# Patient Record
Sex: FEMALE | Race: WHITE | NOT HISPANIC OR LATINO | Employment: OTHER | URBAN - METROPOLITAN AREA
[De-identification: names, ages, dates, MRNs, and addresses within clinical notes are randomized per-mention and may not be internally consistent; named-entity substitution may affect disease eponyms.]

---

## 2017-01-02 ENCOUNTER — TELEPHONE (OUTPATIENT)
Dept: INPATIENT UNIT | Facility: HOSPITAL | Age: 77
End: 2017-01-02

## 2017-01-03 ENCOUNTER — HOSPITAL ENCOUNTER (OUTPATIENT)
Dept: RADIOLOGY | Facility: HOSPITAL | Age: 77
Discharge: HOME | End: 2017-01-03
Attending: INTERNAL MEDICINE | Admitting: INTERNAL MEDICINE
Payer: COMMERCIAL

## 2017-01-03 ENCOUNTER — GENERIC CONVERSION - ENCOUNTER (OUTPATIENT)
Dept: OTHER | Facility: OTHER | Age: 77
End: 2017-01-03

## 2017-01-03 VITALS
RESPIRATION RATE: 18 BRPM | HEART RATE: 96 BPM | OXYGEN SATURATION: 94 % | BODY MASS INDEX: 30.21 KG/M2 | SYSTOLIC BLOOD PRESSURE: 115 MMHG | DIASTOLIC BLOOD PRESSURE: 56 MMHG | WEIGHT: 160 LBS | HEIGHT: 61 IN | TEMPERATURE: 98.1 F

## 2017-01-03 DIAGNOSIS — R16.0 ENLARGED LIVER: ICD-10-CM

## 2017-01-03 LAB
INR PPP: 0.96 (ref 0.86–1.16)
PROTHROMBIN TIME: 12.9 SECONDS (ref 12–14.3)

## 2017-01-03 PROCEDURE — 88342 IMHCHEM/IMCYTCHM 1ST ANTB: CPT | Performed by: INTERNAL MEDICINE

## 2017-01-03 PROCEDURE — 88307 TISSUE EXAM BY PATHOLOGIST: CPT | Performed by: INTERNAL MEDICINE

## 2017-01-03 PROCEDURE — 88333 PATH CONSLTJ SURG CYTO XM 1: CPT | Performed by: INTERNAL MEDICINE

## 2017-01-03 PROCEDURE — 88334 PATH CONSLTJ SURG CYTO XM EA: CPT | Performed by: INTERNAL MEDICINE

## 2017-01-03 PROCEDURE — 85610 PROTHROMBIN TIME: CPT | Performed by: RADIOLOGY

## 2017-01-03 PROCEDURE — 88367 INSITU HYBRIDIZATION AUTO: CPT | Performed by: INTERNAL MEDICINE

## 2017-01-03 PROCEDURE — 77012 CT SCAN FOR NEEDLE BIOPSY: CPT

## 2017-01-03 PROCEDURE — 88341 IMHCHEM/IMCYTCHM EA ADD ANTB: CPT | Performed by: INTERNAL MEDICINE

## 2017-01-03 PROCEDURE — 88361 TUMOR IMMUNOHISTOCHEM/COMPUT: CPT | Performed by: INTERNAL MEDICINE

## 2017-01-03 PROCEDURE — 47000 NEEDLE BIOPSY OF LIVER PERQ: CPT

## 2017-01-03 RX ORDER — HYDROCODONE BITARTRATE AND ACETAMINOPHEN 5; 325 MG/1; MG/1
1 TABLET ORAL EVERY 6 HOURS PRN
Status: DISCONTINUED | OUTPATIENT
Start: 2017-01-03 | End: 2017-01-03 | Stop reason: HOSPADM

## 2017-01-03 RX ORDER — MIDAZOLAM HYDROCHLORIDE 1 MG/ML
INJECTION INTRAMUSCULAR; INTRAVENOUS CODE/TRAUMA/SEDATION MEDICATION
Status: COMPLETED | OUTPATIENT
Start: 2017-01-03 | End: 2017-01-03

## 2017-01-03 RX ORDER — SODIUM CHLORIDE 9 MG/ML
75 INJECTION, SOLUTION INTRAVENOUS CONTINUOUS
Status: DISCONTINUED | OUTPATIENT
Start: 2017-01-03 | End: 2017-01-03 | Stop reason: HOSPADM

## 2017-01-03 RX ORDER — FENTANYL CITRATE 50 UG/ML
INJECTION, SOLUTION INTRAMUSCULAR; INTRAVENOUS CODE/TRAUMA/SEDATION MEDICATION
Status: COMPLETED | OUTPATIENT
Start: 2017-01-03 | End: 2017-01-03

## 2017-01-03 RX ORDER — ONDANSETRON 2 MG/ML
INJECTION INTRAMUSCULAR; INTRAVENOUS
Status: COMPLETED
Start: 2017-01-03 | End: 2017-01-03

## 2017-01-03 RX ORDER — ONDANSETRON 2 MG/ML
4 INJECTION INTRAMUSCULAR; INTRAVENOUS EVERY 6 HOURS PRN
Status: DISCONTINUED | OUTPATIENT
Start: 2017-01-03 | End: 2017-01-03 | Stop reason: HOSPADM

## 2017-01-03 RX ADMIN — MIDAZOLAM HYDROCHLORIDE 1 MG: 1 INJECTION, SOLUTION INTRAMUSCULAR; INTRAVENOUS at 08:46

## 2017-01-03 RX ADMIN — ONDANSETRON: 2 INJECTION INTRAMUSCULAR; INTRAVENOUS at 11:18

## 2017-01-03 RX ADMIN — FENTANYL CITRATE 50 MCG: 50 INJECTION, SOLUTION INTRAMUSCULAR; INTRAVENOUS at 08:46

## 2017-01-05 ENCOUNTER — HOSPITAL ENCOUNTER (INPATIENT)
Facility: HOSPITAL | Age: 77
LOS: 1 days | Discharge: HOME | DRG: 920 | End: 2017-01-06
Attending: INTERNAL MEDICINE | Admitting: INTERNAL MEDICINE
Payer: COMMERCIAL

## 2017-01-05 ENCOUNTER — APPOINTMENT (EMERGENCY)
Dept: CT IMAGING | Facility: HOSPITAL | Age: 77
DRG: 920 | End: 2017-01-05
Payer: COMMERCIAL

## 2017-01-05 ENCOUNTER — APPOINTMENT (EMERGENCY)
Dept: RADIOLOGY | Facility: HOSPITAL | Age: 77
DRG: 920 | End: 2017-01-05
Payer: COMMERCIAL

## 2017-01-05 PROBLEM — C50.911: Chronic | Status: ACTIVE | Noted: 2017-01-05

## 2017-01-05 PROBLEM — C50.919 METASTATIC BREAST CANCER (HCC): Chronic | Status: ACTIVE | Noted: 2017-01-05

## 2017-01-05 PROBLEM — C78.7: Chronic | Status: ACTIVE | Noted: 2017-01-05

## 2017-01-05 PROBLEM — K76.89 SUBCAPSULAR HEMATOMA OF LIVER: Status: ACTIVE | Noted: 2017-01-05

## 2017-01-05 PROBLEM — R10.9 ABDOMINAL PAIN: Status: ACTIVE | Noted: 2017-01-05

## 2017-01-05 PROBLEM — R06.02 SOB (SHORTNESS OF BREATH) ON EXERTION: Chronic | Status: ACTIVE | Noted: 2017-01-05

## 2017-01-05 LAB
ALBUMIN SERPL BCP-MCNC: 3.4 G/DL (ref 3.5–5)
ALP SERPL-CCNC: 81 U/L (ref 46–116)
ALT SERPL W P-5'-P-CCNC: 50 U/L (ref 12–78)
ANION GAP SERPL CALCULATED.3IONS-SCNC: 7 MMOL/L (ref 4–13)
APTT PPP: 28 SECONDS (ref 24–36)
AST SERPL W P-5'-P-CCNC: 31 U/L (ref 5–45)
BASOPHILS # BLD AUTO: 0.06 THOUSANDS/ΜL (ref 0–0.1)
BASOPHILS NFR BLD AUTO: 1 % (ref 0–1)
BILIRUB SERPL-MCNC: 0.93 MG/DL (ref 0.2–1)
BILIRUB UR QL STRIP: NEGATIVE
BUN SERPL-MCNC: 15 MG/DL (ref 5–25)
CALCIUM SERPL-MCNC: 9.1 MG/DL (ref 8.3–10.1)
CHLORIDE SERPL-SCNC: 107 MMOL/L (ref 100–108)
CLARITY UR: CLEAR
CLARITY, POC: CLEAR
CO2 SERPL-SCNC: 31 MMOL/L (ref 21–32)
COLOR UR: YELLOW
COLOR, POC: YELLOW
CREAT SERPL-MCNC: 0.72 MG/DL (ref 0.6–1.3)
EOSINOPHIL # BLD AUTO: 0.09 THOUSAND/ΜL (ref 0–0.61)
EOSINOPHIL NFR BLD AUTO: 2 % (ref 0–6)
ERYTHROCYTE [DISTWIDTH] IN BLOOD BY AUTOMATED COUNT: 13 % (ref 11.6–15.1)
GFR SERPL CREATININE-BSD FRML MDRD: >60 ML/MIN/1.73SQ M
GLUCOSE SERPL-MCNC: 136 MG/DL (ref 65–140)
GLUCOSE UR STRIP-MCNC: NEGATIVE MG/DL
HCT VFR BLD AUTO: 35.3 % (ref 34.8–46.1)
HGB BLD-MCNC: 12.1 G/DL (ref 11.5–15.4)
HGB UR QL STRIP.AUTO: NEGATIVE
INR PPP: 1.03 (ref 0.86–1.16)
KETONES UR STRIP-MCNC: NEGATIVE MG/DL
LEUKOCYTE ESTERASE UR QL STRIP: NEGATIVE
LIPASE SERPL-CCNC: 64 U/L (ref 73–393)
LYMPHOCYTES # BLD AUTO: 1.33 THOUSANDS/ΜL (ref 0.6–4.47)
LYMPHOCYTES NFR BLD AUTO: 25 % (ref 14–44)
MCH RBC QN AUTO: 35.4 PG (ref 26.8–34.3)
MCHC RBC AUTO-ENTMCNC: 34.3 G/DL (ref 31.4–37.4)
MCV RBC AUTO: 103 FL (ref 82–98)
MONOCYTES # BLD AUTO: 0.29 THOUSAND/ΜL (ref 0.17–1.22)
MONOCYTES NFR BLD AUTO: 5 % (ref 4–12)
NEUTROPHILS # BLD AUTO: 3.59 THOUSANDS/ΜL (ref 1.85–7.62)
NEUTS SEG NFR BLD AUTO: 67 % (ref 43–75)
NITRITE UR QL STRIP: NEGATIVE
NRBC BLD AUTO-RTO: 0 /100 WBCS
PH UR STRIP.AUTO: 6.5 [PH] (ref 4.5–8)
PLATELET # BLD AUTO: 184 THOUSANDS/UL (ref 149–390)
PMV BLD AUTO: 9.5 FL (ref 8.9–12.7)
POTASSIUM SERPL-SCNC: 4 MMOL/L (ref 3.5–5.3)
PROT SERPL-MCNC: 6.5 G/DL (ref 6.4–8.2)
PROT UR STRIP-MCNC: NEGATIVE MG/DL
PROTHROMBIN TIME: 13.5 SECONDS (ref 12–14.3)
RBC # BLD AUTO: 3.42 MILLION/UL (ref 3.81–5.12)
SODIUM SERPL-SCNC: 145 MMOL/L (ref 136–145)
SP GR UR STRIP.AUTO: 1.01 (ref 1–1.03)
UROBILINOGEN UR QL STRIP.AUTO: 0.2 E.U./DL
WBC # BLD AUTO: 5.36 THOUSAND/UL (ref 4.31–10.16)

## 2017-01-05 PROCEDURE — 80053 COMPREHEN METABOLIC PANEL: CPT | Performed by: PHYSICIAN ASSISTANT

## 2017-01-05 PROCEDURE — 81003 URINALYSIS AUTO W/O SCOPE: CPT

## 2017-01-05 PROCEDURE — 71020 HB CHEST X-RAY 2VW FRONTAL&LATL: CPT

## 2017-01-05 PROCEDURE — 85025 COMPLETE CBC W/AUTO DIFF WBC: CPT | Performed by: PHYSICIAN ASSISTANT

## 2017-01-05 PROCEDURE — 74177 CT ABD & PELVIS W/CONTRAST: CPT

## 2017-01-05 PROCEDURE — 85730 THROMBOPLASTIN TIME PARTIAL: CPT | Performed by: PHYSICIAN ASSISTANT

## 2017-01-05 PROCEDURE — 81002 URINALYSIS NONAUTO W/O SCOPE: CPT | Performed by: PHYSICIAN ASSISTANT

## 2017-01-05 PROCEDURE — 99285 EMERGENCY DEPT VISIT HI MDM: CPT

## 2017-01-05 PROCEDURE — 83690 ASSAY OF LIPASE: CPT | Performed by: PHYSICIAN ASSISTANT

## 2017-01-05 PROCEDURE — 96375 TX/PRO/DX INJ NEW DRUG ADDON: CPT

## 2017-01-05 PROCEDURE — 71275 CT ANGIOGRAPHY CHEST: CPT

## 2017-01-05 PROCEDURE — 85610 PROTHROMBIN TIME: CPT | Performed by: PHYSICIAN ASSISTANT

## 2017-01-05 PROCEDURE — 36415 COLL VENOUS BLD VENIPUNCTURE: CPT | Performed by: PHYSICIAN ASSISTANT

## 2017-01-05 PROCEDURE — 96374 THER/PROPH/DIAG INJ IV PUSH: CPT

## 2017-01-05 RX ORDER — POLYETHYLENE GLYCOL 3350 17 G/17G
17 POWDER, FOR SOLUTION ORAL DAILY PRN
Status: DISCONTINUED | OUTPATIENT
Start: 2017-01-05 | End: 2017-01-06 | Stop reason: HOSPADM

## 2017-01-05 RX ORDER — CALCIUM CARBONATE 500(1250)
500 TABLET ORAL 2 TIMES DAILY WITH MEALS
Status: DISCONTINUED | OUTPATIENT
Start: 2017-01-05 | End: 2017-01-06 | Stop reason: HOSPADM

## 2017-01-05 RX ORDER — MORPHINE SULFATE 2 MG/ML
2 INJECTION, SOLUTION INTRAMUSCULAR; INTRAVENOUS ONCE
Status: COMPLETED | OUTPATIENT
Start: 2017-01-05 | End: 2017-01-05

## 2017-01-05 RX ORDER — ONDANSETRON 2 MG/ML
4 INJECTION INTRAMUSCULAR; INTRAVENOUS ONCE
Status: COMPLETED | OUTPATIENT
Start: 2017-01-05 | End: 2017-01-05

## 2017-01-05 RX ORDER — HYDROCODONE BITARTRATE AND ACETAMINOPHEN 5; 325 MG/1; MG/1
1 TABLET ORAL EVERY 6 HOURS PRN
Status: DISCONTINUED | OUTPATIENT
Start: 2017-01-05 | End: 2017-01-05

## 2017-01-05 RX ORDER — MORPHINE SULFATE 2 MG/ML
2 INJECTION, SOLUTION INTRAMUSCULAR; INTRAVENOUS EVERY 4 HOURS PRN
Status: DISCONTINUED | OUTPATIENT
Start: 2017-01-05 | End: 2017-01-06 | Stop reason: HOSPADM

## 2017-01-05 RX ORDER — DOCUSATE SODIUM 100 MG/1
100 CAPSULE, LIQUID FILLED ORAL 2 TIMES DAILY
Status: DISCONTINUED | OUTPATIENT
Start: 2017-01-05 | End: 2017-01-06 | Stop reason: HOSPADM

## 2017-01-05 RX ORDER — OXYCODONE HYDROCHLORIDE 5 MG/1
5 TABLET ORAL EVERY 4 HOURS PRN
Status: DISCONTINUED | OUTPATIENT
Start: 2017-01-05 | End: 2017-01-06 | Stop reason: HOSPADM

## 2017-01-05 RX ORDER — ATORVASTATIN CALCIUM 20 MG/1
20 TABLET, FILM COATED ORAL
Status: DISCONTINUED | OUTPATIENT
Start: 2017-01-05 | End: 2017-01-06 | Stop reason: HOSPADM

## 2017-01-05 RX ADMIN — Medication 500 MG: at 18:19

## 2017-01-05 RX ADMIN — SODIUM CHLORIDE 500 ML: 0.9 INJECTION, SOLUTION INTRAVENOUS at 06:34

## 2017-01-05 RX ADMIN — ONDANSETRON 4 MG: 2 INJECTION INTRAMUSCULAR; INTRAVENOUS at 06:37

## 2017-01-05 RX ADMIN — ATORVASTATIN CALCIUM 20 MG: 20 TABLET, FILM COATED ORAL at 18:19

## 2017-01-05 RX ADMIN — IOHEXOL 100 ML: 350 INJECTION, SOLUTION INTRAVENOUS at 07:55

## 2017-01-05 RX ADMIN — MORPHINE SULFATE 2 MG: 2 INJECTION, SOLUTION INTRAMUSCULAR; INTRAVENOUS at 06:37

## 2017-01-05 RX ADMIN — DOCUSATE SODIUM 100 MG: 100 CAPSULE, LIQUID FILLED ORAL at 13:43

## 2017-01-06 VITALS
HEIGHT: 61 IN | HEART RATE: 93 BPM | SYSTOLIC BLOOD PRESSURE: 129 MMHG | WEIGHT: 165.34 LBS | DIASTOLIC BLOOD PRESSURE: 77 MMHG | RESPIRATION RATE: 20 BRPM | OXYGEN SATURATION: 90 % | TEMPERATURE: 96.8 F | BODY MASS INDEX: 31.22 KG/M2

## 2017-01-06 LAB
ERYTHROCYTE [DISTWIDTH] IN BLOOD BY AUTOMATED COUNT: 13.1 % (ref 11.6–15.1)
HCT VFR BLD AUTO: 35.3 % (ref 34.8–46.1)
HGB BLD-MCNC: 11.6 G/DL (ref 11.5–15.4)
MCH RBC QN AUTO: 34.2 PG (ref 26.8–34.3)
MCHC RBC AUTO-ENTMCNC: 32.9 G/DL (ref 31.4–37.4)
MCV RBC AUTO: 104 FL (ref 82–98)
PLATELET # BLD AUTO: 195 THOUSANDS/UL (ref 149–390)
PMV BLD AUTO: 9.5 FL (ref 8.9–12.7)
RBC # BLD AUTO: 3.39 MILLION/UL (ref 3.81–5.12)
WBC # BLD AUTO: 4.55 THOUSAND/UL (ref 4.31–10.16)

## 2017-01-06 PROCEDURE — 85027 COMPLETE CBC AUTOMATED: CPT | Performed by: PHYSICIAN ASSISTANT

## 2017-01-06 RX ORDER — OXYCODONE HYDROCHLORIDE 5 MG/1
5 TABLET ORAL EVERY 8 HOURS PRN
Qty: 20 TABLET | Refills: 0 | Status: SHIPPED | OUTPATIENT
Start: 2017-01-06 | End: 2017-07-24 | Stop reason: HOSPADM

## 2017-01-06 RX ADMIN — Medication 500 MG: at 08:44

## 2017-01-06 RX ADMIN — DOCUSATE SODIUM 100 MG: 100 CAPSULE, LIQUID FILLED ORAL at 08:44

## 2017-01-17 ENCOUNTER — ALLSCRIPTS OFFICE VISIT (OUTPATIENT)
Dept: OTHER | Facility: OTHER | Age: 77
End: 2017-01-17

## 2017-01-17 ENCOUNTER — TRANSCRIBE ORDERS (OUTPATIENT)
Dept: ADMINISTRATIVE | Facility: HOSPITAL | Age: 77
End: 2017-01-17

## 2017-01-17 DIAGNOSIS — C50.911 MALIGNANT NEOPLASM OF RIGHT FEMALE BREAST (HCC): ICD-10-CM

## 2017-01-17 DIAGNOSIS — C50.911 MALIGNANT NEOPLASM OF RIGHT FEMALE BREAST, UNSPECIFIED SITE OF BREAST: Primary | ICD-10-CM

## 2017-01-17 DIAGNOSIS — Z51.11 ENCOUNTER FOR ANTINEOPLASTIC CHEMOTHERAPY: ICD-10-CM

## 2017-01-19 ENCOUNTER — HOSPITAL ENCOUNTER (OUTPATIENT)
Dept: NUCLEAR MEDICINE | Facility: HOSPITAL | Age: 77
Discharge: HOME | End: 2017-01-19
Attending: INTERNAL MEDICINE
Payer: COMMERCIAL

## 2017-01-19 DIAGNOSIS — C50.911 MALIGNANT NEOPLASM OF RIGHT FEMALE BREAST (HCC): ICD-10-CM

## 2017-01-19 DIAGNOSIS — Z51.11 ENCOUNTER FOR ANTINEOPLASTIC CHEMOTHERAPY: ICD-10-CM

## 2017-01-19 PROCEDURE — 78472 GATED HEART PLANAR SINGLE: CPT

## 2017-01-19 PROCEDURE — A9560 TC99M LABELED RBC: HCPCS

## 2017-01-24 RX ORDER — SODIUM CHLORIDE 9 MG/ML
20 INJECTION, SOLUTION INTRAVENOUS CONTINUOUS
Status: DISCONTINUED | OUTPATIENT
Start: 2017-01-25 | End: 2017-01-28 | Stop reason: HOSPADM

## 2017-01-24 RX ORDER — LAMOTRIGINE 25 MG/1
250 TABLET ORAL ONCE
Status: COMPLETED | OUTPATIENT
Start: 2017-01-25 | End: 2017-01-25

## 2017-01-25 ENCOUNTER — HOSPITAL ENCOUNTER (OUTPATIENT)
Dept: INFUSION CENTER | Facility: CLINIC | Age: 77
Discharge: HOME | End: 2017-01-25
Payer: COMMERCIAL

## 2017-01-25 VITALS
HEART RATE: 96 BPM | TEMPERATURE: 97 F | DIASTOLIC BLOOD PRESSURE: 80 MMHG | SYSTOLIC BLOOD PRESSURE: 138 MMHG | WEIGHT: 168.43 LBS | BODY MASS INDEX: 31.82 KG/M2 | RESPIRATION RATE: 20 BRPM

## 2017-01-25 PROCEDURE — 96402 CHEMO HORMON ANTINEOPL SQ/IM: CPT

## 2017-01-25 PROCEDURE — 96413 CHEMO IV INFUSION 1 HR: CPT

## 2017-01-25 PROCEDURE — 96417 CHEMO IV INFUS EACH ADDL SEQ: CPT

## 2017-01-25 RX ADMIN — SODIUM CHLORIDE 20 ML/HR: 0.9 INJECTION, SOLUTION INTRAVENOUS at 13:30

## 2017-01-25 RX ADMIN — FULVESTRANT 250 MG: 50 INJECTION INTRAMUSCULAR at 16:02

## 2017-01-25 RX ADMIN — Medication 602 MG: at 14:32

## 2017-01-25 RX ADMIN — PERTUZUMAB 840 MG: 30 INJECTION, SOLUTION, CONCENTRATE INTRAVENOUS at 13:17

## 2017-01-25 NOTE — PLAN OF CARE
Problem: Potential for Falls  Goal: Patient will remain free of falls  INTERVENTIONS:  - Assess patient frequently for physical needs  - Identify cognitive and physical deficits and behaviors that affect risk of falls    - [place] fall precautions as indicated by assessment   - Educate patient/family on patient safety including physical limitations  - Instruct patient to call for assistance with activity based on assessment  - Modify environment to reduce risk of injury  - Consider OT/PT consult to assist with strengthening/mobility   Outcome: Progressing

## 2017-02-14 RX ORDER — SODIUM CHLORIDE 9 MG/ML
20 INJECTION, SOLUTION INTRAVENOUS CONTINUOUS
Status: DISCONTINUED | OUTPATIENT
Start: 2017-02-15 | End: 2017-02-18 | Stop reason: HOSPADM

## 2017-02-15 ENCOUNTER — HOSPITAL ENCOUNTER (OUTPATIENT)
Dept: INFUSION CENTER | Facility: CLINIC | Age: 77
Discharge: HOME | End: 2017-02-15
Payer: COMMERCIAL

## 2017-02-15 VITALS
TEMPERATURE: 97.5 F | DIASTOLIC BLOOD PRESSURE: 69 MMHG | BODY MASS INDEX: 31.24 KG/M2 | SYSTOLIC BLOOD PRESSURE: 146 MMHG | HEART RATE: 94 BPM | RESPIRATION RATE: 18 BRPM | WEIGHT: 165.34 LBS

## 2017-02-15 PROCEDURE — 96417 CHEMO IV INFUS EACH ADDL SEQ: CPT

## 2017-02-15 PROCEDURE — 96413 CHEMO IV INFUSION 1 HR: CPT

## 2017-02-15 RX ADMIN — PERTUZUMAB 420 MG: 30 INJECTION, SOLUTION, CONCENTRATE INTRAVENOUS at 10:47

## 2017-02-15 RX ADMIN — Medication 452 MG: at 11:21

## 2017-02-15 RX ADMIN — SODIUM CHLORIDE 20 ML/HR: 0.9 INJECTION, SOLUTION INTRAVENOUS at 10:12

## 2017-02-15 NOTE — PLAN OF CARE
Problem: Potential for Falls  Goal: Patient will remain free of falls  INTERVENTIONS:  - Assess patient frequently for physical needs  - Identify cognitive and physical deficits and behaviors that affect risk of falls    - [place] fall precautions as indicated by assessment   - Educate patient/family on patient safety including physical limitations  - Instruct patient to call for assistance with activity based on assessment  - Modify environment to reduce risk of injury  - Consider OT/PT consult to assist with strengthening/mobility   Outcome: Progressing

## 2017-02-15 NOTE — PROGRESS NOTES
Patient arrived to the infusion center for cycle 2 of Perjeta/Herceptin  Pt reports she has had intermittent diarrhea, itching and decreased appetite since her first infusion  Patient did not notify Dr Sam Calvo office  I spoke to Nathalie Severe RN with Dr Beni Bañuelos and reported patient's complaints  Per Crystal, pt instructed to take imodium as needed for diarrhea and benadryl as needed for itching  Pt also instructed to contact Dr Sam Calvo office if her symptoms persist or worsen after today's infusion  Pt verbalized understanding

## 2017-02-17 ENCOUNTER — HOSPITAL ENCOUNTER (OUTPATIENT)
Dept: ULTRASOUND IMAGING | Facility: HOSPITAL | Age: 77
Discharge: HOME | End: 2017-02-17
Attending: INTERNAL MEDICINE
Payer: COMMERCIAL

## 2017-02-17 DIAGNOSIS — C50.911 MALIGNANT NEOPLASM OF RIGHT FEMALE BREAST, UNSPECIFIED SITE OF BREAST: ICD-10-CM

## 2017-02-17 DIAGNOSIS — Z51.11 ENCOUNTER FOR ANTINEOPLASTIC CHEMOTHERAPY: ICD-10-CM

## 2017-02-17 DIAGNOSIS — C50.911 MALIGNANT NEOPLASM OF RIGHT FEMALE BREAST (HCC): ICD-10-CM

## 2017-02-17 PROCEDURE — 76705 ECHO EXAM OF ABDOMEN: CPT

## 2017-02-20 RX ORDER — LAMOTRIGINE 25 MG/1
250 TABLET ORAL ONCE
Status: COMPLETED | OUTPATIENT
Start: 2017-02-21 | End: 2017-02-21

## 2017-02-21 ENCOUNTER — HOSPITAL ENCOUNTER (OUTPATIENT)
Dept: INFUSION CENTER | Facility: CLINIC | Age: 77
Discharge: HOME | End: 2017-02-21
Payer: COMMERCIAL

## 2017-02-21 ENCOUNTER — ALLSCRIPTS OFFICE VISIT (OUTPATIENT)
Dept: OTHER | Facility: OTHER | Age: 77
End: 2017-02-21

## 2017-02-21 PROCEDURE — 96402 CHEMO HORMON ANTINEOPL SQ/IM: CPT

## 2017-02-21 RX ADMIN — FULVESTRANT 250 MG: 50 INJECTION INTRAMUSCULAR at 12:44

## 2017-02-21 NOTE — PROGRESS NOTES
Pt given Faslodex IM as ordered  Pt just came from Dr Stu Sears office and states she does not need labs drawn today as written on the orders  No parameters for Faslodex

## 2017-02-21 NOTE — PLAN OF CARE
Problem: Potential for Falls  Goal: Patient will remain free of falls  INTERVENTIONS:  - Assess patient frequently for physical needs  - Identify cognitive and physical deficits and behaviors that affect risk of falls    - [place] fall precautions as indicated by assessment   - Educate patient/family on patient safety including physical limitations  - Instruct patient to call for assistance with activity based on assessment  - Modify environment to reduce risk of injury  - Consider OT/PT consult to assist with strengthening/mobility   Outcome: Progressing

## 2017-02-24 ENCOUNTER — GENERIC CONVERSION - ENCOUNTER (OUTPATIENT)
Dept: OTHER | Facility: OTHER | Age: 77
End: 2017-02-24

## 2017-03-07 RX ORDER — SODIUM CHLORIDE 9 MG/ML
20 INJECTION, SOLUTION INTRAVENOUS CONTINUOUS
Status: DISCONTINUED | OUTPATIENT
Start: 2017-03-08 | End: 2017-03-11 | Stop reason: HOSPADM

## 2017-03-08 ENCOUNTER — HOSPITAL ENCOUNTER (OUTPATIENT)
Dept: INFUSION CENTER | Facility: CLINIC | Age: 77
Discharge: HOME | End: 2017-03-08
Payer: COMMERCIAL

## 2017-03-08 VITALS
WEIGHT: 163.47 LBS | HEART RATE: 89 BPM | RESPIRATION RATE: 16 BRPM | SYSTOLIC BLOOD PRESSURE: 123 MMHG | TEMPERATURE: 97 F | DIASTOLIC BLOOD PRESSURE: 69 MMHG | BODY MASS INDEX: 30.89 KG/M2

## 2017-03-08 PROCEDURE — 96417 CHEMO IV INFUS EACH ADDL SEQ: CPT

## 2017-03-08 PROCEDURE — 96413 CHEMO IV INFUSION 1 HR: CPT

## 2017-03-08 RX ADMIN — TRASTUZUMAB 452 MG: KIT at 11:17

## 2017-03-08 RX ADMIN — SODIUM CHLORIDE 20 ML/HR: 0.9 INJECTION, SOLUTION INTRAVENOUS at 10:38

## 2017-03-08 RX ADMIN — PERTUZUMAB 420 MG: 30 INJECTION, SOLUTION, CONCENTRATE INTRAVENOUS at 10:40

## 2017-03-08 NOTE — PLAN OF CARE
Problem: Potential for Falls  Goal: Patient will remain free of falls  INTERVENTIONS:  - Assess patient frequently for physical needs  - Identify cognitive and physical deficits and behaviors that affect risk of falls    - [place] fall precautions as indicated by assessment   - Educate patient/family on patient safety including physical limitations  - Instruct patient to call for assistance with activity based on assessment  - Modify environment to reduce risk of injury  - Consider OT/PT consult to assist with strengthening/mobility   Outcome: Progressing

## 2017-03-20 RX ORDER — LAMOTRIGINE 25 MG/1
250 TABLET ORAL ONCE
Status: COMPLETED | OUTPATIENT
Start: 2017-03-21 | End: 2017-03-21

## 2017-03-21 ENCOUNTER — TRANSCRIBE ORDERS (OUTPATIENT)
Dept: ADMINISTRATIVE | Facility: HOSPITAL | Age: 77
End: 2017-03-21

## 2017-03-21 ENCOUNTER — ALLSCRIPTS OFFICE VISIT (OUTPATIENT)
Dept: OTHER | Facility: OTHER | Age: 77
End: 2017-03-21

## 2017-03-21 ENCOUNTER — HOSPITAL ENCOUNTER (OUTPATIENT)
Dept: INFUSION CENTER | Facility: CLINIC | Age: 77
Discharge: HOME | End: 2017-03-21
Payer: COMMERCIAL

## 2017-03-21 VITALS
DIASTOLIC BLOOD PRESSURE: 71 MMHG | TEMPERATURE: 98.1 F | RESPIRATION RATE: 20 BRPM | SYSTOLIC BLOOD PRESSURE: 111 MMHG | HEART RATE: 88 BPM

## 2017-03-21 DIAGNOSIS — C78.7 SECONDARY MALIGNANT NEOPLASM OF LIVER (HCC): Primary | ICD-10-CM

## 2017-03-21 PROCEDURE — 96402 CHEMO HORMON ANTINEOPL SQ/IM: CPT

## 2017-03-21 RX ADMIN — FULVESTRANT 250 MG: 50 INJECTION INTRAMUSCULAR at 13:09

## 2017-03-21 RX ADMIN — FULVESTRANT 250 MG: 50 INJECTION INTRAMUSCULAR at 13:10

## 2017-03-21 NOTE — PLAN OF CARE
Problem: Potential for Falls  Goal: Patient will remain free of falls  INTERVENTIONS:  - Assess patient frequently for physical needs  - Identify cognitive and physical deficits and behaviors that affect risk of falls    - [place] fall precautions as indicated by assessment   - Educate patient/family on patient safety including physical limitations  - Instruct patient to call for assistance with activity based on assessment  - Modify environment to reduce risk of injury  - Consider OT/PT consult to assist with strengthening/mobility   Outcome: Progressing

## 2017-03-28 RX ORDER — SODIUM CHLORIDE 9 MG/ML
20 INJECTION, SOLUTION INTRAVENOUS CONTINUOUS
Status: DISCONTINUED | OUTPATIENT
Start: 2017-03-29 | End: 2017-04-01 | Stop reason: HOSPADM

## 2017-03-29 ENCOUNTER — HOSPITAL ENCOUNTER (OUTPATIENT)
Dept: INFUSION CENTER | Facility: CLINIC | Age: 77
Discharge: HOME | End: 2017-03-29
Payer: COMMERCIAL

## 2017-03-29 VITALS
WEIGHT: 162.04 LBS | BODY MASS INDEX: 30.62 KG/M2 | SYSTOLIC BLOOD PRESSURE: 114 MMHG | DIASTOLIC BLOOD PRESSURE: 59 MMHG | RESPIRATION RATE: 18 BRPM | TEMPERATURE: 97.5 F

## 2017-03-29 PROCEDURE — 96417 CHEMO IV INFUS EACH ADDL SEQ: CPT

## 2017-03-29 PROCEDURE — 96413 CHEMO IV INFUSION 1 HR: CPT

## 2017-03-29 RX ADMIN — PERTUZUMAB 420 MG: 30 INJECTION, SOLUTION, CONCENTRATE INTRAVENOUS at 10:36

## 2017-03-29 RX ADMIN — SODIUM CHLORIDE 20 ML/HR: 0.9 INJECTION, SOLUTION INTRAVENOUS at 10:24

## 2017-03-29 RX ADMIN — TRASTUZUMAB 438 MG: KIT at 11:15

## 2017-03-29 NOTE — PLAN OF CARE
Problem: Potential for Falls  Goal: Patient will remain free of falls  INTERVENTIONS:  - Assess patient frequently for physical needs  - Identify cognitive and physical deficits and behaviors that affect risk of falls    - [place] fall precautions as indicated by assessment   - Educate patient/family on patient safety including physical limitations  - Instruct patient to call for assistance with activity based on assessment  - Modify environment to reduce risk of injury  - Consider OT/PT consult to assist with strengthening/mobility   Outcome: Progressing

## 2017-04-02 ENCOUNTER — GENERIC CONVERSION - ENCOUNTER (OUTPATIENT)
Dept: OTHER | Facility: OTHER | Age: 77
End: 2017-04-02

## 2017-04-02 ENCOUNTER — APPOINTMENT (EMERGENCY)
Dept: CT IMAGING | Facility: HOSPITAL | Age: 77
End: 2017-04-02
Payer: COMMERCIAL

## 2017-04-02 ENCOUNTER — HOSPITAL ENCOUNTER (EMERGENCY)
Facility: HOSPITAL | Age: 77
Discharge: LEFT BEFORE DISCHARGE | End: 2017-04-03
Attending: EMERGENCY MEDICINE
Payer: COMMERCIAL

## 2017-04-02 ENCOUNTER — APPOINTMENT (EMERGENCY)
Dept: RADIOLOGY | Facility: HOSPITAL | Age: 77
End: 2017-04-02
Payer: COMMERCIAL

## 2017-04-02 VITALS
DIASTOLIC BLOOD PRESSURE: 63 MMHG | TEMPERATURE: 98.8 F | RESPIRATION RATE: 23 BRPM | OXYGEN SATURATION: 95 % | HEART RATE: 82 BPM | SYSTOLIC BLOOD PRESSURE: 131 MMHG

## 2017-04-02 DIAGNOSIS — R55 SYNCOPE: Primary | ICD-10-CM

## 2017-04-02 LAB
ANION GAP SERPL CALCULATED.3IONS-SCNC: 6 MMOL/L (ref 4–13)
ATRIAL RATE: 84 BPM
BASOPHILS # BLD AUTO: 0.09 THOUSANDS/ΜL (ref 0–0.1)
BASOPHILS NFR BLD AUTO: 2 % (ref 0–1)
BILIRUB UR QL STRIP: NEGATIVE
BUN SERPL-MCNC: 16 MG/DL (ref 5–25)
CALCIUM SERPL-MCNC: 9.5 MG/DL (ref 8.3–10.1)
CHLORIDE SERPL-SCNC: 104 MMOL/L (ref 100–108)
CLARITY UR: CLEAR
CO2 SERPL-SCNC: 30 MMOL/L (ref 21–32)
COLOR UR: YELLOW
CREAT SERPL-MCNC: 0.82 MG/DL (ref 0.6–1.3)
EOSINOPHIL # BLD AUTO: 0.21 THOUSAND/ΜL (ref 0–0.61)
EOSINOPHIL NFR BLD AUTO: 5 % (ref 0–6)
ERYTHROCYTE [DISTWIDTH] IN BLOOD BY AUTOMATED COUNT: 13.7 % (ref 11.6–15.1)
GFR SERPL CREATININE-BSD FRML MDRD: >60 ML/MIN/1.73SQ M
GLUCOSE SERPL-MCNC: 174 MG/DL (ref 65–140)
GLUCOSE UR STRIP-MCNC: NEGATIVE MG/DL
HCT VFR BLD AUTO: 39.1 % (ref 34.8–46.1)
HGB BLD-MCNC: 13.2 G/DL (ref 11.5–15.4)
HGB UR QL STRIP.AUTO: NEGATIVE
KETONES UR STRIP-MCNC: NEGATIVE MG/DL
LEUKOCYTE ESTERASE UR QL STRIP: ABNORMAL
LYMPHOCYTES # BLD AUTO: 1.46 THOUSANDS/ΜL (ref 0.6–4.47)
LYMPHOCYTES NFR BLD AUTO: 34 % (ref 14–44)
MCH RBC QN AUTO: 34.9 PG (ref 26.8–34.3)
MCHC RBC AUTO-ENTMCNC: 33.8 G/DL (ref 31.4–37.4)
MCV RBC AUTO: 103 FL (ref 82–98)
MONOCYTES # BLD AUTO: 0.3 THOUSAND/ΜL (ref 0.17–1.22)
MONOCYTES NFR BLD AUTO: 7 % (ref 4–12)
NEUTROPHILS # BLD AUTO: 2.18 THOUSANDS/ΜL (ref 1.85–7.62)
NEUTS SEG NFR BLD AUTO: 52 % (ref 43–75)
NITRITE UR QL STRIP: NEGATIVE
NRBC BLD AUTO-RTO: 0 /100 WBCS
P AXIS: 63 DEGREES
PH UR STRIP.AUTO: 5.5 [PH] (ref 4.5–8)
PLATELET # BLD AUTO: 253 THOUSANDS/UL (ref 149–390)
PMV BLD AUTO: 9.4 FL (ref 8.9–12.7)
POTASSIUM SERPL-SCNC: 4.2 MMOL/L (ref 3.5–5.3)
PR INTERVAL: 218 MS
PROT UR STRIP-MCNC: NEGATIVE MG/DL
QRS AXIS: 105 DEGREES
QRSD INTERVAL: 140 MS
QT INTERVAL: 416 MS
QTC INTERVAL: 491 MS
RBC # BLD AUTO: 3.78 MILLION/UL (ref 3.81–5.12)
SODIUM SERPL-SCNC: 140 MMOL/L (ref 136–145)
SP GR UR STRIP.AUTO: 1.02 (ref 1–1.03)
SPECIMEN SOURCE: NORMAL
T WAVE AXIS: -26 DEGREES
TROPONIN I BLD-MCNC: 0.01 NG/ML (ref 0–0.08)
UROBILINOGEN UR QL STRIP.AUTO: 0.2 E.U./DL
VENTRICULAR RATE: 84 BPM
WBC # BLD AUTO: 4.24 THOUSAND/UL (ref 4.31–10.16)

## 2017-04-02 PROCEDURE — 80048 BASIC METABOLIC PNL TOTAL CA: CPT | Performed by: EMERGENCY MEDICINE

## 2017-04-02 PROCEDURE — 96360 HYDRATION IV INFUSION INIT: CPT

## 2017-04-02 PROCEDURE — 70450 CT HEAD/BRAIN W/O DYE: CPT

## 2017-04-02 PROCEDURE — 84484 ASSAY OF TROPONIN QUANT: CPT

## 2017-04-02 PROCEDURE — 81002 URINALYSIS NONAUTO W/O SCOPE: CPT | Performed by: EMERGENCY MEDICINE

## 2017-04-02 PROCEDURE — 85025 COMPLETE CBC W/AUTO DIFF WBC: CPT | Performed by: EMERGENCY MEDICINE

## 2017-04-02 PROCEDURE — 36415 COLL VENOUS BLD VENIPUNCTURE: CPT | Performed by: EMERGENCY MEDICINE

## 2017-04-02 PROCEDURE — 93005 ELECTROCARDIOGRAM TRACING: CPT | Performed by: EMERGENCY MEDICINE

## 2017-04-02 PROCEDURE — 71020 HB CHEST X-RAY 2VW FRONTAL&LATL: CPT

## 2017-04-02 RX ADMIN — SODIUM CHLORIDE 500 ML: 0.9 INJECTION, SOLUTION INTRAVENOUS at 22:00

## 2017-04-03 ENCOUNTER — GENERIC CONVERSION - ENCOUNTER (OUTPATIENT)
Dept: OTHER | Facility: OTHER | Age: 77
End: 2017-04-03

## 2017-04-03 LAB
BACTERIA UR QL AUTO: ABNORMAL /HPF
CAOX CRY URNS QL MICRO: ABNORMAL /HPF
CLARITY, POC: CLEAR
COLOR, POC: YELLOW
MUCOUS THREADS UR QL AUTO: ABNORMAL
NON-SQ EPI CELLS URNS QL MICRO: ABNORMAL /HPF
RBC #/AREA URNS AUTO: ABNORMAL /HPF
WBC #/AREA URNS AUTO: ABNORMAL /HPF

## 2017-04-03 PROCEDURE — 81001 URINALYSIS AUTO W/SCOPE: CPT

## 2017-04-03 PROCEDURE — 87086 URINE CULTURE/COLONY COUNT: CPT

## 2017-04-03 PROCEDURE — 99285 EMERGENCY DEPT VISIT HI MDM: CPT

## 2017-04-04 LAB — BACTERIA UR CULT: NORMAL

## 2017-04-06 ENCOUNTER — APPOINTMENT (OUTPATIENT)
Dept: LAB | Facility: MEDICAL CENTER | Age: 77
End: 2017-04-06
Payer: COMMERCIAL

## 2017-04-06 DIAGNOSIS — C50.911 MALIGNANT NEOPLASM OF RIGHT FEMALE BREAST (HCC): ICD-10-CM

## 2017-04-06 LAB
ALBUMIN SERPL BCP-MCNC: 3.5 G/DL (ref 3.5–5)
ALP SERPL-CCNC: 81 U/L (ref 46–116)
ALT SERPL W P-5'-P-CCNC: 23 U/L (ref 12–78)
ANION GAP SERPL CALCULATED.3IONS-SCNC: 6 MMOL/L (ref 4–13)
AST SERPL W P-5'-P-CCNC: 16 U/L (ref 5–45)
BILIRUB SERPL-MCNC: 0.76 MG/DL (ref 0.2–1)
BUN SERPL-MCNC: 13 MG/DL (ref 5–25)
CALCIUM SERPL-MCNC: 9.3 MG/DL (ref 8.3–10.1)
CHLORIDE SERPL-SCNC: 106 MMOL/L (ref 100–108)
CO2 SERPL-SCNC: 30 MMOL/L (ref 21–32)
CREAT SERPL-MCNC: 0.79 MG/DL (ref 0.6–1.3)
GFR SERPL CREATININE-BSD FRML MDRD: >60 ML/MIN/1.73SQ M
GLUCOSE P FAST SERPL-MCNC: 105 MG/DL (ref 65–99)
POTASSIUM SERPL-SCNC: 4.2 MMOL/L (ref 3.5–5.3)
PROT SERPL-MCNC: 7 G/DL (ref 6.4–8.2)
SODIUM SERPL-SCNC: 142 MMOL/L (ref 136–145)

## 2017-04-06 PROCEDURE — 80053 COMPREHEN METABOLIC PANEL: CPT

## 2017-04-06 PROCEDURE — 36415 COLL VENOUS BLD VENIPUNCTURE: CPT

## 2017-04-07 DIAGNOSIS — R19.7 DIARRHEA: ICD-10-CM

## 2017-04-07 DIAGNOSIS — C78.7 SECONDARY MALIGNANT NEOPLASM OF LIVER AND INTRAHEPATIC BILE DUCT (HCC): ICD-10-CM

## 2017-04-07 DIAGNOSIS — C50.919 MALIGNANT NEOPLASM OF FEMALE BREAST (HCC): ICD-10-CM

## 2017-04-10 ENCOUNTER — HOSPITAL ENCOUNTER (OUTPATIENT)
Dept: CT IMAGING | Facility: HOSPITAL | Age: 77
Discharge: HOME | End: 2017-04-10
Attending: INTERNAL MEDICINE
Payer: COMMERCIAL

## 2017-04-10 DIAGNOSIS — C78.7 SECONDARY MALIGNANT NEOPLASM OF LIVER AND INTRAHEPATIC BILE DUCT (HCC): ICD-10-CM

## 2017-04-10 PROCEDURE — 71260 CT THORAX DX C+: CPT

## 2017-04-10 PROCEDURE — 74177 CT ABD & PELVIS W/CONTRAST: CPT

## 2017-04-10 RX ADMIN — IOHEXOL 100 ML: 350 INJECTION, SOLUTION INTRAVENOUS at 15:19

## 2017-04-13 ENCOUNTER — ALLSCRIPTS OFFICE VISIT (OUTPATIENT)
Dept: OTHER | Facility: OTHER | Age: 77
End: 2017-04-13

## 2017-04-17 ENCOUNTER — GENERIC CONVERSION - ENCOUNTER (OUTPATIENT)
Dept: OTHER | Facility: OTHER | Age: 77
End: 2017-04-17

## 2017-04-18 ENCOUNTER — APPOINTMENT (OUTPATIENT)
Dept: LAB | Facility: MEDICAL CENTER | Age: 77
End: 2017-04-18
Payer: COMMERCIAL

## 2017-04-18 DIAGNOSIS — R19.7 DIARRHEA: ICD-10-CM

## 2017-04-18 DIAGNOSIS — C50.919 MALIGNANT NEOPLASM OF FEMALE BREAST (HCC): ICD-10-CM

## 2017-04-18 PROCEDURE — 87046 STOOL CULTR AEROBIC BACT EA: CPT

## 2017-04-18 PROCEDURE — 87015 SPECIMEN INFECT AGNT CONCNTJ: CPT

## 2017-04-18 PROCEDURE — 87045 FECES CULTURE AEROBIC BACT: CPT

## 2017-04-18 PROCEDURE — 87899 AGENT NOS ASSAY W/OPTIC: CPT

## 2017-04-18 PROCEDURE — 87493 C DIFF AMPLIFIED PROBE: CPT

## 2017-04-18 RX ORDER — LAMOTRIGINE 25 MG/1
250 TABLET ORAL ONCE
Status: DISCONTINUED | OUTPATIENT
Start: 2017-04-19 | End: 2017-04-19 | Stop reason: SINTOL

## 2017-04-18 RX ORDER — SODIUM CHLORIDE 9 MG/ML
20 INJECTION, SOLUTION INTRAVENOUS CONTINUOUS
Status: DISCONTINUED | OUTPATIENT
Start: 2017-04-19 | End: 2017-04-19 | Stop reason: SINTOL

## 2017-04-19 ENCOUNTER — HOSPITAL ENCOUNTER (OUTPATIENT)
Dept: INFUSION CENTER | Facility: CLINIC | Age: 77
Discharge: HOME | End: 2017-04-19
Payer: COMMERCIAL

## 2017-04-19 ENCOUNTER — GENERIC CONVERSION - ENCOUNTER (OUTPATIENT)
Dept: OTHER | Facility: OTHER | Age: 77
End: 2017-04-19

## 2017-04-19 LAB — C DIFF TOX GENS STL QL NAA+PROBE: NORMAL

## 2017-04-20 LAB
BACTERIA STL CULT: NORMAL
BACTERIA STL CULT: NORMAL

## 2017-04-25 RX ORDER — LAMOTRIGINE 25 MG/1
250 TABLET ORAL ONCE
Status: COMPLETED | OUTPATIENT
Start: 2017-04-26 | End: 2017-04-26

## 2017-04-26 ENCOUNTER — HOSPITAL ENCOUNTER (OUTPATIENT)
Dept: INFUSION CENTER | Facility: CLINIC | Age: 77
Discharge: HOME | End: 2017-04-26
Payer: COMMERCIAL

## 2017-04-26 VITALS
TEMPERATURE: 97.8 F | RESPIRATION RATE: 18 BRPM | HEART RATE: 105 BPM | DIASTOLIC BLOOD PRESSURE: 98 MMHG | SYSTOLIC BLOOD PRESSURE: 165 MMHG

## 2017-04-26 PROCEDURE — 96402 CHEMO HORMON ANTINEOPL SQ/IM: CPT

## 2017-04-26 RX ORDER — DIPHENOXYLATE HYDROCHLORIDE AND ATROPINE SULFATE 2.5; .025 MG/1; MG/1
1 TABLET ORAL 4 TIMES DAILY PRN
COMMUNITY
End: 2017-07-24 | Stop reason: HOSPADM

## 2017-04-26 RX ADMIN — FULVESTRANT 250 MG: 50 INJECTION INTRAMUSCULAR at 09:55

## 2017-04-26 NOTE — PLAN OF CARE
Problem: Potential for Falls  Goal: Patient will remain free of falls  INTERVENTIONS:  - Assess patient frequently for physical needs  - Identify cognitive and physical deficits and behaviors that affect risk of falls    - [place] fall precautions as indicated by assessment   - Educate patient/family on patient safety including physical limitations  - Instruct patient to call for assistance with activity based on assessment  - Modify environment to reduce risk of injury  - Consider OT/PT consult to assist with strengthening/mobility   Outcome: Progressing

## 2017-05-02 ENCOUNTER — TRANSCRIBE ORDERS (OUTPATIENT)
Dept: ADMINISTRATIVE | Facility: HOSPITAL | Age: 77
End: 2017-05-02

## 2017-05-02 DIAGNOSIS — M81.0 OSTEOPOROSIS, UNSPECIFIED OSTEOPOROSIS TYPE, UNSPECIFIED PATHOLOGICAL FRACTURE PRESENCE: Primary | ICD-10-CM

## 2017-05-03 ENCOUNTER — TRANSCRIBE ORDERS (OUTPATIENT)
Dept: ADMINISTRATIVE | Facility: HOSPITAL | Age: 77
End: 2017-05-03

## 2017-05-03 ENCOUNTER — APPOINTMENT (OUTPATIENT)
Dept: LAB | Facility: MEDICAL CENTER | Age: 77
End: 2017-05-03
Payer: COMMERCIAL

## 2017-05-03 ENCOUNTER — HOSPITAL ENCOUNTER (OUTPATIENT)
Dept: BONE DENSITY | Facility: MEDICAL CENTER | Age: 77
Discharge: HOME | End: 2017-05-03
Payer: COMMERCIAL

## 2017-05-03 DIAGNOSIS — R19.7 DIARRHEA, UNSPECIFIED TYPE: ICD-10-CM

## 2017-05-03 DIAGNOSIS — M81.0 OSTEOPOROSIS, UNSPECIFIED OSTEOPOROSIS TYPE, UNSPECIFIED PATHOLOGICAL FRACTURE PRESENCE: ICD-10-CM

## 2017-05-03 DIAGNOSIS — R19.7 DIARRHEA, UNSPECIFIED TYPE: Primary | ICD-10-CM

## 2017-05-03 PROCEDURE — 86316 IMMUNOASSAY TUMOR OTHER: CPT

## 2017-05-03 PROCEDURE — 77080 DXA BONE DENSITY AXIAL: CPT

## 2017-05-03 PROCEDURE — 83519 RIA NONANTIBODY: CPT

## 2017-05-03 PROCEDURE — 84260 ASSAY OF SEROTONIN: CPT

## 2017-05-03 PROCEDURE — 36415 COLL VENOUS BLD VENIPUNCTURE: CPT

## 2017-05-04 ENCOUNTER — GENERIC CONVERSION - ENCOUNTER (OUTPATIENT)
Dept: OTHER | Facility: OTHER | Age: 77
End: 2017-05-04

## 2017-05-05 LAB — CGA SERPL-SCNC: 2 NMOL/L (ref 0–5)

## 2017-05-08 LAB — SEROTONIN PLAS-MCNC: 84 NG/ML (ref 0–420)

## 2017-05-10 LAB — NSE SERPL IA-MCNC: 4.7 NG/ML (ref 0–12.5)

## 2017-05-16 ENCOUNTER — ALLSCRIPTS OFFICE VISIT (OUTPATIENT)
Dept: OTHER | Facility: OTHER | Age: 77
End: 2017-05-16

## 2017-05-23 ENCOUNTER — HOSPITAL ENCOUNTER (OUTPATIENT)
Dept: INFUSION CENTER | Facility: CLINIC | Age: 77
Discharge: HOME | End: 2017-05-23
Payer: COMMERCIAL

## 2017-05-23 VITALS
RESPIRATION RATE: 18 BRPM | SYSTOLIC BLOOD PRESSURE: 169 MMHG | TEMPERATURE: 98.7 F | HEART RATE: 98 BPM | DIASTOLIC BLOOD PRESSURE: 85 MMHG

## 2017-05-23 PROCEDURE — 96402 CHEMO HORMON ANTINEOPL SQ/IM: CPT

## 2017-05-23 RX ORDER — ONDANSETRON 4 MG/1
4 TABLET, FILM COATED ORAL EVERY 8 HOURS PRN
COMMUNITY
End: 2019-08-12

## 2017-05-23 RX ORDER — LAMOTRIGINE 25 MG/1
250 TABLET ORAL ONCE
Status: COMPLETED | OUTPATIENT
Start: 2017-05-23 | End: 2017-05-23

## 2017-05-23 RX ADMIN — FULVESTRANT 250 MG: 50 INJECTION INTRAMUSCULAR at 10:50

## 2017-05-23 RX ADMIN — FULVESTRANT 250 MG: 50 INJECTION INTRAMUSCULAR at 10:53

## 2017-05-23 NOTE — PLAN OF CARE
Problem: Potential for Falls  Goal: Patient will remain free of falls  INTERVENTIONS:  - Assess patient frequently for physical needs  - Identify cognitive and physical deficits and behaviors that affect risk of falls    - [place] fall precautions as indicated by assessment   - Educate patient/family on patient safety including physical limitations  - Instruct patient to call for assistance with activity based on assessment  - Modify environment to reduce risk of injury  - Consider OT/PT consult to assist with strengthening/mobility   Outcome: Progressing

## 2017-05-31 ENCOUNTER — TRANSCRIBE ORDERS (OUTPATIENT)
Dept: ADMINISTRATIVE | Facility: HOSPITAL | Age: 77
End: 2017-05-31

## 2017-05-31 ENCOUNTER — ALLSCRIPTS OFFICE VISIT (OUTPATIENT)
Dept: OTHER | Facility: OTHER | Age: 77
End: 2017-05-31

## 2017-05-31 DIAGNOSIS — C78.7 SECONDARY MALIGNANT NEOPLASM OF LIVER (HCC): Primary | ICD-10-CM

## 2017-06-09 ENCOUNTER — GENERIC CONVERSION - ENCOUNTER (OUTPATIENT)
Dept: OTHER | Facility: OTHER | Age: 77
End: 2017-06-09

## 2017-06-16 ENCOUNTER — GENERIC CONVERSION - ENCOUNTER (OUTPATIENT)
Dept: OTHER | Facility: OTHER | Age: 77
End: 2017-06-16

## 2017-06-19 RX ORDER — LAMOTRIGINE 25 MG/1
250 TABLET ORAL ONCE
Status: COMPLETED | OUTPATIENT
Start: 2017-06-20 | End: 2017-06-20

## 2017-06-20 ENCOUNTER — APPOINTMENT (OUTPATIENT)
Dept: LAB | Facility: CLINIC | Age: 77
End: 2017-06-20
Payer: COMMERCIAL

## 2017-06-20 ENCOUNTER — TRANSCRIBE ORDERS (OUTPATIENT)
Dept: LAB | Facility: CLINIC | Age: 77
End: 2017-06-20

## 2017-06-20 ENCOUNTER — ALLSCRIPTS OFFICE VISIT (OUTPATIENT)
Dept: OTHER | Facility: OTHER | Age: 77
End: 2017-06-20

## 2017-06-20 ENCOUNTER — HOSPITAL ENCOUNTER (OUTPATIENT)
Dept: INFUSION CENTER | Facility: CLINIC | Age: 77
Discharge: HOME | End: 2017-06-20
Payer: COMMERCIAL

## 2017-06-20 ENCOUNTER — TRANSCRIBE ORDERS (OUTPATIENT)
Dept: ADMINISTRATIVE | Facility: HOSPITAL | Age: 77
End: 2017-06-20

## 2017-06-20 VITALS
DIASTOLIC BLOOD PRESSURE: 68 MMHG | SYSTOLIC BLOOD PRESSURE: 122 MMHG | RESPIRATION RATE: 18 BRPM | TEMPERATURE: 98 F | HEART RATE: 102 BPM

## 2017-06-20 DIAGNOSIS — C78.7 SECONDARY MALIGNANT NEOPLASM OF LIVER (HCC): Primary | ICD-10-CM

## 2017-06-20 DIAGNOSIS — E78.2 MIXED HYPERLIPIDEMIA: ICD-10-CM

## 2017-06-20 DIAGNOSIS — E78.2 MIXED HYPERLIPIDEMIA: Primary | ICD-10-CM

## 2017-06-20 DIAGNOSIS — C50.911 MALIGNANT NEOPLASM OF RIGHT FEMALE BREAST (HCC): ICD-10-CM

## 2017-06-20 LAB
ALBUMIN SERPL BCP-MCNC: 4.3 G/DL (ref 3.5–5)
ALP SERPL-CCNC: 76 U/L (ref 46–116)
ALT SERPL W P-5'-P-CCNC: <6 U/L (ref 12–78)
ANION GAP SERPL CALCULATED.3IONS-SCNC: 10 MMOL/L (ref 4–13)
AST SERPL W P-5'-P-CCNC: 13 U/L (ref 5–45)
BILIRUB SERPL-MCNC: 1.2 MG/DL (ref 0.2–1)
BUN SERPL-MCNC: 16 MG/DL (ref 5–25)
CALCIUM SERPL-MCNC: 9.8 MG/DL (ref 8.3–10.1)
CHLORIDE SERPL-SCNC: 105 MMOL/L (ref 100–108)
CHOLEST SERPL-MCNC: 170 MG/DL (ref 50–200)
CO2 SERPL-SCNC: 29 MMOL/L (ref 21–32)
CREAT SERPL-MCNC: 0.87 MG/DL (ref 0.6–1.3)
GFR SERPL CREATININE-BSD FRML MDRD: >60 ML/MIN/1.73SQ M
GLUCOSE P FAST SERPL-MCNC: 131 MG/DL (ref 65–99)
HDLC SERPL-MCNC: 67 MG/DL (ref 40–60)
LDLC SERPL CALC-MCNC: 79 MG/DL (ref 0–100)
POTASSIUM SERPL-SCNC: 4.8 MMOL/L (ref 3.5–5.3)
PROT SERPL-MCNC: 7.2 G/DL (ref 6.4–8.2)
SODIUM SERPL-SCNC: 144 MMOL/L (ref 136–145)
TRIGL SERPL-MCNC: 120 MG/DL

## 2017-06-20 PROCEDURE — 80061 LIPID PANEL: CPT

## 2017-06-20 PROCEDURE — 36415 COLL VENOUS BLD VENIPUNCTURE: CPT

## 2017-06-20 PROCEDURE — 96402 CHEMO HORMON ANTINEOPL SQ/IM: CPT

## 2017-06-20 PROCEDURE — 80053 COMPREHEN METABOLIC PANEL: CPT

## 2017-06-20 RX ADMIN — FULVESTRANT 250 MG: 50 INJECTION INTRAMUSCULAR at 11:09

## 2017-06-20 NOTE — PLAN OF CARE
Problem: Potential for Falls  Goal: Patient will remain free of falls  INTERVENTIONS:  - Assess patient frequently for physical needs  -  Identify cognitive and physical deficits and behaviors that affect risk of falls    -  [place] fall precautions as indicated by assessment   - Educate patient/family on patient safety including physical limitations  - Instruct patient to call for assistance with activity based on assessment  - Modify environment to reduce risk of injury  - Consider OT/PT consult to assist with strengthening/mobility   Outcome: Progressing

## 2017-06-20 NOTE — PROGRESS NOTES
Pt received faslodex injections per protocol without complications  Pt to have surgery performed in July  Dr Darby Riesel office aware, and next faslodex appointment scheduled for 8/1/17  AVS provided

## 2017-06-21 ENCOUNTER — ALLSCRIPTS OFFICE VISIT (OUTPATIENT)
Dept: OTHER | Facility: OTHER | Age: 77
End: 2017-06-21

## 2017-06-21 ENCOUNTER — TRANSCRIBE ORDERS (OUTPATIENT)
Dept: ADMINISTRATIVE | Facility: HOSPITAL | Age: 77
End: 2017-06-21

## 2017-06-21 DIAGNOSIS — R55 SYNCOPE AND COLLAPSE: Primary | ICD-10-CM

## 2017-06-21 DIAGNOSIS — Z01.810 PRE-OPERATIVE CARDIOVASCULAR EXAMINATION: ICD-10-CM

## 2017-06-21 DIAGNOSIS — E78.5 OTHER AND UNSPECIFIED HYPERLIPIDEMIA: ICD-10-CM

## 2017-06-27 ENCOUNTER — HOSPITAL ENCOUNTER (OUTPATIENT)
Dept: MRI IMAGING | Facility: HOSPITAL | Age: 77
Discharge: HOME | End: 2017-06-27
Attending: SURGERY
Payer: COMMERCIAL

## 2017-06-27 DIAGNOSIS — C78.7 SECONDARY MALIGNANT NEOPLASM OF LIVER AND INTRAHEPATIC BILE DUCT (HCC): ICD-10-CM

## 2017-06-27 DIAGNOSIS — R55 SYNCOPE AND COLLAPSE: ICD-10-CM

## 2017-06-27 DIAGNOSIS — C50.911 MALIGNANT NEOPLASM OF RIGHT FEMALE BREAST (HCC): ICD-10-CM

## 2017-06-27 DIAGNOSIS — E78.5 HYPERLIPIDEMIA: ICD-10-CM

## 2017-06-27 PROCEDURE — 74183 MRI ABD W/O CNTR FLWD CNTR: CPT

## 2017-06-27 PROCEDURE — A9581 GADOXETATE DISODIUM INJ: HCPCS | Performed by: SURGERY

## 2017-06-27 RX ADMIN — GADOXETATE DISODIUM 10 ML: 181.43 INJECTION, SOLUTION INTRAVENOUS at 13:03

## 2017-06-29 ENCOUNTER — HOSPITAL ENCOUNTER (OUTPATIENT)
Dept: NON INVASIVE DIAGNOSTICS | Facility: CLINIC | Age: 77
Discharge: HOME | End: 2017-06-29
Payer: COMMERCIAL

## 2017-06-29 ENCOUNTER — HOSPITAL ENCOUNTER (OUTPATIENT)
Dept: MRI IMAGING | Facility: HOSPITAL | Age: 77
Discharge: HOME | End: 2017-06-29
Attending: INTERNAL MEDICINE
Payer: COMMERCIAL

## 2017-06-29 DIAGNOSIS — C50.911 MALIGNANT NEOPLASM OF RIGHT FEMALE BREAST (HCC): ICD-10-CM

## 2017-06-29 DIAGNOSIS — R55 SYNCOPE AND COLLAPSE: ICD-10-CM

## 2017-06-29 DIAGNOSIS — E78.5 HYPERLIPIDEMIA: ICD-10-CM

## 2017-06-29 LAB
ARRHY DURING EX: NORMAL
CHEST PAIN STATEMENT: NORMAL
MAX DIASTOLIC BP: 78 MMHG
MAX HEART RATE: 130 BPM
MAX PREDICTED HEART RATE: 141 BPM
MAX. SYSTOLIC BP: 138 MMHG
PROTOCOL NAME: NORMAL
REASON FOR TERMINATION: NORMAL
TARGET HR FORMULA: NORMAL
TEST INDICATION: NORMAL
TIME IN EXERCISE PHASE: 256 S

## 2017-06-29 PROCEDURE — 93017 CV STRESS TEST TRACING ONLY: CPT

## 2017-06-29 PROCEDURE — A9585 GADOBUTROL INJECTION: HCPCS | Performed by: INTERNAL MEDICINE

## 2017-06-29 PROCEDURE — 78452 HT MUSCLE IMAGE SPECT MULT: CPT

## 2017-06-29 PROCEDURE — 70553 MRI BRAIN STEM W/O & W/DYE: CPT

## 2017-06-29 PROCEDURE — A9502 TC99M TETROFOSMIN: HCPCS

## 2017-06-29 RX ADMIN — REGADENOSON 0.4 MG: 0.08 INJECTION, SOLUTION INTRAVENOUS at 09:20

## 2017-06-29 RX ADMIN — GADOBUTROL 7 ML: 604.72 INJECTION INTRAVENOUS at 13:21

## 2017-06-30 ENCOUNTER — TRANSCRIBE ORDERS (OUTPATIENT)
Dept: ADMINISTRATIVE | Facility: HOSPITAL | Age: 77
End: 2017-06-30

## 2017-06-30 ENCOUNTER — APPOINTMENT (OUTPATIENT)
Dept: LAB | Facility: MEDICAL CENTER | Age: 77
End: 2017-06-30
Payer: COMMERCIAL

## 2017-06-30 DIAGNOSIS — C78.7 SECONDARY MALIGNANT NEOPLASM OF LIVER (HCC): Primary | ICD-10-CM

## 2017-06-30 DIAGNOSIS — C78.7 SECONDARY MALIGNANT NEOPLASM OF LIVER (HCC): ICD-10-CM

## 2017-06-30 LAB
BUN SERPL-MCNC: 13 MG/DL (ref 5–25)
CREAT SERPL-MCNC: 0.76 MG/DL (ref 0.6–1.3)
GFR SERPL CREATININE-BSD FRML MDRD: >60 ML/MIN/1.73SQ M

## 2017-06-30 PROCEDURE — 84520 ASSAY OF UREA NITROGEN: CPT

## 2017-06-30 PROCEDURE — 82565 ASSAY OF CREATININE: CPT

## 2017-06-30 PROCEDURE — 36415 COLL VENOUS BLD VENIPUNCTURE: CPT

## 2017-07-03 ENCOUNTER — TRANSCRIBE ORDERS (OUTPATIENT)
Dept: LAB | Facility: HOSPITAL | Age: 77
End: 2017-07-03

## 2017-07-03 ENCOUNTER — APPOINTMENT (OUTPATIENT)
Dept: PREADMISSION TESTING | Facility: HOSPITAL | Age: 77
DRG: 421 | End: 2017-07-03
Payer: COMMERCIAL

## 2017-07-03 ENCOUNTER — GENERIC CONVERSION - ENCOUNTER (OUTPATIENT)
Dept: OTHER | Facility: OTHER | Age: 77
End: 2017-07-03

## 2017-07-03 DIAGNOSIS — C78.7 SECONDARY MALIGNANT NEOPLASM OF LIVER (HCC): Primary | ICD-10-CM

## 2017-07-03 DIAGNOSIS — C78.7 SECONDARY MALIGNANT NEOPLASM OF LIVER (HCC): ICD-10-CM

## 2017-07-03 LAB
ABO GROUP BLD: NORMAL
APTT PPP: 27 SECONDS (ref 23–35)
BASOPHILS # BLD AUTO: 0.05 THOUSANDS/ΜL (ref 0–0.1)
BASOPHILS NFR BLD AUTO: 1 % (ref 0–1)
BLD GP AB SCN SERPL QL: NEGATIVE
EOSINOPHIL # BLD AUTO: 0.09 THOUSAND/ΜL (ref 0–0.61)
EOSINOPHIL NFR BLD AUTO: 2 % (ref 0–6)
ERYTHROCYTE [DISTWIDTH] IN BLOOD BY AUTOMATED COUNT: 13.2 % (ref 11.6–15.1)
EST. AVERAGE GLUCOSE BLD GHB EST-MCNC: 137 MG/DL
HBA1C MFR BLD: 6.4 % (ref 4.2–6.3)
HCT VFR BLD AUTO: 38.9 % (ref 34.8–46.1)
HGB BLD-MCNC: 12.8 G/DL (ref 11.5–15.4)
INR PPP: 0.99 (ref 0.86–1.16)
LYMPHOCYTES # BLD AUTO: 1.88 THOUSANDS/ΜL (ref 0.6–4.47)
LYMPHOCYTES NFR BLD AUTO: 37 % (ref 14–44)
MCH RBC QN AUTO: 34.7 PG (ref 26.8–34.3)
MCHC RBC AUTO-ENTMCNC: 32.9 G/DL (ref 31.4–37.4)
MCV RBC AUTO: 105 FL (ref 82–98)
MONOCYTES # BLD AUTO: 0.55 THOUSAND/ΜL (ref 0.17–1.22)
MONOCYTES NFR BLD AUTO: 11 % (ref 4–12)
NEUTROPHILS # BLD AUTO: 2.53 THOUSANDS/ΜL (ref 1.85–7.62)
NEUTS SEG NFR BLD AUTO: 49 % (ref 43–75)
NRBC BLD AUTO-RTO: 0 /100 WBCS
PLATELET # BLD AUTO: 185 THOUSANDS/UL (ref 149–390)
PMV BLD AUTO: 9.4 FL (ref 8.9–12.7)
PROTHROMBIN TIME: 13.1 SECONDS (ref 12.1–14.4)
RBC # BLD AUTO: 3.69 MILLION/UL (ref 3.81–5.12)
RH BLD: POSITIVE
SPECIMEN EXPIRATION DATE: NORMAL
WBC # BLD AUTO: 5.11 THOUSAND/UL (ref 4.31–10.16)

## 2017-07-03 PROCEDURE — 36415 COLL VENOUS BLD VENIPUNCTURE: CPT

## 2017-07-03 PROCEDURE — 85610 PROTHROMBIN TIME: CPT

## 2017-07-03 PROCEDURE — 86920 COMPATIBILITY TEST SPIN: CPT

## 2017-07-03 PROCEDURE — 86901 BLOOD TYPING SEROLOGIC RH(D): CPT

## 2017-07-03 PROCEDURE — 86900 BLOOD TYPING SEROLOGIC ABO: CPT

## 2017-07-03 PROCEDURE — 85730 THROMBOPLASTIN TIME PARTIAL: CPT

## 2017-07-03 PROCEDURE — 86850 RBC ANTIBODY SCREEN: CPT

## 2017-07-03 PROCEDURE — 83036 HEMOGLOBIN GLYCOSYLATED A1C: CPT

## 2017-07-03 PROCEDURE — 85025 COMPLETE CBC W/AUTO DIFF WBC: CPT

## 2017-07-05 ENCOUNTER — ALLSCRIPTS OFFICE VISIT (OUTPATIENT)
Dept: OTHER | Facility: OTHER | Age: 77
End: 2017-07-05

## 2017-07-11 ENCOUNTER — HOSPITAL ENCOUNTER (OUTPATIENT)
Dept: NON INVASIVE DIAGNOSTICS | Facility: CLINIC | Age: 77
Discharge: HOME | End: 2017-07-11
Payer: COMMERCIAL

## 2017-07-11 DIAGNOSIS — E78.5 HYPERLIPIDEMIA: ICD-10-CM

## 2017-07-11 PROCEDURE — 93306 TTE W/DOPPLER COMPLETE: CPT

## 2017-07-13 ENCOUNTER — APPOINTMENT (INPATIENT)
Dept: RADIOLOGY | Facility: HOSPITAL | Age: 77
DRG: 421 | End: 2017-07-13
Payer: COMMERCIAL

## 2017-07-13 ENCOUNTER — ANESTHESIA EVENT (OUTPATIENT)
Dept: PERIOP | Facility: HOSPITAL | Age: 77
DRG: 421 | End: 2017-07-13
Payer: COMMERCIAL

## 2017-07-13 ENCOUNTER — ANESTHESIA (OUTPATIENT)
Dept: PERIOP | Facility: HOSPITAL | Age: 77
DRG: 421 | End: 2017-07-13
Payer: COMMERCIAL

## 2017-07-13 ENCOUNTER — HOSPITAL ENCOUNTER (INPATIENT)
Facility: HOSPITAL | Age: 77
LOS: 11 days | Discharge: HOME HEALTH | DRG: 421 | End: 2017-07-24
Attending: SURGERY | Admitting: SURGERY
Payer: COMMERCIAL

## 2017-07-13 DIAGNOSIS — C50.911 ADENOCARCINOMA OF RIGHT BREAST METASTATIC TO LIVER (HCC): Primary | Chronic | ICD-10-CM

## 2017-07-13 DIAGNOSIS — I44.7 LEFT BUNDLE BRANCH BLOCK (LBBB): Chronic | ICD-10-CM

## 2017-07-13 DIAGNOSIS — C78.7 SECONDARY MALIGNANT NEOPLASM OF LIVER AND INTRAHEPATIC BILE DUCT (HCC): ICD-10-CM

## 2017-07-13 DIAGNOSIS — C78.7 METASTATIC ADENOCARCINOMA TO LIVER (HCC): ICD-10-CM

## 2017-07-13 DIAGNOSIS — C78.7 ADENOCARCINOMA OF RIGHT BREAST METASTATIC TO LIVER (HCC): Primary | Chronic | ICD-10-CM

## 2017-07-13 LAB
ALBUMIN SERPL BCP-MCNC: 3.3 G/DL (ref 3.5–5)
ALP SERPL-CCNC: 37 U/L (ref 46–116)
ALT SERPL W P-5'-P-CCNC: 406 U/L (ref 12–78)
ANION GAP SERPL CALCULATED.3IONS-SCNC: 14 MMOL/L (ref 4–13)
ARTERIAL PATENCY WRIST A: YES
AST SERPL W P-5'-P-CCNC: 503 U/L (ref 5–45)
BASE EXCESS BLDA CALC-SCNC: -4.2 MMOL/L
BASE EXCESS BLDA CALC-SCNC: -6 MMOL/L (ref -2–3)
BASE EXCESS BLDA CALC-SCNC: -6 MMOL/L (ref -2–3)
BASE EXCESS BLDA CALC-SCNC: -6.2 MMOL/L
BASE EXCESS BLDA CALC-SCNC: 0 MMOL/L (ref -2–3)
BASOPHILS # BLD AUTO: 0 THOUSANDS/ΜL (ref 0–0.1)
BASOPHILS # BLD AUTO: 0.01 THOUSANDS/ΜL (ref 0–0.1)
BASOPHILS NFR BLD AUTO: 0 % (ref 0–1)
BASOPHILS NFR BLD AUTO: 0 % (ref 0–1)
BILIRUB SERPL-MCNC: 1.32 MG/DL (ref 0.2–1)
BODY TEMPERATURE: 97.6 DEGREES FEHRENHEIT
BUN SERPL-MCNC: 12 MG/DL (ref 5–25)
CA-I BLD-SCNC: 0.72 MMOL/L (ref 1.12–1.32)
CA-I BLD-SCNC: 0.96 MMOL/L (ref 1.12–1.32)
CA-I BLD-SCNC: 1.17 MMOL/L (ref 1.12–1.32)
CALCIUM SERPL-MCNC: 9.8 MG/DL (ref 8.3–10.1)
CHLORIDE SERPL-SCNC: 110 MMOL/L (ref 100–108)
CO2 SERPL-SCNC: 20 MMOL/L (ref 21–32)
CREAT SERPL-MCNC: 0.91 MG/DL (ref 0.6–1.3)
EOSINOPHIL # BLD AUTO: 0 THOUSAND/ΜL (ref 0–0.61)
EOSINOPHIL # BLD AUTO: 0.01 THOUSAND/ΜL (ref 0–0.61)
EOSINOPHIL NFR BLD AUTO: 0 % (ref 0–6)
EOSINOPHIL NFR BLD AUTO: 0 % (ref 0–6)
ERYTHROCYTE [DISTWIDTH] IN BLOOD BY AUTOMATED COUNT: 16.5 % (ref 11.6–15.1)
ERYTHROCYTE [DISTWIDTH] IN BLOOD BY AUTOMATED COUNT: 16.8 % (ref 11.6–15.1)
GFR SERPL CREATININE-BSD FRML MDRD: 59.6 ML/MIN/1.73SQ M
GLUCOSE SERPL-MCNC: 171 MG/DL (ref 65–140)
GLUCOSE SERPL-MCNC: 175 MG/DL (ref 65–140)
GLUCOSE SERPL-MCNC: 177 MG/DL (ref 65–140)
GLUCOSE SERPL-MCNC: 189 MG/DL (ref 65–140)
GLUCOSE SERPL-MCNC: 255 MG/DL (ref 65–140)
GLUCOSE SERPL-MCNC: 274 MG/DL (ref 65–140)
HCO3 BLDA-SCNC: 19 MMOL/L (ref 22–28)
HCO3 BLDA-SCNC: 19 MMOL/L (ref 22–28)
HCO3 BLDA-SCNC: 20.2 MMOL/L (ref 24–30)
HCO3 BLDA-SCNC: 21.4 MMOL/L (ref 24–30)
HCO3 BLDA-SCNC: 25.4 MMOL/L (ref 24–30)
HCT VFR BLD AUTO: 30.2 % (ref 34.8–46.1)
HCT VFR BLD AUTO: 31.1 % (ref 34.8–46.1)
HCT VFR BLD CALC: 24 % (ref 34.8–46.1)
HCT VFR BLD CALC: 29 % (ref 34.8–46.1)
HCT VFR BLD CALC: 33 % (ref 34.8–46.1)
HGB BLD-MCNC: 10.6 G/DL (ref 11.5–15.4)
HGB BLD-MCNC: 11 G/DL (ref 11.5–15.4)
HGB BLDA-MCNC: 11.2 G/DL (ref 11.5–15.4)
HGB BLDA-MCNC: 8.2 G/DL (ref 11.5–15.4)
HGB BLDA-MCNC: 9.9 G/DL (ref 11.5–15.4)
HOROWITZ INDEX BLDA+IHG-RTO: 60 MM[HG]
LACTATE SERPL-SCNC: 5.6 MMOL/L (ref 0.5–2)
LACTATE SERPL-SCNC: 8.3 MMOL/L (ref 0.5–2)
LYMPHOCYTES # BLD AUTO: 0.38 THOUSANDS/ΜL (ref 0.6–4.47)
LYMPHOCYTES # BLD AUTO: 1.08 THOUSANDS/ΜL (ref 0.6–4.47)
LYMPHOCYTES NFR BLD AUTO: 25 % (ref 14–44)
LYMPHOCYTES NFR BLD AUTO: 9 % (ref 14–44)
MAGNESIUM SERPL-MCNC: 1.5 MG/DL (ref 1.6–2.6)
MCH RBC QN AUTO: 31.5 PG (ref 26.8–34.3)
MCH RBC QN AUTO: 31.9 PG (ref 26.8–34.3)
MCHC RBC AUTO-ENTMCNC: 35.1 G/DL (ref 31.4–37.4)
MCHC RBC AUTO-ENTMCNC: 35.4 G/DL (ref 31.4–37.4)
MCV RBC AUTO: 89 FL (ref 82–98)
MCV RBC AUTO: 91 FL (ref 82–98)
MONOCYTES # BLD AUTO: 0.67 THOUSAND/ΜL (ref 0.17–1.22)
MONOCYTES # BLD AUTO: 0.79 THOUSAND/ΜL (ref 0.17–1.22)
MONOCYTES NFR BLD AUTO: 15 % (ref 4–12)
MONOCYTES NFR BLD AUTO: 18 % (ref 4–12)
NEUTROPHILS # BLD AUTO: 2.5 THOUSANDS/ΜL (ref 1.85–7.62)
NEUTROPHILS # BLD AUTO: 3.37 THOUSANDS/ΜL (ref 1.85–7.62)
NEUTS SEG NFR BLD AUTO: 57 % (ref 43–75)
NEUTS SEG NFR BLD AUTO: 76 % (ref 43–75)
NRBC BLD AUTO-RTO: 0 /100 WBCS
NRBC BLD AUTO-RTO: 0 /100 WBCS
O2 CT BLDA-SCNC: 15.9 ML/DL (ref 16–23)
O2 CT BLDA-SCNC: 16.3 ML/DL (ref 16–23)
OXYHGB MFR BLDA: 97.8 % (ref 94–97)
OXYHGB MFR BLDA: 98.2 % (ref 94–97)
PCO2 BLD: 21 MMOL/L (ref 21–32)
PCO2 BLD: 23 MMOL/L (ref 21–32)
PCO2 BLD: 27 MMOL/L (ref 21–32)
PCO2 BLD: 39.9 MM HG (ref 42–50)
PCO2 BLD: 41.3 MM HG (ref 42–50)
PCO2 BLD: 49.1 MM HG (ref 42–50)
PCO2 BLDA: 29.2 MM HG (ref 36–44)
PCO2 BLDA: 36.6 MM HG (ref 36–44)
PEEP RESPIRATORY: 7 CM[H2O]
PH BLD: 7.25 [PH] (ref 7.3–7.4)
PH BLD: 7.31 [PH] (ref 7.3–7.4)
PH BLD: 7.4 [PH] (ref 7.3–7.4)
PH BLDA: 7.33 [PH] (ref 7.35–7.45)
PH BLDA: 7.43 [PH] (ref 7.35–7.45)
PLATELET # BLD AUTO: 56 THOUSANDS/UL (ref 149–390)
PLATELET # BLD AUTO: 61 THOUSANDS/UL (ref 149–390)
PMV BLD AUTO: 9.2 FL (ref 8.9–12.7)
PMV BLD AUTO: 9.5 FL (ref 8.9–12.7)
PO2 BLD: 244 MM HG (ref 35–45)
PO2 BLD: 528 MM HG (ref 35–45)
PO2 BLD: 96 MM HG (ref 35–45)
PO2 BLDA: 159.1 MM HG (ref 75–129)
PO2 BLDA: 200.7 MM HG (ref 75–129)
POTASSIUM BLD-SCNC: 3.9 MMOL/L (ref 3.5–5.3)
POTASSIUM SERPL-SCNC: 3.2 MMOL/L (ref 3.5–5.3)
PROT SERPL-MCNC: 4.8 G/DL (ref 6.4–8.2)
RBC # BLD AUTO: 3.32 MILLION/UL (ref 3.81–5.12)
RBC # BLD AUTO: 3.49 MILLION/UL (ref 3.81–5.12)
SAO2 % BLD FROM PO2: 100 % (ref 95–98)
SAO2 % BLD FROM PO2: 100 % (ref 95–98)
SAO2 % BLD FROM PO2: 97 % (ref 95–98)
SODIUM BLD-SCNC: 140 MMOL/L (ref 136–145)
SODIUM BLD-SCNC: 142 MMOL/L (ref 136–145)
SODIUM BLD-SCNC: 142 MMOL/L (ref 136–145)
SODIUM SERPL-SCNC: 144 MMOL/L (ref 136–145)
SPECIMEN SOURCE: ABNORMAL
VENT AC: 14
VENT- AC: AC
VT SETTING VENT: 450 ML
WBC # BLD AUTO: 4.4 THOUSAND/UL (ref 4.31–10.16)
WBC # BLD AUTO: 4.42 THOUSAND/UL (ref 4.31–10.16)

## 2017-07-13 PROCEDURE — 71010 HB CHEST X-RAY 1 VIEW FRONTAL (PORTABLE): CPT

## 2017-07-13 PROCEDURE — 83735 ASSAY OF MAGNESIUM: CPT | Performed by: SURGERY

## 2017-07-13 PROCEDURE — 83605 ASSAY OF LACTIC ACID: CPT | Performed by: SURGERY

## 2017-07-13 PROCEDURE — 88307 TISSUE EXAM BY PATHOLOGIST: CPT | Performed by: SURGERY

## 2017-07-13 PROCEDURE — 82805 BLOOD GASES W/O2 SATURATION: CPT | Performed by: SURGERY

## 2017-07-13 PROCEDURE — 0FB10ZX EXCISION OF RIGHT LOBE LIVER, OPEN APPROACH, DIAGNOSTIC: ICD-10-PCS | Performed by: SURGERY

## 2017-07-13 PROCEDURE — 88363 XM ARCHIVE TISSUE MOLEC ANAL: CPT | Performed by: PATHOLOGY

## 2017-07-13 PROCEDURE — 82803 BLOOD GASES ANY COMBINATION: CPT

## 2017-07-13 PROCEDURE — P9021 RED BLOOD CELLS UNIT: HCPCS

## 2017-07-13 PROCEDURE — C1886 CATHETER, ABLATION: HCPCS | Performed by: SURGERY

## 2017-07-13 PROCEDURE — 88342 IMHCHEM/IMCYTCHM 1ST ANTB: CPT | Performed by: SURGERY

## 2017-07-13 PROCEDURE — 82948 REAGENT STRIP/BLOOD GLUCOSE: CPT

## 2017-07-13 PROCEDURE — 94002 VENT MGMT INPAT INIT DAY: CPT

## 2017-07-13 PROCEDURE — 84132 ASSAY OF SERUM POTASSIUM: CPT

## 2017-07-13 PROCEDURE — 85025 COMPLETE CBC W/AUTO DIFF WBC: CPT | Performed by: SURGERY

## 2017-07-13 PROCEDURE — P9016 RBC LEUKOCYTES REDUCED: HCPCS

## 2017-07-13 PROCEDURE — 80053 COMPREHEN METABOLIC PANEL: CPT | Performed by: SURGERY

## 2017-07-13 PROCEDURE — 88361 TUMOR IMMUNOHISTOCHEM/COMPUT: CPT | Performed by: SURGERY

## 2017-07-13 PROCEDURE — P9017 PLASMA 1 DONOR FRZ W/IN 8 HR: HCPCS

## 2017-07-13 PROCEDURE — 88341 IMHCHEM/IMCYTCHM EA ADD ANTB: CPT | Performed by: SURGERY

## 2017-07-13 PROCEDURE — 82947 ASSAY GLUCOSE BLOOD QUANT: CPT

## 2017-07-13 PROCEDURE — 94760 N-INVAS EAR/PLS OXIMETRY 1: CPT

## 2017-07-13 PROCEDURE — 82330 ASSAY OF CALCIUM: CPT

## 2017-07-13 PROCEDURE — 85014 HEMATOCRIT: CPT

## 2017-07-13 PROCEDURE — 84295 ASSAY OF SERUM SODIUM: CPT

## 2017-07-13 RX ORDER — ALBUMIN, HUMAN INJ 5% 5 %
SOLUTION INTRAVENOUS CONTINUOUS PRN
Status: DISCONTINUED | OUTPATIENT
Start: 2017-07-13 | End: 2017-07-13 | Stop reason: SURG

## 2017-07-13 RX ORDER — MAGNESIUM HYDROXIDE 1200 MG/15ML
LIQUID ORAL AS NEEDED
Status: DISCONTINUED | OUTPATIENT
Start: 2017-07-13 | End: 2017-07-13 | Stop reason: HOSPADM

## 2017-07-13 RX ORDER — PANTOPRAZOLE SODIUM 40 MG/1
40 INJECTION, POWDER, FOR SOLUTION INTRAVENOUS
Status: DISCONTINUED | OUTPATIENT
Start: 2017-07-14 | End: 2017-07-17

## 2017-07-13 RX ORDER — POTASSIUM CHLORIDE 14.9 MG/ML
20 INJECTION INTRAVENOUS
Status: COMPLETED | OUTPATIENT
Start: 2017-07-13 | End: 2017-07-14

## 2017-07-13 RX ORDER — EPHEDRINE SULFATE 50 MG/ML
INJECTION, SOLUTION INTRAVENOUS AS NEEDED
Status: DISCONTINUED | OUTPATIENT
Start: 2017-07-13 | End: 2017-07-13 | Stop reason: SURG

## 2017-07-13 RX ORDER — FUROSEMIDE 10 MG/ML
INJECTION INTRAMUSCULAR; INTRAVENOUS AS NEEDED
Status: DISCONTINUED | OUTPATIENT
Start: 2017-07-13 | End: 2017-07-13 | Stop reason: SURG

## 2017-07-13 RX ORDER — CLINDAMYCIN PHOSPHATE 900 MG/50ML
900 INJECTION INTRAVENOUS ONCE
Status: DISCONTINUED | OUTPATIENT
Start: 2017-07-13 | End: 2017-07-13

## 2017-07-13 RX ORDER — SUCCINYLCHOLINE CHLORIDE 20 MG/ML
INJECTION INTRAMUSCULAR; INTRAVENOUS AS NEEDED
Status: DISCONTINUED | OUTPATIENT
Start: 2017-07-13 | End: 2017-07-13 | Stop reason: SURG

## 2017-07-13 RX ORDER — POTASSIUM CHLORIDE 14.9 MG/ML
20 INJECTION INTRAVENOUS
Status: COMPLETED | OUTPATIENT
Start: 2017-07-13 | End: 2017-07-13

## 2017-07-13 RX ORDER — MAGNESIUM SULFATE HEPTAHYDRATE 40 MG/ML
2 INJECTION, SOLUTION INTRAVENOUS ONCE
Status: COMPLETED | OUTPATIENT
Start: 2017-07-13 | End: 2017-07-13

## 2017-07-13 RX ORDER — CALCIUM CHLORIDE 100 MG/ML
INJECTION INTRAVENOUS; INTRAVENTRICULAR AS NEEDED
Status: DISCONTINUED | OUTPATIENT
Start: 2017-07-13 | End: 2017-07-13 | Stop reason: SURG

## 2017-07-13 RX ORDER — CLINDAMYCIN PHOSPHATE 900 MG/50ML
900 INJECTION INTRAVENOUS ONCE
Status: COMPLETED | OUTPATIENT
Start: 2017-07-13 | End: 2017-07-13

## 2017-07-13 RX ORDER — SODIUM CHLORIDE 9 MG/ML
50 INJECTION, SOLUTION INTRAVENOUS CONTINUOUS
Status: DISCONTINUED | OUTPATIENT
Start: 2017-07-13 | End: 2017-07-16

## 2017-07-13 RX ORDER — FENTANYL CITRATE 50 UG/ML
INJECTION, SOLUTION INTRAMUSCULAR; INTRAVENOUS AS NEEDED
Status: DISCONTINUED | OUTPATIENT
Start: 2017-07-13 | End: 2017-07-13 | Stop reason: SURG

## 2017-07-13 RX ORDER — PROPOFOL 10 MG/ML
5-50 INJECTION, EMULSION INTRAVENOUS
Status: DISCONTINUED | OUTPATIENT
Start: 2017-07-13 | End: 2017-07-15

## 2017-07-13 RX ORDER — SODIUM CHLORIDE 9 MG/ML
INJECTION, SOLUTION INTRAVENOUS CONTINUOUS PRN
Status: DISCONTINUED | OUTPATIENT
Start: 2017-07-13 | End: 2017-07-13 | Stop reason: SURG

## 2017-07-13 RX ORDER — ROCURONIUM BROMIDE 10 MG/ML
INJECTION, SOLUTION INTRAVENOUS AS NEEDED
Status: DISCONTINUED | OUTPATIENT
Start: 2017-07-13 | End: 2017-07-13 | Stop reason: SURG

## 2017-07-13 RX ORDER — LIDOCAINE HYDROCHLORIDE 10 MG/ML
INJECTION, SOLUTION INFILTRATION; PERINEURAL AS NEEDED
Status: DISCONTINUED | OUTPATIENT
Start: 2017-07-13 | End: 2017-07-13 | Stop reason: SURG

## 2017-07-13 RX ORDER — ONDANSETRON 2 MG/ML
4 INJECTION INTRAMUSCULAR; INTRAVENOUS EVERY 6 HOURS PRN
Status: DISCONTINUED | OUTPATIENT
Start: 2017-07-13 | End: 2017-07-24 | Stop reason: HOSPADM

## 2017-07-13 RX ORDER — HEPARIN SODIUM 5000 [USP'U]/ML
5000 INJECTION, SOLUTION INTRAVENOUS; SUBCUTANEOUS EVERY 12 HOURS SCHEDULED
Status: DISCONTINUED | OUTPATIENT
Start: 2017-07-13 | End: 2017-07-17

## 2017-07-13 RX ORDER — PROPOFOL 10 MG/ML
INJECTION, EMULSION INTRAVENOUS AS NEEDED
Status: DISCONTINUED | OUTPATIENT
Start: 2017-07-13 | End: 2017-07-13 | Stop reason: SURG

## 2017-07-13 RX ORDER — MAGNESIUM SULFATE 1 G/100ML
1 INJECTION INTRAVENOUS ONCE
Status: DISCONTINUED | OUTPATIENT
Start: 2017-07-13 | End: 2017-07-13

## 2017-07-13 RX ORDER — PANTOPRAZOLE SODIUM 40 MG/1
40 TABLET, DELAYED RELEASE ORAL
Status: DISCONTINUED | OUTPATIENT
Start: 2017-07-14 | End: 2017-07-13

## 2017-07-13 RX ORDER — FENTANYL CITRATE 50 UG/ML
25 INJECTION, SOLUTION INTRAMUSCULAR; INTRAVENOUS EVERY 2 HOUR PRN
Status: DISCONTINUED | OUTPATIENT
Start: 2017-07-13 | End: 2017-07-17

## 2017-07-13 RX ORDER — HEPARIN SODIUM 5000 [USP'U]/ML
5000 INJECTION, SOLUTION INTRAVENOUS; SUBCUTANEOUS EVERY 12 HOURS SCHEDULED
Status: DISCONTINUED | OUTPATIENT
Start: 2017-07-14 | End: 2017-07-14

## 2017-07-13 RX ORDER — SODIUM CHLORIDE, SODIUM LACTATE, POTASSIUM CHLORIDE, CALCIUM CHLORIDE 600; 310; 30; 20 MG/100ML; MG/100ML; MG/100ML; MG/100ML
50 INJECTION, SOLUTION INTRAVENOUS CONTINUOUS
Status: DISCONTINUED | OUTPATIENT
Start: 2017-07-13 | End: 2017-07-14

## 2017-07-13 RX ADMIN — SODIUM CHLORIDE 2000 ML: 0.9 INJECTION, SOLUTION INTRAVENOUS at 22:58

## 2017-07-13 RX ADMIN — MAGNESIUM SULFATE HEPTAHYDRATE 2 G: 40 INJECTION, SOLUTION INTRAVENOUS at 19:57

## 2017-07-13 RX ADMIN — ONDANSETRON 4 MG: 2 INJECTION INTRAMUSCULAR; INTRAVENOUS at 20:48

## 2017-07-13 RX ADMIN — ALBUMIN HUMAN: 0.05 INJECTION, SOLUTION INTRAVENOUS at 14:40

## 2017-07-13 RX ADMIN — ROCURONIUM BROMIDE 40 MG: 10 INJECTION, SOLUTION INTRAVENOUS at 12:56

## 2017-07-13 RX ADMIN — PROPOFOL 20 MCG/KG/MIN: 10 INJECTION, EMULSION INTRAVENOUS at 17:10

## 2017-07-13 RX ADMIN — POTASSIUM CHLORIDE 20 MEQ: 200 INJECTION, SOLUTION INTRAVENOUS at 20:18

## 2017-07-13 RX ADMIN — DEXAMETHASONE SODIUM PHOSPHATE 5 MG: 10 INJECTION INTRAMUSCULAR; INTRAVENOUS at 13:17

## 2017-07-13 RX ADMIN — HEPARIN SODIUM 5000 UNITS: 5000 INJECTION, SOLUTION INTRAVENOUS; SUBCUTANEOUS at 20:50

## 2017-07-13 RX ADMIN — CALCIUM CHLORIDE 1 G: 100 INJECTION PARENTERAL at 15:43

## 2017-07-13 RX ADMIN — CALCIUM CHLORIDE 0.5 G: 100 INJECTION PARENTERAL at 16:22

## 2017-07-13 RX ADMIN — ROCURONIUM BROMIDE 15 MG: 10 INJECTION, SOLUTION INTRAVENOUS at 14:20

## 2017-07-13 RX ADMIN — POTASSIUM CHLORIDE 20 MEQ: 200 INJECTION, SOLUTION INTRAVENOUS at 18:38

## 2017-07-13 RX ADMIN — INSULIN HUMAN 5 UNITS: 100 INJECTION, SOLUTION PARENTERAL at 16:20

## 2017-07-13 RX ADMIN — EPHEDRINE SULFATE 5 MG: 50 INJECTION, SOLUTION INTRAMUSCULAR; INTRAVENOUS; SUBCUTANEOUS at 13:45

## 2017-07-13 RX ADMIN — ROCURONIUM BROMIDE 10 MG: 10 INJECTION, SOLUTION INTRAVENOUS at 13:53

## 2017-07-13 RX ADMIN — CLINDAMYCIN PHOSPHATE 900 MG: 18 INJECTION, SOLUTION INTRAMUSCULAR; INTRAVENOUS at 13:20

## 2017-07-13 RX ADMIN — EPHEDRINE SULFATE 5 MG: 50 INJECTION, SOLUTION INTRAMUSCULAR; INTRAVENOUS; SUBCUTANEOUS at 13:50

## 2017-07-13 RX ADMIN — POTASSIUM CHLORIDE 20 MEQ: 200 INJECTION, SOLUTION INTRAVENOUS at 22:21

## 2017-07-13 RX ADMIN — ALBUMIN HUMAN: 0.05 INJECTION, SOLUTION INTRAVENOUS at 15:04

## 2017-07-13 RX ADMIN — SUCCINYLCHOLINE CHLORIDE 100 MG: 20 INJECTION, SOLUTION INTRAMUSCULAR; INTRAVENOUS at 12:54

## 2017-07-13 RX ADMIN — ALBUMIN HUMAN: 0.05 INJECTION, SOLUTION INTRAVENOUS at 15:20

## 2017-07-13 RX ADMIN — EPHEDRINE SULFATE 5 MG: 50 INJECTION, SOLUTION INTRAMUSCULAR; INTRAVENOUS; SUBCUTANEOUS at 14:57

## 2017-07-13 RX ADMIN — SODIUM CHLORIDE 1000 ML: 0.9 INJECTION, SOLUTION INTRAVENOUS at 18:16

## 2017-07-13 RX ADMIN — SODIUM CHLORIDE: 0.9 INJECTION, SOLUTION INTRAVENOUS at 13:00

## 2017-07-13 RX ADMIN — SODIUM CHLORIDE 100 ML/HR: 0.9 INJECTION, SOLUTION INTRAVENOUS at 17:46

## 2017-07-13 RX ADMIN — LIDOCAINE HYDROCHLORIDE 50 MG: 10 INJECTION, SOLUTION INFILTRATION; PERINEURAL at 12:54

## 2017-07-13 RX ADMIN — SODIUM CHLORIDE 1000 ML: 0.9 INJECTION, SOLUTION INTRAVENOUS at 19:33

## 2017-07-13 RX ADMIN — SODIUM CHLORIDE, SODIUM LACTATE, POTASSIUM CHLORIDE, AND CALCIUM CHLORIDE: .6; .31; .03; .02 INJECTION, SOLUTION INTRAVENOUS at 16:39

## 2017-07-13 RX ADMIN — EPHEDRINE SULFATE 5 MG: 50 INJECTION, SOLUTION INTRAMUSCULAR; INTRAVENOUS; SUBCUTANEOUS at 14:53

## 2017-07-13 RX ADMIN — INSULIN LISPRO 1 UNITS: 100 INJECTION, SOLUTION INTRAVENOUS; SUBCUTANEOUS at 19:36

## 2017-07-13 RX ADMIN — ROCURONIUM BROMIDE 25 MG: 10 INJECTION, SOLUTION INTRAVENOUS at 15:45

## 2017-07-13 RX ADMIN — FUROSEMIDE 10 MG: 10 INJECTION, SOLUTION INTRAMUSCULAR; INTRAVENOUS at 16:12

## 2017-07-13 RX ADMIN — SODIUM CHLORIDE, SODIUM LACTATE, POTASSIUM CHLORIDE, AND CALCIUM CHLORIDE 50 ML/HR: .6; .31; .03; .02 INJECTION, SOLUTION INTRAVENOUS at 11:32

## 2017-07-13 RX ADMIN — FENTANYL CITRATE 100 MCG: 50 INJECTION, SOLUTION INTRAMUSCULAR; INTRAVENOUS at 12:54

## 2017-07-13 RX ADMIN — SODIUM CHLORIDE, SODIUM LACTATE, POTASSIUM CHLORIDE, AND CALCIUM CHLORIDE: .6; .31; .03; .02 INJECTION, SOLUTION INTRAVENOUS at 13:10

## 2017-07-13 RX ADMIN — ALBUMIN HUMAN: 0.05 INJECTION, SOLUTION INTRAVENOUS at 16:34

## 2017-07-13 RX ADMIN — EPHEDRINE SULFATE 5 MG: 50 INJECTION, SOLUTION INTRAMUSCULAR; INTRAVENOUS; SUBCUTANEOUS at 13:56

## 2017-07-13 RX ADMIN — PROPOFOL 130 MG: 10 INJECTION, EMULSION INTRAVENOUS at 12:54

## 2017-07-13 RX ADMIN — ROCURONIUM BROMIDE 10 MG: 10 INJECTION, SOLUTION INTRAVENOUS at 13:47

## 2017-07-13 RX ADMIN — PHENYLEPHRINE HYDROCHLORIDE 40 MCG/MIN: 10 INJECTION INTRAVENOUS at 13:50

## 2017-07-14 LAB
ABO GROUP BLD BPU: NORMAL
ALBUMIN SERPL BCP-MCNC: 3.2 G/DL (ref 3.5–5)
ALP SERPL-CCNC: 38 U/L (ref 46–116)
ALT SERPL W P-5'-P-CCNC: 967 U/L (ref 12–78)
ANION GAP SERPL CALCULATED.3IONS-SCNC: 8 MMOL/L (ref 4–13)
AST SERPL W P-5'-P-CCNC: 1110 U/L (ref 5–45)
BASE EXCESS BLDA CALC-SCNC: -4.3 MMOL/L
BASOPHILS # BLD AUTO: 0 THOUSANDS/ΜL (ref 0–0.1)
BASOPHILS NFR BLD AUTO: 0 % (ref 0–1)
BILIRUB SERPL-MCNC: 1.89 MG/DL (ref 0.2–1)
BODY TEMPERATURE: 99 DEGREES FEHRENHEIT
BPU ID: NORMAL
BUN SERPL-MCNC: 12 MG/DL (ref 5–25)
CALCIUM SERPL-MCNC: 8 MG/DL (ref 8.3–10.1)
CHLORIDE SERPL-SCNC: 113 MMOL/L (ref 100–108)
CO2 SERPL-SCNC: 22 MMOL/L (ref 21–32)
CREAT SERPL-MCNC: 0.76 MG/DL (ref 0.6–1.3)
EOSINOPHIL # BLD AUTO: 0 THOUSAND/ΜL (ref 0–0.61)
EOSINOPHIL NFR BLD AUTO: 0 % (ref 0–6)
ERYTHROCYTE [DISTWIDTH] IN BLOOD BY AUTOMATED COUNT: 16.8 % (ref 11.6–15.1)
GFR SERPL CREATININE-BSD FRML MDRD: >60 ML/MIN/1.73SQ M
GLUCOSE SERPL-MCNC: 187 MG/DL (ref 65–140)
GLUCOSE SERPL-MCNC: 194 MG/DL (ref 65–140)
GLUCOSE SERPL-MCNC: 201 MG/DL (ref 65–140)
GLUCOSE SERPL-MCNC: 224 MG/DL (ref 65–140)
GLUCOSE SERPL-MCNC: 255 MG/DL (ref 65–140)
HCO3 BLDA-SCNC: 20.3 MMOL/L (ref 22–28)
HCT VFR BLD AUTO: 30.8 % (ref 34.8–46.1)
HGB BLD-MCNC: 10.9 G/DL (ref 11.5–15.4)
LACTATE SERPL-SCNC: 2 MMOL/L (ref 0.5–2)
LACTATE SERPL-SCNC: 2.3 MMOL/L (ref 0.5–2)
LACTATE SERPL-SCNC: 3.4 MMOL/L (ref 0.5–2)
LYMPHOCYTES # BLD AUTO: 0.58 THOUSANDS/ΜL (ref 0.6–4.47)
LYMPHOCYTES NFR BLD AUTO: 10 % (ref 14–44)
MAGNESIUM SERPL-MCNC: 2.1 MG/DL (ref 1.6–2.6)
MCH RBC QN AUTO: 31.9 PG (ref 26.8–34.3)
MCHC RBC AUTO-ENTMCNC: 35.4 G/DL (ref 31.4–37.4)
MCV RBC AUTO: 90 FL (ref 82–98)
MONOCYTES # BLD AUTO: 0.75 THOUSAND/ΜL (ref 0.17–1.22)
MONOCYTES NFR BLD AUTO: 13 % (ref 4–12)
NEUTROPHILS # BLD AUTO: 4.5 THOUSANDS/ΜL (ref 1.85–7.62)
NEUTS SEG NFR BLD AUTO: 77 % (ref 43–75)
NRBC BLD AUTO-RTO: 0 /100 WBCS
O2 CT BLDA-SCNC: 15.7 ML/DL (ref 16–23)
OXYHGB MFR BLDA: 96.9 % (ref 94–97)
PCO2 BLDA: 35.9 MM HG (ref 36–44)
PH BLDA: 7.37 [PH] (ref 7.35–7.45)
PLATELET # BLD AUTO: 58 THOUSANDS/UL (ref 149–390)
PMV BLD AUTO: 9.6 FL (ref 8.9–12.7)
PO2 BLDA: 120.6 MM HG (ref 75–129)
POTASSIUM SERPL-SCNC: 4.9 MMOL/L (ref 3.5–5.3)
PROT SERPL-MCNC: 4.9 G/DL (ref 6.4–8.2)
RBC # BLD AUTO: 3.42 MILLION/UL (ref 3.81–5.12)
SODIUM SERPL-SCNC: 143 MMOL/L (ref 136–145)
SPECIMEN SOURCE: ABNORMAL
UNIT DISPENSE STATUS: NORMAL
UNIT PRODUCT CODE: NORMAL
UNIT RH: NORMAL
VENT- AC: AC
WBC # BLD AUTO: 5.84 THOUSAND/UL (ref 4.31–10.16)

## 2017-07-14 PROCEDURE — 85025 COMPLETE CBC W/AUTO DIFF WBC: CPT | Performed by: SURGERY

## 2017-07-14 PROCEDURE — 83605 ASSAY OF LACTIC ACID: CPT | Performed by: PHYSICIAN ASSISTANT

## 2017-07-14 PROCEDURE — 94760 N-INVAS EAR/PLS OXIMETRY 1: CPT

## 2017-07-14 PROCEDURE — 83735 ASSAY OF MAGNESIUM: CPT | Performed by: SURGERY

## 2017-07-14 PROCEDURE — 80053 COMPREHEN METABOLIC PANEL: CPT | Performed by: SURGERY

## 2017-07-14 PROCEDURE — 82805 BLOOD GASES W/O2 SATURATION: CPT | Performed by: SURGERY

## 2017-07-14 PROCEDURE — 83605 ASSAY OF LACTIC ACID: CPT | Performed by: SURGERY

## 2017-07-14 PROCEDURE — 94003 VENT MGMT INPAT SUBQ DAY: CPT

## 2017-07-14 PROCEDURE — 82948 REAGENT STRIP/BLOOD GLUCOSE: CPT

## 2017-07-14 PROCEDURE — 94150 VITAL CAPACITY TEST: CPT

## 2017-07-14 PROCEDURE — 83605 ASSAY OF LACTIC ACID: CPT | Performed by: EMERGENCY MEDICINE

## 2017-07-14 PROCEDURE — C9113 INJ PANTOPRAZOLE SODIUM, VIA: HCPCS | Performed by: SURGERY

## 2017-07-14 RX ADMIN — HEPARIN SODIUM 5000 UNITS: 5000 INJECTION, SOLUTION INTRAVENOUS; SUBCUTANEOUS at 08:09

## 2017-07-14 RX ADMIN — PANTOPRAZOLE SODIUM 40 MG: 40 INJECTION, POWDER, FOR SOLUTION INTRAVENOUS at 08:09

## 2017-07-14 RX ADMIN — SODIUM CHLORIDE 100 ML/HR: 0.9 INJECTION, SOLUTION INTRAVENOUS at 18:06

## 2017-07-14 RX ADMIN — PROPOFOL 18 MCG/KG/MIN: 10 INJECTION, EMULSION INTRAVENOUS at 02:07

## 2017-07-14 RX ADMIN — ONDANSETRON 4 MG: 2 INJECTION INTRAMUSCULAR; INTRAVENOUS at 00:30

## 2017-07-14 RX ADMIN — FENTANYL CITRATE 25 MCG: 50 INJECTION INTRAMUSCULAR; INTRAVENOUS at 04:02

## 2017-07-14 RX ADMIN — FENTANYL CITRATE 25 MCG: 50 INJECTION INTRAMUSCULAR; INTRAVENOUS at 06:17

## 2017-07-14 RX ADMIN — INSULIN LISPRO 2 UNITS: 100 INJECTION, SOLUTION INTRAVENOUS; SUBCUTANEOUS at 06:14

## 2017-07-14 RX ADMIN — SODIUM CHLORIDE 100 ML/HR: 0.9 INJECTION, SOLUTION INTRAVENOUS at 08:09

## 2017-07-14 RX ADMIN — FENTANYL CITRATE 25 MCG: 50 INJECTION INTRAMUSCULAR; INTRAVENOUS at 08:05

## 2017-07-14 RX ADMIN — INSULIN LISPRO 2 UNITS: 100 INJECTION, SOLUTION INTRAVENOUS; SUBCUTANEOUS at 00:35

## 2017-07-14 RX ADMIN — POTASSIUM CHLORIDE 20 MEQ: 200 INJECTION, SOLUTION INTRAVENOUS at 00:29

## 2017-07-14 RX ADMIN — Medication: at 14:44

## 2017-07-14 RX ADMIN — FENTANYL CITRATE 25 MCG: 50 INJECTION INTRAMUSCULAR; INTRAVENOUS at 00:16

## 2017-07-14 RX ADMIN — HEPARIN SODIUM 5000 UNITS: 5000 INJECTION, SOLUTION INTRAVENOUS; SUBCUTANEOUS at 20:02

## 2017-07-14 RX ADMIN — INSULIN LISPRO 2 UNITS: 100 INJECTION, SOLUTION INTRAVENOUS; SUBCUTANEOUS at 12:50

## 2017-07-14 RX ADMIN — INSULIN LISPRO 2 UNITS: 100 INJECTION, SOLUTION INTRAVENOUS; SUBCUTANEOUS at 18:00

## 2017-07-15 ENCOUNTER — GENERIC CONVERSION - ENCOUNTER (OUTPATIENT)
Dept: OTHER | Facility: OTHER | Age: 77
End: 2017-07-15

## 2017-07-15 LAB
ABO GROUP BLD BPU: NORMAL
ALBUMIN SERPL BCP-MCNC: 3 G/DL (ref 3.5–5)
ALP SERPL-CCNC: 45 U/L (ref 46–116)
ALT SERPL W P-5'-P-CCNC: 856 U/L (ref 12–78)
ANION GAP SERPL CALCULATED.3IONS-SCNC: 6 MMOL/L (ref 4–13)
AST SERPL W P-5'-P-CCNC: 568 U/L (ref 5–45)
ATRIAL RATE: 126 BPM
ATRIAL RATE: 131 BPM
BASOPHILS # BLD AUTO: 0 THOUSANDS/ΜL (ref 0–0.1)
BASOPHILS NFR BLD AUTO: 0 % (ref 0–1)
BILIRUB SERPL-MCNC: 1.43 MG/DL (ref 0.2–1)
BPU ID: NORMAL
BUN SERPL-MCNC: 10 MG/DL (ref 5–25)
CA-I BLD-SCNC: 1.14 MMOL/L (ref 1.12–1.32)
CALCIUM SERPL-MCNC: 8.2 MG/DL (ref 8.3–10.1)
CHLORIDE SERPL-SCNC: 106 MMOL/L (ref 100–108)
CO2 SERPL-SCNC: 25 MMOL/L (ref 21–32)
CREAT SERPL-MCNC: 0.55 MG/DL (ref 0.6–1.3)
CROSSMATCH: NORMAL
CROSSMATCH: NORMAL
EOSINOPHIL # BLD AUTO: 0 THOUSAND/ΜL (ref 0–0.61)
EOSINOPHIL NFR BLD AUTO: 0 % (ref 0–6)
ERYTHROCYTE [DISTWIDTH] IN BLOOD BY AUTOMATED COUNT: 17 % (ref 11.6–15.1)
GFR SERPL CREATININE-BSD FRML MDRD: >60 ML/MIN/1.73SQ M
GLUCOSE SERPL-MCNC: 161 MG/DL (ref 65–140)
GLUCOSE SERPL-MCNC: 161 MG/DL (ref 65–140)
GLUCOSE SERPL-MCNC: 166 MG/DL (ref 65–140)
GLUCOSE SERPL-MCNC: 168 MG/DL (ref 65–140)
GLUCOSE SERPL-MCNC: 178 MG/DL (ref 65–140)
GLUCOSE SERPL-MCNC: 194 MG/DL (ref 65–140)
HCT VFR BLD AUTO: 29.5 % (ref 34.8–46.1)
HGB BLD-MCNC: 10.4 G/DL (ref 11.5–15.4)
LYMPHOCYTES # BLD AUTO: 0.97 THOUSANDS/ΜL (ref 0.6–4.47)
LYMPHOCYTES NFR BLD AUTO: 11 % (ref 14–44)
MAGNESIUM SERPL-MCNC: 1.9 MG/DL (ref 1.6–2.6)
MCH RBC QN AUTO: 31.8 PG (ref 26.8–34.3)
MCHC RBC AUTO-ENTMCNC: 35.3 G/DL (ref 31.4–37.4)
MCV RBC AUTO: 90 FL (ref 82–98)
MONOCYTES # BLD AUTO: 1.7 THOUSAND/ΜL (ref 0.17–1.22)
MONOCYTES NFR BLD AUTO: 19 % (ref 4–12)
NEUTROPHILS # BLD AUTO: 6.15 THOUSANDS/ΜL (ref 1.85–7.62)
NEUTS SEG NFR BLD AUTO: 70 % (ref 43–75)
NRBC BLD AUTO-RTO: 0 /100 WBCS
P AXIS: 35 DEGREES
PHOSPHATE SERPL-MCNC: 1.8 MG/DL (ref 2.3–4.1)
PLATELET # BLD AUTO: 59 THOUSANDS/UL (ref 149–390)
PMV BLD AUTO: 9.9 FL (ref 8.9–12.7)
POTASSIUM SERPL-SCNC: 4.5 MMOL/L (ref 3.5–5.3)
PR INTERVAL: 263 MS
PR INTERVAL: 503 MS
PROT SERPL-MCNC: 5 G/DL (ref 6.4–8.2)
QRS AXIS: 45 DEGREES
QRS AXIS: 53 DEGREES
QRSD INTERVAL: 104 MS
QRSD INTERVAL: 117 MS
QT INTERVAL: 338 MS
QT INTERVAL: 375 MS
QTC INTERVAL: 499 MS
QTC INTERVAL: 543 MS
RBC # BLD AUTO: 3.27 MILLION/UL (ref 3.81–5.12)
SODIUM SERPL-SCNC: 137 MMOL/L (ref 136–145)
T WAVE AXIS: 10 DEGREES
T WAVE AXIS: 11 DEGREES
TROPONIN I SERPL-MCNC: 0.06 NG/ML
UNIT DISPENSE STATUS: NORMAL
UNIT PRODUCT CODE: NORMAL
UNIT RH: NORMAL
VENTRICULAR RATE: 126 BPM
VENTRICULAR RATE: 131 BPM
WBC # BLD AUTO: 8.84 THOUSAND/UL (ref 4.31–10.16)

## 2017-07-15 PROCEDURE — 93005 ELECTROCARDIOGRAM TRACING: CPT

## 2017-07-15 PROCEDURE — C9113 INJ PANTOPRAZOLE SODIUM, VIA: HCPCS | Performed by: SURGERY

## 2017-07-15 PROCEDURE — 80053 COMPREHEN METABOLIC PANEL: CPT | Performed by: SURGERY

## 2017-07-15 PROCEDURE — 93005 ELECTROCARDIOGRAM TRACING: CPT | Performed by: PHYSICIAN ASSISTANT

## 2017-07-15 PROCEDURE — 94760 N-INVAS EAR/PLS OXIMETRY 1: CPT

## 2017-07-15 PROCEDURE — 84484 ASSAY OF TROPONIN QUANT: CPT | Performed by: PHYSICIAN ASSISTANT

## 2017-07-15 PROCEDURE — 82330 ASSAY OF CALCIUM: CPT | Performed by: SURGERY

## 2017-07-15 PROCEDURE — 83735 ASSAY OF MAGNESIUM: CPT | Performed by: SURGERY

## 2017-07-15 PROCEDURE — 84100 ASSAY OF PHOSPHORUS: CPT | Performed by: SURGERY

## 2017-07-15 PROCEDURE — 94640 AIRWAY INHALATION TREATMENT: CPT

## 2017-07-15 PROCEDURE — 85025 COMPLETE CBC W/AUTO DIFF WBC: CPT | Performed by: SURGERY

## 2017-07-15 PROCEDURE — 82948 REAGENT STRIP/BLOOD GLUCOSE: CPT

## 2017-07-15 RX ORDER — METOPROLOL TARTRATE 5 MG/5ML
5 INJECTION INTRAVENOUS EVERY 6 HOURS PRN
Status: DISCONTINUED | OUTPATIENT
Start: 2017-07-15 | End: 2017-07-15

## 2017-07-15 RX ORDER — ALBUTEROL SULFATE 2.5 MG/3ML
2.5 SOLUTION RESPIRATORY (INHALATION) ONCE
Status: COMPLETED | OUTPATIENT
Start: 2017-07-15 | End: 2017-07-15

## 2017-07-15 RX ORDER — NITROGLYCERIN 0.4 MG/1
0.4 TABLET SUBLINGUAL
Status: DISCONTINUED | OUTPATIENT
Start: 2017-07-15 | End: 2017-07-24 | Stop reason: HOSPADM

## 2017-07-15 RX ORDER — METOPROLOL TARTRATE 5 MG/5ML
5 INJECTION INTRAVENOUS EVERY 6 HOURS PRN
Status: DISCONTINUED | OUTPATIENT
Start: 2017-07-15 | End: 2017-07-23

## 2017-07-15 RX ORDER — NITROGLYCERIN 0.4 MG/1
TABLET SUBLINGUAL
Status: COMPLETED
Start: 2017-07-15 | End: 2017-07-15

## 2017-07-15 RX ORDER — ALBUTEROL SULFATE 2.5 MG/3ML
SOLUTION RESPIRATORY (INHALATION)
Status: COMPLETED
Start: 2017-07-15 | End: 2017-07-15

## 2017-07-15 RX ADMIN — INSULIN LISPRO 2 UNITS: 100 INJECTION, SOLUTION INTRAVENOUS; SUBCUTANEOUS at 13:30

## 2017-07-15 RX ADMIN — Medication: at 12:41

## 2017-07-15 RX ADMIN — INSULIN LISPRO 1 UNITS: 100 INJECTION, SOLUTION INTRAVENOUS; SUBCUTANEOUS at 00:05

## 2017-07-15 RX ADMIN — HEPARIN SODIUM 5000 UNITS: 5000 INJECTION, SOLUTION INTRAVENOUS; SUBCUTANEOUS at 08:27

## 2017-07-15 RX ADMIN — ALBUTEROL SULFATE 2.5 MG: 2.5 SOLUTION RESPIRATORY (INHALATION) at 10:48

## 2017-07-15 RX ADMIN — HEPARIN SODIUM 5000 UNITS: 5000 INJECTION, SOLUTION INTRAVENOUS; SUBCUTANEOUS at 21:40

## 2017-07-15 RX ADMIN — NITROGLYCERIN 0.4 MG: 0.4 TABLET SUBLINGUAL at 11:11

## 2017-07-15 RX ADMIN — NITROGLYCERIN 0.4 MG: 0.4 TABLET SUBLINGUAL at 11:28

## 2017-07-15 RX ADMIN — INSULIN LISPRO 1 UNITS: 100 INJECTION, SOLUTION INTRAVENOUS; SUBCUTANEOUS at 21:40

## 2017-07-15 RX ADMIN — METOPROLOL TARTRATE 5 MG: 5 INJECTION INTRAVENOUS at 22:04

## 2017-07-15 RX ADMIN — PANTOPRAZOLE SODIUM 40 MG: 40 INJECTION, POWDER, FOR SOLUTION INTRAVENOUS at 08:27

## 2017-07-15 RX ADMIN — INSULIN LISPRO 1 UNITS: 100 INJECTION, SOLUTION INTRAVENOUS; SUBCUTANEOUS at 16:48

## 2017-07-15 RX ADMIN — SODIUM CHLORIDE 100 ML/HR: 0.9 INJECTION, SOLUTION INTRAVENOUS at 04:15

## 2017-07-15 RX ADMIN — METOPROLOL TARTRATE 5 MG: 5 INJECTION INTRAVENOUS at 15:56

## 2017-07-15 RX ADMIN — INSULIN LISPRO 1 UNITS: 100 INJECTION, SOLUTION INTRAVENOUS; SUBCUTANEOUS at 05:41

## 2017-07-16 PROBLEM — K76.89 SUBCAPSULAR HEMATOMA OF LIVER: Status: RESOLVED | Noted: 2017-01-05 | Resolved: 2017-07-16

## 2017-07-16 PROBLEM — R06.02 SOB (SHORTNESS OF BREATH) ON EXERTION: Chronic | Status: RESOLVED | Noted: 2017-01-05 | Resolved: 2017-07-16

## 2017-07-16 LAB
ABO GROUP BLD BPU: NORMAL
ANION GAP SERPL CALCULATED.3IONS-SCNC: 5 MMOL/L (ref 4–13)
BASOPHILS # BLD AUTO: 0 THOUSANDS/ΜL (ref 0–0.1)
BASOPHILS NFR BLD AUTO: 0 % (ref 0–1)
BPU ID: NORMAL
BUN SERPL-MCNC: 9 MG/DL (ref 5–25)
CALCIUM SERPL-MCNC: 8.3 MG/DL (ref 8.3–10.1)
CHLORIDE SERPL-SCNC: 103 MMOL/L (ref 100–108)
CO2 SERPL-SCNC: 29 MMOL/L (ref 21–32)
CREAT SERPL-MCNC: 0.44 MG/DL (ref 0.6–1.3)
CROSSMATCH: NORMAL
EOSINOPHIL # BLD AUTO: 0.01 THOUSAND/ΜL (ref 0–0.61)
EOSINOPHIL NFR BLD AUTO: 0 % (ref 0–6)
ERYTHROCYTE [DISTWIDTH] IN BLOOD BY AUTOMATED COUNT: 16.5 % (ref 11.6–15.1)
GFR SERPL CREATININE-BSD FRML MDRD: >60 ML/MIN/1.73SQ M
GLUCOSE SERPL-MCNC: 116 MG/DL (ref 65–140)
GLUCOSE SERPL-MCNC: 138 MG/DL (ref 65–140)
GLUCOSE SERPL-MCNC: 197 MG/DL (ref 65–140)
GLUCOSE SERPL-MCNC: 242 MG/DL (ref 65–140)
GLUCOSE SERPL-MCNC: 243 MG/DL (ref 65–140)
HCT VFR BLD AUTO: 27.9 % (ref 34.8–46.1)
HGB BLD-MCNC: 9.8 G/DL (ref 11.5–15.4)
LYMPHOCYTES # BLD AUTO: 1.22 THOUSANDS/ΜL (ref 0.6–4.47)
LYMPHOCYTES NFR BLD AUTO: 13 % (ref 14–44)
MCH RBC QN AUTO: 32 PG (ref 26.8–34.3)
MCHC RBC AUTO-ENTMCNC: 35.1 G/DL (ref 31.4–37.4)
MCV RBC AUTO: 91 FL (ref 82–98)
MONOCYTES # BLD AUTO: 1.49 THOUSAND/ΜL (ref 0.17–1.22)
MONOCYTES NFR BLD AUTO: 16 % (ref 4–12)
NEUTROPHILS # BLD AUTO: 6.37 THOUSANDS/ΜL (ref 1.85–7.62)
NEUTS SEG NFR BLD AUTO: 71 % (ref 43–75)
NRBC BLD AUTO-RTO: 0 /100 WBCS
PLATELET # BLD AUTO: 65 THOUSANDS/UL (ref 149–390)
PMV BLD AUTO: 9.7 FL (ref 8.9–12.7)
POTASSIUM SERPL-SCNC: 4.2 MMOL/L (ref 3.5–5.3)
RBC # BLD AUTO: 3.06 MILLION/UL (ref 3.81–5.12)
SODIUM SERPL-SCNC: 137 MMOL/L (ref 136–145)
UNIT DISPENSE STATUS: NORMAL
UNIT PRODUCT CODE: NORMAL
UNIT RH: NORMAL
WBC # BLD AUTO: 9.1 THOUSAND/UL (ref 4.31–10.16)

## 2017-07-16 PROCEDURE — 85025 COMPLETE CBC W/AUTO DIFF WBC: CPT | Performed by: SURGERY

## 2017-07-16 PROCEDURE — 82948 REAGENT STRIP/BLOOD GLUCOSE: CPT

## 2017-07-16 PROCEDURE — C9113 INJ PANTOPRAZOLE SODIUM, VIA: HCPCS | Performed by: SURGERY

## 2017-07-16 PROCEDURE — 80048 BASIC METABOLIC PNL TOTAL CA: CPT | Performed by: SURGERY

## 2017-07-16 RX ORDER — DEXTROSE, SODIUM CHLORIDE, AND POTASSIUM CHLORIDE 5; .45; .15 G/100ML; G/100ML; G/100ML
50 INJECTION INTRAVENOUS CONTINUOUS
Status: DISCONTINUED | OUTPATIENT
Start: 2017-07-16 | End: 2017-07-17

## 2017-07-16 RX ADMIN — METOPROLOL TARTRATE 5 MG: 5 INJECTION INTRAVENOUS at 05:34

## 2017-07-16 RX ADMIN — PANTOPRAZOLE SODIUM 40 MG: 40 INJECTION, POWDER, FOR SOLUTION INTRAVENOUS at 09:20

## 2017-07-16 RX ADMIN — INSULIN LISPRO 2 UNITS: 100 INJECTION, SOLUTION INTRAVENOUS; SUBCUTANEOUS at 21:35

## 2017-07-16 RX ADMIN — DEXTROSE, SODIUM CHLORIDE, AND POTASSIUM CHLORIDE 50 ML/HR: 5; .45; .15 INJECTION INTRAVENOUS at 15:33

## 2017-07-16 RX ADMIN — INSULIN LISPRO 3 UNITS: 100 INJECTION, SOLUTION INTRAVENOUS; SUBCUTANEOUS at 12:08

## 2017-07-16 RX ADMIN — HEPARIN SODIUM 5000 UNITS: 5000 INJECTION, SOLUTION INTRAVENOUS; SUBCUTANEOUS at 21:33

## 2017-07-16 RX ADMIN — INSULIN LISPRO 2 UNITS: 100 INJECTION, SOLUTION INTRAVENOUS; SUBCUTANEOUS at 16:47

## 2017-07-16 RX ADMIN — METOPROLOL TARTRATE 5 MG: 5 INJECTION INTRAVENOUS at 16:43

## 2017-07-16 RX ADMIN — HEPARIN SODIUM 5000 UNITS: 5000 INJECTION, SOLUTION INTRAVENOUS; SUBCUTANEOUS at 09:20

## 2017-07-16 RX ADMIN — Medication: at 14:08

## 2017-07-17 LAB
ANION GAP SERPL CALCULATED.3IONS-SCNC: 4 MMOL/L (ref 4–13)
APTT PPP: 30 SECONDS (ref 23–35)
BASOPHILS # BLD AUTO: 0.01 THOUSANDS/ΜL (ref 0–0.1)
BASOPHILS NFR BLD AUTO: 0 % (ref 0–1)
BUN SERPL-MCNC: 15 MG/DL (ref 5–25)
CALCIUM SERPL-MCNC: 8 MG/DL (ref 8.3–10.1)
CHLORIDE SERPL-SCNC: 106 MMOL/L (ref 100–108)
CO2 SERPL-SCNC: 29 MMOL/L (ref 21–32)
CREAT SERPL-MCNC: 0.61 MG/DL (ref 0.6–1.3)
EOSINOPHIL # BLD AUTO: 0.08 THOUSAND/ΜL (ref 0–0.61)
EOSINOPHIL NFR BLD AUTO: 1 % (ref 0–6)
ERYTHROCYTE [DISTWIDTH] IN BLOOD BY AUTOMATED COUNT: 16.7 % (ref 11.6–15.1)
GFR SERPL CREATININE-BSD FRML MDRD: >60 ML/MIN/1.73SQ M
GLUCOSE SERPL-MCNC: 160 MG/DL (ref 65–140)
GLUCOSE SERPL-MCNC: 168 MG/DL (ref 65–140)
GLUCOSE SERPL-MCNC: 237 MG/DL (ref 65–140)
GLUCOSE SERPL-MCNC: 244 MG/DL (ref 65–140)
GLUCOSE SERPL-MCNC: 283 MG/DL (ref 65–140)
GLUCOSE SERPL-MCNC: 291 MG/DL (ref 65–140)
HCT VFR BLD AUTO: 25 % (ref 34.8–46.1)
HGB BLD-MCNC: 8.8 G/DL (ref 11.5–15.4)
INR PPP: 1.14 (ref 0.86–1.16)
LYMPHOCYTES # BLD AUTO: 1.29 THOUSANDS/ΜL (ref 0.6–4.47)
LYMPHOCYTES NFR BLD AUTO: 16 % (ref 14–44)
MCH RBC QN AUTO: 32.8 PG (ref 26.8–34.3)
MCHC RBC AUTO-ENTMCNC: 35.2 G/DL (ref 31.4–37.4)
MCV RBC AUTO: 93 FL (ref 82–98)
MONOCYTES # BLD AUTO: 1.65 THOUSAND/ΜL (ref 0.17–1.22)
MONOCYTES NFR BLD AUTO: 20 % (ref 4–12)
NEUTROPHILS # BLD AUTO: 5.13 THOUSANDS/ΜL (ref 1.85–7.62)
NEUTS SEG NFR BLD AUTO: 63 % (ref 43–75)
NRBC BLD AUTO-RTO: 0 /100 WBCS
PLATELET # BLD AUTO: 105 THOUSANDS/UL (ref 149–390)
PMV BLD AUTO: 9.8 FL (ref 8.9–12.7)
POTASSIUM SERPL-SCNC: 4.4 MMOL/L (ref 3.5–5.3)
PROTHROMBIN TIME: 14.6 SECONDS (ref 12.1–14.4)
RBC # BLD AUTO: 2.68 MILLION/UL (ref 3.81–5.12)
SODIUM SERPL-SCNC: 139 MMOL/L (ref 136–145)
WBC # BLD AUTO: 8.19 THOUSAND/UL (ref 4.31–10.16)

## 2017-07-17 PROCEDURE — G8988 SELF CARE GOAL STATUS: HCPCS

## 2017-07-17 PROCEDURE — 85025 COMPLETE CBC W/AUTO DIFF WBC: CPT | Performed by: SURGERY

## 2017-07-17 PROCEDURE — 85610 PROTHROMBIN TIME: CPT | Performed by: STUDENT IN AN ORGANIZED HEALTH CARE EDUCATION/TRAINING PROGRAM

## 2017-07-17 PROCEDURE — 85730 THROMBOPLASTIN TIME PARTIAL: CPT | Performed by: STUDENT IN AN ORGANIZED HEALTH CARE EDUCATION/TRAINING PROGRAM

## 2017-07-17 PROCEDURE — G8987 SELF CARE CURRENT STATUS: HCPCS

## 2017-07-17 PROCEDURE — 80048 BASIC METABOLIC PNL TOTAL CA: CPT | Performed by: SURGERY

## 2017-07-17 PROCEDURE — 97166 OT EVAL MOD COMPLEX 45 MIN: CPT

## 2017-07-17 PROCEDURE — 82948 REAGENT STRIP/BLOOD GLUCOSE: CPT

## 2017-07-17 PROCEDURE — C9113 INJ PANTOPRAZOLE SODIUM, VIA: HCPCS | Performed by: SURGERY

## 2017-07-17 RX ORDER — KETOROLAC TROMETHAMINE 30 MG/ML
15 INJECTION, SOLUTION INTRAMUSCULAR; INTRAVENOUS EVERY 6 HOURS PRN
Status: DISCONTINUED | OUTPATIENT
Start: 2017-07-17 | End: 2017-07-17

## 2017-07-17 RX ORDER — HEPARIN SODIUM 5000 [USP'U]/ML
5000 INJECTION, SOLUTION INTRAVENOUS; SUBCUTANEOUS EVERY 8 HOURS SCHEDULED
Status: DISCONTINUED | OUTPATIENT
Start: 2017-07-17 | End: 2017-07-24 | Stop reason: HOSPADM

## 2017-07-17 RX ORDER — PANTOPRAZOLE SODIUM 40 MG/1
40 TABLET, DELAYED RELEASE ORAL
Status: DISCONTINUED | OUTPATIENT
Start: 2017-07-17 | End: 2017-07-24 | Stop reason: HOSPADM

## 2017-07-17 RX ORDER — OXYCODONE HYDROCHLORIDE 5 MG/1
5 TABLET ORAL EVERY 4 HOURS PRN
Status: DISCONTINUED | OUTPATIENT
Start: 2017-07-17 | End: 2017-07-24

## 2017-07-17 RX ORDER — OXYCODONE HYDROCHLORIDE 10 MG/1
10 TABLET ORAL EVERY 6 HOURS PRN
Status: DISCONTINUED | OUTPATIENT
Start: 2017-07-17 | End: 2017-07-24

## 2017-07-17 RX ADMIN — OXYCODONE HYDROCHLORIDE 5 MG: 5 TABLET ORAL at 11:38

## 2017-07-17 RX ADMIN — INSULIN LISPRO 3 UNITS: 100 INJECTION, SOLUTION INTRAVENOUS; SUBCUTANEOUS at 16:00

## 2017-07-17 RX ADMIN — INSULIN LISPRO 4 UNITS: 100 INJECTION, SOLUTION INTRAVENOUS; SUBCUTANEOUS at 11:38

## 2017-07-17 RX ADMIN — OXYCODONE HYDROCHLORIDE 10 MG: 10 TABLET ORAL at 09:18

## 2017-07-17 RX ADMIN — METOPROLOL TARTRATE 5 MG: 5 INJECTION INTRAVENOUS at 15:55

## 2017-07-17 RX ADMIN — HEPARIN SODIUM 5000 UNITS: 5000 INJECTION, SOLUTION INTRAVENOUS; SUBCUTANEOUS at 21:24

## 2017-07-17 RX ADMIN — PANTOPRAZOLE SODIUM 40 MG: 40 INJECTION, POWDER, FOR SOLUTION INTRAVENOUS at 08:03

## 2017-07-17 RX ADMIN — DEXTROSE, SODIUM CHLORIDE, AND POTASSIUM CHLORIDE 50 ML/HR: 5; .45; .15 INJECTION INTRAVENOUS at 06:31

## 2017-07-17 RX ADMIN — HEPARIN SODIUM 5000 UNITS: 5000 INJECTION, SOLUTION INTRAVENOUS; SUBCUTANEOUS at 13:58

## 2017-07-17 RX ADMIN — HYDROMORPHONE HYDROCHLORIDE 0.5 MG: 1 INJECTION, SOLUTION INTRAMUSCULAR; INTRAVENOUS; SUBCUTANEOUS at 13:58

## 2017-07-17 RX ADMIN — INSULIN LISPRO 2 UNITS: 100 INJECTION, SOLUTION INTRAVENOUS; SUBCUTANEOUS at 21:24

## 2017-07-17 RX ADMIN — INSULIN LISPRO 1 UNITS: 100 INJECTION, SOLUTION INTRAVENOUS; SUBCUTANEOUS at 08:04

## 2017-07-18 LAB
ANION GAP SERPL CALCULATED.3IONS-SCNC: 5 MMOL/L (ref 4–13)
BUN SERPL-MCNC: 17 MG/DL (ref 5–25)
CALCIUM SERPL-MCNC: 8.7 MG/DL (ref 8.3–10.1)
CHLORIDE SERPL-SCNC: 104 MMOL/L (ref 100–108)
CO2 SERPL-SCNC: 30 MMOL/L (ref 21–32)
CREAT SERPL-MCNC: 0.46 MG/DL (ref 0.6–1.3)
GFR SERPL CREATININE-BSD FRML MDRD: >60 ML/MIN/1.73SQ M
GLUCOSE SERPL-MCNC: 129 MG/DL (ref 65–140)
GLUCOSE SERPL-MCNC: 151 MG/DL (ref 65–140)
GLUCOSE SERPL-MCNC: 206 MG/DL (ref 65–140)
GLUCOSE SERPL-MCNC: 209 MG/DL (ref 65–140)
GLUCOSE SERPL-MCNC: 210 MG/DL (ref 65–140)
HGB BLD-MCNC: 8.5 G/DL (ref 11.5–15.4)
POTASSIUM SERPL-SCNC: 4 MMOL/L (ref 3.5–5.3)
SODIUM SERPL-SCNC: 139 MMOL/L (ref 136–145)

## 2017-07-18 PROCEDURE — 80048 BASIC METABOLIC PNL TOTAL CA: CPT | Performed by: PHYSICIAN ASSISTANT

## 2017-07-18 PROCEDURE — 82948 REAGENT STRIP/BLOOD GLUCOSE: CPT

## 2017-07-18 PROCEDURE — 85018 HEMOGLOBIN: CPT | Performed by: SURGERY

## 2017-07-18 PROCEDURE — G8979 MOBILITY GOAL STATUS: HCPCS

## 2017-07-18 PROCEDURE — 97163 PT EVAL HIGH COMPLEX 45 MIN: CPT

## 2017-07-18 PROCEDURE — G8978 MOBILITY CURRENT STATUS: HCPCS

## 2017-07-18 RX ADMIN — HEPARIN SODIUM 5000 UNITS: 5000 INJECTION, SOLUTION INTRAVENOUS; SUBCUTANEOUS at 05:22

## 2017-07-18 RX ADMIN — INSULIN LISPRO 2 UNITS: 100 INJECTION, SOLUTION INTRAVENOUS; SUBCUTANEOUS at 17:20

## 2017-07-18 RX ADMIN — INSULIN LISPRO 1 UNITS: 100 INJECTION, SOLUTION INTRAVENOUS; SUBCUTANEOUS at 21:06

## 2017-07-18 RX ADMIN — METOPROLOL TARTRATE 5 MG: 5 INJECTION INTRAVENOUS at 05:22

## 2017-07-18 RX ADMIN — HEPARIN SODIUM 5000 UNITS: 5000 INJECTION, SOLUTION INTRAVENOUS; SUBCUTANEOUS at 21:06

## 2017-07-18 RX ADMIN — METOPROLOL TARTRATE 5 MG: 5 INJECTION INTRAVENOUS at 18:00

## 2017-07-18 RX ADMIN — HEPARIN SODIUM 5000 UNITS: 5000 INJECTION, SOLUTION INTRAVENOUS; SUBCUTANEOUS at 12:47

## 2017-07-18 RX ADMIN — PANTOPRAZOLE SODIUM 40 MG: 40 TABLET, DELAYED RELEASE ORAL at 05:22

## 2017-07-18 RX ADMIN — OXYCODONE HYDROCHLORIDE 10 MG: 10 TABLET ORAL at 10:21

## 2017-07-18 RX ADMIN — INSULIN LISPRO 2 UNITS: 100 INJECTION, SOLUTION INTRAVENOUS; SUBCUTANEOUS at 12:47

## 2017-07-18 RX ADMIN — OXYCODONE HYDROCHLORIDE 5 MG: 5 TABLET ORAL at 21:06

## 2017-07-18 RX ADMIN — INSULIN LISPRO 1 UNITS: 100 INJECTION, SOLUTION INTRAVENOUS; SUBCUTANEOUS at 09:03

## 2017-07-18 RX ADMIN — OXYCODONE HYDROCHLORIDE 5 MG: 5 TABLET ORAL at 05:28

## 2017-07-18 RX ADMIN — OXYCODONE HYDROCHLORIDE 5 MG: 5 TABLET ORAL at 15:09

## 2017-07-19 ENCOUNTER — APPOINTMENT (INPATIENT)
Dept: PHYSICAL THERAPY | Facility: HOSPITAL | Age: 77
DRG: 421 | End: 2017-07-19
Payer: COMMERCIAL

## 2017-07-19 ENCOUNTER — APPOINTMENT (INPATIENT)
Dept: RADIOLOGY | Facility: HOSPITAL | Age: 77
DRG: 421 | End: 2017-07-19
Payer: COMMERCIAL

## 2017-07-19 LAB
GLUCOSE SERPL-MCNC: 158 MG/DL (ref 65–140)
GLUCOSE SERPL-MCNC: 206 MG/DL (ref 65–140)
GLUCOSE SERPL-MCNC: 206 MG/DL (ref 65–140)
GLUCOSE SERPL-MCNC: 223 MG/DL (ref 65–140)

## 2017-07-19 PROCEDURE — 97530 THERAPEUTIC ACTIVITIES: CPT

## 2017-07-19 PROCEDURE — 82948 REAGENT STRIP/BLOOD GLUCOSE: CPT

## 2017-07-19 PROCEDURE — 97110 THERAPEUTIC EXERCISES: CPT

## 2017-07-19 PROCEDURE — 97116 GAIT TRAINING THERAPY: CPT

## 2017-07-19 PROCEDURE — 97535 SELF CARE MNGMENT TRAINING: CPT

## 2017-07-19 PROCEDURE — 71275 CT ANGIOGRAPHY CHEST: CPT

## 2017-07-19 RX ADMIN — IOHEXOL 85 ML: 350 INJECTION, SOLUTION INTRAVENOUS at 11:29

## 2017-07-19 RX ADMIN — HEPARIN SODIUM 5000 UNITS: 5000 INJECTION, SOLUTION INTRAVENOUS; SUBCUTANEOUS at 05:13

## 2017-07-19 RX ADMIN — METOPROLOL TARTRATE 5 MG: 5 INJECTION INTRAVENOUS at 23:56

## 2017-07-19 RX ADMIN — OXYCODONE HYDROCHLORIDE 10 MG: 10 TABLET ORAL at 10:29

## 2017-07-19 RX ADMIN — HEPARIN SODIUM 5000 UNITS: 5000 INJECTION, SOLUTION INTRAVENOUS; SUBCUTANEOUS at 13:36

## 2017-07-19 RX ADMIN — INSULIN LISPRO 2 UNITS: 100 INJECTION, SOLUTION INTRAVENOUS; SUBCUTANEOUS at 12:13

## 2017-07-19 RX ADMIN — METOPROLOL TARTRATE 5 MG: 5 INJECTION INTRAVENOUS at 08:40

## 2017-07-19 RX ADMIN — OXYCODONE HYDROCHLORIDE 5 MG: 5 TABLET ORAL at 19:33

## 2017-07-19 RX ADMIN — INSULIN LISPRO 1 UNITS: 100 INJECTION, SOLUTION INTRAVENOUS; SUBCUTANEOUS at 21:00

## 2017-07-19 RX ADMIN — INSULIN LISPRO 1 UNITS: 100 INJECTION, SOLUTION INTRAVENOUS; SUBCUTANEOUS at 08:40

## 2017-07-19 RX ADMIN — OXYCODONE HYDROCHLORIDE 5 MG: 5 TABLET ORAL at 15:02

## 2017-07-19 RX ADMIN — PANTOPRAZOLE SODIUM 40 MG: 40 TABLET, DELAYED RELEASE ORAL at 05:13

## 2017-07-19 RX ADMIN — HEPARIN SODIUM 5000 UNITS: 5000 INJECTION, SOLUTION INTRAVENOUS; SUBCUTANEOUS at 21:00

## 2017-07-19 RX ADMIN — METOPROLOL TARTRATE 5 MG: 5 INJECTION INTRAVENOUS at 15:00

## 2017-07-19 RX ADMIN — INSULIN LISPRO 2 UNITS: 100 INJECTION, SOLUTION INTRAVENOUS; SUBCUTANEOUS at 17:36

## 2017-07-19 RX ADMIN — OXYCODONE HYDROCHLORIDE 5 MG: 5 TABLET ORAL at 05:13

## 2017-07-20 LAB
ANION GAP SERPL CALCULATED.3IONS-SCNC: 5 MMOL/L (ref 4–13)
BASOPHILS # BLD AUTO: 0.05 THOUSANDS/ΜL (ref 0–0.1)
BASOPHILS NFR BLD AUTO: 1 % (ref 0–1)
BUN SERPL-MCNC: 13 MG/DL (ref 5–25)
CALCIUM SERPL-MCNC: 8.6 MG/DL (ref 8.3–10.1)
CHLORIDE SERPL-SCNC: 101 MMOL/L (ref 100–108)
CO2 SERPL-SCNC: 32 MMOL/L (ref 21–32)
CREAT SERPL-MCNC: 0.48 MG/DL (ref 0.6–1.3)
EOSINOPHIL # BLD AUTO: 0.22 THOUSAND/ΜL (ref 0–0.61)
EOSINOPHIL NFR BLD AUTO: 2 % (ref 0–6)
ERYTHROCYTE [DISTWIDTH] IN BLOOD BY AUTOMATED COUNT: 16.9 % (ref 11.6–15.1)
GFR SERPL CREATININE-BSD FRML MDRD: >60 ML/MIN/1.73SQ M
GLUCOSE SERPL-MCNC: 128 MG/DL (ref 65–140)
GLUCOSE SERPL-MCNC: 152 MG/DL (ref 65–140)
GLUCOSE SERPL-MCNC: 205 MG/DL (ref 65–140)
GLUCOSE SERPL-MCNC: 250 MG/DL (ref 65–140)
GLUCOSE SERPL-MCNC: 255 MG/DL (ref 65–140)
HCT VFR BLD AUTO: 25.3 % (ref 34.8–46.1)
HGB BLD-MCNC: 8.5 G/DL (ref 11.5–15.4)
LYMPHOCYTES # BLD AUTO: 1.48 THOUSANDS/ΜL (ref 0.6–4.47)
LYMPHOCYTES NFR BLD AUTO: 15 % (ref 14–44)
MCH RBC QN AUTO: 32.2 PG (ref 26.8–34.3)
MCHC RBC AUTO-ENTMCNC: 33.6 G/DL (ref 31.4–37.4)
MCV RBC AUTO: 96 FL (ref 82–98)
MONOCYTES # BLD AUTO: 2.33 THOUSAND/ΜL (ref 0.17–1.22)
MONOCYTES NFR BLD AUTO: 24 % (ref 4–12)
NEUTROPHILS # BLD AUTO: 5.55 THOUSANDS/ΜL (ref 1.85–7.62)
NEUTS SEG NFR BLD AUTO: 58 % (ref 43–75)
NRBC BLD AUTO-RTO: 1 /100 WBCS
PLATELET # BLD AUTO: 241 THOUSANDS/UL (ref 149–390)
PMV BLD AUTO: 9 FL (ref 8.9–12.7)
POTASSIUM SERPL-SCNC: 4 MMOL/L (ref 3.5–5.3)
RBC # BLD AUTO: 2.64 MILLION/UL (ref 3.81–5.12)
SODIUM SERPL-SCNC: 138 MMOL/L (ref 136–145)
WBC # BLD AUTO: 9.75 THOUSAND/UL (ref 4.31–10.16)

## 2017-07-20 PROCEDURE — 80048 BASIC METABOLIC PNL TOTAL CA: CPT | Performed by: PHYSICIAN ASSISTANT

## 2017-07-20 PROCEDURE — 85025 COMPLETE CBC W/AUTO DIFF WBC: CPT | Performed by: PHYSICIAN ASSISTANT

## 2017-07-20 PROCEDURE — 82948 REAGENT STRIP/BLOOD GLUCOSE: CPT

## 2017-07-20 RX ADMIN — INSULIN LISPRO 3 UNITS: 100 INJECTION, SOLUTION INTRAVENOUS; SUBCUTANEOUS at 16:24

## 2017-07-20 RX ADMIN — OXYCODONE HYDROCHLORIDE 5 MG: 5 TABLET ORAL at 19:44

## 2017-07-20 RX ADMIN — METOPROLOL TARTRATE 12.5 MG: 25 TABLET ORAL at 21:08

## 2017-07-20 RX ADMIN — OXYCODONE HYDROCHLORIDE 5 MG: 5 TABLET ORAL at 05:30

## 2017-07-20 RX ADMIN — HEPARIN SODIUM 5000 UNITS: 5000 INJECTION, SOLUTION INTRAVENOUS; SUBCUTANEOUS at 13:38

## 2017-07-20 RX ADMIN — METOPROLOL TARTRATE 12.5 MG: 25 TABLET ORAL at 11:27

## 2017-07-20 RX ADMIN — OXYCODONE HYDROCHLORIDE 5 MG: 5 TABLET ORAL at 00:01

## 2017-07-20 RX ADMIN — HYDROMORPHONE HYDROCHLORIDE 0.5 MG: 1 INJECTION, SOLUTION INTRAMUSCULAR; INTRAVENOUS; SUBCUTANEOUS at 08:10

## 2017-07-20 RX ADMIN — HYDROMORPHONE HYDROCHLORIDE 0.5 MG: 1 INJECTION, SOLUTION INTRAMUSCULAR; INTRAVENOUS; SUBCUTANEOUS at 15:24

## 2017-07-20 RX ADMIN — OXYCODONE HYDROCHLORIDE 10 MG: 10 TABLET ORAL at 10:21

## 2017-07-20 RX ADMIN — PANTOPRAZOLE SODIUM 40 MG: 40 TABLET, DELAYED RELEASE ORAL at 05:30

## 2017-07-20 RX ADMIN — INSULIN LISPRO 2 UNITS: 100 INJECTION, SOLUTION INTRAVENOUS; SUBCUTANEOUS at 11:29

## 2017-07-20 RX ADMIN — INSULIN LISPRO 1 UNITS: 100 INJECTION, SOLUTION INTRAVENOUS; SUBCUTANEOUS at 08:00

## 2017-07-20 RX ADMIN — HEPARIN SODIUM 5000 UNITS: 5000 INJECTION, SOLUTION INTRAVENOUS; SUBCUTANEOUS at 21:08

## 2017-07-20 RX ADMIN — METOPROLOL TARTRATE 5 MG: 5 INJECTION INTRAVENOUS at 19:44

## 2017-07-20 RX ADMIN — HEPARIN SODIUM 5000 UNITS: 5000 INJECTION, SOLUTION INTRAVENOUS; SUBCUTANEOUS at 05:30

## 2017-07-20 RX ADMIN — METOPROLOL TARTRATE 5 MG: 5 INJECTION INTRAVENOUS at 10:16

## 2017-07-20 RX ADMIN — INSULIN LISPRO 2 UNITS: 100 INJECTION, SOLUTION INTRAVENOUS; SUBCUTANEOUS at 21:08

## 2017-07-21 ENCOUNTER — APPOINTMENT (INPATIENT)
Dept: PHYSICAL THERAPY | Facility: HOSPITAL | Age: 77
DRG: 421 | End: 2017-07-21
Payer: COMMERCIAL

## 2017-07-21 LAB
GLUCOSE SERPL-MCNC: 157 MG/DL (ref 65–140)
GLUCOSE SERPL-MCNC: 193 MG/DL (ref 65–140)
GLUCOSE SERPL-MCNC: 199 MG/DL (ref 65–140)
GLUCOSE SERPL-MCNC: 200 MG/DL (ref 65–140)

## 2017-07-21 PROCEDURE — 97535 SELF CARE MNGMENT TRAINING: CPT

## 2017-07-21 PROCEDURE — 97116 GAIT TRAINING THERAPY: CPT

## 2017-07-21 PROCEDURE — 97110 THERAPEUTIC EXERCISES: CPT

## 2017-07-21 PROCEDURE — 82948 REAGENT STRIP/BLOOD GLUCOSE: CPT

## 2017-07-21 RX ADMIN — METOPROLOL TARTRATE 25 MG: 25 TABLET ORAL at 21:31

## 2017-07-21 RX ADMIN — OXYCODONE HYDROCHLORIDE 10 MG: 10 TABLET ORAL at 13:45

## 2017-07-21 RX ADMIN — INSULIN LISPRO 2 UNITS: 100 INJECTION, SOLUTION INTRAVENOUS; SUBCUTANEOUS at 18:01

## 2017-07-21 RX ADMIN — PANTOPRAZOLE SODIUM 40 MG: 40 TABLET, DELAYED RELEASE ORAL at 05:13

## 2017-07-21 RX ADMIN — HEPARIN SODIUM 5000 UNITS: 5000 INJECTION, SOLUTION INTRAVENOUS; SUBCUTANEOUS at 13:44

## 2017-07-21 RX ADMIN — INSULIN LISPRO 1 UNITS: 100 INJECTION, SOLUTION INTRAVENOUS; SUBCUTANEOUS at 22:21

## 2017-07-21 RX ADMIN — METOPROLOL TARTRATE 5 MG: 5 INJECTION INTRAVENOUS at 05:13

## 2017-07-21 RX ADMIN — OXYCODONE HYDROCHLORIDE 5 MG: 5 TABLET ORAL at 22:32

## 2017-07-21 RX ADMIN — HEPARIN SODIUM 5000 UNITS: 5000 INJECTION, SOLUTION INTRAVENOUS; SUBCUTANEOUS at 05:13

## 2017-07-21 RX ADMIN — INSULIN LISPRO 1 UNITS: 100 INJECTION, SOLUTION INTRAVENOUS; SUBCUTANEOUS at 09:12

## 2017-07-21 RX ADMIN — HEPARIN SODIUM 5000 UNITS: 5000 INJECTION, SOLUTION INTRAVENOUS; SUBCUTANEOUS at 21:31

## 2017-07-21 RX ADMIN — INSULIN LISPRO 2 UNITS: 100 INJECTION, SOLUTION INTRAVENOUS; SUBCUTANEOUS at 11:53

## 2017-07-21 RX ADMIN — METOPROLOL TARTRATE 12.5 MG: 25 TABLET ORAL at 09:17

## 2017-07-22 LAB
GLUCOSE SERPL-MCNC: 159 MG/DL (ref 65–140)
GLUCOSE SERPL-MCNC: 167 MG/DL (ref 65–140)
GLUCOSE SERPL-MCNC: 215 MG/DL (ref 65–140)
GLUCOSE SERPL-MCNC: 237 MG/DL (ref 65–140)

## 2017-07-22 PROCEDURE — 82948 REAGENT STRIP/BLOOD GLUCOSE: CPT

## 2017-07-22 RX ADMIN — METOPROLOL TARTRATE 5 MG: 5 INJECTION INTRAVENOUS at 18:37

## 2017-07-22 RX ADMIN — INSULIN LISPRO 3 UNITS: 100 INJECTION, SOLUTION INTRAVENOUS; SUBCUTANEOUS at 11:57

## 2017-07-22 RX ADMIN — HEPARIN SODIUM 5000 UNITS: 5000 INJECTION, SOLUTION INTRAVENOUS; SUBCUTANEOUS at 05:42

## 2017-07-22 RX ADMIN — HEPARIN SODIUM 5000 UNITS: 5000 INJECTION, SOLUTION INTRAVENOUS; SUBCUTANEOUS at 21:30

## 2017-07-22 RX ADMIN — PANTOPRAZOLE SODIUM 40 MG: 40 TABLET, DELAYED RELEASE ORAL at 05:43

## 2017-07-22 RX ADMIN — OXYCODONE HYDROCHLORIDE 10 MG: 10 TABLET ORAL at 12:01

## 2017-07-22 RX ADMIN — METOPROLOL TARTRATE 25 MG: 25 TABLET ORAL at 08:49

## 2017-07-22 RX ADMIN — METOPROLOL TARTRATE 25 MG: 25 TABLET ORAL at 21:30

## 2017-07-22 RX ADMIN — OXYCODONE HYDROCHLORIDE 5 MG: 5 TABLET ORAL at 18:37

## 2017-07-22 RX ADMIN — INSULIN LISPRO 1 UNITS: 100 INJECTION, SOLUTION INTRAVENOUS; SUBCUTANEOUS at 07:36

## 2017-07-22 RX ADMIN — HEPARIN SODIUM 5000 UNITS: 5000 INJECTION, SOLUTION INTRAVENOUS; SUBCUTANEOUS at 13:10

## 2017-07-22 RX ADMIN — INSULIN LISPRO 1 UNITS: 100 INJECTION, SOLUTION INTRAVENOUS; SUBCUTANEOUS at 21:30

## 2017-07-22 RX ADMIN — OXYCODONE HYDROCHLORIDE 5 MG: 5 TABLET ORAL at 07:39

## 2017-07-22 RX ADMIN — INSULIN LISPRO 2 UNITS: 100 INJECTION, SOLUTION INTRAVENOUS; SUBCUTANEOUS at 16:14

## 2017-07-23 LAB
GLUCOSE SERPL-MCNC: 151 MG/DL (ref 65–140)
GLUCOSE SERPL-MCNC: 165 MG/DL (ref 65–140)
GLUCOSE SERPL-MCNC: 210 MG/DL (ref 65–140)
GLUCOSE SERPL-MCNC: 257 MG/DL (ref 65–140)

## 2017-07-23 PROCEDURE — 97530 THERAPEUTIC ACTIVITIES: CPT

## 2017-07-23 PROCEDURE — 97116 GAIT TRAINING THERAPY: CPT

## 2017-07-23 PROCEDURE — 82948 REAGENT STRIP/BLOOD GLUCOSE: CPT

## 2017-07-23 RX ADMIN — OXYCODONE HYDROCHLORIDE 10 MG: 10 TABLET ORAL at 04:10

## 2017-07-23 RX ADMIN — METOPROLOL TARTRATE 5 MG: 5 INJECTION INTRAVENOUS at 05:21

## 2017-07-23 RX ADMIN — PANTOPRAZOLE SODIUM 40 MG: 40 TABLET, DELAYED RELEASE ORAL at 05:21

## 2017-07-23 RX ADMIN — OXYCODONE HYDROCHLORIDE 5 MG: 5 TABLET ORAL at 19:49

## 2017-07-23 RX ADMIN — HEPARIN SODIUM 5000 UNITS: 5000 INJECTION, SOLUTION INTRAVENOUS; SUBCUTANEOUS at 05:21

## 2017-07-23 RX ADMIN — HEPARIN SODIUM 5000 UNITS: 5000 INJECTION, SOLUTION INTRAVENOUS; SUBCUTANEOUS at 21:23

## 2017-07-23 RX ADMIN — OXYCODONE HYDROCHLORIDE 10 MG: 10 TABLET ORAL at 11:10

## 2017-07-23 RX ADMIN — METOPROLOL TARTRATE 25 MG: 25 TABLET ORAL at 21:22

## 2017-07-23 RX ADMIN — INSULIN LISPRO 2 UNITS: 100 INJECTION, SOLUTION INTRAVENOUS; SUBCUTANEOUS at 21:23

## 2017-07-23 RX ADMIN — METOPROLOL TARTRATE 25 MG: 25 TABLET ORAL at 09:33

## 2017-07-23 RX ADMIN — INSULIN LISPRO 1 UNITS: 100 INJECTION, SOLUTION INTRAVENOUS; SUBCUTANEOUS at 07:20

## 2017-07-23 RX ADMIN — INSULIN LISPRO 3 UNITS: 100 INJECTION, SOLUTION INTRAVENOUS; SUBCUTANEOUS at 12:03

## 2017-07-23 RX ADMIN — INSULIN LISPRO 1 UNITS: 100 INJECTION, SOLUTION INTRAVENOUS; SUBCUTANEOUS at 17:31

## 2017-07-23 RX ADMIN — HEPARIN SODIUM 5000 UNITS: 5000 INJECTION, SOLUTION INTRAVENOUS; SUBCUTANEOUS at 13:59

## 2017-07-24 VITALS
WEIGHT: 155 LBS | DIASTOLIC BLOOD PRESSURE: 56 MMHG | OXYGEN SATURATION: 91 % | HEIGHT: 61 IN | SYSTOLIC BLOOD PRESSURE: 116 MMHG | TEMPERATURE: 97.7 F | RESPIRATION RATE: 18 BRPM | HEART RATE: 109 BPM | BODY MASS INDEX: 29.27 KG/M2

## 2017-07-24 LAB
GLUCOSE SERPL-MCNC: 151 MG/DL (ref 65–140)
GLUCOSE SERPL-MCNC: 274 MG/DL (ref 65–140)

## 2017-07-24 PROCEDURE — 82948 REAGENT STRIP/BLOOD GLUCOSE: CPT

## 2017-07-24 RX ORDER — ACETAMINOPHEN 325 MG/1
TABLET ORAL
Qty: 30 TABLET | Refills: 0 | Status: SHIPPED | OUTPATIENT
Start: 2017-07-24 | End: 2018-12-17 | Stop reason: ALTCHOICE

## 2017-07-24 RX ORDER — OXYCODONE HYDROCHLORIDE 5 MG/1
5 TABLET ORAL EVERY 6 HOURS PRN
Qty: 20 TABLET | Refills: 0 | Status: SHIPPED | OUTPATIENT
Start: 2017-07-24 | End: 2017-08-03

## 2017-07-24 RX ORDER — OXYCODONE HYDROCHLORIDE 5 MG/1
5 TABLET ORAL EVERY 6 HOURS PRN
Status: DISCONTINUED | OUTPATIENT
Start: 2017-07-24 | End: 2017-07-24 | Stop reason: HOSPADM

## 2017-07-24 RX ORDER — ACETAMINOPHEN 325 MG/1
650 TABLET ORAL EVERY 6 HOURS PRN
Status: DISCONTINUED | OUTPATIENT
Start: 2017-07-24 | End: 2017-07-24 | Stop reason: HOSPADM

## 2017-07-24 RX ADMIN — HEPARIN SODIUM 5000 UNITS: 5000 INJECTION, SOLUTION INTRAVENOUS; SUBCUTANEOUS at 05:24

## 2017-07-24 RX ADMIN — PANTOPRAZOLE SODIUM 40 MG: 40 TABLET, DELAYED RELEASE ORAL at 05:24

## 2017-07-24 RX ADMIN — INSULIN LISPRO 4 UNITS: 100 INJECTION, SOLUTION INTRAVENOUS; SUBCUTANEOUS at 11:53

## 2017-07-24 RX ADMIN — INSULIN LISPRO 1 UNITS: 100 INJECTION, SOLUTION INTRAVENOUS; SUBCUTANEOUS at 07:12

## 2017-07-24 RX ADMIN — METOPROLOL TARTRATE 25 MG: 25 TABLET ORAL at 08:33

## 2017-07-24 RX ADMIN — BISACODYL 10 MG: 5 TABLET, COATED ORAL at 07:12

## 2017-07-26 ENCOUNTER — GENERIC CONVERSION - ENCOUNTER (OUTPATIENT)
Dept: OTHER | Facility: OTHER | Age: 77
End: 2017-07-26

## 2017-07-28 ENCOUNTER — ALLSCRIPTS OFFICE VISIT (OUTPATIENT)
Dept: OTHER | Facility: OTHER | Age: 77
End: 2017-07-28

## 2017-07-31 RX ORDER — LAMOTRIGINE 25 MG/1
250 TABLET ORAL ONCE
Status: DISCONTINUED | OUTPATIENT
Start: 2017-08-01 | End: 2017-08-04 | Stop reason: HOSPADM

## 2017-08-01 ENCOUNTER — HOSPITAL ENCOUNTER (OUTPATIENT)
Dept: INFUSION CENTER | Facility: CLINIC | Age: 77
Discharge: HOME | End: 2017-08-01
Payer: COMMERCIAL

## 2017-08-01 ENCOUNTER — ALLSCRIPTS OFFICE VISIT (OUTPATIENT)
Dept: OTHER | Facility: OTHER | Age: 77
End: 2017-08-01

## 2017-08-08 ENCOUNTER — GENERIC CONVERSION - ENCOUNTER (OUTPATIENT)
Dept: OTHER | Facility: OTHER | Age: 77
End: 2017-08-08

## 2017-09-19 ENCOUNTER — TRANSCRIBE ORDERS (OUTPATIENT)
Dept: ADMINISTRATIVE | Facility: HOSPITAL | Age: 77
End: 2017-09-19

## 2017-09-19 ENCOUNTER — ALLSCRIPTS OFFICE VISIT (OUTPATIENT)
Dept: OTHER | Facility: OTHER | Age: 77
End: 2017-09-19

## 2017-09-19 DIAGNOSIS — C78.7 SECONDARY MALIGNANT NEOPLASM OF LIVER (HCC): Primary | ICD-10-CM

## 2017-09-26 ENCOUNTER — ALLSCRIPTS OFFICE VISIT (OUTPATIENT)
Dept: OTHER | Facility: OTHER | Age: 77
End: 2017-09-26

## 2017-09-28 DIAGNOSIS — R55 SYNCOPE AND COLLAPSE: ICD-10-CM

## 2017-09-28 DIAGNOSIS — R00.2 PALPITATIONS: ICD-10-CM

## 2017-10-02 ENCOUNTER — TELEPHONE (OUTPATIENT)
Dept: PREADMISSION TESTING | Facility: HOSPITAL | Age: 77
End: 2017-10-02

## 2017-10-02 RX ORDER — MIDODRINE HYDROCHLORIDE 2.5 MG/1
2.5 TABLET ORAL 2 TIMES DAILY
Status: ON HOLD | COMMUNITY
End: 2017-10-30 | Stop reason: ALTCHOICE

## 2017-10-03 ENCOUNTER — TELEPHONE (OUTPATIENT)
Dept: PREADMISSION TESTING | Facility: HOSPITAL | Age: 77
End: 2017-10-03

## 2017-10-03 RX ORDER — VANCOMYCIN HYDROCHLORIDE 1 G/200ML
15 INJECTION, SOLUTION INTRAVENOUS ONCE
Status: CANCELLED | OUTPATIENT
Start: 2017-10-04

## 2017-10-04 ENCOUNTER — HOSPITAL ENCOUNTER (OUTPATIENT)
Dept: NON INVASIVE DIAGNOSTICS | Facility: HOSPITAL | Age: 77
Discharge: HOME | End: 2017-10-04
Attending: INTERNAL MEDICINE | Admitting: INTERNAL MEDICINE
Payer: COMMERCIAL

## 2017-10-04 VITALS
RESPIRATION RATE: 18 BRPM | SYSTOLIC BLOOD PRESSURE: 164 MMHG | BODY MASS INDEX: 28.32 KG/M2 | WEIGHT: 150 LBS | HEIGHT: 61 IN | TEMPERATURE: 97.8 F | OXYGEN SATURATION: 96 % | HEART RATE: 93 BPM | DIASTOLIC BLOOD PRESSURE: 67 MMHG

## 2017-10-04 DIAGNOSIS — R00.2 PALPITATIONS: ICD-10-CM

## 2017-10-04 DIAGNOSIS — R55 SYNCOPE AND COLLAPSE: ICD-10-CM

## 2017-10-04 PROCEDURE — 33282 HB IMPLANT PAT-ACTIVE HT RECORD: CPT | Performed by: INTERNAL MEDICINE

## 2017-10-04 PROCEDURE — C1764 EVENT RECORDER, CARDIAC: HCPCS

## 2017-10-04 RX ORDER — LIDOCAINE HYDROCHLORIDE AND EPINEPHRINE 10; 10 MG/ML; UG/ML
INJECTION, SOLUTION INFILTRATION; PERINEURAL CODE/TRAUMA/SEDATION MEDICATION
Status: COMPLETED | OUTPATIENT
Start: 2017-10-04 | End: 2017-10-04

## 2017-10-04 RX ORDER — TAMOXIFEN CITRATE 20 MG/1
20 TABLET ORAL DAILY
COMMUNITY
End: 2018-03-29 | Stop reason: SDUPTHER

## 2017-10-04 RX ADMIN — LIDOCAINE HYDROCHLORIDE AND EPINEPHRINE 10 ML: 10; 10 INJECTION, SOLUTION INFILTRATION; PERINEURAL at 08:01

## 2017-10-04 NOTE — DISCHARGE INSTRUCTIONS
Please call [phone] Cardiac Electrophysiology to make appointment to check loop recorder  If any bleeding, swelling, discharge, fever, or more than mild pain, please [phone] or go to ER

## 2017-10-13 ENCOUNTER — GENERIC CONVERSION - ENCOUNTER (OUTPATIENT)
Dept: OTHER | Facility: OTHER | Age: 77
End: 2017-10-13

## 2017-10-23 ENCOUNTER — ALLSCRIPTS OFFICE VISIT (OUTPATIENT)
Dept: OTHER | Facility: OTHER | Age: 77
End: 2017-10-23

## 2017-10-27 NOTE — PROGRESS NOTES
Assessment  1  Cardiac syncope (780 2) (R55)   2  Syncope (780 2) (R55)   3  Adenocarcinoma of breast (174 9) (C50 919)   4  Liver metastases (197 7) (C78 7)    Plan  Adenocarcinoma of breast    · Tamoxifen Citrate 20 MG Oral Tablet; Take 1 tablet daily   Rx By: Lorenzo Cervantes; Dispense: 30 Days ; #:30 Tablet; Refill: 5;For: Adenocarcinoma of breast; YINA = N; Verified Transmission to [id number]; Last Updated By: System, SureScripts; 9/19/2017 12:56:36 PM  Cardiac syncope, Syncope    · Midodrine HCl - 2 5 MG Oral Tablet; Take 1 tablet twice daily   Rx By: Sarita Souza; Dispense: 30 Days ; #:60 Tablet; Refill: 2;For: Cardiac syncope, Syncope; YINA = N; Verified Transmission to [id number]; Last Updated By: System, SureScripts; 9/14/2017 1:48:36 PM  Liver metastases    · Drink plenty of fluids ; Status:Complete;   Done: [id number]   Ordered; For:Liver metastases; Ordered By:Deisy Moscoso;   · (1) CBC/PLT/DIFF; Status:Active; Requested for:13Nov2017;    Perform:[facility]; SNT:[id number]; Ordered; For:Liver metastases; Ordered By:Deisy Moscoso;   · (1) COMPREHENSIVE METABOLIC PANEL; Status:Active; Requested for:13Nov2017;    Perform:[facility]; KCJ:[id number]; Ordered; For:Liver metastases; Ordered By:Deisy Moscoso;   · * CT CHEST ABDOMEN PELVIS W CONTRAST; Status:Need Information - Financial  Authorization; Requested for:14Nov2017 09:30AM;    Perform:[facility]; KZT:[id number]; Last Updated By:Rae Marie; 9/19/2017 1:06:35 PM;Ordered; For:Liver metastases; Ordered By:Deisy Moscoso;   · Follow-up visit in 2 months Evaluation and Treatment  Follow-up  Status: Complete   Done: 28PHG7567 10:15AM   Ordered; For: Liver metastases; Ordered By: Lorenzo Cervantes Performed:  Due: 23SKJ5320; Last Updated By: Oneyda Arriola; 9/19/2017 12:57:12 PM    Chief Complaint  Chief Complaint Free Text Note Form: followup regarding breast cancer        History of Present Illness  HPI: 2009- right breast, infiltrating ductal carcinoma (T2, N1 sebastien)  Tumor was ER/KS positive, HER-2 negative  2009- right mastectomy  Taxotere/Cytoxan x4 cycles  2009- started Arimidex 1 mg by mouth daily  This continued until October 2014 2016- felt lump in the right  lateral aspect of right breast  Biopsy confirmed the presence of ductal carcinoma, consistent with breast primary  Tumor was %, KS 70%, HER-2 +1 showed right lateral chest wall nodule measuring 2 3 cm, SUV 11 1  Right posterior liver, Mass  , measuring 5 8 cm, [id number]- started Faslodex 500 mg every month, and Ibrance 75 mg by mouth daily, 3 weeks on, one week [id number]- CT chest, and pelvis showed increase in the size of her predominant hepatic lesion with stable or responding disease elsewhere  confirmed the presence of metastatic breast cancer in this lesion  Interestingly, this was %, KS 2% and HER-2 +2  FISH confirmed HER-2 positive  2017 added Herceptin 6 mg every 3 weeks, Perjeta 420 mg IV every 3 weeks  2017? Perjeta discontinued due to diarrhea  2017? patient had significant response with the exception of hepatic disease  She underwent resection/partial hepatectomy of the predominant hepatic lesion  Resection margin was ablated  Pathology specimen indicated metastatic breast cancer, ER 70%, KS 0%, HER-2 negative by IHC  Current Therapy: March 16 2016- started Faslodex 500 mg every month, and Ibrance 75 mg by mouth daily, 3 weeks on, one week off 2017 added Herceptin 6 mg every 3 weeks, Perjeta 420 mg IV every 3 weeks  2017? Perjeta discontinued due to diarrhea  2017? patient had significant response with the exception of hepatic disease  She underwent resection/partial hepatectomy of the predominant hepatic lesion  Resection margin was ablated  Pathology specimen indicated metastatic breast cancer, ER 70%, KS 0%, HER-2 negative by IHC  Review of Systems  Complete-Female:   Constitutional: occasional hot flashes  , but-- No fever, no chills, feels well, no tiredness, no recent weight gain or weight loss  Eyes: No complaints of eye pain, no red eyes, no eyesight problems, no discharge, no dry eyes, no itching of eyes  ENT: no complaints of earache, no loss of hearing, no nose bleeds, no nasal discharge, no sore throat, no hoarseness  Cardiovascular: No complaints of slow heart rate, no fast heart rate, no chest pain, no palpitations, no leg claudication, no lower extremity edema  Respiratory: shortness of breath during exertion  Gastrointestinal: diarrhea, but-- no constipation  Genitourinary: No complaints of dysuria, no incontinence, no pelvic pain, no dysmenorrhea, no vaginal discharge or bleeding  Musculoskeletal: No complaints of arthralgias, no myalgias, no joint swelling or stiffness, no limb pain or swelling  Integumentary: No complaints of skin rash or lesions, no itching, no skin wounds, no breast pain or lump  Neurological: No complaints of headache, no confusion, no convulsions, no numbness, no dizziness or fainting, no tingling, no limb weakness, no difficulty walking  Psychiatric: Not suicidal, no sleep disturbance, no anxiety or depression, no change in personality, no emotional problems  Hematologic/Lymphatic: No complaints of swollen glands, no swollen glands in the neck, does not bleed easily, does not bruise easily  Active Problems  1  Abnormal PET scan of head (793 0) (R94 02)   2  Acute bronchitis (466 0) (J20 9)   3  Adenocarcinoma of breast (174 9) (C50 919)   4  Admission for antineoplastic chemotherapy (V58 11) (Z51 11)   5  Cardiac syncope (780 2) (R55)   6  Diarrhea (787 91) (R19 7)   7  Edema of both legs (782 3) (R60 0)   8  Flushing (782 62) (R23 2)   9  Hyperlipidemia (272 4) (E78 5)   10  Left bundle branch block (LBBB) (426 3) (I44 7)   11  Liver mass, right lobe (573 9) (R16 0)   12  Liver metastases (197 7) (C78 7)   13  Palpitations (785 1) (R00 2)   14   Preop cardiovascular exam (V72 81) (Z01 810)   15  Recurrent breast cancer, right (174 9) (C50 911)   16  Screening mammogram for high-risk patient (V76 11) (Z12 31)   17  Syncope (780 2) (R55)    Past Medical History  1  History of Age At First Period 13 Years Old (Menarche)   2  History of Age At First Pregnancy 19 Years Old   3  History of diverticulitis of colon (V12 79) (Z87 19)   4  History of Night sweats (780 8) (R61)   5  History of Polyuria (788 42) (R35 8)   6  History of Previous Pregnancies Resulted In 4  Live Birth(S)   7  History of Status post chemotherapy (V66 2) (Z51 89)   8  History of Use of anastrozole (Arimidex) (V07 52) (N67 399)    Surgical History  1  History of Adenoidectomy   2  History of Biopsy Breast Open   3  History of Biopsy Breast Percutaneous Needle Core   4  History of Bladder Surgery   5  History of Breast Surgery Mastectomy   6  History of General Surgery   7  History of General Surgery   8  History of Hysterectomy   9  History of Knee Replacement   10  History of Liver Surgery   11  History of Rotator Cuff Repair   12  History of [place] Lymph Node Biopsy   13  History of Shoulder Repair   14  History of Tonsillectomy    Family History  Father    1  Family history of Lung Cancer (V16 1)  Brother    2  Family history of Lung Cancer (V16 1)    Social History   · Being A Social Drinker   · Never A Smoker    Current Meds   1  Acetaminophen 325 MG Oral Tablet; TAKE 1 TO 2 TABLETS EVERY 6 HOURS AS   NEEDED; Therapy: (Recorded:71Ohm9443) to Recorded   2  Atorvastatin Calcium 20 MG Oral Tablet; Take 1 tablet daily; Therapy: 66OSU3823 to  Requested for: 01Aug2017 Recorded   3  Diphenoxylate-Atropine 2 5-0 025 MG Oral Tablet; TAKE 1 TABLET 4 TIMES DAILY AS   NEEDED FOR DIARRHEA; Therapy: 13Apr2017 to (Evaluate:23Urx7414); Last Rx:13Apr2017 Ordered   4  Furosemide 20 MG Oral Tablet; take 1 tablet daily prn edema;    Therapy: 01Aug2017 to (Oniel Marie)  Requested for: 01Aug2017; Last Rx:30Lif5196 Ordered   5  Metoprolol Tartrate 25 MG Oral Tablet; TAKE 1 5 TABLET Twice daily  Requested for:   11Sep2017; Last Rx:11Sep2017 Ordered   6  Ondansetron HCl - 4 MG Oral Tablet; One tab PO q 4-6 hours PRN nausea; Therapy: 55Tdd9375 to (Last Rx:89Wpa5992)  Requested for: 77XNE6121 Ordered  Medication List Reviewed: The medication list was reviewed and updated today  Allergies  1  Ampicillin CAPS    Vitals  Vital Signs    Recorded: 19Sep2017 12:16PM   Temperature 98 1 F   Heart Rate 89   Respiration 16   Systolic 183   Diastolic 82   Height 5 ft 0 5 in   Weight 148 lb 2 oz   BMI Calculated 28 45   BSA Calculated 1 65   O2 Saturation 92   Pain Scale 0     Physical Exam    Constitutional   General appearance: No acute distress, well appearing and well nourished  Eyes   Conjunctiva and lids: No swelling, erythema or discharge  Ears, Nose, Mouth, and Throat   External inspection of ears and nose: Normal     Oropharynx: Normal with no erythema, edema, exudate or lesions  Pulmonary   Auscultation of lungs: Clear to auscultation  Cardiovascular   Auscultation of heart: Normal rate and rhythm, normal S1 and S2, without murmurs  Examination of extremities for edema and/or varicosities: Normal     Abdomen   Abdomen: Non-tender, no masses  -- Healing incision right upper quadrant  No exudate or erythema  Liver and spleen: No hepatomegaly or splenomegaly  Lymphatic   Palpation of lymph nodes in neck: No lymphadenopathy  Musculoskeletal   Gait and station: Normal     Skin   Skin and subcutaneous tissue: Abnormal  -- Difficult to appreciate subcutaneous nodule in the right lateral chest wall  Neurologic   Cranial nerves: Cranial nerves 2-12 intact      Psychiatric   Orientation to person, place, and time: Normal          Future Appointments    Date/Time Provider Specialty Site   09/26/2017 02:00 PM Sudhakar Amaya DO Cardiology  CARDIOLOGY  [place]   11/21/2017 10:15 AM Aura Crocker M D , Ninoska Weott Hematology Oncology [address] [facility]     Signatures   Electronically signed by : DESIREE Briones  Sep 22 2017  5:40PM EST                       (Author)

## 2017-10-30 ENCOUNTER — HOSPITAL ENCOUNTER (INPATIENT)
Facility: HOSPITAL | Age: 77
LOS: 2 days | Discharge: HOME | DRG: 244 | End: 2017-11-01
Attending: INTERNAL MEDICINE | Admitting: INTERNAL MEDICINE
Payer: COMMERCIAL

## 2017-10-30 LAB
ANION GAP SERPL CALCULATED.3IONS-SCNC: 6 MMOL/L (ref 4–13)
BASOPHILS # BLD AUTO: 0.02 THOUSANDS/ΜL (ref 0–0.1)
BASOPHILS NFR BLD AUTO: 0 % (ref 0–1)
BUN SERPL-MCNC: 10 MG/DL (ref 5–25)
CALCIUM SERPL-MCNC: 9.5 MG/DL (ref 8.3–10.1)
CHLORIDE SERPL-SCNC: 107 MMOL/L (ref 100–108)
CO2 SERPL-SCNC: 29 MMOL/L (ref 21–32)
CREAT SERPL-MCNC: 0.56 MG/DL (ref 0.6–1.3)
EOSINOPHIL # BLD AUTO: 0.17 THOUSAND/ΜL (ref 0–0.61)
EOSINOPHIL NFR BLD AUTO: 2 % (ref 0–6)
ERYTHROCYTE [DISTWIDTH] IN BLOOD BY AUTOMATED COUNT: 15.5 % (ref 11.6–15.1)
GFR SERPL CREATININE-BSD FRML MDRD: 89 ML/MIN/1.73SQ M
GLUCOSE SERPL-MCNC: 90 MG/DL (ref 65–140)
HCT VFR BLD AUTO: 38.7 % (ref 34.8–46.1)
HGB BLD-MCNC: 12.4 G/DL (ref 11.5–15.4)
LYMPHOCYTES # BLD AUTO: 2.94 THOUSANDS/ΜL (ref 0.6–4.47)
LYMPHOCYTES NFR BLD AUTO: 32 % (ref 14–44)
MAGNESIUM SERPL-MCNC: 2.1 MG/DL (ref 1.6–2.6)
MCH RBC QN AUTO: 28.8 PG (ref 26.8–34.3)
MCHC RBC AUTO-ENTMCNC: 32 G/DL (ref 31.4–37.4)
MCV RBC AUTO: 90 FL (ref 82–98)
MONOCYTES # BLD AUTO: 0.91 THOUSAND/ΜL (ref 0.17–1.22)
MONOCYTES NFR BLD AUTO: 10 % (ref 4–12)
NEUTROPHILS # BLD AUTO: 5.21 THOUSANDS/ΜL (ref 1.85–7.62)
NEUTS SEG NFR BLD AUTO: 56 % (ref 43–75)
NRBC BLD AUTO-RTO: 0 /100 WBCS
PLATELET # BLD AUTO: 185 THOUSANDS/UL (ref 149–390)
PMV BLD AUTO: 9.5 FL (ref 8.9–12.7)
POTASSIUM SERPL-SCNC: 4.1 MMOL/L (ref 3.5–5.3)
RBC # BLD AUTO: 4.31 MILLION/UL (ref 3.81–5.12)
SODIUM SERPL-SCNC: 142 MMOL/L (ref 136–145)
TSH SERPL DL<=0.05 MIU/L-ACNC: 1.92 UIU/ML (ref 0.36–3.74)
WBC # BLD AUTO: 9.26 THOUSAND/UL (ref 4.31–10.16)

## 2017-10-30 PROCEDURE — 93005 ELECTROCARDIOGRAM TRACING: CPT | Performed by: PHYSICIAN ASSISTANT

## 2017-10-30 PROCEDURE — 85025 COMPLETE CBC W/AUTO DIFF WBC: CPT | Performed by: PHYSICIAN ASSISTANT

## 2017-10-30 PROCEDURE — 84443 ASSAY THYROID STIM HORMONE: CPT | Performed by: PHYSICIAN ASSISTANT

## 2017-10-30 PROCEDURE — 83735 ASSAY OF MAGNESIUM: CPT | Performed by: PHYSICIAN ASSISTANT

## 2017-10-30 PROCEDURE — 80048 BASIC METABOLIC PNL TOTAL CA: CPT | Performed by: PHYSICIAN ASSISTANT

## 2017-10-30 RX ORDER — VANCOMYCIN HYDROCHLORIDE 1 G/200ML
1000 INJECTION, SOLUTION INTRAVENOUS ONCE
Status: COMPLETED | OUTPATIENT
Start: 2017-10-31 | End: 2017-10-31

## 2017-10-30 RX ORDER — ACETAMINOPHEN 325 MG/1
650 TABLET ORAL EVERY 6 HOURS PRN
Status: DISCONTINUED | OUTPATIENT
Start: 2017-10-30 | End: 2017-11-01 | Stop reason: HOSPADM

## 2017-10-30 RX ORDER — ONDANSETRON HYDROCHLORIDE 4 MG/5ML
4 SOLUTION ORAL EVERY 6 HOURS PRN
Status: DISCONTINUED | OUTPATIENT
Start: 2017-10-30 | End: 2017-10-30 | Stop reason: CLARIF

## 2017-10-30 RX ORDER — TAMOXIFEN CITRATE 10 MG/1
20 TABLET ORAL DAILY
Status: DISCONTINUED | OUTPATIENT
Start: 2017-10-30 | End: 2017-11-01 | Stop reason: HOSPADM

## 2017-10-30 RX ORDER — ONDANSETRON 4 MG/1
4 TABLET, ORALLY DISINTEGRATING ORAL EVERY 6 HOURS PRN
Status: DISCONTINUED | OUTPATIENT
Start: 2017-10-30 | End: 2017-11-01 | Stop reason: HOSPADM

## 2017-10-30 RX ORDER — ATORVASTATIN CALCIUM 20 MG/1
20 TABLET, FILM COATED ORAL DAILY
Status: DISCONTINUED | OUTPATIENT
Start: 2017-10-30 | End: 2017-11-01 | Stop reason: HOSPADM

## 2017-10-30 RX ADMIN — TAMOXIFEN CITRATE 20 MG: 10 TABLET, FILM COATED ORAL at 18:21

## 2017-10-30 RX ADMIN — ATORVASTATIN CALCIUM 20 MG: 20 TABLET, FILM COATED ORAL at 18:21

## 2017-10-30 NOTE — H&P
H&P Exam - Cardiology   Yariel Conn 76 y o  female MRN: [id number]  Unit/Bed#: [facility] 720-01 Encounter: [id number]    Assessment/Plan     Assessment:  1  Recurrent syncope and symptomatic bradycardia with 5 second pause noted on loop recorder  2  Low-normal LV EF per echo 07/2017  3  Metastatic breast cancer s/p right mastectomy  4  Hyperlipidemia    Plan:  1  Left-sided pacemaker tomorrow, 10/31/2017  Procedure explained in detail to patient and her , all questions answered  2   IV Vanco on-call, labs pending today, she is not currently on blood thinners  3   She states that she has frequent dizziness despite being in normal sinus rhythm currently  She has asked for a walker, and I will make her bed rest with bedside commode to avoid any falls  4   Nothing to eat after midnight for pacemaker tomorrow  History of Present Illness   HPI:  Yariel Conn is a 76y o  year old female with a history of hyperlipidemia, metastatic breast cancer with prior right mastectomy, low normal LVEF, and recurrent syncope  She states that this past April she had her 1st episode of sudden syncope  Her  was present for the episode, and states that she was standing in the kitchen and turned and suddenly collapsed to the floor  She did not injure herself at the time  She does not recall the episode, and denied any prodrome  Since that time she has had approximately 4 or 5 other episodes, all without a prodrome  Her  states that she does not lose consciousness for more than 1 minutes  Today, she had an episode where she felt extremely dizzy and lightheaded with a slight vision change  This lasted several seconds  She then sent our office ever remote transmission from her loop recorder and this corresponded to a 5 second pause  This was reviewed by Dr Tawnya Sicard, who admitted her directly for pacemaker implantation tomorrow  She denies chest pain or pressure, worsening dyspnea, edema   She does note ongoing dizziness, and actually feels slightly dizzy sitting at the edge of her bed presently  Review of Systems  ROS as noted above, otherwise 12 point review of systems was performed and is negative         Historical Information   Past Medical History:   Diagnosis Date    Anxiety     Arthritis     Breast CA ([address] )     recurrent, ductal carcinoma ER, SC pos    Depression     Diverticula of intestine     Dizziness     and passes out    Flushing     Hiatal hernia     [place] (hard of hearing)      lizette    Hypercholesteremia     Liver mass     Liver metastasis (HCC)     Metastatic adenocarcinoma to liver (HCC)     Bx 01/03/2017    PONV (postoperative nausea and vomiting)     Recurrent breast cancer (HCC)     Shingles     Shortness of breath     on exertion    Tachycardia     Urinary incontinence     Use of cane as ambulatory aid     or walker     Past Surgical History:   Procedure Laterality Date    CATARACT EXTRACTION Bilateral     COLONOSCOPY      HYSTERECTOMY      JOINT REPLACEMENT      lizette  TKR and right TSR    MASTECTOMY Right     SC RESEC LIVER,PART LOBECTOMY N/A 7/13/2017    Procedure: LIVER RESECTION, INTRAOPERATIVE ULTRASOUND;  Surgeon: Rena Hendrickson MD;  Location: [facility];  Service: Surgical Oncology    ROTATOR CUFF REPAIR Right     SENTINEL LYMPH NODE BIOPSY Right     TONSILECTOMY AND ADNOIDECTOMY       Family History:   Family History   Problem Relation Age of Onset    Cancer Father     Cancer Brother        Social History   History   Alcohol Use    Yes     Comment: few x year     History   Drug Use No     History   Smoking Status    Never Smoker   Smokeless Tobacco    Never Used         Meds/Allergies   all medications and allergies reviewed  Home Medications:   Prescriptions Prior to Admission   Medication    acetaminophen (TYLENOL) 325 mg tablet    atorvastatin (LIPITOR) 20 mg tablet    calcium carbonate (CALCIUM 600) 600 MG tablet    CLINDAMYCIN HCL PO  Cranberry (THERACRAN HP PO)    Glucosamine-Chondroit-Vit C-Mn (GLUCOSAMINE 1500 COMPLEX) CAPS    Multiple Vitamin (MULTIVITAMIN) capsule    ondansetron (ZOFRAN) 4 mg tablet    tamoxifen (NOLVADEX) 20 mg tablet       Allergies   Allergen Reactions    Ampicillin Shortness Of Breath and Anaphylaxis       Objective   Vitals: Blood pressure 148/81, pulse 93, temperature 97 6 °F (36 4 °C), temperature source Oral, resp  rate 18, height 5' 1" (1 549 m), weight 68 kg (149 lb 14 6 oz), SpO2 96 %  Orthostatic Blood Pressures    Flowsheet Row Most Recent Value   Blood Pressure  148/81 filed at 10/30/2017 1505   Patient Position - Orthostatic VS  Sitting filed at 10/30/2017 1505            Intake/Output Summary (Last 24 hours) at 10/30/17 1529  Last data filed at 10/30/17 1516   Gross per 24 hour   Intake                0 ml   Output              200 ml   Net             -200 ml       Invasive Devices          No matching active lines, drains, or airways          Physical Exam    GEN: NAD, alert and oriented, well appearing  SKIN: dry without significant lesions or rashes  HEENT: NCAT, PERRL, EOMs intact  NECK: No JVD appreciated  CARDIOVASCULAR: RRR, normal S1, S2 without murmurs, rubs, or gallops appreciated  LUNGS: Clear to auscultation bilaterally without wheezes, rhonchi, or rales  ABDOMEN: Soft, nontender, nondistended  EXTREMITIES/VASCULAR: perfused without clubbing, cyanosis, or edema b/l  PSYCH: Normal mood and affect  NEURO: CN ll-Xll grossly intact      Lab Results: I have personally reviewed pertinent lab results  All labs pending              Imaging: I have personally reviewed pertinent reports      Results for orders placed during the hospital encounter of 07/11/17   Echo complete with contrast if indicated    N[place]ative [address]  [facility] [place] [facility]  [phone]    Transthoracic Echocardiogram  2D, M-mode, Doppler, and Color Doppler    Study date:  2017    Patient: Aleksandar Peguero  MR number: [id number]  Account number: [de-identified]  : 1940  Age: 76 years  Gender: Female  Status: Outpatient  Location: [address] [place] [address] and [facility]  Height: 61 in  Weight: 161 7 lb  BP: 128/ 64 mmHg    Indications: Assess left ventricular function  Diagnoses: E78 5 - Hyperlipidemia, unspecified    Sonographer:  ELIANE Gillespie  Primary Physician:  Caitlin Pillai MD  Referring Physician:  Maynor Gomes DO  Group:  [id number]  Cardiology Fellow:  Estelle Mendoza MD  Interpreting Physician:  Fide Palma MD    SUMMARY    LEFT VENTRICLE:  Systolic function was at the lower limits of normal  Ejection fraction was estimated to be 50 %  Although no diagnostic regional wall motion abnormality was identified, this possibility cannot be completely excluded on the basis of this study  Left ventricular diastolic function parameters were normal     VENTRICULAR SEPTUM:  There was dyssynergic motion  These changes are consistent with LBBB  RIGHT VENTRICLE:  The size was normal   Systolic function was normal     TRICUSPID VALVE:  There was mild regurgitation  Pulmonary artery systolic pressure was within the normal range  HISTORY: PRIOR HISTORY: Breast cancer; LBBB; Hyperlipidemia    PROCEDURE: The study was performed in the [address] [facility] [place]  This was a routine study  The transthoracic approach was used  The study included complete 2D imaging, M-mode, complete spectral Doppler, and color Doppler  The  heart rate was 90 bpm, at the start of the study  Images were obtained from the parasternal, apical, subcostal, and suprasternal notch acoustic windows  Echocardiographic views were limited due to lung interference  Image quality was  adequate  LEFT VENTRICLE: Size was normal  Systolic function was at the lower limits of normal  Ejection fraction was estimated to be 50 %   Although no diagnostic regional wall motion abnormality was identified, this possibility cannot be completely  excluded on the basis of this study  Wall thickness was normal  DOPPLER: Left ventricular diastolic function parameters were normal     VENTRICULAR SEPTUM: There was dyssynergic motion  These changes are consistent with LBBB  RIGHT VENTRICLE: The size was normal  Systolic function was normal  Wall thickness was normal     LEFT ATRIUM: Size was normal     RIGHT ATRIUM: Size was normal     MITRAL VALVE: Valve structure was normal  There was normal leaflet separation  DOPPLER: The transmitral velocity was within the normal range  There was no evidence for stenosis  There was trace regurgitation  AORTIC VALVE: The valve was trileaflet  Leaflets exhibited normal thickness and normal cuspal separation  DOPPLER: Transaortic velocity was within the normal range  There was no evidence for stenosis  There was no regurgitation  TRICUSPID VALVE: The valve structure was normal  There was normal leaflet separation  DOPPLER: The transtricuspid velocity was within the normal range  There was no evidence for stenosis  There was mild regurgitation  Pulmonary artery  systolic pressure was within the normal range  Estimated peak PA pressure was 22 mmHg  PULMONIC VALVE: Leaflets exhibited normal thickness, no calcification, and normal cuspal separation  DOPPLER: The transpulmonic velocity was within the normal range  There was trace regurgitation  PERICARDIUM: There was no pericardial effusion  AORTA: The root exhibited normal size  SYSTEMIC VEINS: IVC: The inferior vena cava was normal in size and course   Respirophasic changes were normal     SYSTEM MEASUREMENT TABLES    2D  %FS: 20 78 %  Ao Diam: 3 49 cm  EDV(Teich): 45 21 ml  EF Biplane: 52 52 %  EF(Cube): 50 28 %  EF(Teich): 43 45 %  ESV(Cube): 18 41 ml  ESV(Teich): 25 57 ml  IVSd: 1 04 cm  LA Diam: 2 52 cm  LVEDV MOD A2C: 104 64 ml  LVEDV MOD A4C: 107 05 ml  LVEDV MOD BP: 108 41 ml  LVEF MOD A2C: 58 35 %  LVEF MOD A4C: 46 17 %  LVESV MOD A2C: 43 58 ml  LVESV MOD A4C: 57 62 ml  LVESV MOD BP: 51 48 ml  LVIDd: 3 33 cm  LVIDs: 2 64 cm  LVLd A2C: 7 99 cm  LVLd A4C: 7 59 cm  LVLs A2C: 6 22 cm  LVLs A4C: 6 59 cm  LVPWd: 1 13 cm  SI(Cube): 10 76 ml/m2  SI(Teich): 11 35 ml/m2  SV MOD A2C: 61 05 ml  SV MOD A4C: 49 42 ml  SV(Cube): 18 61 ml  SV(Teich): 19 64 ml    CW  AV Vmax: 1 07 m/s  AV maxP 62 mmHg  TR Vmax: 2 08 m/s  TR maxP 3 mmHg    MM  TAPSE: 1 66 cm    PW  E': 0 11 m/s  E/E': 9 62  LVOT Vmax: 0 74 m/s  LVOT maxP 2 mmHg  MV A Yadiel: 0 27 m/s  MV Dec Bethel: 7 18 m/s2  MV DecT: 145 34 ms  MV E Yadiel: 1 04 m/s  MV E/A Ratio: 3 88    [employer] Accredited Echocardiography Laboratory    Prepared and electronically signed by    Jessica Rich MD  Signed 2017 16:28:13         EKG/telemetry:  Currently normal sinus rhythm with 1st degree AV block      Code Status: Level 1 - Full Code

## 2017-10-30 NOTE — PLAN OF CARE
DISCHARGE PLANNING     Discharge to home or other facility with appropriate resources Progressing        INFECTION - ADULT     Absence or prevention of progression during hospitalization Progressing        Knowledge Deficit     Patient/family/caregiver demonstrates understanding of disease process, treatment plan, medications, and discharge instructions Progressing        PAIN - ADULT     Verbalizes/displays adequate comfort level or baseline comfort level Progressing        Potential for Falls     Patient will remain free of falls Progressing        Prexisting or High Potential for Compromised Skin Integrity     Skin integrity is maintained or improved Progressing        SAFETY ADULT     Patient will remain free of falls Progressing

## 2017-10-31 ENCOUNTER — APPOINTMENT (OUTPATIENT)
Dept: NON INVASIVE DIAGNOSTICS | Facility: HOSPITAL | Age: 77
DRG: 244 | End: 2017-10-31
Attending: INTERNAL MEDICINE
Payer: COMMERCIAL

## 2017-10-31 ENCOUNTER — APPOINTMENT (INPATIENT)
Dept: RADIOLOGY | Facility: HOSPITAL | Age: 77
DRG: 244 | End: 2017-10-31
Payer: COMMERCIAL

## 2017-10-31 ENCOUNTER — ANESTHESIA (INPATIENT)
Dept: NON INVASIVE DIAGNOSTICS | Facility: HOSPITAL | Age: 77
DRG: 244 | End: 2017-10-31
Payer: COMMERCIAL

## 2017-10-31 LAB
ATRIAL RATE: 85 BPM
P AXIS: 27 DEGREES
PR INTERVAL: 226 MS
QRS AXIS: 19 DEGREES
QRSD INTERVAL: 130 MS
QT INTERVAL: 434 MS
QTC INTERVAL: 516 MS
T WAVE AXIS: -13 DEGREES
VENTRICULAR RATE: 85 BPM

## 2017-10-31 PROCEDURE — 0JH606Z INSERTION OF PACEMAKER, DUAL CHAMBER INTO CHEST SUBCUTANEOUS TISSUE AND FASCIA, OPEN APPROACH: ICD-10-PCS | Performed by: INTERNAL MEDICINE

## 2017-10-31 PROCEDURE — C1898 LEAD, PMKR, OTHER THAN TRANS: HCPCS

## 2017-10-31 PROCEDURE — C1892 INTRO/SHEATH,FIXED,PEEL-AWAY: HCPCS | Performed by: PHYSICIAN ASSISTANT

## 2017-10-31 PROCEDURE — 33208 INSRT HEART PM ATRIAL & VENT: CPT | Performed by: PHYSICIAN ASSISTANT

## 2017-10-31 PROCEDURE — C1785 PMKR, DUAL, RATE-RESP: HCPCS

## 2017-10-31 PROCEDURE — 33284 HB REMOVE PAT-ACTIVE HT RECORD: CPT | Performed by: PHYSICIAN ASSISTANT

## 2017-10-31 PROCEDURE — 71010 HB CHEST X-RAY 1 VIEW FRONTAL (PORTABLE): CPT

## 2017-10-31 PROCEDURE — C1769 GUIDE WIRE: HCPCS | Performed by: PHYSICIAN ASSISTANT

## 2017-10-31 PROCEDURE — 0JPT32Z REMOVAL OF MONITORING DEVICE FROM TRUNK SUBCUTANEOUS TISSUE AND FASCIA, PERCUTANEOUS APPROACH: ICD-10-PCS | Performed by: INTERNAL MEDICINE

## 2017-10-31 PROCEDURE — 02HK3JZ INSERTION OF PACEMAKER LEAD INTO RIGHT VENTRICLE, PERCUTANEOUS APPROACH: ICD-10-PCS | Performed by: INTERNAL MEDICINE

## 2017-10-31 PROCEDURE — 02H63JZ INSERTION OF PACEMAKER LEAD INTO RIGHT ATRIUM, PERCUTANEOUS APPROACH: ICD-10-PCS | Performed by: INTERNAL MEDICINE

## 2017-10-31 RX ORDER — PROPOFOL 10 MG/ML
INJECTION, EMULSION INTRAVENOUS AS NEEDED
Status: DISCONTINUED | OUTPATIENT
Start: 2017-10-31 | End: 2017-10-31 | Stop reason: SURG

## 2017-10-31 RX ORDER — SODIUM CHLORIDE 9 MG/ML
INJECTION, SOLUTION INTRAVENOUS CONTINUOUS PRN
Status: DISCONTINUED | OUTPATIENT
Start: 2017-10-31 | End: 2017-10-31 | Stop reason: SURG

## 2017-10-31 RX ORDER — LIDOCAINE HYDROCHLORIDE 10 MG/ML
INJECTION, SOLUTION INFILTRATION; PERINEURAL CODE/TRAUMA/SEDATION MEDICATION
Status: COMPLETED | OUTPATIENT
Start: 2017-10-31 | End: 2017-10-31

## 2017-10-31 RX ORDER — GENTAMICIN SULFATE 40 MG/ML
INJECTION, SOLUTION INTRAMUSCULAR; INTRAVENOUS CODE/TRAUMA/SEDATION MEDICATION
Status: COMPLETED | OUTPATIENT
Start: 2017-10-31 | End: 2017-10-31

## 2017-10-31 RX ORDER — ACETAMINOPHEN 325 MG/1
650 TABLET ORAL EVERY 6 HOURS PRN
Status: DISCONTINUED | OUTPATIENT
Start: 2017-10-31 | End: 2017-11-01 | Stop reason: HOSPADM

## 2017-10-31 RX ORDER — FENTANYL CITRATE 50 UG/ML
INJECTION, SOLUTION INTRAMUSCULAR; INTRAVENOUS AS NEEDED
Status: DISCONTINUED | OUTPATIENT
Start: 2017-10-31 | End: 2017-10-31 | Stop reason: SURG

## 2017-10-31 RX ORDER — MIDAZOLAM HYDROCHLORIDE 1 MG/ML
INJECTION INTRAMUSCULAR; INTRAVENOUS AS NEEDED
Status: DISCONTINUED | OUTPATIENT
Start: 2017-10-31 | End: 2017-10-31 | Stop reason: SURG

## 2017-10-31 RX ORDER — PROPOFOL 10 MG/ML
INJECTION, EMULSION INTRAVENOUS CONTINUOUS PRN
Status: DISCONTINUED | OUTPATIENT
Start: 2017-10-31 | End: 2017-10-31 | Stop reason: SURG

## 2017-10-31 RX ADMIN — LIDOCAINE HYDROCHLORIDE 20 ML: 10 INJECTION, SOLUTION INFILTRATION; PERINEURAL at 14:46

## 2017-10-31 RX ADMIN — PROPOFOL 20 MG: 10 INJECTION, EMULSION INTRAVENOUS at 14:48

## 2017-10-31 RX ADMIN — MIDAZOLAM HYDROCHLORIDE 2 MG: 1 INJECTION, SOLUTION INTRAMUSCULAR; INTRAVENOUS at 14:13

## 2017-10-31 RX ADMIN — FENTANYL CITRATE 25 MCG: 50 INJECTION, SOLUTION INTRAMUSCULAR; INTRAVENOUS at 14:48

## 2017-10-31 RX ADMIN — FENTANYL CITRATE 25 MCG: 50 INJECTION, SOLUTION INTRAMUSCULAR; INTRAVENOUS at 15:25

## 2017-10-31 RX ADMIN — PROPOFOL 25 MG: 10 INJECTION, EMULSION INTRAVENOUS at 14:35

## 2017-10-31 RX ADMIN — VANCOMYCIN HYDROCHLORIDE 1000 MG: 1 INJECTION, SOLUTION INTRAVENOUS at 11:53

## 2017-10-31 RX ADMIN — TAMOXIFEN CITRATE 20 MG: 10 TABLET, FILM COATED ORAL at 20:09

## 2017-10-31 RX ADMIN — FENTANYL CITRATE 25 MCG: 50 INJECTION, SOLUTION INTRAMUSCULAR; INTRAVENOUS at 14:25

## 2017-10-31 RX ADMIN — VANCOMYCIN HYDROCHLORIDE 1 G: 1 INJECTION, POWDER, LYOPHILIZED, FOR SOLUTION INTRAVENOUS at 14:18

## 2017-10-31 RX ADMIN — PROPOFOL 25 MG: 10 INJECTION, EMULSION INTRAVENOUS at 15:13

## 2017-10-31 RX ADMIN — PROPOFOL 30 MG: 10 INJECTION, EMULSION INTRAVENOUS at 15:38

## 2017-10-31 RX ADMIN — PROPOFOL 40 MCG/KG/MIN: 10 INJECTION, EMULSION INTRAVENOUS at 14:15

## 2017-10-31 RX ADMIN — SODIUM CHLORIDE: 0.9 INJECTION, SOLUTION INTRAVENOUS at 14:09

## 2017-10-31 RX ADMIN — LIDOCAINE HYDROCHLORIDE 7 ML: 10 INJECTION, SOLUTION INFILTRATION; PERINEURAL at 15:25

## 2017-10-31 RX ADMIN — GENTAMICIN SULFATE 80 MG: 40 INJECTION, SOLUTION INTRAMUSCULAR; INTRAVENOUS at 15:10

## 2017-10-31 RX ADMIN — IOHEXOL 10 ML: 350 INJECTION, SOLUTION INTRAVENOUS at 15:17

## 2017-10-31 RX ADMIN — ATORVASTATIN CALCIUM 20 MG: 20 TABLET, FILM COATED ORAL at 20:09

## 2017-10-31 NOTE — CASE MANAGEMENT
Initial Clinical Review    Admission: Date/Time/Statement: 10/30/17 @ 1349     Orders Placed This Encounter   Procedures    Inpatient Admission     Standing Status:   Standing     Number of Occurrences:   1     Order Specific Question:   Admitting Physician     Answer:   Tootie Joe [[id number]]     Order Specific Question:   Level of Care     Answer:   Med Surg [[id number]]     Order Specific Question:   Estimated length of stay     Answer:   More than 2 Midnights     Order Specific Question:   Certification     Answer:   I certify that inpatient services are medically necessary for this patient for a duration of greater than two midnights  See H&P and MD Progress Notes for additional information about the patient's course of treatment  ED: Date/Time/Mode of Arrival:   ED Arrival Information     Patient not seen in ED                       Chief Complaint: No chief complaint on file  History of Illness:  76y o  year old female with a history of hyperlipidemia, metastatic breast cancer with prior right mastectomy, low normal LVEF, and recurrent syncope  She states that this past April she had her 1st episode of sudden syncope  Her  was present for the episode, and states that she was standing in the kitchen and turned and suddenly collapsed to the floor  She did not injure herself at the time  She does not recall the episode, and denied any prodrome  Since that time she has had approximately 4 or 5 other episodes, all without a prodrome  Her  states that she does not lose consciousness for more than 1 minutes  Today, she had an episode where she felt extremely dizzy and lightheaded with a slight vision change  This lasted several seconds  She then sent our office ever remote transmission from her loop recorder and this corresponded to a 5 second pause  This was reviewed by Dr Zee Tucker, who admitted her directly for pacemaker implantation tomorrow    She denies chest pain or pressure, worsening dyspnea, edema  She does note ongoing dizziness, and actually feels slightly dizzy sitting at the edge of her bed presently        ED Vital Signs:   ED Triage Vitals   Temperature Pulse Respirations Blood Pressure SpO2   10/30/17 1356 10/30/17 1356 10/30/17 1356 10/30/17 1356 10/30/17 1356   97 8 °F (36 6 °C) 94 19 136/68 98 %      Temp Source Heart Rate Source Patient Position - Orthostatic VS BP Location FiO2 (%)   10/30/17 1356 10/30/17 1505 10/30/17 1356 10/30/17 1356 --   Oral Monitor Sitting Right arm       Pain Score       10/30/17 1403       No Pain        Wt Readings from Last 1 Encounters:   10/30/17 68 kg (149 lb 14 6 oz)       Vital Signs (abnormal): Abnormal Labs/Diagnostic Test Results: cr 0 56  ED Treatment:   Medication Administration - No Administrations Displayed (No Start Event Found)     None          Past Medical/Surgical History:    Active Ambulatory Problems     Diagnosis Date Noted    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 01/05/2017    Hypercholesteremia     Gastroesophageal reflux disease without esophagitis 03/22/2010    History of total knee replacement 05/27/2015    Hearing loss 07/16/2014    Left bundle branch block (LBBB) 04/13/2015     Resolved Ambulatory Problems     Diagnosis Date Noted    Subcapsular hematoma of liver 01/05/2017    SOB (shortness of breath) on exertion 01/05/2017    Metastatic adenocarcinoma to liver (HCC)     Breast CA (HCC)      Past Medical History:   Diagnosis Date    Anxiety     Arthritis     [facility]     Depression     Diverticula of intestine     Dizziness     Flushing     Hiatal hernia     [place] (hard of hearing)     Hypercholesteremia     Liver mass     Liver metastasis (HCC)     Metastatic adenocarcinoma to liver (HCC)     PONV (postoperative nausea and vomiting)     Recurrent breast cancer (HCC)     Shingles     Shortness of breath     Tachycardia     Urinary incontinence     Use of cane as ambulatory aid        Admitting Diagnosis: Syncope [R55]    Age/Sex: 76 y o  female    Assessment/Plan: Assessment:  1  Recurrent syncope and symptomatic bradycardia with 5 second pause noted on loop recorder  2  Low-normal LV EF per echo 07/2017  3  Metastatic breast cancer s/p right mastectomy  4  Hyperlipidemia     Plan:  1  Left-sided pacemaker tomorrow, 10/31/2017  Procedure explained in detail to patient and her , all questions answered  2   IV Federicoo on-call, labs pending today, she is not currently on blood thinners  3   She states that she has frequent dizziness despite being in normal sinus rhythm currently  She has asked for a walker, and I will make her bed rest with bedside commode to avoid any falls  4   Nothing to eat after midnight for pacemaker tomorrow      Admission Orders:  Scheduled Meds:   atorvastatin 20 mg Oral Daily   tamoxifen 20 mg Oral Daily     Continuous Infusions:    PRN Meds:   acetaminophen    ondansetron    venodynes  telm  dapt instructions  Npo  To or for cardiac pacer implant

## 2017-10-31 NOTE — PLAN OF CARE
Problem: DISCHARGE PLANNING - CARE MANAGEMENT  Goal: Discharge to post-acute care or home with appropriate resources  INTERVENTIONS:  - Conduct assessment to determine patient/family and health care team treatment goals, and need for post-acute services based on payer coverage, community resources, and patient preferences, and barriers to discharge  - Address psychosocial, clinical, and financial barriers to discharge as identified in assessment in conjunction with the patient/family and health care team  - Arrange appropriate level of post-acute services according to patient's   needs and preference and payer coverage in collaboration with the physician and health care team  - Communicate with and update the patient/family, physician, and health care team regarding progress on the discharge plan  - Arrange appropriate transportation to post-acute venues  - Home w family support    Outcome: Progressing

## 2017-10-31 NOTE — ANESTHESIA PREPROCEDURE EVALUATION
Review of Systems/Medical History  Patient summary reviewed  Chart reviewed  History of anesthetic complications PONV    Cardiovascular  Hyperlipidemia, Dysrhythmias (SSS, mostly stefano, has had syncope ), ,    Pulmonary  Negative pulmonary ROS ,        GI/Hepatic      Comment: S/p liver resection July 2017 for CA     Negative  ROS        Endo/Other  Arthritis     GYN    Breast cancer (2009; currently taking tamoxifen) Right mastectomy       Hematology  Negative hematology ROS      Musculoskeletal  Negative musculoskeletal ROS        Neurology  Negative neurology ROS      Psychology   Anxiety, Depression ,        NPO verified, right limb alert, left shoulder "needs surgery" and is sore    Physical Exam    Airway    Mallampati score: I  TM Distance: >3 FB  Neck ROM: full     Dental   No notable dental hx     Cardiovascular  Cardiovascular exam normal    Pulmonary  Pulmonary exam normal     Other Findings        Anesthesia Plan  ASA Score- 3       Anesthesia Type- IV sedation with anesthesia with ASA Monitors  Additional Monitors:   Airway Plan:           Induction- intravenous  Informed Consent- Anesthetic plan and risks discussed with patient

## 2017-10-31 NOTE — SOCIAL WORK
Cm met w pt today & explained CM role  Pt lives w  in 2 sty home w 5 [address]  Bedroom on 2nd  [place] on both  Was [employer], retired  Family transports  DME cane,rw, shower seat & high rise toilet  H/o [facility]  H/o rhb at  [facility]  Uses [facility] in [place]  PCP Dr Mirtha Cardona  No POA  Denies any MH, D&A tx  Emergency contact,  Claribel Fordor [phone]  Anticipate no needs at this time   or dtr to transport home  CM to follow after procedure  CM reviewed d/c planning process including the following: identifying help at home, patient preference for d/c planning needs, Discharge Lounge, [facility] Meds to Bed program, availability of treatment team to discuss questions or concerns patient and/or family may have regarding understanding medications and recognizing signs and symptoms once discharged  CM also encouraged patient to follow up with all recommended appointments after discharge  Patient advised of importance for patient and family to participate in managing patients medical well being

## 2017-10-31 NOTE — PLAN OF CARE
CARDIOVASCULAR - ADULT     Maintains optimal cardiac output and hemodynamic stability Progressing     Absence of cardiac dysrhythmias or at baseline rhythm Progressing        DISCHARGE PLANNING     Discharge to home or other facility with appropriate resources Progressing        INFECTION - ADULT     Absence or prevention of progression during hospitalization Progressing        Knowledge Deficit     Patient/family/caregiver demonstrates understanding of disease process, treatment plan, medications, and discharge instructions Progressing        PAIN - ADULT     Verbalizes/displays adequate comfort level or baseline comfort level Progressing        Potential for Falls     Patient will remain free of falls Progressing        Prexisting or High Potential for Compromised Skin Integrity     Skin integrity is maintained or improved Progressing        SAFETY ADULT     Patient will remain free of falls Progressing

## 2017-10-31 NOTE — ANESTHESIA POSTPROCEDURE EVALUATION
Post-Op Assessment Note      CV Status:  Stable    Mental Status:  Alert and awake    Hydration Status:  Stable    PONV Controlled:  None    Airway Patency:  Patent    Post Op Vitals Reviewed: Yes          Staff: CRNA           BP   128/94   Temp      Pulse  82   Resp   14   SpO2   98% on RA   Post procedure VS noted above, SV nonobstructed

## 2017-11-01 ENCOUNTER — APPOINTMENT (INPATIENT)
Dept: RADIOLOGY | Facility: HOSPITAL | Age: 77
DRG: 244 | End: 2017-11-01
Attending: INTERNAL MEDICINE
Payer: COMMERCIAL

## 2017-11-01 VITALS
HEIGHT: 61 IN | TEMPERATURE: 98.2 F | RESPIRATION RATE: 18 BRPM | WEIGHT: 149.91 LBS | HEART RATE: 103 BPM | BODY MASS INDEX: 28.3 KG/M2 | DIASTOLIC BLOOD PRESSURE: 89 MMHG | SYSTOLIC BLOOD PRESSURE: 169 MMHG | OXYGEN SATURATION: 96 %

## 2017-11-01 PROBLEM — R00.1 SYMPTOMATIC BRADYCARDIA: Status: ACTIVE | Noted: 2017-11-01

## 2017-11-01 LAB
ANION GAP SERPL CALCULATED.3IONS-SCNC: 8 MMOL/L (ref 4–13)
BUN SERPL-MCNC: 12 MG/DL (ref 5–25)
CALCIUM SERPL-MCNC: 8.9 MG/DL (ref 8.3–10.1)
CHLORIDE SERPL-SCNC: 107 MMOL/L (ref 100–108)
CO2 SERPL-SCNC: 25 MMOL/L (ref 21–32)
CREAT SERPL-MCNC: 0.58 MG/DL (ref 0.6–1.3)
ERYTHROCYTE [DISTWIDTH] IN BLOOD BY AUTOMATED COUNT: 15.8 % (ref 11.6–15.1)
GFR SERPL CREATININE-BSD FRML MDRD: 88 ML/MIN/1.73SQ M
GLUCOSE SERPL-MCNC: 141 MG/DL (ref 65–140)
HCT VFR BLD AUTO: 39.4 % (ref 34.8–46.1)
HGB BLD-MCNC: 12.5 G/DL (ref 11.5–15.4)
MAGNESIUM SERPL-MCNC: 2.2 MG/DL (ref 1.6–2.6)
MCH RBC QN AUTO: 28.7 PG (ref 26.8–34.3)
MCHC RBC AUTO-ENTMCNC: 31.7 G/DL (ref 31.4–37.4)
MCV RBC AUTO: 91 FL (ref 82–98)
PLATELET # BLD AUTO: 171 THOUSANDS/UL (ref 149–390)
PMV BLD AUTO: 9.9 FL (ref 8.9–12.7)
POTASSIUM SERPL-SCNC: 3.9 MMOL/L (ref 3.5–5.3)
RBC # BLD AUTO: 4.35 MILLION/UL (ref 3.81–5.12)
SODIUM SERPL-SCNC: 140 MMOL/L (ref 136–145)
WBC # BLD AUTO: 9.87 THOUSAND/UL (ref 4.31–10.16)

## 2017-11-01 PROCEDURE — 85027 COMPLETE CBC AUTOMATED: CPT | Performed by: PHYSICIAN ASSISTANT

## 2017-11-01 PROCEDURE — 71020 HB CHEST X-RAY 2VW FRONTAL&LATL: CPT

## 2017-11-01 PROCEDURE — 83735 ASSAY OF MAGNESIUM: CPT | Performed by: PHYSICIAN ASSISTANT

## 2017-11-01 PROCEDURE — 80048 BASIC METABOLIC PNL TOTAL CA: CPT | Performed by: PHYSICIAN ASSISTANT

## 2017-11-01 RX ADMIN — ACETAMINOPHEN 650 MG: 325 TABLET, FILM COATED ORAL at 06:30

## 2017-11-01 RX ADMIN — ONDANSETRON 4 MG: 4 TABLET, ORALLY DISINTEGRATING ORAL at 06:30

## 2017-11-01 RX ADMIN — METOPROLOL TARTRATE 25 MG: 25 TABLET ORAL at 09:24

## 2017-11-01 NOTE — DISCHARGE INSTRUCTIONS
Please refer to post pacemaker implantation discharge instructions and restrictions and your pacemaker booklet/temporary card  Keep incision dry for one week  Do not use lotions/powders/creams on incision  Remove outer bandage 48 hours after implantation  Leave underlying steri-strips in place, they will either fall off on their own or will be removed at 2 week follow up appointment  No overhead reaching/pushing/pulling/lifting greater than 5-10lbs with left arm for one month  Please call the office if you notice redness, swelling, bleeding, or drainage from incision or if you develop fevers  AFTER PACEMAKER CARE:    If you have any questions, please call [phone] to speak with a nurse (8:30am-4pm, or [phone] after hours)  For appointments, please call [phone]  WHAT YOU SHOULD KNOW:   A pacemaker is a small, battery-powered device that is placed under your skin in your upper chest area with wires placed through a vein that lead directly into the heart  It helps regulate your heart rate and prevent your heart from beating too slowly                  AFTER YOU LEAVE:     Medicines:     · Pain medicine: You may need medicine to take away or decrease pain  ¨ Learn how to take your medicine  Ask what medicine and how much you should take  Be sure you know how, when, and how often to take it  Usually Over the counter pain medicine is sufficient to control pain (Acetominophen or Ibuprofen) Ask your doctor if you may take these  If this does not control your pain, narcotic pain killers may be prescribed, please call if you need prescription  ¨ Do not wait until the pain is severe before you take your medicine  Tell caregivers if your pain does not decrease  ¨ Pain medicine can make you dizzy or sleepy  Prevent falls by calling someone when you get out of bed or if you need help  Take your medicine as directed    Call your healthcare provider if you think your medicine is not helping or if you have side effects  Tell him if you are allergic to any medicine  Follow up with your cardiologist after your procedure: You will need a follow-up visit approximately 2 weeks after you leave the hospital  Your cardiologist will check your wound and make sure that your pacemaker is working correctly  Follow the instructions to check your pacemaker: Your cardiologist or primary healthcare provider will check your pacemaker and the battery regularly  He will use a computer to check your pacemaker over the telephone or wireless device which will be given to you  Pacemaker batteries usually last 5 to 10 years  The pacemaker unit will be replaced when the battery gets low  This is a simpler procedure than the original one to implant your pacemaker  Wound care:  Keep your incision dry for one week  Sponge/tub baths are preferred, try to avoid a shower for 7 days  Do not use lotions/powders/creams on incision  Remove outer bandage 48 hours after implantation  Leave underlying steri-strips in place, they will either fall off on their own or will be removed at 2 week follow up appointment  Please call the office if you notice redness, swelling, bleeding, or drainage from incision or if you develop fevers  Activity:   · Arm movement and lifting:  Be careful using the arm on the side of your pacemaker  Do not move your arm for the first 24 hours after your procedure  Do not  lift your arm above your shoulder or lift more than 10 pounds for one month after your procedure  Avoid pushing, pulling, or repetitive arm movements for one month  This helps the leads stay in place and helps your wound heal  Ask your caregiver when you can drive after your procedure  You may move your arm side to side without lifting above your shoulder, and do not need to wear a sling at home     · Typically driving is allowed after 1 week post pacemaker if you are stable, however in some cases it may be longer and you should discuss with your doctor before proceeding  · Sports:  Ask your caregiver when it is okay to play tennis, golf, basketball, or any sport that requires you to lift your arms  Do not play full contact sports, such as football, that could damage your pacemaker  Ask your cardiologist or primary healthcare provider how much and what kinds of physical activity are safe for you  Living with a pacemaker:   · Tell all caregivers you have a pacemaker: This includes surgeons, radiologists, and medical technicians  You may want to wear a medical alert ID bracelet or necklace that states that you have a pacemaker  · Carry your pacemaker ID card: Make sure you receive a pacemaker ID card  Carry it with you at all times  It lists important information about your pacemaker  Show it to airport security if you travel  · Avoid electrical interference:  Avoid welding equipment and other equipment with large magnets or electric fields  These things could interfere with how your pacemaker works  Use your cell phone on the ear opposite from your pacemaker  Do not carry your cell phone in your shirt pocket over your chest      · Some Pacemakers are MRI safe  Ask you doctor if it is safe to proceed with MRI and let the radiologist and staff know you have a pacemaker  · Do not touch the skin around your pacemaker: This can cause damage to the lead wires or move the pacemaker unit from where it should be  Contact your cardiologist or primary healthcare provider if:   · The area around your pacemaker has increasing amount of pain after surgery  The pain should improve over first few days after implantation  · The skin around your stitches has increasing redness, swelling, or has drainage  This may mean that you have an infection  · You have a fever  · You have chills, a cough, and feel weak or achy  These are also signs of infection  · Your feet or ankles are more swollen than your baseline  · Your Heart rate is less than 50 beats per minute     Seek care immediately if:   · Your bandage becomes soaked with blood  · Your pacemaker is swelling rapidly    · Your stitches open up  · You feel your heart suddenly beating very slowly or quickly  · You become too weak or dizzy to stand, or you pass out  · Your arm or leg feels warm, tender, and painful  It may look swollen and red  · You have chest pain that does not go away with rest or medicine  · You feel lightheaded, short of breath, and have chest pain  · You cough up blood  © 2014 [address] is for End User's use only and may not be sold, redistributed or otherwise used for commercial purposes  All illustrations and images included in CareNotes® are the copyrighted property of [employer]  or [employer]  The above information is an  only  It is not intended as medical advice for individual conditions or treatments  Talk to your doctor, nurse or pharmacist before following any medical regimen to see if it is safe and effective for you

## 2017-11-01 NOTE — NURSING NOTE
telem and IV sites dc'd before d/c dressing intact over site, also dressings over pacer sites intact

## 2017-11-01 NOTE — DISCHARGE SUMMARY
Discharge Summary - Keshav Anders 76 y o  female MRN: [id number]    Unit/Bed#: [facility] 720-01 Encounter: [id number]      Admission Date: 10/30/2017   Discharge Date:  11/1/2017    Discharge Diagnosis:   1  Recurrent syncope and symptomatic bradycardia with 5 second pause noted on loop recorder, status post loop recorder explant and Medtronic dual-chamber pacemaker implantation 10/31/2017  2  Low-normal LV EF per echo 07/2017  3  Metastatic breast cancer s/p right mastectomy  4  Hyperlipidemia    Procedures Performed:   Medtronic dual-chamber pacemaker implantation    Consultants: none    HPI: Please refer to the initial history and physical as well as procedure notes for full details  Briefly, Keshav Anders is a 76y o  year old female with a history of hyperlipidemia, metastatic breast cancer with prior right mastectomy, low normal LVEF, and recurrent syncope  She states that this past April she had her 1st episode of sudden syncope  Her  was present for the episode, and states that she was standing in the kitchen and turned and suddenly collapsed to the floor  She did not injure herself at the time  She does not recall the episode, and denied any prodrome  Since that time she has had approximately 4 or 5 other episodes, all without a prodrome  Her  states that she does not lose consciousness for more than 1 minutes  Today, she had an episode where she felt extremely dizzy and lightheaded with a slight vision change  This lasted several seconds  She then sent our office a remote transmission from her loop recorder and her symptoms corresponded to a 5 second pause  This was reviewed by Dr Millicent Whitaker, who admitted her directly for pacemaker implantation  Hospital Course: After the procedure was explained in detail and informed consent was obtained, she underwent Medtronic dual-chamber pacemaker implantation on 11/27/5575 without complications  She tolerated the procedure well   CXR immediately following the procedure showed likely appropriate lead placement without pneumothorax  She was then monitored an additional night for further observation  There were no acute issues or events overnight  The following morning she denied all cardiac complaints, including chest pain/pressure, dyspnea, palpitations, dizziness, lightheadedness, or syncope  Her vital signs were reviewed and labs are stable  Telemetry showed normal sinus rhythm as well as sinus tachycardia, no other arrhythmias noted  Chest xray this morning showed no pneumothorax, however it appeared her passive atrial lead dislodged and was directed posteriorly on lateral view  Device interrogation in the morning confirmed likely dislodgement of atrial lead, with no capture and low P-waves  Her ventricular lead was working well  It was initially felt that she would undergo lead revision, and thus she was made NPO  Upon further review and after Re interrogation of her device later that afternoon, there was consistent atrial sensing as well as capture  As her ventricular wire is working well and she will likely only use her device occasionally for intermittent heart block, it was decided that she does not require an atrial lead revision at this time  Final device reprogramming was determined by Dr Michael Post  This was explained to the patient in detail, and she is in agreement and understands this plan  She is the stable for discharge at this time  Her incision was clean, dry, and intact without swelling, hematoma, redness, bleeding, drainage, or signs of infection   Physical exam is as follows:  GEN: NAD, alert and oriented, well appearing  SKIN: dry without significant lesions or rashes  HEENT: NCAT, PERRL, EOMs intact  NECK: No JVD appreciated  CARDIOVASCULAR: RRR, normal S1, S2 without murmurs, rubs, or gallops appreciated  LUNGS: Clear to auscultation bilaterally without wheezes, rhonchi, or rales  ABDOMEN: Soft, nontender, nondistended  EXTREMITIES/VASCULAR: perfused without clubbing, cyanosis, or edema b/l  PSYCH: Normal mood and affect  NEURO: CN ll-Xll grossly intact    She was given routine post implantation discharge instructions and restrictions, including wound care, and these were explained in detail  She was instructed to remove her outer bandage tomorrow, leaving the steri-strips in place, and to keep the incision dry for one week  She was given a two week follow up appointment with our device clinic for device interrogation and site check, and she was instructed to follow up with her primary cardiologist as previously instructed  In terms of her medications, she will continue her prior regimen  Her lopressor had been held, and this was restarted at 25 mg twice daily  This will hopefully help with her baseline sinus tachycardia  No other changes were made  She is stable for discharge at this time with all questions answered  She was discussed in detail with Dr Carlyle Salter who is in agreement with this discharge summary  Discharge Medications:  See after visit summary for reconciled discharge medications provided to patient and family      Prescriptions Prior to Admission   Medication    acetaminophen (TYLENOL) 325 mg tablet    atorvastatin (LIPITOR) 20 mg tablet    calcium carbonate (CALCIUM 600) 600 MG tablet    CLINDAMYCIN HCL PO    Cranberry (THERACRAN HP PO)    Glucosamine-Chondroit-Vit C-Mn (GLUCOSAMINE 1500 COMPLEX) CAPS    Multiple Vitamin (MULTIVITAMIN) capsule    ondansetron (ZOFRAN) 4 mg tablet    tamoxifen (NOLVADEX) 20 mg tablet         Current Facility-Administered Medications   Medication Dose Route Frequency    acetaminophen (TYLENOL) tablet 650 mg  650 mg Oral Q6H PRN    acetaminophen (TYLENOL) tablet 650 mg  650 mg Oral Q6H PRN    atorvastatin (LIPITOR) tablet 20 mg  20 mg Oral Daily    metoprolol tartrate (LOPRESSOR) tablet 25 mg  25 mg Oral Q12H [facility]    ondansetron (ZOFRAN-ODT) dispersible tablet 4 mg  4 mg Oral Q6H PRN    tamoxifen (NOLVADEX) tablet 20 mg  20 mg Oral Daily       Pertininet Labs/diagnostics:  CBC with diff:   Results from last 7 days  Lab Units 11/01/17  0629 10/30/17  1640   WBC Thousand/uL 9 87 9 26   HEMOGLOBIN g/dL 12 5 12 4   HEMATOCRIT % 39 4 38 7   MCV fL 91 90   PLATELETS Thousands/uL 171 185   MCH pg 28 7 28 8   MCHC g/dL 31 7 32 0   RDW % 15 8* 15 5*   MPV fL 9 9 9 5   NRBC AUTO /100 WBCs  --  0       BMP:  Results from last 7 days  Lab Units 11/01/17  0629 10/30/17  1640   SODIUM mmol/L 140 142   POTASSIUM mmol/L 3 9 4 1   CHLORIDE mmol/L 107 107   CO2 mmol/L 25 29   BUN mg/dL 12 10   CREATININE mg/dL 0 58* 0 56*   GLUCOSE RANDOM mg/dL 141* 90   CALCIUM mg/dL 8 9 9 5       Magnesium:   Results from last 7 days  Lab Units 11/01/17  0629 10/30/17  1640   MAGNESIUM mg/dL 2 2 2 1       Coags:         Complications: none    Condition at Discharge: good     Discharge instructions/Information to patient and family:   See after visit summary for information provided to patient and family  Provisions for Follow-Up Care:  See after visit summary for information related to follow-up care and any pertinent home health orders  Disposition: Home    Planned Readmission: No    Discharge Statement   I spent 45 minutes minutes discharging the patient  This time was spent on the day of discharge  I had direct contact with the patient on the day of discharge  Additional documentation is required if more than 30 minutes were spent on discharge  Evaluating the incision, discussing discharge instructions and restrictions, arranging follow up appointments, discussing medications    Discharge Medications:  See after visit summary for reconciled discharge medications provided to patient and family

## 2017-11-01 NOTE — PLAN OF CARE
CARDIOVASCULAR - ADULT     Maintains optimal cardiac output and hemodynamic stability Progressing     Absence of cardiac dysrhythmias or at baseline rhythm Progressing        DISCHARGE PLANNING     Discharge to home or other facility with appropriate resources Progressing        DISCHARGE PLANNING - CARE MANAGEMENT     Discharge to post-acute care or home with appropriate resources Progressing        INFECTION - ADULT     Absence or prevention of progression during hospitalization Progressing        Knowledge Deficit     Patient/family/caregiver demonstrates understanding of disease process, treatment plan, medications, and discharge instructions Progressing        PAIN - ADULT     Verbalizes/displays adequate comfort level or baseline comfort level Progressing        Potential for Falls     Patient will remain free of falls Progressing        Prexisting or High Potential for Compromised Skin Integrity     Skin integrity is maintained or improved Progressing        SAFETY ADULT     Patient will remain free of falls Progressing

## 2017-11-06 ENCOUNTER — APPOINTMENT (EMERGENCY)
Dept: NON INVASIVE DIAGNOSTICS | Facility: HOSPITAL | Age: 77
End: 2017-11-06
Payer: COMMERCIAL

## 2017-11-06 ENCOUNTER — APPOINTMENT (EMERGENCY)
Dept: RADIOLOGY | Facility: HOSPITAL | Age: 77
End: 2017-11-06
Payer: COMMERCIAL

## 2017-11-06 ENCOUNTER — HOSPITAL ENCOUNTER (EMERGENCY)
Facility: HOSPITAL | Age: 77
Discharge: HOME | End: 2017-11-06
Attending: EMERGENCY MEDICINE
Payer: COMMERCIAL

## 2017-11-06 VITALS
RESPIRATION RATE: 20 BRPM | BODY MASS INDEX: 28.32 KG/M2 | HEIGHT: 61 IN | WEIGHT: 150 LBS | TEMPERATURE: 97.3 F | SYSTOLIC BLOOD PRESSURE: 116 MMHG | OXYGEN SATURATION: 96 % | DIASTOLIC BLOOD PRESSURE: 64 MMHG | HEART RATE: 100 BPM

## 2017-11-06 DIAGNOSIS — J90 PLEURAL EFFUSION, RIGHT: ICD-10-CM

## 2017-11-06 DIAGNOSIS — M54.2 NECK PAIN: Primary | ICD-10-CM

## 2017-11-06 DIAGNOSIS — R52 PAIN: ICD-10-CM

## 2017-11-06 LAB
ANION GAP SERPL CALCULATED.3IONS-SCNC: 6 MMOL/L (ref 4–13)
ATRIAL RATE: 96 BPM
BASOPHILS # BLD AUTO: 0.03 THOUSANDS/ΜL (ref 0–0.1)
BASOPHILS NFR BLD AUTO: 0 % (ref 0–1)
BUN SERPL-MCNC: 11 MG/DL (ref 5–25)
CALCIUM SERPL-MCNC: 9.4 MG/DL (ref 8.3–10.1)
CHLORIDE SERPL-SCNC: 104 MMOL/L (ref 100–108)
CO2 SERPL-SCNC: 29 MMOL/L (ref 21–32)
CREAT SERPL-MCNC: 0.6 MG/DL (ref 0.6–1.3)
EOSINOPHIL # BLD AUTO: 0.16 THOUSAND/ΜL (ref 0–0.61)
EOSINOPHIL NFR BLD AUTO: 2 % (ref 0–6)
ERYTHROCYTE [DISTWIDTH] IN BLOOD BY AUTOMATED COUNT: 15.3 % (ref 11.6–15.1)
GFR SERPL CREATININE-BSD FRML MDRD: 87 ML/MIN/1.73SQ M
GLUCOSE SERPL-MCNC: 109 MG/DL (ref 65–140)
HCT VFR BLD AUTO: 40.4 % (ref 34.8–46.1)
HGB BLD-MCNC: 13 G/DL (ref 11.5–15.4)
LYMPHOCYTES # BLD AUTO: 2.23 THOUSANDS/ΜL (ref 0.6–4.47)
LYMPHOCYTES NFR BLD AUTO: 23 % (ref 14–44)
MCH RBC QN AUTO: 29.1 PG (ref 26.8–34.3)
MCHC RBC AUTO-ENTMCNC: 32.2 G/DL (ref 31.4–37.4)
MCV RBC AUTO: 90 FL (ref 82–98)
MONOCYTES # BLD AUTO: 1.32 THOUSAND/ΜL (ref 0.17–1.22)
MONOCYTES NFR BLD AUTO: 14 % (ref 4–12)
NEUTROPHILS # BLD AUTO: 5.94 THOUSANDS/ΜL (ref 1.85–7.62)
NEUTS SEG NFR BLD AUTO: 61 % (ref 43–75)
NRBC BLD AUTO-RTO: 0 /100 WBCS
NT-PROBNP SERPL-MCNC: 253 PG/ML
P AXIS: 47 DEGREES
PLATELET # BLD AUTO: 176 THOUSANDS/UL (ref 149–390)
PMV BLD AUTO: 9.5 FL (ref 8.9–12.7)
POTASSIUM SERPL-SCNC: 4.1 MMOL/L (ref 3.5–5.3)
PR INTERVAL: 216 MS
QRS AXIS: 179 DEGREES
QRSD INTERVAL: 116 MS
QT INTERVAL: 388 MS
QTC INTERVAL: 490 MS
RBC # BLD AUTO: 4.47 MILLION/UL (ref 3.81–5.12)
SODIUM SERPL-SCNC: 139 MMOL/L (ref 136–145)
T WAVE AXIS: 24 DEGREES
TROPONIN I SERPL-MCNC: <0.02 NG/ML
VENTRICULAR RATE: 96 BPM
WBC # BLD AUTO: 9.7 THOUSAND/UL (ref 4.31–10.16)

## 2017-11-06 PROCEDURE — 93971 EXTREMITY STUDY: CPT

## 2017-11-06 PROCEDURE — 83880 ASSAY OF NATRIURETIC PEPTIDE: CPT | Performed by: EMERGENCY MEDICINE

## 2017-11-06 PROCEDURE — 93005 ELECTROCARDIOGRAM TRACING: CPT | Performed by: EMERGENCY MEDICINE

## 2017-11-06 PROCEDURE — 36415 COLL VENOUS BLD VENIPUNCTURE: CPT | Performed by: EMERGENCY MEDICINE

## 2017-11-06 PROCEDURE — 99284 EMERGENCY DEPT VISIT MOD MDM: CPT

## 2017-11-06 PROCEDURE — 84484 ASSAY OF TROPONIN QUANT: CPT | Performed by: EMERGENCY MEDICINE

## 2017-11-06 PROCEDURE — 71275 CT ANGIOGRAPHY CHEST: CPT

## 2017-11-06 PROCEDURE — 85025 COMPLETE CBC W/AUTO DIFF WBC: CPT | Performed by: EMERGENCY MEDICINE

## 2017-11-06 PROCEDURE — 71020 HB CHEST X-RAY 2VW FRONTAL&LATL: CPT

## 2017-11-06 PROCEDURE — 80048 BASIC METABOLIC PNL TOTAL CA: CPT | Performed by: EMERGENCY MEDICINE

## 2017-11-06 RX ORDER — ASPIRIN 81 MG/1
81 TABLET ORAL DAILY
COMMUNITY
End: 2017-11-15 | Stop reason: HOSPADM

## 2017-11-06 RX ORDER — ACETAMINOPHEN 325 MG/1
650 TABLET ORAL ONCE
Status: COMPLETED | OUTPATIENT
Start: 2017-11-06 | End: 2017-11-06

## 2017-11-06 RX ORDER — MORPHINE SULFATE 10 MG/ML
6 INJECTION, SOLUTION INTRAMUSCULAR; INTRAVENOUS ONCE
Status: DISCONTINUED | OUTPATIENT
Start: 2017-11-06 | End: 2017-11-06 | Stop reason: HOSPADM

## 2017-11-06 RX ADMIN — ACETAMINOPHEN 650 MG: 325 TABLET, FILM COATED ORAL at 14:12

## 2017-11-06 RX ADMIN — IOHEXOL 85 ML: 350 INJECTION, SOLUTION INTRAVENOUS at 13:21

## 2017-11-06 NOTE — ED PROVIDER NOTES
ASSESSMENT AND PLAN    Jennifer Syed is a 76 y o  female with a history of sick sinus syndrome, status post pacemaker implantation on October 30th, presents with neck pain and left shoulder pain  Differential diagnosis musculoskeletal pain, versus tension headache, versus PE versus pneumothorax, versus perforated viscus, IJ DVT  Based on the patient's recent procedure, I am concerned a perforation, , however the patient is well-appearing and nontoxic, so this lowers my index suspicion  Patient is currently hemodynamically stable, though mildly tachycardic in the 90s, well-appearing, without acute distress  On exam, she does have tenderness to palpation of the left neck and left superior shoulder and the SCM and paraspinal distribution   -Will check CBC, BMP, troponin, BNP  -chest x-ray  Will also order EKG  -will check formal bilateral upper extremity duplex to rule out DVT  -if the patient's workup at this point is negative, I have a low threshold to scan the patient look for pulmonary embolism, given her recent surgery, as well as her borderline tachycardia  -continue cardiac monitoring, frequent vital signs, continue his O2 saturation  -Tylenol for pain  I did offer the patient morphine as well, but she declines this   -will speak with the cardiologist on call, and possibly the electrophysiologist, the update them on the patient's clinical status  -will re-evaluate pending imaging and lab work, with disposition pending    ED COURSE    -Labwork and Imaging reviewed - chest x-ray unremarkable  Based on the patient's vital signs, and borderline oxygen saturation, I did obtain a CT PE study  This study was negative for pulmonary embolism, did reveal a moderate right-sided pleural effusion, suspicious for recurrence of malignancy     -I spoke with the cardiology attending, who spoke directly with the patient's electrophysiologist Dr Ramón Haq who performed the pacemaker procedure    Their service stay there are no indications for further workup from a cardiology perspective  They sent the patient to the emergency department for evaluation of her neck pain, and they were not suspicious for surgical complication  Furthermore, they do not see any indication for cardiology consultation in the emergency department, and will continue to follow the patient as an outpatient  -on re-evaluation, the patient remains with a heart rate in the 90s, but no acute distress, and requests to go home  Physical exam is unchanged  I did speak to the patient at this time, offer her admission for further workup of her pleural effusion, possible recurrence of malignancy, and her borderline vital signs  However, the patient stated that she would rather go home, and follow up as an outpatient with her primary care physician, and her cardiologist   -at this point, the patient is appropriate for discharge home  I will provide the patient with return precautions  The patient states that she will call her primary care physician tomorrow, in order to arrange follow-up for further workup of her pleural effusion  I discussed this plan with the patient, and she is agreeable and understanding with this plan    History  Chief Complaint   Patient presents with    Neck Pain     patient had a pacemaker place approx 1 week ago and started with left sided neck pain and dizziness yesterday  Describes chest pain as heaviness     70-year-old female with a history of sick sinus syndrome status post [employer] dual-chamber pacemaker implantation on October 31st, stage 4 metastatic breast cancer with liver metastasis and chest wall metastasis, status post right mastectomy, hyperlipidemia, presents with neck pain  States the pain started last night, after the onset of mild headache, which she described as posterior  She states the pain is on the left side of her neck, worse with movement common if she stays perfectly still there is no pain   States she took 2 Tylenol approximately 5:30 p m  last night, with minimal to no relief  She was unable to sleep tonight, and did note that she has some oxycodone, but did not want take some because she does not want take any until she is told to  Of note, she had a pacemaker placed on October 31st   This morning, she called her electrophysiologist, who looked at the interrogation of the pacemaker, noted to be normal  He told her to go to the emergency department for further evaluation  She denies any current fevers, chills, lightheadedness, chest pain, shortness of breath, nausea, vomiting, diarrhea, abdominal pain  She does acknowledge dizziness, but states that this is a chronic issue, and today is no different from her normal dizziness  She further denies any bleeding, PE the incisions, her any other symptoms of surgical postoperative complications  Prior to Admission Medications   Prescriptions Last Dose Informant Patient Reported? Taking? CLINDAMYCIN HCL PO Unknown at Unknown time  Yes No   Sig: Take by mouth as needed Prior to dental visits   Cranberry (THERACRAN HP PO) 11/5/2017 at Unknown time  Yes Yes   Sig: Take 2 tablets by mouth daily   Glucosamine-Chondroit-Vit C-Mn (GLUCOSAMINE 1500 COMPLEX) CAPS 11/5/2017 at Unknown time  Yes Yes   Sig: Take 1 capsule by mouth 2 (two) times a day     Multiple Vitamin (MULTIVITAMIN) capsule 11/5/2017 at Unknown time  Yes Yes   Sig: Take 1 capsule by mouth daily  acetaminophen (TYLENOL) 325 mg tablet 11/5/2017 at Unknown time  No Yes   Sig: May take 650 mg every 6-8 hours as needed for pain   Patient taking differently: Take 650 mg by mouth every 6 (six) hours as needed May take 650 mg every 6-8 hours as needed for pain    aspirin (ECOTRIN LOW STRENGTH) 81 mg EC tablet 11/5/2017 at Unknown time  Yes Yes   Sig: Take 81 mg by mouth daily   atorvastatin (LIPITOR) 20 mg tablet 11/5/2017 at Unknown time  Yes Yes   Sig: Take 20 mg by mouth daily     calcium carbonate (CALCIUM 600) 600 MG tablet 11/5/2017 at Unknown time  Yes Yes   Sig: Take 600 mg by mouth 2 (two) times a day with meals  metoprolol tartrate (LOPRESSOR) 25 mg tablet 11/5/2017 at Unknown time  No Yes   Sig: Take 1 tablet by mouth every 12 (twelve) hours   ondansetron (ZOFRAN) 4 mg tablet Unknown at Unknown time  Yes No   Sig: Take 4 mg by mouth every 8 (eight) hours as needed for nausea or vomiting   tamoxifen (NOLVADEX) 20 mg tablet 11/5/2017 at Unknown time  Yes Yes   Sig: Take 20 mg by mouth daily      Facility-Administered Medications: None       Past Medical History:   Diagnosis Date    Anxiety     Arthritis     Breast CA ([facility] [address] )     recurrent, ductal carcinoma ER, [place] pos    Depression     Diverticula of intestine     Dizziness     and passes out    Flushing     Hiatal hernia     [place] (hard of hearing)      [place]    Hypercholesteremia     Liver mass     Liver metastasis (HCC)     Metastatic adenocarcinoma to liver ([facility] [address] )     Bx 01/03/2017    PONV (postoperative nausea and vomiting)     Recurrent breast cancer (HCC)     Shingles     Shortness of breath     on exertion    Tachycardia     Urinary incontinence     Use of cane as ambulatory aid     or walker       Past Surgical History:   Procedure Laterality Date    CATARACT EXTRACTION Bilateral     COLONOSCOPY      HYSTERECTOMY      JOINT REPLACEMENT      liz[place]  TKR and right TSR    MASTECTOMY Right     [place] RESEC LIVER,PART LOBECTOMY N/A 7/13/2017    Procedure: LIVER RESECTION, INTRAOPERATIVE ULTRASOUND;  Surgeon: Oziel Cervantes MD;  Location: [facility];  Service: Surgical Oncology    ROTATOR CUFF REPAIR Right     SENTINEL LYMPH NODE BIOPSY Right     TONSILECTOMY AND ADNOIDECTOMY         Family History   Problem Relation Age of Onset    Cancer Father     Cancer Brother      I have reviewed and agree with the history as documented      Social History   Substance Use Topics    Smoking status: Never Smoker    Smokeless tobacco: Never Used  Alcohol use No        Review of Systems   Constitutional: Negative for chills and fever  HENT: Negative for congestion and sinus pain  Eyes: Negative for photophobia and visual disturbance  Respiratory: Negative for cough, chest tightness, shortness of breath and wheezing  Cardiovascular: Negative for chest pain and palpitations  Gastrointestinal: Negative for diarrhea, nausea and vomiting  Genitourinary: Negative for difficulty urinating and dysuria  Musculoskeletal: Positive for neck pain  Negative for back pain  Skin: Negative for pallor and rash  Neurological: Positive for dizziness (Chronic, as per patient)  Negative for syncope, facial asymmetry, speech difficulty, weakness, light-headedness, numbness and headaches  Physical Exam  ED Triage Vitals [11/06/17 0957]   Temperature Pulse Respirations Blood Pressure SpO2   (!) 97 3 °F (36 3 °C) 96 18 164/78 96 %      Temp Source Heart Rate Source Patient Position - Orthostatic VS BP Location FiO2 (%)   Oral Monitor Lying Left arm --      Pain Score       7           Orthostatic Vital Signs  Vitals:    11/06/17 1300 11/06/17 1400 11/06/17 1430 11/06/17 1500   BP: 142/63 138/61 125/58 116/64   Pulse: 94 88 96 100   Patient Position - Orthostatic VS:           Physical Exam   Constitutional: She is oriented to person, place, and time  Awake and alert, well appearing, no acute distress, nontoxic-appearing   HENT:   Head: Normocephalic  Mouth/Throat: Oropharynx is clear and moist  No oropharyngeal exudate  Eyes: Pupils are equal, round, and reactive to light  No scleral icterus  Neck: Normal range of motion  Neck supple  No JVD present  There is tenderness to palpation of the left paraspinal neck muscles, also superior and posterior left shoulder  No bony tenderness  No erythema, masses, wound   Cardiovascular: Normal rate, regular rhythm and normal heart sounds  No murmur heard    Superior lateral chest incision is clean dry and intact, appropriately tender, no drainage, blood, purulent discharge  Medial lower chest incision is clean dry and intact, appropriately tender, no drainage, bloody, purulent discharge   Pulmonary/Chest: Effort normal and breath sounds normal  No respiratory distress  She has no wheezes  She has no rales  Abdominal: Soft  She exhibits no distension  There is no tenderness  Musculoskeletal: Normal range of motion  She exhibits no edema  Neurological: She is alert and oriented to person, place, and time  No cranial nerve deficit  Coordination normal    5/5 strength in all extremities, equal bilaterally  Sensation grossly intact bilaterally  No dysmetria on finger-to-nose or heel-to-shin testing  No cranial nerve deficits appreciated on exam    Skin: Skin is warm and dry  ED Medications  Medications   iohexol (OMNIPAQUE) 350 MG/ML injection (MULTI-DOSE) 85 mL (85 mL Intravenous Given 11/6/17 1321)   acetaminophen (TYLENOL) tablet 650 mg (650 mg Oral Given 11/6/17 1412)       Diagnostic Studies  Results Reviewed     Procedure Component Value Units Date/Time    Basic metabolic panel [[id number]] Collected:  11/06/17 1035    Lab Status:  Final result Specimen:  Blood from Arm, Left Updated:  11/06/17 1221     Sodium 139 mmol/L      Potassium 4 1 mmol/L      Chloride 104 mmol/L      CO2 29 mmol/L      Anion Gap 6 mmol/L      BUN 11 mg/dL      Creatinine 0 60 mg/dL      Glucose 109 mg/dL      Calcium 9 4 mg/dL      eGFR 87 ml/min/1 73sq m     Narrative:         National Kidney Disease Education Program recommendations are as follows:  GFR calculation is accurate only with a steady state creatinine  Chronic Kidney disease less than 60 ml/min/1 73 sq  meters  Kidney failure less than 15 ml/min/1 73 sq  meters      B-type natriuretic peptide [[id number]]  (Normal) Collected:  11/06/17 1035    Lab Status:  Final result Specimen:  Blood from Arm, Left Updated:  11/06/17 1219     NT-proBNP 253 pg/mL     Troponin I [[id number]]  (Normal) Collected:  11/06/17 1035    Lab Status:  Final result Specimen:  Blood from Arm, Left Updated:  11/06/17 1150     Troponin I <0 02 ng/mL     Narrative:         [employer] Chemistry analyzer 99% cutoff is > 0 04 ng/mL in network labs    o cTnI 99% cutoff is useful only when applied to patients in the clinical setting of myocardial ischemia  o cTnI 99% cutoff should be interpreted in the context of clinical history, ECG findings and possibly cardiac imaging to establish correct diagnosis  o cTnI 99% cutoff may be suggestive but clearly not indicative of a coronary event without the clinical setting of myocardial ischemia  CBC and differential [[id number]]  (Abnormal) Collected:  11/06/17 1035    Lab Status:  Final result Specimen:  Blood from Arm, Left Updated:  11/06/17 1047     WBC 9 70 Thousand/uL      RBC 4 47 Million/uL      Hemoglobin 13 0 g/dL      Hematocrit 40 4 %      MCV 90 fL      MCH 29 1 pg      MCHC 32 2 g/dL      RDW 15 3 (H) %      MPV 9 5 fL      Platelets 230 Thousands/uL      nRBC 0 /100 WBCs      Neutrophils Relative 61 %      Lymphocytes Relative 23 %      Monocytes Relative 14 (H) %      Eosinophils Relative 2 %      Basophils Relative 0 %      Neutrophils Absolute 5 94 Thousands/µL      Lymphocytes Absolute 2 23 Thousands/µL      Monocytes Absolute 1 32 (H) Thousand/µL      Eosinophils Absolute 0 16 Thousand/µL      Basophils Absolute 0 03 Thousands/µL                  CTA ED chest PE study   Final Result by Bakari Maddox MD (11/06 5824)      No pulmonary arterial embolism  Small right basilar effusion increased in size when compared to the study prior study with associated mild compressive atelectasis  No acute pulmonary findings  Workstation performed: [id number]         XR chest 2 views   Final Result by Carmina Lewis MD (11/06 3632)      A small right-sided pleural effusion is noted  No pneumothorax    Transvenous pacemaker leads unchanged in position         Workstation performed: [id number]         VAS upper limb venous duplex scan, unilateral/limited    (Results Pending)         Procedures  Procedures      Phone Consults  ED Phone Contact    ED Course  ED Course            Identification of Seniors at [facility] Most Recent Value   (ISAR) Identification of Seniors at Risk   Before the illness or injury that brought you to the Emergency, did you need someone to help you on a regular basis? 0 Filed at: 11/06/2017 0959   In the last 24 hours, have you needed more help than usual?  0 Filed at: 11/06/2017 3241   Have you been hospitalized for one or more nights during the past 6 months? 1 Filed at: 11/06/2017 0959   In general, do you see well?  0 Filed at: 11/06/2017 0959   In general, do you have serious problems with your memory? 0 Filed at: 11/06/2017 4354   Do you take more than three different medications every day? 1 Filed at: 11/06/2017 0959   ISAR Score  2 Filed at: 11/06/2017 7504                          MDM  CritCare Time    Disposition  Final diagnoses:   Neck pain   Pleural effusion, right     Time reflects when diagnosis was documented in both MDM as applicable and the Disposition within this note     Time User Action Codes Description Comment    11/6/2017 11:50 AM Jorge RAMÍREZ Add [R52] Pain     11/6/2017  3:36 PM Vineet Reyna Add [M54 2] Neck pain     11/6/2017  3:36 PM Vineet Reyna Add [J90] Pleural effusion, right       ED Disposition     ED Disposition Condition Comment    Discharge  [address] [place] [address] discharge to home/self care      Condition at discharge: Good        Follow-up Information     Follow up With Specialties Details Why Contact Dora Angulo, [address] [address] [phone]          Discharge Medication List as of 11/6/2017  3:38 PM      CONTINUE these medications which have NOT CHANGED    Details   acetaminophen (TYLENOL) 325 mg tablet May take 650 mg every 6-8 hours as needed for pain, Print      aspirin (ECOTRIN LOW STRENGTH) 81 mg EC tablet Take 81 mg by mouth daily, Historical Med      atorvastatin (LIPITOR) 20 mg tablet Take 20 mg by mouth daily  , Until Discontinued, Historical Med      calcium carbonate (CALCIUM 600) 600 MG tablet Take 600 mg by mouth 2 (two) times a day with meals  , Until Discontinued, Historical Med      Cranberry (THERACRAN HP PO) Take 2 tablets by mouth daily, Until Discontinued, Historical Med      Glucosamine-Chondroit-Vit C-Mn (GLUCOSAMINE 1500 COMPLEX) CAPS Take 1 capsule by mouth 2 (two) times a day  , Historical Med      metoprolol tartrate (LOPRESSOR) 25 mg tablet Take 1 tablet by mouth every 12 (twelve) hours, Starting Wed 11/1/2017, No Print      Multiple Vitamin (MULTIVITAMIN) capsule Take 1 capsule by mouth daily  , Until Discontinued, Historical Med      tamoxifen (NOLVADEX) 20 mg tablet Take 20 mg by mouth daily, Historical Med      CLINDAMYCIN HCL PO Take by mouth as needed Prior to dental visits, Historical Med      ondansetron (ZOFRAN) 4 mg tablet Take 4 mg by mouth every 8 (eight) hours as needed for nausea or vomiting, Until Discontinued, Historical Med           No discharge procedures on file  ED Provider  Attending physically available and evaluated Vern Bloch I managed the patient along with the ED Attending      Electronically Signed by         Gagan Skinner MD  Resident  11/06/17 7143

## 2017-11-06 NOTE — DISCHARGE INSTRUCTIONS
For your neck pain, please continue to take Tylenol every 8 hours for pain  Please follow up with the primary care physician for further management of the neck pain if needed  I would recommend calling their office tomorrow to update them of your hospitalization in the emergency department  For your recent pacemaker insertion, please continue to follow up with his scheduled appointment  Acute Neck Pain   WHAT YOU NEED TO KNOW:   Acute neck pain starts suddenly, increases quickly, and goes away in a few days  The pain may come and go, or be worse with certain movements  The pain may be only in your neck, or it may move to your arms, back, or shoulders  You may also have pain that starts in another body area and moves to your neck  DISCHARGE INSTRUCTIONS:   Return to the emergency department if:   · You have an injury that causes neck pain and shooting pain down your arms or legs  · Your neck pain suddenly becomes severe  · You have neck pain along with numbness, tingling, or weakness in your arms or legs  · You have a stiff neck, a headache, and a fever  Contact your healthcare provider if:   · You have new or worsening symptoms  · Your symptoms continue even after treatment  · You have questions or concerns about your condition or care  Medicines:   · NSAIDs , such as ibuprofen, help decrease swelling, pain, and fever  This medicine is available without a doctor's order  Ask your healthcare provider which medicine to take and how often to take it  Follow directions  NSAIDs can cause stomach bleeding or kidney problems if not taken correctly  If you take blood thinner medicine, always ask if NSAIDs are safe for you  · Acetaminophen  helps decrease pain and fever  Ask your healthcare provider how much to take and how often to take it  Follow directions  Acetaminophen can cause liver damage if not taken correctly  · Steroid medicine  may be used to reduce inflammation   This can help relieve pain caused by swelling  · Take your medicine as directed  Contact your healthcare provider if you think your medicine is not helping or if you have side effects  Tell him or her if you are allergic to any medicine  Keep a list of the medicines, vitamins, and herbs you take  Include the amounts, and when and why you take them  Bring the list or the pill bottles to follow-up visits  Carry your medicine list with you in case of an emergency  Manage or prevent acute neck pain:   · Rest your neck as directed  Do not make sudden movements, such as turning your head quickly  Your healthcare provider may recommend you wear a cervical collar for a short time  The collar will prevent you from moving your head  This will give your neck time to heal if an injury is causing your neck pain  Ask your healthcare provider when you can return to sports or other normal daily activities  · Apply heat as directed  Heat helps relieve pain and swelling  Use a heat wrap, or soak a small towel in warm water  Wring out the extra water  Apply the heat wrap or towel for 20 minutes every hour, or as directed  · Apply ice as directed  Ice helps relieve pain and swelling, and can help prevent tissue damage  Use an ice pack, or put ice in a bag  Cover the ice pack or back with a towel before you apply it to your neck  Apply the ice pack or ice for 15 minutes every hour, or as directed  Your healthcare provider can tell you how often to apply ice  · Do neck exercises as directed  Neck exercises help strengthen the muscles and increase range of motion  Your healthcare provider will tell you which exercises are right for you  He may give you instructions, or he may recommend that you work with a physical therapist  Your healthcare provider or therapist can make sure you are doing the exercises correctly  · Maintain good posture  Try to keep your head and shoulders lifted when you sit   If you work in front of a computer, make sure the monitor is at the right level  You should not need to look up down to see the screen  You should also not have to lean forward to be able to read what is on the screen  Make sure your keyboard, mouse, and other computer items are placed where you do not have to extend your shoulder to reach them  Get up often if you work in front of a computer or sit for long periods of time  Stretch or walk around to keep your neck muscles loose  Follow up with your healthcare provider as directed: Your healthcare provider may refer you to a specialist if your pain does not get better with treatment  Write down your questions so you remember to ask them during your visits  © 2017 [facility] Information is for End User's use only and may not be sold, redistributed or otherwise used for commercial purposes  All illustrations and images included in CareNotes® are the copyrighted property of [employer]  or [employer]  The above information is an  only  It is not intended as medical advice for individual conditions or treatments  Talk to your doctor, nurse or pharmacist before following any medical regimen to see if it is safe and effective for you

## 2017-11-06 NOTE — ED NOTES
Pt requested to start with tylenol for pain and if needed she will ask for morphine    Pt states pain is minimal at this time     Tyler Lawrence RN  11/06/17 3440

## 2017-11-08 ENCOUNTER — APPOINTMENT (INPATIENT)
Dept: RADIOLOGY | Facility: HOSPITAL | Age: 77
DRG: 260 | End: 2017-11-08
Payer: COMMERCIAL

## 2017-11-08 ENCOUNTER — HOSPITAL ENCOUNTER (INPATIENT)
Facility: HOSPITAL | Age: 77
LOS: 7 days | Discharge: HOME | DRG: 260 | End: 2017-11-15
Attending: INTERNAL MEDICINE | Admitting: INTERNAL MEDICINE
Payer: COMMERCIAL

## 2017-11-08 ENCOUNTER — TRANSCRIBE ORDERS (OUTPATIENT)
Dept: ADMINISTRATIVE | Facility: HOSPITAL | Age: 77
End: 2017-11-08

## 2017-11-08 ENCOUNTER — HOSPITAL ENCOUNTER (OUTPATIENT)
Dept: NON INVASIVE DIAGNOSTICS | Facility: CLINIC | Age: 77
Discharge: HOME | DRG: 260 | End: 2017-11-08
Payer: COMMERCIAL

## 2017-11-08 DIAGNOSIS — J94.8 NEOPLASTIC PLEURAL EFFUSION: ICD-10-CM

## 2017-11-08 DIAGNOSIS — J94.8 NEOPLASTIC PLEURAL EFFUSION: Primary | ICD-10-CM

## 2017-11-08 DIAGNOSIS — T82.9XXA COMPLICATION ASSOCIATED WITH CARDIAC PACEMAKER LEAD, INITIAL ENCOUNTER: Primary | ICD-10-CM

## 2017-11-08 PROCEDURE — 93005 ELECTROCARDIOGRAM TRACING: CPT | Performed by: INTERNAL MEDICINE

## 2017-11-08 PROCEDURE — 71010 HB CHEST X-RAY 1 VIEW FRONTAL (PORTABLE): CPT

## 2017-11-08 PROCEDURE — 93308 TTE F-UP OR LMTD: CPT

## 2017-11-08 RX ORDER — HEPARIN SODIUM 5000 [USP'U]/ML
5000 INJECTION, SOLUTION INTRAVENOUS; SUBCUTANEOUS EVERY 8 HOURS SCHEDULED
Status: DISCONTINUED | OUTPATIENT
Start: 2017-11-08 | End: 2017-11-09

## 2017-11-08 RX ORDER — ATORVASTATIN CALCIUM 20 MG/1
20 TABLET, FILM COATED ORAL DAILY
Status: DISCONTINUED | OUTPATIENT
Start: 2017-11-08 | End: 2017-11-09

## 2017-11-08 RX ORDER — ACETAMINOPHEN 325 MG/1
650 TABLET ORAL EVERY 6 HOURS PRN
Status: DISCONTINUED | OUTPATIENT
Start: 2017-11-08 | End: 2017-11-15 | Stop reason: HOSPADM

## 2017-11-08 RX ORDER — CALCIUM CARBONATE 500(1250)
500 TABLET ORAL 2 TIMES DAILY WITH MEALS
Status: DISCONTINUED | OUTPATIENT
Start: 2017-11-08 | End: 2017-11-09

## 2017-11-08 RX ORDER — ONDANSETRON 4 MG/1
4 TABLET, ORALLY DISINTEGRATING ORAL EVERY 8 HOURS PRN
Status: DISCONTINUED | OUTPATIENT
Start: 2017-11-08 | End: 2017-11-15 | Stop reason: HOSPADM

## 2017-11-08 RX ORDER — TAMOXIFEN CITRATE 10 MG/1
20 TABLET ORAL DAILY
Status: DISCONTINUED | OUTPATIENT
Start: 2017-11-08 | End: 2017-11-15 | Stop reason: HOSPADM

## 2017-11-08 RX ORDER — ASPIRIN 81 MG/1
81 TABLET ORAL DAILY
Status: DISCONTINUED | OUTPATIENT
Start: 2017-11-09 | End: 2017-11-09

## 2017-11-08 RX ADMIN — Medication 500 MG: at 18:29

## 2017-11-08 RX ADMIN — TAMOXIFEN CITRATE 20 MG: 10 TABLET, FILM COATED ORAL at 21:47

## 2017-11-08 RX ADMIN — ATORVASTATIN CALCIUM 20 MG: 20 TABLET, FILM COATED ORAL at 21:47

## 2017-11-08 RX ADMIN — METOPROLOL TARTRATE 25 MG: 25 TABLET ORAL at 21:16

## 2017-11-08 RX ADMIN — ACETAMINOPHEN 650 MG: 325 TABLET, FILM COATED ORAL at 21:16

## 2017-11-08 NOTE — H&P
H&P Exam - Cardiology   Ren Bowden 76 y o  female MRN: [id number]  Unit/Bed#: [facility] 709-01 Encounter: [id number]    Assessment/Plan     Assessment    - SSS with pauses upto 5 seconds, with episodes of syncope - s/p Medtronic  DDD on 10/31/17  - Hyperlipidemia  - Metastatic infiltratve ductal carcinoma of right breast s/p mastectomy in 2009, chemo with toxotac, Cytoxan, arimidase in the past, now on tamoxifen   - Echo Low normal LV systolic function, with EF 50 %, no significant WMA  NST in 06/17- Breast attenuation with paradoxical perfusion defect    - LBBB  - Right pleural effusion on CT PE- Metastatic? Plan     Device interrogation in clinic- Loss of  ventricular capture, normal atrial capture  Would keep NPO past midnight for Pacemaker lead revision tomorrow  Will need need HIREN during the procedure to monitor for effusion  Telemetry monitoring  Would obtain routine labs in the morning  Limited Echo done today- Thick epicardial fat pad, probable trace pericardial effusion, official read pending  Would repeat portable chest xray today to look for lead position  Tylenol for pain  Continue home meds- aspirin, atorvastatin 20, metoprolol 25 bid  DVT ppx- Heparin sub q    Discussed with Dr Nat Hamilton  History of Present Illness   HPI:  Ren Bowden is a 76 y o  female  With past medical history of  Hyperlipidemia,right breast  infiltrative ductal carcinoma s/p mastectomy in 2009  and chemotherapy( taxotac, Cytoxan and arimidex) in the past,  then had hepatic metastasis underwent resection in 09/2017,  SSS underwent Medtronic dual-chamber pacemaker on 10/31/2017, now  sent as a direct admit  for pacemaker revision      Patient has been experiencing pain in the left side of her chest, shoulder and her left neck that started on Sunday, remote device interrogation during that time revealed normal pacemaker function she was sent to the ED on 11/06/2017 for the pain at which time chest x-ray revealed atrial and ventricular leads in position, no pneumothorax, CTA was negative for PE  , hence was discharged from the ED  Today she went to the clinic for device interrogation and f/u limited echocardiogram at which time interrogation revealed loss of V- capture, hence sent for pacemaker  lead revision tomorrow  She still has some positional and pleuritic pain in her left inframammary region, notes improvement in her neck and shoulder pain now  No chest pain  Has some shortness of breath     ( She had a loop recorder placed on 10/04/2017 for recurrent episodes of syncope,  and then remote transmission  revealed pauses up to 5 seconds which corresponded with the timing of her symptoms, it was then decided to implant a pacemaker  )  Outpatient Cardiologist- Dr Afsaneh Mckeon and Dr Millicent Whitaker    Review of Systems    Historical Information   Past Medical History:   Diagnosis Date    Anxiety     Arthritis     Breast CA ([facility] [address] )     recurrent, ductal carcinoma ER, [place] pos    Depression     Diverticula of intestine     Dizziness     and passes out    Flushing     Hiatal hernia     [place] (hard of hearing)      lizette    Hypercholesteremia     Liver mass     Liver metastasis ([facility] [address] )     Metastatic adenocarcinoma to liver ([facility] [address] )     Bx 01/03/2017    PONV (postoperative nausea and vomiting)     Recurrent breast cancer ([address]     Shingles     Shortness of breath     on exertion    Tachycardia     Urinary incontinence     Use of cane as ambulatory aid     or walker     Past Surgical History:   Procedure Laterality Date    CATARACT EXTRACTION Bilateral     COLONOSCOPY      HYSTERECTOMY      JOINT REPLACEMENT      lizette  TKR and right TSR    MASTECTOMY Right     [place] RESEC LIVER,PART LOBECTOMY N/A 7/13/2017    Procedure: LIVER RESECTION, INTRAOPERATIVE ULTRASOUND;  Surgeon: Denilson Lewis MD;  Location: [facility];  Service: Surgical Oncology    ROTATOR CUFF REPAIR Right     SENTINEL LYMPH NODE BIOPSY Right     TONSILECTOMY AND ADNOIDECTOMY       Social History   History   Alcohol Use No     History   Drug Use No     History   Smoking Status    Never Smoker   Smokeless Tobacco    Never Used     Family History:   Family History   Problem Relation Age of Onset    Cancer Father     Cancer Brother        Meds/Allergies   PTA meds:   Prior to Admission Medications   Prescriptions Last Dose Informant Patient Reported? Taking? CLINDAMYCIN HCL PO   Yes No   Sig: Take by mouth as needed Prior to dental visits   Cranberry (THERACRAN HP PO)   Yes No   Sig: Take 2 tablets by mouth daily   Glucosamine-Chondroit-Vit C-Mn (GLUCOSAMINE 1500 COMPLEX) CAPS   Yes No   Sig: Take 1 capsule by mouth 2 (two) times a day     Multiple Vitamin (MULTIVITAMIN) capsule   Yes No   Sig: Take 1 capsule by mouth daily  acetaminophen (TYLENOL) 325 mg tablet   No No   Sig: May take 650 mg every 6-8 hours as needed for pain   Patient taking differently: Take 650 mg by mouth every 6 (six) hours as needed May take 650 mg every 6-8 hours as needed for pain    aspirin (ECOTRIN LOW STRENGTH) 81 mg EC tablet   Yes No   Sig: Take 81 mg by mouth daily   atorvastatin (LIPITOR) 20 mg tablet   Yes No   Sig: Take 20 mg by mouth daily  calcium carbonate (CALCIUM 600) 600 MG tablet   Yes No   Sig: Take 600 mg by mouth 2 (two) times a day with meals  metoprolol tartrate (LOPRESSOR) 25 mg tablet   No No   Sig: Take 1 tablet by mouth every 12 (twelve) hours   ondansetron (ZOFRAN) 4 mg tablet   Yes No   Sig: Take 4 mg by mouth every 8 (eight) hours as needed for nausea or vomiting   tamoxifen (NOLVADEX) 20 mg tablet   Yes No   Sig: Take 20 mg by mouth daily      Facility-Administered Medications: None     Allergies   Allergen Reactions    Ampicillin Shortness Of Breath and Anaphylaxis       Objective   Vitals: There were no vitals taken for this visit        No intake or output data in the 24 hours ending 11/08/17 1710    Invasive Devices          No matching active lines, drains, or airways          Physical Exam   Constitutional: She is oriented to person, place, and time  She appears well-developed and well-nourished  No distress  Eyes: Pupils are equal, round, and reactive to light  Neck: Normal range of motion  Neck supple  No JVD present  No tracheal deviation present  No thyromegaly present  Cardiovascular: Normal rate and regular rhythm  No murmur heard  Pulmonary/Chest: No respiratory distress  She has no wheezes  Decreased effort, reduced breath sounds at bases   Abdominal: Soft  She exhibits no distension  There is no tenderness  Musculoskeletal: She exhibits no edema or tenderness  Neurological: She is alert and oriented to person, place, and time  Lab Results:   CBC with diff:   Results from last 7 days  Lab Units 11/06/17  1035   WBC Thousand/uL 9 70   RBC Million/uL 4 47   HEMOGLOBIN g/dL 13 0   HEMATOCRIT % 40 4   MCV fL 90   MCH pg 29 1   MCHC g/dL 32 2   RDW % 15 3*   MPV fL 9 5   PLATELETS Thousands/uL 176     Imaging: I have personally reviewed pertinent reports  Pathology and Other Studies: I have personally reviewed pertinent reports  VTE Prophylaxis: Sequential compression device (Venodyne)     Code Status: Level 1 - Full Code  Advance Directive and Living Will:      Power of :    POLST:      Counseling / Coordination of Care  Total floor / unit time spent today 60 minutes  Greater than 50% of total time was spent with the patient and / or family counseling and / or coordination of care    A description of the counseling / coordination of care:

## 2017-11-09 ENCOUNTER — APPOINTMENT (INPATIENT)
Dept: RADIOLOGY | Facility: HOSPITAL | Age: 77
DRG: 260 | End: 2017-11-09
Payer: COMMERCIAL

## 2017-11-09 ENCOUNTER — GENERIC CONVERSION - ENCOUNTER (OUTPATIENT)
Dept: OTHER | Facility: OTHER | Age: 77
End: 2017-11-09

## 2017-11-09 ENCOUNTER — ANESTHESIA EVENT (INPATIENT)
Dept: PERIOP | Facility: HOSPITAL | Age: 77
DRG: 260 | End: 2017-11-09
Payer: COMMERCIAL

## 2017-11-09 ENCOUNTER — ANESTHESIA EVENT (INPATIENT)
Dept: NON INVASIVE DIAGNOSTICS | Facility: HOSPITAL | Age: 77
DRG: 260 | End: 2017-11-09
Payer: COMMERCIAL

## 2017-11-09 ENCOUNTER — ANESTHESIA (INPATIENT)
Dept: PERIOP | Facility: HOSPITAL | Age: 77
DRG: 260 | End: 2017-11-09
Payer: COMMERCIAL

## 2017-11-09 ENCOUNTER — APPOINTMENT (INPATIENT)
Dept: NON INVASIVE DIAGNOSTICS | Facility: HOSPITAL | Age: 77
DRG: 260 | End: 2017-11-09
Attending: INTERNAL MEDICINE
Payer: COMMERCIAL

## 2017-11-09 PROBLEM — T82.111A PACEMAKER MALFUNCTION, INITIAL ENCOUNTER: Status: ACTIVE | Noted: 2017-11-09

## 2017-11-09 PROBLEM — T82.9XXA COMPLICATION ASSOCIATED WITH CARDIAC PACEMAKER LEAD: Status: ACTIVE | Noted: 2017-11-09

## 2017-11-09 LAB
ABO GROUP BLD: NORMAL
ALBUMIN SERPL BCP-MCNC: 3 G/DL (ref 3.5–5)
ALP SERPL-CCNC: 80 U/L (ref 46–116)
ALT SERPL W P-5'-P-CCNC: 17 U/L (ref 12–78)
ANION GAP SERPL CALCULATED.3IONS-SCNC: 4 MMOL/L (ref 4–13)
ANION GAP SERPL CALCULATED.3IONS-SCNC: 9 MMOL/L (ref 4–13)
APTT PPP: 27 SECONDS (ref 23–35)
AST SERPL W P-5'-P-CCNC: 17 U/L (ref 5–45)
ATRIAL RATE: 97 BPM
BASE EXCESS BLDA CALC-SCNC: -3 MMOL/L (ref -2–3)
BASOPHILS # BLD AUTO: 0.01 THOUSANDS/ΜL (ref 0–0.1)
BASOPHILS NFR BLD AUTO: 0 % (ref 0–1)
BILIRUB SERPL-MCNC: 0.91 MG/DL (ref 0.2–1)
BLD GP AB SCN SERPL QL: NEGATIVE
BUN SERPL-MCNC: 10 MG/DL (ref 5–25)
BUN SERPL-MCNC: 15 MG/DL (ref 5–25)
CA-I BLD-SCNC: 1.02 MMOL/L (ref 1.12–1.32)
CA-I BLD-SCNC: 1.1 MMOL/L (ref 1.12–1.32)
CALCIUM SERPL-MCNC: 8.4 MG/DL (ref 8.3–10.1)
CALCIUM SERPL-MCNC: 9.4 MG/DL (ref 8.3–10.1)
CHLORIDE SERPL-SCNC: 108 MMOL/L (ref 100–108)
CHLORIDE SERPL-SCNC: 111 MMOL/L (ref 100–108)
CO2 SERPL-SCNC: 22 MMOL/L (ref 21–32)
CO2 SERPL-SCNC: 29 MMOL/L (ref 21–32)
CREAT SERPL-MCNC: 0.48 MG/DL (ref 0.6–1.3)
CREAT SERPL-MCNC: 0.68 MG/DL (ref 0.6–1.3)
EOSINOPHIL # BLD AUTO: 0.01 THOUSAND/ΜL (ref 0–0.61)
EOSINOPHIL NFR BLD AUTO: 0 % (ref 0–6)
ERYTHROCYTE [DISTWIDTH] IN BLOOD BY AUTOMATED COUNT: 14.7 % (ref 11.6–15.1)
ERYTHROCYTE [DISTWIDTH] IN BLOOD BY AUTOMATED COUNT: 15.3 % (ref 11.6–15.1)
GFR SERPL CREATININE-BSD FRML MDRD: 83 ML/MIN/1.73SQ M
GFR SERPL CREATININE-BSD FRML MDRD: 94 ML/MIN/1.73SQ M
GLUCOSE SERPL-MCNC: 129 MG/DL (ref 65–140)
GLUCOSE SERPL-MCNC: 130 MG/DL (ref 65–140)
GLUCOSE SERPL-MCNC: 156 MG/DL (ref 65–140)
GLUCOSE SERPL-MCNC: 166 MG/DL (ref 65–140)
GLUCOSE SERPL-MCNC: 173 MG/DL (ref 65–140)
HCO3 BLDA-SCNC: 22 MMOL/L (ref 24–30)
HCT VFR BLD AUTO: 35.7 % (ref 34.8–46.1)
HCT VFR BLD AUTO: 36.7 % (ref 34.8–46.1)
HCT VFR BLD CALC: 25 % (ref 34.8–46.1)
HCT VFR BLD CALC: 27 % (ref 34.8–46.1)
HGB BLD-MCNC: 11.5 G/DL (ref 11.5–15.4)
HGB BLD-MCNC: 12 G/DL (ref 11.5–15.4)
HGB BLDA-MCNC: 8.5 G/DL (ref 11.5–15.4)
HGB BLDA-MCNC: 9.2 G/DL (ref 11.5–15.4)
INR PPP: 1.07 (ref 0.86–1.16)
LYMPHOCYTES # BLD AUTO: 0.7 THOUSANDS/ΜL (ref 0.6–4.47)
LYMPHOCYTES NFR BLD AUTO: 5 % (ref 14–44)
MAGNESIUM SERPL-MCNC: 1.6 MG/DL (ref 1.6–2.6)
MAGNESIUM SERPL-MCNC: 2.1 MG/DL (ref 1.6–2.6)
MCH RBC QN AUTO: 28.7 PG (ref 26.8–34.3)
MCH RBC QN AUTO: 29.8 PG (ref 26.8–34.3)
MCHC RBC AUTO-ENTMCNC: 31.3 G/DL (ref 31.4–37.4)
MCHC RBC AUTO-ENTMCNC: 33.6 G/DL (ref 31.4–37.4)
MCV RBC AUTO: 89 FL (ref 82–98)
MCV RBC AUTO: 92 FL (ref 82–98)
MONOCYTES # BLD AUTO: 1.23 THOUSAND/ΜL (ref 0.17–1.22)
MONOCYTES NFR BLD AUTO: 9 % (ref 4–12)
NEUTROPHILS # BLD AUTO: 11.4 THOUSANDS/ΜL (ref 1.85–7.62)
NEUTS SEG NFR BLD AUTO: 86 % (ref 43–75)
NRBC BLD AUTO-RTO: 0 /100 WBCS
P AXIS: 65 DEGREES
PCO2 BLD: 23 MMOL/L (ref 21–32)
PCO2 BLD: 37.3 MM HG (ref 42–50)
PH BLD: 7.38 [PH] (ref 7.3–7.4)
PHOSPHATE SERPL-MCNC: 3.3 MG/DL (ref 2.3–4.1)
PLATELET # BLD AUTO: 148 THOUSANDS/UL (ref 149–390)
PLATELET # BLD AUTO: 190 THOUSANDS/UL (ref 149–390)
PLATELET # BLD AUTO: 194 THOUSANDS/UL (ref 149–390)
PMV BLD AUTO: 9.2 FL (ref 8.9–12.7)
PMV BLD AUTO: 9.3 FL (ref 8.9–12.7)
PMV BLD AUTO: 9.4 FL (ref 8.9–12.7)
PO2 BLD: 223 MM HG (ref 75–129)
PO2 BLD: 82 MM HG (ref 35–45)
POTASSIUM BLD-SCNC: 3.4 MMOL/L (ref 3.5–5.3)
POTASSIUM BLD-SCNC: 4.4 MMOL/L (ref 3.5–5.3)
POTASSIUM SERPL-SCNC: 4.1 MMOL/L (ref 3.5–5.3)
POTASSIUM SERPL-SCNC: 4.4 MMOL/L (ref 3.5–5.3)
PR INTERVAL: 213 MS
PROT SERPL-MCNC: 5.8 G/DL (ref 6.4–8.2)
PROTHROMBIN TIME: 13.9 SECONDS (ref 12.1–14.4)
QRS AXIS: 127 DEGREES
QRSD INTERVAL: 117 MS
QT INTERVAL: 392 MS
QTC INTERVAL: 498 MS
RBC # BLD AUTO: 4.01 MILLION/UL (ref 3.81–5.12)
RBC # BLD AUTO: 4.03 MILLION/UL (ref 3.81–5.12)
RH BLD: POSITIVE
SAO2 % BLD FROM PO2: 96 % (ref 95–98)
SODIUM BLD-SCNC: 142 MMOL/L (ref 136–145)
SODIUM BLD-SCNC: 145 MMOL/L (ref 136–145)
SODIUM SERPL-SCNC: 141 MMOL/L (ref 136–145)
SODIUM SERPL-SCNC: 142 MMOL/L (ref 136–145)
SPECIMEN EXPIRATION DATE: NORMAL
SPECIMEN SOURCE: ABNORMAL
SPECIMEN SOURCE: ABNORMAL
T WAVE AXIS: 3 DEGREES
VENTRICULAR RATE: 97 BPM
WBC # BLD AUTO: 13.37 THOUSAND/UL (ref 4.31–10.16)
WBC # BLD AUTO: 6.7 THOUSAND/UL (ref 4.31–10.16)

## 2017-11-09 PROCEDURE — 85025 COMPLETE CBC W/AUTO DIFF WBC: CPT | Performed by: PHYSICIAN ASSISTANT

## 2017-11-09 PROCEDURE — 86901 BLOOD TYPING SEROLOGIC RH(D): CPT | Performed by: INTERNAL MEDICINE

## 2017-11-09 PROCEDURE — 82803 BLOOD GASES ANY COMBINATION: CPT

## 2017-11-09 PROCEDURE — 84100 ASSAY OF PHOSPHORUS: CPT | Performed by: INTERNAL MEDICINE

## 2017-11-09 PROCEDURE — 86923 COMPATIBILITY TEST ELECTRIC: CPT

## 2017-11-09 PROCEDURE — 93005 ELECTROCARDIOGRAM TRACING: CPT | Performed by: PHYSICIAN ASSISTANT

## 2017-11-09 PROCEDURE — 02PA3MZ REMOVAL OF CARDIAC LEAD FROM HEART, PERCUTANEOUS APPROACH: ICD-10-PCS | Performed by: INTERNAL MEDICINE

## 2017-11-09 PROCEDURE — 02HK3JZ INSERTION OF PACEMAKER LEAD INTO RIGHT VENTRICLE, PERCUTANEOUS APPROACH: ICD-10-PCS | Performed by: INTERNAL MEDICINE

## 2017-11-09 PROCEDURE — 82948 REAGENT STRIP/BLOOD GLUCOSE: CPT

## 2017-11-09 PROCEDURE — 82330 ASSAY OF CALCIUM: CPT

## 2017-11-09 PROCEDURE — 86900 BLOOD TYPING SEROLOGIC ABO: CPT | Performed by: INTERNAL MEDICINE

## 2017-11-09 PROCEDURE — 85730 THROMBOPLASTIN TIME PARTIAL: CPT | Performed by: INTERNAL MEDICINE

## 2017-11-09 PROCEDURE — P9021 RED BLOOD CELLS UNIT: HCPCS

## 2017-11-09 PROCEDURE — 83735 ASSAY OF MAGNESIUM: CPT | Performed by: INTERNAL MEDICINE

## 2017-11-09 PROCEDURE — 84295 ASSAY OF SERUM SODIUM: CPT

## 2017-11-09 PROCEDURE — 85049 AUTOMATED PLATELET COUNT: CPT | Performed by: INTERNAL MEDICINE

## 2017-11-09 PROCEDURE — 70490 CT SOFT TISSUE NECK W/O DYE: CPT

## 2017-11-09 PROCEDURE — 02WA3MZ REVISION OF CARDIAC LEAD IN HEART, PERCUTANEOUS APPROACH: ICD-10-PCS | Performed by: INTERNAL MEDICINE

## 2017-11-09 PROCEDURE — C1892 INTRO/SHEATH,FIXED,PEEL-AWAY: HCPCS | Performed by: INTERNAL MEDICINE

## 2017-11-09 PROCEDURE — 82947 ASSAY GLUCOSE BLOOD QUANT: CPT

## 2017-11-09 PROCEDURE — 71010 HB CHEST X-RAY 1 VIEW FRONTAL (PORTABLE): CPT

## 2017-11-09 PROCEDURE — 93312 ECHO TRANSESOPHAGEAL: CPT

## 2017-11-09 PROCEDURE — 80048 BASIC METABOLIC PNL TOTAL CA: CPT | Performed by: INTERNAL MEDICINE

## 2017-11-09 PROCEDURE — 74176 CT ABD & PELVIS W/O CONTRAST: CPT

## 2017-11-09 PROCEDURE — C1769 GUIDE WIRE: HCPCS | Performed by: INTERNAL MEDICINE

## 2017-11-09 PROCEDURE — 71250 CT THORAX DX C-: CPT

## 2017-11-09 PROCEDURE — 0JD60ZZ EXTRACTION OF CHEST SUBCUTANEOUS TISSUE AND FASCIA, OPEN APPROACH: ICD-10-PCS | Performed by: INTERNAL MEDICINE

## 2017-11-09 PROCEDURE — 85027 COMPLETE CBC AUTOMATED: CPT | Performed by: INTERNAL MEDICINE

## 2017-11-09 PROCEDURE — 86850 RBC ANTIBODY SCREEN: CPT | Performed by: INTERNAL MEDICINE

## 2017-11-09 PROCEDURE — 85610 PROTHROMBIN TIME: CPT | Performed by: INTERNAL MEDICINE

## 2017-11-09 PROCEDURE — 84132 ASSAY OF SERUM POTASSIUM: CPT

## 2017-11-09 PROCEDURE — C1898 LEAD, PMKR, OTHER THAN TRANS: HCPCS | Performed by: INTERNAL MEDICINE

## 2017-11-09 PROCEDURE — 83735 ASSAY OF MAGNESIUM: CPT | Performed by: PHYSICIAN ASSISTANT

## 2017-11-09 PROCEDURE — 3E0102A INTRODUCTION OF ANTI-INFECTIVE ENVELOPE INTO SUBCUTANEOUS TISSUE, OPEN APPROACH: ICD-10-PCS | Performed by: INTERNAL MEDICINE

## 2017-11-09 PROCEDURE — 93308 TTE F-UP OR LMTD: CPT

## 2017-11-09 PROCEDURE — 85014 HEMATOCRIT: CPT

## 2017-11-09 PROCEDURE — 80053 COMPREHEN METABOLIC PANEL: CPT | Performed by: PHYSICIAN ASSISTANT

## 2017-11-09 DEVICE — LEAD PACER SELECTSECURE 69CM 4.1FR STEROID ELUT: Type: IMPLANTABLE DEVICE | Site: HEART | Status: FUNCTIONAL

## 2017-11-09 DEVICE — ANTIBIOTIC ENVELOPE TYRX AIGIS MED PACER: Type: IMPLANTABLE DEVICE | Site: CHEST | Status: FUNCTIONAL

## 2017-11-09 RX ORDER — SODIUM CHLORIDE, SODIUM LACTATE, POTASSIUM CHLORIDE, CALCIUM CHLORIDE 600; 310; 30; 20 MG/100ML; MG/100ML; MG/100ML; MG/100ML
50 INJECTION, SOLUTION INTRAVENOUS CONTINUOUS
Status: DISCONTINUED | OUTPATIENT
Start: 2017-11-09 | End: 2017-11-11

## 2017-11-09 RX ORDER — PROPOFOL 10 MG/ML
INJECTION, EMULSION INTRAVENOUS CONTINUOUS PRN
Status: DISCONTINUED | OUTPATIENT
Start: 2017-11-09 | End: 2017-11-09 | Stop reason: SURG

## 2017-11-09 RX ORDER — FENTANYL CITRATE 50 UG/ML
INJECTION, SOLUTION INTRAMUSCULAR; INTRAVENOUS AS NEEDED
Status: DISCONTINUED | OUTPATIENT
Start: 2017-11-09 | End: 2017-11-09 | Stop reason: SURG

## 2017-11-09 RX ORDER — ONDANSETRON 2 MG/ML
INJECTION INTRAMUSCULAR; INTRAVENOUS AS NEEDED
Status: DISCONTINUED | OUTPATIENT
Start: 2017-11-09 | End: 2017-11-09 | Stop reason: SURG

## 2017-11-09 RX ORDER — ONDANSETRON 2 MG/ML
4 INJECTION INTRAMUSCULAR; INTRAVENOUS ONCE AS NEEDED
Status: DISCONTINUED | OUTPATIENT
Start: 2017-11-09 | End: 2017-11-09 | Stop reason: HOSPADM

## 2017-11-09 RX ORDER — SODIUM CHLORIDE 9 MG/ML
INJECTION, SOLUTION INTRAVENOUS CONTINUOUS PRN
Status: DISCONTINUED | OUTPATIENT
Start: 2017-11-09 | End: 2017-11-09 | Stop reason: SURG

## 2017-11-09 RX ORDER — LIDOCAINE HYDROCHLORIDE 20 MG/ML
2 INJECTION, SOLUTION EPIDURAL; INFILTRATION; INTRACAUDAL; PERINEURAL ONCE
Status: COMPLETED | OUTPATIENT
Start: 2017-11-09 | End: 2017-11-09

## 2017-11-09 RX ORDER — GLYCOPYRROLATE 0.2 MG/ML
INJECTION INTRAMUSCULAR; INTRAVENOUS AS NEEDED
Status: DISCONTINUED | OUTPATIENT
Start: 2017-11-09 | End: 2017-11-09 | Stop reason: SURG

## 2017-11-09 RX ORDER — ETOMIDATE 2 MG/ML
INJECTION INTRAVENOUS AS NEEDED
Status: DISCONTINUED | OUTPATIENT
Start: 2017-11-09 | End: 2017-11-09 | Stop reason: SURG

## 2017-11-09 RX ORDER — METOCLOPRAMIDE HYDROCHLORIDE 5 MG/ML
10 INJECTION INTRAMUSCULAR; INTRAVENOUS ONCE AS NEEDED
Status: DISCONTINUED | OUTPATIENT
Start: 2017-11-09 | End: 2017-11-09 | Stop reason: HOSPADM

## 2017-11-09 RX ORDER — ALBUMIN, HUMAN INJ 5% 5 %
12.5 SOLUTION INTRAVENOUS ONCE
Status: COMPLETED | OUTPATIENT
Start: 2017-11-09 | End: 2017-11-09

## 2017-11-09 RX ORDER — FENTANYL CITRATE/PF 50 MCG/ML
25 SYRINGE (ML) INJECTION
Status: DISCONTINUED | OUTPATIENT
Start: 2017-11-09 | End: 2017-11-09 | Stop reason: HOSPADM

## 2017-11-09 RX ORDER — ALBUMIN, HUMAN INJ 5% 5 %
SOLUTION INTRAVENOUS CONTINUOUS PRN
Status: DISCONTINUED | OUTPATIENT
Start: 2017-11-09 | End: 2017-11-09 | Stop reason: SURG

## 2017-11-09 RX ORDER — SODIUM CHLORIDE 9 MG/ML
20 INJECTION, SOLUTION INTRAVENOUS CONTINUOUS
Status: DISCONTINUED | OUTPATIENT
Start: 2017-11-09 | End: 2017-11-11

## 2017-11-09 RX ORDER — OXYCODONE HYDROCHLORIDE 5 MG/1
5 TABLET ORAL EVERY 4 HOURS PRN
Status: DISCONTINUED | OUTPATIENT
Start: 2017-11-09 | End: 2017-11-15 | Stop reason: HOSPADM

## 2017-11-09 RX ORDER — SUCCINYLCHOLINE CHLORIDE 20 MG/ML
INJECTION INTRAMUSCULAR; INTRAVENOUS AS NEEDED
Status: DISCONTINUED | OUTPATIENT
Start: 2017-11-09 | End: 2017-11-09 | Stop reason: SURG

## 2017-11-09 RX ORDER — VANCOMYCIN HYDROCHLORIDE 1 G/200ML
1000 INJECTION, SOLUTION INTRAVENOUS ONCE
Status: COMPLETED | OUTPATIENT
Start: 2017-11-09 | End: 2017-11-09

## 2017-11-09 RX ORDER — MAGNESIUM SULFATE HEPTAHYDRATE 40 MG/ML
2 INJECTION, SOLUTION INTRAVENOUS ONCE
Status: COMPLETED | OUTPATIENT
Start: 2017-11-09 | End: 2017-11-10

## 2017-11-09 RX ORDER — FENTANYL CITRATE/PF 50 MCG/ML
12.5 SYRINGE (ML) INJECTION
Status: DISCONTINUED | OUTPATIENT
Start: 2017-11-09 | End: 2017-11-09 | Stop reason: HOSPADM

## 2017-11-09 RX ORDER — CALCIUM CHLORIDE 100 MG/ML
INJECTION INTRAVENOUS; INTRAVENTRICULAR AS NEEDED
Status: DISCONTINUED | OUTPATIENT
Start: 2017-11-09 | End: 2017-11-09 | Stop reason: SURG

## 2017-11-09 RX ORDER — ONDANSETRON 2 MG/ML
4 INJECTION INTRAMUSCULAR; INTRAVENOUS ONCE AS NEEDED
Status: COMPLETED | OUTPATIENT
Start: 2017-11-09 | End: 2017-11-09

## 2017-11-09 RX ORDER — LIDOCAINE HYDROCHLORIDE 10 MG/ML
INJECTION, SOLUTION INFILTRATION; PERINEURAL AS NEEDED
Status: DISCONTINUED | OUTPATIENT
Start: 2017-11-09 | End: 2017-11-09 | Stop reason: SURG

## 2017-11-09 RX ORDER — MAGNESIUM HYDROXIDE 1200 MG/15ML
LIQUID ORAL AS NEEDED
Status: DISCONTINUED | OUTPATIENT
Start: 2017-11-09 | End: 2017-11-09 | Stop reason: HOSPADM

## 2017-11-09 RX ORDER — LIDOCAINE HYDROCHLORIDE AND EPINEPHRINE 10; 10 MG/ML; UG/ML
INJECTION, SOLUTION INFILTRATION; PERINEURAL AS NEEDED
Status: DISCONTINUED | OUTPATIENT
Start: 2017-11-09 | End: 2017-11-09 | Stop reason: HOSPADM

## 2017-11-09 RX ORDER — ROCURONIUM BROMIDE 10 MG/ML
INJECTION, SOLUTION INTRAVENOUS AS NEEDED
Status: DISCONTINUED | OUTPATIENT
Start: 2017-11-09 | End: 2017-11-09 | Stop reason: SURG

## 2017-11-09 RX ORDER — PROPOFOL 10 MG/ML
INJECTION, EMULSION INTRAVENOUS AS NEEDED
Status: DISCONTINUED | OUTPATIENT
Start: 2017-11-09 | End: 2017-11-09 | Stop reason: SURG

## 2017-11-09 RX ADMIN — LIDOCAINE HYDROCHLORIDE 50 MG: 10 INJECTION, SOLUTION INFILTRATION; PERINEURAL at 14:46

## 2017-11-09 RX ADMIN — LIDOCAINE HYDROCHLORIDE 2 ML: 20 INJECTION, SOLUTION EPIDURAL; INFILTRATION; INTRACAUDAL; PERINEURAL at 23:10

## 2017-11-09 RX ADMIN — PHENYLEPHRINE HYDROCHLORIDE 20 MCG/MIN: 10 INJECTION INTRAVENOUS at 16:20

## 2017-11-09 RX ADMIN — ONDANSETRON 4 MG: 2 INJECTION INTRAMUSCULAR; INTRAVENOUS at 20:40

## 2017-11-09 RX ADMIN — CALCIUM CHLORIDE 0.5 G: 100 INJECTION PARENTERAL at 20:18

## 2017-11-09 RX ADMIN — ONDANSETRON 4 MG: 2 INJECTION INTRAMUSCULAR; INTRAVENOUS at 19:15

## 2017-11-09 RX ADMIN — FENTANYL CITRATE 25 MCG: 50 INJECTION, SOLUTION INTRAMUSCULAR; INTRAVENOUS at 19:09

## 2017-11-09 RX ADMIN — PHENYLEPHRINE HYDROCHLORIDE 10 MCG/MIN: 10 INJECTION INTRAVENOUS at 19:31

## 2017-11-09 RX ADMIN — PROPOFOL 150 MG: 10 INJECTION, EMULSION INTRAVENOUS at 14:46

## 2017-11-09 RX ADMIN — ONDANSETRON 4 MG: 2 INJECTION INTRAMUSCULAR; INTRAVENOUS at 19:10

## 2017-11-09 RX ADMIN — ACETAMINOPHEN 650 MG: 325 TABLET, FILM COATED ORAL at 23:08

## 2017-11-09 RX ADMIN — SODIUM CHLORIDE: 0.9 INJECTION, SOLUTION INTRAVENOUS at 15:14

## 2017-11-09 RX ADMIN — METOPROLOL TARTRATE 25 MG: 25 TABLET ORAL at 08:15

## 2017-11-09 RX ADMIN — LIDOCAINE HYDROCHLORIDE 100 MG: 20 INJECTION, SOLUTION INTRAVENOUS at 19:41

## 2017-11-09 RX ADMIN — FENTANYL CITRATE 25 MCG: 50 INJECTION, SOLUTION INTRAMUSCULAR; INTRAVENOUS at 20:59

## 2017-11-09 RX ADMIN — PROPOFOL 40 MCG/KG/MIN: 10 INJECTION, EMULSION INTRAVENOUS at 15:15

## 2017-11-09 RX ADMIN — NEOSTIGMINE METHYLSULFATE 4 MG: 1 INJECTION, SOLUTION INTRAMUSCULAR; INTRAVENOUS; SUBCUTANEOUS at 16:41

## 2017-11-09 RX ADMIN — CALCIUM CHLORIDE 0.5 G: 100 INJECTION PARENTERAL at 20:15

## 2017-11-09 RX ADMIN — ROCURONIUM BROMIDE 50 MG: 10 INJECTION INTRAVENOUS at 14:46

## 2017-11-09 RX ADMIN — SODIUM CHLORIDE 20 ML/HR: 0.9 INJECTION, SOLUTION INTRAVENOUS at 23:30

## 2017-11-09 RX ADMIN — PHENYLEPHRINE HYDROCHLORIDE 10 MCG/MIN: 10 INJECTION INTRAVENOUS at 18:05

## 2017-11-09 RX ADMIN — FENTANYL CITRATE 25 MCG: 50 INJECTION, SOLUTION INTRAMUSCULAR; INTRAVENOUS at 19:18

## 2017-11-09 RX ADMIN — FENTANYL CITRATE 50 MCG: 50 INJECTION, SOLUTION INTRAMUSCULAR; INTRAVENOUS at 20:15

## 2017-11-09 RX ADMIN — VANCOMYCIN HYDROCHLORIDE 1000 MG: 1 INJECTION, SOLUTION INTRAVENOUS at 13:19

## 2017-11-09 RX ADMIN — MAGNESIUM SULFATE HEPTAHYDRATE 2 G: 40 INJECTION, SOLUTION INTRAVENOUS at 23:44

## 2017-11-09 RX ADMIN — SODIUM CHLORIDE: 0.9 INJECTION, SOLUTION INTRAVENOUS at 16:15

## 2017-11-09 RX ADMIN — ALBUMIN HUMAN 12.5 G: 0.05 INJECTION, SOLUTION INTRAVENOUS at 17:43

## 2017-11-09 RX ADMIN — FENTANYL CITRATE 50 MCG: 50 INJECTION, SOLUTION INTRAMUSCULAR; INTRAVENOUS at 15:26

## 2017-11-09 RX ADMIN — SUCCINYLCHOLINE CHLORIDE 100 MG: 20 INJECTION, SOLUTION INTRAMUSCULAR; INTRAVENOUS at 19:41

## 2017-11-09 RX ADMIN — SODIUM CHLORIDE 500 ML: 0.9 INJECTION, SOLUTION INTRAVENOUS at 18:55

## 2017-11-09 RX ADMIN — FENTANYL CITRATE 25 MCG: 50 INJECTION, SOLUTION INTRAMUSCULAR; INTRAVENOUS at 20:54

## 2017-11-09 RX ADMIN — SODIUM CHLORIDE: 0.9 INJECTION, SOLUTION INTRAVENOUS at 19:15

## 2017-11-09 RX ADMIN — FENTANYL CITRATE 50 MCG: 50 INJECTION, SOLUTION INTRAMUSCULAR; INTRAVENOUS at 15:21

## 2017-11-09 RX ADMIN — SODIUM CHLORIDE: 0.9 INJECTION, SOLUTION INTRAVENOUS at 20:25

## 2017-11-09 RX ADMIN — ETOMIDATE 16 MG: 2 INJECTION INTRAVENOUS at 19:41

## 2017-11-09 RX ADMIN — ACETAMINOPHEN 650 MG: 325 TABLET, FILM COATED ORAL at 05:21

## 2017-11-09 RX ADMIN — GLYCOPYRROLATE 0.4 MG: 0.2 INJECTION, SOLUTION INTRAMUSCULAR; INTRAVENOUS at 16:41

## 2017-11-09 RX ADMIN — ALBUMIN HUMAN: 0.05 INJECTION, SOLUTION INTRAVENOUS at 16:00

## 2017-11-09 RX ADMIN — Medication 500 MG: at 08:15

## 2017-11-09 RX ADMIN — ALBUMIN HUMAN: 0.05 INJECTION, SOLUTION INTRAVENOUS at 16:10

## 2017-11-09 RX ADMIN — SODIUM CHLORIDE: 0.9 INJECTION, SOLUTION INTRAVENOUS at 19:31

## 2017-11-09 RX ADMIN — SODIUM CHLORIDE: 0.9 INJECTION, SOLUTION INTRAVENOUS at 14:20

## 2017-11-09 NOTE — ANESTHESIA PREPROCEDURE EVALUATION
76 y o  female with a history of sick sinus syndrome, status post pacemaker implantation on October 30th, presents with neck pain and left shoulder pain c/w pacer lead perforation  Review of Systems/Medical History  Patient summary reviewed  Chart reviewed  History of anesthetic complications     Cardiovascular  EKG reviewed, Pacemaker/AICD, Hyperlipidemia, Dysrhythmias, ,   Comment: LEFT VENTRICLE: Size was normal  Systolic function was at the lower limits of normal by visual assessment  Ejection fraction was estimated to be 50 %  This study was inadequate for the evaluation of regional wall motion      RIGHT VENTRICLE: The size was normal  Systolic function was normal      LEFT ATRIUM: Size was normal      RIGHT ATRIUM: Size was normal      MITRAL VALVE: Valve structure was normal  DOPPLER: There was no significant regurgitation      AORTIC VALVE: The valve was trileaflet  Leaflets exhibited good mobility      TRICUSPID VALVE: The valve structure was normal      PULMONIC VALVE: Not well visualized      PERICARDIUM: A trace pericardial effusion was identified anterior to the heart  A pericardial fat pad was present      AORTA: Not well visualized          Sinus rhythm with 1st degree A-V block  Right axis deviation  Left bundle branch block  Confirmed by FRANCESCA BAUER, ROMARIO ([id number]) on 11/6/2017 1:27:23 PM    Specimen Collected: 11/06/17 10:02  Last Resulted: 11/06/17 13:27              Lead perforation  Through apex,  Pulmonary  Shortness of breath, ,        GI/Hepatic    GERD , Liver disease, ,  Hiatal hernia,             Endo/Other  Arthritis     GYN       Hematology   Musculoskeletal       Neurology    Neuromuscular disease ,    Psychology   Anxiety, Depression ,            Physical Exam    Airway    Mallampati score: II  TM Distance: >3 FB  Neck ROM: full     Dental   No notable dental hx     Cardiovascular  Cardiovascular exam normal    Pulmonary  Pulmonary exam normal Breath sounds clear to auscultation, Other Findings        Anesthesia Plan  ASA Score- 4 Emergent      Anesthesia Type- general with ASA Monitors  Additional Monitors: arterial line  Airway Plan: ETT  Induction- intravenous  Informed Consent- Anesthetic plan and risks discussed with patient  I personally reviewed this patient with the CRNA  Discussed and agreed on the Anesthesia Plan with the CRNA       Lab Results   Component Value Date    WBC 6 70 11/09/2017    HGB 11 5 11/09/2017    HCT 36 7 11/09/2017    MCV 92 11/09/2017     11/09/2017     Lab Results   Component Value Date    GLUCOSE 130 11/09/2017    CALCIUM 9 4 11/09/2017     11/09/2017    K 4 4 11/09/2017    CO2 29 11/09/2017     11/09/2017    BUN 15 11/09/2017    CREATININE 0 68 11/09/2017     Lab Results   Component Value Date    INR 1 07 11/09/2017    INR 1 14 07/17/2017    INR 0 99 07/03/2017    PROTIME 13 9 11/09/2017    PROTIME 14 6 (H) 07/17/2017    PROTIME 13 1 07/03/2017     Lab Results   Component Value Date    PTT 27 11/09/2017     Type and Screen:  O        I, Dr J Luis Gerber, the attending physician, have personally seen and evaluated the patient prior to anesthetic care  I have reviewed the pre-anesthetic record, and other medical records if appropriate to the anesthetic care  If a CRNA is involved in the case, I have reviewed the CRNA assessment, if present, and agree  The patient is in a suitable condition to proceed with my formulated anesthetic plan

## 2017-11-09 NOTE — PROCEDURES
Date of Procedure: 11/9/2017    Time of Procedure: 6:30 PM    Indication for procedure: Hypotension    Procedure performed: Central Line placement    Performing Physician: Dr Bong Cornelius    Type of Anesthesia: 10cc of 1% Lidocaine- Topical    Consent Status: Emergent procedure- Two physician consent with Dr Guanako Dumont and Anesthesiologist/Verbal consent from patient    Procedure and Technique: Time out was performed  Patient was prepped and draped in a sterile fashion  Right IJ was visualized using ultrasound  10cc of lidocaine was injected locally  A 16cm central line catheter was inserted under ultrasound guidance using modified Seldinger technique  Good blood draw was noted through all the three ports and they were flushed  A sterile dressing was applied after the central line was sutured to the skin  Patient tolerated the procedure well  Post procedure stat chest xray was ordered      Procedure complications: None    Blood loss: Minimal

## 2017-11-09 NOTE — PERIOPERATIVE NURSING NOTE
Bedside ECHO done  Dr Tawnya Sicard and Dr Warren Lopes at bedside  Albumin infusing and istat done at bedside

## 2017-11-09 NOTE — PERIOPERATIVE NURSING NOTE
Timeout done at bedside with Unknown Martinez tech), Moise Hartman RN, Esperanza Julian RN and Dr Arely Gleason MD  "Correct pt, site, procedure, position and laterality verified "   Pt prepped by Dr Arely Gleason MD for RIJ TLC

## 2017-11-09 NOTE — PERIOPERATIVE NURSING NOTE
Shortly after arrival to PACU, SBP 80's  Dr Shaq Burleson at bedside with Dr Nat Hamilton   250cc albumin given as ordered and also plan to do bedside HIREN

## 2017-11-09 NOTE — CASE MANAGEMENT
Initial Clinical Review    Admission: Date/Time/Statement: 11/8/17 @ 1550     Orders Placed This Encounter   Procedures    Inpatient Admission     Standing Status:   Standing     Number of Occurrences:   1     Order Specific Question:   Admitting Physician     Answer:   Marie Oleary [[id number]]     Order Specific Question:   Level of Care     Answer:   Med Surg [[id number]]     Order Specific Question:   Estimated length of stay     Answer:   More than 2 Midnights     Order Specific Question:   Certification     Answer:   I certify that inpatient services are medically necessary for this patient for a duration of greater than two midnights  See H&P and MD Progress Notes for additional information about the patient's course of treatment  History of Illness:       Karli Cordova is a 76 y o  female  With past medical history of  Hyperlipidemia,right breast  infiltrative ductal carcinoma s/p mastectomy in 2009  and chemotherapy( taxotac, Cytoxan and arimidex) in the past,  then had hepatic metastasis underwent resection in 09/2017,  SSS underwent Medtronic dual-chamber pacemaker on 10/31/2017, now  sent as a direct admit  for pacemaker revision  Patient has been experiencing pain in the left side of her chest, shoulder and her left neck that started on Sunday, remote device interrogation during that time revealed normal pacemaker function she was sent to the ED on 11/06/2017 for the pain at which time chest x-ray revealed atrial and ventricular leads in position, no pneumothorax, CTA was negative for PE  , hence was discharged from the ED  Today she went to the clinic for device interrogation and f/u limited echocardiogram at which time interrogation revealed loss of V- capture, hence sent for pacemaker  lead revision tomorrow  She still has some positional and pleuritic pain in her left inframammary region, notes improvement in her neck and shoulder pain now  No chest pain  Has some shortness of breath     ( She had a loop recorder placed on 10/04/2017 for recurrent episodes of syncope,  and then remote transmission  revealed pauses up to 5 seconds which corresponded with the timing of her symptoms, it was then decided to implant a pacemaker  )  Outpatient Cardiologist- Dr Camp Staff and Dr Shan Crow    Vital Signs:   Temperature Pulse Respirations Blood Pressure SpO2   11/08/17 1600 11/08/17 1600 11/08/17 1600 11/08/17 1600 11/08/17 1600   98 °F (36 7 °C) 80 18 146/68 98 %      Temp Source Heart Rate Source Patient Position - Orthostatic VS BP Location FiO2 (%)   11/08/17 1600 11/09/17 0756 11/08/17 1600 11/08/17 1600 --   Oral Monitor Sitting Right arm       Pain Score       11/08/17 1845       No Pain        Wt Readings from Last 1 Encounters:   11/08/17 68 kg (150 lb)       Vital Signs (abnormal):     11/09/17 0756   97 2 °F (36 2 °C)  86  18  158/71  97 %  None (Room air)  Lying           Abnormal Labs/Diagnostic Test Results:      Past Medical/Surgical History:    Active Ambulatory Problems     Diagnosis Date Noted    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 ([facility] [address] ) 01/05/2017    Hypercholesteremia     Gastroesophageal reflux disease without esophagitis 03/22/2010    History of total knee replacement 05/27/2015    Hearing loss 07/16/2014    Left bundle branch block (LBBB) 04/13/2015    Symptomatic bradycardia 11/01/2017     Resolved Ambulatory Problems     Diagnosis Date Noted    Subcapsular hematoma of liver 01/05/2017    SOB (shortness of breath) on exertion 01/05/2017    Metastatic adenocarcinoma to liver (HCC)     Breast CA (HCC)      Past Medical History:   Diagnosis Date    Anxiety     Arthritis     Breast CA ([facility] [address] )     Depression     Diverticula of intestine     Dizziness     Flushing     Hiatal hernia     [place] (hard of hearing)     Hypercholesteremia     Liver mass     Liver metastasis (HCC)     Metastatic adenocarcinoma to liver (HCC)     PONV (postoperative nausea and vomiting)  Recurrent breast cancer ([facility] [address] )     Shingles     Shortness of breath     Tachycardia     Urinary incontinence     Use of cane as ambulatory aid        Admitting Diagnosis: Pacemaker malfunction, initial encounter [T82 111A]    Age/Sex: 76 y o  female    Assessment/Plan:     Assessment     · SSS with pauses upto 5 seconds, with episodes of syncope - s/p Medtronic  DDD on 10/31/17  · Hyperlipidemia  · Metastatic infiltratve ductal carcinoma of right breast s/p mastectomy in 2009, chemo with toxotac, Cytoxan, arimidase in the past, now on tamoxifen   · Echo Low normal LV systolic function, with EF 50 %, no significant WMA  NST in 06/17- Breast attenuation with paradoxical perfusion defect  · LBBB  · Right pleural effusion on CT PE- Metastatic?        Plan      Device interrogation in clinic- Loss of  ventricular capture, normal atrial capture  Would keep NPO past midnight for Pacemaker lead revision tomorrow  Will need need HIREN during the procedure to monitor for effusion  Telemetry monitoring  Would obtain routine labs in the morning  Limited Echo done today- Thick epicardial fat pad, probable trace pericardial effusion, official read pending  Would repeat portable chest xray today to look for lead position  Tylenol for pain  Continue home meds- aspirin, atorvastatin 20, metoprolol 25 bid  DVT ppx- Heparin sub q       Admission Orders:    Complication associated with cardiac pacemaker lead [T82  9XXA]  CARDIAC CATH  11-9   CARDIAC LEAD REVISION   HIREN   NPO  SEQUENTIAL COMPRESSION DEVICE   TELEMETRY         Scheduled Meds:   atorvastatin 20 mg Oral Daily   calcium carbonate 500 mg Oral BID With Meals   metoprolol tartrate 25 mg Oral Q12H [address]   tamoxifen 20 mg Oral Daily     Continuous Infusions:    PRN Meds:   acetaminophen    ondansetron

## 2017-11-09 NOTE — PERIOPERATIVE NURSING NOTE
Pt to be transferred to P4 critical care unit after line placed  Phenylephrine infusing to keep SBP>90

## 2017-11-09 NOTE — ANESTHESIA POSTPROCEDURE EVALUATION
Post-Op Assessment Note      CV Status:  Stable    Mental Status:  Alert and awake    Hydration Status:  Euvolemic    PONV Controlled:  Controlled    Airway Patency:  Patent    Post Op Vitals Reviewed: Yes          Staff: CRNA           /59 (11/09/17 1715)    Temp 98 4 °F (36 9 °C) (11/09/17 1714)    Pulse 92 (11/09/17 1714)   Resp 17 (11/09/17 1715)    SpO2 100 % (11/09/17 1715)

## 2017-11-09 NOTE — ANESTHESIA PROCEDURE NOTES
Arterial Line Insertion  Date/Time: 11/9/2017 3:10 PM  Performed by: Michael Teran by: PABLO Dudley   Consent: Verbal consent obtained  Risks and benefits: risks, benefits and alternatives were discussed  Patient identity confirmed: verbally with patient and arm band  Indications: hemodynamic monitoring  Orientation:  RightLocation: brachial artery    Sedation:  Patient sedated: under GA    Needle gauge: 20  Number of attempts: 1  Post-procedure: dressing applied  Patient tolerance: Patient tolerated the procedure well with no immediate complications  Comments: Placed under [address] guidance

## 2017-11-09 NOTE — SOCIAL WORK
Completed By Heather Garcia, ADONAY    Met with pt at bedside  Pt resides with her hsb Andreia Mishra at home in a 2 story home with 5 ELGIN  Pt states there is a powder room downstairs  Pt states her hsb is indep at home  Pt states they do not use cell phones and the home phone # is [phone]  Pt states her PCP is Dr Shan Loera  Pt states she uses a cane, Rw, and shower chair at home  Pt had home care in the past and used [facility]  Pt [facility] [address] [facility] [place] [facility]  Pt states she is able to manage her own meds  Pt denies any hx of mental health or substance abuse  Pt states her hsb will transport her home  CM reviewed d/c planning process including the following: identifying help at home, patient preference for d/c planning needs, Discharge Lounge, [facility] Meds to Bed program, availability of treatment team to discuss questions or concerns patient and/or family may have regarding understanding medications and recognizing signs and symptoms once discharged  CM also encouraged patient to follow up with all recommended appointments after discharge  Patient advised of importance for patient and family to participate in managing patients medical well being      Reviewed and agree with above: Baldomero Miranda

## 2017-11-10 ENCOUNTER — GENERIC CONVERSION - ENCOUNTER (OUTPATIENT)
Dept: OTHER | Facility: OTHER | Age: 77
End: 2017-11-10

## 2017-11-10 ENCOUNTER — APPOINTMENT (INPATIENT)
Dept: RADIOLOGY | Facility: HOSPITAL | Age: 77
DRG: 260 | End: 2017-11-10
Payer: COMMERCIAL

## 2017-11-10 ENCOUNTER — APPOINTMENT (INPATIENT)
Dept: NON INVASIVE DIAGNOSTICS | Facility: HOSPITAL | Age: 77
DRG: 260 | End: 2017-11-10
Payer: COMMERCIAL

## 2017-11-10 ENCOUNTER — APPOINTMENT (INPATIENT)
Dept: RADIOLOGY | Facility: HOSPITAL | Age: 77
DRG: 260 | End: 2017-11-10
Attending: INTERNAL MEDICINE
Payer: COMMERCIAL

## 2017-11-10 PROBLEM — S20.219A CHEST WALL HEMATOMA: Status: ACTIVE | Noted: 2017-11-10

## 2017-11-10 LAB
ABO GROUP BLD BPU: NORMAL
ABO GROUP BLD BPU: NORMAL
ANION GAP SERPL CALCULATED.3IONS-SCNC: 9 MMOL/L (ref 4–13)
ATRIAL RATE: 100 BPM
BPU ID: NORMAL
BPU ID: NORMAL
BUN SERPL-MCNC: 10 MG/DL (ref 5–25)
CALCIUM SERPL-MCNC: 8.1 MG/DL (ref 8.3–10.1)
CHLORIDE SERPL-SCNC: 109 MMOL/L (ref 100–108)
CO2 SERPL-SCNC: 23 MMOL/L (ref 21–32)
CREAT SERPL-MCNC: 0.46 MG/DL (ref 0.6–1.3)
ERYTHROCYTE [DISTWIDTH] IN BLOOD BY AUTOMATED COUNT: 14.8 % (ref 11.6–15.1)
ERYTHROCYTE [DISTWIDTH] IN BLOOD BY AUTOMATED COUNT: 15 % (ref 11.6–15.1)
ERYTHROCYTE [DISTWIDTH] IN BLOOD BY AUTOMATED COUNT: 15.1 % (ref 11.6–15.1)
ERYTHROCYTE [DISTWIDTH] IN BLOOD BY AUTOMATED COUNT: 15.1 % (ref 11.6–15.1)
GFR SERPL CREATININE-BSD FRML MDRD: 95 ML/MIN/1.73SQ M
GLUCOSE SERPL-MCNC: 167 MG/DL (ref 65–140)
HCT VFR BLD AUTO: 32.8 % (ref 34.8–46.1)
HCT VFR BLD AUTO: 33.8 % (ref 34.8–46.1)
HCT VFR BLD AUTO: 34.9 % (ref 34.8–46.1)
HCT VFR BLD AUTO: 35.1 % (ref 34.8–46.1)
HGB BLD-MCNC: 10.9 G/DL (ref 11.5–15.4)
HGB BLD-MCNC: 10.9 G/DL (ref 11.5–15.4)
HGB BLD-MCNC: 11.5 G/DL (ref 11.5–15.4)
HGB BLD-MCNC: 11.6 G/DL (ref 11.5–15.4)
MCH RBC QN AUTO: 28.7 PG (ref 26.8–34.3)
MCH RBC QN AUTO: 29.2 PG (ref 26.8–34.3)
MCH RBC QN AUTO: 29.4 PG (ref 26.8–34.3)
MCH RBC QN AUTO: 29.6 PG (ref 26.8–34.3)
MCHC RBC AUTO-ENTMCNC: 32.2 G/DL (ref 31.4–37.4)
MCHC RBC AUTO-ENTMCNC: 32.8 G/DL (ref 31.4–37.4)
MCHC RBC AUTO-ENTMCNC: 33.2 G/DL (ref 31.4–37.4)
MCHC RBC AUTO-ENTMCNC: 33.2 G/DL (ref 31.4–37.4)
MCV RBC AUTO: 88 FL (ref 82–98)
MCV RBC AUTO: 89 FL (ref 82–98)
P AXIS: 50 DEGREES
PLATELET # BLD AUTO: 136 THOUSANDS/UL (ref 149–390)
PLATELET # BLD AUTO: 147 THOUSANDS/UL (ref 149–390)
PLATELET # BLD AUTO: 153 THOUSANDS/UL (ref 149–390)
PLATELET # BLD AUTO: 156 THOUSANDS/UL (ref 149–390)
PMV BLD AUTO: 9.1 FL (ref 8.9–12.7)
PMV BLD AUTO: 9.1 FL (ref 8.9–12.7)
PMV BLD AUTO: 9.2 FL (ref 8.9–12.7)
PMV BLD AUTO: 9.3 FL (ref 8.9–12.7)
POTASSIUM SERPL-SCNC: 4.1 MMOL/L (ref 3.5–5.3)
PR INTERVAL: 213 MS
QRS AXIS: 48 DEGREES
QRSD INTERVAL: 121 MS
QT INTERVAL: 342 MS
QTC INTERVAL: 441 MS
RBC # BLD AUTO: 3.68 MILLION/UL (ref 3.81–5.12)
RBC # BLD AUTO: 3.8 MILLION/UL (ref 3.81–5.12)
RBC # BLD AUTO: 3.94 MILLION/UL (ref 3.81–5.12)
RBC # BLD AUTO: 3.95 MILLION/UL (ref 3.81–5.12)
SODIUM SERPL-SCNC: 141 MMOL/L (ref 136–145)
T WAVE AXIS: 241 DEGREES
UNIT DISPENSE STATUS: NORMAL
UNIT DISPENSE STATUS: NORMAL
UNIT PRODUCT CODE: NORMAL
UNIT PRODUCT CODE: NORMAL
UNIT RH: NORMAL
UNIT RH: NORMAL
VANCOMYCIN TROUGH SERPL-MCNC: 10.1 UG/ML (ref 10–20)
VENTRICULAR RATE: 100 BPM
WBC # BLD AUTO: 12.01 THOUSAND/UL (ref 4.31–10.16)
WBC # BLD AUTO: 12.73 THOUSAND/UL (ref 4.31–10.16)
WBC # BLD AUTO: 9.47 THOUSAND/UL (ref 4.31–10.16)
WBC # BLD AUTO: 9.71 THOUSAND/UL (ref 4.31–10.16)

## 2017-11-10 PROCEDURE — 70498 CT ANGIOGRAPHY NECK: CPT

## 2017-11-10 PROCEDURE — 80202 ASSAY OF VANCOMYCIN: CPT | Performed by: STUDENT IN AN ORGANIZED HEALTH CARE EDUCATION/TRAINING PROGRAM

## 2017-11-10 PROCEDURE — 85027 COMPLETE CBC AUTOMATED: CPT | Performed by: STUDENT IN AN ORGANIZED HEALTH CARE EDUCATION/TRAINING PROGRAM

## 2017-11-10 PROCEDURE — 71275 CT ANGIOGRAPHY CHEST: CPT

## 2017-11-10 PROCEDURE — 93308 TTE F-UP OR LMTD: CPT

## 2017-11-10 PROCEDURE — 80048 BASIC METABOLIC PNL TOTAL CA: CPT | Performed by: PHYSICIAN ASSISTANT

## 2017-11-10 PROCEDURE — 71020 HB CHEST X-RAY 2VW FRONTAL&LATL: CPT

## 2017-11-10 RX ORDER — CIPROFLOXACIN 2 MG/ML
400 INJECTION, SOLUTION INTRAVENOUS EVERY 12 HOURS
Status: DISCONTINUED | OUTPATIENT
Start: 2017-11-10 | End: 2017-11-15 | Stop reason: HOSPADM

## 2017-11-10 RX ORDER — SODIUM CHLORIDE 450 MG/100ML
75 INJECTION, SOLUTION INTRAVENOUS CONTINUOUS
Status: DISCONTINUED | OUTPATIENT
Start: 2017-11-10 | End: 2017-11-11

## 2017-11-10 RX ADMIN — IOHEXOL 100 ML: 350 INJECTION, SOLUTION INTRAVENOUS at 12:31

## 2017-11-10 RX ADMIN — SODIUM CHLORIDE 75 ML/HR: 0.45 INJECTION, SOLUTION INTRAVENOUS at 07:54

## 2017-11-10 RX ADMIN — OXYCODONE HYDROCHLORIDE 5 MG: 5 TABLET ORAL at 07:58

## 2017-11-10 RX ADMIN — OXYCODONE HYDROCHLORIDE 5 MG: 5 TABLET ORAL at 21:28

## 2017-11-10 RX ADMIN — TAMOXIFEN CITRATE 20 MG: 10 TABLET, FILM COATED ORAL at 21:05

## 2017-11-10 RX ADMIN — METOPROLOL TARTRATE 12.5 MG: 25 TABLET ORAL at 21:06

## 2017-11-10 RX ADMIN — VANCOMYCIN HYDROCHLORIDE 750 MG: 750 INJECTION, SOLUTION INTRAVENOUS at 00:56

## 2017-11-10 RX ADMIN — CIPROFLOXACIN 400 MG: 2 INJECTION, SOLUTION INTRAVENOUS at 12:12

## 2017-11-10 RX ADMIN — ACETAMINOPHEN 650 MG: 325 TABLET, FILM COATED ORAL at 06:43

## 2017-11-10 RX ADMIN — CIPROFLOXACIN 400 MG: 2 INJECTION, SOLUTION INTRAVENOUS at 21:30

## 2017-11-10 RX ADMIN — VANCOMYCIN HYDROCHLORIDE 750 MG: 750 INJECTION, SOLUTION INTRAVENOUS at 13:35

## 2017-11-10 RX ADMIN — OXYCODONE HYDROCHLORIDE 5 MG: 5 TABLET ORAL at 12:11

## 2017-11-10 NOTE — PERIOPERATIVE NURSING NOTE
Noted left chest with increased swelling and ecchymosis  Dr Hernandez  at bedside  Plan to return to the OR  CXR done at bedside

## 2017-11-10 NOTE — PROGRESS NOTES
CVP monitoring set up per verbal order from Cards fellow, Dr Randy Rabago  CVP 7-8  Will continue to monitor

## 2017-11-10 NOTE — CASE MANAGEMENT
Continued Stay Review    Date/POD#:  11/10/2017 POD 1 s/p  Lead revision, DEBRIDEMENT WOUND AND DRESSING CHANGE ([address]) (Left)     Vital Signs: /64   Pulse (!) 106   Temp 98 6 °F (37 °C) (Oral)   Resp 22   Ht 5' 1" (1 549 m)   Wt 77 4 kg (170 lb 10 2 oz)   LMP  (LMP Unknown) Comment: post   SpO2 93%   BMI 32 24 kg/m²     Medication:   Scheduled Meds:   ciprofloxacin 400 mg Intravenous Q12H   sodium chloride 500 mL Intravenous Once   tamoxifen 20 mg Oral Daily   vancomycin 10 mg/kg Intravenous Q12H     Continuous Infusions:   lactated ringers 50 mL/hr    phenylephrine (ALDO-SYNEPHRINE) 50 mg (STANDARD CONCENTRATION) in sodium chloride 0 9% 250 mL  mcg/min Last Rate: Stopped (11/09/17 1955)   sodium chloride 20 mL/hr Last Rate: Stopped (11/10/17 0600)   sodium chloride 75 mL/hr Last Rate: 75 mL/hr (11/10/17 0754)     PRN Meds:   acetaminophen    ondansetron    oxyCODONE    Abnormal Labs/Diagnostic Results:   WBC 12 01 (H) 4 31 - 10 16 Thousand/uL       Age/Sex: 76 y o  female      CVP 7-8  Right radial a-line    Assessment/Plan: TBD  Assessment:  1   Recurrent syncope and symptomatic bradycardia with 5 second pause noted on loop recorder, status post loop recorder explant and Medtronic dual-chamber pacemaker implantation 10/31/2017              A ) atrial lead dislodged immediately post op but was not intervened on due to no immediate need for atrial pacing              B ) RV lead perforation - patient with post op/outpatient chest pain and non-capturing RV lead s/p lead extraction with reimplantation 11/9/2017              C ) left chest wall hematoma s/p pocket debridement with no pocket hematoma noted; CT confirmed subpectoral hematoma  2   Low-normal LV EF per echo 07/2017  3   Metastatic breast cancer s/p right mastectomy  4   Hyperlipidemia     Plan:  1  Awaiting results of chest CT - will likely continue to monitor hematoma as long as there is no evidence of active bleeding    2  Control pain  3  Echo showed no evidence of pericardial effusion  4  She should stay until at least Monday to continue to monitor pain, hemoglobin, blood pressure  5  Blood pressures have been stable  She has baseline sinus tachycardia, will restart low dose lopressor 12 5 mg twice daily   Up titrate as BP allows       Discharge Plan: [facility]

## 2017-11-10 NOTE — PROGRESS NOTES
Patient going to OR emergently from PACU for hematoma collection of left axilla as per Dr Keith Teran line placed earlier in PACU by EP fellow, phenylephrine gtt running

## 2017-11-10 NOTE — PROGRESS NOTES
Progress Note - Electrophysiology-Cardiology (EP)   Pete Hays 76 y o  female MRN: [id number]  Unit/Bed#: [facility] 418-01 Encounter: [id number]      Assessment:  1   Recurrent syncope and symptomatic bradycardia with 5 second pause noted on loop recorder, status post loop recorder explant and [employer] dual-chamber pacemaker implantation 10/31/2017   A ) atrial lead dislodged immediately post op but was not intervened on due to no immediate need for atrial pacing   B ) RV lead perforation - patient with post op/outpatient chest pain and non-capturing RV lead s/p lead extraction with reimplantation 11/9/2017   C ) left chest wall hematoma s/p pocket debridement with no pocket hematoma noted; CT confirmed subpectoral hematoma  2   Low-normal LV EF per echo 07/2017  3   Metastatic breast cancer s/p right mastectomy  4   Hyperlipidemia    Plan:  1  Awaiting results of chest CT - will likely continue to monitor hematoma as long as there is no evidence of active bleeding  2  Control pain  3  Echo showed no evidence of pericardial effusion  4  She should stay until at least Monday to continue to monitor pain, hemoglobin, blood pressure  5  Blood pressures have been stable  She has baseline sinus tachycardia, will restart low dose lopressor 12 5 mg twice daily  Up titrate as BP allows  Subjective/Objective   Chief Complaint: ongoing chest pain    Subjective: Patient reports ongoing chest pain, now also having pain in back, left axilla, and left hand  She has ongoing dizziness which she has at baseline  She denies palpitations or shortness of breath       Objective:     Vitals: /64   Pulse (!) 114   Temp 98 6 °F (37 °C) (Oral)   Resp (!) 30   Ht 5' 1" (1 549 m)   Wt 77 4 kg (170 lb 10 2 oz)   LMP  (LMP Unknown) Comment: post   SpO2 95%   BMI 32 24 kg/m²   Vitals:    11/08/17 1709 11/10/17 0600   Weight: 68 kg (150 lb) 77 4 kg (170 lb 10 2 oz)     Orthostatic Blood Pressures    Flowsheet Row Most Recent Value   Blood Pressure  126/64 filed at 11/09/2017 2130   Patient Position - Orthostatic VS  Lying filed at 11/09/2017 1100            Intake/Output Summary (Last 24 hours) at 11/10/17 1504  Last data filed at 11/10/17 1400   Gross per 24 hour   Intake           5987 5 ml   Output             1345 ml   Net           4642 5 ml       Invasive Devices     Central Venous Catheter Line            CVC Central Lines 11/09/17 Triple Right Internal jugular less than 1 day          Peripheral Intravenous Line            Peripheral IV 11/09/17 Left Hand 1 day    Peripheral IV 11/09/17 Left Wrist 1 day          Arterial Line            Arterial Line 11/09/17 Right Radial less than 1 day                            Scheduled Meds:  ciprofloxacin 400 mg Intravenous Q12H   sodium chloride 500 mL Intravenous Once   tamoxifen 20 mg Oral Daily   vancomycin 10 mg/kg Intravenous Q12H     Continuous Infusions:  lactated ringers 50 mL/hr    phenylephrine (ALDO-SYNEPHRINE) 50 mg (STANDARD CONCENTRATION) in sodium chloride 0 9% 250 mL  mcg/min Last Rate: Stopped (11/09/17 1955)   sodium chloride 20 mL/hr Last Rate: Stopped (11/10/17 0600)   sodium chloride 75 mL/hr Last Rate: 75 mL/hr (11/10/17 0754)     PRN Meds:   acetaminophen    ondansetron    oxyCODONE        Physical Exam:   GEN: NAD, alert and oriented, well appearing  SKIN: dry without significant lesions or rashes  HEENT: NCAT, PERRL, EOMs intact  NECK: No JVD appreciated  CARDIOVASCULAR: RRR, normal S1, S2 without murmurs, rubs, or gallops appreciated  LUNGS: Clear to auscultation bilaterally anteriorly without wheezes, rhonchi, or rales  ABDOMEN: Soft, nontender, nondistended  EXTREMITIES/VASCULAR: perfused without clubbing, cyanosis, or edema b/l  PSYCH: Normal mood and affect  NEURO: CN ll-Xll grossly intact                Lab Results: I have personally reviewed pertinent lab results        Results from last 7 days  Lab Units 11/10/17  0906 11/10/17  0520 17  2238   WBC Thousand/uL 12 01* 12 73* 13 37*   HEMOGLOBIN g/dL 11 5 11 6 12 0   HEMATOCRIT % 35 1 34 9 35 7   PLATELETS Thousands/uL 153 156 148*       Results from last 7 days  Lab Units 11/10/17  0417 17  0614   SODIUM mmol/L 141 142  --  141   POTASSIUM mmol/L 4 1 4 1  --  4 4   CHLORIDE mmol/L 109* 111*  --  108   CO2 mmol/L 23 22  --  29   BUN mg/dL 10 10  --  15   CREATININE mg/dL 0 46* 0 48*  --  0 68   GLUCOSE RANDOM mg/dL 167* 173*  --  130   GLUCOSE, ISTAT   --   --   < >  --    CALCIUM mg/dL 8 1* 8 4  --  9 4   < > = values in this interval not displayed  Results from last 7 days  Lab Units 17  0126   INR  1 07   PTT seconds 27       Results from last 7 days  Lab Units 17  0614   MAGNESIUM mg/dL 1 6 2 1         Imaging: I have personally reviewed pertinent reports  Results for orders placed during the hospital encounter of 17   Echo complete with contrast if indicated    Narrative [facility] [address]  [facility] [place], [address] [facility]  [phone]    Transthoracic Echocardiogram  2D, M-mode, Doppler, and Color Doppler    Study date:  2017    Patient: Valeria Dance  MR number: [id number]  Account number: [de-identified]  : 1940  Age: 76 years  Gender: Female  Status: Outpatient  Location: [address] [place] [address] [facility]  Height: 61 in  Weight: 161 7 lb  BP: 128/ 64 mmHg    Indications: Assess left ventricular function  Diagnoses: E78 5 - Hyperlipidemia, unspecified    Sonographer:  ELIANE Spear  Primary Physician:  Eva Steele MD  Referring Physician:  Huyen Richardson DO  Group:  [facility]  Cardiology Fellow:  Krysten Roe MD  Interpreting Physician:  Radha Montes MD    SUMMARY    LEFT VENTRICLE:  Systolic function was at the lower limits of normal  Ejection fraction was estimated to be 50 %    Although no diagnostic regional wall motion abnormality was identified, this possibility cannot be completely excluded on the basis of this study  Left ventricular diastolic function parameters were normal     VENTRICULAR SEPTUM:  There was dyssynergic motion  These changes are consistent with LBBB  RIGHT VENTRICLE:  The size was normal   Systolic function was normal     TRICUSPID VALVE:  There was mild regurgitation  Pulmonary artery systolic pressure was within the normal range  HISTORY: PRIOR HISTORY: Breast cancer; LBBB; Hyperlipidemia    PROCEDURE: The study was performed in the [address]  This was a routine study  The transthoracic approach was used  The study included complete 2D imaging, M-mode, complete spectral Doppler, and color Doppler  The  heart rate was 90 bpm, at the start of the study  Images were obtained from the parasternal, apical, subcostal, and suprasternal notch acoustic windows  Echocardiographic views were limited due to lung interference  Image quality was  adequate  LEFT VENTRICLE: Size was normal  Systolic function was at the lower limits of normal  Ejection fraction was estimated to be 50 %  Although no diagnostic regional wall motion abnormality was identified, this possibility cannot be completely  excluded on the basis of this study  Wall thickness was normal  DOPPLER: Left ventricular diastolic function parameters were normal     VENTRICULAR SEPTUM: There was dyssynergic motion  These changes are consistent with LBBB  RIGHT VENTRICLE: The size was normal  Systolic function was normal  Wall thickness was normal     LEFT ATRIUM: Size was normal     RIGHT ATRIUM: Size was normal     MITRAL VALVE: Valve structure was normal  There was normal leaflet separation  DOPPLER: The transmitral velocity was within the normal range  There was no evidence for stenosis  There was trace regurgitation  AORTIC VALVE: The valve was trileaflet  Leaflets exhibited normal thickness and normal cuspal separation   DOPPLER: Transaortic velocity was within the normal range  There was no evidence for stenosis  There was no regurgitation  TRICUSPID VALVE: The valve structure was normal  There was normal leaflet separation  DOPPLER: The transtricuspid velocity was within the normal range  There was no evidence for stenosis  There was mild regurgitation  Pulmonary artery  systolic pressure was within the normal range  Estimated peak PA pressure was 22 mmHg  PULMONIC VALVE: Leaflets exhibited normal thickness, no calcification, and normal cuspal separation  DOPPLER: The transpulmonic velocity was within the normal range  There was trace regurgitation  PERICARDIUM: There was no pericardial effusion  AORTA: The root exhibited normal size  SYSTEMIC VEINS: IVC: The inferior vena cava was normal in size and course   Respirophasic changes were normal     SYSTEM MEASUREMENT TABLES    2D  %FS: 20 78 %  Ao Diam: 3 49 cm  EDV(Teich): 45 21 ml  EF Biplane: 52 52 %  EF(Cube): 50 28 %  EF(Teich): 43 45 %  ESV(Cube): 18 41 ml  ESV(Teich): 25 57 ml  IVSd: 1 04 cm  LA Diam: 2 52 cm  LVEDV MOD A2C: 104 64 ml  LVEDV MOD A4C: 107 05 ml  LVEDV MOD BP: 108 41 ml  LVEF MOD A2C: 58 35 %  LVEF MOD A4C: 46 17 %  LVESV MOD A2C: 43 58 ml  LVESV MOD A4C: 57 62 ml  LVESV MOD BP: 51 48 ml  LVIDd: 3 33 cm  LVIDs: 2 64 cm  LVLd A2C: 7 99 cm  LVLd A4C: 7 59 cm  LVLs A2C: 6 22 cm  LVLs A4C: 6 59 cm  LVPWd: 1 13 cm  SI(Cube): 10 76 ml/m2  SI(Teich): 11 35 ml/m2  SV MOD A2C: 61 05 ml  SV MOD A4C: 49 42 ml  SV(Cube): 18 61 ml  SV(Teich): 19 64 ml    CW  AV Vmax: 1 07 m/s  AV maxP 62 mmHg  TR Vmax: 2 08 m/s  TR maxP 3 mmHg    MM  TAPSE: 1 66 cm    PW  E': 0 11 m/s  E/E': 9 62  LVOT Vmax: 0 74 m/s  LVOT maxP 2 mmHg  MV A Yadiel: 0 27 m/s  MV Dec [place]: 7 18 m/s2  MV DecT: 145 34 ms  MV E Yadiel: 1 04 m/s  MV E/A Ratio: 3 88    Inters\[facility]\"" Commission Accredited Echocardiography Laboratory    Prepared and electronically signed by    Melissa Garcia, MD  Signed 11-Jul-2017 16:28:13         EKG/telemetry: likely sinus tachycardia, rates 100-110 bpm    VTE Pharmacologic Prophylaxis: Reason for no pharmacologic prophylaxis chest wall hematoma  VTE Mechanical Prophylaxis: sequential compression device

## 2017-11-10 NOTE — CONSULTS
[facility] 76 [facility]  [facility] MRN: [id number]  Unit[facility]Bed[facility] 418[facility]01 Encounter: [id number]    Physician Requesting Consult: Leonor Abrams MD    Reason for Consultation / Chief Complaint: pacemaker V- lead replaced     History of Present Illness:  Nga Arredondo is a 76 y o  female who presents to [facility] for pacemaker the lead replacement by Cardiology, the device was interrogated in the clinic and had loss of ventricular capture  Patient has a medical history of hyperlipidemia right breast infiltrative ductal carcinoma status post mastectomy in 2009 and chemotherapy with taxotac, Cytoxan and arimidex in the past, hepatic metastasis resection in September of 2017,  pacemaker implantation 10/31/2017  Today was his pacemaker V-lead revision  Patient does have a  pericardial effusion which is monitored with HIREN, also had a chest x-ray with large right pleural effusion chronic for her  Patient was up in PACU after revision, received approximately 3 L of crystalloid and albumin, and still had episodes of hypotension, A-line and central line was placed in PACU  Patient developed of left-sided axilla hematoma and was emergent returned to the OR for evacuation and debridement of hematoma  In OR  serosanginous fluid was in pocket, but no hematoma observed  Incision was extended, about 10cm total, and exploration of perimeters of pacemaker pocket was done  No hematoma was observed  Perhaps swelling in armpit/axilla is lymphadenopathy  Patient was sent for CT scan of the chest abdomen and pelvis and neck without contrast      History obtained from chart review and the patient      Past Medical History:  Past Medical History:   Diagnosis Date    Anxiety     Arthritis     Breast CA ([facility] [address] )     recurrent, ductal carcinoma ER, [place] pos    Depression     Diverticula of intestine     Dizziness     and passes out    Flushing     Hiatal hernia     [place] (hard of hearing[place]      lizette    Hypercholesteremia     Liver mass     Liver metastasis (HCC)     Metastatic adenocarcinoma to liver ([address] )     Bx 01/03/2017    PONV (postoperative nausea and vomiting)     Recurrent breast cancer (HCC)     Shingles     Shortness of breath     on exertion    Tachycardia     Urinary incontinence     Use of cane as ambulatory aid     or walker       Past Surgical History:  Past Surgical History:   Procedure Laterality Date    CATARACT EXTRACTION Bilateral     COLONOSCOPY      HYSTERECTOMY      JOINT REPLACEMENT      lizette  TKR and right TSR    MASTECTOMY Right     MN RESEC LIVER,PART LOBECTOMY N/A 7/13/2017    Procedure: LIVER RESECTION, INTRAOPERATIVE ULTRASOUND;  Surgeon: Sean Hammond MD;  Location: [facility];  Service: Surgical Oncology    ROTATOR CUFF REPAIR Right     SENTINEL LYMPH NODE BIOPSY Right     TONSILECTOMY AND ADNOIDECTOMY         Past Family History:  Family History   Problem Relation Age of Onset    Cancer Father     Cancer Brother        Social History:  History   Smoking Status    Never Smoker   Smokeless Tobacco    Never Used     History   Alcohol Use No     History   Drug Use No     Marital Status: /Civil Union  Exercise History: n/a    Home Medications:   Prior to Admission medications    Medication Sig Start Date End Date Taking? Authorizing Provider   acetaminophen (TYLENOL) 325 mg tablet May take 650 mg every 6-8 hours as needed for pain  Patient taking differently: Take 650 mg by mouth every 6 (six) hours as needed May take 650 mg every 6-8 hours as needed for pain  7/24/17   Sean Hammond MD   aspirin (ECOTRIN LOW STRENGTH) 81 mg EC tablet Take 81 mg by mouth daily    Historical Provider, MD   atorvastatin (LIPITOR) 20 mg tablet Take 20 mg by mouth daily  Historical Provider, MD   calcium carbonate (CALCIUM 600) 600 MG tablet Take 600 mg by mouth 2 (two) times a day with meals      Historical Provider, MD   CLINDAMYCIN HCL PO Take by mouth as needed Prior to dental visits    Historical Provider, MD   Cranberry (THERACRAN HP PO) Take 2 tablets by mouth daily    Historical Provider, MD   Glucosamine-Chondroit-Vit C-Mn (GLUCOSAMINE 1500 COMPLEX) CAPS Take 1 capsule by mouth 2 (two) times a day      Historical Provider, MD   metoprolol tartrate (LOPRESSOR) 25 mg tablet Take 1 tablet by mouth every 12 (twelve) hours 11/1/17 June [place],    Multiple Vitamin (MULTIVITAMIN) capsule Take 1 capsule by mouth daily  Historical Provider, MD   ondansetron (ZOFRAN) 4 mg tablet Take 4 mg by mouth every 8 (eight) hours as needed for nausea or vomiting    Historical Provider, MD   tamoxifen (NOLVADEX) 20 mg tablet Take 20 mg by mouth daily    Historical Provider, MD       Inpatient Medications:  Scheduled Meds:    magnesium sulfate 2 g Intravenous Once   sodium chloride 500 mL Intravenous Once   tamoxifen 20 mg Oral Daily   [START ON 11/10/2017] vancomycin 10 mg/kg Intravenous Q12H     Continuous Infusions:    lactated ringers 50 mL/hr    phenylephrine (ALDO-SYNEPHRINE) 50 mg (STANDARD CONCENTRATION) in sodium chloride 0 9% 250 mL  mcg/min Last Rate: Stopped (11/09/17 1955)   sodium chloride 20 mL/hr Last Rate: 20 mL/hr (11/09/17 2330)     PRN Meds:    acetaminophen 650 mg Q6H PRN   ondansetron 4 mg Q8H PRN   oxyCODONE 5 mg Q4H PRN       Allergies: Allergies   Allergen Reactions    Ampicillin Shortness Of Breath and Anaphylaxis       ROS:   Review of Systems   Constitutional: Negative for appetite change, diaphoresis, fatigue and fever  HENT: Negative for congestion, facial swelling, rhinorrhea, sneezing and tinnitus  Eyes: Negative for photophobia, pain and discharge  Respiratory: Negative for apnea, cough and shortness of breath  Cardiovascular: Negative for chest pain and leg swelling  Gastrointestinal: Negative for abdominal distention, abdominal pain, diarrhea, nausea and vomiting  Endocrine: Negative for cold intolerance, polydipsia and polyphagia  Genitourinary: Negative for enuresis, frequency and hematuria  Musculoskeletal: Negative for arthralgias, gait problem and myalgias  Skin: Positive for pallor  Negative for color change and rash  Allergic/Immunologic: Positive for immunocompromised state  Negative for environmental allergies and food allergies  Neurological: Negative for syncope, speech difficulty, light-headedness and headaches  Hematological: Negative for adenopathy  Does not bruise/bleed easily  Psychiatric/Behavioral: Negative for behavioral problems, decreased concentration and hallucinations  The patient is not hyperactive  Hemodynamic Monitoring:  N/A     Non-Invasive/Invasive Ventilation Settings:  Respiratory    Lab Data (Last 4 hours)    None         O2/Vent Data (Last 4 hours)    None              No results found for: PHART, SEF9BMC, PO2ART, HBF5OAW, Z4QDUGAO, BEART, SOURCE  SpO2: SpO2: 99 %, SpO2 Activity: SpO2 Activity: At Rest, SpO2 Device: O2 Device: Nasal cannula    Vitals:  Vitals:    17 2100 17 2103 17 2115 17 2130   BP: 133/61  150/52 126/64   Pulse:   (!) 112 104   Resp:   18 19   Temp:    97 5 °F (36 4 °C)   TempSrc:       SpO2: 100% 100% 100% 99%   Weight:       Height:         Temperature:   Temp (24hrs), Av 7 °F (36 5 °C), Min:97 2 °F (36 2 °C), Max:98 4 °F (36 9 °C)    Current Temperature: 97 5 °F (36 4 °C)  Weights:   IBW: 47 8 kg  Body mass index is 28 34 kg/m²  Physical Exam     General:  Appears stated age of 76 , in no acute distress, well nourished   Head: NC/ AT   HEENT:  PERRLA,   Neck: supple, no JVD, carotids 2 +, trachea midline   Chest: Clear to auscultation bilaterally, no rhonchi  No wheezing  No rales,   Left axilla and left chest wall tender to palpitation, appears to be more swelling than on the right, right-sided area S/p mastectomy  CV: RRR, no murmurs, S1/ S2 normal, no rubs     Abd: Soft and non tender, non distended, bowel sounds are present, no guarding or rebound, no abdominal bruit  MSK:  Moves all extremities left arm in a sling due to pacemaker implantation   Pulses: +2 femoral, no bruit, brachial, radial, DP/ PT +2    Neuro: Alert and oriented ×3, Sensation intact           Labs:    Results from last 7 days  Lab Units 11/09/17 2238 11/09/17 2005 11/09/17 1810 11/09/17 0806 11/09/17 0614 11/06/17  1035   WBC Thousand/uL 13 37*  --   --   --  6 70 9 70   HEMOGLOBIN g/dL 12 0  --   --   --  11 5 13 0   I STAT HEMOGLOBIN g/dl  --  8 5* 9 2*  --   --   --    HEMATOCRIT % 35 7  --   --   --  36 7 40 4   PLATELETS Thousands/uL 148*  --   --  194 190 176   NEUTROS PCT % 86*  --   --   --   --  61   MONOS PCT % 9  --   --   --   --  14*        Results from last 7 days  Lab Units 11/09/17 2237 11/09/17 2005 11/09/17 1810 11/09/17 0614 11/06/17  1035   SODIUM mmol/L 142  --   --  141 139   POTASSIUM mmol/L 4 1  --   --  4 4 4 1   CHLORIDE mmol/L 111*  --   --  108 104   CO2 mmol/L 22  --   --  29 29   BUN mg/dL 10  --   --  15 11   CREATININE mg/dL 0 48*  --   --  0 68 0 60   CALCIUM mg/dL 8 4  --   --  9 4 9 4   TOTAL PROTEIN g/dL 5 8*  --   --   --   --    BILIRUBIN TOTAL mg/dL 0 91  --   --   --   --    ALK PHOS U/L 80  --   --   --   --    ALT U/L 17  --   --   --   --    AST U/L 17  --   --   --   --    GLUCOSE RANDOM mg/dL 173*  --   --  130 109   GLUCOSE, ISTAT mg/dl  --  156* 129  --   --        Results from last 7 days  Lab Units 11/09/17 2237 11/09/17 0614   MAGNESIUM mg/dL 1 6 2 1       Results from last 7 days  Lab Units 11/09/17  0614   PHOSPHORUS mg/dL 3 3        Results from last 7 days  Lab Units 11/09/17  0126   INR  1 07   PTT seconds 27           0  Lab Value Date/Time   TROPONINI <0 02 11/06/2017 1035   TROPONINI 0 06 (H) 07/15/2017 1726   TROPONINI 0 06 (H) 07/15/2017 1336   TROPONINI 0 06 (H) 07/15/2017 1119       ABG:        Imaging:   Xr Chest Portable    Result Date: 11/9/2017  Narrative: CHEST INDICATION:  Follow-up after pacemaker revision  COMPARISON:  November 8, 2017  VIEWS:   AP frontal IMAGES:  1 FINDINGS:     Heart top normal in size  Dual-chamber permanent pacemaker present  Atrial lead in expected location of right atrium  Tip of ventricular lead directed to the left and superiorly towards the expected location of the interventricular septum  Elevation of the right hemidiaphragm  Lungs and pleural spaces clear  No pneumothorax  Right shoulder prosthesis  Imaged bones unremarkable  Impression: Permanent pacemaker lead placement as described above  No evidence of pneumothorax  Elevation of the right hemidiaphragm  Workstation performed: [id number]     Xr Chest Portable    Result Date: 11/8/2017  Narrative: CHEST INDICATION:  Evaluate pacemaker  COMPARISON:  11/6/2017 VIEWS:   AP frontal IMAGES:  1 FINDINGS:     Heart shadow is enlarged but unchanged from prior exam   Left dual-lead ICD without wire discontinuity  Leads are unchanged in position within the right atrium and right ventricle  Stable right pleural effusion with right basilar atelectasis  Visualized osseous structures appear stable with right humeral prosthesis and 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  Impression: Pacemaker lead are unchanged in position within the right atrium and right ventricle  Stable right pleural effusion with right basilar atelectasis  Workstation performed: [id number]     Xr Chest Portable    Result Date: 11/1/2017  Narrative: CHEST  PORTABLE INDICATION: Pacemaker insertion COMPARISON:  July 13, 2017 and studies dating back through April 7, 2015 VIEWS:   AP semierect; IMAGES:  1 FINDINGS: Chronic elevation of the right hemidiaphragm noted  Endotracheal tube and nasogastric tube have been removed  The cardiomediastinal silhouette is stable  Dual lead cardiac pacemaker overlies the left chest wall, new  The lungs are clear  No pleural effusion  No pneumothorax  Bony thorax unremarkable   Right shoulder hemiarthroplasty partially included  Impression: No pneumothorax, status post pacemaker insertion    Workstation performed: [id number]     Xr Chest 2 Views    Result Date: 11/6/2017  Narrative: CHEST INDICATION:  Pacemaker placement COMPARISON:  11/1/2017 VIEWS:  Frontal and lateral projections IMAGES:  2 FINDINGS:  Transvenous pacemaker leads are seen in the right atrium and right ventricle, no pneumothorax identified  Cardiomediastinal silhouette appears without change  Right hemidiaphragm is elevated  This is similar to prior studies  A small right-sided pleural effusion is present  Right basilar scarring is similar Visualized osseous structures appear within normal limits for the patient's age  Impression: A small right-sided pleural effusion is noted  No pneumothorax  Transvenous pacemaker leads unchanged in position Workstation performed: [id number]     Xr Chest 2 Views    Result Date: 11/1/2017  Narrative: CHEST INDICATION:  Status post pacemaker placement COMPARISON:  10/31/2017 VIEWS:  Frontal and lateral projections IMAGES:  2 FINDINGS:     Cardiomediastinal silhouette appears unremarkable  Transvenous pacemaker leads are unchanged in position from the prior examination and there is no evidence of pneumothorax  An elevated right hemidiaphragm is again seen with small right effusion  There is a right shoulder prosthesis and arthritis of the left shoulder     Impression: Transvenous pacemaker leads in the right atrium and right ventricle without evidence of pneumothorax Workstation performed: [id number]     Vas Upper Limb Venous Duplex Scan, Unilateral/limited    Result Date: 11/7/2017  Narrative:  [facility] REPORT CLINICAL: Indications:  Patient presents with left upper extremity pain 2 days  PT 1 week S/P pacemaker insertion (left chest)  Physician wants to rule out DVT  Operative History: Hysterectomy Right Mastectomy Risk Factors:   The patient has history of breast cancer with metastasis to liver, hyperlipidemia, left bundle branch block, sick syndrome, GERD, and bradycardia  She has no history of obesity and is a non-smoker  FINDINGS:  Segment       Right            Left                        Impression       Impression       Int  Jugular  Normal (Patent)  Normal (Patent)     CONCLUSION:  Impression  RIGHT UPPER LIMB LIMITED: Evaluation shows no evidence of thrombus in the internal jugular vein, subclavian vein, and the brachiocephalic vein  LEFT UPPER LIMB: No evidence of acute or chronic deep vein thrombosis  No evidence of superficial thrombophlebitis noted  Doppler evaluation shows a normal response to augmentation maneuvers  SIGNATURE: Electronically Signed by: Derek Giron on 2017-11-07 06:40:15 PM    Cta Ed Chest Pe Study    Result Date: 11/6/2017  Narrative: CTA - CHEST WITH IV CONTRAST - PULMONARY ANGIOGRAM INDICATION: "75-year-old female with a history of sick sinus syndrome status post [employer] dual-chamber pacemaker implantation on October 31st, stage 4 metastatic breast cancer with liver metastasis and chest wall metastasis, status post right mastectomy, hyperlipidemia, presents with neck pain  States the pain started last night, after the onset of mild headache, which she described as posterior  She states the pain is on the left side of her neck, worse with movement common if she stays perfectly still there is no pain  St" COMPARISON: CT chest 7/19/2017 TECHNIQUE: CTA examination of the chest was performed using angiographic technique according to a protocol specifically tailored to evaluate for pulmonary embolism  Reformatted images were created in axial, sagittal, and coronal planes  In addition, coronal 3D MIP postprocessing was performed on the acquisition scanner  Radiation dose length product (DLP) for this visit:  615 23 mGy-cm     This examination, like all CT scans performed in the [facility], was performed utilizing techniques to minimize radiation dose exposure, including the use of iterative  reconstruction and automated exposure control  IV Contrast:  85 mL of iohexol (OMNIPAQUE)  350  FINDINGS: PULMONARY ARTERIAL TREE:  No pulmonary embolus is seen  LUNGS/PLEURA: Small right basilar pleural effusion  Compressive atelectasis adjacent to the effusion  No endotracheal or endobronchial lesion  HEART/AORTA:  Unremarkable for patient's age  MEDIASTINUM AND NGOZI:  Unremarkable  CHEST WALL AND LOWER NECK:       Left chest wall pacer  Right mastectomy  VISUALIZED STRUCTURES IN THE UPPER ABDOMEN:  Right hepatic surgery  Cholelithiasis  OSSEOUS STRUCTURES:  No acute fracture or destructive osseous lesion  Impression: No pulmonary arterial embolism  Small right basilar effusion increased in size when compared to the study prior study with associated mild compressive atelectasis  No acute pulmonary findings  Workstation performed: [id number]      I have personally reviewed pertinent reports  EKG: ST This was personally reviewed by myself  Micro:  Lab Results   Component Value Date    URINECX No Growth <1000 cfu/mL 04/03/2017       ______________________________________________________________________    Assessment     Principal Problem:    Pacemaker malfunction, initial encounter  Active Problems:    Complication associated with cardiac pacemaker lead  Resolved Problems:    * No resolved hospital problems  *    Acute blood loss anemia due to surgical procedure  Sinus tachycardia due to surgical procedure  Metastatic breast cancer history  Hypercholesteremia  Leukocytosis status post procedure  Mild thrombocytopenia due to blood loss        PLAN      Neuro:     pain control with PO pain medication/ IV pain medications,   CAM-ICU score daily   No active issues    CV:   Cardiology is primary  Status post pacemaker revision, complication associated with  cardiac pacemaker lead,   Possible bleeding into the chest wall  CT abdomen chest and pelvis was performed without contrast  May need CTA if hemoglobin drops  Continue home medications Lipitor and Lopressor when able  Hold aspirin until bleeding resolved  Sinus tachycardia- due to no beta-blocker today/ versus stress related  Right IJ was placed- pulled back 2 cm    Lung:   Incentive spirometer   Wean FIO2 for PO2 > 92 % NC as needed   Respiratory protocol  HOB > 30       GI:   PPIs - pepcid 20mg PO daily   Diet - start regular diet in the a m  FEN: Replete electrolytes as needed magnesium 2 g IV   DVT prophylaxis- hold     : Monitor ins and outs  Creatinine stable monitor      ID:   Antibiotics  Vanco: 750mg IV BID status post surgery probably 24 hours then DC  Monitor white count and temperatures    Heme:   Acute blood loss anemia due to surgical procedure  monitor hemoglobin and white count q 8 hours   Repeat CT scan at 4 am per cards   Monitor platelet count - mild thrombocytopenia  Metastatic breast cancer history, status post mastectomy right   A-line is in the right- remove as soon as possible  Patient is status post chemotherapy with metastatic disease to the liver  CT of the chest and abdomen showed pectoral muscle hematoma active bleeding could not be established due to CT scan without contrast    CTA in the am       MSK:  No issues    Endo:   Check glucose daily    Disposition:  ICU admission overnight if no complication can probably be downgraded tomorrow      Counseling / Coordination of Care  Total Critical Care time spent 30 minutes excluding procedures, teaching and family updates  ______________________________________________________________________    VTE Pharmacologic Prophylaxis: Sequential compression device (Venodyne)   VTE Mechanical Prophylaxis: sequential compression device    Invasive lines and devices:   Invasive Devices     Central Venous Catheter Line            CVC Central Lines 11/09/17 Triple Right Internal jugular less than 1 day Peripheral Intravenous Line            Peripheral IV 11/09/17 Left Hand less than 1 day    Peripheral IV 11/09/17 Left Wrist less than 1 day    Peripheral IV 11/09/17 Right Foot less than 1 day          Arterial Line            Arterial Line 11/09/17 Right Brachial less than 1 day                Code Status: Level 1 - Full Code  POA:    POLST:      Given critical illness, patient length of stay will require greater than two midnights  Portions of the record may have been created with voice recognition software  Occasional wrong word or "sound a like" substitutions may have occurred due to the inherent limitations of voice recognition software  Read the chart carefully and recognize, using context, where substitutions have occurred        Bernie Hadley PA-C    Consults

## 2017-11-10 NOTE — SOCIAL WORK
Pt readmitted for lead malfunction  Lead extracted yesterday and revised  Also had debridement and washout  Pt not open currently with [facility] and not sure if she needs it at this time  She would use them again if needed  Cm met w pt today & explained CM role  Pt lives w  in 2 sty home w 5 [address]  Bedroom on 2nd  [place] on both  Was [employer], retired  Family transports  DME cane,rw, shower seat & high rise toilet  [facility]/o [facility]  H/[facility] rhb at  [facility]  Uses [facility] in [place]  PCP Dr Sina Monterroso  No POA  Denies any MH, D&A tx  Emergency contact,  Josh Ramirez [phone]  Anticipate no needs at this time   or dtr to transport home  CM to follow after procedure      CM reviewed d/c planning process including the following: identifying help at home, patient preference for d/c planning needs, Discharge Lounge, [facility] Meds to Bed program, availability of treatment team to discuss questions or concerns patient and/or family may have regarding understanding medications and recognizing signs and symptoms once discharged  CM also encouraged patient to follow up with all recommended appointments after discharge  Patient advised of importance for patient and family to participate in managing patients medical well being

## 2017-11-10 NOTE — CASE MANAGEMENT
Continued Stay Review    SURGICAL REPORTS REQUESTED FOR Joe Olivas      Date:11-9-17     Anesthesia Start Date/Time: 11/09/17 1438   Surgeon: Cordelia Gloria MD   Procedures:   Bella Cratracy      CARDIAC LEAD REVISION      PACEMAKER LEAD REVISION, REMOVAL OF NONFUNCTIONING LEAD, AND INSERTION OF A NEW RV LEAD (N/A Chest)   Anesthesia type: general     Preop Diagnosis:  Possible Hematoma Pacemaker site in axilla  Hypotension  S/p lead extraction and revision, only small effusion w/o tamponade  Anemia  Breast CA        Emergent implied consent       Procedure(s) (LRB):  DEBRIDEMENT WOUND AND DRESSING CHANGE ([address]) (Left)   Draped in normal sterile fashion  Incision was made over pacemaker left chest  Pacemaker was explanted with blunt dissection, serosanginous fluid was in pocket, but no hematoma observed  Incision was extended, about 10cm total, and exploration of perimeters of pacemaker pocket was done  No hematoma was observed  Pocket was vigerously washed with antibiotic solution and clear fluid came back  Perhaps swelling in armpit/axilla is lymphadenopathy       Pacemaker and antibiotic pouch was placed back in pocket and pocket was closed in multiple layers with 2-0, 3-0, 4-0 vicryl          Specimen(s):  None     Estimated Blood Loss:   10ml     Drains:   none     Anesthesia Type:   General     Operative Indications:  Possible Hematoma Pacemaker site in axilla  Hypotension  S/p lead extraction and revision, only small effusion w/o tamponade  Anemia  Breast CA     Operative Findings:  No hematoma found    Complications:   None         Anesthesia Start Date/Time: 11/09/17 1931   Procedure: DEBRIDEMENT WOUND AND DRESSING CHANGE [facility]) (Left Chest)   Anesthesia type: general     Surgeon(s) and Role:     * Cordelia Gloria MD - Primary     Preop Diagnosis:  Possible Hematoma Pacemaker site in axilla     Hypotension  S/p lead extraction and revision, only small effusion w/o tamponade  Anemia  Breast CA        Emergent implied consent       Procedure(s) (LRB):  DEBRIDEMENT WOUND AND DRESSING CHANGE ([address]) (Left)   Draped in normal sterile fashion  Incision was made over pacemaker left chest  Pacemaker was explanted with blunt dissection, serosanginous fluid was in pocket, but no hematoma observed  Incision was extended, about 10cm total, and exploration of perimeters of pacemaker pocket was done  No hematoma was observed  Pocket was vigerously washed with antibiotic solution and clear fluid came back  Perhaps swelling in armpit/axilla is lymphadenopathy       Pacemaker and antibiotic pouch was placed back in pocket and pocket was closed in multiple layers with 2-0, 3-0, 4-0 vicryl          Specimen(s):  None     Estimated Blood Loss:   10ml     Drains:   none     Anesthesia Type:   General     Operative Indications:  Possible Hematoma Pacemaker site in axilla     Hypotension  S/p lead extraction and revision, only small effusion w/o tamponade  Anemia  Breast CA     Operative Findings:  No hematoma found    Complications:   None    Vital Signs: /64   Pulse 102   Temp 98 6 °F (37 °C) (Oral)   Resp 20   Ht 5' 1" (1 549 m)   Wt 77 4 kg (170 lb 10 2 oz)   LMP  (LMP Unknown) Comment: post   SpO2 96%   BMI 32 24 kg/m²     Medications:   Scheduled Meds:   ciprofloxacin 400 mg Intravenous Q12H   sodium chloride 500 mL Intravenous Once   tamoxifen 20 mg Oral Daily   vancomycin 10 mg/kg Intravenous Q12H     Continuous Infusions:   lactated ringers 50 mL/hr    phenylephrine (ALDO-SYNEPHRINE) 50 mg (STANDARD CONCENTRATION) in sodium chloride 0 9% 250 mL  mcg/min Last Rate: Stopped (11/09/17 1955)   sodium chloride 20 mL/hr Last Rate: Stopped (11/10/17 0600)   sodium chloride 75 mL/hr Last Rate: 75 mL/hr (11/10/17 0754)     PRN Meds:   acetaminophen    ondansetron    oxyCODONE    Abnormal Labs/Diagnostic Results:     Lab Units 11/09/17 2238 11/09/17 2005 11/09/17 1810 11/09/17  0806 11/09/17  0614 11/06/17  1035   WBC Thousand/uL 13 37*  --   --   --  6 70 9 70   HEMOGLOBIN g/dL 12 0  --   --   --  11 5 13 0   I STAT HEMOGLOBIN g/dl  --  8 5* 9 2*  --   --   --    HEMATOCRIT % 35 7  --   --   --  36 7 40 4   PLATELETS Thousands/uL 148*  --   --  194 190 176   NEUTROS PCT % 86*  --   --   --   --  61   MONOS PCT % 9  --   --   --   --  14*               Age/Sex: 76 y o  female     Assessment/Plan:    Critical Care/ICU  Consults Date of Service: 11/9/2017  8:51 PM     Eddie Michael is a 76 y o  female who presents to [place] for pacemaker the lead replacement by Cardiology, the device was interrogated in the clinic and had loss of ventricular capture  Patient has a medical history of hyperlipidemia right breast infiltrative ductal carcinoma status post mastectomy in 2009 and chemotherapy with taxotac, Cytoxan and arimidex in the past, hepatic metastasis resection in September of 2017,  pacemaker implantation 10/31/2017  Today was his pacemaker V-lead revision  Patient does have a  pericardial effusion which is monitored with HIREN, also had a chest x-ray with large right pleural effusion chronic for her  Patient was up in PACU after revision, received approximately 3 L of crystalloid and albumin, and still had episodes of hypotension, A-line and central line was placed in PACU  Patient developed of left-sided axilla hematoma and was emergent returned to the OR for evacuation and debridement of hematoma  In OR  serosanginous fluid was in pocket, but no hematoma observed  Incision was extended, about 10cm total, and exploration of perimeters of pacemaker pocket was done  No hematoma was observed  Perhaps swelling in armpit/axilla is lymphadenopathy    Patient was sent for CT scan of the chest abdomen and pelvis and neck without contrast     Assessment      Principal Problem:    Pacemaker malfunction, initial encounter  Active Problems:    Complication associated with cardiac pacemaker lead  Resolved Problems:    * No resolved hospital problems  *     Acute blood loss anemia due to surgical procedure  Sinus tachycardia due to surgical procedure  Metastatic breast cancer history  Hypercholesteremia  Leukocytosis status post procedure  Mild thrombocytopenia due to blood loss           PLAN       Neuro:     pain control with PO pain medication/ IV pain medications,   CAM-ICU score daily   No active issues     CV:   Cardiology is primary  Status post pacemaker revision, complication associated with  cardiac pacemaker lead,   Possible bleeding into the chest wall  CT abdomen chest and pelvis was performed without contrast  May need CTA if hemoglobin drops  Continue home medications Lipitor and Lopressor when able  Hold aspirin until bleeding resolved  Sinus tachycardia- due to no beta-blocker today/ versus stress related  Right IJ was placed- pulled back 2 cm     Lung:   Incentive spirometer   Wean FIO2 for PO2 > 92 % NC as needed   Respiratory protocol  HOB > 30         GI:   PPIs - pepcid 20mg PO daily   Diet - start regular diet in the a m      FEN: Replete electrolytes as needed magnesium 2 g IV   DVT prophylaxis- hold      : Monitor ins and outs  Creatinine stable monitor        ID:    Antibiotics  Vanco: 750mg IV BID status post surgery probably 24 hours then DC  Monitor white count and temperatures     Heme:   Acute blood loss anemia due to surgical procedure  monitor hemoglobin and white count q 8 hours   Repeat CT scan at 4 am per cards   Monitor platelet count - mild thrombocytopenia  Metastatic breast cancer history, status post mastectomy right   A-line is in the right- remove as soon as possible  Patient is status post chemotherapy with metastatic disease to the liver  CT of the chest and abdomen showed pectoral muscle hematoma active bleeding could not be established due to CT scan without contrast    CTA in the am         MSK:  No issues     Endo:   Check glucose daily     Disposition:  ICU admission overnight if no complication can probably be downgraded tomorrow    Discharge Plan:   [address]

## 2017-11-10 NOTE — OR NURSING
Patient received into OR 5 direct from PACU for emergency surgery   No consent due to emergency status

## 2017-11-10 NOTE — OP NOTE
OPERATIVE REPORT  PATIENT NAME: Cara Norman    :  1940  MRN: [id number]  Pt Location: [facility] OR ROOM 05    SURGERY DATE: 2017    Surgeon(s) and Role:     * Nadira Jarrett MD - Primary    Preop Diagnosis:  Possible Hematoma Pacemaker site in axilla  Hypotension  S/p lead extraction and revision, only small effusion w/o tamponade  Anemia  Breast CA      Emergent implied consent  Procedure(s) (LRB):  DEBRIDEMENT WOUND AND DRESSING CHANGE [facility]) (Left)   Draped in normal sterile fashion  Incision was made over pacemaker left chest  Blunt dissection in subcutatneous tissue to level of pectoral muscle  Pacemaker was explanted with blunt dissection, serosanginous fluid was in pocket, but no hematoma observed  Incision was extended, about 10cm total, and exploration of perimeters of pacemaker pocket was done  No hematoma was observed  Pocket was vigerously washed with antibiotic solution and clear fluid came back  Perhaps swelling in armpit/axilla is lymphadenopathy  Pacemaker and antibiotic pouch was placed back in pocket and pocket was closed in multiple layers with 2-0, 3-0, 4-0 vicryl  Specimen(s):  None    Estimated Blood Loss:   10ml    Drains:   none    Anesthesia Type:   General    Operative Indications:  Possible Hematoma Pacemaker site in axilla     Hypotension  S/p lead extraction and revision, only small effusion w/o tamponade  Anemia  Breast CA    Operative Findings:  No hematoma found  Complications:   None    Patient Disposition:  PACU   SIGNATURE: Nadira Jarrett MD  DATE: 2017  TIME: 8:55 PM

## 2017-11-10 NOTE — PROGRESS NOTES
Patient intermittently was hypotensive  SHe complained of pain on left upper chaest and axillar  Large hematoma noted  WIll bring her back to OR for Exploratory hematoma evacuation and to stop any active bleeding

## 2017-11-10 NOTE — ANESTHESIA PREPROCEDURE EVALUATION
Anesthesia Plan  ASA Score- 3 Emergent      Anesthesia Type-   Additional Monitors:   Airway Plan: ETT  Comment: Left axilla hematoma in PACU, brought to OR emergently  Induction- intravenous        Informed Consent-

## 2017-11-10 NOTE — PROGRESS NOTES
Critical Care Interval Progress Note     Azucena Matute 76 y o  female MRN: [id number]    Unit/Bed#: [facility] 418-01 Encounter: [id number]    Impression:  Principal Problem:    Pacemaker malfunction, initial encounter  Active Problems:    Complication associated with cardiac pacemaker lead  Resolved Problems:    * No resolved hospital problems  *    Hypovolemic shock  Acute blood loss anemia  Chronic right pleural effusion  Breast cancer with metastatic disease  Bradycardia s/p pacemaker  Pacemaker lead malfunction    Plan:  Patient post op lead extraction from perforated lead  Post op pt with hypotension and reported low hb  She received 2 untis rbc and ivf and vasopressors initiated  Left ACW fullness concerning for hematoma  Patient returned to or after echocardiogram for exploration of pacer pocket for active bleeding  Echo did not display tamponade or large pericardial effusion  No active bleeding identified with exploration, pt hemodynamically improved  CT ordered by cardiology without contrast with hematoma left axilla and acw  Plan to chack labs and trend Hb  If BP or Hb decreases obtain CTA to evaluate if active bleeding and consider IR  Neurovasc checks lue and hematoma size  Counseling / Coordination of Care: Total Critical Care time spent 33 minutes excluding procedures, teaching and family updates  ______________________________________________________________________    Chief Complaint:  hypotension    Recent Events / Nursing Concern: For Patient evaluated postoperative  Patient had underwent pacemaker lead extraction secondary to perforation  Postoperatively patient was hypotensive in the was concern for bleeding  She underwent exploration of anterior chest wall pacer pocket for active bleeding      Vitals:   Vitals:    11/09/17 2100 11/09/17 2103 11/09/17 2115 11/09/17 2130   BP: 133/61  150/52 126/64   Pulse:   (!) 112 104   Resp:   18 19   Temp:    97 5 °F (36 4 °C)   TempSrc: SpO2: 100% 100% 100% 99%   Weight:       Height:         Arterial Line BP: 134/66  Arterial Line MAP (mmHg): 96 mmHg    Temperature: Temp (24hrs), Av 7 °F (36 5 °C), Min:97 2 °F (36 2 °C), Max:98 4 °F (36 9 °C)  Current: Temperature: 97 5 °F (36 4 °C)    Hemodynamic Monitoring:  N/A       Respiratory:  SpO2: SpO2: 99 %, SpO2 Activity: SpO2 Activity: At Rest, SpO2 Device: O2 Device: Nasal cannula  O2 Flow Rate (L/min): 2 L/min    Physical Exam:  Physical Exam   Constitutional: She is oriented to person, place, and time  She appears well-developed and well-nourished  HENT:   Head: Normocephalic and atraumatic  Eyes: Conjunctivae are normal  Pupils are equal, round, and reactive to light  Right eye exhibits no discharge  Left eye exhibits no discharge  No scleral icterus  Neck: Normal range of motion  Neck supple  No tracheal deviation present  Cardiovascular: Normal rate and regular rhythm  No murmur heard  Pulmonary/Chest: Breath sounds normal  She has no wheezes  She has no rales  Left anterior chest wall and axillary area with fullness, tenderness to palpation left axilla and lateral chest, mastectomy of the right breast   Abdominal: Soft  Bowel sounds are normal  She exhibits no distension  There is no tenderness  Musculoskeletal: Normal range of motion  She exhibits no edema or deformity  Neurological: She is alert and oriented to person, place, and time  Skin: Skin is warm and dry  There is pallor  Psychiatric: She has a normal mood and affect  Her behavior is normal    Nursing note and vitals reviewed  Allergies:    Allergies   Allergen Reactions    Ampicillin Shortness Of Breath and Anaphylaxis       Medications:   Scheduled Meds:  sodium chloride 500 mL Intravenous Once   tamoxifen 20 mg Oral Daily   [START ON 11/10/2017] vancomycin 10 mg/kg Intravenous Q12H     Continuous Infusions:  lactated ringers 50 mL/hr    phenylephrine (ALDO-SYNEPHRINE) 50 mg (STANDARD CONCENTRATION) in sodium chloride 0 9% 250 mL  mcg/min Last Rate: Stopped (11/09/17 1955)   sodium chloride 20 mL/hr      PRN Meds:    acetaminophen 650 mg Q6H PRN   ondansetron 4 mg Q8H PRN   oxyCODONE 5 mg Q4H PRN       Labs:     Results from last 7 days  Lab Units 11/09/17 2238 11/09/17 2005 11/09/17 1810 11/09/17 0806 11/09/17 0614 11/06/17  1035   WBC Thousand/uL 13 37*  --   --   --  6 70 9 70   HEMOGLOBIN g/dL 12 0  --   --   --  11 5 13 0   I STAT HEMOGLOBIN g/dl  --  8 5* 9 2*  --   --   --    HEMATOCRIT % 35 7  --   --   --  36 7 40 4   PLATELETS Thousands/uL 148*  --   --  194 190 176   NEUTROS PCT % 86*  --   --   --   --  61   MONOS PCT % 9  --   --   --   --  14*       Results from last 7 days  Lab Units 11/09/17 2237 11/09/17 2005 11/09/17  1810 11/09/17 0614 11/06/17  1035   SODIUM mmol/L 142  --   --  141 139   POTASSIUM mmol/L 4 1  --   --  4 4 4 1   CHLORIDE mmol/L 111*  --   --  108 104   CO2 mmol/L 22  --   --  29 29   BUN mg/dL 10  --   --  15 11   CREATININE mg/dL 0 48*  --   --  0 68 0 60   CALCIUM mg/dL 8 4  --   --  9 4 9 4   TOTAL PROTEIN g/dL 5 8*  --   --   --   --    BILIRUBIN TOTAL mg/dL 0 91  --   --   --   --    ALK PHOS U/L 80  --   --   --   --    ALT U/L 17  --   --   --   --    AST U/L 17  --   --   --   --    GLUCOSE RANDOM mg/dL 173*  --   --  130 109   GLUCOSE, ISTAT mg/dl  --  156* 129  --   --        Results from last 7 days  Lab Units 11/09/17 2237 11/09/17 0614   MAGNESIUM mg/dL 1 6 2 1       Results from last 7 days  Lab Units 11/09/17  0614   PHOSPHORUS mg/dL 3 3        Results from last 7 days  Lab Units 11/09/17  0126   INR  1 07   PTT seconds 27           0  Lab Value Date/Time   TROPONINI <0 02 11/06/2017 1035   TROPONINI 0 06 (H) 07/15/2017 1726   TROPONINI 0 06 (H) 07/15/2017 1336   TROPONINI 0 06 (H) 07/15/2017 1119           Diagnostic Imaging / Data:  CT soft tissue neck Large hematoma in the left chest wall extending into the posterior supraclavicular region is more accurately characterized on CT examination of the chest performed concurrently  CT chest abdomen pelvisRight greater than left effusions    I have personally reviewed pertinent reports  and I have personally reviewed pertinent films in PACS  EKG:  normal sinus rhythm with interventricular conduction delay and right axis deviation, nonspecific changes EKG grossly unchanged from prior  Code Status: Level 1 - Full Code    Portions of the record may have been created with voice recognition software  Occasional wrong word or "sound a like" substitutions may have occurred due to the inherent limitations of voice recognition software  Read the chart carefully and recognize, using context, where substitutions have occurred      SIGNATURE: Milton Esquivel DO  DATE: November 9, 2017  TIME: 11:27 PM

## 2017-11-10 NOTE — PERIOPERATIVE NURSING NOTE
Condition stable post op  VSS  Pt ready for D/C from the PACU   Will go to Cat scan on way up to ICU

## 2017-11-10 NOTE — PROGRESS NOTES
Progress Note - Critical Care   Maximino Orta 76 y o  female MRN: [id number]  Unit/Bed#: [facility] 465-41 Encounter: [id number]    Attending Physician: Cordelia Gloria MD      ______________________________________________________________________  Assessment and Plan:   Principal Problem:    Pacemaker malfunction, initial encounter  Active Problems:    Complication associated with cardiac pacemaker lead  Resolved Problems:    * No resolved hospital problems  *        Neuro: cont neuro checks    CV: echo pending  Cta chest pending  HD stable at this time  S/p Lead extraction  Monitor cw hematoma  Consult vascular to r/o pseudoaneurysm  Pulm: stable at this time    GI: tolerating po  Breast ca with liver mets  : monitor I/os    ID: no obvious infxn at this time  Heme: monitor hgb  No active bleeding noted at this time  Endo: glucose control     Msk/Skin: oob to chair  Reposition q 2h    Disposition: cont icu care for now  Likely transfer soon  Code Status: Level 1 - Full Code    Counseling / Coordination of Care  Total Critical Care time spent 12 minutes excluding procedures, teaching and family updates  ______________________________________________________________________    Chief Complaint: pt with chest pain    24 Hour Events:   Pt stable o/n  No new issues reported       ______________________________________________________________________    Physical Exam:   Neuro:  aa x ox3  CV:  rrr- rgm  Chest wall:  Right mastectomy  Left incision c/d without infxn  Hematoma left axilla/breast soft, mild/mod tenderness to palpation  Resp:  Decreased b/l bases anteriorly    - wrr  Abd: soft, nt, nd +bs  Ext: strickland, LUE in sling, 1+edema LE   +scd's    ______________________________________________________________________  Vitals:    11/10/17 0400 11/10/17 0500 11/10/17 0600 11/10/17 0800   BP:       Pulse: (!) 106 104 (!) 106    Resp: 18 22 21    Temp: 98 2 °F (36 8 °C)   98 6 °F (37 °C) TempSrc: Oral   Oral   SpO2: 97% 97% 95%    Weight:   77 4 kg (170 lb 10 2 oz)    Height:           Temperature:   Temp (24hrs), Av 9 °F (36 6 °C), Min:97 5 °F (36 4 °C), Max:98 6 °F (37 °C)    Current Temperature: 98 6 °F (37 °C)  Weights:   IBW: 47 8 kg    Body mass index is 32 24 kg/m²  Weight (last 2 days)     Date/Time   Weight    11/10/17 0600  77 4 (170 64)    17 1709  68 (150)    17 1600  68 (150)                 Non-Invasive/Invasive Ventilation Settings:  Respiratory    Lab Data (Last 4 hours)    None         O2/Vent Data (Last 4 hours)    None              No results found for: PHART, ASM7BRT, PO2ART, IZJ0FER, H5XFBIMD, BEART, SOURCE  SpO2: SpO2: 96 %  Intake and Outputs:  I/O       701 -  07 - 11/10 0700 11/10 0701 -  07    P  O    100    I V  (mL/kg)  3930 (50 8)     Blood  700     IV Piggyback  800     Total Intake(mL/kg)  5430 (70 2) 100 (1 3)    Urine (mL/kg/hr) 400 1122 (0 6)     Stool 0      Total Output 400 1122      Net -400 +4308 +100           Unmeasured Urine Occurrence  1 x         UOP: 35-100ml/hour   Nutrition:        Diet Orders            Start     Ordered    17 1850  Diet Cardiac; Cardiac TLC 2 3 GM NA  Diet effective now     Question Answer Comment   Diet Type Cardiac    Cardiac Cardiac TLC 2 3 GM NA    RD to adjust diet per protocol? Yes        17 1849          Labs:     Results from last 7 days  Lab Units 11/10/17  0418 17  2238 17  2005  17  0806 17  0614 17  1035   WBC Thousand/uL 12 73* 13 37*  --   --   --  6 70 9 70   HEMOGLOBIN g/dL 11 6 12 0  --   --   --  11 5 13 0   I STAT HEMOGLOBIN g/dl  --   --  8 5*  < >  --   --   --    HEMATOCRIT % 34 9 35 7  --   --   --  36 7 40 4   PLATELETS Thousands/uL 156 148*  --   --  194 190 176   NEUTROS PCT %  --  86*  --   --   --   --  61   MONOS PCT %  --  9  --   --   --   --  14*   < > = values in this interval not displayed      Results from last 7 days  Lab Units 11/10/17  0417 17  0614   SODIUM mmol/L 141 142  --   --  141   POTASSIUM mmol/L 4 1 4 1  --   --  4 4   CHLORIDE mmol/L 109* 111*  --   --  108   CO2 mmol/L 23 22  --   --  29   BUN mg/dL 10 10  --   --  15   CREATININE mg/dL 0 46* 0 48*  --   --  0 68   CALCIUM mg/dL 8 1* 8 4  --   --  9 4   TOTAL PROTEIN g/dL  --  5 8*  --   --   --    BILIRUBIN TOTAL mg/dL  --  0 91  --   --   --    ALK PHOS U/L  --  80  --   --   --    ALT U/L  --  17  --   --   --    AST U/L  --  17  --   --   --    GLUCOSE RANDOM mg/dL 167* 173*  --   --  130   GLUCOSE, ISTAT mg/dl  --   --  156*  < >  --    < > = values in this interval not displayed  Results from last 7 days  Lab Units 1714   MAGNESIUM mg/dL 1 6 2 1     Lab Results   Component Value Date    PHOS 3 3 2017    PHOS 1 8 (L) 07/15/2017        Results from last 7 days  Lab Units 17  0126   INR  1 07   PTT seconds 27       0  Lab Value Date/Time   TROPONINI <0 02 2017 1035   TROPONINI 0 06 (H) 07/15/2017 1726   TROPONINI 0 06 (H) 07/15/2017 1336   TROPONINI 0 06 (H) 07/15/2017 1119         ABG:  Lab Results   Component Value Date    PHART 7 371 2017    YRV5IQR 35 9 (L) 2017    PO2ART 120 6 2017    USD6QCQ 20 3 (L) 2017    BEART -4 3 2017    SOURCE Line, Arterial 2017     Imagin/9 ct chest: Large left chest wall hematoma as described  Right greater than left pleural effusions with overlying compressive atelectasis  11/10 cta chest pending, cxr pending I have personally reviewed pertinent reports  and I have personally reviewed pertinent films in PACS  EKG: nsr  Echo:  pending  Micro:  Lab Results   Component Value Date    URINECX No Growth <1000 cfu/mL 2017     Allergies:    Allergies   Allergen Reactions    Ampicillin Shortness Of Breath and Anaphylaxis     Medications:   Scheduled Meds:  sodium chloride 500 mL Intravenous Once tamoxifen 20 mg Oral Daily   vancomycin 10 mg/kg Intravenous Q12H     Continuous Infusions:  lactated ringers 50 mL/hr    phenylephrine (ALDO-SYNEPHRINE) 50 mg (STANDARD CONCENTRATION) in sodium chloride 0 9% 250 mL  mcg/min Last Rate: Stopped (11/09/17 1955)   sodium chloride 20 mL/hr Last Rate: Stopped (11/10/17 0600)   sodium chloride 75 mL/hr Last Rate: 75 mL/hr (11/10/17 0754)     PRN Meds:    acetaminophen 650 mg Q6H PRN   ondansetron 4 mg Q8H PRN   oxyCODONE 5 mg Q4H PRN     VTE Pharmacologic Prophylaxis: Pharmacologic VTE Prophylaxis contraindicated due to bleeding  VTE Mechanical Prophylaxis: sequential compression device  Invasive lines and devices: Invasive Devices     Central Venous Catheter Line            CVC Central Lines 11/09/17 Triple Right Internal jugular less than 1 day          Peripheral Intravenous Line            Peripheral IV 11/09/17 Left Wrist 1 day    Peripheral IV 11/09/17 Left Hand less than 1 day          Arterial Line            Arterial Line 11/09/17 Right Radial less than 1 day                     Portions of the record may have been created with voice recognition software  Occasional wrong word or "sound a like" substitutions may have occurred due to the inherent limitations of voice recognition software  Read the chart carefully and recognize, using context, where substitutions have occurred      Arabella Bettencourt PA-C

## 2017-11-10 NOTE — ANESTHESIA POSTPROCEDURE EVALUATION
Post-Op Assessment Note      CV Status:  Stable    Mental Status:  Alert and awake    Hydration Status:  Euvolemic    PONV Controlled:  Controlled    Airway Patency:  Patent    Post Op Vitals Reviewed: Yes          Staff: CRNA           /85 (11/09/17 2056)    Temp 97 5 °F (36 4 °C) (11/09/17 2056)    Pulse 104 (11/09/17 2056)   Resp 20 (11/09/17 2056)    SpO2 100 % (11/09/17 2103)

## 2017-11-11 LAB
BASOPHILS # BLD AUTO: 0.02 THOUSANDS/ΜL (ref 0–0.1)
BASOPHILS NFR BLD AUTO: 0 % (ref 0–1)
EOSINOPHIL # BLD AUTO: 0.23 THOUSAND/ΜL (ref 0–0.61)
EOSINOPHIL NFR BLD AUTO: 2 % (ref 0–6)
ERYTHROCYTE [DISTWIDTH] IN BLOOD BY AUTOMATED COUNT: 15 % (ref 11.6–15.1)
HCT VFR BLD AUTO: 31.5 % (ref 34.8–46.1)
HCT VFR BLD AUTO: 31.7 % (ref 34.8–46.1)
HGB BLD-MCNC: 10.3 G/DL (ref 11.5–15.4)
HGB BLD-MCNC: 10.3 G/DL (ref 11.5–15.4)
LYMPHOCYTES # BLD AUTO: 2.1 THOUSANDS/ΜL (ref 0.6–4.47)
LYMPHOCYTES NFR BLD AUTO: 21 % (ref 14–44)
MCH RBC QN AUTO: 29.3 PG (ref 26.8–34.3)
MCHC RBC AUTO-ENTMCNC: 32.5 G/DL (ref 31.4–37.4)
MCV RBC AUTO: 90 FL (ref 82–98)
MONOCYTES # BLD AUTO: 1.45 THOUSAND/ΜL (ref 0.17–1.22)
MONOCYTES NFR BLD AUTO: 14 % (ref 4–12)
NEUTROPHILS # BLD AUTO: 6.32 THOUSANDS/ΜL (ref 1.85–7.62)
NEUTS SEG NFR BLD AUTO: 63 % (ref 43–75)
NRBC BLD AUTO-RTO: 0 /100 WBCS
PLATELET # BLD AUTO: 132 THOUSANDS/UL (ref 149–390)
PMV BLD AUTO: 9.1 FL (ref 8.9–12.7)
RBC # BLD AUTO: 3.51 MILLION/UL (ref 3.81–5.12)
WBC # BLD AUTO: 10.14 THOUSAND/UL (ref 4.31–10.16)

## 2017-11-11 PROCEDURE — 85018 HEMOGLOBIN: CPT | Performed by: PHYSICIAN ASSISTANT

## 2017-11-11 PROCEDURE — 85025 COMPLETE CBC W/AUTO DIFF WBC: CPT | Performed by: PHYSICIAN ASSISTANT

## 2017-11-11 PROCEDURE — 85014 HEMATOCRIT: CPT | Performed by: PHYSICIAN ASSISTANT

## 2017-11-11 RX ORDER — ONDANSETRON 2 MG/ML
4 INJECTION INTRAMUSCULAR; INTRAVENOUS EVERY 4 HOURS PRN
Status: DISCONTINUED | OUTPATIENT
Start: 2017-11-11 | End: 2017-11-15 | Stop reason: HOSPADM

## 2017-11-11 RX ORDER — FERROUS SULFATE 325(65) MG
325 TABLET ORAL 2 TIMES DAILY WITH MEALS
Status: DISCONTINUED | OUTPATIENT
Start: 2017-11-11 | End: 2017-11-15 | Stop reason: HOSPADM

## 2017-11-11 RX ADMIN — ACETAMINOPHEN 650 MG: 325 TABLET, FILM COATED ORAL at 11:30

## 2017-11-11 RX ADMIN — VANCOMYCIN HYDROCHLORIDE 750 MG: 750 INJECTION, SOLUTION INTRAVENOUS at 00:25

## 2017-11-11 RX ADMIN — Medication 325 MG: at 17:28

## 2017-11-11 RX ADMIN — CIPROFLOXACIN 400 MG: 2 INJECTION, SOLUTION INTRAVENOUS at 23:51

## 2017-11-11 RX ADMIN — ACETAMINOPHEN 650 MG: 325 TABLET, FILM COATED ORAL at 17:49

## 2017-11-11 RX ADMIN — OXYCODONE HYDROCHLORIDE 5 MG: 5 TABLET ORAL at 04:54

## 2017-11-11 RX ADMIN — ONDANSETRON 4 MG: 4 TABLET, ORALLY DISINTEGRATING ORAL at 07:12

## 2017-11-11 RX ADMIN — TAMOXIFEN CITRATE 20 MG: 10 TABLET, FILM COATED ORAL at 22:44

## 2017-11-11 RX ADMIN — METOPROLOL TARTRATE 12.5 MG: 25 TABLET ORAL at 22:44

## 2017-11-11 RX ADMIN — VANCOMYCIN HYDROCHLORIDE 750 MG: 750 INJECTION, SOLUTION INTRAVENOUS at 12:41

## 2017-11-11 RX ADMIN — METOPROLOL TARTRATE 12.5 MG: 25 TABLET ORAL at 08:39

## 2017-11-11 RX ADMIN — ONDANSETRON 4 MG: 2 INJECTION INTRAMUSCULAR; INTRAVENOUS at 11:27

## 2017-11-11 RX ADMIN — CIPROFLOXACIN 400 MG: 2 INJECTION, SOLUTION INTRAVENOUS at 10:30

## 2017-11-11 NOTE — CONSULTS
Consultation - Vascular Surgery   Barbara Murillo 76 y o  female MRN: [id number]  Unit/Bed#: [facility] 418-01 Encounter: [id number]      Assessment/Plan      Assessment:  68-year-old female status post pacemaker lead extraction with postoperative hematoma at the left chest wall and axilla  Patient is neurovascularly intact in the left arm  There is palpable radial pulse  Patient without evidence of expanding or pulsatile hematoma at the axilla or left chest wall  CTA has been reviewed there is no evidence of arterial extravasation  Plan:  No acute vascular intervention  Continue monitoring hematoma  Frequent neurovascular checks  Trend hgb    History of Present Illness   Physician Requesting Consult: Rufino Ward MD  Reason for Consult / Principal Problem:  Left chest wall hematoma    HPI: Barbara Murillo is a 76y o  year old female who presents with left chest wall hematoma postop day 1 following pacemaker lead extraction and replacement due to right ventricular lead perforation  Postprocedure, patient developed large left chest wall and axillary hematoma  Patient denies numbness or tingling in her fingers  Patient is not on anticoagulation at this time  She is hemodynamically stable  Postprocedure, she underwent CTA of the chest and neck without evidence of arterial extravasation following replacement of pacemaker  Consults    Review of Systems   Constitutional: Negative  HENT: Negative  Eyes: Negative  Respiratory: Negative  Gastrointestinal: Negative  Endocrine: Negative  Genitourinary: Negative  Musculoskeletal: Negative  Skin: Positive for wound  Allergic/Immunologic: Negative  Neurological: Negative          Historical Information   Past Medical History:   Diagnosis Date    Anxiety     Arthritis     Breast CA ([facility] [address] )     recurrent, ductal carcinoma ER, [place] pos    Depression     Diverticula of intestine     Dizziness     and passes out    Flushing     Hiatal hernia     [place] (hard of hearing[place]      [place]    Hypercholesteremia     Liver mass     Liver metastasis (HCC)     Metastatic adenocarcinoma to liver ([facility] [address] )     Bx 01/03/2017    PONV (postoperative nausea and vomiting)     [facility]     Shingles     Shortness of breath     on exertion    Tachycardia     Urinary incontinence     Use of cane as ambulatory aid     or walker     Past Surgical History:   Procedure Laterality Date    CARDIAC PACEMAKER REMOVAL N/A 11/9/2017    Procedure: PACEMAKER LEAD REVISION, REMOVAL OF NONFUNCTIONING LEAD, AND INSERTION OF A NEW RV LEAD;  Surgeon: Harika Walker MD;  [facility];  Service: Cardiology    CATARACT EXTRACTION Bilateral     COLONOSCOPY      HYSTERECTOMY      JOINT REPLACEMENT      [place]  TKR and right TSR    MASTECTOMY Right     NH RESEC LIVER,PART LOBECTOMY N/A 7/13/2017    Procedure: LIVER RESECTION, INTRAOPERATIVE ULTRASOUND;  Surgeon: Ilana Martinze MD;  [facility];  Service: Surgical Oncology    ROTATOR CUFF REPAIR Right     SENTINEL LYMPH NODE BIOPSY Right     TONSILECTOMY AND ADNOIDECTOMY      WOUND DEBRIDEMENT Left 11/9/2017    Procedure: DEBRIDEMENT WOUND AND DRESSING CHANGE ([address] OUT); Surgeon: Harika Walker MD;  [facility];  Service: Cardiology     Social History   History   Alcohol Use No     History   Drug Use No     History   Smoking Status    Never Smoker   Smokeless Tobacco    Never Used     Family History: non-contributory}    Meds/Allergies   all current active meds have been reviewed  Allergies   Allergen Reactions    Ampicillin Shortness Of Breath and Anaphylaxis       Objective   Vitals: Blood pressure 126/64, pulse (!) 126, temperature 98 4 °F (36 9 °C), temperature source Oral, resp  rate (!) 36, height 5' 1" (1 549 m), weight 77 4 kg (170 lb 10 2 oz), SpO2 93 %  ,Body mass index is 32 24 kg/m²      Intake/Output Summary (Last 24 hours) at 11/10/17 2005  Last data filed at 11/10/17 1621   Gross per 24 hour   Intake           2987 5 ml   Output             1454 ml   Net           1533 5 ml     Invasive Devices     Central Venous Catheter Line            CVC Central Lines 11/09/17 Triple Right Internal jugular 1 day          Peripheral Intravenous Line            Peripheral IV 11/09/17 Left Hand 1 day    Peripheral IV 11/09/17 Left Wrist 1 day          Arterial Line            Arterial Line 11/09/17 Right Radial 1 day                Physical Exam   Constitutional: She is oriented to person, place, and time  She appears well-developed  No distress  HENT:   Head: Normocephalic and atraumatic  Eyes: Pupils are equal, round, and reactive to light  Neck: Normal range of motion  Cardiovascular: Intact distal pulses  Sinus tachycardia    2+ radial pulse    Pulmonary/Chest:   Ecchymosis of the left chest wall and axilla, no evidence of pulsatile or expanding hematoma   Abdominal: Soft  She exhibits no distension  There is no tenderness  Musculoskeletal: Normal range of motion  Neurological: She is alert and oriented to person, place, and time  Skin: Skin is warm and dry         Lab Results:   Coags: No results found for: PT, PTT, INR, Creatinine:   Lab Results   Component Value Date    CREATININE 0 46 (L) 11/10/2017   , Lipid Panel: No results found for: CHOL, CBC with diff:   Lab Results   Component Value Date    WBC 9 47 11/10/2017    HGB 10 9 (L) 11/10/2017    HCT 33 8 (L) 11/10/2017    MCV 89 11/10/2017     (L) 11/10/2017    MCH 28 7 11/10/2017    MCHC 32 2 11/10/2017    RDW 15 1 11/10/2017    MPV 9 1 11/10/2017    NRBC 0 11/09/2017   , BMP/CMP:   Lab Results   Component Value Date     11/10/2017    K 4 1 11/10/2017     (H) 11/10/2017    CO2 23 11/10/2017    ANIONGAP 9 11/10/2017    BUN 10 11/10/2017    CREATININE 0 46 (L) 11/10/2017    GLUCOSE 167 (H) 11/10/2017    CALCIUM 8 1 (L) 11/10/2017    AST 17 11/09/2017    ALT 17 11/09/2017    ALKPHOS 80 11/09/2017 PROT 5 8 (L) 11/09/2017    ALBUMIN 3 0 (L) 11/09/2017    BILITOT 0 91 11/09/2017    EGFR 95 11/10/2017     Imaging Studies: I have personally reviewed pertinent reports  and I have personally reviewed pertinent films in PACS  EKG, Pathology, and Other Studies: I have personally reviewed pertinent reports     and I have personally reviewed pertinent films in PACS  VTE Prophylaxis: Heparin     Code Status: Level 1 - Full Code  Advance Directive and Living Will:      Power of :    POLST:

## 2017-11-11 NOTE — PROGRESS NOTES
Progress Note - Electrophysiology-Cardiology (EP)   Marisela Gibbons 76 y o  female MRN: [id number]  Unit/Bed#: [facility] 418-01 Encounter: [id number]      Assessment:  1   Recurrent syncope and symptomatic bradycardia with 5 second pause noted on loop recorder, status post loop recorder explant and [employer] dual-chamber pacemaker implantation 10/31/2017              A ) atrial lead dislodged immediately post op but was not intervened on due to no immediate need for atrial pacing              B ) RV lead perforation - patient with post op/outpatient chest pain and non-capturing RV lead s/p lead extraction with reimplantation 11/9/2017              C ) left chest wall hematoma s/p pocket debridement with no pocket hematoma noted; CT confirmed subpectoral hematoma  2   Low-normal LV EF per echo 07/2017  3   Metastatic breast cancer s/p right mastectomy  4  Hyperlipidemia  5  Acute left arm swelling    Plan:  1  Vascular surgery has seen the patient and has reviewed his CTA, there is no evidence of active bleeding  Hemoglobin has dropped slightly, but is still greater than 10  Will continue to monitor, check h/h later this afternoon  Will add iron  2   Blood pressure is stable since starting low-dose Lopressor  Okay to discontinue arterial line  3   She has acute left are swelling, however we are unable to anticoagulate due to her chest wall hematoma  Could consider left upper extremity Doppler, however will hold off ordering at this time  Continue supportive care, discontinue IV fluids  4   Okay to transfer out of the unit  5   Continue IV antibiotics  Subjective/Objective   Chief Complaint:  Pain has improved    Subjective:  She is feeling much better today, currently sitting up in chair  Her chest wall pain has improved but is still present  She denies shortness of breath or palpitations  She noted that her left arm became acutely swollen overnight      Objective:     Vitals: /63   Pulse 105 Temp 98 6 °F (37 °C)   Resp (!) 34   Ht 5' 1" (1 549 m)   Wt 77 kg (169 lb 12 1 oz)   LMP  (LMP Unknown) Comment: post   SpO2 97%   BMI 32 07 kg/m²   Vitals:    11/11/17 0400 11/11/17 0600   Weight: 77 kg (169 lb 12 1 oz) 77 kg (169 lb 12 1 oz)     Orthostatic Blood Pressures    Flowsheet Row Most Recent Value   Blood Pressure  110/63 filed at 11/11/2017 1115   Patient Position - Orthostatic VS  Lying filed at 11/09/2017 1100            Intake/Output Summary (Last 24 hours) at 11/11/17 0916  Last data filed at 11/11/17 0601   Gross per 24 hour   Intake             1150 ml   Output             2632 ml   Net            -1482 ml       Invasive Devices     Central Venous Catheter Line            CVC Central Lines 11/09/17 Triple Right Internal jugular 1 day          Peripheral Intravenous Line            Peripheral IV 11/09/17 Left Wrist 2 days    Peripheral IV 11/09/17 Left Hand 1 day          Arterial Line            Arterial Line 11/09/17 Right Radial 1 day                            Scheduled Meds:  ciprofloxacin 400 mg Intravenous Q12H   metoprolol tartrate 12 5 mg Oral Q12H [address]   sodium chloride 500 mL Intravenous Once   tamoxifen 20 mg Oral Daily   vancomycin 10 mg/kg Intravenous Q12H     Continuous Infusions:  lactated ringers 50 mL/hr    sodium chloride 20 mL/hr Last Rate: Stopped (11/10/17 0600)     PRN Meds:   acetaminophen    ondansetron    ondansetron    oxyCODONE      Physical Exam:   GEN: NAD, alert and oriented, well appearing  SKIN: dry without significant lesions or rashes  HEENT: NCAT, PERRL, EOMs intact  NECK: No JVD appreciated  CARDIOVASCULAR: RRR, distant S1, S2 without murmurs, rubs, or gallops appreciated  LUNGS: Clear to auscultation bilaterally without wheezes, rhonchi, or rales  ABDOMEN: Soft, nontender, nondistended  EXTREMITIES/VASCULAR: lower extremities perfused without clubbing, cyanosis, or edema b/l; left upper extremity acutely swollen  PSYCH: Normal mood and affect  NEURO: CN ll-Xll grossly intact                Lab Results: I have personally reviewed pertinent lab results  Results from last 7 days  Lab Units 17  0443 11/10/17  2133 11/10/17  1630   WBC Thousand/uL 10 14 9 71 9 47   HEMOGLOBIN g/dL 10 3* 10 9* 10 9*   HEMATOCRIT % 31 7* 32 8* 33 8*   PLATELETS Thousands/uL 132* 136* 147*       Results from last 7 days  Lab Units 11/10/17  0417 17  0614   SODIUM mmol/L 141 142  --  141   POTASSIUM mmol/L 4 1 4 1  --  4 4   CHLORIDE mmol/L 109* 111*  --  108   CO2 mmol/L 23 22  --  29   BUN mg/dL 10 10  --  15   CREATININE mg/dL 0 46* 0 48*  --  0 68   GLUCOSE RANDOM mg/dL 167* 173*  --  130   GLUCOSE, ISTAT   --   --   < >  --    CALCIUM mg/dL 8 1* 8 4  --  9 4   < > = values in this interval not displayed  Results from last 7 days  Lab Units 17  0126   INR  1 07   PTT seconds 27       Results from last 7 days  Lab Units 17  0614   MAGNESIUM mg/dL 1 6 2 1         Imaging: I have personally reviewed pertinent reports  Results for orders placed during the hospital encounter of 17   Echo complete with contrast if indicated    Narrative [address]  [address], [address] [facility]  [phone]    Transthoracic Echocardiogram  2D, M-mode, Doppler, and Color Doppler    Study date:  2017    Patient: Arminda Yung  MR number: [id number]  Account number: [de-identified]  : 1940  Age: 76 years  Gender: Female  Status: Outpatient  Location: [address] [place] [address] [facility] [place]  Height: 61 in  Weight: 161 7 lb  BP: 128/ 64 mmHg    Indications: Assess left ventricular function      Diagnoses: E78 5 - Hyperlipidemia, unspecified    Sonographer:  ELIANE Palencia  Primary Physician:  Radha Bartholomew MD  Referring Physician:  Ruba Myles DO  Group:  [id number]  Cardiology Fellow:  Lakesha Massey MD  Interpreting Physician:  Iggy Suarez MD    SUMMARY    LEFT VENTRICLE:  Systolic function was at the lower limits of normal  Ejection fraction was estimated to be 50 %  Although no diagnostic regional wall motion abnormality was identified, this possibility cannot be completely excluded on the basis of this study  Left ventricular diastolic function parameters were normal     VENTRICULAR SEPTUM:  There was dyssynergic motion  These changes are consistent with LBBB  RIGHT VENTRICLE:  The size was normal   Systolic function was normal     TRICUSPID VALVE:  There was mild regurgitation  Pulmonary artery systolic pressure was within the normal range  HISTORY: PRIOR HISTORY: Breast cancer; LBBB; Hyperlipidemia    PROCEDURE: The study was performed in the [address] [facility] [place]  This was a routine study  The transthoracic approach was used  The study included complete 2D imaging, M-mode, complete spectral Doppler, and color Doppler  The  heart rate was 90 bpm, at the start of the study  Images were obtained from the parasternal, apical, subcostal, and suprasternal notch acoustic windows  Echocardiographic views were limited due to lung interference  Image quality was  adequate  LEFT VENTRICLE: Size was normal  Systolic function was at the lower limits of normal  Ejection fraction was estimated to be 50 %  Although no diagnostic regional wall motion abnormality was identified, this possibility cannot be completely  excluded on the basis of this study  Wall thickness was normal  DOPPLER: Left ventricular diastolic function parameters were normal     VENTRICULAR SEPTUM: There was dyssynergic motion  These changes are consistent with LBBB  RIGHT VENTRICLE: The size was normal  Systolic function was normal  Wall thickness was normal     LEFT ATRIUM: Size was normal     RIGHT ATRIUM: Size was normal     MITRAL VALVE: Valve structure was normal  There was normal leaflet separation   DOPPLER: The transmitral velocity was within the normal range  There was no evidence for stenosis  There was trace regurgitation  AORTIC VALVE: The valve was trileaflet  Leaflets exhibited normal thickness and normal cuspal separation  DOPPLER: Transaortic velocity was within the normal range  There was no evidence for stenosis  There was no regurgitation  TRICUSPID VALVE: The valve structure was normal  There was normal leaflet separation  DOPPLER: The transtricuspid velocity was within the normal range  There was no evidence for stenosis  There was mild regurgitation  Pulmonary artery  systolic pressure was within the normal range  Estimated peak PA pressure was 22 mmHg  PULMONIC VALVE: Leaflets exhibited normal thickness, no calcification, and normal cuspal separation  DOPPLER: The transpulmonic velocity was within the normal range  There was trace regurgitation  PERICARDIUM: There was no pericardial effusion  AORTA: The root exhibited normal size  SYSTEMIC VEINS: IVC: The inferior vena cava was normal in size and course   Respirophasic changes were normal     SYSTEM MEASUREMENT TABLES    2D  %FS: 20 78 %  Ao Diam: 3 49 cm  EDV(Teich): 45 21 ml  EF Biplane: 52 52 %  EF(Cube): 50 28 %  EF(Teich): 43 45 %  ESV(Cube): 18 41 ml  ESV(Teich): 25 57 ml  IVSd: 1 04 cm  LA Diam: 2 52 cm  LVEDV MOD A2C: 104 64 ml  LVEDV MOD A4C: 107 05 ml  LVEDV MOD BP: 108 41 ml  LVEF MOD A2C: 58 35 %  LVEF MOD A4C: 46 17 %  LVESV MOD A2C: 43 58 ml  LVESV MOD A4C: 57 62 ml  LVESV MOD BP: 51 48 ml  LVIDd: 3 33 cm  LVIDs: 2 64 cm  LVLd A2C: 7 99 cm  LVLd A4C: 7 59 cm  LVLs A2C: 6 22 cm  LVLs A4C: 6 59 cm  LVPWd: 1 13 cm  SI(Cube): 10 76 ml/m2  SI(Teich): 11 35 ml/m2  SV MOD A2C: 61 05 ml  SV MOD A4C: 49 42 ml  SV(Cube): 18 61 ml  SV(Teich): 19 64 ml    CW  AV Vmax: 1 07 m/s  AV maxP 62 mmHg  TR Vmax: 2 08 m/s  TR maxP 3 mmHg    MM  TAPSE: 1 66 cm    PW  E': 0 11 m/s  E/E': 9 62  LVOT Vmax: 0 74 m/s  LVOT maxP 2 mmHg  MV A Yadiel: 0 27 m/s  MV Dec Carroll: 7 18 m/s2  MV DecT: 145 34 ms  MV E Yadiel: 1 04 m/s  MV E/A Ratio: 3 88    [facility] Accredited Echocardiography Laboratory    Prepared and electronically signed by    Fide Palma MD  Signed 11-Jul-2017 16:28:13         EKG/telemetry:  Normal sinus rhythm with periods of sinus tachycardia, no other noted    VTE Pharmacologic Prophylaxis: Reason for no pharmacologic prophylaxis Chest wall hematoma  VTE Mechanical Prophylaxis: sequential compression device

## 2017-11-11 NOTE — PROGRESS NOTES
Progress Note - Critical Care   Jean Carlos Crew 76 y o  female MRN: [id number]  Unit/Bed#: [facility] 877-43 Encounter: [id number]    Attending Physician: Susie Cohn MD      ______________________________________________________________________  Assessment and Plan:   Principal Problem:    Pacemaker malfunction, initial encounter  Active Problems:    Complication associated with cardiac pacemaker lead    Chest wall hematoma  Resolved Problems:    * No resolved hospital problems  *        Neuro: nv intact grossly    CV: echo done  No pericardial effusion  Ef 55  S/p lead extraction  Hematoma appears stable  Vascular following  No obvious source of bleeding on cta  Pulm: nc 02 prn  Stable at this time  GI: tolerating po diet  Cont ppi  Hx of liver mets  Ct reviewed with team     : monitor I/o's  Monitor lytes    ID: no obvious infxn at this time  Heme: hgb stable  Monitor for cont bleeding  U/s ue r/o dvt  Endo: glucose control  Msk/Skin: will need compression sleeve if u/s negative  Disposition: reposition 2h  Transfer to ms tele    Code Status: Level 1 - Full Code      Chief Complaint: left arm swelling    24 Hour Events: no events o/n  Vascular following  cta reviewed  ______________________________________________________________________    Physical Exam:   Neuro:  Intact grossly  aa x o x 3,   Cv:  rrr - rgm  Resp:  cta b/l -rrw  Abd: soft, nt, nd, +bs  Ext :  LUE with ecchymosis and swelling    B/l le with       ______________________________________________________________________  Vitals:    17 0400 17 0500 17 0600 17 0839   BP:    110/63   Pulse: 98 104 (!) 106 105   Resp: 17 (!) 25 (!) 34    Temp: 98 6 °F (37 °C)      TempSrc:       SpO2: 97% 95% 97%    Weight: 77 kg (169 lb 12 1 oz)  77 kg (169 lb 12 1 oz)    Height:           Temperature:   Temp (24hrs), Av 5 °F (36 9 °C), Min:98 4 °F (36 9 °C), Max:98 6 °F (37 °C)    Current Temperature: 98 6 °F (37 °C)  Weights:   IBW: 47 8 kg    Body mass index is 32 07 kg/m²  Weight (last 2 days)     Date/Time   Weight    11/11/17 0600  77 (169 75)    11/11/17 0400  77 (169 75)    11/10/17 0600  77 4 (170 64)               Non-Invasive/Invasive Ventilation Settings:  Respiratory    Lab Data (Last 4 hours)    None         O2/Vent Data (Last 4 hours)    None              No results found for: PHART, WUB5QDK, PO2ART, ZID9NSP, L8KXIENN, BEART, SOURCE  SpO2: SpO2: 97 %  Intake and Outputs:  I/O       11/09 0701 - 11/10 0700 11/10 0701 - 11/11 0700 11/11 0701 - 11/12 0700    P  O   300     I V  (mL/kg) 3930 (50 8) 607 5 (7 9)     Blood 700      IV Piggyback 800 350     Total Intake(mL/kg) 5430 (70 2) 1257 5 (16 3)     Urine (mL/kg/hr) 1122 (0 6) 2632 (1 4)     Stool       Total Output 1122 2632      Net +4308 -1374 5             Unmeasured Urine Occurrence 1 x            Nutrition:        Diet Orders            Start     Ordered    11/09/17 1850  Diet Cardiac; Cardiac TLC 2 3 GM NA  Diet effective now     Question Answer Comment   Diet Type Cardiac    Cardiac Cardiac TLC 2 3 GM NA    RD to adjust diet per protocol? Yes        11/09/17 1849        Labs:     Results from last 7 days  Lab Units 11/11/17  0443 11/10/17  2133 11/10/17  1630  11/09/17  2238  11/06/17  1035   WBC Thousand/uL 10 14 9 71 9 47  < > 13 37*  < > 9 70   HEMOGLOBIN g/dL 10 3* 10 9* 10 9*  < > 12 0  < > 13 0   I STAT HEMOGLOBIN   --   --   --   --   --   < >  --    HEMATOCRIT % 31 7* 32 8* 33 8*  < > 35 7  < > 40 4   PLATELETS Thousands/uL 132* 136* 147*  < > 148*  < > 176   NEUTROS PCT % 63  --   --   --  86*  --  61   MONOS PCT % 14*  --   --   --  9  --  14*   < > = values in this interval not displayed      Results from last 7 days  Lab Units 11/10/17  0417 11/09/17  2237 11/09/17 2005 11/09/17  0614   SODIUM mmol/L 141 142  --   --  141   POTASSIUM mmol/L 4 1 4 1  --   --  4 4   CHLORIDE mmol/L 109* 111*  --   --  108   CO2 mmol/L 23 22  --   --  29   BUN mg/dL 10 10  --   --  15   CREATININE mg/dL 0 46* 0 48*  --   --  0 68   CALCIUM mg/dL 8 1* 8 4  --   --  9 4   TOTAL PROTEIN g/dL  --  5 8*  --   --   --    BILIRUBIN TOTAL mg/dL  --  0 91  --   --   --    ALK PHOS U/L  --  80  --   --   --    ALT U/L  --  17  --   --   --    AST U/L  --  17  --   --   --    GLUCOSE RANDOM mg/dL 167* 173*  --   --  130   GLUCOSE, ISTAT mg/dl  --   --  156*  < >  --    < > = values in this interval not displayed  Results from last 7 days  Lab Units 11/09/17  2237 11/09/17  0614   MAGNESIUM mg/dL 1 6 2 1     Lab Results   Component Value Date    PHOS 3 3 11/09/2017    PHOS 1 8 (L) 07/15/2017        Results from last 7 days  Lab Units 11/09/17  0126   INR  1 07   PTT seconds 27       0  Lab Value Date/Time   TROPONINI <0 02 11/06/2017 1035   TROPONINI 0 06 (H) 07/15/2017 1726   TROPONINI 0 06 (H) 07/15/2017 1336   TROPONINI 0 06 (H) 07/15/2017 1119         ABG:  Lab Results   Component Value Date    PHART 7 371 07/14/2017    UQU2JKN 35 9 (L) 07/14/2017    PO2ART 120 6 07/14/2017    UZV7RWF 20 3 (L) 07/14/2017    BEART -4 3 07/14/2017    SOURCE Line, Arterial 07/14/2017     Imaging: echo 11/10 ef50%, no significant pericardial effusion  I have personally reviewed pertinent films in PACS    Micro:  Lab Results   Component Value Date    URINECX No Growth <1000 cfu/mL 04/03/2017     Allergies:    Allergies   Allergen Reactions    Ampicillin Shortness Of Breath and Anaphylaxis     Medications:   Scheduled Meds:  ciprofloxacin 400 mg Intravenous Q12H   metoprolol tartrate 12 5 mg Oral Q12H [address]   sodium chloride 500 mL Intravenous Once   tamoxifen 20 mg Oral Daily   vancomycin 10 mg/kg Intravenous Q12H     Continuous Infusions:  lactated ringers 50 mL/hr    sodium chloride 20 mL/hr Last Rate: Stopped (11/10/17 0600)     PRN Meds:    acetaminophen 650 mg Q6H PRN   ondansetron 4 mg Q4H PRN   ondansetron 4 mg Q8H PRN   oxyCODONE 5 mg Q4H PRN     VTE Pharmacologic Prophylaxis: Pharmacologic VTE Prophylaxis contraindicated due to bleeding  VTE Mechanical Prophylaxis: sequential compression device  Invasive lines and devices: Invasive Devices     Central Venous Catheter Line            CVC Central Lines 11/09/17 Triple Right Internal jugular 1 day          Peripheral Intravenous Line            Peripheral IV 11/09/17 Left Wrist 2 days    Peripheral IV 11/09/17 Left Hand 1 day          Arterial Line            Arterial Line 11/09/17 Right Radial 1 day                     Portions of the record may have been created with voice recognition software  Occasional wrong word or "sound a like" substitutions may have occurred due to the inherent limitations of voice recognition software  Read the chart carefully and recognize, using context, where substitutions have occurred      Chris Glasgow PA-C

## 2017-11-12 LAB
ABO GROUP BLD BPU: NORMAL
ABO GROUP BLD BPU: NORMAL
BPU ID: NORMAL
BPU ID: NORMAL
UNIT DISPENSE STATUS: NORMAL
UNIT DISPENSE STATUS: NORMAL
UNIT PRODUCT CODE: NORMAL
UNIT PRODUCT CODE: NORMAL
UNIT RH: NORMAL
UNIT RH: NORMAL

## 2017-11-12 RX ORDER — ATORVASTATIN CALCIUM 20 MG/1
20 TABLET, FILM COATED ORAL
Status: DISCONTINUED | OUTPATIENT
Start: 2017-11-12 | End: 2017-11-15 | Stop reason: HOSPADM

## 2017-11-12 RX ADMIN — VANCOMYCIN HYDROCHLORIDE 750 MG: 750 INJECTION, SOLUTION INTRAVENOUS at 13:52

## 2017-11-12 RX ADMIN — Medication 325 MG: at 15:30

## 2017-11-12 RX ADMIN — VANCOMYCIN HYDROCHLORIDE 750 MG: 750 INJECTION, SOLUTION INTRAVENOUS at 01:38

## 2017-11-12 RX ADMIN — METOPROLOL TARTRATE 12.5 MG: 25 TABLET ORAL at 09:14

## 2017-11-12 RX ADMIN — CIPROFLOXACIN 400 MG: 2 INJECTION, SOLUTION INTRAVENOUS at 10:46

## 2017-11-12 RX ADMIN — CIPROFLOXACIN 400 MG: 2 INJECTION, SOLUTION INTRAVENOUS at 22:00

## 2017-11-12 RX ADMIN — METOPROLOL TARTRATE 25 MG: 25 TABLET ORAL at 21:12

## 2017-11-12 RX ADMIN — Medication 325 MG: at 09:14

## 2017-11-12 RX ADMIN — ATORVASTATIN CALCIUM 20 MG: 20 TABLET, FILM COATED ORAL at 15:30

## 2017-11-12 RX ADMIN — ACETAMINOPHEN 650 MG: 325 TABLET, FILM COATED ORAL at 15:26

## 2017-11-12 RX ADMIN — ACETAMINOPHEN 650 MG: 325 TABLET, FILM COATED ORAL at 09:14

## 2017-11-12 RX ADMIN — TAMOXIFEN CITRATE 20 MG: 10 TABLET, FILM COATED ORAL at 21:13

## 2017-11-12 NOTE — PROGRESS NOTES
Progress Note - Electrophysiology-Cardiology (EP)   Justice Paulson 76 y o  female MRN: [id number]  Unit/Bed#: [facility] 426-01 Encounter: [id number]      Assessment:  1   Recurrent syncope and symptomatic bradycardia with 5 second pause noted on loop recorder, status post loop recorder explant and Medtronic dual-chamber pacemaker implantation 10/31/2017              A ) atrial lead dislodged immediately post op but was not intervened on due to no immediate need for atrial pacing              B ) RV lead perforation - patient with post op/outpatient chest pain and non-capturing RV lead s/p lead extraction with reimplantation 11/9/2017              C ) left chest wall hematoma s/p pocket debridement with no pocket hematoma noted; CT confirmed subpectoral hematoma  2   Low-normal LV EF per echo 07/2017  3   Metastatic breast cancer s/p right mastectomy  4  Hyperlipidemia  5  Acute left arm swelling    Plan:  1  Hemoglobin/hematocrit stable yesterday afternoon  Will check tomorrow morning  2   Upper extremity Doppler ordered by Critical Care, awaiting results  Would not be able to anticoagulate at this time given chest wall hematoma, but would at least know the extent of possible occlusion  It appears slightly improved since yesterday, encouraged her to keep elevated as possible  3   Continue IV antibiotics per Dr Lilian Sylvester  Will re-evaluate tomorrow  He would like her to stay on antibiotics for 10 days total following discharge  4   Continue iron  5   Increase Lopressor to 25 mg twice daily  6   Incentive spirometry  7   She questions why she is not getting her Lipitor this admission, and I am unclear as to why was not ordered  I will restart her prior home dosing  Subjective/Objective   Chief Complaint:  Feeling better    Subjective:  She is starting to feel more like herself  Her pain is better controlled on Tylenol alone    She states that overnight she had no issues with pain, only that she had to urinate frequently  She has ambulated without significant symptoms  She has chronic dizziness, but this is actually improved during this admission      Objective:     Vitals: /60   Pulse 100   Temp 97 6 °F (36 4 °C) (Oral)   Resp 18   Ht 5' 1" (1 549 m)   Wt 77 kg (169 lb 12 1 oz)   LMP  (LMP Unknown) Comment: post   SpO2 92%   BMI 32 07 kg/m²   Vitals:    11/11/17 0400 11/11/17 0600   Weight: 77 kg (169 lb 12 1 oz) 77 kg (169 lb 12 1 oz)     Orthostatic Blood Pressures    Flowsheet Row Most Recent Value   Blood Pressure  130/60 filed at 11/12/2017 0732   Patient Position - Orthostatic VS  Sitting filed at 11/12/2017 0732            Intake/Output Summary (Last 24 hours) at 11/12/17 1121  Last data filed at 11/12/17 1028   Gross per 24 hour   Intake              550 ml   Output             2341 ml   Net            -1791 ml       Invasive Devices     Central Venous Catheter Line            CVC Central Lines 11/09/17 Triple Right Internal jugular 2 days                            Scheduled Meds:  ciprofloxacin 400 mg Intravenous Q12H   ferrous sulfate 325 mg Oral BID With Meals   metoprolol tartrate 12 5 mg Oral Q12H [address]   tamoxifen 20 mg Oral Daily   vancomycin 10 mg/kg Intravenous Q12H     Continuous Infusions:   PRN Meds:   acetaminophen    ondansetron    ondansetron    oxyCODONE    Physical Exam:   GEN: NAD, alert and oriented, well appearing  SKIN: dry without significant lesions or rashes  HEENT: NCAT, PERRL, EOMs intact  NECK: No JVD or carotid bruits appreciated  CARDIOVASCULAR: RRR, normal S1, S2 without murmurs, rubs, or gallops appreciated  LUNGS: Clear to auscultation bilaterally without wheezes, rhonchi, or rales; decreased breath sounds at bases bilaterally  ABDOMEN: Soft, nontender, nondistended  EXTREMITIES/VASCULAR: LE perfused without clubbing, cyanosis, or edema b/l; left upper extremity swelling with intact pulses, decreased since yesterday  PSYCH: Normal mood and affect  NEURO: CN ll-Xll grossly intact                Lab Results: I have personally reviewed pertinent lab results  Results from last 7 days  Lab Units 17  1443 17  0443 11/10/17  2133 11/10/17  1630   WBC Thousand/uL  --  10 14 9 71 9 47   HEMOGLOBIN g/dL 10 3* 10 3* 10 9* 10 9*   HEMATOCRIT % 31 5* 31 7* 32 8* 33 8*   PLATELETS Thousands/uL  --  132* 136* 147*       Results from last 7 days  Lab Units 11/10/17  0417 17  2237  17  0614   SODIUM mmol/L 141 142  --  141   POTASSIUM mmol/L 4 1 4 1  --  4 4   CHLORIDE mmol/L 109* 111*  --  108   CO2 mmol/L 23 22  --  29   BUN mg/dL 10 10  --  15   CREATININE mg/dL 0 46* 0 48*  --  0 68   GLUCOSE RANDOM mg/dL 167* 173*  --  130   GLUCOSE, ISTAT   --   --   < >  --    CALCIUM mg/dL 8 1* 8 4  --  9 4   < > = values in this interval not displayed  Results from last 7 days  Lab Units 17  0126   INR  1 07   PTT seconds 27       Results from last 7 days  Lab Units 17  0614   MAGNESIUM mg/dL 1 6 2 1         Imaging: I have personally reviewed pertinent reports  Results for orders placed during the hospital encounter of 17   Echo complete with contrast if indicated    Narrative [facility]  [facility], [address] [facility]  [phone]    Transthoracic Echocardiogram  2D, M-mode, Doppler, and Color Doppler    Study date:  2017    Patient: Renny Gleason  MR number: [id number]  Account number: [de-identified]  : 1940  Age: 76 years  Gender: Female  Status: Outpatient  Location: [address] [place] [address] [facility]  Height: 61 in  Weight: 161 7 lb  BP: 128/ 64 mmHg    Indications: Assess left ventricular function      Diagnoses: E78 5 - Hyperlipidemia, unspecified    Sonographer:  ELIANE Rosas  Primary Physician:  Chino Shields MD  Referring Physician:  Gale Bone DO  Group:  [id number]  Cardiology Fellow:  Iris Junior MD  Interpreting Physician:  Flaquito Carrillo MD    SUMMARY    LEFT VENTRICLE:  Systolic function was at the lower limits of normal  Ejection fraction was estimated to be 50 %  Although no diagnostic regional wall motion abnormality was identified, this possibility cannot be completely excluded on the basis of this study  Left ventricular diastolic function parameters were normal     VENTRICULAR SEPTUM:  There was dyssynergic motion  These changes are consistent with LBBB  RIGHT VENTRICLE:  The size was normal   Systolic function was normal     TRICUSPID VALVE:  There was mild regurgitation  Pulmonary artery systolic pressure was within the normal range  HISTORY: PRIOR HISTORY: Breast cancer; LBBB; Hyperlipidemia    PROCEDURE: The study was performed in the [address] [facility] [place]  This was a routine study  The transthoracic approach was used  The study included complete 2D imaging, M-mode, complete spectral Doppler, and color Doppler  The  heart rate was 90 bpm, at the start of the study  Images were obtained from the parasternal, apical, subcostal, and suprasternal notch acoustic windows  Echocardiographic views were limited due to lung interference  Image quality was  adequate  LEFT VENTRICLE: Size was normal  Systolic function was at the lower limits of normal  Ejection fraction was estimated to be 50 %  Although no diagnostic regional wall motion abnormality was identified, this possibility cannot be completely  excluded on the basis of this study  Wall thickness was normal  DOPPLER: Left ventricular diastolic function parameters were normal     VENTRICULAR SEPTUM: There was dyssynergic motion  These changes are consistent with LBBB  RIGHT VENTRICLE: The size was normal  Systolic function was normal  Wall thickness was normal     LEFT ATRIUM: Size was normal     RIGHT ATRIUM: Size was normal     MITRAL VALVE: Valve structure was normal  There was normal leaflet separation   DOPPLER: The transmitral velocity was within the normal range  There was no evidence for stenosis  There was trace regurgitation  AORTIC VALVE: The valve was trileaflet  Leaflets exhibited normal thickness and normal cuspal separation  DOPPLER: Transaortic velocity was within the normal range  There was no evidence for stenosis  There was no regurgitation  TRICUSPID VALVE: The valve structure was normal  There was normal leaflet separation  DOPPLER: The transtricuspid velocity was within the normal range  There was no evidence for stenosis  There was mild regurgitation  Pulmonary artery  systolic pressure was within the normal range  Estimated peak PA pressure was 22 mmHg  PULMONIC VALVE: Leaflets exhibited normal thickness, no calcification, and normal cuspal separation  DOPPLER: The transpulmonic velocity was within the normal range  There was trace regurgitation  PERICARDIUM: There was no pericardial effusion  AORTA: The root exhibited normal size  SYSTEMIC VEINS: IVC: The inferior vena cava was normal in size and course   Respirophasic changes were normal     SYSTEM MEASUREMENT TABLES    2D  %FS: 20 78 %  Ao Diam: 3 49 cm  EDV(Teich): 45 21 ml  EF Biplane: 52 52 %  EF(Cube): 50 28 %  EF(Teich): 43 45 %  ESV(Cube): 18 41 ml  ESV(Teich): 25 57 ml  IVSd: 1 04 cm  LA Diam: 2 52 cm  LVEDV MOD A2C: 104 64 ml  LVEDV MOD A4C: 107 05 ml  LVEDV MOD BP: 108 41 ml  LVEF MOD A2C: 58 35 %  LVEF MOD A4C: 46 17 %  LVESV MOD A2C: 43 58 ml  LVESV MOD A4C: 57 62 ml  LVESV MOD BP: 51 48 ml  LVIDd: 3 33 cm  LVIDs: 2 64 cm  LVLd A2C: 7 99 cm  LVLd A4C: 7 59 cm  LVLs A2C: 6 22 cm  LVLs A4C: 6 59 cm  LVPWd: 1 13 cm  SI(Cube): 10 76 ml/m2  SI(Teich): 11 35 ml/m2  SV MOD A2C: 61 05 ml  SV MOD A4C: 49 42 ml  SV(Cube): 18 61 ml  SV(Teich): 19 64 ml    CW  AV Vmax: 1 07 m/s  AV maxP 62 mmHg  TR Vmax: 2 08 m/s  TR maxP 3 mmHg    MM  TAPSE: 1 66 cm    PW  E': 0 11 m/s  E/E': 9 62  LVOT Vmax: 0 74 m/s  LVOT maxP 2 mmHg  MV A Yadiel: 0 27 m/s  MV Dec [place]: 7 18 m/s2  MV DecT: 145 34 ms  MV E Yadiel: 1 04 m/s  MV E/A Ratio: 3 88    [address] Accredited Echocardiography Laboratory    Prepared and electronically signed by    Yamilet Marc MD  Signed 11-Jul-2017 16:28:13         EKG/telemetry:  Normal sinus rhythm with periods of sinus tachycardia    VTE Pharmacologic Prophylaxis: Reason for no pharmacologic prophylaxis Chest wall hematoma  VTE Mechanical Prophylaxis: sequential compression device

## 2017-11-13 ENCOUNTER — APPOINTMENT (INPATIENT)
Dept: NON INVASIVE DIAGNOSTICS | Facility: HOSPITAL | Age: 77
DRG: 260 | End: 2017-11-13
Payer: COMMERCIAL

## 2017-11-13 ENCOUNTER — APPOINTMENT (INPATIENT)
Dept: RADIOLOGY | Facility: HOSPITAL | Age: 77
DRG: 260 | End: 2017-11-13
Payer: COMMERCIAL

## 2017-11-13 DIAGNOSIS — D50.0 IRON DEFICIENCY ANEMIA DUE TO CHRONIC BLOOD LOSS: ICD-10-CM

## 2017-11-13 DIAGNOSIS — C78.7 SECONDARY MALIGNANT NEOPLASM OF LIVER AND INTRAHEPATIC BILE DUCT (HCC): ICD-10-CM

## 2017-11-13 LAB
ANION GAP SERPL CALCULATED.3IONS-SCNC: 4 MMOL/L (ref 4–13)
BASOPHILS # BLD AUTO: 0.03 THOUSANDS/ΜL (ref 0–0.1)
BASOPHILS NFR BLD AUTO: 0 % (ref 0–1)
BUN SERPL-MCNC: 15 MG/DL (ref 5–25)
CALCIUM SERPL-MCNC: 8.4 MG/DL (ref 8.3–10.1)
CHLORIDE SERPL-SCNC: 110 MMOL/L (ref 100–108)
CO2 SERPL-SCNC: 29 MMOL/L (ref 21–32)
CREAT SERPL-MCNC: 0.58 MG/DL (ref 0.6–1.3)
EOSINOPHIL # BLD AUTO: 0.31 THOUSAND/ΜL (ref 0–0.61)
EOSINOPHIL NFR BLD AUTO: 4 % (ref 0–6)
ERYTHROCYTE [DISTWIDTH] IN BLOOD BY AUTOMATED COUNT: 14.7 % (ref 11.6–15.1)
GFR SERPL CREATININE-BSD FRML MDRD: 88 ML/MIN/1.73SQ M
GLUCOSE SERPL-MCNC: 126 MG/DL (ref 65–140)
HCT VFR BLD AUTO: 27.1 % (ref 34.8–46.1)
HGB BLD-MCNC: 8.7 G/DL (ref 11.5–15.4)
LYMPHOCYTES # BLD AUTO: 1.69 THOUSANDS/ΜL (ref 0.6–4.47)
LYMPHOCYTES NFR BLD AUTO: 22 % (ref 14–44)
MCH RBC QN AUTO: 29.4 PG (ref 26.8–34.3)
MCHC RBC AUTO-ENTMCNC: 32.1 G/DL (ref 31.4–37.4)
MCV RBC AUTO: 92 FL (ref 82–98)
MONOCYTES # BLD AUTO: 1.2 THOUSAND/ΜL (ref 0.17–1.22)
MONOCYTES NFR BLD AUTO: 16 % (ref 4–12)
NEUTROPHILS # BLD AUTO: 4.46 THOUSANDS/ΜL (ref 1.85–7.62)
NEUTS SEG NFR BLD AUTO: 58 % (ref 43–75)
NRBC BLD AUTO-RTO: 0 /100 WBCS
PLATELET # BLD AUTO: 153 THOUSANDS/UL (ref 149–390)
PMV BLD AUTO: 8.9 FL (ref 8.9–12.7)
POTASSIUM SERPL-SCNC: 4 MMOL/L (ref 3.5–5.3)
RBC # BLD AUTO: 2.96 MILLION/UL (ref 3.81–5.12)
SODIUM SERPL-SCNC: 143 MMOL/L (ref 136–145)
WBC # BLD AUTO: 7.71 THOUSAND/UL (ref 4.31–10.16)

## 2017-11-13 PROCEDURE — 85025 COMPLETE CBC W/AUTO DIFF WBC: CPT | Performed by: PHYSICIAN ASSISTANT

## 2017-11-13 PROCEDURE — 71260 CT THORAX DX C+: CPT

## 2017-11-13 PROCEDURE — 97116 GAIT TRAINING THERAPY: CPT

## 2017-11-13 PROCEDURE — 93971 EXTREMITY STUDY: CPT

## 2017-11-13 PROCEDURE — 74177 CT ABD & PELVIS W/CONTRAST: CPT

## 2017-11-13 PROCEDURE — G8979 MOBILITY GOAL STATUS: HCPCS

## 2017-11-13 PROCEDURE — 80048 BASIC METABOLIC PNL TOTAL CA: CPT | Performed by: PHYSICIAN ASSISTANT

## 2017-11-13 PROCEDURE — 97163 PT EVAL HIGH COMPLEX 45 MIN: CPT

## 2017-11-13 PROCEDURE — G8978 MOBILITY CURRENT STATUS: HCPCS

## 2017-11-13 RX ADMIN — CIPROFLOXACIN 400 MG: 2 INJECTION, SOLUTION INTRAVENOUS at 09:54

## 2017-11-13 RX ADMIN — ACETAMINOPHEN 650 MG: 325 TABLET, FILM COATED ORAL at 00:40

## 2017-11-13 RX ADMIN — VANCOMYCIN HYDROCHLORIDE 750 MG: 750 INJECTION, SOLUTION INTRAVENOUS at 00:42

## 2017-11-13 RX ADMIN — METOPROLOL TARTRATE 25 MG: 25 TABLET ORAL at 22:30

## 2017-11-13 RX ADMIN — VANCOMYCIN HYDROCHLORIDE 750 MG: 750 INJECTION, SOLUTION INTRAVENOUS at 13:10

## 2017-11-13 RX ADMIN — OXYCODONE HYDROCHLORIDE 5 MG: 5 TABLET ORAL at 14:38

## 2017-11-13 RX ADMIN — Medication 325 MG: at 08:43

## 2017-11-13 RX ADMIN — ACETAMINOPHEN 650 MG: 325 TABLET, FILM COATED ORAL at 08:44

## 2017-11-13 RX ADMIN — TAMOXIFEN CITRATE 20 MG: 10 TABLET, FILM COATED ORAL at 22:30

## 2017-11-13 RX ADMIN — Medication 325 MG: at 16:11

## 2017-11-13 RX ADMIN — METOPROLOL TARTRATE 25 MG: 25 TABLET ORAL at 08:44

## 2017-11-13 RX ADMIN — IOHEXOL 100 ML: 350 INJECTION, SOLUTION INTRAVENOUS at 16:43

## 2017-11-13 RX ADMIN — CIPROFLOXACIN 400 MG: 2 INJECTION, SOLUTION INTRAVENOUS at 22:34

## 2017-11-13 RX ADMIN — ATORVASTATIN CALCIUM 20 MG: 20 TABLET, FILM COATED ORAL at 16:11

## 2017-11-13 NOTE — PHYSICAL THERAPY NOTE
Physical Therapy Evaluation    Patient's Name: David Bartholomew    Admitting Diagnosis  Pacemaker malfunction, initial encounter [N04 319T]  Complication associated with cardiac pacemaker lead, initial encounter [T82  9XXA]    Problem List  Patient Active Problem List   Diagnosis    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 ([facility] )    Hypercholesteremia    Gastroesophageal reflux disease without esophagitis    History of total knee replacement    Hearing loss    Left bundle branch block (LBBB)    Symptomatic bradycardia    Complication associated with cardiac pacemaker lead    Pacemaker malfunction, initial encounter    Chest wall hematoma       Past Medical History  Past Medical History:   Diagnosis Date    Anxiety     Arthritis     Breast CA ([facility] )     recurrent, ductal carcinoma ER, OR pos    Depression     Diverticula of intestine     Dizziness     and passes out    Flushing     Hiatal hernia     [place] (hard of hearing)      [place]    Hypercholesteremia     Liver mass     Liver metastasis (HCC)     Metastatic adenocarcinoma to liver ([facility] )     Bx 01/03/2017    PONV (postoperative nausea and vomiting)     Recurrent breast cancer (HCC)     Shingles     Shortness of breath     on exertion    Tachycardia     Urinary incontinence     Use of cane as ambulatory aid     or walker       Past Surgical History  Past Surgical History:   Procedure Laterality Date    CARDIAC PACEMAKER REMOVAL N/A 11/9/2017    Procedure: PACEMAKER LEAD REVISION, REMOVAL OF NONFUNCTIONING LEAD, AND INSERTION OF A NEW RV LEAD;  Surgeon: Rodolfo Medley MD;  [facility];  Service: Cardiology    CATARACT EXTRACTION Bilateral     COLONOSCOPY      HYSTERECTOMY      JOINT REPLACEMENT      lizette  TKR and right TSR    MASTECTOMY Right     OR RESEC LIVER,PART LOBECTOMY N/A 7/13/2017    Procedure: LIVER RESECTION, INTRAOPERATIVE ULTRASOUND;  Surgeon: Lena Mazariegos MD;  [facility];  Service: Surgical Oncology    ROTATOR CUFF REPAIR Right     SENTINEL LYMPH NODE BIOPSY Right     TONSILECTOMY AND ADNOIDECTOMY      WOUND DEBRIDEMENT Left 11/9/2017    Procedure: DEBRIDEMENT WOUND AND DRESSING CHANGE [facility]); Surgeon: Leann Pittman MD;  [facility];  Service: Cardiology          11/13/17 2869   Note Type   Note type Eval only   Pain Assessment   Pain Assessment 0-10   Pain Score 8   Pain Location Ankle;Back   Pain Orientation Left   Home Living   Type of [address] Multi-level;1/2 bath on main level;Stairs to enter with rails  (5 ELGIN)   Bathroom Toilet Raised   Bathroom Equipment Shower chair   Home Equipment Walker;Cane   Prior Function   Level of [place] Independent with ADLs and functional mobility   Lives With Spouse   ADL Assistance Independent   IADLs Independent   Falls in the last 6 months 0   Vocational Retired   Restrictions/Precautions   [facility] Bearing Precautions Per Order No   Braces or Orthoses (DENIES)   Other Precautions Pain; Fall Risk;Telemetry  (URINARY INCONTINENCE)   General   Family/Caregiver Present No   Cognition   Overall Cognitive Status WFL   RUE Assessment   RUE Assessment WFL   LUE Assessment   LUE Assessment X   LUE Strength   LUE Overall Strength Deficits;Due to pain  (GROSSLY 2/5; LIMITED BY PAIN/EDEMA )   RLE Assessment   RLE Assessment WFL  (4-/5 (GROSSLY))   LLE Assessment   LLE Assessment WFL  (4-/5 (GROSSLY))   Bed Mobility   Supine to Sit Unable to assess   Sit to Supine Unable to assess   Transfers   Sit to Stand 5  Supervision   Additional items Impulsive   Stand to Sit 5  Supervision   Ambulation/Elevation   Gait pattern Excessively slow; Foward flexed;Decreased foot clearance   Gait Assistance 4  Minimal assist   Additional items Assist x 1   Assistive Device Rolling walker   Distance 5 FEET    Balance   Static Sitting Normal   Static Standing Fair -   Ambulatory Poor +   Endurance Deficit   Endurance Deficit Yes   Endurance Deficit Description PAIN; FATIGUE; WEAKNESS   Activity Tolerance   Activity Tolerance Patient limited by fatigue   Nurse Made Aware JOLANTA TO SEE PER ELEONORA OWEN   Assessment   Prognosis Good   Problem List Decreased strength;Decreased range of motion;Decreased endurance;Pain;Decreased skin integrity; Impaired balance;Decreased mobility   Assessment PT COMPLETED EVALUATION OF 76YEAR OLD FEMALE WHO HAD PREVIOUSLY UNDERGONE A RECENT PACEMAKER INSERTION ON 10/31/17 AND WAS D/C HOME  PATIENT ADMITTED TO ED 11/6/17 FOR CHEST PAIN HOWEVER AFTER NORMAL TEST RESULTS WAS D/C HOME  PATIENT ADMITTED THIS ADMISSION WITH "PACEMAKER MALFUNCTION" AND IS NOW S/P  "LEAD EXTRACTION WITH RE-IMPLANTATION" 11/9/17  ADDITIONAL DIAGNOSIS INCLUDES SUBPECTORAL HEMATOMA WITH L UE EDMEA S/P DOPPLER STUDY THIS AM (PENDING RESULTS)  CURRENT MEDICAL AND PHYSICAL INSTABILITIES INCLUDE PAIN (L ARM/BACK), TELEMETRY MONITORING, FALLS RISK, AND A REGRESSION IN FUNCTIONAL STATUS FROM BASELINE  PMH IS SIGNIFICANT FOR BREAST CA (S/P R MASECTOMY 2009), HEPATIC METASTASIS (S/P RESECTION 09/2017), [place], AND SHINGLES  PRIOR TO THIS ADMISSION PATIENT RESIDED IN MULTILEVEL HOME (NO FIRST FLOOR SET UP, 5 ELGIN) WITH SPOUSE WHERE SHE REPORTS PRIOR I WITH MOBILITY (NO AD), ADLS, AND IADLS  CURRENT IMPAIRMENTS INCLUDE PAIN, REDUCED STRENGTH AND ROM OF L UE, AND DECREASED ACTIVITY TOLERANCE  DURING PT EVALUATION PATIENT REQUIRED S W/ INCREASED TIME FOR SIT<-->STAND TRANSFERS AND ALICE-X1 FOR SHORT DISTANCE AMBULATION  PATIENT AMBULATED 5 FEET (BED-->TOILET) W/ RW PRESENTING WITH REDUCED B/L LE CLEARANCE AND GAIT SPEED  ANTICIPATE WITH CONTINUED MOBILITY AND ACHIEVEMENT OF GOALS PATIENT WILL D/C HOME W/ FAMILY SUPPORT PRN (SPOUSE AND DGHT), USE OF RW, AND HOME PT SERVICES  SHE WILL BENEFIT FROM CONTINUED SKILLED INPT PT THIS ADMISSION TO ACHIEVE MAXIMAL FUNCTION AND SAFETY      Goals   Patient Goals TO USE THE BATHROOM    LTG Expiration Date 11/23/17   Long Term Goal #1 7-10 DAYS: 1) COMPLETE BED MOBILITY MOD-I; 2) PERFORM SIT<-->STAND TRANSFER MOD-I; 3) AMBULATE 300 FEET MOD-I W/ RW; 4) NAVIGATE 12 STEPS S LEVEL; 5) IMPROVE B/L LE STRENGTH BY 1/2 GRADE; 6) IMPROVE ACTIVITY TOLERANCE TO 45 MINUTES    Treatment Day 0   Plan   Treatment/Interventions Functional transfer training;LE strengthening/ROM; Elevations; Therapeutic exercise; Endurance training;Gait training;Bed mobility; Equipment eval/education   PT Frequency 5x/wk   Recommendation   Recommendation Home with family support;Home PT   Equipment Recommended Walker  (PATIENT HAS RW AT HOME)   PT - OK to Discharge No  (IMPROVE MOBILITY AND STAIRS PRIOR TO D/C HOME )   Barthel Index   Feeding 10   Bathing 0   Grooming Score 0   Dressing Score 5   Bladder Score 0   Bowels Score 10   Toilet Use Score 5   Transfers (Bed/Chair) Score 10   Mobility (Level Surface) Score 0   Stairs Score 0   Barthel Index Score 40         Finn Arroyo, PT

## 2017-11-13 NOTE — PLAN OF CARE
Problem: PHYSICAL THERAPY ADULT  Goal: Performs mobility at highest level of function for planned discharge setting  See evaluation for individualized goals  Treatment/Interventions: Functional transfer training, LE strengthening/ROM, Elevations, Therapeutic exercise, Endurance training, Gait training, Bed mobility, Equipment eval/education  Equipment Recommended: Walker (PATIENT HAS RW AT HOME)       See flowsheet documentation for full assessment, interventions and recommendations  Kassandra Diaz PT    Prognosis: Good  Problem List: Decreased strength, Decreased range of motion, Decreased endurance, Pain, Decreased skin integrity, Impaired balance, Decreased mobility  Assessment: PT COMPLETED EVALUATION OF 76YEAR OLD FEMALE WHO HAD PREVIOUSLY UNDERGONE A RECENT PACEMAKER INSERTION ON 10/31/17 AND WAS D/C HOME  PATIENT ADMITTED TO ED 11/6/17 FOR CHEST PAIN HOWEVER AFTER NORMAL TEST RESULTS WAS D/C HOME  PATIENT ADMITTED THIS ADMISSION WITH "PACEMAKER MALFUNCTION" AND IS NOW S/P  "LEAD EXTRACTION WITH RE-IMPLANTATION" 11/9/17  ADDITIONAL DIAGNOSIS INCLUDES SUBPECTORAL HEMATOMA WITH L UE EDMEA S/P DOPPLER STUDY THIS AM (PENDING RESULTS)  CURRENT MEDICAL AND PHYSICAL INSTABILITIES INCLUDE PAIN (L ARM/BACK), TELEMETRY MONITORING, FALLS RISK, AND A REGRESSION IN FUNCTIONAL STATUS FROM BASELINE  PMH IS SIGNIFICANT FOR BREAST CA (S/P R MASECTOMY 2009), HEPATIC METASTASIS (S/P RESECTION 09/2017), [place], AND SHINGLES  PRIOR TO THIS ADMISSION PATIENT RESIDED IN MULTILEVEL HOME (NO FIRST FLOOR SET UP, 5 ELGIN) WITH SPOUSE WHERE SHE REPORTS PRIOR I WITH MOBILITY (NO AD), ADLS, AND IADLS  CURRENT IMPAIRMENTS INCLUDE PAIN, REDUCED STRENGTH AND ROM OF L UE, AND DECREASED ACTIVITY TOLERANCE  DURING PT EVALUATION PATIENT REQUIRED S W/ INCREASED TIME FOR SIT<-->STAND TRANSFERS AND ALICE-X1 FOR SHORT DISTANCE AMBULATION  PATIENT AMBULATED 5 FEET (BED-->TOILET) W/ RW PRESENTING WITH REDUCED B/L LE CLEARANCE AND GAIT SPEED  ANTICIPATE WITH CONTINUED MOBILITY AND ACHIEVEMENT OF GOALS PATIENT WILL D/C HOME W/ FAMILY SUPPORT PRN (SPOUSE AND DGHT), USE OF RW, AND HOME PT SERVICES  SHE WILL BENEFIT FROM CONTINUED SKILLED INPT PT THIS ADMISSION TO ACHIEVE MAXIMAL FUNCTION AND SAFETY  Recommendation: (S) Home with family support, Home PT     PT - OK to Discharge: No (IMPROVE MOBILITY AND STAIRS PRIOR TO D/C HOME )    See flowsheet documentation for full assessment     Jimy Estrada, PT

## 2017-11-13 NOTE — PHYSICAL THERAPY NOTE
PT TREATMENT       11/13/17 0910   Pain Assessment   Pain Assessment 0-10   Pain Score 8   Pain Type Acute pain   Pain Location Back;Arm   Pain Orientation Left   Restrictions/Precautions   Other Precautions Pain; Fall Risk;Telemetry   General   Chart Reviewed Yes   Response to Previous Treatment Patient with no complaints from previous session  Family/Caregiver Present No   Cognition   Overall Cognitive Status WFL   Subjective   Subjective "IM HOPING TO GET BACK HOME"   Transfers   Sit to Stand 5  Supervision   Additional items Increased time required   Stand to Sit 5  Supervision   Ambulation/Elevation   Gait pattern Excessively slow;Decreased foot clearance; Foward flexed   Gait Assistance 4  Minimal assist   Additional items Assist x 1   Assistive Device Rolling walker   Distance 50 FEET X2   Balance   Static Sitting Normal   Static Standing Fair -   Ambulatory Poor +   Endurance Deficit   Endurance Deficit Yes   Endurance Deficit Description PAIN; WEAKNESS; FATIGUE   Activity Tolerance   Activity Tolerance Patient limited by fatigue   Nurse Made Aware JOLANTA TO SEE PER RN BRAYDEN   Assessment   Prognosis Good   Problem List Decreased strength;Decreased endurance; Impaired balance;Decreased mobility;Pain   Assessment PT INITIATED TREATMENT SESSION IN ORDER TO ASSIST PATIENT IN ACHIEVING GOALS FOR IMPROVED AMBULATION AND ACTIVITY TOLERANCE  UPON ARRIVAL PATIENT REPORTING 8/10 PAIN IN L UE WITH REDUCED FUNCTION (REQUIRED ASSISTANCE FOR TOILETING AND DONNING UNDERWEAR)  PATIENT PERFORMED SIT<-->STAND TRANSFERS S LEVEL AND AMBULATED MIN-AX1 W/ RW 50 FEET X2  GAIT PRESENTS WITH REDUCED GAIT SPEED AND B/L LE CLEARANCE WITH MILD FORWARD TRUNK FLEXION  DISTANCE LIMITED BY REPORTED FATIGUE  ENCOURAGED AMBULATION 3 MORE TIMES W/ RN STAFF TODAY  ANTICIPATE WITH CONTINUED MOBILITY AND ACHIEVEMENT OF PT GOALS PATIENT WILL D/C HOME WITH FAMILY SUPPORT PRN, USE OF RW, AND HOME PT SERVICES   PLAN TO PROGRESS AMBULATORY DISTANCE AND TRIAL STAIR NAVIGATION IN FUTURE TREATMENT SESSIONS  Goals   Patient Goals TO WALK    LTG Expiration Date 11/23/17   Treatment Day 1   Plan   Treatment/Interventions Functional transfer training;LE strengthening/ROM; Elevations; Therapeutic exercise; Endurance training;Gait training;Bed mobility; Equipment eval/education   Progress Progressing toward goals   PT Frequency 5x/wk  (BID PRN )   Recommendation   Recommendation Home with family support;Home PT   Equipment Recommended Walker  (HAS RW AT HOME )   PT - OK to Discharge No  ([address] )   Suresh Turner, PT

## 2017-11-13 NOTE — PROGRESS NOTES
Progress Note - Electrophysiology-Cardiology (EP)   Barbara Murillo 76 y o  female MRN: [id number]  Unit/Bed#: [facility] 426-01 Encounter: [id number]      Assessment:  1   Recurrent syncope and symptomatic bradycardia with 5 second pause noted on loop recorder, status post loop recorder explant and [employer] dual-chamber pacemaker implantation 10/31/2017              A ) atrial lead dislodged immediately post op but was not intervened on due to no immediate need for atrial pacing              B ) RV lead perforation - patient with post op/outpatient chest pain and non-capturing RV lead s/p lead extraction with reimplantation 11/9/2017              C ) left chest wall hematoma s/p pocket debridement with no pocket hematoma noted; CT confirmed subpectoral hematoma  2   Low-normal LV EF per echo 07/2017  3   Metastatic breast cancer s/p right mastectomy  4   Hyperlipidemia  5   Acute left arm swelling    Plan:  -because of drop in patient's hemoglobin, patient will be monitored at least overnight  Patient had to cancel her outpatient CT scan for re-evaluation of her cancer  We did talk with patient's oncologist   We will order CT scan with contrast of chest abdomen and pelvis to both re-evaluate her cancer and re-evaluate her hematoma  -continue current regimen  Patient is mildly tachycardic but blood pressures have been stable   -await results of left arm Doppler although we will not anticoagulate her even if she has a blood clot at this point  Subjective/Objective     Subjective:  Patient feeling improved  Denies chest pain, shortness of breath or dizziness    Does have some discomfort in the left back    Objective:     Vitals: /61 Comment: Map 88  Pulse 102   Temp 97 9 °F (36 6 °C) (Oral)   Resp 18   Ht 5' 1" (1 549 m)   Wt 72 2 kg (159 lb 2 8 oz)   LMP  (LMP Unknown) Comment: post   SpO2 97%   BMI 30 08 kg/m²   Vitals:    11/11/17 0600 11/13/17 0700   Weight: 77 kg (169 lb 12 1 oz) 72 2 kg (159 lb 2 8 oz)     Orthostatic Blood Pressures    Flowsheet Row Most Recent Value   Blood Pressure  137/61 [Map 88] filed at 11/13/2017 0831   Patient Position - Orthostatic VS  Sitting filed at 11/13/2017 0831          Physical Exam:  VS: Blood pressure 137/61, pulse 102, temperature 97 9 °F (36 6 °C), temperature source Oral, resp  rate 18, height 5' 1" (1 549 m), weight 72 2 kg (159 lb 2 8 oz), SpO2 97 %  Gen   Pt in NAD  Skin: warm and dry without rashes, clubbing or edema  CV: regular but tachycardic, +S1, S2  Resp : decreased BS at b/l bases  Abdomen: soft, NT, ND, +BS  Extr : no LE edema b/l, L arm edematous  Psych: normal affect and mood  Neuro: No focal deficits, AAOx3        Scheduled Meds:  atorvastatin 20 mg Oral Daily With Dinner   ciprofloxacin 400 mg Intravenous Q12H   ferrous sulfate 325 mg Oral BID With Meals   metoprolol tartrate 25 mg Oral Q12H [address]   tamoxifen 20 mg Oral Daily   vancomycin 10 mg/kg Intravenous Q12H     Continuous Infusions:   PRN Meds:   acetaminophen    ondansetron    ondansetron    oxyCODONE      Intake/Output Summary (Last 24 hours) at 11/13/17 1019  Last data filed at 11/13/17 0843   Gross per 24 hour   Intake             1130 ml   Output             1350 ml   Net             -220 ml       Invasive Devices     Central Venous Catheter Line            CVC Central Lines 11/09/17 Triple Right Internal jugular 3 days                            Lab Results:   CBC with diff:   Results from last 7 days  Lab Units 11/13/17  0449   WBC Thousand/uL 7 71   RBC Million/uL 2 96*   HEMOGLOBIN g/dL 8 7*   HEMATOCRIT % 27 1*   MCV fL 92   MCH pg 29 4   MCHC g/dL 32 1   RDW % 14 7   MPV fL 8 9   PLATELETS Thousands/uL 153     CMP:   Results from last 7 days  Lab Units 11/13/17  0450  11/09/17  2237   SODIUM mmol/L 143  < > 142   POTASSIUM mmol/L 4 0  < > 4 1   CHLORIDE mmol/L 110*  < > 111*   CO2 mmol/L 29  < > 22   ANION GAP mmol/L 4  < > 9   BUN mg/dL 15  < > 10   CREATININE mg/dL 0 58*  < > 0 48*   GLUCOSE RANDOM mg/dL 126  < > 173*   CALCIUM mg/dL 8 4  < > 8 4   AST U/L  --   --  17   ALT U/L  --   --  17   ALK PHOS U/L  --   --  80   TOTAL PROTEIN g/dL  --   --  5 8*   ALBUMIN g/dL  --   --  3 0*   BILIRUBIN TOTAL mg/dL  --   --  0 91   EGFR ml/min/1 73sq m 88  < > 94   < > = values in this interval not displayed  TSH:     Magnesium:   Results from last 7 days  Lab Units 11/09/17  2237   MAGNESIUM mg/dL 1 6       Tele: javed  Counseling / Coordination of Care  Total time spent today 45 minutes minutes  Greater than 50% of total time was spent with the patient and / or family counseling and / or coordination of care

## 2017-11-13 NOTE — PLAN OF CARE
Problem: PHYSICAL THERAPY ADULT  Goal: Performs mobility at highest level of function for planned discharge setting  See evaluation for individualized goals  Treatment/Interventions: Functional transfer training, LE strengthening/ROM, Elevations, Therapeutic exercise, Endurance training, Gait training, Bed mobility, Equipment eval/education  Equipment Recommended: Walker (PATIENT HAS RW AT HOME)       See flowsheet documentation for full assessment, interventions and recommendations  Jamil Linares, PT     Outcome: Progressing  Prognosis: Good  Problem List: Decreased strength, Decreased endurance, Impaired balance, Decreased mobility, Pain  Assessment: PT INITIATED TREATMENT SESSION IN ORDER TO ASSIST PATIENT IN ACHIEVING GOALS FOR IMPROVED AMBULATION AND ACTIVITY TOLERANCE  UPON ARRIVAL PATIENT REPORTING 8/10 PAIN IN L UE WITH REDUCED FUNCTION (REQUIRED ASSISTANCE FOR TOILETING AND DONNING UNDERWEAR)  PATIENT PERFORMED SIT<-->STAND TRANSFERS S LEVEL AND AMBULATED MIN-AX1 W/ RW 50 FEET X2  GAIT PRESENTS WITH REDUCED GAIT SPEED AND B/L LE CLEARANCE WITH MILD FORWARD TRUNK FLEXION  DISTANCE LIMITED BY REPORTED FATIGUE  ENCOURAGED AMBULATION 3 MORE TIMES W/ RN STAFF TODAY  ANTICIPATE WITH CONTINUED MOBILITY AND ACHIEVEMENT OF PT GOALS PATIENT WILL D/C HOME WITH FAMILY SUPPORT PRN, USE OF RW, AND HOME PT SERVICES  PLAN TO PROGRESS AMBULATORY DISTANCE AND TRIAL STAIR NAVIGATION IN FUTURE TREATMENT SESSIONS  Recommendation: (S) Home with family support, Home PT     PT - OK to Discharge: (S) No (IMPROVE MOBILITY AND STAIR NAVIGATION )    See flowsheet documentation for full assessment     Jamil Linares, PT

## 2017-11-13 NOTE — ED ATTENDING ATTESTATION
García Morocho MD, saw and evaluated the patient  I have discussed the patient with the resident/non-physician practitioner and agree with the resident's/non-physician practitioner's findings, Plan of Care, and MDM as documented in the resident's/non-physician practitioner's note, except where noted  All available labs and Radiology studies were reviewed  At this point I agree with the current assessment done in the Emergency Department  I have conducted an independent evaluation of this patient a history and physical is as follows:    Neck pain after having pacemaker implanted  Pacemaker was done 1 week ago, patient complains of infrascapular back pain which is worse with twisting or moving  No fevers, no limb swelling, no new significantly worse shortness of breath  No chest pain  Review of systems otherwise negative in 12 systems were reviewed    Exam nonfocal  Agree with workup and documentation    Critical Care Time  CritCare Time

## 2017-11-14 LAB
ERYTHROCYTE [DISTWIDTH] IN BLOOD BY AUTOMATED COUNT: 14.9 % (ref 11.6–15.1)
HCT VFR BLD AUTO: 29.5 % (ref 34.8–46.1)
HGB BLD-MCNC: 9.4 G/DL (ref 11.5–15.4)
MCH RBC QN AUTO: 29.2 PG (ref 26.8–34.3)
MCHC RBC AUTO-ENTMCNC: 31.9 G/DL (ref 31.4–37.4)
MCV RBC AUTO: 92 FL (ref 82–98)
PLATELET # BLD AUTO: 183 THOUSANDS/UL (ref 149–390)
PMV BLD AUTO: 8.7 FL (ref 8.9–12.7)
RBC # BLD AUTO: 3.22 MILLION/UL (ref 3.81–5.12)
WBC # BLD AUTO: 7.59 THOUSAND/UL (ref 4.31–10.16)

## 2017-11-14 PROCEDURE — 85027 COMPLETE CBC AUTOMATED: CPT | Performed by: INTERNAL MEDICINE

## 2017-11-14 RX ADMIN — VANCOMYCIN HYDROCHLORIDE 750 MG: 750 INJECTION, SOLUTION INTRAVENOUS at 01:33

## 2017-11-14 RX ADMIN — METOPROLOL TARTRATE 25 MG: 25 TABLET ORAL at 08:39

## 2017-11-14 RX ADMIN — Medication 325 MG: at 16:03

## 2017-11-14 RX ADMIN — VANCOMYCIN HYDROCHLORIDE 750 MG: 750 INJECTION, SOLUTION INTRAVENOUS at 12:57

## 2017-11-14 RX ADMIN — ACETAMINOPHEN 650 MG: 325 TABLET, FILM COATED ORAL at 11:18

## 2017-11-14 RX ADMIN — CIPROFLOXACIN 400 MG: 2 INJECTION, SOLUTION INTRAVENOUS at 09:49

## 2017-11-14 RX ADMIN — CIPROFLOXACIN 400 MG: 2 INJECTION, SOLUTION INTRAVENOUS at 23:15

## 2017-11-14 RX ADMIN — ATORVASTATIN CALCIUM 20 MG: 20 TABLET, FILM COATED ORAL at 16:03

## 2017-11-14 RX ADMIN — OXYCODONE HYDROCHLORIDE 5 MG: 5 TABLET ORAL at 23:14

## 2017-11-14 RX ADMIN — OXYCODONE HYDROCHLORIDE 5 MG: 5 TABLET ORAL at 17:06

## 2017-11-14 RX ADMIN — METOPROLOL TARTRATE 25 MG: 25 TABLET ORAL at 21:12

## 2017-11-14 RX ADMIN — TAMOXIFEN CITRATE 20 MG: 10 TABLET, FILM COATED ORAL at 21:13

## 2017-11-14 RX ADMIN — Medication 325 MG: at 08:38

## 2017-11-14 NOTE — PROGRESS NOTES
Progress Note - Electrophysiology-Cardiology (EP)   Arabella Samuel 76 y o  female MRN: [id number]  Unit/Bed#: [facility] 426-01 Encounter: [id number]    Assessment:  1  S/p PPM revision for perforated RV lead w/o tamponade  She had large submuscular hematoma post revision requiring 2 units of transfuse, bleeding stopped on its own  Hgb 10 3 to 8 7 yesterday  Pain is well controlled with tylenol only  CT yesterdya shows now active bleeding, hematoma slightly bigger  2  Mild sinus tachycardia, observing  3  Anemia from blood  Loss on iron  4  ANtibiotics to prevent pacemaekr infection-cipro/vanco, plan 10 day course total  5  Right IJ Central line, she has no access UE due to right sided breast cancer and left side new pacemake w post op arm edema  6  Chronic right pleural effusion  Plan:  1  PT today  2  F/u cbc  3  COnsider d/c tomorrow  Principal Problem:    Pacemaker malfunction, initial encounter  Active Problems:    Complication associated with cardiac pacemaker lead    Chest wall hematoma    Chief Complaint: F/u ppm hematoma  Subjective: Pain well controlled, she is ambulating  Feels good  Scheduled Meds:  atorvastatin 20 mg Oral Daily With Dinner   ciprofloxacin 400 mg Intravenous Q12H   ferrous sulfate 325 mg Oral BID With Meals   metoprolol tartrate 25 mg Oral Q12H [address]   tamoxifen 20 mg Oral Daily   vancomycin 10 mg/kg Intravenous Q12H     Continuous Infusions:   PRN Meds:   acetaminophen    barium sulfate    ondansetron    ondansetron    oxyCODONE        ROS: 12 point ROS is done and reviewed,  negative except pertinent positives in CV, above         Vitals: /57   Pulse 90   Temp 98 4 °F (36 9 °C) (Oral)   Resp 20   Ht 5' 1" (1 549 m)   Wt 72 6 kg (160 lb 0 9 oz)   LMP  (LMP Unknown) Comment: post   SpO2 93%   BMI 30 24 kg/m²   Vitals:    11/13/17 0700 11/14/17 0700   Weight: 72 2 kg (159 lb 2 8 oz) 72 6 kg (160 lb 0 9 oz)     Intake/Output Summary (Last 24 hours) at 11/14/17 0930  Last data filed at 11/14/17 0835   Gross per 24 hour   Intake              460 ml   Output             2100 ml   Net            -1640 ml       GEN: No acute distress, Alert and oriented, well appearing  HEENT:Head, neck, ears, oral pharynx: Mucus membranes moist, oral pharynx clear, nares clear  External ears normal  EYES: Pupils equal, sclera anicteric, midline, normal conjuctiva  NECK: No JVD, supple, no obvious masses or thryomegaly or goiter RIJ Central line  CARDIOVASCULAR: RRR, No murmur, rub, gallops S1,S2  LUNGS: Clear To auscultation bilaterally, normal effort, no rales, rhonchi, crackles  ABDOMEN: Soft, nondistended, nontender, without obvious organomegaly or ascites  EXTREMITIES/VASCULAR: No edema  Radial pulses intact, pedal pulses difficult to palpate, warm an well perfused  PSYCH: Normal Affect, no overt suicidal ideation, linear speech pattern without evidence of psychosis  NEURO: Grossly intact, moving all extremiteis equal, face symmetric, alert and responsive, no obvious focal defecits  HEME: No bleeding, bruising, petechia, purpura  SKIN: Eccymosis left breast and axilla  Chest: She has moderate swelling diffusely on left chest wall    Lab Results:   Lab Results:     CBC with diff:   Results from last 7 days  Lab Units 11/13/17  0449 11/11/17  1443 11/11/17  0443 11/10/17  2133 11/10/17  1630 11/10/17  0906 11/10/17  0418 11/09/17  2238   WBC Thousand/uL 7 71  --  10 14 9 71 9 47 12 01* 12 73* 13 37*   HEMOGLOBIN g/dL 8 7* 10 3* 10 3* 10 9* 10 9* 11 5 11 6 12 0   HEMATOCRIT % 27 1* 31 5* 31 7* 32 8* 33 8* 35 1 34 9 35 7   MCV fL 92  --  90 89 89 89 88 89   PLATELETS Thousands/uL 153  --  132* 136* 147* 153 156 148*   MCH pg 29 4  --  29 3 29 6 28 7 29 2 29 4 29 8   MCHC g/dL 32 1  --  32 5 33 2 32 2 32 8 33 2 33 6   RDW % 14 7  --  15 0 15 1 15 1 15 0 14 8 14 7   MPV fL 8 9  --  9 1 9 2 9 1 9 1 9 3 9 4   NRBC AUTO /100 WBCs 0  --  0  --   --   --   --  0         CMP:  Results from last 7 days  Lab Units 11/13/17  0450 11/10/17  0417 11/09/17  2237 11/09/17  2005 11/09/17  1810 11/09/17  0614   SODIUM mmol/L 143 141 142  --   --  141   POTASSIUM mmol/L 4 0 4 1 4 1  --   --  4 4   CHLORIDE mmol/L 110* 109* 111*  --   --  108   CO2 mmol/L 29 23 22  --   --  29   ANION GAP mmol/L 4 9 9  --   --  4   BUN mg/dL 15 10 10  --   --  15   CREATININE mg/dL 0 58* 0 46* 0 48*  --   --  0 68   GLUCOSE RANDOM mg/dL 126 167* 173*  --   --  130   GLUCOSE, ISTAT mg/dl  --   --   --  156* 129  --    CALCIUM mg/dL 8 4 8 1* 8 4  --   --  9 4   AST U/L  --   --  17  --   --   --    ALT U/L  --   --  17  --   --   --    ALK PHOS U/L  --   --  80  --   --   --    TOTAL PROTEIN g/dL  --   --  5 8*  --   --   --    ALBUMIN g/dL  --   --  3 0*  --   --   --    BILIRUBIN TOTAL mg/dL  --   --  0 91  --   --   --    EGFR ml/min/1 73sq m 88 95 94  --   --  83         BMP:  Results from last 7 days  Lab Units 11/13/17  0450 11/10/17  0417 11/09/17  2237  11/09/17  0614   SODIUM mmol/L 143 141 142  --  141   POTASSIUM mmol/L 4 0 4 1 4 1  --  4 4   CHLORIDE mmol/L 110* 109* 111*  --  108   CO2 mmol/L 29 23 22  --  29   BUN mg/dL 15 10 10  --  15   CREATININE mg/dL 0 58* 0 46* 0 48*  --  0 68   GLUCOSE RANDOM mg/dL 126 167* 173*  --  130   GLUCOSE, ISTAT   --   --   --   < >  --    CALCIUM mg/dL 8 4 8 1* 8 4  --  9 4   < > = values in this interval not displayed  BNP:   Results Reviewed     None        No results for input(s): BNP in the last 72 hours               Magnesium:   Results from last 7 days  Lab Units 11/09/17 2237 11/09/17  0614   MAGNESIUM mg/dL 1 6 2 1       Coags:   Results from last 7 days  Lab Units 11/09/17  0126   PTT seconds 27   INR  1 07       Cardiac testing:   Results for orders placed during the hospital encounter of 07/11/17   Echo complete with contrast if indicated    Narrative [address]  [address] [facility]  Madelyn Solis, [address] [facility]  [phone]    Transthoracic Echocardiogram  2D, M-mode, Doppler, and Color Doppler    Study date:  2017    Patient: Arminda Yung  MR number: [id number]  Account number: [de-identified]  : 1940  Age: 76 years  Gender: Female  Status: Outpatient  Location: [address] [place] [address] [facility] [place]  Height: 61 in  Weight: 161 7 lb  BP: 128/ 64 mmHg    Indications: Assess left ventricular function  Diagnoses: E78 5 - Hyperlipidemia, unspecified    Sonographer:  ELIANE Palencia  Primary Physician:  Radha Bartholomew MD  Referring Physician:  Ruba Myles DO  Group:  [id number]  Cardiology Fellow:  Lakesha Massey MD  Interpreting Physician:  Iggy Suarez MD    SUMMARY    LEFT VENTRICLE:  Systolic function was at the lower limits of normal  Ejection fraction was estimated to be 50 %  Although no diagnostic regional wall motion abnormality was identified, this possibility cannot be completely excluded on the basis of this study  Left ventricular diastolic function parameters were normal     VENTRICULAR SEPTUM:  There was dyssynergic motion  These changes are consistent with LBBB  RIGHT VENTRICLE:  The size was normal   Systolic function was normal     TRICUSPID VALVE:  There was mild regurgitation  Pulmonary artery systolic pressure was within the normal range  HISTORY: PRIOR HISTORY: Breast cancer; LBBB; Hyperlipidemia    PROCEDURE: The study was performed in the [address]  This was a routine study  The transthoracic approach was used  The study included complete 2D imaging, M-mode, complete spectral Doppler, and color Doppler  The  heart rate was 90 bpm, at the start of the study  Images were obtained from the parasternal, apical, subcostal, and suprasternal notch acoustic windows  Echocardiographic views were limited due to lung interference  Image quality was  adequate      LEFT VENTRICLE: Size was normal  Systolic function was at the lower limits of normal  Ejection fraction was estimated to be 50 %  Although no diagnostic regional wall motion abnormality was identified, this possibility cannot be completely  excluded on the basis of this study  Wall thickness was normal  DOPPLER: Left ventricular diastolic function parameters were normal     VENTRICULAR SEPTUM: There was dyssynergic motion  These changes are consistent with LBBB  RIGHT VENTRICLE: The size was normal  Systolic function was normal  Wall thickness was normal     LEFT ATRIUM: Size was normal     RIGHT ATRIUM: Size was normal     MITRAL VALVE: Valve structure was normal  There was normal leaflet separation  DOPPLER: The transmitral velocity was within the normal range  There was no evidence for stenosis  There was trace regurgitation  AORTIC VALVE: The valve was trileaflet  Leaflets exhibited normal thickness and normal cuspal separation  DOPPLER: Transaortic velocity was within the normal range  There was no evidence for stenosis  There was no regurgitation  TRICUSPID VALVE: The valve structure was normal  There was normal leaflet separation  DOPPLER: The transtricuspid velocity was within the normal range  There was no evidence for stenosis  There was mild regurgitation  Pulmonary artery  systolic pressure was within the normal range  Estimated peak PA pressure was 22 mmHg  PULMONIC VALVE: Leaflets exhibited normal thickness, no calcification, and normal cuspal separation  DOPPLER: The transpulmonic velocity was within the normal range  There was trace regurgitation  PERICARDIUM: There was no pericardial effusion  AORTA: The root exhibited normal size  SYSTEMIC VEINS: IVC: The inferior vena cava was normal in size and course   Respirophasic changes were normal     SYSTEM MEASUREMENT TABLES    2D  %FS: 20 78 %  Ao Diam: 3 49 cm  EDV(Teich): 45 21 ml  EF Biplane: 52 52 %  EF(Cube): 50 28 %  EF(Teich): 43 45 %  ESV(Cube): 18 41 ml  ESV(Teich): 25 57 ml  IVSd: 1 04 cm  LA Diam: 2 52 cm  LVEDV MOD A2C: 104 64 ml  LVEDV MOD A4C: 107 05 ml  LVEDV MOD BP: 108 41 ml  LVEF MOD A2C: 58 35 %  LVEF MOD A4C: 46 17 %  LVESV MOD A2C: 43 58 ml  LVESV MOD A4C: 57 62 ml  LVESV MOD BP: 51 48 ml  LVIDd: 3 33 cm  LVIDs: 2 64 cm  LVLd A2C: 7 99 cm  LVLd A4C: 7 59 cm  LVLs A2C: 6 22 cm  LVLs A4C: 6 59 cm  LVPWd: 1 13 cm  SI(Cube): 10 76 ml/m2  SI(Teich): 11 35 ml/m2  SV MOD A2C: 61 05 ml  SV MOD A4C: 49 42 ml  SV(Cube): 18 61 ml  SV(Teich): 19 64 ml    CW  AV Vmax: 1 07 m/s  AV maxP 62 mmHg  TR Vmax: 2 08 m/s  TR maxP 3 mmHg    MM  TAPSE: 1 66 cm    PW  E': 0 11 m/s  E/E': 9 62  LVOT Vmax: 0 74 m/s  LVOT maxP 2 mmHg  MV A Yadiel: 0 27 m/s  MV Dec [place]: 7 18 m/s2  MV DecT: 145 34 ms  MV E Yadiel: 1 04 m/s  MV E/A Ratio: 3 88    [facility] Accredited Echocardiography Laboratory    Prepared and electronically signed by    Libertad Mcclendon MD  Signed 2017 16:28:13       No results found for this or any previous visit  No results found for this or any previous visit  No results found for this or any previous visit  EKG/TELE: NSR-Sinus tachyafib 85-110bpm    I have personally reviewed the EKG,Echocardiogram, Telemetry and radiology images and labwork as above  Above findings include my interpretation

## 2017-11-14 NOTE — RESTORATIVE TECHNICIAN NOTE
Restorative Specialist Mobility Note       Activity: Ambulate in alex, Chair, Bathroom privileges     Assistive Device: Front wheel walker        Repositioned:  Other (Comment) (Rep /sat pt upright in bed )           Range of Motion: Right leg, Left leg

## 2017-11-15 VITALS
DIASTOLIC BLOOD PRESSURE: 58 MMHG | RESPIRATION RATE: 18 BRPM | SYSTOLIC BLOOD PRESSURE: 121 MMHG | HEART RATE: 90 BPM | TEMPERATURE: 98.8 F | HEIGHT: 61 IN | BODY MASS INDEX: 29.89 KG/M2 | OXYGEN SATURATION: 95 % | WEIGHT: 158.29 LBS

## 2017-11-15 LAB
BASOPHILS # BLD AUTO: 0.04 THOUSANDS/ΜL (ref 0–0.1)
BASOPHILS NFR BLD AUTO: 1 % (ref 0–1)
EOSINOPHIL # BLD AUTO: 0.33 THOUSAND/ΜL (ref 0–0.61)
EOSINOPHIL NFR BLD AUTO: 4 % (ref 0–6)
ERYTHROCYTE [DISTWIDTH] IN BLOOD BY AUTOMATED COUNT: 14.9 % (ref 11.6–15.1)
HCT VFR BLD AUTO: 28.9 % (ref 34.8–46.1)
HGB BLD-MCNC: 9.3 G/DL (ref 11.5–15.4)
LYMPHOCYTES # BLD AUTO: 2.23 THOUSANDS/ΜL (ref 0.6–4.47)
LYMPHOCYTES NFR BLD AUTO: 30 % (ref 14–44)
MCH RBC QN AUTO: 29.4 PG (ref 26.8–34.3)
MCHC RBC AUTO-ENTMCNC: 32.2 G/DL (ref 31.4–37.4)
MCV RBC AUTO: 92 FL (ref 82–98)
MONOCYTES # BLD AUTO: 1.09 THOUSAND/ΜL (ref 0.17–1.22)
MONOCYTES NFR BLD AUTO: 14 % (ref 4–12)
NEUTROPHILS # BLD AUTO: 3.85 THOUSANDS/ΜL (ref 1.85–7.62)
NEUTS SEG NFR BLD AUTO: 51 % (ref 43–75)
NRBC BLD AUTO-RTO: 0 /100 WBCS
PLATELET # BLD AUTO: 195 THOUSANDS/UL (ref 149–390)
PMV BLD AUTO: 8.7 FL (ref 8.9–12.7)
RBC # BLD AUTO: 3.16 MILLION/UL (ref 3.81–5.12)
WBC # BLD AUTO: 7.55 THOUSAND/UL (ref 4.31–10.16)

## 2017-11-15 PROCEDURE — 90686 IIV4 VACC NO PRSV 0.5 ML IM: CPT | Performed by: PHYSICIAN ASSISTANT

## 2017-11-15 PROCEDURE — 97530 THERAPEUTIC ACTIVITIES: CPT

## 2017-11-15 PROCEDURE — 85025 COMPLETE CBC W/AUTO DIFF WBC: CPT | Performed by: PHYSICIAN ASSISTANT

## 2017-11-15 PROCEDURE — 97116 GAIT TRAINING THERAPY: CPT

## 2017-11-15 PROCEDURE — G0008 ADMIN INFLUENZA VIRUS VAC: HCPCS | Performed by: PHYSICIAN ASSISTANT

## 2017-11-15 RX ORDER — FERROUS SULFATE 325(65) MG
325 TABLET ORAL 2 TIMES DAILY WITH MEALS
Qty: 60 TABLET | Refills: 3 | Status: SHIPPED | OUTPATIENT
Start: 2017-11-15 | End: 2018-01-07 | Stop reason: ALTCHOICE

## 2017-11-15 RX ORDER — DOXYCYCLINE 100 MG/1
100 CAPSULE ORAL 2 TIMES DAILY
Qty: 10 CAPSULE | Refills: 0 | Status: SHIPPED | OUTPATIENT
Start: 2017-11-15 | End: 2017-11-20

## 2017-11-15 RX ADMIN — VANCOMYCIN HYDROCHLORIDE 750 MG: 750 INJECTION, SOLUTION INTRAVENOUS at 00:36

## 2017-11-15 RX ADMIN — ACETAMINOPHEN 650 MG: 325 TABLET, FILM COATED ORAL at 14:20

## 2017-11-15 RX ADMIN — OXYCODONE HYDROCHLORIDE 5 MG: 5 TABLET ORAL at 08:24

## 2017-11-15 RX ADMIN — INFLUENZA VIRUS VACCINE 0.5 ML: 15; 15; 15; 15 SUSPENSION INTRAMUSCULAR at 13:40

## 2017-11-15 RX ADMIN — Medication 325 MG: at 08:23

## 2017-11-15 RX ADMIN — METOPROLOL TARTRATE 25 MG: 25 TABLET ORAL at 08:24

## 2017-11-15 NOTE — DISCHARGE SUMMARY
Discharge Summary - Merlin Argueta 76 y o  female MRN: [id number]    [facility] En[facility]: [id number]    Admission Date: 11/8/2017   Discharge Date: 11/15/2017    Discharge Diagnosis:   1   Recurrent syncope and symptomatic bradycardia with 5 second pause noted on loop recorder, status post loop recorder explant and [employer] dual-chamber pacemaker implantation 10/31/2017              A ) atrial lead dislodged immediately post op but was not intervened on due to no immediate need for atrial pacing              B ) RV lead perforation - patient with post op/outpatient chest pain and non-capturing RV lead s/p lead extraction with reimplantation 11/9/2017              C ) left chest wall hematoma s/p pocket debridement with no pocket hematoma noted; CT confirmed subpectoral hematoma  2   Low-normal LV EF per echo 07/2017  3   Metastatic breast cancer s/p right mastectomy  4   Hyperlipidemia  5   Acute left arm swelling   A ) Upper extremity doppler findings: Evaluation shows acute occlusive deep vein thrombosis in the distal axillary  vein  Subacute to acute occlusive deep vein thrombosis was identified in the  paired brachial veins  Acute occlusive superficial thrombophlebitis was  identified in the median cubital vein  B ) unable to anticoagulate due to presence of chest wall hematoma  6  Chronic right pleural effusion    Procedures Performed:   Orders Placed This Encounter   Procedures    Cardiac lead revision       Consultants:   1  Dr Angie Qureshi - critical care  2  Dr Chris Prader - vascular surgery    HPI: Please refer to the initial history and physical as well as procedure notes for full details  Briefly, Merlin Argueta is a 76y o  year old female with a history of recurrent syncope, low-normal LV EF, hyperlipidemia, chronic right pleural effusion, and metastatic breast cancer with prior right mastectomy  [facility]    She has a loop recorder in situ for her history of syncope  She was found to have symptomatic 5 second pause, and she was admitted 10/31/2017 for a dual-chamber pacemaker implantation  She was discharged the following day, with known atrial lead dislodgement however her RV lead was working well and she was asymptomatic  Over the next several days, she developed chest pain consistent with pericarditis and device interrogation showed no RV lead capture  She was admitted for suspected lead perforation  Hospital Course:  Echocardiogram showed no significant pericardial effusion  It was recommended that she undergo lead extraction with reimplantation  She underwent this procedure on 11/9/2017  In the immediate postop setting, she was noted to have significant chest wall and axillary pain, and became hypotensive  She underwent pocket revision for suspected hematoma, however there was no hematoma noted  Later imaging confirmed subpectoral hematoma  She was transfused with 2 units, and her blood pressure stabilized  She has prophylactically started on IV Cipro and vancomycin  She was monitored in the ICU for several days  Her blood pressure improved, and she was ultimately restarted on her beta blocker for her baseline sinus tachycardia  She was noted to have acute left arm swelling, and upper extremity Doppler confirmed acute DVT in the distal axillary vein  She is unable to be anticoagulated given her chest wall hematoma, and with conservative treatment her arm swelling significantly improved by the time of discharge  She is eventually transferred out of ICU, and was monitored for several more days  Her hemoglobin at one point dropped from 10 to 8 7, and she was started on iron supplement  Her hemoglobin on the day of discharge had stabilized to 9 3  Her pain was continue to be controlled on Tylenol only, and she deferred stronger pain medicine at the time of discharge      Because of her lengthy hospital stay, she missed her outpatient CT scan for ongoing monitoring of her breast cancer and this was done as an inpatient  She will continue to follow up with her outpatient oncologist   From our standpoint, it showed slightly larger chest wall hematoma, but no active bleeding  She was seen by physical therapy, and there is no acute need for inpatient for home physical therapy  On the day discharge she denied all cardiac complaints, and states her pain is well controlled  Her rhythm has remained stable throughout hospital stay, and she was eventually put back on her home dose of Lopressor  Her vital signs and labs are otherwise stable  Physical exam on the day of discharge is as follows:  GEN: NAD, alert and oriented, well appearing  SKIN: dry without significant lesions or rashes  HEENT: NCAT, PERRL, EOMs intact  NECK: No JVD appreciated  CARDIOVASCULAR: RRR, normal S1, S2 without murmurs, rubs, or gallops appreciated  LUNGS: Clear to auscultation bilaterally without wheezes, rhonchi, or rales, slightly decreased breath sounds right base  ABDOMEN: Soft, nontender, nondistended  EXTREMITIES/VASCULAR: LE perfused without clubbing, cyanosis, or edema b/l; trace left upper extremity edema, dramatically increased  PSYCH: Normal mood and affect  NEURO: CN ll-Xll grossly intact  INCISION:  Clean, dry, intact without significant swelling, hematoma, redness, bleeding, drainage, or signs of infection    She was instructed to hold her aspirin until further instructed  She will be discharged on doxycycline 100 mg twice daily for 5 more days to complete her course  She was giving at 2 week follow-up appointment with Dr Stephanie Dean, at that time repeat upper extremity scan should be considered  She was given routine post implantation discharge instructions and restrictions  All questions were answered  She was instructed to undergo a repeat CBC on Monday, 11/20/2017  She is stable for discharge at this time with all questions answered    She was discussed with both Dr Elena Velazquez and Dr Zee Tucker who are in agreement with this discharge summary  Discharge Medications:  See after visit summary for reconciled discharge medications provided to patient and family      Prescriptions Prior to Admission   Medication    acetaminophen (TYLENOL) 325 mg tablet    aspirin (ECOTRIN LOW STRENGTH) 81 mg EC tablet    atorvastatin (LIPITOR) 20 mg tablet    calcium carbonate (CALCIUM 600) 600 MG tablet    CLINDAMYCIN HCL PO    Cranberry (THERACRAN HP PO)    Glucosamine-Chondroit-Vit C-Mn (GLUCOSAMINE 1500 COMPLEX) CAPS    metoprolol tartrate (LOPRESSOR) 25 mg tablet    Multiple Vitamin (MULTIVITAMIN) capsule    ondansetron (ZOFRAN) 4 mg tablet    tamoxifen (NOLVADEX) 20 mg tablet         Current Facility-Administered Medications   Medication Dose Route Frequency    acetaminophen (TYLENOL) tablet 650 mg  650 mg Oral Q6H PRN    atorvastatin (LIPITOR) tablet 20 mg  20 mg Oral Daily With Dinner    barium sulfate 2 1 % suspension 450 mL  450 mL Oral Once in imaging    ciprofloxacin (CIPRO) IVPB (premix) 400 mg  400 mg Intravenous Q12H    ferrous sulfate tablet 325 mg  325 mg Oral BID With Meals    influenza inactivated quadrivalent vaccine (FLULAVAL) IM injection 0 5 mL  0 5 mL Intramuscular Prior to discharge    metoprolol tartrate (LOPRESSOR) tablet 25 mg  25 mg Oral Q12H [address]    ondansetron (ZOFRAN) injection 4 mg  4 mg Intravenous Q4H PRN    ondansetron (ZOFRAN-ODT) dispersible tablet 4 mg  4 mg Oral Q8H PRN    oxyCODONE (ROXICODONE) IR tablet 5 mg  5 mg Oral Q4H PRN    tamoxifen (NOLVADEX) tablet 20 mg  20 mg Oral Daily    vancomycin (VANCOCIN) IVPB (premix) 750 mg  10 mg/kg Intravenous Q12H       [facility] Labs/diagnostics:  CBC with diff:   Results from last 7 days  Lab Units 11/15/17  0532 11/14/17  0950 11/13/17  0449 11/11/17  1443 11/11/17  0443 11/10/17  2133 11/10/17  1630 11/10/17  0906  11/09/17  2238   WBC Thousand/uL 7 55 7 59 7 71  -- 10 14 9 71 9 47 12 01*  < > 13 37*   HEMOGLOBIN g/dL 9 3* 9 4* 8 7* 10 3* 10 3* 10 9* 10 9* 11 5  < > 12 0   HEMATOCRIT % 28 9* 29 5* 27 1* 31 5* 31 7* 32 8* 33 8* 35 1  < > 35 7   MCV fL 92 92 92  --  90 89 89 89  < > 89   PLATELETS Thousands/uL 195 183 153  --  132* 136* 147* 153  < > 148*   MCH pg 29 4 29 2 29 4  --  29 3 29 6 28 7 29 2  < > 29 8   MCHC g/dL 32 2 31 9 32 1  --  32 5 33 2 32 2 32 8  < > 33 6   RDW % 14 9 14 9 14 7  --  15 0 15 1 15 1 15 0  < > 14 7   MPV fL 8 7* 8 7* 8 9  --  9 1 9 2 9 1 9 1  < > 9 4   NRBC AUTO /100 WBCs 0  --  0  --  0  --   --   --   --  0   < > = values in this interval not displayed  BMP:  Results from last 7 days  Lab Units 11/13/17  0450 11/10/17  0417 11/09/17  2237  11/09/17  0614   SODIUM mmol/L 143 141 142  --  141   POTASSIUM mmol/L 4 0 4 1 4 1  --  4 4   CHLORIDE mmol/L 110* 109* 111*  --  108   CO2 mmol/L 29 23 22  --  29   BUN mg/dL 15 10 10  --  15   CREATININE mg/dL 0 58* 0 46* 0 48*  --  0 68   GLUCOSE RANDOM mg/dL 126 167* 173*  --  130   GLUCOSE, ISTAT   --   --   --   < >  --    CALCIUM mg/dL 8 4 8 1* 8 4  --  9 4   < > = values in this interval not displayed      CMP:  Results from last 7 days  Lab Units 11/13/17  0450 11/10/17  0417 11/09/17  2237 11/09/17  2005 11/09/17  1810 11/09/17  0614   SODIUM mmol/L 143 141 142  --   --  141   POTASSIUM mmol/L 4 0 4 1 4 1  --   --  4 4   CHLORIDE mmol/L 110* 109* 111*  --   --  108   CO2 mmol/L 29 23 22  --   --  29   ANION GAP mmol/L 4 9 9  --   --  4   BUN mg/dL 15 10 10  --   --  15   CREATININE mg/dL 0 58* 0 46* 0 48*  --   --  0 68   GLUCOSE RANDOM mg/dL 126 167* 173*  --   --  130   GLUCOSE, ISTAT mg/dl  --   --   --  156* 129  --    CALCIUM mg/dL 8 4 8 1* 8 4  --   --  9 4   AST U/L  --   --  17  --   --   --    ALT U/L  --   --  17  --   --   --    ALK PHOS U/L  --   --  80  --   --   --    TOTAL PROTEIN g/dL  --   --  5 8*  --   --   --    ALBUMIN g/dL  --   --  3 0*  --   --   --    BILIRUBIN TOTAL mg/dL --   --  0 91  --   --   --    EGFR ml/min/1 73sq m 88 95 94  --   --  83       BNP:   Results Reviewed     None        No results for input(s): BNP in the last 72 hours  Magnesium:   Results from last 7 days  Lab Units 11/09/17  2237 11/09/17  0614   MAGNESIUM mg/dL 1 6 2 1       Coags:   Results from last 7 days  Lab Units 11/09/17  0126   PTT seconds 27   INR  1 07       Condition at Discharge: good     Discharge instructions/Information to patient and family:   See after visit summary for information provided to patient and family  Provisions for Follow-Up Care:  See after visit summary for information related to follow-up care and any pertinent home health orders  Disposition: Home    Planned Readmission: No    Discharge Statement   I spent 30 minutes minutes discharging the patient  This time was spent on the day of discharge  I had direct contact with the patient on the day of discharge  Additional documentation is required if more than 30 minutes were spent on discharge

## 2017-11-15 NOTE — PLAN OF CARE
Problem: PHYSICAL THERAPY ADULT  Goal: Performs mobility at highest level of function for planned discharge setting  See evaluation for individualized goals  Treatment/Interventions: Functional transfer training, LE strengthening/ROM, Elevations, Therapeutic exercise, Endurance training, Gait training, Bed mobility, Equipment eval/education  Equipment Recommended: Walker (PATIENT HAS RW AT HOME)       See flowsheet documentation for full assessment, interventions and recommendations  Shelley Galeazzi, PT     Outcome: Progressing  Prognosis: Good  Problem List: Decreased strength, Decreased endurance, Impaired balance, Decreased range of motion, Decreased mobility  Assessment: patient tolerated the PT session well  Seated on chair upon entry and  present t/o session,  patient agreed to participate in therapy  Ambulated to bathroom for toileting and needed S and VCs for safety and to slow pace down  STS transfers with S  Negotiated 5 steps with both hands on R handrail and S/Min A given for last 2 steps due to fatigue  Patient needed standing rest prior to descending stairs  Patient was educated on keeping a slow pace with activities  No overt LOB noted t o session however fatigues easily  Patient recommend to be d/c home with Home PT to address the endurance deficits  Barriers to Discharge: None     Recommendation: Home with family support, Home PT     PT - OK to Discharge: (S) No (IMPROVE MOBILITY AND STAIR NAVIGATION )    See flowsheet documentation for full assessment

## 2017-11-15 NOTE — DISCHARGE INSTRUCTIONS
Please discontinue aspirin for now, take doxycycline 100 milligrams twice daily for 5 more days, and continue iron once or twice daily until instructed otherwise  Please refer to post pacemaker implantation discharge instructions and restrictions and your pacemaker booklet/temporary card  Keep incision dry for one week  Do not use lotions/powders/creams on incision  Remove outer bandage 48 hours after implantation  Leave underlying steri-strips in place, they will either fall off on their own or will be removed at 2 week follow up appointment  No overhead reaching/pushing/pulling/lifting greater than 5-10lbs with left arm for one month  Please call the office if you notice redness, swelling, bleeding, or drainage from incision or if you develop fevers  AFTER PACEMAKER CARE:    If you have any questions, please call [phone] to speak with a nurse (8:30am-4pm, or [phone] after hours)  For appointments, please call [phone]  WHAT YOU SHOULD KNOW:   A pacemaker is a small, battery-powered device that is placed under your skin in your upper chest area with wires placed through a vein that lead directly into the heart  It helps regulate your heart rate and prevent your heart from beating too slowly                  AFTER YOU LEAVE:     Medicines:     · Pain medicine: You may need medicine to take away or decrease pain  ¨ Learn how to take your medicine  Ask what medicine and how much you should take  Be sure you know how, when, and how often to take it  Usually Over the counter pain medicine is sufficient to control pain (Acetominophen or Ibuprofen) Ask your doctor if you may take these  If this does not control your pain, narcotic pain killers may be prescribed, please call if you need prescription  ¨ Do not wait until the pain is severe before you take your medicine  Tell caregivers if your pain does not decrease  ¨ Pain medicine can make you dizzy or sleepy   Prevent falls by calling someone when you get out of bed or if you need help  Take your medicine as directed  Call your healthcare provider if you think your medicine is not helping or if you have side effects  Tell him if you are allergic to any medicine  Follow up with your cardiologist after your procedure: You will need a follow-up visit approximately 2 weeks after you leave the hospital  Your cardiologist will check your wound and make sure that your pacemaker is working correctly  Follow the instructions to check your pacemaker: Your cardiologist or primary healthcare provider will check your pacemaker and the battery regularly  He will use a computer to check your pacemaker over the telephone or wireless device which will be given to you  Pacemaker batteries usually last 5 to 10 years  The pacemaker unit will be replaced when the battery gets low  This is a simpler procedure than the original one to implant your pacemaker  Wound care:  Keep your incision dry for one week  Sponge/tub baths are preferred, try to avoid a shower for 7 days  Do not use lotions/powders/creams on incision  Remove outer bandage 48 hours after implantation  Leave underlying steri-strips in place, they will either fall off on their own or will be removed at 2 week follow up appointment  Please call the office if you notice redness, swelling, bleeding, or drainage from incision or if you develop fevers  Activity:   · Arm movement and lifting:  Be careful using the arm on the side of your pacemaker  Do not move your arm for the first 24 hours after your procedure  Do not  lift your arm above your shoulder or lift more than 10 pounds for one month after your procedure  Avoid pushing, pulling, or repetitive arm movements for one month  This helps the leads stay in place and helps your wound heal  Ask your caregiver when you can drive after your procedure   You may move your arm side to side without lifting above your shoulder, and do not need to wear a sling at home  · Typically driving is allowed after 1 week post pacemaker if you are stable, however in some cases it may be longer and you should discuss with your doctor before proceeding  · Sports:  Ask your caregiver when it is okay to play tennis, golf, basketball, or any sport that requires you to lift your arms  Do not play full contact sports, such as football, that could damage your pacemaker  Ask your cardiologist or primary healthcare provider how much and what kinds of physical activity are safe for you  Living with a pacemaker:   · Tell all caregivers you have a pacemaker: This includes surgeons, radiologists, and medical technicians  You may want to wear a medical alert ID bracelet or necklace that states that you have a pacemaker  · Carry your pacemaker ID card: Make sure you receive a pacemaker ID card  Carry it with you at all times  It lists important information about your pacemaker  Show it to airport security if you travel  · Avoid electrical interference:  Avoid welding equipment and other equipment with large magnets or electric fields  These things could interfere with how your pacemaker works  Use your cell phone on the ear opposite from your pacemaker  Do not carry your cell phone in your shirt pocket over your chest      · Some Pacemakers are MRI safe  Ask you doctor if it is safe to proceed with MRI and let the radiologist and staff know you have a pacemaker  · Do not touch the skin around your pacemaker: This can cause damage to the lead wires or move the pacemaker unit from where it should be  Contact your cardiologist or primary healthcare provider if:   · The area around your pacemaker has increasing amount of pain after surgery  The pain should improve over first few days after implantation  · The skin around your stitches has increasing redness, swelling, or has drainage  This may mean that you have an infection       · You have a fever      · You have chills, a cough, and feel weak or achy  These are also signs of infection  · Your feet or ankles are more swollen than your baseline  · Your Heart rate is less than 50 beats per minute     Seek care immediately if:   · Your bandage becomes soaked with blood  · Your pacemaker is swelling rapidly    · Your stitches open up  · You feel your heart suddenly beating very slowly or quickly  · You become too weak or dizzy to stand, or you pass out  · Your arm or leg feels warm, tender, and painful  It may look swollen and red  · You have chest pain that does not go away with rest or medicine  · You feel lightheaded, short of breath, and have chest pain  · You cough up blood  © 2014 [employer] [employer] is for End User's use only and may not be sold, redistributed or otherwise used for commercial purposes  All illustrations and images included in CareNotes® are the copyrighted property of [employer]  or [employer]  The above information is an  only  It is not intended as medical advice for individual conditions or treatments  Talk to your doctor, nurse or pharmacist before following any medical regimen to see if it is safe and effective for you

## 2017-11-15 NOTE — CASE MANAGEMENT
Notification of Discharge  This is a Notification of Discharge from our facility [address]  Please be advised that this patient has been discharge from our facility  Below you will find the admission and discharge date and time including the patients disposition  PRESENTATION DATE: 11/8/2017  3:50 PM  IP ADMISSION DATE: 11/8/17 1550  DISCHARGE DATE: 11/15/2017  2:51 PM  DISPOSITION: Home/Self Care    [address] [facility] in the [facility] [address] for 2017  Network Utilization Review Department  Phone: [phone]; Fax [phone]  ATTENTION: The Network Utilization Review Department is now centralized for our 7 Facilities  Make a note that we have a new phone and fax numbers for our Department  Please call with any questions or concerns to [phone] and carefully follow the prompts so that you are directed to the right person  All voicemails are confidential  Fax any determinations, approvals, denials, and requests for initial or continue stay review clinical to [phone]  Due to HIGH CALL volume, it would be easier if you could please send faxed requests to expedite your requests and in part, help us provide discharge notifications faster

## 2017-11-15 NOTE — PHYSICAL THERAPY NOTE
Physical Therapy Progress Note     11/15/17 1039   Pain Assessment   Pain Assessment No/denies pain   Restrictions/Precautions   Weight Bearing Precautions Per Order No   Other Precautions Fall Risk;Telemetry   General   Chart Reviewed Yes   Family/Caregiver Present Yes   Cognition   Overall Cognitive Status WFL   Subjective   Subjective I am going home  Seated on chair upon entry   present in room  Transfers   Sit to Stand 5  Supervision   Additional items Increased time required;Assist x 1   Stand to Sit 5  Supervision   Stand pivot 5  Supervision   Toilet transfer 5  Supervision   Additional items Assist x 1;Verbal cues;Standard toilet; Increased time required   Ambulation/Elevation   Gait pattern Inconsistent anant; Redundant gait at times; Decreased foot clearance;Poor UE support; Forward Flexion  (Poor LUE support on Rw)   Gait Assistance 5  Supervision   Additional items Assist x 1;Verbal cues   Assistive Device Rolling walker   Distance 220ft   Stair Management Assistance 4  Minimal assist   Additional items Assist x 1;Verbal cues; Increased time required   Stair Management Technique One rail R;Step to pattern; Sideways   Number of Stairs 5   Balance   Static Sitting Normal   Dynamic Sitting Good   Static Standing Fair   Dynamic Standing Fair -   Ambulatory Poor +   Endurance Deficit   Endurance Deficit Yes   Endurance Deficit Description Fatigue   Activity Tolerance   Activity Tolerance Patient limited by fatigue;Patient tolerated treatment well   Nurse Made Aware Yes   Exercises   TKR Sitting;Bilateral;AROM;10 reps   Assessment   Prognosis Good   Problem List Decreased strength;Decreased endurance; Impaired balance;Decreased range of motion;Decreased mobility   Assessment patient tolerated the PT session well  Seated on chair upon entry and  present t/o session,  patient agreed to participate in therapy  Ambulated to bathroom for toileting and needed S and VCs for safety and to slow pace down  STS transfers with S  Negotiated 5 steps with both hands on R handrail and S/Min A given for last 2 steps due to fatigue  Patient needed standing rest prior to descending stairs  Patient was educated on keeping a slow pace with activities  No overt LOB noted t o session however fatigues easily  Patient recommend to be d/c home with Home PT to address the endurance deficits     Barriers to Discharge None   Goals   Patient Goals I want to go home   LTG Expiration Date 11/23/17   Recommendation   Recommendation Home with family support;Home PT   Equipment Recommended Noah Pimentel, PTA

## 2017-11-17 ENCOUNTER — TRANSCRIBE ORDERS (OUTPATIENT)
Dept: LAB | Facility: HOSPITAL | Age: 77
End: 2017-11-17

## 2017-11-17 DIAGNOSIS — C78.7 SECONDARY MALIGNANT NEOPLASM OF LIVER (HCC): Primary | ICD-10-CM

## 2017-11-20 ENCOUNTER — APPOINTMENT (OUTPATIENT)
Dept: LAB | Facility: HOSPITAL | Age: 77
End: 2017-11-20
Attending: INTERNAL MEDICINE
Payer: COMMERCIAL

## 2017-11-20 DIAGNOSIS — C78.7 SECONDARY MALIGNANT NEOPLASM OF LIVER (HCC): ICD-10-CM

## 2017-11-20 DIAGNOSIS — C78.7 SECONDARY MALIGNANT NEOPLASM OF LIVER AND INTRAHEPATIC BILE DUCT (HCC): ICD-10-CM

## 2017-11-20 LAB
ALBUMIN SERPL BCP-MCNC: 3.3 G/DL (ref 3.5–5)
ALP SERPL-CCNC: 136 U/L (ref 46–116)
ALT SERPL W P-5'-P-CCNC: 25 U/L (ref 12–78)
ANION GAP SERPL CALCULATED.3IONS-SCNC: 6 MMOL/L (ref 4–13)
AST SERPL W P-5'-P-CCNC: 32 U/L (ref 5–45)
BASOPHILS # BLD AUTO: 0.03 THOUSANDS/ΜL (ref 0–0.1)
BASOPHILS NFR BLD AUTO: 0 % (ref 0–1)
BILIRUB SERPL-MCNC: 1.26 MG/DL (ref 0.2–1)
BUN SERPL-MCNC: 18 MG/DL (ref 5–25)
CALCIUM SERPL-MCNC: 9.5 MG/DL (ref 8.3–10.1)
CHLORIDE SERPL-SCNC: 106 MMOL/L (ref 100–108)
CO2 SERPL-SCNC: 29 MMOL/L (ref 21–32)
CREAT SERPL-MCNC: 0.66 MG/DL (ref 0.6–1.3)
EOSINOPHIL # BLD AUTO: 0.24 THOUSAND/ΜL (ref 0–0.61)
EOSINOPHIL NFR BLD AUTO: 3 % (ref 0–6)
ERYTHROCYTE [DISTWIDTH] IN BLOOD BY AUTOMATED COUNT: 16.1 % (ref 11.6–15.1)
GFR SERPL CREATININE-BSD FRML MDRD: 84 ML/MIN/1.73SQ M
GLUCOSE P FAST SERPL-MCNC: 201 MG/DL (ref 65–99)
HCT VFR BLD AUTO: 36.3 % (ref 34.8–46.1)
HGB BLD-MCNC: 11.6 G/DL (ref 11.5–15.4)
LYMPHOCYTES # BLD AUTO: 1.93 THOUSANDS/ΜL (ref 0.6–4.47)
LYMPHOCYTES NFR BLD AUTO: 21 % (ref 14–44)
MCH RBC QN AUTO: 30.1 PG (ref 26.8–34.3)
MCHC RBC AUTO-ENTMCNC: 32 G/DL (ref 31.4–37.4)
MCV RBC AUTO: 94 FL (ref 82–98)
MONOCYTES # BLD AUTO: 0.97 THOUSAND/ΜL (ref 0.17–1.22)
MONOCYTES NFR BLD AUTO: 11 % (ref 4–12)
NEUTROPHILS # BLD AUTO: 5.91 THOUSANDS/ΜL (ref 1.85–7.62)
NEUTS SEG NFR BLD AUTO: 65 % (ref 43–75)
NRBC BLD AUTO-RTO: 0 /100 WBCS
PLATELET # BLD AUTO: 297 THOUSANDS/UL (ref 149–390)
PMV BLD AUTO: 9.2 FL (ref 8.9–12.7)
POTASSIUM SERPL-SCNC: 4.6 MMOL/L (ref 3.5–5.3)
PROT SERPL-MCNC: 7.1 G/DL (ref 6.4–8.2)
RBC # BLD AUTO: 3.85 MILLION/UL (ref 3.81–5.12)
SODIUM SERPL-SCNC: 141 MMOL/L (ref 136–145)
VENIPUNCTURE: NORMAL
WBC # BLD AUTO: 9.1 THOUSAND/UL (ref 4.31–10.16)

## 2017-11-20 PROCEDURE — 80053 COMPREHEN METABOLIC PANEL: CPT

## 2017-11-20 PROCEDURE — 36415 COLL VENOUS BLD VENIPUNCTURE: CPT

## 2017-11-20 PROCEDURE — 85025 COMPLETE CBC W/AUTO DIFF WBC: CPT

## 2017-11-21 ENCOUNTER — GENERIC CONVERSION - ENCOUNTER (OUTPATIENT)
Dept: OTHER | Facility: OTHER | Age: 77
End: 2017-11-21

## 2017-12-01 ENCOUNTER — ALLSCRIPTS OFFICE VISIT (OUTPATIENT)
Dept: OTHER | Facility: OTHER | Age: 77
End: 2017-12-01

## 2017-12-01 ENCOUNTER — GENERIC CONVERSION - ENCOUNTER (OUTPATIENT)
Dept: OTHER | Facility: OTHER | Age: 77
End: 2017-12-01

## 2017-12-05 NOTE — PROGRESS NOTES
Assessment  Assessed    1  Anemia due to blood loss (280 0) (D50 0)    Plan  Anemia due to blood loss    · (1) BASIC METABOLIC PROFILE; Status:Active; Requested for:01Dec2017;    Perform:[facility]; Due:01Dec2018; Ordered; For:Anemia due to blood loss; Ordered By:Jocelyn Malhotra;   · (1) CBC/PLT/DIFF; Status:Active; Requested for:01Dec2017;    Perform:[facility]; Due:01Dec2018; Ordered; For:Anemia due to blood loss; Ordered By:Jocelyn Malhotra;   · (1) IRON PANEL; Status:Active; Requested for:01Dec2017;    Perform:[facility]; Due:01Dec2018; Ordered; For:Anemia due to blood loss; Ordered By:Jocelyn Malhotra;  Palpitations    · EKG/ECG- POC; Status:Complete;   Done: 40HRX2835   Perform: In Office; [phone]; Last Updated By:Anna Bella; 12/1/2017 2:11:17 PM;Ordered;Ordered By:Jocelyn Malhotra;    Discussion/Summary  Cardiology Discussion Summary Free Text Note Form [facility]:   77 yo femaleMultiple 5s pauses due to intermittent complete heart block and syncope seen on loop, which was subsequently explanted and New dual chamber pacemaker w Passive leads placed  Unfortuantely she had late subacute RV lead perforation (pain started 5 days later and loss of capture)  We revised device and no pericardial effusion, however she did develop large hematoma requiring 2 units tranfusion  She is now recovered, hematoma and bruising resolving  She did have swelling left upper arm which is improving  She is on iron for anemia  Stage 4 Liver RAMO edema a little wose todayPostural dizzyness, she sees ENT today who was going to start meclizine  Recommend she take lasix daily for 5 days to help w TRISTAN and UE edemaResume aspirin 81mgcheck CBC and Iron panel to determine if iron still needed  in 2 months  Chief Complaint  Chief Complaint Free Text Note Form: Hospital F/U for lead revision  Pt complains of swelling in LE and swelling in left arm  No complaints of chest pain, palpitations or SOB        History of Present Illness  Cardiology HPI Free Text Note [facility]: 77 yo femaleMultiple 5s pauses due to intermittent complete heart block and syncope seen on loop, which was subsequently explanted and New dual chamber pacemaker w Passive leads placed  Unfortuantely she had late subacute RV lead perforation (pain started 5 days later and loss of capture)  We revised device and no pericardial effusion, however she did develop large hematoma requiring 2 units tranfusion  She is now recovered, hematoma and bruising resolving  She did have swelling left upper arm which is improving  She is on iron for anemia  Stage 4 Liver RAMO edema a little wose todayPostural dizzyness, she sees ENT today who was going to start meclizine  point ROS per paper elizabeth      Review of Systems  ROS Reviewed:   ROS reviewed  Active Problems  Problems    1  Abnormal PET scan of head (793 0) (R94 02)   2  Acute bronchitis (466 0) (J20 9)   3  Adenocarcinoma of breast (174 9) (C50 919)   4  Admission for antineoplastic chemotherapy (V58 11) (Z51 11)   5  Cardiac syncope (780 2) (R55)   6  Diarrhea (787 91) (R19 7)   7  Displacement of cardiac electrode, subsequent encounter (V58 89,996 01) (T82 120D)   8  Edema of both legs (782 3) (R60 0)   9  Flushing (782 62) (R23 2)   10  Hyperlipidemia (272 4) (E78 5)   11  Left bundle branch block (LBBB) (426 3) (I44 7)   12  Liver mass, right lobe (573 9) (R16 0)   13  Liver metastases (197 7) (C78 7)   14  Palpitations (785 1) (R00 2)   15  Preop cardiovascular exam (V72 81) (Z01 810)   16  Recurrent breast cancer, right (174 9) (C50 911)   17  Screening mammogram for high-risk patient (V76 11) (Z12 31)   18  Syncope (780 2) (R55)    Past Medical History  Problems    1  History of Age At First Period 13 Years Old (Menarche)   2  History of Age At First Pregnancy 19 Years Old   3  History of diverticulitis of colon (V12 79) (Z87 19)   4  History of Night sweats (780 8) (R61)   5   History of Polyuria (788 42) (R35 8)   6  History of Previous Pregnancies Resulted In 4  Live Birth(S)   7  History of Status post chemotherapy (V66 2) (Z51 89)   8  History of Use of anastrozole (Arimidex) (V07 52) (M50 316)  Active Problems And Past Medical History Reviewed: The active problems and past medical history were reviewed and updated today  Surgical History  Problems    1  History of Adenoidectomy   2  History of Biopsy Breast Open   3  History of Biopsy Breast Percutaneous Needle Core   4  History of Bladder Surgery   5  History of Breast Surgery Mastectomy   6  History of General Surgery   7  History of General Surgery   8  History of Hysterectomy   9  History of Knee Replacement   10  History of Liver Surgery   11  History of Rotator Cuff Repair   12  History of Thurman Lymph Node Biopsy   13  History of Shoulder Repair   14  History of Tonsillectomy  Surgical History Reviewed: The surgical history was reviewed and updated today  Family History  Father    1  Family history of Lung Cancer (V16 1)  Brother    2  Family history of Lung Cancer (V16 1)  Family History Reviewed: The family history was reviewed and updated today  Social History  Problems    · Being A Social Drinker   · Never A Smoker    Current Meds   1  Acetaminophen 325 MG Oral Tablet; TAKE 1 TO 2 TABLETS EVERY 6 HOURS AS NEEDED; Therapy: (Recorded:01Aug2017) to Recorded   2  Atorvastatin Calcium 20 MG Oral Tablet; Take 1 tablet daily; Therapy: 07USM3618 to  Requested for: 01Aug2017 Recorded   3  Diphenoxylate-Atropine 2 5-0 025 MG Oral Tablet; TAKE 1 TABLET 4 TIMES DAILY AS NEEDED FOR DIARRHEA; Therapy: 13Apr2017 to (Evaluate:92Qny9170); Last Rx:13Apr2017 Ordered   4  Furosemide 20 MG Oral Tablet; take 1 tablet daily prn edema; Therapy: 01Aug2017 to (Evaluate:55Bhz0771)  Requested for: 01Aug2017; Last Rx:01Aug2017 Ordered   5   Metoprolol Tartrate 25 MG Oral Tablet; TAKE 0 5 TABLET Every twelve hours  Requested for: 81ULQ7210; Last Rx: 38SOY1296 Ordered   6  Ondansetron HCl - 4 MG Oral Tablet; One tab PO q 4-6 hours PRN nausea; Therapy: 50Wgs8135 to (Last Rx:52Bpg3280)  Requested for: 31LZU9484 Ordered   7  Tamoxifen Citrate 20 MG Oral Tablet; Take 1 tablet daily; Therapy: 67Rrk9071 to (Evaluate:18Mar2018)  Requested for: 58Uie3652; Last Rx:13Vps2918 Ordered    Allergies  Medication    1  Ampicillin CAPS    Vitals  Vital Signs    Recorded: 17SGP1367 02:08PM   Heart Rate 83   Systolic 965, RUE, Sitting   Diastolic 64, RUE, Sitting   BP CUFF SIZE Large   Height 5 ft 0 5 in   Weight 155 lb 8 oz   BMI Calculated 29 87   BSA Calculated 1 69       Physical Exam   Constitutional - General appearance: No acute distress, well appearing and well nourished  Eyes - Conjunctiva and Sclera examination: Conjunctiva pink, sclera anicteric  Ears, Nose, Mouth, and Throat - External inspection of ears and nose: Normal without deformities or discharge  Neck - Normal, no JVD   Cardiovascular - Auscultation of heart: Normal rate and rhythm, normal S1 and S2, no murmurs  -- Examination of extremities for edema and/or varicosities: Abnormal  -- 1+ pitting bilateraly  Chest -  Chest: Abnormal-- Mild eccymosis left breast  Pacemaekr site c/d/i well healing  Musculoskeletal - Gait and station: Normal gait  Skin - Skin: Normal without rashes  Skin is warm and well perfused  Neurologic - Speech normal  No focal deficits  Psychiatric - Mood and affect: Normal       Results/Data  Diagnostic Studies Reviewed Cardio: I personally reviewed the recording/images in the office today  My interpretation follows    ICD/ Pacer Interpretation Pacemaekr check today shows normal Atrial and ventricular sensign and thresholds   ECG Report: NSR with LBBB first degree AV block      Future Appointments    Date/Time Provider Specialty Site   01/23/2018 11:30 AM Cardiology, Device Remote  ST [address] [place] [address]   04/26/2018 10:00 AM Cardiology, Device Remote  [facility] [address] [facility] [address]        Signatures   Electronically signed by : DESIREE Ravi ; Dec  1 2017  2:51PM EST                       (Author)

## 2018-01-07 ENCOUNTER — HOSPITAL ENCOUNTER (INPATIENT)
Facility: HOSPITAL | Age: 78
LOS: 4 days | Discharge: HOME | DRG: 444 | End: 2018-01-11
Attending: SURGERY | Admitting: SURGERY
Payer: COMMERCIAL

## 2018-01-07 ENCOUNTER — APPOINTMENT (EMERGENCY)
Dept: CT IMAGING | Facility: HOSPITAL | Age: 78
DRG: 444 | End: 2018-01-07
Payer: COMMERCIAL

## 2018-01-07 ENCOUNTER — HOSPITAL ENCOUNTER (EMERGENCY)
Facility: HOSPITAL | Age: 78
DRG: 444 | End: 2018-01-07
Attending: EMERGENCY MEDICINE | Admitting: EMERGENCY MEDICINE
Payer: COMMERCIAL

## 2018-01-07 VITALS
HEIGHT: 61 IN | TEMPERATURE: 98.2 F | HEART RATE: 94 BPM | OXYGEN SATURATION: 97 % | BODY MASS INDEX: 31.97 KG/M2 | WEIGHT: 169.31 LBS | RESPIRATION RATE: 27 BRPM | SYSTOLIC BLOOD PRESSURE: 100 MMHG | DIASTOLIC BLOOD PRESSURE: 59 MMHG

## 2018-01-07 DIAGNOSIS — K81.0 ACUTE CHOLECYSTITIS: Primary | ICD-10-CM

## 2018-01-07 DIAGNOSIS — K80.50 CHOLEDOCHOLITHIASIS: ICD-10-CM

## 2018-01-07 DIAGNOSIS — K83.09 CHOLANGITIS: ICD-10-CM

## 2018-01-07 DIAGNOSIS — K80.50 CHOLEDOCHOLITHIASIS: Primary | ICD-10-CM

## 2018-01-07 DIAGNOSIS — K85.10 ACUTE BILIARY PANCREATITIS, UNSPECIFIED COMPLICATION STATUS: ICD-10-CM

## 2018-01-07 DIAGNOSIS — K85.90 ACUTE PANCREATITIS: ICD-10-CM

## 2018-01-07 PROBLEM — K81.9 CHOLECYSTITIS: Status: ACTIVE | Noted: 2018-01-07

## 2018-01-07 LAB
ALBUMIN SERPL BCP-MCNC: 2.5 G/DL (ref 3.5–5)
ALP SERPL-CCNC: 307 U/L (ref 46–116)
ALT SERPL W P-5'-P-CCNC: 212 U/L (ref 12–78)
ANION GAP SERPL CALCULATED.3IONS-SCNC: 15 MMOL/L (ref 4–13)
APTT PPP: 28 SECONDS (ref 23–35)
AST SERPL W P-5'-P-CCNC: 176 U/L (ref 5–45)
BACTERIA UR QL AUTO: ABNORMAL /HPF
BASOPHILS # BLD MANUAL: 0 THOUSAND/UL (ref 0–0.1)
BASOPHILS NFR MAR MANUAL: 0 % (ref 0–1)
BILIRUB SERPL-MCNC: 3.67 MG/DL (ref 0.2–1)
BILIRUB UR QL STRIP: ABNORMAL
BUN SERPL-MCNC: 17 MG/DL (ref 5–25)
CALCIUM SERPL-MCNC: 9 MG/DL (ref 8.3–10.1)
CHLORIDE SERPL-SCNC: 103 MMOL/L (ref 100–108)
CLARITY UR: ABNORMAL
CO2 SERPL-SCNC: 21 MMOL/L (ref 21–32)
COLOR UR: ABNORMAL
CREAT SERPL-MCNC: 0.91 MG/DL (ref 0.6–1.3)
EOSINOPHIL # BLD MANUAL: 0 THOUSAND/UL (ref 0–0.4)
EOSINOPHIL NFR BLD MANUAL: 0 % (ref 0–6)
ERYTHROCYTE [DISTWIDTH] IN BLOOD BY AUTOMATED COUNT: 13.9 % (ref 11.6–15.1)
GFR SERPL CREATININE-BSD FRML MDRD: 60 ML/MIN/1.73SQ M
GLUCOSE SERPL-MCNC: 157 MG/DL (ref 65–140)
GLUCOSE UR STRIP-MCNC: NEGATIVE MG/DL
HCT VFR BLD AUTO: 40.2 % (ref 34.8–46.1)
HGB BLD-MCNC: 13.2 G/DL (ref 11.5–15.4)
HGB UR QL STRIP.AUTO: ABNORMAL
INR PPP: 1.27 (ref 0.86–1.16)
KETONES UR STRIP-MCNC: ABNORMAL MG/DL
LACTATE SERPL-SCNC: 2.4 MMOL/L (ref 0.5–2)
LEUKOCYTE ESTERASE UR QL STRIP: NEGATIVE
LIPASE SERPL-CCNC: 5202 U/L (ref 73–393)
LYMPHOCYTES # BLD AUTO: 0.67 THOUSAND/UL (ref 0.6–4.47)
LYMPHOCYTES # BLD AUTO: 7 % (ref 14–44)
MCH RBC QN AUTO: 30.9 PG (ref 26.8–34.3)
MCHC RBC AUTO-ENTMCNC: 32.8 G/DL (ref 31.4–37.4)
MCV RBC AUTO: 94 FL (ref 82–98)
METAMYELOCYTES NFR BLD MANUAL: 1 % (ref 0–1)
MONOCYTES # BLD AUTO: 0.1 THOUSAND/UL (ref 0–1.22)
MONOCYTES NFR BLD: 1 % (ref 4–12)
NEUTROPHILS # BLD MANUAL: 8.7 THOUSAND/UL (ref 1.85–7.62)
NEUTS BAND NFR BLD MANUAL: 20 % (ref 0–8)
NEUTS SEG NFR BLD AUTO: 71 % (ref 43–75)
NITRITE UR QL STRIP: NEGATIVE
NON-SQ EPI CELLS URNS QL MICRO: ABNORMAL /HPF
NRBC BLD AUTO-RTO: 0 /100 WBCS
PH UR STRIP.AUTO: 5.5 [PH] (ref 4.5–8)
PLATELET # BLD AUTO: 191 THOUSANDS/UL (ref 149–390)
PLATELET # BLD AUTO: 207 THOUSANDS/UL (ref 149–390)
PLATELET BLD QL SMEAR: ADEQUATE
PMV BLD AUTO: 10 FL (ref 8.9–12.7)
PMV BLD AUTO: 10.6 FL (ref 8.9–12.7)
POTASSIUM SERPL-SCNC: 4.5 MMOL/L (ref 3.5–5.3)
PROT SERPL-MCNC: 6.9 G/DL (ref 6.4–8.2)
PROT UR STRIP-MCNC: ABNORMAL MG/DL
PROTHROMBIN TIME: 16 SECONDS (ref 12.1–14.4)
RBC # BLD AUTO: 4.27 MILLION/UL (ref 3.81–5.12)
RBC #/AREA URNS AUTO: ABNORMAL /HPF
RBC MORPH BLD: NORMAL
SODIUM SERPL-SCNC: 139 MMOL/L (ref 136–145)
SP GR UR STRIP.AUTO: 1.01 (ref 1–1.03)
TOTAL CELLS COUNTED SPEC: 100
UROBILINOGEN UR QL STRIP.AUTO: 1 E.U./DL
WBC # BLD AUTO: 9.56 THOUSAND/UL (ref 4.31–10.16)
WBC #/AREA URNS AUTO: ABNORMAL /HPF

## 2018-01-07 PROCEDURE — 71275 CT ANGIOGRAPHY CHEST: CPT

## 2018-01-07 PROCEDURE — 85007 BL SMEAR W/DIFF WBC COUNT: CPT | Performed by: EMERGENCY MEDICINE

## 2018-01-07 PROCEDURE — 80053 COMPREHEN METABOLIC PANEL: CPT | Performed by: EMERGENCY MEDICINE

## 2018-01-07 PROCEDURE — 85027 COMPLETE CBC AUTOMATED: CPT | Performed by: EMERGENCY MEDICINE

## 2018-01-07 PROCEDURE — 85610 PROTHROMBIN TIME: CPT | Performed by: EMERGENCY MEDICINE

## 2018-01-07 PROCEDURE — 74177 CT ABD & PELVIS W/CONTRAST: CPT

## 2018-01-07 PROCEDURE — 36415 COLL VENOUS BLD VENIPUNCTURE: CPT | Performed by: EMERGENCY MEDICINE

## 2018-01-07 PROCEDURE — 99285 EMERGENCY DEPT VISIT HI MDM: CPT

## 2018-01-07 PROCEDURE — 85049 AUTOMATED PLATELET COUNT: CPT | Performed by: SURGERY

## 2018-01-07 PROCEDURE — 87040 BLOOD CULTURE FOR BACTERIA: CPT | Performed by: SURGERY

## 2018-01-07 PROCEDURE — 81001 URINALYSIS AUTO W/SCOPE: CPT

## 2018-01-07 PROCEDURE — 83690 ASSAY OF LIPASE: CPT | Performed by: EMERGENCY MEDICINE

## 2018-01-07 PROCEDURE — 83605 ASSAY OF LACTIC ACID: CPT | Performed by: SURGERY

## 2018-01-07 PROCEDURE — 81002 URINALYSIS NONAUTO W/O SCOPE: CPT | Performed by: EMERGENCY MEDICINE

## 2018-01-07 PROCEDURE — 85730 THROMBOPLASTIN TIME PARTIAL: CPT | Performed by: EMERGENCY MEDICINE

## 2018-01-07 RX ORDER — ONDANSETRON 2 MG/ML
4 INJECTION INTRAMUSCULAR; INTRAVENOUS EVERY 6 HOURS PRN
Status: DISCONTINUED | OUTPATIENT
Start: 2018-01-07 | End: 2018-01-11 | Stop reason: HOSPADM

## 2018-01-07 RX ORDER — ASPIRIN 81 MG/1
81 TABLET, CHEWABLE ORAL DAILY
Status: DISCONTINUED | OUTPATIENT
Start: 2018-01-08 | End: 2018-01-11 | Stop reason: HOSPADM

## 2018-01-07 RX ORDER — HEPARIN SODIUM 5000 [USP'U]/ML
5000 INJECTION, SOLUTION INTRAVENOUS; SUBCUTANEOUS EVERY 8 HOURS SCHEDULED
Status: DISCONTINUED | OUTPATIENT
Start: 2018-01-07 | End: 2018-01-07

## 2018-01-07 RX ORDER — HEPARIN SODIUM 5000 [USP'U]/ML
5000 INJECTION, SOLUTION INTRAVENOUS; SUBCUTANEOUS EVERY 8 HOURS SCHEDULED
Status: DISCONTINUED | OUTPATIENT
Start: 2018-01-07 | End: 2018-01-11 | Stop reason: HOSPADM

## 2018-01-07 RX ORDER — OXYCODONE HYDROCHLORIDE 10 MG/1
10 TABLET ORAL EVERY 4 HOURS PRN
Status: DISCONTINUED | OUTPATIENT
Start: 2018-01-07 | End: 2018-01-09

## 2018-01-07 RX ORDER — OXYCODONE HYDROCHLORIDE 5 MG/1
5 TABLET ORAL EVERY 4 HOURS PRN
Status: DISCONTINUED | OUTPATIENT
Start: 2018-01-07 | End: 2018-01-11 | Stop reason: HOSPADM

## 2018-01-07 RX ORDER — ONDANSETRON 2 MG/ML
4 INJECTION INTRAMUSCULAR; INTRAVENOUS EVERY 6 HOURS PRN
Status: DISCONTINUED | OUTPATIENT
Start: 2018-01-07 | End: 2018-01-07

## 2018-01-07 RX ORDER — ASPIRIN 81 MG/1
81 TABLET, CHEWABLE ORAL DAILY
Status: DISCONTINUED | OUTPATIENT
Start: 2018-01-08 | End: 2018-01-07

## 2018-01-07 RX ORDER — SODIUM CHLORIDE 9 MG/ML
100 INJECTION, SOLUTION INTRAVENOUS CONTINUOUS
Status: DISCONTINUED | OUTPATIENT
Start: 2018-01-07 | End: 2018-01-07

## 2018-01-07 RX ORDER — TAMOXIFEN CITRATE 10 MG/1
20 TABLET ORAL DAILY
Status: DISCONTINUED | OUTPATIENT
Start: 2018-01-08 | End: 2018-01-11 | Stop reason: HOSPADM

## 2018-01-07 RX ORDER — OXYCODONE HYDROCHLORIDE 10 MG/1
10 TABLET ORAL EVERY 4 HOURS PRN
Status: DISCONTINUED | OUTPATIENT
Start: 2018-01-07 | End: 2018-01-07

## 2018-01-07 RX ORDER — ACETAMINOPHEN 325 MG/1
650 TABLET ORAL EVERY 6 HOURS PRN
Status: DISCONTINUED | OUTPATIENT
Start: 2018-01-07 | End: 2018-01-07

## 2018-01-07 RX ORDER — OXYCODONE HYDROCHLORIDE 5 MG/1
5 TABLET ORAL EVERY 4 HOURS PRN
Status: DISCONTINUED | OUTPATIENT
Start: 2018-01-07 | End: 2018-01-07

## 2018-01-07 RX ORDER — SODIUM CHLORIDE 9 MG/ML
200 INJECTION, SOLUTION INTRAVENOUS CONTINUOUS
Status: DISCONTINUED | OUTPATIENT
Start: 2018-01-07 | End: 2018-01-09

## 2018-01-07 RX ADMIN — HEPARIN SODIUM 5000 UNITS: 5000 INJECTION, SOLUTION INTRAVENOUS; SUBCUTANEOUS at 18:24

## 2018-01-07 RX ADMIN — VANCOMYCIN HYDROCHLORIDE 1250 MG: 1 INJECTION, POWDER, LYOPHILIZED, FOR SOLUTION INTRAVENOUS at 22:02

## 2018-01-07 RX ADMIN — SODIUM CHLORIDE 1000 ML: 0.9 INJECTION, SOLUTION INTRAVENOUS at 06:10

## 2018-01-07 RX ADMIN — METRONIDAZOLE 500 MG: 500 INJECTION, SOLUTION INTRAVENOUS at 20:58

## 2018-01-07 RX ADMIN — SODIUM CHLORIDE 250 ML/HR: 0.9 INJECTION, SOLUTION INTRAVENOUS at 18:22

## 2018-01-07 RX ADMIN — Medication 2000 MG: at 18:25

## 2018-01-07 RX ADMIN — IOHEXOL 100 ML: 350 INJECTION, SOLUTION INTRAVENOUS at 07:07

## 2018-01-07 NOTE — ED RE-EVALUATION NOTE
I was signed out this pt  By Dr Tyrone Madden and I re-evaluated her  She has a hx of breast cancer s/p mastectomy  in 2009, then diagnosed with mets to the liver for which she had a partial resection of the liver by dr Margo Ward in 7/2017  She's had epigastric/RUQ abd  Pain intermittent for the past 2-3 weeks, but worse and constand now since last night  No fevers,  +n/v    CT scan shows acute cholecystitis, choledocolithiasis and pancreatitis  She requests to go back to Dr Margo Ward at [facility]  Dr Margo Ward accepts transfer  Pt  Is npo

## 2018-01-07 NOTE — EMTALA/ACUTE CARE TRANSFER
[facility]  [address] [facility] [address]  Dept: [phone]      EMTALA TRANSFER CONSENT    NAME Lisette Wiseman                                         1940                              MRN [id number]    I have been informed of my rights regarding examination, treatment, and transfer   by Dr Joanie Durham*    Benefits: Continuity of care, Specialized equipment and/or services available at the receiving facility (Include comment)________________________    Risks: Potential for delay in receiving treatment, Increased discomfort during transfer, Possible worsening of condition or death during transfer      Consent for Transfer:  I acknowledge that my medical condition has been evaluated and explained to me by the emergency department physician or other qualified medical person and/or my attending physician, who has recommended that I be transferred to the service of  Accepting Physician:  (Dr Ivan Julio) at [address], [address] :  ([facility])  The above potential benefits of such transfer, the potential risks associated with such transfer, and the probable risks of not being transferred have been explained to me, and I fully understand them  The doctor has explained that, in my case, the benefits of transfer outweigh the risks  I agree to be transferred  I authorize the performance of emergency medical procedures and treatments upon me in both transit and upon arrival at the receiving facility  Additionally, I authorize the release of any and all medical records to the receiving facility and request they be transported with me, if possible  I understand that the safest mode of transportation during a medical emergency is an ambulance and that the Hospital advocates the use of this mode of transport   Risks of traveling to the receiving facility by car, including absence of medical control, life sustaining equipment, such as oxygen, and medical personnel has been explained to me and I fully understand them  (VISHNU CORRECT BOX BELOW)  [ Erin Shen  I consent to the stated transfer and to be transported by ambulance/helicopter  [  ]  I consent to the stated transfer, but refuse transportation by ambulance and accept full responsibility for my transportation by car  I understand the risks of non-ambulance transfers and I exonerate the Hospital and its staff from any deterioration in my condition that results from this refusal     X___________________________________________    DATE  18  TIME________  Signature of patient or legally responsible individual signing on patient behalf           RELATIONSHIP TO PATIENT_________________________          Provider [address] [place] [address]                                         1940                              MRN [id number]    A medical screening exam was performed on the above named patient  Based on the examination:    Condition Necessitating Transfer The primary encounter diagnosis was Acute cholecystitis  Diagnoses of Choledocholithiasis and Acute pancreatitis were also pertinent to this visit      Patient Condition: The patient has been stabilized such that within reasonable medical probability, no material deterioration of the patient condition or the condition of the unborn child(jody) is likely to result from the transfer    Reason for Transfer: Level of Care needed not available at this facility    Transfer Requirements: Facility  ([facility])   · Space available and qualified personnel available for treatment as acknowledged by    · Agreed to accept transfer and to provide appropriate medical treatment as acknowledged by        (Dr Beatrice Munson)  · Appropriate medical records of the examination and treatment of the patient are provided at the time of transfer   [address] _______  · Transfer will be performed by qualified personnel from    and appropriate transfer equipment as required, including the use of necessary and appropriate life support measures  Provider Certification: I have examined the patient and explained the following risks and benefits of being transferred/refusing transfer to the patient/family:  General risk, such as traffic hazards, adverse weather conditions, rough terrain or turbulence, possible failure of equipment (including vehicle or aircraft), or consequences of actions of persons outside the control of the transport personnel      Based on these reasonable risks and benefits to the patient and/or the unborn child(jody), and based upon the information available at the time of the patients examination, I certify that the medical benefits reasonably to be expected from the provision of appropriate medical treatments at another medical facility outweigh the increasing risks, if any, to the individuals medical condition, and in the case of labor to the unborn child, from effecting the transfer      X____________________________________________ DATE 01/07/18        TIME_______      ORIGINAL - SEND TO MEDICAL RECORDS   COPY - SEND WITH PATIENT DURING TRANSFER

## 2018-01-07 NOTE — ED NOTES
Pt tearful, states she does not want surgery  Asked for ice chips  Checked with Dr Ngozi Shirley who stated that she may be a surgical transfer and may have moist swabs         Glenny Hopkins RN  01/07/18 2365

## 2018-01-07 NOTE — ED NOTES
Called [facility] to check on status of transfer to B  Awaiting discharge at that campus before placement       Princess Tessa RN  01/07/18 1012

## 2018-01-07 NOTE — ED NOTES
Adjusted patient bed, provided with warm blankets, offers no complaints, asymptomatic with recent BP reading, patient is asleep and resting  Remains on cardiac monitor and pulse ox  AAOx3        Amisha Celestin RN  01/07/18 4523

## 2018-01-07 NOTE — ED PROVIDER NOTES
History  Chief Complaint   Patient presents with    Abdominal Pain     pt  arrived via EMS c/o abdominal pain and weakness     Patient is an 64-year-old female with a history of metastatic cancer that presents for intermittent abdominal pain for the past several weeks  She has had nausea associated with this  There are no modifying factors  She presents tonight because she was getting out of bed because she felt nauseated and was too weak to ambulate to the bathroom  Patient sat on the floor and called 911 for a lift assist   Patient was too unsteady on her feet and too weak so she decided to come to the hospital   Patient complains of epigastric and left upper quadrant pain  She is currently pain-free  She gets nausea associated with this but does not have that now either  She also complains of intermittent dizziness that she has had for several months and is currently being worked up with [address] and Throat  History provided by:  Patient and relative   used: No    Abdominal Pain   Pain location:  Epigastric, LUQ and RUQ  Pain radiates to:  Does not radiate  Pain severity:  Moderate  Onset quality:  Gradual  Timing:  Intermittent  Progression:  Waxing and waning  Chronicity:  Recurrent  Context: previous surgery    Context: not eating and not trauma    Relieved by:  Nothing  Worsened by:  Nothing  Ineffective treatments:  None tried  Associated symptoms: chills, fatigue and nausea    Associated symptoms: no chest pain, no constipation, no cough, no diarrhea, no dysuria, no fever, no shortness of breath and no vomiting        Prior to Admission Medications   Prescriptions Last Dose Informant Patient Reported? Taking?    Cranberry (THERACRAN HP PO)   Yes No   Sig: Take 2 tablets by mouth daily   Glucosamine-Chondroit-Vit C-Mn (GLUCOSAMINE 1500 COMPLEX) CAPS   Yes No   Sig: Take 1 capsule by mouth 2 (two) times a day     Multiple Vitamin (MULTIVITAMIN) capsule   Yes No   Sig: Take 1 capsule by mouth daily  acetaminophen (TYLENOL) 325 mg tablet   No No   Sig: May take 650 mg every 6-8 hours as needed for pain   Patient taking differently: Take 650 mg by mouth every 6 (six) hours as needed May take 650 mg every 6-8 hours as needed for pain    atorvastatin (LIPITOR) 20 mg tablet   Yes No   Sig: Take 20 mg by mouth daily  calcium carbonate (CALCIUM 600) 600 MG tablet   Yes No   Sig: Take 600 mg by mouth 2 (two) times a day with meals     metoprolol tartrate (LOPRESSOR) 25 mg tablet   No No   Sig: Take 1 tablet by mouth every 12 (twelve) hours   ondansetron (ZOFRAN) 4 mg tablet   Yes No   Sig: Take 4 mg by mouth every 8 (eight) hours as needed for nausea or vomiting   tamoxifen (NOLVADEX) 20 mg tablet   Yes No   Sig: Take 20 mg by mouth daily      Facility-Administered Medications: None       Past Medical History:   Diagnosis Date    Anxiety     Arthritis     Breast CA (HCC)     recurrent, ductal carcinoma ER, AL pos    Depression     Diverticula of intestine     Dizziness     and passes out    Flushing     Hiatal hernia     [place] hearing[place]      [place]    Hypercholesteremia     Liver mass     Liver metastasis (HCC)     Metastatic adenocarcinoma to liver ([facility] [address] )     Bx 01/03/2017    PONV (postoperative nausea and vomiting)     Recurrent breast cancer (HCC)     Shingles     Shortness of breath     on exertion    Tachycardia     Urinary incontinence     Use of cane as ambulatory aid     or walker       Past Surgical History:   Procedure Laterality Date    CARDIAC PACEMAKER REMOVAL N/A 11/9/2017    Procedure: PACEMAKER LEAD REVISION, REMOVAL OF NONFUNCTIONING LEAD, AND INSERTION OF A NEW RV LEAD;  Surgeon: Shlomo Vega MD;  [facility];  Service: Cardiology    CATARACT EXTRACTION Bilateral     COLONOSCOPY      HYSTERECTOMY      JOINT REPLACEMENT      [place]  TKR and right TSR    MASTECTOMY Right     AL RESEC LIVER,PART LOBECTOMY N/A 7/13/2017    Procedure: LIVER RESECTION, INTRAOPERATIVE ULTRASOUND;  Surgeon: Olamide Finn MD;  [facility];  Service: Surgical Oncology    ROTATOR CUFF REPAIR Right     SENTINEL LYMPH NODE BIOPSY Right     TONSILECTOMY AND ADNOIDECTOMY      WOUND DEBRIDEMENT Left 11/9/2017    Procedure: DEBRIDEMENT WOUND AND DRESSING CHANGE [facility]); Surgeon: Teodoro Boss MD;  [facility];  Service: Cardiology       Family History   Problem Relation Age of Onset    Cancer Father     Cancer Brother      I have reviewed and agree with the history as documented  Social History   Substance Use Topics    Smoking status: Never Smoker    Smokeless tobacco: Never Used    Alcohol use No        Review of Systems   Constitutional: Positive for activity change, appetite change, chills and fatigue  Negative for fever  Respiratory: Negative for cough, chest tightness, shortness of breath, wheezing and stridor  Cardiovascular: Negative for chest pain  Gastrointestinal: Positive for abdominal pain and nausea  Negative for constipation, diarrhea and vomiting  Genitourinary: Negative for dysuria  Musculoskeletal: Negative for back pain  Skin: Negative for color change, pallor, rash and wound  Allergic/Immunologic: Negative for immunocompromised state  Neurological: Positive for dizziness  All other systems reviewed and are negative  Physical Exam  ED Triage Vitals [01/07/18 0522]   Temperature Pulse Respirations Blood Pressure SpO2   98 5 °F (36 9 °C) 67 18 132/58 91 %      Temp Source Heart Rate Source Patient Position - Orthostatic VS BP Location FiO2 (%)   Oral Monitor Lying Right arm --      Pain Score       No Pain           Orthostatic Vital Signs  Vitals:    01/07/18 0522 01/07/18 0611   BP: 132/58 122/56   Pulse: 67 63   Patient Position - Orthostatic VS: Lying Lying       Physical Exam   Constitutional: She is oriented to person, place, and time  She appears well-developed and well-nourished  No distress  HENT:   Head: Normocephalic and atraumatic  Mouth/Throat: Oropharynx is clear and moist  Mucous membranes are dry  Eyes: Conjunctivae are normal  No scleral icterus  Neck: Normal range of motion  Neck supple  Cardiovascular: Normal rate, regular rhythm, normal heart sounds and intact distal pulses  Exam reveals no friction rub  No murmur heard  Pulmonary/Chest: Effort normal and breath sounds normal  No stridor  No respiratory distress  She has no wheezes  She has no rales  Abdominal: Soft  She exhibits no distension  There is tenderness in the epigastric area and left upper quadrant  There is no rebound, no guarding, no tenderness at McBurney's point and negative Smith's sign  Musculoskeletal: Normal range of motion  She exhibits no edema, tenderness or deformity  Neurological: She is alert and oriented to person, place, and time  Skin: Skin is warm and dry  No rash noted  She is not diaphoretic  No pallor  Psychiatric: She has a normal mood and affect  Nursing note and vitals reviewed        ED Medications  Medications   sodium chloride 0 9 % bolus 1,000 mL (0 mL Intravenous Stopped 1/7/18 0637)       Diagnostic Studies  Results Reviewed     Procedure Component Value Units Date/Time    Comprehensive metabolic panel [[id number]]  (Abnormal) Collected:  01/07/18 0600    Lab Status:  Final result Specimen:  Blood from Arm, Left Updated:  01/07/18 1209     Sodium 139 mmol/L      Potassium 4 5 mmol/L      Chloride 103 mmol/L      CO2 21 mmol/L      Anion Gap 15 (H) mmol/L      BUN 17 mg/dL      Creatinine 0 91 mg/dL      Glucose 157 (H) mg/dL      Calcium 9 0 mg/dL       (H) U/L       (H) U/L      Alkaline Phosphatase 307 (H) U/L      Total Protein 6 9 g/dL      Albumin 2 5 (L) g/dL      Total Bilirubin 3 67 (H) mg/dL      eGFR 60 ml/min/1 73sq m     Narrative:         National Kidney Disease Education Program recommendations are as follows:  GFR calculation is accurate only with a steady state creatinine  Chronic Kidney disease less than 60 ml/min/1 73 sq  meters  Kidney failure less than 15 ml/min/1 73 sq  meters  Lipase [[id number]]  (Abnormal) Collected:  01/07/18 0600    Lab Status:  Final result Specimen:  Blood from Arm, Left Updated:  01/07/18 0626     Lipase 5,202 (H) u/L     Protime-INR [[id number]]  (Abnormal) Collected:  01/07/18 0600    Lab Status:  Final result Specimen:  Blood from Arm, Left Updated:  01/07/18 0624     Protime 16 0 (H) seconds      INR 1 27 (H)    APTT [[id number]]  (Normal) Collected:  01/07/18 0600    Lab Status:  Final result Specimen:  Blood from Arm, Left Updated:  01/07/18 0624     PTT 28 seconds     Narrative: Therapeutic Heparin Range = 60-90 seconds    CBC and differential [[id number]]  (Normal) Collected:  01/07/18 0600    Lab Status:  Preliminary result Specimen:  Blood from Arm, Left Updated:  01/07/18 0608     WBC 9 56 Thousand/uL      RBC 4 27 Million/uL      Hemoglobin 13 2 g/dL      Hematocrit 40 2 %      MCV 94 fL      MCH 30 9 pg      MCHC 32 8 g/dL      RDW 13 9 %      MPV 10 6 fL      Platelets 785 Thousands/uL     POCT urinalysis dipstick [[id number]]     Lab Status:  No result Specimen:  Urine                  PE Study with CT Abdomen and Pelvis with contrast    (Results Pending)              Procedures  Procedures       Phone Contacts  ED Phone Contact    ED Course  ED Course                                MDM  Number of Diagnoses or Management Options  Diagnosis management comments: Given the patient's extensive history of metastatic carcinoma I will obtain labs and a CT scan  She is currently pain-free and does not want anything at this time  Labs show an elevated lipase, LFTs and bilirubin  She does have a history of gallstones based on a CT done in November  CT currently pending at this time  Patient will need to be admitted  Disposition to Medicine versus surgery on the CT         Amount and/or Complexity of Data Reviewed  Clinical lab tests: ordered  Tests in the radiology section of CPT®: ordered  Tests in the medicine section of CPT®: ordered  Review and summarize past medical records: yes    Patient Progress  Patient progress: stable    CritCare Time    Disposition  Final diagnoses:   None     ED Disposition     None      Follow-up Information    None       Patient's Medications   Discharge Prescriptions    No medications on file     No discharge procedures on file      ED Provider  Electronically Signed by           Amelia Naik DO  01/07/18 1988

## 2018-01-07 NOTE — EMTALA/ACUTE CARE TRANSFER
[facility]  [address] [facility] [address]  Dept: [phone]      EMTALA TRANSFER CONSENT    NAME J Luis Lara                                         1940                              MRN [id number]    I have been informed of my rights regarding examination, treatment, and transfer   by Dr Sugey Flynn*    Benefits:      Risks:        Consent for Transfer:  I acknowledge that my medical condition has been evaluated and explained to me by the emergency department physician or other qualified medical person and/or my attending physician, who has recommended that I be transferred to the service of  Accepting Physician:  (Dr Elia Lloyd) at [address], [address] :  ([facility])  The above potential benefits of such transfer, the potential risks associated with such transfer, and the probable risks of not being transferred have been explained to me, and I fully understand them  The doctor has explained that, in my case, the benefits of transfer outweigh the risks  I agree to be transferred  I authorize the performance of emergency medical procedures and treatments upon me in both transit and upon arrival at the receiving facility  Additionally, I authorize the release of any and all medical records to the receiving facility and request they be transported with me, if possible  I understand that the safest mode of transportation during a medical emergency is an ambulance and that the Hospital advocates the use of this mode of transport  Risks of traveling to the receiving facility by car, including absence of medical control, life sustaining equipment, such as oxygen, and medical personnel has been explained to me and I fully understand them  (VISHNU CORRECT BOX BELOW)  [  ]  I consent to the stated transfer and to be transported by ambulance/helicopter    [  ]  I consent to the stated transfer, but refuse transportation by ambulance and accept full responsibility for my transportation by car  I understand the risks of non-ambulance transfers and I exonerate the Hospital and its staff from any deterioration in my condition that results from this refusal     X___________________________________________    DATE  18  TIME________  Signature of patient or legally responsible individual signing on patient behalf           RELATIONSHIP TO PATIENT_________________________          Provider [address] [place] [address]                                         1940                              MRN [id number]    A medical screening exam was performed on the above named patient  Based on the examination:    Condition Necessitating Transfer The primary encounter diagnosis was Acute cholecystitis  Diagnoses of Choledocholithiasis and Acute pancreatitis were also pertinent to this visit  Patient Condition: The patient has been stabilized such that within reasonable medical probability, no material deterioration of the patient condition or the condition of the unborn child(jody) is likely to result from the transfer    Reason for Transfer: Level of Care needed not available at this facility    Transfer Requirements: Facility  ([facility])   · Space available and qualified personnel available for treatment as acknowledged by    · Agreed to accept transfer and to provide appropriate medical treatment as acknowledged by        (Dr Stevie Tang)  · Appropriate medical records of the examination and treatment of the patient are provided at the time of transfer   [address], [address] _______  · Transfer will be performed by qualified personnel from    and appropriate transfer equipment as required, including the use of necessary and appropriate life support measures      Provider Certification: I have examined the patient and explained the following risks and benefits of being transferred/refusing transfer to the patient/family:  General risk, such as traffic hazards, adverse weather conditions, rough terrain or turbulence, possible failure of equipment (including vehicle or aircraft), or consequences of actions of persons outside the control of the transport personnel      Based on these reasonable risks and benefits to the patient and/or the unborn child(jody), and based upon the information available at the time of the patients examination, I certify that the medical benefits reasonably to be expected from the provision of appropriate medical treatments at another medical facility outweigh the increasing risks, if any, to the individuals medical condition, and in the case of labor to the unborn child, from effecting the transfer      X____________________________________________ DATE 01/07/18        TIME_______      ORIGINAL - SEND TO MEDICAL RECORDS   COPY - SEND WITH PATIENT DURING TRANSFER

## 2018-01-07 NOTE — H&P
H&P Exam - Surgical Oncology   Itzel Schulz [de-identified] y o  female MRN: [id number]  Unit/Bed#: [facility] 603-01 Encounter: [id number]    Assessment/Plan     Assessment:  49-year-old female with choledocholithiasis resulting in cholangitis, acute pancreatitis, acute cholecystitis  Plan:  1  Choledocholithiasis   -  Discussed with GI, Dr Alvis Closs agrees that the patient needs ERCP   -  Plan for ERCP tomorrow 1/8 per GI   -  Pain control in the meantime   -  Follow-up LFTs in the morning   -  Patient cannot tolerate MRCP given pacemaker    2  Cholangitis   -  Secondary to #1   -  Broad-spectrum antibiotics, given patient allergy to ampicillin have ordered cefepime, metronidazole, and vancomycin; vancomycin trough to be done 30 minutes before 4th dose of vancomycin   -  Follow-up GI consult     3  Acute pancreatitis   -  Secondary to #1   -  Trend lipase   -  Crystalloid resuscitation, will start sodium chloride 0 9% infusion at 250 mL an hour IV   -  Consult general surgery for further recommendations   -  Okay to have ice chips for comfort    4  Acute cholecystitis   -  Antibiotics given for #2 should cover for now   -  Will need cholecystectomy, however this will be difficult given history of open hepatic resection   -  Consult general surgery for further recommendations; will need cholecystectomy    5  Complete heart block   -  Recently had new dual chamber pacemaker placed which was complicated by late subacute RV lead perforation   -  ECHO from 11/10 shows EF of 50%   -  Continue home aspirin 81 mg by mouth every day   -  Hold home furosemide 20 mg by mouth every day p r n  for edema     6  Metastatic breast cancer status post liver resection   - Continued home tamoxifen 20 mg p o  every day    7  Hyperlipidemia   - Hold home Lipitor 20 mg p o  every day     8  Hypertension   -  Hold home metoprolol 12 5 mg by mouth b i d     9  DVT prophylaxis   -  SQH   -  SCDs    10   Disposition   - Given cardiac history and competing interest with need for aggressive fluid resuscitation, and potential for volume overload, will place patient in intensive care unit with surgical critical care consultation   - Dr Gordon Cancino accepting patient to ICU   - PT/OT    Nurys Watson MD PGY-4  8:14 PM  01/07/18      History of Present Illness     HPI:  Sunshine Fernandez is a [de-identified] y o  female who presents with abdominal pain in her epigastrium for 2 days  She also has extreme thirst and feels dehydrated  She is passing flatus was not had a bowel movement recently  She denies measurable fevers, however she did have shaking chills at home prior to coming to the hospital   Her  called 911 this morning when she was bending over and pain, and she was seen in the [facility]   There she was found to have hyperbilirubinemia, elevated lipase, transaminitis, and bandemia  She was transferred here for higher level care  Of note, she has a history of  Metastatic breast cancer, and is status post  Liver segment 6 7 resection on 07/13/2017 for metastatic breast cancer  She also recently had a pacemaker placed  She is followed by Cardiology as an outpatient, as well as hematology oncology  She is on tamoxifen for breast cancer  Review of Systems   Constitutional: Negative for fever  HENT: Negative for sore throat  Eyes: Negative for visual disturbance  Respiratory: Negative for shortness of breath  Cardiovascular: Negative for chest pain  Gastrointestinal: Positive for abdominal pain, nausea and vomiting  Endocrine: Negative for polyuria  Genitourinary: Negative for dysuria  Musculoskeletal: Negative for arthralgias  Skin: Negative for rash  Allergic/Immunologic: Negative for environmental allergies  Neurological: Negative for dizziness  Hematological: Does not bruise/bleed easily  Psychiatric/Behavioral: The patient is not nervous/anxious          Historical Information   Past Medical History: Diagnosis Date    Anxiety     Arthritis     Breast CA ([facility] [address] )     recurrent, ductal carcinoma ER, [place] pos    Depression     Diverticula of intestine     Dizziness     and passes out    Flushing     Hiatal hernia     [place]      [place]    Hypercholesteremia     Liver mass     Liver metastasis (HCC)     Metastatic adenocarcinoma to liver (HCC)     Bx 01/03/2017    PONV (postoperative nausea and vomiting)     Recurrent breast cancer ([facility] [address] )     Shingles     Shortness of breath     on exertion    Tachycardia     Urinary incontinence     Use of cane as ambulatory aid     or walker     Past Surgical History:   Procedure Laterality Date    CARDIAC PACEMAKER REMOVAL N/A 11/9/2017    Procedure: PACEMAKER LEAD REVISION, REMOVAL OF NONFUNCTIONING LEAD, AND INSERTION OF A NEW RV LEAD;  Surgeon: Andreia Churchill MD;  Location: [facility];  Service: Cardiology    CATARACT EXTRACTION Bilateral     COLONOSCOPY      HYSTERECTOMY      JOINT REPLACEMENT      [place]  TKR and right TSR    MASTECTOMY Right     [place] RESEC LIVER,PART LOBECTOMY N/A 7/13/2017    Procedure: LIVER RESECTION, INTRAOPERATIVE ULTRASOUND;  Surgeon: Tobi Diana MD;  Location: [facility];  Service: Surgical Oncology    ROTATOR CUFF REPAIR Right     SENTINEL LYMPH NODE BIOPSY Right     TONSILECTOMY AND ADNOIDECTOMY      WOUND DEBRIDEMENT Left 11/9/2017    Procedure: DEBRIDEMENT WOUND AND DRESSING CHANGE ([address] OUT);   Surgeon: Andreia Churchill MD;  Location: [facility];  Service: Cardiology     Social History   History   Alcohol Use No     History   Drug Use No     History   Smoking Status    Never Smoker   Smokeless Tobacco    Never Used     Family History   Problem Relation Age of Onset    Lung cancer Father     Cancer Brother     Diabetes type II Son        Meds/Allergies   all current active meds have been reviewed, current meds:   Current Facility-Administered Medications   Medication Dose Route Frequency    cefepime (MAXIPIME) 2,000 mg in dextrose 5 % 50 mL IVPB  2,000 mg Intravenous Q12H    heparin (porcine) subcutaneous injection 5,000 Units  5,000 Units Subcutaneous Q8H [address]    HYDROmorphone (DILAUDID) 1 mg/mL injection 0 5 mg  0 5 mg Intravenous Q3H PRN    metroNIDAZOLE (FLAGYL) IVPB (premix) 500 mg  500 mg Intravenous Q8H    ondansetron (ZOFRAN) injection 4 mg  4 mg Intravenous Q6H PRN    oxyCODONE (ROXICODONE) immediate release tablet 10 mg  10 mg Oral Q4H PRN    oxyCODONE (ROXICODONE) IR tablet 5 mg  5 mg Oral Q4H PRN    sodium chloride 0 9 % infusion  250 mL/hr Intravenous Continuous    [START ON 1/8/2018] tamoxifen (NOLVADEX) tablet 20 mg  20 mg Oral Daily    vancomycin (VANCOCIN) 1,250 mg in sodium chloride 0 9 % 250 mL IVPB  15 mg/kg Intravenous Q12H    and PTA meds:   Prior to Admission Medications   Prescriptions Last Dose Informant Patient Reported? Taking? Cranberry (THERACRAN HP PO)   Yes No   Sig: Take 2 tablets by mouth daily   Glucosamine-Chondroit-Vit C-Mn (GLUCOSAMINE 1500 COMPLEX) CAPS   Yes No   Sig: Take 1 capsule by mouth 2 (two) times a day     Multiple Vitamin (MULTIVITAMIN) capsule   Yes No   Sig: Take 1 capsule by mouth daily  acetaminophen (TYLENOL) 325 mg tablet   No No   Sig: May take 650 mg every 6-8 hours as needed for pain   Patient taking differently: Take 650 mg by mouth every 6 (six) hours as needed May take 650 mg every 6-8 hours as needed for pain    atorvastatin (LIPITOR) 20 mg tablet   Yes No   Sig: Take 20 mg by mouth daily  calcium carbonate (CALCIUM 600) 600 MG tablet   Yes No   Sig: Take 600 mg by mouth 2 (two) times a day with meals     metoprolol tartrate (LOPRESSOR) 25 mg tablet   No No   Sig: Take 1 tablet by mouth every 12 (twelve) hours   ondansetron (ZOFRAN) 4 mg tablet   Yes No   Sig: Take 4 mg by mouth every 8 (eight) hours as needed for nausea or vomiting   tamoxifen (NOLVADEX) 20 mg tablet   Yes No   Sig: Take 20 mg by mouth daily      Facility-Administered Medications: None     Allergies   Allergen Reactions    Ampicillin Shortness Of Breath and Anaphylaxis       Objective   Vitals: Blood pressure 102/51, pulse 99, temperature 98 1 °F (36 7 °C), temperature source Oral, resp  rate 20, SpO2 93 %  No intake or output data in the 24 hours ending 01/07/18 1750    Invasive Devices     Peripheral Intravenous Line            Peripheral IV 01/07/18 Left Antecubital less than 1 day    Peripheral IV 01/07/18 Right Hand less than 1 day                Physical Exam   Constitutional: She appears well-developed and well-nourished  HENT:   Head: Normocephalic and atraumatic  Eyes: Pupils are equal, round, and reactive to light  Neck: Normal range of motion  No tracheal deviation present  Cardiovascular: Normal rate and regular rhythm  Pulmonary/Chest: Effort normal  She has no wheezes  Abdominal: Soft  There is tenderness (RUQ)     Healed right subcostal incision       Lab Results:   CBC:   Lab Results   Component Value Date    WBC 9 56 01/07/2018    HGB 13 2 01/07/2018    HCT 40 2 01/07/2018    MCV 94 01/07/2018     01/07/2018    MCH 30 9 01/07/2018    MCHC 32 8 01/07/2018    RDW 13 9 01/07/2018    MPV 10 6 01/07/2018    NRBC 0 01/07/2018   , CMP:   Lab Results   Component Value Date     01/07/2018    K 4 5 01/07/2018     01/07/2018    CO2 21 01/07/2018    ANIONGAP 15 (H) 01/07/2018    BUN 17 01/07/2018    CREATININE 0 91 01/07/2018    GLUCOSE 157 (H) 01/07/2018    CALCIUM 9 0 01/07/2018     (H) 01/07/2018     (H) 01/07/2018    ALKPHOS 307 (H) 01/07/2018    PROT 6 9 01/07/2018    ALBUMIN 2 5 (L) 01/07/2018    BILITOT 3 67 (H) 01/07/2018    EGFR 60 01/07/2018   , Magnesium: No results found for: MAG, Phosphorous: No results found for: PHOS, CEA: No results found for: CEA, CA 19-9: No results found for: , AFP: No results found for: AFP  Imaging: I have personally reviewed pertinent films in PACS   1/7 CT-c/a/p: Choledocholithiasis with intra-and extra hepatic biliary dilatation      Associated acute interstitial edematous pancreatitis without pancreatic necrosis or pancreatic collection      Anatomic variation of intrahepatic gallbladder  Cholelithiasis, gallbladder wall thickening with pericholecystic fluid and reactive enhancement within the surrounding hepatic parenchyma most consistent with acute cholecystitis      Postsurgical changes related to partial hepatic resection  Hepatic steatosis  No hepatic mass identified      Increasing right pleural effusion with overlying compressive atelectasis      No pulmonary embolus      Bubbles of gas within the urinary bladder possibly related to instrumentation  Correlation with history of instrumentation and urinary analysis is recommended  Pathology, and Other Studies: n/a    Code Status: Level 1 - Full Code  Advance Directive and Living Will:      Power of :    POLST:      Counseling / Coordination of Care  Total floor / unit time spent today 30 minutes  Greater than 50% of total time was spent with the patient and / or family counseling and / or coordination of care  A description of the counseling / coordination of care: plan of care

## 2018-01-07 NOTE — ED NOTES
Spoke to Dr Ngozi Shirley about pt not wanting surgery  She stated she will speak to pt        Glenny Hopkins, ELEONORA  01/07/18 5378

## 2018-01-08 ENCOUNTER — ANESTHESIA EVENT (INPATIENT)
Dept: GASTROENTEROLOGY | Facility: HOSPITAL | Age: 78
DRG: 444 | End: 2018-01-08
Payer: COMMERCIAL

## 2018-01-08 ENCOUNTER — ANESTHESIA (INPATIENT)
Dept: GASTROENTEROLOGY | Facility: HOSPITAL | Age: 78
DRG: 444 | End: 2018-01-08
Payer: COMMERCIAL

## 2018-01-08 ENCOUNTER — APPOINTMENT (INPATIENT)
Dept: RADIOLOGY | Facility: HOSPITAL | Age: 78
DRG: 444 | End: 2018-01-08
Payer: COMMERCIAL

## 2018-01-08 LAB
ABO GROUP BLD: NORMAL
ALBUMIN SERPL BCP-MCNC: 2 G/DL (ref 3.5–5)
ALBUMIN SERPL BCP-MCNC: 2.1 G/DL (ref 3.5–5)
ALP SERPL-CCNC: 201 U/L (ref 46–116)
ALP SERPL-CCNC: 202 U/L (ref 46–116)
ALT SERPL W P-5'-P-CCNC: 155 U/L (ref 12–78)
ALT SERPL W P-5'-P-CCNC: 165 U/L (ref 12–78)
ANION GAP SERPL CALCULATED.3IONS-SCNC: 7 MMOL/L (ref 4–13)
ANION GAP SERPL CALCULATED.3IONS-SCNC: 7 MMOL/L (ref 4–13)
APTT PPP: 30 SECONDS (ref 23–35)
AST SERPL W P-5'-P-CCNC: 149 U/L (ref 5–45)
AST SERPL W P-5'-P-CCNC: 153 U/L (ref 5–45)
BASOPHILS # BLD AUTO: 0.02 THOUSANDS/ΜL (ref 0–0.1)
BASOPHILS NFR BLD AUTO: 0 % (ref 0–1)
BILIRUB SERPL-MCNC: 1.48 MG/DL (ref 0.2–1)
BILIRUB SERPL-MCNC: 1.98 MG/DL (ref 0.2–1)
BLD GP AB SCN SERPL QL: NEGATIVE
BUN SERPL-MCNC: 22 MG/DL (ref 5–25)
BUN SERPL-MCNC: 25 MG/DL (ref 5–25)
CA-I BLD-SCNC: 1.14 MMOL/L (ref 1.12–1.32)
CALCIUM SERPL-MCNC: 8.2 MG/DL (ref 8.3–10.1)
CALCIUM SERPL-MCNC: 8.3 MG/DL (ref 8.3–10.1)
CHLORIDE SERPL-SCNC: 108 MMOL/L (ref 100–108)
CHLORIDE SERPL-SCNC: 110 MMOL/L (ref 100–108)
CO2 SERPL-SCNC: 25 MMOL/L (ref 21–32)
CO2 SERPL-SCNC: 27 MMOL/L (ref 21–32)
CREAT SERPL-MCNC: 0.59 MG/DL (ref 0.6–1.3)
CREAT SERPL-MCNC: 0.71 MG/DL (ref 0.6–1.3)
EOSINOPHIL # BLD AUTO: 0.16 THOUSAND/ΜL (ref 0–0.61)
EOSINOPHIL NFR BLD AUTO: 1 % (ref 0–6)
ERYTHROCYTE [DISTWIDTH] IN BLOOD BY AUTOMATED COUNT: 14.5 % (ref 11.6–15.1)
GFR SERPL CREATININE-BSD FRML MDRD: 81 ML/MIN/1.73SQ M
GFR SERPL CREATININE-BSD FRML MDRD: 87 ML/MIN/1.73SQ M
GLUCOSE SERPL-MCNC: 115 MG/DL (ref 65–140)
GLUCOSE SERPL-MCNC: 125 MG/DL (ref 65–140)
HCT VFR BLD AUTO: 32.2 % (ref 34.8–46.1)
HGB BLD-MCNC: 10.2 G/DL (ref 11.5–15.4)
INR PPP: 1.32 (ref 0.86–1.16)
LACTATE SERPL-SCNC: 1 MMOL/L (ref 0.5–2)
LIPASE SERPL-CCNC: 1328 U/L (ref 73–393)
LIPASE SERPL-CCNC: 1928 U/L (ref 73–393)
LYMPHOCYTES # BLD AUTO: 1.16 THOUSANDS/ΜL (ref 0.6–4.47)
LYMPHOCYTES NFR BLD AUTO: 8 % (ref 14–44)
MAGNESIUM SERPL-MCNC: 2 MG/DL (ref 1.6–2.6)
MCH RBC QN AUTO: 30.4 PG (ref 26.8–34.3)
MCHC RBC AUTO-ENTMCNC: 31.7 G/DL (ref 31.4–37.4)
MCV RBC AUTO: 96 FL (ref 82–98)
MONOCYTES # BLD AUTO: 1.23 THOUSAND/ΜL (ref 0.17–1.22)
MONOCYTES NFR BLD AUTO: 9 % (ref 4–12)
NEUTROPHILS # BLD AUTO: 11.56 THOUSANDS/ΜL (ref 1.85–7.62)
NEUTS SEG NFR BLD AUTO: 82 % (ref 43–75)
NRBC BLD AUTO-RTO: 0 /100 WBCS
PHOSPHATE SERPL-MCNC: 2.2 MG/DL (ref 2.3–4.1)
PLATELET # BLD AUTO: 175 THOUSANDS/UL (ref 149–390)
PMV BLD AUTO: 10.3 FL (ref 8.9–12.7)
POTASSIUM SERPL-SCNC: 3.7 MMOL/L (ref 3.5–5.3)
POTASSIUM SERPL-SCNC: 3.9 MMOL/L (ref 3.5–5.3)
PROT SERPL-MCNC: 5.5 G/DL (ref 6.4–8.2)
PROT SERPL-MCNC: 5.8 G/DL (ref 6.4–8.2)
PROTHROMBIN TIME: 16.5 SECONDS (ref 12.1–14.4)
RBC # BLD AUTO: 3.36 MILLION/UL (ref 3.81–5.12)
RH BLD: POSITIVE
SODIUM SERPL-SCNC: 142 MMOL/L (ref 136–145)
SODIUM SERPL-SCNC: 142 MMOL/L (ref 136–145)
SPECIMEN EXPIRATION DATE: NORMAL
WBC # BLD AUTO: 14.19 THOUSAND/UL (ref 4.31–10.16)

## 2018-01-08 PROCEDURE — 82330 ASSAY OF CALCIUM: CPT | Performed by: SURGERY

## 2018-01-08 PROCEDURE — C1726 CATH, BAL DIL, NON-VASCULAR: HCPCS | Performed by: INTERNAL MEDICINE

## 2018-01-08 PROCEDURE — G8987 SELF CARE CURRENT STATUS: HCPCS

## 2018-01-08 PROCEDURE — 80053 COMPREHEN METABOLIC PANEL: CPT | Performed by: SURGERY

## 2018-01-08 PROCEDURE — 83605 ASSAY OF LACTIC ACID: CPT | Performed by: EMERGENCY MEDICINE

## 2018-01-08 PROCEDURE — 80053 COMPREHEN METABOLIC PANEL: CPT | Performed by: EMERGENCY MEDICINE

## 2018-01-08 PROCEDURE — 97163 PT EVAL HIGH COMPLEX 45 MIN: CPT

## 2018-01-08 PROCEDURE — 0FC98ZZ EXTIRPATION OF MATTER FROM COMMON BILE DUCT, VIA NATURAL OR ARTIFICIAL OPENING ENDOSCOPIC: ICD-10-PCS | Performed by: INTERNAL MEDICINE

## 2018-01-08 PROCEDURE — 97166 OT EVAL MOD COMPLEX 45 MIN: CPT

## 2018-01-08 PROCEDURE — 74330 X-RAY BILE/PANC ENDOSCOPY: CPT

## 2018-01-08 PROCEDURE — 86850 RBC ANTIBODY SCREEN: CPT | Performed by: SURGERY

## 2018-01-08 PROCEDURE — 85025 COMPLETE CBC W/AUTO DIFF WBC: CPT | Performed by: SURGERY

## 2018-01-08 PROCEDURE — 84100 ASSAY OF PHOSPHORUS: CPT | Performed by: SURGERY

## 2018-01-08 PROCEDURE — G8978 MOBILITY CURRENT STATUS: HCPCS

## 2018-01-08 PROCEDURE — 83690 ASSAY OF LIPASE: CPT | Performed by: SURGERY

## 2018-01-08 PROCEDURE — C2617 STENT, NON-COR, TEM W/O DEL: HCPCS | Performed by: INTERNAL MEDICINE

## 2018-01-08 PROCEDURE — C1769 GUIDE WIRE: HCPCS | Performed by: INTERNAL MEDICINE

## 2018-01-08 PROCEDURE — 86901 BLOOD TYPING SEROLOGIC RH(D): CPT | Performed by: SURGERY

## 2018-01-08 PROCEDURE — 85730 THROMBOPLASTIN TIME PARTIAL: CPT | Performed by: SURGERY

## 2018-01-08 PROCEDURE — 86900 BLOOD TYPING SEROLOGIC ABO: CPT | Performed by: SURGERY

## 2018-01-08 PROCEDURE — BF111ZZ FLUOROSCOPY OF BILIARY AND PANCREATIC DUCTS USING LOW OSMOLAR CONTRAST: ICD-10-PCS | Performed by: INTERNAL MEDICINE

## 2018-01-08 PROCEDURE — G8979 MOBILITY GOAL STATUS: HCPCS

## 2018-01-08 PROCEDURE — 83690 ASSAY OF LIPASE: CPT | Performed by: EMERGENCY MEDICINE

## 2018-01-08 PROCEDURE — G8988 SELF CARE GOAL STATUS: HCPCS

## 2018-01-08 PROCEDURE — 85610 PROTHROMBIN TIME: CPT | Performed by: SURGERY

## 2018-01-08 PROCEDURE — 83735 ASSAY OF MAGNESIUM: CPT | Performed by: SURGERY

## 2018-01-08 PROCEDURE — 0F798DZ DILATION OF COMMON BILE DUCT WITH INTRALUMINAL DEVICE, VIA NATURAL OR ARTIFICIAL OPENING ENDOSCOPIC: ICD-10-PCS | Performed by: INTERNAL MEDICINE

## 2018-01-08 DEVICE — BILIARY STENT
Type: IMPLANTABLE DEVICE | Site: BRONCHUS | Status: NON-FUNCTIONAL
Brand: ADVANIX™ BILIARY
Removed: 2018-02-22

## 2018-01-08 RX ORDER — LIDOCAINE HYDROCHLORIDE 10 MG/ML
INJECTION, SOLUTION INFILTRATION; PERINEURAL AS NEEDED
Status: DISCONTINUED | OUTPATIENT
Start: 2018-01-08 | End: 2018-01-08 | Stop reason: SURG

## 2018-01-08 RX ORDER — PROPOFOL 10 MG/ML
INJECTION, EMULSION INTRAVENOUS AS NEEDED
Status: DISCONTINUED | OUTPATIENT
Start: 2018-01-08 | End: 2018-01-08 | Stop reason: SURG

## 2018-01-08 RX ORDER — PROPOFOL 10 MG/ML
INJECTION, EMULSION INTRAVENOUS CONTINUOUS PRN
Status: DISCONTINUED | OUTPATIENT
Start: 2018-01-08 | End: 2018-01-08 | Stop reason: SURG

## 2018-01-08 RX ADMIN — Medication 2000 MG: at 05:32

## 2018-01-08 RX ADMIN — PROPOFOL 50 MG: 10 INJECTION, EMULSION INTRAVENOUS at 15:43

## 2018-01-08 RX ADMIN — HEPARIN SODIUM 5000 UNITS: 5000 INJECTION, SOLUTION INTRAVENOUS; SUBCUTANEOUS at 02:51

## 2018-01-08 RX ADMIN — METRONIDAZOLE 500 MG: 500 INJECTION, SOLUTION INTRAVENOUS at 04:56

## 2018-01-08 RX ADMIN — LIDOCAINE HYDROCHLORIDE 30 MG: 10 INJECTION, SOLUTION INFILTRATION; PERINEURAL at 15:42

## 2018-01-08 RX ADMIN — VANCOMYCIN HYDROCHLORIDE 1250 MG: 1 INJECTION, POWDER, LYOPHILIZED, FOR SOLUTION INTRAVENOUS at 12:28

## 2018-01-08 RX ADMIN — HEPARIN SODIUM 5000 UNITS: 5000 INJECTION, SOLUTION INTRAVENOUS; SUBCUTANEOUS at 18:17

## 2018-01-08 RX ADMIN — PROPOFOL 120 MCG/KG/MIN: 10 INJECTION, EMULSION INTRAVENOUS at 15:43

## 2018-01-08 RX ADMIN — Medication 2000 MG: at 20:34

## 2018-01-08 RX ADMIN — SODIUM CHLORIDE 250 ML/HR: 0.9 INJECTION, SOLUTION INTRAVENOUS at 04:00

## 2018-01-08 RX ADMIN — IOHEXOL 15 ML: 240 INJECTION, SOLUTION INTRATHECAL; INTRAVASCULAR; INTRAVENOUS; ORAL at 16:34

## 2018-01-08 RX ADMIN — METRONIDAZOLE 500 MG: 500 INJECTION, SOLUTION INTRAVENOUS at 13:44

## 2018-01-08 RX ADMIN — TAMOXIFEN CITRATE 20 MG: 10 TABLET, FILM COATED ORAL at 13:44

## 2018-01-08 RX ADMIN — ASPIRIN 81 MG 81 MG: 81 TABLET ORAL at 12:28

## 2018-01-08 RX ADMIN — METRONIDAZOLE 500 MG: 500 INJECTION, SOLUTION INTRAVENOUS at 21:40

## 2018-01-08 NOTE — PROGRESS NOTES
Progress Note - General Surgery   Pancho James [de-identified] y o  female MRN: [id number]  Unit/Bed#: ICU 09 Encounter: [id number]    Assessment:  47-year-old female with choledocholithiasis resulting in cholangitis, acute pancreatitis, acute cholecystitis  Plan:  - NPO  - IVF  - monitor abdominal exam  - to GI lab for ERCP today  - continue antibiotics  - care per ICU/surg onc    Subjective/Objective     Subjective: Patient denies pain at this time  No flatus or BM overnight  Objective:     Vitals: Blood pressure (!) 122/44, pulse 92, temperature 98 °F (36 7 °C), temperature source Oral, resp  rate 22, height 5' 1" (1 549 m), weight 68 3 kg (150 lb 9 2 oz), SpO2 96 %  ,Body mass index is 28 45 kg/m²  I/O       01/06 0701 - 01/07 0700 01/07 0701 - 01/08 0700 01/08 0701 - 01/09 0700    I V  (mL/kg)  2495 8     IV Piggyback  400 50    Total Intake(mL/kg)  2895 8 50 (0 7)    Urine (mL/kg/hr)  500     Total Output   500      Net   +2395 8 +50                 Physical Exam:  GEN: NAD  HEENT: MMM  CV: RRR  Lung: Normal effort  Ab: Soft, NT/ND  Extrem: No CCE  Neuro:  A+Ox3    Lab, Imaging and other studies:   CBC with diff:   Lab Results   Component Value Date     01/07/2018    MPV 10 0 01/07/2018   , BMP/CMP:   Lab Results   Component Value Date     01/08/2018    K 3 9 01/08/2018     (H) 01/08/2018    CO2 25 01/08/2018    ANIONGAP 7 01/08/2018    BUN 22 01/08/2018    CREATININE 0 59 (L) 01/08/2018    GLUCOSE 115 01/08/2018    CALCIUM 8 3 01/08/2018     (H) 01/08/2018     (H) 01/08/2018    ALKPHOS 201 (H) 01/08/2018    PROT 5 5 (L) 01/08/2018    ALBUMIN 2 0 (L) 01/08/2018    BILITOT 1 48 (H) 01/08/2018    EGFR 87 01/08/2018   , Magnesium: No results found for: MAG  VTE Pharmacologic Prophylaxis: Heparin  VTE Mechanical Prophylaxis: sequential compression device

## 2018-01-08 NOTE — PROGRESS NOTES
Progress Note - Surgical Oncology   Rajat Riojas [de-identified] y o  female MRN: [id number]  Unit/Bed#: ICU 09 Encounter: [id number]    Assessment:    [de-identified] with prior history of partial liver resection 7/2017  Presents now with choledocholithiasis  Lfts down trending , pain resolved- likely passed stone      Plan:    -will follow up with GI concerning ERCP  Likely passed stone  -will need cholecystectomy this admission   -desescalate antibiotics per [facility]  -ok for transfer to floor     Subjective/Objective     Chief Complaint:     Subjective:       Objective:     Vitals: Blood pressure (!) 122/44, pulse 92, temperature 98 °F (36 7 °C), temperature source Oral, resp  rate 22, height 5' 1" (1 549 m), weight 68 3 kg (150 lb 9 2 oz), SpO2 96 %  ,Body mass index is 28 45 kg/m²      I/O       01/06 0701 - 01/07 0700 01/07 0701 - 01/08 0700 01/08 0701 - 01/09 0700    I V  (mL/kg)  2495 8     IV Piggyback  400     Total Intake(mL/kg)  2895 8     Urine (mL/kg/hr)  500     Total Output   500      Net   +2395 8                   Physical Exam:     Alert  Soft, non tender  Non distended    Lab, Imaging and other studies:   CBC with diff:   Lab Results   Component Value Date     01/07/2018    MPV 10 0 01/07/2018   , BMP/CMP:   Lab Results   Component Value Date     01/08/2018    K 3 9 01/08/2018     (H) 01/08/2018    CO2 25 01/08/2018    ANIONGAP 7 01/08/2018    BUN 22 01/08/2018    CREATININE 0 59 (L) 01/08/2018    GLUCOSE 115 01/08/2018    CALCIUM 8 3 01/08/2018     (H) 01/08/2018     (H) 01/08/2018    ALKPHOS 201 (H) 01/08/2018    PROT 5 5 (L) 01/08/2018    ALBUMIN 2 0 (L) 01/08/2018    BILITOT 1 48 (H) 01/08/2018    EGFR 87 01/08/2018   , Magnesium: No results found for: MAG, Coags:   Lab Results   Component Value Date    PTT 30 01/08/2018    INR 1 32 (H) 01/08/2018   , Blood Culture: No results found for: BLOODCX, Urine Culture: No results found for: URINECX, Wound Culure: No results found for: WOUNDCULT  VTE Pharmacologic Prophylaxis: Heparin  VTE Mechanical Prophylaxis: sequential compression device

## 2018-01-08 NOTE — PROGRESS NOTES
ICU Consult note - Nathanael Patricia [de-identified] y o  female MRN: [id number]  Unit/Bed#: [facility] 603-01 Encounter: [id number]     Reason for Admission / Chief Complaint: Cholangitis, cholecystitis, pancreatitis 2/2 choledocholithiasis  History of Present Illness: This is a pleasant 49-year-old female with a history of right broad this is a 49-year-old female with a history of right breast cancer status post mastectomy on tamoxifen with metastases to the liver status post partial hepatectomy (07/2017), complete heart block with pacemaker, hyperlipidemia who initially presented to the [facility] with abdominal pain presents with cholangitis, cholecystitis, and pancreatitis secondary to choledocholithiasis  The patient was initially accepted by surgery and placed on the floor in [place]  It was decided that she should be transferred to the ICU for closer monitoring and resuscitation  Surgery has already spoken to Dr Wyatt Maria from GI who agrees that she needs an ERCP  She presents to the ICU, for closer monitoring, resuscitation  Not on pressors  Of note, the patient had an echocardiogram performed on 11/12/17 which revealed an ejection fraction of 50% which is lower limit of normal  Regarding her liver metastases, last MRI on 6/27/17 revealed 2 pre-existing lesions which are essentially stable but there is a new metastasis measuring 1 4 cm in size adjacent to the large mass of the liver  Patient currently has no complaints  Denies any abdominal pain or nausea  "I am just thirsty"  History obtained from chart review and the patient       Past Medical History:  Past Medical History:   Diagnosis Date    Anxiety     Arthritis     Breast CA ([address] )     recurrent, ductal carcinoma ER, [place] pos    Depression     Diverticula of intestine     Dizziness     and passes out    Flushing     Hiatal hernia     [place] (hard of hearing[place]      lizette    Hypercholesteremia     Liver mass     Liver metastasis (HCC)  Metastatic adenocarcinoma to liver [facility] [address] )     Bx 01/03/2017    PONV (postoperative nausea and vomiting)     Recurrent breast cancer (HCC)     Shingles     Shortness of breath     on exertion    Tachycardia     Urinary incontinence     Use of cane as ambulatory aid     or walker        Past Surgical History:  Past Surgical History:   Procedure Laterality Date    CARDIAC PACEMAKER REMOVAL N/A 11/9/2017    Procedure: PACEMAKER LEAD REVISION, REMOVAL OF NONFUNCTIONING LEAD, AND INSERTION OF A NEW RV LEAD;  Surgeon: Gavino Poe MD;  [facility];  Service: Cardiology    CATARACT EXTRACTION Bilateral     COLONOSCOPY      HYSTERECTOMY      JOINT REPLACEMENT      lizette  TKR and right TSR    MASTECTOMY Right     [place] RESEC LIVER,PART LOBECTOMY N/A 7/13/2017    Procedure: LIVER RESECTION, INTRAOPERATIVE ULTRASOUND;  Surgeon: Ivan Julio MD;  [facility];  Service: Surgical Oncology    ROTATOR CUFF REPAIR Right     SENTINEL LYMPH NODE BIOPSY Right     TONSILECTOMY AND ADNOIDECTOMY      WOUND DEBRIDEMENT Left 11/9/2017    Procedure: DEBRIDEMENT WOUND AND DRESSING CHANGE ([address] OUT);   Surgeon: Gavino Poe MD;  [facility];  Service: Cardiology        Past Family History:  Family History   Problem Relation Age of Onset    Lung cancer Father     Cancer Brother     Diabetes type II Son         Social History:  History   Smoking Status    Never Smoker   Smokeless Tobacco    Never Used     History   Alcohol Use No     History   Drug Use No     Marital Status: /Civil Union     Medications:  Current Facility-Administered Medications   Medication Dose Route Frequency    cefepime (MAXIPIME) 2,000 mg in dextrose 5 % 50 mL IVPB  2,000 mg Intravenous Q12H    heparin (porcine) subcutaneous injection 5,000 Units  5,000 Units Subcutaneous Q8H [facility]    HYDROmorphone (DILAUDID) 1 mg/mL injection 0 5 mg  0 5 mg Intravenous Q3H PRN    metroNIDAZOLE (FLAGYL) IVPB (premix) 500 mg  500 mg Intravenous Q8H    ondansetron (ZOFRAN) injection 4 mg  4 mg Intravenous Q6H PRN    oxyCODONE (ROXICODONE) immediate release tablet 10 mg  10 mg Oral Q4H PRN    oxyCODONE (ROXICODONE) IR tablet 5 mg  5 mg Oral Q4H PRN    sodium chloride 0 9 % infusion  250 mL/hr Intravenous Continuous    [START ON 1/8/2018] tamoxifen (NOLVADEX) tablet 20 mg  20 mg Oral Daily    vancomycin (VANCOCIN) 1,250 mg in sodium chloride 0 9 % 250 mL IVPB  15 mg/kg Intravenous Q12H     Home medications:  Prior to Admission medications    Medication Sig Start Date End Date Taking? Authorizing Provider   acetaminophen (TYLENOL) 325 mg tablet May take 650 mg every 6-8 hours as needed for pain  Patient taking differently: Take 650 mg by mouth every 6 (six) hours as needed May take 650 mg every 6-8 hours as needed for pain  7/24/17   Adriana Duncan MD   atorvastatin (LIPITOR) 20 mg tablet Take 20 mg by mouth daily  Historical Provider, MD   calcium carbonate (CALCIUM 600) 600 MG tablet Take 600 mg by mouth 2 (two) times a day with meals  Historical Provider, MD   Cranberry (THERACRAN HP PO) Take 2 tablets by mouth daily    Historical Provider, MD   Glucosamine-Chondroit-Vit C-Mn (GLUCOSAMINE 1500 COMPLEX) CAPS Take 1 capsule by mouth 2 (two) times a day      Historical Provider, MD   metoprolol tartrate (LOPRESSOR) 25 mg tablet Take 1 tablet by mouth every 12 (twelve) hours 11/1/17   Parag Needs, DO   Multiple Vitamin (MULTIVITAMIN) capsule Take 1 capsule by mouth daily      Historical Provider, MD   ondansetron (ZOFRAN) 4 mg tablet Take 4 mg by mouth every 8 (eight) hours as needed for nausea or vomiting    Historical Provider, MD   tamoxifen (NOLVADEX) 20 mg tablet Take 20 mg by mouth daily    Historical Provider, MD   CLINDAMYCIN HCL PO Take by mouth as needed Prior to dental visits  1/7/18  Historical Provider, MD   ferrous sulfate 325 (65 Fe) mg tablet Take 1 tablet by mouth 2 (two) times a day with meals 11/15/17 1/7/18  Yash Pedro Souza,      Allergies: Allergies   Allergen Reactions    Ampicillin Shortness Of Breath and Anaphylaxis        ROS:   Review of Systems   Constitutional: Negative for chills and fever  HENT: Negative for rhinorrhea, sore throat and trouble swallowing  Eyes: Negative for photophobia and visual disturbance  Respiratory: Negative for cough, chest tightness and shortness of breath  Cardiovascular: Negative for chest pain, palpitations and leg swelling  Gastrointestinal: Negative for abdominal pain, blood in stool, diarrhea, nausea and vomiting  Endocrine: Negative for polyuria  Genitourinary: Negative for dysuria, flank pain, hematuria, vaginal bleeding and vaginal discharge  Musculoskeletal: Negative for back pain and neck pain  Skin: Negative for color change and rash  Allergic/Immunologic: Negative for immunocompromised state  Neurological: Negative for dizziness, weakness, light-headedness, numbness and headaches  All other systems reviewed and are negative  Vitals:  Vitals:    18 1700   BP: 102/51   Pulse: 99   Resp: 20   Temp: 98 1 °F (36 7 °C)   TempSrc: Oral   SpO2: 93%     Temperature:   Temp (24hrs), Av 2 °F (36 8 °C), Min:98 1 °F (36 7 °C), Max:98 5 °F (36 9 °C)    Current: Temperature: 98 1 °F (36 7 °C)     Weights: There is no height or weight on file to calculate BMI  Hemodynamic Monitoring:  N/A     Non-Invasive/Invasive Ventilation Settings:  Respiratory    Lab Data (Last 4 hours)    None         O2/Vent Data (Last 4 hours)    None              No results found for: PHART, SCX6SMH, PO2ART, NWG2HYJ, X4GYMVGV, BEART, SOURCE  SpO2: SpO2: 93 %     Physical Exam:  Physical Exam   Constitutional: She is oriented to person, place, and time  Vital signs are normal  She appears well-developed and well-nourished  She is cooperative  Non-toxic appearance  She does not appear ill  HENT:   Head: Normocephalic and atraumatic     Mouth/Throat: Uvula is midline, oropharynx is clear and moist and mucous membranes are normal    Eyes: Conjunctivae, EOM and lids are normal  Pupils are equal, round, and reactive to light  Neck: Trachea normal and phonation normal    Cardiovascular: Normal rate, regular rhythm, normal heart sounds and normal pulses  No murmur heard  Pulses:       Radial pulses are 2+ on the right side, and 2+ on the left side  Dorsalis pedis pulses are 2+ on the right side, and 2+ on the left side  Pulmonary/Chest: Effort normal and breath sounds normal    Healed surgical scars R and L chest    Abdominal: Soft  Normal appearance and bowel sounds are normal  There is tenderness in the right upper quadrant, epigastric area and left upper quadrant  There is rebound and positive Smith's sign  There is no rigidity, no guarding and no tenderness at McBurney's point  Healed RUQ surgical scar   Neurological: She is alert and oriented to person, place, and time  GCS eye subscore is 4  GCS verbal subscore is 5  GCS motor subscore is 6  Labs:    Results from last 7 days  Lab Units 01/07/18  0600   WBC Thousand/uL 9 56   HEMOGLOBIN g/dL 13 2   HEMATOCRIT % 40 2   PLATELETS Thousands/uL 191   MONO PCT MAN % 1*      Results from last 7 days  Lab Units 01/07/18  0600   SODIUM mmol/L 139   POTASSIUM mmol/L 4 5   CHLORIDE mmol/L 103   CO2 mmol/L 21   BUN mg/dL 17   CREATININE mg/dL 0 91   CALCIUM mg/dL 9 0   TOTAL PROTEIN g/dL 6 9   BILIRUBIN TOTAL mg/dL 3 67*   ALK PHOS U/L 307*   ALT U/L 212*   AST U/L 176*   GLUCOSE RANDOM mg/dL 157*                Results from last 7 days  Lab Units 01/07/18  0600   INR  1 27*   PTT seconds 28           0  Lab Value Date/Time   TROPONINI <0 02 11/06/2017 1035   TROPONINI 0 06 (H) 07/15/2017 1726   TROPONINI 0 06 (H) 07/15/2017 1336   TROPONINI 0 06 (H) 07/15/2017 1119        Imaging:  I have personally reviewed pertinent reports         Micro:  Lab Results   Component Value Date    URINECX No Growth <1000 cfu/mL 04/03/2017       Assessment: [de-identified]year old female with a history of right broad this is a 78-year-old female with a history of right breast cancer status post mastectomy on tamoxifen with metastases to the liver status post partial hepatectomy (07/2017), complete heart block with pacemaker, hyperlipidemia who initially presented to the [facility] with abdominal pain presents with cholangitis, cholecystitis, and pancreatitis secondary to choledocholithiasis  Plan:      Neuro: Analgesia - oxy 5/10 mg q4h PRN, Dilaudid 0 5mg q3h PRN breakthrough  -  Zofran for nausea  -  [facility] ICU    CV: Complete heart block s/p pacer - Recently, replaced secondary to RV lead perforation  Hold BB secondary to sepsis  Continue to monitor   -  HLD - Hold statin 2/2 elevated LFTs  Lung:  No active issues  Encourage IS  GI:  Choledocholithiasis - ERCP aaron  Per GI  Trend LFTs  Pain control as above  -  Cholangitis - 2/2 choledocholithiasis  Cefepime, Vancomycin, and Flagyl (Day #1)  Vanc trough prior to 4th dose  Appreciate GI recs  -  Acute pancreatitis - 2/2 choledocholithiasis  Trend Lipase  Aggressive fluid resuscitation as patient is likely volume depleted  Monitor volume status and respiratory status  Last echo revealed EF 50%  Antibiotics as above  General surgery recs for possible cholecystectomy   -  Hepatic metastases s/p resection - outpatient follow up  FEN:  Normal saline at 250cc/hr for resuscitation  Bolus PRN based on volume status  Check labs and replete lytes PRN  NPO sips with meds  :  No active issues  Monitor I/Os  ID:  Cholangitis - 2/2 choledocholithiasis  Abx plan as above  Heme:  No active issues  Endo:  No active issues  MSK:  Breast cancer s/p mastectomy - continue tamoxifen  -  Frequent offloading   -  DVT proph - SubQ heparin and SCDs       Disposition: ICU care     VTE Pharmacologic Prophylaxis: Sequential compression device (Venodyne)  and Heparin  VTE Mechanical Prophylaxis: sequential compression device     Invasive lines and devices: Invasive Devices     Peripheral Intravenous Line            Peripheral IV 01/07/18 Left Antecubital less than 1 day    Peripheral IV 01/07/18 Right Hand less than 1 day                 Code Status: Level 1 - Full Code  POA:    POLST:       Given critical illness, patient length of stay will require greater than two midnights  Counseling / Coordination of Care  Total Critical Care time spent 45 minutes excluding procedures, teaching and family updates  Portions of the record may have been created with voice recognition software  Occasional wrong word or "sound a like" substitutions may have occurred due to the inherent limitations of voice recognition software  Read the chart carefully and recognize, using context, where substitutions have occurred          Marjorie Guillaume MD

## 2018-01-08 NOTE — OCCUPATIONAL THERAPY NOTE
[address]  Evaluation     Patient Name: Lisette Ek  PJYII'Y Date: 1/8/2018  Problem List  Patient Active Problem List   Diagnosis    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 ([facility] )    Hypercholesteremia    Gastroesophageal reflux disease without esophagitis    History of total knee replacement    Hearing loss    Left bundle branch block (LBBB)    Symptomatic bradycardia    Complication associated with cardiac pacemaker lead    Pacemaker malfunction, initial encounter    Chest wall hematoma    Cholecystitis    Acute biliary pancreatitis    Cholangitis    Choledocholithiasis     Past Medical History  Past Medical History:   Diagnosis Date    Anxiety     Arthritis     Breast CA ([facility] )     recurrent, ductal carcinoma ER, [place] pos    Depression     Diverticula of intestine     Dizziness     and passes out    Flushing     Hiatal hernia     [place] (hard of hearing)      lizette    Hypercholesteremia     Liver mass     Liver metastasis (HCC)     Metastatic adenocarcinoma to liver ([facility] )     Bx 01/03/2017    PONV (postoperative nausea and vomiting)     Recurrent breast cancer (HCC)     Shingles     Shortness of breath     on exertion    Tachycardia     Urinary incontinence     Use of cane as ambulatory aid     or walker     Past Surgical History  Past Surgical History:   Procedure Laterality Date    CARDIAC PACEMAKER REMOVAL N/A 11/9/2017    Procedure: PACEMAKER LEAD REVISION, REMOVAL OF NONFUNCTIONING LEAD, AND INSERTION OF A NEW RV LEAD;  Surgeon: Gavino Poe MD;  [facility];  Service: Cardiology    CATARACT EXTRACTION Bilateral     COLONOSCOPY      HYSTERECTOMY      JOINT REPLACEMENT      lizette  TKR and right TSR    MASTECTOMY Right     [place] RESEC LIVER,PART LOBECTOMY N/A 7/13/2017    Procedure: LIVER RESECTION, INTRAOPERATIVE ULTRASOUND;  Surgeon: Ivan Julio MD;  [facility];  Service: Surgical Oncology    ROTATOR CUFF REPAIR Right     SENTINEL LYMPH NODE BIOPSY Right     TONSILECTOMY AND ADNOIDECTOMY      WOUND DEBRIDEMENT Left 11/9/2017    Procedure: DEBRIDEMENT WOUND AND DRESSING CHANGE [facility]o [facility] KEITH); Surgeon: Gavino Poe MD;  [facility];  Service: Cardiology               01/08/18 1009   Note Type   Note type Eval/Treat   Restrictions/Precautions   Weight Bearing Precautions Per Order No   Other Precautions Fall Risk;Multiple lines;Telemetry   Pain Assessment   Pain Assessment No/denies pain   Home Living   Type of [address] [place] [address] in basement;Stairs to enter with rails  ([address])   Bathroom Shower/Tub Tub/shower unit   Bathroom Toilet Standard   Bathroom Equipment Grab bars in shower   [facility]   Additional Comments PT W/ USE OF RW AND SC PTA  Prior Function   Level of [place] Needs assistance with ADLs and functional mobility; Needs assistance with IADLs  (SPOUSE ASSISTS AT BASELINE  )   Lives With Spouse   Receives Help From Family   ADL Assistance Needs assistance  (ASSIST FOR LB ADLS)   IADLs Needs assistance  (SHARES WITH SPOUSE)   Falls in the last 6 months 0   Vocational Retired   Lifestyle   Autonomy PT [facility]  PT AND SPOUSE SHARE IADLS  SPOUSE COMPLETES DRIVING FOR COUPLE  PT W/ USE OF SC AND RW  NO RECENT H/O FALLS  Reciprocal Relationships PT RESIDES WITH SPOUSE -- PRESENT AND ABLE TO ASSIST AS NEEDED PER PT  Service to Others PT IS RETIRED  PREVIOUSLY WORKED AS A   Intrinsic Gratification PT ENJOYS KEEPING THINGS ORGANIZED AROUND HER HOUSE      Psychosocial   Psychosocial (WDL) WDL   Subjective   Subjective "I AM JUST SO HAPPY TO GET OUT OF BED"   ADL   Where Assessed Chair   Eating Assistance 5  Supervision/Setup   Grooming Assistance 4  Minimal Assistance   UB Bathing Assistance 3  Moderate Assistance   LB Bathing Assistance 3  Moderate Assistance   UB Dressing Assistance 4  Minimal Assistance   LB Dressing Assistance 3  Moderate Assistance   Toileting Assistance  3  Moderate Assistance   Bed Mobility   Supine to Sit 4  Minimal assistance   Additional items Assist x 1; Increased time required;Verbal cues   Sit to Supine Unable to assess   Additional Comments PT LEFT IN SITTING IN ROOM CHAIR POST EVAL  ELEONORA Swain  Transfers   Sit to Stand 4  Minimal assistance   Additional items Assist x 1; Increased time required;Verbal cues   Stand to Sit 4  Minimal assistance   Additional items Assist x 1; Increased time required;Verbal cues   Functional Mobility   Functional Mobility 4  Minimal assistance   Additional Comments ASSIST X1 USING RW   Balance   Static Sitting Fair +   Dynamic Sitting Fair   Static Standing Fair   Dynamic Standing Fair -   Ambulatory Fair -   Activity Tolerance   Activity Tolerance Patient tolerated treatment well   Nurse Made Aware OKAY TO SEE PER LEEONORA DONNELLY   RUVIRY Assessment   RUE Assessment WFL   LUE Assessment   LUE Assessment WFL   Hand Function   Gross Motor Coordination Functional   Fine Motor Coordination Functional   Cognition   Overall Cognitive Status WFL   Arousal/Participation Alert   Attention Within functional limits   Orientation Level Oriented X4   Memory Within functional limits   Following Commands Follows multistep commands without difficulty   Comments PT ENGAGES IN APPROPRIATE CONVERSATION W/ THERAPIST THIS AM     Assessment   Limitation Decreased ADL status; Decreased endurance;Decreased self-care trans;Decreased high-level ADLs  (BALANCE, MEDICAL STATUS)   Prognosis Good   Assessment PT IS AN 79 YO F ADMIT AS TRANSFER FROM [facility]  PT PRESENTED W/ ABDOMINAL PAIN ASSOCIATED WITH EXTREME THIRST  PT NOTED W/ HYPERBILIRUBINEMIA, ELEVATED LIPASE, TRANSAMINITIS, BANDEMIA, CHOLANGITIS, CHOLECYSTITIS AND PANCREATITIS  PT IS PENDING ERCP AT THIS TIME   PT W/ PMH SIGNIFICANT FOR METASTATIC BREAST CANCER, PACEMAKER, VERTIGO, ANXIETY, ARTHRITIS, DEPRESSION, [place], LIVER METASTASIS, SHINGLES, URINARY INCONTINENCE, R ROTATOR CUFF REPAIR, B/L TKA AND R TSA  PT IS FROM HOME W/ SPOUSE AND REPORTS BASELINE INDEPENDENCE IN UB ADLS WHILE SPOUSE ASSISTS W/ LB ADLS  PT W/ USE OF RW AND SC PTA  NO RECENT H/O FALLS  PT AND SPOUSE SHARE IADLS  CURRENTLY PT REQUIRING MIN A UB ADLS, MOD A LB ADLS, MIN A FUNCTIONAL TRANSFERS/AMBULATION USING RW  PT APPEARS LIMITED AT THIS TIME 2* IMPAIRED BALANCE, ACTIVITY TOLERANCE, MEDICAL STATUS, ADL IMPAIRMENTS, GENERALIZED WEAKNESS/DECONDITIONING  OT ANTICIPATES EVENTUAL D/C HOME W/ FAMILY SUPPORT AND HOME PT/OT SERVICES PENDING FURTHER PROGRESS AND MEDICAL STABILITY  WILL CONTINUE TO FOLLOW PT 3-5X/WEEK IN ORDER TO MEET THE BELOW DESCRIBED GOALS IN 7-10 DAYS  Goals   Patient Goals PT WOULD LIKE TO GET OOB AND WALK   LTG Time Frame 7-10   Long Term Goal #1 PLEASE SEE BELOW DESCRIBED GOALS  Plan   Treatment Interventions ADL retraining;Functional transfer training; Endurance training;Patient/family training;Equipment evaluation/education; Compensatory technique education;Continued evaluation; Activityengagement   Goal Expiration Date 01/18/18   OT Frequency 3-5x/wk   Recommendation   OT Discharge Recommendation Home OT   OT - OK to Discharge (PENDING MEDICAL STABILITY  )   Barthel Index   Feeding 0   Bathing 0   Grooming Score 0   Dressing Score 0   Bladder Score 5   Bowels Score 10   Toilet Use Score 5   Transfers (Bed/Chair) Score 10   Mobility (Level Surface) Score 0   Stairs Score 0   Barthel Index Score 30   Modified Ana Scale   Modified Ana Scale 4     GOALS TO BE MET IN 7-10 DAYS:    1) 1) Pt will increase bed mobility to SBA and transfer EOB to participate in functional activity with G tolerance and balance  2) Pt will improve functional transfers to SBA on/off all surfaces using DME PRN w/ G balance/safety including toileting      3) Pt will increase independence in all ADLS to SBA with G balance sitting upright in chair  4) Pt will complete toileting w/ SBA w/ G hygiene/thoroughness using DME PRN  5) Pt will improve activity tolerance to G for min 30 min txment sessions  6) Pt will participate in light grooming task with SBA using setup standing at sink ~3-5mins with G safety and balance  7) Pt will demonstrate 100% carryover of learned E C  techniques s/p skilled education w/o cues t/o fx'l I/ADL/leisure interest tasks      DOCUMENTATION COMPLETED BY Maryhelen Hodgkin, MS, OTR/L

## 2018-01-08 NOTE — CASE MANAGEMENT
Initial Clinical Review    Admission: Date/Time/Statement: 1/7/18 @ 1603     Orders Placed This Encounter   Procedures    Inpatient Admission     Standing Status:   Standing     Number of Occurrences:   1     Order Specific Question:   Admitting Physician     Answer:   Wilbert He     Order Specific Question:   Level of Care     Answer:   Med Surg [16]     Order Specific Question:   Estimated length of stay     Answer:   More than 2 Midnights     Order Specific Question:   Certification     Answer:   I certify that inpatient services are medically necessary for this patient for a duration of greater than two midnights  See H&P and MD Progress Notes for additional information about the patient's course of treatment  1/7/2018 (10 hours)  [id number] [facility] Emergency Department  Acute cholecystitis   Choledocholithiasis   Acute pancreatitis  TRANSFER TO [facility]  PRIOR TO ARRIVAL:  IVF   9% NS 1L BOLUS        History of Illness:      Barbara Eckert is a [de-identified] y o  female who presents with abdominal pain in her epigastrium for 2 days  She also has extreme thirst and feels dehydrated  She is passing flatus was not had a bowel movement recently  She denies measurable fevers, however she did have shaking chills at home prior to coming to the hospital   Her  called 911 this morning when she was bending over and pain, and she was seen in the [facility]   There she was found to have hyperbilirubinemia, elevated lipase, transaminitis, and bandemia  She was transferred here for higher level care  Of note, she has a history of  Metastatic breast cancer, and is status post  Liver segment 6 7 resection on 07/13/2017 for metastatic breast cancer  She also recently had a pacemaker placed  She is followed by Cardiology as an outpatient, as well as hematology oncology    She is on tamoxifen for breast cancer            ED Vital Signs:   ED Triage Vitals   Temperature Pulse Respirations Blood Pressure SpO2   01/07/18 1700 01/07/18 1700 01/07/18 1700 01/07/18 1700 01/07/18 1700   98 1 °F (36 7 °C) 99 20 102/51 93 %      Temp Source Heart Rate Source Patient Position - Orthostatic VS BP Location FiO2 (%)   01/07/18 1700 01/07/18 2100 01/07/18 2100 01/07/18 2100 --   Oral Monitor Lying Left arm       Pain Score       01/07/18 1629       No Pain        Wt Readings from Last 1 Encounters:   01/08/18 68 3 kg (150 lb 9 2 oz)       Vital Signs (abnormal):     01/08/18 0800  98 7 °F (37 1 °C)  92   26  118/54  75  99 %  Nasal cannula       01/08/18 0500  --  88  20   111/38  72   85 %  --  --   01/08/18 0400  98 °F (36 7 °C)  86  19   111/37  63  99 %  Nasal cannula  Lying   01/08/18 0300  --  92   28  122/63  97  98 %  --  --   01/08/18 0200  --  84  19   110/34  78  98 %  --  --   01/08/18 0100  --  84  17   107/40  71  98 %  --  --   01/08/18 0000  97 9 °F (36 6 °C)  84  18   100/40  65  98 %  Nasal cannula  Lying   01/07/18 2300  --  86  19   110/41  65  94 %  --  --   01/07/18 2200  --  88  20   116/44  72  98 %  --  --   01/07/18 2100  97 7 °F (36 5 °C)  92  18   93/43  62  90 %  Nasal cannula  Lying       Respiratory Data   (Last 48 hours)     01/07 0532 01/07 0611 01/07 1120 01/07 1246 01/07 1446 01/07 1629 01/07 2100 01/08 0000 01/08 0400 01/08 0800   O2 Device Nasal c  Nasal c  None (R  None (R  Nasal c  None (R  Nasal c  Nasal c  Nasal c  Nasal c  O2 Flow Rate (L/min) (L/min) 2 2   2  6 2 2 2             Abnormal Labs/Diagnostic Test Results:    AST 5 - 45 U/L 176       ALT 12 - 78 U/L 212       Alkaline Phosphatase 46 - 116 U/L 307       Total Bilirubin 0 20 - 1 00 mg/dL 3 67         RBC, UA None Seen, 0-5 /hpf 0-1       WBC, UA None Seen, 0-5, 5-55, 5-65 /hpf 4-10       Bacteria, UA None Seen, Occasional /hpf  Innu           Lipase 73 - 393 u/L 5,202       Bands % 0 - 8 % 20       LACTIC ACID 2 4 (HH) mmol/L         CTA   ABDOMEN AND PELVIS Choledocholithiasis with intra-and extra hepatic biliary dilatation      Associated acute interstitial edematous pancreatitis without pancreatic necrosis or pancreatic collection      Anatomic variation of intrahepatic gallbladder  Cholelithiasis, gallbladder wall thickening with pericholecystic fluid and reactive enhancement within the surrounding hepatic parenchyma most consistent with acute cholecystitis      Postsurgical changes related to partial hepatic resection  Hepatic steatosis  No hepatic mass identified      Increasing right pleural effusion with overlying compressive atelectasis      No pulmonary embolus      Bubbles of gas within the urinary bladder possibly related to instrumentation  Correlation with history of instrumentation and urinary analysis is recommended      1-8  REPEAT CBC   WBC 14 19 (H) [place]/uL             Past Medical/Surgical History:    Active Ambulatory Problems     Diagnosis Date Noted    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 ([facility] [address] ) 01/05/2017    Hypercholesteremia     Gastroesophageal reflux disease without esophagitis 03/22/2010    History of total knee replacement 05/27/2015    Hearing loss 07/16/2014    Left bundle branch block (LBBB) 04/13/2015    Symptomatic bradycardia 54/26/6863    Complication associated with cardiac pacemaker lead 11/09/2017    Pacemaker malfunction, initial encounter 11/09/2017    Chest wall hematoma 11/10/2017     Resolved Ambulatory Problems     Diagnosis Date Noted    Subcapsular hematoma of liver 01/05/2017    SOB (shortness of breath) on exertion 01/05/2017    Metastatic adenocarcinoma to liver (HCC)     Breast CA (HCC)      Past Medical History:   Diagnosis    Anxiety    Arthritis    Breast CA (HCC)    Depression    Diverticula of intestine    Dizziness    Flushing    Hiatal hernia    [place] (hard of hearing)    Hypercholesteremia    Liver mass    Liver metastasis (HCC)    Metastatic adenocarcinoma to liver (HCC)    PONV (postoperative nausea and vomiting)    Recurrent breast cancer (HCC)    Shingles    Shortness of breath    Tachycardia    Urinary incontinence    Use of cane as ambulatory aid       Admitting Diagnosis: Acute cholecystitis [K81 0]    Age/Sex: [de-identified] y o  female    Assessment/Plan:        Assessment:  49-year-old female with choledocholithiasis resulting in cholangitis, acute pancreatitis, acute cholecystitis      Plan:  1  Choledocholithiasis              -  Discussed with GI, Dr Ignacio  agrees that the patient needs ERCP              -  Plan for ERCP tomorrow 1/8 per GI              -  Pain control in the meantime              -  Follow-up LFTs in the morning              -  Patient cannot tolerate MRCP given pacemaker     2  Cholangitis              -  Secondary to #1              -  Broad-spectrum antibiotics, given patient allergy to ampicillin have ordered cefepime, metronidazole, and vancomycin; vancomycin trough to be done 30 minutes before 4th dose of vancomycin              -  Follow-up GI consult                3   Acute pancreatitis              -  Secondary to #1              -  Trend lipase              -  Crystalloid resuscitation, will start sodium chloride 0 9% infusion at 250 mL an hour IV              -  Consult general surgery for further recommendations              -  Okay to have ice chips for comfort     4  Acute cholecystitis              -  Antibiotics given for #2 should cover for now              -  Will need cholecystectomy, however this will be difficult given history of open hepatic resection              -  Consult general surgery for further recommendations; will need cholecystectomy     5    Complete heart block              -  Recently had new dual chamber pacemaker placed which was complicated by late subacute RV lead perforation              -  ECHO from 11/10 shows EF of 50%              -  Continue home aspirin 81 mg by mouth every day              - Hold home furosemide 20 mg by mouth every day p r n  for edema                6    Metastatic breast cancer status post liver resection              - Continued home tamoxifen 20 mg p o  every day     7  Hyperlipidemia              - Hold home Lipitor 20 mg p o  every day      8  Hypertension              -  Hold home metoprolol 12 5 mg by mouth b i d      9  DVT prophylaxis              -  SQH              -  SCDs     10   Disposition              - Given cardiac history and competing interest with need for aggressive fluid resuscitation, and potential for volume overload, will place patient in intensive care unit with surgical critical care consultation              - Dr Randal Cowan accepting patient to ICU              - PT/OT    Admission Orders:    I/O  18 [facility]   PT AND OT EVAL AND TREAT  TELEMETRY        Scheduled Meds:   aspirin 81 mg Oral Daily   cefepime 2,000 mg Intravenous Q12H   heparin (porcine) 5,000 Units Subcutaneous Q8H [address]   metroNIDAZOLE 500 mg Intravenous Q8H   tamoxifen 20 mg Oral Daily   vancomycin 15 mg/kg Intravenous Q12H     Continuous Infusions:   sodium chloride 250 mL/hr Last Rate: 250 mL/hr (01/08/18 0400)     PRN Meds: HYDROmorphone    ondansetron    oxyCODONE    oxyCODONE

## 2018-01-08 NOTE — NURSING NOTE
Received pt via bed from ICU to 518  Pt awake, denies complaints;however, tearful regarding psychodynamics at home  Orientated pt to room, reinforced call bell system  Pt's call light within reach  Left message on husbands phone that patient was moved to 518  Pt NPO awaiting for plan  Made cc surg aware that pt is in room 518  Will continue to monitor pt

## 2018-01-08 NOTE — ANESTHESIA PREPROCEDURE EVALUATION
Review of Systems/Medical History  Patient summary reviewed  Chart reviewed  History of anesthetic complications     Cardiovascular  EKG reviewed, Pacemaker/AICD, Hyperlipidemia, Dysrhythmias, ,   Comment: LEFT VENTRICLE: Size was normal  Systolic function was at the lower limits of normal by visual assessment  Ejection fraction was estimated to be 50 %  This study was inadequate for the evaluation of regional wall motion      RIGHT VENTRICLE: The size was normal  Systolic function was normal      LEFT ATRIUM: Size was normal      RIGHT ATRIUM: Size was normal      MITRAL VALVE: Valve structure was normal  DOPPLER: There was no significant regurgitation      AORTIC VALVE: The valve was trileaflet  Leaflets exhibited good mobility      TRICUSPID VALVE: The valve structure was normal      PULMONIC VALVE: Not well visualized      PERICARDIUM: A trace pericardial effusion was identified anterior to the heart  A pericardial fat pad was present      AORTA: Not well visualized  Sinus rhythm with 1st degree A-V block  Right axis deviation  Left bundle branch block  Confirmed by ROMARIO MA MD ([id number]) on 11/6/2017 1:27:23 PM    Specimen Collected: 11/06/17 10:02  Last Resulted: 11/06/17 13:27              Lead perforation  Through apex    Echo 7/17-HISTORY: PRIOR HISTORY: Breast cancer; LBBB; Hyperlipidemia     PROCEDURE: The study was performed in the [place] [address] [facility]  This was a routine study  The transthoracic approach was used  The study included complete 2D imaging, M-mode, complete spectral Doppler, and color Doppler  The  heart rate was 90 bpm, at the start of the study  Images were obtained from the parasternal, apical, subcostal, and suprasternal notch acoustic windows  Echocardiographic views were limited due to lung interference  Image quality was  adequate      LEFT VENTRICLE: Size was normal  Systolic function was at the lower limits of normal  Ejection fraction was estimated to be 50 %  Although no diagnostic regional wall motion abnormality was identified, this possibility cannot be completely  excluded on the basis of this study  Wall thickness was normal  DOPPLER: Left ventricular diastolic function parameters were normal      VENTRICULAR SEPTUM: There was dyssynergic motion  These changes are consistent with LBBB      RIGHT VENTRICLE: The size was normal  Systolic function was normal  Wall thickness was normal      LEFT ATRIUM: Size was normal      RIGHT ATRIUM: Size was normal      MITRAL VALVE: Valve structure was normal  There was normal leaflet separation  DOPPLER: The transmitral velocity was within the normal range  There was no evidence for stenosis  There was trace regurgitation      AORTIC VALVE: The valve was trileaflet  Leaflets exhibited normal thickness and normal cuspal separation  DOPPLER: Transaortic velocity was within the normal range  There was no evidence for stenosis  There was no regurgitation      TRICUSPID VALVE: The valve structure was normal  There was normal leaflet separation  DOPPLER: The transtricuspid velocity was within the normal range  There was no evidence for stenosis  There was mild regurgitation  Pulmonary artery  systolic pressure was within the normal range  Estimated peak PA pressure was 22 mmHg      PULMONIC VALVE: Leaflets exhibited normal thickness, no calcification, and normal cuspal separation  DOPPLER: The transpulmonic velocity was within the normal range  There was trace regurgitation      PERICARDIUM: There was no pericardial effusion      AORTA: The root exhibited normal size      SYSTEMIC VEINS: IVC: The inferior vena cava was normal in size and course   Respirophasic changes were normal ,  Pulmonary  Shortness of breath, ,        GI/Hepatic    GERD , Liver disease, ,  Hiatal hernia,             Endo/Other  Arthritis     GYN       Hematology   Musculoskeletal       Neurology    Neuromuscular disease ,    Psychology   Anxiety, Depression , Physical Exam    Airway    Mallampati score: II  TM Distance: >3 FB  Neck ROM: full     Dental       Cardiovascular      Pulmonary      Other Findings        Anesthesia Plan  ASA Score- 3     Anesthesia Type- general with ASA Monitors  Additional Monitors:   Airway Plan: ETT  Comment: Prone  Plan Factors-    Induction- intravenous  Postoperative Plan-     Informed Consent- Anesthetic plan and risks discussed with patient  I personally reviewed this patient with the CRNA  Discussed and agreed on the Anesthesia Plan with the CRNA  Vamshi Fish

## 2018-01-08 NOTE — ANESTHESIA POSTPROCEDURE EVALUATION
Post-Op Assessment Note      CV Status:  Stable    Mental Status:  Alert and awake    Hydration Status:  Euvolemic    PONV Controlled:  Controlled    Airway Patency:  Patent    Post Op Vitals Reviewed: Yes          Staff: CRNA           /59 (01/08/18 1628)    Temp      Pulse 99 (01/08/18 1628)   Resp 20 (01/08/18 1628)    SpO2 100 % (01/08/18 1628)

## 2018-01-08 NOTE — PLAN OF CARE
Problem: OCCUPATIONAL THERAPY ADULT  Goal: Performs self-care activities at highest level of function for planned discharge setting  See evaluation for individualized goals  Treatment Interventions: ADL retraining, Functional transfer training, Endurance training, Patient/family training, Equipment evaluation/education, Compensatory technique education, Continued evaluation, Activityengagement          See flowsheet documentation for full assessment, interventions and recommendations  Limitation: Decreased ADL status, Decreased endurance, Decreased self-care trans, Decreased high-level ADLs (BALANCE, MEDICAL STATUS)  Prognosis: Good  Assessment: PT IS AN 79 YO F ADMIT AS TRANSFER FROM [facility]  PT PRESENTED W/ ABDOMINAL PAIN ASSOCIATED WITH EXTREME THIRST  PT NOTED W/ HYPERBILIRUBINEMIA, ELEVATED LIPASE, TRANSAMINITIS, BANDEMIA, CHOLANGITIS, CHOLECYSTITIS AND PANCREATITIS  PT IS PENDING ERCP AT THIS TIME  PT W/ PMH SIGNIFICANT FOR METASTATIC BREAST CANCER, PACEMAKER, VERTIGO, ANXIETY, ARTHRITIS, DEPRESSION, [place], LIVER METASTASIS, SHINGLES, URINARY INCONTINENCE, R ROTATOR CUFF REPAIR, B/L TKA AND R TSA  PT IS FROM HOME W/ SPOUSE AND REPORTS BASELINE INDEPENDENCE IN UB ADLS WHILE SPOUSE ASSISTS W/ LB ADLS  PT W/ USE OF RW AND SC PTA  NO RECENT H/O FALLS  PT AND SPOUSE SHARE IADLS  CURRENTLY PT REQUIRING MIN A UB ADLS, MOD A LB ADLS, MIN A FUNCTIONAL TRANSFERS/AMBULATION USING RW  PT APPEARS LIMITED AT THIS TIME 2* IMPAIRED BALANCE, ACTIVITY TOLERANCE, MEDICAL STATUS, ADL IMPAIRMENTS, GENERALIZED WEAKNESS/DECONDITIONING  OT ANTICIPATES EVENTUAL D/C HOME W/ FAMILY SUPPORT AND HOME PT/OT SERVICES PENDING FURTHER PROGRESS AND MEDICAL STABILITY  WILL CONTINUE TO FOLLOW PT 3-5X/WEEK IN ORDER TO MEET THE BELOW DESCRIBED GOALS IN 7-10 DAYS  OT Discharge Recommendation: Home OT  OT - OK to Discharge:  (PENDING MEDICAL STABILITY   )

## 2018-01-08 NOTE — CONSULTS
Consultation - General Surgery   Deon Dakin [de-identified] y o  female MRN: [id number]  Unit/Bed#: [facility] 603-01 Encounter: [id number]    Assessment/Plan     Assessment:  59-year-old female with choledocholithiasis resulting in cholangitis, acute pancreatitis, acute cholecystitis  Plan:  1  Choledocholithiasis              -  Plan for ERCP tomorrow 1/8 per GI              -  Follow-up LFTs in the morning              -  Patient cannot tolerate MRCP given pacemaker     2  Cholangitis              -  Secondary to #1              -  Broad-spectrum antibiotics              -  Follow-up GI consult                3   Acute pancreatitis              -  Secondary to #1              -  Trend lipase              -  Crystalloid resuscitation, will start sodium chloride 0 9% infusion at 250 mL an hour IV              -  Ice chips for comfort     4  Acute cholecystitis              -  Antibiotics given for #2 should cover for now              -  Will need cholecystectomy, however this will be difficult given history of open hepatic resection              -  Will consider inpatient cholecystectomy once pancreatitis is resolving and choledocolithiasis has been addressed     5  Complete heart block              -  Recently had new dual chamber pacemaker placed which was complicated by late subacute RV lead perforation              -  ECHO from 11/10 shows EF of 50%              -  OK for home aspirin 81 mg p o  every day                6    Metastatic breast cancer status post liver resection              - OK to continue home tamoxifen as this does not cause pancreatitis     7   Hyperlipidemia              - Hold home Lipitor 20 mg p o  every day      8  Hypertension              -  Hold home metoprolol 12 5 mg by mouth b i d      9  DVT prophylaxis              -  SQH              -  SCDs     10   Disposition              - Critical care per primary service (oncology-surgical)              - PT/OT    Ivan Thompson MD PGY-4  8:19 PM  01/07/18      History of Present Illness     HPI:  Nancy Manuel is a [de-identified] y o  female who presents with [de-identified] y o  female who presents with abdominal pain in her epigastrium for 2 days  She also has extreme thirst and feels dehydrated  She is passing flatus was not had a bowel movement recently  She denies measurable fevers, however she did have shaking chills at home prior to coming to the hospital   Her  called 911 this morning when she was bending over and pain, and she was seen in the [facility]   There she was found to have hyperbilirubinemia, elevated lipase, transaminitis, and bandemia  She was transferred here for higher level care by oncology-surgical   Of note, she has a history of  Metastatic breast cancer, and is status post  Liver segment 6 7 resection on 07/13/2017 for metastatic breast cancer  She also recently had a pacemaker placed  She is followed by Cardiology as an outpatient, as well as hematology oncology  She is on tamoxifen for breast cancer  Inpatient consult to Acute Care Surgery     Date/Time 1/7/2018 8:16 PM     Performed by  Gil Jon     Authorized by Gil Jon              Review of Systems   Constitutional: Positive for chills  Negative for fever  HENT: Negative for sore throat  Eyes: Negative for visual disturbance  Respiratory: Negative for shortness of breath  Cardiovascular: Negative for chest pain  Gastrointestinal: Positive for abdominal pain and nausea  Endocrine: Negative for polyuria  Genitourinary: Negative for dysuria  Musculoskeletal: Negative for arthralgias  Skin: Negative for rash  Allergic/Immunologic: Negative for environmental allergies  Neurological: Negative for dizziness  Hematological: Negative for adenopathy  Bruises/bleeds easily  Psychiatric/Behavioral: The patient is not nervous/anxious          Historical Information   Past Medical History:   Diagnosis Date    Anxiety  Arthritis     Breast CA ([address] )     recurrent, ductal carcinoma ER, OK pos    Depression     Diverticula of intestine     Dizziness     and passes out    Flushing     Hiatal hernia     [place] (hard of hearing)      lizette    Hypercholesteremia     Liver mass     Liver metastasis (HCC)     Metastatic adenocarcinoma to liver ([address] )     Bx 01/03/2017    PONV (postoperative nausea and vomiting)     Recurrent breast cancer ([address] )     Shingles     Shortness of breath     on exertion    Tachycardia     Urinary incontinence     Use of cane as ambulatory aid     or walker     Past Surgical History:   Procedure Laterality Date    CARDIAC PACEMAKER REMOVAL N/A 11/9/2017    Procedure: PACEMAKER LEAD REVISION, REMOVAL OF NONFUNCTIONING LEAD, AND INSERTION OF A NEW RV LEAD;  Surgeon: Kali Worrell MD;  Location: [facility];  Service: Cardiology    CATARACT EXTRACTION Bilateral     COLONOSCOPY      HYSTERECTOMY      JOINT REPLACEMENT      lizette  TKR and right TSR    MASTECTOMY Right     OK RESEC LIVER,PART LOBECTOMY N/A 7/13/2017    Procedure: LIVER RESECTION, INTRAOPERATIVE ULTRASOUND;  Surgeon: Jason Aragon MD;  Location: [facility];  Service: Surgical Oncology    ROTATOR CUFF REPAIR Right     SENTINEL LYMPH NODE BIOPSY Right     TONSILECTOMY AND ADNOIDECTOMY      WOUND DEBRIDEMENT Left 11/9/2017    Procedure: DEBRIDEMENT WOUND AND DRESSING CHANGE ([address] OUT);   Surgeon: Kali Worrell MD;  Location: [facility];  Service: Cardiology     Social History   History   Alcohol Use No     History   Drug Use No     History   Smoking Status    Never Smoker   Smokeless Tobacco    Never Used     Family History: non-contributory    Meds/Allergies   all current active meds have been reviewed, current meds:   Current Facility-Administered Medications   Medication Dose Route Frequency    [START ON 1/8/2018] aspirin chewable tablet 81 mg  81 mg Oral Daily    cefepime (MAXIPIME) 2,000 mg in dextrose 5 % 50 mL IVPB 2,000 mg Intravenous Q12H    heparin (porcine) subcutaneous injection 5,000 Units  5,000 Units Subcutaneous Q8H [address]    HYDROmorphone (DILAUDID) 1 mg/mL injection 0 5 mg  0 5 mg Intravenous Q3H PRN    metroNIDAZOLE (FLAGYL) IVPB (premix) 500 mg  500 mg Intravenous Q8H    ondansetron (ZOFRAN) injection 4 mg  4 mg Intravenous Q6H PRN    oxyCODONE (ROXICODONE) immediate release tablet 10 mg  10 mg Oral Q4H PRN    oxyCODONE (ROXICODONE) IR tablet 5 mg  5 mg Oral Q4H PRN    sodium chloride 0 9 % infusion  250 mL/hr Intravenous Continuous    [START ON 1/8/2018] tamoxifen (NOLVADEX) tablet 20 mg  20 mg Oral Daily    vancomycin (VANCOCIN) 1,250 mg in sodium chloride 0 9 % 250 mL IVPB  15 mg/kg Intravenous Q12H    and PTA meds:   Prior to Admission Medications   Prescriptions Last Dose Informant Patient Reported? Taking? Cranberry (THERACRAN HP PO)   Yes No   Sig: Take 2 tablets by mouth daily   Glucosamine-Chondroit-Vit C-Mn (GLUCOSAMINE 1500 COMPLEX) CAPS   Yes No   Sig: Take 1 capsule by mouth 2 (two) times a day     Multiple Vitamin (MULTIVITAMIN) capsule   Yes No   Sig: Take 1 capsule by mouth daily  acetaminophen (TYLENOL) 325 mg tablet   No No   Sig: May take 650 mg every 6-8 hours as needed for pain   Patient taking differently: Take 650 mg by mouth every 6 (six) hours as needed May take 650 mg every 6-8 hours as needed for pain    atorvastatin (LIPITOR) 20 mg tablet   Yes No   Sig: Take 20 mg by mouth daily  calcium carbonate (CALCIUM 600) 600 MG tablet   Yes No   Sig: Take 600 mg by mouth 2 (two) times a day with meals     metoprolol tartrate (LOPRESSOR) 25 mg tablet   No No   Sig: Take 1 tablet by mouth every 12 (twelve) hours   ondansetron (ZOFRAN) 4 mg tablet   Yes No   Sig: Take 4 mg by mouth every 8 (eight) hours as needed for nausea or vomiting   tamoxifen (NOLVADEX) 20 mg tablet   Yes No   Sig: Take 20 mg by mouth daily      Facility-Administered Medications: None     Allergies   Allergen Reactions    Ampicillin Shortness Of Breath and Anaphylaxis       Objective   First Vitals:   Blood Pressure: 102/51 (01/07/18 1700)  Pulse: 99 (01/07/18 1700)  Temperature: 98 1 °F (36 7 °C) (01/07/18 1700)  Temp Source: Oral (01/07/18 1700)  Respirations: 20 (01/07/18 1700)  SpO2: 93 % (01/07/18 1700)    Current Vitals:   Blood Pressure: 102/51 (01/07/18 1700)  Pulse: 99 (01/07/18 1700)  Temperature: 98 1 °F (36 7 °C) (01/07/18 1700)  Temp Source: Oral (01/07/18 1700)  Respirations: 20 (01/07/18 1700)  SpO2: 93 % (01/07/18 1700)    No intake or output data in the 24 hours ending 01/07/18 2016    Invasive Devices     Peripheral Intravenous Line            Peripheral IV 01/07/18 Left Antecubital less than 1 day    Peripheral IV 01/07/18 Right Hand less than 1 day                Physical Exam   Constitutional: She is oriented to person, place, and time  She appears well-developed and well-nourished  HENT:   Head: Normocephalic and atraumatic  Eyes: Pupils are equal, round, and reactive to light  Neck: Normal range of motion  No tracheal deviation present  Cardiovascular: Normal rate and regular rhythm  Pulmonary/Chest: Effort normal  She has no wheezes  Abdominal: Soft  There is tenderness (RUQ)  Healed RUQ subcostal incision   Musculoskeletal: Normal range of motion  She exhibits no edema  Neurological: She is alert and oriented to person, place, and time  No cranial nerve deficit  Skin: Skin is warm  No rash noted  Psychiatric: She has a normal mood and affect         Lab Results:   CBC:   Lab Results   Component Value Date    WBC 9 56 01/07/2018    HGB 13 2 01/07/2018    HCT 40 2 01/07/2018    MCV 94 01/07/2018     01/07/2018    MCH 30 9 01/07/2018    MCHC 32 8 01/07/2018    RDW 13 9 01/07/2018    MPV 10 6 01/07/2018    NRBC 0 01/07/2018   , CMP:   Lab Results   Component Value Date     01/07/2018    K 4 5 01/07/2018     01/07/2018    CO2 21 01/07/2018    ANIONGAP 15 (H) 01/07/2018    BUN 17 01/07/2018    CREATININE 0 91 01/07/2018    GLUCOSE 157 (H) 01/07/2018    CALCIUM 9 0 01/07/2018     (H) 01/07/2018     (H) 01/07/2018    ALKPHOS 307 (H) 01/07/2018    PROT 6 9 01/07/2018    ALBUMIN 2 5 (L) 01/07/2018    BILITOT 3 67 (H) 01/07/2018    EGFR 60 01/07/2018   , Coagulation:   Lab Results   Component Value Date    INR 1 27 (H) 01/07/2018   , Urinalysis:   Lab Results   Component Value Date    COLORU Orange 01/07/2018    CLARITYU Cloudy 01/07/2018    SPECGRAV 1 010 01/07/2018    PHUR 5 5 01/07/2018    LEUKOCYTESUR Negative 01/07/2018    NITRITE Negative 01/07/2018    PROTEINUA 100 (2+) (A) 01/07/2018    GLUCOSEU Negative 01/07/2018    KETONESU Trace (A) 01/07/2018    BILIRUBINUR Interference- unable to analyze (A) 01/07/2018    BLOODU Trace (A) 01/07/2018   , Amylase: No results found for: AMYLASE, Lipase:   Lab Results   Component Value Date    LIPASE 5,202 (H) 01/07/2018     Imaging: I have personally reviewed pertinent films in PACS  EKG, Pathology, and Other Studies: I have personally reviewed pertinent films in PACS    Counseling / Coordination of Care  Total floor / unit time spent today 30 minutes  Greater than 50% of total time was spent with the patient and / or family counseling and / or coordination of care  A description of the counseling / coordination of care: plan of care

## 2018-01-08 NOTE — PROGRESS NOTES
Progress Note - Krista [de-identified] y o  female MRN: [id number]  Unit/Bed#: ICU 09 Encounter: [id number]    Attending Physician: Nina Renee MD      ______________________________________________________________________  Assessment and Plan:   Principal Problem:    Choledocholithiasis  Active Problems:    Cholecystitis    Acute biliary pancreatitis    Cholangitis  Resolved Problems:    * No resolved hospital problems  *        Neuro: Analgesia - oxy 5/10 mg q4h PRN, Dilaudid 0 5mg q3h PRN breakthrough  (0 PRNs over night)  -  Zofran for nausea  -  [facility]     CV: Complete heart block s/p pacer - Recently, replaced secondary to RV lead perforation  Hold BB secondary to sepsis  Continue to monitor   -  HLD - Hold statin 2/2 elevated LFTs      Lung:  No active issues  Encourage IS      GI:  Choledocholithiasis - ERCP today with GI  Trend LFTs  Pain control as above  -  Cholangitis - 2/2 choledocholithiasis  Cefepime, Vancomycin, and Flagyl (Day #2)  Vanc trough prior to 4th dose  Appreciate GI recs  -  Acute pancreatitis - 2/2 choledocholithiasis  Trend Lipase  Aggressive fluid resuscitation as patient is likely volume depleted  Monitor volume status and respiratory status  Last echo revealed EF 50%  Antibiotics as above  General surgery recs for possible cholecystectomy   -  Hepatic metastases s/p resection - outpatient follow up      FEN:  Normal saline at 250cc/hr for resuscitation  Bolus PRN based on volume status  Check labs and replete lytes PRN  NPO sips with meds      :  No active issues  Monitor I/Os      ID:  Cholangitis - 2/2 choledocholithiasis  Abx plan as above      Heme:  No active issues      Endo:  No active issues      MSK:  Breast cancer s/p mastectomy - continue tamoxifen    -  Frequent offloading   -  DVT proph - SubQ heparin and SCDs      Disposition: Transfer to floor    Code Status: Level 1 - Full Code    Counseling / Coordination of Care  Total Critical Care time spent 30 minutes excluding procedures, teaching and family updates  ______________________________________________________________________    Chief Complaint: No complaints this AM     24 Hour Events: Patient transferred to ICU for closer monitoring       ______________________________________________________________________    Physical Exam   Constitutional: She is oriented to person, place, and time  Vital signs are normal  She appears well-developed and well-nourished  She is cooperative  Non-toxic appearance  She does not appear ill  HENT:   Head: Normocephalic and atraumatic  Mouth/Throat: Uvula is midline, oropharynx is clear and moist and mucous membranes are normal    Eyes: Conjunctivae, EOM and lids are normal  Pupils are equal, round, and reactive to light  Neck: Trachea normal and phonation normal    Cardiovascular: Normal rate, regular rhythm, normal heart sounds and normal pulses  No murmur heard  Pulses:       Radial pulses are 2+ on the right side, and 2+ on the left side  Dorsalis pedis pulses are 2+ on the right side, and 2+ on the left side  Pulmonary/Chest: Effort normal and breath sounds normal    Healed surgical scars R and L chest    Abdominal: Soft  Normal appearance and bowel sounds are normal  There is tenderness in the right upper quadrant, epigastric area and left upper quadrant  There is rebound and positive Smith's sign  There is no rigidity, no guarding and no tenderness at McBurney's point  Healed RUQ surgical scar   Neurological: She is alert and oriented to person, place, and time  GCS eye subscore is 4  GCS verbal subscore is 5   GCS motor subscore is 6        ______________________________________________________________________  Vitals:    01/08/18 0100 01/08/18 0200 01/08/18 0300 01/08/18 0400   BP: (!) 107/40 (!) 110/34 122/63 (!) 111/37   Pulse: 84 84 92 86   Resp: 17 19 (!) 28 19   Temp:    98 °F (36 7 °C)   TempSrc:    Oral   SpO2: 98% 98% 98% 99% Temperature:   Temp (24hrs), Av °F (36 7 °C), Min:97 7 °F (36 5 °C), Max:98 2 °F (36 8 °C)    Current Temperature: 98 °F (36 7 °C)  Weights: There is no height or weight on file to calculate BMI  Weight (last 2 days)     None        Hemodynamic Monitoring:  N/A     Non-Invasive/Invasive Ventilation Settings:  Respiratory    Lab Data (Last 4 hours)    None         O2/Vent Data (Last 4 hours)    None              No results found for: PHART, JVT5WOB, PO2ART, NMY6KCQ, S8UCTKPN, BEART, SOURCE  SpO2: SpO2: 99 %  Intake and Outputs:  I/O       701 -  - 700    I V    8    IV Piggyback  350    Total Intake   2345 8    Urine  500    Total Output   500    Net   +1845 8                Nutrition:        Diet Orders            Start     Ordered    18 0001  Diet NPO; Sips with meds  Diet effective midnight     Question Answer Comment   Diet Type NPO    NPO Except: Sips with meds    RD to adjust diet per protocol?  Yes        18          Labs:     Results from last 7 days  Lab Units 182 18  0600   WBC Thousand/uL  --  9 56   HEMOGLOBIN g/dL  --  13 2   HEMATOCRIT %  --  40 2   PLATELETS Thousands/uL 207 191   MONO PCT MAN %  --  1*       Results from last 7 days  Lab Units 18  0026 18  0600   SODIUM mmol/L 142 139   POTASSIUM mmol/L 3 7 4 5   CHLORIDE mmol/L 108 103   CO2 mmol/L 27 21   BUN mg/dL 25 17   CREATININE mg/dL 0 71 0 91   CALCIUM mg/dL 8 2* 9 0   TOTAL PROTEIN g/dL 5 8* 6 9   BILIRUBIN TOTAL mg/dL 1 98* 3 67*   ALK PHOS U/L 202* 307*   ALT U/L 165* 212*   AST U/L 149* 176*   GLUCOSE RANDOM mg/dL 125 157*         Lab Results   Component Value Date    PHOS 3 3 2017    PHOS 1 8 (L) 07/15/2017        Results from last 7 days  Lab Units 18  0600   INR  1 27*   PTT seconds 28       0  Lab Value Date/Time   TROPONINI <0 02 2017 1035   TROPONINI 0 06 (H) 07/15/2017 1726   TROPONINI 0 06 (H) 07/15/2017 1476 TROPONINI 0 06 (H) 07/15/2017 1119       Results from last 7 days  Lab Units 01/08/18  0026 01/07/18  2052   LACTIC ACID mmol/L 1 0 2 4*     ABG:  Lab Results   Component Value Date    PHART 7 371 07/14/2017    GPJ7YBW 35 9 (L) 07/14/2017    PO2ART 120 6 07/14/2017    BDW8EUL 20 3 (L) 07/14/2017    BEART -4 3 07/14/2017    SOURCE Line, Arterial 07/14/2017     Imaging:  I have personally reviewed pertinent reports  Micro:  Lab Results   Component Value Date    URINECX No Growth <1000 cfu/mL 04/03/2017     Allergies: Allergies   Allergen Reactions    Ampicillin Shortness Of Breath and Anaphylaxis     Medications:   Scheduled Meds:  aspirin 81 mg Oral Daily   cefepime 2,000 mg Intravenous Q12H   heparin (porcine) 5,000 Units Subcutaneous Q8H [facility]   metroNIDAZOLE 500 mg Intravenous Q8H   tamoxifen 20 mg Oral Daily   vancomycin 15 mg/kg Intravenous Q12H     Continuous Infusions:  sodium chloride 250 mL/hr Last Rate: 250 mL/hr (01/07/18 1822)     PRN Meds:    HYDROmorphone 0 5 mg Q3H PRN   ondansetron 4 mg Q6H PRN   oxyCODONE 10 mg Q4H PRN   oxyCODONE 5 mg Q4H PRN     VTE Pharmacologic Prophylaxis: Sequential compression device (Venodyne)  and Heparin  VTE Mechanical Prophylaxis: sequential compression device  Invasive lines and devices: Invasive Devices     Peripheral Intravenous Line            Peripheral IV 01/07/18 Left Antecubital 1 day    Peripheral IV 01/07/18 Right Hand less than 1 day                     Portions of the record may have been created with voice recognition software  Occasional wrong word or "sound a like" substitutions may have occurred due to the inherent limitations of voice recognition software  Read the chart carefully and recognize, using context, where substitutions have occurred      Liz Fonseca MD

## 2018-01-08 NOTE — OP NOTE
**** GI/ENDOSCOPY REPORT ****     PATIENT NAME: Rylee Bradford - VISIT ID:  Patient ID: [id number]   YOB: 1940     INTRODUCTION: Endoscopic Retrograde Cholangiopancreatography - A [de-identified]  year-old female patient presents for an inpatient Endoscopic Retrograde   Cholangiopancreatography at [address] [place] [address]  INDICATIONS: Choledocholithiasis  CONSENT:  The benefits, risks, and alternatives to the procedure were   discussed and informed consent was obtained from the patient  PREPARATION:  EKG, pulse, pulse oximetry and blood pressure were monitored   throughout the procedure  ASA Classification: Class 3 - Patient has severe   systemic disturbance that may or may not be related to the disorder   requiring surgery  MEDICATIONS: Anesthesia-check records     PROCEDURE:  The endoscope was passed with ease through the mouth under   direct visualization and advanced to the duodenum  The scope was withdrawn   and the mucosa was carefully examined  FINDINGS:   Duodenum: There was evidence of duodenitis in the duodenal   bulb and 2nd portion of the duodenum  The mucosa appeared ulcerated  The   first attempt at cannulation (via the major papilla) of the bile duct was   performed and entered superficially with a sphincterotome with a   guidewire  Contrast was injected under fluoroscopic guidance partially   filling the common bile duct  Radiographs were taken  Multiple   medium-sized stones was found in the common bile duct  Upstream, the ducts   was dilated  An adequate biliary sphincterotomy was performed with a   sphincterotome  After sphinterotomy a large amount of pus drained from the   duct  Dilatation was performed, using a 8mm Hurricane balloon for 2 mins  Balloon sweeps were then done with a 9-12mm balloon and at least 5 stones   were removed successfully using a balloon  Due to continued drainage of   pus a 5 cm plastic stent was placed in the area   The procedure was then   terminated  COMPLICATIONS: There were no complications  IMPRESSIONS:  Duodenitis in the duodenal bulb and 2nd portion of the   duodenum  Successful ERCP with biliary cannulation/ sphincterotomy/   sphincteroplasty and stone extraction and finally stent placement  Multiple stones were removed and large amount of pus was seen draining   from the duct  RECOMMENDATIONS:  Continue antibiotics  clear liquid diet for today  Repeat ERCP in 4 - 8 weeks for stent removal      ESTIMATED BLOOD LOSS:     PROCEDURE CODES:     ICD-9 Codes: 535 60 Duodenitis, without mention of hemorrhage 574 50   Calculus of bile duct without mention of cholecystitis, without mention of   obstruction     ICD-10 Codes: K29 Gastritis and duodenitis     PERFORMED BY: DESIREE Willams  on 01/08/2018  Version 1, electronically signed by DESIREE Willams  on 01/08/2018 at   16:44

## 2018-01-08 NOTE — PROGRESS NOTES
01/08/18 1300   Psychosocial   Length of Time/Family Visitation 0-5 min   Plan of Care   Comments PT sleeping   Assessment Completed by: Unit visit

## 2018-01-08 NOTE — MEDICAL STUDENT
Consults     Reason for consult: Choledocholithiasis    HPI:  Ms Ashley Carrington is an [de-identified] yo female with a h/o metastatic breast cancer s/p segment 6/7 liver resection on July 13th 2017 and recent pacemaker placement  She presented to the ED with severe RUQ/LUQ abdominal pain that was continuous and started 2 days before admission  Her pain improves with hydration, does not radiate, and is of moderate intensity  She also reports multiple episodes of emesis as well as decreased appetite and PO intake  She denies fevers/chills/jaundice/melena/hematochezia  The patient does not use alcohol, tobacco or illicit drugs  Objective:    Blood pressure (!) 108/40, pulse 94, temperature 97 6 °F (36 4 °C), temperature source Oral, resp  rate 15, height 5' 1" (1 549 m), weight 68 3 kg (150 lb 9 2 oz), SpO2 97 %         Intake/Output Summary (Last 24 hours) at 01/08/18 1204  Last data filed at 01/08/18 0832    Gross per 24 hour   Intake          3450 01 ml   Output              625 ml   Net          2825 01 ml          Lab Results:      Results from last 7 days  Lab Units 01/08/18  0544   WBC Thousand/uL 14 19*   HEMOGLOBIN g/dL 10 2*   HEMATOCRIT % 32 2*   PLATELETS Thousands/uL 175   NEUTROS PCT % 82*   LYMPHS PCT % 8*   MONOS PCT % 9   EOS PCT % 1         Results from last 7 days  Lab Units 01/08/18  0544   SODIUM mmol/L 142   POTASSIUM mmol/L 3 9   CHLORIDE mmol/L 110*   CO2 mmol/L 25   BUN mg/dL 22   CREATININE mg/dL 0 59*   CALCIUM mg/dL 8 3   TOTAL PROTEIN g/dL 5 5*   BILIRUBIN TOTAL mg/dL 1 48*   ALK PHOS U/L 201*   ALT U/L 155*   AST U/L 153*   GLUCOSE RANDOM mg/dL 115         Results from last 7 days  Lab Units 01/08/18  0544   INR   1 32*         Results from last 7 days  Lab Units 01/08/18  0544   LIPASE u/L 1,928*         Imaging Studies: I have personally reviewed pertinent imaging studies      Pe Study With Ct Abdomen And Pelvis With Contrast     Result Date: 1/7/2018  Impression: Choledocholithiasis with intra-and extra hepatic biliary dilatation  Associated acute interstitial edematous pancreatitis without pancreatic necrosis or pancreatic collection  Anatomic variation of intrahepatic gallbladder  Cholelithiasis, gallbladder wall thickening with pericholecystic fluid and reactive enhancement within the surrounding hepatic parenchyma most consistent with acute cholecystitis  Postsurgical changes related to partial hepatic resection  Hepatic steatosis  No hepatic mass identified  Increasing right pleural effusion with overlying compressive atelectasis  No pulmonary embolus  Bubbles of gas within the urinary bladder possibly related to instrumentation  Correlation with history of instrumentation and urinary analysis is recommended  I personally discussed this study with Dr Mcadams Diss on 1/7/2018 8:00 AM  Workstation performed: [id number]     A/P:    1) Increased LFTs - LFTs are trending down today, indicating that she may have passed a gallstone  However, her WBC count remains elevated  Keep pt  NPO for ERCP  Continue to monitor LFTs  Continue ABX  2) Gallstone pancreatitis - Lipase is trending down  Continue NPO and f/u post ERCP  3) Acute cholecystitis: Per Ct  Due to age and illness, patient is a poor candidate for surgery  Will follow up post ERCP      Hill Crabtree  MS3

## 2018-01-08 NOTE — CONSULTS
Consultation - [facility]  Nancy Manuel [de-identified] y o  female MRN: [id number]  Unit/Bed#: [facility] 094-93 Encounter: [id number]        Inpatient consult to gastroenterology  Consult performed by: Kurt Hall ordered by: Dennise Blake          Reason for Consult / Principal Problem:  Choledocholithiasis    HPI: 17-year-old female with history of metastatic breast cancer presenting for evaluation of acute abdominal pain  Patient reports onset of epigastric pain 2 days ago  She reports having similar pain a few times over the past few months, although the pain was more mild in severity and and dissipated on its own  She states the pain was progressive and severe in nature, so she came to the ED  The pain was associated with nausea and multiple episodes of non-bloody non-bilious emesis  She reports decreased appetite and PO intake  She denies fever and chills  She denies diarrhea and constipation  She denies melena and hematochezia  Of note she underwent liver resection for liver metastases in July 2017  She also has history of hysterectomy in the past     She denies alcohol, tobacco, and drug use  She underwent EGD in 2015 for evaluation of anemia  EGD was completely normal  She recalls having a recent colonoscopy although cannot remember the exact date  REVIEW OF SYSTEMS:    CONSTITUTIONAL: Denies any fever, chills, or rigors  Good appetite, and no recent weight loss  HEENT: No earache or tinnitus  Denies hearing loss or visual disturbances  CARDIOVASCULAR: No chest pain or palpitations  RESPIRATORY: Denies any cough, hemoptysis, shortness of breath or dyspnea on exertion  GASTROINTESTINAL: As noted in the History of Present Illness  GENITOURINARY: No problems with urination  Denies any hematuria or dysuria  NEUROLOGIC: No dizziness or vertigo, denies headaches  MUSCULOSKELETAL: Denies any muscle or joint pain  SKIN: Denies skin rashes or itching     ENDOCRINE: Denies excessive thirst  Denies intolerance to heat or cold  PSYCHOSOCIAL: Denies depression or anxiety  Denies any recent memory loss  Historical Information   Past Medical History:   Diagnosis Date    Anxiety     Arthritis     [facility]     recurrent, ductal carcinoma ER, [place] pos    Depression     Diverticula of intestine     Dizziness     and passes out    Flushing     Hiatal hernia     [place] (hard of hearing)      lizette    Hypercholesteremia     Liver mass     Liver metastasis (HCC)     Metastatic adenocarcinoma to liver ([facility] )     Bx 01/03/2017    PONV (postoperative nausea and vomiting)     Recurrent breast cancer ([facility] )     Shingles     Shortness of breath     on exertion    Tachycardia     Urinary incontinence     Use of cane as ambulatory aid     or walker     Past Surgical History:   Procedure Laterality Date    CARDIAC PACEMAKER REMOVAL N/A 11/9/2017    Procedure: PACEMAKER LEAD REVISION, REMOVAL OF NONFUNCTIONING LEAD, AND INSERTION OF A NEW RV LEAD;  Surgeon: Lindsay Raines MD;  [facility];  Service: Cardiology    CATARACT EXTRACTION Bilateral     COLONOSCOPY      HYSTERECTOMY      JOINT REPLACEMENT      lizette  TKR and right TSR    MASTECTOMY Right     [place] RESEC LIVER,PART LOBECTOMY N/A 7/13/2017    Procedure: LIVER RESECTION, INTRAOPERATIVE ULTRASOUND;  Surgeon: Kenna Sánchez MD;  [facility];  Service: Surgical Oncology    ROTATOR CUFF REPAIR Right     SENTINEL LYMPH NODE BIOPSY Right     TONSILECTOMY AND ADNOIDECTOMY      WOUND DEBRIDEMENT Left 11/9/2017    Procedure: DEBRIDEMENT WOUND AND DRESSING CHANGE ([address] OUT);   Surgeon: Lindsay Raines MD;  [facility];  Service: Cardiology     Social History   History   Alcohol Use No     History   Drug Use No     History   Smoking Status    Never Smoker   Smokeless Tobacco    Never Used     Family History   Problem Relation Age of Onset    Lung cancer Father     Cancer Brother     Diabetes type II Son        Meds/Allergies     Prescriptions Prior to Admission   Medication    acetaminophen (TYLENOL) 325 mg tablet    atorvastatin (LIPITOR) 20 mg tablet    calcium carbonate (CALCIUM 600) 600 MG tablet    Cranberry (THERACRAN HP PO)    Glucosamine-Chondroit-Vit C-Mn (GLUCOSAMINE 1500 COMPLEX) CAPS    metoprolol tartrate (LOPRESSOR) 25 mg tablet    Multiple Vitamin (MULTIVITAMIN) capsule    ondansetron (ZOFRAN) 4 mg tablet    tamoxifen (NOLVADEX) 20 mg tablet     Current Facility-Administered Medications   Medication Dose Route Frequency    aspirin chewable tablet 81 mg  81 mg Oral Daily    cefepime (MAXIPIME) 2,000 mg in dextrose 5 % 50 mL IVPB  2,000 mg Intravenous Q12H    heparin (porcine) subcutaneous injection 5,000 Units  5,000 Units Subcutaneous Q8H [address]    HYDROmorphone (DILAUDID) 1 mg/mL injection 0 5 mg  0 5 mg Intravenous Q3H PRN    metroNIDAZOLE (FLAGYL) IVPB (premix) 500 mg  500 mg Intravenous Q8H    ondansetron (ZOFRAN) injection 4 mg  4 mg Intravenous Q6H PRN    oxyCODONE (ROXICODONE) immediate release tablet 10 mg  10 mg Oral Q4H PRN    oxyCODONE (ROXICODONE) IR tablet 5 mg  5 mg Oral Q4H PRN    sodium chloride 0 9 % infusion  250 mL/hr Intravenous Continuous    tamoxifen (NOLVADEX) tablet 20 mg  20 mg Oral Daily    vancomycin (VANCOCIN) 1,250 mg in sodium chloride 0 9 % 250 mL IVPB  15 mg/kg Intravenous Q12H       Allergies   Allergen Reactions    Ampicillin Shortness Of Breath and Anaphylaxis           Objective     Blood pressure (!) 108/40, pulse 94, temperature 97 6 °F (36 4 °C), temperature source Oral, resp  rate 15, height 5' 1" (1 549 m), weight 68 3 kg (150 lb 9 2 oz), SpO2 97 %        Intake/Output Summary (Last 24 hours) at 01/08/18 1204  Last data filed at 01/08/18 2612   Gross per 24 hour   Intake          3450 01 ml   Output              625 ml   Net          2825 01 ml         PHYSICAL EXAM:      General Appearance:   Alert, cooperative, no distress, appears stated age    HEENT:   Normocephalic, atraumatic, anicteric      Neck:  Supple, symmetrical, trachea midline, no adenopathy    Lungs:   Clear to auscultation bilaterally; no rales, rhonchi or wheezing; respirations unlabored    Heart[de-identified]   S1 and S2 normal; regular rate and rhythm; no murmur, rub, or gallop  Abdomen:   Soft, non-tender, non-distended; normal bowel sounds; no masses, no organomegaly    Genitalia:   Deferred    Rectal:   Deferred    Extremities:  No cyanosis, clubbing or edema    Pulses:  2+ and symmetric all extremities    Skin:  Skin color, texture, turgor normal, no rashes or lesions    Lymph nodes:  No palpable cervical lymphadenopathy        Lab Results:     Results from last 7 days  Lab Units 01/08/18  0544   WBC Thousand/uL 14 19*   HEMOGLOBIN g/dL 10 2*   HEMATOCRIT % 32 2*   PLATELETS Thousands/uL 175   NEUTROS PCT % 82*   LYMPHS PCT % 8*   MONOS PCT % 9   EOS PCT % 1       Results from last 7 days  Lab Units 01/08/18  0544   SODIUM mmol/L 142   POTASSIUM mmol/L 3 9   CHLORIDE mmol/L 110*   CO2 mmol/L 25   BUN mg/dL 22   CREATININE mg/dL 0 59*   CALCIUM mg/dL 8 3   TOTAL PROTEIN g/dL 5 5*   BILIRUBIN TOTAL mg/dL 1 48*   ALK PHOS U/L 201*   ALT U/L 155*   AST U/L 153*   GLUCOSE RANDOM mg/dL 115       Results from last 7 days  Lab Units 01/08/18  0544   INR  1 32*       Results from last 7 days  Lab Units 01/08/18  0544   LIPASE u/L 1,928*       Imaging Studies: I have personally reviewed pertinent imaging studies  Pe Study With Ct Abdomen And Pelvis With Contrast    Result Date: 1/7/2018  Impression: Choledocholithiasis with intra-and extra hepatic biliary dilatation  Associated acute interstitial edematous pancreatitis without pancreatic necrosis or pancreatic collection  Anatomic variation of intrahepatic gallbladder    Cholelithiasis, gallbladder wall thickening with pericholecystic fluid and reactive enhancement within the surrounding hepatic parenchyma most consistent with acute cholecystitis  Postsurgical changes related to partial hepatic resection  Hepatic steatosis  No hepatic mass identified  Increasing right pleural effusion with overlying compressive atelectasis  No pulmonary embolus  Bubbles of gas within the urinary bladder possibly related to instrumentation  Correlation with history of instrumentation and urinary analysis is recommended  I personally discussed this study with Dr Yue Moses on 1/7/2018 8:00 AM  Workstation performed: [id number]       ASSESSMENT and PLAN:      Discussed plan with surgery    61-year-old pleasant female with history of metastatic breast cancer presenting for evaluation of acute abdominal pain found to have choledocholithiasis, acute pancreatitis, and acute cholecystitis  1) Elevated LFTs in the setting of choledocholithiasis: Upon presentation, LFTs elevated with , , alkaline phosphatase 307, and total bilirubin 3 67  CT abdomen pelvis reveals choledocholithiasis with intra and extrahepatic biliary dilatation, associated acute pancreatitis, and cholelithiasis with gallbladder wall thickening and pericholecystic fluid consistent with acute cholecystitis  LFTs are trending down today with , , alkaline phosphatase 201, and total bilirubin 1 48  She may have passed a gallstone as she denies further abdominal pain  No fever or jaundice to suggest cholangitis  However her white blood cell count is elevated at 14,000     -Keep NPO for ERCP with possible stone extraction later today  -Monitor LFTs  -Continue antibiotics    2) Acute gallstone pancreatitis: She denies abdominal pain today  Her pancreatitis appears uncomplicated based on CT scan findings   Lipase trending down overall from 5200 to to 1928       -Continue NPO with IVF for now  -ERCP to evaluate for CBD stone today    3) Acute cholecystitis:  Identified on CT scan    -Patient is a poor candidate for cholecystectomy  -We will attempt ERCP with sphincterotomy to prevent further episodes of choledocholithiasis    Patient was seen and examined by Dr Russell King  All chavez medical decisions were made by Dr Russell King  Thank you for allowing us to participate in the care of this present patient  We will follow-up with you closely

## 2018-01-08 NOTE — NURSING NOTE
Report given to Jovany Fortune RN P8  Expressed  needed to be notified of room change with pt's updates

## 2018-01-08 NOTE — PROGRESS NOTES
Pt transported from GI lab  Report received from [facility], Pt states she is comfortable, reports 0/10 pain  respitory paged for continuous pulse OX  Pt resting with no further needs

## 2018-01-08 NOTE — PHYSICAL THERAPY NOTE
Physical Therapy Evaluation      Patient Active Problem List   Diagnosis    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 )    Hypercholesteremia    Gastroesophageal reflux disease without esophagitis    History of total knee replacement    Hearing loss    Left bundle branch block (LBBB)    Symptomatic bradycardia    Complication associated with cardiac pacemaker lead    Pacemaker malfunction, initial encounter    Chest wall hematoma    Cholecystitis    Acute biliary pancreatitis    Cholangitis    Choledocholithiasis       Past Medical History:   Diagnosis Date    Anxiety     Arthritis     Breast CA (HCC)     recurrent, ductal carcinoma ER, [place] pos    Depression     Diverticula of intestine     Dizziness     and passes out    Flushing     Hiatal hernia     [place] (hard of hearing[place]      [place]    Hypercholesteremia     Liver mass     Liver metastasis (HCC)     Metastatic adenocarcinoma to liver ([facility] )     Bx 01/03/2017    PONV (postoperative nausea and vomiting)     Recurrent breast cancer (HCC)     Shingles     Shortness of breath     on exertion    Tachycardia     Urinary incontinence     Use of cane as ambulatory aid     or walker       Past Surgical History:   Procedure Laterality Date    CARDIAC PACEMAKER REMOVAL N/A 11/9/2017    Procedure: PACEMAKER LEAD REVISION, REMOVAL OF NONFUNCTIONING LEAD, AND INSERTION OF A NEW RV LEAD;  Surgeon: Zachery Garza MD;  [facility];  Service: Cardiology    CATARACT EXTRACTION Bilateral     COLONOSCOPY      HYSTERECTOMY      JOINT REPLACEMENT      lizette  TKR and right TSR    MASTECTOMY Right     [place] RESEC LIVER,PART LOBECTOMY N/A 7/13/2017    Procedure: LIVER RESECTION, INTRAOPERATIVE ULTRASOUND;  Surgeon: Suraj Nation MD;  [facility];  Service: Surgical Oncology    ROTATOR CUFF REPAIR Right     SENTINEL LYMPH NODE BIOPSY Right     TONSILECTOMY AND ADNOIDECTOMY      WOUND DEBRIDEMENT Left 11/9/2017 Procedure: DEBRIDEMENT WOUND AND DRESSING CHANGE [facility] OUT); Surgeon: Zahraa Sherman MD;  [facility];  Service: Cardiology      01/08/18 1010   Note Type   Note type Eval only   Pain Assessment   Pain Assessment No/denies pain   Home Living   Type of [address] Two level   Home Equipment Walker   Prior Function   Level of [place] Needs assistance with IADLs; Needs assistance with ADLs and functional mobility  (pt reports she can do everything but her family wont let her)   Lives With Spouse   Receives Help From Family   ADL Assistance Needs assistance   IADLs Needs assistance   Falls in the last 6 months 0   Comments has vertigo, in the past has led to syncope and falls  better since having pacer placed  pt had recent death in family   Restrictions/Precautions   Other Precautions Multiple lines;Telemetry; Fall Risk   General   Family/Caregiver Present No   Cognition   Overall Cognitive Status WFL   Orientation Level Oriented X4   RUE Assessment   RUE Assessment WFL   LUE Assessment   LUE Assessment WFL   RLE Assessment   RLE Assessment X  (strength 4/5)   LLE Assessment   LLE Assessment X  (strength 4/5)   Coordination   Movements are Fluid and Coordinated 1   Sensation WFL   Bed Mobility   Rolling R 4  Minimal assistance   Rolling L 4  Minimal assistance   Supine to Sit 4  Minimal assistance   Transfers   Sit to Stand 4  Minimal assistance   Additional items Increased time required   Stand to Sit 4  Minimal assistance   Additional items Assist x 1   Ambulation/Elevation   Gait pattern WNL   Gait Assistance 4  Minimal assist   Additional items Assist x 1   Assistive Device Rolling walker   Distance 17'   Balance   Static Sitting Good   Dynamic Sitting Fair +   Static Standing Fair -   Dynamic Standing Fair -   Ambulatory Fair -   Endurance Deficit   Endurance Deficit No   Activity Tolerance   Activity Tolerance Patient tolerated treatment well   Medical Staff Made Aware OT   Nurse Made Aware yes- carlos   Assessment   Prognosis Good   Problem List Decreased strength;Decreased endurance; Impaired balance;Decreased mobility; Decreased safety awareness   Assessment pt admitted with abd pain, dx cholangitis  pt refered to PT  pt was modified indep at home, using rw  family assisting but pt states she can do for herself  pt has prior history of falls and vertigo , cardiac related  pt demonstrated moderate functional limitations due to recent illness and prior debility  pt has deficits in strength, balance, activity tolerance, self care  pt was able to amb using rw for 17'  pt needed min assist  pt tolerated well, no complaint of light headedness or dizziness  pt will be able to return home with family  recommend home PT  Barriers to Discharge Inaccessible home environment   Goals   Patient Goals sti up and walk when ever possible   [facility] Expiration Date 01/15/18   Short Term Goal #1 supervision for bed mobility, transfers, amb with rw for > 150'  stairs with min assist  improve strength and balance by 1/2 grade, demonstrate good safety awareness  Plan   Treatment/Interventions Functional transfer training;LE strengthening/ROM; Elevations; Therapeutic exercise; Endurance training;Patient/family training;Equipment eval/education; Bed mobility;Gait training;Spoke to nursing;OT   PT Frequency 5x/wk   Recommendation   Recommendation Home PT; Home with family support   Equipment Recommended Walker   PT - OK to Discharge No   Modified Ana Scale   Modified McDonald Scale 3   Barthel Index   Feeding 0  (NPO for procedure)   Bathing 0   Grooming Score 0   Dressing Score 5   Bladder Score 5   Bowels Score 10   Toilet Use Score 5   Transfers (Bed/Chair) Score 10   Mobility (Level Surface) Score 0   Stairs Score 0   Barthel Index Score 35   History: co - morbidities, fall risk, use of assistive device, assist for adl's,  multiple lines  Exam: impairments in locomotion, musculoskeletal, balance, self care, GI  Clinical: unstable  Complexity:high      Khushbu Eastman, PT

## 2018-01-09 LAB
ALBUMIN SERPL BCP-MCNC: 2.1 G/DL (ref 3.5–5)
ALP SERPL-CCNC: 228 U/L (ref 46–116)
ALT SERPL W P-5'-P-CCNC: 136 U/L (ref 12–78)
ANION GAP SERPL CALCULATED.3IONS-SCNC: 10 MMOL/L (ref 4–13)
AST SERPL W P-5'-P-CCNC: 108 U/L (ref 5–45)
BASOPHILS # BLD AUTO: 0.01 THOUSANDS/ΜL (ref 0–0.1)
BASOPHILS NFR BLD AUTO: 0 % (ref 0–1)
BILIRUB DIRECT SERPL-MCNC: 0.56 MG/DL (ref 0–0.2)
BILIRUB SERPL-MCNC: 0.92 MG/DL (ref 0.2–1)
BUN SERPL-MCNC: 19 MG/DL (ref 5–25)
CALCIUM SERPL-MCNC: 8.6 MG/DL (ref 8.3–10.1)
CHLORIDE SERPL-SCNC: 113 MMOL/L (ref 100–108)
CO2 SERPL-SCNC: 24 MMOL/L (ref 21–32)
CREAT SERPL-MCNC: 0.42 MG/DL (ref 0.6–1.3)
EOSINOPHIL # BLD AUTO: 0.2 THOUSAND/ΜL (ref 0–0.61)
EOSINOPHIL NFR BLD AUTO: 2 % (ref 0–6)
ERYTHROCYTE [DISTWIDTH] IN BLOOD BY AUTOMATED COUNT: 13.9 % (ref 11.6–15.1)
GFR SERPL CREATININE-BSD FRML MDRD: 97 ML/MIN/1.73SQ M
GLUCOSE SERPL-MCNC: 86 MG/DL (ref 65–140)
HCT VFR BLD AUTO: 34.3 % (ref 34.8–46.1)
HGB BLD-MCNC: 10.9 G/DL (ref 11.5–15.4)
LIPASE SERPL-CCNC: 716 U/L (ref 73–393)
LYMPHOCYTES # BLD AUTO: 1.44 THOUSANDS/ΜL (ref 0.6–4.47)
LYMPHOCYTES NFR BLD AUTO: 11 % (ref 14–44)
MCH RBC QN AUTO: 30.3 PG (ref 26.8–34.3)
MCHC RBC AUTO-ENTMCNC: 31.8 G/DL (ref 31.4–37.4)
MCV RBC AUTO: 95 FL (ref 82–98)
MONOCYTES # BLD AUTO: 1.04 THOUSAND/ΜL (ref 0.17–1.22)
MONOCYTES NFR BLD AUTO: 8 % (ref 4–12)
NEUTROPHILS # BLD AUTO: 10.46 THOUSANDS/ΜL (ref 1.85–7.62)
NEUTS SEG NFR BLD AUTO: 79 % (ref 43–75)
NRBC BLD AUTO-RTO: 0 /100 WBCS
PLATELET # BLD AUTO: 195 THOUSANDS/UL (ref 149–390)
PMV BLD AUTO: 10.1 FL (ref 8.9–12.7)
POTASSIUM SERPL-SCNC: 3.9 MMOL/L (ref 3.5–5.3)
PROT SERPL-MCNC: 6.1 G/DL (ref 6.4–8.2)
RBC # BLD AUTO: 3.6 MILLION/UL (ref 3.81–5.12)
SODIUM SERPL-SCNC: 147 MMOL/L (ref 136–145)
WBC # BLD AUTO: 13.19 THOUSAND/UL (ref 4.31–10.16)

## 2018-01-09 PROCEDURE — 85025 COMPLETE CBC W/AUTO DIFF WBC: CPT | Performed by: PHYSICIAN ASSISTANT

## 2018-01-09 PROCEDURE — 87493 C DIFF AMPLIFIED PROBE: CPT | Performed by: PHYSICIAN ASSISTANT

## 2018-01-09 PROCEDURE — 80048 BASIC METABOLIC PNL TOTAL CA: CPT | Performed by: SURGERY

## 2018-01-09 PROCEDURE — 83690 ASSAY OF LIPASE: CPT | Performed by: SURGERY

## 2018-01-09 PROCEDURE — 80076 HEPATIC FUNCTION PANEL: CPT | Performed by: SURGERY

## 2018-01-09 RX ORDER — DEXTROSE AND SODIUM CHLORIDE 5; .45 G/100ML; G/100ML
100 INJECTION, SOLUTION INTRAVENOUS CONTINUOUS
Status: DISCONTINUED | OUTPATIENT
Start: 2018-01-09 | End: 2018-01-10

## 2018-01-09 RX ORDER — SODIUM PHOSPHATE,MONO-DIBASIC 19G-7G/118
500 ENEMA (ML) RECTAL DAILY
Status: DISCONTINUED | OUTPATIENT
Start: 2018-01-09 | End: 2018-01-11 | Stop reason: HOSPADM

## 2018-01-09 RX ORDER — ATORVASTATIN CALCIUM 20 MG/1
20 TABLET, FILM COATED ORAL
Status: DISCONTINUED | OUTPATIENT
Start: 2018-01-09 | End: 2018-01-11 | Stop reason: HOSPADM

## 2018-01-09 RX ADMIN — HEPARIN SODIUM 5000 UNITS: 5000 INJECTION, SOLUTION INTRAVENOUS; SUBCUTANEOUS at 11:44

## 2018-01-09 RX ADMIN — DEXTROSE AND SODIUM CHLORIDE 100 ML/HR: 5; .45 INJECTION, SOLUTION INTRAVENOUS at 06:38

## 2018-01-09 RX ADMIN — METOPROLOL TARTRATE 25 MG: 25 TABLET ORAL at 11:44

## 2018-01-09 RX ADMIN — HEPARIN SODIUM 5000 UNITS: 5000 INJECTION, SOLUTION INTRAVENOUS; SUBCUTANEOUS at 02:38

## 2018-01-09 RX ADMIN — METRONIDAZOLE 500 MG: 500 INJECTION, SOLUTION INTRAVENOUS at 22:02

## 2018-01-09 RX ADMIN — METRONIDAZOLE 500 MG: 500 INJECTION, SOLUTION INTRAVENOUS at 05:38

## 2018-01-09 RX ADMIN — Medication 2000 MG: at 07:50

## 2018-01-09 RX ADMIN — Medication 500 MG: at 11:45

## 2018-01-09 RX ADMIN — TAMOXIFEN CITRATE 20 MG: 10 TABLET, FILM COATED ORAL at 07:50

## 2018-01-09 RX ADMIN — CEFEPIME HYDROCHLORIDE 1000 MG: 1 INJECTION, SOLUTION INTRAVENOUS at 20:49

## 2018-01-09 RX ADMIN — METRONIDAZOLE 500 MG: 500 INJECTION, SOLUTION INTRAVENOUS at 11:44

## 2018-01-09 RX ADMIN — METOPROLOL TARTRATE 12.5 MG: 25 TABLET ORAL at 20:49

## 2018-01-09 RX ADMIN — ATORVASTATIN CALCIUM 20 MG: 20 TABLET, FILM COATED ORAL at 15:57

## 2018-01-09 RX ADMIN — DEXTROSE AND SODIUM CHLORIDE 100 ML/HR: 5; .45 INJECTION, SOLUTION INTRAVENOUS at 17:30

## 2018-01-09 RX ADMIN — ASPIRIN 81 MG 81 MG: 81 TABLET ORAL at 07:49

## 2018-01-09 RX ADMIN — HEPARIN SODIUM 5000 UNITS: 5000 INJECTION, SOLUTION INTRAVENOUS; SUBCUTANEOUS at 17:25

## 2018-01-09 NOTE — PROGRESS NOTES
Progress Note - Iban Moore [de-identified] y o  female MRN: [id number]    Unit/Bed#: [facility] 807-01 Encounter: [id number]    Subjective:     Patient seen and examined at bedside  She denies abdominal pain, nausea, and vomiting  She is hungry  She had a few episodes of diarrhea this morning  Objective:     Vitals: Blood pressure 150/65, pulse 83, temperature 98 1 °F (36 7 °C), temperature source Oral, resp  rate 16, height 5' 1" (1 549 m), weight 68 3 kg (150 lb 9 2 oz), SpO2 95 %  ,Body mass index is 28 45 kg/m²        Intake/Output Summary (Last 24 hours) at 01/09/18 1412  Last data filed at 01/09/18 1300   Gross per 24 hour   Intake              910 ml   Output              450 ml   Net              460 ml       Physical Exam:     General Appearance: Alert, appears stated age and cooperative  Lungs: Clear to auscultation bilaterally, no rales or rhonchi, no labored breathing/accessory muscle use  Heart: Regular rate and rhythm, S1, S2 normal, no murmur, click, rub or gallop  Abdomen: Soft, non-tender, non-distended; bowel sounds normal; no masses or no organomegaly  Extremities: No cyanosis, edema    Invasive Devices     Peripheral Intravenous Line            Peripheral IV 01/07/18 Left Antecubital 2 days    Peripheral IV 01/07/18 Right Hand 2 days                Lab Results:    Results from last 7 days  Lab Units 01/09/18  0522   WBC Thousand/uL 13 19*   HEMOGLOBIN g/dL 10 9*   HEMATOCRIT % 34 3*   PLATELETS Thousands/uL 195   NEUTROS PCT % 79*   LYMPHS PCT % 11*   MONOS PCT % 8   EOS PCT % 2       Results from last 7 days  Lab Units 01/09/18  0522   SODIUM mmol/L 147*   POTASSIUM mmol/L 3 9   CHLORIDE mmol/L 113*   CO2 mmol/L 24   BUN mg/dL 19   CREATININE mg/dL 0 42*   CALCIUM mg/dL 8 6   TOTAL PROTEIN g/dL 6 1*   BILIRUBIN TOTAL mg/dL 0 92   ALK PHOS U/L 228*   ALT U/L 136*   AST U/L 108*   GLUCOSE RANDOM mg/dL 86       Results from last 7 days  Lab Units 01/08/18  0544   INR  1 32*       Results from last 7 days  Lab Units 01/09/18  0522   LIPASE u/L 716*       Imaging Studies: I have personally reviewed pertinent imaging studies  Fl Ercp Biliary And Pancreatic    Result Date: 1/9/2018  Impression: Please see procedure report for further details  Workstation performed: [id number]     Pe Study With Ct Abdomen And Pelvis With Contrast    Result Date: 1/7/2018  Impression: Choledocholithiasis with intra-and extra hepatic biliary dilatation  Associated acute interstitial edematous pancreatitis without pancreatic necrosis or pancreatic collection  Anatomic variation of intrahepatic gallbladder  Cholelithiasis, gallbladder wall thickening with pericholecystic fluid and reactive enhancement within the surrounding hepatic parenchyma most consistent with acute cholecystitis  Postsurgical changes related to partial hepatic resection  Hepatic steatosis  No hepatic mass identified  Increasing right pleural effusion with overlying compressive atelectasis  No pulmonary embolus  Bubbles of gas within the urinary bladder possibly related to instrumentation  Correlation with history of instrumentation and urinary analysis is recommended  I personally discussed this study with Dr Karin Saez on 1/7/2018 8:00 AM  Workstation performed: [id number]       Assessment and Plan:     1) Acute gallstone pancreatitis and cholangitis: status post ERCP with sphincterotomy/sphincteroplasty, stone and pus extraction, and stent placement  WBC count and LFTs are downtrending  She is afebrile   She denies abdominal pain, N/V         -Continue antibiotics  -Advanced to low fat diet  -If tolerating diet, OK for discharge from GI standpoint  -Follow-up in the GI office 2 weeks post-discharge  -Plan for repeat ERCP with stent removal in 4-8 weeks     2) Acute cholecystitis: Identified on CT scan     -Patient is a poor candidate for cholecystectomy  -We will attempt ERCP with sphincterotomy to prevent further episodes of choledocholithiasis     3) Acute diarrhea: Likely antibiotics associated diarrhea     -If diarrhea persists, would check for C   Difficile

## 2018-01-09 NOTE — PROGRESS NOTES
01/09/18 1300   Clinical Encounter Type   Visited With Patient   Routine Visit Follow-up   Patient Spiritual Encounters   Spiritual Encounter Notes Contact precautions   PT declined visit

## 2018-01-09 NOTE — PROGRESS NOTES
Progress Note - General Surgery   Giuliana Jones [de-identified] y o  female MRN: [id number]  Unit/Bed#: [facility] 807-01 Encounter: [id number]    Assessment and Plan:  Patient Active Problem List   Diagnosis    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 ([address] )    Hypercholesteremia    Gastroesophageal reflux disease without esophagitis    History of total knee replacement    Hearing loss    Left bundle branch block (LBBB)    Symptomatic bradycardia    Complication associated with cardiac pacemaker lead    Pacemaker malfunction, initial encounter    Chest wall hematoma    Cholecystitis    Acute biliary pancreatitis    Cholangitis    Choledocholithiasis       Assessment:  78yoF with prior history of partial liver resection 7/2017  Presenting  with choledocholithiasis status post ERCP on 1/8/18    Plan:  Clear liquid diet and advance as tolerated   Change fluids to D5 1/2 [email or web address]   follow-up on a m  Labs/LFTs  Subcu heparin and Venodyne for DVT prophylaxis  Continue antibiotics -  Cefepime Vanco Flagyl  Repeat ERCP in 4 - 8 weeks for stent removal   Out of bed /ambulation   Possible DC home later today  If tolerating diet      Subjective:  ERCP yesterday  Showing Duodenitis  Successful ERCP with multiple stones and pus extracted  Patient feels well today  Denies any abdominal pain  States she had 1 bowel movement overnight  Is very hungry    Objective:       Vitals   Vitals:    01/08/18 1730 01/08/18 2000 01/08/18 2300 01/09/18 0300   BP: 131/59 143/70 133/62 142/64   Pulse: 89 89 83 88   Resp: 17 18 20 20   Temp: 97 8 °F (36 6 °C) 97 9 °F (36 6 °C) 97 9 °F (36 6 °C) 98 3 °F (36 8 °C)   TempSrc: Oral Oral Axillary Axillary   SpO2: 97% 98% 96% 98%   Weight:       Height:         Temp  Min: 97 6 °F (36 4 °C)  Max: 98 7 °F (37 1 °C)  IBW: 47 8 kg   Body mass index is 28 45 kg/m²  I/O   I/O       01/07 0701 - 01/08 0700 01/08 0701 - 01/09 0700    P  O   0    I V  (mL/kg) 2495 8 1654 2 (24 2)    IV Piggyback 400 400    Total Intake(mL/kg) 2895 8 2054 2 (30 1)    Urine (mL/kg/hr) 500 775 (0 5)    Stool  1 (0)    Total Output 500 776    Net +2395 8 +1278 2                Intake/Output Summary (Last 24 hours) at 01/09/18 0542  Last data filed at 01/09/18 0032   Gross per 24 hour   Intake          2554 17 ml   Output              776 ml   Net          1778 17 ml       Invasive  Devices  Invasive Devices     Peripheral Intravenous Line            Peripheral IV 01/07/18 Left Antecubital 2 days    Peripheral IV 01/07/18 Right Hand 1 day                Active medications  Scheduled Meds:  aspirin 81 mg Oral Daily   cefepime 2,000 mg Intravenous Q12H   heparin (porcine) 5,000 Units Subcutaneous Q8H [address]   metroNIDAZOLE 500 mg Intravenous Q8H   tamoxifen 20 mg Oral Daily     Continuous Infusions:    sodium chloride 200 mL/hr Last Rate: 200 mL/hr (01/08/18 1357)     PRN Meds:     HYDROmorphone 0 5 mg Q3H PRN   ondansetron 4 mg Q6H PRN   oxyCODONE 10 mg Q4H PRN   oxyCODONE 5 mg Q4H PRN       Physical Exam: /64   Pulse 88   Temp 98 3 °F (36 8 °C) (Axillary)   Resp 20   Ht 5' 1" (1 549 m)   Wt 68 3 kg (150 lb 9 2 oz)   LMP  (LMP Unknown) Comment: post   SpO2 98%   BMI 28 45 kg/m²     Physical Exam:  General Appearance: Alert, cooperative, no distress, appears stated age  Lungs: Clear to auscultation bilaterally, respirations unlablored   Heart: Regular rate and rhythm, S1 and S2 normal, no murmur, rub or gallop  Abdomen: Soft, non-tender, non distended, bowel sounds active in all four quadrants,   No masses or organomegaly    Laboratory and Diagnostics:    Results from last 7 days  Lab Units 01/08/18  0544 01/07/18 2052 01/07/18  0600   WBC Thousand/uL 14 19*  --  9 56   HEMOGLOBIN g/dL 10 2*  --  13 2   HEMATOCRIT % 32 2*  --  40 2   PLATELETS Thousands/uL 175 207 191   NEUTROS PCT % 82*  --   --    MONOS PCT % 9  --   --    MONO PCT MAN %  --   --  1*       Results from last 7 days  Lab Units 01/08/18  0544 01/08/18  0026 01/07/18  0600   SODIUM mmol/L 142 142 139   POTASSIUM mmol/L 3 9 3 7 4 5   CHLORIDE mmol/L 110* 108 103   CO2 mmol/L 25 27 21   BUN mg/dL 22 25 17   CREATININE mg/dL 0 59* 0 71 0 91   CALCIUM mg/dL 8 3 8 2* 9 0   TOTAL PROTEIN g/dL 5 5* 5 8* 6 9   BILIRUBIN TOTAL mg/dL 1 48* 1 98* 3 67*   ALK PHOS U/L 201* 202* 307*   ALT U/L 155* 165* 212*   AST U/L 153* 149* 176*   GLUCOSE RANDOM mg/dL 115 125 157*       Results from last 7 days  Lab Units 01/08/18  0544   MAGNESIUM mg/dL 2 0     Lab Results   Component Value Date    PHOS 2 2 (L) 01/08/2018    PHOS 3 3 11/09/2017    PHOS 1 8 (L) 07/15/2017        Results from last 7 days  Lab Units 01/08/18  0544 01/07/18  0600   INR  1 32* 1 27*   PTT seconds 30 28     No results found for: TROPONINT  ABG:  Lab Results   Component Value Date    PHART 7 371 07/14/2017    BGH8PWH 35 9 (L) 07/14/2017    PO2ART 120 6 07/14/2017    UKU7NMG 20 3 (L) 07/14/2017    BEART -4 3 07/14/2017    SOURCE Line, Arterial 07/14/2017       Blood Culture:   Lab Results   Component Value Date    BLOODCX No Growth at 24 hrs  01/07/2018    BLOODCX No Growth at 24 hrs  01/07/2018     Urine Culture:   Lab Results   Component Value Date    URINECX No Growth <1000 cfu/mL 04/03/2017     Sputum Culture: No components found for: SPUTUMCX    Imaging: I have personally reviewed pertinent reports  ERCP   IMPRESSIONS:  Duodenitis in the duodenal bulb and 2nd portion of the   duodenum  Successful ERCP with biliary cannulation/ sphincterotomy/   sphincteroplasty and stone extraction and finally stent placement  Multiple stones were removed and large amount of pus was seen draining   from the duct  RECOMMENDATIONS:  Continue antibiotics  clear liquid diet for today      Repeat ERCP in 4 - 8 weeks for stent removal     VTE Pharmacologic Prophylaxis: Heparin  VTE Mechanical Prophylaxis: sequential compression device

## 2018-01-09 NOTE — CASE MANAGEMENT
Thank you,  [address] in the [facility] by Brock Bailey for 2017  Network Utilization Review Department  Phone: [phone]; Fax [phone]  ATTENTION: The Network Utilization Review Department is now centralized for our 7 Facilities  Make a note that we have a new phone and fax numbers for our Department  Please call with any questions or concerns to [phone] and carefully follow the prompts so that you are directed to the right person  All voicemails are confidential  Fax any determinations, approvals, denials, and requests for initial or continue stay review clinical to [phone]  Due to HIGH CALL volume, it would be easier if you could please send faxed requests to expedite your requests and in part, help us provide discharge notifications faster      Continued Stay Review    Date: 1/9/18    Vital Signs: /65   Pulse 83   Temp 98 1 °F (36 7 °C) (Oral)   Resp 16   Ht 5' 1" (1 549 m)   Wt 68 3 kg (150 lb 9 2 oz)   LMP  (LMP Unknown) Comment: post   SpO2 95%   BMI 28 45 kg/m²     Medications:   Scheduled Meds:   aspirin 81 mg Oral Daily   atorvastatin 20 mg Oral Daily With Dinner   cefepime 2,000 mg Intravenous Q12H   glucosamine sulfate 500 mg Oral Daily   heparin (porcine) 5,000 Units Subcutaneous Q8H [address]   metoprolol tartrate 25 mg Oral Q12H [address]   metroNIDAZOLE 500 mg Intravenous Q8H   tamoxifen 20 mg Oral Daily     Continuous Infusions:   dextrose 5 % and sodium chloride 0 45 % 100 mL/hr Last Rate: 100 mL/hr (01/09/18 8906)     PRN Meds: HYDROmorphone    ondansetron    oxyCODONE    Abnormal Labs/Diagnostic Results:   Sodium                     1/9/18  147     Chloride 113     Creatinine 0 42       Total Bilirubin 0 92    Bilirubin, Direct 0 56     Alkaline Phosphatase 228               Total Protein 6 1     Albumin 2 1       WBC 13 19     RBC 3 60     Hemoglobin 10 9     Hematocrit 34 3     Neutrophils Relative 79 Lymphocytes Relative 11     Neutrophils Absolute 10 46       1/8 ERCP  IMPRESSIONS:    Duodenitis in the duodenal bulb and 2nd portion of the   duodenum  Successful ERCP with biliary cannulation/ sphincterotomy/   sphincteroplasty and stone extraction and finally stent placement  Multiple stones were removed and large amount of pus was seen draining   from the duct  RECOMMENDATIONS:  Continue antibiotics  clear liquid diet for today  Repeat ERCP in 4 - 8 weeks for stent removal     Age/Sex: [de-identified] y o  female     Assessment/Plan:   1/9 Surgical Oncology   Pancreatitis  Choledocholithiasis  Cholangitis  POD #1 ERCP sphincterotomy, sphincteroplasty, insertion CBD plastic stent     Plan:  1  Check am labs follow lipase, WBC, LFT's  2  ? Clears today  3  OOB and ambulating halls  4  PT/OT  5  Continue antibiotics  6  Incentive spirometry  _________________________________  1/9 General Surgery Progress Note  Patient Active Problem List   Diagnosis    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 ([address] )    Hypercholesteremia    Gastroesophageal reflux disease without esophagitis    History of total knee replacement    Hearing loss    Left bundle branch block (LBBB)    Symptomatic bradycardia    Complication associated with cardiac pacemaker lead    Pacemaker malfunction, initial encounter    Chest wall hematoma    Cholecystitis    Acute biliary pancreatitis    Cholangitis    Choledocholithiasis      Assessment:  78yoF with prior history of partial liver resection 7/2017  Presenting  with choledocholithiasis status post ERCP on 1/8/18     Plan:  Clear liquid diet and advance as tolerated   Change fluids to D5 1/2 [email or web address]   follow-up on a m   Labs/LFTs  Subcu heparin and Venodyne for DVT prophylaxis  Continue antibiotics -  Cefepime Vanco Flagyl  Repeat ERCP in 4 - 8 weeks for stent removal   Out of bed /ambulation   Possible DC home later today  If tolerating diet     Discharge Plan: Probably home w or w/o C

## 2018-01-09 NOTE — SOCIAL WORK
Pt is a 90 day unrelated readmission  Previously d/c 11/15 for recurrent syncope  Current admission for Choledocholithiasis  During previous admission, pt refused VNA upon d/c  Pt resides with her  in 2 story home, 5 ELGIN  reports being IPTA  PCP is Dr Amara Bearden  Pt has a cane, Rw, and shower chair at home  hx of VNA with [facility]  No hx STR  Preferred Pharmacy is [facility] on [address] [facility]  Pt denies any hx of mental health or substance abuse  CM reviewed d/c planning process including the following: identifying help at home, patient preference for d/c planning needs, Discharge Lounge, [facility] Meds to Bed program, availability of treatment team to discuss questions or concerns patient and/or family may have regarding understanding medications and recognizing signs and symptoms once discharged  CM also encouraged patient to follow up with all recommended appointments after discharge  Patient advised of importance for patient and family to participate in managing patients medical well being   to transport when stable for d/c

## 2018-01-09 NOTE — PROGRESS NOTES
Progress Note - Surgical Oncology  Sneha Lujan [de-identified] y o  female MRN: [id number]  Unit/Bed#: [facility] 807-01 Encounter: [id number]      Objective: Feels so much better today  No nausea, no emesis, no abdominal pain, no back pain  Still NPO after ERCP, sphincterotomy, sphinteroplasty, CBD stenting  Would like to get OOB and use the bathroom and not the bedpan  Thirsty  On Cefepime/Flagyl   775 UA  1 BM    Blood pressure 142/64, pulse 88, temperature 98 3 °F (36 8 °C), temperature source Axillary, resp  rate 20, height 5' 1" (1 549 m), weight 68 3 kg (150 lb 9 2 oz), SpO2 98 %  ,Body mass index is 28 45 kg/m²  Intake/Output Summary (Last 24 hours) at 01/09/18 0535  Last data filed at 01/09/18 0032   Gross per 24 hour   Intake          2554 17 ml   Output              776 ml   Net          1778 17 ml       Invasive Devices     Peripheral Intravenous Line            Peripheral IV 01/07/18 Left Antecubital 2 days    Peripheral IV 01/07/18 Right Hand 1 day                Physical Exam:   Abdomen: soft, non distended, non tender  Extremities: no calf tenderness    Lab, Imaging and other studies:  pending  VTE Pharmacologic Prophylaxis: Heparin  VTE Mechanical Prophylaxis: sequential compression device    Assessment:  Pancreatitis  Choledocholithiasis  Cholangitis  POD #1 ERCP sphincterotomy, sphincteroplasty, insertion CBD plastic stent    Plan:  1  Check am labs follow lipase, WBC, LFT's  2  ? Clears today  3  OOB and ambulating halls  4  PT/OT  5  Continue antibiotics  6   Incentive spirometry

## 2018-01-09 NOTE — CONSULTS
Consultation - Infectious Disease   Nancyann Goodpasture [de-identified] y o  female MRN: [id number]  Unit/Bed#: [facility] 807-01 Encounter: [id number]      Inpatient consult to Infectious Diseases  Consult performed by: Aylin Orozco  Consult ordered by: Kayleigh Velasquez          IMPRESSION & RECOMMENDATIONS:   Impression:  1  Choledocholithiasis with cholecystitis, and pancreatitis  cholangitis and acute biliary pancreatitis  2  Metastatic breast carcinoma S/P liver segment resection 7/17  3  CAD with sinus bradycardia and pauses S/P dual chamber pacemaker insertion  4  Diarrhea R/0 C difficile colitis    Recommendations:    Discuss with the primary service  1   Check results of cultures and C diff PCR  2  For now agree with discontinuing IV vancomycin, continuing metronidazole 500 mg q 8 hours IV and will decrease cefepime to 1 g q 12 hours IV      HISTORY OF PRESENT ILLNESS:    Reason for Consult:  Cholangitis  HPI: Nancyann Goodpasture is a [de-identified]y o  year old female who was transferred from [place] [address] to this facility on 1/7/18 with a history of 2 days of increasing abdominal epigastric pain  The patient has a history of right breast carcinoma with a mastectomy about 11 years ago with a recurrence and liver metastases approximately 2 years ago that was initially treated with chemotherapy  When this ceased to have a positive affect she underwent a liver segment 6 and 7 resection on 7/13/17  A CT scan of her abdomen revealed  intrahepatic biliary dilatation ended least for common bile duct stones  There was also gallbladder wall thickening with pericholecystic fluid with surrounding abnormal enhancement as well as pancreatic thickening with eugene pancreatic edema  This was felt to be compatible with choledochollithiasis, acute cholecystitis, and pancreatitis  Yesterday the patient underwent an ERCP with biliary cannulation/sphincteroplasty/sphincterotomy/and stone extraction with stent placement    She has been on cefepime 2 g q 12 hours IV, metronidazole 500 mg q 8 hours IV, and had been on vancomycin 1250 mg q 12 hours IV and until it was discontinued yesterday  Patient has an ampicillin allergy  Thus far her blood cultures are negative  She has been afebrile with a modest elevation of her LFTs and WBC's     Today she developed diarrhea x4 and a C diff assay was sent  The patient also has a history of syncopal episodes which a loop recorder documented bradycardia with significant pauses  On 10/31/17 she underwent a dual chamber pacemaker implantation  This became complicated by a lead dislodgement and subsequent hematoma requiring lead extraction and reimplantation on 11/9/17  She also developed left arm DVT  Since that time she had brief intermittent abdominal pain episodes which she self treated  Discontinued until the pain became much more intense and constant within 2 days prior to her current admission  Review of Systems   Constitutional: Positive for activity change, appetite change and fatigue  Gastrointestinal: Positive for abdominal pain, diarrhea and nausea  A [id number] point system-based review of systems is otherwise negative      PAST MEDICAL HISTORY:  Past Medical History:   Diagnosis Date    Anxiety     Arthritis     Breast CA [address] )     recurrent, ductal carcinoma ER, NE pos    Depression     Diverticula of intestine     Dizziness     and passes out    Flushing     Hiatal hernia     [place] (hard of hearing[place]      liz[place]    Hypercholesteremia     Liver mass     Liver metastasis (HCC)     Metastatic adenocarcinoma to liver (HCC)     Bx 01/03/2017    PONV (postoperative nausea and vomiting)     Recurrent breast cancer (HCC)     Shingles     Shortness of breath     on exertion    Tachycardia     Urinary incontinence     Use of cane as ambulatory aid     or walker     Past Surgical History:   Procedure Laterality Date    CARDIAC PACEMAKER REMOVAL N/A 11/9/2017    Procedure: PACEMAKER LEAD REVISION, REMOVAL OF NONFUNCTIONING LEAD, AND INSERTION OF A NEW RV LEAD;  Surgeon: Talon Hudson MD;  [facility]: [facility];  Service: Cardiology    CATARACT EXTRACTION Bilateral     COLONOSCOPY      ERCP N/A 2018    Procedure: ENDOSCOPIC RETROGRADE CHOLANGIOPANCREATOGRAPHY (ERCP); Surgeon: Mendez Rhodes MD;  Location: [facility]; Service: Gastroenterology    HYSTERECTOMY      JOINT REPLACEMENT      lizette  TKR and right TSR    MASTECTOMY Right     ID RESEC LIVER,PART LOBECTOMY N/A 2017    Procedure: LIVER RESECTION, INTRAOPERATIVE ULTRASOUND;  Surgeon: Lara Salomon MD;  [facility];  Service: Surgical Oncology    ROTATOR CUFF REPAIR Right     SENTINEL LYMPH NODE BIOPSY Right     TONSILECTOMY AND ADNOIDECTOMY      WOUND DEBRIDEMENT Left 2017    Procedure: DEBRIDEMENT WOUND AND DRESSING CHANGE [facility]); Surgeon: Talon Hudson MD;  [facility]: [facility];  Service: Cardiology       FAMILY HISTORY:  Non-contributory    SOCIAL HISTORY:  Social History   /Civil Union  History   Alcohol Use No     History   Drug Use No     History   Smoking Status    Never Smoker   Smokeless Tobacco    Never Used       ALLERGIES:  Allergies   Allergen Reactions    Ampicillin Shortness Of Breath and Anaphylaxis    Patient can take cephalosporins    MEDICATIONS:  All current active medications have been reviewed        PHYSICAL EXAM:  HR:  [76-99] 83  Resp:  [15-27] 16  BP: (116-160)/(53-75) 150/65  SpO2:  [95 %-100 %] 95 %  Temp (24hrs), Av °F (36 7 °C), Min:97 8 °F (36 6 °C), Max:98 3 °F (36 8 °C)  Current: Temperature: 98 1 °F (36 7 °C)    Intake/Output Summary (Last 24 hours) at 18 1353  Last data filed at 18 1300   Gross per 24 hour   Intake             1260 ml   Output              801 ml   Net              459 ml       General Appearance:  Appearing well, nontoxic, and in no distress, appears stated age   Head:  Normocephalic, without obvious abnormality, atraumatic   Eyes:  PERRL, conjunctiva pink and sclera anicteric, both eyes   Nose: Nares normal, mucosa normal, no drainage   Throat: Oropharynx moist without lesions; lips, mucosa, and tongue normal; teeth and gums normal   Neck: Supple, symmetrical, trachea midline, no adenopathy, no tenderness/mass/nodules   Back:   Symmetric, no curvature, ROM normal, no CVA tenderness   Lungs:   Clear to auscultation bilaterally, no audible wheezes, rhonchi and rales, respirations unlabored   Chest Wall:  S/P right mastectomy   Heart:  Regular rate and rhythm, left subclavian pacemaker, S1, S2 normal, no murmur, rub or gallop   Abdomen:   Soft, mild upper quadrant tenderness r, non-distended, positive bowel sounds, no masses, no organomegaly, right upper quadrant surgical scar    No CVA tenderness   Extremities: Surgical scars both knees   Skin: Multiple surgical scars as above   Neurologic: Alert and oriented times 3, extremity strength 5/5 and symmetric           Invasive Devices:   Peripheral IV 01/07/18 Right Hand (Active)   Site Assessment Clean;Dry; Intact 1/9/2018 10:00 AM   Dressing Type Transparent 1/9/2018 10:00 AM   Line Status Flushed;Saline locked 1/9/2018 10:00 AM   Dressing Status Clean;Dry; Intact 1/9/2018 10:00 AM   Dressing Intervention Other (Comment) 1/7/2018  9:00 PM   Dressing Change Due 01/11/17 1/7/2018  9:00 PM   Reason Not Rotated Not due 1/7/2018  9:00 PM       Peripheral IV 01/07/18 Left Antecubital (Active)   Site Assessment Clean;Dry; Intact 1/9/2018 10:00 AM   Dressing Type Transparent 1/9/2018 10:00 AM   Line Status Flushed;Saline locked 1/9/2018 10:00 AM   Dressing Status Clean;Dry; Intact 1/9/2018 10:00 AM   Dressing Change Due 01/11/17 1/7/2018  9:00 PM   Reason Not Rotated Not due 1/7/2018  4:13 PM       LABS, IMAGING, & OTHER STUDIES:  Lab Results:      I have personally reviewed pertinent labs        Results from last 7 days  Lab Units 01/09/18  0522 01/08/18  0544 01/07/18  1099 01/07/18  0600   WBC Thousand/uL 13 19* 14 19*  --  9 56   HEMOGLOBIN g/dL 10 9* 10 2*  --  13 2   PLATELETS Thousands/uL 195 175 207 191       Results from last 7 days  Lab Units 01/09/18  0522 01/08/18  0544 01/08/18  0026   SODIUM mmol/L 147* 142 142   POTASSIUM mmol/L 3 9 3 9 3 7   CHLORIDE mmol/L 113* 110* 108   CO2 mmol/L 24 25 27   ANION GAP mmol/L 10 7 7   BUN mg/dL 19 22 25   CREATININE mg/dL 0 42* 0 59* 0 71   EGFR ml/min/1 73sq m 97 87 81   GLUCOSE RANDOM mg/dL 86 115 125   CALCIUM mg/dL 8 6 8 3 8 2*   AST U/L 108* 153* 149*   ALT U/L 136* 155* 165*   ALK PHOS U/L 228* 201* 202*   TOTAL PROTEIN g/dL 6 1* 5 5* 5 8*   ALBUMIN g/dL 2 1* 2 0* 2 1*   BILIRUBIN TOTAL mg/dL 0 92 1 48* 1 98*       Results from last 7 days  Lab Units 01/07/18 2051 01/07/18 2050   BLOOD CULTURE  No Growth at 24 hrs  No Growth at 24 hrs  Imaging Studies:   I have personally reviewed pertinent imaging study reports and images in PACS  EKG, Pathology, and Other Studies:   I have personally reviewed pertinent reports

## 2018-01-10 LAB
ALBUMIN SERPL BCP-MCNC: 2.1 G/DL (ref 3.5–5)
ALP SERPL-CCNC: 228 U/L (ref 46–116)
ALT SERPL W P-5'-P-CCNC: 106 U/L (ref 12–78)
ANION GAP SERPL CALCULATED.3IONS-SCNC: 8 MMOL/L (ref 4–13)
AST SERPL W P-5'-P-CCNC: 54 U/L (ref 5–45)
BASOPHILS # BLD AUTO: 0.02 THOUSANDS/ΜL (ref 0–0.1)
BASOPHILS NFR BLD AUTO: 0 % (ref 0–1)
BILIRUB SERPL-MCNC: 0.6 MG/DL (ref 0.2–1)
BUN SERPL-MCNC: 11 MG/DL (ref 5–25)
C DIFF TOX GENS STL QL NAA+PROBE: NORMAL
CALCIUM SERPL-MCNC: 8.2 MG/DL (ref 8.3–10.1)
CHLORIDE SERPL-SCNC: 108 MMOL/L (ref 100–108)
CO2 SERPL-SCNC: 24 MMOL/L (ref 21–32)
CREAT SERPL-MCNC: 0.44 MG/DL (ref 0.6–1.3)
EOSINOPHIL # BLD AUTO: 0.25 THOUSAND/ΜL (ref 0–0.61)
EOSINOPHIL NFR BLD AUTO: 3 % (ref 0–6)
ERYTHROCYTE [DISTWIDTH] IN BLOOD BY AUTOMATED COUNT: 14 % (ref 11.6–15.1)
GFR SERPL CREATININE-BSD FRML MDRD: 96 ML/MIN/1.73SQ M
GLUCOSE SERPL-MCNC: 186 MG/DL (ref 65–140)
HCT VFR BLD AUTO: 34 % (ref 34.8–46.1)
HGB BLD-MCNC: 11.2 G/DL (ref 11.5–15.4)
LIPASE SERPL-CCNC: 370 U/L (ref 73–393)
LYMPHOCYTES # BLD AUTO: 1.52 THOUSANDS/ΜL (ref 0.6–4.47)
LYMPHOCYTES NFR BLD AUTO: 16 % (ref 14–44)
MCH RBC QN AUTO: 30.7 PG (ref 26.8–34.3)
MCHC RBC AUTO-ENTMCNC: 32.9 G/DL (ref 31.4–37.4)
MCV RBC AUTO: 93 FL (ref 82–98)
MONOCYTES # BLD AUTO: 0.94 THOUSAND/ΜL (ref 0.17–1.22)
MONOCYTES NFR BLD AUTO: 10 % (ref 4–12)
NEUTROPHILS # BLD AUTO: 6.76 THOUSANDS/ΜL (ref 1.85–7.62)
NEUTS SEG NFR BLD AUTO: 71 % (ref 43–75)
NRBC BLD AUTO-RTO: 0 /100 WBCS
PLATELET # BLD AUTO: 199 THOUSANDS/UL (ref 149–390)
PMV BLD AUTO: 10.3 FL (ref 8.9–12.7)
POTASSIUM SERPL-SCNC: 3.4 MMOL/L (ref 3.5–5.3)
PROT SERPL-MCNC: 5.9 G/DL (ref 6.4–8.2)
RBC # BLD AUTO: 3.65 MILLION/UL (ref 3.81–5.12)
SODIUM SERPL-SCNC: 140 MMOL/L (ref 136–145)
WBC # BLD AUTO: 9.53 THOUSAND/UL (ref 4.31–10.16)

## 2018-01-10 PROCEDURE — 83690 ASSAY OF LIPASE: CPT | Performed by: PHYSICIAN ASSISTANT

## 2018-01-10 PROCEDURE — 85025 COMPLETE CBC W/AUTO DIFF WBC: CPT | Performed by: PHYSICIAN ASSISTANT

## 2018-01-10 PROCEDURE — 80053 COMPREHEN METABOLIC PANEL: CPT | Performed by: PHYSICIAN ASSISTANT

## 2018-01-10 RX ADMIN — CEFEPIME HYDROCHLORIDE 1000 MG: 1 INJECTION, SOLUTION INTRAVENOUS at 08:13

## 2018-01-10 RX ADMIN — CEFEPIME HYDROCHLORIDE 1000 MG: 1 INJECTION, SOLUTION INTRAVENOUS at 21:16

## 2018-01-10 RX ADMIN — DEXTROSE AND SODIUM CHLORIDE 100 ML/HR: 5; .45 INJECTION, SOLUTION INTRAVENOUS at 03:00

## 2018-01-10 RX ADMIN — METRONIDAZOLE 500 MG: 500 INJECTION, SOLUTION INTRAVENOUS at 04:55

## 2018-01-10 RX ADMIN — METOPROLOL TARTRATE 12.5 MG: 25 TABLET ORAL at 21:16

## 2018-01-10 RX ADMIN — METOPROLOL TARTRATE 12.5 MG: 25 TABLET ORAL at 08:12

## 2018-01-10 RX ADMIN — ASPIRIN 81 MG 81 MG: 81 TABLET ORAL at 08:13

## 2018-01-10 RX ADMIN — HEPARIN SODIUM 5000 UNITS: 5000 INJECTION, SOLUTION INTRAVENOUS; SUBCUTANEOUS at 02:29

## 2018-01-10 RX ADMIN — HEPARIN SODIUM 5000 UNITS: 5000 INJECTION, SOLUTION INTRAVENOUS; SUBCUTANEOUS at 18:55

## 2018-01-10 RX ADMIN — ATORVASTATIN CALCIUM 20 MG: 20 TABLET, FILM COATED ORAL at 17:18

## 2018-01-10 RX ADMIN — METRONIDAZOLE 500 MG: 500 INJECTION, SOLUTION INTRAVENOUS at 13:22

## 2018-01-10 RX ADMIN — METRONIDAZOLE 500 MG: 500 INJECTION, SOLUTION INTRAVENOUS at 22:59

## 2018-01-10 RX ADMIN — TAMOXIFEN CITRATE 20 MG: 10 TABLET, FILM COATED ORAL at 08:12

## 2018-01-10 RX ADMIN — Medication 500 MG: at 08:12

## 2018-01-10 NOTE — PROGRESS NOTES
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 ([address] )    Hypercholesteremia    Gastroesophageal reflux disease without esophagitis    History of total knee replacement    Hearing loss    Left bundle branch block (LBBB)    Symptomatic bradycardia    Complication associated with cardiac pacemaker lead    Pacemaker malfunction, initial encounter    Chest wall hematoma    Cholecystitis    Acute biliary pancreatitis    Cholangitis    Choledocholithiasis       Assessment:  78yoF with prior history of partial liver resection 7/2017  Presenting  with choledocholithiasis status post ERCP on 1/8/18    Plan:  Regular diet  Discontinue IV fluids  Follow up on CDiff - unlikely as pt's diarrhea has resolved   Follow-up on a m  Labs/LFTs  Subcu heparin and Venodyne for DVT prophylaxis  Continue antibiotics -  Cefepime / Flagyl  Repeat ERCP in 4 - 8 weeks for stent removal  Out of bed /ambulation  Possible DC home later today if tolerating diet    Subjective: Patient feels well today  Had multiple liquid bowel movements yesterday, concerning for C diff however her diarrhea has now resolved  She denies any abdominal pain  Tolerating regular diet without nausea or vomiting    Objective:       Vitals   Vitals:    01/09/18 1500 01/09/18 1900 01/09/18 2300 01/10/18 0353   BP: 129/60 131/59 145/74 156/77   Pulse: 80 85 87 85   Resp: 20 20 22 18   Temp: 98 2 °F (36 8 °C) (!) 97 3 °F (36 3 °C) 98 6 °F (37 °C) 98 4 °F (36 9 °C)   TempSrc: Oral Oral Oral Oral   SpO2: 95% 96% 95% 96%   Weight:       Height:         Temp  Min: 97 3 °F (36 3 °C)  Max: 98 7 °F (37 1 °C)  IBW: 47 8 kg   Body mass index is 28 45 kg/m²  I/O   I/O       01/07 0701 - 01/08 0700 01/08 0701 - 01/09 0700    P  O   0    I V  (mL/kg) 2495 8 1654 2 (24 2) IV Piggyback 400 400    Total Intake(mL/kg) 2895 8 2054 2 (30 1)    Urine (mL/kg/hr) 500 775 (0 5)    Stool  1 (0)    Total Output 500 776    Net +2395 8 +1278 2                Intake/Output Summary (Last 24 hours) at 01/10/18 0616  Last data filed at 01/10/18 0604   Gross per 24 hour   Intake          3161 67 ml   Output              850 ml   Net          2311 67 ml       Invasive  Devices  Invasive Devices     Peripheral Intravenous Line            Peripheral IV 01/07/18 Left Antecubital 3 days    Peripheral IV 01/07/18 Right Hand 3 days                Active medications  Scheduled Meds:    aspirin 81 mg Oral Daily   atorvastatin 20 mg Oral Daily With Dinner   cefepime 1,000 mg Intravenous Q12H   glucosamine sulfate 500 mg Oral Daily   heparin (porcine) 5,000 Units Subcutaneous Q8H [address]   metoprolol tartrate 12 5 mg Oral Q12H ARMIN   metroNIDAZOLE 500 mg Intravenous Q8H   tamoxifen 20 mg Oral Daily     Continuous Infusions:     PRN Meds:     HYDROmorphone 0 5 mg Q6H PRN   ondansetron 4 mg Q6H PRN   oxyCODONE 5 mg Q4H PRN       Physical Exam: /77   Pulse 85   Temp 98 4 °F (36 9 °C) (Oral)   Resp 18   Ht 5' 1" (1 549 m)   Wt 68 3 kg (150 lb 9 2 oz)   LMP  (LMP Unknown) Comment: post   SpO2 96%   BMI 28 45 kg/m²     Physical Exam:  General Appearance: Alert, cooperative, no distress, appears stated age  Lungs: Clear to auscultation bilaterally, respirations unlablored   Heart: Regular rate and rhythm, S1 and S2 normal, no murmur, rub or gallop  Abdomen: Soft, non-tender, non distended, bowel sounds active in all four quadrants,   No masses or organomegaly    Laboratory and Diagnostics:    Results from last 7 days  Lab Units 01/09/18  0522 01/08/18  0544 01/07/18  2052 01/07/18  0600   WBC Thousand/uL 13 19* 14 19*  --  9 56   HEMOGLOBIN g/dL 10 9* 10 2*  --  13 2   HEMATOCRIT % 34 3* 32 2*  --  40 2   PLATELETS Thousands/uL 195 175 207 191   NEUTROS PCT % 79* 82*  --   --    MONOS PCT % 8 9  --   -- MONO PCT MAN %  --   --   --  1*       Results from last 7 days  Lab Units 01/09/18  0522 01/08/18  0544 01/08/18  0026   SODIUM mmol/L 147* 142 142   POTASSIUM mmol/L 3 9 3 9 3 7   CHLORIDE mmol/L 113* 110* 108   CO2 mmol/L 24 25 27   BUN mg/dL 19 22 25   CREATININE mg/dL 0 42* 0 59* 0 71   CALCIUM mg/dL 8 6 8 3 8 2*   TOTAL PROTEIN g/dL 6 1* 5 5* 5 8*   BILIRUBIN TOTAL mg/dL 0 92 1 48* 1 98*   ALK PHOS U/L 228* 201* 202*   ALT U/L 136* 155* 165*   AST U/L 108* 153* 149*   GLUCOSE RANDOM mg/dL 86 115 125       Results from last 7 days  Lab Units 01/08/18  0544   MAGNESIUM mg/dL 2 0     Lab Results   Component Value Date    PHOS 2 2 (L) 01/08/2018    PHOS 3 3 11/09/2017    PHOS 1 8 (L) 07/15/2017        Results from last 7 days  Lab Units 01/08/18  0544 01/07/18  0600   INR  1 32* 1 27*   PTT seconds 30 28     No results found for: TROPONINT  ABG:  Lab Results   Component Value Date    PHART 7 371 07/14/2017    RCQ3BAQ 35 9 (L) 07/14/2017    PO2ART 120 6 07/14/2017    IRQ5OSX 20 3 (L) 07/14/2017    BEART -4 3 07/14/2017    SOURCE Line, Arterial 07/14/2017       Blood Culture:   Lab Results   Component Value Date    BLOODCX No Growth at 48 hrs  01/07/2018    BLOODCX No Growth at 48 hrs  01/07/2018     Urine Culture:   Lab Results   Component Value Date    URINECX No Growth <1000 cfu/mL 04/03/2017     Sputum Culture: No components found for: SPUTUMCX    Imaging: I have personally reviewed pertinent reports  ERCP   IMPRESSIONS:  Duodenitis in the duodenal bulb and 2nd portion of the   duodenum  Successful ERCP with biliary cannulation/ sphincterotomy/   sphincteroplasty and stone extraction and finally stent placement  Multiple stones were removed and large amount of pus was seen draining   from the duct  RECOMMENDATIONS:  Continue antibiotics  clear liquid diet for today      Repeat ERCP in 4 - 8 weeks for stent removal     VTE Pharmacologic Prophylaxis: Heparin  VTE Mechanical Prophylaxis: sequential compression device

## 2018-01-10 NOTE — SOCIAL WORK
Cm reviewed patient during care coordination rounds  Per medical team, C dif results pending  Pt being switched to low fat diet  Patient anticipated for discharge home with  pending ATB determination  Cm continues to follow

## 2018-01-10 NOTE — OCCUPATIONAL THERAPY NOTE
OT CANCELLATION NOTE    ATTEMPTED TO SEE PT, HOWEVER PT REFUSED  SHE SAID SHE DID NOT HAVE A BRIEF ON AND IS INCONTINENT, THEREFORE DID NOT WANT TO GET UP  OFFERED TO GET HER A BRIEF, HOWEVER PT CONT TO REFUSE 2* FATIGUE AND POSSIBLE D/C TODAY  WILL CONT TO FOLLOW AS ABLE       Eugenie Thompson, OTR/L

## 2018-01-10 NOTE — PROGRESS NOTES
Progress Note - Infectious Disease   Juliet Davis [de-identified] y o  female MRN: [id number]  Unit/Bed#: [facility] 807-01 Encounter: [id number]      Impression:  1  Choledocholithiasis with cholecystitis, pancreatitis, and cholangitis with acute biliary pancreatitis  2  Metastatic breast carcinoma S/P liver segment resection   3  CAD with sinus bradycardia and pauses S/P dual-chamber pacemaker insertion  4  Non C diff diarrhea    Recommendations:  1  Check final culture results  2  Continue cefepime 1 g q 12 hours IV and metronidazole 500 mg q 8 hours IV until patient discharged by primary service  May be able to discharge on p o  metronidazole 500 mg q 8 hours and trimethoprim sulfa DS 1 tab q 12 hours for additional 72 hours if tolerated  Will try and discuss with primary service    Antibiotics:  1  Cefepime 1 g q 12 hours IV and metronidazole 500 mg q 8 hours IV    Subjective: The patient has no complaints and wants to go home  Denies fevers, chills, or sweats  Denies nausea, vomiting, or diarrhea  Objective:  Vitals:  HR:  [81-87] 81  Resp:  [18-22] 18  BP: (131-156)/(59-87) 139/87  SpO2:  [95 %-98 %] 98 %  Temp (24hrs), Av 1 °F (36 7 °C), Min:97 3 °F (36 3 °C), Max:98 6 °F (37 °C)  Current: Temperature: 98 °F (36 7 °C)    Physical Exam:     General Appearance:  Alert, nontoxic, no acute distress  Throat: Oropharynx moist without lesions  Lips, mucosa, and tongue normal   Neck: Supple, symmetrical, trachea midline, no adenopathy,  no tenderness/mass/nodules   Lungs:   Clear to auscultation bilaterally, no audible wheezes, rhonchi or rales; respirations unlabored   Heart:  Regular rate and rhythm, S1, S2 normal, no murmur, rub or gallop   Abdomen:   Soft, non-tender, non-distended, positive bowel sounds    No masses, no organomegaly, right upper quadrant surgical scar    No CVA tenderness   Extremities: Surgical scars both knees   Skin: Surgical scars, status post right mastectomy,         Invasive Devices     Peripheral Intravenous Line            Peripheral IV 01/07/18 Left Antecubital 3 days    Peripheral IV 01/07/18 Right Hand 3 days                Labs, Imaging, & Other studies:   All pertinent labs were personally reviewed    Results from last 7 days  Lab Units 01/10/18  0503 01/09/18  0522 01/08/18  0544   WBC Thousand/uL 9 53 13 19* 14 19*   HEMOGLOBIN g/dL 11 2* 10 9* 10 2*   PLATELETS Thousands/uL 199 195 175       Results from last 7 days  Lab Units 01/10/18  0503 01/09/18  0522 01/08/18  0544   SODIUM mmol/L 140 147* 142   POTASSIUM mmol/L 3 4* 3 9 3 9   CHLORIDE mmol/L 108 113* 110*   CO2 mmol/L 24 24 25   ANION GAP mmol/L 8 10 7   BUN mg/dL 11 19 22   CREATININE mg/dL 0 44* 0 42* 0 59*   EGFR ml/min/1 73sq m 96 97 87   GLUCOSE RANDOM mg/dL 186* 86 115   CALCIUM mg/dL 8 2* 8 6 8 3   AST U/L 54* 108* 153*   ALT U/L 106* 136* 155*   ALK PHOS U/L 228* 228* 201*   TOTAL PROTEIN g/dL 5 9* 6 1* 5 5*   ALBUMIN g/dL 2 1* 2 1* 2 0*   BILIRUBIN TOTAL mg/dL 0 60 0 92 1 48*       Results from last 7 days  Lab Units 01/09/18  1357 01/07/18 2051 01/07/18 2050   BLOOD CULTURE   --  No Growth at 48 hrs  No Growth at 48 hrs     C DIFF TOXIN B  NEGATIVE for C difficle toxin by PCR    --   --

## 2018-01-11 ENCOUNTER — GENERIC CONVERSION - ENCOUNTER (OUTPATIENT)
Dept: OTHER | Facility: OTHER | Age: 78
End: 2018-01-11

## 2018-01-11 VITALS
HEART RATE: 98 BPM | DIASTOLIC BLOOD PRESSURE: 65 MMHG | WEIGHT: 150.57 LBS | SYSTOLIC BLOOD PRESSURE: 149 MMHG | HEIGHT: 61 IN | BODY MASS INDEX: 28.43 KG/M2 | OXYGEN SATURATION: 98 % | RESPIRATION RATE: 18 BRPM | TEMPERATURE: 97.4 F

## 2018-01-11 RX ORDER — SULFAMETHOXAZOLE AND TRIMETHOPRIM 800; 160 MG/1; MG/1
1 TABLET ORAL EVERY 12 HOURS SCHEDULED
Status: DISCONTINUED | OUTPATIENT
Start: 2018-01-11 | End: 2018-01-11 | Stop reason: HOSPADM

## 2018-01-11 RX ORDER — METRONIDAZOLE 500 MG/1
500 TABLET ORAL EVERY 8 HOURS SCHEDULED
Status: DISCONTINUED | OUTPATIENT
Start: 2018-01-11 | End: 2018-01-11 | Stop reason: HOSPADM

## 2018-01-11 RX ORDER — SULFAMETHOXAZOLE AND TRIMETHOPRIM 800; 160 MG/1; MG/1
1 TABLET ORAL EVERY 12 HOURS SCHEDULED
Qty: 5 TABLET | Refills: 0 | Status: SHIPPED | OUTPATIENT
Start: 2018-01-11 | End: 2018-01-14

## 2018-01-11 RX ORDER — METRONIDAZOLE 500 MG/1
500 TABLET ORAL EVERY 8 HOURS SCHEDULED
Qty: 9 TABLET | Refills: 0 | Status: SHIPPED | OUTPATIENT
Start: 2018-01-11 | End: 2018-01-14

## 2018-01-11 RX ADMIN — METOPROLOL TARTRATE 12.5 MG: 25 TABLET ORAL at 09:05

## 2018-01-11 RX ADMIN — HEPARIN SODIUM 5000 UNITS: 5000 INJECTION, SOLUTION INTRAVENOUS; SUBCUTANEOUS at 05:45

## 2018-01-11 RX ADMIN — METRONIDAZOLE 500 MG: 500 INJECTION, SOLUTION INTRAVENOUS at 05:45

## 2018-01-11 RX ADMIN — TAMOXIFEN CITRATE 20 MG: 10 TABLET, FILM COATED ORAL at 09:05

## 2018-01-11 RX ADMIN — SULFAMETHOXAZOLE AND TRIMETHOPRIM 1 TABLET: 800; 160 TABLET ORAL at 09:05

## 2018-01-11 RX ADMIN — ASPIRIN 81 MG 81 MG: 81 TABLET ORAL at 09:05

## 2018-01-11 RX ADMIN — Medication 500 MG: at 09:06

## 2018-01-11 NOTE — MISCELLANEOUS
Message   Recorded as Task   Date: 03/15/2016 11:53 AM, Created By: Rolando Schuster   Task Name: Call Back   Assigned To: Tamar Bansal   Regarding Patient: Sergio Eid, Status: Active   Comment:    Catherine Hope - 15 Mar 2016 11:53 AM     TASK CREATED  Caller: Star, Other; Other; [phone] called from [place] asking on clarification for different pt's scripts  [phone] Ext [phone]   Confirmed with Diplomat that patient is getting faslodex concurrently with Ibrance       Active Problems    1  Abnormal PET scan of head (793 0) (R94 02)   2  Acute bronchitis (466 0) (J20 9)   3  Adenocarcinoma of breast (174 9) (C50 919)   4  Flushing (782 62) (R23 2)   5  Liver mass, right lobe (573 9) (R16 0)   6  Recurrent breast cancer, right (174 9) (C50 911)   7  Screening mammogram for high-risk patient (V76 11) (Z12 31)    Current Meds   1  Atorvastatin Calcium 20 MG Oral Tablet; TABS PO X 30; Therapy: 19TPS7640 to (Last Rx:61Hrf2323)  Requested for: 91BOM9092 Ordered   2  Azithromycin 250 MG Oral Tablet (Zithromax Z-Les); TAKE 2 TABLETS ON DAY 1 THEN   TAKE 1 TABLET A DAY FOR 4 DAYS; Therapy: 31Piv4934 to (Last Rx:06Vdl1251)  Requested for: 91Zeg7459 Ordered   3  Benzonatate 200 MG Oral Capsule; TAKE 1 CAPSULE 3 TIMES DAILY AS NEEDED; Therapy: 69Uqe0011 to (Evaluate:85Lbz8628)  Requested for: 31Hxt1388; Last   Rx:63Klg5741 Ordered   4  Calcium 600 MG Oral Tablet; twice daily; Therapy: 98JQB4990 to (Last Rx:12Mar2012)  Requested for: 30TSN9534 Ordered   5  Dulcolax TBEC; Therapy: (Recorded:09Oct2012) to Recorded   6  Fiber CAPS; Therapy: (Recorded:09Oct2012) to Recorded   7  Glucosamine 1500 Complex Oral Capsule; twice daily; Therapy: 01VBO5763 to (Last Rx:12Mar2012)  Requested for: 13IPM4661 Ordered   8  Multivitamins Oral Capsule; Daily; Therapy: 94WYG7082 to (Last Rx:12Mar2012)  Requested for: 23ECL9324 Ordered    Allergies    1   Ampicillin CAPS    Signatures   Electronically signed by : Digna Rivera, ; Mar 15 2016 12:50PM EST                       (Author)

## 2018-01-11 NOTE — SOCIAL WORK
Pt is being d/c today  CM met with ptEdison to address a complaint regarding Case Management  Pt states that she is unsure of this person's name or what they were coming to address, but that she was only spoken to from her door, of which she believes to be because of the Cdiff precaution  Pt states that her frustration, however, stems from her  receiving a call by "someone" regarding pt's inability to sign "some kind of consent" due to confusion, of which is not the case  CM informed pt that we would try to find out where the issue was in order to prevent this from happening in the future, as it appears it must have been from another administrative department  CM informed CM Manager and provided pt with CM contact information  Pt expressed her appreciation of Case Management efforts to resolve her complaints, and reiterated that she loves [facility]'s and her nurses and doctors have all been wonderful

## 2018-01-11 NOTE — PROGRESS NOTES
Progress Note - Surgical Oncology  Jen Temple [de-identified] y o  female MRN: [id number]  Unit/Bed#: [facility] 807-01 Encounter: [id number]      Objective: Doing well, anxious to get home, no nausea, no vomiting, tolerating a low fat diet  Ambulating, urinating well, only one bowel movement yesterday  No abdominal pain, no back pain  C-diff negative  1850 UA  1 BM      Blood pressure 142/77, pulse 77, temperature 97 7 °F (36 5 °C), temperature source Oral, resp  rate 18, height 5' 1" (1 549 m), weight 68 3 kg (150 lb 9 2 oz), SpO2 95 %  ,Body mass index is 28 45 kg/m²        Intake/Output Summary (Last 24 hours) at 01/11/18 7801  Last data filed at 01/11/18 0548   Gross per 24 hour   Intake              290 ml   Output             2050 ml   Net            -1760 ml       Invasive Devices     Peripheral Intravenous Line            Peripheral IV 01/07/18 Left Antecubital 4 days    Peripheral IV 01/07/18 Right Hand 4 days                Physical Exam:  Abdomen: soft, non tender, non distended  Extremities: no calf tenderness    Lab, Imaging and other studies:  pending  VTE Pharmacologic Prophylaxis: Heparin  VTE Mechanical Prophylaxis: sequential compression device    Assessment:  Pancreatitis  Choledocholithiasis  Cholangitis  POD # 3 ERCP sphincterotomy, sphincteroplasty, insertion CBD plastic stent    Plan:  Home today on Bactrim DS BID for 3 days/Flagyl 500 mg TID x 3 days

## 2018-01-11 NOTE — CASE MANAGEMENT
Notification of Discharge  This is a Notification of Discharge from our facility [address]  Please be advised that this patient has been discharge from our facility  Below you will find the admission and discharge date and time including the patients disposition  PRESENTATION DATE: 1/7/2018  4:03 PM  IP ADMISSION DATE: 1/7/18 1603  DISCHARGE DATE: 1/11/2018 10:43 AM  DISPOSITION: Home/Self Care    [address] [facility] in the [facility] by [address] for 2017  Network Utilization Review Department  Phone: [phone]; Fax [phone]  ATTENTION: The Network Utilization Review Department is now centralized for our 7 Facilities  Make a note that we have a new phone and fax numbers for our Department  Please call with any questions or concerns to [phone] and carefully follow the prompts so that you are directed to the right person  All voicemails are confidential  Fax any determinations, approvals, denials, and requests for initial or continue stay review clinical to [phone]  Due to HIGH CALL volume, it would be easier if you could please send faxed requests to expedite your requests and in part, help us provide discharge notifications faster

## 2018-01-11 NOTE — PROCEDURES
Procedures by Mariela Palmer MD at 1/3/2017   9:37 AM      Author:  Mariela Palmer MD Service:  Interventional Radiology  Author Type:  Physician     Filed:  1/3/2017  9:38 AM Date of Service:  1/3/2017  9:37 AM Status:  Signed     :  Mariela Palmer MD (Physician)            Procedures    INTERVENTIONAL RADIOLOGY PROCEDURE NOTE    Date: 01/03/17    Procedures:     1  CT guided right liver mass biopsy    Preoperative diagnosis: Breast CA, enlarging FDG avid liver lesion right lobe  Postoperative diagnosis: Same    Surgeon: Mariela Palmer MD     Assistant: None  No qualified resident was available  Blood loss: Minimal    Specimens: 5 x 18 gauge core biopsy samples  Findings:  Lesional cells present  Complications: None    Anesthesia: IV moderate conscious sedation    Plan:  Bed rest 4 hours, right side down  If patient remains stable anticipate DC this afternoon                  Received for:Provider  EPIC   Jorden  3 2017  9:38AM [facility] Standard Time

## 2018-01-11 NOTE — MISCELLANEOUS
Message   Recorded as Task   Date: 08/11/2016 12:31 PM, Created By: Michoacano Fitzpatrick   Task Name: Call Back   Assigned To: Tamar Bansal   Regarding Patient: Cristal Brambila, Status: Active   CommentEleazar Cruz - 11 Aug 2016 12:31 PM     TASK CREATED  Caller: Self; Results Inquiry; [phone] (Home)  Patient requestng Seth Jobs results in allscripts  She has appt next Tuesday and she said she doesn't want to worry over the weekend  #[phone]   PET CT results reviewed with patients      Active Problems    1  Abnormal PET scan of head (793 0) (R94 02)   2  Acute bronchitis (466 0) (J20 9)   3  Adenocarcinoma of breast (174 9) (C50 919)   4  Flushing (782 62) (R23 2)   5  Liver mass, right lobe (573 9) (R16 0)   6  Liver metastases (197 7) (C78 7)   7  Recurrent breast cancer, right (174 9) (C50 911)   8  Screening mammogram for high-risk patient (V76 11) (Z12 31)    Current Meds   1  Atorvastatin Calcium 20 MG Oral Tablet; TABS PO X 30; Therapy: 49MSB6512 to (Last Rx:59Fhb2596)  Requested for: 96KNC7867 Ordered   2  Calcium 600 MG Oral Tablet; twice daily; Therapy: 25KAY7832 to (Last Rx:12Mar2012)  Requested for: 59TGU4903 Ordered   3  Dulcolax TBEC; Therapy: (Recorded:09Oct2012) to Recorded   4  Fiber CAPS; Therapy: (Recorded:09Oct2012) to Recorded   5  Glucosamine 1500 Complex Oral Capsule; twice daily; Therapy: 78XPH8190 to (Last Rx:12Mar2012)  Requested for: 78QJL8578 Ordered   6  Ibrance 75 MG Oral Capsule; One tab PO day 1-21 every 28 days; Therapy: 71ZIL1629 to (Last Rx:43Htp3134)  Requested for: 35WBF6329 Ordered   7  Multivitamins Oral Capsule; Daily; Therapy: 65AFN1996 to (Last Rx:12Mar2012)  Requested for: 72CNI6352 Ordered    Allergies    1   Ampicillin CAPS    Signatures   Electronically signed by : Yoanna Sanders, ; Aug 11 2016  1:48PM EST                       (Author)

## 2018-01-11 NOTE — MISCELLANEOUS
Message   Recorded as Task   Date: 11/22/2016 10:25 AM, Created By: Hill Amin   Task Name: Medical Complaint Callback   Assigned To: Tamar Bansal   Regarding Patient: Gloria Feliz, Status: Active   CommentNicolette Rower - 22 Nov 2016 10:25 AM     TASK CREATED  Caller: Self; [phone] (Home)  Pt is to have Faslodex tomorrow and start her Cheyanne Mckusick but she was diagnosed w/shingles and needs to know if her chemo tx's should start tomorrow? Patient diagnosed with shingles, UTI and has diarrhea  Treatment will be delayed x 2 week  Patient will call me back next week with an update to make sure she is ok to start Ibrance and faslodex next week      Active Problems    1  Abnormal PET scan of head (793 0) (R94 02)   2  Acute bronchitis (466 0) (J20 9)   3  Adenocarcinoma of breast (174 9) (C50 919)   4  Flushing (782 62) (R23 2)   5  Liver mass, right lobe (573 9) (R16 0)   6  Liver metastases (197 7) (C78 7)   7  Recurrent breast cancer, right (174 9) (C50 911)   8  Screening mammogram for high-risk patient (V76 11) (Z12 31)    Current Meds   1  Atorvastatin Calcium 20 MG Oral Tablet; TABS PO X 30; Therapy: 07MGX0939 to (Last Rx:58Lyb8466)  Requested for: 46MAJ4420 Ordered   2  [place] 7 5 MG Oral Capsule; Take one capsule daily; Therapy: 16QJM2626 to (Last [facility])  Requested for: 69BTV6335 Ordered   3  Calcium 600 MG Oral Tablet; twice daily; Therapy: 41GWK7009 to (Last Rx:12Mar2012)  Requested for: 46KNW0052 Ordered   4  Dulcolax TBEC; Therapy: (Recorded:09Oct2012) to Recorded   5  Fiber CAPS; Therapy: (Recorded:09Oct2012) to Recorded   6  Glucosamine 1500 Complex Oral Capsule; twice daily; Therapy: 76NAN8018 to (Last Rx:12Mar2012)  Requested for: 70OXT4443 Ordered   7  Ibrance 75 MG Oral Capsule; One tab PO day 1-21 every 28 days; Therapy: 60AHW6013 to (Last Rx:27Ppt0923)  Requested for: 08Pxv8652 Ordered   8  Multivitamins Oral Capsule; Daily;    Therapy: 61HUN8645 to (Last PC:73HTM2806)  Requested for: 55PGA7224 Ordered    Allergies    1   Ampicillin CAPS    Signatures   Electronically signed by : Machelle Hernandez, ; Nov 22 2016 11:27AM EST                       (Author)

## 2018-01-11 NOTE — DISCHARGE SUMMARY
Discharge Summary - Surgical Oncology   Geovanni Anderson [de-identified] y o  female MRN: [id number]  [facility] Encounter: [id number]    Admission Date: 1/7/2018     Admitting Diagnosis: Acute cholecystitis [K81 0], Pancreatitis    HPI: Geovanni Anderson is a [de-identified] y o  female who presents with abdominal pain in her epigastrium for 2 days  She also has extreme thirst and feels dehydrated  She is passing flatus was not had a bowel movement recently  She denies measurable fevers, however she did have shaking chills at home prior to coming to the hospital   Her  called 911 this morning when she was bending over and pain, and she was seen in the [facility]   There she was found to have hyperbilirubinemia, elevated lipase, transaminitis, and bandemia  She was transferred here for higher level care  Of note, she has a history of  Metastatic breast cancer, and is status post  Liver segment 6 7 resection on 07/13/2017 for metastatic breast cancer  She also recently had a pacemaker placed  She is followed by Cardiology as an outpatient, as well as hematology oncology  She is on tamoxifen for breast cancer  Patient is well known to our service  Procedures Performed: 1/8/18 ERCP sphincterotomy, sphincteroplasty, insertion plastic common bile duct stent - Dr Ilana Lee: Patient was admitted and initially was kept in the intensive care unit due to her pancreatitis  Patient was then transferred to a regular floor  Her blood cultures remained no growth throughout the admission  Her lipase levels went from 5202 to 370 by discharge  Her total bilirubins were 1 98 to 0 60  Patient underwent an ERCP and had five common bile duct stones removed along with a sphincterotomy, sphincteroplasty, insertion of a plastic common bile duct stent which will be removed by Dr Yany Jiang in 4-8 weeks  Patient was then able to be placed on a low fat diet which she is tolerating   She has no abdominal pain, no chills, no fevers  Patient was placed on antibiotics and infectious disease was consulted for home antibiotics recommendations  Patient will be discharged home on Bactrim DS BID x 3 more days along with Flagyl 500 mg PO TID x 3 more days  Patient was on Cefepime/Flagyl during the hospital stay  Patient will follow with Dr Regi Thomson on 1/23/18 at 8:15 at the [address] [place] [address] office  She will also follow with Dr Pedro Rodriguez for the CBD stent removal     Complications: None    Discharge Diagnosis: Choleduocholithiasis    Discharge Date: 1/11/18    Condition at Discharge: Good     Discharge instructions/Information to patient and family:   See after visit summary for information provided to patient and family  Provisions for Follow-Up Care:  See after visit summary for information related to follow-up care and any pertinent home health orders  Disposition: Home    Planned Readmission: No    Discharge Statement   I spent 1 hour discharging the patient  This time was spent on the day of discharge  I had direct contact with the patient on the day of discharge  Additional documentation is required if more than 30 minutes were spent on discharge  Discharge Medications:  See after visit summary for reconciled discharge medications provided to patient and family

## 2018-01-12 VITALS
RESPIRATION RATE: 16 BRPM | DIASTOLIC BLOOD PRESSURE: 80 MMHG | SYSTOLIC BLOOD PRESSURE: 138 MMHG | HEART RATE: 88 BPM | HEIGHT: 61 IN | WEIGHT: 150 LBS | BODY MASS INDEX: 28.32 KG/M2

## 2018-01-12 VITALS
HEART RATE: 93 BPM | BODY MASS INDEX: 31.34 KG/M2 | TEMPERATURE: 97 F | HEIGHT: 61 IN | RESPIRATION RATE: 20 BRPM | OXYGEN SATURATION: 95 % | DIASTOLIC BLOOD PRESSURE: 70 MMHG | SYSTOLIC BLOOD PRESSURE: 134 MMHG | WEIGHT: 166 LBS

## 2018-01-12 LAB
BACTERIA BLD CULT: NORMAL
BACTERIA BLD CULT: NORMAL

## 2018-01-12 NOTE — PROGRESS NOTES
Preliminary Nursing Report                Patient Information    Initial Encounter Entry Date:   2017 1:47 PM EST (Automated Transmission Automated Transmission)       Last Modified:   {Jak Crow}              Legal Name: Catherine Otto Number:        YOB: 1940        Age (years): 76        Gender: F        Body Mass Index (BMI): 31 kg/m2        Height: 60 in  Weight: 157 lbs (71 kgs)           Address:   [address] [place] [id number] [place]              Phone: [phone]   (consent to leave messages)        Email:        Ethnicity: Decline to State        Roman Catholic:        Marital Status:        Preferred Language: English        Race: Other Race                    Patient Insurance Information        Primary Insurance Information Carrier Name: {Primary  CarrierName}           Carrier Address:   {Primary  CarrierAddress}              Carrier Phone: {Primary  CarrierPhone}          Group Number: {Primary  GroupNumber}          Policy Number: {Primary  PolicyNumber}          Insured Name: {Primary  InsuredName}          Insured : {Primary  InsuredDOB}          Relationship to Insured: {Primary  RelationshiptoInsured}           Secondary Insurance Information Carrier Name: {Secondary  CarrierName}           Carrier Address:   {Secondary  CarrierAddress}              Carrier Phone: {Secondary  CarrierPhone}          Group Number: {Secondary  GroupNumber}          Policy Number: {Secondary  PolicyNumber}          Insured Name: {Secondary  InsuredName}          Insured : {Secondary  InsuredDOB}          Relationship to Insured: {Secondary  RelationshiptoInsured}                       Health Profile   Booking #:   Andi Benavides #: [id number]               DOS: 2017    Surgery : PARTIAL REMOVAL OF LIVER    Add'l Procedures/Notes:     Surgery Risk: Major          Precautions          Allergies    Ampicillin CAPS       Clinical Comments: Reaction Type: , Reaction: , Severity:              Medications    Atorvastatin Calcium 20 MG Oral Tablet       Calcium 600 MG Oral Tablet       Diphenoxylate-Atropine 2 5-0 025 MG Oral Tablet       Dulcolax TBEC       Fiber CAPS       Glucosamine 1500 Complex Oral Capsule       Ibrance 75 MG Oral Capsule       Multivitamins Oral Capsule       Ondansetron HCl - 4 MG Oral Tablet       TheraCran CAPS               Conditions    Abnormal PET scan of head       Acute bronchitis       Adenocarcinoma of breast       Admission for antineoplastic chemotherapy       Diarrhea       Flushing       Liver mass, right lobe       Liver metastases       Recurrent breast cancer, right       Screening mammogram for high-risk patient               Family History    None             Surgical History    None             Social History    Being A Social Drinker       Never A Smoker                               Patient Instructions       Medical Procedure Risk  NPO Instructions   The day before surgery it is recommended to have a light dinner at your usual time and you are allowed a light snack early in the evening  Do not eat anything heavy or eat a big meal after 7pm  Do not eat or drink anything after midnight prior to your surgery  If you are supposed to take any of your medications, do so with a sip of water  Failure to follow these instructions can lead to an increased risk of lung complications and may result in a delay or cancellation of your procedure  If you have any questions, contact your institution for further instructions  No candy, no gum, no mints, no chewing tobacco          Glucosamine 1500 Complex Oral Capsule  Medication Instructions (Herbal Medications) 39, 40  Please stop the following medications one week prior to surgery  Atorvastatin Calcium 20 MG Oral Tablet  Medication Instruction (Cholesterol Medication) 81, 82  Please continue to take this medication on your normal schedule   If this is an oral medication and you take in the morning, you may do so with a sip of water  Testing Considerations       ? Chest X-ray (CXR) t  Consider a Chest X-Ray (CXR) if patient is having respiratory symptoms  Triggered by: Age or Facility Rec         ? Coagulation Tests (PT/PTT/INR) t  Triggered by: Medical Procedure Risk         ? Complete Blood Count (CBC) t, client, client  If test was completed and normal within last six months, repeat test is not necessary  Triggered by: Liver metastases, Age or Facility Rec         ? Comprehensive Metabolic Panel (CMP) t  If test was completed and normal within last six months, repeat test is not necessary  Triggered by: Liver metastases         ? Electrocardiogram (ECG) t  Patient does not need new test if normal ECG is present within the last six months and no change in clinical condition  Triggered by: Age or Facility Rec         ? Type and Screen client  Type and Screen - Blood: If there is anticipated or possible large blood loss with this procedure, then a Type and Screen for Blood should be ordered  Triggered by: Age or Facility Rec               Consultations       ? Primary Care Physician Evaluation   Primary care physician may need to evaluate patient prior to surgery  This is likely NOT necessary if the listed conditions are chronic and stable  Triggered by: Recurrent breast cancer, right, Adenocarcinoma of breast, Diarrhea               Miscellaneous Questions         Question: Are you able to walk up a flight of stairs, walk up a hill or do heavy housework WITHOUT having chest pain or shortness of breath? Answer: YES                   Allergies/Conditions/Medications Not Found        The following were not recognized by our system when generating the recommendations  Please consider if this would impact any preoperative protocols  ? Being A Social Drinker       ? Never A Smoker       ? Dulcolax TBEC       ? Fiber CAPS       ? Ibrance 75 MG Oral Capsule       ? [facility]                  Appointment Information         Date:    07/13/2017        Location:    [facility]        Address:           Directions:                      Footnotes revision 14      ?? Denotes a free-text entry  Legal Disclaimer: Any and all recommendations and services provided herein are designed to assist in the preoperative care of the patient  Nothing contained herein is designed to replace, eliminate or alleviate the responsibility of the attending physician to supervise and determine the patient?s preoperative care and course of treatment  Failure to provide complete, accurate information may negatively impact the system?s ability to recommend the proper preoperative protocol  THE ATTENDING PHYSICIAN IS RESPONSIBLE TO REVIEW THE SUGGESTED PREOPERATIVE PROTOCOLS/COURSE OF TREATMENT AND PRESCRIBE THE FINAL COURSE OF PREOPERATIVE TREATMENT IN CONSULTATION WITH THE PATIENT  THE ePREOP SYSTEM AND ITS MATERIALS ARE PROVIDED ? AS IS? WITHOUT WARRANTY OF ANY KIND, EXPRESS OR IMPLIED, INCLUDING, BUT NOT LIMITED TO, WARRANTIES OF PERFORMANCE OR MERCHANTABILITY OR FITNESS FOR A PARTICULAR PURPOSE  PATIENT AND PHYSICIANS HEREBY AGREE THAT THEIR USE OF THE MATERIALS AND RESOURCES ACT AS A CONSENT TO RELEASE AND WAIVE ePREOP FROM ANY AND ALL CLAIMS OF WARRANTY, TORT OR CONTRACT LAW OF ANY KIND  Electronically signed by:Tin FOSTER    Jun 16 2017  2:16PM EST

## 2018-01-12 NOTE — MISCELLANEOUS
Message   Recorded as Task   Date: 04/19/2017 09:21 AM, Created By: Marylee Caper   Task Name: Call Back   Assigned To: Tamar Bansal   Regarding Patient: Brady Ross, Status: Active   Comment:    Marylee Lucila - 19 Apr 2017 9:21 AM     TASK CREATED  patient calling to let you know that she canceled her chemo today because she has very bad diarrhea  she is taking meds  she is having a hard time getting it under control  please call her back at [phone]  Patient reports diarrhea is not getting better  She is using Lomotil PRN  Discussed with Dr Jose Quevedo  Hold perjecta and herceptin at this time  [id number] [address] for patient to continue faslodex and Ibrance  Chemo canceled today  Faslodex rescheduled in one week  Patient will call me with any additional questions or concerns      Active Problems    1  Abnormal PET scan of head (793 0) (R94 02)   2  Acute bronchitis (466 0) (J20 9)   3  Adenocarcinoma of breast (174 9) (C50 919)   4  Admission for antineoplastic chemotherapy (V58 11) (Z51 11)   5  Diarrhea (787 91) (R19 7)   6  Flushing (782 62) (R23 2)   7  Liver mass, right lobe (573 9) (R16 0)   8  Liver metastases (197 7) (C78 7)   9  Recurrent breast cancer, right (174 9) (C50 911)   10  Screening mammogram for high-risk patient (V76 11) (Z12 31)    Current Meds   1  Atorvastatin Calcium 20 MG Oral Tablet; TABS PO X 30; Therapy: 77ARB2829 to (Last Rx:89Zzw1750)  Requested for: 00HKX5972 Ordered   2  Calcium 600 MG Oral Tablet; twice daily; Therapy: 93SRG7685 to (Last Rx:12Mar2012)  Requested for: 34PYK5346 Ordered   3  Diphenoxylate-Atropine 2 5-0 025 MG Oral Tablet (Lomotil); TAKE 1 TABLET 4 TIMES   DAILY AS NEEDED FOR DIARRHEA; Therapy: 13Apr2017 to (Evaluate:98Aoj4444); Last Rx:13Apr2017 Ordered   4  Dulcolax TBEC; Therapy: (Recorded:09Oct2012) to Recorded   5  Fiber CAPS; Therapy: (Recorded:09Oct2012) to Recorded   6  Glucosamine 1500 Complex Oral Capsule; twice daily;    Therapy: 18VHW3114 to (Last LX:48MJM3916)  Requested for: 16VRT8710 Ordered   7  Ibrance 75 MG Oral Capsule; One tab PO day 1-21 every 28 days; Therapy: 80VLY2889 to (Last Rx:19Jan2017)  Requested for: 01FFR9322 Ordered   8  Multivitamins Oral Capsule; Daily; Therapy: 50YOY8688 to (Last Rx:12Mar2012)  Requested for: 15GYR6012 Ordered   9  Ondansetron HCl - 4 MG Oral Tablet (Zofran); One tab PO q 4-6 hours PRN nausea; Therapy: 63Gvu8858 to (Last Rx:21Feb2017)  Requested for: 12Bjy5760 Ordered   10  TheraCran CAPS Recorded    Allergies    1   Ampicillin CAPS    Signatures   Electronically signed by : David Lobato, ; Apr 19 2017 11:24AM EST                       (Author)

## 2018-01-12 NOTE — MISCELLANEOUS
Message   Recorded as Task   Date: 09/26/2016 08:23 AM, Created By: Luda Palacio   Task Name: Medical Complaint Callback   Assigned To: Hortensia Crooks   Regarding Patient: Sergio Eid, Status: Active   CommentFrederico [place] - 26 Sep 2016 8:23 AM     TASK CREATED  Caller: Self; Medical Complaint; [phone] (Home)  pt said Dr Eugenia vasquez prescribe her something for her hotflashes night sweats call it into [facility] on [address] - 26 Sep 2016 8:27 AM     TASK REASSIGNED: Previously Assigned To Chai Cuenca - 26 Sep 2016 9:15 AM     TASK EDITED  Brisdelle 7 5mg daily sent to pts pharmacy  pt aware  Active Problems    1  Abnormal PET scan of head (793 0) (R94 02)   2  Acute bronchitis (466 0) (J20 9)   3  Adenocarcinoma of breast (174 9) (C50 919)   4  Flushing (782 62) (R23 2)   5  Liver mass, right lobe (573 9) (R16 0)   6  Liver metastases (197 7) (C78 7)   7  Recurrent breast cancer, right (174 9) (C50 911)   8  Screening mammogram for high-risk patient (V76 11) (Z12 31)    Current Meds   1  Atorvastatin Calcium 20 MG Oral Tablet; TABS PO X 30; Therapy: 63KCY4520 to (Last Rx:64Qlo8552)  Requested for: 18RIH2198 Ordered   2  Calcium 600 MG Oral Tablet; twice daily; Therapy: 45FNE8313 to (Last Rx:12Mar2012)  Requested for: 99BRS6885 Ordered   3  Dulcolax TBEC; Therapy: (Recorded:09Oct2012) to Recorded   4  Fiber CAPS; Therapy: (Recorded:09Oct2012) to Recorded   5  Glucosamine 1500 Complex Oral Capsule; twice daily; Therapy: 26QSC9275 to (Last Rx:12Mar2012)  Requested for: 47UBZ7956 Ordered   6  Ibrance 75 MG Oral Capsule; One tab PO day 1-21 every 28 days; Therapy: 50JFQ4010 to (Last Rx:35Vam5976)  Requested for: 74Pnm8979 Ordered   7  Multivitamins Oral Capsule; Daily; Therapy: 36ZRT9213 to (Last Rx:12Mar2012)  Requested for: 02VOG4863 Ordered    Allergies    1  Ampicillin CAPS    Plan  Flushing    · Brisdelle 7 5 MG Oral Capsule;  Take one capsule daily    Signatures   Electronically signed by : Fide Burns RN; Sep 26 2016  9:17AM EST                       (Author)

## 2018-01-13 VITALS
OXYGEN SATURATION: 92 % | DIASTOLIC BLOOD PRESSURE: 64 MMHG | TEMPERATURE: 98.1 F | HEIGHT: 61 IN | BODY MASS INDEX: 30.02 KG/M2 | WEIGHT: 159 LBS | HEART RATE: 96 BPM | SYSTOLIC BLOOD PRESSURE: 128 MMHG | RESPIRATION RATE: 18 BRPM

## 2018-01-13 VITALS
RESPIRATION RATE: 16 BRPM | DIASTOLIC BLOOD PRESSURE: 82 MMHG | HEIGHT: 61 IN | WEIGHT: 148.13 LBS | OXYGEN SATURATION: 92 % | SYSTOLIC BLOOD PRESSURE: 138 MMHG | TEMPERATURE: 98.1 F | BODY MASS INDEX: 27.97 KG/M2 | HEART RATE: 89 BPM

## 2018-01-13 VITALS
WEIGHT: 154 LBS | RESPIRATION RATE: 15 BRPM | DIASTOLIC BLOOD PRESSURE: 68 MMHG | BODY MASS INDEX: 29.07 KG/M2 | HEIGHT: 61 IN | OXYGEN SATURATION: 94 % | TEMPERATURE: 98.5 F | HEART RATE: 122 BPM | SYSTOLIC BLOOD PRESSURE: 122 MMHG

## 2018-01-13 VITALS
HEART RATE: 92 BPM | SYSTOLIC BLOOD PRESSURE: 128 MMHG | WEIGHT: 156 LBS | RESPIRATION RATE: 18 BRPM | DIASTOLIC BLOOD PRESSURE: 64 MMHG | BODY MASS INDEX: 29.45 KG/M2 | HEIGHT: 61 IN

## 2018-01-13 VITALS
OXYGEN SATURATION: 93 % | RESPIRATION RATE: 16 BRPM | SYSTOLIC BLOOD PRESSURE: 118 MMHG | WEIGHT: 157 LBS | BODY MASS INDEX: 29.64 KG/M2 | HEART RATE: 117 BPM | HEIGHT: 61 IN | DIASTOLIC BLOOD PRESSURE: 68 MMHG | TEMPERATURE: 97.6 F

## 2018-01-13 NOTE — MISCELLANEOUS
Message   Recorded as Task   Date: 04/03/2017 08:32 AM, Created By: GALINA PEREIRA   Task Name: Call Back   Assigned To: Tamar Bansal   Regarding Patient: Fariba Neri, Status: Active   Comment:    Fadumo Rosibel - 03 Apr 2017 8:32 AM     TASK CREATED  Pt called and stated she had something happen last night and wanted to discuss it with you  She didn't specify what happened  #: [phone]   Patient was in the Er over the night due to syncopal episode  slight abnormality in her EKG - Dr Gregory Cushing did review  He does not believe this is related to her being on Herceptin  Patient will be following with her PCP      Active Problems    1  Abnormal PET scan of head (793 0) (R94 02)   2  Acute bronchitis (466 0) (J20 9)   3  Adenocarcinoma of breast (174 9) (C50 919)   4  Admission for antineoplastic chemotherapy (V58 11) (Z51 11)   5  Flushing (782 62) (R23 2)   6  Liver mass, right lobe (573 9) (R16 0)   7  Liver metastases (197 7) (C78 7)   8  Recurrent breast cancer, right (174 9) (C50 911)   9  Screening mammogram for high-risk patient (V76 11) (Z12 31)    Current Meds   1  Atorvastatin Calcium 20 MG Oral Tablet; TABS PO X 30; Therapy: 68SUP4215 to (Last Rx:02Feb2011)  Requested for: 73FNJ1562 Ordered   2  Calcium 600 MG Oral Tablet; twice daily; Therapy: 80RMT3792 to (Last Rx:12Mar2012)  Requested for: 62YLK1950 Ordered   3  Dulcolax TBEC; Therapy: (Recorded:09Oct2012) to Recorded   4  Fiber CAPS; Therapy: (Recorded:09Oct2012) to Recorded   5  Glucosamine 1500 Complex Oral Capsule; twice daily; Therapy: 30PGZ4764 to (Last Rx:12Mar2012)  Requested for: 04WWT9654 Ordered   6  Ibrance 75 MG Oral Capsule; One tab PO day 1-21 every 28 days; Therapy: 93PAR0459 to (Last Rx:19Jan2017)  Requested for: 44SNU4249 Ordered   7  Multivitamins Oral Capsule; Daily; Therapy: 38DCP3768 to (Last Rx:12Mar2012)  Requested for: 01OBG7419 Ordered   8  Ondansetron HCl - 4 MG Oral Tablet (Zofran);  One tab PO q 4-6 hours PRN nausea; Therapy: 64Ymp4270 to (Last Rx:55Ich4631)  Requested for: 74Jfz2330 Ordered   9  TheraCran CAPS Recorded    Allergies    1   Ampicillin CAPS    Signatures   Electronically signed by : Annie Quintero, ; Apr  3 2017 10:46AM EST                       (Author)

## 2018-01-13 NOTE — MISCELLANEOUS
Message   Recorded as Task   Date: 11/29/2016 08:53 AM, Created By: ROSY RENTERIA   Task Name: Call Back   Assigned To: Tamar Bansal   Regarding Patient: Rohit Hernandez, Status: Active   Comment:    Titus Louise - 29 Nov 2016 8:53 AM     TASK CREATED  Pt called and stated she has shingles and was given valacyclovir  She had to stop the ibrance because of the shingles  The valacyclovir is finished tomorrow and pt wants to know when she should go back on her chemo regiman? Callback#: [phone]   Patient ok to re start Faslodex and Ibrance  Her shingles are getting better and antibiotic will be finished tomorrow      Active Problems    1  Abnormal PET scan of head (793 0) (R94 02)   2  Acute bronchitis (466 0) (J20 9)   3  Adenocarcinoma of breast (174 9) (C50 919)   4  Flushing (782 62) (R23 2)   5  Liver mass, right lobe (573 9) (R16 0)   6  Liver metastases (197 7) (C78 7)   7  Recurrent breast cancer, right (174 9) (C50 911)   8  Screening mammogram for high-risk patient (V76 11) (Z12 31)    Current Meds   1  Atorvastatin Calcium 20 MG Oral Tablet; TABS PO X 30; Therapy: 25KTS4109 to (Last Rx:47Qmq1748)  Requested for: 83GNJ7482 Ordered   2  Brisdelle 7 5 MG Oral Capsule; Take one capsule daily; Therapy: 77OFK4012 to (Last Nisreen Joy)  Requested for: 76VNK6389 Ordered   3  Calcium 600 MG Oral Tablet; twice daily; Therapy: 66PUJ0384 to (Last Rx:12Mar2012)  Requested for: 84JUQ7272 Ordered   4  Dulcolax TBEC; Therapy: (Recorded:09Oct2012) to Recorded   5  Fiber CAPS; Therapy: (Recorded:09Oct2012) to Recorded   6  Glucosamine 1500 Complex Oral Capsule; twice daily; Therapy: 07DJN6102 to (Last Rx:12Mar2012)  Requested for: 22DBK8864 Ordered   7  Ibrance 75 MG Oral Capsule; One tab PO day 1-21 every 28 days; Therapy: 52CVZ1250 to (Last Rx:66Mpm2787)  Requested for: 83Iai3874 Ordered   8  Multivitamins Oral Capsule; Daily;    Therapy: 26RJK2405 to (Last Rx:12Mar2012)  Requested for: 80BEK7739 Ordered    Allergies    1   Ampicillin CAPS    Signatures   Electronically signed by : Lucinda Parada, ; Nov 29 2016  9:42AM EST                       (Author)

## 2018-01-13 NOTE — MISCELLANEOUS
Message   Recorded as Task   Date: 07/26/2017 11:39 AM, Created By: Casandra Kee   Task Name: Follow Up   Assigned To: Tamar Bansal   Regarding Patient: Tiffanie Carlos, Status: Active   CommentEmcaseyezio Story - 26 Jul 2017 11:39 AM     TASK CREATED  Caller: Self; General Medical Question; [phone] (Home)  pt d/c Mon from [facility] after liver resection  Pt is req a call about her upcoming appt  said ahe has infusion same day as appt and doesnt think she should and question her Patience Leisenring call pt #[phone]   Obdulio Richard - 26 Jul 2017 11:55 AM     TASK REASSIGNED: Previously Assigned To Daiana Ramos   Patient wishes to hold off on getting more treatment until she see's Dr Helen Hairston on 8/1/17      Active Problems    1  Abnormal PET scan of head (793 0) (R94 02)   2  Acute bronchitis (466 0) (J20 9)   3  Adenocarcinoma of breast (174 9) (C50 919)   4  Admission for antineoplastic chemotherapy (V58 11) (Z51 11)   5  Cardiac syncope (780 2) (R55)   6  Diarrhea (787 91) (R19 7)   7  Flushing (782 62) (R23 2)   8  Hyperlipidemia (272 4) (E78 5)   9  Left bundle branch block (LBBB) (426 3) (I44 7)   10  Liver mass, right lobe (573 9) (R16 0)   11  Liver metastases (197 7) (C78 7)   12  Preop cardiovascular exam (V72 81) (Z01 810)   13  Recurrent breast cancer, right (174 9) (C50 911)   14  Screening mammogram for high-risk patient (V76 11) (Z12 31)    Current Meds   1  Atorvastatin Calcium 20 MG Oral Tablet; TABS PO X 30; Therapy: 78RYM5749 to (Last Rx:99Tmk9882)  Requested for: 63OJJ4658 Ordered   2  Calcium 600 MG Oral Tablet; twice daily; Therapy: 71EQR6685 to (Last Rx:12Mar2012)  Requested for: 21IND1142 Ordered   3  Diphenoxylate-Atropine 2 5-0 025 MG Oral Tablet (Lomotil); TAKE 1 TABLET 4 TIMES   DAILY AS NEEDED FOR DIARRHEA; Therapy: 13Apr2017 to (Evaluate:48Zqj3679); Last Rx:13Apr2017 Ordered   4  Dulcolax TBEC; Therapy: (Recorded:09Oct2012) to Recorded   5  Fiber CAPS;    Therapy: (Recorded:09Oct2012) to Recorded   6  Glucosamine 1500 Complex Oral Capsule; twice daily; Therapy: 66CQT4300 to (Last Rx:12Mar2012)  Requested for: 06JCC1806 Ordered   7  Ibrance 75 MG Oral Capsule; One tab PO day 1-21 every 28 days; Therapy: 45YDO5179 to (Last Rx:19Jan2017)  Requested for: 72FQW7291 Ordered   8  Multivitamins Oral Capsule; Daily; Therapy: 80OQM3377 to (Last Rx:12Mar2012)  Requested for: 70BVZ5212 Ordered   9  Ondansetron HCl - 4 MG Oral Tablet (Zofran); One tab PO q 4-6 hours PRN nausea; Therapy: 80Aoa5553 to (Last Rx:79Ogs6576)  Requested for: 82MJR1444 Ordered   10  TheraCran CAPS Recorded    Allergies    1   Ampicillin CAPS    Signatures   Electronically signed by : Rafia Quach, ; Jul 26 2017  2:19PM EST                       (Author)

## 2018-01-13 NOTE — MISCELLANEOUS
Message   Recorded as Task   Date: 05/16/2016 03:23 PM, Created By: Sammye Krabbe   Task Name: Follow Up   Assigned To: Tamar Bansal   Regarding Patient: Brandon Doss, Status: Active   CommentSharion [place] - 16 May 2016 3:23 PM     TASK CREATED  Caller: Self; General Medical Question; [phone] (Home)  Patient would a call back regarding another procedure that she's going to be having with another doctor and wants to have it cleared with Dr Boris Mark  Per Dr Boris Mark patient [facility] for [facility] - patient is on Ibrance  urology will prescribe antibiotic prior to procedure      Active Problems    1  Abnormal PET scan of head (793 0) (R94 02)   2  Acute bronchitis (466 0) (J20 9)   3  Adenocarcinoma of breast (174 9) (C50 919)   4  Flushing (782 62) (R23 2)   5  Liver mass, right lobe (573 9) (R16 0)   6  Liver metastases (197 7) (C78 7)   7  Recurrent breast cancer, right (174 9) (C50 911)   8  Screening mammogram for high-risk patient (V76 11) (Z12 31)    Current Meds   1  Atorvastatin Calcium 20 MG Oral Tablet; TABS PO X 30; Therapy: 49NQF8829 to (Last Rx:02Feb2011)  Requested for: 60INR3134 Ordered   2  Calcium 600 MG Oral Tablet; twice daily; Therapy: 07UWY0234 to (Last Rx:12Mar2012)  Requested for: 65DPR6366 Ordered   3  Dulcolax TBEC; Therapy: (Recorded:09Oct2012) to Recorded   4  Fiber CAPS; Therapy: (Recorded:09Oct2012) to Recorded   5  Glucosamine 1500 Complex Oral Capsule; twice daily; Therapy: 99WRL4970 to (Last Rx:12Mar2012)  Requested for: 68BMR3220 Ordered   6  Multivitamins Oral Capsule; Daily; Therapy: 37TBO0875 to (Last Rx:12Mar2012)  Requested for: 01BYT4558 Ordered    Allergies    1   Ampicillin CAPS    Signatures   Electronically signed by : Salena Wright, ; May 17 2016 12:04PM EST                       (Author)

## 2018-01-14 VITALS
OXYGEN SATURATION: 94 % | BODY MASS INDEX: 29.92 KG/M2 | HEIGHT: 61 IN | SYSTOLIC BLOOD PRESSURE: 134 MMHG | RESPIRATION RATE: 18 BRPM | DIASTOLIC BLOOD PRESSURE: 74 MMHG | HEART RATE: 101 BPM | TEMPERATURE: 97.5 F | WEIGHT: 158.5 LBS

## 2018-01-14 VITALS
DIASTOLIC BLOOD PRESSURE: 74 MMHG | SYSTOLIC BLOOD PRESSURE: 134 MMHG | BODY MASS INDEX: 30.44 KG/M2 | HEIGHT: 61 IN | RESPIRATION RATE: 20 BRPM | HEART RATE: 96 BPM | OXYGEN SATURATION: 93 % | WEIGHT: 161.25 LBS | TEMPERATURE: 98 F

## 2018-01-14 VITALS
HEART RATE: 102 BPM | WEIGHT: 154.5 LBS | OXYGEN SATURATION: 93 % | TEMPERATURE: 98 F | BODY MASS INDEX: 29.17 KG/M2 | HEIGHT: 61 IN | RESPIRATION RATE: 18 BRPM | DIASTOLIC BLOOD PRESSURE: 68 MMHG | SYSTOLIC BLOOD PRESSURE: 122 MMHG

## 2018-01-14 VITALS
SYSTOLIC BLOOD PRESSURE: 116 MMHG | RESPIRATION RATE: 20 BRPM | DIASTOLIC BLOOD PRESSURE: 68 MMHG | HEIGHT: 61 IN | WEIGHT: 161 LBS | HEART RATE: 109 BPM | BODY MASS INDEX: 30.4 KG/M2 | TEMPERATURE: 96.6 F | OXYGEN SATURATION: 93 %

## 2018-01-14 VITALS
DIASTOLIC BLOOD PRESSURE: 78 MMHG | WEIGHT: 157.09 LBS | HEART RATE: 114 BPM | TEMPERATURE: 97.9 F | HEIGHT: 61 IN | OXYGEN SATURATION: 93 % | BODY MASS INDEX: 29.66 KG/M2 | RESPIRATION RATE: 18 BRPM | SYSTOLIC BLOOD PRESSURE: 128 MMHG

## 2018-01-14 VITALS
OXYGEN SATURATION: 93 % | DIASTOLIC BLOOD PRESSURE: 68 MMHG | RESPIRATION RATE: 16 BRPM | SYSTOLIC BLOOD PRESSURE: 118 MMHG | TEMPERATURE: 97.6 F | BODY MASS INDEX: 29.7 KG/M2 | WEIGHT: 157.31 LBS | HEART RATE: 113 BPM | HEIGHT: 61 IN

## 2018-01-14 VITALS
HEIGHT: 61 IN | HEART RATE: 120 BPM | OXYGEN SATURATION: 97 % | DIASTOLIC BLOOD PRESSURE: 60 MMHG | WEIGHT: 154.31 LBS | BODY MASS INDEX: 29.13 KG/M2 | SYSTOLIC BLOOD PRESSURE: 112 MMHG

## 2018-01-14 NOTE — MISCELLANEOUS
Message   Recorded as Task   Date: 07/03/2017 11:08 AM, Created By: Jose J Wheeler   Task Name: Med Renewal Request   Assigned To: Tamar Bansal   Regarding Patient: Sandra Reyes, Status: Active   CommentPmirian Bradshaw - 03 Jul 2017 11:08 AM     TASK CREATED  patient calling for refill on zofran - [facility]  and also asking for MRI brain results from last week  [phone] with patient - explained MRI normal - Zofran renewed      Active Problems    1  Abnormal PET scan of head (793 0) (R94 02)   2  Acute bronchitis (466 0) (J20 9)   3  Adenocarcinoma of breast (174 9) (C50 919)   4  Admission for antineoplastic chemotherapy (V58 11) (Z51 11)   5  Cardiac syncope (780 2) (R55)   6  Diarrhea (787 91) (R19 7)   7  Flushing (782 62) (R23 2)   8  Hyperlipidemia (272 4) (E78 5)   9  Left bundle branch block (LBBB) (426 3) (I44 7)   10  Liver mass, right lobe (573 9) (R16 0)   11  Liver metastases (197 7) (C78 7)   12  Preop cardiovascular exam (V72 81) (Z01 810)   13  Recurrent breast cancer, right (174 9) (C50 911)   14  Screening mammogram for high-risk patient (V76 11) (Z12 31)    Current Meds   1  Atorvastatin Calcium 20 MG Oral Tablet; TABS PO X 30; Therapy: 19PDI0904 to (Last Rx:32Xbn9341)  Requested for: 04SYR9283 Ordered   2  Calcium 600 MG Oral Tablet; twice daily; Therapy: 37UZE3442 to (Last Rx:12Mar2012)  Requested for: 34DIJ1757 Ordered   3  Diphenoxylate-Atropine 2 5-0 025 MG Oral Tablet (Lomotil); TAKE 1 TABLET 4 TIMES   DAILY AS NEEDED FOR DIARRHEA; Therapy: 13Apr2017 to (Evaluate:91Jbv7740); Last Rx:13Apr2017 Ordered   4  Dulcolax TBEC; Therapy: (Recorded:09Oct2012) to Recorded   5  Fiber CAPS; Therapy: (Recorded:09Oct2012) to Recorded   6  Glucosamine 1500 Complex Oral Capsule; twice daily; Therapy: 08IEZ4410 to (Last Rx:12Mar2012)  Requested for: 24ARA3789 Ordered   7  Ibrance 75 MG Oral Capsule; One tab PO day 1-21 every 28 days;    Therapy: 60ZKN2951 to (Last EE:50TLH7180)  Requested for: 33FYL5662 Ordered   8  Multivitamins Oral Capsule; Daily; Therapy: 95DVD4555 to (Last Rx:12Mar2012)  Requested for: 44VBM4184 Ordered   9  Ondansetron HCl - 4 MG Oral Tablet (Zofran); One tab PO q 4-6 hours PRN nausea; Therapy: 37Jub0440 to (Last Rx:76Ntj0246)  Requested for: 00Xba3417; Status: ACTIVE   - Retrospective By Protocol Authorization Ordered   10  TheraCran CAPS Recorded    Allergies    1   Ampicillin CAPS    Signatures   Electronically signed by : Roma Soler, ; Jul  3 2017 11:11AM EST                       (Author)

## 2018-01-15 NOTE — PROCEDURES
Procedures by Tomas Drummond MD at 11/9/2017  6:57 PM      Author:  Tomas Drummond MD Service:  Cardiology Author Type:  Resident    Filed:  11/9/2017  7:06 PM Date of Service:  11/9/2017  6:57 PM Status:  Attested Addendum    :  Tomas Drummond MD (Resident)      Related Notes:  Original Note by Tomas Drummond MD (Resident) filed at 11/9/2017  7:01 PM      Cosigner:  Kali Worrell MD at 11/9/2017  9:09 PM        Pre-procedure Diagnoses:       1  Hypotension [I95 9]                Procedures:       1  CENTRAL LINE [JOQ35 (Custom)]              Attestation signed by Kali Worrell MD at 11/9/2017  9:09 PM             Teaching Physician Statement  I supervised the procedure and was present the entire time  Date of Procedure: 11/9/2017    Time of Procedure: 6:30 PM    Indication for procedure: Hypotension    Procedure performed: Central Line placement    Performing Physician: Dr Tomas Drummond    Type of Anesthesia: 10cc of 1% Lidocaine- Topical    Consent Status: Emergent procedure- Two physician consent with Dr Dallie Shone and Anesthesiologist/Verbal consent from patient    Procedure and Technique: Time out was performed  Patient was prepped and draped in a sterile fashion  Right IJ was visualized using ultrasound  10cc of lidocaine was injected locally  A 16cm central line catheter was inserted under ultrasound guidance  using modified Seldinger technique  Good blood draw was noted through all the three ports and they were flushed  A sterile dressing was applied after the central line was sutured to the skin  Patient tolerated the procedure well  Post procedure stat chest  xray was ordered      Procedure complications: None    Blood loss: Minimal                 Received for:Loliat Teague MD  Nov 9 2017  9:09PM [facility] Standard Time

## 2018-01-15 NOTE — MISCELLANEOUS
Message   Recorded as Task   Date: 05/01/2017 02:05 PM, Created By: Mari Castellon   Task Name: Call Back   Assigned To: Tamar Bansal   Regarding Patient: Jose Antonio Doherty, Status: In Progress   CommentLupillo Rivera - 01 May 2017 2:05 PM     TASK CREATED  Caller: Self; Care Coordination; [phone] (Home)  PATIENTS LAST CHEMO WAS CANCELLED BY DR MONTEIRO BUT HAS ANOTHER APPT FOR THE 10TH OF MAY AND WAS WONDERING IF SHE SHOULD GO TO THAT ONE  PATIENT HAS AN APPOINTMENT WITH  [facility] [place] ON THE 16TH  PLEASE GIVE HER A CALL BACK AND LET HER KNOW  Tamar Bansal - 03 May 2017 9:54 AM     TASK IN PROGRESS   Patient will hold Chemo on 5/10 - will be rescheduled after visit with Dr Samia Paul on 5/16/17      Active Problems    1  Abnormal PET scan of head (793 0) (R94 02)   2  Acute bronchitis (466 0) (J20 9)   3  Adenocarcinoma of breast (174 9) (C50 919)   4  Admission for antineoplastic chemotherapy (V58 11) (Z51 11)   5  Diarrhea (787 91) (R19 7)   6  Flushing (782 62) (R23 2)   7  Liver mass, right lobe (573 9) (R16 0)   8  Liver metastases (197 7) (C78 7)   9  Recurrent breast cancer, right (174 9) (C50 911)   10  Screening mammogram for high-risk patient (V76 11) (Z12 31)    Current Meds   1  Atorvastatin Calcium 20 MG Oral Tablet; TABS PO X 30; Therapy: 76YKL6873 to (Last Rx:70Rfq6569)  Requested for: 04BDW7336 Ordered   2  Calcium 600 MG Oral Tablet; twice daily; Therapy: 27QOR2879 to (Last Rx:12Mar2012)  Requested for: 42PFZ5662 Ordered   3  Diphenoxylate-Atropine 2 5-0 025 MG Oral Tablet (Lomotil); TAKE 1 TABLET 4 TIMES   DAILY AS NEEDED FOR DIARRHEA; Therapy: 01Wsd5638 to (Evaluate:41Wum2600); Last Rx:13Apr2017 Ordered   4  Dulcolax TBEC; Therapy: (Recorded:09Oct2012) to Recorded   5  Fiber CAPS; Therapy: (Recorded:09Oct2012) to Recorded   6  Glucosamine 1500 Complex Oral Capsule; twice daily; Therapy: 67XPK7962 to (Last Rx:12Mar2012)  Requested for: 32BTV2883 Ordered   7   Ibrance 75 MG Oral Capsule; One tab PO day 1-21 every 28 days; Therapy: 65XIU4898 to (Last Rx:19Jan2017)  Requested for: 31DWT1195 Ordered   8  Multivitamins Oral Capsule; Daily; Therapy: 75DOJ2203 to (Last Rx:12Mar2012)  Requested for: 31ITG9183 Ordered   9  Ondansetron HCl - 4 MG Oral Tablet (Zofran); One tab PO q 4-6 hours PRN nausea; Therapy: 24Huv1982 to (Last Rx:21Feb2017)  Requested for: 99Cpv5947 Ordered   10  TheraCran CAPS Recorded    Allergies    1   Ampicillin CAPS    Signatures   Electronically signed by : Mitchell Cooks, ; May  4 2017  1:31PM EST                       (Author)

## 2018-01-15 NOTE — MISCELLANEOUS
Message  Nurse Navigator:  Spoke to patient via phone call for purpose of nurse navigation  Patient stated she is "doing good", reported that she is having "liver surgery in July with Dr Rubens Sebastian", has no complaints or concerns at this time  Denies pain or other symptoms  Provided patient with contact information and instructed her to call for any questions/concerns  Will continue to reach out to patient PRN  Active Problems    1  Abnormal PET scan of head (793 0) (R94 02)   2  Acute bronchitis (466 0) (J20 9)   3  Adenocarcinoma of breast (174 9) (C50 919)   4  Admission for antineoplastic chemotherapy (V58 11) (Z51 11)   5  Diarrhea (787 91) (R19 7)   6  Flushing (782 62) (R23 2)   7  Liver mass, right lobe (573 9) (R16 0)   8  Liver metastases (197 7) (C78 7)   9  Recurrent breast cancer, right (174 9) (C50 911)   10  Screening mammogram for high-risk patient (V76 11) (Z12 31)    Current Meds   1  Atorvastatin Calcium 20 MG Oral Tablet; TABS PO X 30; Therapy: 32ZIB9528 to (Last Rx:02Feb2011)  Requested for: 28KTM2193 Ordered   2  Calcium 600 MG Oral Tablet; twice daily; Therapy: 12QNL8612 to (Last Rx:12Mar2012)  Requested for: 64AEB4292 Ordered   3  Diphenoxylate-Atropine 2 5-0 025 MG Oral Tablet (Lomotil); TAKE 1 TABLET 4 TIMES   DAILY AS NEEDED FOR DIARRHEA; Therapy: 79Glk4159 to (Evaluate:68Mkd8002); Last Rx:13Apr2017 Ordered   4  Dulcolax TBEC; Therapy: (Recorded:09Oct2012) to Recorded   5  Fiber CAPS; Therapy: (Recorded:09Oct2012) to Recorded   6  Glucosamine 1500 Complex Oral Capsule; twice daily; Therapy: 10VCQ5903 to (Last Rx:12Mar2012)  Requested for: 13JKM6881 Ordered   7  Ibrance 75 MG Oral Capsule; One tab PO day 1-21 every 28 days; Therapy: 86ECH9313 to (Last Rx:19Jan2017)  Requested for: 26LVS1988 Ordered   8  Multivitamins Oral Capsule; Daily; Therapy: 80UBC2534 to (Last Rx:12Mar2012)  Requested for: 93EKO9744 Ordered   9  Ondansetron HCl - 4 MG Oral Tablet (Zofran);  One tab PO q 4-6 hours PRN nausea; Therapy: 83Ijy0195 to (Last Rx:40Tbn1455)  Requested for: 34Zwz5765 Ordered   10  TheraCran CAPS Recorded    Allergies    1   Ampicillin CAPS    Signatures   Electronically signed by : Viky Victor RN; Jun 9 2017 10:02AM EST                       (Author)

## 2018-01-16 NOTE — MISCELLANEOUS
Message   Recorded as Task   Date: 08/08/2017 09:59 AM, Created By: Elizabeth Loco   Task Name: Call Back   Assigned To: Tamar Bansal   Regarding Patient: Raysa Harris, Status: Active   CommentTwila Treadwell - 08 Aug 2017 9:59 AM     TASK CREATED  patient saw dr Martell Comer last week  And if she didnt hear by either of you by yesterday that she was suppose to give you a call  Patients # [phone]  I will discuss this further with Dr Crystal Jordan when he returns to the office next week  Patient will  Rx for oxycodone at the [facility] office  She reports she takes one at bedtime - this offers her relief      Active Problems    1  Abnormal PET scan of head (793 0) (R94 02)   2  Acute bronchitis (466 0) (J20 9)   3  Adenocarcinoma of breast (174 9) (C50 919)   4  Admission for antineoplastic chemotherapy (V58 11) (Z51 11)   5  Cardiac syncope (780 2) (R55)   6  Diarrhea (787 91) (R19 7)   7  Edema of both legs (782 3) (R60 0)   8  Flushing (782 62) (R23 2)   9  Hyperlipidemia (272 4) (E78 5)   10  Left bundle branch block (LBBB) (426 3) (I44 7)   11  Liver mass, right lobe (573 9) (R16 0)   12  Liver metastases (197 7) (C78 7)   13  Palpitations (785 1) (R00 2)   14  Preop cardiovascular exam (V72 81) (Z01 810)   15  Recurrent breast cancer, right (174 9) (C50 911)   16  Screening mammogram for high-risk patient (V76 11) (Z12 31)    Current Meds   1  Acetaminophen 325 MG Oral Tablet; TAKE 1 TO 2 TABLETS EVERY 6 HOURS AS   NEEDED; Therapy: (Recorded:15Wqv9745) to Recorded   2  Atorvastatin Calcium 20 MG Oral Tablet; Take 1 tablet daily; Therapy: 11JNX7195 to  Requested for: 01Aug2017 Recorded   3  Diphenoxylate-Atropine 2 5-0 025 MG Oral Tablet (Lomotil); TAKE 1 TABLET 4 TIMES   DAILY AS NEEDED FOR DIARRHEA; Therapy: 13Apr2017 to (Evaluate:68Ofe6378); Last Rx:13Apr2017 Ordered   4  Furosemide 20 MG Oral Tablet (Lasix); take 1 tablet daily prn edema;    Therapy: 01Aug2017 to (Evaluate:29Nov2017) Requested for: 01Aug2017; Last   Rx:51Efn7754 Ordered   5  Metoprolol Tartrate 25 MG Oral Tablet; TAKE 1 5 TABLET Twice daily; Last Rx:50Jne7147   Ordered   6  Ondansetron HCl - 4 MG Oral Tablet (Zofran); One tab PO q 4-6 hours PRN nausea; Therapy: 21Feb2017 to (Last Rx:27Nzt1929)  Requested for: 42CPO0943 Ordered   7  OxyCODONE HCl - 5 MG Oral Tablet; Therapy: (Recorded:01Aug2017) to Recorded    Allergies    1   Ampicillin CAPS    Plan  Adenocarcinoma of breast    · OxyCODONE HCl - 5 MG Oral Tablet; TAKE 1 TABLET EVERY 6 HOURS AS  NEEDED FOR BREAKTHROUGH PAIN    Signatures   Electronically signed by : Charlie Nieto, ; Aug  8 2017 11:20AM EST                       (Author)

## 2018-01-17 NOTE — MISCELLANEOUS
Message   Recorded as Task   Date: 04/04/2016 01:36 PM, Created By: Ingrid Hines   Task Name: Follow Up   Assigned To: Tamar Bansal   Regarding Patient: Robert Kearney, Status: Active   Comment:    Ingrid Hines - 04 Apr 2016 1:36 PM     TASK CREATED  Caller: Self; General Medical Question; [phone] (Home)  HAS A QUESTION REGARDING IBRANCE AND FASLODEX / AND IF HEADACHES ARE A SIDE EFFECT, PATIENT ALSO MENTIONED PAIN IN HER RIGHT CHEST WALL  PLEASE ADVISE PATIENT   Spoke with patient - She explained that she has pain in her breast in the area where her tumor was removed  She described it as burning at times  She also had pain up the back of her head intermittently  She has tried PO tylenol PRN  Discussed with Dr Sam Jones - pain is not likely from Faslodex or Ibrance  Patient should try PRN non steroidal  She will call me if pain continues or gets worse  Pt agreeable to plan and verbalized understanding      Active Problems    1  Abnormal PET scan of head (793 0) (R94 02)   2  Acute bronchitis (466 0) (J20 9)   3  Adenocarcinoma of breast (174 9) (C50 919)   4  Flushing (782 62) (R23 2)   5  Liver mass, right lobe (573 9) (R16 0)   6  Recurrent breast cancer, right (174 9) (C50 911)   7  Screening mammogram for high-risk patient (V76 11) (Z12 31)    Current Meds   1  Atorvastatin Calcium 20 MG Oral Tablet; TABS PO X 30; Therapy: 99QQI0735 to (Last Rx:84Txi8940)  Requested for: 41PDJ9836 Ordered   2  Azithromycin 250 MG Oral Tablet (Zithromax Z-Les); TAKE 2 TABLETS ON DAY 1 THEN   TAKE 1 TABLET A DAY FOR 4 DAYS; Therapy: 03Jhq3056 to (Last Rx:16Khc3109)  Requested for: 98Igr5847 Ordered   3  Benzonatate 200 MG Oral Capsule; TAKE 1 CAPSULE 3 TIMES DAILY AS NEEDED; Therapy: 87Bof3739 to (Evaluate:94Jru3603)  Requested for: 36Wgn4357; Last   Rx:94Wbz4285 Ordered   4  Calcium 600 MG Oral Tablet; twice daily; Therapy: 05FMX8748 to (Last Rx:12Mar2012)  Requested for: 14YWY3246 Ordered   5   Dulcolax TBEC;   Therapy: (Recorded:09Oct2012) to Recorded   6  Fiber CAPS; Therapy: (Recorded:09Oct2012) to Recorded   7  Glucosamine 1500 Complex Oral Capsule; twice daily; Therapy: 22SJK8097 to (Last Rx:12Mar2012)  Requested for: 93YMZ1861 Ordered   8  Multivitamins Oral Capsule; Daily; Therapy: 46KZF5331 to (Last Rx:12Mar2012)  Requested for: 91TMA8737 Ordered    Allergies    1   Ampicillin CAPS    Signatures   Electronically signed by : Yoanna Sanders, ; Apr 4 2016  2:27PM EST                       (Author)

## 2018-01-18 ENCOUNTER — GENERIC CONVERSION - ENCOUNTER (OUTPATIENT)
Dept: OTHER | Facility: OTHER | Age: 78
End: 2018-01-18

## 2018-01-18 NOTE — PROGRESS NOTES
Assessment    1  Liver metastases (197 7) (C78 7)   2  Recurrent breast cancer, right (174 9) (C50 911)    Plan  Recurrent breast cancer, right    · Drink plenty of fluids ; Status:Complete;   Done: 61FMS7279   Ordered; For:Recurrent breast cancer, right; Ordered By:Bonita Moscoso;   · Follow-up visit in 4 Months Evaluation and Treatment  Follow-up  Status: Hold For -  Scheduling  Requested for: 49HBK2832   Ordered; For: Recurrent breast cancer, right; Ordered By: Yonatan Chao Performed:  Due: 82Ngd8262   · (1) CBC/PLT/DIFF; Status:Active; Requested for:98Grx0023;    Perform:[facility]; ASJ:68BWK7465;XWTDDQW; For:Recurrent breast cancer, right; Ordered By:Samuel Moscoso;   · (1) CBC/PLT/DIFF; Status: In Progress - Order Generated; Requested for:Recurring  Schedule: 8/16/2016; 10/11/2016; 12/6/2016 ;    Perform:[facility]; CKV:52LUO5143;CEFEDBJ; For:Recurrent breast cancer, right; Ordered By:Bonita Moscoso;   · (1) COMPREHENSIVE METABOLIC PANEL; Status:Active; Requested for:55Qpd8960;    Perform:[facility]; FMO:36BGL5637;IDRLNEN; For:Recurrent breast cancer, right; Ordered By:Samuel Moscoso;   · (1) COMPREHENSIVE METABOLIC PANEL; Status: In Progress - Order Generated; Requested for:Recurring Schedule: 8/16/2016; 10/11/2016; 12/6/2016 ;    Perform:[facility]; GKE:70RTJ5976;HWIRPJD; For:Recurrent breast cancer, right; Ordered By:Bonita Moscoso;   · * NM PET CT SKULL BASE TO MID THIGH; Status:Hold For - Scheduling; Requested  for:08Drv2920;    Perform:[facility]; QOL:33INW5255;FBBVAFR; For:Recurrent breast cancer, right; Ordered By:Bonita Moscoso;    Discussion/Summary  Discussion Summary:   In summary, this is a 64year-old female history of recurrent breast cancer with liver metastases  Most recent PET/CT shows decrease in the size of her hepatic lesion by some 20-25 percent  Slight increase in SUV is noted, however  The significance of this finding is unclear    The chest wall lesion has remained, but hypermetabolism is resolved, further supporting responding disease status  There is some activity in the right gluteal region, probably related to Faslodex injections, ongoing  At her request  We reviewed that her disease is not curable and that the goal of therapy is disease control with potential for survival benefit  Fortunately, she is tolerating therapy exceptionally well and enjoying good disease control  She does have moderate hot flashes  Medication was offered  She will call if she wishes to pursue this  Lastly, we reviewed other potential treatment options that may be used at some time in the future, if applicable  We made arrangements for repeat PET/CT just prior to her next visit in 4 months  Blood work is requested every 2 months  Tumor marker has been uninformative in her case  I reviewed the above with the patient and her   They voiced understanding and agreement  Medication SE Review and Pt Understands Tx: Possible side effects of new medications were reviewed with the patient/guardian today  The treatment plan was reviewed with the patient/guardian  The patient/guardian understands and agrees with the treatment plan   Understands and agrees with treatment plan: The treatment plan was reviewed with the patient/guardian  The patient/guardian understands and agrees with the treatment plan   Counseling Documentation With Imm: The patient was counseled regarding diagnostic results, instructions for management, patient and family education, impressions  total time of encounter was 40 minutes  Chief Complaint  Chief Complaint Free Text Note Form: Follow-up regarding breast cancer  History of Present Illness  HPI: 2009- right breast, infiltrating ductal carcinoma (T2, N1 sebastien)  Tumor was ER/HI positive, HER-2 negative  May 2009- right mastectomy  Taxotere/Cytoxan x4 cycles  October 2009- started Arimidex 1 mg by mouth daily   This continued until October 2014 February 2016- felt lump in the right  lateral aspect of right breast  Biopsy confirmed the presence of ductal carcinoma, consistent with breast primary  Tumor was %, [place] 70%, HER-2 +1  PET/CT showed right lateral chest wall nodule measuring 2 3 cm, SUV 11 1  Right posterior liver, Mass  , measuring 5 8 cm, hypermetabolic  March 16 5574- started Faslodex 500 mg every month, and Ibrance 75 mg by mouth daily, 3 weeks on, one week off   Current Therapy: March 16 2016- started Faslodex 500 mg every month, and Ibrance 75 mg by mouth daily, 3 weeks on, one week off   Interval History: Occasional wheezing, cough, HENRIQUEZ  Occasional  Non-productive  Able to walk 5-10 minutes on flat  Review of Systems  Complete-Female:   Constitutional: No fever, no chills, feels well, no tiredness, no recent weight gain or weight loss  Eyes: No complaints of eye pain, no red eyes, no eyesight problems, no discharge, no dry eyes, no itching of eyes  ENT: no complaints of earache, no loss of hearing, no nose bleeds, no nasal discharge, no sore throat, no hoarseness  Cardiovascular: No complaints of slow heart rate, no fast heart rate, no chest pain, no palpitations, no leg claudication, no lower extremity edema  Respiratory: No complaints of shortness of breath, no wheezing, no cough, no SOB on exertion, no orthopnea, no PND  Gastrointestinal: constipation and better with stool softners  Last colo before 2009  No polyps  Genitourinary: No complaints of dysuria, no incontinence, no pelvic pain, no dysmenorrhea, no vaginal discharge or bleeding  Musculoskeletal: No complaints of arthralgias, no myalgias, no joint swelling or stiffness, no limb pain or swelling  Integumentary: No complaints of skin rash or lesions, no itching, no skin wounds, no breast pain or lump     Neurological: No complaints of headache, no confusion, no convulsions, no numbness, no dizziness or fainting, no tingling, no limb weakness, no difficulty walking  Psychiatric: Not suicidal, no sleep disturbance, no anxiety or depression, no change in personality, no emotional problems  Hematologic/Lymphatic: No complaints of swollen glands, no swollen glands in the neck, does not bleed easily, does not bruise easily  Active Problems    1  Abnormal PET scan of head (793 0) (R94 02)   2  Acute bronchitis (466 0) (J20 9)   3  Adenocarcinoma of breast (174 9) (C50 919)   4  Flushing (782 62) (R23 2)   5  Liver mass, right lobe (573 9) (R16 0)   6  Liver metastases (197 7) (C78 7)   7  Recurrent breast cancer, right (174 9) (C50 911)   8  Screening mammogram for high-risk patient (V76 11) (Z12 31)    Past Medical History    1  History of Age At First Period 13 Years Old (Menarche)   2  History of Age At First Pregnancy 19 Years Old   3  History of diverticulitis of colon (V12 79) (Z87 19)   4  History of Night sweats (780 8) (R61)   5  History of Polyuria (788 42) (R35 8)   6  History of Previous Pregnancies Resulted In 4  Live Birth(S)   7  History of Status post chemotherapy (V66 2) (Z51 89)   8  History of Use of anastrozole (Arimidex) (V07 52) (B00 369)    Surgical History    1  History of Adenoidectomy   2  History of Biopsy Breast Open   3  History of Biopsy Breast Percutaneous Needle Core   4  History of Breast Surgery Mastectomy   5  History of General Surgery   6  History of General Surgery   7  History of Hysterectomy   8  History of Rotator Cuff Repair   9  History of [place] Lymph Node Biopsy   10  History of Shoulder Repair   11  History of Tonsillectomy    Family History  Father    1  Family history of Lung Cancer (V16 1)  Brother    2  Family history of Lung Cancer (V16 1)    Social History    · Being A Social Drinker   · Never A Smoker    Current Meds   1  Atorvastatin Calcium 20 MG Oral Tablet; TABS PO X 30; Therapy: 40AQO6382 to (Last Rx:75Sxy0701)  Requested for: 88HKK3396 Ordered   2   Calcium 600 MG Oral Tablet; twice daily; Therapy: 95KDB2038 to (Last Rx:12Mar2012)  Requested for: 89SRI8377 Ordered   3  Dulcolax TBEC; Therapy: (Recorded:09Oct2012) to Recorded   4  Fiber CAPS; Therapy: (Recorded:09Oct2012) to Recorded   5  Glucosamine 1500 Complex Oral Capsule; twice daily; Therapy: 78PSU0229 to (Last Rx:12Mar2012)  Requested for: 34RYB8709 Ordered   6  Ibrance 75 MG Oral Capsule; One tab PO day 1-21 every 28 days; Therapy: 94CEP8157 to (Last Rx:31Abj0598)  Requested for: 47DFJ6439 Ordered   7  Multivitamins Oral Capsule; Daily; Therapy: 58IXR5401 to (Last Rx:12Mar2012)  Requested for: 13SQD6311 Ordered    Allergies    1  Ampicillin CAPS    Vitals  Vital Signs    Recorded: 26BXA1447 01:23RU   Systolic 073   Diastolic 78   Heart Rate 633   Respiration 20   Temperature 98 2 F   Pain Scale 0   O2 Saturation 93   Height 5 ft 0 5 in   Weight 169 lb    BMI Calculated 32 46   BSA Calculated 1 75     Physical Exam    Constitutional   General appearance: No acute distress, well appearing and well nourished  Eyes   Conjunctiva and lids: No swelling, erythema or discharge  Ears, Nose, Mouth, and Throat   External inspection of ears and nose: Normal     Oropharynx: Normal with no erythema, edema, exudate or lesions  Pulmonary   Auscultation of lungs: Clear to auscultation  Cardiovascular   Auscultation of heart: Normal rate and rhythm, normal S1 and S2, without murmurs  Examination of extremities for edema and/or varicosities: Normal     Abdomen   Abdomen: Non-tender, no masses  Liver and spleen: No hepatomegaly or splenomegaly  Lymphatic   Palpation of lymph nodes in neck: No lymphadenopathy  Musculoskeletal   Gait and station: Normal     Skin   Skin and subcutaneous tissue: Abnormal   Difficult to appreciate subcutaneous nodule in the right lateral chest wall  Neurologic   Cranial nerves: Cranial nerves 2-12 intact      Psychiatric   Orientation to person, place, and time: Normal  Results/Data  * NM PET CT SKULL BASE TO MID THIGH 54Zht1503 10:39AM Margarita Oliveira Order Number: [id number]    Order Number: [id number]   Performing Comments: NO FOOD 6 HOURS PRIOR TO TEST   PLEASE DRINK WATER   - Patient Instructions: [facility] [address]   [place]     Test Name Result Flag Reference   NM PET CT SKULL BASE TO MID THIGH (Report)     PET/CT SCAN     INDICATION: History of recurrent right breast carcinoma  Restaging examination  Undergoing chemotherapy  MODIFIER:   PS      COMPARISON: 5/10/2016     CELL TYPE: Infiltrating ductal carcinoma, right breast     TECHNIQUE:  8 5 mCi F-18-FDG administered IV  Multiplanar attenuation corrected and non attenuation corrected PET images are available for interpretation, and contiguous, low dose, axial CT sections were obtained from the skull base through the femurs    following the administration of oral contrast material (7 5 cc Omnipaque-240 in 300 cc water)  Intravenous contrast material was not utilized  Fasting serum glucose: 113 mg/dl     FINDINGS:      VISUALIZED BRAIN:     No acute abnormalities are seen  HEAD/NECK:     There is a physiologic distribution of FDG  No FDG avid cervical adenopathy is seen  CT images: Unremarkable  CHEST:     Prior examination demonstrates a right lateral chest wall metastatic lesion  Previous size was 1 1 x 1 7 cm, currently on image 104, measuring 0 9 cm in diameter  Prior SUV max was 3 0, on today's study there is no residual abnormal activity  There    are no new hypermetabolic foci within the thorax  ABDOMEN:     Again seen is a metastatic lesion within the posterior aspect of the right hepatic lobe  Earlier SUV max was 10 9, size 3 9 x 2 6 cm  The size of the lesion is currently approximately 3 2 x 2 5 cm  Q clear SUV max is 17 6   Note: This examination is    being interpreted on a new PET/CT utilizing Q Clear technology and therefore strict comparison to previous SUV values is not possible  In comparing the lesion to adjacent physiological structures, the degree of intensity is relatively similar to the    prior study  There are no new hypermetabolic lesions  Cholelithiasis is present       PELVIS:    No FDG avid soft tissue lesions are seen  CT images: Colonic diverticulosis is present  There are scattered areas of increased activity within the skeletal muscle of the pelvis, lateral to the left hip and also within the right gluteal muscle, likely related to physiologic change/muscle    straining during imaging  OSSEOUS STRUCTURES:   No FDG avid lesions are seen  CT images: No significant findings  IMPRESSION:     1  Although a small soft tissue nodule remains in the right lateral chest wall, there is no longer abnormal glucose activity  There are no new suspicious abnormalities identified within the thorax  2  The right hepatic lobe lesion has diminished in size  There is however still viable tumor, the remaining tumor demonstrating glucose avidity similar to the previous examination  3  There are no new lesions identified within the abdomen or pelvis  Workstation performed: [id number]     Signed by:   Atif Williamson MD   8/9/16   7 5     (1) CBC/PLT/DIFF 20Jun2016 07:21AM Brandy Evan Order Number: [id number]   Order Number: [id number]     Test Name Result Flag Reference   WBC COUNT 4 86 Thousand/uL  4 31-10 16   RBC COUNT 3 68 Million/uL L 3 81-5 12   HEMOGLOBIN 12 4 g/dL  11 5-15 4   HEMATOCRIT 37 1 %  34 8-46  1    fL H 82-98   MCH 33 7 pg  26 8-34 3   MCHC 33 4 g/dL  31 4-37 4   RDW 15 5 % H 11 6-15 1   MPV 9 6 fL  8 9-12 7   PLATELET COUNT 913 Thousands/uL  149-390   nRBC AUTOMATED 0 /100 WBCs     NEUTROPHILS RELATIVE PERCENT 37 % L 43-75   LYMPHOCYTES RELATIVE PERCENT 50 % H 14-44   MONOCYTES RELATIVE PERCENT 7 %  4-12   EOSINOPHILS RELATIVE PERCENT 4 %  0-6   BASOPHILS RELATIVE PERCENT 2 % H 0-1   NEUTROPHILS ABSOLUTE COUNT 1 80 Thousands/?L L 1 85-7 62   LYMPHOCYTES ABSOLUTE COUNT 2 42 Thousands/?L  0 60-4 47   MONOCYTES ABSOLUTE COUNT 0 36 Thousand/?L  0 17-1 22   EOSINOPHILS ABSOLUTE COUNT 0 18 Thousand/?L  0 00-0 61   BASOPHILS ABSOLUTE COUNT 0 09 Thousands/?L  0 00-0 10     (1) COMPREHENSIVE METABOLIC PANEL 74GVA3142 57:24UO Miguelina Stable Order Number: [id number]   Order Number: [id number]     Test Name Result Flag Reference   GLUCOSE,RANDM 103 mg/dL     If the patient is fasting, the ADA then defines impaired fasting glucose as > 100 mg/dL and diabetes as > or equal to 123 mg/dL  SODIUM 141 mmol/L  136-145   POTASSIUM 4 6 mmol/L  3 5-5 3   This specimen shows spectrophotometric evidence of moderate hemolysis sufficient to jeopardize the accuracy of measurement of K+, MG, AST, LD, and IRON  Interpret results with caution  Repeat specimen recommended  CHLORIDE 108 mmol/L  100-108   CARBON DIOXIDE 28 mmol/L  21-32   ANION GAP (CALC) 5 mmol/L  4-13   BLOOD UREA NITROGEN 16 mg/dL  5-25   CREATININE 0 67 mg/dL  0 60-1 30   Standardized to IDMS reference method   CALCIUM 9 1 mg/dL  8 3-10 1   BILI, TOTAL 0 66 mg/dL  0 20-1 00   ALK PHOSPHATAS 74 U/L     ALT (SGPT) 40 U/L  12-78   AST(SGOT) 34 U/L  5-45   This specimen shows spectrophotometric evidence of moderate hemolysis sufficient to jeopardize the accuracy of measurement of K+, MG, AST, LD, and IRON  Interpret results with caution  Repeat specimen recommended  ALBUMIN 3 8 g/dL  3 5-5 0   TOTAL PROTEIN 6 8 g/dL  6 4-8 2   eGFR Non-African American      >60 0 ml/min/1 73sq m   [facility] Disease Education Program recommendations are as follows:  GFR calculation is accurate only with a steady state creatinine  Chronic Kidney disease less than 60 ml/min/1 73 sq  meters  Kidney failure less than 15 ml/min/1 73 sq  meters       Signatures   Electronically signed by : DESIREE Kirkpatrick D ,DO; Aug 16 2016 11:19AM EST (Author)

## 2018-01-18 NOTE — MISCELLANEOUS
Message   Recorded as Task   Date: 04/17/2017 08:28 AM, Created By: Renny Pizarro   Task Name: Follow Up   Assigned To: Tamar Bansal   Regarding Patient: Tamara Mckeon, Status: Active   Comment:    Lina Rousey - 17 Apr 2017 8:28 AM     TASK CREATED  Caller: Self; General Medical Question; [phone] (Home)  Has questions about diarrhea medication and instructions  Please call   patient reports she us using Lomotil for diarrhea - reviewed PRN instructions  She reports that for the past 2 days when she wakes up she has a normal BM and then about 1 hour later she has an episode of diarrhea  Active Problems    1  Abnormal PET scan of head (793 0) (R94 02)   2  Acute bronchitis (466 0) (J20 9)   3  Adenocarcinoma of breast (174 9) (C50 919)   4  Admission for antineoplastic chemotherapy (V58 11) (Z51 11)   5  Diarrhea (787 91) (R19 7)   6  Flushing (782 62) (R23 2)   7  Liver mass, right lobe (573 9) (R16 0)   8  Liver metastases (197 7) (C78 7)   9  Recurrent breast cancer, right (174 9) (C50 911)   10  Screening mammogram for high-risk patient (V76 11) (Z12 31)    Current Meds   1  Atorvastatin Calcium 20 MG Oral Tablet; TABS PO X 30; Therapy: 13WLG0681 to (Last Rx:79Lks3814)  Requested for: 97SZU1736 Ordered   2  Calcium 600 MG Oral Tablet; twice daily; Therapy: 38ATU3297 to (Last Rx:12Mar2012)  Requested for: 35KDX9120 Ordered   3  Diphenoxylate-Atropine 2 5-0 025 MG Oral Tablet (Lomotil); TAKE 1 TABLET 4 TIMES   DAILY AS NEEDED FOR DIARRHEA; Therapy: 62Ayg4354 to (Evaluate:12Hwq9618); Last Rx:13Apr2017 Ordered   4  Dulcolax TBEC; Therapy: (Recorded:09Oct2012) to Recorded   5  Fiber CAPS; Therapy: (Recorded:09Oct2012) to Recorded   6  Glucosamine 1500 Complex Oral Capsule; twice daily; Therapy: 66CVV2775 to (Last Rx:12Mar2012)  Requested for: 97PAR4689 Ordered   7  Ibrance 75 MG Oral Capsule; One tab PO day 1-21 every 28 days;    Therapy: 53BRP6308 to (Last Rx:19Jan2017)  Requested for: 50DEI1055 Ordered   8  Multivitamins Oral Capsule; Daily; Therapy: 27CDY4789 to (Last Rx:12Mar2012)  Requested for: 77PVG2559 Ordered   9  Ondansetron HCl - 4 MG Oral Tablet (Zofran); One tab PO q 4-6 hours PRN nausea; Therapy: 02Dwi4999 to (Last Rx:21Feb2017)  Requested for: 08Pql7841 Ordered   10  TheraCran CAPS Recorded    Allergies    1   Ampicillin CAPS    Signatures   Electronically signed by : Trever Martinez, ; Apr 17 2017  9:02AM EST                       (Author)

## 2018-01-18 NOTE — PROGRESS NOTES
Assessment    1  Adenocarcinoma of breast (174 9) (C50 919)   2  Liver metastases (197 7) (C78 7)    Plan  Liver metastases    · Drink plenty of fluids ; Status:Complete;   Done: [id number]   Ordered; For:Liver metastases; Ordered By:Brendan Moscoso;   · Follow-up visit in 6 weeks Evaluation and Treatment  Follow-up  [facility]  Status: Hold For - Scheduling  Requested for: 46VLW2444   Ordered; For: Liver metastases; Ordered By: Cesario Leonardo Performed:  Due: 12FSA8796   · (1) CBC/ PLT (NO DIFF); Status:Active; Requested LNO:[id number];    Perform:[facility]; [id number];BNVVYCA; For:Liver metastases; Ordered By:Brendan Moscoso;   · (1) COMPREHENSIVE METABOLIC PANEL; Status:Active; Requested FZM:[id number];    Perform:[facility]; FYW:[id number];AMRKFCH; For:Liver metastases; Ordered By:Brendan Moscoso;    Discussion/Summary  Discussion Summary:   In summary, this is a 67-year-old female history of breast cancer as outlined  She's been on Faslodex and Ibrance  She tolerates this well  CBC and chemistries have been normal   Recent PET/CT shows decrease in the size of her right chest wall lesion, as well as previously noted hepatic lesion  The improvement in her hepatic lesion confirms that this is metastatic breast cancer  It would be almost on imaginable for other metastatic disease to respond to the combination of treatment with Faslodex and Ibrance  Benign processes seem  Similarly unlikely  Fortunately, she seems to be tolerating her treatment exceptionally well  Her performance status remains good  She functions without restriction  Lastly, she notes, that she's had some wheezing, cough, shortness of breath  These have occurred prior to institution of her therapy  They fluctuate and are somewhat bothersome  I suspect this may represent asthma  I do not believe it is related to her malignancy, or treatment  I suggested that she see her PCP, for evaluation appropriate treatment    I'll see her back in 6 weeks for review with repeat staging to be accomplished in 3 months  I reviewed the above with the patient and her   They voiced understanding and agreement  Understands and agrees with treatment plan: The treatment plan was reviewed with the patient/guardian  The patient/guardian understands and agrees with the treatment plan   Counseling Documentation With Imm: The patient was counseled regarding diagnostic results, instructions for management, patient and family education, impressions  total time of encounter was 40 minutes  Chief Complaint  Chief Complaint Free Text Note Form: Followup regarding breast cancer      History of Present Illness  HPI: 2009- right breast, infiltrating ductal carcinoma (T2, N1 sebastien)  Tumor was ER/GA positive, HER-2 negative  May 2009- right mastectomy  Taxotere/Cytoxan x4 cycles  October 2009- started Arimidex 1 mg by mouth daily  This continued until October 2014 February 2016- felt lump in the right  lateral aspect of right breast  Biopsy confirmed the presence of ductal carcinoma, consistent with breast primary  Tumor was %, GA 70%, HER-2 +1  PET/CT showed right lateral chest wall nodule measuring 2 3 cm, SUV 11 1  Right posterior liver, Mass  , measuring 5 8 cm, hypermetabolic  March 16 6414- started Faslodex 500 mg every month, and Ibrance 75 mg by mouth daily, 3 weeks on, one week off   Current Therapy: March 16 2016- started Faslodex 500 mg every month, and Ibrance 75 mg by mouth daily, 3 weeks on, one week off   Interval History: Occasional wheezing, cough, SOB  Has had in the past Maybe more so recently  Review of Systems  Complete-Female:   Constitutional: No fever, no chills, feels well, no tiredness, no recent weight gain or weight loss  Eyes: No complaints of eye pain, no red eyes, no eyesight problems, no discharge, no dry eyes, no itching of eyes     ENT: no complaints of earache, no loss of hearing, no nose bleeds, no nasal discharge, no sore throat, no hoarseness  Cardiovascular: No complaints of slow heart rate, no fast heart rate, no chest pain, no palpitations, no leg claudication, no lower extremity edema  Respiratory: No complaints of shortness of breath, no wheezing, no cough, no SOB on exertion, no orthopnea, no PND  Gastrointestinal: constipation and better with stool softners  Last colo before 2009  No polyps  Genitourinary: No complaints of dysuria, no incontinence, no pelvic pain, no dysmenorrhea, no vaginal discharge or bleeding  Musculoskeletal: No complaints of arthralgias, no myalgias, no joint swelling or stiffness, no limb pain or swelling  Integumentary: No complaints of skin rash or lesions, no itching, no skin wounds, no breast pain or lump  Neurological: No complaints of headache, no confusion, no convulsions, no numbness, no dizziness or fainting, no tingling, no limb weakness, no difficulty walking  Psychiatric: Not suicidal, no sleep disturbance, no anxiety or depression, no change in personality, no emotional problems  Hematologic/Lymphatic: No complaints of swollen glands, no swollen glands in the neck, does not bleed easily, does not bruise easily  Active Problems    1  Abnormal PET scan of head (793 0) (R94 02)   2  Acute bronchitis (466 0) (J20 9)   3  Adenocarcinoma of breast (174 9) (C50 919)   4  Flushing (782 62) (R23 2)   5  Liver mass, right lobe (573 9) (R16 0)   6  Recurrent breast cancer, right (174 9) (C50 911)   7  Screening mammogram for high-risk patient (V76 11) (Z12 31)    Past Medical History    1  History of Age At First Period 13 Years Old (Menarche)   2  History of Age At First Pregnancy 19 Years Old   3  History of diverticulitis of colon (V12 79) (Z87 19)   4  History of Night sweats (780 8) (R61)   5  History of Polyuria (788 42) (R35 8)   6  History of Previous Pregnancies Resulted In 4  Live Birth(S)   7   History of Status post chemotherapy (V66 2) (Z51 89)   8  History of Use of anastrozole (Arimidex) (V07 52) (S17 410)    Surgical History    1  History of Adenoidectomy   2  History of Biopsy Breast Open   3  History of Biopsy Breast Percutaneous Needle Core   4  History of Breast Surgery Mastectomy   5  History of General Surgery   6  History of General Surgery   7  History of Hysterectomy   8  History of Rotator Cuff Repair   9  History of [place] Lymph Node Biopsy   10  History of Shoulder Repair   11  History of Tonsillectomy    Family History  Father    1  Family history of Lung Cancer (V16 1)  Brother    2  Family history of Lung Cancer (V16 1)    Social History    · Being A Social Drinker   · Never A Smoker    Current Meds   1  Atorvastatin Calcium 20 MG Oral Tablet; TABS PO X 30; Therapy: 67SHP6871 to (Last Rx:02Feb2011)  Requested for: 01IMB7914 Ordered   2  Calcium 600 MG Oral Tablet; twice daily; Therapy: 09RZN9376 to (Last Rx:12Mar2012)  Requested for: 64JKO0853 Ordered   3  Dulcolax TBEC; Therapy: (Recorded:09Oct2012) to Recorded   4  Fiber CAPS; Therapy: (Recorded:09Oct2012) to Recorded   5  Glucosamine 1500 Complex Oral Capsule; twice daily; Therapy: 30KJZ7475 to (Last Rx:12Mar2012)  Requested for: 21YQH9760 Ordered   6  Multivitamins Oral Capsule; Daily; Therapy: 99AFI4663 to (Last Rx:12Mar2012)  Requested for: 26XAG7875 Ordered    Allergies    1  Ampicillin CAPS    Vitals  Vital Signs [Data Includes: Current Encounter]    Recorded: 80KIB2171 09:55AM   Temperature 97 7 F   Heart Rate 104   Respiration 20   Systolic 115   Diastolic 86   Height 5 ft 0 5 in   Weight 167 lb 2 08 oz   BMI Calculated 32 1   BSA Calculated 1 74   O2 Saturation 94   Pain Scale 0     Physical Exam    Constitutional   General appearance: No acute distress, well appearing and well nourished  Eyes   Conjunctiva and lids: No swelling, erythema or discharge      Ears, Nose, Mouth, and Throat   External inspection of ears and nose: Normal     Oropharynx: Normal with no erythema, edema, exudate or lesions  Pulmonary   Auscultation of lungs: Clear to auscultation  Cardiovascular   Auscultation of heart: Normal rate and rhythm, normal S1 and S2, without murmurs  Examination of extremities for edema and/or varicosities: Normal     Abdomen   Abdomen: Non-tender, no masses  Liver and spleen: No hepatomegaly or splenomegaly  Lymphatic   Palpation of lymph nodes in neck: No lymphadenopathy  Musculoskeletal   Gait and station: Normal     Skin   Skin and subcutaneous tissue: Abnormal   Difficult to appreciate subcutaneous nodule in the right lateral chest wall  Neurologic   Cranial nerves: Cranial nerves 2-12 intact  Psychiatric   Orientation to person, place, and time: Normal          Results/Data  Encounter Results   (1) COMPREHENSIVE METABOLIC PANEL 83BQC4142 19:62PD [place]     Test Name Result Flag Reference   GLUCOSE,RANDM 109 mg/dL     If the patient is fasting, the ADA then defines impaired fasting glucose as > 100 mg/dL and diabetes as > or equal to 123 mg/dL  SODIUM 146 mmol/L H 136-145   POTASSIUM 5 0 mmol/L  3 5-5 3   CHLORIDE 107 mmol/L  100-108   CARBON DIOXIDE 29 mmol/L  21-32   ANION GAP (CALC) 10 mmol/L  4-13   BLOOD UREA NITROGEN 15 mg/dL  5-25   CREATININE 0 69 mg/dL  0 60-1 30   Standardized to IDMS reference method   CALCIUM 10 1 mg/dL  8 3-10 1   BILI, TOTAL 0 60 mg/dL  0 20-1 00   ALK PHOSPHATAS 75 U/L     ALT (SGPT) <6 U/L L 12-78   AST(SGOT) 11 U/L  5-45   ALBUMIN 4 2 g/dL  3 5-5 0   TOTAL PROTEIN 7 1 g/dL  6 4-8 2   eGFR Non-African American      >60 0 ml/min/1 73sq [facility] Disease Education Program recommendations are as follows:  GFR calculation is accurate only with a steady state creatinine  Chronic Kidney disease less than 60 ml/min/1 73 sq  meters  Kidney failure less than 15 ml/min/1 73 sq  meters       Results   * NM PET CT SKULL BASE TO MID THIGH 05JLG2679 09:33AM [place]     Test Name Result Flag Reference   NM PET CT SKULL BASE TO MID THIGH (Report)     PET/CT SCAN     INDICATION: Recurrent right breast cancer, restaging, postchemotherapy 4/13/2016     MODIFIER:   PS      COMPARISON: 3/2/2016     CELL TYPE: Infiltrating ductal carcinoma, right breast biopsy 2009     TECHNIQUE:  12 7 mCi F-18-FDG administered IV  Multiplanar attenuation corrected and non attenuation corrected PET images are available for interpretation, and contiguous, low dose, axial CT sections were obtained from the skull base through the femurs    following the administration of oral contrast material (7 5 cc Omnipaque-240 in 300 cc water)  Intravenous contrast material was not utilized  Fasting serum glucose: 105 mg/dl     FINDINGS:      VISUALIZED BRAIN:     No acute abnormalities are seen  Previously seen hypermetabolic focus in the left temporoparietal region has diminished  HEAD/NECK:     There is a physiologic distribution of FDG  No FDG avid cervical adenopathy is seen  Minimal brown fat activity is incidentally noted  CT images: Unremarkable  CHEST:     Hypermetabolic lesion in the right lateral chest wall has diminished, measuring 1 1 x 1 7 cm, SUV 3 0, prior measurement 1 7 x 2 3 cm, SUV 11 1  This is compatible with response to therapy, although viable tumor likely remains  No new FDG avid soft tissue lesions are seen  CT images: Right middle and basilar pleural-parenchymal scarring/atelectasis  Coronary artery calcifications  ABDOMEN:     Right posterior liver metastasis has decreased, measuring 3 9 x 2 6 cm, SUV 10 9, prior measurement 5 8 x 3 6 cm, SUV 27 5  There is central photopenia compatible with necrosis  This is also compatible with response to therapy, although viable tumor    likely remains as well  No new FDG avid soft tissue lesions are seen  CT images: Cholelithiasis  Splenic and hepatic granulomas  PELVIS:    No FDG avid soft tissue lesions are seen     CT images: Sigmoid diverticulosis  OSSEOUS STRUCTURES:   No FDG avid lesions are seen  CT images: Spine degenerative change  IMPRESSION:   1  Decreased size and hypermetabolism of hypermetabolic metastases in the right chest wall and right liver, compatible with response to therapy, although viable tumor likely remains  2  No new hypermetabolic metastases demonstrated               Workstation performed: [id number]     Signed by:   Dharmesh Cooper MD   5/11/16   7 5     Signatures   Electronically signed by : DESIREE Peña ,DO; May 12 2016 10:36AM EST                       (Author)    Electronically signed by : DESIREE Peña ,DO; May 12 2016 10:38AM EST                       (Author)

## 2018-01-22 VITALS
BODY MASS INDEX: 27.84 KG/M2 | DIASTOLIC BLOOD PRESSURE: 48 MMHG | SYSTOLIC BLOOD PRESSURE: 98 MMHG | WEIGHT: 147.44 LBS | OXYGEN SATURATION: 91 % | HEART RATE: 78 BPM | HEIGHT: 61 IN

## 2018-01-22 VITALS
DIASTOLIC BLOOD PRESSURE: 70 MMHG | SYSTOLIC BLOOD PRESSURE: 122 MMHG | HEIGHT: 61 IN | OXYGEN SATURATION: 94 % | HEART RATE: 91 BPM | RESPIRATION RATE: 16 BRPM | WEIGHT: 154.25 LBS | BODY MASS INDEX: 29.12 KG/M2 | TEMPERATURE: 97.5 F

## 2018-01-22 VITALS
HEART RATE: 83 BPM | SYSTOLIC BLOOD PRESSURE: 126 MMHG | HEIGHT: 61 IN | DIASTOLIC BLOOD PRESSURE: 64 MMHG | WEIGHT: 155.5 LBS | BODY MASS INDEX: 29.36 KG/M2

## 2018-01-26 ENCOUNTER — TELEPHONE (OUTPATIENT)
Dept: GASTROENTEROLOGY | Facility: CLINIC | Age: 78
End: 2018-01-26

## 2018-01-26 RX ORDER — OXYCODONE HYDROCHLORIDE 5 MG/1
TABLET ORAL
COMMUNITY
Start: 2017-08-11 | End: 2018-12-17 | Stop reason: ALTCHOICE

## 2018-01-26 RX ORDER — FUROSEMIDE 20 MG/1
TABLET ORAL
Status: ON HOLD | COMMUNITY
Start: 2017-08-01 | End: 2019-08-21 | Stop reason: SDUPTHER

## 2018-01-26 RX ORDER — CLOTRIMAZOLE AND BETAMETHASONE DIPROPIONATE 10; .64 MG/G; MG/G
1 CREAM TOPICAL AS NEEDED
COMMUNITY
Start: 2017-08-14 | End: 2022-01-01 | Stop reason: HOSPADM

## 2018-01-26 RX ORDER — DIPHENOXYLATE HYDROCHLORIDE AND ATROPINE SULFATE 2.5; .025 MG/1; MG/1
1 TABLET ORAL 4 TIMES DAILY PRN
COMMUNITY
Start: 2017-04-13 | End: 2018-02-20

## 2018-01-26 RX ORDER — CLINDAMYCIN HYDROCHLORIDE 300 MG/1
CAPSULE ORAL
Refills: 0 | COMMUNITY
Start: 2018-01-03 | End: 2022-01-01 | Stop reason: HOSPADM

## 2018-01-29 ENCOUNTER — OFFICE VISIT (OUTPATIENT)
Dept: CARDIOLOGY CLINIC | Facility: CLINIC | Age: 78
End: 2018-01-29
Payer: COMMERCIAL

## 2018-01-29 VITALS
DIASTOLIC BLOOD PRESSURE: 60 MMHG | SYSTOLIC BLOOD PRESSURE: 106 MMHG | BODY MASS INDEX: 29.64 KG/M2 | WEIGHT: 151 LBS | HEART RATE: 84 BPM | HEIGHT: 60 IN | OXYGEN SATURATION: 97 %

## 2018-01-29 DIAGNOSIS — R00.0 TACHYCARDIA: ICD-10-CM

## 2018-01-29 DIAGNOSIS — J90 PLEURAL EFFUSION, RIGHT: ICD-10-CM

## 2018-01-29 DIAGNOSIS — R00.1 SYMPTOMATIC BRADYCARDIA: Primary | ICD-10-CM

## 2018-01-29 DIAGNOSIS — Z95.0 PACEMAKER: ICD-10-CM

## 2018-01-29 PROCEDURE — 99214 OFFICE O/P EST MOD 30 MIN: CPT | Performed by: INTERNAL MEDICINE

## 2018-01-29 NOTE — PROGRESS NOTES
Heart Failure Outpatient Progress Note - Rip Mejia [de-identified] y o  female MRN: [id number]    @ Encounter: [id number]      Assessment/Plan:    Patient Active Problem List    Diagnosis Date Noted    Cholecystitis 01/07/2018    Acute biliary pancreatitis 01/07/2018    Cholangitis 01/07/2018    Choledocholithiasis 01/07/2018    Chest wall hematoma 84/60/2747    Complication associated with cardiac pacemaker lead 11/09/2017    Pacemaker malfunction, initial encounter 11/09/2017    Symptomatic bradycardia 11/01/2017    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 ([facility] [address] ) 01/05/2017    Hypercholesteremia     History of total knee replacement 05/27/2015    Left bundle branch block (LBBB) 04/13/2015    Hearing loss 07/16/2014    Gastroesophageal reflux disease without esophagitis 03/22/2010       Assessment:  1  Medtronic dual chamber PPM 10/31/17 after loop showed 5 second pause  She has recurrent syncope in the above context  Had large hematomas admitted 11/9, hemorrhagic shock   RV lead perforation- lead extraction and reimplantation on 11/9/17  2  Metastsatic breast CA- post right mastectomy, liver lesion removed Sept 2017  3  Hyperlipidemia  4  Right Pleural effusion on 1/7/18 CT chest- sounds better on exam today,  Not large, saturating ok, has lasix to take prn  5  Normal EF: 50%, with normal right heart function on Nov 2017 echo    TODAY'S PLAN:    HPI: 75 yo female presents for follow up after undergoing PPM placement in October for symptomatic bradycardia after loop recorder revealed 5 second pause  She has metastatic breast CA, stage 4 with taxotere, cytoxan and arimidex in past with last MUGA Jan 2017 showing EF: 57%  EKG had shown LBBB  Her disease had disappeared radiographically except for liver lesion  She had a number of syncopal episodes  Nuclear stress was unremarkable and echo showed EF: 50%  She underwent resection of liver lesions in Sept 2017  10/4/17 loop recorder implanted   She had 5 second pause noted and underwent Medtronic dual chamber PPM 10/31/17  Post PPM course complicated by RV perforation and hemorrhagic shock  On 11/9 had lead extraction and reimplantation on 11/9/17  Has since followed up with Dr Erwin Every  She had a CT chest 1/718 and there is moderate right pleural effusion  Interval History:   See above  Today she still gets lightheaded and is seeing ENT     Past Medical History:   Diagnosis Date    Anxiety     Arthritis     Breast CA ([facility] [address] )     recurrent, ductal carcinoma ER, MS pos    Depression     Diverticula of intestine     Dizziness     and passes out    Flushing     Hiatal hernia     [place] (hard of hearing)      lizette    Hypercholesteremia     Liver mass     Liver metastasis (HCC)     Metastatic adenocarcinoma to liver ([facility] [address] )     Bx 01/03/2017    PONV (postoperative nausea and vomiting)     Recurrent breast cancer (HCC)     Shingles     Shortness of breath     on exertion    Tachycardia     Urinary incontinence     Use of cane as ambulatory aid     or walker       Review of Systems - General ROS: positive for dyspnea on exertion and lightheadedness, but no syncope  No chest pain  Allergies   Allergen Reactions    Ampicillin Shortness Of Breath and Anaphylaxis           Current Outpatient Prescriptions:     clotrimazole-betamethasone (LOTRISONE) 1-0 05 % cream, Apply topically, Disp: , Rfl:     diphenoxylate-atropine (LOMOTIL) 2 5-0 025 mg per tablet, Take 1 tablet by mouth 4 (four) times a day as needed, Disp: , Rfl:     furosemide (LASIX) 20 mg tablet, TAKE 1 TABLET BY MOUTH DAILY AS NEEDED FOR EDEMA, Disp: , Rfl:     oxyCODONE (ROXICODONE) 5 mg immediate release tablet, take 1 tablet by mouth every 6 hours if needed for BREAKTHROUGH PAIN, Disp: , Rfl:     acetaminophen (TYLENOL) 325 mg tablet, May take 650 mg every 6-8 hours as needed for pain (Patient taking differently: Take 650 mg by mouth every 6 (six) hours as needed May take 650 mg every 6-8 hours as needed for pain ), Disp: 30 tablet, Rfl: 0    atorvastatin (LIPITOR) 20 mg tablet, Take 20 mg by mouth daily  , Disp: , Rfl:     calcium carbonate (CALCIUM 600) 600 MG tablet, Take 600 mg by mouth 2 (two) times a day with meals  , Disp: , Rfl:     clindamycin (CLEOCIN) 300 MG capsule, TAKE 2 CAPSULES BY MOUTH 1 HOUR PRIOR TO DENTAL PROCEDURE , Disp: , Rfl: 0    Cranberry (THERACRAN HP PO), Take 2 tablets by mouth daily, Disp: , Rfl:     Glucosamine-Chondroit-Vit C-Mn (GLUCOSAMINE 1500 COMPLEX) CAPS, Take 1 capsule by mouth 2 (two) times a day  , Disp: , Rfl:     metoprolol tartrate (LOPRESSOR) 25 mg tablet, Take 1 tablet by mouth every 12 (twelve) hours, Disp: , Rfl: 0    Multiple Vitamin (MULTIVITAMIN) capsule, Take 1 capsule by mouth daily  , Disp: , Rfl:     ondansetron (ZOFRAN) 4 mg tablet, Take 4 mg by mouth every 8 (eight) hours as needed for nausea or vomiting, Disp: , Rfl:     tamoxifen (NOLVADEX) 20 mg tablet, Take 20 mg by mouth daily, Disp: , Rfl:     Social History     Social History    Marital status: /Civil Union     Spouse name: N/A    Number of children: N/A    Years of education: N/A     Occupational History    Not on file  Social History Main Topics    Smoking status: Never Smoker    Smokeless tobacco: Never Used    Alcohol use No    Drug use: No    Sexual activity: Not on file     Other Topics Concern    Not on file     Social History Narrative    No narrative on file       Family History   Problem Relation Age of Onset    Lung cancer Father     Cancer Brother     Diabetes type II Son        Physical Exam:    Vitals: There were no vitals taken for this visit  , There is no height or weight on file to calculate BMI ,   Wt Readings from Last 3 Encounters:   01/08/18 68 3 kg (150 lb 9 2 oz)   01/07/18 76 8 kg (169 lb 5 oz)   12/01/17 70 5 kg (155 lb 8 oz)         Physical Exam:  Vitals:    01/29/18 1104   BP: 106/60   Pulse: 84   SpO2: 97%         GEN: Jannie Díaz appears well, alert and oriented x 3, pleasant and cooperative   HEENT: pupils equal, round, and reactive to light; extraocular muscles intact  NECK: supple, no carotid bruits   HEART: regular rhythm, normal S1 and S2, no murmurs, clicks, gallops or rubs, JVP is down   LUNGS: clear to auscultation bilaterally; no wheezes, rales, or rhonchi   No significant effusion on exam on right  ABDOMEN: normal bowel sounds, soft, no tenderness, no distention  EXTREMITIES: peripheral pulses normal; no clubbing, cyanosis, or edema  NEURO: no focal findings   SKIN: normal without suspicious lesions on exposed skin    Labs & Results:    Lab Results   Component Value Date    GLUCOSE 186 (H) 01/10/2018    CALCIUM 8 2 (L) 01/10/2018     01/10/2018    K 3 4 (L) 01/10/2018    CO2 24 01/10/2018     01/10/2018    BUN 11 01/10/2018    CREATININE 0 44 (L) 01/10/2018     Lab Results   Component Value Date    WBC 9 53 01/10/2018    HGB 11 2 (L) 01/10/2018    HCT 34 0 (L) 01/10/2018    MCV 93 01/10/2018     01/10/2018     No results found for: BNP   Lab Results   Component Value Date    CHOL 170 06/20/2017     Lab Results   Component Value Date    HDL 67 (H) 06/20/2017     Lab Results   Component Value Date    LDLCALC 79 06/20/2017     Lab Results   Component Value Date    TRIG 120 06/20/2017     No components found for: CHOLHDL     Echocardiogram 11/10  LVEF: 50%  LVIDd:  RV:  MR:  PASP:  RVOT:   Other:    EKG personally reviewed by Shelia Saint, DO  Counseling / Coordination of Care  Total floor / unit time spent today 25 minutes  Greater than 50% of total time was spent with the patient and / or family counseling and / or coordination of care  A description of the counseling / coordination of care: 15  Thank you for the opportunity to participate in the care of this patient    JEREMY JIMÉNEZ [address]

## 2018-01-30 PROBLEM — K85.10 ACUTE GALLSTONE PANCREATITIS: Status: ACTIVE | Noted: 2018-01-30

## 2018-02-09 ENCOUNTER — OFFICE VISIT (OUTPATIENT)
Dept: CARDIOLOGY CLINIC | Facility: CLINIC | Age: 78
End: 2018-02-09
Payer: COMMERCIAL

## 2018-02-09 VITALS
HEIGHT: 60 IN | WEIGHT: 155.3 LBS | BODY MASS INDEX: 30.49 KG/M2 | SYSTOLIC BLOOD PRESSURE: 108 MMHG | DIASTOLIC BLOOD PRESSURE: 64 MMHG | HEART RATE: 86 BPM

## 2018-02-09 DIAGNOSIS — T82.9XXD PACEMAKER COMPLICATIONS, SUBSEQUENT ENCOUNTER: ICD-10-CM

## 2018-02-09 DIAGNOSIS — I44.30 HEART BLOCK ATRIOVENTRICULAR: ICD-10-CM

## 2018-02-09 DIAGNOSIS — R00.1 SYMPTOMATIC BRADYCARDIA: Primary | ICD-10-CM

## 2018-02-09 DIAGNOSIS — I44.7 LEFT BUNDLE BRANCH BLOCK (LBBB): Chronic | ICD-10-CM

## 2018-02-09 DIAGNOSIS — T82.9XXS: ICD-10-CM

## 2018-02-09 PROCEDURE — 99213 OFFICE O/P EST LOW 20 MIN: CPT | Performed by: INTERNAL MEDICINE

## 2018-02-09 PROCEDURE — 93000 ELECTROCARDIOGRAM COMPLETE: CPT | Performed by: INTERNAL MEDICINE

## 2018-02-09 NOTE — PROGRESS NOTES
[facility] [address]     Follow-up  Today's Date: 02/09/18        Patient name: Pancho James  YOB: 1940  Sex: female         Chief Complaint: f/u acemaker      ASSESSMENT:  Problem List Items Addressed This Visit     Symptomatic bradycardia - Primary    Relevant Orders    POCT ECG (Completed)        [de-identified] yo female  1) Multiple 5s pauses due to intermittent complete heart block and syncope seen on loop, which was subsequently explanted and New dual chamber pacemaker w Passive leads placed  Unfortuantely she had late subacute RV lead perforation (pain started 5 days later and loss of capture)  We revised device and no pericardial effusion, however she did develop large hematoma requiring 2 units tranfusion  She is now recovered, hematoma and bruising resolving  She did have swelling left upper arm which is resolved ) Stage 4 Liver CA    2) Postural dizzyness, improve  3) S/p cholecystitis planned biliary stent      DISCUSSION AND PLAN:  1  Remote pacemaker checks, next in April  2  F/u in 12 months        Orders Placed This Encounter   Procedures    POCT ECG     There are no discontinued medications    HPI: [de-identified] yo female  1) Multiple 5s pauses due to intermittent complete heart block and syncope seen on loop, which was subsequently explanted and New dual chamber pacemaker w Passive leads placed  Unfortuantely she had late subacute RV lead perforation (pain started 5 days later and loss of capture)  We revised device and no pericardial effusion, however she did develop large hematoma requiring 2 units tranfusion  She is now recovered, hematoma and bruising resolving   She did have swelling left upper arm which is resolved ) Stage 4 Liver CA    2) Postural dizzyness, improve  3) S/p cholecystitis planned biliary stent    Please note HPI is listed by problem with with update following it, it is copied again in the assessment above and reflects medical decision making as well  Complete 12 point ROS reviewed and otherwise non pertinent or negative except as per HPI pertinent positives in Cardiovascular and Respiratory emphasized  Please see paper chart for outpatient clinic patients where the patient completed the 12 point ROS survey  Past Medical History:   Diagnosis Date    Anxiety     Arthritis     Breast CA ([address] )     recurrent, ductal carcinoma ER, [place] pos    Depression     Diverticula of intestine     Dizziness     and passes out    [place]     Hiatal hernia     [place] (hard of hearing[place]      l[place]    Hypercholesteremia     Liver mass     Liver metastasis (HCC)     Metastatic adenocarcinoma to liver (HCC)     Bx 01/03/2017    PONV (postoperative nausea and vomiting)     Recurrent breast cancer (HCC)     Shingles     Shortness of breath     on exertion    Tachycardia     Urinary incontinence     Use of cane as ambulatory aid     or walker       Allergies   Allergen Reactions    Ampicillin Shortness Of Breath and Anaphylaxis     Other reaction(s): Unknown Allergic Reaction  Reaction Date: [id number]; I reviewed the Home Medication list and Allergies in the chart  Scheduled Meds:  Current Outpatient Prescriptions   Medication Sig Dispense Refill    acetaminophen (TYLENOL) 325 mg tablet May take 650 mg every 6-8 hours as needed for pain (Patient taking differently: Take 650 mg by mouth every 6 (six) hours as needed May take 650 mg every 6-8 hours as needed for pain ) 30 tablet 0    atorvastatin (LIPITOR) 20 mg tablet Take 20 mg by mouth daily   calcium carbonate (CALCIUM 600) 600 MG tablet Take 600 mg by mouth 2 (two) times a day with meals        clindamycin (CLEOCIN) 300 MG capsule TAKE 2 CAPSULES BY MOUTH 1 HOUR PRIOR TO DENTAL PROCEDURE   0    clotrimazole-betamethasone (LOTRISONE) 1-0 05 % cream Apply topically      Cranberry (THERACRAN HP PO) Take 2 tablets by mouth daily      furosemide (LASIX) 20 mg tablet TAKE 1 TABLET BY MOUTH DAILY AS NEEDED FOR EDEMA      Glucosamine-Chondroit-Vit C-Mn (GLUCOSAMINE 1500 COMPLEX) CAPS Take 1 capsule by mouth 2 (two) times a day        metoprolol tartrate (LOPRESSOR) 25 mg tablet Take 0 5 tablets (12 5 mg total) by mouth every 12 (twelve) hours 30 tablet 4    Multiple Vitamin (MULTIVITAMIN) capsule Take 1 capsule by mouth daily   ondansetron (ZOFRAN) 4 mg tablet Take 4 mg by mouth every 8 (eight) hours as needed for nausea or vomiting      oxyCODONE (ROXICODONE) 5 mg immediate release tablet take 1 tablet by mouth every 6 hours if needed for BREAKTHROUGH PAIN      tamoxifen (NOLVADEX) 20 mg tablet Take 20 mg by mouth daily      diphenoxylate-atropine (LOMOTIL) 2 5-0 025 mg per tablet Take 1 tablet by mouth 4 (four) times a day as needed       No current facility-administered medications for this visit  PRN Meds:         Family History   Problem Relation Age of Onset    Lung cancer Father     Cancer Brother     Diabetes type II Son        Social History     Social History    Marital status: /Civil Union     Spouse name: N/A    Number of children: N/A    Years of education: N/A     Occupational History    Not on file       Social History Main Topics    Smoking status: Never Smoker    Smokeless tobacco: Never Used    Alcohol use No    Drug use: No    Sexual activity: Not on file     Other Topics Concern    Not on file     Social History Narrative    No narrative on file         OBJECTIVE:    /64 (BP Location: Right arm, Patient Position: Sitting, Cuff Size: Large)   Pulse 86   Ht 5' (1 524 m)   Wt 70 4 kg (155 lb 4 8 oz)   LMP  (LMP Unknown) Comment: post   BMI 30 33 kg/m²   Vitals:    02/09/18 1550   Weight: 70 4 kg (155 lb 4 8 oz)     GEN: No acute distress, Alert and oriented, well appearing  HEENT:Head, neck, ears, oral pharynx: Mucus membranes moist, oral pharynx clear, nares clear  External ears normal  EYES: Pupils equal, sclera anicteric, midline, normal conjuctiva  NECK: No JVD, supple, no obvious masses or thryomegaly or goiter  CARDIOVASCULAR:  RRR, No murmur, rub, gallops S1,S2  LUNGS: Clear To auscultation bilaterally, normal effort, no rales, rhonchi, crackles  ABDOMEN:  nondistended,  without obvious organomegaly or ascites  EXTREMITIES/VASCULAR:  No edema  Radial pulses intact, pedal pulses difficult to palpate, warm an well perfused  PSYCH: Normal Affect, no overt suicidal ideation, linear speech pattern without evidence of psychosis  NEURO: Grossly intact, moving all extremiteis equal, face symmetric, alert and responsive, no obvious focal defecits  GAIT:  Ambulates normally without difficulty  HEME: No bleeding, bruising, petechia, purpura  SKIN: No significant rashes, warm, no diaphoresis or pallor  Lab Results:     @RESULTRCNT(1M])@    CBC with diff:     CMP:    TSH:       Lipid Profile:          Cardiac testing:   Results for orders placed during the hospital encounter of 17   Echo complete with contrast if indicated    Narrative [facility] [address]  [facility], [address] [facility]  [phone]    Transthoracic Echocardiogram  2D, M-mode, Doppler, and Color Doppler    Study date:  2017    Patient: Zachary Martinez  MR number: [id number]  Account number: [de-identified]  : 1940  Age: 76 years  Gender: Female  Status: Outpatient  Location: [address] [place] [address] [facility]  Height: 61 in  Weight: 161 7 lb  BP: 128/ 64 mmHg    Indications: Assess left ventricular function      Diagnoses: E78 5 - Hyperlipidemia, unspecified    Sonographer:  ELIANE Huynh  Primary Physician:  Noni Tellez MD  Referring Physician:  Saman Briones DO  Group:  [facility]  Cardiology Fellow:  Fariba Horvath MD  Interpreting Physician:  Venkatesh Ramirez Hang Crooks MD    SUMMARY    LEFT VENTRICLE:  Systolic function was at the lower limits of normal  Ejection fraction was estimated to be 50 %  Although no diagnostic regional wall motion abnormality was identified, this possibility cannot be completely excluded on the basis of this study  Left ventricular diastolic function parameters were normal     VENTRICULAR SEPTUM:  There was dyssynergic motion  These changes are consistent with LBBB  RIGHT VENTRICLE:  The size was normal   Systolic function was normal     TRICUSPID VALVE:  There was mild regurgitation  Pulmonary artery systolic pressure was within the normal range  HISTORY: PRIOR HISTORY: Breast cancer; LBBB; Hyperlipidemia    PROCEDURE: The study was performed in the [address] [facility] [place]  This was a routine study  The transthoracic approach was used  The study included complete 2D imaging, M-mode, complete spectral Doppler, and color Doppler  The  heart rate was 90 bpm, at the start of the study  Images were obtained from the parasternal, apical, subcostal, and suprasternal notch acoustic windows  Echocardiographic views were limited due to lung interference  Image quality was  adequate  LEFT VENTRICLE: Size was normal  Systolic function was at the lower limits of normal  Ejection fraction was estimated to be 50 %  Although no diagnostic regional wall motion abnormality was identified, this possibility cannot be completely  excluded on the basis of this study  Wall thickness was normal  DOPPLER: Left ventricular diastolic function parameters were normal     VENTRICULAR SEPTUM: There was dyssynergic motion  These changes are consistent with LBBB  RIGHT VENTRICLE: The size was normal  Systolic function was normal  Wall thickness was normal     LEFT ATRIUM: Size was normal     RIGHT ATRIUM: Size was normal     MITRAL VALVE: Valve structure was normal  There was normal leaflet separation   DOPPLER: The transmitral velocity was within the normal range  There was no evidence for stenosis  There was trace regurgitation  AORTIC VALVE: The valve was trileaflet  Leaflets exhibited normal thickness and normal cuspal separation  DOPPLER: Transaortic velocity was within the normal range  There was no evidence for stenosis  There was no regurgitation  TRICUSPID VALVE: The valve structure was normal  There was normal leaflet separation  DOPPLER: The transtricuspid velocity was within the normal range  There was no evidence for stenosis  There was mild regurgitation  Pulmonary artery  systolic pressure was within the normal range  Estimated peak PA pressure was 22 mmHg  PULMONIC VALVE: Leaflets exhibited normal thickness, no calcification, and normal cuspal separation  DOPPLER: The transpulmonic velocity was within the normal range  There was trace regurgitation  PERICARDIUM: There was no pericardial effusion  AORTA: The root exhibited normal size  SYSTEMIC VEINS: IVC: The inferior vena cava was normal in size and course   Respirophasic changes were normal     SYSTEM MEASUREMENT TABLES    2D  %FS: 20 78 %  Ao Diam: 3 49 cm  EDV(Teich): 45 21 ml  EF Biplane: 52 52 %  EF(Cube): 50 28 %  EF(Teich): 43 45 %  ESV(Cube): 18 41 ml  ESV(Teich): 25 57 ml  IVSd: 1 04 cm  LA Diam: 2 52 cm  LVEDV MOD A2C: 104 64 ml  LVEDV MOD A4C: 107 05 ml  LVEDV MOD BP: 108 41 ml  LVEF MOD A2C: 58 35 %  LVEF MOD A4C: 46 17 %  LVESV MOD A2C: 43 58 ml  LVESV MOD A4C: 57 62 ml  LVESV MOD BP: 51 48 ml  LVIDd: 3 33 cm  LVIDs: 2 64 cm  LVLd A2C: 7 99 cm  LVLd A4C: 7 59 cm  LVLs A2C: 6 22 cm  LVLs A4C: 6 59 cm  LVPWd: 1 13 cm  SI(Cube): 10 76 ml/m2  SI(Teich): 11 35 ml/m2  SV MOD A2C: 61 05 ml  SV MOD A4C: 49 42 ml  SV(Cube): 18 61 ml  SV(Teich): 19 64 ml    CW  AV Vmax: 1 07 m/s  AV maxP 62 mmHg  TR Vmax: 2 08 m/s  TR maxP 3 mmHg    MM  TAPSE: 1 66 cm    PW  E': 0 11 m/s  E/E': 9 62  LVOT Vmax: 0 74 m/s  LVOT maxP 2 mmHg  MV A Yadiel: 0 27 m/s  MV Dec [place]: 7 18 m/s2  MV DecT: 145 34 ms  MV E Yadiel: 1 04 m/s  MV E/A Ratio: 3 88    [facility] [place] [facility] Accredited Echocardiography Laboratory    Prepared and electronically signed by    Trever Man MD  Signed 11-Jul-2017 16:28:13       No results found for this or any previous visit  No results found for this or any previous visit  No results found for this or any previous visit  I reviewed and interpreted the following LABS/EKG/TELE/IMAGING and below is summary of my interpretation (if data available):       I  I personally did cardiac device interrogation in office today and this was my finding of prior events: she had NSVT vs noise on 1/7/18 but was in hospital  Short episdoes of paroxysmal afib then as well but overall burden <0 1%  RV pace 99%

## 2018-02-12 DIAGNOSIS — R00.0 TACHYCARDIA: ICD-10-CM

## 2018-02-14 ENCOUNTER — HOSPITAL ENCOUNTER (OUTPATIENT)
Dept: CT IMAGING | Facility: HOSPITAL | Age: 78
Discharge: HOME | End: 2018-02-14
Attending: INTERNAL MEDICINE
Payer: COMMERCIAL

## 2018-02-14 DIAGNOSIS — C78.7 SECONDARY MALIGNANT NEOPLASM OF LIVER AND INTRAHEPATIC BILE DUCT (HCC): ICD-10-CM

## 2018-02-14 PROCEDURE — 74177 CT ABD & PELVIS W/CONTRAST: CPT

## 2018-02-14 PROCEDURE — 71260 CT THORAX DX C+: CPT

## 2018-02-14 RX ADMIN — IOHEXOL 100 ML: 350 INJECTION, SOLUTION INTRAVENOUS at 09:46

## 2018-02-20 ENCOUNTER — OFFICE VISIT (OUTPATIENT)
Dept: HEMATOLOGY ONCOLOGY | Facility: CLINIC | Age: 78
End: 2018-02-20
Payer: COMMERCIAL

## 2018-02-20 VITALS
TEMPERATURE: 97.9 F | BODY MASS INDEX: 29.49 KG/M2 | HEIGHT: 61 IN | DIASTOLIC BLOOD PRESSURE: 80 MMHG | WEIGHT: 156.2 LBS | RESPIRATION RATE: 14 BRPM | OXYGEN SATURATION: 91 % | HEART RATE: 100 BPM | SYSTOLIC BLOOD PRESSURE: 134 MMHG

## 2018-02-20 DIAGNOSIS — C50.911 ADENOCARCINOMA, BREAST, RIGHT (HCC): Primary | ICD-10-CM

## 2018-02-20 PROCEDURE — 99215 OFFICE O/P EST HI 40 MIN: CPT | Performed by: PHYSICIAN ASSISTANT

## 2018-02-20 NOTE — PROGRESS NOTES
[address] [facility] [address] [address] [phone]  Progress Note  Rip Mejia, 1940, [id number]  2/20/2018    Assessment/Plan:    1  Metastatic Breast Cancer s/p liver resection, with no evidence of recurrent disease  patient's CT scans were reviewed today  Patient does have a persistent pleural effusion,  which presented after partial hepatectomy  There is no way to tell if this is a malignant pleural effusion, without pleural fluid cytology, however the size of the pleural effusion has decreased compared to interval scanning  At this time observation is recommended  patient was advised to follow up in 3 months with CT of the chest abdomen and pelvis and CA 27 29  Patient will have CBC and CMP completed prior to CT scans secondary to the use of contrast dye  2     LFT elevation with, choledocholithiasis status post biliary stent  Patient will follow up with GI later in this week for evaluation of removal of biliary stent  Patient will follow up with GI regarding LFT elevations and additional testing  Labs and imaging for follow up:  Orders Placed This Encounter   Procedures    CT chest abdomen pelvis w contrast     Standing Status:   Future     Standing Expiration Date:   2/20/2022     Scheduling Instructions: The patient will need to drink barium for this test   Barium needs to be picked up in the registration area at least one day prior to your study  AM Appointment: Drink one bottle of barium before bed time the evening before your scheduled test   Drink 1/      2 of the second bottle one hour prior to your test   Please bring other 1/2 bottle with you to drink at the time of your study  PM Appointment:  Drink one bottle of barium before 9:00am on the day of your test   Drink 1/2 of the second bottle one hour      prior to your test   Please bring other 1/2 bottle with you to drink at the time of your study  Nothing to eat 3 hours prior to your test   In addition to the barium, clear liquids are also permitted up until the time of the scan  Clear liquids      includes water, black coffee or tea, apple juice or clear broth  If possible wear clothing without any metal in the abdomen area  Sweat suit, sports, sports bra or bra without underwire may eliminate the need to change  Please bring your physician order,      insurance cards, a form of photo ID and a list of your medications with you  Arrive 15 minutes prior to your appointment time in order to register  On the day of your test, please bring any prior CT or MRI studies of this area with you that were not      performed at a [facility]  To schedule this appointment, please contact Central Scheduling at [phone]  Order Specific Question:   What is the patient's sedation requirement? Answer:   No Sedation    Cancer antigen 27 29     Standing Status:   Future     Standing Expiration Date:   2/20/2019     The patient is scheduled for follow-up in approximately   Three months with Dr Anton Scott  Patient  And  voiced agreement and understanding to the above  Patient and  knows to call the Hematology/Oncology office with any questions and concerns regarding the above  Carefully review your medication list in verify the list is accurate and up-to-date  Please call the hematologic/Oncology office if there medications missing from the less, medications on the list that your not currently taking or if there is a dosage or instruction that is different from higher taking medication  Total time of being counter was 25 minutes  The patient's current treatment goals are continued remission  No significant barrier to care were identified      The patient is able to self care     -------------------------------------------------------------------------------------------------------    Chief complaint:   Chief Complaint Patient presents with    Follow-up       Metastatic breast cancer     History of present illness/Cancer History:      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 [facility])    2009 Initial Diagnosis     Adenocarcinoma of right breast,  Infiltrating ductal carcinoma T2, N1 sebastien) Tumor was ER/TN positive, HER2 negative  2009 Surgery     Right Mastectomy         2009 -  Chemotherapy     Adjuvant chemotherapy with Taxotere/Cytoxan x 4 cycles         2009 - 10/2014 Chemotherapy     Arimidex 1 mg po dialy         2/11/2016 Progression     Right lateral mastectomy site growth with core needle biopsy demonstrated reoccurrence  %, TN 70%, Her2 +1  Final Diagnosis   A  Breast, right lateral mastectomy site, core needle biopsies:                        - Recurrent invasive mammary carcinoma, no special type (formerly invasive ductal carcinoma), 0 8 cm largest single focus               - Combined Graham score: 7/9                         - Tubule formation: None (3/3)  - Nuclear pleomorphism: Marked (3/3)  - Mitotic count: 3 mitoses per 10 high-power fields (1/3)  - No lymph-vascular invasion is identified              - No in situ component is identified                - No microcalcifications are identified  3/16/2016 - 1/24/2017 Chemotherapy     Faslodex 500 mg with loading dose followed by maintenance monthly injection with Ibrance 75mg 3 weeks on 1 week off           12/13/2016 Progression      Progression noted on PET-CT scan  Progression was largely found in the liver  Patient was set up for liver biopsy  1/3/2017 Biopsy     Liver lesion biopsy demonstated Estrogen Receptor (clone SP-1) 100%/positive, Progesterone Receptor (clone 1E2) 1-2%/positive, HER2 by IHC ([id number]) 2+/equivocal   Her2 by FISH was positive            1/25/2017 - 4/19/2017 Chemotherapy      Herceptin and Perjeta x5 cycles         4/20/2017 Adverse Reaction     Perjecta causing significant diarrhea  Medication stopped  2017 Surgery     Partial Hepatectomy, results demonstrated ER70%, PR0% & Her2 negative disease  2017 -  Chemotherapy     Tamoxifen 20 mg daily           Staging: No matching staging information was found for the patient  ECO - Asymptomatic    Interval history:     Patient was recently hospitalized secondary to choledocholithiasis,   Acute biliary pancreatitis associated with acute LFT changes  Patient is following with GI  Patient with pacemaker  Review of Systems   Constitutional: Negative for activity change, fatigue and fever  HENT: Negative for nosebleeds  Respiratory: Negative for cough, chest tightness and shortness of breath  Cardiovascular: Negative for palpitations and leg swelling  Gastrointestinal: Negative for abdominal pain, constipation, diarrhea, nausea and vomiting  Musculoskeletal: Negative for myalgias  Skin: Negative for pallor  Neurological: Negative for headaches  Hematological: Negative for adenopathy  Does not bruise/bleed easily  Current Outpatient Prescriptions:     metFORMIN (GLUCOPHAGE) 500 mg tablet, Take 500 mg by mouth, Disp: , Rfl:     acetaminophen (TYLENOL) 325 mg tablet, May take 650 mg every 6-8 hours as needed for pain (Patient taking differently: Take 650 mg by mouth every 6 (six) hours as needed May take 650 mg every 6-8 hours as needed for pain ), Disp: 30 tablet, Rfl: 0    atorvastatin (LIPITOR) 20 mg tablet, Take 20 mg by mouth daily  , Disp: , Rfl:     calcium carbonate (CALCIUM 600) 600 MG tablet, Take 600 mg by mouth 2 (two) times a day with meals  , Disp: , Rfl:     clindamycin (CLEOCIN) 300 MG capsule, TAKE 2 CAPSULES BY MOUTH 1 HOUR PRIOR TO DENTAL PROCEDURE , Disp: , Rfl: 0    clotrimazole-betamethasone (LOTRISONE) 1-0 05 % cream, Apply topically, Disp: , Rfl:     Cranberry (THERACRAN HP PO), Take 2 tablets by mouth daily, Disp: , Rfl:     furosemide (LASIX) 20 mg tablet, TAKE 1 TABLET BY MOUTH DAILY AS NEEDED FOR EDEMA, Disp: , Rfl:     Glucosamine-Chondroit-Vit C-Mn (GLUCOSAMINE 1500 COMPLEX) CAPS, Take 1 capsule by mouth 2 (two) times a day  , Disp: , Rfl:     metoprolol tartrate (LOPRESSOR) 25 mg tablet, Take 1 tablet (25 mg total) by mouth every 12 (twelve) hours, Disp: 60 tablet, Rfl: 3    metoprolol tartrate (LOPRESSOR) 25 mg tablet, Take 0 5 tablets (12 5 mg total) by mouth every 12 (twelve) hours, Disp: 30 tablet, Rfl: 0    Multiple Vitamin (MULTIVITAMIN) capsule, Take 1 capsule by mouth daily  , Disp: , Rfl:     ondansetron (ZOFRAN) 4 mg tablet, Take 4 mg by mouth every 8 (eight) hours as needed for nausea or vomiting, Disp: , Rfl:     oxyCODONE (ROXICODONE) 5 mg immediate release tablet, take 1 tablet by mouth every 6 hours if needed for BREAKTHROUGH PAIN, Disp: , Rfl:     tamoxifen (NOLVADEX) 20 mg tablet, Take 20 mg by mouth daily, Disp: , Rfl:       No current facility-administered medications for this visit  Allergies: Allergies   Allergen Reactions    Ampicillin Shortness Of Breath and Anaphylaxis     Other reaction(s): Unknown Allergic Reaction  Reaction Date: [id number];      Objective:   /80   Pulse 100   Temp 97 9 °F (36 6 °C)   Resp 14   Ht 5' 1" (1 549 m)   Wt 70 9 kg (156 lb 3 2 oz)   LMP  (LMP Unknown) Comment: post   SpO2 91%   BMI 29 51 kg/m²     Physical Exam   Constitutional: She appears well-developed and well-nourished  No distress  HENT:   Head: Normocephalic  Mouth/Throat: No oropharyngeal exudate  Eyes: Pupils are equal, round, and reactive to light  Neck: Normal range of motion  Cardiovascular: Normal rate, regular rhythm and normal heart sounds  Pulmonary/Chest: Effort normal and breath sounds normal  No respiratory distress  Left chest wall pacemaker   Abdominal: Soft  Bowel sounds are normal  There is no tenderness     Musculoskeletal: Normal range of motion  She exhibits no edema  Lymphadenopathy:     She has no cervical adenopathy  Neurological: She is alert  Skin: Skin is warm  No rash noted       Pertinent Laboratory Results and Imaging Review:  Component      Latest Ref Rng & Units 1/9/2018 1/10/2018              Sodium      136 - 145 mmol/L 147 (H) 140   Potassium      3 5 - 5 3 mmol/L 3 9 3 4 (L)   Chloride      100 - 108 mmol/L 113 (H) 108   CO2      21 - 32 mmol/L 24 24   Anion Gap      4 - 13 mmol/L 10 8   BUN      5 - 25 mg/dL 19 11   Creatinine      0 60 - 1 30 mg/dL 0 42 (L) 0 44 (L)   Glucose      65 - 140 mg/dL 86 186 (H)   Calcium      8 3 - 10 1 mg/dL 8 6 8 2 (L)   AST      5 - 45 U/L 108 (H) 54 (H)   ALT      12 - 78 U/L 136 (H) 106 (H)   Alkaline Phosphatase      46 - 116 U/L 228 (H) 228 (H)   Total Protein      6 4 - 8 2 g/dL 6 1 (L) 5 9 (L)   Albumin      3 5 - 5 0 g/dL 2 1 (L) 2 1 (L)   Total Bilirubin      0 20 - 1 00 mg/dL 0 92 0 60   eGFR      ml/min/1 73sq m 97 96     Component      Latest Ref Rng & Units 1/10/2018             WBC      4 31 - 10 16 Thousand/uL 9 53   RBC      3 81 - 5 12 Million/uL 3 65 (L)   Hemoglobin      11 5 - 15 4 g/dL 11 2 (L)   Hematocrit      34 8 - 46 1 % 34 0 (L)   MCV      82 - 98 fL 93   MCH      26 8 - 34 3 pg 30 7   MCHC      31 4 - 37 4 g/dL 32 9   Platelets      892 - 390 Thousands/uL 199   Neutrophils Relative      43 - 75 % 71   Neutrophils Absolute      1 85 - 7 62 Thousands/µL 6 76   PLT ESTIMATE          RDW      11 6 - 15 1 % 14 0   MPV      8 9 - 12 7 fL 10 3   nRBC      /100 WBCs 0   BANDS      0 - 8 %    Lymphocytes Relative      14 - 44 % 16   Monocytes Relative      4 - 12 % 10   Eosinophils Relative      0 - 6 % 3   Lymphocytes Absolute      0 60 - 4 47 Thousands/µL 1 52   Monocytes Absolute      0 17 - 1 22 Thousand/µL 0 94   Eosinophils Absolute      0 00 - 0 61 Thousand/µL 0 25   Basophils Absolute      0 00 - 0 10 Thousands/µL 0 02     The following historical data was reviewed  Past Medical History:   Diagnosis Date    Anxiety     Arthritis     Breast CA ([facility] [address] )     recurrent, ductal carcinoma ER, [place] pos    Depression     Diverticula of intestine     Dizziness     and passes out    Flushing     Hiatal hernia     [place] (hard of hearing)      lizette    Hypercholesteremia     Liver mass     Liver metastasis (HCC)     Metastatic adenocarcinoma to liver (HCC)     Bx 01/03/2017    PONV (postoperative nausea and vomiting)     Recurrent breast cancer ([facility] [address] )     Shingles     Shortness of breath     on exertion    Tachycardia     Urinary incontinence     Use of cane as ambulatory aid     or walker       Past Surgical History:   Procedure Laterality Date    CARDIAC PACEMAKER REMOVAL N/A 11/9/2017    Procedure: PACEMAKER LEAD REVISION, REMOVAL OF NONFUNCTIONING LEAD, AND INSERTION OF A NEW RV LEAD;  Surgeon: Zachery Garza MD;  [facility];  Service: Cardiology    CATARACT EXTRACTION Bilateral     COLONOSCOPY      ERCP N/A 1/8/2018    Procedure: ENDOSCOPIC RETROGRADE CHOLANGIOPANCREATOGRAPHY (ERCP); Surgeon: Ervin Jacobs MD;  Location: [facility]; Service: Gastroenterology    HYSTERECTOMY      JOINT REPLACEMENT      lizette  TKR and right TSR    MASTECTOMY Right     MI RESEC LIVER,PART LOBECTOMY N/A 7/13/2017    Procedure: LIVER RESECTION, INTRAOPERATIVE ULTRASOUND;  Surgeon: Suraj Nation MD;  [facility];  Service: Surgical Oncology    ROTATOR CUFF REPAIR Right     SENTINEL LYMPH NODE BIOPSY Right     TONSILECTOMY AND ADNOIDECTOMY      WOUND DEBRIDEMENT Left 11/9/2017    Procedure: DEBRIDEMENT WOUND AND DRESSING CHANGE [facility]);   Surgeon: Zachery Garza MD;  [facility];  Service: Cardiology       Social History     Social History    Marital status: /Civil Union     Spouse name: N/A    Number of children: N/A    Years of education: N/A     Social History Main Topics    Smoking status: Never Smoker    Smokeless tobacco: Never Used    Alcohol use No    Drug use: No    Sexual activity: Not Asked     Other Topics Concern    None     Social History Narrative    None       Family History   Problem Relation Age of Onset    Lung cancer Father     Cancer Brother     Diabetes type II Son        Please note: This report has been generated by a voice recognition software system  Therefore there may be syntax, spelling, and/or grammatical errors  Please call if you have any questions

## 2018-02-22 ENCOUNTER — HOSPITAL ENCOUNTER (OUTPATIENT)
Facility: HOSPITAL | Age: 78
Setting detail: OUTPATIENT SURGERY
Discharge: HOME | End: 2018-02-22
Attending: INTERNAL MEDICINE | Admitting: INTERNAL MEDICINE
Payer: COMMERCIAL

## 2018-02-22 ENCOUNTER — ANESTHESIA EVENT (OUTPATIENT)
Dept: GASTROENTEROLOGY | Facility: HOSPITAL | Age: 78
End: 2018-02-22
Payer: COMMERCIAL

## 2018-02-22 ENCOUNTER — APPOINTMENT (OUTPATIENT)
Dept: RADIOLOGY | Facility: HOSPITAL | Age: 78
End: 2018-02-22
Attending: INTERNAL MEDICINE
Payer: COMMERCIAL

## 2018-02-22 ENCOUNTER — ANESTHESIA (OUTPATIENT)
Dept: GASTROENTEROLOGY | Facility: HOSPITAL | Age: 78
End: 2018-02-22
Payer: COMMERCIAL

## 2018-02-22 VITALS
RESPIRATION RATE: 18 BRPM | BODY MASS INDEX: 28.32 KG/M2 | HEART RATE: 82 BPM | TEMPERATURE: 99.9 F | DIASTOLIC BLOOD PRESSURE: 61 MMHG | HEIGHT: 61 IN | WEIGHT: 150 LBS | SYSTOLIC BLOOD PRESSURE: 140 MMHG | OXYGEN SATURATION: 96 %

## 2018-02-22 PROCEDURE — 43264 ERCP REMOVE DUCT CALCULI: CPT | Performed by: INTERNAL MEDICINE

## 2018-02-22 PROCEDURE — 74330 X-RAY BILE/PANC ENDOSCOPY: CPT

## 2018-02-22 PROCEDURE — 43273 ENDOSCOPIC PANCREATOSCOPY: CPT | Performed by: INTERNAL MEDICINE

## 2018-02-22 PROCEDURE — C1769 GUIDE WIRE: HCPCS | Performed by: INTERNAL MEDICINE

## 2018-02-22 PROCEDURE — 43262 ENDO CHOLANGIOPANCREATOGRAPH: CPT | Performed by: INTERNAL MEDICINE

## 2018-02-22 PROCEDURE — 43275 ERCP REMOVE FORGN BODY DUCT: CPT | Performed by: INTERNAL MEDICINE

## 2018-02-22 RX ORDER — SODIUM CHLORIDE 9 MG/ML
INJECTION, SOLUTION INTRAVENOUS CONTINUOUS PRN
Status: DISCONTINUED | OUTPATIENT
Start: 2018-02-22 | End: 2018-02-22 | Stop reason: SURG

## 2018-02-22 RX ORDER — ONDANSETRON 2 MG/ML
INJECTION INTRAMUSCULAR; INTRAVENOUS AS NEEDED
Status: DISCONTINUED | OUTPATIENT
Start: 2018-02-22 | End: 2018-02-22 | Stop reason: SURG

## 2018-02-22 RX ORDER — ASPIRIN 81 MG/1
81 TABLET ORAL DAILY
COMMUNITY
End: 2019-05-06 | Stop reason: CLARIF

## 2018-02-22 RX ORDER — PROPOFOL 10 MG/ML
INJECTION, EMULSION INTRAVENOUS AS NEEDED
Status: DISCONTINUED | OUTPATIENT
Start: 2018-02-22 | End: 2018-02-22 | Stop reason: SURG

## 2018-02-22 RX ORDER — PROPOFOL 10 MG/ML
INJECTION, EMULSION INTRAVENOUS CONTINUOUS PRN
Status: DISCONTINUED | OUTPATIENT
Start: 2018-02-22 | End: 2018-02-22 | Stop reason: SURG

## 2018-02-22 RX ADMIN — IOHEXOL 18 ML: 240 INJECTION, SOLUTION INTRATHECAL; INTRAVASCULAR; INTRAVENOUS; ORAL at 11:14

## 2018-02-22 RX ADMIN — PROPOFOL 30 MG: 10 INJECTION, EMULSION INTRAVENOUS at 10:09

## 2018-02-22 RX ADMIN — PROPOFOL 60 MG: 10 INJECTION, EMULSION INTRAVENOUS at 09:58

## 2018-02-22 RX ADMIN — SODIUM CHLORIDE: 0.9 INJECTION, SOLUTION INTRAVENOUS at 09:57

## 2018-02-22 RX ADMIN — ONDANSETRON 4 MG: 2 INJECTION INTRAMUSCULAR; INTRAVENOUS at 11:00

## 2018-02-22 RX ADMIN — PROPOFOL 100 MCG/KG/MIN: 10 INJECTION, EMULSION INTRAVENOUS at 09:58

## 2018-02-22 NOTE — ANESTHESIA POSTPROCEDURE EVALUATION
Post-Op Assessment Note      CV Status:  Stable    Mental Status:  Alert and awake    Hydration Status:  Euvolemic    PONV Controlled:  Controlled    Airway Patency:  Patent    Post Op Vitals Reviewed: Yes          Staff: CRNA, Anesthesiologist           /57 (02/22/18 1104)    Temp      Pulse 86 (02/22/18 1104)   Resp 18 (02/22/18 1104)    SpO2 100 % (02/22/18 1104)

## 2018-02-22 NOTE — ANESTHESIA PREPROCEDURE EVALUATION
Review of Systems/Medical History  Patient summary reviewed  Chart reviewed      Cardiovascular  Hyperlipidemia, Dysrhythmias, ,    Pulmonary  Shortness of breath,        GI/Hepatic    GERD poorly controlled, Liver disease, history of liver cancer,  Hiatal hernia,             Endo/Other     GYN       Hematology   Musculoskeletal    Arthritis     Neurology    Neuromuscular disease ,    Psychology   Anxiety, Depression ,              Physical Exam    Airway    Mallampati score: II  TM Distance: <3 FB  Neck ROM: full     Dental   No notable dental hx     Cardiovascular  Rhythm: regular, Rate: normal, Cardiovascular exam normal    Pulmonary  Pulmonary exam normal Breath sounds clear to auscultation,     Other Findings        Anesthesia Plan  ASA Score- 3     Anesthesia Type- IV sedation with anesthesia with ASA Monitors  Additional Monitors:   Airway Plan:         Plan Factors-    Induction- intravenous  Postoperative Plan-     Informed Consent- Anesthetic plan and risks discussed with patient  I personally reviewed this patient with the CRNA  Discussed and agreed on the Anesthesia Plan with the CRNA  Rashaun Purcell

## 2018-02-22 NOTE — OP NOTE
**** GI/ENDOSCOPY REPORT ****     PATIENT NAME: Mitchell Rodriguez - VISIT ID:  Patient ID: [id number]   YOB: 1940     INTRODUCTION: Endoscopic Retrograde Cholangiopancreatography - A [de-identified]  year-old female patient presents for an outpatient Endoscopic Retrograde   Cholangiopancreatography at [address] [place] [address]  INDICATIONS: Choledocholithiasis and cholangitis status post ERCP and   stent placement  Now for stent removal and duct clearance  CONSENT:  The benefits, risks, and alternatives to the procedure were   discussed and informed consent was obtained from the patient  PREPARATION:  EKG, pulse, pulse oximetry and blood pressure were monitored   throughout the procedure  ASA Classification: Class 3 - Patient has severe   systemic disturbance that may or may not be related to the disorder   requiring surgery  MEDICATIONS: Anesthesia-check records     PROCEDURE:  The endoscope was passed with ease through the mouth under   direct visualization and advanced to the duodenum  The scope was withdrawn   and the mucosa was carefully examined  FINDINGS:   The side-viewing scope was passed down to the 2nd portion of   the duodenum  The ampulla was visualized  The stent was seen  The stent   was removed using a snare  A part of the  stent did break off  The bile   duct was then cannulated using a wire and a 9-12 mm extraction balloon  Balloon sweeps were done and sludge and stone fragments were removed  A   filling defect was seen at the distal bile duct  The sphincterotomy was   then extended using a sphincterotome  Again balloon sweeps were done and   2 residual stones were removed with some difficulty  The wire was   advanced to the left intrahepatic duct but not into the right intrahepatic   duct due to her prior surgery  Repeated balloon sweeps did not reveal any   further stone fragments and there was good contrast drainage      Cholangiogram did not show any filling defects and showed that the duct   was slightly dilated to approximately 10 mm  The cystic duct was patent   and the gallbladder also filled with contrast      COMPLICATIONS: There were no complications  IMPRESSIONS:  Successful stent removal, extension of the sphincterotomy   and residual stone removal   No further filling defects were seen and no   stent was left in place  The bile duct was mildly dilated to 10 mm  The   cystic duct was patent and the gallbladder was visualized  RECOMMENDATIONS:  Follow up with GI as needed  ESTIMATED BLOOD LOSS:     PROCEDURE CODES:     ICD-9 Codes:     ICD-10 Codes:     PERFORMED BY: DESIREE Mclain  on 02/22/2018  Version 1, electronically signed by DESRIEE Mclain  on 02/22/2018 at   11:47

## 2018-02-22 NOTE — H&P
History and Physical - [facility]  Deon Dakin [de-identified] y o  female MRN: [id number]    HPI: Deon Dakin is a [de-identified]y o  year old female who presents with history of cholangitis/choledocholithiasis  Status post ERCP and removal of multiple stones and pus  Now for stent removal       Review of Systems    Historical Information   Past Medical History:   Diagnosis Date    Anxiety     Arthritis     Breast CA ([facility] [address] )     recurrent, ductal carcinoma ER, [place] pos    Depression     Diverticula of intestine     Dizziness     and passes out    Flushing     Hiatal hernia     [place] (hard of hearing[place]      liz[place]    Hypercholesteremia     Liver mass     Liver metastasis (HCC)     Metastatic adenocarcinoma to liver ([facility] [address] )     Bx 01/03/2017    PONV (postoperative nausea and vomiting)     Recurrent breast cancer (HCC)     Shingles     Shortness of breath     on exertion    Tachycardia     Urinary incontinence     Use of cane as ambulatory aid     or walker     Past Surgical History:   Procedure Laterality Date    CARDIAC PACEMAKER REMOVAL N/A 11/9/2017    Procedure: PACEMAKER LEAD REVISION, REMOVAL OF NONFUNCTIONING LEAD, AND INSERTION OF A NEW RV LEAD;  Surgeon: Vito Zapata MD;  [facility];  Service: Cardiology    CATARACT EXTRACTION Bilateral     COLONOSCOPY      ERCP N/A 1/8/2018    Procedure: ENDOSCOPIC RETROGRADE CHOLANGIOPANCREATOGRAPHY (ERCP); Surgeon: Cristiano Prado MD;  [facility];   Service: Gastroenterology    HYSTERECTOMY      JOINT REPLACEMENT      lizette  TKR and right TSR    MASTECTOMY Right     [place] RESEC LIVER,PART LOBECTOMY N/A 7/13/2017    Procedure: LIVER RESECTION, INTRAOPERATIVE ULTRASOUND;  Surgeon: Shell Israel MD;  Location: [facility];  Service: Surgical Oncology    ROTATOR CUFF REPAIR Right     SENTINEL LYMPH NODE BIOPSY Right     TONSILECTOMY AND ADNOIDECTOMY      WOUND DEBRIDEMENT Left 11/9/2017    Procedure: DEBRIDEMENT WOUND AND DRESSING CHANGE [facility] OUT); Surgeon: Thao Taylor MD;  [facility];  Service: Cardiology     Social History   History   Alcohol Use No     History   Drug Use No     History   Smoking Status    Never Smoker   Smokeless Tobacco    Never Used     Family History   Problem Relation Age of Onset    Lung cancer Father     Cancer Brother     Diabetes type II Son        Meds/Allergies     Prescriptions Prior to Admission   Medication    aspirin (ECOTRIN LOW STRENGTH) 81 mg EC tablet    acetaminophen (TYLENOL) 325 mg tablet    atorvastatin (LIPITOR) 20 mg tablet    calcium carbonate (CALCIUM 600) 600 MG tablet    clindamycin (CLEOCIN) 300 MG capsule    clotrimazole-betamethasone (LOTRISONE) 1-0 05 % cream    Cranberry (THERACRAN HP PO)    furosemide (LASIX) 20 mg tablet    Glucosamine-Chondroit-Vit C-Mn (GLUCOSAMINE 1500 COMPLEX) CAPS    metFORMIN (GLUCOPHAGE) 500 mg tablet    metoprolol tartrate (LOPRESSOR) 25 mg tablet    metoprolol tartrate (LOPRESSOR) 25 mg tablet    Multiple Vitamin (MULTIVITAMIN) capsule    ondansetron (ZOFRAN) 4 mg tablet    oxyCODONE (ROXICODONE) 5 mg immediate release tablet    tamoxifen (NOLVADEX) 20 mg tablet       Allergies   Allergen Reactions    Ampicillin Shortness Of Breath and Anaphylaxis     Other reaction(s): Unknown Allergic Reaction  Reaction Date: [id number];        Objective     Blood pressure 125/58, pulse 75, temperature 99 9 °F (37 7 °C), temperature source Temporal, resp  rate 16, height 5' 1" (1 549 m), weight 68 kg (150 lb)  PHYSICAL EXAM    Gen: NAD  CV: RRR  CHEST: Clear  ABD: soft, NT/ND  EXT: no edema  Neuro: AAO      ASSESSMENT/PLAN:  This is a [de-identified]y o  year old female here for stent removal and clearance of duct status post ERCP for cholangitis/choledocholithiasis  PLAN:   Procedure:  ERCP

## 2018-02-26 NOTE — MISCELLANEOUS
Message  GI Reminder Recall [employer]:   Date: 01/11/2018   Dear Tariq Valdez:     Review of our records shows you are due for the following: ERCP  Please call the following office to schedule your appointment:   [address] [place] [address] [phone]  We look forward to hearing from you!      Sincerely,         Signatures   Electronically signed by : Brady Ortiz, ; Jan 11 2018  9:25AM EST                       (Author)

## 2018-03-07 NOTE — PROGRESS NOTES
"  Discussion/Summary  Normal device function      Results/Data  Cardiac Device In Clinic 23Oct2017 03:14PM [facility]     Test Name Result Flag Reference   MISCELLANEOUS COMMENT      DEVICE INTERROGATED IN THE [facility] OFFICE  BATTERY VOLTAGE ADEQUATE  NO PROGRAMMING CHANGES MADE TO DEVICE PARAMETERS  WOUND CHECK: INCISION CLEAN AND DRY WITH EDGES APPROXIMATED; WOUND CARE AND RESTRICTIONS REVIEWED WITH PATIENT  ---DUMONT   Cardiac Electrophysiology Report      [id number]   DEVICE TYPE Monitor       Cardiac Electrophysiology Report [id number] 03:14PM [facility] [place]     Test Name Result Flag Reference   Cardiac Electrophysiology Report      [id number]     Signatures   Electronically signed by : Jose Stevenson, ; Oct 23 2017 11:32AM EST                       (Author)    Electronically signed by : DESIREE Herrera ; Oct 23 2017 12:49PM EST                       (Author)    "

## 2018-03-07 NOTE — PROGRESS NOTES
"  Discussion/Summary  Normal device function      Results/Data  Cardiac Device In Clinic [id number] 08:25PM Freida Aus     Test Name Result Flag Reference   MISCELLANEOUS COMMENT (Report)     DEVICE INTERROGATED IN THE [facility] OFFICE  BATTERY VOLTAGE ADEQUATE  (15 YRS)  AP AND  <1%  ALL LEAD PARAMETERS WITHIN NORMAL LIMITS  NO SIGNIFICANT HIGH RATE EPISODES  NORMAL DEVICE FUNCTION  WOUND CHECK: INCISION CLEAN AND DRY WITH EDGES APPROXIMATED; WOUND CARE AND RESTRICTIONS REVIEWED WITH PATIENT  ---DUMONT   Cardiac Electrophysiology Report      [email or web address]  pdf   DEVICE TYPE Pacemaker       Cardiac Electrophysiology Report [id number] 08:25PM Freida Aus     Test Name Result Flag Reference   Cardiac Electrophysiology Report      [id number]  pdf     Signatures   Electronically signed by : Katelyn Jimenes, ; Dec  1 2017  4:14PM EST                       (Author)    Electronically signed by : DESIREE Leonard ; Dec  1 2017  5:32PM EST                       (Author)    "

## 2018-03-29 DIAGNOSIS — C50.919 MALIGNANT NEOPLASM OF FEMALE BREAST, UNSPECIFIED ESTROGEN RECEPTOR STATUS, UNSPECIFIED LATERALITY, UNSPECIFIED SITE OF BREAST (HCC): Primary | ICD-10-CM

## 2018-03-29 RX ORDER — TAMOXIFEN CITRATE 20 MG/1
20 TABLET ORAL DAILY
Qty: 30 TABLET | Refills: 6 | Status: SHIPPED | OUTPATIENT
Start: 2018-03-29 | End: 2018-10-26 | Stop reason: SDUPTHER

## 2018-04-26 ENCOUNTER — IN-CLINIC DEVICE VISIT (OUTPATIENT)
Dept: CARDIOLOGY CLINIC | Facility: CLINIC | Age: 78
End: 2018-04-26
Payer: COMMERCIAL

## 2018-04-26 DIAGNOSIS — I44.2 CHB (COMPLETE HEART BLOCK) (HCC): Primary | ICD-10-CM

## 2018-04-26 DIAGNOSIS — Z95.0 PRESENCE OF PERMANENT CARDIAC PACEMAKER: ICD-10-CM

## 2018-04-26 PROCEDURE — 93296 REM INTERROG EVL PM/IDS: CPT | Performed by: INTERNAL MEDICINE

## 2018-04-26 PROCEDURE — 93294 REM INTERROG EVL PM/LDLS PM: CPT | Performed by: INTERNAL MEDICINE

## 2018-04-26 NOTE — PROGRESS NOTES
DUAL CHAMBER PACEMAKER (HIS)  CARELINK PACEMAKER TRANSMISSION:  BATTERY VOLTAGE ADEQUATE (3 YR)   AP 0 3%  99 8% (CHB)   ALL AVAILABLE LEAD PARAMETERS WITHIN NORMAL LIMITS   RV OUTPUT @ 3 5V/1 0MS    3 AT/AF EPISODES (LONGEST 3 MIN ) WITH EGMS SHOWING PAT ~ 150 BPM, AND PAT/ST WITH COMPETITIVE PACING   NO AF   NORMAL DEVICE FUNCTION   RG

## 2018-05-07 ENCOUNTER — APPOINTMENT (OUTPATIENT)
Dept: RADIOLOGY | Facility: CLINIC | Age: 78
End: 2018-05-07
Payer: COMMERCIAL

## 2018-05-07 ENCOUNTER — OFFICE VISIT (OUTPATIENT)
Dept: OBGYN CLINIC | Facility: MEDICAL CENTER | Age: 78
End: 2018-05-07
Payer: COMMERCIAL

## 2018-05-07 VITALS
BODY MASS INDEX: 29.98 KG/M2 | HEART RATE: 92 BPM | DIASTOLIC BLOOD PRESSURE: 72 MMHG | SYSTOLIC BLOOD PRESSURE: 112 MMHG | WEIGHT: 158.8 LBS | HEIGHT: 61 IN

## 2018-05-07 DIAGNOSIS — M25.552 BILATERAL HIP PAIN: ICD-10-CM

## 2018-05-07 DIAGNOSIS — M76.892 HIP ABDUCTOR TENDONITIS, LEFT: ICD-10-CM

## 2018-05-07 DIAGNOSIS — M76.30 ILIOTIBIAL BAND SYNDROME, UNSPECIFIED LATERALITY: ICD-10-CM

## 2018-05-07 DIAGNOSIS — M70.61 TROCHANTERIC BURSITIS OF RIGHT HIP: ICD-10-CM

## 2018-05-07 DIAGNOSIS — M25.552 BILATERAL HIP PAIN: Primary | ICD-10-CM

## 2018-05-07 DIAGNOSIS — M25.551 BILATERAL HIP PAIN: Primary | ICD-10-CM

## 2018-05-07 DIAGNOSIS — M25.551 BILATERAL HIP PAIN: ICD-10-CM

## 2018-05-07 PROCEDURE — 73521 X-RAY EXAM HIPS BI 2 VIEWS: CPT

## 2018-05-07 PROCEDURE — 99203 OFFICE O/P NEW LOW 30 MIN: CPT | Performed by: ORTHOPAEDIC SURGERY

## 2018-05-07 NOTE — PROGRESS NOTES
Assessment/Plan     1  Bilateral hip pain    2  Hip abductor tendonitis, left    3  Iliotibial band syndrome, unspecified laterality    4  Trochanteric bursitis of right hip      Orders Placed This Encounter   Procedures    XR hips bilateral 2 vw w pelvis if performed    Ambulatory referral to Physical Therapy     PT  Has pacemaker- will avoid MRI for now  Return in about 6 weeks (around 6/18/2018)  May consider medrol dose jesús    I answered all of the patient's questions during the visit and provided education of the patient's condition during the visit  The patient verbalized understanding of the information given and agrees with the plan  This note was dictated using [employer] software  It may contain errors including improperly dictated words  Please contact physician directly for any questions  History of Present Illness   Chief complaint:   Chief Complaint   Patient presents with    Left Hip - Pain    Right Hip - Pain       HPI: Azucena Matute is a [de-identified] y o  female that c/o bilateral hip pain that started 1 month ago  She states she was being treated with physical therapy for vertigo  They were working on her balance and doing a lot of lower body exercises which may have aggravated her hips  She denies ever having any hip or groin pain before this  She states her pain is in the lateral hips and radiates to her knees  The pain does not go past her knees  She also has groin pain in both hips  States the pain is worse in the mornings when she 1st wakes up and starts walking  The pain gets better with movement throughout the day  States her pain comes and goes  It is sharp stabbing pain  She has taken Tylenol and Motrin without relief  She has not done any physical therapy for her hips  She does have a history of bilateral knee replacements in 2015 by Dr Kristel Marquez  Patient also notes she is on Lipitor and her primary care physician said this may be the cause of her bilateral thigh pain   She admits a history of diabetes  She also notes being in the hospital several times this past year and was told she should avoid any further surgery  ROS:    See HPI for musculoskeletal review, frequent urination secondary to known incontinence, anxiety  All other systems reviewed are negative     Historical Information   Past Medical History:   Diagnosis Date    Anxiety     Arthritis     Breast CA (HCC)     recurrent, ductal carcinoma ER, HI pos    Depression     Diverticula of intestine     Dizziness     and passes out    Flushing     Hiatal hernia     [place] (hard of hearing[place]      liz[place]    Hypercholesteremia     Liver mass     Liver metastasis (HCC)     Metastatic adenocarcinoma to liver ([facility] [address] )     Bx 01/03/2017    PONV (postoperative nausea and vomiting)     Recurrent breast cancer ([facility] [address] )     Shingles     Shortness of breath     on exertion    Tachycardia     Urinary incontinence     Use of cane as ambulatory aid     or walker     Past Surgical History:   Procedure Laterality Date    CARDIAC PACEMAKER REMOVAL N/A 11/9/2017    Procedure: PACEMAKER LEAD REVISION, REMOVAL OF NONFUNCTIONING LEAD, AND INSERTION OF A NEW RV LEAD;  Surgeon: Rufino Ward MD;  [facility];  Service: Cardiology    CATARACT EXTRACTION Bilateral     COLONOSCOPY      ERCP N/A 1/8/2018    Procedure: ENDOSCOPIC RETROGRADE CHOLANGIOPANCREATOGRAPHY (ERCP); Surgeon: Julito Brown MD;  Location: [facility]; Service: Gastroenterology    HYSTERECTOMY      JOINT REPLACEMENT      lizette  TKR and right TSR    MASTECTOMY Right     HI ERCP DX COLLECTION SPECIMEN BRUSHING/WASHING N/A 2/22/2018    Procedure: ENDOSCOPIC RETROGRADE CHOLANGIOPANCREATOGRAPHY (ERCP) with stent removal;  Surgeon: Julito Brown MD;  [facility];   Service: Gastroenterology    HI RES [address] N/A 7/13/2017    Procedure: LIVER RESECTION, INTRAOPERATIVE ULTRASOUND;  Surgeon: Kathy Lubin MD;  [facility];  Service: Surgical Oncology    ROTATOR CUFF REPAIR Right     SENTINEL LYMPH NODE BIOPSY Right     TONSILECTOMY AND ADNOIDECTOMY      WOUND DEBRIDEMENT Left 11/9/2017    Procedure: DEBRIDEMENT WOUND AND DRESSING CHANGE [facility]); Surgeon: Julio Curtis MD;  Location[facility];  Service: Cardiology     Social History   History   Alcohol Use No     History   Drug Use No     History   Smoking Status    Never Smoker   Smokeless Tobacco    Never Used     Family History:   Family History   Problem Relation Age of Onset    Lung cancer Father     Cancer Brother     Diabetes type II Son        Meds/Allergies     (Not in a hospital admission)  Allergies   Allergen Reactions    Ampicillin Shortness Of Breath, Anaphylaxis and Other (See Comments)     Other reaction(s): Unknown Allergic Reaction  Reaction Date: 07Mar2012;        Objective   Vitals: Blood pressure 112/72, pulse 92, height 5' 1" (1 549 m), weight 72 kg (158 lb 12 8 oz)  ,Body mass index is 30 kg/m²  PE:  AAOx 3  WDWN  Hearing intact, no drainage from eyes  Regular rate  no audible wheezing  no abdominal distension  LE compartments soft, skin intact      bilateral hip:   No dislocation/deformity  full ROM without pain  AT/GS/quads/hamstring/iliopsoas 5/5  Sensation L4, L5, S1 intact bilaterally  Palpable posterior tibial pulses bilaterally  +JARRELL R, neg  JARRELL L  +stinchfield B/L  +TTP over bilateral greater trochanters - mild  No bilateral TTP over SIJ  Mild TTP over L glut   Medius  L abductors 4/5, R abductor 5/5  +Obers test B/L    Back:    No TTP over lumbar spinous processes, paraspinal musculature  Negative SLR      Imaging Studies: I have personally reviewed pertinent films in PACS  XR bilateral hips:  Mild joint space narrowing

## 2018-05-14 ENCOUNTER — EVALUATION (OUTPATIENT)
Dept: PHYSICAL THERAPY | Facility: MEDICAL CENTER | Age: 78
End: 2018-05-14
Payer: COMMERCIAL

## 2018-05-14 DIAGNOSIS — M76.892 HIP ABDUCTOR TENDONITIS, LEFT: Primary | ICD-10-CM

## 2018-05-14 DIAGNOSIS — M25.552 BILATERAL HIP PAIN: ICD-10-CM

## 2018-05-14 DIAGNOSIS — M25.551 BILATERAL HIP PAIN: ICD-10-CM

## 2018-05-14 DIAGNOSIS — M70.61 TROCHANTERIC BURSITIS OF RIGHT HIP: ICD-10-CM

## 2018-05-14 DIAGNOSIS — M76.30 ILIOTIBIAL BAND SYNDROME, UNSPECIFIED LATERALITY: ICD-10-CM

## 2018-05-14 PROCEDURE — G8991 OTHER PT/OT GOAL STATUS: HCPCS | Performed by: PHYSICAL THERAPIST

## 2018-05-14 PROCEDURE — G8990 OTHER PT/OT CURRENT STATUS: HCPCS | Performed by: PHYSICAL THERAPIST

## 2018-05-14 PROCEDURE — 97162 PT EVAL MOD COMPLEX 30 MIN: CPT | Performed by: PHYSICAL THERAPIST

## 2018-05-14 NOTE — PROGRESS NOTES
Evaluation     Today's date: 2018  Patient name: Bronson Steward  : 1940  MRN: [id number]  Referring provider: Ivana Hurley DO  Dx:   Encounter Diagnosis     ICD-10-CM    1  Hip abductor tendonitis, left M76 892 Ambulatory referral to Physical Therapy   2  Bilateral hip pain M25 551 Ambulatory referral to Physical Therapy    M25 552    3  Iliotibial band syndrome, unspecified laterality M76 30 Ambulatory referral to Physical Therapy   4  Trochanteric bursitis of right hip M70 61 Ambulatory referral to Physical Therapy                Assessment  Impairments: abnormal coordination, abnormal gait, abnormal muscle firing, abnormal muscle tone, abnormal or restricted ROM, abnormal movement, activity intolerance, impaired physical strength, lacks appropriate home exercise program and pain with function    Assessment details: Indira Herrera is a pleasant [address] [place] [address] y o  female who presents with left lateral and anterior hip pain and difficulty with function secondary to chronic lateral hip tendinalgia  The patient's greatest concerns are the pain she is experiencing, worry over not knowing what's wrong, wanting to avoid surgery and fear of not being able to keep active  Patient is experiencing most difficulty with getting up in the morning and seems to have most discomfort following a nights sleep though her sleep is not disrupted  Patient will be given general conditioning to address her needs and overcome the sedentary lifestyle she has been forced to adapt following her year of medical issues  PT to address his symptoms  If he does not progress with treatment as expected it would be suggested that he have a right hip arthrogram for further analysis of the labrum  No further referral appears necessary at this time based upon examination results        Understanding of Dx/Px/POC: good   Prognosis: good  Prognosis details: Positive prognostic indicators include positive attitude toward recovery, good understanding of diagnosis and treatment plan options, absence of peripheralization and absence of observed red flags  Negative prognostic indicators include chronicity of symptoms  Goals  Impairment Goals  - Pt I with initial HEP in 1-2 visits  - Improve ROM equal to contralateral side in 4-6 weeks  - Increase strength to 4/5 in all affected areas in 4-6 week    Functional Goals  - Increase Functional Status Measure measured by FOTO by [facility]  - Patient will be independent with comprehensive HEP in 6-8 weeks  - Return to ADL's as desired  - Ambulation is improved to prior level of function in 6-8 weeks  - Stair climbing is improved to prior level of function in 6-8 weeks  - Squatting is improved to prior level of function in 6-8 weeks     Plan  Patient would benefit from: skilled PT  Referral necessary: No  Planned modality interventions: thermotherapy: hydrocollator packs  Planned therapy interventions: activity modification, joint mobilization, manual therapy, motor coordination training, neuromuscular re-education, patient education, therapeutic activities, therapeutic exercise, graded activity, home exercise program and behavior modification  Treatment plan discussed with: patient  Plan details: Prognosis above is given PT services 2x/week tapering to 1x/week over the next 2 months and home program adherence          Subjective Evaluation     History of Present Illness  Mechanism of injury: see medical hx for full list of issues  Patient has been having left hip pain since march that has been worsening  She has come to oppt to address her pain and return to walking without issues  Patient notes that her symptoms started after attending PT [facility] for balance     Recurrent probem    Quality of life: good    Pain  Current pain ratin  At best pain ratin  At worst pain ratin  Quality: dull ache           Objective      Static Posture      Comments  Standing posture patient lists to the left (seemingly from slight scoliosis)     Palpation      left Tenderness of the piriformis, rectus femoris, sartorius and TFL  Trigger point to gluteus medius and piriformis       Tests      Right Hip   Positive Gaenslen's, piriformis and scour  Negative Yong's, Ely's, JARRELL, FADIR, femoral nerve tension, long sit, modified Laith, Laith, SI compression, SI distraction, sign of the buttock and sports hernia  Familia: Positive  SLR: Negative       Additional Tests Details  Slight discomfort in lateral right hip as well as anterior      Ambulation      Comments   Normal however patient demonstrates excessive right hip ER and toe out       Functional Assessment      Comments  Difficulty and pain with single leg stance and forward flexion as well as anything that places patient's right hip into anterior impingement position       General Comments      Lumbar Comments  All testing is normal for lumbar spine  Unable to recreate pain with palpation  No neurological signs  Good activation of TA and multifidus  Normal PAROM  Normal AROM and PROM

## 2018-05-15 ENCOUNTER — OFFICE VISIT (OUTPATIENT)
Dept: PHYSICAL THERAPY | Facility: MEDICAL CENTER | Age: 78
End: 2018-05-15
Payer: COMMERCIAL

## 2018-05-15 DIAGNOSIS — M25.551 BILATERAL HIP PAIN: ICD-10-CM

## 2018-05-15 DIAGNOSIS — M76.30 ILIOTIBIAL BAND SYNDROME, UNSPECIFIED LATERALITY: ICD-10-CM

## 2018-05-15 DIAGNOSIS — M25.552 BILATERAL HIP PAIN: ICD-10-CM

## 2018-05-15 DIAGNOSIS — M70.61 TROCHANTERIC BURSITIS OF RIGHT HIP: ICD-10-CM

## 2018-05-15 DIAGNOSIS — M76.892 HIP ABDUCTOR TENDONITIS, LEFT: Primary | ICD-10-CM

## 2018-05-15 PROCEDURE — 97140 MANUAL THERAPY 1/> REGIONS: CPT | Performed by: PHYSICAL THERAPIST

## 2018-05-15 PROCEDURE — 97110 THERAPEUTIC EXERCISES: CPT | Performed by: PHYSICAL THERAPIST

## 2018-05-15 NOTE — PROGRESS NOTES
Daily Note     Today's date: 5/15/2018  Patient name: Jose Lanza  : 1940  MRN: [id number]  Referring provider: Dorinda Roberson DO  Dx:   Encounter Diagnosis     ICD-10-CM    1  Hip abductor tendonitis, left M76 892    2  Bilateral hip pain M25 551     M25 552    3  Iliotibial band syndrome, unspecified laterality M76 30    4  Trochanteric bursitis of right hip M70 61                   Subjective: patient states that she is feeling better after sleeping on her back last night  Her pain was not as bad as it was the day before  She is excited that her pain is less  Objective: See treatment diary below  Precautions: see medical history    Daily Treatment Diary     Manual  /15            Hip flexor stretch 96bipe7            stm 5min            Left hip prom with oscillation  3min                                          Exercise Diary              bike 10min            Hamstring stretch 79hkzf1            Piriformis stretch 14mkaj9                                                                                                                                                                                                                                             Modalities                                                               Assessment: Tolerated treatment well  Patient demonstrated fatigue post treatment, exhibited good technique with therapeutic exercises and would benefit from continued PT  Patient was dramatically improved with her rom and walking following manual stretching  Plan: Continue per plan of care

## 2018-05-21 DIAGNOSIS — C50.911 ADENOCARCINOMA OF RIGHT BREAST METASTATIC TO LIVER (HCC): Primary | ICD-10-CM

## 2018-05-21 DIAGNOSIS — C78.7 ADENOCARCINOMA OF RIGHT BREAST METASTATIC TO LIVER (HCC): Primary | ICD-10-CM

## 2018-05-26 ENCOUNTER — TRANSCRIBE ORDERS (OUTPATIENT)
Dept: ADMINISTRATIVE | Facility: HOSPITAL | Age: 78
End: 2018-05-26

## 2018-05-26 ENCOUNTER — APPOINTMENT (OUTPATIENT)
Dept: LAB | Facility: MEDICAL CENTER | Age: 78
End: 2018-05-26
Payer: COMMERCIAL

## 2018-05-26 DIAGNOSIS — C50.911 ADENOCARCINOMA OF RIGHT BREAST METASTATIC TO LIVER (HCC): ICD-10-CM

## 2018-05-26 DIAGNOSIS — C78.7 ADENOCARCINOMA OF RIGHT BREAST METASTATIC TO LIVER (HCC): ICD-10-CM

## 2018-05-26 LAB
BUN SERPL-MCNC: 22 MG/DL (ref 5–25)
CREAT SERPL-MCNC: 0.69 MG/DL (ref 0.6–1.3)
GFR SERPL CREATININE-BSD FRML MDRD: 82 ML/MIN/1.73SQ M

## 2018-05-26 PROCEDURE — 82565 ASSAY OF CREATININE: CPT

## 2018-05-26 PROCEDURE — 36415 COLL VENOUS BLD VENIPUNCTURE: CPT

## 2018-05-26 PROCEDURE — 84520 ASSAY OF UREA NITROGEN: CPT

## 2018-05-29 ENCOUNTER — APPOINTMENT (OUTPATIENT)
Dept: LAB | Facility: HOSPITAL | Age: 78
End: 2018-05-29
Payer: COMMERCIAL

## 2018-05-29 ENCOUNTER — OFFICE VISIT (OUTPATIENT)
Dept: PHYSICAL THERAPY | Facility: MEDICAL CENTER | Age: 78
End: 2018-05-29
Payer: COMMERCIAL

## 2018-05-29 ENCOUNTER — HOSPITAL ENCOUNTER (OUTPATIENT)
Dept: CT IMAGING | Facility: HOSPITAL | Age: 78
Discharge: HOME | End: 2018-05-29
Payer: COMMERCIAL

## 2018-05-29 DIAGNOSIS — M76.30 ILIOTIBIAL BAND SYNDROME, UNSPECIFIED LATERALITY: ICD-10-CM

## 2018-05-29 DIAGNOSIS — M25.552 BILATERAL HIP PAIN: ICD-10-CM

## 2018-05-29 DIAGNOSIS — M25.551 BILATERAL HIP PAIN: ICD-10-CM

## 2018-05-29 DIAGNOSIS — M76.892 HIP ABDUCTOR TENDONITIS, LEFT: Primary | ICD-10-CM

## 2018-05-29 DIAGNOSIS — C50.911 ADENOCARCINOMA, BREAST, RIGHT (HCC): ICD-10-CM

## 2018-05-29 DIAGNOSIS — M70.61 TROCHANTERIC BURSITIS OF RIGHT HIP: ICD-10-CM

## 2018-05-29 PROCEDURE — 97110 THERAPEUTIC EXERCISES: CPT | Performed by: PHYSICAL THERAPIST

## 2018-05-29 PROCEDURE — 71260 CT THORAX DX C+: CPT

## 2018-05-29 PROCEDURE — 97140 MANUAL THERAPY 1/> REGIONS: CPT | Performed by: PHYSICAL THERAPIST

## 2018-05-29 PROCEDURE — 36415 COLL VENOUS BLD VENIPUNCTURE: CPT

## 2018-05-29 PROCEDURE — 86300 IMMUNOASSAY TUMOR CA 15-3: CPT

## 2018-05-29 PROCEDURE — 74177 CT ABD & PELVIS W/CONTRAST: CPT

## 2018-05-29 RX ADMIN — IOHEXOL 100 ML: 350 INJECTION, SOLUTION INTRAVENOUS at 12:50

## 2018-05-29 NOTE — PROGRESS NOTES
Daily Note     Today's date: 2018  Patient name: Bronson Steward  : 1940  MRN: [id number]  Referring provider: Ivana Hurley DO  Dx:   Encounter Diagnosis     ICD-10-CM    1  Hip abductor tendonitis, left M76 892    2  Bilateral hip pain M25 551     M25 552    3  Iliotibial band syndrome, unspecified laterality M76 30    4  Trochanteric bursitis of right hip M70 61                   Subjective:  Patient states that he was feeling good having less pain following last visit however her pain returned following her CT scan  Patient is happy to be back to therapy today to reduce her pain  Objective: See treatment diary below  Precautions: see medical history    Daily Treatment Diary     Manual              Hip flexor stretch 60rbwu0            stm 5min            Left hip prom with oscillation  3min                                          Exercise Diary              bike 10min            Hamstring stretch 45fmwq3            Piriformis stretch 59cslo8            bridge 10                                                                                                                                                                                                                                Modalities                                                               Assessment: Tolerated treatment well  Patient demonstrated fatigue post treatment, exhibited good technique with therapeutic exercises and would benefit from continued PT  Patient was dramatically improved with her rom and walking following manual stretching  Plan: Continue per plan of care

## 2018-05-30 LAB — CANCER AG27-29 SERPL-ACNC: 15.8 U/ML (ref 0–42.3)

## 2018-05-30 NOTE — MISCELLANEOUS
Message   Recorded as Task   Date: 02/24/2017 02:23 PM, Created By: Anayeli Zhang   Task Name: Care Coordination   Assigned To: Tamar Bansal   Regarding Patient: Luci Rojas, Status: Active   Comment:    Anayeli Zhang - 24 Feb 2017 2:23 PM     TASK CREATED  Caller: Self; Care Coordination; [phone] (Home)  MARIELLE/  [facility] [place] [facility], WOULD LIKE A CALL BACK TO GIVE AN UPDATE  Spoke with patient - she reports diarrhea and nausea are getting better  Imodium PRN and anti nausea meds  She will call with any additional questions or concerns      Active Problems    1  Abnormal PET scan of head (793 0) (R94 02)   2  Acute bronchitis (466 0) (J20 9)   3  Adenocarcinoma of breast (174 9) (C50 919)   4  Admission for antineoplastic chemotherapy (V58 11) (Z51 11)   5  Flushing (782 62) (R23 2)   6  Liver mass, right lobe (573 9) (R16 0)   7  Liver metastases (197 7) (C78 7)   8  Recurrent breast cancer, right (174 9) (C50 911)   9  Screening mammogram for high-risk patient (V76 11) (Z12 31)    Current Meds   1  Atorvastatin Calcium 20 MG Oral Tablet; TABS PO X 30; Therapy: 01SEP4965 to (Last Rx:02Feb2011)  Requested for: 35HUA0097 Ordered   2  Calcium 600 MG Oral Tablet; twice daily; Therapy: 46ZUC7514 to (Last Rx:12Mar2012)  Requested for: 17LMM5484 Ordered   3  Dulcolax TBEC; Therapy: (Recorded:09Oct2012) to Recorded   4  Fiber CAPS; Therapy: (Recorded:09Oct2012) to Recorded   5  Glucosamine 1500 Complex Oral Capsule; twice daily; Therapy: 68NQI5613 to (Last Rx:12Mar2012)  Requested for: 88COL6793 Ordered   6  Ibrance 75 MG Oral Capsule; One tab PO day 1-21 every 28 days; Therapy: 84RDL1134 to (Last Rx:19Jan2017)  Requested for: 56IMA3694 Ordered   7  Multivitamins Oral Capsule; Daily; Therapy: 81OKR0510 to (Last Rx:12Mar2012)  Requested for: 99WCC2894 Ordered   8  Ondansetron HCl - 4 MG Oral Tablet (Zofran); One tab PO q 4-6 hours PRN nausea;    Therapy: 21Feb2017 to (Last Rx:21Feb2017)  Requested for: 07KIO5395 Ordered   9  TheraCran CAPS Recorded    Allergies    1   Ampicillin CAPS    Signatures   Electronically signed by : Lupe Brody, ; Feb 24 2017  2:31PM EST                       (Author) Keystone Flap Text: The defect edges were debeveled with a #15c scalpel blade.  Given the location of the defect, shape of the defect a keystone flap was deemed most appropriate.  Using a sterile surgical marker, an appropriate keystone flap was drawn incorporating the defect, outlining the appropriate donor tissue and placing the expected incisions within the relaxed skin tension lines where possible. The area thus outlined was incised deep to adipose tissue with a #15 scalpel blade.  The skin margins were undermined to an appropriate distance in all directions around the primary defect and laterally outward around the flap utilizing iris scissors.

## 2018-05-31 ENCOUNTER — OFFICE VISIT (OUTPATIENT)
Dept: PHYSICAL THERAPY | Facility: MEDICAL CENTER | Age: 78
End: 2018-05-31
Payer: COMMERCIAL

## 2018-05-31 DIAGNOSIS — M76.892 HIP ABDUCTOR TENDONITIS, LEFT: Primary | ICD-10-CM

## 2018-05-31 DIAGNOSIS — M70.61 TROCHANTERIC BURSITIS OF RIGHT HIP: ICD-10-CM

## 2018-05-31 DIAGNOSIS — M76.30 ILIOTIBIAL BAND SYNDROME, UNSPECIFIED LATERALITY: ICD-10-CM

## 2018-05-31 DIAGNOSIS — M25.551 BILATERAL HIP PAIN: ICD-10-CM

## 2018-05-31 DIAGNOSIS — M25.552 BILATERAL HIP PAIN: ICD-10-CM

## 2018-05-31 PROCEDURE — 97140 MANUAL THERAPY 1/> REGIONS: CPT

## 2018-05-31 PROCEDURE — 97110 THERAPEUTIC EXERCISES: CPT

## 2018-05-31 NOTE — PROGRESS NOTES
Daily Note     Today's date: 2018  Patient name: Cinthia Castro  : 1940  MRN: [id number]  Referring provider: Irais Cullen DO  Dx:   Encounter Diagnosis     ICD-10-CM    1  Hip abductor tendonitis, left M76 892    2  Bilateral hip pain M25 551     M25 552    3  Iliotibial band syndrome, unspecified laterality M76 30    4  Trochanteric bursitis of right hip M70 61                   Subjective: Patient noted that she had decreased pain with bolster under her knees to sleep  She stated, "I slept better " Patient noted soreness around greater trochanter and ITB post exercises and treatment  Objective: See treatment diary below      Assessment: STM helped to decrease restrictions in fascia  Patient needed VC to correct form for piriformis stretch  Patient would benefit from continued PT      Plan: Continue per plan of care         Precautions: see medical history     Daily Treatment Diary      Manual                     Hip flexor stretch 39hxxw7  58rabn8                   stm 5min  5 min                   Left hip prom with oscillation  3min  5 min                                                                         Exercise Diary                        bike 10min  10min                   Hamstring stretch 06dxmu1  35eaeb4                   Piriformis stretch 20rarp3  94koir1                   bridge 10  10                                                                                                                                                                                                                                                                                                                                                                                                                         Modalities

## 2018-06-05 ENCOUNTER — OFFICE VISIT (OUTPATIENT)
Dept: HEMATOLOGY ONCOLOGY | Facility: CLINIC | Age: 78
End: 2018-06-05
Payer: COMMERCIAL

## 2018-06-05 ENCOUNTER — OFFICE VISIT (OUTPATIENT)
Dept: PHYSICAL THERAPY | Facility: MEDICAL CENTER | Age: 78
End: 2018-06-05
Payer: COMMERCIAL

## 2018-06-05 VITALS
SYSTOLIC BLOOD PRESSURE: 126 MMHG | HEART RATE: 79 BPM | BODY MASS INDEX: 30.06 KG/M2 | WEIGHT: 159.2 LBS | OXYGEN SATURATION: 94 % | RESPIRATION RATE: 17 BRPM | DIASTOLIC BLOOD PRESSURE: 72 MMHG | TEMPERATURE: 97.8 F | HEIGHT: 61 IN

## 2018-06-05 DIAGNOSIS — C50.911 ADENOCARCINOMA OF RIGHT BREAST METASTATIC TO LIVER (HCC): Primary | Chronic | ICD-10-CM

## 2018-06-05 DIAGNOSIS — M25.551 BILATERAL HIP PAIN: ICD-10-CM

## 2018-06-05 DIAGNOSIS — M76.892 HIP ABDUCTOR TENDONITIS, LEFT: Primary | ICD-10-CM

## 2018-06-05 DIAGNOSIS — C78.7 ADENOCARCINOMA OF RIGHT BREAST METASTATIC TO LIVER (HCC): Primary | Chronic | ICD-10-CM

## 2018-06-05 DIAGNOSIS — M76.30 ILIOTIBIAL BAND SYNDROME, UNSPECIFIED LATERALITY: ICD-10-CM

## 2018-06-05 DIAGNOSIS — M25.552 BILATERAL HIP PAIN: ICD-10-CM

## 2018-06-05 DIAGNOSIS — M70.61 TROCHANTERIC BURSITIS OF RIGHT HIP: ICD-10-CM

## 2018-06-05 PROCEDURE — 97140 MANUAL THERAPY 1/> REGIONS: CPT | Performed by: PHYSICAL THERAPIST

## 2018-06-05 PROCEDURE — 97110 THERAPEUTIC EXERCISES: CPT | Performed by: PHYSICAL THERAPIST

## 2018-06-05 PROCEDURE — 99214 OFFICE O/P EST MOD 30 MIN: CPT | Performed by: INTERNAL MEDICINE

## 2018-06-05 NOTE — PROGRESS NOTES
Daily Note     Today's date: 2018  Patient name: Triny Angeles  : 1940  MRN: [id number]  Referring provider: Josefa Ríos DO  Dx:   Encounter Diagnosis     ICD-10-CM    1  Hip abductor tendonitis, left M76 892    2  Bilateral hip pain M25 551     M25 552    3  Iliotibial band syndrome, unspecified laterality M76 30    4  Trochanteric bursitis of right hip M70 61                   Subjective: Patient noted that she had decreased pain with bolster under her knees to sleep  She stated, "I slept better " Patient noted soreness around greater trochanter and ITB post exercises and treatment  Objective: See treatment diary below      Assessment: STM helped to decrease restrictions in fascia  Patient needed VC to correct form for piriformis stretch  Patient would benefit from continued PT      Plan: Continue per plan of care         Precautions: see medical history     Daily Treatment Diary      Manual  6/5                    Hip flexor stretch 41jhmt9                    stm 5min                    Left hip prom with oscillation  3min                                                                          Exercise Diary                       bike 10min                    Hamstring stretch 95acye9                    Piriformis stretch 37udbx0                    bridge 10                                                                                                                                                                                                                                                                                                                                                                                                                         Modalities

## 2018-06-05 NOTE — LETTER
June 5, 2018     Danna Thakur MD  [address] [employer]  [address] [place] [address]    Patient: Eddie Michael   YOB: 1940   Date of Visit: 6/5/2018       Dear Dr Vo Phi: Thank you for referring Marilyn Walker to me for evaluation  Below are my notes for this consultation  If you have questions, please do not hesitate to call me  I look forward to following your patient along with you  Sincerely,        Pedro Gates DO        CC: No Recipients  Pedro Gates DO  6/5/2018  1:59 PM  Sign at close encounter  Eddie Michael  1940  [address] [facility] [address]  [facility] [address] [facility] [address] [phone]    Chief Complaint   Patient presents with    Follow-up          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)    2009 Initial Diagnosis     Adenocarcinoma of right breast,  Infiltrating ductal carcinoma T2, N1 sebatsien) Tumor was ER/W[place] positive, HER2 negative  2009 Surgery     Right Mastectomy         2009 -  Chemotherapy     Adjuvant chemotherapy with Taxotere/Cytoxan x 4 cycles         2009 - 10/2014 Chemotherapy     Arimidex 1 mg po dialy         2/11/2016 Progression     Right lateral mastectomy site growth with core needle biopsy demonstrated reoccurrence  %, WY 70%, Her2 +1  Final Diagnosis   A  Breast, right lateral mastectomy site, core needle biopsies:                        - Recurrent invasive mammary carcinoma, no special type (formerly invasive ductal carcinoma), 0 8 cm largest single focus               - Combined Jose Elias score: 7/9                         - Tubule formation: None (3/3)  - Nuclear pleomorphism: Marked (3/3)  - Mitotic count: 3 mitoses per 10 high-power fields (1/3)                 - No lymph-vascular invasion is identified              - No in situ component is identified                - No microcalcifications are identified  3/16/2016 - 1/24/2017 Chemotherapy     Faslodex 500 mg with loading dose followed by maintenance monthly injection with Ibrance 75mg 3 weeks on 1 week off           12/13/2016 Progression      Progression noted on PET-CT scan  Progression was largely found in the liver  Patient was set up for liver biopsy  1/3/2017 Biopsy     Liver lesion biopsy demonstated Estrogen Receptor (clone SP-1) 100%/positive, Progesterone Receptor (clone 1E2) 1-2%/positive, HER2 by IHC ([id number]) 2+/equivocal   Her2 by FISH was positive  1/25/2017 - 4/19/2017 Chemotherapy      Herceptin and Perjeta x5 cycles         4/20/2017 Adverse Reaction     Perjecta causing significant diarrhea  Medication stopped  7/13/2017 Surgery     Partial Hepatectomy, results demonstrated ER70%, PR0% & Her2 negative disease  9/19/2017 -  Chemotherapy     Tamoxifen 20 mg daily            History of Present Illness:  -No ref  provider found:    -Interval History:  Clinically stable  Review of Systems:  Review of Systems   Constitutional: Negative for appetite change, diaphoresis, fatigue and fever  HENT: Negative for sinus pain  Eyes: Negative for discharge  Respiratory: Negative for cough and shortness of breath  Cardiovascular: Negative for chest pain  Gastrointestinal: Negative for abdominal pain, constipation and diarrhea  Endocrine: Negative for cold intolerance  Genitourinary: Negative for difficulty urinating and hematuria  Musculoskeletal: Negative for joint swelling  Skin: Negative for rash  Allergic/Immunologic: Negative for environmental allergies  Neurological: Negative for dizziness and headaches  Hematological: Negative for adenopathy  Psychiatric/Behavioral: Negative for agitation         Patient Active Problem List   Diagnosis    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 ([facility] [address] )    Hypercholesteremia    Gastroesophageal reflux disease without esophagitis    History of total knee replacement    Hearing loss    Left bundle branch block (LBBB)    Symptomatic bradycardia    Complication associated with cardiac pacemaker lead    Pacemaker complications, subsequent encounter    Chest wall hematoma    Cholecystitis    Acute biliary pancreatitis    Cholangitis    Choledocholithiasis    Acute gallstone pancreatitis    Heart block atrioventricular     Past Medical History:   Diagnosis Date    Anxiety     Arthritis     Breast CA (HCC)     recurrent, ductal carcinoma ER, [place] pos    Depression     Diverticula of intestine     Dizziness     and passes out    Flushing     Hiatal hernia     [place]      [place]    Hypercholesteremia     Liver mass     Liver metastasis (HCC)     Metastatic adenocarcinoma to liver (HCC)     Bx 01/03/2017    PONV (postoperative nausea and vomiting)     Recurrent breast cancer (HCC)     Shingles     Shortness of breath     on exertion    Tachycardia     Urinary incontinence     Use of cane as ambulatory aid     or walker     Past Surgical History:   Procedure Laterality Date    CARDIAC PACEMAKER REMOVAL N/A 11/9/2017    Procedure: PACEMAKER LEAD REVISION, REMOVAL OF NONFUNCTIONING LEAD, AND INSERTION OF A NEW RV LEAD;  Surgeon: Sophia Huntley MD;  [facility];  Service: Cardiology    CATARACT EXTRACTION Bilateral     COLONOSCOPY      ERCP N/A 1/8/2018    Procedure: ENDOSCOPIC RETROGRADE CHOLANGIOPANCREATOGRAPHY (ERCP); Surgeon: Jalen Villanueva MD;  Location: [facility]; Service: Gastroenterology    HYSTERECTOMY      JOINT REPLACEMENT      lizette  TKR and right TSR    MASTECTOMY Right     WI ERCP DX COLLECTION SPECIMEN BRUSHING/WASHING N/A 2/22/2018    Procedure: ENDOSCOPIC RETROGRADE CHOLANGIOPANCREATOGRAPHY (ERCP) with stent removal;  Surgeon: Jalen Villanueva MD;  [facility];   Service: Gastroenterology    WI RESEC LIVER,PART LOBECTOMY N/A 7/13/2017    Procedure: LIVER RESECTION, INTRAOPERATIVE ULTRASOUND;  Surgeon: Bronson Riojas MD;  [facility];  Service: Surgical Oncology    ROTATOR CUFF REPAIR Right     SENTINEL LYMPH NODE BIOPSY Right     TONSILECTOMY AND ADNOIDECTOMY      WOUND DEBRIDEMENT Left 11/9/2017    Procedure: DEBRIDEMENT WOUND AND DRESSING CHANGE [facility]); Surgeon: Valerie Fierro MD;  [facility];  Service: Cardiology     Family History   Problem Relation Age of Onset    Lung cancer Father     Cancer Brother     Diabetes type II Son      Social History     Social History    Marital status: /Civil Union     Spouse name: N/A    Number of children: N/A    Years of education: N/A     Occupational History    Not on file  Social History Main Topics    Smoking status: Never Smoker    Smokeless tobacco: Never Used    Alcohol use No    Drug use: No    Sexual activity: Not on file     Other Topics Concern    Not on file     Social History Narrative    No narrative on file       Current Outpatient Prescriptions:     acetaminophen (TYLENOL) 325 mg tablet, May take 650 mg every 6-8 hours as needed for pain (Patient taking differently: Take 650 mg by mouth every 6 (six) hours as needed May take 650 mg every 6-8 hours as needed for pain ), Disp: 30 tablet, Rfl: 0    aspirin (ECOTRIN LOW STRENGTH) 81 mg EC tablet, Take 81 mg by mouth daily, Disp: , Rfl:     atorvastatin (LIPITOR) 20 mg tablet, Take 20 mg by mouth daily  , Disp: , Rfl:     calcium carbonate (CALCIUM 600) 600 MG tablet, Take 600 mg by mouth 2 (two) times a day with meals  , Disp: , Rfl:     clindamycin (CLEOCIN) 300 MG capsule, TAKE 2 CAPSULES BY MOUTH 1 HOUR PRIOR TO DENTAL PROCEDURE , Disp: , Rfl: 0    clotrimazole-betamethasone (LOTRISONE) 1-0 05 % cream, Apply topically, Disp: , Rfl:     Cranberry (THERACRAN HP PO), Take 2 tablets by mouth daily, Disp: , Rfl:     furosemide (LASIX) 20 mg tablet, TAKE 1 TABLET BY MOUTH DAILY AS NEEDED FOR EDEMA, Disp: , Rfl:   Glucosamine-Chondroit-Vit C-Mn (GLUCOSAMINE 1500 COMPLEX) CAPS, Take 1 capsule by mouth 2 (two) times a day  , Disp: , Rfl:     metFORMIN (GLUCOPHAGE) 500 mg tablet, Take 500 mg by mouth daily  , Disp: , Rfl:     metoprolol tartrate (LOPRESSOR) 25 mg tablet, Take 1 tablet (25 mg total) by mouth every 12 (twelve) hours, Disp: 60 tablet, Rfl: 3    metoprolol tartrate (LOPRESSOR) 25 mg tablet, Take 0 5 tablets (12 5 mg total) by mouth every 12 (twelve) hours, Disp: 30 tablet, Rfl: 0    Multiple Vitamin (MULTIVITAMIN) capsule, Take 1 capsule by mouth daily  , Disp: , Rfl:     ondansetron (ZOFRAN) 4 mg tablet, Take 4 mg by mouth every 8 (eight) hours as needed for nausea or vomiting, Disp: , Rfl:     oxyCODONE (ROXICODONE) 5 mg immediate release tablet, take 1 tablet by mouth every 6 hours if needed for BREAKTHROUGH PAIN, Disp: , Rfl:     tamoxifen (NOLVADEX) 20 mg tablet, Take 1 tablet (20 mg total) by mouth daily, Disp: 30 tablet, Rfl: 6  Allergies   Allergen Reactions    Ampicillin Shortness Of Breath, Anaphylaxis and Other (See Comments)     Other reaction(s): Unknown Allergic Reaction  Reaction Date: [id number]; There were no vitals filed for this visit  Physical Exam   Constitutional: She is oriented to person, place, and time  She appears well-developed  HENT:   Head: Normocephalic  Eyes: Pupils are equal, round, and reactive to light  Neck: Neck supple  Cardiovascular: Normal rate  No murmur heard  Pulmonary/Chest: No respiratory distress  She has no wheezes  She has no rales  Abdominal: Soft  She exhibits no distension  There is no tenderness  There is no rebound  Musculoskeletal: She exhibits no edema  Lymphadenopathy:     She has no cervical adenopathy  Neurological: She is alert and oriented to person, place, and time  She displays normal reflexes  Skin: Skin is warm  No rash noted  Psychiatric: She has a normal mood and affect   Thought content normal  Performance Status: ECOG/Zubrod/WHO: 1 - Symptomatic but completely ambulatory    Labs:  CBC, Coags, BMP, Mg, Phos     Imaging  Xr Hips Bilateral 2 Vw W Pelvis If Performed    Result Date: 5/10/2018  Narrative: BILATERAL HIPS AND PELVIS INDICATION:   M25 551: Pain in right hip M25 552: Pain in left hip   1 month of bilateral hip pain  COMPARISON:  None VIEWS:  XR HIPS BILATERAL 2 VW W PELVIS IF PERFORMED Images: 5  FINDINGS: The bony pelvis appears intact  Mild degenerative change of the SI joints and lower lumbar spine  LEFT HIP: Mild degenerative osteoarthritis  Joint space alignment is maintained  Soft tissues are unremarkable  RIGHT HIP: Moderate degenerative osteoarthritis  Joint space alignment is maintained  Soft tissues are unremarkable  Impression: Degenerative osteoarthritis of the hips  Workstation performed: [id number]     Ct Chest Abdomen Pelvis W Contrast    Result Date: 6/1/2018  Narrative: CT CHEST, ABDOMEN AND PELVIS WITH IV CONTRAST INDICATION:   C50 911: Malignant neoplasm of unspecified site of right female breast  COMPARISON: February 14, 2018 TECHNIQUE: CT examination of the chest, abdomen and pelvis was performed  Axial, sagittal, and coronal 2D reformatted images were created from the source data and submitted for interpretation  Radiation dose length product (DLP) for this visit:  1040 mGy-cm   This examination, like all CT scans performed in the [facility], was performed utilizing techniques to minimize radiation dose exposure, including the use of iterative reconstruction and automated exposure control  IV Contrast:  100 mL of iohexol (OMNIPAQUE)   350 Enteric Contrast: Enteric contrast was administered  FINDINGS: CHEST LUNGS:  Diminished right pleural effusion since prior exam   Stable granuloma right lung base posteriorly  Stable calcifications in the right inferior hilum  PLEURA:  Unremarkable  HEART/GREAT VESSELS:  Unremarkable for patient's age  MEDIASTINUM AND NGOZI:  Unremarkable  CHEST WALL AND LOWER NECK:   Left side cardiac pacer with intact leads  Prior right mastectomy  Stable subcentimeter nodes in the right axilla ABDOMEN LIVER/BILIARY TREE:  Stable postop changes  No enhancing mass  Pneumobilia  Previous biliary stent is no longer visible GALLBLADDER:  Intrahepatic gallbladder with pneumobilia  SPLEEN:  Numerous calcified granulomata PANCREAS:  Unremarkable  ADRENAL GLANDS:  Unremarkable  KIDNEYS/URETERS:  Stable tiny low-attenuation cortical lesions likely small cysts STOMACH AND BOWEL:  Mild gastric wall thickening probably due to underdistention rather than gastritis  Small bowel appears average caliber without obstruction  Colonic diverticulosis without evidence of diverticulitis APPENDIX:  No findings to suggest appendicitis  ABDOMINOPELVIC CAVITY:  No ascites or free intraperitoneal air  No lymphadenopathy  VESSELS:  Unremarkable for patient's age  PELVIS REPRODUCTIVE ORGANS:  Uterus has been removed  No adnexal masses  URINARY BLADDER:  There is again a small amount of gas and urinary bladder lumen which may be iatrogenic  Please correlate with patient's recent history  ABDOMINAL WALL/INGUINAL REGIONS:  Unremarkable  OSSEOUS STRUCTURES:  Multilevel degenerative changes similar prior exam  Grade 1 anterolisthesis L3 on L4  Right shoulder arthroplasty with stable hardware  Stable dystrophic calcifications left lateral pelvis     Impression: Previous biliary stent is no longer visible  There is not significant biliary ductal dilatation on this exam there is however pneumobilia which appears slightly more extensive  Stable postoperative changes in the liver  Diminished right pleural effusion  No new metastatic disease identified  There is again a small amount of gas and urinary bladder lumen which may be iatrogenic  Please correlate with patient's recent history   Workstation performed: [id number]     I reviewed the above laboratory and imaging data  Discussion/Summary:  In summary, this is a 58-year-old female history of advanced breast cancer as outlined  She is currently on tamoxifen  She tolerates this fairly well that she does have night sweats  She has no evidence of thrombosis  She continues aspirin 81 mg p o  daily for thromboprophylaxis  Recent CT of the chest abdomen and pelvis showed no evidence recurrence/progressive disease  We she tells me that she is undergoing physical therapy for the left IT band tightness  She feels that she is starting to have some benefit in this regard  The CT scan showed no bony metastases in this area  I reviewed the above findings and their significance with the patient and her   I asked her to continue on tamoxifen and aspirin for now    Follow-up in 3 months with repeat imaging just prior to that

## 2018-06-05 NOTE — PROGRESS NOTES
Merlin Kendall  1940  [address] 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 [facility])    2009 Initial Diagnosis     Adenocarcinoma of right breast,  Infiltrating ductal carcinoma T2, N1 sebastien) Tumor was ER/MN positive, HER2 negative  2009 Surgery     Right Mastectomy         2009 -  Chemotherapy     Adjuvant chemotherapy with Taxotere/Cytoxan x 4 cycles         2009 - 10/2014 Chemotherapy     Arimidex 1 mg po dialy         2/11/2016 Progression     Right lateral mastectomy site growth with core needle biopsy demonstrated reoccurrence  %, MN 70%, Her2 +1  Final Diagnosis   A  Breast, right lateral mastectomy site, core needle biopsies:                        - Recurrent invasive mammary carcinoma, no special type (formerly invasive ductal carcinoma), 0 8 cm largest single focus               - Combined [place] score: 7/9                         - Tubule formation: None (3/3)  - Nuclear pleomorphism: Marked (3/3)  - Mitotic count: 3 mitoses per 10 high-power fields (1/3)  - No lymph-vascular invasion is identified              - No in situ component is identified                - No microcalcifications are identified  3/16/2016 - 1/24/2017 Chemotherapy     Faslodex 500 mg with loading dose followed by maintenance monthly injection with Ibrance 75mg 3 weeks on 1 week off           12/13/2016 Progression      Progression noted on PET-CT scan  Progression was largely found in the liver  Patient was set up for liver biopsy           1/3/2017 Biopsy     Liver lesion biopsy demonstated Estrogen Receptor (clone SP-1) 100%/positive, Progesterone Receptor (clone 1E2) 1-2%/positive, HER2 by IHC ([id number]) 2+/equivocal   Her2 by FISH was positive  1/25/2017 - 4/19/2017 Chemotherapy      Herceptin and Perjeta x5 cycles         4/20/2017 Adverse Reaction     Perjecta causing significant diarrhea  Medication stopped  7/13/2017 Surgery     Partial Hepatectomy, results demonstrated ER70%, PR0% & Her2 negative disease  9/19/2017 -  Chemotherapy     Tamoxifen 20 mg daily            History of Present Illness:  -No ref  provider found:    -Interval History:  Clinically stable  Review of Systems:  Review of Systems   Constitutional: Negative for appetite change, diaphoresis, fatigue and fever  HENT: Negative for sinus pain  Eyes: Negative for discharge  Respiratory: Negative for cough and shortness of breath  Cardiovascular: Negative for chest pain  Gastrointestinal: Negative for abdominal pain, constipation and diarrhea  Endocrine: Negative for cold intolerance  Genitourinary: Negative for difficulty urinating and hematuria  Musculoskeletal: Negative for joint swelling  Skin: Negative for rash  Allergic/Immunologic: Negative for environmental allergies  Neurological: Negative for dizziness and headaches  Hematological: Negative for adenopathy  Psychiatric/Behavioral: Negative for agitation         Patient Active Problem List   Diagnosis    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 ([address] )    Hypercholesteremia    Gastroesophageal reflux disease without esophagitis    History of total knee replacement    Hearing loss    Left bundle branch block (LBBB)    Symptomatic bradycardia    Complication associated with cardiac pacemaker lead    Pacemaker complications, subsequent encounter    Chest wall hematoma    Cholecystitis    Acute biliary pancreatitis    Cholangitis    Choledocholithiasis    Acute gallstone pancreatitis    Heart block atrioventricular     Past Medical History:   Diagnosis Date    Anxiety     Arthritis     Breast CA (HCC)     recurrent, ductal carcinoma ER, MS pos    Depression     Diverticula of intestine     Dizziness     and passes out    Flushing     Hiatal hernia     Huslia (hard of hearing)      lizette    Hypercholesteremia     Liver mass     Liver metastasis (HCC)     Metastatic adenocarcinoma to liver [facility] )     Bx 01/03/2017    PONV (postoperative nausea and vomiting)     Recurrent breast cancer ([facility] )     Shingles     Shortness of breath     on exertion    Tachycardia     Urinary incontinence     Use of cane as ambulatory aid     or walker     Past Surgical History:   Procedure Laterality Date    CARDIAC PACEMAKER REMOVAL N/A 11/9/2017    Procedure: PACEMAKER LEAD REVISION, REMOVAL OF NONFUNCTIONING LEAD, AND INSERTION OF A NEW RV LEAD;  Surgeon: Carola Lizarraga MD;  [facility];  Service: Cardiology    CATARACT EXTRACTION Bilateral     COLONOSCOPY      ERCP N/A 1/8/2018    Procedure: ENDOSCOPIC RETROGRADE CHOLANGIOPANCREATOGRAPHY (ERCP); Surgeon: Sada Hartman MD;  Location: [facility]; Service: Gastroenterology    HYSTERECTOMY      JOINT REPLACEMENT      lizette  TKR and right TSR    MASTECTOMY Right     AR ERCP DX COLLECTION SPECIMEN BRUSHING/WASHING N/A 2/22/2018    Procedure: ENDOSCOPIC RETROGRADE CHOLANGIOPANCREATOGRAPHY (ERCP) with stent removal;  Surgeon: Sada Hartman MD;  [facility]: [facility]; Service: Gastroenterology    AR RESEC [address] N/A 7/13/2017    Procedure: LIVER RESECTION, INTRAOPERATIVE ULTRASOUND;  Surgeon: Varsha Landaverde MD;  [facility];  Service: Surgical Oncology    ROTATOR CUFF REPAIR Right     SENTINEL LYMPH NODE BIOPSY Right     TONSILECTOMY AND ADNOIDECTOMY      WOUND DEBRIDEMENT Left 11/9/2017    Procedure: DEBRIDEMENT WOUND AND DRESSING CHANGE [facility]);   Surgeon: Carola Lizarraga MD;  [facility];  Service: Cardiology     Family History   Problem Relation Age of Onset    Lung cancer Father     Cancer Brother     Diabetes type II Son      Social History     Social History  Marital status: /Civil Union     Spouse name: N/A    Number of children: N/A    Years of education: N/A     Occupational History    Not on file  Social History Main Topics    Smoking status: Never Smoker    Smokeless tobacco: Never Used    Alcohol use No    Drug use: No    Sexual activity: Not on file     Other Topics Concern    Not on file     Social History Narrative    No narrative on file       Current Outpatient Prescriptions:     acetaminophen (TYLENOL) 325 mg tablet, May take 650 mg every 6-8 hours as needed for pain (Patient taking differently: Take 650 mg by mouth every 6 (six) hours as needed May take 650 mg every 6-8 hours as needed for pain ), Disp: 30 tablet, Rfl: 0    aspirin (ECOTRIN LOW STRENGTH) 81 mg EC tablet, Take 81 mg by mouth daily, Disp: , Rfl:     atorvastatin (LIPITOR) 20 mg tablet, Take 20 mg by mouth daily  , Disp: , Rfl:     calcium carbonate (CALCIUM 600) 600 MG tablet, Take 600 mg by mouth 2 (two) times a day with meals  , Disp: , Rfl:     clindamycin (CLEOCIN) 300 MG capsule, TAKE 2 CAPSULES BY MOUTH 1 HOUR PRIOR TO DENTAL PROCEDURE , Disp: , Rfl: 0    clotrimazole-betamethasone (LOTRISONE) 1-0 05 % cream, Apply topically, Disp: , Rfl:     Cranberry (THERACRAN HP PO), Take 2 tablets by mouth daily, Disp: , Rfl:     furosemide (LASIX) 20 mg tablet, TAKE 1 TABLET BY MOUTH DAILY AS NEEDED FOR EDEMA, Disp: , Rfl:     Glucosamine-Chondroit-Vit C-Mn (GLUCOSAMINE 1500 COMPLEX) CAPS, Take 1 capsule by mouth 2 (two) times a day  , Disp: , Rfl:     metFORMIN (GLUCOPHAGE) 500 mg tablet, Take 500 mg by mouth daily  , Disp: , Rfl:     metoprolol tartrate (LOPRESSOR) 25 mg tablet, Take 1 tablet (25 mg total) by mouth every 12 (twelve) hours, Disp: 60 tablet, Rfl: 3    metoprolol tartrate (LOPRESSOR) 25 mg tablet, Take 0 5 tablets (12 5 mg total) by mouth every 12 (twelve) hours, Disp: 30 tablet, Rfl: 0    Multiple Vitamin (MULTIVITAMIN) capsule, Take 1 capsule by mouth daily  , Disp: , Rfl:     ondansetron (ZOFRAN) 4 mg tablet, Take 4 mg by mouth every 8 (eight) hours as needed for nausea or vomiting, Disp: , Rfl:     oxyCODONE (ROXICODONE) 5 mg immediate release tablet, take 1 tablet by mouth every 6 hours if needed for BREAKTHROUGH PAIN, Disp: , Rfl:     tamoxifen (NOLVADEX) 20 mg tablet, Take 1 tablet (20 mg total) by mouth daily, Disp: 30 tablet, Rfl: 6  Allergies   Allergen Reactions    Ampicillin Shortness Of Breath, Anaphylaxis and Other (See Comments)     Other reaction(s): Unknown Allergic Reaction  Reaction Date: [id number]; There were no vitals filed for this visit  Physical Exam   Constitutional: She is oriented to person, place, and time  She appears well-developed  HENT:   Head: Normocephalic  Eyes: Pupils are equal, round, and reactive to light  Neck: Neck supple  Cardiovascular: Normal rate  No murmur heard  Pulmonary/Chest: No respiratory distress  She has no wheezes  She has no rales  Abdominal: Soft  She exhibits no distension  There is no tenderness  There is no rebound  Musculoskeletal: She exhibits no edema  Lymphadenopathy:     She has no cervical adenopathy  Neurological: She is alert and oriented to person, place, and time  She displays normal reflexes  Skin: Skin is warm  No rash noted  Psychiatric: She has a normal mood and affect  Thought content normal            Performance Status: ECOG/Zubrod/WHO: 1 - Symptomatic but completely ambulatory    Labs:  CBC, Coags, BMP, Mg, Phos     Imaging  Xr Hips Bilateral 2 Vw W Pelvis If Performed    Result Date: 5/10/2018  Narrative: BILATERAL HIPS AND PELVIS INDICATION:   M25 551: Pain in right hip M25 552: Pain in left hip   1 month of bilateral hip pain  COMPARISON:  None VIEWS:  XR HIPS BILATERAL 2 VW W PELVIS IF PERFORMED Images: 5  FINDINGS: The bony pelvis appears intact  Mild degenerative change of the SI joints and lower lumbar spine   LEFT HIP: Mild degenerative osteoarthritis  Joint space alignment is maintained  Soft tissues are unremarkable  RIGHT HIP: Moderate degenerative osteoarthritis  Joint space alignment is maintained  Soft tissues are unremarkable  Impression: Degenerative osteoarthritis of the hips  Workstation performed: [id number]     Ct Chest Abdomen Pelvis W Contrast    Result Date: 6/1/2018  Narrative: CT CHEST, ABDOMEN AND PELVIS WITH IV CONTRAST INDICATION:   C50 911: Malignant neoplasm of unspecified site of right female breast  COMPARISON: February 14, 2018 TECHNIQUE: CT examination of the chest, abdomen and pelvis was performed  Axial, sagittal, and coronal 2D reformatted images were created from the source data and submitted for interpretation  Radiation dose length product (DLP) for this visit:  1040 mGy-cm   This examination, like all CT scans performed in the [facility], was performed utilizing techniques to minimize radiation dose exposure, including the use of iterative reconstruction and automated exposure control  IV Contrast:  100 mL of iohexol (OMNIPAQUE)   350 Enteric Contrast: Enteric contrast was administered  FINDINGS: CHEST LUNGS:  Diminished right pleural effusion since prior exam   Stable granuloma right lung base posteriorly  Stable calcifications in the right inferior hilum  PLEURA:  Unremarkable  HEART/GREAT VESSELS:  Unremarkable for patient's age  MEDIASTINUM AND NGOZI:  Unremarkable  CHEST WALL AND LOWER NECK:   Left side cardiac pacer with intact leads  Prior right mastectomy  Stable subcentimeter nodes in the right axilla ABDOMEN LIVER/BILIARY TREE:  Stable postop changes  No enhancing mass  Pneumobilia  Previous biliary stent is no longer visible GALLBLADDER:  Intrahepatic gallbladder with pneumobilia  SPLEEN:  Numerous calcified granulomata PANCREAS:  Unremarkable  ADRENAL GLANDS:  Unremarkable   KIDNEYS/URETERS:  Stable tiny low-attenuation cortical lesions likely small cysts STOMACH AND BOWEL: Mild gastric wall thickening probably due to underdistention rather than gastritis  Small bowel appears average caliber without obstruction  Colonic diverticulosis without evidence of diverticulitis APPENDIX:  No findings to suggest appendicitis  ABDOMINOPELVIC CAVITY:  No ascites or free intraperitoneal air  No lymphadenopathy  VESSELS:  Unremarkable for patient's age  PELVIS REPRODUCTIVE ORGANS:  Uterus has been removed  No adnexal masses  URINARY BLADDER:  There is again a small amount of gas and urinary bladder lumen which may be iatrogenic  Please correlate with patient's recent history  ABDOMINAL WALL/INGUINAL REGIONS:  Unremarkable  OSSEOUS STRUCTURES:  Multilevel degenerative changes similar prior exam  Grade 1 anterolisthesis L3 on L4  Right shoulder arthroplasty with stable hardware  Stable dystrophic calcifications left lateral pelvis     Impression: Previous biliary stent is no longer visible  There is not significant biliary ductal dilatation on this exam there is however pneumobilia which appears slightly more extensive  Stable postoperative changes in the liver  Diminished right pleural effusion  No new metastatic disease identified  There is again a small amount of gas and urinary bladder lumen which may be iatrogenic  Please correlate with patient's recent history  Workstation performed: [id number]     I reviewed the above laboratory and imaging data  Discussion/Summary:  In summary, this is a 66-year-old female history of advanced breast cancer as outlined  She is currently on tamoxifen  She tolerates this fairly well that she does have night sweats  She has no evidence of thrombosis  She continues aspirin 81 mg p o  daily for thromboprophylaxis  Recent CT of the chest abdomen and pelvis showed no evidence recurrence/progressive disease  We she tells me that she is undergoing physical therapy for the left IT band tightness    She feels that she is starting to have some benefit in this regard  The CT scan showed no bony metastases in this area  I reviewed the above findings and their significance with the patient and her   I asked her to continue on tamoxifen and aspirin for now    Follow-up in 3 months with repeat imaging just prior to that

## 2018-06-07 ENCOUNTER — OFFICE VISIT (OUTPATIENT)
Dept: PHYSICAL THERAPY | Facility: MEDICAL CENTER | Age: 78
End: 2018-06-07
Payer: COMMERCIAL

## 2018-06-07 DIAGNOSIS — M76.892 HIP ABDUCTOR TENDONITIS, LEFT: Primary | ICD-10-CM

## 2018-06-07 DIAGNOSIS — M70.61 TROCHANTERIC BURSITIS OF RIGHT HIP: ICD-10-CM

## 2018-06-07 DIAGNOSIS — M76.30 ILIOTIBIAL BAND SYNDROME, UNSPECIFIED LATERALITY: ICD-10-CM

## 2018-06-07 DIAGNOSIS — M25.551 BILATERAL HIP PAIN: ICD-10-CM

## 2018-06-07 DIAGNOSIS — M25.552 BILATERAL HIP PAIN: ICD-10-CM

## 2018-06-07 PROCEDURE — 97110 THERAPEUTIC EXERCISES: CPT | Performed by: PHYSICAL THERAPIST

## 2018-06-07 NOTE — PROGRESS NOTES
Daily Note     Today's date: 2018  Patient name: Sejal Tan  : 1940  MRN: [id number]  Referring provider: Elisha Adames DO  Dx:   Encounter Diagnosis     ICD-10-CM    1  Hip abductor tendonitis, left M76 892    2  Bilateral hip pain M25 551     M25 552    3  Iliotibial band syndrome, unspecified laterality M76 30    4  Trochanteric bursitis of right hip M70 61                   Subjective: patient states that her pain is always worse following lying down  Objective: See treatment diary below      Assessment: STM helped to decrease restrictions in fascia  Patient needed VC to correct form for piriformis stretch  Patient would benefit from continued PT  TE modified today to keep the patient up straight and not have her lying down  Patient was able to perform all TE and was able to walk with much less pain following session focused on standing exercises  Plan: Continue per plan of care         Precautions: see medical history     Daily Treatment Diary      Manual                      Hip flexor stretch hold                    stm hold                    Left hip prom with oscillation  hold                                                                          Exercise Diary                       bike 10min                    Hamstring stretch [id number]                    Piriformis stretch [id number]                    bridge hold                     standing hip abduction 10x2                     standing hip extension 10x2                      mini squat 10x2                      hamstring curls standing 10x2                                                                                                                                                                                                                                                                                                                           Modalities

## 2018-06-12 ENCOUNTER — APPOINTMENT (OUTPATIENT)
Dept: PHYSICAL THERAPY | Facility: MEDICAL CENTER | Age: 78
End: 2018-06-12
Payer: COMMERCIAL

## 2018-06-14 ENCOUNTER — OFFICE VISIT (OUTPATIENT)
Dept: PHYSICAL THERAPY | Facility: MEDICAL CENTER | Age: 78
End: 2018-06-14
Payer: COMMERCIAL

## 2018-06-14 DIAGNOSIS — M25.551 BILATERAL HIP PAIN: ICD-10-CM

## 2018-06-14 DIAGNOSIS — M76.892 HIP ABDUCTOR TENDONITIS, LEFT: Primary | ICD-10-CM

## 2018-06-14 DIAGNOSIS — M76.30 ILIOTIBIAL BAND SYNDROME, UNSPECIFIED LATERALITY: ICD-10-CM

## 2018-06-14 DIAGNOSIS — M70.61 TROCHANTERIC BURSITIS OF RIGHT HIP: ICD-10-CM

## 2018-06-14 DIAGNOSIS — M25.552 BILATERAL HIP PAIN: ICD-10-CM

## 2018-06-14 PROCEDURE — 97110 THERAPEUTIC EXERCISES: CPT | Performed by: PHYSICAL THERAPIST

## 2018-06-14 NOTE — PROGRESS NOTES
Daily Note     Today's date: 2018  Patient name: Kelsey Vigil  : 1940  MRN: [id number]  Referring provider: Imani Singh DO  Dx:   Encounter Diagnosis     ICD-10-CM    1  Hip abductor tendonitis, left M76 892    2  Bilateral hip pain M25 551     M25 552    3  Iliotibial band syndrome, unspecified laterality M76 30    4  Trochanteric bursitis of right hip M70 61                   Subjective: patient states that her pain is always worse following lying down and the mornings continue to be difficult  Patient notes that otherwise she is having little pain and is moving well after the first 1/2 hour of being awake  Objective: See treatment diary below      Assessment: patient continues to make gains in strength and now that she is performing all of her TE in standing she is leaving treatment much better  She seems to get most benefit from lumbar extension in standing at the edge of the table  Plan: Continue per plan of care         Precautions: see medical history     Daily Treatment Diary      Manual                      Hip flexor stretch hold                    stm hold                    Left hip prom with oscillation  hold                                                                          Exercise Diary                       bike 10min                    Hamstring stretch [id number]                    Piriformis stretch [id number]                    bridge hold                     standing hip abduction 10x2                     standing hip extension 10x2                      mini squat 10x2                      hamstring curls standing 10x2                      standing lumbar extension 10x2                                                                                                                                                                                                                                                                                                 Modalities

## 2018-06-19 ENCOUNTER — OFFICE VISIT (OUTPATIENT)
Dept: PHYSICAL THERAPY | Facility: MEDICAL CENTER | Age: 78
End: 2018-06-19
Payer: COMMERCIAL

## 2018-06-19 DIAGNOSIS — M76.30 ILIOTIBIAL BAND SYNDROME, UNSPECIFIED LATERALITY: ICD-10-CM

## 2018-06-19 DIAGNOSIS — M76.892 HIP ABDUCTOR TENDONITIS, LEFT: Primary | ICD-10-CM

## 2018-06-19 DIAGNOSIS — M25.551 BILATERAL HIP PAIN: ICD-10-CM

## 2018-06-19 DIAGNOSIS — M25.552 BILATERAL HIP PAIN: ICD-10-CM

## 2018-06-19 DIAGNOSIS — M70.61 TROCHANTERIC BURSITIS OF RIGHT HIP: ICD-10-CM

## 2018-06-19 PROCEDURE — 97110 THERAPEUTIC EXERCISES: CPT

## 2018-06-19 NOTE — PROGRESS NOTES
Daily Note     Today's date: 2018  Patient name: Keshav Anders  : 1940  MRN: [id number]  Referring provider: Tre Thomas DO  Dx:   Encounter Diagnosis     ICD-10-CM    1  Hip abductor tendonitis, left M76 892    2  Bilateral hip pain M25 551     M25 552    3  Iliotibial band syndrome, unspecified laterality M76 30    4  Trochanteric bursitis of right hip M70 61                   Subjective: Patient reports that she continues to have pain first thing in the morning and after sitting for a period of time and then taking some steps  Pt reports that her pain then resolves fairly quickly and she is good for the rest of the day  Objective: See treatment diary below      Assessment: Patient continues to make gains in strength and now that she is performing all of her TE in standing  Pt noted some increased soreness after performing hip abd/extension, apply moist heat post ex which decreased ex soreness  Pt reported feeling good post treatment session  Plan: Continue per plan of care         Precautions: see medical history     Daily Treatment Diary      Manual                      Hip flexor stretch hold                    stm hold                    Left hip prom with oscillation  hold                                                                          Exercise Diary                       bike 10min                    Hamstring stretch [id number]                    Piriformis stretch [id number]                    bridge hold                     standing hip abduction 10x2                     standing hip extension 10x2                      mini squat 10x2                      hamstring curls standing 10x2                      standing lumbar extension 10x2                                                                                                                                                                                                                                                                                                 Modalities                         Moist heat   10'

## 2018-06-20 ENCOUNTER — OFFICE VISIT (OUTPATIENT)
Dept: OBGYN CLINIC | Facility: MEDICAL CENTER | Age: 78
End: 2018-06-20
Payer: COMMERCIAL

## 2018-06-20 VITALS
SYSTOLIC BLOOD PRESSURE: 116 MMHG | BODY MASS INDEX: 30.17 KG/M2 | WEIGHT: 159.8 LBS | HEART RATE: 78 BPM | DIASTOLIC BLOOD PRESSURE: 67 MMHG | HEIGHT: 61 IN

## 2018-06-20 DIAGNOSIS — M70.62 TROCHANTERIC BURSITIS OF LEFT HIP: ICD-10-CM

## 2018-06-20 DIAGNOSIS — M76.892 TENDINITIS INVOLVING LEFT HIP ABDUCTORS: Primary | ICD-10-CM

## 2018-06-20 DIAGNOSIS — M76.32 ILIOTIBIAL BAND SYNDROME, LEFT: ICD-10-CM

## 2018-06-20 PROCEDURE — 99213 OFFICE O/P EST LOW 20 MIN: CPT | Performed by: ORTHOPAEDIC SURGERY

## 2018-06-20 RX ORDER — METHYLPREDNISOLONE 4 MG/1
TABLET ORAL
Qty: 21 TABLET | Refills: 0 | Status: SHIPPED | OUTPATIENT
Start: 2018-06-20 | End: 2018-09-10 | Stop reason: ALTCHOICE

## 2018-06-20 NOTE — PROGRESS NOTES
Assessment/Plan     1  Tendinitis involving left hip abductors    2  Iliotibial band syndrome, left    3  Trochanteric bursitis of left hip      No orders of the defined types were placed in this encounter     -script for medrol dose jesús sent to her pharmacy  Medication side effects and warning were given  -she will talk to her cardiologist tomorrow to see if she can get an MRI with her pacemaker  She will call the office with an update  -possible MRI vs CT scan of left hip   -stop PT due to increased pain  She will continue with home exercises as tolerated  No Follow-up on file  Subjective   Chief Complaint:   Chief Complaint   Patient presents with    Left Hip - Follow-up, Lo Hollingsworth is a [de-identified] y o  female who presents for follow up for left hip pain  Since her last visit she has gone to physical therapy  She states physical therapy made her pain worse  States all of her pain is in her lateral hip and buttock region  The pain does radiate down to her knee  Pain is a constant pain and is achy  When she 1st wakes up in the morning it is a sharp stabbing pain and she can barely walk  When she gets moving she is fine throughout the day  She also notes some numbness and tingling on the lateral aspect of her knee  The she cannot have an MRI due to having a pacemaker  She states she is not any better since her last visit  She states that she had a follow-up appointment with Dr Jailene Suarez for her knees and he he did an injection in her lateral hip  She has states he injected her were all of her pain was and she did not get relief  Review of Systems  See HPI for musculoskeletal review     All other systems reviewed are negative     History:  Past Medical History:   Diagnosis Date    Anxiety     Arthritis     Breast CA ([facility] [address] )     recurrent, ductal carcinoma ER, [place] pos    Depression     Diverticula of intestine     Dizziness     and passes out    Flushing     Hiatal hernia     [facility] (hard of hearing)      lizette    Hypercholesteremia     Liver mass     Liver metastasis (HCC)     Metastatic adenocarcinoma to liver ([address] )     Bx 01/03/2017    PONV (postoperative nausea and vomiting)     Recurrent breast cancer (HCC)     Shingles     Shortness of breath     on exertion    Tachycardia     Urinary incontinence     Use of cane as ambulatory aid     or walker     Past Surgical History:   Procedure Laterality Date    CARDIAC PACEMAKER REMOVAL N/A 11/9/2017    Procedure: PACEMAKER LEAD REVISION, REMOVAL OF NONFUNCTIONING LEAD, AND INSERTION OF A NEW RV LEAD;  Surgeon: Alba Guerrero MD;  [facility];  Service: Cardiology    CATARACT EXTRACTION Bilateral     COLONOSCOPY      ERCP N/A 1/8/2018    Procedure: ENDOSCOPIC RETROGRADE CHOLANGIOPANCREATOGRAPHY (ERCP); Surgeon: Herbert Duncan MD;  Location[facility]; Service: Gastroenterology    HYSTERECTOMY      JOINT REPLACEMENT      lizette  TKR and right TSR    MASTECTOMY Right     [place] ERCP DX COLLECTION SPECIMEN BRUSHING/WASHING N/A 2/22/2018    Procedure: ENDOSCOPIC RETROGRADE CHOLANGIOPANCREATOGRAPHY (ERCP) with stent removal;  Surgeon: Herbert Duncan MD;  Location: [facility]; Service: Gastroenterology    [place] RESEC [address] N/A 7/13/2017    Procedure: LIVER RESECTION, INTRAOPERATIVE ULTRASOUND;  Surgeon: Vicki Dominguez MD;  [facility];  Service: Surgical Oncology    ROTATOR CUFF REPAIR Right     SENTINEL LYMPH NODE BIOPSY Right     TONSILECTOMY AND ADNOIDECTOMY      WOUND DEBRIDEMENT Left 11/9/2017    Procedure: DEBRIDEMENT WOUND AND DRESSING CHANGE [facility]);   Surgeon: Alba Guerrero MD;  [facility];  Service: Cardiology     Social History   History   Alcohol Use No     History   Drug Use No     History   Smoking Status    Never Smoker   Smokeless Tobacco    Never Used     Family History:   Family History   Problem Relation Age of Onset    Lung cancer Father     Cancer Brother     Diabetes type II Son        Meds/Allergies     (Not in a hospital admission)  Allergies   Allergen Reactions    Ampicillin Shortness Of Breath, Anaphylaxis and Other (See Comments)     Other reaction(s): Unknown Allergic Reaction  Reaction Date: [id number];           Objective     /67   Pulse 78   Ht 5' 1" (1 549 m)   Wt 72 5 kg (159 lb 12 8 oz)   LMP  (LMP Unknown) Comment: post   BMI 30 19 kg/m²      PE:  AAOx 3  WDWN  Hearing intact, no drainage from eyes  no audible wheezing  no abdominal distension  LE compartments soft, skin intact    Ortho Exam:  left hip:   No dislocation/deformity  ROM: full  No TTP over greater trochanter  No TTP over SIJ  Neg  Jie test  Neg  Laith's test  Abduction 5/5  AT/GS intact  TTP over left Glut   Medius

## 2018-06-21 ENCOUNTER — OFFICE VISIT (OUTPATIENT)
Dept: CARDIOLOGY CLINIC | Facility: CLINIC | Age: 78
End: 2018-06-21
Payer: COMMERCIAL

## 2018-06-21 ENCOUNTER — APPOINTMENT (OUTPATIENT)
Dept: PHYSICAL THERAPY | Facility: MEDICAL CENTER | Age: 78
End: 2018-06-21
Payer: COMMERCIAL

## 2018-06-21 ENCOUNTER — TELEPHONE (OUTPATIENT)
Dept: OBGYN CLINIC | Facility: HOSPITAL | Age: 78
End: 2018-06-21

## 2018-06-21 VITALS
SYSTOLIC BLOOD PRESSURE: 108 MMHG | HEIGHT: 61 IN | DIASTOLIC BLOOD PRESSURE: 66 MMHG | BODY MASS INDEX: 30.55 KG/M2 | OXYGEN SATURATION: 96 % | WEIGHT: 161.8 LBS | HEART RATE: 78 BPM

## 2018-06-21 DIAGNOSIS — Z95.0 PACEMAKER: Primary | ICD-10-CM

## 2018-06-21 DIAGNOSIS — E78.00 PURE HYPERCHOLESTEROLEMIA: ICD-10-CM

## 2018-06-21 PROCEDURE — 99213 OFFICE O/P EST LOW 20 MIN: CPT | Performed by: INTERNAL MEDICINE

## 2018-06-21 NOTE — PROGRESS NOTES
Heart Failure Outpatient Progress Note - Marisela Gibbons [de-identified] y o  female MRN: [id number]    @ Encounter: [id number]      Assessment/Plan:    Patient Active Problem List    Diagnosis Date Noted    Heart block atrioventricular 02/09/2018    Acute gallstone pancreatitis 01/30/2018    Cholecystitis 01/07/2018    Acute biliary pancreatitis 01/07/2018    Cholangitis 01/07/2018    Choledocholithiasis 01/07/2018    Chest wall hematoma 24/79/7845    Complication associated with cardiac pacemaker lead 11/09/2017    Pacemaker complications, subsequent encounter 11/09/2017    Symptomatic bradycardia 11/01/2017    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 ([facility] [address] ) 01/05/2017    Hypercholesteremia     History of total knee replacement 05/27/2015    Left bundle branch block (LBBB) 04/13/2015    Hearing loss 07/16/2014    Gastroesophageal reflux disease without esophagitis 03/22/2010       Assessment:  1  Medtronic dual chamber PPM 10/31/17 after loop showed 5 second pauses  She has recurrent syncope in the above context  Had large hematomas admitted 11/9, hemorrhagic shock   RV lead perforation- lead extraction and reimplantation on 11/9/17  Interrogation 6/18/18: 100%   2  Metastsatic breast CA- post right mastectomy, liver lesion removed Sept 2017  3  Hyperlipidemia  4  Right Pleural effusion on 1/7/18 CT chest- sounds better on exam today,  Not large, saturating ok, has lasix to take prn  Follow up CT 5/29/18: smaller in size  5  Normal EF: 50%, with normal right heart function on Nov 2017 echo    TODAY'S PLAN:  No change in meds today    HPI: [de-identified] yo female presents for follow up after undergoing PPM placement in October 2017 for symptomatic bradycardia after loop recorder revealed 5 second pause  She has metastatic breast CA, stage 4 with taxotere, cytoxan and arimidex in past with last MUGA Jan 2017 showing EF: 57%  EKG had shown LBBB   Her disease had disappeared radiographically except for liver lesion  She had a number of syncopal episodes  Nuclear stress was unremarkable and echo showed EF: 50%  She underwent resection of liver lesions in Sept 2017  10/4/17 loop recorder implanted  She had 5 second pause noted and underwent Medtronic dual chamber PPM 10/31/17  Post PPM course complicated by RV perforation and hemorrhagic shock  On 11/9 had lead extraction and reimplantation on 11/9/17  Has since followed up with Dr Tianna Telles  She had a CT chest 1/718 and there is moderate right pleural effusion  Interval History:   Last Saturday had a pin prick near her PPM    Past Medical History:   Diagnosis Date    Anxiety     Arthritis     Breast CA ([address] )     recurrent, ductal carcinoma ER, [place] pos    Depression     Diverticula of intestine     Dizziness     and passes out    Flushing     Hiatal hernia     [place] (hard of hearing[place]      lizette    Hypercholesteremia     Liver mass     Liver metastasis (HCC)     Metastatic adenocarcinoma to liver ([address] )     Bx 01/03/2017    PONV (postoperative nausea and vomiting)     Recurrent breast cancer (HCC)     Shingles     Shortness of breath     on exertion    Tachycardia     Urinary incontinence     Use of cane as ambulatory aid     or walker       Review of Systems - General ROS: positive for dyspnea on exertion and lightheadedness, but no syncope  No chest pain       Allergies   Allergen Reactions    Ampicillin Shortness Of Breath, Anaphylaxis and Other (See Comments)     Other reaction(s): Unknown Allergic Reaction  Reaction Date: [id number];           Current Outpatient Prescriptions:     acetaminophen (TYLENOL) 325 mg tablet, May take 650 mg every 6-8 hours as needed for pain (Patient taking differently: Take 650 mg by mouth every 6 (six) hours as needed May take 650 mg every 6-8 hours as needed for pain ), Disp: 30 tablet, Rfl: 0    aspirin (ECOTRIN LOW STRENGTH) 81 mg EC tablet, Take 81 mg by mouth daily, Disp: , Rfl:     atorvastatin (LIPITOR) 20 mg tablet, Take 20 mg by mouth daily  , Disp: , Rfl:     Calcium Citrate-Vitamin D (CALCIUM CITRATE + D PO), Take 1,500 mg by mouth daily, Disp: , Rfl:     clindamycin (CLEOCIN) 300 MG capsule, TAKE 2 CAPSULES BY MOUTH 1 HOUR PRIOR TO DENTAL PROCEDURE , Disp: , Rfl: 0    clotrimazole-betamethasone (LOTRISONE) 1-0 05 % cream, Apply topically, Disp: , Rfl:     Cranberry (THERACRAN HP PO), Take 2 tablets by mouth daily, Disp: , Rfl:     furosemide (LASIX) 20 mg tablet, TAKE 1 TABLET BY MOUTH DAILY AS NEEDED FOR EDEMA, Disp: , Rfl:     Glucosamine-Chondroit-Vit C-Mn (GLUCOSAMINE 1500 COMPLEX) CAPS, Take 1 capsule by mouth daily  , Disp: , Rfl:     metFORMIN (GLUCOPHAGE) 500 mg tablet, 2 (two) times a day, Disp: , Rfl:     Methylprednisolone 4 MG TBPK, Use as directed on package, Disp: 21 tablet, Rfl: 0    metoprolol tartrate (LOPRESSOR) 25 mg tablet, Take 0 5 tablets (12 5 mg total) by mouth every 12 (twelve) hours, Disp: 30 tablet, Rfl: 0    Multiple Vitamin (MULTIVITAMIN) capsule, Take 1 capsule by mouth daily  , Disp: , Rfl:     ondansetron (ZOFRAN) 4 mg tablet, Take 4 mg by mouth every 8 (eight) hours as needed for nausea or vomiting, Disp: , Rfl:     oxyCODONE (ROXICODONE) 5 mg immediate release tablet, take 1 tablet by mouth every 6 hours if needed for BREAKTHROUGH PAIN, Disp: , Rfl:     tamoxifen (NOLVADEX) 20 mg tablet, Take 1 tablet (20 mg total) by mouth daily, Disp: 30 tablet, Rfl: 6    Social History     Social History    Marital status: /Civil Union     Spouse name: N/A    Number of children: N/A    Years of education: N/A     Occupational History    Not on file       Social History Main Topics    Smoking status: Never Smoker    Smokeless tobacco: Never Used    Alcohol use No    Drug use: No    Sexual activity: Not on file     Other Topics Concern    Not on file     Social History Narrative    No narrative on file       Family History   Problem Relation Age of Onset    Lung cancer Father     Cancer Brother     Diabetes type II Son        Physical Exam:    Vitals: Blood pressure 108/66, pulse 78, height 5' 1" (1 549 m), weight 73 4 kg (161 lb 12 8 oz), SpO2 96 %  , Body mass index is 30 57 kg/m² ,   Wt Readings from Last 3 Encounters:   06/21/18 73 4 kg (161 lb 12 8 oz)   06/20/18 72 5 kg (159 lb 12 8 oz)   06/05/18 72 2 kg (159 lb 3 2 oz)         Physical Exam:  Vitals:    06/21/18 1139   BP: 108/66   Pulse: 78   SpO2: 96%         GEN: Karli Cordova appears well, alert and oriented x 3, pleasant and cooperative   HEENT: pupils equal, round, and reactive to light; extraocular muscles intact  NECK: supple, no carotid bruits   HEART: regular rhythm, normal S1 and S2, no murmurs, clicks, gallops or rubs, JVP is down   LUNGS: clear to auscultation bilaterally; no wheezes, rales, or rhonchi   No significant effusion on exam on right  ABDOMEN: normal bowel sounds, soft, no tenderness, no distention  EXTREMITIES: peripheral pulses normal; no clubbing, cyanosis, or edema  NEURO: no focal findings   SKIN: normal without suspicious lesions on exposed skin    Labs & Results:    Lab Results   Component Value Date    GLUCOSE 186 (H) 01/10/2018    CALCIUM 8 2 (L) 01/10/2018     01/10/2018    K 3 4 (L) 01/10/2018    CO2 24 01/10/2018     01/10/2018    BUN 22 05/26/2018    CREATININE 0 69 05/26/2018     Lab Results   Component Value Date    WBC 9 53 01/10/2018    HGB 11 2 (L) 01/10/2018    HCT 34 0 (L) 01/10/2018    MCV 93 01/10/2018     01/10/2018     No results found for: BNP   Lab Results   Component Value Date    CHOL 170 06/20/2017     Lab Results   Component Value Date    HDL 67 (H) 06/20/2017     Lab Results   Component Value Date    LDLCALC 79 06/20/2017     Lab Results   Component Value Date    TRIG 120 06/20/2017     No components found for: CHOLHDL     EKG personally reviewed by Lexie Catalan DO       Counseling / Coordination of Care  Total floor / unit time spent today 25 minutes  Greater than 50% of total time was spent with the patient and / or family counseling and / or coordination of care  A description of the counseling / coordination of care: 15  Thank you for the opportunity to participate in the care of this patient    JEREMY JIMÉNEZ [address]

## 2018-06-21 NOTE — TELEPHONE ENCOUNTER
Pt called back to return your call  She has appt soon, she requests to be called in the afternoon       # [phone]

## 2018-06-21 NOTE — TELEPHONE ENCOUNTER
Caller: patient  Callback# [phone]  Dr Anjali Ruff        Patient callback to let Janae Allan know she is able to have MRI with her pacemaker

## 2018-06-22 ENCOUNTER — TELEPHONE (OUTPATIENT)
Dept: OBGYN CLINIC | Facility: MEDICAL CENTER | Age: 78
End: 2018-06-22

## 2018-06-22 DIAGNOSIS — M76.892 TENDINITIS INVOLVING LEFT HIP ABDUCTORS: ICD-10-CM

## 2018-06-22 DIAGNOSIS — M76.32 ILIOTIBIAL BAND SYNDROME, LEFT: ICD-10-CM

## 2018-06-22 DIAGNOSIS — T82.9XXD PACEMAKER COMPLICATIONS, SUBSEQUENT ENCOUNTER: Primary | ICD-10-CM

## 2018-06-22 NOTE — TELEPHONE ENCOUNTER
I called and spoke to the patient today  She let me know she did talk to her   cardiologist and she is allowed to have an MRI with her pacemaker  I let her know I will go ahead and order the MRI of her left hip  Someone  from our office will contact her to schedule her MRI  I let her know to make sure she lets the MRI technologist know that she has a pacemaker when she has her appointment

## 2018-06-22 NOTE — TELEPHONE ENCOUNTER
I called scheduling dept  and spoke with Down to help the patient with a MRI's appt  As the patient has a pacemaker, they will put her on a waiting list and patient will be contacted by someone who will review her medical information and set up the appt  for the MRI

## 2018-06-25 PROCEDURE — G8991 OTHER PT/OT GOAL STATUS: HCPCS | Performed by: PHYSICAL THERAPIST

## 2018-06-25 PROCEDURE — G8990 OTHER PT/OT CURRENT STATUS: HCPCS | Performed by: PHYSICAL THERAPIST

## 2018-06-26 ENCOUNTER — APPOINTMENT (OUTPATIENT)
Dept: PHYSICAL THERAPY | Facility: MEDICAL CENTER | Age: 78
End: 2018-06-26
Payer: COMMERCIAL

## 2018-06-26 DIAGNOSIS — C50.911 ADENOCARCINOMA, BREAST, RIGHT (HCC): Primary | ICD-10-CM

## 2018-06-28 ENCOUNTER — APPOINTMENT (OUTPATIENT)
Dept: PHYSICAL THERAPY | Facility: MEDICAL CENTER | Age: 78
End: 2018-06-28
Payer: COMMERCIAL

## 2018-06-29 NOTE — TELEPHONE ENCOUNTER
I called and confirmed with the patient then MRI of the left hip is the correct exam   She was stating her last visit that her pain is in her lateral hip and buttock area and radiates down to her knee  I let her know it is not uncommon for people to have this type of radiation to the thigh to the knee  She also states the Medrol Dosepak did help her for the 1st couple days she has little to no pain but then the pain came back shortly after

## 2018-06-29 NOTE — TELEPHONE ENCOUNTER
Patient is calling because she just scheduled her MRI and it is for the left hip  She is concerned because she feels the pain in her thigh and she wants to make sure that she is getting the correct area looked at

## 2018-07-10 ENCOUNTER — HOSPITAL ENCOUNTER (OUTPATIENT)
Dept: RADIOLOGY | Facility: HOSPITAL | Age: 78
Discharge: HOME | End: 2018-07-10
Payer: COMMERCIAL

## 2018-07-10 DIAGNOSIS — T82.9XXD PACEMAKER COMPLICATIONS, SUBSEQUENT ENCOUNTER: ICD-10-CM

## 2018-07-10 DIAGNOSIS — M76.892 TENDINITIS INVOLVING LEFT HIP ABDUCTORS: ICD-10-CM

## 2018-07-10 DIAGNOSIS — M76.32 ILIOTIBIAL BAND SYNDROME, LEFT: ICD-10-CM

## 2018-07-10 PROCEDURE — 73721 MRI JNT OF LWR EXTRE W/O DYE: CPT

## 2018-07-19 ENCOUNTER — OFFICE VISIT (OUTPATIENT)
Dept: OBGYN CLINIC | Facility: MEDICAL CENTER | Age: 78
End: 2018-07-19
Payer: COMMERCIAL

## 2018-07-19 VITALS
SYSTOLIC BLOOD PRESSURE: 123 MMHG | HEIGHT: 61 IN | WEIGHT: 164.2 LBS | BODY MASS INDEX: 31 KG/M2 | HEART RATE: 96 BPM | DIASTOLIC BLOOD PRESSURE: 67 MMHG

## 2018-07-19 DIAGNOSIS — M70.62 GREATER TROCHANTERIC BURSITIS OF LEFT HIP: Primary | ICD-10-CM

## 2018-07-19 DIAGNOSIS — M16.12 PRIMARY OSTEOARTHRITIS OF LEFT HIP: ICD-10-CM

## 2018-07-19 PROCEDURE — 99213 OFFICE O/P EST LOW 20 MIN: CPT | Performed by: ORTHOPAEDIC SURGERY

## 2018-07-19 PROCEDURE — 20610 DRAIN/INJ JOINT/BURSA W/O US: CPT | Performed by: ORTHOPAEDIC SURGERY

## 2018-07-19 RX ORDER — BUPIVACAINE HYDROCHLORIDE 2.5 MG/ML
2 INJECTION, SOLUTION INFILTRATION; PERINEURAL
Status: COMPLETED | OUTPATIENT
Start: 2018-07-19 | End: 2018-07-19

## 2018-07-19 RX ORDER — METHYLPREDNISOLONE ACETATE 40 MG/ML
1 INJECTION, SUSPENSION INTRA-ARTICULAR; INTRALESIONAL; INTRAMUSCULAR; SOFT TISSUE
Status: COMPLETED | OUTPATIENT
Start: 2018-07-19 | End: 2018-07-19

## 2018-07-19 RX ADMIN — METHYLPREDNISOLONE ACETATE 1 ML: 40 INJECTION, SUSPENSION INTRA-ARTICULAR; INTRALESIONAL; INTRAMUSCULAR; SOFT TISSUE at 09:56

## 2018-07-19 RX ADMIN — BUPIVACAINE HYDROCHLORIDE 2 ML: 2.5 INJECTION, SOLUTION INFILTRATION; PERINEURAL at 09:56

## 2018-07-19 NOTE — PROGRESS NOTES
Assessment/Plan     1  Greater trochanteric bursitis of left hip    2  Primary osteoarthritis of left hip      Orders Placed This Encounter   Procedures    Large joint arthrocentesis     Received L trochanteric bursa steroid injection today  Patient knows to ice and avoid strenuous activity for 1-2 days if needed  Return if symptoms worsen or fail to improve  Patient knows to call in a week or 2 her pain was not relieved  We will consider a left intra-articular steroid injection at that time  I answered all of the patient's questions during the visit and provided education of the patient's condition during the visit  The patient verbalized understanding of the information given and agrees with the plan  This note was dictated using [employer] software  It may contain errors including improperly dictated words  Please contact physician directly for any questions  Subjective   Chief Complaint:   Chief Complaint   Patient presents with    Left Hip - Follow-up       Jean Carlos Gabriel is a [de-identified] y o  female who presents for follow up for left hip pain today  Since her last visit she had an MRI of her left hip and also tried a Medrol Dosepak  She states the Medrol Dosepak did help relieve her pain until about the 4th day  Then her pain came back  She states not as much pain but discomfort  The discomfort is in her lateral hip down to her knee  She denies any groin pain  She has not had any intra-articular hip steroid injections  She did receive a steroid injection May 17th by her PCP  She states she is unsure exactly where he injected but found a spot in her posterior hip that hurt lot and injected  She states she did not get relief from this  She has done physical therapy without relief  She states her pain is mainly in the morning when she 1st gets up and that she goes throughout the day she feels much better  Review of Systems  See HPI for musculoskeletal review     All other systems reviewed are negative     History:  Past Medical History:   Diagnosis Date    Anxiety     Arthritis     Breast CA ([facility] [address] )     recurrent, ductal carcinoma ER, [place] pos    Depression     Diverticula of intestine     Dizziness     and passes out    Flushing     Hiatal hernia     [place] (hard of hearing[place]      lizette    Hypercholesteremia     Liver mass     Liver metastasis (HCC)     Metastatic adenocarcinoma to liver ([facility] [address] )     Bx 01/03/2017    PONV (postoperative nausea and vomiting)     Recurrent breast cancer ([facility] [address] )     Shingles     Shortness of breath     on exertion    Tachycardia     Urinary incontinence     Use of cane as ambulatory aid     or walker     Past Surgical History:   Procedure Laterality Date    CARDIAC PACEMAKER REMOVAL N/A 11/9/2017    Procedure: PACEMAKER LEAD REVISION, REMOVAL OF NONFUNCTIONING LEAD, AND INSERTION OF A NEW RV LEAD;  Surgeon: Stephanie Guillen MD;  [facility];  Service: Cardiology    CATARACT EXTRACTION Bilateral     COLONOSCOPY      ERCP N/A 1/8/2018    Procedure: ENDOSCOPIC RETROGRADE CHOLANGIOPANCREATOGRAPHY (ERCP); Surgeon: Tawana Bill MD;  [facility]: [facility]; Service: Gastroenterology    HYSTERECTOMY      JOINT REPLACEMENT      lizette  TKR and right TSR    MASTECTOMY Right     [place] ERCP DX COLLECTION SPECIMEN BRUSHING/WASHING N/A 2/22/2018    Procedure: ENDOSCOPIC RETROGRADE CHOLANGIOPANCREATOGRAPHY (ERCP) with stent removal;  Surgeon: Tawana Bill MD;  Location: [facility]; Service: Gastroenterology    [place] RES [address] N/A 7/13/2017    Procedure: LIVER RESECTION, INTRAOPERATIVE ULTRASOUND;  Surgeon: Erich Voss MD;  [facility];  Service: Surgical Oncology    ROTATOR CUFF REPAIR Right     SENTINEL LYMPH NODE BIOPSY Right     TONSILECTOMY AND ADNOIDECTOMY      WOUND DEBRIDEMENT Left 11/9/2017    Procedure: DEBRIDEMENT WOUND AND DRESSING CHANGE [facility]);   Surgeon: Stephanie Guillen MD;  [facility];  Service: Cardiology Social History   History   Alcohol Use No     History   Drug Use No     History   Smoking Status    Never Smoker   Smokeless Tobacco    Never Used     Family History:   Family History   Problem Relation Age of Onset    Lung cancer Father     Cancer Brother     Diabetes type II Son        Meds/Allergies     (Not in a hospital admission)  Allergies   Allergen Reactions    Ampicillin Shortness Of Breath, Anaphylaxis and Other (See Comments)     Other reaction(s): Unknown Allergic Reaction  Reaction Date: 07Mar2012;           Objective     /67   Pulse 96   Ht 5' 1" (1 549 m)   Wt 74 5 kg (164 lb 3 2 oz)   LMP  (LMP Unknown) Comment: post   BMI 31 03 kg/m²      PE:  AAOx 3  WDWN  Hearing intact, no drainage from eyes  no audible wheezing  no abdominal distension  LE compartments soft, skin intact    Ortho Exam:  left hip:   No dislocation/deformity  ROM: full no pain   + TTP over greater trochanter  No TTP over SIJ  Neg  Jie test  Neg  Laith's test  Abduction 5/5  AT/GS intact    Imaging Studies: I have personally reviewed pertinent reports     and I have personally reviewed pertinent films in PACS  MRI left hip- moderate DJD, mild greater trochanteric bursitis     Large joint arthrocentesis  Date/Time: 7/19/2018 9:56 AM  Consent given by: patient  Site marked: site marked  Timeout: Immediately prior to procedure a time out was called to verify the correct patient, procedure, equipment, support staff and site/side marked as required   Supporting Documentation  Indications: pain   Procedure Details  Location: hip - L greater trochanteric bursa  Preparation: Patient was prepped and draped in the usual sterile fashion  Ultrasound guidance: no  Medications administered: 1 mL methylPREDNISolone acetate 40 mg/mL; 2 mL bupivacaine 0 25 %    Patient tolerance: patient tolerated the procedure well with no immediate complications  Dressing:  Sterile dressing applied

## 2018-07-30 ENCOUNTER — REMOTE DEVICE CLINIC VISIT (OUTPATIENT)
Dept: CARDIOLOGY CLINIC | Facility: CLINIC | Age: 78
End: 2018-07-30
Payer: COMMERCIAL

## 2018-07-30 DIAGNOSIS — Z95.0 CARDIAC PACEMAKER: ICD-10-CM

## 2018-07-30 DIAGNOSIS — I44.7 LBBB (LEFT BUNDLE BRANCH BLOCK): Primary | ICD-10-CM

## 2018-07-30 DIAGNOSIS — R55 SYNCOPE AND COLLAPSE: ICD-10-CM

## 2018-07-30 PROCEDURE — 93296 REM INTERROG EVL PM/IDS: CPT | Performed by: INTERNAL MEDICINE

## 2018-07-30 PROCEDURE — 93294 REM INTERROG EVL PM/LDLS PM: CPT | Performed by: INTERNAL MEDICINE

## 2018-07-30 NOTE — PROGRESS NOTES
MDT DUAL CHAMBER PACEMAKER (HIS)  CARELINK PACEMAKER TRANSMISSION:  BATTERY VOLTAGE ADEQUATE (3 5-4 YR)   AP 0 2%  99 9% (CHB)   ALL AVAILABLE LEAD PARAMETERS WITHIN NORMAL LIMITS   RV OUTPUT @ 3 5V/1 0MS   1 AT/AF EPISODES WITH DURATION @ 4 MINS  EGRM SHOWING PAT, COMP  ATRIAL PACING (NO AF)   HX OF SAME & PT TAKES ASA 81, METOPROLOL TART   NORMAL DEVICE FUNCTION   eb

## 2018-08-20 ENCOUNTER — TELEPHONE (OUTPATIENT)
Dept: OBGYN CLINIC | Facility: HOSPITAL | Age: 78
End: 2018-08-20

## 2018-08-20 NOTE — TELEPHONE ENCOUNTER
Caller: patient  Call back number: [phone]  Patient's doctor: Dr Priscilla Pretty    Patient had a cortisone injection in June  She states it did not help her pain  She is asking what the next step will be   Please advise

## 2018-08-21 DIAGNOSIS — M16.12 PRIMARY OSTEOARTHRITIS OF LEFT HIP: Primary | ICD-10-CM

## 2018-08-21 NOTE — TELEPHONE ENCOUNTER
Please call the patient and give her Dr Marge Anderson office number to schedule injection  Thank you   I called and spoke with the patient she will go for a steroid injection in her hip

## 2018-08-21 NOTE — TELEPHONE ENCOUNTER
I called and left a message for the patient to call the office back  I want to let her know that we can try a intra articular hip steroid injection

## 2018-09-01 ENCOUNTER — APPOINTMENT (OUTPATIENT)
Dept: LAB | Facility: MEDICAL CENTER | Age: 78
End: 2018-09-01
Payer: COMMERCIAL

## 2018-09-01 DIAGNOSIS — C50.911 ADENOCARCINOMA OF RIGHT BREAST METASTATIC TO LIVER (HCC): Chronic | ICD-10-CM

## 2018-09-01 DIAGNOSIS — C78.7 ADENOCARCINOMA OF RIGHT BREAST METASTATIC TO LIVER (HCC): Chronic | ICD-10-CM

## 2018-09-01 LAB
ALBUMIN SERPL BCP-MCNC: 3.2 G/DL (ref 3.5–5)
ALP SERPL-CCNC: 81 U/L (ref 46–116)
ALT SERPL W P-5'-P-CCNC: 29 U/L (ref 12–78)
ANION GAP SERPL CALCULATED.3IONS-SCNC: 4 MMOL/L (ref 4–13)
AST SERPL W P-5'-P-CCNC: 22 U/L (ref 5–45)
BASOPHILS # BLD AUTO: 0.05 THOUSANDS/ΜL (ref 0–0.1)
BASOPHILS NFR BLD AUTO: 1 % (ref 0–1)
BILIRUB SERPL-MCNC: 0.59 MG/DL (ref 0.2–1)
BUN SERPL-MCNC: 15 MG/DL (ref 5–25)
CALCIUM SERPL-MCNC: 8.6 MG/DL (ref 8.3–10.1)
CHLORIDE SERPL-SCNC: 107 MMOL/L (ref 100–108)
CO2 SERPL-SCNC: 28 MMOL/L (ref 21–32)
CREAT SERPL-MCNC: 0.67 MG/DL (ref 0.6–1.3)
EOSINOPHIL # BLD AUTO: 0.14 THOUSAND/ΜL (ref 0–0.61)
EOSINOPHIL NFR BLD AUTO: 2 % (ref 0–6)
ERYTHROCYTE [DISTWIDTH] IN BLOOD BY AUTOMATED COUNT: 12.4 % (ref 11.6–15.1)
GFR SERPL CREATININE-BSD FRML MDRD: 83 ML/MIN/1.73SQ M
GLUCOSE P FAST SERPL-MCNC: 114 MG/DL (ref 65–99)
HCT VFR BLD AUTO: 41.1 % (ref 34.8–46.1)
HGB BLD-MCNC: 13.3 G/DL (ref 11.5–15.4)
IMM GRANULOCYTES # BLD AUTO: 0.02 THOUSAND/UL (ref 0–0.2)
IMM GRANULOCYTES NFR BLD AUTO: 0 % (ref 0–2)
LYMPHOCYTES # BLD AUTO: 2.59 THOUSANDS/ΜL (ref 0.6–4.47)
LYMPHOCYTES NFR BLD AUTO: 37 % (ref 14–44)
MCH RBC QN AUTO: 31.9 PG (ref 26.8–34.3)
MCHC RBC AUTO-ENTMCNC: 32.4 G/DL (ref 31.4–37.4)
MCV RBC AUTO: 99 FL (ref 82–98)
MONOCYTES # BLD AUTO: 0.74 THOUSAND/ΜL (ref 0.17–1.22)
MONOCYTES NFR BLD AUTO: 11 % (ref 4–12)
NEUTROPHILS # BLD AUTO: 3.49 THOUSANDS/ΜL (ref 1.85–7.62)
NEUTS SEG NFR BLD AUTO: 49 % (ref 43–75)
NRBC BLD AUTO-RTO: 0 /100 WBCS
PLATELET # BLD AUTO: 175 THOUSANDS/UL (ref 149–390)
PMV BLD AUTO: 9.7 FL (ref 8.9–12.7)
POTASSIUM SERPL-SCNC: 4.3 MMOL/L (ref 3.5–5.3)
PROT SERPL-MCNC: 6.6 G/DL (ref 6.4–8.2)
RBC # BLD AUTO: 4.17 MILLION/UL (ref 3.81–5.12)
SODIUM SERPL-SCNC: 139 MMOL/L (ref 136–145)
WBC # BLD AUTO: 7.03 THOUSAND/UL (ref 4.31–10.16)

## 2018-09-01 PROCEDURE — 85025 COMPLETE CBC W/AUTO DIFF WBC: CPT

## 2018-09-01 PROCEDURE — 80053 COMPREHEN METABOLIC PANEL: CPT

## 2018-09-01 PROCEDURE — 36415 COLL VENOUS BLD VENIPUNCTURE: CPT

## 2018-09-06 ENCOUNTER — HOSPITAL ENCOUNTER (OUTPATIENT)
Dept: CT IMAGING | Facility: HOSPITAL | Age: 78
Discharge: HOME | End: 2018-09-06
Attending: INTERNAL MEDICINE
Payer: COMMERCIAL

## 2018-09-06 DIAGNOSIS — C50.911 ADENOCARCINOMA OF RIGHT BREAST METASTATIC TO LIVER (HCC): Chronic | ICD-10-CM

## 2018-09-06 DIAGNOSIS — C78.7 ADENOCARCINOMA OF RIGHT BREAST METASTATIC TO LIVER (HCC): Chronic | ICD-10-CM

## 2018-09-06 PROCEDURE — 71260 CT THORAX DX C+: CPT

## 2018-09-06 PROCEDURE — 74177 CT ABD & PELVIS W/CONTRAST: CPT

## 2018-09-06 RX ADMIN — IOHEXOL 100 ML: 350 INJECTION, SOLUTION INTRAVENOUS at 08:59

## 2018-09-10 ENCOUNTER — HOSPITAL ENCOUNTER (OUTPATIENT)
Dept: RADIOLOGY | Facility: MEDICAL CENTER | Age: 78
Discharge: HOME | End: 2018-09-10
Admitting: PHYSICAL MEDICINE & REHABILITATION
Payer: COMMERCIAL

## 2018-09-10 VITALS
DIASTOLIC BLOOD PRESSURE: 66 MMHG | SYSTOLIC BLOOD PRESSURE: 125 MMHG | TEMPERATURE: 97.4 F | RESPIRATION RATE: 20 BRPM | HEART RATE: 80 BPM | OXYGEN SATURATION: 98 %

## 2018-09-10 DIAGNOSIS — M16.12 PRIMARY OSTEOARTHRITIS OF LEFT HIP: ICD-10-CM

## 2018-09-10 DIAGNOSIS — M25.552 LEFT HIP PAIN: ICD-10-CM

## 2018-09-10 PROCEDURE — 77002 NEEDLE LOCALIZATION BY XRAY: CPT

## 2018-09-10 PROCEDURE — 20610 DRAIN/INJ JOINT/BURSA W/O US: CPT | Performed by: PHYSICAL MEDICINE & REHABILITATION

## 2018-09-10 PROCEDURE — 77002 NEEDLE LOCALIZATION BY XRAY: CPT | Performed by: PHYSICAL MEDICINE & REHABILITATION

## 2018-09-10 RX ORDER — METHYLPREDNISOLONE ACETATE 40 MG/ML
40 INJECTION, SUSPENSION INTRA-ARTICULAR; INTRALESIONAL; INTRAMUSCULAR; PARENTERAL; SOFT TISSUE ONCE
Status: COMPLETED | OUTPATIENT
Start: 2018-09-10 | End: 2018-09-10

## 2018-09-10 RX ORDER — BUPIVACAINE HCL/PF 2.5 MG/ML
10 VIAL (ML) INJECTION ONCE
Status: COMPLETED | OUTPATIENT
Start: 2018-09-10 | End: 2018-09-10

## 2018-09-10 RX ORDER — LIDOCAINE HYDROCHLORIDE 10 MG/ML
5 INJECTION, SOLUTION EPIDURAL; INFILTRATION; INTRACAUDAL; PERINEURAL ONCE
Status: COMPLETED | OUTPATIENT
Start: 2018-09-10 | End: 2018-09-10

## 2018-09-10 RX ADMIN — LIDOCAINE HYDROCHLORIDE 1.5 ML: 10 INJECTION, SOLUTION EPIDURAL; INFILTRATION; INTRACAUDAL; PERINEURAL at 13:30

## 2018-09-10 RX ADMIN — IOHEXOL 2 ML: 300 INJECTION, SOLUTION INTRAVENOUS at 13:31

## 2018-09-10 RX ADMIN — METHYLPREDNISOLONE ACETATE 40 MG: 40 INJECTION, SUSPENSION INTRA-ARTICULAR; INTRALESIONAL; INTRAMUSCULAR; PARENTERAL; SOFT TISSUE at 13:31

## 2018-09-10 RX ADMIN — Medication 3 ML: at 13:31

## 2018-09-10 NOTE — DISCHARGE INSTRUCTIONS
Steroid Joint Injection   WHAT YOU NEED TO KNOW:   A steroid joint injection is a procedure to inject steroid medicine into a joint  Steroid medicine decreases pain and inflammation  The injection may also contain an anesthetic (numbing medicine) to decrease pain  It may be done to treat conditions such as arthritis, gout, or carpal tunnel syndrome  The injections may be given in your knee, ankle, shoulder, elbow, wrist, ankle or sacroiliac joint  1  Do not apply heat to any area that is numb  If you have discomfort or soreness at the injection site, you may apply ice today, 20 minutes on and 20 minutes off  Tomorrow you may use ice or warm, moist heat  Do not apply ice or heat directly to the skin  2  You may have an increase or change in the discomfort for 36-48 hours after your treatment  Apply ice and continue with any pain medicine you have been prescribed  3  Do not do anything strenuous today  You may shower, but no tub baths or hot tubs today  You may resume your normal activities tomorrow, but do not overdo it  Resume normal activities slowly when you are feeling better  4  If you experience redness, drainage or swelling at the injection site, or if you develop a fever above 100 degrees, please call [facility] at [phone] or go to the Emergency Room  5  Continue to take all routine medicines prescribed by your primary care physician unless otherwise instructed by our staff  Most blood thinners should be started again according to your regularly scheduled dosing  If you have any questions, please give our office a call  If you have a problem specifically related to your procedure, please call our office at [phone]  Problems not related to your procedure should be directed to your primary care physician

## 2018-09-10 NOTE — H&P
History of Present Illness: The patient is a [de-identified] y o  female who presents with complaints of  Left hip pain    Patient Active Problem List   Diagnosis    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 ([facility] [facility] )    Hypercholesteremia    Gastroesophageal reflux disease without esophagitis    History of total knee replacement    Hearing loss    Left bundle branch block (LBBB)    Symptomatic bradycardia    Complication associated with cardiac pacemaker lead    Pacemaker complications, subsequent encounter    Chest wall hematoma    Cholecystitis    Acute biliary pancreatitis    Cholangitis    Choledocholithiasis    Acute gallstone pancreatitis    Heart block atrioventricular       Past Medical History:   Diagnosis Date    Anxiety     Arthritis     Breast CA (HCC)     recurrent, ductal carcinoma ER, [place] pos    Depression     Diverticula of intestine     Dizziness     and passes out    Flushing     Hiatal hernia     [place] (hard of hearing)      lizette    Hypercholesteremia     Liver mass     Liver metastasis (HCC)     Metastatic adenocarcinoma to liver ([facility] [address] )     Bx 01/03/2017    PONV (postoperative nausea and vomiting)     Recurrent breast cancer (HCC)     Shingles     Shortness of breath     on exertion    Tachycardia     Urinary incontinence     Use of cane as ambulatory aid     or walker       Past Surgical History:   Procedure Laterality Date    CARDIAC PACEMAKER REMOVAL N/A 11/9/2017    Procedure: PACEMAKER LEAD REVISION, REMOVAL OF NONFUNCTIONING LEAD, AND INSERTION OF A NEW RV LEAD;  Surgeon: Leonor Abrams MD;  [facility];  Service: Cardiology    CATARACT EXTRACTION Bilateral     COLONOSCOPY      ERCP N/A 1/8/2018    Procedure: ENDOSCOPIC RETROGRADE CHOLANGIOPANCREATOGRAPHY (ERCP); Surgeon: Ananda Martin MD;  [facility]: [facility];   Service: Gastroenterology    HYSTERECTOMY      JOINT REPLACEMENT      lizette  TKR and right TSR    MASTECTOMY Right     AR ERCP DX COLLECTION SPECIMEN BRUSHING/WASHING N/A 2/22/2018    Procedure: ENDOSCOPIC RETROGRADE CHOLANGIOPANCREATOGRAPHY (ERCP) with stent removal;  Surgeon: Dacia Ferris MD;  [facility]; Service: Gastroenterology    ID RES [id number] N/A 7/13/2017    Procedure: LIVER RESECTION, INTRAOPERATIVE ULTRASOUND;  Surgeon: Francisco Sanchez MD;  [facility];  Service: Surgical Oncology    ROTATOR CUFF REPAIR Right     SENTINEL LYMPH NODE BIOPSY Right     TONSILECTOMY AND ADNOIDECTOMY      WOUND DEBRIDEMENT Left 11/9/2017    Procedure: DEBRIDEMENT WOUND AND DRESSING CHANGE [facility]); Surgeon: Joaquin Xiao MD;  [facility]: [facility];  Service: Cardiology         Current Outpatient Prescriptions:     acetaminophen (TYLENOL) 325 mg tablet, May take 650 mg every 6-8 hours as needed for pain (Patient taking differently: Take 650 mg by mouth every 6 (six) hours as needed May take 650 mg every 6-8 hours as needed for pain ), Disp: 30 tablet, Rfl: 0    aspirin (ECOTRIN LOW STRENGTH) 81 mg EC tablet, Take 81 mg by mouth daily, Disp: , Rfl:     atorvastatin (LIPITOR) 20 mg tablet, Take 20 mg by mouth daily  , Disp: , Rfl:     Calcium Citrate-Vitamin D (CALCIUM CITRATE + D PO), Take 1,500 mg by mouth daily, Disp: , Rfl:     clindamycin (CLEOCIN) 300 MG capsule, TAKE 2 CAPSULES BY MOUTH 1 HOUR PRIOR TO DENTAL PROCEDURE , Disp: , Rfl: 0    clotrimazole-betamethasone (LOTRISONE) 1-0 05 % cream, Apply topically, Disp: , Rfl:     Cranberry (THERACRAN HP PO), Take 2 tablets by mouth daily, Disp: , Rfl:     furosemide (LASIX) 20 mg tablet, TAKE 1 TABLET BY MOUTH DAILY AS NEEDED FOR EDEMA, Disp: , Rfl:     Glucosamine-Chondroit-Vit C-Mn (GLUCOSAMINE 1500 COMPLEX) CAPS, Take 1 capsule by mouth daily  , Disp: , Rfl:     metFORMIN (GLUCOPHAGE) 500 mg tablet, 2 (two) times a day, Disp: , Rfl:     metoprolol tartrate (LOPRESSOR) 25 mg tablet, Take 0 5 tablets (12 5 mg total) by mouth every 12 (twelve) hours, Disp: 30 tablet, Rfl: 0    Multiple Vitamin (MULTIVITAMIN) capsule, Take 1 capsule by mouth daily  , Disp: , Rfl:     ondansetron (ZOFRAN) 4 mg tablet, Take 4 mg by mouth every 8 (eight) hours as needed for nausea or vomiting, Disp: , Rfl:     oxyCODONE (ROXICODONE) 5 mg immediate release tablet, take 1 tablet by mouth every 6 hours if needed for BREAKTHROUGH PAIN, Disp: , Rfl:     tamoxifen (NOLVADEX) 20 mg tablet, Take 1 tablet (20 mg total) by mouth daily, Disp: 30 tablet, Rfl: 6    Allergies   Allergen Reactions    Ampicillin Shortness Of Breath, Anaphylaxis and Other (See Comments)     Other reaction(s): Unknown Allergic Reaction  Reaction Date: [id number];        Physical Exam: There were no vitals filed for this visit  General: Awake, Alert, Oriented x 3, Mood and affect appropriate  Respiratory: Respirations even and unlabored  Cardiovascular: Peripheral pulses intact; no edema  Musculoskeletal Exam:  Left hip pain    ASA Score:  2  Patient/Chart Verification  Patient ID Verified: Verbal  ID Band Applied: No  Consents Confirmed: Procedural, To be obtained in the Pre-Procedure area  H&P( within 30 days) Verified: To be obtained in the Pre-Procedure area  Interval H&P(within 24 hr) Complete (required for Outpatients and Surgery Admit only): To be obtained in the Pre-Procedure area  Allergies Reviewed: Yes  Anticoag/NSAID held?: NA  Currently on antibiotics?: No    Assessment:   1  Left hip pain    2   Primary osteoarthritis of left hip        Plan: LT HIP INJ [facility]

## 2018-09-18 ENCOUNTER — OFFICE VISIT (OUTPATIENT)
Dept: HEMATOLOGY ONCOLOGY | Facility: CLINIC | Age: 78
End: 2018-09-18
Payer: COMMERCIAL

## 2018-09-18 VITALS
TEMPERATURE: 97.7 F | HEART RATE: 90 BPM | SYSTOLIC BLOOD PRESSURE: 134 MMHG | RESPIRATION RATE: 18 BRPM | DIASTOLIC BLOOD PRESSURE: 74 MMHG | HEIGHT: 62 IN | BODY MASS INDEX: 30 KG/M2 | OXYGEN SATURATION: 91 % | WEIGHT: 163 LBS

## 2018-09-18 DIAGNOSIS — C50.911 ADENOCARCINOMA OF RIGHT BREAST METASTATIC TO LIVER (HCC): Primary | Chronic | ICD-10-CM

## 2018-09-18 DIAGNOSIS — C78.7 ADENOCARCINOMA OF RIGHT BREAST METASTATIC TO LIVER (HCC): Primary | Chronic | ICD-10-CM

## 2018-09-18 PROCEDURE — 99214 OFFICE O/P EST MOD 30 MIN: CPT | Performed by: INTERNAL MEDICINE

## 2018-09-18 NOTE — PROGRESS NOTES
[facility] HEMATOLOGY ONCOLOGY [address]  [address] [address] [phone]  [address] [place],1940, [id number]  09/18/18    Discussion:   In summary, this is an 17-year-old female history of breast cancer as outlined  She is currently on tamoxifen  She is tolerating this without difficulty  She does have hot flashes about twice a day and night sweats almost every night  She finds these to be tolerable, however  Otherwise she functions without restriction  She does fatigue easier than she had in years past   She is able to continue caring for herself, her , her home, etc   Recent CT scan chest abdomen pelvis shows postoperative changes in the liver  There is no evidence of new or recurrent disease  CBC CMP and tumor marker are all normal   I reviewed the above findings and their significance with the patient and her   She requests a PET scan prior to her next visit in 4 months time  I Think that is reasonable  I discussed the above with the patient  The patient and her  voiced understanding and agreement   ______________________________________________________________________    Chief Complaint   Patient presents with    Follow-up       HPI:     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 [facility])    2009 Initial Diagnosis     Adenocarcinoma of right breast,  Infiltrating ductal carcinoma T2, N1 sebastien) Tumor was ER/NM positive, HER2 negative  2009 Surgery     Right Mastectomy         2009 -  Chemotherapy     Adjuvant chemotherapy with Taxotere/Cytoxan x 4 cycles         2009 - 10/2014 Chemotherapy     Arimidex 1 mg po dialy         2/11/2016 Progression     Right lateral mastectomy site growth with core needle biopsy demonstrated reoccurrence  %, NM 70%, Her2 +1  Final Diagnosis   A   Breast, right lateral mastectomy site, core needle biopsies:                        - Recurrent invasive mammary carcinoma, no special type (formerly invasive ductal carcinoma), 0 8 cm largest single focus               - Combined Midland score: 7/9                         - Tubule formation: None (3/3)  - Nuclear pleomorphism: Marked (3/3)  - Mitotic count: 3 mitoses per 10 high-power fields (1/3)  - No lymph-vascular invasion is identified              - No in situ component is identified                - No microcalcifications are identified  3/16/2016 - 1/24/2017 Chemotherapy     Faslodex 500 mg with loading dose followed by maintenance monthly injection with Ibrance 75mg 3 weeks on 1 week off           12/13/2016 Progression      Progression noted on PET-CT scan  Progression was largely found in the liver  Patient was set up for liver biopsy  1/3/2017 Biopsy     Liver lesion biopsy demonstated Estrogen Receptor (clone SP-1) 100%/positive, Progesterone Receptor (clone 1E2) 1-2%/positive, HER2 by IHC ([id number]) 2+/equivocal   Her2 by FISH was positive  1/25/2017 - 4/19/2017 Chemotherapy      Herceptin and Perjeta x5 cycles         4/20/2017 Adverse Reaction     Perjecta causing significant diarrhea  Medication stopped  7/13/2017 Surgery     Partial Hepatectomy, results demonstrated ER70%, PR0% & Her2 negative disease  9/19/2017 -  Chemotherapy     Tamoxifen 20 mg daily            Interval History:  Clinically stable     0 - Asymptomatic    Review of Systems    Past Medical History:   Diagnosis Date    Anxiety     Arthritis     Breast CA ([address] )     recurrent, ductal carcinoma ER, [place] pos    Depression     Diverticula of intestine     Dizziness     and passes out    Flushing     Hiatal hernia     [place] (hard of hearing)      lizette    Hypercholesteremia     Liver mass     Liver metastasis (HCC)     Metastatic adenocarcinoma to liver ([address] )     Bx 01/03/2017    PONV (postoperative nausea and vomiting)     Recurrent breast cancer ([facility] [address] )     Shingles     Shortness of breath     on exertion    Tachycardia     Urinary incontinence     Use of cane as ambulatory aid     or walker     Patient Active Problem List   Diagnosis    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 (HCC)    Hypercholesteremia    Gastroesophageal reflux disease without esophagitis    History of total knee replacement    Hearing loss    Left bundle branch block (LBBB)    Symptomatic bradycardia    Complication associated with cardiac pacemaker lead    Pacemaker complications, subsequent encounter    Chest wall hematoma    Cholecystitis    Acute biliary pancreatitis    Cholangitis    Choledocholithiasis    Acute gallstone pancreatitis    Heart block atrioventricular       Current Outpatient Prescriptions:     acetaminophen (TYLENOL) 325 mg tablet, May take 650 mg every 6-8 hours as needed for pain (Patient taking differently: Take 650 mg by mouth every 6 (six) hours as needed May take 650 mg every 6-8 hours as needed for pain ), Disp: 30 tablet, Rfl: 0    aspirin (ECOTRIN LOW STRENGTH) 81 mg EC tablet, Take 81 mg by mouth daily, Disp: , Rfl:     atorvastatin (LIPITOR) 20 mg tablet, Take 20 mg by mouth daily  , Disp: , Rfl:     Calcium Citrate-Vitamin D (CALCIUM CITRATE + D PO), Take 1,500 mg by mouth daily, Disp: , Rfl:     clindamycin (CLEOCIN) 300 MG capsule, TAKE 2 CAPSULES BY MOUTH 1 HOUR PRIOR TO DENTAL PROCEDURE , Disp: , Rfl: 0    clotrimazole-betamethasone (LOTRISONE) 1-0 05 % cream, Apply topically, Disp: , Rfl:     Cranberry (THERACRAN HP PO), Take 2 tablets by mouth daily, Disp: , Rfl:     furosemide (LASIX) 20 mg tablet, TAKE 1 TABLET BY MOUTH DAILY AS NEEDED FOR EDEMA, Disp: , Rfl:     Glucosamine-Chondroit-Vit C-Mn (GLUCOSAMINE 1500 COMPLEX) CAPS, Take 1 capsule by mouth daily  , Disp: , Rfl:     metFORMIN (GLUCOPHAGE) 500 mg tablet, 2 (two) times a day, Disp: , Rfl:     metoprolol tartrate (LOPRESSOR) 25 mg tablet, Take 0 5 tablets (12 5 mg total) by mouth every 12 (twelve) hours, Disp: 30 tablet, Rfl: 0    Multiple Vitamin (MULTIVITAMIN) capsule, Take 1 capsule by mouth daily  , Disp: , Rfl:     ondansetron (ZOFRAN) 4 mg tablet, Take 4 mg by mouth every 8 (eight) hours as needed for nausea or vomiting, Disp: , Rfl:     oxyCODONE (ROXICODONE) 5 mg immediate release tablet, take 1 tablet by mouth every 6 hours if needed for BREAKTHROUGH PAIN, Disp: , Rfl:     tamoxifen (NOLVADEX) 20 mg tablet, Take 1 tablet (20 mg total) by mouth daily, Disp: 30 tablet, Rfl: 6  Allergies   Allergen Reactions    Ampicillin Shortness Of Breath, Anaphylaxis and Other (See Comments)     Other reaction(s): Unknown Allergic Reaction  Reaction Date: [id number];      Past Surgical History:   Procedure Laterality Date    CARDIAC PACEMAKER REMOVAL N/A 11/9/2017    Procedure: PACEMAKER LEAD REVISION, REMOVAL OF NONFUNCTIONING LEAD, AND INSERTION OF A NEW RV LEAD;  Surgeon: Milo Reveles MD;  [facility];  Service: Cardiology    CATARACT EXTRACTION Bilateral     COLONOSCOPY      ERCP N/A 1/8/2018    Procedure: ENDOSCOPIC RETROGRADE CHOLANGIOPANCREATOGRAPHY (ERCP); Surgeon: Kelly Yi MD;  [facility]; Service: Gastroenterology    HYSTERECTOMY      JOINT REPLACEMENT      lizette  TKR and right TSR    MASTECTOMY Right     RI ERCP DX COLLECTION SPECIMEN BRUSHING/WASHING N/A 2/22/2018    Procedure: ENDOSCOPIC RETROGRADE CHOLANGIOPANCREATOGRAPHY (ERCP) with stent removal;  Surgeon: Kelly Yi MD;  [facility]: [facility];   Service: Gastroenterology    [facility] [address] [place] [address] 165 N/A 7/13/2017    Procedure: LIVER RESECTION, INTRAOPERATIVE ULTRASOUND;  Surgeon: Gina Heredia MD;  [facility];  Service: Surgical Oncology    ROTATOR CUFF REPAIR Right     SENTINEL LYMPH NODE BIOPSY Right     TONSILECTOMY AND ADNOIDECTOMY      WOUND DEBRIDEMENT Left 11/9/2017    Procedure: DEBRIDEMENT WOUND AND DRESSING CHANGE [facility] OUT);  Surgeon: Stephanie Guillen MD;  [facility];  Service: Cardiology     Social History     Objective:  Vitals:    09/18/18 1108   BP: 134/74   BP Location: Left arm   Patient Position: Sitting   Pulse: 90   Resp: 18   Temp: 97 7 °F (36 5 °C)   TempSrc: Tympanic   SpO2: 91%   Weight: 73 9 kg (163 lb)   Height: 5' 1 5" (1 562 m)     Physical Exam      Labs: I personally reviewed the labs and imaging pertinent to this patient care

## 2018-10-01 ENCOUNTER — OFFICE VISIT (OUTPATIENT)
Dept: OBGYN CLINIC | Facility: MEDICAL CENTER | Age: 78
End: 2018-10-01
Payer: COMMERCIAL

## 2018-10-01 ENCOUNTER — TELEPHONE (OUTPATIENT)
Dept: PAIN MEDICINE | Facility: MEDICAL CENTER | Age: 78
End: 2018-10-01

## 2018-10-01 ENCOUNTER — APPOINTMENT (OUTPATIENT)
Dept: RADIOLOGY | Facility: CLINIC | Age: 78
End: 2018-10-01
Payer: COMMERCIAL

## 2018-10-01 VITALS
SYSTOLIC BLOOD PRESSURE: 132 MMHG | HEIGHT: 61 IN | BODY MASS INDEX: 30.78 KG/M2 | HEART RATE: 99 BPM | DIASTOLIC BLOOD PRESSURE: 81 MMHG | WEIGHT: 163 LBS

## 2018-10-01 DIAGNOSIS — M54.50 LUMBAR PAIN: Primary | ICD-10-CM

## 2018-10-01 DIAGNOSIS — M43.10 SPONDYLOLISTHESIS, UNSPECIFIED SPINAL REGION: ICD-10-CM

## 2018-10-01 DIAGNOSIS — M25.552 PAIN IN LEFT HIP: ICD-10-CM

## 2018-10-01 DIAGNOSIS — M70.62 GREATER TROCHANTERIC BURSITIS OF LEFT HIP: ICD-10-CM

## 2018-10-01 DIAGNOSIS — M54.50 LUMBAR PAIN: ICD-10-CM

## 2018-10-01 PROCEDURE — 99213 OFFICE O/P EST LOW 20 MIN: CPT | Performed by: ORTHOPAEDIC SURGERY

## 2018-10-01 PROCEDURE — 72100 X-RAY EXAM L-S SPINE 2/3 VWS: CPT

## 2018-10-01 NOTE — TELEPHONE ENCOUNTER
-PA-    RN s/w pt regarding previous  Pt had a left hip inj on 9/10 w/ JE  Per pt she has pain in the AM when she gets up like it was before the injection  But then her pain would go away after she walked around and was up for a while  Per pt now she has the pain when she gets up, it goes away and then comes back by the afternoon and now the pain is "different" from her hip down her leg to her knee  RN advised that it has been over two weeks since she had the injection and that we should have seen an improvement by now if she were going to have one from the injection  RN advised that she f/u with Dr Shanna García regarding same  Pt aware that she will send this to Rosa Powers to review as well  Pt verbalized understanding and was appreciative of the call back and will f/u with Dr Shanna García regarding same

## 2018-10-01 NOTE — TELEPHONE ENCOUNTER
Pt would like a call back regarding her recent injection  States that she did not get any relief from the procedure and would like to speak with a nurse to advise  She can be reached at [phone]

## 2018-10-01 NOTE — PROGRESS NOTES
Assessment/Plan     1  Lumbar pain    2  Spondylolisthesis, unspecified spinal region    3  Pain in left hip    4  Greater trochanteric bursitis of left hip      Orders Placed This Encounter   Procedures    XR spine lumbar 2 or 3 views injury    MRI lumbar spine wo contrast    Ambulatory referral to Pain Management    Ambulatory referral to Spine Surgery     -will go for MRI of lumbar spine  -referral to pain management and spine surgeon given today  -continue with tylenol as needed for pain  -continue with home exercises  Return for with Jason Yao   to review MRI and formulate tx plan    I answered all of the patient's questions during the visit and provided education of the patient's condition during the visit  The patient verbalized understanding of the information given and agrees with the plan  This note was dictated using True Pivot software  It may contain errors including improperly dictated words  Please contact physician directly for any questions  Subjective   Chief Complaint:   Chief Complaint   Patient presents with    Left Hip - Follow-up       Rajat Riojas is a [de-identified] y o  female who presents for follow up for left hip pain  States her pain is in her lateral hip that radiates down her lateral thigh to her knee  Pain does not go past her knee  She denies any numbness or tingling  She last had a left greater trochanteric bursa steroid injection on 7/19  She states that did help with some of her pain  but she continued to have pain  She then went for a left hip steroid injection on 09/10  She states that did help for the 1st few hours but then her pain returned the next day  She states she also got the injection in the afternoon when she typically does not have the pain so she is unsure how much it actually helped  She states most of her pain is in the morning  The pain  goes away throughout the day but comes back at night now  She occasionally has some groin pain   She has done physical therapy in the past without benefit  She takes Tylenol as needed for pain control  She denies any back pain  She denies any recent bowel or bladder changes  Review of Systems  See HPI for musculoskeletal review  All other systems reviewed are negative     History:  Past Medical History:   Diagnosis Date    Anxiety     Arthritis     [facility] )     recurrent, ductal carcinoma ER, [place] pos    Depression     Diverticula of intestine     Dizziness     and passes out    Flushing     Hiatal hernia     [place] (hard of hearing)      lizette    Hypercholesteremia     Liver mass     Liver metastasis (HCC)     Metastatic adenocarcinoma [facility] )     Bx 01/03/2017    PONV (postoperative nausea and vomiting)     Rec[facility]     Shingles     Shortness of breath     on exertion    Tachycardia     Urinary incontinence     Use of cane as ambulatory aid     or walker     Past Surgical History:   Procedure Laterality Date    CARDIAC PACEMAKER REMOVAL N/A 11/9/2017    Procedure: PACEMAKER LEAD REVISION, REMOVAL OF NONFUNCTIONING LEAD, AND INSERTION OF A NEW RV LEAD;  Surgeon: Zachery Garza MD;  [facility];  Service: Cardiology    CATARACT EXTRACTION Bilateral     COLONOSCOPY      ERCP N/A 1/8/2018    Procedure: ENDOSCOPIC RETROGRADE CHOLANGIOPANCREATOGRAPHY (ERCP); Surgeon: Ervin Jacobs MD;  Location: [facility]; Service: Gastroenterology    HYSTERECTOMY      JOINT REPLACEMENT      l[place]  TKR and right TSR    MASTECTOMY Right     NE ERCP DX COLLECTION SPECIMEN BRUSHING/WASHING N/A 2/22/2018    Procedure: ENDOSCOPIC RETROGRADE CHOLANGIOPANCREATOGRAPHY (ERCP) with stent removal;  Surgeon: Ervin Jacobs MD;  [facility]: [facility];   Service: Gastroenterology    NE RESEC [address] N/A 7/13/2017    Procedure: LIVER RESECTION, INTRAOPERATIVE ULTRASOUND;  Surgeon: Suraj Nation MD;  [facility];  Service: Surgical Oncology    ROTATOR CUFF REPAIR Right  SENTINEL LYMPH NODE BIOPSY Right     TONSILECTOMY AND ADNOIDECTOMY      WOUND DEBRIDEMENT Left 11/9/2017    Procedure: DEBRIDEMENT WOUND AND DRESSING CHANGE [facility]); Surgeon: Shlomo Vega MD;  [facility];  Service: Cardiology     Social History   History   Alcohol Use No     History   Drug Use No     History   Smoking Status    Never Smoker   Smokeless Tobacco    Never Used     Family History:   Family History   Problem Relation Age of Onset    Lung cancer Father     Cancer Brother     Diabetes type II Son        Meds/Allergies     (Not in a hospital admission)  Allergies   Allergen Reactions    Ampicillin Shortness Of Breath, Anaphylaxis and Other (See Comments)     Other reaction(s): Unknown Allergic Reaction  Reaction Date: 07Mar2012;           Objective     /81   Pulse 99   Ht 5' 1" (1 549 m)   Wt 73 9 kg (163 lb)   LMP  (LMP Unknown) Comment: post   BMI 30 80 kg/m²      PE:  AAOx 3  WDWN  Hearing intact, no drainage from eyes  no audible wheezing  no abdominal distension  LE compartments soft, skin intact    Ortho Exam:  left hip:   No dislocation/deformity  ROM: full no pain with ROM  mild TTP over greater trochanter  No TTP over SIJ  Abduction 5/5  AT/GS intact  No TTP over lumbar spinous processes, no TTP over paraspinal musculature or SI joints     RLE:  EHL/AT/GS/quads/hamstrings/iliopsoas 5/5, sensation grossly intact L4, L5, S1, palpable pedal pulse  Imaging Studies: I have personally reviewed pertinent films in PACS  X-ray lumbar spine- spondylolisthesis, multilevel DJD

## 2018-10-02 ENCOUNTER — TELEPHONE (OUTPATIENT)
Dept: OBGYN CLINIC | Facility: HOSPITAL | Age: 78
End: 2018-10-02

## 2018-10-02 NOTE — TELEPHONE ENCOUNTER
I called and talked to MRI  States states that she has to have  her MRI done in  [place] due to her pacemaker  I let them know she had talked with her  cardiologist previously and she is okay to have an MRI with her pacemaker  She had an MRI in July without any issues    I will have someone form our  office call and reschedule her MRI for [place]

## 2018-10-02 NOTE — TELEPHONE ENCOUNTER
Patient sees Dr Minerva Quiles is calling   [phone]    MRI is calling because the patient is scheduled for an MRI on Monday 10/8/18 but the patient has a pace maker  Please advise

## 2018-10-10 DIAGNOSIS — R00.0 TACHYCARDIA: ICD-10-CM

## 2018-10-11 ENCOUNTER — HOSPITAL ENCOUNTER (OUTPATIENT)
Dept: RADIOLOGY | Facility: HOSPITAL | Age: 78
Discharge: HOME | End: 2018-10-11

## 2018-10-11 DIAGNOSIS — M43.10 SPONDYLOLISTHESIS, UNSPECIFIED SPINAL REGION: ICD-10-CM

## 2018-10-11 DIAGNOSIS — M54.50 LUMBAR PAIN: ICD-10-CM

## 2018-10-23 ENCOUNTER — HOSPITAL ENCOUNTER (OUTPATIENT)
Dept: RADIOLOGY | Facility: HOSPITAL | Age: 78
Discharge: HOME | End: 2018-10-23
Payer: COMMERCIAL

## 2018-10-23 PROCEDURE — 72148 MRI LUMBAR SPINE W/O DYE: CPT

## 2018-10-25 ENCOUNTER — CONSULT (OUTPATIENT)
Dept: PAIN MEDICINE | Facility: MEDICAL CENTER | Age: 78
End: 2018-10-25
Payer: COMMERCIAL

## 2018-10-25 VITALS
BODY MASS INDEX: 31.34 KG/M2 | DIASTOLIC BLOOD PRESSURE: 72 MMHG | HEIGHT: 61 IN | SYSTOLIC BLOOD PRESSURE: 130 MMHG | WEIGHT: 166 LBS | RESPIRATION RATE: 12 BRPM | HEART RATE: 76 BPM

## 2018-10-25 DIAGNOSIS — M48.062 SPINAL STENOSIS OF LUMBAR REGION WITH NEUROGENIC CLAUDICATION: Primary | ICD-10-CM

## 2018-10-25 DIAGNOSIS — M54.50 LUMBAR PAIN: ICD-10-CM

## 2018-10-25 DIAGNOSIS — M43.10 SPONDYLOLISTHESIS, UNSPECIFIED SPINAL REGION: ICD-10-CM

## 2018-10-25 PROCEDURE — 99204 OFFICE O/P NEW MOD 45 MIN: CPT | Performed by: PHYSICAL MEDICINE & REHABILITATION

## 2018-10-25 NOTE — LETTER
October 25, 2018     James Miles PA-C  [address]    Patient: Malia Kimball   YOB: 1940   Date of Visit: 10/25/2018       Dear Dr Rik Palomo: Thank you for referring Cinthia Leger to me for evaluation  Below are my notes for this consultation  If you have questions, please do not hesitate to call me  I look forward to following your patient along with you  Sincerely,        Santy Abbott DO        CC: Kanchan Decker, DO Santy Abbott DO  10/25/2018 11:47 AM  Sign at close encounter  Assessment:  1  Spinal stenosis of lumbar region with neurogenic claudication    2  Lumbar pain    3  Spondylolisthesis, unspecified spinal region        Plan:  1  We will schedule patient for an L5 epidural steroid injection  Complete risks and benefits including bleeding, infection, tissue reaction, allergic reaction were discussed  Verbal consent obtained  This will require holding of her aspirin, we will get clearance from her prescribing physician, Dr Dede Tomas  2  Follow-up 4 weeks after the injection to evaluate her response and consider further options at that time which may include referral to 1 of our spine surgeons    My impressions and treatment recommendations were discussed in detail with the patient who verbalized understanding and had no further questions  Discharge instructions were provided  I personally saw and examined the patient and I agree with the above discussed plan of care  Orders Placed This Encounter   Procedures    FL spine and pain procedure     Standing Status:   Future     Standing Expiration Date:   10/25/2019     Order Specific Question:   Reason for Exam:     Answer:   (L) L5 LESI     Order Specific Question:   Anticoagulant hold needed? Answer:   YES, ASA HOLD     No orders of the defined types were placed in this encounter        History of Present Illness:    Malia Kimball is a [de-identified] y o  female Sent from Dr Talita Aguilar for further evaluation and management of her radiating left leg pain  She does have spondylolisthesis noted on her MRI with potential nerve root compression  She has been experiencing this for the past 7 months or so without any significant inciting event or trauma  She notes moderate to severe pain when she initially gets up in the morning and then this eases throughout the day  She rates her pain as an 8/10 at its worst       Aggravating factors include standing, reaching overhead, and walking long distances  Alleviating factors include lying down  No relief with hip injection, physical therapy, or local heat or ice application  She does take 81 mg aspirin on a daily basis and has a pacemaker  I have personally reviewed and/or updated the patient's past medical history, past surgical history, family history, social history, current medications, allergies, and vital signs today  Review of Systems:    Review of Systems   Constitutional: Negative for fever and unexpected weight change  HENT: Negative for trouble swallowing  Eyes: Negative for visual disturbance  Respiratory: Positive for shortness of breath  Negative for wheezing  Cardiovascular: Negative for chest pain and palpitations  Gastrointestinal: Positive for diarrhea  Negative for constipation, nausea and vomiting  Endocrine: Negative for cold intolerance, heat intolerance and polydipsia  Genitourinary: Positive for frequency  Negative for difficulty urinating  Musculoskeletal: Positive for gait problem, joint swelling and myalgias  Negative for arthralgias  Skin: Negative for rash  Neurological: Positive for dizziness and weakness  Negative for seizures, syncope and headaches  Hematological: Bruises/bleeds easily  Psychiatric/Behavioral: Negative for dysphoric mood  All other systems reviewed and are negative        Patient Active Problem List   Diagnosis    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 ([facility] [address] )    Hypercholesteremia    Gastroesophageal reflux disease without esophagitis    History of total knee replacement    Hearing loss    Left bundle branch block (LBBB)    Symptomatic bradycardia    Complication associated with cardiac pacemaker lead    Pacemaker complications, subsequent encounter    Chest wall hematoma    Cholecystitis    Acute biliary pancreatitis    Cholangitis    Choledocholithiasis    Acute gallstone pancreatitis    Heart block atrioventricular       Past Medical History:   Diagnosis Date    Anxiety     Arthritis     Breast CA (HCC)     recurrent, ductal carcinoma [place], [place] pos    Depression     Diverticula of intestine     Dizziness     and passes out    Flushing     Hiatal hernia     [place]      liz[place]    Hypercholesteremia     Liver mass     Liver metastasis (HCC)     Metastatic adenocarcinoma to liver (HCC)     Bx 01/03/2017    PONV (postoperative nausea and vomiting)     Recurrent breast cancer (HCC)     Shingles     Shortness of breath     on exertion    Tachycardia     Urinary incontinence     Use of cane as ambulatory aid     or walker       Past Surgical History:   Procedure Laterality Date    CARDIAC PACEMAKER REMOVAL N/A 11/9/2017    Procedure: PACEMAKER LEAD REVISION, REMOVAL OF NONFUNCTIONING LEAD, AND INSERTION OF A NEW RV LEAD;  Surgeon: Talon Hudson MD;  [facility];  Service: Cardiology    CATARACT EXTRACTION Bilateral     COLONOSCOPY      ERCP N/A 1/8/2018    Procedure: ENDOSCOPIC RETROGRADE CHOLANGIOPANCREATOGRAPHY (ERCP); Surgeon: Mendez Rhodes MD;  [facility]; Service: Gastroenterology    HYSTERECTOMY      JOINT REPLACEMENT      [place]  TKR and right TSR    MASTECTOMY Right     IN ERCP DX COLLECTION SPECIMEN BRUSHING/WASHING N/A 2/22/2018    Procedure: ENDOSCOPIC RETROGRADE CHOLANGIOPANCREATOGRAPHY (ERCP) with stent removal;  Surgeon: Mendez Rhodes MD;  [facility];   Service: Gastroenterology   Mohamud Schmid HI RESEC LIVER,PART LOBECTOMY N/A 7/13/2017    Procedure: LIVER RESECTION, INTRAOPERATIVE ULTRASOUND;  Surgeon: Georgie Hashimoto, MD;  [facility];  Service: Surgical Oncology    ROTATOR CUFF REPAIR Right     SENTINEL LYMPH NODE BIOPSY Right     TONSILECTOMY AND ADNOIDECTOMY      WOUND DEBRIDEMENT Left 11/9/2017    Procedure: DEBRIDEMENT WOUND AND DRESSING CHANGE [facility]); Surgeon: Melina Massey MD;  [facility];  Service: Cardiology       Family History   Problem Relation Age of Onset    Lung cancer Father     Cancer Brother     Diabetes type II Son        Social History     Occupational History    Not on file  Social History Main Topics    Smoking status: Never Smoker    Smokeless tobacco: Never Used    Alcohol use No    Drug use: No    Sexual activity: Not on file       Current Outpatient Prescriptions on File Prior to Visit   Medication Sig    acetaminophen (TYLENOL) 325 mg tablet May take 650 mg every 6-8 hours as needed for pain (Patient taking differently: Take 650 mg by mouth every 6 (six) hours as needed May take 650 mg every 6-8 hours as needed for pain )    aspirin (ECOTRIN LOW STRENGTH) 81 mg EC tablet Take 81 mg by mouth daily    atorvastatin (LIPITOR) 20 mg tablet Take 20 mg by mouth daily   Calcium Citrate-Vitamin D (CALCIUM CITRATE + D PO) Take 1,500 mg by mouth daily    clindamycin (CLEOCIN) 300 MG capsule TAKE 2 CAPSULES BY MOUTH 1 HOUR PRIOR TO DENTAL PROCEDURE      clotrimazole-betamethasone (LOTRISONE) 1-0 05 % cream Apply topically    Cranberry (THERACRAN HP PO) Take 2 tablets by mouth daily    furosemide (LASIX) 20 mg tablet TAKE 1 TABLET BY MOUTH DAILY AS NEEDED FOR EDEMA    Glucosamine-Chondroit-Vit C-Mn (GLUCOSAMINE 1500 COMPLEX) CAPS Take 1 capsule by mouth daily      metFORMIN (GLUCOPHAGE) 500 mg tablet 2 (two) times a day    metoprolol tartrate (LOPRESSOR) 25 mg tablet Take 0 5 tablets (12 5 mg total) by mouth every 12 (twelve) hours    Multiple Vitamin (MULTIVITAMIN) capsule Take 1 capsule by mouth daily   ondansetron (ZOFRAN) 4 mg tablet Take 4 mg by mouth every 8 (eight) hours as needed for nausea or vomiting    tamoxifen (NOLVADEX) 20 mg tablet Take 1 tablet (20 mg total) by mouth daily    oxyCODONE (ROXICODONE) 5 mg immediate release tablet take 1 tablet by mouth every 6 hours if needed for BREAKTHROUGH PAIN     No current facility-administered medications on file prior to visit  Allergies   Allergen Reactions    Ampicillin Shortness Of Breath, Anaphylaxis and Other (See Comments)     Other reaction(s): Unknown Allergic Reaction  Reaction Date: [id number];        Physical Exam:    /72   Pulse 76   Resp 12   Ht 5' 1" (1 549 m)   Wt 75 3 kg (166 lb)   LMP  (LMP Unknown) Comment: post   BMI 31 37 kg/m²        LUMBAR  General: Well-developed, well-nourished individual in no acute distress  Mental: Appropriate mood and affect  Grossly oriented with coherent speech and thought processing   Neuro:  Cranial nerves: Cranial nerve function is grossly intact bilaterally   Strength: Bilateral lower extremity strength is normal and symmetric   No atrophy or tone abnormalities noted   Reflexes: Bilateral lower extremity muscle stretch reflexes are absent at the knees and ankles    No ankle clonus is noted   Sensation: No loss of sensation is noted   SLR/Foraminal Compression Maneuvers: Straight leg raising in the sitting position is negative for radicular pain   Musculoskeletal:  Palpation: Inspection and palpation of the spine and extremities are unremarkable   Spine: Normal pain-free range of motion   No gross axial skeletal deformities   Skin: Skin inspection grossly negative for erythema, breakdown, or concerning lesions in affected area   Lymph: No lymphadenopathy is appreciated in the involved extremity   Vessels: No lower extremity edema   Lungs: Breathing is comfortable and regular   No dyspnea noted during examination   Eyes: Visual field grossly intact to confrontation  No redness appreciated  ENT: No craniofacial deformities or asymmetry  No neck masses appreciated  Imaging  persistant pain   Dx: Lumbar pain [M54 5 (ICD-10-CM)]; Spondylolisthesis, unspecified spinal region [M43 10 (ICD-10-CM)]   Study Result     MRI LUMBAR SPINE WITHOUT CONTRAST     INDICATION: 23-year-old female, back and leg pain     COMPARISON:  10/1/2018 x-rays     TECHNIQUE:  Sagittal T1, sagittal T2, sagittal inversion recovery, axial T1 and axial T2, coronal T2        IMAGE QUALITY:  Diagnostic     FINDINGS:     ALIGNMENT:    Grade 1-2 degenerative anterolisthesis L3-4, slightly less pronounced than on prior x-rays  No compression fracture     No scoliosis      MARROW SIGNAL:  Normal marrow signal is identified within the visualized bony structures  No discrete marrow lesion      DISTAL CORD AND CONUS:  Normal size and signal within the distal cord and conus  The conus ends at the L1-2 level      PARASPINAL SOFT TISSUES:  Paraspinal soft tissues are unremarkable      SACRUM:  Normal signal within the sacrum   No evidence of insufficiency or stress fracture      LOWER THORACIC DISC SPACES:  Mild multilevel degenerative spondylosis and annular bulges      LUMBAR DISC SPACES:  Transitional anatomy lumbosacral junction consistent with sacralization of the L5 vertebra      L1-L2:  Mild degenerative spondylosis and bulging annulus, no stenosis     L2-L3:  Mild degenerative disc disease, moderate degenerative facet hypertrophy, mild central canal and bilateral foraminal stenosis, small central disc protrusion,  possible bilateral L3 nerve root encroachment      L3-L4:  Mild degenerative disc disease, severe degenerative facet hypertrophy, grade 1-2 anterolisthesis, mild central canal stenosis, moderate bilateral foraminal stenosis, possible bilateral L3 nerve root encroachment      L4-L5:  Moderate degenerative disc and facet disease, no stenosis     L5-S1:  Developmentally hypoplastic disc space, no stenosis     IMPRESSION:  Multilevel degenerative spondylosis     Mild central canal and bilateral foraminal stenosis, small central disc protrusion L2-3     Grade 1-2 degenerative anterolisthesis, mild central canal stenosis, moderate bilateral foraminal stenosis L3-4              Workstation performed: [id number]

## 2018-10-25 NOTE — PROGRESS NOTES
Assessment:  1  Spinal stenosis of lumbar region with neurogenic claudication    2  Lumbar pain    3  Spondylolisthesis, unspecified spinal region        Plan:  1  We will schedule patient for an L5 epidural steroid injection  Complete risks and benefits including bleeding, infection, tissue reaction, allergic reaction were discussed  Verbal consent obtained  This will require holding of her aspirin, we will get clearance from her prescribing physician, Dr Steffi Vazquez  2  Follow-up 4 weeks after the injection to evaluate her response and consider further options at that time which may include referral to 1 of our spine surgeons    My impressions and treatment recommendations were discussed in detail with the patient who verbalized understanding and had no further questions  Discharge instructions were provided  I personally saw and examined the patient and I agree with the above discussed plan of care  Orders Placed This Encounter   Procedures    FL spine and pain procedure     Standing Status:   Future     Standing Expiration Date:   10/25/2019     Order Specific Question:   Reason for Exam:     Answer:   (L) L5 LESI     Order Specific Question:   Anticoagulant hold needed? Answer:   YES, ASA HOLD     No orders of the defined types were placed in this encounter  History of Present Illness:    Che Jaramillo is a [de-identified] y o  female Sent from Dr Ingrid Tucker for further evaluation and management of her radiating left leg pain  She does have spondylolisthesis noted on her MRI with potential nerve root compression  She has been experiencing this for the past 7 months or so without any significant inciting event or trauma  She notes moderate to severe pain when she initially gets up in the morning and then this eases throughout the day  She rates her pain as an 8/10 at its worst       Aggravating factors include standing, reaching overhead, and walking long distances  Alleviating factors include lying down  No relief with hip injection, physical therapy, or local heat or ice application  She does take 81 mg aspirin on a daily basis and has a pacemaker  I have personally reviewed and/or updated the patient's past medical history, past surgical history, family history, social history, current medications, allergies, and vital signs today  Review of Systems:    Review of Systems   Constitutional: Negative for fever and unexpected weight change  HENT: Negative for trouble swallowing  Eyes: Negative for visual disturbance  Respiratory: Positive for shortness of breath  Negative for wheezing  Cardiovascular: Negative for chest pain and palpitations  Gastrointestinal: Positive for diarrhea  Negative for constipation, nausea and vomiting  Endocrine: Negative for cold intolerance, heat intolerance and polydipsia  Genitourinary: Positive for frequency  Negative for difficulty urinating  Musculoskeletal: Positive for gait problem, joint swelling and myalgias  Negative for arthralgias  Skin: Negative for rash  Neurological: Positive for dizziness and weakness  Negative for seizures, syncope and headaches  Hematological: Bruises/bleeds easily  Psychiatric/Behavioral: Negative for dysphoric mood  All other systems reviewed and are negative        Patient Active Problem List   Diagnosis    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 ([facility] [address] )    Hypercholesteremia    Gastroesophageal reflux disease without esophagitis    History of total knee replacement    Hearing loss    Left bundle branch block (LBBB)    Symptomatic bradycardia    Complication associated with cardiac pacemaker lead    Pacemaker complications, subsequent encounter    Chest wall hematoma    Cholecystitis    Acute biliary pancreatitis    Cholangitis    Choledocholithiasis    Acute gallstone pancreatitis    Heart block atrioventricular       Past Medical History:   Diagnosis Date    Anxiety     Arthritis  Breast CA ([facility] [address] )     recurrent, ductal carcinoma ER, [place] pos    Depression     Diverticula of intestine     Dizziness     and passes out    Flushing     Hiatal hernia     [place]      [place]    Hypercholesteremia     Liver mass     Liver metastasis (HCC)     Metastatic adenocarcinoma to liver ([facility] [address] )     Bx 01/03/2017    PONV (postoperative nausea and vomiting)     Recurrent breast cancer ([facility] [address] )     Shingles     Shortness of breath     on exertion    Tachycardia     Urinary incontinence     Use of cane as ambulatory aid     or walker       Past Surgical History:   Procedure Laterality Date    CARDIAC PACEMAKER REMOVAL N/A 11/9/2017    Procedure: PACEMAKER LEAD REVISION, REMOVAL OF NONFUNCTIONING LEAD, AND INSERTION OF A NEW RV LEAD;  Surgeon: Favio Corey MD;  [facility];  Service: Cardiology    CATARACT EXTRACTION Bilateral     COLONOSCOPY      ERCP N/A 1/8/2018    Procedure: ENDOSCOPIC RETROGRADE CHOLANGIOPANCREATOGRAPHY (ERCP); Surgeon: Eleni Waddell MD;  [facility]; Service: Gastroenterology    HYSTERECTOMY      JOINT REPLACEMENT      lizette  TKR and right TSR    MASTECTOMY Right     MD ERCP DX COLLECTION SPECIMEN BRUSHING/WASHING N/A 2/22/2018    Procedure: ENDOSCOPIC RETROGRADE CHOLANGIOPANCREATOGRAPHY (ERCP) with stent removal;  Surgeon: Eleni Waddell MD;  [facility]; Service: Gastroenterology    MD RESEC [id number] N/A 7/13/2017    Procedure: LIVER RESECTION, INTRAOPERATIVE ULTRASOUND;  Surgeon: García Paige MD;  [facility];  Service: Surgical Oncology    ROTATOR CUFF REPAIR Right     SENTINEL LYMPH NODE BIOPSY Right     TONSILECTOMY AND ADNOIDECTOMY      WOUND DEBRIDEMENT Left 11/9/2017    Procedure: DEBRIDEMENT WOUND AND DRESSING CHANGE [facility]);   Surgeon: Favio Corey MD;  [facility];  Service: Cardiology       Family History   Problem Relation Age of Onset    Lung cancer Father     Cancer Brother    Abel Mcmahan Diabetes type II Son        Social History     Occupational History    Not on file  Social History Main Topics    Smoking status: Never Smoker    Smokeless tobacco: Never Used    Alcohol use No    Drug use: No    Sexual activity: Not on file       Current Outpatient Prescriptions on File Prior to Visit   Medication Sig    acetaminophen (TYLENOL) 325 mg tablet May take 650 mg every 6-8 hours as needed for pain (Patient taking differently: Take 650 mg by mouth every 6 (six) hours as needed May take 650 mg every 6-8 hours as needed for pain )    aspirin (ECOTRIN LOW STRENGTH) 81 mg EC tablet Take 81 mg by mouth daily    atorvastatin (LIPITOR) 20 mg tablet Take 20 mg by mouth daily   Calcium Citrate-Vitamin D (CALCIUM CITRATE + D PO) Take 1,500 mg by mouth daily    clindamycin (CLEOCIN) 300 MG capsule TAKE 2 CAPSULES BY MOUTH 1 HOUR PRIOR TO DENTAL PROCEDURE   clotrimazole-betamethasone (LOTRISONE) 1-0 05 % cream Apply topically    Cranberry (THERACRAN HP PO) Take 2 tablets by mouth daily    furosemide (LASIX) 20 mg tablet TAKE 1 TABLET BY MOUTH DAILY AS NEEDED FOR EDEMA    Glucosamine-Chondroit-Vit C-Mn (GLUCOSAMINE 1500 COMPLEX) CAPS Take 1 capsule by mouth daily      metFORMIN (GLUCOPHAGE) 500 mg tablet 2 (two) times a day    metoprolol tartrate (LOPRESSOR) 25 mg tablet Take 0 5 tablets (12 5 mg total) by mouth every 12 (twelve) hours    Multiple Vitamin (MULTIVITAMIN) capsule Take 1 capsule by mouth daily   ondansetron (ZOFRAN) 4 mg tablet Take 4 mg by mouth every 8 (eight) hours as needed for nausea or vomiting    tamoxifen (NOLVADEX) 20 mg tablet Take 1 tablet (20 mg total) by mouth daily    oxyCODONE (ROXICODONE) 5 mg immediate release tablet take 1 tablet by mouth every 6 hours if needed for BREAKTHROUGH PAIN     No current facility-administered medications on file prior to visit          Allergies   Allergen Reactions    Ampicillin Shortness Of Breath, Anaphylaxis and Other (See Comments)     Other reaction(s): Unknown Allergic Reaction  Reaction Date: [id number];        Physical Exam:    /72   Pulse 76   Resp 12   Ht 5' 1" (1 549 m)   Wt 75 3 kg (166 lb)   LMP  (LMP Unknown) Comment: post   BMI 31 37 kg/m²       LUMBAR  General: Well-developed, well-nourished individual in no acute distress  Mental: Appropriate mood and affect  Grossly oriented with coherent speech and thought processing   Neuro:  Cranial nerves: Cranial nerve function is grossly intact bilaterally   Strength: Bilateral lower extremity strength is normal and symmetric   No atrophy or tone abnormalities noted   Reflexes: Bilateral lower extremity muscle stretch reflexes are absent at the knees and ankles    No ankle clonus is noted   Sensation: No loss of sensation is noted   SLR/Foraminal Compression Maneuvers: Straight leg raising in the sitting position is negative for radicular pain   Musculoskeletal:  Palpation: Inspection and palpation of the spine and extremities are unremarkable   Spine: Normal pain-free range of motion   No gross axial skeletal deformities   Skin: Skin inspection grossly negative for erythema, breakdown, or concerning lesions in affected area   Lymph: No lymphadenopathy is appreciated in the involved extremity   Vessels: No lower extremity edema   Lungs: Breathing is comfortable and regular  No dyspnea noted during examination   Eyes: Visual field grossly intact to confrontation  No redness appreciated  ENT: No craniofacial deformities or asymmetry  No neck masses appreciated  Imaging  persistant pain   Dx: Lumbar pain [M54 5 (ICD-10-CM)]; Spondylolisthesis, unspecified spinal region [M43 10 (ICD-10-CM)]   Study Result     MRI LUMBAR SPINE WITHOUT CONTRAST     INDICATION: 57-year-old female, back and leg pain     COMPARISON:  10/1/2018 x-rays     TECHNIQUE:  Sagittal T1, sagittal T2, sagittal inversion recovery, axial T1 and axial T2, coronal T2     IMAGE QUALITY:  Diagnostic     FINDINGS:     ALIGNMENT:    Grade 1-2 degenerative anterolisthesis L3-4, slightly less pronounced than on prior x-rays  No compression fracture     No scoliosis      MARROW SIGNAL:  Normal marrow signal is identified within the visualized bony structures  No discrete marrow lesion      DISTAL CORD AND CONUS:  Normal size and signal within the distal cord and conus  The conus ends at the L1-2 level      PARASPINAL SOFT TISSUES:  Paraspinal soft tissues are unremarkable      SACRUM:  Normal signal within the sacrum   No evidence of insufficiency or stress fracture      LOWER THORACIC DISC SPACES:  Mild multilevel degenerative spondylosis and annular bulges      LUMBAR DISC SPACES:  Transitional anatomy lumbosacral junction consistent with sacralization of the L5 vertebra      L1-L2:  Mild degenerative spondylosis and bulging annulus, no stenosis     L2-L3:  Mild degenerative disc disease, moderate degenerative facet hypertrophy, mild central canal and bilateral foraminal stenosis, small central disc protrusion,  possible bilateral L3 nerve root encroachment      L3-L4:  Mild degenerative disc disease, severe degenerative facet hypertrophy, grade 1-2 anterolisthesis, mild central canal stenosis, moderate bilateral foraminal stenosis, possible bilateral L3 nerve root encroachment      L4-L5:  Moderate degenerative disc and facet disease, no stenosis     L5-S1:  Developmentally hypoplastic disc space, no stenosis     IMPRESSION:  Multilevel degenerative spondylosis     Mild central canal and bilateral foraminal stenosis, small central disc protrusion L2-3     Grade 1-2 degenerative anterolisthesis, mild central canal stenosis, moderate bilateral foraminal stenosis L3-4              Workstation performed: [id number]

## 2018-10-26 ENCOUNTER — TELEPHONE (OUTPATIENT)
Dept: HEMATOLOGY ONCOLOGY | Facility: CLINIC | Age: 78
End: 2018-10-26

## 2018-10-26 DIAGNOSIS — C50.919 MALIGNANT NEOPLASM OF FEMALE BREAST, UNSPECIFIED ESTROGEN RECEPTOR STATUS, UNSPECIFIED LATERALITY, UNSPECIFIED SITE OF BREAST (HCC): ICD-10-CM

## 2018-10-26 RX ORDER — TAMOXIFEN CITRATE 20 MG/1
20 TABLET ORAL DAILY
Qty: 30 TABLET | Refills: 3 | Status: SHIPPED | OUTPATIENT
Start: 2018-10-26 | End: 2019-02-04 | Stop reason: SDUPTHER

## 2018-10-26 NOTE — TELEPHONE ENCOUNTER
Pt called regarding needing a refill for Tamoxifen 20 mg  Please send it to [facility] in [place]  Thank you

## 2018-10-29 ENCOUNTER — REMOTE DEVICE CLINIC VISIT (OUTPATIENT)
Dept: CARDIOLOGY CLINIC | Facility: CLINIC | Age: 78
End: 2018-10-29
Payer: COMMERCIAL

## 2018-10-29 DIAGNOSIS — I49.5 SSS (SICK SINUS SYNDROME) (HCC): ICD-10-CM

## 2018-10-29 DIAGNOSIS — R55 SYNCOPE AND COLLAPSE: Primary | ICD-10-CM

## 2018-10-29 DIAGNOSIS — Z95.0 PRESENCE OF CARDIAC PACEMAKER: ICD-10-CM

## 2018-10-29 DIAGNOSIS — I44.30 AVB (ATRIOVENTRICULAR BLOCK): ICD-10-CM

## 2018-10-29 PROCEDURE — 93294 REM INTERROG EVL PM/LDLS PM: CPT | Performed by: INTERNAL MEDICINE

## 2018-10-29 PROCEDURE — 93296 REM INTERROG EVL PM/IDS: CPT | Performed by: INTERNAL MEDICINE

## 2018-10-29 NOTE — PROGRESS NOTES
Results for orders placed or performed in visit on 10/29/18   Cardiac EP device report    Narrative    DUAL CHAMBER PACEMAKER (HIS)  CARELINK TRANSMISSION: BATTERY VOLTAGE ADEQUATE (4 YRS)  AP: 0 9%  : > 99% (>40% DEPENDENT)  ALL AVAILABLE LEAD PARAMETERS WITHIN NORMAL LIMITS  1 AT/AF EPISODE W/ EGRAM SHOWING AT, COMP ATRIAL PACING (NO AF)  NO OTHER SIGNIFICANT HIGH RATE EPISODES  PT TAKES METOPROLOL TART, ASA 81MG  EF: 50% (ECHO 11/10/17)  PACEMAKER FUNCTIONING APPROPRIATELY    [address] [place] [address]

## 2018-11-15 ENCOUNTER — TELEPHONE (OUTPATIENT)
Dept: PAIN MEDICINE | Facility: MEDICAL CENTER | Age: 78
End: 2018-11-15

## 2018-11-15 ENCOUNTER — TELEPHONE (OUTPATIENT)
Dept: CARDIOLOGY CLINIC | Facility: CLINIC | Age: 78
End: 2018-11-15

## 2018-11-15 NOTE — TELEPHONE ENCOUNTER
Spoke to Alfie De La Paz at [facility] regarding ASA hold for pt  (scheduled for Left L5 LESI)  She will address with Dr Kitty Ricci when he comes in for office hrs this afternoon

## 2018-11-15 NOTE — TELEPHONE ENCOUNTER
[facility] is requesting a 6 day hold on aspirin prior to an interlaminar injection  Please advise if ok to do

## 2018-11-16 NOTE — TELEPHONE ENCOUNTER
Per cardiology task dated 11/15:     Telephone     11/15/2018  [facility] [address] [facility] [place], [address] [facility]   Cardiology   Aspirin Hold   Reason for call    Conversation: Aspirin Hold   (Oldest Message First)   Oumar Rizo RN    Note      [facility] is requesting a 6 day hold on aspirin prior to an interlaminar injection      Please advise if ok to do              [facility] contacted ELEONORA Rao RN routed this conversation to DO Latia Goode DO   to Oumar Rizo RN     Note      Sure, that is fine          ++    Nurse Shilpa and [facility] left message for patient to call office back prior to her scheduled appt time on 11/19  She will need to be rescheduled as her hold was not properly documented, approved by cardiology and not for the correct hold day as she was instructed to hold aspirin for 4 days instead of 6  Please contact office directly to get either myself or a nurse on the phone to speak with patient

## 2018-11-16 NOTE — TELEPHONE ENCOUNTER
Attempted to reach pt and her  answered and stated the pt is not home  Advised him to tell her to continue her ASA b/c [facility] has to reschedule her appt for Monday and for the pt to C/B to reschedule    Aware of office hrs     --when pt calls back transfer to live person-  or nurse--

## 2018-11-19 ENCOUNTER — HOSPITAL ENCOUNTER (OUTPATIENT)
Dept: RADIOLOGY | Facility: MEDICAL CENTER | Age: 78
Discharge: HOME | End: 2018-11-19
Payer: COMMERCIAL

## 2018-11-19 ENCOUNTER — APPOINTMENT (OUTPATIENT)
Dept: RADIOLOGY | Facility: MEDICAL CENTER | Age: 78
End: 2018-11-19
Payer: COMMERCIAL

## 2018-11-19 VITALS
SYSTOLIC BLOOD PRESSURE: 139 MMHG | OXYGEN SATURATION: 97 % | RESPIRATION RATE: 20 BRPM | HEART RATE: 85 BPM | DIASTOLIC BLOOD PRESSURE: 82 MMHG | TEMPERATURE: 97.7 F

## 2018-11-19 DIAGNOSIS — M48.062 SPINAL STENOSIS, LUMBAR REGION, WITH NEUROGENIC CLAUDICATION: ICD-10-CM

## 2018-11-19 PROCEDURE — 62323 NJX INTERLAMINAR LMBR/SAC: CPT | Performed by: PHYSICAL MEDICINE & REHABILITATION

## 2018-11-19 NOTE — DISCHARGE INSTRUCTIONS
Epidural Steroid Injection   WHAT YOU NEED TO KNOW:   An epidural steroid injection (JESUS) is a procedure to inject steroid medicine into the epidural space  The epidural space is between your spinal cord and vertebrae  Steroids reduce inflammation and fluid buildup in your spine that may be causing pain  You may be given pain medicine along with the steroids  ACTIVITY  · Do not drive or operate machinery today  · No strenuous activity today - bending, lifting, etc   · You may resume normal activites starting tomorrow - start slowly and as tolerated  · You may shower today, but no tub baths or hot tubs  · You may have numbness for several hours from the local anesthetic  Please use caution and common sense, especially with weight-bearing activities  CARE OF THE INJECTION SITE  · If you have soreness or pain, apply ice to the area today (20 minutes on/20 minutes off)  · Starting tomorrow, you may use warm, moist heat or ice if needed  · You may have an increase or change in your discomfort for 36-48 hours after your treatment  · Apply ice and continue with any pain medication you have been prescribed  · Notify [facility] if you have any of the following: redness, drainage, swelling, headache, stiff neck or fever above 100°F     SPECIAL INSTRUCTIONS  · Our office will contact you in approximately 7 days for a progress report  MEDICATIONS  · Continue to take all routine medications  Our office may have instructed you to hold some medications  Restart Asprin tomorrow 11/20/18  If you have a problem specifically related to your procedure, please call our office at [phone]  Problems not related to your procedure should be directed to your primary care physician

## 2018-11-19 NOTE — TELEPHONE ENCOUNTER
Olivia  got phone call transferred from phone room, Olivia and RN S/W pt   Alisa Laser with pt when her last dose of ASA was  Pt stated it was on 11/12/18 so pt held her ASA for 6 days  Pt coming in today as previously scheduled

## 2018-11-19 NOTE — H&P
History of Present Illness: The patient is a [de-identified] y o  female who presents with complaints of   Back and left leg pain    Patient Active Problem List   Diagnosis    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 ([facility] )    Hypercholesteremia    Gastroesophageal reflux disease without esophagitis    History of total knee replacement    Hearing loss    Left bundle branch block (LBBB)    Symptomatic bradycardia    Complication associated with cardiac pacemaker lead    Pacemaker complications, subsequent encounter    Chest wall hematoma    Cholecystitis    Acute biliary pancreatitis    Cholangitis    Choledocholithiasis    Acute gallstone pancreatitis    Heart block atrioventricular       Past Medical History:   Diagnosis Date    Anxiety     Arthritis     Breast CA (HCC)     recurrent, ductal carcinoma ER, [place] pos    Depression     Diverticula of intestine     Dizziness     and passes out    Flushing     Hiatal hernia     [place] (hard of hearing)      lizette    Hypercholesteremia     Liver mass     Liver metastasis (HCC)     Metastatic adenocarcinoma to liver ([facility] )     Bx 01/03/2017    PONV (postoperative nausea and vomiting)     Recurrent breast cancer (HCC)     Shingles     Shortness of breath     on exertion    Tachycardia     Urinary incontinence     Use of cane as ambulatory aid     or walker       Past Surgical History:   Procedure Laterality Date    CARDIAC PACEMAKER REMOVAL N/A 11/9/2017    Procedure: PACEMAKER LEAD REVISION, REMOVAL OF NONFUNCTIONING LEAD, AND INSERTION OF A NEW RV LEAD;  Surgeon: Luba José MD;  [facility];  Service: Cardiology    CATARACT EXTRACTION Bilateral     COLONOSCOPY      ERCP N/A 1/8/2018    Procedure: ENDOSCOPIC RETROGRADE CHOLANGIOPANCREATOGRAPHY (ERCP); Surgeon: Rosy Campo MD;  [facility];   Service: Gastroenterology    HYSTERECTOMY      JOINT REPLACEMENT      lizette  TKR and right TSR    MASTECTOMY Right     MD ERCP DX COLLECTION SPECIMEN BRUSHING/WASHING N/A 2/22/2018    Procedure: ENDOSCOPIC RETROGRADE CHOLANGIOPANCREATOGRAPHY (ERCP) with stent removal;  Surgeon: Tessie Neves MD;  [facility]; Service: Gastroenterology    [facility] [address] N/A 7/13/2017    Procedure: LIVER RESECTION, INTRAOPERATIVE ULTRASOUND;  Surgeon: Tereza Rogers MD;  [facility];  Service: Surgical Oncology    ROTATOR CUFF REPAIR Right     SENTINEL LYMPH NODE BIOPSY Right     TONSILECTOMY AND ADNOIDECTOMY      WOUND DEBRIDEMENT Left 11/9/2017    Procedure: DEBRIDEMENT WOUND AND DRESSING CHANGE [facility]); Surgeon: Marly Finn MD;  [facility];  Service: Cardiology         Current Outpatient Prescriptions:     acetaminophen (TYLENOL) 325 mg tablet, May take 650 mg every 6-8 hours as needed for pain (Patient taking differently: Take 650 mg by mouth every 6 (six) hours as needed May take 650 mg every 6-8 hours as needed for pain ), Disp: 30 tablet, Rfl: 0    aspirin (ECOTRIN LOW STRENGTH) 81 mg EC tablet, Take 81 mg by mouth daily, Disp: , Rfl:     atorvastatin (LIPITOR) 20 mg tablet, Take 20 mg by mouth daily  , Disp: , Rfl:     Calcium Citrate-Vitamin D (CALCIUM CITRATE + D PO), Take 1,500 mg by mouth daily, Disp: , Rfl:     clotrimazole-betamethasone (LOTRISONE) 1-0 05 % cream, Apply topically, Disp: , Rfl:     Cranberry (THERACRAN HP PO), Take 2 tablets by mouth daily, Disp: , Rfl:     furosemide (LASIX) 20 mg tablet, TAKE 1 TABLET BY MOUTH DAILY AS NEEDED FOR EDEMA, Disp: , Rfl:     Glucosamine-Chondroit-Vit C-Mn (GLUCOSAMINE 1500 COMPLEX) CAPS, Take 1 capsule by mouth daily  , Disp: , Rfl:     metFORMIN (GLUCOPHAGE) 500 mg tablet, 2 (two) times a day, Disp: , Rfl:     metoprolol tartrate (LOPRESSOR) 25 mg tablet, Take 0 5 tablets (12 5 mg total) by mouth every 12 (twelve) hours, Disp: 30 tablet, Rfl: 6    Multiple Vitamin (MULTIVITAMIN) capsule, Take 1 capsule by mouth daily  , Disp: , Rfl:    ondansetron (ZOFRAN) 4 mg tablet, Take 4 mg by mouth every 8 (eight) hours as needed for nausea or vomiting, Disp: , Rfl:     oxyCODONE (ROXICODONE) 5 mg immediate release tablet, take 1 tablet by mouth every 6 hours if needed for BREAKTHROUGH PAIN, Disp: , Rfl:     tamoxifen (NOLVADEX) 20 mg tablet, Take 1 tablet (20 mg total) by mouth daily, Disp: 30 tablet, Rfl: 3    clindamycin (CLEOCIN) 300 MG capsule, TAKE 2 CAPSULES BY MOUTH 1 HOUR PRIOR TO DENTAL PROCEDURE , Disp: , Rfl: 0    Allergies   Allergen Reactions    Ampicillin Shortness Of Breath, Anaphylaxis and Other (See Comments)     Other reaction(s): Unknown Allergic Reaction  Reaction Date: [id number];        Physical Exam:   Vitals:    11/19/18 1036   BP: 128/77   Pulse: 85   Resp: 20   Temp: 97 7 °F (36 5 °C)   SpO2: 99%     General: Awake, Alert, Oriented x 3, Mood and affect appropriate  Respiratory: Respirations even and unlabored  Cardiovascular: Peripheral pulses intact; no edema  Musculoskeletal Exam:  Back and left leg pain    ASA Score:  2  Patient/Chart Verification  Patient ID Verified: Verbal  ID Band Applied: No  Consents Confirmed: Procedural, To be obtained in the Pre-Procedure area  H&P( within 30 days) Verified: To be obtained in the Pre-Procedure area  Interval H&P(within 24 hr) Complete (required for Outpatients and Surgery Admit only): To be obtained in the Pre-Procedure area  Allergies Reviewed: Yes  Anticoag/NSAID held?: Yes (pt help ASA, LD 11/12 )  Currently on antibiotics?: No    Assessment:   1   Spinal stenosis, lumbar region, with neurogenic claudication        Plan:  Left L5 -S1 lumbar interlaminar epidural steroid injection

## 2018-11-26 ENCOUNTER — TELEPHONE (OUTPATIENT)
Dept: PAIN MEDICINE | Facility: CLINIC | Age: 78
End: 2018-11-26

## 2018-11-28 NOTE — TELEPHONE ENCOUNTER
Spoke with patient states that in AM which prior to procedure was the worst time, her pain is now zero, per patient she gets some discomfort as the day moves on

## 2018-12-17 ENCOUNTER — OFFICE VISIT (OUTPATIENT)
Dept: PAIN MEDICINE | Facility: MEDICAL CENTER | Age: 78
End: 2018-12-17
Payer: COMMERCIAL

## 2018-12-17 VITALS
SYSTOLIC BLOOD PRESSURE: 114 MMHG | HEIGHT: 61 IN | WEIGHT: 157.6 LBS | DIASTOLIC BLOOD PRESSURE: 76 MMHG | RESPIRATION RATE: 16 BRPM | BODY MASS INDEX: 29.76 KG/M2 | HEART RATE: 68 BPM

## 2018-12-17 DIAGNOSIS — M43.10 SPONDYLOLISTHESIS, UNSPECIFIED SPINAL REGION: ICD-10-CM

## 2018-12-17 DIAGNOSIS — M48.062 SPINAL STENOSIS, LUMBAR REGION, WITH NEUROGENIC CLAUDICATION: Primary | ICD-10-CM

## 2018-12-17 DIAGNOSIS — M54.50 LUMBAR PAIN: ICD-10-CM

## 2018-12-17 PROCEDURE — 4040F PNEUMOC VAC/ADMIN/RCVD: CPT | Performed by: NURSE PRACTITIONER

## 2018-12-17 PROCEDURE — 99213 OFFICE O/P EST LOW 20 MIN: CPT | Performed by: NURSE PRACTITIONER

## 2018-12-17 RX ORDER — TIZANIDINE 4 MG/1
4 TABLET ORAL EVERY 8 HOURS PRN
Refills: 0 | COMMUNITY
Start: 2018-12-11 | End: 2018-12-26 | Stop reason: ALTCHOICE

## 2018-12-17 RX ORDER — CLOTRIMAZOLE AND BETAMETHASONE DIPROPIONATE 10; .64 MG/G; MG/G
CREAM TOPICAL
COMMUNITY
Start: 2018-12-04 | End: 2018-12-17 | Stop reason: SDUPTHER

## 2018-12-17 RX ORDER — IBUPROFEN 200 MG
TABLET ORAL EVERY 6 HOURS PRN
COMMUNITY
End: 2019-02-08 | Stop reason: ALTCHOICE

## 2018-12-17 NOTE — PROGRESS NOTES
Assessment:  1  Spinal stenosis, lumbar region, with neurogenic claudication    2  Lumbar pain    3  Spondylolisthesis, unspecified spinal region        Plan:  I discussed with the patient that since there has been  significant improvement in the pain symptoms that we will hold off on any repeat injections at this point in time  However, I reviewed with the patient that if his symptoms should return, worsen, and/or experience new pain symptoms, he should call our office to schedule an office visit to discuss repeating the injection  Follow-up as needed          [place] Prescription Drug Monitoring Program report was reviewed and was appropriate         My impressions and treatment recommendations were discussed in detail with the patient who verbalized understanding and had no further questions  Discharge instructions were provided  I personally saw and examined the patient and I agree with the above discussed plan of care  No orders of the defined types were placed in this encounter  New Medications Ordered This Visit   Medications    tiZANidine (ZANAFLEX) 4 mg tablet     Sig: Take 4 mg by mouth every 8 (eight) hours as needed     Refill:  0    ibuprofen (MOTRIN) 200 mg tablet     Sig: Take by mouth every 6 (six) hours as needed for mild pain       History of Present Illness:    Che Jaramillo is a [de-identified] y o  female who presents for follow-up of her low back pain  Today she rates her pain 0/10  She is status post a left L5 lumbar epidural steroid injection with Dr Adelina Calderon on November 19, 2018 and she reports 100% relief as results of the injection  I have personally reviewed and/or updated the patient's past medical history, past surgical history, family history, social history, current medications, allergies, and vital signs today  Review of Systems:    Review of Systems   Respiratory: Negative for shortness of breath  Cardiovascular: Negative for chest pain     Gastrointestinal: Negative for constipation, diarrhea, nausea and vomiting  Musculoskeletal: Negative for arthralgias, gait problem, joint swelling and myalgias  Skin: Negative for rash  Neurological: Negative for dizziness, seizures and weakness  All other systems reviewed and are negative        Patient Active Problem List   Diagnosis    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    Hypercholesteremia    Gastroesophageal reflux disease without esophagitis    History of total knee replacement    Hearing loss    Left bundle branch block (LBBB)    Symptomatic bradycardia    Complication associated with cardiac pacemaker lead    Pacemaker complications, subsequent encounter    Chest wall hematoma    Cholecystitis    Acute biliary pancreatitis    Cholangitis    Choledocholithiasis    Acute gallstone pancreatitis    Heart block atrioventricular    Spinal stenosis, lumbar region, with neurogenic claudication    Lumbar pain    Spondylolisthesis       Past Medical History:   Diagnosis Date    Anxiety     Arthritis     Breast CA (HCC)     recurrent, ductal carcinoma ER, [place] pos    Depression     Diverticula of intestine     Dizziness     and passes out    Flushing     Hiatal hernia     [place] (hard of hearing)      lizette    Hypercholesteremia     Liver mass     Liver metastasis (HCC)     Metastatic adenocarcinoma [facility] liver [facility] )     Bx 01/03/2017    PONV (postoperative nausea and vomiting)     Recurrent breast cancer (HCC)     Shingles     Shortness of breath     on exertion    Tachycardia     Urinary incontinence     Use of cane as ambulatory aid     or walker       Past Surgical History:   Procedure Laterality Date    CARDIAC PACEMAKER REMOVAL N/A 11/9/2017    Procedure: PACEMAKER LEAD REVISION, REMOVAL OF NONFUNCTIONING LEAD, AND INSERTION OF A NEW RV LEAD;  Surgeon: Geronimo Boyd MD;  [facility];  Service: Cardiology    CATARACT EXTRACTION Bilateral     COLONOSCOPY      ERCP N/A 1/8/2018    Procedure: ENDOSCOPIC RETROGRADE CHOLANGIOPANCREATOGRAPHY (ERCP); Surgeon: Regina Taylor MD;  [facility]; Service: Gastroenterology    HYSTERECTOMY      JOINT REPLACEMENT      lizette  TKR and right TSR    MASTECTOMY Right     OR ERCP DX COLLECTION SPECIMEN BRUSHING/WASHING N/A 2/22/2018    Procedure: ENDOSCOPIC RETROGRADE CHOLANGIOPANCREATOGRAPHY (ERCP) with stent removal;  Surgeon: Regina Taylor MD;  [facility]; Service: Gastroenterology    OR RESEC [address] N/A 7/13/2017    Procedure: LIVER RESECTION, INTRAOPERATIVE ULTRASOUND;  Surgeon: Judith Paige MD;  [facility];  Service: Surgical Oncology    ROTATOR CUFF REPAIR Right     SENTINEL LYMPH NODE BIOPSY Right     TONSILECTOMY AND ADNOIDECTOMY      WOUND DEBRIDEMENT Left 11/9/2017    Procedure: DEBRIDEMENT WOUND AND DRESSING CHANGE [facility]); Surgeon: Jazmín Barron MD;  [facility];  Service: Cardiology       Family History   Problem Relation Age of Onset    Lung cancer Father     Cancer Brother     Diabetes type II Son        Social History     Occupational History    Not on file  Social History Main Topics    Smoking status: Never Smoker    Smokeless tobacco: Never Used    Alcohol use No    Drug use: No    Sexual activity: Not on file       Current Outpatient Prescriptions on File Prior to Visit   Medication Sig    aspirin (ECOTRIN LOW STRENGTH) 81 mg EC tablet Take 81 mg by mouth daily    atorvastatin (LIPITOR) 20 mg tablet Take 20 mg by mouth daily      Calcium Citrate-Vitamin D (CALCIUM CITRATE + D PO) Take 1,500 mg by mouth daily    clotrimazole-betamethasone (LOTRISONE) 1-0 05 % cream Apply topically    Cranberry (THERACRAN HP PO) Take 2 tablets by mouth daily    Glucosamine-Chondroit-Vit C-Mn (GLUCOSAMINE 1500 COMPLEX) CAPS Take 1 capsule by mouth daily      metFORMIN (GLUCOPHAGE) 500 mg tablet 2 (two) times a day    metoprolol tartrate (LOPRESSOR) 25 mg tablet Take 0 5 tablets (12 5 mg total) by mouth every 12 (twelve) hours    Multiple Vitamin (MULTIVITAMIN) capsule Take 1 capsule by mouth daily   ondansetron (ZOFRAN) 4 mg tablet Take 4 mg by mouth every 8 (eight) hours as needed for nausea or vomiting    tamoxifen (NOLVADEX) 20 mg tablet Take 1 tablet (20 mg total) by mouth daily    clindamycin (CLEOCIN) 300 MG capsule TAKE 2 CAPSULES BY MOUTH 1 HOUR PRIOR TO DENTAL PROCEDURE   furosemide (LASIX) 20 mg tablet TAKE 1 TABLET BY MOUTH DAILY AS NEEDED FOR EDEMA    [DISCONTINUED] acetaminophen (TYLENOL) 325 mg tablet May take 650 mg every 6-8 hours as needed for pain (Patient taking differently: Take 650 mg by mouth every 6 (six) hours as needed May take 650 mg every 6-8 hours as needed for pain )    [DISCONTINUED] oxyCODONE (ROXICODONE) 5 mg immediate release tablet take 1 tablet by mouth every 6 hours if needed for BREAKTHROUGH PAIN     No current facility-administered medications on file prior to visit  Allergies   Allergen Reactions    Ampicillin Shortness Of Breath, Anaphylaxis and Other (See Comments)     Other reaction(s): Unknown Allergic Reaction  Reaction Date: [id number];        Physical Exam:    /76   Pulse 68   Resp 16   Ht 5' 1" (1 549 m)   Wt 71 5 kg (157 lb 9 6 oz)   LMP  (LMP Unknown) Comment: post   BMI 29 78 kg/m²     Constitutional: normal, well developed, well nourished, alert, in no distress and non-toxic and no overt pain behavior    Eyes: anicteric  HEENT: grossly intact  Neck: supple, symmetric, trachea midline and no masses   Pulmonary:even and unlabored  Cardiovascular:No edema or pitting edema present  Skin:Normal without rashes or lesions and well hydrated  Psychiatric:Mood and affect appropriate  Neurologic:Cranial Nerves II-XII grossly intact  Musculoskeletal:ambulates with cane    Imaging

## 2018-12-26 ENCOUNTER — OFFICE VISIT (OUTPATIENT)
Dept: PAIN MEDICINE | Facility: MEDICAL CENTER | Age: 78
End: 2018-12-26
Payer: COMMERCIAL

## 2018-12-26 VITALS
SYSTOLIC BLOOD PRESSURE: 134 MMHG | RESPIRATION RATE: 18 BRPM | DIASTOLIC BLOOD PRESSURE: 81 MMHG | HEART RATE: 76 BPM | WEIGHT: 157.2 LBS | HEIGHT: 61 IN | BODY MASS INDEX: 29.68 KG/M2

## 2018-12-26 DIAGNOSIS — M79.18 MYOFASCIAL PAIN SYNDROME: Primary | ICD-10-CM

## 2018-12-26 PROCEDURE — 99213 OFFICE O/P EST LOW 20 MIN: CPT | Performed by: NURSE PRACTITIONER

## 2018-12-26 RX ORDER — METHOCARBAMOL 500 MG/1
500 TABLET, FILM COATED ORAL 3 TIMES DAILY PRN
COMMUNITY
Start: 2018-12-17 | End: 2019-02-04 | Stop reason: ALTCHOICE

## 2018-12-26 RX ORDER — BACLOFEN 10 MG/1
10 TABLET ORAL
Qty: 30 TABLET | Refills: 1 | Status: SHIPPED | OUTPATIENT
Start: 2018-12-26 | End: 2019-01-22

## 2018-12-26 NOTE — PROGRESS NOTES
Assessment:  1  Myofascial pain syndrome        Plan: At this time, I am going to initiate physical therapy once or twice a week for 4-6 weeks for her right thoracic myofascial pain  Patient has tried methocarbamol in tizanidine with no relief  I will change the medication to baclofen 10 mg 1 tablet at bedtime  I did encourage the patient to use over-the-counter lidocaine patches to the affected area which she can leave in place for 12 hr and remove for 12 hr  She can also use heat when she is not wearing the patch    Patient will return in 6 weeks for re-evaluation after therapy      [address] [place] [employer] [facility] report was reviewed and was appropriate         My impressions and treatment recommendations were discussed in detail with the patient who verbalized understanding and had no further questions  Discharge instructions were provided  I personally saw and examined the patient and I agree with the above discussed plan of care  Orders Placed This Encounter   Procedures    Ambulatory referral to Physical Therapy     Standing Status:   Future     Standing Expiration Date:   6/26/2019     Referral Priority:   Routine     Referral Type:   Physical Therapy     Referral Reason:   Specialty Services Required     Requested Specialty:   Physical Therapy     Number of Visits Requested:   1     Expiration Date:   12/26/2019     New Medications Ordered This Visit   Medications    methocarbamol (ROBAXIN) 500 mg tablet     Sig: Take 500 mg by mouth Three times daily as needed    baclofen 10 mg tablet     Sig: Take 1 tablet (10 mg total) by mouth daily at bedtime     Dispense:  30 tablet     Refill:  1       History of Present Illness:    Laney De Leon is a [de-identified] y o  female who presents for follow-up for thoracic myofascial pain  Today she rates her pain 10/10 this is intermittent in nature and described as sharp, and shooting      Patient has tried both methocarbamol 500 mg 3 times a day and tizanidine and neither 1 has given her any relief  I have personally reviewed and/or updated the patient's past medical history, past surgical history, family history, social history, current medications, allergies, and vital signs today  Review of Systems:    Review of Systems   Respiratory: Negative for shortness of breath  Cardiovascular: Negative for chest pain  Gastrointestinal: Negative for constipation, diarrhea, nausea and vomiting  Musculoskeletal: Positive for back pain  Negative for arthralgias, gait problem, joint swelling and myalgias  Skin: Negative for rash  Neurological: Positive for dizziness  Negative for seizures and weakness  All other systems reviewed and are negative        Patient Active Problem List   Diagnosis    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    Hypercholesteremia    Gastroesophageal reflux disease without esophagitis    History of total knee replacement    Hearing loss    Left bundle branch block (LBBB)    Symptomatic bradycardia    Complication associated with cardiac pacemaker lead    Pacemaker complications, subsequent encounter    Chest wall hematoma    Cholecystitis    Acute biliary pancreatitis    Cholangitis    Choledocholithiasis    Acute gallstone pancreatitis    Heart block atrioventricular    Spinal stenosis, lumbar region, with neurogenic claudication    Lumbar pain    Spondylolisthesis       Past Medical History:   Diagnosis Date    Anxiety     Arthritis     Breast CA (HCC)     recurrent, ductal carcinoma ER, [place] pos    Depression     Diverticula of intestine     Dizziness     and passes out    Flushing     Hiatal hernia     [place] (hard of hearing)      lizette    Hypercholesteremia     Liver mass     Liver metastasis (HCC)     Metastatic adenocarcinoma to liver ([facility] )     Bx 01/03/2017    PONV (postoperative nausea and vomiting)     Recurrent breast cancer (HCC)     Shingles     Shortness of breath on exertion    Tachycardia     Urinary incontinence     Use of cane as ambulatory aid     or walker       Past Surgical History:   Procedure Laterality Date    CARDIAC PACEMAKER REMOVAL N/A 11/9/2017    Procedure: PACEMAKER LEAD REVISION, REMOVAL OF NONFUNCTIONING LEAD, AND INSERTION OF A NEW RV LEAD;  Surgeon: Teodoro Boss MD;  [facility];  Service: Cardiology    CATARACT EXTRACTION Bilateral     COLONOSCOPY      ERCP N/A 1/8/2018    Procedure: ENDOSCOPIC RETROGRADE CHOLANGIOPANCREATOGRAPHY (ERCP); Surgeon: Franca Castillo MD;  Location: [facility]; Service: Gastroenterology    HYSTERECTOMY      JOINT REPLACEMENT      lizette  TKR and right TSR    MASTECTOMY Right     MD ERCP DX COLLECTION SPECIMEN BRUSHING/WASHING N/A 2/22/2018    Procedure: ENDOSCOPIC RETROGRADE CHOLANGIOPANCREATOGRAPHY (ERCP) with stent removal;  Surgeon: Franca Castillo MD;  [facility]; Service: Gastroenterology    MD RESEC [id number] N/A 7/13/2017    Procedure: LIVER RESECTION, INTRAOPERATIVE ULTRASOUND;  Surgeon: Olamide Finn MD;  [facility];  Service: Surgical Oncology    ROTATOR CUFF REPAIR Right     SENTINEL LYMPH NODE BIOPSY Right     TONSILECTOMY AND ADNOIDECTOMY      WOUND DEBRIDEMENT Left 11/9/2017    Procedure: DEBRIDEMENT WOUND AND DRESSING CHANGE [facility]); Surgeon: Teodoro Boss MD;  [facility];  Service: Cardiology       Family History   Problem Relation Age of Onset    Lung cancer Father     Cancer Brother     Diabetes type II Son        Social History     Occupational History    Not on file       Social History Main Topics    Smoking status: Never Smoker    Smokeless tobacco: Never Used    Alcohol use No    Drug use: No    Sexual activity: Not on file       Current Outpatient Prescriptions on File Prior to Visit   Medication Sig    aspirin (ECOTRIN LOW STRENGTH) 81 mg EC tablet Take 81 mg by mouth daily    atorvastatin (LIPITOR) 20 mg tablet Take 20 mg by mouth daily   Calcium Citrate-Vitamin D (CALCIUM CITRATE + D PO) Take 1,500 mg by mouth daily    clindamycin (CLEOCIN) 300 MG capsule TAKE 2 CAPSULES BY MOUTH 1 HOUR PRIOR TO DENTAL PROCEDURE   clotrimazole-betamethasone (LOTRISONE) 1-0 05 % cream Apply topically    Cranberry (THERACRAN HP PO) Take 2 tablets by mouth daily    furosemide (LASIX) 20 mg tablet TAKE 1 TABLET BY MOUTH DAILY AS NEEDED FOR EDEMA    Glucosamine-Chondroit-Vit C-Mn (GLUCOSAMINE 1500 COMPLEX) CAPS Take 1 capsule by mouth daily      ibuprofen (MOTRIN) 200 mg tablet Take by mouth every 6 (six) hours as needed for mild pain    metFORMIN (GLUCOPHAGE) 500 mg tablet 2 (two) times a day    metoprolol tartrate (LOPRESSOR) 25 mg tablet Take 0 5 tablets (12 5 mg total) by mouth every 12 (twelve) hours    Multiple Vitamin (MULTIVITAMIN) capsule Take 1 capsule by mouth daily   ondansetron (ZOFRAN) 4 mg tablet Take 4 mg by mouth every 8 (eight) hours as needed for nausea or vomiting    tamoxifen (NOLVADEX) 20 mg tablet Take 1 tablet (20 mg total) by mouth daily    [DISCONTINUED] tiZANidine (ZANAFLEX) 4 mg tablet Take 4 mg by mouth every 8 (eight) hours as needed     No current facility-administered medications on file prior to visit  Allergies   Allergen Reactions    Ampicillin Shortness Of Breath, Anaphylaxis and Other (See Comments)     Other reaction(s): Unknown Allergic Reaction  Reaction Date: [id number];        Physical Exam:    /81   Pulse 76   Resp 18   Ht 5' 1" (1 549 m)   Wt 71 3 kg (157 lb 3 2 oz)   LMP  (LMP Unknown) Comment: post   BMI 29 70 kg/m²     Constitutional: normal, well developed, well nourished, alert, in no distress and non-toxic and no overt pain behavior    Eyes: anicteric  HEENT: grossly intact  Neck: supple, symmetric, trachea midline and no masses   Pulmonary:even and unlabored  Cardiovascular:No edema or pitting edema present  Skin:Normal without rashes or lesions and well hydrated  Psychiatric:Mood and affect appropriate  Neurologic:Cranial Nerves II-XII grossly intact  Musculoskeletal:normal     Thoracic Spine Exam    Appearance:  Normal lordosis  Palpation/Tenderness:  right thoracic paraspinal tenderness      Range of Motion:  Full range of motion with no pain or limitations in flexion, extension, lateral flexion and rotation          Imaging

## 2019-01-02 ENCOUNTER — EVALUATION (OUTPATIENT)
Dept: PHYSICAL THERAPY | Facility: MEDICAL CENTER | Age: 79
End: 2019-01-02
Payer: COMMERCIAL

## 2019-01-02 DIAGNOSIS — M54.6 ACUTE RIGHT-SIDED THORACIC BACK PAIN: Primary | ICD-10-CM

## 2019-01-02 PROCEDURE — 97161 PT EVAL LOW COMPLEX 20 MIN: CPT | Performed by: PHYSICAL THERAPIST

## 2019-01-02 PROCEDURE — G8990 OTHER PT/OT CURRENT STATUS: HCPCS | Performed by: PHYSICAL THERAPIST

## 2019-01-02 PROCEDURE — G8991 OTHER PT/OT GOAL STATUS: HCPCS | Performed by: PHYSICAL THERAPIST

## 2019-01-02 NOTE — PROGRESS NOTES
PT Evaluation     Today's date: 2019  Patient name: Emily Urias  : 1940  MRN: [id number]  Referring provider: Berenice Gregorio DO  Dx:   Encounter Diagnosis     ICD-10-CM    1  Acute right-sided thoracic back pain M54 6                   Assessment  Assessment details: Emily Urias is a pleasant [de-identified] y o  female presents with right sided thoracic pain  No further referral appears necessary at this time  Primary movement diagnosis of thoracic facet hypomobility and poor scapular control resulting in symptoms of pain and limiting her participation/performance in lifting her arm overhead, sitting for long periods  Primary impairments include:  1)  Mid thoracic facet hypomobility - addressing through mobilizations and self mobility   2) Poor scapular control - addressing with neuromuscular re-education        Skilled PT services appropriate to facilitate return to prior level of function with transition to home exercise program for independent management when appropriate  Impairments: abnormal muscle firing, abnormal muscle tone, abnormal or restricted ROM, impaired physical strength, lacks appropriate home exercise program and pain with function  Understanding of Dx/Px/POC: good   Prognosis: good    Goals  Patient will successfully transition to home exercise program   Patient will be able to manage symptoms independently      Patient will be able to sit without pain in 6 weeks  Patient will stop reliance on medication for pain control in 6 weeks  Patient will reach overhead without pain in 6 weeks       Plan  Patient would benefit from: skilled PT  Referral necessary: No  Planned modality interventions: thermotherapy: hydrocollator packs  Planned therapy interventions: home exercise program, manual therapy, neuromuscular re-education, patient education, functional ROM exercises, strengthening, stretching, joint mobilization, graded activity, graded exercise, therapeutic exercise, body mechanics training, motor coordination training and activity modification  Frequency: 2x week  Duration in weeks: 12  Treatment plan discussed with: patient        Subjective Evaluation    History of Present Illness  Mechanism of injury: Peg Cardoso is a [de-identified] y o  female presenting to therapy with right sided thoracic pain  She notes pain began a few weeks ago and has been nagging her since that time  She had undergone an epidural injection for low back pain and leg pain which has resolved since the injection  She mentioned her thoracic pain at her visit and was referred to therapy and has been trying muscle relaxers to control pain with little relief  Pain fairly constant, worse with sitting without chair back or when reaching her arm up overhead  She is sleeping fine and has no other red flags at this time    Pain  Current pain ratin  At best pain ratin  At worst pain rating: 10  Quality: sharp and needle-like    Patient Goals  Patient goals for therapy: decreased pain and independence with ADLs/IADLs          Objective   Red flag screen (-)   Cervical spine screen negative   Dermatomes and myotomes intact  + Hx of cancer   Shoulder AROM WFL with minor limitations due to hx of R TSA and L Shoulder OA      TTP to medial scapular border with trigger points noted  Hypomobile thoracic spine with reduced thoracic rotation   Pain improved with manual thoracic SNAG with rotation and extension         Precautions: Osteoporosis, Hx of cancer breast and liver     Daily Treatment Diary     Manual  1/2            Mid thoracic SNAGS                                                                      Exercise Diary              Scapular retractions             Thoracic extension over chair Modalities

## 2019-01-09 ENCOUNTER — APPOINTMENT (OUTPATIENT)
Dept: LAB | Facility: MEDICAL CENTER | Age: 79
End: 2019-01-09
Payer: COMMERCIAL

## 2019-01-09 ENCOUNTER — OFFICE VISIT (OUTPATIENT)
Dept: PHYSICAL THERAPY | Facility: MEDICAL CENTER | Age: 79
End: 2019-01-09
Payer: COMMERCIAL

## 2019-01-09 DIAGNOSIS — C78.7 ADENOCARCINOMA OF RIGHT BREAST METASTATIC TO LIVER (HCC): Chronic | ICD-10-CM

## 2019-01-09 DIAGNOSIS — C50.911 ADENOCARCINOMA OF RIGHT BREAST METASTATIC TO LIVER (HCC): Chronic | ICD-10-CM

## 2019-01-09 DIAGNOSIS — M54.6 ACUTE RIGHT-SIDED THORACIC BACK PAIN: Primary | ICD-10-CM

## 2019-01-09 LAB
ALBUMIN SERPL BCP-MCNC: 3.7 G/DL (ref 3.5–5)
ALP SERPL-CCNC: 77 U/L (ref 46–116)
ALT SERPL W P-5'-P-CCNC: 35 U/L (ref 12–78)
ANION GAP SERPL CALCULATED.3IONS-SCNC: 7 MMOL/L (ref 4–13)
AST SERPL W P-5'-P-CCNC: 26 U/L (ref 5–45)
BASOPHILS # BLD AUTO: 0.04 THOUSANDS/ΜL (ref 0–0.1)
BASOPHILS NFR BLD AUTO: 0 % (ref 0–1)
BILIRUB SERPL-MCNC: 0.51 MG/DL (ref 0.2–1)
BUN SERPL-MCNC: 14 MG/DL (ref 5–25)
CALCIUM SERPL-MCNC: 9.8 MG/DL (ref 8.3–10.1)
CANCER AG27-29 SERPL-ACNC: 20 U/ML (ref 0–42.3)
CHLORIDE SERPL-SCNC: 106 MMOL/L (ref 100–108)
CO2 SERPL-SCNC: 28 MMOL/L (ref 21–32)
CREAT SERPL-MCNC: 0.65 MG/DL (ref 0.6–1.3)
EOSINOPHIL # BLD AUTO: 0.3 THOUSAND/ΜL (ref 0–0.61)
EOSINOPHIL NFR BLD AUTO: 3 % (ref 0–6)
ERYTHROCYTE [DISTWIDTH] IN BLOOD BY AUTOMATED COUNT: 12.6 % (ref 11.6–15.1)
GFR SERPL CREATININE-BSD FRML MDRD: 84 ML/MIN/1.73SQ M
GLUCOSE P FAST SERPL-MCNC: 102 MG/DL (ref 65–99)
HCT VFR BLD AUTO: 43.8 % (ref 34.8–46.1)
HGB BLD-MCNC: 14 G/DL (ref 11.5–15.4)
IMM GRANULOCYTES # BLD AUTO: 0.03 THOUSAND/UL (ref 0–0.2)
IMM GRANULOCYTES NFR BLD AUTO: 0 % (ref 0–2)
LYMPHOCYTES # BLD AUTO: 2.89 THOUSANDS/ΜL (ref 0.6–4.47)
LYMPHOCYTES NFR BLD AUTO: 31 % (ref 14–44)
MCH RBC QN AUTO: 31.4 PG (ref 26.8–34.3)
MCHC RBC AUTO-ENTMCNC: 32 G/DL (ref 31.4–37.4)
MCV RBC AUTO: 98 FL (ref 82–98)
MONOCYTES # BLD AUTO: 0.97 THOUSAND/ΜL (ref 0.17–1.22)
MONOCYTES NFR BLD AUTO: 11 % (ref 4–12)
NEUTROPHILS # BLD AUTO: 5.04 THOUSANDS/ΜL (ref 1.85–7.62)
NEUTS SEG NFR BLD AUTO: 55 % (ref 43–75)
NRBC BLD AUTO-RTO: 0 /100 WBCS
PLATELET # BLD AUTO: 178 THOUSANDS/UL (ref 149–390)
PMV BLD AUTO: 10.2 FL (ref 8.9–12.7)
POTASSIUM SERPL-SCNC: 4.6 MMOL/L (ref 3.5–5.3)
PROT SERPL-MCNC: 7 G/DL (ref 6.4–8.2)
RBC # BLD AUTO: 4.46 MILLION/UL (ref 3.81–5.12)
SODIUM SERPL-SCNC: 141 MMOL/L (ref 136–145)
WBC # BLD AUTO: 9.27 THOUSAND/UL (ref 4.31–10.16)

## 2019-01-09 PROCEDURE — 36415 COLL VENOUS BLD VENIPUNCTURE: CPT

## 2019-01-09 PROCEDURE — 80053 COMPREHEN METABOLIC PANEL: CPT

## 2019-01-09 PROCEDURE — 97110 THERAPEUTIC EXERCISES: CPT | Performed by: PHYSICAL THERAPIST

## 2019-01-09 PROCEDURE — 86300 IMMUNOASSAY TUMOR CA 15-3: CPT

## 2019-01-09 PROCEDURE — 97140 MANUAL THERAPY 1/> REGIONS: CPT | Performed by: PHYSICAL THERAPIST

## 2019-01-09 PROCEDURE — 85025 COMPLETE CBC W/AUTO DIFF WBC: CPT

## 2019-01-09 NOTE — PROGRESS NOTES
Daily Note     Today's date: 2019  Patient name: Iker Meza  : 1940  MRN: [id number]  Referring provider: Trevor Moreno DO  Dx:   Encounter Diagnosis     ICD-10-CM    1  Acute right-sided thoracic back pain M54 6                   Subjective: Natalia Goins reports she felt good for a day or two after evaluation  Pain returned afterward       Objective: See treatment diary below  Precautions: Osteoporosis, Hx of cancer breast and liver     Daily Treatment Diary     Manual             Mid thoracic SNAGS   AF           STM to medial scapular border on L   AF           Supine grade IV thoracic PA  AF                                         Exercise Diary              Scapular retractions  Pink TB  3X10           Thoracic extension over chair  20X                                                                                                                                                                                                                                                         Modalities              MHP pre  10'                                         Assessment: Tolerated treatment well  Patient with reduced pain post mobilizations  Will continue with HEP and follow up Friday       Plan: Continue per plan of care 
84

## 2019-01-10 DIAGNOSIS — C78.7 METASTASIS TO LIVER (HCC): ICD-10-CM

## 2019-01-10 DIAGNOSIS — C50.911 MALIGNANT NEOPLASM OF RIGHT FEMALE BREAST, UNSPECIFIED ESTROGEN RECEPTOR STATUS, UNSPECIFIED SITE OF BREAST (HCC): Primary | ICD-10-CM

## 2019-01-11 ENCOUNTER — OFFICE VISIT (OUTPATIENT)
Dept: PHYSICAL THERAPY | Facility: MEDICAL CENTER | Age: 79
End: 2019-01-11
Payer: COMMERCIAL

## 2019-01-11 DIAGNOSIS — Z17.0 MALIGNANT NEOPLASM OF BREAST IN FEMALE, ESTROGEN RECEPTOR POSITIVE, UNSPECIFIED LATERALITY, UNSPECIFIED SITE OF BREAST (HCC): Primary | ICD-10-CM

## 2019-01-11 DIAGNOSIS — C50.919 MALIGNANT NEOPLASM OF BREAST IN FEMALE, ESTROGEN RECEPTOR POSITIVE, UNSPECIFIED LATERALITY, UNSPECIFIED SITE OF BREAST (HCC): Primary | ICD-10-CM

## 2019-01-11 DIAGNOSIS — M54.6 ACUTE RIGHT-SIDED THORACIC BACK PAIN: Primary | ICD-10-CM

## 2019-01-11 PROCEDURE — 97110 THERAPEUTIC EXERCISES: CPT | Performed by: PHYSICAL THERAPIST

## 2019-01-11 PROCEDURE — 97140 MANUAL THERAPY 1/> REGIONS: CPT | Performed by: PHYSICAL THERAPIST

## 2019-01-12 NOTE — PROGRESS NOTES
Daily Note     Today's date: 2019  Patient name: Kush Choudhury  : 1940  MRN: [id number]  Referring provider: Trev Warren DO  Dx:   Encounter Diagnosis     ICD-10-CM    1  Acute right-sided thoracic back pain M54 6                   Subjective: Yadira Najera reports that she did well for the day after therapy and had minima pain  Pain returned this morning and makes it uncomfortable to sit         Objective: See treatment diary below  Precautions: Osteoporosis, Hx of cancer breast and liver     Daily Treatment Diary     Manual            Mid thoracic SNAGS   AF AF          STM to medial scapular border on L   AF AF          Supine grade IV thoracic PA  AF AF                                        Exercise Diary              Scapular retractions  Pink TB  3X10 3X10          Thoracic extension over chair  20X 20 with PT assist          TB extension   Yellow  3x10                                                                                                                                                                                                                                           Modalities              MHP pre  10' 10'                                        Assessment: Tolerated treatment well  Patient with less pain post manuals  Progress as able      Plan: Continue per plan of care

## 2019-01-15 ENCOUNTER — OFFICE VISIT (OUTPATIENT)
Dept: PHYSICAL THERAPY | Facility: MEDICAL CENTER | Age: 79
End: 2019-01-15
Payer: COMMERCIAL

## 2019-01-15 DIAGNOSIS — M54.6 ACUTE RIGHT-SIDED THORACIC BACK PAIN: Primary | ICD-10-CM

## 2019-01-15 PROCEDURE — 97110 THERAPEUTIC EXERCISES: CPT | Performed by: PHYSICAL THERAPIST

## 2019-01-15 PROCEDURE — 97140 MANUAL THERAPY 1/> REGIONS: CPT | Performed by: PHYSICAL THERAPIST

## 2019-01-16 NOTE — PROGRESS NOTES
Daily Note     Today's date: 1/15/2019  Patient name: Thiago Resendez  : 1940  MRN: [id number]  Referring provider: Joshua Cruz DO  Dx:   Encounter Diagnosis     ICD-10-CM    1  Acute right-sided thoracic back pain M54 6                   Subjective: Kelly Pham reports a repeat of 1 day with minimal pain post last session but pain returns       Objective: See treatment diary below  Precautions: Osteoporosis, Hx of cancer breast and liver     Daily Treatment Diary     Manual  1/2 1/9 1/11 1/15         Mid thoracic SNAGS   AF AF AF         STM to medial scapular border on L   AF AF AF         Supine grade IV thoracic PA  AF AF AF                                       Exercise Diary              Scapular retractions  Pink TB  3X10 3X10 3X10 pink         Thoracic extension over chair  20X 20 with PT assist 20         TB extension   Yellow  3x10 Yellow  3x10         Supine prolonged thoracic extension over half roll    3 min                                                                                                                                                                                                                              Modalities              MHP pre  10' 10' 10'                                       Assessment: Tolerated treatment well  Patient with reduced pain post session  Issued tennis ball to perform self STM to medial scapular border  Plan: Continue per plan of care

## 2019-01-17 ENCOUNTER — HOSPITAL ENCOUNTER (OUTPATIENT)
Dept: CT IMAGING | Facility: HOSPITAL | Age: 79
Discharge: HOME | End: 2019-01-17
Attending: INTERNAL MEDICINE
Payer: COMMERCIAL

## 2019-01-17 DIAGNOSIS — C50.911 MALIGNANT NEOPLASM OF RIGHT FEMALE BREAST, UNSPECIFIED ESTROGEN RECEPTOR STATUS, UNSPECIFIED SITE OF BREAST (HCC): ICD-10-CM

## 2019-01-17 DIAGNOSIS — C78.7 METASTASIS TO LIVER (HCC): ICD-10-CM

## 2019-01-17 PROCEDURE — 74177 CT ABD & PELVIS W/CONTRAST: CPT

## 2019-01-17 PROCEDURE — 71260 CT THORAX DX C+: CPT

## 2019-01-17 RX ADMIN — IOHEXOL 100 ML: 350 INJECTION, SOLUTION INTRAVENOUS at 19:22

## 2019-01-18 ENCOUNTER — OFFICE VISIT (OUTPATIENT)
Dept: PHYSICAL THERAPY | Facility: MEDICAL CENTER | Age: 79
End: 2019-01-18
Payer: COMMERCIAL

## 2019-01-18 DIAGNOSIS — M54.6 ACUTE RIGHT-SIDED THORACIC BACK PAIN: Primary | ICD-10-CM

## 2019-01-18 PROCEDURE — 97140 MANUAL THERAPY 1/> REGIONS: CPT | Performed by: PHYSICAL THERAPIST

## 2019-01-18 PROCEDURE — 97110 THERAPEUTIC EXERCISES: CPT | Performed by: PHYSICAL THERAPIST

## 2019-01-18 NOTE — PROGRESS NOTES
Daily Note     Today's date: 2019  Patient name: Antonino Mcintyre  : 1940  MRN: [id number]  Referring provider: Deshawn Renteria DO  Dx:   Encounter Diagnosis     ICD-10-CM    1  Acute right-sided thoracic back pain M54 6                   Subjective: Melina Perera reports that she believes she is doing better and has been working more with self STM using tennis ball      Objective: See treatment diary below  Precautions: Osteoporosis, Hx of cancer breast and liver     Daily Treatment Diary     Manual  1/2 1/9 1/11 1/15 1/18        Mid thoracic SNAGS   AF AF AF AF        STM to medial scapular border on L   AF AF AF AF        Supine grade IV thoracic PA  AF AF AF AF                                      Exercise Diary              Scapular retractions  Pink TB  3X10 3X10 3X10 pink         Thoracic extension over chair  20X 20 with PT assist 20         TB extension   Yellow  3x10 Yellow  3x10         Supine prolonged thoracic extension over half roll    3 min  5 min        Self STM with tennis ball     5 min                                                                                                                                                                                                               Modalities              MHP pre  10' 10' 10' 10'                                      Assessment: Tolerated treatment well  Patient with reduced pain post session and continues to note reduced pain when at home with sitting  Plan: Continue per plan of care

## 2019-01-21 ENCOUNTER — OFFICE VISIT (OUTPATIENT)
Dept: PHYSICAL THERAPY | Facility: MEDICAL CENTER | Age: 79
End: 2019-01-21
Payer: COMMERCIAL

## 2019-01-21 ENCOUNTER — TELEPHONE (OUTPATIENT)
Dept: HEMATOLOGY ONCOLOGY | Facility: CLINIC | Age: 79
End: 2019-01-21

## 2019-01-21 DIAGNOSIS — M54.6 ACUTE RIGHT-SIDED THORACIC BACK PAIN: Primary | ICD-10-CM

## 2019-01-21 PROCEDURE — 97110 THERAPEUTIC EXERCISES: CPT

## 2019-01-21 PROCEDURE — 97140 MANUAL THERAPY 1/> REGIONS: CPT

## 2019-01-21 NOTE — PROGRESS NOTES
Daily Note     Today's date: 2019  Patient name: Rajat Riojas  : 1940  MRN: [id number]  Referring provider: Anjali Whitaker DO  Dx:   Encounter Diagnosis     ICD-10-CM    1  Acute right-sided thoracic back pain M54 6        Start Time: 0950  Stop Time: 1030  Total time in clinic (min): 40 minutes    Subjective: Patient reported prior to start of session, "Not so good right now, but it is much better than it was "      Objective: See treatment diary below    Precautions: Osteoporosis, Hx of cancer breast and liver     Daily Treatment Diary     Manual  1/2 1/9 1/11 1/15 1/18 1/21       Mid thoracic SNAGS   AF AF AF AF AF       STM to medial scapular border on L   AF AF AF AF AF       Supine grade IV thoracic PA  AF AF AF AF AF                                     Exercise Diary              Scapular retractions  Pink TB  3X10 3X10 3X10 pink         Thoracic extension over chair  20X 20 with PT assist 20         TB extension   Yellow  3x10 Yellow  3x10         Supine prolonged thoracic extension over half roll    3 min  5 min 5 min       Self STM with tennis ball     5 min 5 min                                                                                                                                                                                                              Modalities              MHP pre  10' 10' 10' 10' 10'                                   Patient worked with PT 1:1 for 20 minutes for manuals  Assessment: Tolerated treatment well  Was able to perform al exercise with no increase of pain  Patient notes that she has been performing STM with tennis ball at home, and feels that it has been helping to lower her levels of pain  Patient would benefit from continued PT to further decrease pain  Plan: Continue per plan of care  Progress treatment as tolerated

## 2019-01-21 NOTE — TELEPHONE ENCOUNTER
Shira Brewer from [facility] called to report significant findings on patients CT of chest, abdomen and pelvis         Report is in [address], and if any questions the contact ts Dr Jesus Cotter

## 2019-01-22 ENCOUNTER — OFFICE VISIT (OUTPATIENT)
Dept: PAIN MEDICINE | Facility: MEDICAL CENTER | Age: 79
End: 2019-01-22
Payer: COMMERCIAL

## 2019-01-22 VITALS
SYSTOLIC BLOOD PRESSURE: 132 MMHG | HEART RATE: 85 BPM | BODY MASS INDEX: 29.83 KG/M2 | RESPIRATION RATE: 16 BRPM | HEIGHT: 61 IN | DIASTOLIC BLOOD PRESSURE: 67 MMHG | WEIGHT: 158 LBS

## 2019-01-22 DIAGNOSIS — M79.18 MYOFASCIAL PAIN SYNDROME: Primary | ICD-10-CM

## 2019-01-22 PROCEDURE — 99214 OFFICE O/P EST MOD 30 MIN: CPT | Performed by: PHYSICAL MEDICINE & REHABILITATION

## 2019-01-22 RX ORDER — METAXALONE 800 MG/1
800 TABLET ORAL 3 TIMES DAILY
Qty: 30 TABLET | Refills: 0 | Status: SHIPPED | OUTPATIENT
Start: 2019-01-22 | End: 2019-02-04 | Stop reason: ALTCHOICE

## 2019-01-22 NOTE — PROGRESS NOTES
Assessment:  1  Myofascial pain syndrome        Plan:  1  The patient is not been experiencing relief with Baclofen, we will discontinue this  2  Continue physical therapy  3  Initiate skelaxin up to 3 times daily  4  Follow-up in 4 weeks to evaluate response, consider MRI of the thoracic spine at that point especially regarding patient's prior cancer history  My impressions and treatment recommendations were discussed in detail with the patient who verbalized understanding and had no further questions  Discharge instructions were provided  I personally saw and examined the patient and I agree with the above discussed plan of care  No orders of the defined types were placed in this encounter  No orders of the defined types were placed in this encounter  History of Present Illness:    Geovanni Anderson is a 78 y o  female Is seen in follow-up regarding myofascial thoracic pain  This continues to be problematic for her and is only slightly improved with physical therapy  No relief with oral muscle relaxers at this time  She is also trying ibuprofen  I recommended she try naproxen instead and we will adjust her muscle relaxer to something else  States the pain is constant and sharp localized to the thoracic paraspinal musculature without any radiation  Unable to determine any significant aggravating or alleviating factors but notes some mild improvement with therapy  I have personally reviewed and/or updated the patient's past medical history, past surgical history, family history, social history, current medications, allergies, and vital signs today  Review of Systems:    Review of Systems   Respiratory: Negative for shortness of breath  Cardiovascular: Negative for chest pain  Gastrointestinal: Positive for diarrhea  Negative for constipation, nausea and vomiting  Musculoskeletal: Positive for back pain and myalgias  Negative for arthralgias, gait problem and joint swelling  Skin: Negative for rash  Neurological: Negative for dizziness, seizures and weakness  All other systems reviewed and are negative  Patient Active Problem List   Diagnosis    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 ([facility] [address] )    Hypercholesteremia    Gastroesophageal reflux disease without esophagitis    History of total knee replacement    Hearing loss    Left bundle branch block (LBBB)    Symptomatic bradycardia    Complication associated with cardiac pacemaker lead    Pacemaker complications, subsequent encounter    Chest wall hematoma    Cholecystitis    Acute biliary pancreatitis    Cholangitis    Choledocholithiasis    Acute gallstone pancreatitis    Heart block atrioventricular    Spinal stenosis, lumbar region, with neurogenic claudication    Lumbar pain    Spondylolisthesis       Past Medical History:   Diagnosis Date    Anxiety     Arthritis     Breast CA (HCC)     recurrent, ductal carcinoma ER, [place] pos    Depression     Diverticula of intestine     Dizziness     and passes out    Flushing     Hiatal hernia     [place] (hard of hearing)      lizette    Hypercholesteremia     Liver mass     Liver metastasis (HCC)     Metastatic adenocarcinoma to liver ([facility] [address] )     Bx 01/03/2017    PONV (postoperative nausea and vomiting)     Recurrent breast cancer (HCC)     Shingles     Shortness of breath     on exertion    Tachycardia     Urinary incontinence     Use of cane as ambulatory aid     or walker       Past Surgical History:   Procedure Laterality Date    CARDIAC PACEMAKER REMOVAL N/A 11/9/2017    Procedure: PACEMAKER LEAD REVISION, REMOVAL OF NONFUNCTIONING LEAD, AND INSERTION OF A NEW RV LEAD;  Surgeon: Chapo Hand MD;  [facility];  Service: Cardiology    CATARACT EXTRACTION Bilateral     COLONOSCOPY      ERCP N/A 1/8/2018    Procedure: ENDOSCOPIC RETROGRADE CHOLANGIOPANCREATOGRAPHY (ERCP); Surgeon: Guillermo Herrera MD;  [facility];   Service: Gastroenterology    HYSTERECTOMY      JOINT REPLACEMENT      lizette  TKR and right TSR    MASTECTOMY Right     [place] ERCP DX COLLECTION SPECIMEN BRUSHING/WASHING N/A 2/22/2018    Procedure: ENDOSCOPIC RETROGRADE CHOLANGIOPANCREATOGRAPHY (ERCP) with stent removal;  Surgeon: Deidre Cottrell MD;  [facility]; Service: Gastroenterology    [place] RESEC [address] N/A 7/13/2017    Procedure: LIVER RESECTION, INTRAOPERATIVE ULTRASOUND;  Surgeon: Marvin Padilla MD;  [facility];  Service: Surgical Oncology    ROTATOR CUFF REPAIR Right     SENTINEL LYMPH NODE BIOPSY Right     TONSILECTOMY AND ADNOIDECTOMY      WOUND DEBRIDEMENT Left 11/9/2017    Procedure: DEBRIDEMENT WOUND AND DRESSING CHANGE [facility]); Surgeon: Gerardo Ritchie MD;  [facility];  Service: Cardiology       Family History   Problem Relation Age of Onset    Lung cancer Father     Cancer Brother     Diabetes type II Son        Social History     Occupational History    Not on file  Social History Main Topics    Smoking status: Never Smoker    Smokeless tobacco: Never Used    Alcohol use No    Drug use: No    Sexual activity: Not on file       Current Outpatient Prescriptions on File Prior to Visit   Medication Sig    aspirin (ECOTRIN LOW STRENGTH) 81 mg EC tablet Take 81 mg by mouth daily    atorvastatin (LIPITOR) 20 mg tablet Take 20 mg by mouth daily      Calcium Citrate-Vitamin D (CALCIUM CITRATE + D PO) Take 1,500 mg by mouth daily    clotrimazole-betamethasone (LOTRISONE) 1-0 05 % cream Apply topically    Cranberry (THERACRAN HP PO) Take 2 tablets by mouth daily    furosemide (LASIX) 20 mg tablet TAKE 1 TABLET BY MOUTH DAILY AS NEEDED FOR EDEMA    Glucosamine-Chondroit-Vit C-Mn (GLUCOSAMINE 1500 COMPLEX) CAPS Take 1 capsule by mouth daily      ibuprofen (MOTRIN) 200 mg tablet Take by mouth every 6 (six) hours as needed for mild pain    metFORMIN (GLUCOPHAGE) 500 mg tablet 2 (two) times a day    methocarbamol (ROBAXIN) 500 mg tablet Take 500 mg by mouth Three times daily as needed    metoprolol tartrate (LOPRESSOR) 25 mg tablet Take 0 5 tablets (12 5 mg total) by mouth every 12 (twelve) hours    Multiple Vitamin (MULTIVITAMIN) capsule Take 1 capsule by mouth daily   ondansetron (ZOFRAN) 4 mg tablet Take 4 mg by mouth every 8 (eight) hours as needed for nausea or vomiting    tamoxifen (NOLVADEX) 20 mg tablet Take 1 tablet (20 mg total) by mouth daily    [DISCONTINUED] baclofen 10 mg tablet Take 1 tablet (10 mg total) by mouth daily at bedtime    clindamycin (CLEOCIN) 300 MG capsule TAKE 2 CAPSULES BY MOUTH 1 HOUR PRIOR TO DENTAL PROCEDURE  No current facility-administered medications on file prior to visit  Allergies   Allergen Reactions    Ampicillin Shortness Of Breath, Anaphylaxis and Other (See Comments)     Other reaction(s): Unknown Allergic Reaction  Reaction Date: [id number];        Physical Exam:    /67   Pulse 85   Resp 16   Ht 5' 1" (1 549 m)   Wt 71 7 kg (158 lb)   LMP  (LMP Unknown) Comment: post   BMI 29 85 kg/m²     Constitutional: normal, well developed, well nourished, alert, in no distress and non-toxic and no overt pain behavior  Eyes: anicteric  HEENT: grossly intact  Neck: supple, symmetric, trachea midline and no masses   Pulmonary:even and unlabored  Cardiovascular:No edema or pitting edema present  Skin:Normal without rashes or lesions and well hydrated  Psychiatric:Mood and affect appropriate  Neurologic:Cranial Nerves II-XII grossly intact  Musculoskeletal:normal , except for some tenderness to palpation along the midthoracic paraspinal musculature reproducing her pain complaint, this is also somewhat reproduced with thoracic range of motion      Imaging

## 2019-01-25 ENCOUNTER — OFFICE VISIT (OUTPATIENT)
Dept: PHYSICAL THERAPY | Facility: MEDICAL CENTER | Age: 79
End: 2019-01-25
Payer: COMMERCIAL

## 2019-01-25 DIAGNOSIS — M54.6 ACUTE RIGHT-SIDED THORACIC BACK PAIN: Primary | ICD-10-CM

## 2019-01-25 PROCEDURE — 97140 MANUAL THERAPY 1/> REGIONS: CPT

## 2019-01-25 PROCEDURE — 97110 THERAPEUTIC EXERCISES: CPT

## 2019-01-25 NOTE — PROGRESS NOTES
Daily Note     Today's date: 2019  Patient name: Barbara Eckert  : 1940  MRN: [id number]  Referring provider: Madonna Martinez DO  Dx:   Encounter Diagnosis     ICD-10-CM    1  Acute right-sided thoracic back pain M54 6        Start Time: 1105  Stop Time: 1145  Total time in clinic (min): 40 minutes    Subjective: Pt reports her back is "feeling better" today prior to start of session  Objective: See treatment diary below    Precautions: Osteoporosis, Hx of cancer breast and liver     Daily Treatment Diary     Manual  1/2 1/9 1/11 1/15 1/18 1/21 1/25      Mid thoracic SNAGS   AF AF AF AF AF AF      STM to medial scapular border on L   AF AF AF AF AF AFB      Supine grade IV thoracic PA  AF AF AF AF AF AF                                    Exercise Diary              Scapular retractions  Pink TB  3X10 3X10 3X10 pink         Thoracic extension over chair  20X 20 with PT assist 20         TB extension   Yellow  3x10 Yellow  3x10         Supine prolonged thoracic extension over half roll    3 min  5 min 5 min 5 min      Self STM with tennis ball     5 min 5 min 5 min                                                                                                                                                                                                             Modalities              MHP pre  10' 10' 10' 10' 10' 10'                                    Assessment: Tolerated treatment well  Patient continues to show improvement through reports of decreased pain  Slight tension felt around medial border of L scapula during STM  Patient would benefit from continued PT      Plan: Continue per plan of care

## 2019-02-04 ENCOUNTER — OFFICE VISIT (OUTPATIENT)
Dept: HEMATOLOGY ONCOLOGY | Facility: CLINIC | Age: 79
End: 2019-02-04
Payer: COMMERCIAL

## 2019-02-04 ENCOUNTER — OFFICE VISIT (OUTPATIENT)
Dept: PHYSICAL THERAPY | Facility: MEDICAL CENTER | Age: 79
End: 2019-02-04
Payer: COMMERCIAL

## 2019-02-04 VITALS
BODY MASS INDEX: 29.63 KG/M2 | OXYGEN SATURATION: 93 % | WEIGHT: 161 LBS | TEMPERATURE: 98.2 F | DIASTOLIC BLOOD PRESSURE: 74 MMHG | HEIGHT: 62 IN | SYSTOLIC BLOOD PRESSURE: 132 MMHG | RESPIRATION RATE: 17 BRPM | HEART RATE: 93 BPM

## 2019-02-04 DIAGNOSIS — C50.911 ADENOCARCINOMA OF RIGHT BREAST METASTATIC TO LIVER (HCC): Primary | Chronic | ICD-10-CM

## 2019-02-04 DIAGNOSIS — M54.6 ACUTE RIGHT-SIDED THORACIC BACK PAIN: Primary | ICD-10-CM

## 2019-02-04 DIAGNOSIS — C50.919 MALIGNANT NEOPLASM OF FEMALE BREAST, UNSPECIFIED ESTROGEN RECEPTOR STATUS, UNSPECIFIED LATERALITY, UNSPECIFIED SITE OF BREAST (HCC): ICD-10-CM

## 2019-02-04 DIAGNOSIS — C78.7 ADENOCARCINOMA OF RIGHT BREAST METASTATIC TO LIVER (HCC): Primary | Chronic | ICD-10-CM

## 2019-02-04 PROCEDURE — 97110 THERAPEUTIC EXERCISES: CPT | Performed by: PHYSICAL THERAPIST

## 2019-02-04 PROCEDURE — 99215 OFFICE O/P EST HI 40 MIN: CPT | Performed by: INTERNAL MEDICINE

## 2019-02-04 PROCEDURE — 97140 MANUAL THERAPY 1/> REGIONS: CPT | Performed by: PHYSICAL THERAPIST

## 2019-02-04 RX ORDER — TAMOXIFEN CITRATE 20 MG/1
20 TABLET ORAL DAILY
Qty: 30 TABLET | Refills: 3 | Status: SHIPPED | OUTPATIENT
Start: 2019-02-04 | End: 2019-05-06 | Stop reason: SDUPTHER

## 2019-02-04 NOTE — PROGRESS NOTES
[facility] HEMATOLOGY ONCOLOGY Andie Collier  [address] [address] [phone]  [address] Krystal Hernandez,1940, [id number]  02/04/19    Discussion:   In summary, this is an 38-year-old female history of advanced breast cancer  Clinically she is doing fairly well  She generally functions without restriction  She has had some pain in the right neck radiating to the right occipital scalp  Neuropathic quality  Recent CT scan chest abdomen pelvis shows increase in the size of right axillary nodes, previously 1 1 cm, currently 1 7 cm  No other gross evidence of progression is noted  Recent CBC chemistry and tumor marker are normal   To my recollection, tumor marker had previously been normal even when her disease was active  I reviewed the above findings and their significance  My inclination would be to continue tamoxifen as her disease generally seems to be under good control  Add radiation therapy to the right axilla for local control and to diminish morbidities which are certainly impending  Additionally, we made arrangements for an MRI of the C-spine to investigate her radiating occipital pain  This is been evolving over the past 3 weeks or so  She tells me that she has had MRIs since her pacemaker was placed  I discussed the above with the patient  The patient and her  voiced understanding and agreement   ______________________________________________________________________    Chief Complaint   Patient presents with    Follow-up       HPI:     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 [facility])    2009 Initial Diagnosis     Adenocarcinoma of right breast,  Infiltrating ductal carcinoma T2, N1 sebastien) Tumor was ER/AR positive, HER2 negative             2009 Surgery     Right Mastectomy         2009 -  Chemotherapy     Adjuvant chemotherapy with Taxotere/Cytoxan x 4 cycles         2009 - 10/2014 Chemotherapy     Arimidex 1 mg po dialy 2/11/2016 Progression     Right lateral mastectomy site growth with core needle biopsy demonstrated reoccurrence  %, RI 70%, Her2 +1  Final Diagnosis   A  Breast, right lateral mastectomy site, core needle biopsies:                        - Recurrent invasive mammary carcinoma, no special type (formerly invasive ductal carcinoma), 0 8 cm largest single focus               - Combined Jose Elias score: 7/9                         - Tubule formation: None (3/3)  - Nuclear pleomorphism: Marked (3/3)  - Mitotic count: 3 mitoses per 10 high-power fields (1/3)  - No lymph-vascular invasion is identified              - No in situ component is identified                - No microcalcifications are identified  3/16/2016 - 1/24/2017 Chemotherapy     Faslodex 500 mg with loading dose followed by maintenance monthly injection with Ibrance 75mg 3 weeks on 1 week off           12/13/2016 Progression      Progression noted on PET-CT scan  Progression was largely found in the liver  Patient was set up for liver biopsy  1/3/2017 Biopsy     Liver lesion biopsy demonstated Estrogen Receptor (clone SP-1) 100%/positive, Progesterone Receptor (clone 1E2) 1-2%/positive, HER2 by IHC ([id number]) 2+/equivocal   Her2 by FISH was positive  1/25/2017 - 4/19/2017 Chemotherapy      Herceptin and Perjeta x5 cycles         4/20/2017 Adverse Reaction     Perjecta causing significant diarrhea  Medication stopped  7/13/2017 Surgery     Partial Hepatectomy, results demonstrated ER70%, PR0% & Her2 negative disease  9/19/2017 -  Chemotherapy     Tamoxifen 20 mg daily            Interval History:  Clinically stable  Neck pain with radiation to the right occiput  1 - Symptomatic but completely ambulatory    Review of Systems   Constitutional: Negative for appetite change, diaphoresis, fatigue and fever  HENT: Negative for sinus pain  Eyes: Negative for discharge  Respiratory: Negative for cough and shortness of breath  Cardiovascular: Negative for chest pain  Gastrointestinal: Negative for abdominal pain, constipation and diarrhea  Endocrine: Negative for cold intolerance  Genitourinary: Negative for difficulty urinating and hematuria  Musculoskeletal: Negative for joint swelling  Skin: Negative for rash  Allergic/Immunologic: Negative for environmental allergies  Neurological: Negative for dizziness and headaches  Hematological: Negative for adenopathy  Psychiatric/Behavioral: Negative for agitation         Past Medical History:   Diagnosis Date    Anxiety     Arthritis     Breast CA (HCC)     recurrent, ductal carcinoma ER, [place] pos    Depression     Diverticula of intestine     Dizziness     and passes out    Flushing     Hiatal hernia     [place] (hard of hearing)      lizette    Hypercholesteremia     Liver mass     Liver metastasis (HCC)     Metastatic adenocarcinoma to liver ([facility] [address] )     Bx 01/03/2017    PONV (postoperative nausea and vomiting)     Recurrent breast cancer (HCC)     Shingles     Shortness of breath     on exertion    Tachycardia     Urinary incontinence     Use of cane as ambulatory aid     or walker     Patient Active Problem List   Diagnosis    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 (HCC)    Hypercholesteremia    Gastroesophageal reflux disease without esophagitis    History of total knee replacement    Hearing loss    Left bundle branch block (LBBB)    Symptomatic bradycardia    Complication associated with cardiac pacemaker lead    Pacemaker complications, subsequent encounter    Chest wall hematoma    Cholecystitis    Acute biliary pancreatitis    Cholangitis    Choledocholithiasis    Acute gallstone pancreatitis    Heart block atrioventricular    Spinal stenosis, lumbar region, with neurogenic claudication    Lumbar pain    Spondylolisthesis       Current Outpatient Prescriptions:     aspirin (ECOTRIN LOW STRENGTH) 81 mg EC tablet, Take 81 mg by mouth daily, Disp: , Rfl:     atorvastatin (LIPITOR) 20 mg tablet, Take 20 mg by mouth daily  , Disp: , Rfl:     Calcium Citrate-Vitamin D (CALCIUM CITRATE + D PO), Take 1,500 mg by mouth daily, Disp: , Rfl:     clindamycin (CLEOCIN) 300 MG capsule, TAKE 2 CAPSULES BY MOUTH 1 HOUR PRIOR TO DENTAL PROCEDURE , Disp: , Rfl: 0    clotrimazole-betamethasone (LOTRISONE) 1-0 05 % cream, Apply topically, Disp: , Rfl:     Cranberry (THERACRAN HP PO), Take 2 tablets by mouth daily, Disp: , Rfl:     furosemide (LASIX) 20 mg tablet, TAKE 1 TABLET BY MOUTH DAILY AS NEEDED FOR EDEMA, Disp: , Rfl:     Glucosamine-Chondroit-Vit C-Mn (GLUCOSAMINE 1500 COMPLEX) CAPS, Take 1 capsule by mouth daily  , Disp: , Rfl:     ibuprofen (MOTRIN) 200 mg tablet, Take by mouth every 6 (six) hours as needed for mild pain, Disp: , Rfl:     metFORMIN (GLUCOPHAGE) 500 mg tablet, 2 (two) times a day, Disp: , Rfl:     metoprolol tartrate (LOPRESSOR) 25 mg tablet, Take 0 5 tablets (12 5 mg total) by mouth every 12 (twelve) hours, Disp: 30 tablet, Rfl: 6    Multiple Vitamin (MULTIVITAMIN) capsule, Take 1 capsule by mouth daily  , Disp: , Rfl:     ondansetron (ZOFRAN) 4 mg tablet, Take 4 mg by mouth every 8 (eight) hours as needed for nausea or vomiting, Disp: , Rfl:     tamoxifen (NOLVADEX) 20 mg tablet, Take 1 tablet (20 mg total) by mouth daily, Disp: 30 tablet, Rfl: 3  Allergies   Allergen Reactions    Ampicillin Shortness Of Breath, Anaphylaxis and Other (See Comments)     Other reaction(s): Unknown Allergic Reaction  Reaction Date: [id number];      Past Surgical History:   Procedure Laterality Date    CARDIAC PACEMAKER REMOVAL N/A 11/9/2017    Procedure: PACEMAKER LEAD REVISION, REMOVAL OF NONFUNCTIONING LEAD, AND INSERTION OF A NEW RV LEAD;  Surgeon: Melina Massey MD;  [facility];  Service: Cardiology    CATARACT EXTRACTION Bilateral  COLONOSCOPY      ERCP N/A 1/8/2018    Procedure: ENDOSCOPIC RETROGRADE CHOLANGIOPANCREATOGRAPHY (ERCP); Surgeon: Regina Taylor MD;  [facility]; Service: Gastroenterology    HYSTERECTOMY      JOINT REPLACEMENT      lizette  TKR and right TSR    MASTECTOMY Right     [place] ERCP DX COLLECTION SPECIMEN BRUSHING/WASHING N/A 2/22/2018    Procedure: ENDOSCOPIC RETROGRADE CHOLANGIOPANCREATOGRAPHY (ERCP) with stent removal;  Surgeon: Regina Tayolr MD;  [facility]; Service: Gastroenterology    [place] RESEC [address] N/A 7/13/2017    Procedure: LIVER RESECTION, INTRAOPERATIVE ULTRASOUND;  Surgeon: Judith Paige MD;  [facility];  Service: Surgical Oncology    ROTATOR CUFF REPAIR Right     SENTINEL LYMPH NODE BIOPSY Right     TONSILECTOMY AND ADNOIDECTOMY      WOUND DEBRIDEMENT Left 11/9/2017    Procedure: DEBRIDEMENT WOUND AND DRESSING CHANGE [facility]); Surgeon: Jazmín Barron MD;  [facility];  Service: Cardiology     Social History     Objective:  Vitals:    02/04/19 1349   BP: 132/74   BP Location: Right arm   Patient Position: Sitting   Pulse: 93   Resp: 17   Temp: 98 2 °F (36 8 °C)   TempSrc: Tympanic   SpO2: 93%   Weight: 73 kg (161 lb)   Height: 5' 1 5" (1 562 m)     Physical Exam   Constitutional: She is oriented to person, place, and time  She appears well-developed  HENT:   Head: Normocephalic  Eyes: Pupils are equal, round, and reactive to light  Neck: Neck supple  Cardiovascular: Normal rate  No murmur heard  Pulmonary/Chest: No respiratory distress  She has no wheezes  She has no rales  Abdominal: Soft  She exhibits no distension  There is no tenderness  There is no rebound  Musculoskeletal: She exhibits no edema  Lymphadenopathy:     She has no cervical adenopathy  She has axillary adenopathy (Right axilla  Nontender  Approximately 1 5 cm  )  Neurological: She is alert and oriented to person, place, and time   She displays normal reflexes  Skin: Skin is warm  No rash noted  Psychiatric: She has a normal mood and affect  Thought content normal          Labs: I personally reviewed the labs and imaging pertinent to this patient care

## 2019-02-04 NOTE — PROGRESS NOTES
Daily Note     Today's date: 2019  Patient name: Juliet Davis  : 1940  MRN: [id number]  Referring provider: April Cabrera DO  Dx:   Encounter Diagnosis     ICD-10-CM    1  Acute right-sided thoracic back pain M54 6                   Subjective: Tariq Werner reports that pain is overall improved from beginning, she can still just feel it is there  Objective: See treatment diary below  Precautions: Osteoporosis, Hx of cancer breast and liver     Daily Treatment Diary     Manual  1/2 1/9 1/11 1/15 1/18 1/21 1/25 2/4     Mid thoracic SNAGS   AF AF AF AF AF AF AF     STM to medial scapular border on L   AF AF AF AF AF AFB AF     Supine grade IV thoracic PA  AF AF AF AF AF AF AF                                   Exercise Diary              Scapular retractions  Pink TB  3X10 3X10 3X10 pink    Blue  3X10     Thoracic extension over chair  20X 20 with PT assist 20    30     TB extension   Yellow  3x10 Yellow  3x10         Supine prolonged thoracic extension over half roll    3 min  5 min 5 min 5 min 5 in      Self STM with tennis ball     5 min 5 min 5 min 5 min                                                                                                                                                                                                             Modalities              MHP pre  10' 10' 10' 10' 10' 10'                                    Assessment: Tolerated treatment well  Patient with reduced resting and activity pain since beginning PT  Good tolerance to home TE and will follow up next week to determine how she does with HEP for the week      Plan: Continue per plan of care

## 2019-02-08 ENCOUNTER — RADIATION ONCOLOGY CONSULT (OUTPATIENT)
Dept: RADIATION ONCOLOGY | Facility: CLINIC | Age: 79
End: 2019-02-08
Attending: RADIOLOGY
Payer: COMMERCIAL

## 2019-02-08 ENCOUNTER — CLINICAL SUPPORT (OUTPATIENT)
Dept: RADIATION ONCOLOGY | Facility: CLINIC | Age: 79
End: 2019-02-08

## 2019-02-08 VITALS
HEIGHT: 62 IN | RESPIRATION RATE: 20 BRPM | DIASTOLIC BLOOD PRESSURE: 80 MMHG | WEIGHT: 162.2 LBS | TEMPERATURE: 98.2 F | SYSTOLIC BLOOD PRESSURE: 144 MMHG | HEART RATE: 89 BPM | OXYGEN SATURATION: 96 % | BODY MASS INDEX: 29.85 KG/M2

## 2019-02-08 DIAGNOSIS — C78.7 ADENOCARCINOMA OF RIGHT BREAST METASTATIC TO LIVER (HCC): Primary | Chronic | ICD-10-CM

## 2019-02-08 DIAGNOSIS — C50.911 ADENOCARCINOMA OF RIGHT BREAST METASTATIC TO LIVER (HCC): Primary | Chronic | ICD-10-CM

## 2019-02-08 PROCEDURE — 99215 OFFICE O/P EST HI 40 MIN: CPT | Performed by: RADIOLOGY

## 2019-02-08 RX ORDER — COVID-19 ANTIGEN TEST
1 KIT MISCELLANEOUS AS NEEDED
COMMUNITY
End: 2019-08-12

## 2019-02-08 NOTE — PROGRESS NOTES
Jen Temple  1940  Ms Guerline Burroughs is a 78 y o  female    Chief Complaint   Patient presents with    Consult   2009- right breast, infiltrating ductal carcinoma (T2, N1 sebastien)  Tumor was ER/SD positive, HER-2 negative  She then underwent a right mastectomy  She then received Taxotere/Cytoxan x4 cycles  2009- started Arimidex 1 mg by mouth daily  This continued until October 2014 2016-She felt a  lump in the right  lateral aspect of right breast  Biopsy confirmed the presence of ductal carcinoma, consistent with breast primary  Tumor was %, SD 70%, HER-2 +1   2/11/16 Pet/CT revealed a right lateral chest wall nodule measuring 2 3 cm, SUV 11 1  Right posterior liver, mass, that measured 5 8 cm, hypermetabolic  3/16/ 2016- started Faslodex 500 mg every month, and Ibrance 75 mg by mouth daily, 3 weeks on, one week off  12/13/2016- CT chest, and pelvis showed increase in the size of her predominant hepatic lesion with stable or responding disease elsewhere  confirmed the presence of metastatic breast cancer in this lesion  , with a biopsy, 1/2017  Interestingly, this was %, SD 2% and HER-2 +2  FISH confirmed HER-2 positive  2017 added Herceptin 6 mg every 3 weeks, Perjeta 420 mg IV every 3 weeks  4/9/2017 Perjeta discontinued due to diarrhea  Patient had significant response with the exception of hepatic disease  7/31/2017She underwent resection/partial hepatectomy of the predominant hepatic lesion  Resection margin was ablated  Pathology specimen indicated metastatic breast cancer, ER 70%, SD 0%, HER-2 negative by IHC     9/19/17 began Tamoxifen    10/30/17 Had left chest Medtronic dual-chamber pacemaker implantation, for heart block      1/7/18 Pt had pancreatitis and underwent an ERCP and had five common bile duct stones removed along with a sphincterotomy, sphincteroplasty, insertion of a plastic common bile duct stent      2/20/18 med onc f/u she was stable    5/29/18, 9/6/18  ct scans were stable  10/25/18 had spine and pain consult for spinal stenosis of the lumbar 5 region  1/21/19 CT scan showed Progressive increase in the size of right axillary nodes with an appearance that is highly suspicious for local metastatic lymphadenopathy  Pt also felt the mass  Ultrasound-guided biopsy could be performed to confirm this suspicion, if clinically appropriate  This was discussed with the pt at her med onc f/u, on 2/4/19  Dr Minna Cortez, plans to continue pt on Tamoxifen, and have her see Rad Onc for consideration off adding Radiation therapy to the right axilla, for local control, and diminish comorbidities which are certainly pending  She will also have a c- spine MRI to assess her occipital pain, scheduled for 2/13  Today has pain ,mainly in the right mid back, and neck  She no pain in the rt axilla  Tolerating Tamoxifen with some night sweats  HAS PACEMAKER LEFT CHEST WALL  Cancer Staging  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)  Staging form: Breast, AJCC 8th Edition  - Pathologic stage from 2/11/2016: Stage IV (rpTX, pNX, pM1, G2, ER: Positive, W[place]: Unknown, HER2: Negative) - Signed by Chel Allan PA-C on 2/20/2018      Oncology History    2009- right breast, infiltrating ductal carcinoma (T2, N1 sebastien)  Tumor was ER/[place] positive, HER-2 negative  She then underwent a right mastectomy  She then received Taxotere/Cytoxan x4 cycles  2009- started Arimidex 1 mg by mouth daily  This continued until October 2014 2016-She felt a  lump in the right  lateral aspect of right breast  Biopsy confirmed the presence of ductal carcinoma, consistent with breast primary  Tumor was %, [place] 70%, HER-2 +1   2/11/16 Pet/CT revealed a right lateral chest wall nodule measuring 2 3 cm, SUV 11 1  Right posterior liver, mass, that measured 5 8 cm, hypermetabolic    3/16/ 2016- started Faslodex 500 mg every month, and Ibrance 75 mg by mouth daily, 3 weeks on, one week off  12/13/2016- CT chest, and pelvis showed increase in the size of her predominant hepatic lesion with stable or responding disease elsewhere  confirmed the presence of metastatic breast cancer in this lesion  , with a biopsy, 1/2017  Interestingly, this was %, LA 2% and HER-2 +2  FISH confirmed HER-2 positive  2017 added Herceptin 6 mg every 3 weeks, Perjeta 420 mg IV every 3 weeks  4/9/2017 Perjeta discontinued due to diarrhea  Patient had significant response with the exception of hepatic disease  7/31/2017She underwent resection/partial hepatectomy of the predominant hepatic lesion  Resection margin was ablated  Pathology specimen indicated metastatic breast cancer, ER 70%, LA 0%, HER-2 negative by IHC     9/19/17 began Tamoxifen    10/30/17 Had left chest Medtronic dual-chamber pacemaker implantation, for heart block  1/7/18 Pt had pancreatitis and underwent an ERCP and had five common bile duct stones removed along with a sphincterotomy, sphincteroplasty, insertion of a plastic common bile duct stent      2/20/18 med onc f/u she was stable    5/29/18, 9/6/18  ct scans were stable  10/25/18 had spine and pain consult for spinal stenosis of the lumbar 5 region  1/21/19 CT scan showed Progressive increase in the size of right axillary nodes with an appearance that is highly suspicious for local metastatic lymphadenopathy  Ultrasound-guided biopsy could be performed to confirm this suspicion, if clinically appropriate  This was discussed with the pt at her med onc f/u, on 2/4/19  Dr Rohan Narayanan, plans to continue pt on Tamoxifen, and have her see Rad Onc for consideration off adding Radiation therapy to the right axilla, for local control, and diminish comorbidities which are certainly pending  She will also have a c- spine MRI to assess her occipital pain  NEED A COPY OF HER PACEMAKER CARD          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 [facility])    2009 Initial Diagnosis     Adenocarcinoma of right breast,  Infiltrating ductal carcinoma T2, N1 sebastien) Tumor was ER/[place] positive, HER2 negative  2009 Surgery     Right Mastectomy         2009 -  Chemotherapy     Adjuvant chemotherapy with Taxotere/Cytoxan x 4 cycles         2009 - 10/2014 Chemotherapy     Arimidex 1 mg po dialy         2/11/2016 Progression     Right lateral mastectomy site growth with core needle biopsy demonstrated reoccurrence  %, [place] 70%, Her2 +1  Final Diagnosis   A  Breast, right lateral mastectomy site, core needle biopsies:                        - Recurrent invasive mammary carcinoma, no special type (formerly invasive ductal carcinoma), 0 8 cm largest single focus               - Combined [place] score: 7/9                         - Tubule formation: None (3/3)  - Nuclear pleomorphism: Marked (3/3)  - Mitotic count: 3 mitoses per 10 high-power fields (1/3)  - No lymph-vascular invasion is identified              - No in situ component is identified                - No microcalcifications are identified  3/16/2016 - 1/24/2017 Chemotherapy     Faslodex 500 mg with loading dose followed by maintenance monthly injection with Ibrance 75mg 3 weeks on 1 week off           12/13/2016 Progression      Progression noted on PET-CT scan  Progression was largely found in the liver  Patient was set up for liver biopsy  1/3/2017 Biopsy     Liver lesion biopsy demonstated Estrogen Receptor (clone SP-1) 100%/positive, Progesterone Receptor (clone 1E2) 1-2%/positive, HER2 by IHC ([id number]) 2+/equivocal   Her2 by FISH was positive  1/25/2017 - 4/19/2017 Chemotherapy      Herceptin and Perjeta x5 cycles         4/20/2017 Adverse Reaction     Perjecta causing significant diarrhea  Medication stopped  7/13/2017 Surgery     Partial Hepatectomy, results demonstrated ER70%, PR0% & Her2 negative disease            9/19/2017 -  Chemotherapy Tamoxifen 20 mg daily            Clinical Trial:No  Tobacco  Current tobacco user: no  If yes, brief counseling provided: NA    Hypertension  Hypertension screening performed: yes  Normotensive:  no  If no, referred to PCP: no    Depression Screening  Screened for depression using PHQ-2: yes    Screened for depression using PHQ-9:  yes  Screening positive or negative:  positive  If score >4, was any of the following actions taken?    Additional evaluation for depression, suicide risk assesment, referral to PCP or psychiatry, medication started:  Yes  Gave her card for [facility] for Patients >65 years  Advanced Care Planning Discussed:  yes  Patient named surrogate decision maker or care plan in chart: yes    OB/GYN History:      Health Maintenance   Topic Date Due    Medicare Annual Wellness Visit (AWV)  1940    Urinary Incontinence Screening  01/05/2003    PT PLAN OF CARE  02/01/2019    Fall Risk  01/02/2020    Depression Screening PHQ  01/02/2020    DTaP,Tdap,and Td Vaccines (2 - Td) 06/23/2026    INFLUENZA VACCINE  Completed    Pneumococcal PPSV23/PCV13 65+ Years / High and Highest Risk  Completed       Patient Active Problem List   Diagnosis    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    Hypercholesteremia    Gastroesophageal reflux disease without esophagitis    History of total knee replacement    Hearing loss    Left bundle branch block (LBBB)    Symptomatic bradycardia    Complication associated with cardiac pacemaker lead    Pacemaker complications, subsequent encounter    Chest wall hematoma    Cholecystitis    Acute biliary pancreatitis    Cholangitis    Choledocholithiasis    Acute gallstone pancreatitis    Heart block atrioventricular    Spinal stenosis, lumbar region, with neurogenic claudication    Lumbar pain    Spondylolisthesis     Past Medical History:   Diagnosis Date    Anxiety     Arthritis     Breast CA ([facility] ) recurrent, ductal carcinoma ER, NH pos    Depression     Diverticula of intestine     Dizziness     and passes out    Flushing     Hiatal hernia     [place] (hard of hearing)      [place]    Hypercholesteremia     Liver mass     Liver metastasis (HCC)     Metastatic adenocarcinoma to liver (HCC)     Bx 01/03/2017    PONV (postoperative nausea and vomiting)     Recurrent breast cancer ([address] )     Shingles     Shortness of breath     on exertion    Tachycardia     Urinary incontinence     Use of cane as ambulatory aid     or walker     Past Surgical History:   Procedure Laterality Date    CARDIAC PACEMAKER REMOVAL N/A 11/9/2017    Procedure: PACEMAKER LEAD REVISION, REMOVAL OF NONFUNCTIONING LEAD, AND INSERTION OF A NEW RV LEAD;  Surgeon: Jazmín Barron MD;  [facility];  Service: Cardiology    CATARACT EXTRACTION Bilateral     COLONOSCOPY      ERCP N/A 1/8/2018    Procedure: ENDOSCOPIC RETROGRADE CHOLANGIOPANCREATOGRAPHY (ERCP); Surgeon: Regina Taylor MD;  Location: [facility]; Service: Gastroenterology    HYSTERECTOMY      JOINT REPLACEMENT      lizette  TKR and right TSR    MASTECTOMY Right     NH ERCP DX COLLECTION SPECIMEN BRUSHING/WASHING N/A 2/22/2018    Procedure: ENDOSCOPIC RETROGRADE CHOLANGIOPANCREATOGRAPHY (ERCP) with stent removal;  Surgeon: Regina Taylor MD;  Location: [facility]; Service: Gastroenterology    NH RESEC [address] N/A 7/13/2017    Procedure: LIVER RESECTION, INTRAOPERATIVE ULTRASOUND;  Surgeon: Judith Paige MD;  [facility];  Service: Surgical Oncology    ROTATOR CUFF REPAIR Right     SENTINEL LYMPH NODE BIOPSY Right     TONSILECTOMY AND ADNOIDECTOMY      WOUND DEBRIDEMENT Left 11/9/2017    Procedure: DEBRIDEMENT WOUND AND DRESSING CHANGE [facility]);   Surgeon: Jazmín Barron MD;  [facility];  Service: Cardiology     Family History   Problem Relation Age of Onset    Lung cancer Father     Cancer Brother     Diabetes type II Son Social History     Social History    Marital status: /Civil Union     Spouse name: N/A    Number of children: N/A    Years of education: N/A     Occupational History    Not on file  Social History Main Topics    Smoking status: Never Smoker    Smokeless tobacco: Never Used    Alcohol use No    Drug use: No    Sexual activity: Not on file     Other Topics Concern    Not on file     Social History Narrative    No narrative on file       Current Outpatient Prescriptions:     aspirin (ECOTRIN LOW STRENGTH) 81 mg EC tablet, Take 81 mg by mouth daily, Disp: , Rfl:     atorvastatin (LIPITOR) 20 mg tablet, Take 20 mg by mouth daily  , Disp: , Rfl:     Calcium Citrate-Vitamin D (CALCIUM CITRATE + D PO), Take 1,500 mg by mouth daily, Disp: , Rfl:     clindamycin (CLEOCIN) 300 MG capsule, TAKE 2 CAPSULES BY MOUTH 1 HOUR PRIOR TO DENTAL PROCEDURE , Disp: , Rfl: 0    clotrimazole-betamethasone (LOTRISONE) 1-0 05 % cream, Apply topically, Disp: , Rfl:     Cranberry (THERACRAN HP PO), Take 2 tablets by mouth daily, Disp: , Rfl:     furosemide (LASIX) 20 mg tablet, TAKE 1 TABLET BY MOUTH DAILY AS NEEDED FOR EDEMA, Disp: , Rfl:     Glucosamine-Chondroit-Vit C-Mn (GLUCOSAMINE 1500 COMPLEX) CAPS, Take 1 capsule by mouth daily  , Disp: , Rfl:     metFORMIN (GLUCOPHAGE) 500 mg tablet, 2 (two) times a day, Disp: , Rfl:     metoprolol tartrate (LOPRESSOR) 25 mg tablet, Take 0 5 tablets (12 5 mg total) by mouth every 12 (twelve) hours, Disp: 30 tablet, Rfl: 6    Multiple Vitamin (MULTIVITAMIN) capsule, Take 1 capsule by mouth daily  , Disp: , Rfl:     Naproxen Sodium (ALEVE) 220 MG CAPS, Take 1 capsule by mouth daily, Disp: , Rfl:     ondansetron (ZOFRAN) 4 mg tablet, Take 4 mg by mouth every 8 (eight) hours as needed for nausea or vomiting, Disp: , Rfl:     tamoxifen (NOLVADEX) 20 mg tablet, Take 1 tablet (20 mg total) by mouth daily, Disp: 30 tablet, Rfl: 3    Allergies   Allergen Reactions    Ampicillin Shortness Of Breath, Anaphylaxis and Other (See Comments)     Other reaction(s): Unknown Allergic Reaction  Reaction Date: 07Mar2012;        Review of Systems:  Review of Systems   Constitutional: Positive for activity change (less active) and fatigue (5/10)  HENT: Positive for hearing loss  Eyes: Negative  Respiratory: Positive for shortness of breath (with exhertion)  Cardiovascular: Positive for leg swelling (at times, Lasix prn)  Has left chest pacer   Gastrointestinal: Negative  Endocrine: Negative  Genitourinary:        Leakage , wears attends   Musculoskeletal: Positive for arthralgias, back pain and neck pain  Rt posterior neck pain, rt mid back pain   Allergic/Immunologic: Negative  Neurological: Negative  Hematological: Negative  Psychiatric/Behavioral:        Feels depressed about her overall health issues       Vitals:    02/08/19 0835   BP: 144/80   BP Location: Left arm   Pulse: 89   Resp: 20   Temp: 98 2 °F (36 8 °C)   SpO2: 96%   Weight: 73 6 kg (162 lb 3 2 oz)   Height: 5' 1 5" (1 562 m)            Imaging:Ct Chest Abdomen Pelvis W Contrast    Result Date: 1/21/2019  Narrative: CT CHEST, ABDOMEN AND PELVIS WITH IV CONTRAST INDICATION:   C50 911: Malignant neoplasm of unspecified site of right female breast C78 7: Secondary malignant neoplasm of liver and intrahepatic bile duct  Breast cancer  COMPARISON:  Multiple prior examinations most recently September 6, 2018  TECHNIQUE: CT examination of the chest, abdomen and pelvis was performed  In addition to portal venous phase postcontrast scanning through the abdomen and pelvis, delayed phase postcontrast scanning was performed through the upper abdominal viscera  Axial, sagittal, and coronal 2D reformatted images were created from the source data and submitted for interpretation  Radiation dose length product (DLP) for this visit:  969 mGy-cm     This examination, like all CT scans performed in the [facility], was performed utilizing techniques to minimize radiation dose exposure, including the use of iterative reconstruction and automated exposure control  IV Contrast:  100 mL of iohexol (OMNIPAQUE) Enteric Contrast: Enteric contrast was administered  FINDINGS: CHEST LUNGS:  No new or suspicious pulmonary mass  No tracheal or endobronchial mass  As on prior examinations there is relative elevation of right hemidiaphragm with atelectasis/scarring at the right lung base, unchanged  PLEURA:  Pleural thickening/calcification along the border of the right hemidiaphragm, unchanged from prior examinations  No pneumothorax  No solid enhancing pleural mass or pleural effusion  HEART/GREAT VESSELS:  Unremarkable for patient's age  Pacemaker leads are again noted in the heart  MEDIASTINUM AND NGOZI:  Unremarkable  CHEST WALL AND LOWER NECK: Again noted is left chest pacer/ICD with intact leads  Surgical changes of prior right mastectomy  A nodular density in the right axilla measuring 1 7 x 1 2 cm has increased from 1 1 x 0 8 cm on the previous examination  A retropectoral node in the upper medial right axilla measuring 1 4 x 0 8 cm is increased from 0 8 x 0 5 mm on previous examination  These findings are quite suspicious for local metastatic [place] adenopathy  ABDOMEN LIVER/BILIARY TREE:  Postoperative changes are reidentified  There is atypical enhancement pattern especially in the periphery of the liver as a result of surgical change  No convincing findings to suggest new or suspicious hepatic mass  Again noted is prominent pneumobilia related to prior surgery  GALLBLADDER:  Again noted is extensive gas within the lumen of the gallbladder which is located in the anterior right upper abdomen between the right and left hepatic lobes  Previously noted pericholecystic fluid is not seen on the current exam  SPLEEN:  Calcified splenic granulomata are again noted    No new or suspicious solid splenic mass  PANCREAS:  Unremarkable  ADRENAL GLANDS:  Unremarkable  KIDNEYS/URETERS:  Stable tiny low-attenuation cortical lesions likely small cysts  No solid renal mass  There are tiny nonobstructing calculi at the lower pole of the left kidney on the order of 1 to 2 mm in size  STOMACH AND BOWEL:  Unremarkable  Stomach and small bowel  Extensive diffuse colonic diverticulosis without evidence of diverticulitis APPENDIX:  No findings to suggest appendicitis  ABDOMINOPELVIC CAVITY:  No ascites or free intraperitoneal air  No lymphadenopathy  VESSELS:  Unremarkable for patient's age  PELVIS REPRODUCTIVE ORGANS:  Uterus has been removed  No adnexal masses  URINARY BLADDER:  There is again a small amount of gas and urinary bladder lumen which is considered most likely iatrogenic  Please correlate with patient's recent history  ABDOMINAL WALL/INGUINAL REGIONS:  Unremarkable  OSSEOUS STRUCTURES:  Advanced multilevel thoracolumbar degenerative changes are again noted  Advanced left glenohumeral degenerative change  No suspicious sclerotic or destructive osseous lesion  Impression: Progressive increase in the size of right axillary nodes with an appearance that is highly suspicious for local metastatic lymphadenopathy  Ultrasound-guided biopsy could be performed to confirm this suspicion, if clinically appropriate  No other CT evidence of new recurrent or metastatic tumor in the chest, abdomen or pelvis  Again noted are postsurgical changes  Relative elevation of right hemidiaphragm with right basilar atelectasis and trace posterior right mid chest pleural effusion or pleural thickening  Profound changes of colonic diverticulosis without acute diverticulitis  Tiny nonobstructing lower pole left renal calculi  The study was marked in EPIC for significant notification   Workstation performed: [id number]       Teaching: Radiation packet

## 2019-02-08 NOTE — PROGRESS NOTES
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 [facility])  Staging form: Breast, AJCC 8th Edition  - Pathologic stage from 2/11/2016: Stage IV (rpTX, pNX, pM1, G2, ER: Positive, CA: Unknown, HER2: Negative) - Signed by Lulu Mejia PA-C on 2/20/2018    Oncology History    2009- right breast, infiltrating ductal carcinoma (T2, N1 sebastien)  Tumor was ER/CA positive, HER-2 negative  She then underwent a right mastectomy  She then received Taxotere/Cytoxan x4 cycles  2009- started Arimidex 1 mg by mouth daily  This continued until October 2014 2016-She felt a  lump in the right  lateral aspect of right breast  Biopsy confirmed the presence of ductal carcinoma, consistent with breast primary  Tumor was %, CA 70%, HER-2 +1   2/11/16 Pet/CT revealed a right lateral chest wall nodule measuring 2 3 cm, SUV 11 1  Right posterior liver, mass, that measured 5 8 cm, hypermetabolic  3/16/ 2016- started Faslodex 500 mg every month, and Ibrance 75 mg by mouth daily, 3 weeks on, one week off  12/13/2016- CT chest, and pelvis showed increase in the size of her predominant hepatic lesion with stable or responding disease elsewhere  confirmed the presence of metastatic breast cancer in this lesion  , with a biopsy, 1/2017  Interestingly, this was %, CA 2% and HER-2 +2  FISH confirmed HER-2 positive  2017 added Herceptin 6 mg every 3 weeks, Perjeta 420 mg IV every 3 weeks  4/9/2017 Perjeta discontinued due to diarrhea  Patient had significant response with the exception of hepatic disease  7/31/2017She underwent resection/partial hepatectomy of the predominant hepatic lesion  Resection margin was ablated  Pathology specimen indicated metastatic breast cancer, ER 70%, CA 0%, HER-2 negative by IHC     9/19/17 began Tamoxifen    10/30/17 Had left chest Medtronic dual-chamber pacemaker implantation, for heart block      1/7/18 Pt had pancreatitis and underwent an ERCP and had five common bile duct stones removed along with a sphincterotomy, sphincteroplasty, insertion of a plastic common bile duct stent      2/20/18 med onc f/u she was stable    5/29/18, 9/6/18  ct scans were stable  10/25/18 had spine and pain consult for spinal stenosis of the lumbar 5 region  1/21/19 CT scan showed Progressive increase in the size of right axillary nodes with an appearance that is highly suspicious for local metastatic lymphadenopathy  Ultrasound-guided biopsy could be performed to confirm this suspicion, if clinically appropriate  This was discussed with the pt at her med onc f/u, on 2/4/19  Dr Mally Reed, plans to continue pt on Tamoxifen, and have her see Rad Onc for consideration off adding Radiation therapy to the right axilla, for local control, and diminish comorbidities which are certainly pending  She will also have a c- spine MRI to assess her occipital pain  NEED A COPY OF HER PACEMAKER CARD  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 [facility])    2009 Initial Diagnosis     Adenocarcinoma of right breast,  Infiltrating ductal carcinoma T2, N1 sebastien) Tumor was ER/AZ positive, HER2 negative  2009 Surgery     Right Mastectomy         2009 -  Chemotherapy     Adjuvant chemotherapy with Taxotere/Cytoxan x 4 cycles         2009 - 10/2014 Chemotherapy     Arimidex 1 mg po dialy         2/11/2016 Progression     Right lateral mastectomy site growth with core needle biopsy demonstrated reoccurrence  %, AZ 70%, Her2 +1  Final Diagnosis   A  Breast, right lateral mastectomy site, core needle biopsies:                        - Recurrent invasive mammary carcinoma, no special type (formerly invasive ductal carcinoma), 0 8 cm largest single focus               - Combined [place] score: 7/9                         - Tubule formation: None (3/3)  - Nuclear pleomorphism: Marked (3/3)                          - Mitotic count: 3 mitoses per 10 high-power fields (1/3)  - No lymph-vascular invasion is identified              - No in situ component is identified                - No microcalcifications are identified  3/16/2016 - 1/24/2017 Chemotherapy     Faslodex 500 mg with loading dose followed by maintenance monthly injection with Ibrance 75mg 3 weeks on 1 week off           12/13/2016 Progression      Progression noted on PET-CT scan  Progression was largely found in the liver  Patient was set up for liver biopsy  1/3/2017 Biopsy     Liver lesion biopsy demonstated Estrogen Receptor (clone SP-1) 100%/positive, Progesterone Receptor (clone 1E2) 1-2%/positive, HER2 by IHC ([id number]) 2+/equivocal   Her2 by FISH was positive  1/25/2017 - 4/19/2017 Chemotherapy      Herceptin and Perjeta x5 cycles         4/20/2017 Adverse Reaction     Perjecta causing significant diarrhea  Medication stopped  7/13/2017 Surgery     Partial Hepatectomy, results demonstrated ER70%, PR0% & Her2 negative disease            9/19/2017 -  Chemotherapy     Tamoxifen 20 mg daily            Interval History:***    GYN History:***    Patient Active Problem List   Diagnosis    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 ([address] )    Hypercholesteremia    Gastroesophageal reflux disease without esophagitis    History of total knee replacement    Hearing loss    Left bundle branch block (LBBB)    Symptomatic bradycardia    Complication associated with cardiac pacemaker lead    Pacemaker complications, subsequent encounter    Chest wall hematoma    Cholecystitis    Acute biliary pancreatitis    Cholangitis    Choledocholithiasis    Acute gallstone pancreatitis    Heart block atrioventricular    Spinal stenosis, lumbar region, with neurogenic claudication    Lumbar pain    Spondylolisthesis     Past Medical History:   Diagnosis Date    Anxiety     Arthritis     Breast CA (HCC)     recurrent, ductal carcinoma ER, [place] pos    Depression     Diverticula of intestine     Dizziness     and passes out    Flushing     Hiatal hernia     [place]hard of hearing[place]      [place]    Hypercholesteremia     Liver mass     Liver metastasis (HCC)     Metastatic adenocarcinoma to liver (HCC)     Bx 01/03/2017    PONV (postoperative nausea and vomiting)     Recurrent breast cancer ([address] )     Shingles     Shortness of breath     on exertion    Tachycardia     Urinary incontinence     Use of cane as ambulatory aid     or walker     Past Surgical History:   Procedure Laterality Date    CARDIAC PACEMAKER REMOVAL N/A 11/9/2017    Procedure: PACEMAKER LEAD REVISION, REMOVAL OF NONFUNCTIONING LEAD, AND INSERTION OF A NEW RV LEAD;  Surgeon: Andreia Churchill MD;  [facility];  Service: Cardiology    CATARACT EXTRACTION Bilateral     COLONOSCOPY      ERCP N/A 1/8/2018    Procedure: ENDOSCOPIC RETROGRADE CHOLANGIOPANCREATOGRAPHY (ERCP); Surgeon: Parul Dallas MD;  Location: [facility]; Service: Gastroenterology    HYSTERECTOMY      JOINT REPLACEMENT      [place]  TKR and right TSR    MASTECTOMY Right     NV ERCP DX COLLECTION SPECIMEN BRUSHING/WASHING N/A 2/22/2018    Procedure: ENDOSCOPIC RETROGRADE CHOLANGIOPANCREATOGRAPHY (ERCP) with stent removal;  Surgeon: Parul Dallas MD;  Location: [facility]; Service: Gastroenterology    [place] RESEC [address] N/A 7/13/2017    Procedure: LIVER RESECTION, INTRAOPERATIVE ULTRASOUND;  Surgeon: Tobi Diana MD;  [facility];  Service: Surgical Oncology    ROTATOR CUFF REPAIR Right     SENTINEL LYMPH NODE BIOPSY Right     TONSILECTOMY AND ADNOIDECTOMY      WOUND DEBRIDEMENT Left 11/9/2017    Procedure: DEBRIDEMENT WOUND AND DRESSING CHANGE [facility]);   Surgeon: Andreia Churchill MD;  [facility];  Service: Cardiology     Family History   Problem Relation Age of Onset    Lung cancer Father     Cancer Brother     Diabetes type II Son      Social History     Social History    Marital status: /Civil Union     Spouse name: N/A    Number of children: N/A    Years of education: N/A     Occupational History    Not on file  Social History Main Topics    Smoking status: Never Smoker    Smokeless tobacco: Never Used    Alcohol use No    Drug use: No    Sexual activity: Not on file     Other Topics Concern    Not on file     Social History Narrative    No narrative on file       Current Outpatient Prescriptions:     aspirin (ECOTRIN LOW STRENGTH) 81 mg EC tablet, Take 81 mg by mouth daily, Disp: , Rfl:     atorvastatin (LIPITOR) 20 mg tablet, Take 20 mg by mouth daily  , Disp: , Rfl:     Calcium Citrate-Vitamin D (CALCIUM CITRATE + D PO), Take 1,500 mg by mouth daily, Disp: , Rfl:     clindamycin (CLEOCIN) 300 MG capsule, TAKE 2 CAPSULES BY MOUTH 1 HOUR PRIOR TO DENTAL PROCEDURE , Disp: , Rfl: 0    clotrimazole-betamethasone (LOTRISONE) 1-0 05 % cream, Apply topically, Disp: , Rfl:     Cranberry (THERACRAN HP PO), Take 2 tablets by mouth daily, Disp: , Rfl:     furosemide (LASIX) 20 mg tablet, TAKE 1 TABLET BY MOUTH DAILY AS NEEDED FOR EDEMA, Disp: , Rfl:     Glucosamine-Chondroit-Vit C-Mn (GLUCOSAMINE 1500 COMPLEX) CAPS, Take 1 capsule by mouth daily  , Disp: , Rfl:     metFORMIN (GLUCOPHAGE) 500 mg tablet, 2 (two) times a day, Disp: , Rfl:     metoprolol tartrate (LOPRESSOR) 25 mg tablet, Take 0 5 tablets (12 5 mg total) by mouth every 12 (twelve) hours, Disp: 30 tablet, Rfl: 6    Multiple Vitamin (MULTIVITAMIN) capsule, Take 1 capsule by mouth daily  , Disp: , Rfl:     Naproxen Sodium (ALEVE) 220 MG CAPS, Take 1 capsule by mouth daily, Disp: , Rfl:     ondansetron (ZOFRAN) 4 mg tablet, Take 4 mg by mouth every 8 (eight) hours as needed for nausea or vomiting, Disp: , Rfl:     tamoxifen (NOLVADEX) 20 mg tablet, Take 1 tablet (20 mg total) by mouth daily, Disp: 30 tablet, Rfl: 3  Allergies   Allergen Reactions    Ampicillin Shortness Of Breath, Anaphylaxis and Other (See Comments)     Other reaction(s): Unknown Allergic Reaction  Reaction Date: [id number];        Review of Systems:  Review of Systems    Vitals:    02/08/19 0835   BP: 144/80   BP Location: Left arm   Pulse: 89   Resp: 20   Temp: 98 2 °F (36 8 °C)   SpO2: 96%   Weight: 73 6 kg (162 lb 3 2 oz)   Height: 5' 1 5" (1 562 m)       Imaging:Ct Chest Abdomen Pelvis W Contrast    Result Date: 1/21/2019  Narrative: CT CHEST, ABDOMEN AND PELVIS WITH IV CONTRAST INDICATION:   C50 911: Malignant neoplasm of unspecified site of right female breast C78 7: Secondary malignant neoplasm of liver and intrahepatic bile duct  Breast cancer  COMPARISON:  Multiple prior examinations most recently September 6, 2018  TECHNIQUE: CT examination of the chest, abdomen and pelvis was performed  In addition to portal venous phase postcontrast scanning through the abdomen and pelvis, delayed phase postcontrast scanning was performed through the upper abdominal viscera  Axial, sagittal, and coronal 2D reformatted images were created from the source data and submitted for interpretation  Radiation dose length product (DLP) for this visit:  969 mGy-cm   This examination, like all CT scans performed in the [facility], was performed utilizing techniques to minimize radiation dose exposure, including the use of iterative reconstruction and automated exposure control  IV Contrast:  100 mL of iohexol (OMNIPAQUE) Enteric Contrast: Enteric contrast was administered  FINDINGS: CHEST LUNGS:  No new or suspicious pulmonary mass  No tracheal or endobronchial mass  As on prior examinations there is relative elevation of right hemidiaphragm with atelectasis/scarring at the right lung base, unchanged  PLEURA:  Pleural thickening/calcification along the border of the right hemidiaphragm, unchanged from prior examinations  No pneumothorax  No solid enhancing pleural mass or pleural effusion  HEART/GREAT VESSELS:  Unremarkable for patient's age  Pacemaker leads are again noted in the heart  MEDIASTINUM AND NGOZI:  Unremarkable  CHEST WALL AND LOWER NECK: Again noted is left chest pacer/ICD with intact leads  Surgical changes of prior right mastectomy  A nodular density in the right axilla measuring 1 7 x 1 2 cm has increased from 1 1 x 0 8 cm on the previous examination  A retropectoral node in the upper medial right axilla measuring 1 4 x 0 8 cm is increased from 0 8 x 0 5 mm on previous examination  These findings are quite suspicious for local metastatic [place] adenopathy  ABDOMEN LIVER/BILIARY TREE:  Postoperative changes are reidentified  There is atypical enhancement pattern especially in the periphery of the liver as a result of surgical change  No convincing findings to suggest new or suspicious hepatic mass  Again noted is prominent pneumobilia related to prior surgery  GALLBLADDER:  Again noted is extensive gas within the lumen of the gallbladder which is located in the anterior right upper abdomen between the right and left hepatic lobes  Previously noted pericholecystic fluid is not seen on the current exam  SPLEEN:  Calcified splenic granulomata are again noted  No new or suspicious solid splenic mass  PANCREAS:  Unremarkable  ADRENAL GLANDS:  Unremarkable  KIDNEYS/URETERS:  Stable tiny low-attenuation cortical lesions likely small cysts  No solid renal mass  There are tiny nonobstructing calculi at the lower pole of the left kidney on the order of 1 to 2 mm in size  STOMACH AND BOWEL:  Unremarkable  Stomach and small bowel  Extensive diffuse colonic diverticulosis without evidence of diverticulitis APPENDIX:  No findings to suggest appendicitis  ABDOMINOPELVIC CAVITY:  No ascites or free intraperitoneal air  No lymphadenopathy  VESSELS:  Unremarkable for patient's age  PELVIS REPRODUCTIVE ORGANS:  Uterus has been removed  No adnexal masses   URINARY BLADDER:  There is again a small amount of gas and urinary bladder lumen which is considered most likely iatrogenic  Please correlate with patient's recent history  ABDOMINAL WALL/INGUINAL REGIONS:  Unremarkable  OSSEOUS STRUCTURES:  Advanced multilevel thoracolumbar degenerative changes are again noted  Advanced left glenohumeral degenerative change  No suspicious sclerotic or destructive osseous lesion  Impression: Progressive increase in the size of right axillary nodes with an appearance that is highly suspicious for local metastatic lymphadenopathy  Ultrasound-guided biopsy could be performed to confirm this suspicion, if clinically appropriate  No other CT evidence of new recurrent or metastatic tumor in the chest, abdomen or pelvis  Again noted are postsurgical changes  Relative elevation of right hemidiaphragm with right basilar atelectasis and trace posterior right mid chest pleural effusion or pleural thickening  Profound changes of colonic diverticulosis without acute diverticulitis  Tiny nonobstructing lower pole left renal calculi  The study was marked in EPIC for significant notification   Workstation performed: [id number]       Teaching:***

## 2019-02-08 NOTE — PROGRESS NOTES
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)  Staging form: Breast, AJCC 8th Edition  - Pathologic stage from 2016: Stage IV (rpTX, pNX, pM1, G2, ER: Positive, TN: Unknown, HER2: Negative) - Signed by Kirsty Hendrickson PA-C on 2018           History of Present Illness   Rajat Riojas is a 78y o  year old female who   was diagnosed in was diagnosed in  with right breast, infiltrating ductal carcinoma (T2, N1 sebastien)  Tumor was ER/TN positive, HER-2 negative  She  underwent a right mastectomy and received Taxotere/Cytoxan x4 cycles  - started Arimidex 1 mg by mouth daily  This continued until -She felt a  lump in the right  lateral aspect of right breast  Biopsy confirmed the presence of ductal carcinoma, consistent with breast primary  Tumor was %, TN 70%, HER-2 +1   16 Pet/CT revealed a right lateral chest wall nodule measuring 2 3 cm, SUV 11 1  Right posterior liver, mass, that measured 5 8 cm, hypermetabolic  3/16/ 2016- started Faslodex 500 mg every month, and Ibrance 75 mg by mouth daily, 3 weeks on, one week off  2016- CT chest, and pelvis showed increase in the size of her predominant hepatic lesion with stable or responding disease elsewhere  confirmed the presence of metastatic breast cancer in this lesion  , with a biopsy, 2017  Interestingly, this was %, TN 2% and HER-2 +2  FISH confirmed HER-2 positive       added Herceptin 6 mg every 3 weeks, Perjeta 420 mg IV every 3 weeks  2017 Perjeta discontinued due to diarrhea  Patient had significant response with the exception of hepatic disease       2017She underwent resection/partial hepatectomy of the predominant hepatic lesion   Resection margin was ablated  Pathology specimen indicated metastatic breast cancer, ER 70%, KY 0%, HER-2 negative by IHC      9/19/17 began Tamoxifen     10/30/17 Had left chest [employer] dual-chamber pacemaker implantation, for heart block      1/7/18 Pt had pancreatitis and underwent an ERCP and had five common bile duct stones removed along with a sphincterotomy, sphincteroplasty, insertion of a plastic common bile duct stent       2/20/18 med onc f/u she was stable     5/29/18, 9/6/18  ct scans were stable      10/25/18 had spine and pain consult for spinal stenosis of the lumbar 5 region      1/21/19 CT scan showed Progressive increase in the size of right axillary nodes with an appearance that is highly suspicious for local metastatic lymphadenopathy  Pt also felt the mass   Ultrasound-guided biopsy could be performed to confirm this suspicion, if clinically appropriate      This was discussed with the pt at her med onc f/u, on 2/4/19  Dr Rohan Narayanan, plans to continue pt on Tamoxifen, and have her see Rad Onc for consideration off adding Radiation therapy to the right axilla, for local control, and diminish comorbidities which are certainly pending  She will also have a c- spine MRI to assess her occipital pain, scheduled for 2/13  Today has pain ,mainly in the right mid back, and neck  She no pain in the rt axilla  Tolerating Tamoxifen with some night sweats       HAS PACEMAKER LEFT CHEST WALL  She has had cervical pain for the past 1 month  She will be undergoing MRI of the C-spine next week  Historical Information      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 [facility])    2009 Initial Diagnosis     Adenocarcinoma of right breast,  Infiltrating ductal carcinoma T2, N1 sebastien) Tumor was ER/KY positive, HER2 negative             2009 Surgery     Right Mastectomy         2009 -  Chemotherapy     Adjuvant chemotherapy with Taxotere/Cytoxan x 4 cycles         2009 - 10/2014 Chemotherapy     Arimidex 1 mg po dialy 2/11/2016 Progression     Right lateral mastectomy site growth with core needle biopsy demonstrated reoccurrence  %, KY 70%, Her2 +1  Final Diagnosis   A  Breast, right lateral mastectomy site, core needle biopsies:                        - Recurrent invasive mammary carcinoma, no special type (formerly invasive ductal carcinoma), 0 8 cm largest single focus               - Combined Jose Elias score: 7/9                         - Tubule formation: None (3/3)  - Nuclear pleomorphism: Marked (3/3)  - Mitotic count: 3 mitoses per 10 high-power fields (1/3)  - No lymph-vascular invasion is identified              - No in situ component is identified                - No microcalcifications are identified  3/16/2016 - 1/24/2017 Chemotherapy     Faslodex 500 mg with loading dose followed by maintenance monthly injection with Ibrance 75mg 3 weeks on 1 week off           12/13/2016 Progression      Progression noted on PET-CT scan  Progression was largely found in the liver  Patient was set up for liver biopsy  1/3/2017 Biopsy     Liver lesion biopsy demonstated Estrogen Receptor (clone SP-1) 100%/positive, Progesterone Receptor (clone 1E2) 1-2%/positive, HER2 by IHC ([id number]) 2+/equivocal   Her2 by FISH was positive  1/25/2017 - 4/19/2017 Chemotherapy      Herceptin and Perjeta x5 cycles         4/20/2017 Adverse Reaction     Perjecta causing significant diarrhea  Medication stopped  7/13/2017 Surgery     Partial Hepatectomy, results demonstrated ER70%, PR0% & Her2 negative disease            9/19/2017 -  Chemotherapy     Tamoxifen 20 mg daily              Past Medical History:   Diagnosis Date    Anxiety     Arthritis     Breast CA (HCC)     recurrent, ductal carcinoma ER, KY pos    Depression     Diverticula of intestine     Dizziness     and passes out    Flushing     Hiatal hernia     [place] hearing[place]      [place]    Hypercholesteremia     Liver mass     Liver metastasis (HCC)     Metastatic adenocarcinoma to liver ([address] )     Bx 01/03/2017    PONV (postoperative nausea and vomiting)     Recurrent breast cancer (HCC)     Shingles     Shortness of breath     on exertion    Tachycardia     Urinary incontinence     Use of cane as ambulatory aid     or walker     Past Surgical History:   Procedure Laterality Date    CARDIAC PACEMAKER REMOVAL N/A 11/9/2017    Procedure: PACEMAKER LEAD REVISION, REMOVAL OF NONFUNCTIONING LEAD, AND INSERTION OF A NEW RV LEAD;  Surgeon: David Schmitt MD;  [facility];  Service: Cardiology    CATARACT EXTRACTION Bilateral     COLONOSCOPY      ERCP N/A 1/8/2018    Procedure: ENDOSCOPIC RETROGRADE CHOLANGIOPANCREATOGRAPHY (ERCP); Surgeon: Tina Dang MD;  Location: [facility]; Service: Gastroenterology    HYSTERECTOMY      JOINT REPLACEMENT      [place]  TKR and right TSR    MASTECTOMY Right     MD ERCP DX COLLECTION SPECIMEN BRUSHING/WASHING N/A 2/22/2018    Procedure: ENDOSCOPIC RETROGRADE CHOLANGIOPANCREATOGRAPHY (ERCP) with stent removal;  Surgeon: Tina Dang MD;  [facility]; Service: Gastroenterology    MD RESEC [id number] N/A 7/13/2017    Procedure: LIVER RESECTION, INTRAOPERATIVE ULTRASOUND;  Surgeon: Tonia Hargrove MD;  [facility];  Service: Surgical Oncology    ROTATOR CUFF REPAIR Right     SENTINEL LYMPH NODE BIOPSY Right     TONSILECTOMY AND ADNOIDECTOMY      WOUND DEBRIDEMENT Left 11/9/2017    Procedure: DEBRIDEMENT WOUND AND DRESSING CHANGE [facility]);   Surgeon: David Schmitt MD;  [facility];  Service: Cardiology       Family History   Problem Relation Age of Onset    Lung cancer Father     Cancer Brother     Diabetes type II Son        Social History   History   Alcohol Use No     History   Drug Use No     History   Smoking Status    Never Smoker   Smokeless Tobacco    Never Used Meds/Allergies     Current Outpatient Prescriptions:     aspirin (ECOTRIN LOW STRENGTH) 81 mg EC tablet, Take 81 mg by mouth daily, Disp: , Rfl:     atorvastatin (LIPITOR) 20 mg tablet, Take 20 mg by mouth daily  , Disp: , Rfl:     Calcium Citrate-Vitamin D (CALCIUM CITRATE + D PO), Take 1,500 mg by mouth daily, Disp: , Rfl:     clindamycin (CLEOCIN) 300 MG capsule, TAKE 2 CAPSULES BY MOUTH 1 HOUR PRIOR TO DENTAL PROCEDURE , Disp: , Rfl: 0    clotrimazole-betamethasone (LOTRISONE) 1-0 05 % cream, Apply topically, Disp: , Rfl:     Cranberry (THERACRAN HP PO), Take 2 tablets by mouth daily, Disp: , Rfl:     furosemide (LASIX) 20 mg tablet, TAKE 1 TABLET BY MOUTH DAILY AS NEEDED FOR EDEMA, Disp: , Rfl:     Glucosamine-Chondroit-Vit C-Mn (GLUCOSAMINE 1500 COMPLEX) CAPS, Take 1 capsule by mouth daily  , Disp: , Rfl:     metFORMIN (GLUCOPHAGE) 500 mg tablet, 2 (two) times a day, Disp: , Rfl:     metoprolol tartrate (LOPRESSOR) 25 mg tablet, Take 0 5 tablets (12 5 mg total) by mouth every 12 (twelve) hours, Disp: 30 tablet, Rfl: 6    Multiple Vitamin (MULTIVITAMIN) capsule, Take 1 capsule by mouth daily  , Disp: , Rfl:     Naproxen Sodium (ALEVE) 220 MG CAPS, Take 1 capsule by mouth daily, Disp: , Rfl:     ondansetron (ZOFRAN) 4 mg tablet, Take 4 mg by mouth every 8 (eight) hours as needed for nausea or vomiting, Disp: , Rfl:     tamoxifen (NOLVADEX) 20 mg tablet, Take 1 tablet (20 mg total) by mouth daily, Disp: 30 tablet, Rfl: 3  Allergies   Allergen Reactions    Ampicillin Shortness Of Breath, Anaphylaxis and Other (See Comments)     Other reaction(s): Unknown Allergic Reaction  Reaction Date: [id number];          Review of Systems    Constitutional: Positive for activity change (less active) and fatigue (5/10)  HENT: Positive for hearing loss  Eyes: Negative  Respiratory: Positive for shortness of breath (with exhertion)  Cardiovascular: Positive for leg swelling (at times, Lasix prn)  Has left chest pacer   Gastrointestinal: Negative  Endocrine: Negative  Genitourinary:        Leakage , wears attends   Musculoskeletal: Positive for arthralgias, back pain and neck pain  Rt posterior neck pain, rt mid back pain   Allergic/Immunologic: Negative  Neurological: Negative  Hematological: Negative  Psychiatric/Behavioral:        Feels depressed about her overall health issues           OBJECTIVE:   /80 (BP Location: Left arm)   Pulse 89   Temp 98 2 °F (36 8 °C)   Resp 20   Ht 5' 1 5" (1 562 m)   Wt 73 6 kg (162 lb 3 2 oz)   LMP  (LMP Unknown) Comment: post   SpO2 96%   BMI 30 15 kg/m²   Pain Assessment:  1  Performance Status: ECOG/Zubrod/WHO: 1 - Symptomatic but completely ambulatory    Physical Exam   Constitutional: She appears well-developed  HENT:   Head: Normocephalic  Hair is normal    Mouth/Throat: Oropharynx is clear and moist    Eyes: EOM are normal  No scleral icterus  Neck: Neck supple  No spinous process tenderness present  No edema present  Cardiovascular: Normal rate and regular rhythm  Pulmonary/Chest: Effort normal and breath sounds normal  No respiratory distress  She has no wheezes  She exhibits no tenderness  She has a palpable lesion in her right axilla measuring approximately 2 cm  She does have some local skin indentation at this site  No erythema  She has fairly good range of motion of the right upper extremity but limited on the left  No evidence of lymphedema  The right chest wall at the mastectomy scar area shows no evidence of recurrent lesions  Abdominal: Soft  Bowel sounds are normal  She exhibits no distension, no ascites and no mass  There is no hepatosplenomegaly  There is no tenderness  There is no rebound  Genitourinary: No breast swelling or tenderness  Musculoskeletal: Normal range of motion  She exhibits no edema or tenderness  She has no spine tenderness to palpation     Lymphadenopathy:     She has no cervical adenopathy  She has no axillary adenopathy  Neurological: She is alert  She has normal strength  No cranial nerve deficit  Coordination and gait normal    Skin: Skin is intact  Psychiatric: She has a normal mood and affect  Her speech is normal and behavior is normal             RESULTS  Lab Results    Chemistry        Component Value Date/Time     05/13/2015 0500    K 4 6 01/09/2019 1205    K 4 1 05/13/2015 0500     01/09/2019 1205     05/13/2015 0500    CO2 28 01/09/2019 1205    CO2  11/09/2017 2005      Comment:      Test Error    BUN 14 01/09/2019 1205    BUN 16 05/13/2015 0500    CREATININE 0 65 01/09/2019 1205    CREATININE 0 59 (L) 05/13/2015 0500        Component Value Date/Time    CALCIUM 9 8 01/09/2019 1205    CALCIUM 8 8 05/13/2015 0500    ALKPHOS 77 01/09/2019 1205    ALKPHOS 81 05/13/2015 0500    AST 26 01/09/2019 1205    AST 31 05/13/2015 0500    ALT 35 01/09/2019 1205    ALT 36 05/13/2015 0500    BILITOT 1 9 (H) 05/13/2015 0500            Lab Results   Component Value Date    WBC 9 27 01/09/2019    HGB 14 0 01/09/2019    HCT 43 8 01/09/2019    MCV 98 01/09/2019     01/09/2019         Imaging Studies  Ct Chest Abdomen Pelvis W Contrast    Result Date: 1/21/2019  Narrative: CT CHEST, ABDOMEN AND PELVIS WITH IV CONTRAST INDICATION:   C50 911: Malignant neoplasm of unspecified site of right female breast C78 7: Secondary malignant neoplasm of liver and intrahepatic bile duct  Breast cancer  COMPARISON:  Multiple prior examinations most recently September 6, 2018  TECHNIQUE: CT examination of the chest, abdomen and pelvis was performed  In addition to portal venous phase postcontrast scanning through the abdomen and pelvis, delayed phase postcontrast scanning was performed through the upper abdominal viscera  Axial, sagittal, and coronal 2D reformatted images were created from the source data and submitted for interpretation   Radiation dose length product (DLP) for this visit:  969 mGy-cm   This examination, like all CT scans performed in the [facility], was performed utilizing techniques to minimize radiation dose exposure, including the use of iterative reconstruction and automated exposure control  IV Contrast:  100 mL of iohexol (OMNIPAQUE) Enteric Contrast: Enteric contrast was administered  FINDINGS: CHEST LUNGS:  No new or suspicious pulmonary mass  No tracheal or endobronchial mass  As on prior examinations there is relative elevation of right hemidiaphragm with atelectasis/scarring at the right lung base, unchanged  PLEURA:  Pleural thickening/calcification along the border of the right hemidiaphragm, unchanged from prior examinations  No pneumothorax  No solid enhancing pleural mass or pleural effusion  HEART/GREAT VESSELS:  Unremarkable for patient's age  Pacemaker leads are again noted in the heart  MEDIASTINUM AND NGOZI:  Unremarkable  CHEST WALL AND LOWER NECK: Again noted is left chest pacer/ICD with intact leads  Surgical changes of prior right mastectomy  A nodular density in the right axilla measuring 1 7 x 1 2 cm has increased from 1 1 x 0 8 cm on the previous examination  A retropectoral node in the upper medial right axilla measuring 1 4 x 0 8 cm is increased from 0 8 x 0 5 mm on previous examination  These findings are quite suspicious for local metastatic [place] adenopathy  ABDOMEN LIVER/BILIARY TREE:  Postoperative changes are reidentified  There is atypical enhancement pattern especially in the periphery of the liver as a result of surgical change  No convincing findings to suggest new or suspicious hepatic mass  Again noted is prominent pneumobilia related to prior surgery  GALLBLADDER:  Again noted is extensive gas within the lumen of the gallbladder which is located in the anterior right upper abdomen between the right and left hepatic lobes    Previously noted pericholecystic fluid is not seen on the current exam  SPLEEN:  Calcified splenic granulomata are again noted  No new or suspicious solid splenic mass  PANCREAS:  Unremarkable  ADRENAL GLANDS:  Unremarkable  KIDNEYS/URETERS:  Stable tiny low-attenuation cortical lesions likely small cysts  No solid renal mass  There are tiny nonobstructing calculi at the lower pole of the left kidney on the order of 1 to 2 mm in size  STOMACH AND BOWEL:  Unremarkable  Stomach and small bowel  Extensive diffuse colonic diverticulosis without evidence of diverticulitis APPENDIX:  No findings to suggest appendicitis  ABDOMINOPELVIC CAVITY:  No ascites or free intraperitoneal air  No lymphadenopathy  VESSELS:  Unremarkable for patient's age  PELVIS REPRODUCTIVE ORGANS:  Uterus has been removed  No adnexal masses  URINARY BLADDER:  There is again a small amount of gas and urinary bladder lumen which is considered most likely iatrogenic  Please correlate with patient's recent history  ABDOMINAL WALL/INGUINAL REGIONS:  Unremarkable  OSSEOUS STRUCTURES:  Advanced multilevel thoracolumbar degenerative changes are again noted  Advanced left glenohumeral degenerative change  No suspicious sclerotic or destructive osseous lesion  Impression: Progressive increase in the size of right axillary nodes with an appearance that is highly suspicious for local metastatic lymphadenopathy  Ultrasound-guided biopsy could be performed to confirm this suspicion, if clinically appropriate  No other CT evidence of new recurrent or metastatic tumor in the chest, abdomen or pelvis  Again noted are postsurgical changes  Relative elevation of right hemidiaphragm with right basilar atelectasis and trace posterior right mid chest pleural effusion or pleural thickening  Profound changes of colonic diverticulosis without acute diverticulitis  Tiny nonobstructing lower pole left renal calculi  The study was marked in EPIC for significant notification   Workstation performed: [id number] Pathology:  Final Diagnosis   A  Liver, liver 6-7 for gross margins then routine in formalin, resection:  - Metastatic carcinoma consistent with breast primary  -- Multiple metastases 6 5 cm, 1 8 cm and 1 6 cm     -- Confirmed by positive GATA3, CK7 and GCDFP-15 (focal) and negative CK20       immunostains  -- Focal necrosis and fibrosis consistent with probable chemotherapy response  -- Estrogen, Progesterone & HER2 receptor studies pending, to be described in an       addendum   - Hepatic resection margin focally involved by metastatic carcinoma (1 8 cm            ASSESSMENT  1  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 [facility])  Radiation Simulation Treatment     Cancer Staging  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 [facility])  Staging form: Breast, AJCC 8th Edition  - Pathologic stage from 2/11/2016: Stage IV (rpTX, pNX, pM1, G2, ER: Positive, TN: Unknown, HER2: Negative) - Signed by Callie Villasenor PA-C on 2/20/2018        PLAN/DISCUSSION  Orders Placed This Encounter   Procedures   [facility]          Che Jaramillo is a 78y o  year old female with a history of right breast carcinoma ER TN and her 2 positive  She has evidence of stage IV disease as noted on the liver metastasis which has been resected  She has been recently maintained on tamoxifen with good tolerance and stability of her systemic disease except for her right axilla which shows progression on imaging  She is asymptomatic at this site  However with no other obvious areas of disease I recommended radiation treatment to the right axilla  The plan will be to treat this area to a definitive dose for gross disease  The possible side effects both acute and long-term were reviewed with her and her daughter  We did discuss that prior to embarking on axillary radiation we will await her MRI of the cervical spine results    Should this be without evidence of metastasis then would proceed with her right axillary radiation  If there is metastasis in the cervical spine I will likely offer her radiation as she is symptomatic at this site  We have scheduled her for CT simulation planning on 2/19/2019  Nikkie Recio MD  2/8/2019,10:09 AM      Portions of the record may have been created with voice recognition software   Occasional wrong word or "sound a like" substitutions may have occurred due to the inherent limitations of voice recognition software   Read the chart carefully and recognize, using context, where substitutions have occurred

## 2019-02-11 RX ORDER — VENLAFAXINE 37.5 MG/1
TABLET ORAL EVERY 12 HOURS
COMMUNITY
End: 2019-03-01 | Stop reason: ALTCHOICE

## 2019-02-12 ENCOUNTER — HOSPITAL ENCOUNTER (OUTPATIENT)
Dept: RADIOLOGY | Facility: HOSPITAL | Age: 79
Discharge: HOME | End: 2019-02-12
Payer: COMMERCIAL

## 2019-02-12 DIAGNOSIS — C50.919 MALIGNANT NEOPLASM OF FEMALE BREAST, UNSPECIFIED ESTROGEN RECEPTOR STATUS, UNSPECIFIED LATERALITY, UNSPECIFIED SITE OF BREAST (HCC): ICD-10-CM

## 2019-02-12 PROCEDURE — A9585 GADOBUTROL INJECTION: HCPCS | Performed by: INTERNAL MEDICINE

## 2019-02-12 PROCEDURE — 72156 MRI NECK SPINE W/O & W/DYE: CPT

## 2019-02-12 RX ADMIN — GADOBUTROL 10 ML: 604.72 INJECTION INTRAVENOUS at 12:53

## 2019-02-13 ENCOUNTER — TELEPHONE (OUTPATIENT)
Dept: HEMATOLOGY ONCOLOGY | Facility: CLINIC | Age: 79
End: 2019-02-13

## 2019-02-13 ENCOUNTER — IN-CLINIC DEVICE VISIT (OUTPATIENT)
Dept: CARDIOLOGY CLINIC | Facility: CLINIC | Age: 79
End: 2019-02-13
Payer: COMMERCIAL

## 2019-02-13 ENCOUNTER — OFFICE VISIT (OUTPATIENT)
Dept: CARDIOLOGY CLINIC | Facility: CLINIC | Age: 79
End: 2019-02-13
Payer: COMMERCIAL

## 2019-02-13 VITALS
WEIGHT: 160 LBS | HEART RATE: 92 BPM | SYSTOLIC BLOOD PRESSURE: 120 MMHG | DIASTOLIC BLOOD PRESSURE: 68 MMHG | BODY MASS INDEX: 29.44 KG/M2 | HEIGHT: 62 IN

## 2019-02-13 DIAGNOSIS — I44.2 INTERMITTENT COMPLETE HEART BLOCK (HCC): ICD-10-CM

## 2019-02-13 DIAGNOSIS — Z95.0 PRESENCE OF PERMANENT CARDIAC PACEMAKER: ICD-10-CM

## 2019-02-13 DIAGNOSIS — I44.7 LBBB (LEFT BUNDLE BRANCH BLOCK): Primary | ICD-10-CM

## 2019-02-13 DIAGNOSIS — T82.9XXD PACEMAKER COMPLICATIONS, SUBSEQUENT ENCOUNTER: ICD-10-CM

## 2019-02-13 DIAGNOSIS — I49.5 SICK SINUS SYNDROME (HCC): Primary | ICD-10-CM

## 2019-02-13 PROCEDURE — 93000 ELECTROCARDIOGRAM COMPLETE: CPT | Performed by: INTERNAL MEDICINE

## 2019-02-13 PROCEDURE — 93280 PM DEVICE PROGR EVAL DUAL: CPT | Performed by: INTERNAL MEDICINE

## 2019-02-13 PROCEDURE — 99213 OFFICE O/P EST LOW 20 MIN: CPT | Performed by: INTERNAL MEDICINE

## 2019-02-13 NOTE — PROGRESS NOTES
HEART AND VASCULAR  CARDIAC ELECTROPHYSIOLOGY   HEART RHYTHM CENTER  [facility]     Follow-up  Today's Date: 02/13/19        Patient name: Nilay Mccarthy  YOB: 1940  Sex: female         Chief Complaint: f/u acemaker      ASSESSMENT:  Problem List Items Addressed This Visit     None      Visit Diagnoses     LBBB (left bundle branch block)    -  Primary    Relevant Orders    POCT ECG        79 yo female  1) Multiple 5s pauses due to intermittent complete heart block and syncope seen on loop, then dual chamber pacemaker w Passive leads placed  Unfortuantely she had late subacute RV lead perforation (pain started 5 days later and loss of capture)  We revised device and no pericardial effusion, however she did develop large hematoma requiring 2 units tranfusion  She is now recovered,       2) Stage 4 Liver CA post resection    3) Breast Cancer getting chemo and radiation      DISCUSSION AND PLAN:  1  Remote pacemaker checks, repeat in 1 month since RV amplitude lower  2  F/u in 12 months        Orders Placed This Encounter   Procedures    POCT ECG     There are no discontinued medications    HPI:    79 yo female  1) Multiple 5s pauses due to intermittent complete heart block and syncope seen on loop, then dual chamber pacemaker w Passive leads placed  Unfortuantely she had late subacute RV lead perforation (pain started 5 days later and loss of capture)  We revised device and no pericardial effusion, however she did develop large hematoma requiring 2 units tranfusion  She is now recovered,       2) Stage 4 Liver CA post resection    3) Breast Cancer getting chemo and radiation        Please note HPI is listed by problem with with update following it, it is copied again in the assessment above and reflects medical decision making as well         Complete 12 point ROS reviewed and otherwise non pertinent or negative except as per HPI pertinent positives in Cardiovascular and Respiratory emphasized  Please see paper chart for outpatient clinic patients where the patient completed the 12 point ROS survey  Past Medical History:   Diagnosis Date    Anxiety     Arthritis     Breast CA ([address] )     recurrent, ductal carcinoma ER, [place] pos    Depression     Diverticula of intestine     Dizziness     and passes out    Flushing     Hiatal hernia     [place] (hard of hearing)      lizette    Hypercholesteremia     Liver mass     Liver metastasis (HCC)     Metastatic adenocarcinoma to liver (HCC)     Bx 01/03/2017    PONV (postoperative nausea and vomiting)     Recurrent breast cancer (HCC)     Shingles     Shortness of breath     on exertion    Tachycardia     Urinary incontinence     Use of cane as ambulatory aid     or walker       Allergies   Allergen Reactions    Ampicillin Shortness Of Breath, Anaphylaxis and Other (See Comments)     Other reaction(s): Unknown Allergic Reaction  Reaction Date: [id number]; I reviewed the Home Medication list and Allergies in the chart  Scheduled Meds:  Current Outpatient Medications   Medication Sig Dispense Refill    aspirin (ECOTRIN LOW STRENGTH) 81 mg EC tablet Take 81 mg by mouth daily      atorvastatin (LIPITOR) 20 mg tablet Take 20 mg by mouth daily        Calcium Citrate-Vitamin D (CALCIUM CITRATE + D PO) Take 1,500 mg by mouth daily      clotrimazole-betamethasone (LOTRISONE) 1-0 05 % cream Apply topically      Cranberry (THERACRAN HP PO) Take 2 tablets by mouth daily      furosemide (LASIX) 20 mg tablet TAKE 1 TABLET BY MOUTH DAILY AS NEEDED FOR EDEMA      Glucosamine-Chondroit-Vit C-Mn (GLUCOSAMINE 1500 COMPLEX) CAPS Take 1 capsule by mouth daily        metFORMIN (GLUCOPHAGE) 500 mg tablet 2 (two) times a day      metoprolol tartrate (LOPRESSOR) 25 mg tablet Take 0 5 tablets (12 5 mg total) by mouth every 12 (twelve) hours 30 tablet 6    Multiple Vitamin (MULTIVITAMIN) capsule Take 1 capsule by mouth daily   Naproxen Sodium (ALEVE) 220 MG CAPS Take 1 capsule by mouth daily      ondansetron (ZOFRAN) 4 mg tablet Take 4 mg by mouth every 8 (eight) hours as needed for nausea or vomiting      tamoxifen (NOLVADEX) 20 mg tablet Take 1 tablet (20 mg total) by mouth daily 30 tablet 3    clindamycin (CLEOCIN) 300 MG capsule TAKE 2 CAPSULES BY MOUTH 1 HOUR PRIOR TO DENTAL PROCEDURE   0    venlafaxine (EFFEXOR) 37 5 mg tablet Every 12 hours       No current facility-administered medications for this visit        PRN Meds:         Family History   Problem Relation Age of Onset    Lung cancer Father     Cancer Brother     Diabetes type II Son        Social History     Socioeconomic History    Marital status: /Civil Union     Spouse name: Not on file    Number of children: Not on file    Years of education: Not on file    Highest education level: Not on file   Occupational History    Not on file   Social Needs    Financial resource strain: Not on file    Food insecurity:     Worry: Not on file     Inability: Not on file    Transportation needs:     Medical: Not on file     Non-medical: Not on file   Tobacco Use    Smoking status: Never Smoker    Smokeless tobacco: Never Used   Substance and Sexual Activity    Alcohol use: No    Drug use: No    Sexual activity: Not on file   Lifestyle    Physical activity:     Days per week: Not on file     Minutes per session: Not on file    Stress: Not on file   Relationships    Social connections:     Talks on phone: Not on file     Gets together: Not on file     Attends [employer] service: Not on file     Active member of club or organization: Not on file     Attends meetings of clubs or organizations: Not on file     Relationship status: Not on file    Intimate partner violence:     Fear of current or ex partner: Not on file     Emotionally abused: Not on file     Physically abused: Not on file     Forced sexual activity: Not on file   Other Topics Concern    Not on file   Social History Narrative    Not on file         OBJECTIVE:    /68 (BP Location: Right arm, Patient Position: Sitting, Cuff Size: Standard)   Pulse 92   Ht 5' 1 5" (1 562 m)   Wt 72 6 kg (160 lb)   LMP  (LMP Unknown) Comment: post   BMI 29 74 kg/m²   Vitals:    02/13/19 1410   Weight: 72 6 kg (160 lb)     GEN: No acute distress, Alert and oriented, well appearing  HEENT:Head, neck, ears, oral pharynx: Mucus membranes moist, oral pharynx clear, nares clear  External ears normal  EYES: Pupils equal, sclera anicteric, midline, normal conjuctiva  NECK: No JVD, supple, no obvious masses or thryomegaly or goiter  CARDIOVASCULAR:  RRR, No murmur, rub, gallops S1,S2  LUNGS: Clear To auscultation bilaterally, normal effort, no rales, rhonchi, crackles  ABDOMEN:  nondistended,  without obvious organomegaly or ascites  EXTREMITIES/VASCULAR:  No edema  Radial pulses intact, pedal pulses difficult to palpate, warm an well perfused  PSYCH: Normal Affect, no overt suicidal ideation, linear speech pattern without evidence of psychosis  NEURO: Grossly intact, moving all extremiteis equal, face symmetric, alert and responsive, no obvious focal defecits  GAIT:  Ambulates normally without difficulty  HEME: No bleeding, bruising, petechia, purpura  SKIN: No significant rashes, warm, no diaphoresis or pallor       Lab Results:     @RESULTRCNT(1M])@    CBC with diff:     CMP:      Invalid input(s): ALBUMIN  TSH:       Lipid Profile:          Cardiac testing:   Results for orders placed during the hospital encounter of 07/11/17   Echo complete with contrast if indicated    Narrative [facility]  [facility], [address] [facility]  [phone]    Transthoracic Echocardiogram  2D, M-mode, Doppler, and Color Doppler    Study date:  11-Jul-2017    Patient: Vj Garcia  MR number: [id number]  Account number: [id number]  : 1940  Age: 76 years  Gender: Female  Status: Outpatient  Location: [address] [place] [address] and [facility]  Height: 61 in  Weight: 161 7 lb  BP: 128/ 64 mmHg    Indications: Assess left ventricular function  Diagnoses: E78 5 - Hyperlipidemia, unspecified    Sonographer:  ELIANE Singer  Primary Physician:  Evangelina Nina MD  Referring Physician:  Evert Keene DO  Group:  [id number]  Cardiology Fellow:  Aury Hall MD  Interpreting Physician:  Mahin Abdullahi MD    SUMMARY    LEFT VENTRICLE:  Systolic function was at the lower limits of normal  Ejection fraction was estimated to be 50 %  Although no diagnostic regional wall motion abnormality was identified, this possibility cannot be completely excluded on the basis of this study  Left ventricular diastolic function parameters were normal     VENTRICULAR SEPTUM:  There was dyssynergic motion  These changes are consistent with LBBB  RIGHT VENTRICLE:  The size was normal   Systolic function was normal     TRICUSPID VALVE:  There was mild regurgitation  Pulmonary artery systolic pressure was within the normal range  HISTORY: PRIOR HISTORY: Breast cancer; LBBB; Hyperlipidemia    PROCEDURE: The study was performed in the [address] [facility]  This was a routine study  The transthoracic approach was used  The study included complete 2D imaging, M-mode, complete spectral Doppler, and color Doppler  The  heart rate was 90 bpm, at the start of the study  Images were obtained from the parasternal, apical, subcostal, and suprasternal notch acoustic windows  Echocardiographic views were limited due to lung interference  Image quality was  adequate  LEFT VENTRICLE: Size was normal  Systolic function was at the lower limits of normal  Ejection fraction was estimated to be 50 %   Although no diagnostic regional wall motion abnormality was identified, this possibility cannot be completely  excluded on the basis of this study  Wall thickness was normal  DOPPLER: Left ventricular diastolic function parameters were normal     VENTRICULAR SEPTUM: There was dyssynergic motion  These changes are consistent with LBBB  RIGHT VENTRICLE: The size was normal  Systolic function was normal  Wall thickness was normal     LEFT ATRIUM: Size was normal     RIGHT ATRIUM: Size was normal     MITRAL VALVE: Valve structure was normal  There was normal leaflet separation  DOPPLER: The transmitral velocity was within the normal range  There was no evidence for stenosis  There was trace regurgitation  AORTIC VALVE: The valve was trileaflet  Leaflets exhibited normal thickness and normal cuspal separation  DOPPLER: Transaortic velocity was within the normal range  There was no evidence for stenosis  There was no regurgitation  TRICUSPID VALVE: The valve structure was normal  There was normal leaflet separation  DOPPLER: The transtricuspid velocity was within the normal range  There was no evidence for stenosis  There was mild regurgitation  Pulmonary artery  systolic pressure was within the normal range  Estimated peak PA pressure was 22 mmHg  PULMONIC VALVE: Leaflets exhibited normal thickness, no calcification, and normal cuspal separation  DOPPLER: The transpulmonic velocity was within the normal range  There was trace regurgitation  PERICARDIUM: There was no pericardial effusion  AORTA: The root exhibited normal size  SYSTEMIC VEINS: IVC: The inferior vena cava was normal in size and course   Respirophasic changes were normal     SYSTEM MEASUREMENT TABLES    2D  %FS: 20 78 %  Ao Diam: 3 49 cm  EDV(Teich): 45 21 ml  EF Biplane: 52 52 %  EF(Cube): 50 28 %  EF(Teich): 43 45 %  ESV(Cube): 18 41 ml  ESV(Teich): 25 57 ml  IVSd: 1 04 cm  LA Diam: 2 52 cm  LVEDV MOD A2C: 104 64 ml  LVEDV MOD A4C: 107 05 ml  LVEDV MOD BP: 108 41 ml  LVEF MOD A2C: 58 35 %  LVEF MOD A4C: 46 17 %  LVESV MOD A2C: 43 58 ml  LVESV MOD A4C: 57 62 ml  LVESV MOD BP: 51 48 ml  LVIDd: 3 33 cm  LVIDs: 2 64 cm  LVLd A2C: 7 99 cm  LVLd A4C: 7 59 cm  LVLs A2C: 6 22 cm  LVLs A4C: 6 59 cm  LVPWd: 1 13 cm  SI(Cube): 10 76 ml/m2  SI(Teich): 11 35 ml/m2  SV MOD A2C: 61 05 ml  SV MOD A4C: 49 42 ml  SV(Cube): 18 61 ml  SV(Teich): 19 64 ml    CW  AV Vmax: 1 07 m/s  AV maxP 62 mmHg  TR Vmax: 2 08 m/s  TR maxP 3 mmHg    MM  TAPSE: 1 66 cm    PW  E': 0 11 m/s  E/E': 9 62  LVOT Vmax: 0 74 m/s  LVOT maxP 2 mmHg  MV A Yadiel: 0 27 m/s  MV Dec [place]: 7 18 m/s2  MV DecT: 145 34 ms  MV E Yadiel: 1 04 m/s  MV E/A Ratio: 3 88    [facility] Accredited Echocardiography Laboratory    Prepared and electronically signed by    Darcy Shelton MD  Signed 2017 16:28:13       No results found for this or any previous visit  No results found for this or any previous visit  No results found for this or any previous visit  I reviewed and interpreted the following LABS/EKG/TELE/IMAGING and below is summary of my interpretation (if data available):    interogation today  RV pace 54%  R waves decreased to 3-4 but w good threshold   HIS RV lead

## 2019-02-13 NOTE — PROGRESS NOTES
Results for orders placed or performed in visit on 02/13/19   Cardiac EP device report    Narrative    DUAL CHAMBER PACEMAKER (HIS)  DEVICE INTERROGATED IN THE [place] OFFICE  BATTERY VOLTAGE ADEQUATE  (3 5 YRS)  AP <1%  55%  ALL LEAD PARAMETERS WITHIN NORMAL LIMITS HOWEVER MEASURED R WAVE DECREASED  NO SIGNIFICANT HIGH RATE EPISODES  DECREASE MADE TO AMPLITUDE TO PROMOTE DEVICE LONGEVITY WHILE MAINTAINING AN APPROPRIATE SAFETY MARGIN  NORMAL DEVICE FUNCTION  ---DUMONT

## 2019-02-13 NOTE — TELEPHONE ENCOUNTER
Telephone Note  [facility] [place] [address]  [phone]    Kedar Mcclureing  02/13/19      CC: MRI C-spine     Subjective: MRI cervical spine w wo contrast  Narrative: MRI CERVICAL SPINE WITH AND WITHOUT CONTRAST    INDICATION: [id number] [id number]: Malignant neoplasm of unspecified site of unspecified female breast     COMPARISON:  None  TECHNIQUE:  Sagittal T1, sagittal T2, sagittal inversion recovery, axial 2D merge and axial T2  Sagittal T1 and axial T1 postcontrast       IV Contrast:  10 mL of Gadobutrol injection (SINGLE-DOSE)     IMAGE QUALITY:  Diagnostic  FINDINGS:    ALIGNMENT:  Straightening of the normal cervical lordosis  Smoothly exaggerated upper thoracic kyphosis  No compression fracture  Slight anterior subluxation of T1 upon T2 likely related to facet arthropathy  MARROW SIGNAL:  Normal marrow signal is identified within the visualized bony structures  No discrete marrow lesion  CERVICAL AND VISUALIZED UPPER THORACIC CORD:  Normal signal within the visualized cord  PREVERTEBRAL AND PARASPINAL SOFT TISSUES:  Normal     VISUALIZED POSTERIOR FOSSA:  The visualized posterior fossa demonstrates no abnormal signal     CERVICAL DISC SPACES:  At the C1-2 level there is hypertrophic change surrounding the dens and at the C1-2 junction laterally, right more so than left  This results in mild to moderate canal stenosis with partial effacement of the CSF space but no drew   cord compression  There is severe left-sided foraminal narrowing secondary to these hypertrophic changes and mild right-sided foraminal narrowing  C2-C3:  Normal disc height  Left greater than right facet hypertrophic degenerative change  No disc herniation or canal stenosis  Slight left foraminal narrowing  C3-C4:  Both facet joints are fused  Normal disc height  No disc herniation or canal stenosis    Mild bilateral foraminal narrowing  C4-C5:  There is disc desiccation and loss of disc height  Annular bulging and uncinate/facet hypertrophic changes more pronounced on the left  There is mild canal stenosis with moderate left and mild right foraminal narrowing  C5-C6:  Slight loss of disc height with mild annular bulging and uncinate joint hypertrophic degenerative change  Mild canal stenosis with partial effacement of the CSF space especially anteriorly  No cord compression  Mild to moderate bilateral   foraminal narrowing  C6-C7:  Loss of disc height  Annular bulging and uncinate joint hypertrophic degenerative change  Mild canal stenosis  Moderate left and mild right foraminal narrowing  C7-T1:  Slight left facet hypertrophic change  No canal stenosis  Minimal left foraminal narrowing without discrete nerve impingement  UPPER THORACIC DISC SPACES:  Normal     POSTCONTRAST IMAGING:  Normal   Impression: Straightening of the normal cervical lordosis with multilevel cervical spondylitic degenerative change including degenerative disc disease, endplate and uncinate hypertrophic changes and facet hypertrophic changes  There is multilevel mild canal   stenosis slightly more pronounced at the level of C1-C2 secondary to pronounced hypertrophic change  There is multilevel foraminal narrowing, most pronounced on the left at the C1-C2 level  Moderate left foraminal narrowing at C4-5 and C6-7  Workstation performed: [id number]      A/P:  1   Multilevel cervical spondylitic degenerative changes and mild canal stenosis  There is no evidence of metastatic disease in the patient's cervical spine  Patient has orthopedic/degenerative changes  Patient follows with pain management  Patient was made aware of the results of the above today  Patient sees Pain Management in 2 weeks and will discuss treatment further with them

## 2019-02-19 ENCOUNTER — APPOINTMENT (OUTPATIENT)
Dept: RADIATION ONCOLOGY | Facility: CLINIC | Age: 79
End: 2019-02-19
Attending: RADIOLOGY
Payer: COMMERCIAL

## 2019-02-19 PROCEDURE — 77370 RADIATION PHYSICS CONSULT: CPT | Performed by: RADIOLOGY

## 2019-02-19 PROCEDURE — 77290 THER RAD SIMULAJ FIELD CPLX: CPT | Performed by: RADIOLOGY

## 2019-02-21 ENCOUNTER — OFFICE VISIT (OUTPATIENT)
Dept: PAIN MEDICINE | Facility: MEDICAL CENTER | Age: 79
End: 2019-02-21
Payer: COMMERCIAL

## 2019-02-21 VITALS
BODY MASS INDEX: 29.4 KG/M2 | DIASTOLIC BLOOD PRESSURE: 68 MMHG | HEART RATE: 100 BPM | HEIGHT: 62 IN | WEIGHT: 159.8 LBS | SYSTOLIC BLOOD PRESSURE: 112 MMHG

## 2019-02-21 DIAGNOSIS — M79.18 MYOFASCIAL PAIN SYNDROME: Primary | ICD-10-CM

## 2019-02-21 DIAGNOSIS — M54.2 NECK PAIN: ICD-10-CM

## 2019-02-21 PROCEDURE — 99213 OFFICE O/P EST LOW 20 MIN: CPT | Performed by: PHYSICAL MEDICINE & REHABILITATION

## 2019-02-21 NOTE — PROGRESS NOTES
Assessment:  1  Myofascial pain syndrome    2  Neck pain        Plan:  1  At this time I would like the patient to continue with physical therapy as she had been noting some gradual improvement in her symptoms  She discontinued therapy to pursue further evaluation with her oncologist     2  I would like her to otherwise follow-up as needed if her pain begins to worsen or does not respond to the therapy  My impressions and treatment recommendations were discussed in detail with the patient who verbalized understanding and had no further questions  Discharge instructions were provided  I personally saw and examined the patient and I agree with the above discussed plan of care  No orders of the defined types were placed in this encounter  No orders of the defined types were placed in this encounter  History of Present Illness:    Barbara Eckert is a 78 y o  female returns in followup related to right-sided neck pain, thoracic back pain, and left trochanteric bursitis  She states that the trochanteric bursa region feels much better after our injection  She has noted some significant improvement in her thoracic myofascial pain with physical therapy and describe some very infrequent/occasional shooting pain in her neck which only lasts a few seconds  I reviewed with her that there is likely nothing that I can do to intervene with that and she verbalized understanding  She overall rates her pain as a 5/10  She was found to have return of her cancer and will be undergoing radiation therapy in the near future  I have personally reviewed and/or updated the patient's past medical history, past surgical history, family history, social history, current medications, allergies, and vital signs today  Review of Systems:    Review of Systems   Constitutional: Negative for fever and unexpected weight change  HENT: Negative for trouble swallowing  Eyes: Negative for visual disturbance  Respiratory: Negative for shortness of breath and wheezing  Cardiovascular: Negative for chest pain and palpitations  Gastrointestinal: Negative for constipation, diarrhea, nausea and vomiting  Endocrine: Negative for cold intolerance, heat intolerance and polydipsia  Genitourinary: Negative for difficulty urinating and frequency  Musculoskeletal: Negative for arthralgias, gait problem, joint swelling and myalgias  Skin: Negative for rash  Neurological: Negative for dizziness, seizures, syncope, weakness and headaches  Hematological: Does not bruise/bleed easily  Psychiatric/Behavioral: Negative for dysphoric mood  All other systems reviewed and are negative        Patient Active Problem List   Diagnosis    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 ([email or web address] 75 )    Hypercholesteremia    Gastroesophageal reflux disease without esophagitis    History of total knee replacement    Hearing loss    LBBB (left bundle branch block)    Symptomatic bradycardia    Complication associated with cardiac pacemaker lead    Pacemaker complications, subsequent encounter    Chest wall hematoma    Cholecystitis    Acute biliary pancreatitis    Cholangitis    Choledocholithiasis    Acute gallstone pancreatitis    Intermittent complete heart block ([email or web address] 75 )    Spinal stenosis, lumbar region, with neurogenic claudication    Lumbar pain    Spondylolisthesis       Past Medical History:   Diagnosis Date    Anxiety     Arthritis     Breast CA (HCC)     recurrent, ductal carcinoma ER, MS pos    Depression     Diverticula of intestine     Dizziness     and passes out    Flushing     Hiatal hernia     [place] (hard of hearing)      [place]    Hypercholesteremia     Liver mass     Liver metastasis (HCC)     Metastatic adenocarcinoma to liver ([email or web address] 75 )     Bx 01/03/2017    PONV (postoperative nausea and vomiting)     Recurrent breast cancer (HCC)     Shingles     Shortness of breath     on exertion    Tachycardia     Urinary incontinence     Use of cane as ambulatory aid     or walker       Past Surgical History:   Procedure Laterality Date    CARDIAC PACEMAKER REMOVAL N/A 11/9/2017    Procedure: PACEMAKER LEAD REVISION, REMOVAL OF NONFUNCTIONING LEAD, AND INSERTION OF A NEW RV LEAD;  Surgeon: Jazmín Barron MD;  [facility];  Service: Cardiology    CATARACT EXTRACTION Bilateral     COLONOSCOPY      ERCP N/A 1/8/2018    Procedure: ENDOSCOPIC RETROGRADE CHOLANGIOPANCREATOGRAPHY (ERCP); Surgeon: Regina Taylor MD;  Location: [facility]; Service: Gastroenterology    HYSTERECTOMY      JOINT REPLACEMENT      [place]  TKR and right TSR    MASTECTOMY Right     [place] ERCP DX COLLECTION SPECIMEN BRUSHING/WASHING N/A 2/22/2018    Procedure: ENDOSCOPIC RETROGRADE CHOLANGIOPANCREATOGRAPHY (ERCP) with stent removal;  Surgeon: Regina Taylor MD;  [facility]: [facility]; Service: Gastroenterology    [place] RESEC [address] N/A 7/13/2017    Procedure: LIVER RESECTION, INTRAOPERATIVE ULTRASOUND;  Surgeon: Judith Paige MD;  [facility];  Service: Surgical Oncology    ROTATOR CUFF REPAIR Right     SENTINEL LYMPH NODE BIOPSY Right     TONSILECTOMY AND ADNOIDECTOMY      WOUND DEBRIDEMENT Left 11/9/2017    Procedure: DEBRIDEMENT WOUND AND DRESSING CHANGE [facility]);   Surgeon: Jazmín Barron MD;  [facility];  Service: Cardiology       Family History   Problem Relation Age of Onset    Lung cancer Father     Cancer Brother     Diabetes type II Son        Social History     Occupational History    Not on file   Tobacco Use    Smoking status: Never Smoker    Smokeless tobacco: Never Used   Substance and Sexual Activity    Alcohol use: No    Drug use: No    Sexual activity: Not on file       Current Outpatient Medications on File Prior to Visit   Medication Sig    aspirin (ECOTRIN LOW STRENGTH) 81 mg EC tablet Take 81 mg by mouth daily    atorvastatin (LIPITOR) 20 mg tablet Take 20 mg by mouth daily   Calcium Citrate-Vitamin D (CALCIUM CITRATE + D PO) Take 1,500 mg by mouth daily    clindamycin (CLEOCIN) 300 MG capsule TAKE 2 CAPSULES BY MOUTH 1 HOUR PRIOR TO DENTAL PROCEDURE   clotrimazole-betamethasone (LOTRISONE) 1-0 05 % cream Apply topically    Cranberry (THERACRAN HP PO) Take 2 tablets by mouth daily    furosemide (LASIX) 20 mg tablet TAKE 1 TABLET BY MOUTH DAILY AS NEEDED FOR EDEMA    Glucosamine-Chondroit-Vit C-Mn (GLUCOSAMINE 1500 COMPLEX) CAPS Take 1 capsule by mouth daily      metFORMIN (GLUCOPHAGE) 500 mg tablet 2 (two) times a day    metoprolol tartrate (LOPRESSOR) 25 mg tablet Take 0 5 tablets (12 5 mg total) by mouth every 12 (twelve) hours    Multiple Vitamin (MULTIVITAMIN) capsule Take 1 capsule by mouth daily   Naproxen Sodium (ALEVE) 220 MG CAPS Take 1 capsule by mouth daily    ondansetron (ZOFRAN) 4 mg tablet Take 4 mg by mouth every 8 (eight) hours as needed for nausea or vomiting    tamoxifen (NOLVADEX) 20 mg tablet Take 1 tablet (20 mg total) by mouth daily    venlafaxine (EFFEXOR) 37 5 mg tablet Every 12 hours     No current facility-administered medications on file prior to visit  Allergies   Allergen Reactions    Ampicillin Shortness Of Breath, Anaphylaxis and Other (See Comments)     Other reaction(s): Unknown Allergic Reaction  Reaction Date: [id number];        Physical Exam:    /68   Pulse 100   Ht 5' 1 5" (1 562 m)   Wt 72 5 kg (159 lb 12 8 oz)   LMP  (LMP Unknown) Comment: post   BMI 29 70 kg/m²     Constitutional: normal, well developed, well nourished, alert, in no distress and non-toxic and no overt pain behavior    Eyes: anicteric  HEENT: grossly intact  Neck: supple, symmetric, trachea midline and no masses   Pulmonary:even and unlabored  Cardiovascular:No edema or pitting edema present  Skin:Normal without rashes or lesions and well hydrated  Psychiatric:Mood and affect appropriate  Neurologic:Cranial Nerves II-XII grossly intact  Musculoskeletal:normal    Imaging

## 2019-02-22 ENCOUNTER — TELEPHONE (OUTPATIENT)
Dept: PAIN MEDICINE | Facility: MEDICAL CENTER | Age: 79
End: 2019-02-22

## 2019-02-22 NOTE — TELEPHONE ENCOUNTER
Yes I did review the MRI but would not recommend any other treatments based on how she presented yesterday

## 2019-02-22 NOTE — TELEPHONE ENCOUNTER
S/w pt  States she saw 260Torrie Daniel in office yesterday and forgot to ask him if he was aware of her MRI spine results and if he had any further recommendations after reviewing  She states results were reviewed with her by oncology but they advised her to have pain management also review  Advised pt that I would forward to 260Torrie Daniel but he had likely already reviewed results

## 2019-02-26 PROCEDURE — 77334 RADIATION TREATMENT AID(S): CPT | Performed by: RADIOLOGY

## 2019-02-26 PROCEDURE — 77300 RADIATION THERAPY DOSE PLAN: CPT | Performed by: RADIOLOGY

## 2019-02-26 PROCEDURE — 77295 3-D RADIOTHERAPY PLAN: CPT | Performed by: RADIOLOGY

## 2019-02-27 ENCOUNTER — REMOTE DEVICE CLINIC VISIT (OUTPATIENT)
Dept: CARDIOLOGY CLINIC | Facility: CLINIC | Age: 79
End: 2019-02-27

## 2019-02-27 DIAGNOSIS — I44.2 CHB (COMPLETE HEART BLOCK) (HCC): ICD-10-CM

## 2019-02-27 DIAGNOSIS — Z95.0 CARDIAC PACEMAKER: ICD-10-CM

## 2019-02-27 DIAGNOSIS — I44.7 LBBB (LEFT BUNDLE BRANCH BLOCK): Primary | ICD-10-CM

## 2019-02-27 PROCEDURE — 77417 THER RADIOLOGY PORT IMAGE(S): CPT | Performed by: RADIOLOGY

## 2019-02-27 PROCEDURE — 77387 GUIDANCE FOR RADJ TX DLVR: CPT | Performed by: RADIOLOGY

## 2019-02-27 PROCEDURE — 77280 THER RAD SIMULAJ FIELD SMPL: CPT | Performed by: RADIOLOGY

## 2019-02-27 PROCEDURE — 99024 POSTOP FOLLOW-UP VISIT: CPT | Performed by: INTERNAL MEDICINE

## 2019-02-27 PROCEDURE — 77412 RADIATION TX DELIVERY LVL 3: CPT | Performed by: RADIOLOGY

## 2019-02-27 NOTE — PROGRESS NOTES
Results for orders placed or performed in visit on 02/27/19   Cardiac EP device report    Narrative    DUAL CHAMBER PACEMAKER (HIS)  NON-BILLABLE CARELINK TRANSMISSION - PRE RADIATION TX; BATTERY VOLTAGE ADEQUATE (4 5 YRS)  AP - 0 3%  - 92 5% (>40%/MVP ON)  ALL AVAILABLE LEAD PARAMETERS WITHIN NORMAL LIMITS  NO SIGNIFICANT HIGH RATE EPISODES  NORMAL DEVICE FUNCTION    EB

## 2019-02-28 ENCOUNTER — APPOINTMENT (OUTPATIENT)
Dept: RADIATION ONCOLOGY | Facility: CLINIC | Age: 79
End: 2019-02-28
Attending: RADIOLOGY
Payer: COMMERCIAL

## 2019-02-28 PROCEDURE — 77387 GUIDANCE FOR RADJ TX DLVR: CPT | Performed by: RADIOLOGY

## 2019-02-28 PROCEDURE — 77412 RADIATION TX DELIVERY LVL 3: CPT | Performed by: RADIOLOGY

## 2019-02-28 PROCEDURE — 77331 SPECIAL RADIATION DOSIMETRY: CPT | Performed by: RADIOLOGY

## 2019-03-01 ENCOUNTER — APPOINTMENT (EMERGENCY)
Dept: CT IMAGING | Facility: HOSPITAL | Age: 79
DRG: 392 | End: 2019-03-01
Payer: COMMERCIAL

## 2019-03-01 ENCOUNTER — APPOINTMENT (OUTPATIENT)
Dept: RADIATION ONCOLOGY | Facility: CLINIC | Age: 79
End: 2019-03-01
Attending: RADIOLOGY
Payer: COMMERCIAL

## 2019-03-01 ENCOUNTER — HOSPITAL ENCOUNTER (INPATIENT)
Facility: HOSPITAL | Age: 79
LOS: 1 days | Discharge: HOME | DRG: 392 | End: 2019-03-02
Attending: EMERGENCY MEDICINE | Admitting: HOSPITALIST
Payer: COMMERCIAL

## 2019-03-01 DIAGNOSIS — C50.611 MALIGNANT NEOPLASM OF AXILLARY TAIL OF RIGHT BREAST IN FEMALE, ESTROGEN RECEPTOR POSITIVE (HCC): Primary | ICD-10-CM

## 2019-03-01 DIAGNOSIS — E87.2 LACTIC ACIDOSIS: ICD-10-CM

## 2019-03-01 DIAGNOSIS — Z17.0 MALIGNANT NEOPLASM OF AXILLARY TAIL OF RIGHT BREAST IN FEMALE, ESTROGEN RECEPTOR POSITIVE (HCC): Primary | ICD-10-CM

## 2019-03-01 DIAGNOSIS — R10.84 GENERALIZED ABDOMINAL PAIN: Primary | ICD-10-CM

## 2019-03-01 PROBLEM — R82.90 ABNORMAL URINALYSIS: Status: ACTIVE | Noted: 2019-03-01

## 2019-03-01 PROBLEM — E11.9 TYPE 2 DIABETES MELLITUS, WITHOUT LONG-TERM CURRENT USE OF INSULIN (HCC): Status: ACTIVE | Noted: 2019-03-01

## 2019-03-01 PROBLEM — I10 HYPERTENSION: Status: ACTIVE | Noted: 2019-03-01

## 2019-03-01 PROBLEM — R65.10 SIRS (SYSTEMIC INFLAMMATORY RESPONSE SYNDROME) (HCC): Status: ACTIVE | Noted: 2019-03-01

## 2019-03-01 PROBLEM — R10.10 UPPER ABDOMINAL PAIN: Status: ACTIVE | Noted: 2019-03-01

## 2019-03-01 LAB
ALBUMIN SERPL BCP-MCNC: 3.5 G/DL (ref 3.5–5)
ALP SERPL-CCNC: 80 U/L (ref 46–116)
ALT SERPL W P-5'-P-CCNC: 33 U/L (ref 12–78)
ANION GAP SERPL CALCULATED.3IONS-SCNC: 14 MMOL/L (ref 4–13)
APTT PPP: 28 SECONDS (ref 26–38)
AST SERPL W P-5'-P-CCNC: 28 U/L (ref 5–45)
BACTERIA UR QL AUTO: ABNORMAL /HPF
BASOPHILS # BLD AUTO: 0.05 THOUSANDS/ΜL (ref 0–0.1)
BASOPHILS NFR BLD AUTO: 0 % (ref 0–1)
BILIRUB SERPL-MCNC: 0.61 MG/DL (ref 0.2–1)
BILIRUB UR QL STRIP: NEGATIVE
BUN SERPL-MCNC: 13 MG/DL (ref 5–25)
CALCIUM SERPL-MCNC: 9 MG/DL (ref 8.3–10.1)
CHLORIDE SERPL-SCNC: 103 MMOL/L (ref 100–108)
CLARITY UR: ABNORMAL
CO2 SERPL-SCNC: 24 MMOL/L (ref 21–32)
COLOR UR: YELLOW
CREAT SERPL-MCNC: 0.84 MG/DL (ref 0.6–1.3)
EOSINOPHIL # BLD AUTO: 0.02 THOUSAND/ΜL (ref 0–0.61)
EOSINOPHIL NFR BLD AUTO: 0 % (ref 0–6)
ERYTHROCYTE [DISTWIDTH] IN BLOOD BY AUTOMATED COUNT: 11.8 % (ref 11.6–15.1)
EST. AVERAGE GLUCOSE BLD GHB EST-MCNC: 154 MG/DL
GFR SERPL CREATININE-BSD FRML MDRD: 65 ML/MIN/1.73SQ M
GLUCOSE SERPL-MCNC: 184 MG/DL (ref 65–140)
GLUCOSE SERPL-MCNC: 243 MG/DL (ref 65–140)
GLUCOSE UR STRIP-MCNC: NEGATIVE MG/DL
HBA1C MFR BLD: 7 % (ref 4.2–6.3)
HCT VFR BLD AUTO: 43.7 % (ref 34.8–46.1)
HGB BLD-MCNC: 14.4 G/DL (ref 11.5–15.4)
HGB UR QL STRIP.AUTO: ABNORMAL
IMM GRANULOCYTES # BLD AUTO: 0.04 THOUSAND/UL (ref 0–0.2)
IMM GRANULOCYTES NFR BLD AUTO: 0 % (ref 0–2)
INR PPP: 1.04 (ref 0.86–1.17)
KETONES UR STRIP-MCNC: NEGATIVE MG/DL
LACTATE SERPL-SCNC: 2.1 MMOL/L (ref 0.5–2)
LACTATE SERPL-SCNC: 2.2 MMOL/L (ref 0.5–2)
LEUKOCYTE ESTERASE UR QL STRIP: NEGATIVE
LIPASE SERPL-CCNC: 69 U/L (ref 73–393)
LYMPHOCYTES # BLD AUTO: 2.09 THOUSANDS/ΜL (ref 0.6–4.47)
LYMPHOCYTES NFR BLD AUTO: 15 % (ref 14–44)
MCH RBC QN AUTO: 31.6 PG (ref 26.8–34.3)
MCHC RBC AUTO-ENTMCNC: 33 G/DL (ref 31.4–37.4)
MCV RBC AUTO: 96 FL (ref 82–98)
MONOCYTES # BLD AUTO: 1.32 THOUSAND/ΜL (ref 0.17–1.22)
MONOCYTES NFR BLD AUTO: 10 % (ref 4–12)
NEUTROPHILS # BLD AUTO: 10.19 THOUSANDS/ΜL (ref 1.85–7.62)
NEUTS SEG NFR BLD AUTO: 75 % (ref 43–75)
NITRITE UR QL STRIP: POSITIVE
NON-SQ EPI CELLS URNS QL MICRO: ABNORMAL /HPF
NRBC BLD AUTO-RTO: 0 /100 WBCS
PH UR STRIP.AUTO: 7 [PH] (ref 4.5–8)
PLATELET # BLD AUTO: 172 THOUSANDS/UL (ref 149–390)
PLATELET # BLD AUTO: 186 THOUSANDS/UL (ref 149–390)
PMV BLD AUTO: 9.5 FL (ref 8.9–12.7)
PMV BLD AUTO: 9.6 FL (ref 8.9–12.7)
POTASSIUM SERPL-SCNC: 3.7 MMOL/L (ref 3.5–5.3)
PROCALCITONIN SERPL-MCNC: <0.05 NG/ML
PROT SERPL-MCNC: 7.3 G/DL (ref 6.4–8.2)
PROT UR STRIP-MCNC: NEGATIVE MG/DL
PROTHROMBIN TIME: 13.7 SECONDS (ref 11.8–14.2)
RBC # BLD AUTO: 4.56 MILLION/UL (ref 3.81–5.12)
RBC #/AREA URNS AUTO: ABNORMAL /HPF
SODIUM SERPL-SCNC: 141 MMOL/L (ref 136–145)
SP GR UR STRIP.AUTO: 1.02 (ref 1–1.03)
TROPONIN I SERPL-MCNC: <0.02 NG/ML
UROBILINOGEN UR QL STRIP.AUTO: 0.2 E.U./DL
WBC # BLD AUTO: 13.71 THOUSAND/UL (ref 4.31–10.16)
WBC #/AREA URNS AUTO: ABNORMAL /HPF

## 2019-03-01 PROCEDURE — 85730 THROMBOPLASTIN TIME PARTIAL: CPT | Performed by: EMERGENCY MEDICINE

## 2019-03-01 PROCEDURE — 83605 ASSAY OF LACTIC ACID: CPT | Performed by: EMERGENCY MEDICINE

## 2019-03-01 PROCEDURE — 80053 COMPREHEN METABOLIC PANEL: CPT | Performed by: EMERGENCY MEDICINE

## 2019-03-01 PROCEDURE — 84145 PROCALCITONIN (PCT): CPT | Performed by: PHYSICIAN ASSISTANT

## 2019-03-01 PROCEDURE — 99223 1ST HOSP IP/OBS HIGH 75: CPT | Performed by: HOSPITALIST

## 2019-03-01 PROCEDURE — 96361 HYDRATE IV INFUSION ADD-ON: CPT

## 2019-03-01 PROCEDURE — 81001 URINALYSIS AUTO W/SCOPE: CPT

## 2019-03-01 PROCEDURE — 93005 ELECTROCARDIOGRAM TRACING: CPT

## 2019-03-01 PROCEDURE — 36415 COLL VENOUS BLD VENIPUNCTURE: CPT | Performed by: EMERGENCY MEDICINE

## 2019-03-01 PROCEDURE — 87040 BLOOD CULTURE FOR BACTERIA: CPT | Performed by: EMERGENCY MEDICINE

## 2019-03-01 PROCEDURE — 83690 ASSAY OF LIPASE: CPT | Performed by: EMERGENCY MEDICINE

## 2019-03-01 PROCEDURE — 99285 EMERGENCY DEPT VISIT HI MDM: CPT

## 2019-03-01 PROCEDURE — 96365 THER/PROPH/DIAG IV INF INIT: CPT

## 2019-03-01 PROCEDURE — 83036 HEMOGLOBIN GLYCOSYLATED A1C: CPT | Performed by: PHYSICIAN ASSISTANT

## 2019-03-01 PROCEDURE — 74177 CT ABD & PELVIS W/CONTRAST: CPT

## 2019-03-01 PROCEDURE — 82948 REAGENT STRIP/BLOOD GLUCOSE: CPT

## 2019-03-01 PROCEDURE — 96375 TX/PRO/DX INJ NEW DRUG ADDON: CPT

## 2019-03-01 PROCEDURE — 84484 ASSAY OF TROPONIN QUANT: CPT | Performed by: EMERGENCY MEDICINE

## 2019-03-01 PROCEDURE — 85610 PROTHROMBIN TIME: CPT | Performed by: EMERGENCY MEDICINE

## 2019-03-01 PROCEDURE — 85049 AUTOMATED PLATELET COUNT: CPT | Performed by: PHYSICIAN ASSISTANT

## 2019-03-01 PROCEDURE — 81003 URINALYSIS AUTO W/O SCOPE: CPT

## 2019-03-01 PROCEDURE — 85025 COMPLETE CBC W/AUTO DIFF WBC: CPT | Performed by: EMERGENCY MEDICINE

## 2019-03-01 RX ORDER — ATORVASTATIN CALCIUM 20 MG/1
20 TABLET, FILM COATED ORAL EVERY EVENING
Status: DISCONTINUED | OUTPATIENT
Start: 2019-03-01 | End: 2019-03-02 | Stop reason: HOSPADM

## 2019-03-01 RX ORDER — ACETAMINOPHEN 325 MG/1
650 TABLET ORAL EVERY 6 HOURS PRN
Status: DISCONTINUED | OUTPATIENT
Start: 2019-03-01 | End: 2019-03-02 | Stop reason: HOSPADM

## 2019-03-01 RX ORDER — ONDANSETRON 2 MG/ML
4 INJECTION INTRAMUSCULAR; INTRAVENOUS ONCE
Status: COMPLETED | OUTPATIENT
Start: 2019-03-01 | End: 2019-03-01

## 2019-03-01 RX ORDER — TAMOXIFEN CITRATE 10 MG/1
20 TABLET ORAL DAILY
Status: DISCONTINUED | OUTPATIENT
Start: 2019-03-02 | End: 2019-03-02 | Stop reason: HOSPADM

## 2019-03-01 RX ORDER — HEPARIN SODIUM 5000 [USP'U]/ML
5000 INJECTION, SOLUTION INTRAVENOUS; SUBCUTANEOUS EVERY 8 HOURS SCHEDULED
Status: DISCONTINUED | OUTPATIENT
Start: 2019-03-01 | End: 2019-03-02 | Stop reason: HOSPADM

## 2019-03-01 RX ORDER — SODIUM CHLORIDE 9 MG/ML
75 INJECTION, SOLUTION INTRAVENOUS CONTINUOUS
Status: DISCONTINUED | OUTPATIENT
Start: 2019-03-01 | End: 2019-03-02 | Stop reason: HOSPADM

## 2019-03-01 RX ORDER — ASPIRIN 81 MG/1
81 TABLET ORAL DAILY
Status: DISCONTINUED | OUTPATIENT
Start: 2019-03-02 | End: 2019-03-02 | Stop reason: HOSPADM

## 2019-03-01 RX ORDER — ONDANSETRON 2 MG/ML
4 INJECTION INTRAMUSCULAR; INTRAVENOUS EVERY 6 HOURS PRN
Status: DISCONTINUED | OUTPATIENT
Start: 2019-03-01 | End: 2019-03-02 | Stop reason: HOSPADM

## 2019-03-01 RX ADMIN — SODIUM CHLORIDE 75 ML/HR: 0.9 INJECTION, SOLUTION INTRAVENOUS at 19:22

## 2019-03-01 RX ADMIN — MORPHINE SULFATE 2 MG: 2 INJECTION, SOLUTION INTRAMUSCULAR; INTRAVENOUS at 14:05

## 2019-03-01 RX ADMIN — INSULIN LISPRO 2 UNITS: 100 INJECTION, SOLUTION INTRAVENOUS; SUBCUTANEOUS at 21:14

## 2019-03-01 RX ADMIN — HEPARIN SODIUM 5000 UNITS: 5000 INJECTION, SOLUTION INTRAVENOUS; SUBCUTANEOUS at 21:14

## 2019-03-01 RX ADMIN — SODIUM CHLORIDE 1000 ML: 0.9 INJECTION, SOLUTION INTRAVENOUS at 13:55

## 2019-03-01 RX ADMIN — SODIUM CHLORIDE 1000 ML: 0.9 INJECTION, SOLUTION INTRAVENOUS at 09:35

## 2019-03-01 RX ADMIN — IOHEXOL 100 ML: 350 INJECTION, SOLUTION INTRAVENOUS at 11:31

## 2019-03-01 RX ADMIN — ATORVASTATIN CALCIUM 20 MG: 20 TABLET, FILM COATED ORAL at 19:22

## 2019-03-01 RX ADMIN — ONDANSETRON 4 MG: 2 INJECTION INTRAMUSCULAR; INTRAVENOUS at 09:38

## 2019-03-01 RX ADMIN — METOPROLOL TARTRATE 12.5 MG: 25 TABLET ORAL at 21:12

## 2019-03-01 RX ADMIN — CEFEPIME HYDROCHLORIDE 2000 MG: 1 INJECTION, POWDER, FOR SOLUTION INTRAMUSCULAR; INTRAVENOUS at 13:57

## 2019-03-01 NOTE — PLAN OF CARE
Problem: Potential for Falls  Goal: Patient will remain free of falls  Description  INTERVENTIONS:  - Assess patient frequently for physical needs  -  Identify cognitive and physical deficits and behaviors that affect risk of falls  -  [place] fall precautions as indicated by assessment   - Educate patient/family on patient safety including physical limitations  - Instruct patient to call for assistance with activity based on assessment  - Modify environment to reduce risk of injury  - Consider OT/PT consult to assist with strengthening/mobility  Outcome: Progressing     Problem: Nutrition/Hydration-ADULT  Goal: Nutrient/Hydration intake appropriate for improving, restoring or maintaining nutritional needs  Description  Monitor and assess patient's nutrition/hydration status for malnutrition (ex- brittle hair, bruises, dry skin, pale skin and conjunctiva, muscle wasting, smooth red tongue, and disorientation)  Collaborate with interdisciplinary team and initiate plan and interventions as ordered  Monitor patient's weight and dietary intake as ordered or per policy  Utilize nutrition screening tool and intervene per policy  Determine patient's food preferences and provide high-protein, high-caloric foods as appropriate       INTERVENTIONS:  - Monitor oral intake, urinary output, labs, and treatment plans  - Assess nutrition and hydration status and recommend course of action  - Evaluate amount of meals eaten  - Assist patient with eating if necessary   - Allow adequate time for meals  - Recommend/ encourage appropriate diets, oral nutritional supplements, and vitamin/mineral supplements  - Order, calculate, and assess calorie counts as needed  - Recommend, monitor, and adjust tube feedings and TPN/PPN based on assessed needs  - Assess need for intravenous fluids  - Provide specific nutrition/hydration education as appropriate  - Include patient/family/caregiver in decisions related to nutrition  Outcome: Progressing     Problem: PAIN - ADULT  Goal: Verbalizes/displays adequate comfort level or baseline comfort level  Description  Interventions:  - Encourage patient to monitor pain and request assistance  - Assess pain using appropriate pain scale  - Administer analgesics based on type and severity of pain and evaluate response  - Implement non-pharmacological measures as appropriate and evaluate response  - Consider cultural and social influences on pain and pain management  - Notify physician/advanced practitioner if interventions unsuccessful or patient reports new pain  Outcome: Progressing     Problem: INFECTION - ADULT  Goal: Absence or prevention of progression during hospitalization  Description  INTERVENTIONS:  - Assess and monitor for signs and symptoms of infection  - Monitor lab/diagnostic results  - Monitor all insertion sites, i e  indwelling lines, tubes, and drains  - Monitor endotracheal (as able) and nasal secretions for changes in amount and color  - [place] appropriate cooling/warming therapies per order  - Administer medications as ordered  - Instruct and encourage patient and family to use good hand hygiene technique  - Identify and instruct in appropriate isolation precautions for identified infection/condition  Outcome: Progressing  Goal: Absence of fever/infection during neutropenic period  Description  INTERVENTIONS:  - Monitor WBC  - Implement neutropenic guidelines  Outcome: Progressing     Problem: SAFETY ADULT  Goal: Patient will remain free of falls  Description  INTERVENTIONS:  - Assess patient frequently for physical needs  -  Identify cognitive and physical deficits and behaviors that affect risk of falls    -  [place] fall precautions as indicated by assessment   - Educate patient/family on patient safety including physical limitations  - Instruct patient to call for assistance with activity based on assessment  - Modify environment to reduce risk of injury  - Consider OT/PT consult to assist with strengthening/mobility  Outcome: Progressing  Goal: Maintain or return to baseline ADL function  Description  INTERVENTIONS:  - Assess patient frequently for physical needs  -  Identify cognitive and physical deficits and behaviors that affect risk of falls    -  [place] fall precautions as indicated by assessment   - Educate patient/family on patient safety including physical limitations  - Instruct patient to call for assistance with activity based on assessment  - Modify environment to reduce risk of injury  - Consider OT/PT consult to assist with strengthening/mobility  Outcome: Progressing  Goal: Maintain or return mobility status to optimal level  Description  INTERVENTIONS:  -  Assess patient's ability to carry out ADLs; assess patient's baseline for ADL function and identify physical deficits which impact ability to perform ADLs (bathing, care of mouth/teeth, toileting, grooming, dressing, etc )  - Assess/evaluate cause of self-care deficits   - Assess range of motion  - Assess patient's mobility; develop plan if impaired  - Assess patient's need for assistive devices and provide as appropriate  - Encourage maximum independence but intervene and supervise when necessary  ¯ Involve family in performance of ADLs  ¯ Assess for home care needs following discharge   ¯ Request OT consult to assist with ADL evaluation and planning for discharge  ¯ Provide patient education as appropriate  Outcome: Progressing     Problem: DISCHARGE PLANNING  Goal: Discharge to home or other facility with appropriate resources  Description  INTERVENTIONS:  - Identify barriers to discharge w/patient and caregiver  - Arrange for needed discharge resources and transportation as appropriate  - Identify discharge learning needs (meds, wound care, etc )  - Arrange for interpretive services to assist at discharge as needed  - Refer to Case Management Department for coordinating discharge planning if the patient needs post-hospital services based on physician/advanced practitioner order or complex needs related to functional status, cognitive ability, or social support system  Outcome: Progressing     Problem: Knowledge Deficit  Goal: Patient/family/caregiver demonstrates understanding of disease process, treatment plan, medications, and discharge instructions  Description  Complete learning assessment and assess knowledge base    Interventions:  - Provide teaching at level of understanding  - Provide teaching via preferred learning methods  Outcome: Progressing

## 2019-03-01 NOTE — ASSESSMENT & PLAN NOTE
POA by tachycardia and leukocytosis with initial lactic acid 2 2 with repeat of 2 1  This is in the setting of metformin use and nausea and vomiting after radiation vs gastroenteritis vs GBD  No evidence of pna or pyelonephritis, UA is abn but pt is asymptomatic, no  transaminitis so cholangitis unlikely  Rec'd cefepime in ed  Continue ivf hydraiton  Check procalcitonin, monitor bcx, ucx  Stat RUQ u/s but otherwise will maintain off abx    If pt spikes fevers will need to open up broad spectrum abx

## 2019-03-01 NOTE — H&P
H&P- Nancyann Goodpasture 1940, 78 y o  female MRN: [id number]    Unit/Bed#: [address] 220-02 Encounter: [id number]    Primary Care Provider: Zoë Mcpherson MD   Date and time admitted to hospital: 3/1/2019  9:04 AM    [facility] - [facility]    Patient Information: Nancyann Goodpasture 78 y o  female MRN: [id number]  Unit/Bed#: [address] 220-02 Encounter: [id number]  Admitting Physician: Sudhakar Pyle PA-C  PCP: Zoë Mcpherson MD  Date of Admission:  03/01/19    Assessment/Plan:    Hospital Problem List:     Principal Problem:    Upper abdominal pain  Active Problems:    SIRS (systemic inflammatory response syndrome) (HCC)    Gastroesophageal reflux disease without esophagitis    Intermittent complete heart block (HCC)    Lactic acidosis    Abnormal urinalysis    Type 2 diabetes mellitus, without long-term current use of insulin ([facility] [address] )    Hypertension      * Upper abdominal pain  Assessment & Plan  May be 2* gastroenteritis  Less likely 2* GBD or UA associated w/n/v  Consider radiation induced gastritis from radiation of right axilla LN in pt w/known hx of metastatic breast ca w/mets to liver and right axillary lad  Ct  A/p demonstrates no new liver lesions does demonstrate very mild hepato and extrahepatopneumobilia but no biliary dilatation or inflammation of GB    No pancreatitis or pyelo  UA dirty but pt w/o s/sx concerning for pyelo or uti  Bowel rest w/surgical soft diet, ivf hydration  Check ruq u/s to r/o gbd given hx of      SIRS (systemic inflammatory response syndrome) (HCC)  Assessment & Plan  POA by tachycardia and leukocytosis with initial lactic acid 2 2 with repeat of 2 1  This is in the setting of metformin use and nausea and vomiting after radiation vs gastroenteritis vs GBD  No evidence of pna or pyelonephritis, UA is abn but pt is asymptomatic, no  transaminitis so cholangitis unlikely  Rec'd cefepime in ed  Continue ivf hydraiton  Check procalcitonin, monitor bcx, ucx  Stat RUQ u/s but otherwise will maintain off abx  If pt spikes fevers will need to open up broad spectrum abx    Hypertension  Assessment & Plan  On metoprolol tartrate 12 5 b i d  Lasix 20 mg p r n  For edema  Hold Lasix given nausea and vomiting, will continue Lopressor    Abnormal urinalysis  Assessment & Plan  Nitrite positive with innumerable bacteria and 4-10 WBCs patient is asymptomatic  This likely represents asymptomatic bacteriuria there is no pyelonephritis on clinical exam or CT scan  Follow-up U culture    Lactic acidosis  Assessment & Plan  In setting of metformin and n/v  Hold metformin and ivf hydration    Intermittent complete heart block (HCC)  Assessment & Plan  S/p ppm    Gastroesophageal reflux disease without esophagitis  Assessment & Plan  Continue op regimen    Adenocarcinoma of breast w/mets to liver  Recurrent invasive ductal carcinoma w/involvement of left breast, right breast, right axillary lad s/p chemo and surgery with right hepatic lobectomy in 2017  Started on radiation for right axillary LAD day prior to admission but otherwise disease burden felt to be relatively controlled per hem/onc documentation  Continue op surveillance w/hem/onc rad/onc          VTE Prophylaxis: Heparin  / sequential compression device   Code Status: fc  POLST: There is no POLST form on file for this patient (pre-hospital)    Anticipated Length of Stay:  Patient will be admitted on an Inpatient basis with an anticipated length of stay of  Greater than 2 midnights  Justification for Hospital Stay: sirs vs sepsis    Total Time for Visit, including Counseling / Coordination of Care: 45 minutes  Greater than 50% of this total time spent on direct patient counseling and coordination of care      Chief Complaint:   abd pain n/v    History of Present Illness:    Teja Lopes is a 78 y o  female who presents with PMH of invasive ductal carcinoma of the left breast with recurrence in the right breast and with metastasis to the liver and right axillary lymph node, history of gallbladder disease status post CBD stent and removal of 5 gallbladder stones and February of 2018 with subsequent removal of stent, history of GERD coming to the hospital for epigastric pain nausea vomiting x2 days  Patient was her normal state of health when the day prior to admission she started to have nausea and vomiting approximately 5 hours after oral intake and approximately 1 hour after radiation to her right axilla  She reports that she had several episodes of watery nonbilious nonbloody emesis and bilateral upper abdominal pain which was initially sharp and constant and then improved to a dull ache  It occurred throughout the night was not worsened in the evening although she did wake up several times and still had pain in the morning she still has had this discomfort and her  recommended she come to the hospital to be evaluated  The patient was concerned that at this point as the pain had been persistent this may be another attack of her gallbladder  She has not eaten anything or drink anything since 1100 hours the day prior to admission  She has no fevers or chills  She did have some constipation but had a large bowel movement this morning  She reported that this did not alleviate or worsen her pain  She has no dysuria frequency urgency  For suprapubic pain  Her appetite currently is intact she has no sick contacts or recent travel       Review of Systems:    Review of Systems   Constitutional: Negative for appetite change (appetite intact), chills and fever  HENT: Positive for congestion  Respiratory: Negative for cough and shortness of breath  Cardiovascular: Negative for chest pain, palpitations and leg swelling  Gastrointestinal: Positive for abdominal pain, constipation, nausea and vomiting  Negative for abdominal distention, anal bleeding, blood in stool and diarrhea     Musculoskeletal: Positive for arthralgias (chronic diffuse arthralgias- stable)  Negative for myalgias  Skin: Negative for rash  Neurological: Positive for headaches  Negative for light-headedness  All other systems reviewed and are negative  Past Medical and Surgical History:     Past Medical History:   Diagnosis Date    Anxiety     Arthritis     Breast CA ([facility] [address] )     recurrent, ductal carcinoma ER, [place] pos    Cardiac disease     Depression     Diverticula of intestine     Dizziness     and passes out    Flushing     Hiatal hernia     Ione (hard of hearing)      lizette    Hypercholesteremia     Liver mass     Liver metastasis (HCC)     Metastatic adenocarcinoma to liver (HCC)     Bx 01/03/2017    PONV (postoperative nausea and vomiting)     Recurrent breast cancer ([facility] [address] )     Shingles     Shortness of breath     on exertion    Tachycardia     Urinary incontinence     Use of cane as ambulatory aid     or walker       Past Surgical History:   Procedure Laterality Date    BREAST SURGERY      CARDIAC PACEMAKER REMOVAL N/A 11/9/2017    Procedure: PACEMAKER LEAD REVISION, REMOVAL OF NONFUNCTIONING LEAD, AND INSERTION OF A NEW RV LEAD;  Surgeon: Gabrielle Wheeler MD;  Location[facility];  Service: Cardiology    CATARACT EXTRACTION Bilateral     COLONOSCOPY      ERCP N/A 1/8/2018    Procedure: ENDOSCOPIC RETROGRADE CHOLANGIOPANCREATOGRAPHY (ERCP); Surgeon: Enma Rosen MD;  Location: [facility]; Service: Gastroenterology    HYSTERECTOMY      JOINT REPLACEMENT      lizette  TKR and right TSR    MASTECTOMY Right     ND ERCP DX COLLECTION SPECIMEN BRUSHING/WASHING N/A 2/22/2018    Procedure: ENDOSCOPIC RETROGRADE CHOLANGIOPANCREATOGRAPHY (ERCP) with stent removal;  Surgeon: Enma Rosen MD;  Location: [facility];   Service: Gastroenterology    ND [facility] [address] N/A 7/13/2017    Procedure: LIVER RESECTION, INTRAOPERATIVE ULTRASOUND;  Surgeon: Karly Hansen MD;  [facility];  Service: Surgical Oncology    ROTATOR CUFF REPAIR Right     SENTINEL LYMPH NODE BIOPSY Right     TONSILECTOMY AND ADNOIDECTOMY      WOUND DEBRIDEMENT Left 11/9/2017    Procedure: DEBRIDEMENT WOUND AND DRESSING CHANGE [facility]); Surgeon: Talon Hudson MD;  Location: [facility];  Service: Cardiology       Meds/Allergies:    Prior to Admission medications    Medication Sig Start Date End Date Taking? Authorizing Provider   aspirin (ECOTRIN LOW STRENGTH) 81 mg EC tablet Take 81 mg by mouth daily   Yes Historical Provider, MD   atorvastatin (LIPITOR) 20 mg tablet Take 20 mg by mouth daily  Yes Historical Provider, MD   Calcium Citrate-Vitamin D (CALCIUM CITRATE + D PO) Take 1,500 mg by mouth daily   Yes Historical Provider, MD   clotrimazole-betamethasone (LOTRISONE) 1-0 05 % cream Apply topically 8/14/17  Yes Historical Provider, MD   Cranberry (THERACRAN HP PO) Take 2 tablets by mouth daily   Yes Historical Provider, MD   Glucosamine-Chondroit-Vit C-Mn (GLUCOSAMINE 1500 COMPLEX) CAPS Take 1 capsule by mouth daily     Yes Historical Provider, MD   metFORMIN (GLUCOPHAGE) 500 mg tablet 2 (two) times a day   Yes Historical Provider, MD   metoprolol tartrate (LOPRESSOR) 25 mg tablet Take 0 5 tablets (12 5 mg total) by mouth every 12 (twelve) hours 10/10/18  Yes Jim Higginbotham, DO   Multiple Vitamin (MULTIVITAMIN) capsule Take 1 capsule by mouth daily     Yes Historical Provider, MD   Naproxen Sodium (ALEVE) 220 MG CAPS Take 1 capsule by mouth as needed    Yes Historical Provider, MD   tamoxifen (NOLVADEX) 20 mg tablet Take 1 tablet (20 mg total) by mouth daily 2/4/19  Yes Kendell Araya,    clindamycin (CLEOCIN) 300 MG capsule TAKE 2 CAPSULES BY MOUTH 1 HOUR PRIOR TO DENTAL PROCEDURE  1/3/18   Historical Provider, MD   furosemide (LASIX) 20 mg tablet TAKE 1 TABLET BY MOUTH DAILY AS NEEDED FOR EDEMA 8/1/17   Historical Provider, MD   ondansetron (ZOFRAN) 4 mg tablet Take 4 mg by mouth every 8 (eight) hours as needed for nausea or vomiting Historical Provider, MD   venlafaxine [facility]) 37 5 mg tablet Every 12 hours  3/1/19  Historical Provider, MD     I have reviewed home medications with patient personally  Allergies: Allergies   Allergen Reactions    Ampicillin Shortness Of Breath, Anaphylaxis and Other (See Comments)     Other reaction(s): Unknown Allergic Reaction  Reaction Date: 07Mar2012;        Social History:     Marital Status: /Civil Union   Occupation:   Patient Pre-hospital Living Situation:   Patient Pre-hospital Level of Mobility:   Patient Pre-hospital Diet Restrictions:   Substance Use History:   Social History     Substance and Sexual Activity   Alcohol Use No     Social History     Tobacco Use   Smoking Status Never Smoker   Smokeless Tobacco Never Used     Social History     Substance and Sexual Activity   Drug Use No       Family History:    Family History   Problem Relation Age of Onset    Lung cancer Father     Cancer Brother     Diabetes type II Son        Physical Exam:     Vitals:   Blood Pressure: (!) 184/98 (03/01/19 1606)  Pulse: 91 (03/01/19 1606)  Temperature: 99 7 °F (37 6 °C) (03/01/19 1606)  Temp Source: Temporal (03/01/19 1606)  Respirations: 18 (03/01/19 1606)  Weight - Scale: 68 8 kg (151 lb 10 8 oz) (03/01/19 0908)  SpO2: 95 % (03/01/19 1606)    Physical Exam   Constitutional: She appears well-developed  No distress  Appears stated age, overweight no apparent distress  P[place]   HENT:   Head: Normocephalic and atraumatic  Right Ear: External ear normal    Left Ear: External ear normal    Eyes: Conjunctivae are normal  Right eye exhibits no discharge  Neck: Normal range of motion  Cardiovascular: Normal rate, regular rhythm and normal heart sounds  Exam reveals no gallop and no friction rub  No murmur heard  Pulmonary/Chest: Effort normal and breath sounds normal  No stridor  No respiratory distress  She has no wheezes  She has no rales  Abdominal: Soft   Bowel sounds are normal  She exhibits no distension and no mass  There is no tenderness (No epigastric or right upper quadrant tenderness presently negative Smith sign)  There is no rebound and no guarding  No CVA tenderness with fist percussion     Musculoskeletal: She exhibits no edema  Neurological: She is alert  Skin: Capillary refill takes less than 2 seconds  She is not diaphoretic  Psychiatric: She has a normal mood and affect  Vitals reviewed  (  Be Sure to Include Physical Exam: Delete this entire line when you have entered your exam)    Additional Data:     Lab Results: I have personally reviewed pertinent reports  Results from last 7 days   Lab Units 03/01/19  0925   WBC Thousand/uL 13 71*   HEMOGLOBIN g/dL 14 4   HEMATOCRIT % 43 7   PLATELETS Thousands/uL 186   NEUTROS PCT % 75   LYMPHS PCT % 15   MONOS PCT % 10   EOS PCT % 0     Results from last 7 days   Lab Units 03/01/19  0925   POTASSIUM mmol/L 3 7   CHLORIDE mmol/L 103   CO2 mmol/L 24   BUN mg/dL 13   CREATININE mg/dL 0 84   CALCIUM mg/dL 9 0   ALK PHOS U/L 80   ALT U/L 33   AST U/L 28     Results from last 7 days   Lab Units 03/01/19  0925   INR  1 04       Imaging: I have personally reviewed pertinent reports  Mri Cervical Spine W Wo Contrast    Result Date: 2/13/2019  Narrative: MRI CERVICAL SPINE WITH AND WITHOUT CONTRAST INDICATION: C50 919: Malignant neoplasm of unspecified site of unspecified female breast  COMPARISON:  None  TECHNIQUE:  Sagittal T1, sagittal T2, sagittal inversion recovery, axial 2D merge and axial T2  Sagittal T1 and axial T1 postcontrast   IV Contrast:  10 mL of Gadobutrol injection (SINGLE-DOSE) IMAGE QUALITY:  Diagnostic  FINDINGS: ALIGNMENT:  Straightening of the normal cervical lordosis  Smoothly exaggerated upper thoracic kyphosis  No compression fracture  Slight anterior subluxation of T1 upon T2 likely related to facet arthropathy   MARROW SIGNAL:  Normal marrow signal is identified within the visualized bony structures  No discrete marrow lesion  CERVICAL AND VISUALIZED UPPER THORACIC CORD:  Normal signal within the visualized cord  PREVERTEBRAL AND PARASPINAL SOFT TISSUES:  Normal  VISUALIZED POSTERIOR FOSSA:  The visualized posterior fossa demonstrates no abnormal signal  CERVICAL DISC SPACES:  At the C1-2 level there is hypertrophic change surrounding the dens and at the C1-2 junction laterally, right more so than left  This results in mild to moderate canal stenosis with partial effacement of the CSF space but no drew  cord compression  There is severe left-sided foraminal narrowing secondary to these hypertrophic changes and mild right-sided foraminal narrowing  C2-C3:  Normal disc height  Left greater than right facet hypertrophic degenerative change  No disc herniation or canal stenosis  Slight left foraminal narrowing  C3-C4:  Both facet joints are fused  Normal disc height  No disc herniation or canal stenosis  Mild bilateral foraminal narrowing  C4-C5:  There is disc desiccation and loss of disc height  Annular bulging and uncinate/facet hypertrophic changes more pronounced on the left  There is mild canal stenosis with moderate left and mild right foraminal narrowing  C5-C6:  Slight loss of disc height with mild annular bulging and uncinate joint hypertrophic degenerative change  Mild canal stenosis with partial effacement of the CSF space especially anteriorly  No cord compression  Mild to moderate bilateral foraminal narrowing  C6-C7:  Loss of disc height  Annular bulging and uncinate joint hypertrophic degenerative change  Mild canal stenosis  Moderate left and mild right foraminal narrowing  C7-T1:  Slight left facet hypertrophic change  No canal stenosis  Minimal left foraminal narrowing without discrete nerve impingement   UPPER THORACIC DISC SPACES:  Normal  POSTCONTRAST IMAGING:  Normal      Impression: Straightening of the normal cervical lordosis with multilevel cervical spondylitic degenerative change including degenerative disc disease, endplate and uncinate hypertrophic changes and facet hypertrophic changes  There is multilevel mild canal stenosis slightly more pronounced at the level of C1-C2 secondary to pronounced hypertrophic change  There is multilevel foraminal narrowing, most pronounced on the left at the C1-C2 level  Moderate left foraminal narrowing at C4-5 and C6-7  Workstation performed: [id number]     Ct Abdomen Pelvis With Contrast    Result Date: 3/1/2019  Narrative: CT ABDOMEN AND PELVIS WITH IV CONTRAST INDICATION:   generalized abdominal pain, history of metastatic breast CA, gallbladder problems  COMPARISON:  CT abdomen/pelvis dated January 17, 2019  TECHNIQUE:  CT examination of the abdomen and pelvis was performed  In addition to portal venous phase postcontrast scanning through the abdomen and pelvis, delayed phase postcontrast scanning was performed through the upper abdominal viscera  Axial, sagittal, and coronal 2D reformatted images were created from the source data and submitted for interpretation  Radiation dose length product (DLP) for this visit:  604 mGy-cm   This examination, like all CT scans performed in the [facility], was performed utilizing techniques to minimize radiation dose exposure, including the use of iterative reconstruction and automated exposure control  IV Contrast:  100 mL of iohexol (OMNIPAQUE) Enteric Contrast:  Enteric contrast was not administered  FINDINGS: ABDOMEN LOWER CHEST:  Atelectatic changes are noted at the right lung base  There is stable elevation of the right hemidiaphragm  Postoperative changes of prior right mastectomy are partially visualized  LIVER/BILIARY TREE:  Postoperative changes of a partial right hepatic lobe resection are reidentified  An atypical enhancement pattern involving the periphery of the right hepatic lobe is unchanged    Again noted is a small amount of pneumobilia decreased from the prior exam   There is diffuse fatty infiltration of the liver  No new liver lesions  There is a small amount of air noted within the common bile duct  GALLBLADDER:  There has been interval resolution of previously noted air within the gallbladder lumen  The gallbladder is located within the anterior right upper abdomen between the right and left hepatic lobes  SPLEEN:  There multiple punctate calcifications suggestive of prior granulomatous disease  PANCREAS:  Unremarkable  ADRENAL GLANDS:  Unremarkable  KIDNEYS/URETERS:  No hydronephrosis  One or more sharply circumscribed subcentimeter renal hypodensities are noted  These lesions are too small to accurately characterize, but are statistically most likely to represent benign cortical renal cyst(s)  According to the guidelines published in  the [facility] Paper of the ACR Incidental Findings Committee (Radiology 2010), no further workup of these lesions is recommended  STOMACH AND BOWEL:  There is colonic diverticulosis without evidence of acute diverticulitis  APPENDIX:  No findings to suggest appendicitis  ABDOMINOPELVIC CAVITY:  No ascites or free intraperitoneal air  No lymphadenopathy  VESSELS:  Unremarkable for patient's age  PELVIS REPRODUCTIVE ORGANS:  Unremarkable for patient's age  URINARY BLADDER:  Unremarkable  ABDOMINAL WALL/INGUINAL REGIONS:  Unremarkable  OSSEOUS STRUCTURES:  No acute fracture or destructive osseous lesion  Multilevel spondylotic degenerative changes of the thoracolumbar spine are similar in appearance to the prior exam      Impression: No acute intra-abdominal abnormality  No free air or free fluid  Scattered colonic diverticulosis with no inflammatory changes present to suggest acute diverticulitis  Reidentified postoperative changes of prior partial right hepatic lobe resection with interval decrease in intrahepatic and extra hepatic pneumobilia  Interval decrease in gallbladder air  No new liver lesions  Workstation performed: [id number]       EKG, Pathology, and Other Studies Reviewed on Admission:   · EKG:     Allscripts / Epic Records Reviewed: Yes     ** Please Note: This note has been constructed using a voice recognition system   **

## 2019-03-01 NOTE — ED PROVIDER NOTES
History  Chief Complaint   Patient presents with    Abdominal Pain     Started yesterday, lower abdominal pain  An 24-year-old female with past medical history of breast cancer currently receiving radiation (recently restarted 2 days ago) with metastatic liver lesions s/p resection (July 2017), complete heart block s/p pacemaker placement, depression and hypercholesterolemia; presents with mid abdominal pain that began yesterday  Pain began around 11 am yesterday and has been constant since onset  Pain is nonradiating  Patient does report nausea and dry heaving however no vomiting  Patient has been unable to eat since yesterday secondary to the pain  Patient does report having hard stools, however did have a normal bowel movement this morning  Patient denies melena and bright red blood per rectum  Patient otherwise denies fever, chills, chest pain, shortness of breath, dysuria, urinary frequency/urgency, diarrhea, peripheral edema and rashes  Patient does report similar symptoms where she was found to have gallstone pancreatitis requiring stent placement  A/P:  Abdominal pain, mild generalized tenderness throughout  No peritoneal signs  Patient is overall well-appearing however is tachycardic  Will check lab work for pancreatitis, hepatitis, biliary obstruction, renal impairment and electrolyte abnormality  Will obtain CTAP to evaluate for obstruction versus infection versus metastatic lesions vs gallbladder pathology  Will give Zofran and IV fluids for symptomatic relief  Patient does not wish to have pain medication at this time  History provided by:  Medical records and patient  Abdominal Pain   Associated symptoms: nausea        Prior to Admission Medications   Prescriptions Last Dose Informant Patient Reported? Taking?    Calcium Citrate-Vitamin D (CALCIUM CITRATE + D PO)  Self Yes Yes   Sig: Take 1,500 mg by mouth daily   Cranberry (THERACRAN HP PO)  Self Yes Yes   Sig: Take 2 tablets by mouth daily   Glucosamine-Chondroit-Vit C-Mn (GLUCOSAMINE 1500 COMPLEX) CAPS  Self Yes Yes   Sig: Take 1 capsule by mouth daily     Multiple Vitamin (MULTIVITAMIN) capsule  Self Yes Yes   Sig: Take 1 capsule by mouth daily  Naproxen Sodium (ALEVE) 220 MG CAPS  Self Yes Yes   Sig: Take 1 capsule by mouth as needed    aspirin (ECOTRIN LOW STRENGTH) 81 mg EC tablet  Self Yes Yes   Sig: Take 81 mg by mouth daily   atorvastatin (LIPITOR) 20 mg tablet  Self Yes Yes   Sig: Take 20 mg by mouth daily  clindamycin (CLEOCIN) 300 MG capsule Not Taking at Unknown time Self Yes No   Sig: TAKE 2 CAPSULES BY MOUTH 1 HOUR PRIOR TO DENTAL PROCEDURE     clotrimazole-betamethasone (LOTRISONE) 1-0 05 % cream  Self Yes Yes   Sig: Apply topically   furosemide (LASIX) 20 mg tablet  Self Yes Yes   Sig: TAKE 1 TABLET BY MOUTH DAILY AS NEEDED FOR EDEMA   metFORMIN (GLUCOPHAGE) 500 mg tablet  Self Yes Yes   Si (two) times a day   metoprolol tartrate (LOPRESSOR) 25 mg tablet  Self No Yes   Sig: Take 0 5 tablets (12 5 mg total) by mouth every 12 (twelve) hours   ondansetron (ZOFRAN) 4 mg tablet  Self Yes Yes   Sig: Take 4 mg by mouth every 8 (eight) hours as needed for nausea or vomiting   tamoxifen (NOLVADEX) 20 mg tablet  Self No Yes   Sig: Take 1 tablet (20 mg total) by mouth daily      Facility-Administered Medications: None       Past Medical History:   Diagnosis Date    Anxiety     Arthritis     Breast CA (HCC)     recurrent, ductal carcinoma ER, [place] pos    Cardiac disease     Depression     Diverticula of intestine     Dizziness     and passes out    Flushing     Hiatal hernia     [place]hard [place] hearing[place]      [place]    Hypercholesteremia     Liver mass     Liver metastasis (HCC)     Metastatic adenocarcinoma to liver (HCC)     Bx 2017    PONV (postoperative nausea and vomiting)     Recurrent breast cancer (HCC)     Shingles     Shortness of breath     on exertion    Tachycardia     Urinary incontinence     Use of cane as ambulatory aid     or walker       Past Surgical History:   Procedure Laterality Date    BREAST SURGERY      CARDIAC PACEMAKER REMOVAL N/A 11/9/2017    Procedure: PACEMAKER LEAD REVISION, REMOVAL OF NONFUNCTIONING LEAD, AND INSERTION OF A NEW RV LEAD;  Surgeon: Shlomo Vega MD;  [facility];  Service: Cardiology    CATARACT EXTRACTION Bilateral     COLONOSCOPY      ERCP N/A 1/8/2018    Procedure: ENDOSCOPIC RETROGRADE CHOLANGIOPANCREATOGRAPHY (ERCP); Surgeon: Antonina Velázquez MD;  Location: [facility]; Service: Gastroenterology    HYSTERECTOMY      JOINT REPLACEMENT      [place]  TKR and right TSR    MASTECTOMY Right     NE ERCP DX COLLECTION SPECIMEN BRUSHING/WASHING N/A 2/22/2018    Procedure: ENDOSCOPIC RETROGRADE CHOLANGIOPANCREATOGRAPHY (ERCP) with stent removal;  Surgeon: Antonina Velázquez MD;  [facility]; Service: Gastroenterology    NE RESEC [address] N/A 7/13/2017    Procedure: LIVER RESECTION, INTRAOPERATIVE ULTRASOUND;  Surgeon: Kenny Apodaca MD;  [facility];  Service: Surgical Oncology    ROTATOR CUFF REPAIR Right     SENTINEL LYMPH NODE BIOPSY Right     TONSILECTOMY AND ADNOIDECTOMY      WOUND DEBRIDEMENT Left 11/9/2017    Procedure: DEBRIDEMENT WOUND AND DRESSING CHANGE [facility]); Surgeon: Shlomo Vega MD;  [facility];  Service: Cardiology       Family History   Problem Relation Age of Onset    Lung cancer Father     Cancer Brother     Diabetes type II Son      I have reviewed and agree with the history as documented  Social History     Tobacco Use    Smoking status: Never Smoker    Smokeless tobacco: Never Used   Substance Use Topics    Alcohol use: No    Drug use: No        Review of Systems   Gastrointestinal: Positive for abdominal pain and nausea  All other systems reviewed and are negative        Physical Exam  Physical Exam  General Appearance: alert and oriented, nad, non toxic appearing  Skin:  Warm, dry, intact  HEENT: atraumatic, normocephalic  Neck: Supple, trachea midline  Cardiac: RRR, tachycardic; no murmurs, rub, gallops  Pulmonary: lungs CTAB; no wheezes, rales, rhonchi  Gastrointestinal: abdomen soft, mild generalized tenderness, nondistended; no guarding or rebound tenderness; good bowel sounds, no mass or bruits  Extremities:  no pedal edema, 2+ pulses; no calf tenderness, no clubbing, no cyanosis  Neuro:  no focal motor or sensory deficits, CN 2-12 grossly intact  Psych:  Normal mood and affect, normal judgement and insight      Vital Signs  ED Triage Vitals   Temperature Pulse Respirations Blood Pressure SpO2   03/01/19 0908 03/01/19 0908 03/01/19 0908 03/01/19 0908 03/01/19 0908   97 6 °F (36 4 °C) (!) 116 20 137/70 97 %      Temp Source Heart Rate Source Patient Position - Orthostatic VS BP Location FiO2 (%)   03/01/19 0908 03/01/19 1138 03/01/19 0908 03/01/19 0908 --   Oral Monitor Sitting Left arm       Pain Score       03/01/19 0908       8           Vitals:    03/01/19 0908 03/01/19 1138 03/01/19 1500 03/01/19 1606   BP: 137/70 154/77 140/68 (!) 184/98   Pulse: (!) 116 90 91 91   Patient Position - Orthostatic VS: Sitting Lying Lying Lying       Visual Acuity      ED Medications  Medications   sodium chloride 0 9 % bolus 1,000 mL (0 mL Intravenous Stopped 3/1/19 1017)   ondansetron (ZOFRAN) injection 4 mg (4 mg Intravenous Given 3/1/19 0938)   iohexol (OMNIPAQUE) 350 MG/ML injection (MULTI-DOSE) 100 mL (100 mL Intravenous Given 3/1/19 1131)   sodium chloride 0 9 % bolus 1,000 mL (0 mL Intravenous Stopped 3/1/19 1504)   cefepime (MAXIPIME) 2 g/50 mL dextrose IVPB (0 mg Intravenous Stopped 3/1/19 1442)   morphine injection 2 mg (2 mg Intravenous Given 3/1/19 1405)       Diagnostic Studies  Results Reviewed     Procedure Component Value Units Date/Time    Lactic acid x2 [[id number]]  (Abnormal) Collected:  03/01/19 1248    Lab Status:  Final result Specimen:  Blood from Arm, Left Updated:  03/01/19 1318     LACTIC ACID 2 2 mmol/L     Narrative:       Result may be elevated if tourniquet was used during collection  Urine Microscopic [[id number]]  (Abnormal) Collected:  03/01/19 1153    Lab Status:  Final result Specimen:  Urine, Clean Catch Updated:  03/01/19 1221     RBC, UA 1-2 /hpf      WBC, UA 4-10 /hpf      Epithelial Cells Occasional /hpf      Bacteria, UA Innumerable /hpf     POCT urinalysis dipstick [[id number]]  (Abnormal) Resulted:  03/01/19 1153    Lab Status:  Final result Specimen:  Urine Updated:  03/01/19 1153    ED Urine Macroscopic [[id number]]  (Abnormal) Collected:  03/01/19 1153    Lab Status:  Final result Specimen:  Urine Updated:  03/01/19 1152     Color, UA Yellow     Clarity, UA Slightly Cloudy     pH, UA 7 0     Leukocytes, UA Negative     Nitrite, UA Positive     Protein, UA Negative mg/dl      Glucose, UA Negative mg/dl      Ketones, UA Negative mg/dl      Urobilinogen, UA 0 2 E U /dl      Bilirubin, UA Negative     Blood, UA Trace     Specific Jewett, UA 1 020    Narrative:       CLINITEK RESULT    Lactic acid x2 [[id number]]  (Abnormal) Collected:  03/01/19 1030    Lab Status:  Final result Specimen:  Blood from Arm, Left Updated:  03/01/19 1103     LACTIC ACID 2 1 mmol/L     Narrative:       Result may be elevated if tourniquet was used during collection  Blood culture #2 [[id number]] Collected:  03/01/19 1054    Lab Status: In process Specimen:  Blood from Hand, Left Updated:  03/01/19 1101    Protime-INR [[id number]]  (Normal) Collected:  03/01/19 0925    Lab Status:  Final result Specimen:  Blood from Arm, Left Updated:  03/01/19 1047     Protime 13 7 seconds      INR 1 04    APTT [[id number]]  (Normal) Collected:  03/01/19 0925    Lab Status:  Final result Specimen:  Blood from Arm, Left Updated:  03/01/19 1047     PTT 28 seconds     Blood culture #1 [[id number]] Collected:  03/01/19 1030    Lab Status:   In process Specimen:  Blood from Arm, Left Updated:  03/01/19 1036 Troponin I [[id number]]  (Normal) Collected:  03/01/19 0925    Lab Status:  Final result Specimen:  Blood from Arm, Left Updated:  03/01/19 1006     Troponin I <0 02 ng/mL     Comprehensive metabolic panel [[id number]]  (Abnormal) Collected:  03/01/19 0925    Lab Status:  Final result Specimen:  Blood from Arm, Left Updated:  03/01/19 1002     Sodium 141 mmol/L      Potassium 3 7 mmol/L      Chloride 103 mmol/L      CO2 24 mmol/L      ANION GAP 14 mmol/L      BUN 13 mg/dL      Creatinine 0 84 mg/dL      Glucose 184 mg/dL      Calcium 9 0 mg/dL      AST 28 U/L      ALT 33 U/L      Alkaline Phosphatase 80 U/L      Total Protein 7 3 g/dL      Albumin 3 5 g/dL      Total Bilirubin 0 61 mg/dL      eGFR 65 ml/min/1 73sq m     Narrative:       National Kidney Disease Education Program recommendations are as follows:  GFR calculation is accurate only with a steady state creatinine  Chronic Kidney disease less than 60 ml/min/1 73 sq  meters  Kidney failure less than 15 ml/min/1 73 sq  meters      Lipase [[id number]]  (Abnormal) Collected:  03/01/19 0925    Lab Status:  Final result Specimen:  Blood from Arm, Left Updated:  03/01/19 1002     Lipase 69 u/L     CBC and differential [[id number]]  (Abnormal) Collected:  03/01/19 0925    Lab Status:  Final result Specimen:  Blood from Arm, Left Updated:  03/01/19 0942     WBC 13 71 Thousand/uL      RBC 4 56 Million/uL      Hemoglobin 14 4 g/dL      Hematocrit 43 7 %      MCV 96 fL      MCH 31 6 pg      MCHC 33 0 g/dL      RDW 11 8 %      MPV 9 6 fL      Platelets 124 Thousands/uL      nRBC 0 /100 WBCs      Neutrophils Relative 75 %      Immat GRANS % 0 %      Lymphocytes Relative 15 %      Monocytes Relative 10 %      Eosinophils Relative 0 %      Basophils Relative 0 %      Neutrophils Absolute 10 19 Thousands/µL      Immature Grans Absolute 0 04 Thousand/uL      Lymphocytes Absolute 2 09 Thousands/µL      Monocytes Absolute 1 32 Thousand/µL      Eosinophils Absolute 0 02 Thousand/µL Basophils Absolute 0 05 Thousands/µL                  CT abdomen pelvis with contrast   Final Result by Lia Pacheco MD (03/01 1159)      No acute intra-abdominal abnormality  No free air or free fluid  Scattered colonic diverticulosis with no inflammatory changes present to suggest acute diverticulitis  Reidentified postoperative changes of prior partial right hepatic lobe resection with interval decrease in intrahepatic and extra hepatic pneumobilia  Interval decrease in gallbladder air  No new liver lesions  Workstation performed: [id number]                    Procedures  Procedures   ECG 12 Lead Documentation  Date/Time: today/date: 3/1/2019  Performed by: Nicole Pretty    ECG reviewed by me, the ED Provider: yes    Patient location:  ED   Previous ECG:  Compared to current, no change   Rate:  90  ECG rate assessment: normal    Rhythm: paced rhythm    Ectopy:  Single PVC   QRS axis:  Normal  Intervals: normal   Q waves: None   ST segments:  Normal  T waves: normal      Impression: Paced rhythm with isolated PVC        Phone Contacts  ED Phone Contact    ED Course  ED Course as of Mar 01 1611   Fri Mar 01, 2019   1001 Unclear etiology of patient's symptoms at this time, however there is possibility of infection  Given tachycardia on arrival along with leukocytosis, will add on the remainder of sepsis work up  WBC(!): 13 71   1006 LFT's and lipase within normal limits      1112 Pt receiving IVF  Suspect more likely secondary to dehydration than sepsis  LACTIC ACID(!!): 2 1   1156 Leuks negative   Nitrite, UA(!): Positive   1217 Negative for acute findings  CT abdomen pelvis with contrast   1226 With innumerable bacteria, possible etiology of patient's symptoms although patient denies dysuria/frequency/urgency  WBC, UA(!): 4-10   1320 Persistently elevated regardless of IVF bolus  Pt's HR has improved since arrival, 80-90's    Pt reports some improvement in pain, however still persists  Unclear source of symptoms at this time, possible UTI  Will start IV Abx and admit for further evaluation  LACTIC ACID(!!): 2 2                   Initial Sepsis Screening     Row Name 03/01/19 1115                Is the patient's history suggestive of a new or worsening infection? Yes (Proceed)  (Abnormal)   -AM        Suspected source of infection  urinary tract infection  -AM        Are two or more of the following signs & symptoms of infection both present and new to the patient? Yes (Proceed)  (Abnormal)   -AM        Indicate SIRS criteria  Leukocytosis (WBC > 94759 IJL); Tachycardia > 90 bpm  -AM        If the answer is yes to both questions, suspicion of sepsis is present  --        If severe sepsis is present AND tissue hypoperfusion perists in the hour after fluid resuscitation or lactate > 4, the patient meets criteria for SEPTIC SHOCK  --        Are any of the following organ dysfunction criteria present within 6 hours of suspected infection and SIRS criteria that are NOT considered to be chronic conditions?   Yes  (Abnormal)   -AM        Organ dysfunction  Lactate > 2 0 mmol/L  -AM        Date of presentation of severe sepsis  03/01/19  -AM        Time of presentation of severe sepsis  1115  -AM        Tissue hypoperfusion persists in the hour after crystalloid fluid administration, evidenced, by either:  --        Was hypotension present within one hour of the conclusion of crystalloid fluid administration?  --        Date of presentation of septic shock  --        Time of presentation of septic shock  --          User Key  (r) = Recorded By, (t) = Taken By, (c) = Cosigned By    [address] Name Provider Type    AM Zeenshare, DO Physician                  MDM    Disposition  Final diagnoses:   Generalized abdominal pain   Lactic acidosis     Time reflects when diagnosis was documented in both MDM as applicable and the Disposition within this note     Time User Action Codes Description Comment    3/1/2019  2:50 PM Johny High Add [R10 84] Generalized abdominal pain     3/1/2019  2:50 PM Johny High Add [E87 2] Lactic acidosis       ED Disposition     ED Disposition Condition Date/Time Comment    Admit Stable Fri Mar 1, 2019  2:50 PM Case was discussed with NELLY and the patient's admission status was agreed to be Admission Status: inpatient status to the service of Dr Rayna Dockery   Follow-up Information    None         Current Discharge Medication List      CONTINUE these medications which have NOT CHANGED    Details   aspirin (ECOTRIN LOW STRENGTH) 81 mg EC tablet Take 81 mg by mouth daily      atorvastatin (LIPITOR) 20 mg tablet Take 20 mg by mouth daily  Calcium Citrate-Vitamin D (CALCIUM CITRATE + D PO) Take 1,500 mg by mouth daily      clotrimazole-betamethasone (LOTRISONE) 1-0 05 % cream Apply topically      Cranberry (THERACRAN HP PO) Take 2 tablets by mouth daily      furosemide (LASIX) 20 mg tablet TAKE 1 TABLET BY MOUTH DAILY AS NEEDED FOR EDEMA      Glucosamine-Chondroit-Vit C-Mn (GLUCOSAMINE 1500 COMPLEX) CAPS Take 1 capsule by mouth daily        metFORMIN (GLUCOPHAGE) 500 mg tablet 2 (two) times a day      metoprolol tartrate (LOPRESSOR) 25 mg tablet Take 0 5 tablets (12 5 mg total) by mouth every 12 (twelve) hours  Qty: 30 tablet, Refills: 6    Associated Diagnoses: Tachycardia      Multiple Vitamin (MULTIVITAMIN) capsule Take 1 capsule by mouth daily        Naproxen Sodium (ALEVE) 220 MG CAPS Take 1 capsule by mouth as needed       ondansetron (ZOFRAN) 4 mg tablet Take 4 mg by mouth every 8 (eight) hours as needed for nausea or vomiting      tamoxifen (NOLVADEX) 20 mg tablet Take 1 tablet (20 mg total) by mouth daily  Qty: 30 tablet, Refills: 3    Associated Diagnoses: Malignant neoplasm of female breast, unspecified estrogen receptor status, unspecified laterality, unspecified site of breast (HCC)      clindamycin (CLEOCIN) 300 MG capsule TAKE 2 CAPSULES BY MOUTH 1 HOUR PRIOR TO DENTAL PROCEDURE  Refills: 0           No discharge procedures on file      ED Provider  Electronically Signed by           Adali Lawler DO  03/01/19 5786

## 2019-03-01 NOTE — ASSESSMENT & PLAN NOTE
On metoprolol tartrate 12 5 b i d  Lasix 20 mg p r n   For edema  Hold Lasix given nausea and vomiting, will continue Lopressor

## 2019-03-01 NOTE — ASSESSMENT & PLAN NOTE
Nitrite positive with innumerable bacteria and 4-10 WBCs patient is asymptomatic  This likely represents asymptomatic bacteriuria there is no pyelonephritis on clinical exam or CT scan  Follow-up U culture

## 2019-03-01 NOTE — ASSESSMENT & PLAN NOTE
May be 2* gastroenteritis  Less likely 2* GBD or UA associated w/n/v  Consider radiation induced gastritis from radiation of right axilla LN in pt w/known hx of metastatic breast ca w/mets to liver and right axillary lad  Ct  A/p demonstrates no new liver lesions does demonstrate very mild hepato and extrahepatopneumobilia but no biliary dilatation or inflammation of GB    No pancreatitis or pyelo  UA dirty but pt w/o s/sx concerning for pyelo or uti  Bowel rest w/surgical soft diet, ivf hydration  Check ruq u/s to r/o gbd given hx of

## 2019-03-01 NOTE — MEDICAL STUDENT
MEDICAL STUDENT  Inpatient H&P for TRAINING ONLY   Not Part of Legal Medical Record       H&P Exam - Morgan Monterroso 78 y o  female MRN: [id number]    Unit/Bed#: [place] [address] -02 Encounter: [id number]    Assessment/Plan:  SIRS   - WBC 13 71  - lactic acid 2 2   - possible urinary source, consider pyelonephritis   - IVF   - procalcitonin pending   - blood cultures pending   - received one dose of cefepime IV in the ED, will hold on antibiotics for now until results of procalcitonin and blood cultures return     Abnormal urinalysis   - UA in the ED was positive for nitrites and bacteria   - patient currently denies any urinary symptoms   - will hold on antibiotics since patient is asymptomatic and afebrile at this time     Gastroenteritis   - viral gastroenteritis vs radiation induced   - patient complaining of nausea and vomiting x1 day   - IVF   - ondansetron 4 mg IV prn nausea and vomiting     Diabetes mellitus type 2  - last hemoglobin A1C 11/24/18 was 7 3, recheck A1C   - patient taking metformin at home, hold for 48 hours s/p IV contrast for CT   - Insulin sliding scale, monitor blood glucose levels   - diabetic diet     Hyperlipidemia   - continue atorvastatin   - continue aspirin 81 mg     Hypertension  - continue metoprolol tartrate 12 5 mg PO q12 hours  - monitor BP     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  - s/p right mastectomy, chemotherapy, liver resection   - currently undergoing radiation for metastasis to right axillary lymph node  Wednesday 2/27/19 was her first treatment  She is scheduled for radiation daily, Mon-Fri  - continue tamoxifen 20 mg PO daily     Complete heart block    - s/p pacemaker     History of Present Illness   B S   Is a 77 yo F with PMH breast cancer with mets to the liver s/p mastectomy, right liver resection, and  chemotherapy, DM type 2, heart block s/p pacemaker placement, hyperlipidemia who presents with the complaint of abdominal pain, nausea, vomiting x1 day  Patient states that this pain started yesterday afternoon  The pain is in the epigastric area, does not radiate  She states the pain yesterday was sharp, today the pain is dull and constant  She received morphine in the ED, which she states has helped to alleviate the pain  She has has nausea and vomiting since yesterday as well  Patient states she vomited three times yesterday  She complains of decreased appetite yesterday and most of today, her last meal was yesterday at 11 AM  However, she is stating that she is now hungry and would like to eat  She also complains of intermittent right flank pain, it does not radiate  Denies any fever, chills, dysuria, chest pain, shortness of breath, diarrhea, constipation  She denies any recent illness  Denies any tobacco or alcohol use  She lives at home with her  in a two story home  ROS: Review of Systems   Constitutional: Negative for chills, fatigue and fever  HENT: Negative for congestion, rhinorrhea and sore throat  Respiratory: Negative for cough and shortness of breath  Cardiovascular: Negative for chest pain and leg swelling  Gastrointestinal: Positive for abdominal pain, nausea and vomiting  Negative for constipation and diarrhea  Genitourinary: Positive for flank pain (R sided flank pain)  Negative for difficulty urinating and dysuria  Musculoskeletal: Negative for arthralgias and joint swelling  Skin: Negative for rash and wound  Neurological: Negative for dizziness and light-headedness  Psychiatric/Behavioral: Negative for behavioral problems and confusion           Historical Information   Past Medical History:   Diagnosis Date    Anxiety     Arthritis     Breast CA ([facility] [address] )     recurrent, ductal carcinoma ER, CA pos    Cardiac disease     Depression     Diverticula of intestine     Dizziness     and passes out    Flushing     Hiatal hernia     [place]    Hypercholesteremia     Liver mass     Liver metastasis [facility] [address] )     Metastatic adenocarcinoma to liver ([facility] [address] )     Bx 01/03/2017    PONV (postoperative nausea and vomiting)     Recurrent breast cancer (HCC)     Shingles     Shortness of breath     on exertion    Tachycardia     Urinary incontinence     Use of cane as ambulatory aid     or walker     Past Surgical History:   Procedure Laterality Date    BREAST SURGERY      CARDIAC PACEMAKER REMOVAL N/A 11/9/2017    Procedure: PACEMAKER LEAD REVISION, REMOVAL OF NONFUNCTIONING LEAD, AND INSERTION OF A NEW RV LEAD;  Surgeon: Cierra Shine MD;  Location[facility];  Service: Cardiology    CATARACT EXTRACTION Bilateral     COLONOSCOPY      ERCP N/A 1/8/2018    Procedure: ENDOSCOPIC RETROGRADE CHOLANGIOPANCREATOGRAPHY (ERCP); Surgeon: Cuco Cornejo MD;  Location: [facility]; Service: Gastroenterology    HYSTERECTOMY      JOINT REPLACEMENT      [place]  TKR and right TSR    MASTECTOMY Right     RI ERCP DX COLLECTION SPECIMEN BRUSHING/WASHING N/A 2/22/2018    Procedure: ENDOSCOPIC RETROGRADE CHOLANGIOPANCREATOGRAPHY (ERCP) with stent removal;  Surgeon: Cuco Cornejo MD;  [facility]: [facility]; Service: Gastroenterology    RI RESEC [address] N/A 7/13/2017    Procedure: LIVER RESECTION, INTRAOPERATIVE ULTRASOUND;  Surgeon: Sueellen Habermann, MD;  Location[facility];  Service: Surgical Oncology    ROTATOR CUFF REPAIR Right     SENTINEL LYMPH NODE BIOPSY Right     TONSILECTOMY AND ADNOIDECTOMY      WOUND DEBRIDEMENT Left 11/9/2017    Procedure: DEBRIDEMENT WOUND AND DRESSING CHANGE [facility]);   Surgeon: Cierra Shine MD;  [facility];  Service: Cardiology     Social History   Social History     Substance and Sexual Activity   Alcohol Use No     Social History     Substance and Sexual Activity   Drug Use No     Social History     Tobacco Use   Smoking Status Never Smoker   Smokeless Tobacco Never Used     Family History:   Family History   Problem Relation Age of Onset    Lung cancer Father     Cancer Brother     Diabetes type II Son        Meds/Allergies   all medications and allergies reviewed  Allergies   Allergen Reactions    Ampicillin Shortness Of Breath, Anaphylaxis and Other (See Comments)     Other reaction(s): Unknown Allergic Reaction  Reaction Date: 07Mar2012;        Objective   First Vitals:   Blood Pressure: 137/70 (03/01/19 0908)  Pulse: (!) 116 (03/01/19 0908)  Temperature: 97 6 °F (36 4 °C) (03/01/19 0908)  Temp Source: Oral (03/01/19 0908)  Respirations: 20 (03/01/19 0908)  Weight - Scale: 68 8 kg (151 lb 10 8 oz) (03/01/19 0908)  SpO2: 97 % (03/01/19 0908)    Current Vitals:   Blood Pressure: (!) 184/98 (03/01/19 1606)  Pulse: 91 (03/01/19 1606)  Temperature: 99 7 °F (37 6 °C) (03/01/19 1606)  Temp Source: Temporal (03/01/19 1606)  Respirations: 18 (03/01/19 1606)  Weight - Scale: 68 8 kg (151 lb 10 8 oz) (03/01/19 0908)  SpO2: 95 % (03/01/19 1606)      Intake/Output Summary (Last 24 hours) at 3/1/2019 1702  Last data filed at 3/1/2019 1017  Gross per 24 hour   Intake 1000 ml   Output --   Net 1000 ml       Invasive Devices     Peripheral Intravenous Line            Peripheral IV 03/01/19 Left Forearm less than 1 day                Physical Exam: Physical Exam   Constitutional: She is oriented to person, place, and time  She appears well-developed and well-nourished  No distress  HENT:   Head: Normocephalic and atraumatic  Eyes: Pupils are equal, round, and reactive to light  Right eye exhibits no discharge  Left eye exhibits no discharge  Neck: Normal range of motion  Cardiovascular: Normal rate and regular rhythm  Pulmonary/Chest: Effort normal and breath sounds normal    Abdominal: Soft  Bowel sounds are normal  She exhibits no distension  There is no tenderness  There is no guarding and negative Smith's sign  Musculoskeletal: She exhibits no edema or tenderness  Neurological: She is alert and oriented to person, place, and time     No focal neuro deficits Skin: Skin is warm and dry  She is not diaphoretic  Psychiatric: She has a normal mood and affect  Her behavior is normal          Lab Results:  WBC 4 31 - 10 16 Thousand/uL 13  71High       ANION GAP 4 - 13 mmol/L 14High       Lipase 73 - 393 u/L 69Low          Protime 11 8 - 14 2 seconds 13 7    INR 0 86 - 1 17 1 04      LACTIC ACID 0 5 - 2 0 mmol/L 2 2High Panic      RBC, UA None Seen, 0-5 /hpf 1-2Abnormal     WBC, UA None Seen, 0-5, 5-55, 5-65 /hpf 4-10Abnormal     Epithelial Cells None Seen, Occasional /hpf Occasional    Bacteria, UA None Seen, Occasional /hpf InnumerableAbnormal       Nitrite, UA Negative PositiveAbnormal       Imaging: CT abdomen and pelvis with contrast 3/1 showed no acute intra-abdominal abnormality  No free air or free fluid  Scattered colonic diverticulosis with no inflammatory changes present to suggest acute diverticulitis  Reidentified postoperative changes of prior partial right hepatic lobe resection with interval decrease in intrahepatic and extra hepatic pneumobilia  Interval decrease in gallbladder air  No new liver lesions      Antonieta MCKEON

## 2019-03-02 ENCOUNTER — APPOINTMENT (INPATIENT)
Dept: ULTRASOUND IMAGING | Facility: HOSPITAL | Age: 79
DRG: 392 | End: 2019-03-02
Payer: COMMERCIAL

## 2019-03-02 VITALS
WEIGHT: 151.68 LBS | HEART RATE: 82 BPM | SYSTOLIC BLOOD PRESSURE: 146 MMHG | DIASTOLIC BLOOD PRESSURE: 81 MMHG | OXYGEN SATURATION: 95 % | TEMPERATURE: 98.4 F | BODY MASS INDEX: 28.19 KG/M2 | RESPIRATION RATE: 20 BRPM

## 2019-03-02 LAB
ALBUMIN SERPL BCP-MCNC: 2.6 G/DL (ref 3.5–5)
ALP SERPL-CCNC: 61 U/L (ref 46–116)
ALT SERPL W P-5'-P-CCNC: 25 U/L (ref 12–78)
ANION GAP SERPL CALCULATED.3IONS-SCNC: 11 MMOL/L (ref 4–13)
AST SERPL W P-5'-P-CCNC: 25 U/L (ref 5–45)
ATRIAL RATE: 90 BPM
BASOPHILS # BLD AUTO: 0.04 THOUSANDS/ΜL (ref 0–0.1)
BASOPHILS NFR BLD AUTO: 1 % (ref 0–1)
BILIRUB SERPL-MCNC: 0.63 MG/DL (ref 0.2–1)
BUN SERPL-MCNC: 8 MG/DL (ref 5–25)
CALCIUM SERPL-MCNC: 7.9 MG/DL (ref 8.3–10.1)
CHLORIDE SERPL-SCNC: 107 MMOL/L (ref 100–108)
CO2 SERPL-SCNC: 24 MMOL/L (ref 21–32)
CREAT SERPL-MCNC: 0.59 MG/DL (ref 0.6–1.3)
EOSINOPHIL # BLD AUTO: 0.25 THOUSAND/ΜL (ref 0–0.61)
EOSINOPHIL NFR BLD AUTO: 3 % (ref 0–6)
ERYTHROCYTE [DISTWIDTH] IN BLOOD BY AUTOMATED COUNT: 12 % (ref 11.6–15.1)
GFR SERPL CREATININE-BSD FRML MDRD: 86 ML/MIN/1.73SQ M
GLUCOSE SERPL-MCNC: 130 MG/DL (ref 65–140)
GLUCOSE SERPL-MCNC: 138 MG/DL (ref 65–140)
GLUCOSE SERPL-MCNC: 158 MG/DL (ref 65–140)
HCT VFR BLD AUTO: 38.3 % (ref 34.8–46.1)
HGB BLD-MCNC: 12.5 G/DL (ref 11.5–15.4)
IMM GRANULOCYTES # BLD AUTO: 0.03 THOUSAND/UL (ref 0–0.2)
IMM GRANULOCYTES NFR BLD AUTO: 0 % (ref 0–2)
LIPASE SERPL-CCNC: 73 U/L (ref 73–393)
LYMPHOCYTES # BLD AUTO: 1.88 THOUSANDS/ΜL (ref 0.6–4.47)
LYMPHOCYTES NFR BLD AUTO: 22 % (ref 14–44)
MCH RBC QN AUTO: 32.1 PG (ref 26.8–34.3)
MCHC RBC AUTO-ENTMCNC: 32.6 G/DL (ref 31.4–37.4)
MCV RBC AUTO: 99 FL (ref 82–98)
MONOCYTES # BLD AUTO: 1.03 THOUSAND/ΜL (ref 0.17–1.22)
MONOCYTES NFR BLD AUTO: 12 % (ref 4–12)
NEUTROPHILS # BLD AUTO: 5.4 THOUSANDS/ΜL (ref 1.85–7.62)
NEUTS SEG NFR BLD AUTO: 62 % (ref 43–75)
NRBC BLD AUTO-RTO: 0 /100 WBCS
P AXIS: 38 DEGREES
PLATELET # BLD AUTO: 144 THOUSANDS/UL (ref 149–390)
PMV BLD AUTO: 9.6 FL (ref 8.9–12.7)
POTASSIUM SERPL-SCNC: 3.5 MMOL/L (ref 3.5–5.3)
PR INTERVAL: 222 MS
PROT SERPL-MCNC: 5.8 G/DL (ref 6.4–8.2)
QRS AXIS: 19 DEGREES
QRSD INTERVAL: 102 MS
QT INTERVAL: 330 MS
QTC INTERVAL: 403 MS
RBC # BLD AUTO: 3.89 MILLION/UL (ref 3.81–5.12)
SODIUM SERPL-SCNC: 142 MMOL/L (ref 136–145)
T WAVE AXIS: -81 DEGREES
VENTRICULAR RATE: 90 BPM
WBC # BLD AUTO: 8.63 THOUSAND/UL (ref 4.31–10.16)

## 2019-03-02 PROCEDURE — 80053 COMPREHEN METABOLIC PANEL: CPT | Performed by: PHYSICIAN ASSISTANT

## 2019-03-02 PROCEDURE — 83690 ASSAY OF LIPASE: CPT | Performed by: PHYSICIAN ASSISTANT

## 2019-03-02 PROCEDURE — 76705 ECHO EXAM OF ABDOMEN: CPT

## 2019-03-02 PROCEDURE — 99238 HOSP IP/OBS DSCHRG MGMT 30/<: CPT | Performed by: HOSPITALIST

## 2019-03-02 PROCEDURE — 85025 COMPLETE CBC W/AUTO DIFF WBC: CPT | Performed by: PHYSICIAN ASSISTANT

## 2019-03-02 PROCEDURE — 93010 ELECTROCARDIOGRAM REPORT: CPT | Performed by: INTERNAL MEDICINE

## 2019-03-02 PROCEDURE — 82948 REAGENT STRIP/BLOOD GLUCOSE: CPT

## 2019-03-02 RX ADMIN — SODIUM CHLORIDE 75 ML/HR: 0.9 INJECTION, SOLUTION INTRAVENOUS at 07:23

## 2019-03-02 RX ADMIN — METOPROLOL TARTRATE 12.5 MG: 25 TABLET ORAL at 10:25

## 2019-03-02 RX ADMIN — ASPIRIN 81 MG: 81 TABLET, COATED ORAL at 10:25

## 2019-03-02 RX ADMIN — HEPARIN SODIUM 5000 UNITS: 5000 INJECTION, SOLUTION INTRAVENOUS; SUBCUTANEOUS at 05:25

## 2019-03-02 RX ADMIN — ACETAMINOPHEN 650 MG: 325 TABLET, FILM COATED ORAL at 02:48

## 2019-03-02 RX ADMIN — TAMOXIFEN CITRATE 20 MG: 10 TABLET, FILM COATED ORAL at 10:25

## 2019-03-02 NOTE — UTILIZATION REVIEW
Network Utilization Review Department  Phone: [phone]; Fax [phone]  [email or web address]  ATTENTION: Please call with any questions or concerns to [phone]  and carefully listen to the prompts so that you are directed to the right person  Send all requests for admission clinical reviews, approved or denied determinations and any other requests to fax [phone]  All voicemails are confidential   Initial Clinical Review    Admission: Date/Time/Statement:INPT  3/1/19 @ 1451   Orders Placed This Encounter   Procedures    Inpatient Admission     Standing Status:   Standing     Number of Occurrences:   1     Order Specific Question:   Admitting Physician     Answer:   Kobi Whiting [[id number]]     Order Specific Question:   Level of Care     Answer:   Med Surg [16]     Order Specific Question:   Estimated length of stay     Answer:   More than 2 Midnights     Order Specific Question:   Certification     Answer:   I certify that inpatient services are medically necessary for this patient for a duration of greater than two midnights  See H&P and MD Progress Notes for additional information about the patient's course of treatment  ED: Date/Time/Mode of Arrival:   ED Arrival Information     Expected Arrival Acuity Means of Arrival Escorted By Service Admission Type    - 3/1/2019 09:01 Urgent Walk-In Family Member General Medicine Urgent    Arrival Complaint    ABDOMINAL PAIN        Chief Complaint:   Chief Complaint   Patient presents with    Abdominal Pain     Started yesterday, lower abdominal pain       History of Illness: 78 y o  female who presents with PMH of invasive ductal carcinoma of the left breast with recurrence in the right breast and with metastasis to the liver and right axillary lymph node, history of gallbladder disease status post CBD stent and removal of 5 gallbladder stones and February of 2018 with subsequent removal of stent, history of GERD coming to the hospital for epigastric pain nausea vomiting x2 days  Patient was her normal state of health when the day prior to admission she started to have nausea and vomiting approximately 5 hours after oral intake and approximately 1 hour after radiation to her right axilla  She reports that she had several episodes of watery nonbilious nonbloody emesis and bilateral upper abdominal pain which was initially sharp and constant and then improved to a dull ache  It occurred throughout the night was not worsened in the evening although she did wake up several times and still had pain in the morning she still has had this discomfort and her  recommended she come to the hospital to be evaluated  The patient was concerned that at this point as the pain had been persistent this may be another attack of her gallbladder  She has not eaten anything or drink anything since 1100 hours the day prior to admission  She has no fevers or chills  She did have some constipation but had a large bowel movement this morning  She reported that this did not alleviate or worsen her pain  She has no dysuria frequency urgency  For suprapubic pain  Her appetite currently is intact she has no sick contacts or recent travel         ED Vital Signs:   ED Triage Vitals   Temperature Pulse Respirations Blood Pressure SpO2   03/01/19 0908 03/01/19 0908 03/01/19 0908 03/01/19 0908 03/01/19 0908   97 6 °F (36 4 °C) (!) 116 20 137/70 97 %      Temp Source Heart Rate Source Patient Position - Orthostatic VS BP Location FiO2 (%)   03/01/19 0908 03/01/19 1138 03/01/19 0908 03/01/19 0908 --   Oral Monitor Sitting Left arm       Pain Score       03/01/19 0908       8        Wt Readings from Last 1 Encounters:   03/01/19 68 8 kg (151 lb 10 8 oz)     Vital Signs (abnormal):   03/02/19 0730  98 4 °F (36 9 °C)  82  20  146/81  95 %  None (Room air)  Lying   03/01/19 2252  97 2 °F (36 2 °C)Abnormal   90  20  144/83  97 %  None (Room air)  Lying   03/01/19 2112  --  90  --  154/74  --  --  --   03/01/19 1606  99 7 °F (37 6 °C)  91  18  184/98Abnormal   95 %  None (Room air)  Lying   03/01/19 1500  --  91  16  140/68  100 %  None (Room air)  Lying   03/01/19 1138  --  90  18  154/77  97 %  None (Room air)  Lying   03/01/19 0915  --  --  --  --  --  None (Room air)  --   03/01/19 0908  97 6 °F (36 4 °C)  116Abnormal   20  137/70  97 %  None (Room air)  Sitting       Pertinent Labs/Diagnostic Test Results:   Wbc 13 71  LA 2 1, 2 2    Ct abd: No acute intra-abdominal abnormality  No free air or free fluid  Scattered colonic diverticulosis with no inflammatory changes present to suggest acute diverticulitis  Reidentified postoperative changes of prior partial right hepatic lobe resection with interval decrease in intrahepatic and extra hepatic pneumobilia  Interval decrease in gallbladder air  No new liver lesions    U/s RUQ abd:1   Sludge and stones in the nondistended gallbladder  Mild wall thickening with no choledocholithiasis or biliary ductal obstruction  2   Hepatic steatosis  Hypoechoic nodule in the right lobe occult on recent CT may represent complex cyst versus solitary metastasis  Nonemergent MRI recommended    3   Obscured pancreas due to overlying bowel gas      EKG:Normal sinus rhythm  pvcs  VAT pacing with fusion complexes  Abnormal ECG  When compared with ECG of 10-NOV-2017 09:37,  pvcs are new  pacer spikes noted    ED Treatment:   Medication Administration from 03/01/2019 0901 to 03/01/2019 1606       Date/Time Order Dose Route Action Action by Comments     03/01/2019 1017 sodium chloride 0 9 % bolus 1,000 mL 0 mL Intravenous Stopped Giovanny Chavez RN      03/01/2019 0935 sodium chloride 0 9 % bolus 1,000 mL 1,000 mL Intravenous Varun 37 Giovanny Chavez RN      03/01/2019 0938 ondansetron (ZOFRAN) injection 4 mg 4 mg Intravenous Given Giovanny Chavez RN      03/01/2019 1131 iohexol (OMNIPAQUE) 350 MG/ML injection (MULTI-DOSE) 100 mL 100 mL Intravenous Given Jayne Merlin      03/01/2019 1504 sodium chloride 0 9 % bolus 1,000 mL 0 mL Intravenous Stopped Briana Fitzgerald RN      03/01/2019 1355 sodium chloride 0 9 % bolus 1,000 mL 1,000 mL Intravenous Allitgillesvseleneet 37 Briana Fitzgerald RN      03/01/2019 1442 cefepime (MAXIPIME) 2 g/50 mL dextrose IVPB 0 mg Intravenous Stopped Briana Fitzgerald RN      03/01/2019 1357 cefepime (MAXIPIME) 2 g/50 mL dextrose IVPB 2,000 mg Intravenous Allitpalmer 37 Briana Fitzgerald RN      03/01/2019 1405 morphine injection 2 mg 2 mg Intravenous Given Briana Fitzgerald RN         Past Medical/Surgical History:    Active Ambulatory Problems     Diagnosis Date Noted    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 ([facility] [address] ) 01/05/2017    Hypercholesteremia     Gastroesophageal reflux disease without esophagitis 03/22/2010    History of total knee replacement 05/27/2015    Hearing loss 07/16/2014    LBBB (left bundle branch block) 04/13/2015    Symptomatic bradycardia 23/59/5439    Complication associated with cardiac pacemaker lead 11/09/2017    Pacemaker complications, subsequent encounter 11/09/2017    Chest wall hematoma 11/10/2017    Cholecystitis 01/07/2018    Acute biliary pancreatitis 01/07/2018    Cholangitis 01/07/2018    Choledocholithiasis 01/07/2018    Acute gallstone pancreatitis 01/30/2018    Intermittent complete heart block ([facility] [address] ) 02/09/2018    Spinal stenosis, lumbar region, with neurogenic claudication     Lumbar pain 12/17/2018    Spondylolisthesis 12/17/2018     Resolved Ambulatory Problems     Diagnosis Date Noted    Subcapsular hematoma of liver 01/05/2017    SOB (shortness of breath) on exertion 01/05/2017    Metastatic adenocarcinoma to liver ([facility] [address] )     Breast CA ([facility] [address] )      Past Medical History:   Diagnosis Date    Anxiety     Arthritis     Breast CA ([facility] [address] [facility]     Cardiac disease     Depression     Diverticula of intestine     Dizziness     Flushing     Hiatal hernia     [place] (hard of hearing)     Hypercholesteremia     Liver mass     Liver metastasis ([facility] [address] [facility]     Metastatic adenocarcinoma to liver (HCC)     PONV (postoperative nausea and vomiting)     Recurrent breast cancer (HCC)     Shingles     Shortness of breath     Tachycardia     Urinary incontinence     Use of cane as ambulatory aid      Admitting Diagnosis: Lactic acidosis [E87 2]  Abdominal pain [R10 9]  Generalized abdominal pain [R10 84]  Age/Sex: 78 y o  female  Assessment/Plan: 79 yo female c/o upper abd pain possibly s/t gastroenteritis,Consider radiation induced gastritis from radiation of right axilla LN in pt w/known hx of metastatic breast ca w/mets to liver and right axillary lad  SIRS with leukocytosis initial LA 2 2 with repeat 2 1, [email or web address]  GERD,  Intermittent complete heart block and lactic acidosis  Adenocarcinoma of breast w/mets to liver  Recurrent invasive ductal carcinoma w/involvement of left breast, right breast, right axillary lad s/p chemo and surgery with right hepatic lobectomy in 2017  Started on radiation for right axillary LAD day prior to admission but otherwise disease burden felt to be relatively controlled per hem/onc documentation  Continue op surveillance w/hem/onc rad/onc      Anticipated Length of Stay:  Patient will be admitted on an Inpatient basis with an anticipated length of stay of  Greater than 2 midnights     Justification for Hospital Stay: sirs vs sepsis        Admission Orders:  INPT  REG DIET  OOB      Scheduled Meds:   Current Facility-Administered Medications:  acetaminophen 650 mg Oral Q6H PRN Georgina Benton PA-C    aspirin 81 mg Oral Daily Georgina Benton PA-C    atorvastatin 20 mg Oral QPM Georgina Benton PA-C    heparin (porcine) 5,000 Units Subcutaneous Q8H [address] Georgina Benton PA-C    insulin lispro 1-5 Units Subcutaneous 4x Daily (AC & HS) Georgina Benton PA-C    metoprolol tartrate 12 5 mg Oral Q12H [address] Georgina Benton PA-C    morphine injection 2 mg Intravenous Q6H PRN Georgina Benton PA-C    ondansetron 4 mg Intravenous Q6H PRN Georgina Benton PA-C    sodium chloride 75 mL/hr Intravenous Continuous Georgina Benton PA-C Last Rate: 75 mL/hr (03/02/19 0723)   tamoxifen 20 mg Oral Daily Georgina Benton PA-C      Continuous Infusions:   sodium chloride 75 mL/hr Last Rate: 75 mL/hr (03/02/19 0723)     PRN Meds:   acetaminophen    morphine injection    ondansetron

## 2019-03-02 NOTE — UTILIZATION REVIEW
Notification of Inpatient Admission/Inpatient Authorization Request  This is a Notification of Inpatient Admission/Request for Inpatient Authorization for our facility [address]  Be advised that this patient was admitted to our facility under Inpatient Status  Please contact the Utilization Review Department where the patient is receiving care services for additional admission information  Place of Service Code: 24   Place of Service Name: Inpatient Hospital  Presentation Date & Time: 3/1/2019  9:04 AM  Inpatient Admission Date & Time: 3/1/19 1451  Discharge Date & Time: 3/2/2019 12:53 PM   Discharge Disposition (if discharged): Home/Self Care  Attending Physician: Robel Momin Do [[id number]]   KRISTIN Doyle Practioner ID- [id number]  Primary Office:  [address] [place] [address]  Phone 1: [phone]  Fax: [phone]      Admission Orders (From admission, onward)    Ordered        03/01/19 1451  Inpatient Admission  Once     Order ID Start Status   [id number] 03/01/19 1452 Completed                Facility: [address] Utilization Review Department  Phone: [phone]; Fax [phone]  [email or web address]  ATTENTION: Please call with any questions or concerns to [phone]  and carefully listen to the prompts so that you are directed to the right person  Send all requests for admission clinical reviews, approved or denied determinations and any other requests to fax [phone]   All voicemails are confidential

## 2019-03-02 NOTE — DISCHARGE SUMMARY
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 [facility])  Assessment & Plan  Possibly just indigestion or gastroenteritis  This resolved on its own  Janit Romance to go home      * Upper abdominal pain  Assessment & Plan  Possibly just indigestion or gastroenteritis  Ok to go home          Discharging Physician / Practitioner: Stef Garcia DO  PCP: Linda Mcqueen MD  Admission Date:   Admission Orders (From admission, onward)    Ordered        03/01/19 1451  Inpatient Admission  Once     Order ID Start Status   [id number] 03/01/19 1452 Completed              Discharge Date: 03/02/19    Resolved Problems  Date Reviewed: 3/1/2019    None            ·       Procedures Performed:     · CT abdomen  · U/S abdomen      Reason for Admission: indigestion      Hospital Course: Morgan Monterroso is a 78 y o  female patient who originally presented to the hospital on 3/1/2019 due to indigestion and abdominal pain  She had recently restarted radiation therapy for breast cancer  Had 24 hours of epigastric pain  This actually resolved by the time she was admitted to the hospital   She feels much better  No blood or blackness in her stools  No weight loss  No vomiting  She is very hungry  Please see above list of diagnoses and related plan for additional information  Condition at Discharge: good       Discharge Day Visit / Exam:     Subjective:  Feels better  No abdominal pain    Very anxious to eat      Vitals: Blood Pressure: 146/81 (03/02/19 0730)  Pulse: 82 (03/02/19 0730)  Temperature: 98 4 °F (36 9 °C) (03/02/19 0730)  Temp Source: Temporal (03/02/19 0730)  Respirations: 20 (03/02/19 0730)  Weight - Scale: 68 8 kg (151 lb 10 8 oz) (03/01/19 0908)  SpO2: 95 % (03/02/19 0730)    Exam:     Physical Exam   HENT:   Head: Normocephalic and atraumatic  Eyes: Pupils are equal, round, and reactive to light  EOM are normal    Cardiovascular: Normal rate and regular rhythm  Exam reveals no gallop and no friction rub  No murmur heard  Pulmonary/Chest: Effort normal and breath sounds normal  She has no wheezes  She has no rales  Abdominal: Soft  Bowel sounds are normal  There is no tenderness  Musculoskeletal: She exhibits no edema  Nursing note and vitals reviewed            Discharge instructions/Information to patient and family:   See after visit summary for information provided to patient and family  Provisions for Follow-Up Care:  See after visit summary for information related to follow-up care and any pertinent home health orders  Disposition:     Home       Discharge Statement:  I spent 20 minutes discharging the patient  This time was spent on the day of discharge  I had direct contact with the patient on the day of discharge  Greater than 50% of the total time was spent examining patient, answering all patient questions, arranging and discussing plan of care with patient as well as directly providing post-discharge instructions  Additional time then spent on discharge activities  Discharge Medications:  See after visit summary for reconciled discharge medications provided to patient and family        ** Please Note: This note has been constructed using a voice recognition system **

## 2019-03-02 NOTE — PLAN OF CARE
Problem: Potential for Falls  Goal: Patient will remain free of falls  Description  INTERVENTIONS:  - Assess patient frequently for physical needs  -  Identify cognitive and physical deficits and behaviors that affect risk of falls  -  [place] fall precautions as indicated by assessment   - Educate patient/family on patient safety including physical limitations  - Instruct patient to call for assistance with activity based on assessment  - Modify environment to reduce risk of injury  - Consider OT/PT consult to assist with strengthening/mobility  Outcome: Progressing     Problem: Nutrition/Hydration-ADULT  Goal: Nutrient/Hydration intake appropriate for improving, restoring or maintaining nutritional needs  Description  Monitor and assess patient's nutrition/hydration status for malnutrition (ex- brittle hair, bruises, dry skin, pale skin and conjunctiva, muscle wasting, smooth red tongue, and disorientation)  Collaborate with interdisciplinary team and initiate plan and interventions as ordered  Monitor patient's weight and dietary intake as ordered or per policy  Utilize nutrition screening tool and intervene per policy  Determine patient's food preferences and provide high-protein, high-caloric foods as appropriate       INTERVENTIONS:  - Monitor oral intake, urinary output, labs, and treatment plans  - Assess nutrition and hydration status and recommend course of action  - Evaluate amount of meals eaten  - Assist patient with eating if necessary   - Allow adequate time for meals  - Recommend/ encourage appropriate diets, oral nutritional supplements, and vitamin/mineral supplements  - Order, calculate, and assess calorie counts as needed  - Recommend, monitor, and adjust tube feedings and TPN/PPN based on assessed needs  - Assess need for intravenous fluids  - Provide specific nutrition/hydration education as appropriate  - Include patient/family/caregiver in decisions related to nutrition  Outcome: Progressing     Problem: PAIN - ADULT  Goal: Verbalizes/displays adequate comfort level or baseline comfort level  Description  Interventions:  - Encourage patient to monitor pain and request assistance  - Assess pain using appropriate pain scale  - Administer analgesics based on type and severity of pain and evaluate response  - Implement non-pharmacological measures as appropriate and evaluate response  - Consider cultural and social influences on pain and pain management  - Notify physician/advanced practitioner if interventions unsuccessful or patient reports new pain  Outcome: Progressing     Problem: INFECTION - ADULT  Goal: Absence or prevention of progression during hospitalization  Description  INTERVENTIONS:  - Assess and monitor for signs and symptoms of infection  - Monitor lab/diagnostic results  - Monitor all insertion sites, i e  indwelling lines, tubes, and drains  - Monitor endotracheal (as able) and nasal secretions for changes in amount and color  - [place] appropriate cooling/warming therapies per order  - Administer medications as ordered  - Instruct and encourage patient and family to use good hand hygiene technique  - Identify and instruct in appropriate isolation precautions for identified infection/condition  Outcome: Progressing  Goal: Absence of fever/infection during neutropenic period  Description  INTERVENTIONS:  - Monitor WBC  - Implement neutropenic guidelines  Outcome: Progressing     Problem: SAFETY ADULT  Goal: Patient will remain free of falls  Description  INTERVENTIONS:  - Assess patient frequently for physical needs  -  Identify cognitive and physical deficits and behaviors that affect risk of falls    -  [place] fall precautions as indicated by assessment   - Educate patient/family on patient safety including physical limitations  - Instruct patient to call for assistance with activity based on assessment  - Modify environment to reduce risk of injury  - Consider OT/PT consult to assist with strengthening/mobility  Outcome: Progressing  Goal: Maintain or return to baseline ADL function  Description  INTERVENTIONS:  -  Assess patient's ability to carry out ADLs; assess patient's baseline for ADL function and identify physical deficits which impact ability to perform ADLs (bathing, care of mouth/teeth, toileting, grooming, dressing, etc )  - Assess/evaluate cause of self-care deficits   - Assess range of motion  - Assess patient's mobility; develop plan if impaired  - Assess patient's need for assistive devices and provide as appropriate  - Encourage maximum independence but intervene and supervise when necessary  ¯ Involve family in performance of ADLs  ¯ Assess for home care needs following discharge   ¯ Request OT consult to assist with ADL evaluation and planning for discharge  ¯ Provide patient education as appropriate  Outcome: Progressing  Goal: Maintain or return mobility status to optimal level  Description  INTERVENTIONS:  - Assess patient's baseline mobility status (ambulation, transfers, stairs, etc )    - Identify cognitive and physical deficits and behaviors that affect mobility  - Identify mobility aids required to assist with transfers and/or ambulation (gait belt, sit-to-stand, lift, walker, cane, etc )  - [place] fall precautions as indicated by assessment  - Record patient progress and toleration of activity level on Mobility SBAR; progress patient to next Phase/Stage  - Instruct patient to call for assistance with activity based on assessment  - Request Rehabilitation consult to assist with strengthening/weightbearing, etc   Outcome: Progressing     Problem: DISCHARGE PLANNING  Goal: Discharge to home or other facility with appropriate resources  Description  INTERVENTIONS:  - Identify barriers to discharge w/patient and caregiver  - Arrange for needed discharge resources and transportation as appropriate  - Identify discharge learning needs (meds, wound care, etc )  - Arrange for interpretive services to assist at discharge as needed  - Refer to Case Management Department for coordinating discharge planning if the patient needs post-hospital services based on physician/advanced practitioner order or complex needs related to functional status, cognitive ability, or social support system  Outcome: Progressing     Problem: Knowledge Deficit  Goal: Patient/family/caregiver demonstrates understanding of disease process, treatment plan, medications, and discharge instructions  Description  Complete learning assessment and assess knowledge base    Interventions:  - Provide teaching at level of understanding  - Provide teaching via preferred learning methods  Outcome: Progressing

## 2019-03-02 NOTE — PLAN OF CARE
Problem: Potential for Falls  Goal: Patient will remain free of falls  Description  INTERVENTIONS:  - Assess patient frequently for physical needs  -  Identify cognitive and physical deficits and behaviors that affect risk of falls  -  [place] fall precautions as indicated by assessment   - Educate patient/family on patient safety including physical limitations  - Instruct patient to call for assistance with activity based on assessment  - Modify environment to reduce risk of injury  - Consider OT/PT consult to assist with strengthening/mobility  Outcome: Progressing     Problem: Nutrition/Hydration-ADULT  Goal: Nutrient/Hydration intake appropriate for improving, restoring or maintaining nutritional needs  Description  Monitor and assess patient's nutrition/hydration status for malnutrition (ex- brittle hair, bruises, dry skin, pale skin and conjunctiva, muscle wasting, smooth red tongue, and disorientation)  Collaborate with interdisciplinary team and initiate plan and interventions as ordered  Monitor patient's weight and dietary intake as ordered or per policy  Utilize nutrition screening tool and intervene per policy  Determine patient's food preferences and provide high-protein, high-caloric foods as appropriate       INTERVENTIONS:  - Monitor oral intake, urinary output, labs, and treatment plans  - Assess nutrition and hydration status and recommend course of action  - Evaluate amount of meals eaten  - Assist patient with eating if necessary   - Allow adequate time for meals  - Recommend/ encourage appropriate diets, oral nutritional supplements, and vitamin/mineral supplements  - Order, calculate, and assess calorie counts as needed  - Recommend, monitor, and adjust tube feedings and TPN/PPN based on assessed needs  - Assess need for intravenous fluids  - Provide specific nutrition/hydration education as appropriate  - Include patient/family/caregiver in decisions related to nutrition  Outcome: Progressing     Problem: PAIN - ADULT  Goal: Verbalizes/displays adequate comfort level or baseline comfort level  Description  Interventions:  - Encourage patient to monitor pain and request assistance  - Assess pain using appropriate pain scale  - Administer analgesics based on type and severity of pain and evaluate response  - Implement non-pharmacological measures as appropriate and evaluate response  - Consider cultural and social influences on pain and pain management  - Notify physician/advanced practitioner if interventions unsuccessful or patient reports new pain  Outcome: Progressing     Problem: INFECTION - ADULT  Goal: Absence or prevention of progression during hospitalization  Description  INTERVENTIONS:  - Assess and monitor for signs and symptoms of infection  - Monitor lab/diagnostic results  - Monitor all insertion sites, i e  indwelling lines, tubes, and drains  - Monitor endotracheal (as able) and nasal secretions for changes in amount and color  - [place] appropriate cooling/warming therapies per order  - Administer medications as ordered  - Instruct and encourage patient and family to use good hand hygiene technique  - Identify and instruct in appropriate isolation precautions for identified infection/condition  Outcome: Progressing  Goal: Absence of fever/infection during neutropenic period  Description  INTERVENTIONS:  - Monitor WBC  - Implement neutropenic guidelines  Outcome: Progressing     Problem: SAFETY ADULT  Goal: Patient will remain free of falls  Description  INTERVENTIONS:  - Assess patient frequently for physical needs  -  Identify cognitive and physical deficits and behaviors that affect risk of falls    -  [place] fall precautions as indicated by assessment   - Educate patient/family on patient safety including physical limitations  - Instruct patient to call for assistance with activity based on assessment  - Modify environment to reduce risk of injury  - Consider OT/PT consult to assist with strengthening/mobility  Outcome: Progressing  Goal: Maintain or return to baseline ADL function  Description  INTERVENTIONS:  -  Assess patient's ability to carry out ADLs; assess patient's baseline for ADL function and identify physical deficits which impact ability to perform ADLs (bathing, care of mouth/teeth, toileting, grooming, dressing, etc )  - Assess/evaluate cause of self-care deficits   - Assess range of motion  - Assess patient's mobility; develop plan if impaired  - Assess patient's need for assistive devices and provide as appropriate  - Encourage maximum independence but intervene and supervise when necessary  ¯ Involve family in performance of ADLs  ¯ Assess for home care needs following discharge   ¯ Request OT consult to assist with ADL evaluation and planning for discharge  ¯ Provide patient education as appropriate  Outcome: Progressing  Goal: Maintain or return mobility status to optimal level  Description  INTERVENTIONS:  - Assess patient's baseline mobility status (ambulation, transfers, stairs, etc )    - Identify cognitive and physical deficits and behaviors that affect mobility  - Identify mobility aids required to assist with transfers and/or ambulation (gait belt, sit-to-stand, lift, walker, cane, etc )  - [place] fall precautions as indicated by assessment  - Record patient progress and toleration of activity level on Mobility SBAR; progress patient to next Phase/Stage  - Instruct patient to call for assistance with activity based on assessment  - Request Rehabilitation consult to assist with strengthening/weightbearing, etc   Outcome: Progressing     Problem: DISCHARGE PLANNING  Goal: Discharge to home or other facility with appropriate resources  Description  INTERVENTIONS:  - Identify barriers to discharge w/patient and caregiver  - Arrange for needed discharge resources and transportation as appropriate  - Identify discharge learning needs (meds, wound care, etc )  - Arrange for interpretive services to assist at discharge as needed  - Refer to Case Management Department for coordinating discharge planning if the patient needs post-hospital services based on physician/advanced practitioner order or complex needs related to functional status, cognitive ability, or social support system  Outcome: Progressing     Problem: Knowledge Deficit  Goal: Patient/family/caregiver demonstrates understanding of disease process, treatment plan, medications, and discharge instructions  Description  Complete learning assessment and assess knowledge base    Interventions:  - Provide teaching at level of understanding  - Provide teaching via preferred learning methods  Outcome: Progressing

## 2019-03-04 ENCOUNTER — APPOINTMENT (OUTPATIENT)
Dept: RADIATION ONCOLOGY | Facility: CLINIC | Age: 79
End: 2019-03-04
Attending: RADIOLOGY
Payer: COMMERCIAL

## 2019-03-04 PROCEDURE — 77387 GUIDANCE FOR RADJ TX DLVR: CPT | Performed by: RADIOLOGY

## 2019-03-04 PROCEDURE — 77370 RADIATION PHYSICS CONSULT: CPT | Performed by: RADIOLOGY

## 2019-03-04 PROCEDURE — 77412 RADIATION TX DELIVERY LVL 3: CPT | Performed by: RADIOLOGY

## 2019-03-05 ENCOUNTER — APPOINTMENT (OUTPATIENT)
Dept: RADIATION ONCOLOGY | Facility: CLINIC | Age: 79
End: 2019-03-05
Attending: RADIOLOGY
Payer: COMMERCIAL

## 2019-03-05 PROCEDURE — 77412 RADIATION TX DELIVERY LVL 3: CPT | Performed by: RADIOLOGY

## 2019-03-05 PROCEDURE — 77387 GUIDANCE FOR RADJ TX DLVR: CPT | Performed by: RADIOLOGY

## 2019-03-05 NOTE — UTILIZATION REVIEW
Notification of Discharge  This is a Notification of Discharge from our facility [address]  Please be advised that this patient has been discharge from our facility  Below you will find the admission and discharge date and time including the patients disposition  PRESENTATION DATE: 3/1/2019  9:04 AM  IP ADMISSION DATE: 3/1/19 1451  DISCHARGE DATE: 3/2/2019 12:53 PM  DISPOSITION: [address] Utilization Review Department  Phone: [phone]; Fax [phone]  [email or web address]  ATTENTION: Please call with any questions or concerns to [phone]  and carefully listen to the prompts so that you are directed to the right person  Send all requests for admission clinical reviews, approved or denied determinations and any other requests to fax [phone]  All voicemails are confidential      Honorio Rivera RN   Registered Nurse   Utilization Review   Utilization Review   Signed   Date of Service:  3/2/2019 11:10 AM               Signed                 Show:Clear all  [x]Manual[x]Template[x]Copied    Added by:  Jagjit Elder RN      []Calvin for details      Network Utilization Review Department  Phone: [phone]; Fax [phone]  [email or web address]  ATTENTION: Please call with any questions or concerns to [phone]  and carefully listen to the prompts so that you are directed to the right person  Send all requests for admission clinical reviews, approved or denied determinations and any other requests to fax [phone]   All voicemails are confidential   Initial Clinical Review     Admission: Date/Time/Statement:INPT  3/1/19 @ 1451         Orders Placed This Encounter   Procedures    Inpatient Admission       Standing Status:   Standing       Number of Occurrences:   1       Order Specific Question:   Admitting Physician       Answer:   Kelsi Manley [[id number]]       Order Specific Question:   Level of Care       Answer:   Med Surg [16]       Order Specific Question:   Estimated length of stay       Answer:   More than 2 Midnights       Order Specific Question:   Certification       Answer:   I certify that inpatient services are medically necessary for this patient for a duration of greater than two midnights   See H&P and MD Progress Notes for additional information about the patient's course of treatment       ED: Date/Time/Mode of Arrival:             ED Arrival Information      Expected Arrival Acuity Means of Arrival Escorted By Service Admission Type     - 3/1/2019 09:01 Urgent Walk-In Family Member General Medicine Urgent     Arrival Complaint     ABDOMINAL PAIN          Chief Complaint:        Chief Complaint   Patient presents with    Abdominal Pain       Started yesterday, lower abdominal pain       History of Illness: 78 y o  female who presents with PMH of invasive ductal carcinoma of the left breast with recurrence in the right breast and with metastasis to the liver and right axillary lymph node, history of gallbladder disease status post CBD stent and removal of 5 gallbladder stones and February of 2018 with subsequent removal of stent, history of GERD coming to the hospital for epigastric pain nausea vomiting x2 days   Patient was her normal state of health when the day prior to admission she started to have nausea and vomiting approximately 5 hours after oral intake and approximately 1 hour after radiation to her right axilla   She reports that she had several episodes of watery nonbilious nonbloody emesis and bilateral upper abdominal pain which was initially sharp and constant and then improved to a dull ache   It occurred throughout the night was not worsened in the evening although she did wake up several times and still had pain in the morning she still has had this discomfort and her  recommended she come to the hospital to be evaluated   The patient was concerned that at this point as the pain had been persistent this may be another attack of her gallbladder  Ja Chapman has not eaten anything or drink anything since 1100 hours the day prior to admission   She has no fevers or chills   She did have some constipation but had a large bowel movement this morning   She reported that this did not alleviate or worsen her pain   She has no dysuria frequency urgency   For suprapubic pain   Her appetite currently is intact she has no sick contacts or recent travel           ED Vital Signs:            ED Triage Vitals   Temperature Pulse Respirations Blood Pressure SpO2   03/01/19 0908 03/01/19 0908 03/01/19 0908 03/01/19 0908 03/01/19 0908   97 6 °F (36 4 °C) (!) 116 20 137/70 97 %       Temp Source Heart Rate Source Patient Position - Orthostatic VS BP Location FiO2 (%)   03/01/19 0908 03/01/19 1138 03/01/19 0908 03/01/19 0908 --   Oral Monitor Sitting Left arm         Pain Score           03/01/19 0908           8                Wt Readings from Last 1 Encounters:   03/01/19 68 8 kg (151 lb 10 8 oz)      Vital Signs (abnormal):   03/02/19 0730   98 4 °F (36 9 °C)   82   20   146/81   95 %   None (Room air)   Lying   03/01/19 2252   97 2 °F (36 2 °C)Abnormal    90   20   144/83   97 %   None (Room air)   Lying   03/01/19 2112   --   90   --   154/74   --   --   --   03/01/19 1606   99 7 °F (37 6 °C)   91   18   184/98Abnormal    95 %   None (Room air)   Lying   03/01/19 1500   --   91   16   140/68   100 %   None (Room air)   Lying   03/01/19 1138   --   90   18   154/77   97 %   None (Room air)   Lying   03/01/19 0915   --   --   --   --   --   None (Room air)   --   03/01/19 0908   97 6 °F (36 4 °C)   116Abnormal    20   137/70   97 %   None (Room air)   Sitting         Pertinent Labs/Diagnostic Test Results:   Wbc 13 71  LA 2 1, 2 2     Ct abd: No acute intra-abdominal abnormality   No free air or free fluid   Scattered colonic diverticulosis with no inflammatory changes present to suggest acute diverticulitis  Reidentified postoperative changes of prior partial right hepatic lobe resection with interval decrease in intrahepatic and extra hepatic pneumobilia   Interval decrease in gallbladder air   No new liver lesions     U/s RUQ abd:1   Sludge and stones in the nondistended gallbladder   Mild wall thickening with no choledocholithiasis or biliary ductal obstruction  2   Hepatic steatosis   Hypoechoic nodule in the right lobe occult on recent CT may represent complex cyst versus solitary metastasis   Nonemergent MRI recommended    3   Obscured pancreas due to overlying bowel gas      EKG:Normal sinus rhythm  pvcs  VAT pacing with fusion complexes  Abnormal ECG  When compared with ECG of 10-NOV-2017 09:37,  pvcs are new  pacer spikes noted     ED Treatment:               Medication Administration from 03/01/2019 0901 to 03/01/2019 1606        Date/Time Order Dose Route Action Action by Comments       03/01/2019 1017 sodium chloride 0 9 % bolus 1,000 mL 0 mL Intravenous Stopped Lizzeth Hays RN         03/01/2019 0935 sodium chloride 0 9 % bolus 1,000 mL 1,000 mL Intravenous New [facility] Lizzeth Hays RN         03/01/2019 0938 ondansetron (ZOFRAN) injection 4 mg 4 mg Intravenous Given Lizzeth Hays RN         03/01/2019 1131 iohexol (OMNIPAQUE) 350 MG/ML injection (MULTI-DOSE) 100 mL 100 mL Intravenous Given Robert Hatfield         03/01/2019 1504 sodium chloride 0 9 % bolus 1,000 mL 0 mL Intravenous Stopped Chula Victor RN         03/01/2019 1355 sodium chloride 0 9 % bolus 1,000 mL 1,000 mL Intravenous New Bag Chula Victor RN         03/01/2019 1442 cefepime (MAXIPIME) 2 g/50 mL dextrose IVPB 0 mg Intravenous Stopped Chula Victor RN         03/01/2019 1357 cefepime (MAXIPIME) 2 g/50 mL dextrose IVPB 2,000 mg Intravenous New Bag Chula Victor RN         03/01/2019 1405 morphine injection 2 mg 2 mg Intravenous Given Chula Victor RN            Past Medical/Surgical History:         Active Ambulatory Problems     Diagnosis Date Noted    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 ([facility] [address] ) 01/05/2017    Hypercholesteremia      Gastroesophageal reflux disease without esophagitis 03/22/2010    History of total knee replacement 05/27/2015    Hearing loss 07/16/2014    LBBB (left bundle branch block) 04/13/2015    Symptomatic bradycardia 02/20/6723    Complication associated with cardiac pacemaker lead 11/09/2017    Pacemaker complications, subsequent encounter 11/09/2017    Chest wall hematoma 11/10/2017    Cholecystitis 01/07/2018    Acute biliary pancreatitis 01/07/2018    Cholangitis 01/07/2018    Choledocholithiasis 01/07/2018    Acute gallstone pancreatitis 01/30/2018    Intermittent complete heart block ([facility] [address] ) 02/09/2018    Spinal stenosis, lumbar region, with neurogenic claudication      Lumbar pain 12/17/2018    Spondylolisthesis 12/17/2018           Resolved Ambulatory Problems     Diagnosis Date Noted    Subcapsular hematoma of liver 01/05/2017    SOB (shortness of breath) on exertion 01/05/2017    Metastatic adenocarcinoma to liver (HCC)      Breast CA ([facility] [address] )             Past Medical History:   Diagnosis Date    Anxiety      Arthritis      Breast CA (HCC)      Cardiac disease      Depression      Diverticula of intestine      Dizziness      Flushing      Hiatal hernia      [place] (hard of hearing)      Hypercholesteremia      Liver mass      Liver metastasis (HCC)      Metastatic adenocarcinoma to liver (HCC)      PONV (postoperative nausea and vomiting)      Recurrent breast cancer (HCC)      Shingles      Shortness of breath      Tachycardia      Urinary incontinence      Use of cane as ambulatory aid        Admitting Diagnosis: Lactic acidosis [E87 2]  Abdominal pain [R10 9]  Generalized abdominal pain [R10 84]  Age/Sex: 78 y o  female  Assessment/Plan: 79 yo female c/o upper abd pain possibly s/t gastroenteritis,Consider radiation induced gastritis from radiation of right axilla LN in pt w/known hx of metastatic breast ca w/mets to liver and right axillary lad  SIRS with leukocytosis initial LA 2 2 with repeat 2 1, [email or web address]  GERD,  Intermittent complete heart block and lactic acidosis    Adenocarcinoma of breast w/mets to liver  Recurrent invasive ductal carcinoma w/involvement of left breast, right breast, right axillary lad s/p chemo and surgery with right hepatic lobectomy in 2017  Started on radiation for right axillary LAD day prior to admission but otherwise disease burden felt to be relatively controlled per hem/onc documentation  Continue op surveillance w/hem/onc rad/onc        Anticipated Length of Stay: Hakeem Cardenas will be admitted on an Inpatient basis with an anticipated length of stay of  Greater than 2 midnights    Justification for Hospital Stay: sirs vs sepsis           Admission Orders:  INPT  REG DIET  OOB        Scheduled Meds:   Current Facility-Administered Medications:  acetaminophen 650 mg Oral Q6H PRN Georgina Benton PA-C     aspirin 81 mg Oral Daily Georgina Benton PA-C     atorvastatin 20 mg Oral QPM Georgina Benton PA-C     heparin (porcine) 5,000 Units Subcutaneous Q8H [address] Georgina Benton PA-C     insulin lispro 1-5 Units Subcutaneous 4x Daily (AC & HS) Georgina Benton PA-C     metoprolol tartrate 12 5 mg Oral Q12H [address] Georgina Benton PA-C     morphine injection 2 mg Intravenous Q6H PRN Georgina Benton PA-C     ondansetron 4 mg Intravenous Q6H PRN Georgina Benton PA-C     sodium chloride 75 mL/hr Intravenous Continuous Georgina Benton PA-C Last Rate: 75 mL/hr (03/02/19 0723)   tamoxifen 20 mg Oral Daily Georgina Benton PA-C        Continuous Infusions:   sodium chloride 75 mL/hr Last Rate: 75 mL/hr (03/02/19 0723)      PRN Meds:   acetaminophen    morphine injection    ondansetron

## 2019-03-06 ENCOUNTER — APPOINTMENT (OUTPATIENT)
Dept: RADIATION ONCOLOGY | Facility: CLINIC | Age: 79
End: 2019-03-06
Attending: RADIOLOGY
Payer: COMMERCIAL

## 2019-03-06 LAB
BACTERIA BLD CULT: NORMAL
BACTERIA BLD CULT: NORMAL

## 2019-03-06 PROCEDURE — 77336 RADIATION PHYSICS CONSULT: CPT | Performed by: RADIOLOGY

## 2019-03-06 PROCEDURE — 77417 THER RADIOLOGY PORT IMAGE(S): CPT | Performed by: RADIOLOGY

## 2019-03-06 PROCEDURE — 77412 RADIATION TX DELIVERY LVL 3: CPT | Performed by: RADIOLOGY

## 2019-03-06 PROCEDURE — 77387 GUIDANCE FOR RADJ TX DLVR: CPT | Performed by: RADIOLOGY

## 2019-03-07 ENCOUNTER — APPOINTMENT (OUTPATIENT)
Dept: RADIATION ONCOLOGY | Facility: CLINIC | Age: 79
End: 2019-03-07
Attending: RADIOLOGY
Payer: COMMERCIAL

## 2019-03-07 PROCEDURE — 77412 RADIATION TX DELIVERY LVL 3: CPT | Performed by: RADIOLOGY

## 2019-03-07 PROCEDURE — 77387 GUIDANCE FOR RADJ TX DLVR: CPT | Performed by: RADIOLOGY

## 2019-03-08 ENCOUNTER — APPOINTMENT (OUTPATIENT)
Dept: RADIATION ONCOLOGY | Facility: CLINIC | Age: 79
End: 2019-03-08
Attending: RADIOLOGY
Payer: COMMERCIAL

## 2019-03-08 PROCEDURE — 77387 GUIDANCE FOR RADJ TX DLVR: CPT | Performed by: RADIOLOGY

## 2019-03-08 PROCEDURE — 77412 RADIATION TX DELIVERY LVL 3: CPT | Performed by: RADIOLOGY

## 2019-03-11 ENCOUNTER — APPOINTMENT (OUTPATIENT)
Dept: RADIATION ONCOLOGY | Facility: CLINIC | Age: 79
End: 2019-03-11
Attending: RADIOLOGY
Payer: COMMERCIAL

## 2019-03-11 PROCEDURE — 77412 RADIATION TX DELIVERY LVL 3: CPT | Performed by: RADIOLOGY

## 2019-03-11 PROCEDURE — 77387 GUIDANCE FOR RADJ TX DLVR: CPT | Performed by: RADIOLOGY

## 2019-03-12 ENCOUNTER — APPOINTMENT (OUTPATIENT)
Dept: RADIATION ONCOLOGY | Facility: CLINIC | Age: 79
End: 2019-03-12
Attending: RADIOLOGY
Payer: COMMERCIAL

## 2019-03-12 PROCEDURE — 77412 RADIATION TX DELIVERY LVL 3: CPT | Performed by: RADIOLOGY

## 2019-03-12 PROCEDURE — 77387 GUIDANCE FOR RADJ TX DLVR: CPT | Performed by: RADIOLOGY

## 2019-03-13 ENCOUNTER — APPOINTMENT (OUTPATIENT)
Dept: RADIATION ONCOLOGY | Facility: CLINIC | Age: 79
End: 2019-03-13
Attending: RADIOLOGY
Payer: COMMERCIAL

## 2019-03-13 PROCEDURE — 77336 RADIATION PHYSICS CONSULT: CPT | Performed by: RADIOLOGY

## 2019-03-13 PROCEDURE — 77387 GUIDANCE FOR RADJ TX DLVR: CPT | Performed by: RADIOLOGY

## 2019-03-13 PROCEDURE — 77417 THER RADIOLOGY PORT IMAGE(S): CPT | Performed by: RADIOLOGY

## 2019-03-13 PROCEDURE — 77412 RADIATION TX DELIVERY LVL 3: CPT | Performed by: RADIOLOGY

## 2019-03-14 ENCOUNTER — APPOINTMENT (OUTPATIENT)
Dept: RADIATION ONCOLOGY | Facility: CLINIC | Age: 79
End: 2019-03-14
Attending: RADIOLOGY
Payer: COMMERCIAL

## 2019-03-14 PROCEDURE — 77387 GUIDANCE FOR RADJ TX DLVR: CPT | Performed by: RADIOLOGY

## 2019-03-14 PROCEDURE — 77412 RADIATION TX DELIVERY LVL 3: CPT | Performed by: RADIOLOGY

## 2019-03-15 ENCOUNTER — APPOINTMENT (OUTPATIENT)
Dept: RADIATION ONCOLOGY | Facility: CLINIC | Age: 79
End: 2019-03-15
Attending: RADIOLOGY
Payer: COMMERCIAL

## 2019-03-15 PROCEDURE — 77412 RADIATION TX DELIVERY LVL 3: CPT | Performed by: RADIOLOGY

## 2019-03-15 PROCEDURE — 77387 GUIDANCE FOR RADJ TX DLVR: CPT | Performed by: RADIOLOGY

## 2019-03-18 ENCOUNTER — APPOINTMENT (OUTPATIENT)
Dept: RADIATION ONCOLOGY | Facility: CLINIC | Age: 79
End: 2019-03-18
Attending: RADIOLOGY
Payer: COMMERCIAL

## 2019-03-18 PROCEDURE — 77387 GUIDANCE FOR RADJ TX DLVR: CPT | Performed by: RADIOLOGY

## 2019-03-18 PROCEDURE — 77412 RADIATION TX DELIVERY LVL 3: CPT | Performed by: RADIOLOGY

## 2019-03-19 ENCOUNTER — APPOINTMENT (OUTPATIENT)
Dept: RADIATION ONCOLOGY | Facility: CLINIC | Age: 79
End: 2019-03-19
Attending: RADIOLOGY
Payer: COMMERCIAL

## 2019-03-19 PROCEDURE — 77412 RADIATION TX DELIVERY LVL 3: CPT | Performed by: RADIOLOGY

## 2019-03-19 PROCEDURE — 77387 GUIDANCE FOR RADJ TX DLVR: CPT | Performed by: RADIOLOGY

## 2019-03-20 ENCOUNTER — REMOTE DEVICE CLINIC VISIT (OUTPATIENT)
Dept: CARDIOLOGY CLINIC | Facility: CLINIC | Age: 79
End: 2019-03-20

## 2019-03-20 ENCOUNTER — APPOINTMENT (OUTPATIENT)
Dept: RADIATION ONCOLOGY | Facility: CLINIC | Age: 79
End: 2019-03-20
Attending: RADIOLOGY
Payer: COMMERCIAL

## 2019-03-20 DIAGNOSIS — Z95.0 CARDIAC PACEMAKER IN SITU: ICD-10-CM

## 2019-03-20 DIAGNOSIS — R00.0 TACHYCARDIA: Primary | ICD-10-CM

## 2019-03-20 PROCEDURE — RECHECK: Performed by: INTERNAL MEDICINE

## 2019-03-20 PROCEDURE — 77336 RADIATION PHYSICS CONSULT: CPT | Performed by: RADIOLOGY

## 2019-03-20 PROCEDURE — 77412 RADIATION TX DELIVERY LVL 3: CPT | Performed by: RADIOLOGY

## 2019-03-20 PROCEDURE — 77387 GUIDANCE FOR RADJ TX DLVR: CPT | Performed by: RADIOLOGY

## 2019-03-21 ENCOUNTER — APPOINTMENT (OUTPATIENT)
Dept: RADIATION ONCOLOGY | Facility: CLINIC | Age: 79
End: 2019-03-21
Attending: RADIOLOGY
Payer: COMMERCIAL

## 2019-03-21 PROCEDURE — 77412 RADIATION TX DELIVERY LVL 3: CPT | Performed by: RADIOLOGY

## 2019-03-21 PROCEDURE — 77387 GUIDANCE FOR RADJ TX DLVR: CPT | Performed by: RADIOLOGY

## 2019-03-21 NOTE — PROGRESS NOTES
Results for orders placed or performed in visit on 03/20/19   Cardiac EP device report    Narrative    DUAL CHAMBER PACEMAKER (HIS)  CARELINK TRANSMISSION: *MID-RADIATION*BATTERY STATUS "OK"  AP 0 4%  99%  ALL AVAILABLE LEAD PARAMETERS WITHIN NORMAL LIMITS  NO SIGNIFICANT HIGH RATE EPISODES  NORMAL DEVICE FUNCTION   NC

## 2019-03-22 ENCOUNTER — APPOINTMENT (OUTPATIENT)
Dept: RADIATION ONCOLOGY | Facility: CLINIC | Age: 79
End: 2019-03-22
Attending: RADIOLOGY
Payer: COMMERCIAL

## 2019-03-22 PROCEDURE — 77412 RADIATION TX DELIVERY LVL 3: CPT | Performed by: RADIOLOGY

## 2019-03-22 PROCEDURE — 77387 GUIDANCE FOR RADJ TX DLVR: CPT | Performed by: RADIOLOGY

## 2019-03-25 ENCOUNTER — APPOINTMENT (OUTPATIENT)
Dept: RADIATION ONCOLOGY | Facility: CLINIC | Age: 79
End: 2019-03-25
Attending: RADIOLOGY
Payer: COMMERCIAL

## 2019-03-25 PROCEDURE — 77387 GUIDANCE FOR RADJ TX DLVR: CPT | Performed by: RADIOLOGY

## 2019-03-25 PROCEDURE — 77412 RADIATION TX DELIVERY LVL 3: CPT | Performed by: RADIOLOGY

## 2019-03-26 ENCOUNTER — APPOINTMENT (OUTPATIENT)
Dept: RADIATION ONCOLOGY | Facility: CLINIC | Age: 79
End: 2019-03-26
Attending: RADIOLOGY
Payer: COMMERCIAL

## 2019-03-26 PROCEDURE — 77387 GUIDANCE FOR RADJ TX DLVR: CPT | Performed by: RADIOLOGY

## 2019-03-26 PROCEDURE — 77412 RADIATION TX DELIVERY LVL 3: CPT | Performed by: RADIOLOGY

## 2019-03-27 ENCOUNTER — APPOINTMENT (OUTPATIENT)
Dept: RADIATION ONCOLOGY | Facility: CLINIC | Age: 79
End: 2019-03-27
Attending: RADIOLOGY
Payer: COMMERCIAL

## 2019-03-27 PROCEDURE — 77387 GUIDANCE FOR RADJ TX DLVR: CPT | Performed by: RADIOLOGY

## 2019-03-27 PROCEDURE — 77336 RADIATION PHYSICS CONSULT: CPT | Performed by: RADIOLOGY

## 2019-03-27 PROCEDURE — 77412 RADIATION TX DELIVERY LVL 3: CPT | Performed by: RADIOLOGY

## 2019-03-28 ENCOUNTER — APPOINTMENT (OUTPATIENT)
Dept: RADIATION ONCOLOGY | Facility: CLINIC | Age: 79
End: 2019-03-28
Attending: RADIOLOGY
Payer: COMMERCIAL

## 2019-03-28 PROCEDURE — 77334 RADIATION TREATMENT AID(S): CPT | Performed by: RADIOLOGY

## 2019-03-28 PROCEDURE — 77412 RADIATION TX DELIVERY LVL 3: CPT | Performed by: RADIOLOGY

## 2019-03-28 PROCEDURE — 77307 TELETHX ISODOSE PLAN CPLX: CPT | Performed by: RADIOLOGY

## 2019-03-28 PROCEDURE — 77387 GUIDANCE FOR RADJ TX DLVR: CPT | Performed by: RADIOLOGY

## 2019-03-29 ENCOUNTER — APPOINTMENT (OUTPATIENT)
Dept: RADIATION ONCOLOGY | Facility: CLINIC | Age: 79
End: 2019-03-29
Attending: RADIOLOGY
Payer: COMMERCIAL

## 2019-03-29 PROCEDURE — 77387 GUIDANCE FOR RADJ TX DLVR: CPT | Performed by: RADIOLOGY

## 2019-03-29 PROCEDURE — 77412 RADIATION TX DELIVERY LVL 3: CPT | Performed by: RADIOLOGY

## 2019-03-29 PROCEDURE — 77417 THER RADIOLOGY PORT IMAGE(S): CPT | Performed by: RADIOLOGY

## 2019-04-01 ENCOUNTER — APPOINTMENT (OUTPATIENT)
Dept: LAB | Facility: CLINIC | Age: 79
End: 2019-04-01
Attending: RADIOLOGY
Payer: COMMERCIAL

## 2019-04-01 ENCOUNTER — APPOINTMENT (OUTPATIENT)
Dept: RADIATION ONCOLOGY | Facility: CLINIC | Age: 79
End: 2019-04-01
Attending: RADIOLOGY
Payer: COMMERCIAL

## 2019-04-01 DIAGNOSIS — C50.611 MALIGNANT NEOPLASM OF AXILLARY TAIL OF RIGHT BREAST IN FEMALE, ESTROGEN RECEPTOR POSITIVE (HCC): ICD-10-CM

## 2019-04-01 DIAGNOSIS — Z17.0 MALIGNANT NEOPLASM OF AXILLARY TAIL OF RIGHT BREAST IN FEMALE, ESTROGEN RECEPTOR POSITIVE (HCC): ICD-10-CM

## 2019-04-01 LAB
BASOPHILS # BLD AUTO: 0.03 THOUSANDS/ΜL (ref 0–0.1)
BASOPHILS NFR BLD AUTO: 0 % (ref 0–1)
EOSINOPHIL # BLD AUTO: 0.21 THOUSAND/ΜL (ref 0–0.61)
EOSINOPHIL NFR BLD AUTO: 3 % (ref 0–6)
ERYTHROCYTE [DISTWIDTH] IN BLOOD BY AUTOMATED COUNT: 12.1 % (ref 11.6–15.1)
HCT VFR BLD AUTO: 39 % (ref 34.8–46.1)
HGB BLD-MCNC: 13 G/DL (ref 11.5–15.4)
LYMPHOCYTES # BLD AUTO: 1.95 THOUSANDS/ΜL (ref 0.6–4.47)
LYMPHOCYTES NFR BLD AUTO: 29 % (ref 14–44)
MCH RBC QN AUTO: 31.9 PG (ref 26.8–34.3)
MCHC RBC AUTO-ENTMCNC: 33.3 G/DL (ref 31.4–37.4)
MCV RBC AUTO: 96 FL (ref 82–98)
MONOCYTES # BLD AUTO: 0.84 THOUSAND/ΜL (ref 0.17–1.22)
MONOCYTES NFR BLD AUTO: 12 % (ref 4–12)
NEUTROPHILS # BLD AUTO: 3.77 THOUSANDS/ΜL (ref 1.85–7.62)
NEUTS SEG NFR BLD AUTO: 56 % (ref 43–75)
PLATELET # BLD AUTO: 144 THOUSANDS/UL (ref 149–390)
PMV BLD AUTO: 9.4 FL (ref 8.9–12.7)
RBC # BLD AUTO: 4.08 MILLION/UL (ref 3.81–5.12)
WBC # BLD AUTO: 6.8 THOUSAND/UL (ref 4.31–10.16)

## 2019-04-01 PROCEDURE — 77387 GUIDANCE FOR RADJ TX DLVR: CPT | Performed by: RADIOLOGY

## 2019-04-01 PROCEDURE — 85025 COMPLETE CBC W/AUTO DIFF WBC: CPT

## 2019-04-01 PROCEDURE — 36415 COLL VENOUS BLD VENIPUNCTURE: CPT

## 2019-04-01 PROCEDURE — 77412 RADIATION TX DELIVERY LVL 3: CPT | Performed by: RADIOLOGY

## 2019-04-02 ENCOUNTER — APPOINTMENT (OUTPATIENT)
Dept: RADIATION ONCOLOGY | Facility: CLINIC | Age: 79
End: 2019-04-02
Attending: RADIOLOGY
Payer: COMMERCIAL

## 2019-04-02 PROCEDURE — 77412 RADIATION TX DELIVERY LVL 3: CPT | Performed by: RADIOLOGY

## 2019-04-02 PROCEDURE — 77387 GUIDANCE FOR RADJ TX DLVR: CPT | Performed by: RADIOLOGY

## 2019-04-03 ENCOUNTER — APPOINTMENT (OUTPATIENT)
Dept: RADIATION ONCOLOGY | Facility: CLINIC | Age: 79
End: 2019-04-03
Attending: RADIOLOGY
Payer: COMMERCIAL

## 2019-04-03 PROCEDURE — 77412 RADIATION TX DELIVERY LVL 3: CPT | Performed by: RADIOLOGY

## 2019-04-03 PROCEDURE — 77336 RADIATION PHYSICS CONSULT: CPT | Performed by: RADIOLOGY

## 2019-04-03 PROCEDURE — 77387 GUIDANCE FOR RADJ TX DLVR: CPT | Performed by: RADIOLOGY

## 2019-04-04 ENCOUNTER — APPOINTMENT (OUTPATIENT)
Dept: RADIATION ONCOLOGY | Facility: CLINIC | Age: 79
End: 2019-04-04
Attending: RADIOLOGY
Payer: COMMERCIAL

## 2019-04-04 PROCEDURE — 77280 THER RAD SIMULAJ FIELD SMPL: CPT | Performed by: RADIOLOGY

## 2019-04-04 PROCEDURE — 77412 RADIATION TX DELIVERY LVL 3: CPT | Performed by: RADIOLOGY

## 2019-04-05 ENCOUNTER — APPOINTMENT (OUTPATIENT)
Dept: RADIATION ONCOLOGY | Facility: CLINIC | Age: 79
End: 2019-04-05
Attending: RADIOLOGY
Payer: COMMERCIAL

## 2019-04-05 PROCEDURE — 77412 RADIATION TX DELIVERY LVL 3: CPT | Performed by: RADIOLOGY

## 2019-04-05 PROCEDURE — 77387 GUIDANCE FOR RADJ TX DLVR: CPT | Performed by: RADIOLOGY

## 2019-04-08 ENCOUNTER — APPOINTMENT (OUTPATIENT)
Dept: RADIATION ONCOLOGY | Facility: CLINIC | Age: 79
End: 2019-04-08
Attending: RADIOLOGY
Payer: COMMERCIAL

## 2019-04-08 PROCEDURE — 77387 GUIDANCE FOR RADJ TX DLVR: CPT | Performed by: RADIOLOGY

## 2019-04-08 PROCEDURE — 77412 RADIATION TX DELIVERY LVL 3: CPT | Performed by: RADIOLOGY

## 2019-04-08 PROCEDURE — 77331 SPECIAL RADIATION DOSIMETRY: CPT | Performed by: RADIOLOGY

## 2019-04-09 ENCOUNTER — APPOINTMENT (OUTPATIENT)
Dept: RADIATION ONCOLOGY | Facility: CLINIC | Age: 79
End: 2019-04-09
Attending: RADIOLOGY
Payer: COMMERCIAL

## 2019-04-09 PROCEDURE — 77387 GUIDANCE FOR RADJ TX DLVR: CPT | Performed by: RADIOLOGY

## 2019-04-09 PROCEDURE — 77412 RADIATION TX DELIVERY LVL 3: CPT | Performed by: RADIOLOGY

## 2019-04-10 ENCOUNTER — REMOTE DEVICE CLINIC VISIT (OUTPATIENT)
Dept: CARDIOLOGY CLINIC | Facility: CLINIC | Age: 79
End: 2019-04-10

## 2019-04-10 ENCOUNTER — APPOINTMENT (OUTPATIENT)
Dept: RADIATION ONCOLOGY | Facility: CLINIC | Age: 79
End: 2019-04-10
Attending: RADIOLOGY
Payer: COMMERCIAL

## 2019-04-10 DIAGNOSIS — Z95.0 CARDIAC PACEMAKER: ICD-10-CM

## 2019-04-10 DIAGNOSIS — I44.30 AVB (ATRIOVENTRICULAR BLOCK): ICD-10-CM

## 2019-04-10 DIAGNOSIS — I44.7 LBBB (LEFT BUNDLE BRANCH BLOCK): Primary | ICD-10-CM

## 2019-04-10 DIAGNOSIS — R55 SYNCOPE AND COLLAPSE: ICD-10-CM

## 2019-04-10 PROCEDURE — 77387 GUIDANCE FOR RADJ TX DLVR: CPT | Performed by: RADIOLOGY

## 2019-04-10 PROCEDURE — 99024 POSTOP FOLLOW-UP VISIT: CPT | Performed by: INTERNAL MEDICINE

## 2019-04-10 PROCEDURE — 77412 RADIATION TX DELIVERY LVL 3: CPT | Performed by: RADIOLOGY

## 2019-04-10 PROCEDURE — 77336 RADIATION PHYSICS CONSULT: CPT | Performed by: RADIOLOGY

## 2019-04-26 ENCOUNTER — APPOINTMENT (OUTPATIENT)
Dept: LAB | Facility: MEDICAL CENTER | Age: 79
End: 2019-04-26
Payer: COMMERCIAL

## 2019-04-26 DIAGNOSIS — C50.911 ADENOCARCINOMA OF RIGHT BREAST METASTATIC TO LIVER (HCC): Chronic | ICD-10-CM

## 2019-04-26 DIAGNOSIS — C78.7 ADENOCARCINOMA OF RIGHT BREAST METASTATIC TO LIVER (HCC): Chronic | ICD-10-CM

## 2019-04-26 LAB
ALBUMIN SERPL BCP-MCNC: 3.7 G/DL (ref 3.5–5)
ALP SERPL-CCNC: 89 U/L (ref 46–116)
ALT SERPL W P-5'-P-CCNC: 33 U/L (ref 12–78)
ANION GAP SERPL CALCULATED.3IONS-SCNC: 6 MMOL/L (ref 4–13)
AST SERPL W P-5'-P-CCNC: 26 U/L (ref 5–45)
BASOPHILS # BLD AUTO: 0.04 THOUSANDS/ΜL (ref 0–0.1)
BASOPHILS NFR BLD AUTO: 1 % (ref 0–1)
BILIRUB SERPL-MCNC: 0.53 MG/DL (ref 0.2–1)
BUN SERPL-MCNC: 16 MG/DL (ref 5–25)
CALCIUM SERPL-MCNC: 9.1 MG/DL (ref 8.3–10.1)
CHLORIDE SERPL-SCNC: 111 MMOL/L (ref 100–108)
CO2 SERPL-SCNC: 28 MMOL/L (ref 21–32)
CREAT SERPL-MCNC: 0.67 MG/DL (ref 0.6–1.3)
EOSINOPHIL # BLD AUTO: 0.15 THOUSAND/ΜL (ref 0–0.61)
EOSINOPHIL NFR BLD AUTO: 2 % (ref 0–6)
ERYTHROCYTE [DISTWIDTH] IN BLOOD BY AUTOMATED COUNT: 12 % (ref 11.6–15.1)
GFR SERPL CREATININE-BSD FRML MDRD: 83 ML/MIN/1.73SQ M
GLUCOSE P FAST SERPL-MCNC: 114 MG/DL (ref 65–99)
HCT VFR BLD AUTO: 43 % (ref 34.8–46.1)
HGB BLD-MCNC: 13.7 G/DL (ref 11.5–15.4)
IMM GRANULOCYTES # BLD AUTO: 0.02 THOUSAND/UL (ref 0–0.2)
IMM GRANULOCYTES NFR BLD AUTO: 0 % (ref 0–2)
LYMPHOCYTES # BLD AUTO: 2 THOUSANDS/ΜL (ref 0.6–4.47)
LYMPHOCYTES NFR BLD AUTO: 29 % (ref 14–44)
MCH RBC QN AUTO: 31.5 PG (ref 26.8–34.3)
MCHC RBC AUTO-ENTMCNC: 31.9 G/DL (ref 31.4–37.4)
MCV RBC AUTO: 99 FL (ref 82–98)
MONOCYTES # BLD AUTO: 0.8 THOUSAND/ΜL (ref 0.17–1.22)
MONOCYTES NFR BLD AUTO: 12 % (ref 4–12)
NEUTROPHILS # BLD AUTO: 3.81 THOUSANDS/ΜL (ref 1.85–7.62)
NEUTS SEG NFR BLD AUTO: 56 % (ref 43–75)
NRBC BLD AUTO-RTO: 0 /100 WBCS
PLATELET # BLD AUTO: 154 THOUSANDS/UL (ref 149–390)
PMV BLD AUTO: 10.2 FL (ref 8.9–12.7)
POTASSIUM SERPL-SCNC: 4.7 MMOL/L (ref 3.5–5.3)
PROT SERPL-MCNC: 6.9 G/DL (ref 6.4–8.2)
RBC # BLD AUTO: 4.35 MILLION/UL (ref 3.81–5.12)
SODIUM SERPL-SCNC: 145 MMOL/L (ref 136–145)
WBC # BLD AUTO: 6.82 THOUSAND/UL (ref 4.31–10.16)

## 2019-04-26 PROCEDURE — 85025 COMPLETE CBC W/AUTO DIFF WBC: CPT

## 2019-04-26 PROCEDURE — 80053 COMPREHEN METABOLIC PANEL: CPT

## 2019-04-26 PROCEDURE — 36415 COLL VENOUS BLD VENIPUNCTURE: CPT

## 2019-04-29 ENCOUNTER — OFFICE VISIT (OUTPATIENT)
Dept: PAIN MEDICINE | Facility: MEDICAL CENTER | Age: 79
End: 2019-04-29
Payer: COMMERCIAL

## 2019-04-29 VITALS
HEART RATE: 84 BPM | DIASTOLIC BLOOD PRESSURE: 72 MMHG | SYSTOLIC BLOOD PRESSURE: 120 MMHG | WEIGHT: 161.6 LBS | BODY MASS INDEX: 29.74 KG/M2 | HEIGHT: 62 IN | RESPIRATION RATE: 18 BRPM

## 2019-04-29 DIAGNOSIS — M54.2 NECK PAIN: Primary | ICD-10-CM

## 2019-04-29 DIAGNOSIS — M79.2 NERVE PAIN: ICD-10-CM

## 2019-04-29 DIAGNOSIS — M48.062 SPINAL STENOSIS, LUMBAR REGION, WITH NEUROGENIC CLAUDICATION: ICD-10-CM

## 2019-04-29 DIAGNOSIS — M43.10 SPONDYLOLISTHESIS, UNSPECIFIED SPINAL REGION: ICD-10-CM

## 2019-04-29 DIAGNOSIS — G89.29 CHRONIC LEFT-SIDED LOW BACK PAIN WITH LEFT-SIDED SCIATICA: ICD-10-CM

## 2019-04-29 DIAGNOSIS — M54.42 CHRONIC LEFT-SIDED LOW BACK PAIN WITH LEFT-SIDED SCIATICA: ICD-10-CM

## 2019-04-29 PROCEDURE — 99214 OFFICE O/P EST MOD 30 MIN: CPT | Performed by: NURSE PRACTITIONER

## 2019-04-29 RX ORDER — NORTRIPTYLINE HYDROCHLORIDE 25 MG/1
25 CAPSULE ORAL
Qty: 30 CAPSULE | Refills: 0 | Status: SHIPPED | OUTPATIENT
Start: 2019-04-29 | End: 2019-06-03 | Stop reason: ALTCHOICE

## 2019-05-02 ENCOUNTER — HOSPITAL ENCOUNTER (OUTPATIENT)
Dept: CT IMAGING | Facility: HOSPITAL | Age: 79
Discharge: HOME | End: 2019-05-02
Attending: INTERNAL MEDICINE
Payer: COMMERCIAL

## 2019-05-02 DIAGNOSIS — C50.911 ADENOCARCINOMA OF RIGHT BREAST METASTATIC TO LIVER (HCC): ICD-10-CM

## 2019-05-02 DIAGNOSIS — C78.7 ADENOCARCINOMA OF RIGHT BREAST METASTATIC TO LIVER (HCC): ICD-10-CM

## 2019-05-02 PROCEDURE — 71260 CT THORAX DX C+: CPT

## 2019-05-02 PROCEDURE — 74177 CT ABD & PELVIS W/CONTRAST: CPT

## 2019-05-02 RX ADMIN — IOHEXOL 100 ML: 350 INJECTION, SOLUTION INTRAVENOUS at 19:18

## 2019-05-06 ENCOUNTER — OFFICE VISIT (OUTPATIENT)
Dept: HEMATOLOGY ONCOLOGY | Facility: CLINIC | Age: 79
End: 2019-05-06
Payer: COMMERCIAL

## 2019-05-06 VITALS
HEART RATE: 93 BPM | BODY MASS INDEX: 28.34 KG/M2 | HEIGHT: 62 IN | OXYGEN SATURATION: 94 % | RESPIRATION RATE: 16 BRPM | DIASTOLIC BLOOD PRESSURE: 74 MMHG | TEMPERATURE: 97.7 F | SYSTOLIC BLOOD PRESSURE: 124 MMHG | WEIGHT: 154 LBS

## 2019-05-06 DIAGNOSIS — C50.919 MALIGNANT NEOPLASM OF FEMALE BREAST, UNSPECIFIED ESTROGEN RECEPTOR STATUS, UNSPECIFIED LATERALITY, UNSPECIFIED SITE OF BREAST (HCC): ICD-10-CM

## 2019-05-06 PROCEDURE — 99214 OFFICE O/P EST MOD 30 MIN: CPT | Performed by: INTERNAL MEDICINE

## 2019-05-06 RX ORDER — TAMOXIFEN CITRATE 20 MG/1
20 TABLET ORAL DAILY
Qty: 30 TABLET | Refills: 3 | Status: SHIPPED | OUTPATIENT
Start: 2019-05-06 | End: 2019-06-26 | Stop reason: SDUPTHER

## 2019-05-10 ENCOUNTER — CLINICAL SUPPORT (OUTPATIENT)
Dept: RADIATION ONCOLOGY | Facility: CLINIC | Age: 79
End: 2019-05-10
Attending: RADIOLOGY
Payer: COMMERCIAL

## 2019-05-10 VITALS
SYSTOLIC BLOOD PRESSURE: 120 MMHG | WEIGHT: 164.4 LBS | DIASTOLIC BLOOD PRESSURE: 76 MMHG | BODY MASS INDEX: 30.25 KG/M2 | OXYGEN SATURATION: 93 % | TEMPERATURE: 98.4 F | RESPIRATION RATE: 18 BRPM | HEIGHT: 62 IN | HEART RATE: 93 BPM

## 2019-05-10 DIAGNOSIS — C78.7 ADENOCARCINOMA OF RIGHT BREAST METASTATIC TO LIVER (HCC): Primary | Chronic | ICD-10-CM

## 2019-05-10 DIAGNOSIS — C50.911 ADENOCARCINOMA OF RIGHT BREAST METASTATIC TO LIVER (HCC): Primary | Chronic | ICD-10-CM

## 2019-05-10 PROCEDURE — 99214 OFFICE O/P EST MOD 30 MIN: CPT | Performed by: RADIOLOGY

## 2019-05-16 DIAGNOSIS — R00.0 TACHYCARDIA: ICD-10-CM

## 2019-05-24 ENCOUNTER — REMOTE DEVICE CLINIC VISIT (OUTPATIENT)
Dept: CARDIOLOGY CLINIC | Facility: CLINIC | Age: 79
End: 2019-05-24
Payer: COMMERCIAL

## 2019-05-24 DIAGNOSIS — Z95.0 PACEMAKER: Primary | ICD-10-CM

## 2019-05-24 DIAGNOSIS — R00.1 BRADYCARDIA: ICD-10-CM

## 2019-05-24 PROCEDURE — 93294 REM INTERROG EVL PM/LDLS PM: CPT | Performed by: INTERNAL MEDICINE

## 2019-05-24 PROCEDURE — 93296 REM INTERROG EVL PM/IDS: CPT | Performed by: INTERNAL MEDICINE

## 2019-05-29 ENCOUNTER — HOSPITAL ENCOUNTER (OUTPATIENT)
Dept: RADIOLOGY | Facility: MEDICAL CENTER | Age: 79
Discharge: HOME | End: 2019-05-29
Attending: PHYSICAL MEDICINE & REHABILITATION | Admitting: PHYSICAL MEDICINE & REHABILITATION
Payer: COMMERCIAL

## 2019-05-29 VITALS
DIASTOLIC BLOOD PRESSURE: 80 MMHG | SYSTOLIC BLOOD PRESSURE: 133 MMHG | TEMPERATURE: 97.3 F | RESPIRATION RATE: 20 BRPM | OXYGEN SATURATION: 94 % | HEART RATE: 91 BPM

## 2019-05-29 DIAGNOSIS — M54.42 CHRONIC LEFT-SIDED LOW BACK PAIN WITH LEFT-SIDED SCIATICA: ICD-10-CM

## 2019-05-29 DIAGNOSIS — G89.29 CHRONIC LEFT-SIDED LOW BACK PAIN WITH LEFT-SIDED SCIATICA: ICD-10-CM

## 2019-05-29 PROCEDURE — 62323 NJX INTERLAMINAR LMBR/SAC: CPT | Performed by: PHYSICAL MEDICINE & REHABILITATION

## 2019-05-29 RX ORDER — LIDOCAINE HYDROCHLORIDE 10 MG/ML
5 INJECTION, SOLUTION EPIDURAL; INFILTRATION; INTRACAUDAL; PERINEURAL ONCE
Status: COMPLETED | OUTPATIENT
Start: 2019-05-29 | End: 2019-05-29

## 2019-05-29 RX ORDER — METHYLPREDNISOLONE ACETATE 80 MG/ML
80 INJECTION, SUSPENSION INTRA-ARTICULAR; INTRALESIONAL; INTRAMUSCULAR; PARENTERAL; SOFT TISSUE ONCE
Status: COMPLETED | OUTPATIENT
Start: 2019-05-29 | End: 2019-05-29

## 2019-05-29 RX ADMIN — METHYLPREDNISOLONE ACETATE 80 MG: 80 INJECTION, SUSPENSION INTRA-ARTICULAR; INTRALESIONAL; INTRAMUSCULAR; PARENTERAL; SOFT TISSUE at 09:14

## 2019-05-29 RX ADMIN — IOHEXOL 1 ML: 300 INJECTION, SOLUTION INTRAVENOUS at 09:13

## 2019-05-29 RX ADMIN — LIDOCAINE HYDROCHLORIDE 2 ML: 10 INJECTION, SOLUTION EPIDURAL; INFILTRATION; INTRACAUDAL; PERINEURAL at 09:12

## 2019-06-03 ENCOUNTER — OFFICE VISIT (OUTPATIENT)
Dept: PAIN MEDICINE | Facility: MEDICAL CENTER | Age: 79
End: 2019-06-03
Payer: COMMERCIAL

## 2019-06-03 VITALS
HEIGHT: 62 IN | SYSTOLIC BLOOD PRESSURE: 118 MMHG | RESPIRATION RATE: 20 BRPM | WEIGHT: 157.2 LBS | HEART RATE: 84 BPM | DIASTOLIC BLOOD PRESSURE: 76 MMHG | BODY MASS INDEX: 28.93 KG/M2

## 2019-06-03 DIAGNOSIS — M54.2 NECK PAIN: ICD-10-CM

## 2019-06-03 DIAGNOSIS — M47.812 OSTEOARTHRITIS OF CERVICAL SPINE, UNSPECIFIED SPINAL OSTEOARTHRITIS COMPLICATION STATUS: Primary | ICD-10-CM

## 2019-06-03 DIAGNOSIS — M48.062 SPINAL STENOSIS, LUMBAR REGION, WITH NEUROGENIC CLAUDICATION: ICD-10-CM

## 2019-06-03 PROCEDURE — 99214 OFFICE O/P EST MOD 30 MIN: CPT | Performed by: NURSE PRACTITIONER

## 2019-06-05 ENCOUNTER — TELEPHONE (OUTPATIENT)
Dept: PAIN MEDICINE | Facility: CLINIC | Age: 79
End: 2019-06-05

## 2019-06-06 ENCOUNTER — OFFICE VISIT (OUTPATIENT)
Dept: CARDIOLOGY CLINIC | Facility: CLINIC | Age: 79
End: 2019-06-06
Payer: COMMERCIAL

## 2019-06-06 VITALS
HEART RATE: 74 BPM | SYSTOLIC BLOOD PRESSURE: 120 MMHG | OXYGEN SATURATION: 96 % | DIASTOLIC BLOOD PRESSURE: 88 MMHG | WEIGHT: 157.4 LBS | HEIGHT: 62 IN | BODY MASS INDEX: 28.97 KG/M2

## 2019-06-06 DIAGNOSIS — E78.5 HYPERLIPIDEMIA LDL GOAL <70: ICD-10-CM

## 2019-06-06 DIAGNOSIS — R00.1 SINUS BRADYCARDIA: Primary | ICD-10-CM

## 2019-06-06 DIAGNOSIS — Z95.0 HISTORY OF PACEMAKER: ICD-10-CM

## 2019-06-06 DIAGNOSIS — J90 PLEURAL EFFUSION ON RIGHT: ICD-10-CM

## 2019-06-06 PROCEDURE — 99214 OFFICE O/P EST MOD 30 MIN: CPT | Performed by: INTERNAL MEDICINE

## 2019-06-06 NOTE — TELEPHONE ENCOUNTER
S/W pt  Advised pt of the same  Pt stated she wants to keep the MBB as scheduled for her neck b/c she is still having neck pain

## 2019-06-06 NOTE — TELEPHONE ENCOUNTER
Aware and agree with plan  If she's not having any pain in her lower back we can cancel the MBBs  Please review with patient how she would like to proceed

## 2019-06-10 ENCOUNTER — HOSPITAL ENCOUNTER (OUTPATIENT)
Dept: RADIOLOGY | Facility: MEDICAL CENTER | Age: 79
Discharge: HOME | End: 2019-06-10
Attending: PHYSICAL MEDICINE & REHABILITATION
Payer: COMMERCIAL

## 2019-06-10 VITALS
RESPIRATION RATE: 20 BRPM | HEART RATE: 84 BPM | TEMPERATURE: 97.7 F | SYSTOLIC BLOOD PRESSURE: 110 MMHG | OXYGEN SATURATION: 94 % | DIASTOLIC BLOOD PRESSURE: 73 MMHG

## 2019-06-10 DIAGNOSIS — M47.812 CERVICAL SPONDYLOSIS WITHOUT MYELOPATHY: ICD-10-CM

## 2019-06-10 PROCEDURE — 64490 INJ PARAVERT F JNT C/T 1 LEV: CPT | Performed by: PHYSICAL MEDICINE & REHABILITATION

## 2019-06-10 PROCEDURE — 64491 INJ PARAVERT F JNT C/T 2 LEV: CPT | Performed by: PHYSICAL MEDICINE & REHABILITATION

## 2019-06-10 RX ADMIN — Medication 1.5 ML: at 14:06

## 2019-06-13 ENCOUNTER — TELEPHONE (OUTPATIENT)
Dept: RADIOLOGY | Facility: MEDICAL CENTER | Age: 79
End: 2019-06-13

## 2019-06-26 ENCOUNTER — TELEPHONE (OUTPATIENT)
Dept: HEMATOLOGY ONCOLOGY | Facility: CLINIC | Age: 79
End: 2019-06-26

## 2019-06-26 DIAGNOSIS — C50.919 MALIGNANT NEOPLASM OF FEMALE BREAST, UNSPECIFIED ESTROGEN RECEPTOR STATUS, UNSPECIFIED LATERALITY, UNSPECIFIED SITE OF BREAST (HCC): ICD-10-CM

## 2019-06-27 RX ORDER — TAMOXIFEN CITRATE 20 MG/1
20 TABLET ORAL DAILY
Qty: 30 TABLET | Refills: 3 | Status: SHIPPED | OUTPATIENT
Start: 2019-06-27 | End: 2019-11-04 | Stop reason: SDUPTHER

## 2019-07-01 ENCOUNTER — HOSPITAL ENCOUNTER (OUTPATIENT)
Dept: RADIOLOGY | Facility: MEDICAL CENTER | Age: 79
Discharge: HOME | End: 2019-07-01
Attending: PHYSICAL MEDICINE & REHABILITATION | Admitting: PHYSICAL MEDICINE & REHABILITATION
Payer: COMMERCIAL

## 2019-07-01 VITALS
DIASTOLIC BLOOD PRESSURE: 69 MMHG | SYSTOLIC BLOOD PRESSURE: 132 MMHG | RESPIRATION RATE: 20 BRPM | HEART RATE: 76 BPM | TEMPERATURE: 97.4 F | OXYGEN SATURATION: 98 %

## 2019-07-01 DIAGNOSIS — M47.812 OSTEOARTHRITIS OF CERVICAL SPINE, UNSPECIFIED SPINAL OSTEOARTHRITIS COMPLICATION STATUS: ICD-10-CM

## 2019-07-01 PROCEDURE — 64490 INJ PARAVERT F JNT C/T 1 LEV: CPT | Performed by: PHYSICAL MEDICINE & REHABILITATION

## 2019-07-01 PROCEDURE — 64491 INJ PARAVERT F JNT C/T 2 LEV: CPT | Performed by: PHYSICAL MEDICINE & REHABILITATION

## 2019-07-01 RX ORDER — BUPIVACAINE HYDROCHLORIDE 5 MG/ML
10 INJECTION, SOLUTION EPIDURAL; INTRACAUDAL ONCE
Status: COMPLETED | OUTPATIENT
Start: 2019-07-01 | End: 2019-07-01

## 2019-07-01 RX ADMIN — BUPIVACAINE HYDROCHLORIDE 1.5 ML: 5 INJECTION, SOLUTION EPIDURAL; INTRACAUDAL at 09:57

## 2019-07-01 NOTE — DISCHARGE INSTRUCTIONS

## 2019-07-01 NOTE — H&P
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 ([facility] [address] )    Hypercholesteremia    Gastroesophageal reflux disease without esophagitis    History of total knee replacement    Hearing loss    LBBB (left bundle branch block)    Symptomatic bradycardia    Complication associated with cardiac pacemaker lead    Pacemaker complications, subsequent encounter    Chest wall hematoma    Cholecystitis    Acute biliary pancreatitis    Cholangitis    Choledocholithiasis    Acute gallstone pancreatitis    Intermittent complete heart block (HCC)    Spinal stenosis, lumbar region, with neurogenic claudication    Lumbar pain    Spondylolisthesis    Upper abdominal pain    SIRS (systemic inflammatory response syndrome) (HCC)    Lactic acidosis    Abnormal urinalysis    Type 2 diabetes mellitus, without long-term current use of insulin (HCC)    Hypertension    Nerve pain - Right    Neck pain    Osteoarthritis of cervical spine       Past Medical History:   Diagnosis Date    Anxiety     Arthritis     Breast CA (HCC)     recurrent, ductal carcinoma ER, [place] pos    Cardiac disease     Depression     Diverticula of intestine     Dizziness     and passes out    Flushing     Hiatal hernia     [place]      [place]    Hypercholesteremia     Liver mass     Liver metastasis (HCC)     Metastatic adenocarcinoma to liver ([facility] [address] )     Bx 01/03/2017    PONV (postoperative nausea and vomiting)     Recurrent breast cancer (HCC)     Shingles     Shortness of breath     on exertion    Tachycardia     Urinary incontinence     Use of cane as ambulatory aid     or walker       Past Surgical History:   Procedure Laterality Date    BREAST SURGERY      CARDIAC PACEMAKER REMOVAL N/A 11/9/2017    Procedure: PACEMAKER LEAD REVISION, REMOVAL OF NONFUNCTIONING LEAD, AND INSERTION OF A NEW RV LEAD;  Surgeon: Alonso Goodpasture, MD;  [facility];  Service: Cardiology    CATARACT EXTRACTION Bilateral     COLONOSCOPY      ERCP N/A 1/8/2018    Procedure: ENDOSCOPIC RETROGRADE CHOLANGIOPANCREATOGRAPHY (ERCP); Surgeon: Brian Pruitt MD;  Location: [facility]; Service: Gastroenterology    HYSTERECTOMY      JOINT REPLACEMENT      [place]  TKR and right TSR    MASTECTOMY Right     GA ERCP DX COLLECTION SPECIMEN BRUSHING/WASHING N/A 2/22/2018    Procedure: ENDOSCOPIC RETROGRADE CHOLANGIOPANCREATOGRAPHY (ERCP) with stent removal;  Surgeon: Brian Pruitt MD;  Location: [facility]; Service: Gastroenterology    [place] RESEC [address] N/A 7/13/2017    Procedure: LIVER RESECTION, INTRAOPERATIVE ULTRASOUND;  Surgeon: Susan Dubon MD;  [facility];  Service: Surgical Oncology    ROTATOR CUFF REPAIR Right     SENTINEL LYMPH NODE BIOPSY Right     TONSILECTOMY AND ADNOIDECTOMY      WOUND DEBRIDEMENT Left 11/9/2017    Procedure: DEBRIDEMENT WOUND AND DRESSING CHANGE [facility]); Surgeon: Alonso Goodpasture, MD;  [facility]: [facility];  Service: Cardiology         Current Outpatient Medications:     atorvastatin (LIPITOR) 20 mg tablet, Take 20 mg by mouth daily  , Disp: , Rfl:     Calcium Citrate-Vitamin D (CALCIUM CITRATE + D PO), Take 1,500 mg by mouth daily, Disp: , Rfl:     clotrimazole-betamethasone (LOTRISONE) 1-0 05 % cream, Apply 1 application topically as needed , Disp: , Rfl:     Cranberry (THERACRAN HP PO), Take 2 tablets by mouth daily, Disp: , Rfl:     furosemide (LASIX) 20 mg tablet, TAKE 1 TABLET BY MOUTH DAILY AS NEEDED FOR EDEMA, Disp: , Rfl:     Glucosamine-Chondroit-Vit C-Mn (GLUCOSAMINE 1500 COMPLEX) CAPS, Take 1 capsule by mouth daily  , Disp: , Rfl:     metFORMIN (GLUCOPHAGE) 500 mg tablet, 2 (two) times a day, Disp: , Rfl:     metoprolol tartrate (LOPRESSOR) 25 mg tablet, Take 0 5 tablets (12 5 mg total) by mouth every 12 (twelve) hours, Disp: 30 tablet, Rfl: 6    Multiple Vitamin (MULTIVITAMIN) capsule, Take 1 capsule by mouth daily  , Disp: , Rfl:     Naproxen Sodium (ALEVE) 220 MG CAPS, Take 1 capsule by mouth as needed , Disp: , Rfl:     ondansetron (ZOFRAN) 4 mg tablet, Take 4 mg by mouth every 8 (eight) hours as needed for nausea or vomiting, Disp: , Rfl:     tamoxifen (NOLVADEX) 20 mg tablet, Take 1 tablet (20 mg total) by mouth daily, Disp: 30 tablet, Rfl: 3    clindamycin (CLEOCIN) 300 MG capsule, TAKE 2 CAPSULES BY MOUTH 1 HOUR PRIOR TO DENTAL PROCEDURE , Disp: , Rfl: 0    Current Facility-Administered Medications:     bupivacaine (PF) (MARCAINE) 0 5 % injection 10 mL, 10 mL, Injection, Once, David Boyd, DO    Allergies   Allergen Reactions    Ampicillin Shortness Of Breath, Anaphylaxis and Other (See Comments)     Other reaction(s): Unknown Allergic Reaction  Reaction Date: [id number];        Physical Exam:   Vitals:    07/01/19 0945   BP: 126/71   Pulse: 71   Resp: 20   Temp: (!) 97 4 °F (36 3 °C)   SpO2: 99%     General: Awake, Alert, Oriented x 3, Mood and affect appropriate  Respiratory: Respirations even and unlabored  Cardiovascular: Peripheral pulses intact; no edema  Musculoskeletal Exam:  Right-sided neck pain    ASA Score:  3  Patient/Chart Verification  Patient ID Verified: Verbal  Consents Confirmed: Procedural, To be obtained in the Pre-Procedure area  H&P( within 30 days) Verified: To be obtained in the Pre-Procedure area  Allergies Reviewed: Yes  Anticoag/NSAID held?: NA  Currently on antibiotics?: No  Pregnancy denied?: NA    Assessment:   1   Osteoarthritis of cervical spine, unspecified spinal osteoarthritis complication status        Plan: right C2-4 MBB#1

## 2019-07-08 ENCOUNTER — TELEPHONE (OUTPATIENT)
Dept: RADIOLOGY | Facility: MEDICAL CENTER | Age: 79
End: 2019-07-08

## 2019-07-08 NOTE — TELEPHONE ENCOUNTER
Pain diary reviewed by Dr Gloria Wood; proceed with RFA and f/u; I will call and coordinate       Right C2-4

## 2019-07-09 NOTE — TELEPHONE ENCOUNTER
Late entry:  Called patient yesterday and scheduled:     Right C2-4 RFA on 8/21  6w f/u with Kavin Krause on 10/2    Reviewed instructions: , NPO 1 hour prior, loose-fitting/comfortable clothing, if ill/fever/infx/abx to call and reschedule  No need to hold any meds prior  Patient stated verbal understanding

## 2019-08-12 ENCOUNTER — APPOINTMENT (INPATIENT)
Dept: NON INVASIVE DIAGNOSTICS | Facility: HOSPITAL | Age: 79
DRG: 287 | End: 2019-08-12
Payer: COMMERCIAL

## 2019-08-12 ENCOUNTER — HOSPITAL ENCOUNTER (INPATIENT)
Facility: HOSPITAL | Age: 79
LOS: 7 days | DRG: 287 | End: 2019-08-19
Attending: EMERGENCY MEDICINE | Admitting: INTERNAL MEDICINE
Payer: COMMERCIAL

## 2019-08-12 ENCOUNTER — APPOINTMENT (EMERGENCY)
Dept: CT IMAGING | Facility: HOSPITAL | Age: 79
DRG: 287 | End: 2019-08-12
Payer: COMMERCIAL

## 2019-08-12 DIAGNOSIS — I49.9 VENTRICULAR ARRHYTHMIA: ICD-10-CM

## 2019-08-12 DIAGNOSIS — E83.42 HYPOMAGNESEMIA: ICD-10-CM

## 2019-08-12 DIAGNOSIS — R50.9 FEBRILE: ICD-10-CM

## 2019-08-12 DIAGNOSIS — I47.2 VENTRICULAR TACHYCARDIA (HCC): Primary | ICD-10-CM

## 2019-08-12 DIAGNOSIS — R06.00 DYSPNEA: ICD-10-CM

## 2019-08-12 PROBLEM — R42 LIGHTHEADEDNESS: Status: ACTIVE | Noted: 2019-08-12

## 2019-08-12 PROBLEM — E87.2 HIGH ANION GAP METABOLIC ACIDOSIS: Status: ACTIVE | Noted: 2019-08-12

## 2019-08-12 PROBLEM — E87.6 HYPOKALEMIA: Status: ACTIVE | Noted: 2019-08-12

## 2019-08-12 LAB
ALBUMIN SERPL BCP-MCNC: 2.8 G/DL (ref 3.5–5)
ALP SERPL-CCNC: 161 U/L (ref 46–116)
ALT SERPL W P-5'-P-CCNC: 42 U/L (ref 12–78)
ANION GAP SERPL CALCULATED.3IONS-SCNC: 16 MMOL/L (ref 4–13)
ANION GAP SERPL CALCULATED.3IONS-SCNC: 9 MMOL/L (ref 4–13)
AST SERPL W P-5'-P-CCNC: 43 U/L (ref 5–45)
ATRIAL RATE: 65 BPM
BACTERIA UR QL AUTO: ABNORMAL /HPF
BASOPHILS # BLD MANUAL: 0 THOUSAND/UL (ref 0–0.1)
BASOPHILS NFR MAR MANUAL: 0 % (ref 0–1)
BILIRUB SERPL-MCNC: 1.3 MG/DL (ref 0.2–1)
BILIRUB UR QL STRIP: NEGATIVE
BUN SERPL-MCNC: 21 MG/DL (ref 5–25)
BUN SERPL-MCNC: 24 MG/DL (ref 5–25)
CA-I BLD-SCNC: 1.11 MMOL/L (ref 1.12–1.32)
CALCIUM SERPL-MCNC: 8.7 MG/DL (ref 8.3–10.1)
CALCIUM SERPL-MCNC: 8.8 MG/DL (ref 8.3–10.1)
CHLORIDE SERPL-SCNC: 101 MMOL/L (ref 100–108)
CHLORIDE SERPL-SCNC: 104 MMOL/L (ref 100–108)
CLARITY UR: CLEAR
CO2 SERPL-SCNC: 20 MMOL/L (ref 21–32)
CO2 SERPL-SCNC: 25 MMOL/L (ref 21–32)
COLOR UR: YELLOW
CREAT SERPL-MCNC: 1.01 MG/DL (ref 0.6–1.3)
CREAT SERPL-MCNC: 1.02 MG/DL (ref 0.6–1.3)
EOSINOPHIL # BLD MANUAL: 0 THOUSAND/UL (ref 0–0.4)
EOSINOPHIL NFR BLD MANUAL: 0 % (ref 0–6)
ERYTHROCYTE [DISTWIDTH] IN BLOOD BY AUTOMATED COUNT: 12.9 % (ref 11.6–15.1)
GFR SERPL CREATININE-BSD FRML MDRD: 52 ML/MIN/1.73SQ M
GFR SERPL CREATININE-BSD FRML MDRD: 52 ML/MIN/1.73SQ M
GLUCOSE SERPL-MCNC: 200 MG/DL (ref 65–140)
GLUCOSE SERPL-MCNC: 210 MG/DL (ref 65–140)
GLUCOSE SERPL-MCNC: 210 MG/DL (ref 65–140)
GLUCOSE SERPL-MCNC: 217 MG/DL (ref 65–140)
GLUCOSE SERPL-MCNC: 306 MG/DL (ref 65–140)
GLUCOSE SERPL-MCNC: 334 MG/DL (ref 65–140)
GLUCOSE UR STRIP-MCNC: ABNORMAL MG/DL
HCT VFR BLD AUTO: 44.3 % (ref 34.8–46.1)
HGB BLD-MCNC: 14.4 G/DL (ref 11.5–15.4)
HGB UR QL STRIP.AUTO: ABNORMAL
HYALINE CASTS #/AREA URNS LPF: ABNORMAL /LPF
KETONES UR STRIP-MCNC: NEGATIVE MG/DL
LACTATE SERPL-SCNC: 2.7 MMOL/L (ref 0.5–2)
LEUKOCYTE ESTERASE UR QL STRIP: NEGATIVE
LYMPHOCYTES # BLD AUTO: 0.55 THOUSAND/UL (ref 0.6–4.47)
LYMPHOCYTES # BLD AUTO: 10 % (ref 14–44)
MAGNESIUM SERPL-MCNC: 1.4 MG/DL (ref 1.6–2.6)
MCH RBC QN AUTO: 31.7 PG (ref 26.8–34.3)
MCHC RBC AUTO-ENTMCNC: 32.5 G/DL (ref 31.4–37.4)
MCV RBC AUTO: 98 FL (ref 82–98)
METAMYELOCYTES NFR BLD MANUAL: 3 % (ref 0–1)
MONOCYTES # BLD AUTO: 0.11 THOUSAND/UL (ref 0–1.22)
MONOCYTES NFR BLD: 2 % (ref 4–12)
NEUTROPHILS # BLD MANUAL: 4.65 THOUSAND/UL (ref 1.85–7.62)
NEUTS BAND NFR BLD MANUAL: 9 % (ref 0–8)
NEUTS SEG NFR BLD AUTO: 76 % (ref 43–75)
NITRITE UR QL STRIP: POSITIVE
NON-SQ EPI CELLS URNS QL MICRO: ABNORMAL /HPF
NRBC BLD AUTO-RTO: 0 /100 WBCS
P AXIS: 68 DEGREES
PH UR STRIP.AUTO: 5.5 [PH]
PLATELET # BLD AUTO: 86 THOUSANDS/UL (ref 149–390)
PLATELET # BLD AUTO: 91 THOUSANDS/UL (ref 149–390)
PLATELET BLD QL SMEAR: ABNORMAL
PMV BLD AUTO: 10.3 FL (ref 8.9–12.7)
PMV BLD AUTO: 10.4 FL (ref 8.9–12.7)
POTASSIUM SERPL-SCNC: 3.4 MMOL/L (ref 3.5–5.3)
POTASSIUM SERPL-SCNC: 3.7 MMOL/L (ref 3.5–5.3)
PR INTERVAL: 198 MS
PROT SERPL-MCNC: 6.5 G/DL (ref 6.4–8.2)
PROT UR STRIP-MCNC: ABNORMAL MG/DL
QRS AXIS: 38 DEGREES
QRSD INTERVAL: 134 MS
QT INTERVAL: 522 MS
QTC INTERVAL: 542 MS
RBC # BLD AUTO: 4.54 MILLION/UL (ref 3.81–5.12)
RBC #/AREA URNS AUTO: ABNORMAL /HPF
RBC MORPH BLD: NORMAL
SODIUM SERPL-SCNC: 135 MMOL/L (ref 136–145)
SODIUM SERPL-SCNC: 140 MMOL/L (ref 136–145)
SP GR UR STRIP.AUTO: >=1.03 (ref 1–1.03)
T WAVE AXIS: 25 DEGREES
TOTAL CELLS COUNTED SPEC: 100
TROPONIN I SERPL-MCNC: <0.02 NG/ML
TSH SERPL DL<=0.05 MIU/L-ACNC: 2.74 UIU/ML (ref 0.36–3.74)
UROBILINOGEN UR QL STRIP.AUTO: 0.2 E.U./DL
VENTRICULAR RATE: 65 BPM
WBC # BLD AUTO: 5.47 THOUSAND/UL (ref 4.31–10.16)
WBC #/AREA URNS AUTO: ABNORMAL /HPF
WBC TOXIC VACUOLES BLD QL SMEAR: PRESENT

## 2019-08-12 PROCEDURE — 36415 COLL VENOUS BLD VENIPUNCTURE: CPT | Performed by: EMERGENCY MEDICINE

## 2019-08-12 PROCEDURE — 80053 COMPREHEN METABOLIC PANEL: CPT | Performed by: EMERGENCY MEDICINE

## 2019-08-12 PROCEDURE — 82948 REAGENT STRIP/BLOOD GLUCOSE: CPT

## 2019-08-12 PROCEDURE — 84484 ASSAY OF TROPONIN QUANT: CPT | Performed by: EMERGENCY MEDICINE

## 2019-08-12 PROCEDURE — 80048 BASIC METABOLIC PNL TOTAL CA: CPT | Performed by: PHYSICIAN ASSISTANT

## 2019-08-12 PROCEDURE — 82330 ASSAY OF CALCIUM: CPT | Performed by: PHYSICIAN ASSISTANT

## 2019-08-12 PROCEDURE — 71275 CT ANGIOGRAPHY CHEST: CPT

## 2019-08-12 PROCEDURE — 83735 ASSAY OF MAGNESIUM: CPT | Performed by: EMERGENCY MEDICINE

## 2019-08-12 PROCEDURE — 96365 THER/PROPH/DIAG IV INF INIT: CPT

## 2019-08-12 PROCEDURE — 81001 URINALYSIS AUTO W/SCOPE: CPT | Performed by: PHYSICIAN ASSISTANT

## 2019-08-12 PROCEDURE — 93010 ELECTROCARDIOGRAM REPORT: CPT | Performed by: INTERNAL MEDICINE

## 2019-08-12 PROCEDURE — 85049 AUTOMATED PLATELET COUNT: CPT | Performed by: PHYSICIAN ASSISTANT

## 2019-08-12 PROCEDURE — 83605 ASSAY OF LACTIC ACID: CPT | Performed by: PHYSICIAN ASSISTANT

## 2019-08-12 PROCEDURE — 99291 CRITICAL CARE FIRST HOUR: CPT | Performed by: EMERGENCY MEDICINE

## 2019-08-12 PROCEDURE — 99222 1ST HOSP IP/OBS MODERATE 55: CPT

## 2019-08-12 PROCEDURE — 84443 ASSAY THYROID STIM HORMONE: CPT | Performed by: EMERGENCY MEDICINE

## 2019-08-12 PROCEDURE — 99222 1ST HOSP IP/OBS MODERATE 55: CPT | Performed by: INTERNAL MEDICINE

## 2019-08-12 PROCEDURE — 93005 ELECTROCARDIOGRAM TRACING: CPT

## 2019-08-12 PROCEDURE — 85027 COMPLETE CBC AUTOMATED: CPT | Performed by: EMERGENCY MEDICINE

## 2019-08-12 PROCEDURE — 99285 EMERGENCY DEPT VISIT HI MDM: CPT

## 2019-08-12 PROCEDURE — 85007 BL SMEAR W/DIFF WBC COUNT: CPT | Performed by: EMERGENCY MEDICINE

## 2019-08-12 RX ORDER — MAGNESIUM SULFATE HEPTAHYDRATE 40 MG/ML
2 INJECTION, SOLUTION INTRAVENOUS ONCE
Status: COMPLETED | OUTPATIENT
Start: 2019-08-12 | End: 2019-08-12

## 2019-08-12 RX ORDER — POTASSIUM CHLORIDE 20 MEQ/1
40 TABLET, EXTENDED RELEASE ORAL ONCE
Status: COMPLETED | OUTPATIENT
Start: 2019-08-12 | End: 2019-08-12

## 2019-08-12 RX ORDER — ATORVASTATIN CALCIUM 10 MG/1
20 TABLET, FILM COATED ORAL
Status: DISCONTINUED | OUTPATIENT
Start: 2019-08-12 | End: 2019-08-19 | Stop reason: HOSPADM

## 2019-08-12 RX ORDER — SODIUM CHLORIDE 9 MG/ML
100 INJECTION, SOLUTION INTRAVENOUS ONCE
Status: COMPLETED | OUTPATIENT
Start: 2019-08-13 | End: 2019-08-13

## 2019-08-12 RX ORDER — HEPARIN SODIUM 5000 [USP'U]/ML
5000 INJECTION, SOLUTION INTRAVENOUS; SUBCUTANEOUS EVERY 8 HOURS SCHEDULED
Status: DISCONTINUED | OUTPATIENT
Start: 2019-08-12 | End: 2019-08-19 | Stop reason: HOSPADM

## 2019-08-12 RX ORDER — LIDOCAINE HCL 20 MG/ML
2 SYRINGE (ML) INTRAVENOUS ONCE
Status: COMPLETED | OUTPATIENT
Start: 2019-08-12 | End: 2019-08-12

## 2019-08-12 RX ORDER — SODIUM CHLORIDE, SODIUM GLUCONATE, SODIUM ACETATE, POTASSIUM CHLORIDE, MAGNESIUM CHLORIDE, SODIUM PHOSPHATE, DIBASIC, AND POTASSIUM PHOSPHATE .53; .5; .37; .037; .03; .012; .00082 G/100ML; G/100ML; G/100ML; G/100ML; G/100ML; G/100ML; G/100ML
100 INJECTION, SOLUTION INTRAVENOUS CONTINUOUS
Status: DISCONTINUED | OUTPATIENT
Start: 2019-08-12 | End: 2019-08-13

## 2019-08-12 RX ORDER — ASPIRIN 81 MG/1
324 TABLET ORAL ONCE
Status: COMPLETED | OUTPATIENT
Start: 2019-08-12 | End: 2019-08-12

## 2019-08-12 RX ORDER — CHLORHEXIDINE GLUCONATE 0.12 MG/ML
15 RINSE ORAL EVERY 12 HOURS SCHEDULED
Status: DISCONTINUED | OUTPATIENT
Start: 2019-08-12 | End: 2019-08-12

## 2019-08-12 RX ORDER — TAMOXIFEN CITRATE 10 MG/1
20 TABLET ORAL DAILY
Status: DISCONTINUED | OUTPATIENT
Start: 2019-08-12 | End: 2019-08-19 | Stop reason: HOSPADM

## 2019-08-12 RX ADMIN — HEPARIN SODIUM 5000 UNITS: 5000 INJECTION INTRAVENOUS; SUBCUTANEOUS at 22:24

## 2019-08-12 RX ADMIN — INSULIN LISPRO 4 UNITS: 100 INJECTION, SOLUTION INTRAVENOUS; SUBCUTANEOUS at 12:35

## 2019-08-12 RX ADMIN — SODIUM CHLORIDE, SODIUM GLUCONATE, SODIUM ACETATE, POTASSIUM CHLORIDE, MAGNESIUM CHLORIDE, SODIUM PHOSPHATE, DIBASIC, AND POTASSIUM PHOSPHATE 100 ML/HR: .53; .5; .37; .037; .03; .012; .00082 INJECTION, SOLUTION INTRAVENOUS at 22:30

## 2019-08-12 RX ADMIN — POTASSIUM CHLORIDE 40 MEQ: 1500 TABLET, EXTENDED RELEASE ORAL at 08:05

## 2019-08-12 RX ADMIN — MAGNESIUM SULFATE HEPTAHYDRATE 2 G: 40 INJECTION, SOLUTION INTRAVENOUS at 05:28

## 2019-08-12 RX ADMIN — IODIXANOL 85 ML: 320 INJECTION, SOLUTION INTRAVASCULAR at 06:10

## 2019-08-12 RX ADMIN — HEPARIN SODIUM 5000 UNITS: 5000 INJECTION INTRAVENOUS; SUBCUTANEOUS at 16:00

## 2019-08-12 RX ADMIN — INSULIN LISPRO 2 UNITS: 100 INJECTION, SOLUTION INTRAVENOUS; SUBCUTANEOUS at 16:26

## 2019-08-12 RX ADMIN — ASPIRIN 324 MG: 81 TABLET, COATED ORAL at 05:18

## 2019-08-12 RX ADMIN — AMIODARONE HYDROCHLORIDE 1 MG/MIN: 50 INJECTION, SOLUTION INTRAVENOUS at 05:24

## 2019-08-12 RX ADMIN — MAGNESIUM SULFATE HEPTAHYDRATE 2 G: 40 INJECTION, SOLUTION INTRAVENOUS at 11:30

## 2019-08-12 RX ADMIN — ATORVASTATIN CALCIUM 20 MG: 20 TABLET, FILM COATED ORAL at 17:31

## 2019-08-12 RX ADMIN — MAGNESIUM SULFATE HEPTAHYDRATE 2 G: 40 INJECTION, SOLUTION INTRAVENOUS at 10:24

## 2019-08-12 RX ADMIN — SODIUM CHLORIDE, SODIUM GLUCONATE, SODIUM ACETATE, POTASSIUM CHLORIDE, MAGNESIUM CHLORIDE, SODIUM PHOSPHATE, DIBASIC, AND POTASSIUM PHOSPHATE 100 ML/HR: .53; .5; .37; .037; .03; .012; .00082 INJECTION, SOLUTION INTRAVENOUS at 12:12

## 2019-08-12 RX ADMIN — TAMOXIFEN CITRATE 20 MG: 10 TABLET, FILM COATED ORAL at 10:35

## 2019-08-12 RX ADMIN — INSULIN LISPRO 1 UNITS: 100 INJECTION, SOLUTION INTRAVENOUS; SUBCUTANEOUS at 22:24

## 2019-08-12 RX ADMIN — HEPARIN SODIUM 5000 UNITS: 5000 INJECTION INTRAVENOUS; SUBCUTANEOUS at 10:29

## 2019-08-12 NOTE — H&P
History & Physical Exam - Critical Care   Nava Wright 78 y o  female MRN: [id number]  Unit/Bed#: ED 15 Encounter: [id number]      Assessment/Plan:  1  Ventricular tachycardia in setting of pacemarker placed in 2017 for complete heart block  · This patient will be admitted to ICU step down level 1 requiring further monitoring and workup that will last longer than 2 midnight stay  · Patient has implanted Medtronic pacemaker and had multiple runs (about 20) of VT lasting 20-30 seconds   · Given 100 mg lidocaine prehospital  With effect  · Continue amiodarone drip   · Continue atorvastatin  · Pacemaker interogated-showed a 120 seconda run or polymorphic V-tach with an atrial rate of 140's  · Cardiology consulted  · Trend troponins   2  High aninon gap metabolic acidosis  · Most likely secondary to dehydration secondary to decreased PO intake and dyspnea  · IV fluid resuscitation  · Trend bMP  3  Hyperkalemia  · Replete prn k < 4  · Trend bmp  4  Diabetes type 2  · Metformin held  · SSI and accu checks ac and hs  · Goal 140-180  5  Essential hypertension  · Normotensive at present  · Hold home antihypertensives and lasix  6  Right breast CA s/p right mastectomy  · Continue tamoxifen         _____________________________________________________________________      HPI:    Nava Wright is a 78 y o  female with a past medical history of complete heart block s/p pacemaker, recurrent ductal ER/IA positive breast cancer with a prior pericardial effusion (which is resolved), HTN, Diabetes Type 2 who presented this am with dyspnea  PAtient stated that she felt fatigued, had decreased appetite with nausea, and had chills yesterday  Early this am she developed dyspnea while sleeping  EMS was called and patient was noted to have intermittent episodes of ventricular tachycardia  She was given 100 mg Lidocaine and the episodes subsided along with dyspnea  Denied palpations  Reported dizziness with getting oob   In ED patient was given ASA and started on an amiodarone drip  ED evaluation revealed patient with metabolic acidosis and hypokalemic  Patient admitted to ICU as a Step Down level 1 for further work up and monitoring  Review of Systems:    Full 12 point review of systems was performed  Aside from what was mentioned in the HPI, it is otherwise negative  Historical Information   Past Medical History:   Diagnosis Date    Anxiety     Arthritis     Breast CA ([facility] )     recurrent, ductal carcinoma ER, [place] pos    Cardiac disease     Depression     Diverticula of intestine     Dizziness     and passes out    Flushing     Hiatal hernia     [place] (hard of hearing)      lizette    Hypercholesteremia     Liver mass     Liver metastasis (HCC)     Metastatic adenocarcinoma to liver ([facility] [address] )     Bx 01/03/2017    PONV (postoperative nausea and vomiting)     Recurrent breast cancer ([facility] [address] )     Shingles     Shortness of breath     on exertion    Tachycardia     Urinary incontinence     Use of cane as ambulatory aid     or walker     Past Surgical History:   Procedure Laterality Date    BREAST SURGERY      CARDIAC PACEMAKER REMOVAL N/A 11/9/2017    Procedure: PACEMAKER LEAD REVISION, REMOVAL OF NONFUNCTIONING LEAD, AND INSERTION OF A NEW RV LEAD;  Surgeon: Nisreen Bhat MD;  [facility];  Service: Cardiology    CATARACT EXTRACTION Bilateral     COLONOSCOPY      ERCP N/A 1/8/2018    Procedure: ENDOSCOPIC RETROGRADE CHOLANGIOPANCREATOGRAPHY (ERCP); Surgeon: Primo Salas MD;  [facility]; Service: Gastroenterology    HYSTERECTOMY      JOINT REPLACEMENT      lizette  TKR and right TSR    MASTECTOMY Right     [place] ERCP DX COLLECTION SPECIMEN BRUSHING/WASHING N/A 2/22/2018    Procedure: ENDOSCOPIC RETROGRADE CHOLANGIOPANCREATOGRAPHY (ERCP) with stent removal;  Surgeon: Primo Salas MD;  [facility];   Service: Gastroenterology    [place] RESEC LIVER,PART LOBECTOMY N/A 7/13/2017    Procedure: LIVER RESECTION, INTRAOPERATIVE ULTRASOUND;  Surgeon: Terrence Martinez MD;  [facility];  Service: Surgical Oncology    ROTATOR CUFF REPAIR Right     SENTINEL LYMPH NODE BIOPSY Right     TONSILECTOMY AND ADNOIDECTOMY      WOUND DEBRIDEMENT Left 11/9/2017    Procedure: DEBRIDEMENT WOUND AND DRESSING CHANGE [facility]); Surgeon: Julio Curtis MD;  [facility];  Service: Cardiology     Social History   Social History     Substance and Sexual Activity   Alcohol Use No     Social History     Substance and Sexual Activity   Drug Use No     Social History     Tobacco Use   Smoking Status Never Smoker   Smokeless Tobacco Never Used       Family History:   Family History   Problem Relation Age of Onset    Lung cancer Father     Cancer Brother     Diabetes type II Son        Medications:  Pertinent medications were reviewed    Current Facility-Administered Medications:  amiodarone 1 mg/min Intravenous Continuous Rohit Kidd MD Last Rate: 1 mg/min (08/12/19 0524)   magnesium sulfate 2 g Intravenous Once Rohit Kidd MD Last Rate: 2 g (08/12/19 0528)         Allergies   Allergen Reactions    Ampicillin Shortness Of Breath, Anaphylaxis and Other (See Comments)     Other reaction(s): Unknown Allergic Reaction  Reaction Date: [id number];          Vitals:   /53 (BP Location: Left arm)   Pulse 62   Temp 97 5 °F (36 4 °C) (Temporal)   Resp 19   Wt 73 3 kg (161 lb 9 6 oz)   LMP  (LMP Unknown) Comment: post   SpO2 97%   BMI 29 56 kg/m²   Body mass index is 29 56 kg/m²  SpO2: 97 %,   SpO2 Activity: At Rest,   O2 Device: Nasal cannula    No intake or output data in the 24 hours ending 08/12/19 0718  Invasive Devices     Peripheral Intravenous Line            Peripheral IV 08/12/19 Left Forearm less than 1 day    Peripheral IV 08/12/19 Left Hand less than 1 day                Physical Exam:  Gen: appears stated age and in no distress sitting on litter   HEENT:NT/AC   PERRLA, oropharnyx clear and mucous pink membranes  Neck:Supple  No JVD  Trachea Midline  Chest:Lungs clear in all fields  No w/r/r  No cough  No accessory muscle usuage  Cor:PAced Rhythm  S1,S2  No gallops or murmurs noted  NO edema  Abd:soft, NT/ND with normoactive BS  Ext:FROM  Distal pulse intact  Neuro:Awake alert and oriented x 3  Cranial nerves II-XII intact  No sensory deficits  Skin:Dry and intact  Diagnostic Data:  Lab: I have personally reviewed pertinent lab results  ,   CBC:  Results from last 7 days   Lab Units 08/12/19  0512   WBC Thousand/uL 5 47   HEMOGLOBIN g/dL 14 4   HEMATOCRIT % 44 3   PLATELETS Thousands/uL 91*      CMP: Lab Results   Component Value Date    SODIUM 140 08/12/2019    K 3 4 (L) 08/12/2019     08/12/2019    CO2 20 (L) 08/12/2019    BUN 21 08/12/2019    CREATININE 1 01 08/12/2019    CALCIUM 8 7 08/12/2019    AST 43 08/12/2019    ALT 42 08/12/2019    ALKPHOS 161 (H) 08/12/2019    EGFR 52 08/12/2019   ,   PT/INR: No results found for: PT, INR,   Magnesium: No components found for: MAG,  Phosphorous: No results found for: PHOS    ABG: No results found for: PHART, HZG7EGA, PO2ART, BLF6PPW, Q7RLNKUK, BEART, SOURCE,     Microbiology:  none    Imaging: CT PE STUDY- No PE  Minimal bibasilar, right greater than left, and right middle lobe subsegmental atelectasis  Trace right pleural effusion  I have personally reviewed the pertinent imaging studies on the PACS system      Cardiac/EKG/telemetry/Echo:   Results from last 7 days   Lab Units 08/12/19  0512   TROPONIN I ng/mL <0 02     Paced on Telemetry in rate of 70's      VTE Prophylaxis: Heparin    Code Status: Prior    TITO Cazares    Portions of the record may have been created with voice recognition software  Occasional wrong word or "sound a like" substitutions may have occurred due to the inherent limitations of voice recognition software  Read the chart carefully and recognize, using context, where substitutions have occurred

## 2019-08-12 NOTE — CONSULTS
Consult - Cardiology   Justice Paulson 78 y o  female MRN: [id number]  Unit/Bed#: ICU 08 Encounter: [id number]        Reason For Consult:  Ventricular tachycardia               Assessment and Plan:     1  Wide complex tachycardia:  Observed on pre-hospital ECG (see documentation below) - probable VT  Asymptomatic with no subjective recognition of dysrhythmia   2  Hypokalemia (present on arrival): Now improved and low normal at 3 7  3  Hypomagnesemia (present on arrival):  1 4  4  Hypertension:  Currently normotensive    · Quick look device transmission done in the emergency department indicates 26 instances of ventricular tachycardia since February 13, 2019 with longest of these 119 seconds  No greater detail can be garnered from this report ~~> will check formal device interrogation  · For now continue IV amiodarone  · Check echocardiogram  · Repeat magnesium pending  Continue to optimize electrolytes with goal potassium greater than 4 and magnesium greater than 2 0  History Of Present Illness:   Ms [place] is an 13-year-old patient of cardiologist Dr Michelle Boggs with prior history of loop recorder detection of complete heart block and syncope later receiving Medtronic dual-chamber pacemaker (10/2017)  This was unfortunately complicated by late subacute RV lead perforation with loss of capture requiring lead revision (11/9/2017)  Additional cardiac problems include left bundle branch block, hyperlipidemia  Other problems include liver cancer - prior liver resection (September 2017), breast cancer-S/p RT mastectomy with radiation and chemotherapy, diabetes  On the evening of 8/10 patient felt some mild generalized fatigue  During the early a m  Hours she had some retching and dry heaves without emesis or specific abdominal pain  Yesterday, 8/11, the patient felt tired and fatigued throughout the day with some increased effort dyspnea  She denied any chest pain or perceived ectopy    Later that evening she did have a sense of subjective fever and chills without other focal symptoms specifically denying cough, expectoration, pharyngitis, abdominal pain, or change in her bowel habits  Patient slept poorly through the night possibly with some component of orthopnea  Because of continued symptoms her  called an ambulance early this morning  EN route to the hospital the patient had an ECG which captured evidence of wide complex tachycardia (see below)  The patient was personally unaware of her heart rate and rhythm denying any present or prior feelings of near-syncope/syncope during her time in the ambulance  Likewise she denies any history of the same  She has not had other recent problems with dyspnea, orthopnea, PND, increased lower extremity edema, or weight gain  Past Medical History:        Past Medical History:   Diagnosis Date    Anxiety     Arthritis     Breast CA ([address] )     recurrent, ductal carcinoma ER, [place] pos    Cardiac disease     Depression     Diverticula of intestine     Dizziness     and passes out    Flushing     Hiatal hernia     [place] (hard of hearing)      lizette    Hypercholesteremia     Liver mass     Liver metastasis (HCC)     Metastatic adenocarcinoma to liver (HCC)     Bx 01/03/2017    PONV (postoperative nausea and vomiting)     Recurrent breast cancer ([address] )     Shingles     Shortness of breath     on exertion    Tachycardia     Urinary incontinence     Use of cane as ambulatory aid     or walker      Past Surgical History:   Procedure Laterality Date    BREAST SURGERY      CARDIAC PACEMAKER REMOVAL N/A 11/9/2017    Procedure: PACEMAKER LEAD REVISION, REMOVAL OF NONFUNCTIONING LEAD, AND INSERTION OF A NEW RV LEAD;  Surgeon: Tamara Moctezuma MD;  [facility];  Service: Cardiology    CATARACT EXTRACTION Bilateral     COLONOSCOPY      ERCP N/A 1/8/2018    Procedure: ENDOSCOPIC RETROGRADE CHOLANGIOPANCREATOGRAPHY (ERCP);   Surgeon: Elder Mcardle, MD;  [facility]; Service: Gastroenterology    HYSTERECTOMY      JOINT REPLACEMENT      lizette  TKR and right TSR    MASTECTOMY Right     [place] ERCP DX COLLECTION SPECIMEN BRUSHING/WASHING N/A 2/22/2018    Procedure: ENDOSCOPIC RETROGRADE CHOLANGIOPANCREATOGRAPHY (ERCP) with stent removal;  Surgeon: Ambika Solorzano MD;  [facility]; Service: Gastroenterology    OR RESEC [id number] N/A 7/13/2017    Procedure: LIVER RESECTION, INTRAOPERATIVE ULTRASOUND;  Surgeon: Bernie Haile MD;  [facility];  Service: Surgical Oncology    ROTATOR CUFF REPAIR Right     SENTINEL LYMPH NODE BIOPSY Right     TONSILECTOMY AND ADNOIDECTOMY      WOUND DEBRIDEMENT Left 11/9/2017    Procedure: DEBRIDEMENT WOUND AND DRESSING CHANGE [facility]); Surgeon: Nico Argueta MD;  [facility];  Service: Cardiology        Allergy:        Allergies   Allergen Reactions    Ampicillin Shortness Of Breath, Anaphylaxis and Other (See Comments)     Other reaction(s): Unknown Allergic Reaction  Reaction Date: [id number];        Medications:       Prior to Admission medications    Medication Sig Start Date End Date Taking? Authorizing Provider   atorvastatin (LIPITOR) 20 mg tablet Take 20 mg by mouth daily     Yes Historical Provider, MD   Calcium Citrate-Vitamin D (CALCIUM CITRATE + D PO) Take 1,500 mg by mouth daily   Yes Historical Provider, MD   clindamycin (CLEOCIN) 300 MG capsule TAKE 2 CAPSULES BY MOUTH 1 HOUR PRIOR TO DENTAL PROCEDURE  1/3/18  Yes Historical Provider, MD   clotrimazole-betamethasone (Carlos Krill) 1-0 05 % cream Apply 1 application topically as needed  8/14/17  Yes Historical Provider, MD   Cranberry (THERACRAN HP PO) Take 2 tablets by mouth daily   Yes Historical Provider, MD   furosemide (LASIX) 20 mg tablet TAKE 1 TABLET BY MOUTH DAILY AS NEEDED FOR EDEMA 8/1/17  Yes Historical Provider, MD   Glucosamine-Chondroit-Vit C-Mn (GLUCOSAMINE 1500 COMPLEX) CAPS Take 1 capsule by mouth daily Yes Historical Provider, MD   metFORMIN (GLUCOPHAGE) 500 mg tablet 2 (two) times a day   Yes Historical Provider, MD   metoprolol tartrate (LOPRESSOR) 25 mg tablet Take 0 5 tablets (12 5 mg total) by mouth every 12 (twelve) hours 5/16/19  Yes Godfrey Brody, DO   Multiple Vitamin (MULTIVITAMIN) capsule Take 1 capsule by mouth daily     Yes Historical Provider, MD   tamoxifen (NOLVADEX) 20 mg tablet Take 1 tablet (20 mg total) by mouth daily 6/27/19  Yes Gabbie George, DO   Naproxen Sodium (ALEVE) 220 MG CAPS Take 1 capsule by mouth as needed   8/12/19  Historical Provider, MD   ondansetron (ZOFRAN) 4 mg tablet Take 4 mg by mouth every 8 (eight) hours as needed for nausea or vomiting  8/12/19  Historical Provider, MD       Family History:     Family History   Problem Relation Age of Onset    Lung cancer Father     Cancer Brother     Diabetes type II Son         Social History:       Social History     Socioeconomic History    Marital status: /Civil Union     Spouse name: None    Number of children: None    Years of education: None    Highest education level: None   Occupational History    None   Social Needs    Financial resource strain: None    Food insecurity:     Worry: None     Inability: None    Transportation needs:     Medical: None     Non-medical: None   Tobacco Use    Smoking status: Never Smoker    Smokeless tobacco: Never Used   Substance and Sexual Activity    Alcohol use: No    Drug use: No    Sexual activity: None   Lifestyle    Physical activity:     Days per week: None     Minutes per session: None    Stress: None   Relationships    Social connections:     Talks on phone: None     Gets together: None     Attends Hindu service: None     Active member of club or organization: None     Attends meetings of clubs or organizations: None     Relationship status: None    Intimate partner violence:     Fear of current or ex partner: None     Emotionally abused: None     Physically abused: None     Forced sexual activity: None   Other Topics Concern    None   Social History Narrative    None       ROS:  Symptoms per HPI  Ambulates with assistance of walker  Patient reports 5 lb of purposeful weight loss in the last 1-2 months  Remainder review of systems is negative    Exam:  General:  Alert, normally conversant, comfortable appearing elderly female  Head: Normocephalic, atraumatic  Eyes:  EOMI  Pupils - equal, round, reactive to accomodation  No icterus  Normal Conjunctiva  Oropharynx:  Moist without lesion  Neck:  No gross bruit, JVD, thyromegaly, or lymphadenopathy  Heart:  Somewhat distant, Regular with controlled rate  No rub nor pathologic murmur  Lungs:  Clear without rales/rhonchi/wheeze  Abdomen:  Soft and nontender with normal bowel sounds  No organomegaly or mass  Lower Limbs:  No edema  Pulses[de-identified]  RLE - DP:  2+                 LLE - DP:  1+  Musculoskeletal: Independent movement of limbs observed, Formal ROM and strength eval not performed  Neurologic:    Oriented to: person , place, situation  Cranial Nerves: grossly intact - vision, smell, taste, and hearing  were not tested  Motor function:grossly normal, symmetric   Sensation: Was not tested    DATA:      ECG:                       Telemetry:  Atrial sensed and ventricular paced rhythm with ventricular rate trend in the 70              Weights: Wt Readings from Last 3 Encounters:   08/12/19 71 3 kg (157 lb 3 oz)   06/06/19 71 4 kg (157 lb 6 4 oz)   06/03/19 71 3 kg (157 lb 3 2 oz)   , Body mass index is 28 75 kg/m²           Lab Studies:    Results from last 7 days   Lab Units 08/12/19 0512   TROPONIN I ng/mL <0 02          Results from last 7 days   Lab Units 08/12/19  0913 08/12/19  0512   WBC Thousand/uL  --  5 47   HEMOGLOBIN g/dL  --  14 4   HEMATOCRIT %  --  44 3   PLATELETS Thousands/uL 86* 91*   ,   Results from last 7 days   Lab Units 08/12/19  0913 08/12/19 0512   POTASSIUM mmol/L 3 7 3 4* CHLORIDE mmol/L 101 104   CO2 mmol/L 25 20*   BUN mg/dL 24 21   CREATININE mg/dL 1 02 1 01   CALCIUM mg/dL 8 8 8 7   ALK PHOS U/L  --  161*   ALT U/L  --  42   AST U/L  --  43

## 2019-08-12 NOTE — ED PROVIDER NOTES
ASSESSMENT AND PLAN    Joseluis Thomas is a 78 y o  female with a history of breast cancer, complete heart block status post pacemaker, who presents for evaluation of shortness of breath  Upon EMS arrival, the patient was found to have recurrent episodes of ventricular tachycardia, which were nonsustained  She was given lidocaine based on my recommendation prior to arrival, and upon arrival had no further episodes of ventricular tachycardia  On arrival, the patient was hemodynamically stable, well-appearing, without acute distress  Initial EKG displayed a paced rhythm with overlying atrial tachycardia  On exam, the patient is chronically ill-appearing, however the exam is overall largely unremarkable, the patient does not have any signs of volume overload  -  initial lab work largely unremarkable  Troponin negative  -CTA of the chest with right pleural effusion, otherwise unremarkable  -discussed with [employer]  The patient has had several episodes of ventricular tachycardia, most of which were nonsustained, however did have a single episode that lasted 120 seconds, with some morphology concerning for intermittent VF  -patient was started on amiodarone drip at 1 mg/minute  The patient was also given 2 mg of magnesium empirically  -324 mg baby aspirin  -discussed with critical care  The patient will be admitted to step-down level 1  Will continue amiodarone drip    History  Chief Complaint   Patient presents with    Shortness of Breath     pt arrives via EMS for SOB and dizziness  EMS reports pt to be having episodes of V-tach and call medical command and was given Lidocaine pta  pts vitals stable at this time  pt alert and oriented x4      This is an 78-year-old female who presents for evaluation of dyspnea  Patient has a history of complete heart block, status post pacemaker, as well as 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, with a prior pericardial effusion which has since resolved    She also has a history of type 2 diabetes, hyperlipidemia, hypertension  She has no known history of CAD, and her last SPECT exam was in 2017, and was unremarkable  She did not have perfusion deficits  Patient also has received right heart catheterization in the past, which unfortunately resulted hemopericardium secondary to ventricular perforation, resulting in hemorrhagic shock  This was in 2017  On arrival,  The patient was noted by EMS to have intermittent episodes of ventricular tachycardia, and I gave command for the EMS crew to give a bolus of lidocaine, which appear to have improved the episodes of ventricular tachycardia  Regarding the patient's symptoms, she states that her shortness of breath started yesterday  She has never felt symptoms like this before  She denies any fevers, chills, cough, sputum production, recent episodes of chest pain, nausea, vomiting, diarrhea, abdominal pain, or urinary symptoms  Prior to Admission Medications   Prescriptions Last Dose Informant Patient Reported? Taking? Calcium Citrate-Vitamin D (CALCIUM CITRATE + D PO)  Self Yes Yes   Sig: Take 1,500 mg by mouth daily   Cranberry (THERACRAN HP PO)  Self Yes Yes   Sig: Take 2 tablets by mouth daily   Glucosamine-Chondroit-Vit C-Mn (GLUCOSAMINE 1500 COMPLEX) CAPS  Self Yes Yes   Sig: Take 1 capsule by mouth daily     Multiple Vitamin (MULTIVITAMIN) capsule  Self Yes Yes   Sig: Take 1 capsule by mouth daily  atorvastatin (LIPITOR) 20 mg tablet  Self Yes Yes   Sig: Take 20 mg by mouth daily  clindamycin (CLEOCIN) 300 MG capsule  Self Yes Yes   Sig: TAKE 2 CAPSULES BY MOUTH 1 HOUR PRIOR TO DENTAL PROCEDURE     clotrimazole-betamethasone (LOTRISONE) 1-0 05 % cream  Self Yes Yes   Sig: Apply 1 application topically as needed    furosemide (LASIX) 20 mg tablet  Self Yes Yes   Sig: TAKE 1 TABLET BY MOUTH DAILY AS NEEDED FOR EDEMA   metFORMIN (GLUCOPHAGE) 500 mg tablet  Self Yes Yes   Si (two) times a day   metoprolol tartrate (LOPRESSOR) 25 mg tablet  Self No Yes   Sig: Take 0 5 tablets (12 5 mg total) by mouth every 12 (twelve) hours   tamoxifen (NOLVADEX) 20 mg tablet   No Yes   Sig: Take 1 tablet (20 mg total) by mouth daily      Facility-Administered Medications: None       Past Medical History:   Diagnosis Date    Anxiety     Arthritis     Breast CA (HCC)     recurrent, ductal carcinoma ER, [place] pos    Cardiac disease     Depression     Diverticula of intestine     Dizziness     and passes out    Flushing     Hiatal hernia     [place]      [place]    Hypercholesteremia     Liver mass     Liver metastasis (HCC)     Metastatic adenocarcinoma to liver ([facility] [address] )     Bx 01/03/2017    PONV (postoperative nausea and vomiting)     Recurrent breast cancer ([facility] [address] )     Shingles     Shortness of breath     on exertion    Tachycardia     Urinary incontinence     Use of cane as ambulatory aid     or walker       Past Surgical History:   Procedure Laterality Date    BREAST SURGERY      CARDIAC PACEMAKER REMOVAL N/A 11/9/2017    Procedure: PACEMAKER LEAD REVISION, REMOVAL OF NONFUNCTIONING LEAD, AND INSERTION OF A NEW RV LEAD;  Surgeon: Susie Cohn MD;  [facility];  Service: Cardiology    CATARACT EXTRACTION Bilateral     COLONOSCOPY      ERCP N/A 1/8/2018    Procedure: ENDOSCOPIC RETROGRADE CHOLANGIOPANCREATOGRAPHY (ERCP); Surgeon: Bethany Moctezuma MD;  Location: [facility]; Service: Gastroenterology    HYSTERECTOMY      JOINT REPLACEMENT      [place]  TKR and right TSR    MASTECTOMY Right     [place] ERCP DX COLLECTION SPECIMEN BRUSHING/WASHING N/A 2/22/2018    Procedure: ENDOSCOPIC RETROGRADE CHOLANGIOPANCREATOGRAPHY (ERCP) with stent removal;  Surgeon: Bethany Moctezuma MD;  [facility]: [facility];   Service: Gastroenterology    [facility] [address] N/A 7/13/2017    Procedure: LIVER RESECTION, INTRAOPERATIVE ULTRASOUND;  Surgeon: Aimee Husain MD;  [facility];  Service: Surgical Oncology    ROTATOR CUFF REPAIR Right     SENTINEL LYMPH NODE BIOPSY Right     TONSILECTOMY AND ADNOIDECTOMY      WOUND DEBRIDEMENT Left 11/9/2017    Procedure: DEBRIDEMENT WOUND AND DRESSING CHANGE [facility]); Surgeon: Rufino Ward MD;  [facility];  Service: Cardiology       Family History   Problem Relation Age of Onset    Lung cancer Father     Cancer Brother     Diabetes type II Son      I have reviewed and agree with the history as documented  Social History     Tobacco Use    Smoking status: Never Smoker    Smokeless tobacco: Never Used   Substance Use Topics    Alcohol use: Not Currently    Drug use: No        Review of Systems   Constitutional: Negative for chills and fever  HENT: Negative for congestion and rhinorrhea  Eyes: Negative for photophobia and visual disturbance  Respiratory: Positive for shortness of breath  Negative for cough  Cardiovascular: Negative for chest pain and palpitations  Gastrointestinal: Negative for abdominal pain, diarrhea, nausea and vomiting  Genitourinary: Negative for dysuria and frequency  Musculoskeletal: Negative for neck pain and neck stiffness  Skin: Negative for pallor and rash  Neurological: Negative for light-headedness and headaches  All other systems reviewed and are negative        Physical Exam  ED Triage Vitals   Temperature Pulse Respirations Blood Pressure SpO2   08/12/19 0511 08/12/19 0509 08/12/19 0509 08/12/19 0509 08/12/19 0509   97 5 °F (36 4 °C) 65 18 147/65 90 %      Temp Source Heart Rate Source Patient Position - Orthostatic VS BP Location FiO2 (%)   08/12/19 0511 08/12/19 0509 08/12/19 0509 08/12/19 0509 --   Temporal Monitor Sitting Right arm       Pain Score       08/12/19 1400       No Pain             Orthostatic Vital Signs  Vitals:    08/12/19 2325 08/13/19 0025 08/13/19 0125 08/13/19 0225   BP: 110/50 120/61 (!) 116/40 (!) 92/45   Pulse: 90 84 82 78   Patient Position - Orthostatic VS:           Physical Exam Constitutional: She is oriented to person, place, and time  Awake and alert  Mildly pale appearing  Chronically ill-appearing  HENT:   Head: Normocephalic and atraumatic  Mouth/Throat: Oropharynx is clear and moist  No oropharyngeal exudate  Eyes: Pupils are equal, round, and reactive to light  EOM are normal  Right eye exhibits no discharge  Left eye exhibits no discharge  No scleral icterus  Neck: Normal range of motion  Neck supple  No JVD present  No tracheal deviation present  Cardiovascular: Normal rate, regular rhythm and normal heart sounds  No murmur heard  Pulmonary/Chest: Effort normal  No stridor  No respiratory distress  She has no wheezes  She has no rales  Abdominal: Soft  She exhibits no distension and no mass  There is no tenderness  Musculoskeletal: Normal range of motion  She exhibits no edema or deformity  Right lower leg: She exhibits no tenderness  Left lower leg: She exhibits no tenderness  Neurological: She is alert and oriented to person, place, and time  No cranial nerve deficit  She exhibits normal muscle tone  Skin: Skin is warm and dry  No rash noted  No pallor         ED Medications  Medications   amiodarone (CORDARONE) 900 mg in dextrose 5 % 500 mL infusion (0 5 mg/min Intravenous Rate/Dose Verify 8/12/19 1925)   atorvastatin (LIPITOR) tablet 20 mg (20 mg Oral Given 8/12/19 1731)   tamoxifen (NOLVADEX) tablet 20 mg (20 mg Oral Given 8/12/19 1035)   multi-electrolyte (ISOLYTE-S PH 7 4 equivalent) IV solution (100 mL/hr Intravenous New Bag 8/12/19 2230)   heparin (porcine) subcutaneous injection 5,000 Units (5,000 Units Subcutaneous Given 8/12/19 2224)   insulin lispro (HumaLOG) 100 units/mL subcutaneous injection 1-5 Units (2 Units Subcutaneous Given 8/12/19 1626)   insulin lispro (HumaLOG) 100 units/mL subcutaneous injection 1-5 Units (1 Units Subcutaneous Given 8/12/19 2224)   sodium chloride 0 9 % infusion (has no administration in time range) lidocaine (cardiac) (FOR EMS ONLY) 20 mg/mL injection 200 mg (0 mg Does not apply Given to EMS 8/12/19 0514)   lidocaine (cardiac) 100 mg/5 mL injection **ADS Override Pull** (  Given to EMS 8/12/19 0658)   magnesium sulfate 2 g/50 mL IVPB (premix) 2 g (0 g Intravenous Stopped 8/12/19 0735)   aspirin (ECOTRIN LOW STRENGTH) EC tablet 324 mg (324 mg Oral Given 8/12/19 0518)   iodixanol (VISIPAQUE) 320 MG/ML injection 85 mL (85 mL Intravenous Given 8/12/19 0610)   potassium chloride (K-DUR,KLOR-CON) CR tablet 40 mEq (40 mEq Oral Given 8/12/19 0805)   magnesium sulfate 2 g/50 mL IVPB (premix) 2 g (0 g Intravenous Stopped 8/12/19 1214)     Followed by   magnesium sulfate 2 g/50 mL IVPB (premix) 2 g (0 g Intravenous Stopped 8/12/19 1117)       Diagnostic Studies  Results Reviewed     Procedure Component Value Units Date/Time    Urinalysis with microscopic [[id number]]  (Abnormal) Collected:  08/12/19 1402    Lab Status:  Final result Specimen:  Urine, Clean Catch Updated:  08/12/19 1431     Clarity, UA Clear     Color, UA Yellow     Specific Gravity, UA >=1 030     pH, UA 5 5     Glucose,  (1/4%) mg/dl      Ketones, UA Negative mg/dl      Blood, UA Small     Protein, UA 30 (1+) mg/dl      Nitrite, UA Positive     Bilirubin, UA Negative     Urobilinogen, UA 0 2 E U /dl      Leukocytes, UA Negative     WBC, UA 2-4 /hpf      RBC, UA None Seen /hpf      Hyaline Casts, UA 4-10 /lpf      Bacteria, UA Moderate /hpf      Epithelial Cells Moderate /hpf     Basic metabolic panel [[id number]]  (Abnormal) Collected:  08/12/19 0913    Lab Status:  Final result Specimen:  Blood from Arm, Left Updated:  08/12/19 1022     Sodium 135 mmol/L      Potassium 3 7 mmol/L      Chloride 101 mmol/L      CO2 25 mmol/L      ANION GAP 9 mmol/L      BUN 24 mg/dL      Creatinine 1 02 mg/dL      Glucose 217 mg/dL      Calcium 8 8 mg/dL      eGFR 52 ml/min/1 73sq m     Narrative:       [place] guidelines for Chronic Kidney Disease (CKD):     Stage 1 with normal or high GFR (GFR > 90 mL/min/1 73 square meters)    Stage 2 Mild CKD (GFR = 60-89 mL/min/1 73 square meters)    Stage 3A Moderate CKD (GFR = 45-59 mL/min/1 73 square meters)    Stage 3B Moderate CKD (GFR = 30-44 mL/min/1 73 square meters)    Stage 4 Severe CKD (GFR = 15-29 mL/min/1 73 square meters)    Stage 5 End Stage CKD (GFR <15 mL/min/1 73 square meters)  Note: GFR calculation is accurate only with a steady state creatinine    CBC and differential [[id number]]  (Abnormal) Collected:  08/12/19 0512    Lab Status:  Final result Specimen:  Blood from Arm, Right Updated:  08/12/19 0605     WBC 5 47 Thousand/uL      RBC 4 54 Million/uL      Hemoglobin 14 4 g/dL      Hematocrit 44 3 %      MCV 98 fL      MCH 31 7 pg      MCHC 32 5 g/dL      RDW 12 9 %      MPV 10 4 fL      Platelets 91 Thousands/uL      nRBC 0 /100 WBCs     TSH, 3rd generation with Free T4 reflex [[id number]]  (Normal) Collected:  08/12/19 0512    Lab Status:  Final result Specimen:  Blood from Arm, Right Updated:  08/12/19 0548     TSH 3RD GENERATON 2 741 uIU/mL     Narrative:       Patients undergoing fluorescein dye angiography may retain small amounts of fluorescein in the body for 48-72 hours post procedure  Samples containing fluorescein can produce falsely depressed TSH values  If the patient had this procedure,a specimen should be resubmitted post fluorescein clearance        Magnesium [[id number]]  (Abnormal) Collected:  08/12/19 0512    Lab Status:  Final result Specimen:  Blood from Arm, Right Updated:  08/12/19 0548     Magnesium 1 4 mg/dL     Comprehensive metabolic panel [[id number]]  (Abnormal) Collected:  08/12/19 0512    Lab Status:  Final result Specimen:  Blood from Arm, Right Updated:  08/12/19 0542     Sodium 140 mmol/L      Potassium 3 4 mmol/L      Chloride 104 mmol/L      CO2 20 mmol/L      ANION GAP 16 mmol/L      BUN 21 mg/dL      Creatinine 1 01 mg/dL      Glucose 210 mg/dL Calcium 8 7 mg/dL      AST 43 U/L      ALT 42 U/L      Alkaline Phosphatase 161 U/L      Total Protein 6 5 g/dL      Albumin 2 8 g/dL      Total Bilirubin 1 30 mg/dL      eGFR 52 ml/min/1 73sq m     Narrative:       [place] guidelines for Chronic Kidney Disease (CKD):     Stage 1 with normal or high GFR (GFR > 90 mL/min/1 73 square meters)    Stage 2 Mild CKD (GFR = 60-89 mL/min/1 73 square meters)    Stage 3A Moderate CKD (GFR = 45-59 mL/min/1 73 square meters)    Stage 3B Moderate CKD (GFR = 30-44 mL/min/1 73 square meters)    Stage 4 Severe CKD (GFR = 15-29 mL/min/1 73 square meters)    Stage 5 End Stage CKD (GFR <15 mL/min/1 73 square meters)  Note: GFR calculation is accurate only with a steady state creatinine    Troponin I [[id number]]  (Normal) Collected:  08/12/19 0512    Lab Status:  Final result Specimen:  Blood from Arm, Right Updated:  08/12/19 0542     Troponin I <0 02 ng/mL                  CTA ED chest PE Study   Final Result by Nicole Gamez MD (08/12 6247)      No pulmonary embolus  Minimal bibasilar, right greater than left, and right middle lobe subsegmental atelectasis  Trace right pleural effusion  No other significant interval change  Workstation performed: [id number]               Procedures  ECG 12 Lead Documentation Only  Date/Time: 8/13/2019 2:58 AM  Performed by: Leatha Valencia MD  Authorized by: Leatha Valencia MD     ECG reviewed by me, the ED Provider: yes    Patient location:  ED  Previous ECG:     Previous ECG:  Compared to current    Similarity:  No change    Comparison to cardiac monitor: Yes    Interpretation:     Interpretation: abnormal    Comments:      Paced rhythm with a rate of 65  No significant changes compared to prior ECG    No STEMI criteria met            ED Course                               MDM    Disposition  Final diagnoses:   Ventricular tachycardia (HCC)   Dyspnea   Hypomagnesemia   Ventricular arrhythmia     Time reflects when diagnosis was documented in both MDM as applicable and the Disposition within this note     Time User Action Codes Description Comment    8/12/2019  6:34 AM Zi Garcia Add [I47 2] Ventricular tachycardia ([address] )     8/12/2019  6:34 AM Zi Garcia Add [R06 00] Dyspnea     8/12/2019  6:34 AM Zi Garcia Add [E83 42] Hypomagnesemia     8/12/2019  7:38 AM Christy Bradshaw Add [I49 9] Ventricular arrhythmia       ED Disposition     ED Disposition Condition Date/Time Comment    Admit Stable Mon Aug 12, 2019  6:34 AM Case was discussed with Critical care and the patient's admission status was agreed to be Admission Status: inpatient status to the service of Dr Julissa Mart   Follow-up Information    None         Current Discharge Medication List      CONTINUE these medications which have NOT CHANGED    Details   atorvastatin (LIPITOR) 20 mg tablet Take 20 mg by mouth daily  Calcium Citrate-Vitamin D (CALCIUM CITRATE + D PO) Take 1,500 mg by mouth daily      clindamycin (CLEOCIN) 300 MG capsule TAKE 2 CAPSULES BY MOUTH 1 HOUR PRIOR TO DENTAL PROCEDURE  Refills: 0      clotrimazole-betamethasone (LOTRISONE) 1-0 05 % cream Apply 1 application topically as needed       Cranberry (THERACRAN HP PO) Take 2 tablets by mouth daily      furosemide (LASIX) 20 mg tablet TAKE 1 TABLET BY MOUTH DAILY AS NEEDED FOR EDEMA      Glucosamine-Chondroit-Vit C-Mn (GLUCOSAMINE 1500 COMPLEX) CAPS Take 1 capsule by mouth daily        metFORMIN (GLUCOPHAGE) 500 mg tablet 2 (two) times a day      metoprolol tartrate (LOPRESSOR) 25 mg tablet Take 0 5 tablets (12 5 mg total) by mouth every 12 (twelve) hours  Qty: 30 tablet, Refills: 6    Associated Diagnoses: Tachycardia      Multiple Vitamin (MULTIVITAMIN) capsule Take 1 capsule by mouth daily        tamoxifen (NOLVADEX) 20 mg tablet Take 1 tablet (20 mg total) by mouth daily  Qty: 30 tablet, Refills: 3    Associated Diagnoses: Malignant neoplasm of female breast, unspecified estrogen receptor status, unspecified laterality, unspecified site of breast ([facility] [address] )           No discharge procedures on file  ED Provider  Attending physically available and evaluated Vern Bloch I managed the patient along with the ED Attending      Electronically Signed by         Gagan Skinner MD  08/13/19 6041       Gagan Skinner MD  08/13/19 8663

## 2019-08-12 NOTE — ED ATTENDING ATTESTATION
Karolyn Licea DO, saw and evaluated the patient  I have discussed the patient with the resident/non-physician practitioner and agree with the resident's/non-physician practitioner's findings, Plan of Care, and MDM as documented in the resident's/non-physician practitioner's note, except where noted  All available labs and Radiology studies were reviewed  I was present for key portions of any procedure(s) performed by the resident/non-physician practitioner and I was immediately available to provide assistance  At this point I agree with the current assessment done in the Emergency Department  I have conducted an independent evaluation of this patient a history and physical is as follows:    Patient is an 23-year-old female with an extensive past medical history  She presents tonight for shortness of breath and lightheadedness  Symptoms just started prior to arrival while she was getting up going to the bathroom  EMS arrived and noted that the patient was intermittently in V-tach  They called for medical command which we gave an order for 100 mg of lidocaine  Patient was minimally symptomatic during events with some lightheadedness and shortness of breath but blood pressure was stable  Patient had about 20 runs of intermittent V-tach for EMS lasting anywhere from 6-10 seconds  Patient does have a [employer] dual chamber pacemaker that was placed in 2017 for complete heart block  This was complicated with a right ventricle perforation and pericardial effusion  Last echo that was done was in 2017 that showed an EF of 50% with normal right heart function  Patient arrives in no acute distress  She does have appear pale but not really complaining of anything  Patient's heart rate is now normal without runs of V-tach  Lung sounds are clear to auscultation bilaterally  She is alert and oriented  No abdominal tenderness      Plan is to start an amiodarone drip, cardiac workup, a CT scan for possible underlying pathology given her history of pericardial effusion and cancer  Will continue with supportive care and treat any other further runs of V-tach with boluses  Will give magnesium now  Will interrogate her pacemaker  Pacemaker interrogation shows about 120 second run of polymorphic V-tach with an atrial rate in the 140s  Atrial rate is independent of her ventricle rate  Labs show a Creatinine slightly elevated from baseline, magnesium is low 1 4, does have a slight bandemia but otherwise normal     CT pending  Plan is to continue with amiodarone drip and admit to critical care    Critical Care Time  CriticalCare Time  Performed by: Talon Mora DO  Authorized by: Talon Mora DO     Critical care provider statement:     Critical care time (minutes):  45    Critical care time was exclusive of:  Separately billable procedures and treating other patients and teaching time    Critical care was necessary to treat or prevent imminent or life-threatening deterioration of the following conditions:  Cardiac failure and circulatory failure    Critical care was time spent personally by me on the following activities:  Blood draw for specimens, obtaining history from patient or surrogate, development of treatment plan with patient or surrogate, discussions with consultants, evaluation of patient's response to treatment, examination of patient, interpretation of cardiac output measurements, ordering and performing treatments and interventions, ordering and review of laboratory studies, ordering and review of radiographic studies, review of old charts and re-evaluation of patient's condition    I assumed direction of critical care for this patient from another provider in my specialty: no

## 2019-08-13 ENCOUNTER — APPOINTMENT (INPATIENT)
Dept: NON INVASIVE DIAGNOSTICS | Facility: HOSPITAL | Age: 79
DRG: 287 | End: 2019-08-13
Payer: COMMERCIAL

## 2019-08-13 PROBLEM — E83.42 HYPOMAGNESEMIA: Status: RESOLVED | Noted: 2019-08-12 | Resolved: 2019-08-13

## 2019-08-13 PROBLEM — E87.6 HYPOKALEMIA: Status: RESOLVED | Noted: 2019-08-12 | Resolved: 2019-08-13

## 2019-08-13 PROBLEM — E87.2 HIGH ANION GAP METABOLIC ACIDOSIS: Status: RESOLVED | Noted: 2019-08-12 | Resolved: 2019-08-13

## 2019-08-13 LAB
ANION GAP SERPL CALCULATED.3IONS-SCNC: 8 MMOL/L (ref 4–13)
BASOPHILS # BLD AUTO: 0.05 THOUSANDS/ΜL (ref 0–0.1)
BASOPHILS NFR BLD AUTO: 0 % (ref 0–1)
BUN SERPL-MCNC: 19 MG/DL (ref 5–25)
CA-I BLD-SCNC: 1.04 MMOL/L (ref 1.12–1.32)
CALCIUM SERPL-MCNC: 8 MG/DL (ref 8.3–10.1)
CHLORIDE SERPL-SCNC: 102 MMOL/L (ref 100–108)
CO2 SERPL-SCNC: 28 MMOL/L (ref 21–32)
CREAT SERPL-MCNC: 0.7 MG/DL (ref 0.6–1.3)
EOSINOPHIL # BLD AUTO: 0.09 THOUSAND/ΜL (ref 0–0.61)
EOSINOPHIL NFR BLD AUTO: 1 % (ref 0–6)
ERYTHROCYTE [DISTWIDTH] IN BLOOD BY AUTOMATED COUNT: 13.2 % (ref 11.6–15.1)
GFR SERPL CREATININE-BSD FRML MDRD: 82 ML/MIN/1.73SQ M
GLUCOSE SERPL-MCNC: 175 MG/DL (ref 65–140)
GLUCOSE SERPL-MCNC: 180 MG/DL (ref 65–140)
GLUCOSE SERPL-MCNC: 193 MG/DL (ref 65–140)
GLUCOSE SERPL-MCNC: 220 MG/DL (ref 65–140)
GLUCOSE SERPL-MCNC: 224 MG/DL (ref 65–140)
HCT VFR BLD AUTO: 36.7 % (ref 34.8–46.1)
HGB BLD-MCNC: 12 G/DL (ref 11.5–15.4)
IMM GRANULOCYTES # BLD AUTO: 0.3 THOUSAND/UL (ref 0–0.2)
IMM GRANULOCYTES NFR BLD AUTO: 2 % (ref 0–2)
LYMPHOCYTES # BLD AUTO: 1.32 THOUSANDS/ΜL (ref 0.6–4.47)
LYMPHOCYTES NFR BLD AUTO: 9 % (ref 14–44)
MAGNESIUM SERPL-MCNC: 2.6 MG/DL (ref 1.6–2.6)
MCH RBC QN AUTO: 32.2 PG (ref 26.8–34.3)
MCHC RBC AUTO-ENTMCNC: 32.7 G/DL (ref 31.4–37.4)
MCV RBC AUTO: 98 FL (ref 82–98)
MONOCYTES # BLD AUTO: 1.79 THOUSAND/ΜL (ref 0.17–1.22)
MONOCYTES NFR BLD AUTO: 13 % (ref 4–12)
NEUTROPHILS # BLD AUTO: 10.53 THOUSANDS/ΜL (ref 1.85–7.62)
NEUTS SEG NFR BLD AUTO: 75 % (ref 43–75)
NRBC BLD AUTO-RTO: 0 /100 WBCS
PHOSPHATE SERPL-MCNC: 2.2 MG/DL (ref 2.3–4.1)
PLATELET # BLD AUTO: 75 THOUSANDS/UL (ref 149–390)
PMV BLD AUTO: 10.5 FL (ref 8.9–12.7)
POTASSIUM SERPL-SCNC: 3.2 MMOL/L (ref 3.5–5.3)
RBC # BLD AUTO: 3.73 MILLION/UL (ref 3.81–5.12)
SODIUM SERPL-SCNC: 138 MMOL/L (ref 136–145)
WBC # BLD AUTO: 14.08 THOUSAND/UL (ref 4.31–10.16)

## 2019-08-13 PROCEDURE — 93454 CORONARY ARTERY ANGIO S&I: CPT | Performed by: PHYSICIAN ASSISTANT

## 2019-08-13 PROCEDURE — B211YZZ FLUOROSCOPY OF MULTIPLE CORONARY ARTERIES USING OTHER CONTRAST: ICD-10-PCS | Performed by: INTERNAL MEDICINE

## 2019-08-13 PROCEDURE — 99153 MOD SED SAME PHYS/QHP EA: CPT | Performed by: PHYSICIAN ASSISTANT

## 2019-08-13 PROCEDURE — 84100 ASSAY OF PHOSPHORUS: CPT | Performed by: PHYSICIAN ASSISTANT

## 2019-08-13 PROCEDURE — 83735 ASSAY OF MAGNESIUM: CPT | Performed by: PHYSICIAN ASSISTANT

## 2019-08-13 PROCEDURE — 99152 MOD SED SAME PHYS/QHP 5/>YRS: CPT | Performed by: PHYSICIAN ASSISTANT

## 2019-08-13 PROCEDURE — 85025 COMPLETE CBC W/AUTO DIFF WBC: CPT | Performed by: PHYSICIAN ASSISTANT

## 2019-08-13 PROCEDURE — 82330 ASSAY OF CALCIUM: CPT | Performed by: PHYSICIAN ASSISTANT

## 2019-08-13 PROCEDURE — C1894 INTRO/SHEATH, NON-LASER: HCPCS | Performed by: PHYSICIAN ASSISTANT

## 2019-08-13 PROCEDURE — C1769 GUIDE WIRE: HCPCS | Performed by: PHYSICIAN ASSISTANT

## 2019-08-13 PROCEDURE — 93454 CORONARY ARTERY ANGIO S&I: CPT | Performed by: INTERNAL MEDICINE

## 2019-08-13 PROCEDURE — 93306 TTE W/DOPPLER COMPLETE: CPT | Performed by: INTERNAL MEDICINE

## 2019-08-13 PROCEDURE — 82948 REAGENT STRIP/BLOOD GLUCOSE: CPT

## 2019-08-13 PROCEDURE — 99232 SBSQ HOSP IP/OBS MODERATE 35: CPT | Performed by: INTERNAL MEDICINE

## 2019-08-13 PROCEDURE — 80048 BASIC METABOLIC PNL TOTAL CA: CPT | Performed by: PHYSICIAN ASSISTANT

## 2019-08-13 PROCEDURE — 93306 TTE W/DOPPLER COMPLETE: CPT

## 2019-08-13 PROCEDURE — 99233 SBSQ HOSP IP/OBS HIGH 50: CPT

## 2019-08-13 PROCEDURE — 99152 MOD SED SAME PHYS/QHP 5/>YRS: CPT | Performed by: INTERNAL MEDICINE

## 2019-08-13 RX ORDER — VERAPAMIL HCL 2.5 MG/ML
AMPUL (ML) INTRAVENOUS CODE/TRAUMA/SEDATION MEDICATION
Status: COMPLETED | OUTPATIENT
Start: 2019-08-13 | End: 2019-08-13

## 2019-08-13 RX ORDER — LIDOCAINE HYDROCHLORIDE 10 MG/ML
INJECTION, SOLUTION INFILTRATION; PERINEURAL CODE/TRAUMA/SEDATION MEDICATION
Status: COMPLETED | OUTPATIENT
Start: 2019-08-13 | End: 2019-08-13

## 2019-08-13 RX ORDER — POTASSIUM CHLORIDE 20 MEQ/1
40 TABLET, EXTENDED RELEASE ORAL ONCE
Status: COMPLETED | OUTPATIENT
Start: 2019-08-13 | End: 2019-08-13

## 2019-08-13 RX ORDER — MAGNESIUM HYDROXIDE/ALUMINUM HYDROXICE/SIMETHICONE 120; 1200; 1200 MG/30ML; MG/30ML; MG/30ML
30 SUSPENSION ORAL EVERY 6 HOURS PRN
Status: DISCONTINUED | OUTPATIENT
Start: 2019-08-13 | End: 2019-08-19 | Stop reason: HOSPADM

## 2019-08-13 RX ORDER — MIDAZOLAM HYDROCHLORIDE 1 MG/ML
INJECTION INTRAMUSCULAR; INTRAVENOUS CODE/TRAUMA/SEDATION MEDICATION
Status: COMPLETED | OUTPATIENT
Start: 2019-08-13 | End: 2019-08-13

## 2019-08-13 RX ORDER — NITROGLYCERIN 20 MG/100ML
INJECTION INTRAVENOUS CODE/TRAUMA/SEDATION MEDICATION
Status: COMPLETED | OUTPATIENT
Start: 2019-08-13 | End: 2019-08-13

## 2019-08-13 RX ORDER — HEPARIN SODIUM 1000 [USP'U]/ML
INJECTION, SOLUTION INTRAVENOUS; SUBCUTANEOUS CODE/TRAUMA/SEDATION MEDICATION
Status: COMPLETED | OUTPATIENT
Start: 2019-08-13 | End: 2019-08-13

## 2019-08-13 RX ORDER — AMIODARONE HYDROCHLORIDE 200 MG/1
400 TABLET ORAL 2 TIMES DAILY WITH MEALS
Status: DISCONTINUED | OUTPATIENT
Start: 2019-08-13 | End: 2019-08-17

## 2019-08-13 RX ORDER — FENTANYL CITRATE 50 UG/ML
INJECTION, SOLUTION INTRAMUSCULAR; INTRAVENOUS CODE/TRAUMA/SEDATION MEDICATION
Status: COMPLETED | OUTPATIENT
Start: 2019-08-13 | End: 2019-08-13

## 2019-08-13 RX ORDER — SODIUM CHLORIDE 9 MG/ML
75 INJECTION, SOLUTION INTRAVENOUS CONTINUOUS
Status: DISCONTINUED | OUTPATIENT
Start: 2019-08-13 | End: 2019-08-13

## 2019-08-13 RX ORDER — ONDANSETRON 2 MG/ML
4 INJECTION INTRAMUSCULAR; INTRAVENOUS EVERY 6 HOURS PRN
Status: DISCONTINUED | OUTPATIENT
Start: 2019-08-13 | End: 2019-08-19 | Stop reason: HOSPADM

## 2019-08-13 RX ORDER — ASPIRIN 81 MG/1
TABLET, CHEWABLE ORAL CODE/TRAUMA/SEDATION MEDICATION
Status: COMPLETED | OUTPATIENT
Start: 2019-08-13 | End: 2019-08-13

## 2019-08-13 RX ADMIN — METOPROLOL TARTRATE 12.5 MG: 25 TABLET ORAL at 21:07

## 2019-08-13 RX ADMIN — FENTANYL CITRATE 50 MCG: 50 INJECTION, SOLUTION INTRAMUSCULAR; INTRAVENOUS at 10:55

## 2019-08-13 RX ADMIN — HEPARIN SODIUM 5000 UNITS: 5000 INJECTION INTRAVENOUS; SUBCUTANEOUS at 05:18

## 2019-08-13 RX ADMIN — IOHEXOL 55 ML: 350 INJECTION, SOLUTION INTRAVENOUS at 11:26

## 2019-08-13 RX ADMIN — TAMOXIFEN CITRATE 20 MG: 10 TABLET, FILM COATED ORAL at 12:28

## 2019-08-13 RX ADMIN — HEPARIN SODIUM 4000 UNITS: 1000 INJECTION INTRAVENOUS; SUBCUTANEOUS at 11:18

## 2019-08-13 RX ADMIN — HEPARIN SODIUM 5000 UNITS: 5000 INJECTION INTRAVENOUS; SUBCUTANEOUS at 16:24

## 2019-08-13 RX ADMIN — ASPIRIN 81 MG 324 MG: 81 TABLET ORAL at 11:08

## 2019-08-13 RX ADMIN — MIDAZOLAM 1 MG: 1 INJECTION INTRAMUSCULAR; INTRAVENOUS at 10:55

## 2019-08-13 RX ADMIN — ATORVASTATIN CALCIUM 20 MG: 20 TABLET, FILM COATED ORAL at 17:21

## 2019-08-13 RX ADMIN — Medication 1.25 MG: at 11:17

## 2019-08-13 RX ADMIN — HEPARIN SODIUM 5000 UNITS: 5000 INJECTION INTRAVENOUS; SUBCUTANEOUS at 21:07

## 2019-08-13 RX ADMIN — AMIODARONE HYDROCHLORIDE 400 MG: 200 TABLET ORAL at 16:24

## 2019-08-13 RX ADMIN — INSULIN LISPRO 2 UNITS: 100 INJECTION, SOLUTION INTRAVENOUS; SUBCUTANEOUS at 21:08

## 2019-08-13 RX ADMIN — INSULIN LISPRO 1 UNITS: 100 INJECTION, SOLUTION INTRAVENOUS; SUBCUTANEOUS at 12:31

## 2019-08-13 RX ADMIN — METOPROLOL TARTRATE 12.5 MG: 25 TABLET ORAL at 16:24

## 2019-08-13 RX ADMIN — NITROGLYCERIN 200 MCG: 20 INJECTION INTRAVENOUS at 11:17

## 2019-08-13 RX ADMIN — AMIODARONE HYDROCHLORIDE 0.5 MG/MIN: 50 INJECTION, SOLUTION INTRAVENOUS at 05:52

## 2019-08-13 RX ADMIN — SODIUM CHLORIDE 100 ML/HR: 0.9 INJECTION, SOLUTION INTRAVENOUS at 05:50

## 2019-08-13 RX ADMIN — INSULIN LISPRO 2 UNITS: 100 INJECTION, SOLUTION INTRAVENOUS; SUBCUTANEOUS at 16:24

## 2019-08-13 RX ADMIN — INSULIN LISPRO 1 UNITS: 100 INJECTION, SOLUTION INTRAVENOUS; SUBCUTANEOUS at 10:10

## 2019-08-13 RX ADMIN — POTASSIUM CHLORIDE 40 MEQ: 1500 TABLET, EXTENDED RELEASE ORAL at 10:09

## 2019-08-13 RX ADMIN — LIDOCAINE HYDROCHLORIDE 1 ML: 10 INJECTION, SOLUTION INFILTRATION; PERINEURAL at 11:09

## 2019-08-13 NOTE — QUICK NOTE
Post-op Check Note - Trauma   Oliver Carson 78 y o  female MRN: [id number]  Unit/Bed#: ICU 08 Encounter: [id number]        Procedure/Surgery Done: Cardiac catheterization      Vitals: Blood pressure 127/60, pulse 86, temperature 98 1 °F (36 7 °C), temperature source Temporal, resp  rate (!) 31, height 5' 1" (1 549 m), weight 72 kg (158 lb 11 7 oz), SpO2 94 %  Body mass index is 29 99 kg/m²  SpO2: SpO2: 94 %    ABG:   Lab Results   Component Value Date    PHART 7 371 07/14/2017    XCB2RBF 35 9 (L) 07/14/2017    PO2ART 120 6 07/14/2017    [id number] 20 3 (L) 07/14/2017    BEART -4 3 07/14/2017    SOURCE Line, Arterial 07/14/2017         Intake/Output Summary (Last 24 hours) at 8/13/2019 1217  Last data filed at 8/13/2019 1125  Gross per 24 hour   Intake 3582 46 ml   Output 945 ml   Net 2637 46 ml       Invasive Devices     Peripheral Intravenous Line            Peripheral IV 08/12/19 Left Forearm 1 day                            Physical Exam:   GENERAL APPEARANCE: Resting comfortable, NAD  HEENT:  MMM, atraumatic  CV: RRR, pacer markings on monitor  LUNGS: CTA throughout  ABD: NTTP, soft  EXT:  Cap refill <2 in all extremities, full sensation throughout, no edema or pallor  NEURO:  A&O x3, no focal deficits  SKIN:  No edema or mottling    Other Studies: Cardiac catheterization without intervention  VTE Prophylaxis:  Heparin SQ      Assessment:   1  Ventricular arrythmia  2  S/P cardiac cath    Plan:   1  Patient discontinued on amiodarone gtt in intervention lab  Will refer to cardiology for further recommendations and transition to PO  Continue to keep K >4 and Mg >2  2  No stents placed  Will continue to monitor right radial site for hematoma formation as well as distal perfusion and sensation    3  Plan to transfer patient to floor

## 2019-08-13 NOTE — TRANSFER OF CARE
Progress Note - Karli Cordova 1940, 78 y o  female MRN: [id number]    Unit/Bed#: ICU 08 Encounter: [id number]    Primary Care Provider: Magdalena Jimenes MD   Date and time admitted to hospital: 8/12/2019  5:04 AM        * Ventricular arrhythmia  Assessment & Plan  · Patient admitted for VT for 26 episodes and sustained for VT for 2 min  · Patient admitted on amio gtt, managed by cardiology  · Will be transitioning to amiodarone PO per cardiology  · Will need EP follow up  · Will transitioning to PO amio, will keep on telemetry monitoring on med/surg service  · Catheterization today without any intervention      Lightheadedness  Assessment & Plan  · Resolved with no further episodes  · Will need to be evaluated by PT/OT for discharge planning        Code Status: Level 1 - Full Code  POA:    POLST:      Reason for ICU admission:   Symptomatic ventricular arrhythmia     Active problems:   Principal Problem:    Ventricular arrhythmia  Active Problems:    Lightheadedness  Resolved Problems:    Hypokalemia    Hypomagnesemia    High anion gap metabolic acidosis      Consultants:   - Cardiology    History of Present Illness:   Patient is 78 y o  female with a past medical history of complete heart block s/p pacemaker, recurrent ductal ER/IL positive breast cancer, HTN, DM2 who presented to the ED to be evaluated for dyspnea  Patient stated that she had fatigue, chills, nausea, and decreased appetite starting the day prior  EMS was called after patient experienced worsening dyspnea in the early morning of 8/12  EMS noted patient to have intermittent episodes of ventricular tachycardia  She was given 100 mg Lidocaine and the episodes subsided along with dyspnea  Denied palpations  Reported dizziness with getting oob  In ED patient was given ASA and started on an amiodarone drip  ED evaluation revealed patient with metabolic acidosis and hypokalemia   Patient admitted to ICU as a Step Down level 1 for further work up and monitoring  Summary of clinical course:   Patient was continued on amiodarone gtt in the ICU without any further episodes of ventricular tachycardia  She underwent aggressive electrolyte replacement  She was evaluated by cardiology and underwent cardiac catheterization to evaluate source of ventricular arrhythmia  Catheterization was unrevealing  Patient will be transitioned to amiodarone and will need EP follow up  Patient will be followed by cardiology while inpatient  Recent or scheduled procedures:   8/13 - Cardiac catheterization    Outstanding/pending diagnostics:   AM labs    Cultures:   None       Mobilization Plan:   - PT/OT eval    Nutrition Plan:   - Restart PO diet    VTE Pharmacologic Prophylaxis: Heparin  VTE Mechanical Prophylaxis: sequential compression device    Discharge Plan:     Initial Physical Therapy Recommendations:   Initial Occupational Therapy Recommendations:   Initial /Plan:     Home medications that are not reordered and reason why:         Spoke with Les Bryant  regarding transfer  Please call [phone] with any questions or concerns  Portions of the record may have been created with voice recognition software  Occasional wrong word or "sound a like" substitutions may have occurred due to the inherent limitations of voice recognition software  Read the chart carefully and recognize, using context, where substitutions have occurred

## 2019-08-13 NOTE — PROGRESS NOTES
Progress Note - Cardiology   Jennifer Syed 78 y o  female MRN: [id number]  Unit/Bed#: ICU 08 Encounter: [id number]      Assessment/Recommendations/Discussion:   Polymorphic VT for 26 episodes, sustained VT for 2 min  Hypokalemia  Hypomag  HTN    PLAN  Cath showed no CAD - nonischemic cause  Would stop IV amio and start po Amiodarone 400mg BID for 2 weeks, then change to 300mg po BID for 1 month, then can decrease to maintenance dose  Keep mag >2 and K >4  Echo to be done today: eval EF  If low, may need ICD  Will need follow up with Electrophysiology, likely EP study +/- possible device upgrade to ICD       Subjective:   HPI  No acute events overnight in ICU  No CP/SOB  Cath today negative      Review of Systems: As noted in HPI  Rest of ROS is negative  Vitals:   /60   Pulse 86   Temp 98 1 °F (36 7 °C) (Temporal)   Resp (!) 31   Ht 5' 1" (1 549 m)   Wt 72 kg (158 lb 11 7 oz)   LMP  (LMP Unknown) Comment: post   SpO2 94%   BMI 29 99 kg/m²   Vitals:    08/12/19 0900 08/13/19 0600   Weight: 71 3 kg (157 lb 3 oz) 72 kg (158 lb 11 7 oz)       Intake/Output Summary (Last 24 hours) at 8/13/2019 1232  Last data filed at 8/13/2019 1149  Gross per 24 hour   Intake 3750 79 ml   Output 945 ml   Net 2805 79 ml       Physical Exam:  General appearance: alert and oriented, in no acute distress  Head: Normocephalic, without obvious abnormality, atraumatic  Eyes: conjunctivae/corneas clear  Anicteric    Neck: no adenopathy, no carotid bruit, no JVD, supple, symmetrical, trachea midline and thyroid not enlarged, no tenderness  Lungs: clear to auscultation bilaterally  Heart: regular rate and rhythm, S1, S2 normal, no murmur, click, rub or gallop  Abdomen: soft, non-tender; bowel sounds normal; no masses,  no organomegaly  Extremities: extremities normal, warm and well-perfused; no cyanosis, clubbing, or edema  Skin: Skin color, texture, turgor normal  No rashes or lesions     TELEMETRY: No VT  Lab Results:  Results from last 7 days   Lab Units 08/13/19  0553   WBC Thousand/uL 14 08*   HEMOGLOBIN g/dL 12 0   HEMATOCRIT % 36 7   PLATELETS Thousands/uL 75*     Results from last 7 days   Lab Units 08/13/19  0553  08/12/19  0512   POTASSIUM mmol/L 3 2*   < > 3 4*   CHLORIDE mmol/L 102   < > 104   CO2 mmol/L 28   < > 20*   BUN mg/dL 19   < > 21   CREATININE mg/dL 0 70   < > 1 01   CALCIUM mg/dL 8 0*   < > 8 7   ALK PHOS U/L  --   --  161*   ALT U/L  --   --  42   AST U/L  --   --  43    < > = values in this interval not displayed       Results from last 7 days   Lab Units 08/13/19  0553   POTASSIUM mmol/L 3 2*   CHLORIDE mmol/L 102   CO2 mmol/L 28   BUN mg/dL 19   CREATININE mg/dL 0 70   CALCIUM mg/dL 8 0*           Medications:    Current Facility-Administered Medications:     aluminum-magnesium hydroxide-simethicone (MYLANTA) 200-200-20 mg/5 mL oral suspension 30 mL, 30 mL, Oral, Q6H PRN, Arabella Corrigan MD    amiodarone (CORDARONE) 900 mg in dextrose 5 % 500 mL infusion, 0 5 mg/min, Intravenous, Continuous, TITO Guerin, Stopped at 08/13/19 1125    atorvastatin (LIPITOR) tablet 20 mg, 20 mg, Oral, After Nathaneil Davon FRANKLIN PA-C, 20 mg at 08/12/19 1731    heparin (porcine) subcutaneous injection 5,000 Units, 5,000 Units, Subcutaneous, Q8H [facility], 5,000 Units at 08/13/19 0518 **AND** [COMPLETED] Platelet count, , , Once, Alice FRANKLIN PA-C    insulin lispro (HumaLOG) 100 units/mL subcutaneous injection 1-5 Units, 1-5 Units, Subcutaneous, TID AC, 1 Units at 08/13/19 1231 **AND** Fingerstick Glucose (POCT), , , TID AC, Elroy FRANKLIN PA-C    insulin lispro (HumaLOG) 100 units/mL subcutaneous injection 1-5 Units, 1-5 Units, Subcutaneous, HS, Aliceezio FRANKLIN PA-C, 1 Units at 08/12/19 2224    ondansetron (ZOFRAN) injection 4 mg, 4 mg, Intravenous, Q6H PRN, Arabella Corrigan MD    sodium chloride 0 9 % infusion, 75 mL/hr, Intravenous, Continuous, Arabella Corrigan MD    tamoxifen (NOLVADEX) tablet 20 mg, 20 mg, Oral, Daily, Chelsey Bernardo PA-C, 20 mg at 08/13/19 1228    This note was completed in part utilizing M-Modal Fluency Direct Software  Grammatical errors, random word insertions, spelling mistakes, and incomplete sentences may be an occasional consequence of this system secondary to software limitations, ambient noise, and hardware issues  If you have any questions or concerns about the content, text, or information contained within the body of this dictation, please contact the provider for clarification      Sanna Rodriguez DO  8/13/2019 12:32 PM

## 2019-08-13 NOTE — PROGRESS NOTES
Progress Note - Critical Care   Kelsey Vigil 78 y o  female MRN: [id number]  Unit/Bed#: ICU 08 Encounter: [id number]    Assessment/Plan:  1  Ventricular tachycardia in the setting of a pacemaker placed in 2017 for complete heart block  · Cardiology following   · Continue amiodarone drip  · Echocardiogram pending  · K keep Mag > 2 and K> 4  · For cardiac catheterization today at 10:00 a m     2  Diabetes type 2  · Blood glucose is controlled on current regime  · Accu-Cheks AC and HS     3  Essential hypertension  · Currently normotensive    4  Right breast cancer status post right mastectomy  · Continued tamoxifen    _____________________________________________________________________    HPI/24hr events:   No acute events overnight  NPO for cardiac catheterization today at 10:00 a m  LABS pending  Medications:    Current Facility-Administered Medications:  amiodarone 1 mg/min Intravenous Continuous Deana Sacelestine FRANKLIN PA-C Last Rate: 0 5 mg/min (08/12/19 1925)   atorvastatin 20 mg Oral After Mekhi FRANKLIN PA-C    heparin (porcine) 5,000 Units Subcutaneous [facility] Elroy FRANKLIN PA-C    insulin lispro 1-5 Units Subcutaneous TID AC Elroy FRANKLIN PA-C    insulin lispro 1-5 Units Subcutaneous HS Santos Edwards PA-C    multi-electrolyte 100 mL/hr Intravenous Continuous Deana Sacelestine FRANKLIN PACONRADO Last Rate: 100 mL/hr (08/12/19 2230)   sodium chloride 100 mL/hr Intravenous Once Rachel Caicedo PA-C    tamoxifen 20 mg Oral Daily Deana Sauce V PA-SIXTO          amiodarone 1 mg/min Last Rate: 0 5 mg/min (08/12/19 1925)   multi-electrolyte 100 mL/hr Last Rate: 100 mL/hr (08/12/19 2230)         Physical exam:  Vitals: Body mass index is 29 7 kg/m²  Blood pressure 120/61, pulse 84, temperature (!) 96 7 °F (35 9 °C), temperature source Temporal, resp  rate 21, height 5' 1" (1 549 m), weight 71 3 kg (157 lb 3 oz), SpO2 95 %  ,  Temp  Min: 96 7 °F (35 9 °C)  Max: 98 7 °F (37 1 °C)  IBW: 47 8 kg    SpO2: 95 %  SpO2 Activity: At Rest  O2 Device: Nasal cannula      Intake/Output Summary (Last 24 hours) at 8/13/2019 0119  Last data filed at 8/13/2019 0000  Gross per 24 hour   Intake 2001 8 ml   Output 420 ml   Net 1581 8 ml       Invasive/non-invasive ventilation settings:   Respiratory    Lab Data (Last 4 hours)    None         O2/Vent Data (Last 4 hours)    None              Invasive Devices     Peripheral Intravenous Line            Peripheral IV 08/12/19 Left Antecubital less than 1 day    Peripheral IV 08/12/19 Left Forearm less than 1 day                  Physical Exam:  Gen:  Appears stated age and in no distress  HEENT:  NC/AT  PERRLA, oropharynx clear  Neck:  Supple  No JVD  Trachea midline  Chest:  Lung was clear in all fields  No W/R/R  No cough  Cor:  Audible S1, S2  No murmurs gallops or rubs appreciated  Normal sinus rhythm on telemetry  No edema  Abd:  Soft, nontender, nondistended with normoactive bowel sounds  Ext:  A 4 limb  Distal pulses intact  Neuro:  Awake, alert and oriented x3, cranial nerves 2-12 intact  No sensory deficits  Skin:  Warm dry intact  Diagnostic Data:  Lab: I have personally reviewed pertinent lab results     CBC:   Results from last 7 days   Lab Units 08/12/19  0913 08/12/19  0512   WBC Thousand/uL  --  5 47   HEMOGLOBIN g/dL  --  14 4   HEMATOCRIT %  --  44 3   PLATELETS Thousands/uL 86* 91*       CMP:   Results from last 7 days   Lab Units 08/12/19  0913 08/12/19  0512   SODIUM mmol/L 135* 140   POTASSIUM mmol/L 3 7 3 4*   CHLORIDE mmol/L 101 104   CO2 mmol/L 25 20*   BUN mg/dL 24 21   CREATININE mg/dL 1 02 1 01   CALCIUM mg/dL 8 8 8 7   ALK PHOS U/L  --  161*   ALT U/L  --  42   AST U/L  --  43     PT/INR:   No results found for: PT, INR,   Magnesium:   Results from last 7 days   Lab Units 08/12/19  0512   MAGNESIUM mg/dL 1 4*     Phosphorous:       Microbiology:        Imaging:  No new imaging    Cardiac lab/EKG/telemetry/ECHO:   2D echo pending   Normal sinus rhythm on telemetry    VTE Prophylaxis:  SCDs and heparin    Code Status: Level 1 - Full Code    TITO Cazares    Portions of the record may have been created with voice recognition software  Occasional wrong word or "sound a like" substitutions may have occurred due to the inherent limitations of voice recognition software  Read the chart carefully and recognize, using context, where substitutions have occurred

## 2019-08-13 NOTE — PLAN OF CARE
Problem: Prexisting or High Potential for Compromised Skin Integrity  Goal: Skin integrity is maintained or improved  Description  INTERVENTIONS:  - Identify patients at risk for skin breakdown  - Assess and monitor skin integrity  - Assess and monitor nutrition and hydration status  - Monitor labs (i e  albumin)  - Assess for incontinence   - Turn and reposition patient  - Assist with mobility/ambulation  - Relieve pressure over bony prominences  - Avoid friction and shearing  - Provide appropriate hygiene as needed including keeping skin clean and dry  - Evaluate need for skin moisturizer/barrier cream  - Collaborate with interdisciplinary team (i e  Nutrition, Rehabilitation, etc )   - Patient/family teaching  Outcome: Progressing     Problem: Nutrition/Hydration-ADULT  Goal: Nutrient/Hydration intake appropriate for improving, restoring or maintaining nutritional needs  Description  Monitor and assess patient's nutrition/hydration status for malnutrition (ex- brittle hair, bruises, dry skin, pale skin and conjunctiva, muscle wasting, smooth red tongue, and disorientation)  Collaborate with interdisciplinary team and initiate plan and interventions as ordered  Monitor patient's weight and dietary intake as ordered or per policy  Utilize nutrition screening tool and intervene per policy  Determine patient's food preferences and provide high-protein, high-caloric foods as appropriate       INTERVENTIONS:  - Monitor oral intake, urinary output, labs, and treatment plans  - Assess nutrition and hydration status and recommend course of action  - Evaluate amount of meals eaten  - Assist patient with eating if necessary   - Allow adequate time for meals  - Recommend/ encourage appropriate diets, oral nutritional supplements, and vitamin/mineral supplements  - Order, calculate, and assess calorie counts as needed  - Recommend, monitor, and adjust tube feedings and TPN/PPN based on assessed needs  - Assess need for intravenous fluids  - Provide specific nutrition/hydration education as appropriate  - Include patient/family/caregiver in decisions related to nutrition  Outcome: Progressing     Problem: PAIN - ADULT  Goal: Verbalizes/displays adequate comfort level or baseline comfort level  Description  Interventions:  - Encourage patient to monitor pain and request assistance  - Assess pain using appropriate pain scale  - Administer analgesics based on type and severity of pain and evaluate response  - Implement non-pharmacological measures as appropriate and evaluate response  - Consider cultural and social influences on pain and pain management  - Notify physician/advanced practitioner if interventions unsuccessful or patient reports new pain  Outcome: Progressing     Problem: INFECTION - ADULT  Goal: Absence or prevention of progression during hospitalization  Description  INTERVENTIONS:  - Assess and monitor for signs and symptoms of infection  - Monitor lab/diagnostic results  - Monitor all insertion sites, i e  indwelling lines, tubes, and drains  - Monitor endotracheal (as able) and nasal secretions for changes in amount and color  - [place] appropriate cooling/warming therapies per order  - Administer medications as ordered  - Instruct and encourage patient and family to use good hand hygiene technique  - Identify and instruct in appropriate isolation precautions for identified infection/condition  Outcome: Progressing     Problem: SAFETY ADULT  Goal: Patient will remain free of falls  Description  INTERVENTIONS:  - Assess patient frequently for physical needs  -  Identify cognitive and physical deficits and behaviors that affect risk of falls    -  [place] fall precautions as indicated by assessment   - Educate patient/family on patient safety including physical limitations  - Instruct patient to call for assistance with activity based on assessment  - Modify environment to reduce risk of injury  - Consider OT/PT consult to assist with strengthening/mobility  Outcome: Progressing  Goal: Maintain or return to baseline ADL function  Description  INTERVENTIONS:  -  Assess patient's ability to carry out ADLs; assess patient's baseline for ADL function and identify physical deficits which impact ability to perform ADLs (bathing, care of mouth/teeth, toileting, grooming, dressing, etc )  - Assess/evaluate cause of self-care deficits   - Assess range of motion  - Assess patient's mobility; develop plan if impaired  - Assess patient's need for assistive devices and provide as appropriate  - Encourage maximum independence but intervene and supervise when necessary  ¯ Involve family in performance of ADLs  ¯ Assess for home care needs following discharge   ¯ Request OT consult to assist with ADL evaluation and planning for discharge  ¯ Provide patient education as appropriate  Outcome: Progressing  Goal: Maintain or return mobility status to optimal level  Description  INTERVENTIONS:  - Assess patient's baseline mobility status (ambulation, transfers, stairs, etc )    - Identify cognitive and physical deficits and behaviors that affect mobility  - Identify mobility aids required to assist with transfers and/or ambulation (gait belt, sit-to-stand, lift, walker, cane, etc )  - [place] fall precautions as indicated by assessment  - Record patient progress and toleration of activity level on Mobility SBAR; progress patient to next Phase/Stage  - Instruct patient to call for assistance with activity based on assessment  - Request Rehabilitation consult to assist with strengthening/weightbearing, etc   Outcome: Progressing     Problem: DISCHARGE PLANNING  Goal: Discharge to home or other facility with appropriate resources  Description  INTERVENTIONS:  - Identify barriers to discharge w/patient and caregiver  - Arrange for needed discharge resources and transportation as appropriate  - Identify discharge learning needs (meds, wound care, etc )  - Arrange for interpretive services to assist at discharge as needed  - Refer to Case Management Department for coordinating discharge planning if the patient needs post-hospital services based on physician/advanced practitioner order or complex needs related to functional status, cognitive ability, or social support system  Outcome: Progressing     Problem: Knowledge Deficit  Goal: Patient/family/caregiver demonstrates understanding of disease process, treatment plan, medications, and discharge instructions  Description  Complete learning assessment and assess knowledge base  Interventions:  - Provide teaching at level of understanding  - Provide teaching via preferred learning methods  Outcome: Progressing     Problem: CARDIOVASCULAR - ADULT  Goal: Maintains optimal cardiac output and hemodynamic stability  Description  INTERVENTIONS:  - Monitor I/O, vital signs and rhythm  - Monitor for S/S and trends of decreased cardiac output i e  bleeding, hypotension  - Administer and titrate ordered vasoactive medications to optimize hemodynamic stability  - Assess quality of pulses, skin color and temperature  - Assess for signs of decreased coronary artery perfusion - ex   Angina  - Instruct patient to report change in severity of symptoms  Outcome: Progressing  Goal: Absence of cardiac dysrhythmias or at baseline rhythm  Description  INTERVENTIONS:  - Continuous cardiac monitoring, monitor vital signs, obtain 12 lead EKG if indicated  - Administer antiarrhythmic and heart rate control medications as ordered  - Monitor electrolytes and administer replacement therapy as ordered  Outcome: Progressing

## 2019-08-13 NOTE — ASSESSMENT & PLAN NOTE
· Patient admitted for VT for 26 episodes and sustained for VT for 2 min  · Patient admitted on amio gtt, managed by cardiology  · Will be transitioning to amiodarone PO per cardiology  · Will need EP follow up  · Will transitioning to PO amio, will keep on telemetry monitoring on med/surg service  · Catheterization today without any intervention

## 2019-08-13 NOTE — UTILIZATION REVIEW
Initial Clinical Review    Admission: Date/Time/Statement: 8/12/19 @ 0639     Orders Placed This Encounter   Procedures    Inpatient Admission (expected length of stay for this patient Order details is greater than two midnights)     Standing Status:   Standing     Number of Occurrences:   1     Order Specific Question:   Admitting Physician     Answer:   Sveta Jules [[id number]]     Order Specific Question:   Level of Care     Answer:   Level 1 Stepdown [13]     Order Specific Question:   Estimated length of stay     Answer:   More than 2 Midnights     Order Specific Question:   Certification     Answer:   I certify that inpatient services are medically necessary for this patient for a duration of greater than two midnights  See H&P and MD Progress Notes for additional information about the patient's course of treatment  ED Arrival Information     Expected Arrival Acuity Means of Arrival Escorted By Service Admission Type    - 8/12/2019 05:03 Emergent Ambulance [facility] ([address]) Pulmonology Emergency    Arrival Complaint    -        Chief Complaint   Patient presents with    Shortness of Breath     pt arrives via EMS for SOB and dizziness  EMS reports pt to be having episodes of V-tach and call medical command and was given Lidocaine pta  pts vitals stable at this time  pt alert and oriented x4      Assessment/Plan:    78  Y O female  Presents to ED from home  With with dyspnea which started  While  Sleeping  The  Night  PTA>  Had nausea, poor  Appetite  And chills  The day PTA  EMS  Found pt with intermittent  Episodes of  Vtach  Given lidocaine and  All symptoms  Subsided  Has  Dizziness when getting  OOB  PMH  Is   Pacemaker placed in 2017,  DM 2,   R  Breast  CA  S/P  Mastectomy and HTN  ED   W/u  Revealed  Metabolic acidosis   And hypokalemia  Started on  Amiodarone drip in  ED      ADMIT   STEPDOWN unit     IP with  Vtach,   AG  Metabolic acidosis and hyperkalemia  And plan is   Cardiology  Consult,  IVF, monitor labs and pacer interrogation  Per  Cardiology  Consult:     1  Wide complex tachycardia:  Observed on pre-hospital ECG (see documentation below) - probable VT  Asymptomatic with no subjective recognition of dysrhythmia   2  Hypokalemia (present on arrival): Now improved and low normal at 3 7  3  Hypomagnesemia (present on arrival):  1 4  4  Hypertension:  Currently normotensive     · Quick look device transmission done in the emergency department indicates 26 instances of ventricular tachycardia since February 13, 2019 with longest of these 119 seconds  No greater detail can be garnered from this report ~~> will check formal device interrogation  · For now continue IV amiodarone  · Check echocardiogram  · Repeat magnesium pending  Continue to optimize electrolytes with goal potassium greater than 4 and magnesium greater than 2 0     CARDIAC  CATH   8/13:  Polymorphic VT for 26 episodes, sustained VT for 2 min  Hypokalemia  Hypomag  HTN     PLAN  Cath showed no CAD - nonischemic cause  Would stop IV amio and start po Amiodarone 400mg BID for 2 weeks, then change to 300mg po BID for 1 month, then can decrease to maintenance dose  Keep mag >2 and K >4  Echo to be done today: eval EF   If low, may need ICD  Will need follow up with Electrophysiology, likely EP study +/- possible device upgrade to ICD       ED Triage Vitals   Temperature Pulse Respirations Blood Pressure SpO2   08/12/19 0511 08/12/19 0509 08/12/19 0509 08/12/19 0509 08/12/19 0509   97 5 °F (36 4 °C) 65 18 147/65 90 %      Temp Source Heart Rate Source Patient Position - Orthostatic VS BP Location FiO2 (%)   08/12/19 0511 08/12/19 0509 08/12/19 0509 08/12/19 0509 --   Temporal Monitor Sitting Right arm       Pain Score       08/12/19 1400       No Pain        Wt Readings from Last 1 Encounters:   08/13/19 72 kg (158 lb 11 7 oz)     Additional Vital Signs:   /13/19 1025    86  31Abnormal   127/60  80 94 %       08/13/19 0925    84  23Abnormal   129/61  84  94 %  None (Room air)     08/13/19 0825  98 1 °F (36 7 °C)  82  32Abnormal   119/56  72  96 %    Lying   08/13/19 0815    80  22      96 %  Nasal cannula     08/13/19 0625    76  19  97/51  61  96 %       08/13/19 0525    78  17  120/66  86  97 %       08/13/19 0425    72  17  95/35Abnormal   53  98 %       08/13/19 0325    82  22  92/48Abnormal   68  96 %       08/13/19 0225    78  18  92/45Abnormal   67  97 %       08/13/19 0125    82  20  116/40Abnormal   60  97 %       08/13/19 0025  96 7 °F (35 9 °C)Abnormal   84  21  120/61  92  95 %  Nasal cannula     08/12/19 2325    90  19  110/50  71  96 %       08/12/19 2225    98  26Abnormal   134/60  78  96 %       08/12/19 2125    106Abnormal   26Abnormal   112/49Abnormal   69  94 %       08/12/19 2025    112Abnormal   29Abnormal   114/53  76  96 %       08/12/19 1925  98 7 °F (37 1 °C)  98  24Abnormal   138/55  81  96 %  Nasal cannula           Pertinent Labs/Diagnostic Test Results:   Results from last 7 days   Lab Units 08/13/19  0553 08/12/19 0913 08/12/19  0512   WBC Thousand/uL 14 08*  --  5 47   HEMOGLOBIN g/dL 12 0  --  14 4   HEMATOCRIT % 36 7  --  44 3   PLATELETS Thousands/uL 75* 86* 91*   NEUTROS ABS Thousands/µL 10 53*  --   --    BANDS PCT %  --   --  9*         Results from last 7 days   Lab Units 08/13/19  0553 08/12/19  0913 08/12/19  0512   SODIUM mmol/L 138 135* 140   POTASSIUM mmol/L 3 2* 3 7 3 4*   CHLORIDE mmol/L 102 101 104   CO2 mmol/L 28 25 20*   ANION GAP mmol/L 8 9 16*   BUN mg/dL 19 24 21   CREATININE mg/dL 0 70 1 02 1 01   EGFR ml/min/1 73sq m 82 52 52   CALCIUM mg/dL 8 0* 8 8 8 7   CALCIUM, IONIZED mmol/L 1 04* 1 11*  --    MAGNESIUM mg/dL 2 6  --  1 4*   PHOSPHORUS mg/dL 2 2*  --   --      Results from last 7 days   Lab Units 08/12/19  0512   AST U/L 43   ALT U/L 42   ALK PHOS U/L 161*   TOTAL PROTEIN g/dL 6 5   ALBUMIN g/dL 2 8*   TOTAL BILIRUBIN mg/dL 1 30*     Results from last 7 days   Lab Units 08/13/19  0835 08/12/19  2223 08/12/19  1613 08/12/19  1215 08/12/19  1204   POC GLUCOSE mg/dl 193* 200* 210* 334* 306*     Results from last 7 days   Lab Units 08/13/19  0553 08/12/19  0913 08/12/19  0512   GLUCOSE RANDOM mg/dL 180* 217* 210*           Results from last 7 days   Lab Units 08/12/19  0512   TROPONIN I ng/mL <0 02             Results from last 7 days   Lab Units 08/12/19  0512   TSH 3RD GENERATON uIU/mL 2 741         Results from last 7 days   Lab Units 08/12/19  0913   LACTIC ACID mmol/L 2 7*           Results from last 7 days   Lab Units 08/12/19  1402   CLARITY UA  Clear   COLOR UA  Yellow   SPEC GRAV UA  >=1 030   PH UA  5 5   GLUCOSE UA mg/dl 250 (1/4%)*   KETONES UA mg/dl Negative   BLOOD UA  Small*   PROTEIN UA mg/dl 30 (1+)*   NITRITE UA  Positive*   BILIRUBIN UA  Negative   UROBILINOGEN UA E U /dl 0 2   LEUKOCYTES UA  Negative   WBC UA /hpf 2-4*   RBC UA /hpf None Seen   BACTERIA UA /hpf Moderate*   EPITHELIAL CELLS WET PREP /hpf Moderate*           Results from last 7 days   Lab Units 08/12/19  0512   TOTAL COUNTED  100       Ct  Chest:   No PE  Minimal bibasilar, right greater than left, and right middle lobe subsegmental atelectasis  Trace right pleural effusion    EKG;   prolonge d QT interval      R axis deviation    Lateral infarct, age undetermined   Afib  With slow  Ventricular response     ED Treatment:   Medication Administration from 08/12/2019 0503 to 08/12/2019 0843       Date/Time Order Dose Route     08/12/2019 0514 lidocaine (cardiac) (FOR EMS ONLY) 20 mg/mL injection 200 mg 0 mg Does not apply     08/12/2019 0658 lidocaine (cardiac) 100 mg/5 mL injection **ADS Override Pull** 0       08/12/2019 0735 magnesium sulfate 2 g/50 mL IVPB (premix) 2 g 0 g Intravenous     08/12/2019 0528 magnesium sulfate 2 g/50 mL IVPB (premix) 2 g 2 g Intravenous     08/12/2019 0524 amiodarone (CORDARONE) 900 mg in dextrose 5 % 500 mL infusion 1 mg/min Intravenous     08/12/2019 0518 aspirin (ECOTRIN LOW STRENGTH) EC tablet 324 mg 324 mg Oral     08/12/2019 0610 iodixanol (VISIPAQUE) 320 MG/ML injection 85 mL 85 mL Intravenous     08/12/2019 0805 potassium chloride (K-DUR,KLOR-CON) CR tablet 40 mEq 40 mEq Oral          Present on Admission:   Ventricular arrhythmia   Lightheadedness   Hypokalemia   Hypomagnesemia   High anion gap metabolic acidosis      Admitting Diagnosis: Ventricular arrhythmia [I49 9]  Hypomagnesemia [E83 42]  Ventricular tachycardia (HCC) [I47 2]  Dyspnea [R06 00]  SOB (shortness of breath) [R06 02]  Age/Sex: 78 y o  female  Admission Orders:    Current Facility-Administered Medications:  amiodarone 0 5 mg/min Intravenous   aspirin     atorvastatin 20 mg Oral   fentanyl citrate (PF)  Intravenous   heparin (porcine)     heparin (porcine) 5,000 Units Subcutaneous   insulin lispro 1-5 Units Subcutaneous   insulin lispro 1-5 Units Subcutaneous   iohexol  Intravenous   lidocaine     midazolam     nitroGLYcerin  Intra-arterial   tamoxifen 20 mg Oral   verapamil  Intra-arterial       IP CONSULT TO CASE MANAGEMENT  IP CONSULT TO CARDIOLOGY     Neuro  Checks  Q 4 hrs  Dysphagia  eval  Fall precautions  2 DE  Cardiac  Cath  8/13    Network Utilization Review Department  Phone: [phone]; Fax [phone]  [email or web address]  ATTENTION: Please call with any questions or concerns to [phone]  and carefully listen to the prompts so that you are directed to the right person  Send all requests for admission clinical reviews, approved or denied determinations and any other requests to fax [phone]   All voicemails are confidential

## 2019-08-13 NOTE — SOCIAL WORK
CM met with the patient and her  at bedside to complete a general SW assessment:    The patient lives in a two story home with her   Bed and bath on the 2nd floor  She uses a cane in the community and a RW on her 2nd floor  She has a shower chair, and elevated toilet seats  Hx of VNA, over 1yr ago  [facility]x of [facility] at [facility] after a [facility]  She does not drive, her  transports to all appts  PCP is Beau Scott  She uses the [facility] in [place] for rx needs  No MH History  No D&A history  She has no formal POA, she named her  - Van Rasheed as her health care agent to CM today  She has a living will    CM following for discharge needs, role explained to the patient and her , the patient is moving to E4 - Med/Surg, provided them with the names of the covering  on the med surg unit

## 2019-08-14 ENCOUNTER — APPOINTMENT (INPATIENT)
Dept: RADIOLOGY | Facility: HOSPITAL | Age: 79
DRG: 287 | End: 2019-08-14
Payer: COMMERCIAL

## 2019-08-14 LAB
ANION GAP SERPL CALCULATED.3IONS-SCNC: 8 MMOL/L (ref 4–13)
BUN SERPL-MCNC: 15 MG/DL (ref 5–25)
CALCIUM SERPL-MCNC: 8.2 MG/DL (ref 8.3–10.1)
CHLORIDE SERPL-SCNC: 104 MMOL/L (ref 100–108)
CO2 SERPL-SCNC: 26 MMOL/L (ref 21–32)
CREAT SERPL-MCNC: 0.6 MG/DL (ref 0.6–1.3)
GFR SERPL CREATININE-BSD FRML MDRD: 86 ML/MIN/1.73SQ M
GLUCOSE SERPL-MCNC: 159 MG/DL (ref 65–140)
GLUCOSE SERPL-MCNC: 185 MG/DL (ref 65–140)
GLUCOSE SERPL-MCNC: 211 MG/DL (ref 65–140)
GLUCOSE SERPL-MCNC: 216 MG/DL (ref 65–140)
GLUCOSE SERPL-MCNC: 243 MG/DL (ref 65–140)
POTASSIUM SERPL-SCNC: 3.6 MMOL/L (ref 3.5–5.3)
PROCALCITONIN SERPL-MCNC: 5.95 NG/ML
SODIUM SERPL-SCNC: 138 MMOL/L (ref 136–145)

## 2019-08-14 PROCEDURE — 82948 REAGENT STRIP/BLOOD GLUCOSE: CPT

## 2019-08-14 PROCEDURE — 80048 BASIC METABOLIC PNL TOTAL CA: CPT | Performed by: PHYSICIAN ASSISTANT

## 2019-08-14 PROCEDURE — 87077 CULTURE AEROBIC IDENTIFY: CPT | Performed by: INTERNAL MEDICINE

## 2019-08-14 PROCEDURE — 71046 X-RAY EXAM CHEST 2 VIEWS: CPT

## 2019-08-14 PROCEDURE — 87186 SC STD MICRODIL/AGAR DIL: CPT | Performed by: INTERNAL MEDICINE

## 2019-08-14 PROCEDURE — 87040 BLOOD CULTURE FOR BACTERIA: CPT | Performed by: INTERNAL MEDICINE

## 2019-08-14 PROCEDURE — 84145 PROCALCITONIN (PCT): CPT | Performed by: INTERNAL MEDICINE

## 2019-08-14 PROCEDURE — 99232 SBSQ HOSP IP/OBS MODERATE 35: CPT

## 2019-08-14 PROCEDURE — 99232 SBSQ HOSP IP/OBS MODERATE 35: CPT | Performed by: INTERNAL MEDICINE

## 2019-08-14 RX ORDER — ACETAMINOPHEN 325 MG/1
650 TABLET ORAL EVERY 6 HOURS PRN
Status: DISCONTINUED | OUTPATIENT
Start: 2019-08-14 | End: 2019-08-19 | Stop reason: HOSPADM

## 2019-08-14 RX ADMIN — ACETAMINOPHEN 650 MG: 325 TABLET ORAL at 14:46

## 2019-08-14 RX ADMIN — METOPROLOL TARTRATE 12.5 MG: 25 TABLET ORAL at 08:37

## 2019-08-14 RX ADMIN — INSULIN LISPRO 1 UNITS: 100 INJECTION, SOLUTION INTRAVENOUS; SUBCUTANEOUS at 17:27

## 2019-08-14 RX ADMIN — HEPARIN SODIUM 5000 UNITS: 5000 INJECTION INTRAVENOUS; SUBCUTANEOUS at 05:52

## 2019-08-14 RX ADMIN — TAMOXIFEN CITRATE 20 MG: 10 TABLET, FILM COATED ORAL at 09:30

## 2019-08-14 RX ADMIN — INSULIN LISPRO 1 UNITS: 100 INJECTION, SOLUTION INTRAVENOUS; SUBCUTANEOUS at 08:38

## 2019-08-14 RX ADMIN — INSULIN LISPRO 2 UNITS: 100 INJECTION, SOLUTION INTRAVENOUS; SUBCUTANEOUS at 11:54

## 2019-08-14 RX ADMIN — ATORVASTATIN CALCIUM 20 MG: 20 TABLET, FILM COATED ORAL at 17:23

## 2019-08-14 RX ADMIN — HEPARIN SODIUM 5000 UNITS: 5000 INJECTION INTRAVENOUS; SUBCUTANEOUS at 21:23

## 2019-08-14 RX ADMIN — INSULIN LISPRO 2 UNITS: 100 INJECTION, SOLUTION INTRAVENOUS; SUBCUTANEOUS at 21:23

## 2019-08-14 RX ADMIN — AMIODARONE HYDROCHLORIDE 400 MG: 200 TABLET ORAL at 17:23

## 2019-08-14 RX ADMIN — AMIODARONE HYDROCHLORIDE 400 MG: 200 TABLET ORAL at 08:37

## 2019-08-14 RX ADMIN — METOPROLOL TARTRATE 12.5 MG: 25 TABLET ORAL at 21:22

## 2019-08-14 RX ADMIN — ONDANSETRON 4 MG: 2 INJECTION INTRAMUSCULAR; INTRAVENOUS at 14:30

## 2019-08-14 RX ADMIN — HEPARIN SODIUM 5000 UNITS: 5000 INJECTION INTRAVENOUS; SUBCUTANEOUS at 14:46

## 2019-08-14 NOTE — PLAN OF CARE
Problem: Prexisting or High Potential for Compromised Skin Integrity  Goal: Skin integrity is maintained or improved  Description  INTERVENTIONS:  - Identify patients at risk for skin breakdown  - Assess and monitor skin integrity  - Assess and monitor nutrition and hydration status  - Monitor labs (i e  albumin)  - Assess for incontinence   - Turn and reposition patient  - Assist with mobility/ambulation  - Relieve pressure over bony prominences  - Avoid friction and shearing  - Provide appropriate hygiene as needed including keeping skin clean and dry  - Evaluate need for skin moisturizer/barrier cream  - Collaborate with interdisciplinary team (i e  Nutrition, Rehabilitation, etc )   - Patient/family teaching  Outcome: Progressing     Problem: Nutrition/Hydration-ADULT  Goal: Nutrient/Hydration intake appropriate for improving, restoring or maintaining nutritional needs  Description  Monitor and assess patient's nutrition/hydration status for malnutrition (ex- brittle hair, bruises, dry skin, pale skin and conjunctiva, muscle wasting, smooth red tongue, and disorientation)  Collaborate with interdisciplinary team and initiate plan and interventions as ordered  Monitor patient's weight and dietary intake as ordered or per policy  Utilize nutrition screening tool and intervene per policy  Determine patient's food preferences and provide high-protein, high-caloric foods as appropriate       INTERVENTIONS:  - Monitor oral intake, urinary output, labs, and treatment plans  - Assess nutrition and hydration status and recommend course of action  - Evaluate amount of meals eaten  - Assist patient with eating if necessary   - Allow adequate time for meals  - Recommend/ encourage appropriate diets, oral nutritional supplements, and vitamin/mineral supplements  - Order, calculate, and assess calorie counts as needed  - Recommend, monitor, and adjust tube feedings and TPN/PPN based on assessed needs  - Assess need for intravenous fluids  - Provide specific nutrition/hydration education as appropriate  - Include patient/family/caregiver in decisions related to nutrition  Outcome: Progressing     Problem: PAIN - ADULT  Goal: Verbalizes/displays adequate comfort level or baseline comfort level  Description  Interventions:  - Encourage patient to monitor pain and request assistance  - Assess pain using appropriate pain scale  - Administer analgesics based on type and severity of pain and evaluate response  - Implement non-pharmacological measures as appropriate and evaluate response  - Consider cultural and social influences on pain and pain management  - Notify physician/advanced practitioner if interventions unsuccessful or patient reports new pain  Outcome: Progressing     Problem: INFECTION - ADULT  Goal: Absence or prevention of progression during hospitalization  Description  INTERVENTIONS:  - Assess and monitor for signs and symptoms of infection  - Monitor lab/diagnostic results  - Monitor all insertion sites, i e  indwelling lines, tubes, and drains  - Monitor endotracheal (as able) and nasal secretions for changes in amount and color  - [place] appropriate cooling/warming therapies per order  - Administer medications as ordered  - Instruct and encourage patient and family to use good hand hygiene technique  - Identify and instruct in appropriate isolation precautions for identified infection/condition  Outcome: Progressing     Problem: SAFETY ADULT  Goal: Patient will remain free of falls  Description  INTERVENTIONS:  - Assess patient frequently for physical needs  -  Identify cognitive and physical deficits and behaviors that affect risk of falls    -  [place] fall precautions as indicated by assessment   - Educate patient/family on patient safety including physical limitations  - Instruct patient to call for assistance with activity based on assessment  - Modify environment to reduce risk of injury  - Consider OT/PT consult to assist with strengthening/mobility  Outcome: Progressing  Goal: Maintain or return to baseline ADL function  Description  INTERVENTIONS:  -  Assess patient's ability to carry out ADLs; assess patient's baseline for ADL function and identify physical deficits which impact ability to perform ADLs (bathing, care of mouth/teeth, toileting, grooming, dressing, etc )  - Assess/evaluate cause of self-care deficits   - Assess range of motion  - Assess patient's mobility; develop plan if impaired  - Assess patient's need for assistive devices and provide as appropriate  - Encourage maximum independence but intervene and supervise when necessary  ¯ Involve family in performance of ADLs  ¯ Assess for home care needs following discharge   ¯ Request OT consult to assist with ADL evaluation and planning for discharge  ¯ Provide patient education as appropriate  Outcome: Progressing  Goal: Maintain or return mobility status to optimal level  Description  INTERVENTIONS:  - Assess patient's baseline mobility status (ambulation, transfers, stairs, etc )    - Identify cognitive and physical deficits and behaviors that affect mobility  - Identify mobility aids required to assist with transfers and/or ambulation (gait belt, sit-to-stand, lift, walker, cane, etc )  - [place] fall precautions as indicated by assessment  - Record patient progress and toleration of activity level on Mobility SBAR; progress patient to next Phase/Stage  - Instruct patient to call for assistance with activity based on assessment  - Request Rehabilitation consult to assist with strengthening/weightbearing, etc   Outcome: Progressing     Problem: DISCHARGE PLANNING  Goal: Discharge to home or other facility with appropriate resources  Description  INTERVENTIONS:  - Identify barriers to discharge w/patient and caregiver  - Arrange for needed discharge resources and transportation as appropriate  - Identify discharge learning needs (meds, wound care, etc )  - Arrange for interpretive services to assist at discharge as needed  - Refer to Case Management Department for coordinating discharge planning if the patient needs post-hospital services based on physician/advanced practitioner order or complex needs related to functional status, cognitive ability, or social support system  Outcome: Progressing     Problem: Knowledge Deficit  Goal: Patient/family/caregiver demonstrates understanding of disease process, treatment plan, medications, and discharge instructions  Description  Complete learning assessment and assess knowledge base  Interventions:  - Provide teaching at level of understanding  - Provide teaching via preferred learning methods  Outcome: Progressing     Problem: CARDIOVASCULAR - ADULT  Goal: Maintains optimal cardiac output and hemodynamic stability  Description  INTERVENTIONS:  - Monitor I/O, vital signs and rhythm  - Monitor for S/S and trends of decreased cardiac output i e  bleeding, hypotension  - Administer and titrate ordered vasoactive medications to optimize hemodynamic stability  - Assess quality of pulses, skin color and temperature  - Assess for signs of decreased coronary artery perfusion - ex  Angina  - Instruct patient to report change in severity of symptoms  Outcome: Progressing  Goal: Absence of cardiac dysrhythmias or at baseline rhythm  Description  INTERVENTIONS:  - Continuous cardiac monitoring, monitor vital signs, obtain 12 lead EKG if indicated  - Administer antiarrhythmic and heart rate control medications as ordered  - Monitor electrolytes and administer replacement therapy as ordered  Outcome: Progressing     Problem: Potential for Falls  Goal: Patient will remain free of falls  Description  INTERVENTIONS:  - Assess patient frequently for physical needs  -  Identify cognitive and physical deficits and behaviors that affect risk of falls    -  [place] fall precautions as indicated by assessment   - Educate patient/family on patient safety including physical limitations  - Instruct patient to call for assistance with activity based on assessment  - Modify environment to reduce risk of injury  - Consider OT/PT consult to assist with strengthening/mobility  Outcome: Progressing

## 2019-08-14 NOTE — NURSING NOTE
Patient refusing second blood culture d/t two attempts after first blood cultures received and being a right limb alert  Patient hand swollen from attempts  Patient agreeable to try later in the evening  Ice provided for the patients hand

## 2019-08-14 NOTE — PROGRESS NOTES
[facility] Internal Medicine Progress Note  Patient: Pete Aw 78 y o  female   MRN: [id number]  PCP: Brandon Samano MD  Unit/Bed#: E4 -01 Encounter: [id number]  Date Of Visit: 08/14/19      Assessment/plan  1  Ventricular tachycardia in setting of pacemarker placed in 2017 for complete heart block  Originally admitted to ICU step down level 1  Patient has implanted [employer] pacemaker and had multiple runs (about 20) of VT lasting 20-30 seconds  Given 100 mg lidocaine prehospital  With effect  Pt had been treated with amiodarone drip  Appreciate cardiology recommendations  P ramesh a heart cath that was normal  Pt changed to po amiodarone  Pt will need icd upgrade at [facility] but this can not be achieved until infectious is ruled out    2  Fever- tmax 100 6 x1  Blood culture and procalcitonin obtained  Will monitor pt off antibiotics as could be due to atelectasis  Pt agreed with this plan because she would like to avoid antibiotics if possible  She is currently stable  Repeat cxr is pending  If pt spikes temp again will need broad spectrum antibiotics and possible id consult  3  High aninon gap metabolic acidosis- Most likely secondary to dehydration secondary to decreased PO intake and dyspnea  Has since resolved  4  Hypokalemia- resolved  5  Diabetes type 2- continue to hold metformin  Continue with insulin sliding scale  6  Essential hypertension- stable  Continue to hold lasix  Continue metoprolol  7  Right breast CA s/p right mastectomy- Continue tamoxifen     dispo- monitor due to fever  Pt will need to be transferred to [facility] for icd upgrade once infectious source has been r/o and or treated  Subjective:   Pt seen and examined  Pt having chills at times  She also has diaphoresis  She has tried to use incentive spirometry  No cp no sob no cough no urinary freq, burning, or urgency   No n/v/d no abd pain    Objective:     Vitals: Blood pressure 108/51, pulse 60, temperature (!) 96 5 °F (35 8 °C), temperature source Temporal, resp  rate 18, height 5' 1" (1 549 m), weight 70 8 kg (156 lb 1 4 oz), SpO2 94 %  ,Body mass index is 29 49 kg/m²  Lab, Imaging and other studies:  Results from last 7 days   Lab Units 08/13/19  0553   WBC Thousand/uL 14 08*   HEMOGLOBIN g/dL 12 0   HEMATOCRIT % 36 7   PLATELETS Thousands/uL 75*     Results from last 7 days   Lab Units 08/14/19  0557  08/12/19  0512   POTASSIUM mmol/L 3 6   < > 3 4*   CHLORIDE mmol/L 104   < > 104   CO2 mmol/L 26   < > 20*   BUN mg/dL 15   < > 21   CREATININE mg/dL 0 60   < > 1 01   CALCIUM mg/dL 8 2*   < > 8 7   ALK PHOS U/L  --   --  161*   ALT U/L  --   --  42   AST U/L  --   --  43    < > = values in this interval not displayed  Results from last 7 days   Lab Units 08/12/19  0512   TROPONIN I ng/mL <0 02     Lab Results   Component Value Date    BLOODCX No Growth After 5 Days  03/01/2019    BLOODCX No Growth After 5 Days  03/01/2019    BLOODCX No Growth After 5 Days  01/07/2018    URINECX No Growth <1000 cfu/mL 04/03/2017         Cta Ed Chest Pe Study    Result Date: 8/12/2019  Narrative: CTA - CHEST WITH IV CONTRAST - PULMONARY ANGIOGRAM INDICATION:   dyspnea  COMPARISON: CT chest 5/2/2019 TECHNIQUE: CTA examination of the chest was performed using angiographic technique according to a protocol specifically tailored to evaluate for pulmonary embolism  Axial, sagittal, and coronal 2D reformatted images were created from the source data and  submitted for interpretation  In addition, coronal 3D MIP postprocessing was performed on the acquisition scanner  Radiation dose length product (DLP) for this visit:  420 mGy-cm   This examination, like all CT scans performed in the [place], was performed utilizing techniques to minimize radiation dose exposure, including the use of iterative reconstruction and automated exposure control   IV Contrast:  85 mL of iodixanol (VISIPAQUE)  320  FINDINGS: PULMONARY ARTERIAL TREE:  No pulmonary embolus is seen  LUNGS:  Minimal bibasilar and right middle lobe subsegmental atelectasis  Crowding of right infrahilar bronchovascular markings secondary to chronic stable elevation right hemidiaphragm  No infiltrate or suspicious lung nodule  Punctate calcified granuloma right lung base  Central airways are clear  PLEURA:  Trace right pleural effusion  HEART/GREAT VESSELS:  Heart is not enlarged  No pericardial effusion  Aortic and coronary artery calcification  Distal cardiac pacer leads in place  MEDIASTINUM AND NGOZI:  No enlarged lymph nodes  Punctate right hilar calcified granulomata  CHEST WALL AND LOWER NECK:   Left chest wall cardiac pacer device in place  VISUALIZED STRUCTURES IN THE UPPER ABDOMEN:  Pneumobilia attributed to prior biliary intervention  Stable post partial right hepatectomy changes  Punctate calcified granulomata in the liver and spleen  OSSEOUS STRUCTURES:  No acute fracture or osseous destructive lesion identified  Degenerative changes of the spine and left glenohumeral joint  Mild dextroconvex thoracic kyphoscoliosis  Partially visualized right shoulder prosthesis  Impression: No pulmonary embolus  Minimal bibasilar, right greater than left, and right middle lobe subsegmental atelectasis  Trace right pleural effusion  No other significant interval change   Workstation performed: [id number]       Scheduled Meds:   Current Facility-Administered Medications:  acetaminophen 650 mg Oral Q6H PRN Debbie Ambron, DO   aluminum-magnesium hydroxide-simethicone 30 mL Oral Q6H PRN Nel FRANKLIN PA-C   amiodarone 400 mg Oral BID With Meals Nel FRANKLIN PA-C   atorvastatin 20 mg Oral After Arpita Ponce Mukesh FRANKLIN PA-C   heparin (porcine) 5,000 Units Subcutaneous [facility] Elroy FRANKLIN PA-C   insulin lispro 1-5 Units Subcutaneous TID AC Elroy FRANKLIN PA-C   insulin lispro 1-5 Units Subcutaneous HS Lorena Jack PA-C metoprolol tartrate 12 5 mg Oral Q12H [address] Elroy FRANKLIN PA-C   ondansetron 4 mg Intravenous Q6H PRN Babs Ring PA-C   tamoxifen 20 mg Oral Daily Elroy FRANKLIN PA-C     Continuous Infusions:    PRN Meds:   acetaminophen    aluminum-magnesium hydroxide-simethicone    ondansetron      Physical exam:  Physical Exam  General appearance: alert and oriented, in no acute distress  Head: Normocephalic, without obvious abnormality, atraumatic  Eyes: conjunctivae/corneas clear  PERRL, EOM's intact  Fundi benign    Neck: no adenopathy, no carotid bruit, no JVD, supple, symmetrical, trachea midline and thyroid not enlarged, symmetric, no tenderness/mass/nodules  Lungs: minimal decrease breath sounds at bases bilateral  Heart: regular rate and rhythm, S1, S2 normal, no murmur, click, rub or gallop  Abdomen: soft, non-tender; bowel sounds normal; no masses,  no organomegaly  Extremities: extremities normal, warm and well-perfused; no cyanosis, clubbing, or edema  Pulses: 2+ and symmetric  Skin: Skin color, texture, turgor normal  No rashes or lesions  Neurologic: Mental status: Alert, oriented, thought content appropriate      VTE Pharmacologic Prophylaxis: Heparin  VTE Mechanical Prophylaxis: sequential compression device    Counseling / Coordination of Care  Total floor / unit time spent today 20 minutes      Current Length of Stay: 2 day(s)    Current Patient Status: Inpatient       Code Status: Level 1 - Full Code

## 2019-08-14 NOTE — PLAN OF CARE
Problem: Prexisting or High Potential for Compromised Skin Integrity  Goal: Skin integrity is maintained or improved  Description  INTERVENTIONS:  - Identify patients at risk for skin breakdown  - Assess and monitor skin integrity  - Assess and monitor nutrition and hydration status  - Monitor labs (i e  albumin)  - Assess for incontinence   - Turn and reposition patient  - Assist with mobility/ambulation  - Relieve pressure over bony prominences  - Avoid friction and shearing  - Provide appropriate hygiene as needed including keeping skin clean and dry  - Evaluate need for skin moisturizer/barrier cream  - Collaborate with interdisciplinary team (i e  Nutrition, Rehabilitation, etc )   - Patient/family teaching  Outcome: Progressing     Problem: Nutrition/Hydration-ADULT  Goal: Nutrient/Hydration intake appropriate for improving, restoring or maintaining nutritional needs  Description  Monitor and assess patient's nutrition/hydration status for malnutrition (ex- brittle hair, bruises, dry skin, pale skin and conjunctiva, muscle wasting, smooth red tongue, and disorientation)  Collaborate with interdisciplinary team and initiate plan and interventions as ordered  Monitor patient's weight and dietary intake as ordered or per policy  Utilize nutrition screening tool and intervene per policy  Determine patient's food preferences and provide high-protein, high-caloric foods as appropriate       INTERVENTIONS:  - Monitor oral intake, urinary output, labs, and treatment plans  - Assess nutrition and hydration status and recommend course of action  - Evaluate amount of meals eaten  - Assist patient with eating if necessary   - Allow adequate time for meals  - Recommend/ encourage appropriate diets, oral nutritional supplements, and vitamin/mineral supplements  - Order, calculate, and assess calorie counts as needed  - Recommend, monitor, and adjust tube feedings and TPN/PPN based on assessed needs  - Assess need for intravenous fluids  - Provide specific nutrition/hydration education as appropriate  - Include patient/family/caregiver in decisions related to nutrition  Outcome: Progressing     Problem: PAIN - ADULT  Goal: Verbalizes/displays adequate comfort level or baseline comfort level  Description  Interventions:  - Encourage patient to monitor pain and request assistance  - Assess pain using appropriate pain scale  - Administer analgesics based on type and severity of pain and evaluate response  - Implement non-pharmacological measures as appropriate and evaluate response  - Consider cultural and social influences on pain and pain management  - Notify physician/advanced practitioner if interventions unsuccessful or patient reports new pain  Outcome: Progressing     Problem: INFECTION - ADULT  Goal: Absence or prevention of progression during hospitalization  Description  INTERVENTIONS:  - Assess and monitor for signs and symptoms of infection  - Monitor lab/diagnostic results  - Monitor all insertion sites, i e  indwelling lines, tubes, and drains  - Monitor endotracheal (as able) and nasal secretions for changes in amount and color  - [place] appropriate cooling/warming therapies per order  - Administer medications as ordered  - Instruct and encourage patient and family to use good hand hygiene technique  - Identify and instruct in appropriate isolation precautions for identified infection/condition  Outcome: Progressing     Problem: SAFETY ADULT  Goal: Patient will remain free of falls  Description  INTERVENTIONS:  - Assess patient frequently for physical needs  -  Identify cognitive and physical deficits and behaviors that affect risk of falls    -  [place] fall precautions as indicated by assessment   - Educate patient/family on patient safety including physical limitations  - Instruct patient to call for assistance with activity based on assessment  - Modify environment to reduce risk of injury  - Consider OT/PT consult to assist with strengthening/mobility  Outcome: Progressing  Goal: Maintain or return to baseline ADL function  Description  INTERVENTIONS:  -  Assess patient's ability to carry out ADLs; assess patient's baseline for ADL function and identify physical deficits which impact ability to perform ADLs (bathing, care of mouth/teeth, toileting, grooming, dressing, etc )  - Assess/evaluate cause of self-care deficits   - Assess range of motion  - Assess patient's mobility; develop plan if impaired  - Assess patient's need for assistive devices and provide as appropriate  - Encourage maximum independence but intervene and supervise when necessary  ¯ Involve family in performance of ADLs  ¯ Assess for home care needs following discharge   ¯ Request OT consult to assist with ADL evaluation and planning for discharge  ¯ Provide patient education as appropriate  Outcome: Progressing  Goal: Maintain or return mobility status to optimal level  Description  INTERVENTIONS:  - Assess patient's baseline mobility status (ambulation, transfers, stairs, etc )    - Identify cognitive and physical deficits and behaviors that affect mobility  - Identify mobility aids required to assist with transfers and/or ambulation (gait belt, sit-to-stand, lift, walker, cane, etc )  - [place] fall precautions as indicated by assessment  - Record patient progress and toleration of activity level on Mobility SBAR; progress patient to next Phase/Stage  - Instruct patient to call for assistance with activity based on assessment  - Request Rehabilitation consult to assist with strengthening/weightbearing, etc   Outcome: Progressing     Problem: DISCHARGE PLANNING  Goal: Discharge to home or other facility with appropriate resources  Description  INTERVENTIONS:  - Identify barriers to discharge w/patient and caregiver  - Arrange for needed discharge resources and transportation as appropriate  - Identify discharge learning needs (meds, wound care, etc )  - Arrange for interpretive services to assist at discharge as needed  - Refer to Case Management Department for coordinating discharge planning if the patient needs post-hospital services based on physician/advanced practitioner order or complex needs related to functional status, cognitive ability, or social support system  Outcome: Progressing     Problem: Knowledge Deficit  Goal: Patient/family/caregiver demonstrates understanding of disease process, treatment plan, medications, and discharge instructions  Description  Complete learning assessment and assess knowledge base  Interventions:  - Provide teaching at level of understanding  - Provide teaching via preferred learning methods  Outcome: Progressing     Problem: CARDIOVASCULAR - ADULT  Goal: Maintains optimal cardiac output and hemodynamic stability  Description  INTERVENTIONS:  - Monitor I/O, vital signs and rhythm  - Monitor for S/S and trends of decreased cardiac output i e  bleeding, hypotension  - Administer and titrate ordered vasoactive medications to optimize hemodynamic stability  - Assess quality of pulses, skin color and temperature  - Assess for signs of decreased coronary artery perfusion - ex  Angina  - Instruct patient to report change in severity of symptoms  Outcome: Progressing  Goal: Absence of cardiac dysrhythmias or at baseline rhythm  Description  INTERVENTIONS:  - Continuous cardiac monitoring, monitor vital signs, obtain 12 lead EKG if indicated  - Administer antiarrhythmic and heart rate control medications as ordered  - Monitor electrolytes and administer replacement therapy as ordered  Outcome: Progressing     Problem: Potential for Falls  Goal: Patient will remain free of falls  Description  INTERVENTIONS:  - Assess patient frequently for physical needs  -  Identify cognitive and physical deficits and behaviors that affect risk of falls    -  [place] fall precautions as indicated by assessment   - Educate patient/family on patient safety including physical limitations  - Instruct patient to call for assistance with activity based on assessment  - Modify environment to reduce risk of injury  - Consider OT/PT consult to assist with strengthening/mobility  Outcome: Progressing

## 2019-08-14 NOTE — PROGRESS NOTES
Progress Note - Cardiology   Ren Bowden 78 y o  female MRN: [id number]  Unit/Bed#: E4 -01 Encounter: [id number]      Assessment/Recommendations/Discussion:   Polymorphic VT for 26 episodes, sustained VT for 2 min  Hypokalemia  Hypomag  HTN       PLAN  Cath showed no CAD - nonischemic cause  Continue p o  Amiodarone  Keep magnesium greater than 2 and potassium greater than 4  Echo shows normal ejection fraction  Spoke to EP  Is candidate for ICD upgrade  Once acute infectious process from recent fevers/chills is treated, will need transfer to [facility] for ICD  Subjective:   HPI  No acute events overnight  She was noted to have a fever today and she does describe just having chills as well  Sepsis workup is in progress  Telemetry shows no further episodes of ventricular tachycardia  She denies chest pain or shortness of breath  Review of Systems: As noted in HPI  Rest of ROS is negative  Vitals:   /63 (BP Location: Left arm)   Pulse 62   Temp (!) 100 6 °F (38 1 °C) (Tympanic)   Resp (!) 24   Ht 5' 1" (1 549 m)   Wt 70 8 kg (156 lb 1 4 oz)   LMP  (LMP Unknown) Comment: post   SpO2 96%   BMI 29 49 kg/m²   Vitals:    08/13/19 0600 08/14/19 0600   Weight: 72 kg (158 lb 11 7 oz) 70 8 kg (156 lb 1 4 oz)       Intake/Output Summary (Last 24 hours) at 8/14/2019 1459  Last data filed at 8/14/2019 0800  Gross per 24 hour   Intake 140 ml   Output 750 ml   Net -610 ml       Physical Exam:  General appearance: alert and oriented, in no acute distress  Head: Normocephalic, without obvious abnormality, atraumatic  Eyes: conjunctivae/corneas clear  Anicteric    Neck: no adenopathy, no carotid bruit, no JVD  Lungs: clear to auscultation bilaterally  Heart: regular rate and rhythm, S1, S2 normal, no murmur, click, rub or gallop  Abdomen: soft, non-tender; bowel sounds normal; no masses,  no organomegaly  Extremities: extremities normal, warm and well-perfused; no cyanosis, clubbing, or edema  Skin: Skin color, texture, turgor normal  No rashes or lesions     TELEMETRY:  No episodes of ventricular tachycardia  Lab Results:  Results from last 7 days   Lab Units 08/13/19  0553   WBC Thousand/uL 14 08*   HEMOGLOBIN g/dL 12 0   HEMATOCRIT % 36 7   PLATELETS Thousands/uL 75*     Results from last 7 days   Lab Units 08/14/19  0557  08/12/19  0512   POTASSIUM mmol/L 3 6   < > 3 4*   CHLORIDE mmol/L 104   < > 104   CO2 mmol/L 26   < > 20*   BUN mg/dL 15   < > 21   CREATININE mg/dL 0 60   < > 1 01   CALCIUM mg/dL 8 2*   < > 8 7   ALK PHOS U/L  --   --  161*   ALT U/L  --   --  42   AST U/L  --   --  43    < > = values in this interval not displayed       Results from last 7 days   Lab Units 08/14/19  0557   POTASSIUM mmol/L 3 6   CHLORIDE mmol/L 104   CO2 mmol/L 26   BUN mg/dL 15   CREATININE mg/dL 0 60   CALCIUM mg/dL 8 2*           Medications:    Current Facility-Administered Medications:     acetaminophen (TYLENOL) tablet 650 mg, 650 mg, Oral, Q6H PRN, Debbie Ambron, DO, 650 mg at 08/14/19 1446    aluminum-magnesium hydroxide-simethicone (MYLANTA) 200-200-20 mg/5 mL oral suspension 30 mL, 30 mL, Oral, Q6H PRN, Dyane Nyhan, PA-C    amiodarone tablet 400 mg, 400 mg, Oral, BID With Meals, Jacob FRANKLIN PA-C, 400 mg at 08/14/19 0837    atorvastatin (LIPITOR) tablet 20 mg, 20 mg, Oral, After Arie Herson FRANKLIN PA-C, 20 mg at 08/13/19 1721    heparin (porcine) subcutaneous injection 5,000 Units, 5,000 Units, Subcutaneous, Q8H [address], 5,000 Units at 08/14/19 1446 **AND** [COMPLETED] Platelet count, , , Once, Jacob FRANKLIN PA-C    insulin lispro (HumaLOG) 100 units/mL subcutaneous injection 1-5 Units, 1-5 Units, Subcutaneous, TID AC, 2 Units at 08/14/19 1154 **AND** Fingerstick Glucose (POCT), , , TID AC, Elroy Mayorga V, SILKE    insulin lispro (HumaLOG) 100 units/mL subcutaneous injection 1-5 Units, 1-5 Units, Subcutaneous, HS, Dyane Nyhan, PA-C, 2 Units at 08/13/19 2103   metoprolol tartrate (LOPRESSOR) partial tablet 12 5 mg, 12 5 mg, Oral, Q12H ARMIN, Elroy Mayorga V, PA-C, 12 5 mg at 08/14/19 0837    ondansetron (ZOFRAN) injection 4 mg, 4 mg, Intravenous, Q6H PRN, Joelle Number V, PA-C, 4 mg at 08/14/19 1430    tamoxifen (NOLVADEX) tablet 20 mg, 20 mg, Oral, Daily, Joelle Number V, PA-C, 20 mg at 08/14/19 0930    This note was completed in part utilizing [employer] Direct Software  Grammatical errors, random word insertions, spelling mistakes, and incomplete sentences may be an occasional consequence of this system secondary to software limitations, ambient noise, and hardware issues  If you have any questions or concerns about the content, text, or information contained within the body of this dictation, please contact the provider for clarification      Dami Ramos DO  8/14/2019 2:59 PM

## 2019-08-14 NOTE — NURSING NOTE
Patient rang call bell stating she had chills/ shaking  Patient presented w/ a fever of 100 6  Bp of 186/80  SpO2 of 80% on room air  Oxygen provided, patient returned to 96% on 2L nasal cannula  MD notified  Tylenol given to patient  Blood cultures ordered stat w/ cxr

## 2019-08-14 NOTE — SOCIAL WORK
Cm reviewed pt care coordination rounds  PT is medically stable for d/c  CM anticipating d/c today  Awaiting for pt to see pt for recommendation

## 2019-08-15 ENCOUNTER — APPOINTMENT (INPATIENT)
Dept: CT IMAGING | Facility: HOSPITAL | Age: 79
DRG: 287 | End: 2019-08-15
Payer: COMMERCIAL

## 2019-08-15 LAB
BACTERIA UR QL AUTO: ABNORMAL /HPF
BILIRUB UR QL STRIP: ABNORMAL
CLARITY UR: ABNORMAL
COLOR UR: ABNORMAL
ERYTHROCYTE [DISTWIDTH] IN BLOOD BY AUTOMATED COUNT: 13.3 % (ref 11.6–15.1)
GLUCOSE SERPL-MCNC: 144 MG/DL (ref 65–140)
GLUCOSE SERPL-MCNC: 174 MG/DL (ref 65–140)
GLUCOSE SERPL-MCNC: 209 MG/DL (ref 65–140)
GLUCOSE SERPL-MCNC: 228 MG/DL (ref 65–140)
GLUCOSE UR STRIP-MCNC: ABNORMAL MG/DL
HCT VFR BLD AUTO: 37.5 % (ref 34.8–46.1)
HGB BLD-MCNC: 12.1 G/DL (ref 11.5–15.4)
HGB UR QL STRIP.AUTO: ABNORMAL
KETONES UR STRIP-MCNC: NEGATIVE MG/DL
LEUKOCYTE ESTERASE UR QL STRIP: ABNORMAL
MCH RBC QN AUTO: 31.8 PG (ref 26.8–34.3)
MCHC RBC AUTO-ENTMCNC: 32.3 G/DL (ref 31.4–37.4)
MCV RBC AUTO: 98 FL (ref 82–98)
NITRITE UR QL STRIP: POSITIVE
NON-SQ EPI CELLS URNS QL MICRO: ABNORMAL /HPF
PH UR STRIP.AUTO: 6 [PH]
PLATELET # BLD AUTO: 106 THOUSANDS/UL (ref 149–390)
PMV BLD AUTO: 11.1 FL (ref 8.9–12.7)
PROT UR STRIP-MCNC: ABNORMAL MG/DL
RBC # BLD AUTO: 3.81 MILLION/UL (ref 3.81–5.12)
RBC #/AREA URNS AUTO: ABNORMAL /HPF
SP GR UR STRIP.AUTO: 1.02 (ref 1–1.03)
UROBILINOGEN UR QL STRIP.AUTO: 4 E.U./DL
WBC # BLD AUTO: 12.77 THOUSAND/UL (ref 4.31–10.16)
WBC #/AREA URNS AUTO: ABNORMAL /HPF

## 2019-08-15 PROCEDURE — G8978 MOBILITY CURRENT STATUS: HCPCS

## 2019-08-15 PROCEDURE — 74177 CT ABD & PELVIS W/CONTRAST: CPT

## 2019-08-15 PROCEDURE — 82948 REAGENT STRIP/BLOOD GLUCOSE: CPT

## 2019-08-15 PROCEDURE — G8979 MOBILITY GOAL STATUS: HCPCS

## 2019-08-15 PROCEDURE — 81001 URINALYSIS AUTO W/SCOPE: CPT | Performed by: INTERNAL MEDICINE

## 2019-08-15 PROCEDURE — 99222 1ST HOSP IP/OBS MODERATE 55: CPT | Performed by: INTERNAL MEDICINE

## 2019-08-15 PROCEDURE — 87086 URINE CULTURE/COLONY COUNT: CPT | Performed by: INTERNAL MEDICINE

## 2019-08-15 PROCEDURE — 97163 PT EVAL HIGH COMPLEX 45 MIN: CPT

## 2019-08-15 PROCEDURE — 85027 COMPLETE CBC AUTOMATED: CPT | Performed by: INTERNAL MEDICINE

## 2019-08-15 PROCEDURE — 99232 SBSQ HOSP IP/OBS MODERATE 35: CPT | Performed by: INTERNAL MEDICINE

## 2019-08-15 PROCEDURE — 99232 SBSQ HOSP IP/OBS MODERATE 35: CPT

## 2019-08-15 RX ADMIN — IOHEXOL 50 ML: 240 INJECTION, SOLUTION INTRATHECAL; INTRAVASCULAR; INTRAVENOUS; ORAL at 15:32

## 2019-08-15 RX ADMIN — CEFEPIME HYDROCHLORIDE 2000 MG: 2 INJECTION, POWDER, FOR SOLUTION INTRAVENOUS at 12:35

## 2019-08-15 RX ADMIN — AMIODARONE HYDROCHLORIDE 400 MG: 200 TABLET ORAL at 17:30

## 2019-08-15 RX ADMIN — METOPROLOL TARTRATE 12.5 MG: 25 TABLET ORAL at 08:24

## 2019-08-15 RX ADMIN — CEFEPIME HYDROCHLORIDE 2000 MG: 2 INJECTION, POWDER, FOR SOLUTION INTRAVENOUS at 23:00

## 2019-08-15 RX ADMIN — INSULIN LISPRO 1 UNITS: 100 INJECTION, SOLUTION INTRAVENOUS; SUBCUTANEOUS at 17:30

## 2019-08-15 RX ADMIN — HEPARIN SODIUM 5000 UNITS: 5000 INJECTION INTRAVENOUS; SUBCUTANEOUS at 13:46

## 2019-08-15 RX ADMIN — HEPARIN SODIUM 5000 UNITS: 5000 INJECTION INTRAVENOUS; SUBCUTANEOUS at 21:49

## 2019-08-15 RX ADMIN — IOHEXOL 100 ML: 350 INJECTION, SOLUTION INTRAVENOUS at 15:32

## 2019-08-15 RX ADMIN — INSULIN LISPRO 2 UNITS: 100 INJECTION, SOLUTION INTRAVENOUS; SUBCUTANEOUS at 21:49

## 2019-08-15 RX ADMIN — INSULIN LISPRO 1 UNITS: 100 INJECTION, SOLUTION INTRAVENOUS; SUBCUTANEOUS at 11:48

## 2019-08-15 RX ADMIN — METOPROLOL TARTRATE 12.5 MG: 25 TABLET ORAL at 21:49

## 2019-08-15 RX ADMIN — AMIODARONE HYDROCHLORIDE 400 MG: 200 TABLET ORAL at 08:24

## 2019-08-15 RX ADMIN — ATORVASTATIN CALCIUM 20 MG: 20 TABLET, FILM COATED ORAL at 17:30

## 2019-08-15 RX ADMIN — HEPARIN SODIUM 5000 UNITS: 5000 INJECTION INTRAVENOUS; SUBCUTANEOUS at 05:43

## 2019-08-15 RX ADMIN — TAMOXIFEN CITRATE 20 MG: 10 TABLET, FILM COATED ORAL at 08:24

## 2019-08-15 NOTE — PROGRESS NOTES
[facility] Progress Note  Patient: Karli Cordova 78 y o  female   MRN: [id number]  PCP: Magdalena Jimenes MD  Unit/Bed#: E4 -01 Encounter: [id number]  Date Of Visit: 08/15/19      Assessment/plan  1  Ventricular tachycardia in setting of pacemarker placed in 2017 for complete heart block  Originally admitted to ICU step down level 1  Patient has implanted [employer] pacemaker and had multiple runs (about 20) of VT lasting 20-30 seconds  Given 100 mg lidocaine prehospital  Pt had been treated with amiodarone drip  Appreciate cardiology recommendations  Pt had a heart cath that was normal  Pt changed to po amiodarone  Pt will need icd upgrade at [place] but this can not be achieved until infectious is ruled out     2  Fever- tmax 100 6 x1  Possibly due to tamoxifen and atelectasis  No further fevers  procalcitonin is elevated at 5  Will check a ua however pt asymptomatic  Will continue to monitor off of antibiotics as pt is stable  If pt becomes unstable will start antibiotics  Will consult ID       3  High aninon gap metabolic acidosis- Most likely secondary to dehydration secondary to decreased PO intake and dyspnea  Has since resolved       4  Hypokalemia- resolved       5  Diabetes type 2- continue to hold metformin  Continue with insulin sliding scale       6  Essential hypertension- stable  Continue to hold lasix  Continue metoprolol       7  Right breast CA s/p right mastectomy- Continue tamoxifen     8  Thrombocytopenia- will check cbc       dispo-ID consult  Await blood culture  Pt to go to [place] once infectious r/o and/or treated for icd upgrade  Subjective:   Pt seen and examined  Pt states her urine is dark this am but no dysuria, frequency, or urgency  She has been using incentive spirometry and was able to get to 500  No cough  She has been having night sweats for a while and had discussed this with Dr Malena Villa who attributed it to her tamoxifen  No fevers over night   No chills over night  No cp no sob no n/v/d no abd pain  Objective:     Vitals: Blood pressure 132/60, pulse 85, temperature 98 2 °F (36 8 °C), temperature source Tympanic, resp  rate 19, height 5' 1" (1 549 m), weight 70 8 kg (156 lb 1 4 oz), SpO2 95 %  ,Body mass index is 29 49 kg/m²  Lab, Imaging and other studies:  Results from last 7 days   Lab Units 08/13/19  0553   WBC Thousand/uL 14 08*   HEMOGLOBIN g/dL 12 0   HEMATOCRIT % 36 7   PLATELETS Thousands/uL 75*     Results from last 7 days   Lab Units 08/14/19  0557  08/12/19  0512   POTASSIUM mmol/L 3 6   < > 3 4*   CHLORIDE mmol/L 104   < > 104   CO2 mmol/L 26   < > 20*   BUN mg/dL 15   < > 21   CREATININE mg/dL 0 60   < > 1 01   CALCIUM mg/dL 8 2*   < > 8 7   ALK PHOS U/L  --   --  161*   ALT U/L  --   --  42   AST U/L  --   --  43    < > = values in this interval not displayed  Results from last 7 days   Lab Units 08/12/19  0512   TROPONIN I ng/mL <0 02     Lab Results   Component Value Date    BLOODCX No Growth After 5 Days  03/01/2019    BLOODCX No Growth After 5 Days  03/01/2019    BLOODCX No Growth After 5 Days  01/07/2018    URINECX No Growth <1000 cfu/mL 04/03/2017         Xr Chest Pa & Lateral    Result Date: 8/14/2019  Narrative: CHEST INDICATION:   fevers  COMPARISON:  None EXAM PERFORMED/VIEWS:  XR CHEST PA & LATERAL Images: 2 FINDINGS:  Left-sided chest wall pacemaker is identified  Pacemaker leads are intact  Cardiomediastinal silhouette appears unremarkable  Elevation of the right hemidiaphragm  No congestive failure  No pneumothorax  No pneumonia  Osseous structures appear within normal limits for patient age  Impression: Elevation of the right hemidiaphragm  Clear lung fields  Workstation performed: [id number]     Cta Ed Chest Pe Study    Result Date: 8/12/2019  Narrative: CTA - CHEST WITH IV CONTRAST - PULMONARY ANGIOGRAM INDICATION:   dyspnea   COMPARISON: CT chest 5/2/2019 TECHNIQUE: CTA examination of the chest was performed using angiographic technique according to a protocol specifically tailored to evaluate for pulmonary embolism  Axial, sagittal, and coronal 2D reformatted images were created from the source data and  submitted for interpretation  In addition, coronal 3D MIP postprocessing was performed on the acquisition scanner  Radiation dose length product (DLP) for this visit:  420 mGy-cm   This examination, like all CT scans performed in the [facility], was performed utilizing techniques to minimize radiation dose exposure, including the use of iterative reconstruction and automated exposure control  IV Contrast:  85 mL of iodixanol (VISIPAQUE)  320  FINDINGS: PULMONARY ARTERIAL TREE:  No pulmonary embolus is seen  LUNGS:  Minimal bibasilar and right middle lobe subsegmental atelectasis  Crowding of right infrahilar bronchovascular markings secondary to chronic stable elevation right hemidiaphragm  No infiltrate or suspicious lung nodule  Punctate calcified granuloma right lung base  Central airways are clear  PLEURA:  Trace right pleural effusion  HEART/GREAT VESSELS:  Heart is not enlarged  No pericardial effusion  Aortic and coronary artery calcification  Distal cardiac pacer leads in place  MEDIASTINUM AND NGOZI:  No enlarged lymph nodes  Punctate right hilar calcified granulomata  CHEST WALL AND LOWER NECK:   Left chest wall cardiac pacer device in place  VISUALIZED STRUCTURES IN THE UPPER ABDOMEN:  Pneumobilia attributed to prior biliary intervention  Stable post partial right hepatectomy changes  Punctate calcified granulomata in the liver and spleen  OSSEOUS STRUCTURES:  No acute fracture or osseous destructive lesion identified  Degenerative changes of the spine and left glenohumeral joint  Mild dextroconvex thoracic kyphoscoliosis  Partially visualized right shoulder prosthesis  Impression: No pulmonary embolus   Minimal bibasilar, right greater than left, and right middle lobe subsegmental atelectasis  Trace right pleural effusion  No other significant interval change  Workstation performed: [id number]       Scheduled Meds:   Current Facility-Administered Medications:  acetaminophen 650 mg Oral Q6H PRN Debbie Cruz DO   aluminum-magnesium hydroxide-simethicone 30 mL Oral Q6H PRN Mumtaz FRANKLIN PA-C   amiodarone 400 mg Oral BID With Meals Mumtaz FRANKLIN PA-C   atorvastatin 20 mg Oral After Camila Vish FRANKLIN PA-C   heparin (porcine) 5,000 Units Subcutaneous Q8H [facility] [place] FRANKLIN PA-C   insulin lispro 1-5 Units Subcutaneous TID AC LANDRY Chi-SIXTO   insulin lispro 1-5 Units Subcutaneous HS [place] FRANKLIN PA-C   metoprolol tartrate 12 5 mg Oral Q12H [facility] [place] FRANKLIN PA-C   ondansetron 4 mg Intravenous Q6H PRN Bradly BrineSILKE   tamoxifen 20 mg Oral Daily [place] FRANKLIN PA-C     Continuous Infusions:    PRN Meds:   acetaminophen    aluminum-magnesium hydroxide-simethicone    ondansetron      Physical exam:  Physical Exam  General appearance: alert and oriented, in no acute distress  Head: Normocephalic, without obvious abnormality, atraumatic  Eyes: conjunctivae/corneas clear  PERRL, EOM's intact  Fundi benign    Neck: no adenopathy, no carotid bruit, no JVD, supple, symmetrical, trachea midline and thyroid not enlarged, symmetric, no tenderness/mass/nodules  Lungs: clear to auscultation bilaterally  Heart: regular rate and rhythm, S1, S2 normal, no murmur, click, rub or gallop  Abdomen: soft, non-tender; bowel sounds normal; no masses,  no organomegaly  Extremities: extremities normal, warm and well-perfused; no cyanosis, clubbing, or edema  Pulses: 2+ and symmetric  Skin: Skin color, texture, turgor normal  No rashes or lesions  Neurologic: Mental status: Alert, oriented, thought content appropriate      VTE Pharmacologic Prophylaxis: Heparin  VTE Mechanical Prophylaxis: sequential compression device    Counseling / Coordination of Care  Total floor / unit time spent today 20 minutes      Current Length of Stay: 3 day(s)    Current Patient Status: Inpatient       Code Status: Level 1 - Full Code

## 2019-08-15 NOTE — PHYSICAL THERAPY NOTE
PHYSICAL THERAPY EVAL       08/15/19 1133   Note Type   Note type Eval only   Pain Assessment   Pain Assessment No/denies pain   Home Living   Type of [address] Two level;Bed/bath upstairs;1/2 bath on main level;Stairs to enter with rails  (5STE 2 rails)   Home Equipment Cane;Walker   Prior Function   Level of [place] Needs assistance with IADLs  (Mod I mobility, A with ADL's)   Lives With Spouse   Receives Help From Family   ADL Assistance Needs assistance   IADLs Needs assistance   Falls in the last 6 months 0   Comments Uses cane downstairs and outside of home  RW upstairs   Restrictions/Precautions   Weight Bearing Precautions Per Order No   Other Precautions Fall Risk;Telemetry;Cardiac/sternal   General   Additional Pertinent History Will be transferred to [place] for ICD placement once medically stable   Family/Caregiver Present Yes   Cognition   Overall Cognitive Status WFL   Arousal/Participation Alert   Orientation Level Oriented X4   Memory Within functional limits   Following Commands Follows all commands and directions without difficulty   RUE Assessment   RUE Assessment   (Limited shoulder motion to approx 90deg)   LUE Assessment   LUE Assessment   (limited shoulder motion to 90deg)   RLE Assessment   RLE Assessment   (4/5 all major muscle groups)   LLE Assessment   LLE Assessment   (4/5 all major muscle groups)   Coordination   Sensation WFL   Light Touch   RLE Light Touch Grossly intact   LLE Light Touch Grossly intact   Bed Mobility   Additional Comments Received OOB in chair   Transfers   Sit to Stand 5  Supervision   Additional items Armrests   Stand to Sit 5  Supervision   Additional items Armrests   Ambulation/Elevation   Gait pattern Short stride; Foward flexed   Gait Assistance 5  Supervision   Additional items Verbal cues   Assistive Device Rolling walker   Distance 75ft   Stair Management Assistance Not tested   Balance   Static Sitting Good   Dynamic Sitting Good   Static Standing Fair +   Dynamic Standing Fair +   Ambulatory Fair +   Endurance Deficit   Endurance Deficit Yes   Activity Tolerance   Activity Tolerance Treatment limited secondary to medical complications (Comment)  (SOB)   Nurse Made Aware RN Yuni   Assessment   Prognosis Good   Problem List Decreased strength;Decreased endurance; Impaired balance;Decreased mobility   Assessment Patient is an 79y/o F who presented with SOB, fatigue, nausea and chills  Found to have V-tach  Patient is scheduled to undergo ICD placement but has had a fever over the last day  Plan is to be transferred to [place] once medically stable  PMH significant for heart block s/p pacer, breast ca with mets to the liver, anxiety, HTN, DM, [place]  Patient lives with spouse in a [facility] with 5 steps to enter  Half bath on 1st  Full flight to the second floor  Reports ind with mobility (cane and RW) and assistance needed for ADL's  Spouse drives  Current medical status includes fevers, urinary incontinence, telemetry, SOB, fall risk, decreased strength, ROM, balance, endurance and mobility  Patient performed transfers and amb with supervision  Patient 95% RA at rest  89% RA after amb which improved to 93% with less than 1 minute of rest and pursed lip breathing  Patient appears to be deconditioned and limited by SOB  She will benefit from home P T  When returning home  Goals   Patient Goals To have less SOB   STG Expiration Date 08/29/19   Short Term Goal #1 1  Perform supine<>sit with HOB flat at an independent level 2  Perform sit<>stand transfers with use of UE's mod I 3  Amb 200ft with RW/cane at a mod I level 4  Ascend/descend 12 stairs with railing at a supervision level 5  Participate in LE/UE ther-ex to improve overall strength   Treatment Day 0   Plan   Treatment/Interventions Functional transfer training;LE strengthening/ROM; Elevations; Therapeutic exercise; Endurance training;Bed mobility;Gait training;Spoke to nursing;Spoke to case management   PT Frequency 2-3x/wk   Recommendation   Recommendation Home PT   Additional Comments Assess stairs next session  Modified Ana Scale   Modified [place] Scale 3   Barthel Index   Feeding 10   Bathing 0   Grooming Score 5   Dressing Score 5   Bladder Score 0   Bowels Score 10   Toilet Use Score 5   Transfers (Bed/Chair) Score 10   Mobility (Level Surface) Score 10   Stairs Score 0   Barthel Index Score 55   Nadira Ford, PT            Patient Name: Vero MIRANDA Date: 8/15/2019

## 2019-08-15 NOTE — PLAN OF CARE
Problem: PHYSICAL THERAPY ADULT  Goal: Performs mobility at highest level of function for planned discharge setting  See evaluation for individualized goals  Description  Treatment/Interventions: Functional transfer training, LE strengthening/ROM, Elevations, Therapeutic exercise, Endurance training, Bed mobility, Gait training, Spoke to nursing, Spoke to case management          See flowsheet documentation for full assessment, interventions and recommendations  Note:   Prognosis: Good  Problem List: Decreased strength, Decreased endurance, Impaired balance, Decreased mobility  Assessment: Patient is an 79y/o F who presented with SOB, fatigue, nausea and chills  Found to have V-tach  Patient is scheduled to undergo ICD placement but has had a fever over the last day  Plan is to be transferred to [place] once medically stable  PMH significant for heart block s/p pacer, breast ca with mets to the liver, anxiety, HTN, DM, [place]  Patient lives with spouse in a [facility] with 5 steps to enter  Half bath on 1st  Full flight to the second floor  Reports ind with mobility (cane and RW) and assistance needed for ADL's  Spouse drives  Current medical status includes fevers, urinary incontinence, telemetry, SOB, fall risk, decreased strength, ROM, balance, endurance and mobility  Patient performed transfers and amb with supervision  Patient 95% RA at rest  89% RA after amb which improved to 93% with less than 1 minute of rest and pursed lip breathing  Patient appears to be deconditioned and limited by SOB  She will benefit from home P T  When returning home  Recommendation: Home PT          See flowsheet documentation for full assessment

## 2019-08-15 NOTE — SOCIAL WORK
Cm reviewed pt care coordination rounds  Pt is not medically stable for d/c  Pt has a positive blood culture is now getting treated with IV ABX  When stabilized will be transferring to [facility] for an ICD exchange

## 2019-08-15 NOTE — PLAN OF CARE
Problem: Prexisting or High Potential for Compromised Skin Integrity  Goal: Skin integrity is maintained or improved  Description  INTERVENTIONS:  - Identify patients at risk for skin breakdown  - Assess and monitor skin integrity  - Assess and monitor nutrition and hydration status  - Monitor labs (i e  albumin)  - Assess for incontinence   - Turn and reposition patient  - Assist with mobility/ambulation  - Relieve pressure over bony prominences  - Avoid friction and shearing  - Provide appropriate hygiene as needed including keeping skin clean and dry  - Evaluate need for skin moisturizer/barrier cream  - Collaborate with interdisciplinary team (i e  Nutrition, Rehabilitation, etc )   - Patient/family teaching  Outcome: Progressing     Problem: Nutrition/Hydration-ADULT  Goal: Nutrient/Hydration intake appropriate for improving, restoring or maintaining nutritional needs  Description  Monitor and assess patient's nutrition/hydration status for malnutrition (ex- brittle hair, bruises, dry skin, pale skin and conjunctiva, muscle wasting, smooth red tongue, and disorientation)  Collaborate with interdisciplinary team and initiate plan and interventions as ordered  Monitor patient's weight and dietary intake as ordered or per policy  Utilize nutrition screening tool and intervene per policy  Determine patient's food preferences and provide high-protein, high-caloric foods as appropriate       INTERVENTIONS:  - Monitor oral intake, urinary output, labs, and treatment plans  - Assess nutrition and hydration status and recommend course of action  - Evaluate amount of meals eaten  - Assist patient with eating if necessary   - Allow adequate time for meals  - Recommend/ encourage appropriate diets, oral nutritional supplements, and vitamin/mineral supplements  - Order, calculate, and assess calorie counts as needed  - Recommend, monitor, and adjust tube feedings and TPN/PPN based on assessed needs  - Assess need for intravenous fluids  - Provide specific nutrition/hydration education as appropriate  - Include patient/family/caregiver in decisions related to nutrition  Outcome: Progressing     Problem: PAIN - ADULT  Goal: Verbalizes/displays adequate comfort level or baseline comfort level  Description  Interventions:  - Encourage patient to monitor pain and request assistance  - Assess pain using appropriate pain scale  - Administer analgesics based on type and severity of pain and evaluate response  - Implement non-pharmacological measures as appropriate and evaluate response  - Consider cultural and social influences on pain and pain management  - Notify physician/advanced practitioner if interventions unsuccessful or patient reports new pain  Outcome: Progressing     Problem: INFECTION - ADULT  Goal: Absence or prevention of progression during hospitalization  Description  INTERVENTIONS:  - Assess and monitor for signs and symptoms of infection  - Monitor lab/diagnostic results  - Monitor all insertion sites, i e  indwelling lines, tubes, and drains  - Monitor endotracheal (as able) and nasal secretions for changes in amount and color  - [place] appropriate cooling/warming therapies per order  - Administer medications as ordered  - Instruct and encourage patient and family to use good hand hygiene technique  - Identify and instruct in appropriate isolation precautions for identified infection/condition  Outcome: Progressing     Problem: SAFETY ADULT  Goal: Patient will remain free of falls  Description  INTERVENTIONS:  - Assess patient frequently for physical needs  -  Identify cognitive and physical deficits and behaviors that affect risk of falls    -  [place] fall precautions as indicated by assessment   - Educate patient/family on patient safety including physical limitations  - Instruct patient to call for assistance with activity based on assessment  - Modify environment to reduce risk of injury  - Consider OT/PT consult to assist with strengthening/mobility  Outcome: Progressing  Goal: Maintain or return to baseline ADL function  Description  INTERVENTIONS:  -  Assess patient's ability to carry out ADLs; assess patient's baseline for ADL function and identify physical deficits which impact ability to perform ADLs (bathing, care of mouth/teeth, toileting, grooming, dressing, etc )  - Assess/evaluate cause of self-care deficits   - Assess range of motion  - Assess patient's mobility; develop plan if impaired  - Assess patient's need for assistive devices and provide as appropriate  - Encourage maximum independence but intervene and supervise when necessary  ¯ Involve family in performance of ADLs  ¯ Assess for home care needs following discharge   ¯ Request OT consult to assist with ADL evaluation and planning for discharge  ¯ Provide patient education as appropriate  Outcome: Progressing  Goal: Maintain or return mobility status to optimal level  Description  INTERVENTIONS:  - Assess patient's baseline mobility status (ambulation, transfers, stairs, etc )    - Identify cognitive and physical deficits and behaviors that affect mobility  - Identify mobility aids required to assist with transfers and/or ambulation (gait belt, sit-to-stand, lift, walker, cane, etc )  - [place] fall precautions as indicated by assessment  - Record patient progress and toleration of activity level on Mobility SBAR; progress patient to next Phase/Stage  - Instruct patient to call for assistance with activity based on assessment  - Request Rehabilitation consult to assist with strengthening/weightbearing, etc   Outcome: Progressing     Problem: DISCHARGE PLANNING  Goal: Discharge to home or other facility with appropriate resources  Description  INTERVENTIONS:  - Identify barriers to discharge w/patient and caregiver  - Arrange for needed discharge resources and transportation as appropriate  - Identify discharge learning needs (meds, wound care, etc )  - Arrange for interpretive services to assist at discharge as needed  - Refer to Case Management Department for coordinating discharge planning if the patient needs post-hospital services based on physician/advanced practitioner order or complex needs related to functional status, cognitive ability, or social support system  Outcome: Progressing     Problem: Knowledge Deficit  Goal: Patient/family/caregiver demonstrates understanding of disease process, treatment plan, medications, and discharge instructions  Description  Complete learning assessment and assess knowledge base  Interventions:  - Provide teaching at level of understanding  - Provide teaching via preferred learning methods  Outcome: Progressing     Problem: CARDIOVASCULAR - ADULT  Goal: Maintains optimal cardiac output and hemodynamic stability  Description  INTERVENTIONS:  - Monitor I/O, vital signs and rhythm  - Monitor for S/S and trends of decreased cardiac output i e  bleeding, hypotension  - Administer and titrate ordered vasoactive medications to optimize hemodynamic stability  - Assess quality of pulses, skin color and temperature  - Assess for signs of decreased coronary artery perfusion - ex  Angina  - Instruct patient to report change in severity of symptoms  Outcome: Progressing  Goal: Absence of cardiac dysrhythmias or at baseline rhythm  Description  INTERVENTIONS:  - Continuous cardiac monitoring, monitor vital signs, obtain 12 lead EKG if indicated  - Administer antiarrhythmic and heart rate control medications as ordered  - Monitor electrolytes and administer replacement therapy as ordered  Outcome: Progressing     Problem: Potential for Falls  Goal: Patient will remain free of falls  Description  INTERVENTIONS:  - Assess patient frequently for physical needs  -  Identify cognitive and physical deficits and behaviors that affect risk of falls    -  [place] fall precautions as indicated by assessment   - Educate patient/family on patient safety including physical limitations  - Instruct patient to call for assistance with activity based on assessment  - Modify environment to reduce risk of injury  - Consider OT/PT consult to assist with strengthening/mobility  Outcome: Progressing

## 2019-08-15 NOTE — CONSULTS
Consultation - Infectious Disease   Boyer Kendall 78 y o  female MRN: [id number]  Unit/Bed#: E4 -01 Encounter: [id number]      IMPRESSION & RECOMMENDATIONS:   Impression/Recommendations: This is a 78 y o  female, with ppm in place for complete heart block, presented the ER with dyspnea and was found to have V-tach  Patient is scheduled to undergo ICD placement  However, she has fever over last 24 hours  Procalcitonin is also elevated  1  Fever  Patient has mild leukocytosis with it  However, she is clinically and systemically well, without sepsis or systemic toxicity  There is no obvious source of active infection  Patient has significant atelectasis on lung exam   Likewise, CXR and chest CT showed significant atelectasis  Atelectasis is likely reason for fever  Patient has been using ICS aggressively since yesterday  Temperature is trending down, consistent with atelectasis  Blood cultures drawn yesterday are negative thus far  Observe off antibiotic for now  Monitor temperature  Monitor WBC  Continue to encourage ICS use  Follow-up on pending blood cultures  2  Elevated procalcitonin, at 5 95 yesterday  Although this is suggestive of underlying bacterial infection, as in above, patient is clinically and systemically well has no obvious source of active infection  At this point, I prefer to continue to observe off antibiotic  Will monitor serial procalcitonin  If procalcitonin increases over next few days, we may need to repeat imaging  Of note, patient had normal procalcitonin in March of this year  Observe off antibiotic for now  Monitor serial procalcitonin  If procalcitonin continue to increase, I would check abdomen/pelvis CT  3  Ventricular tachycardia, with plan for ICD insertion  As long as ventricular tachycardia is not recurrent on medications, which should hold off on ICD insertion pending workup for her fever    Recommend holding off on ICD insertion pending fever workup  4  History of right breast cancer, status post mastectomy  Patient is currently on tamoxifen but not chemotherapy  Hospitalization records reviewed in detail  Discussed with patient in detail regarding plan to hold off on antibiotic  She is agreeable  Discussed with Dr Daniel Graham from [facility] regarding plan to hold off on antibiotic for now  Thank you for this consultation  We will follow along with you  HISTORY OF PRESENT ILLNESS:  Reason for Consult:  Fever  Elevated procalcitonin  HPI: Nga Arredondo is a 78 y o  female, status post ppm placement in 2017 for complete heart block, notify EMS on 08/12 with dyspnea  On EMS arrival, patient had intermittent ventricular tachycardia  Patient was given lidocaine and subsequently started amiodarone drip  Patient had cardiac catheterization without significant CAD  Plan is to convert ppm to ICD  However, over the last 24 hours, patient has had fever  Procalcitonin was done, which was elevated  For all these reasons, we are asked to evaluate the patient  At present, patient feels well  Her dyspnea is improved  She has no cough  She has no urinary symptoms  No abdominal flank pain  No diarrhea  Of note, patient has history of right breast cancer, status post right mastectomy  She is now on tamoxifen  REVIEW OF SYSTEMS:  A complete 12 point system-based review was done  Except for what is noted in HPI above, ROS of systems is otherwise negative      PAST MEDICAL HISTORY:  Past Medical History:   Diagnosis Date    Anxiety     Arthritis     Breast CA ([facility] [address] )     recurrent, ductal carcinoma ER, CA pos    Cardiac disease     Depression     Diverticula of intestine     Dizziness     and passes out    Flushing     Hiatal hernia     [place]    Hypercholesteremia     Liver mass     Liver metastasis (HCC)     Metastatic adenocarcinoma to liver ([facility] [address] )     Bx 01/03/2017    PONV (postoperative nausea and vomiting)     Recurrent breast cancer (HCC)     Shingles     Shortness of breath     on exertion    Tachycardia     Urinary incontinence     Use of cane as ambulatory aid     or walker     Past Surgical History:   Procedure Laterality Date    BREAST SURGERY      CARDIAC PACEMAKER REMOVAL N/A 11/9/2017    Procedure: PACEMAKER LEAD REVISION, REMOVAL OF NONFUNCTIONING LEAD, AND INSERTION OF A NEW RV LEAD;  Surgeon: Nico Argueta MD;  [facility];  Service: Cardiology    CATARACT EXTRACTION Bilateral     COLONOSCOPY      ERCP N/A 1/8/2018    Procedure: ENDOSCOPIC RETROGRADE CHOLANGIOPANCREATOGRAPHY (ERCP); Surgeon: Ambika Solorzano MD;  Location: [facility]; Service: Gastroenterology    HYSTERECTOMY      JOINT REPLACEMENT      [place]  TKR and right TSR    MASTECTOMY Right     LA ERCP DX COLLECTION SPECIMEN BRUSHING/WASHING N/A 2/22/2018    Procedure: ENDOSCOPIC RETROGRADE CHOLANGIOPANCREATOGRAPHY (ERCP) with stent removal;  Surgeon: Ambika Solorzano MD;  [facility]; Service: Gastroenterology    LA RESEC [address] N/A 7/13/2017    Procedure: LIVER RESECTION, INTRAOPERATIVE ULTRASOUND;  Surgeon: Bernie Haile MD;  [facility];  Service: Surgical Oncology    ROTATOR CUFF REPAIR Right     SENTINEL LYMPH NODE BIOPSY Right     TONSILECTOMY AND ADNOIDECTOMY      WOUND DEBRIDEMENT Left 11/9/2017    Procedure: DEBRIDEMENT WOUND AND DRESSING CHANGE [facility]); Surgeon: Nico Argueta MD;  [facility];  Service: Cardiology     Problem list reviewed      FAMILY HISTORY:  Non-contributory    SOCIAL HISTORY:  Social History     Substance and Sexual Activity   Alcohol Use Not Currently     Social History     Substance and Sexual Activity   Drug Use No     Social History     Tobacco Use   Smoking Status Never Smoker   Smokeless Tobacco Never Used       ALLERGIES:  Allergies   Allergen Reactions    Ampicillin Shortness Of Breath, Anaphylaxis and Other (See Comments)     Other reaction(s): Unknown Allergic Reaction  Reaction Date: ;        MEDICATIONS:  All current active medications have been reviewed  Patient is currently not on any antibiotic  PHYSICAL EXAM:  Vitals:  Temp:  [96 5 °F (35 8 °C)-100 6 °F (38 1 °C)] 98 2 °F (36 8 °C)  HR:  [60-85] 85  Resp:  [18-27] 19  BP: (108-186)/(51-85) 132/60  SpO2:  [80 %-99 %] 95 %  Temp (24hrs), Av 9 °F (36 6 °C), Min:96 5 °F (35 8 °C), Max:100 6 °F (38 1 °C)  Current: Temperature: 98 2 °F (36 8 °C)     Physical Exam:  General:  Well-nourished, well-developed, somewhat chronically ill-appearing, in no acute distress  Awake, alert and oriented x 3  Eyes:  Conjunctive clear with no hemorrhages or effusions  Oropharynx:  No ulcers, no lesions, pharynx benign, no tonsillitis  Neck:  Supple, no lymphadenopathy, no mass, nontender  Chest:  Ppm site clean  No erythema/warmth  No tenderness  Lungs:  Decreased breath sounds, no rales, no wheezing, no accessory muscle use  Cardiac:  Regular rate and rhythm, normal S1, normal S2, no murmurs  Abdomen:  Soft, nondistended, non-tender, no HSM  Extremities:  No edema, no erythema, nontender  No ulcers  Skin:  No rashes, no ulcers  Neurological:  Moves all four extremities spontaneously, sensation grossly intact    LABS, IMAGING, & OTHER STUDIES:  Lab Results:  I have personally reviewed pertinent labs    Results from last 7 days   Lab Units 19  0557 19  0553 19  0913 19  0512   POTASSIUM mmol/L 3 6 3 2* 3 7 3 4*   CHLORIDE mmol/L 104 102 101 104   CO2 mmol/L 26 28 25 20*   BUN mg/dL 15 19 24 21   CREATININE mg/dL 0 60 0 70 1 02 1 01   EGFR ml/min/1 73sq m 86 82 52 52   CALCIUM mg/dL 8 2* 8 0* 8 8 8 7   AST U/L  --   --   --  43   ALT U/L  --   --   --  42   ALK PHOS U/L  --   --   --  161*     Results from last 7 days   Lab Units 08/15/19  0919 19  0553 19  0913 19  0512   WBC Thousand/uL 12 77* 14 08*  --  5 47   HEMOGLOBIN g/dL 12 1 12 0  --  14 4   PLATELETS Thousands/uL 106* 75* 86* 91*           Imaging Studies:   I have personally reviewed pertinent imaging study reports and images in PACS  CXR reviewed personally  No infiltrates or consolidations  Chest CT reviewed personally  Bibasilar atelectasis  No consolidations  No PE  Trace right pleural effusion  EKG, Pathology, and Other Studies:   I have personally reviewed pertinent reports

## 2019-08-15 NOTE — PLAN OF CARE
Problem: Prexisting or High Potential for Compromised Skin Integrity  Goal: Skin integrity is maintained or improved  Description  INTERVENTIONS:  - Identify patients at risk for skin breakdown  - Assess and monitor skin integrity  - Assess and monitor nutrition and hydration status  - Monitor labs (i e  albumin)  - Assess for incontinence   - Turn and reposition patient  - Assist with mobility/ambulation  - Relieve pressure over bony prominences  - Avoid friction and shearing  - Provide appropriate hygiene as needed including keeping skin clean and dry  - Evaluate need for skin moisturizer/barrier cream  - Collaborate with interdisciplinary team (i e  Nutrition, Rehabilitation, etc )   - Patient/family teaching  Outcome: Progressing     Problem: Nutrition/Hydration-ADULT  Goal: Nutrient/Hydration intake appropriate for improving, restoring or maintaining nutritional needs  Description  Monitor and assess patient's nutrition/hydration status for malnutrition (ex- brittle hair, bruises, dry skin, pale skin and conjunctiva, muscle wasting, smooth red tongue, and disorientation)  Collaborate with interdisciplinary team and initiate plan and interventions as ordered  Monitor patient's weight and dietary intake as ordered or per policy  Utilize nutrition screening tool and intervene per policy  Determine patient's food preferences and provide high-protein, high-caloric foods as appropriate       INTERVENTIONS:  - Monitor oral intake, urinary output, labs, and treatment plans  - Assess nutrition and hydration status and recommend course of action  - Evaluate amount of meals eaten  - Assist patient with eating if necessary   - Allow adequate time for meals  - Recommend/ encourage appropriate diets, oral nutritional supplements, and vitamin/mineral supplements  - Order, calculate, and assess calorie counts as needed  - Recommend, monitor, and adjust tube feedings and TPN/PPN based on assessed needs  - Assess need for intravenous fluids  - Provide specific nutrition/hydration education as appropriate  - Include patient/family/caregiver in decisions related to nutrition  Outcome: Progressing     Problem: PAIN - ADULT  Goal: Verbalizes/displays adequate comfort level or baseline comfort level  Description  Interventions:  - Encourage patient to monitor pain and request assistance  - Assess pain using appropriate pain scale  - Administer analgesics based on type and severity of pain and evaluate response  - Implement non-pharmacological measures as appropriate and evaluate response  - Consider cultural and social influences on pain and pain management  - Notify physician/advanced practitioner if interventions unsuccessful or patient reports new pain  Outcome: Progressing     Problem: INFECTION - ADULT  Goal: Absence or prevention of progression during hospitalization  Description  INTERVENTIONS:  - Assess and monitor for signs and symptoms of infection  - Monitor lab/diagnostic results  - Monitor all insertion sites, i e  indwelling lines, tubes, and drains  - Monitor endotracheal (as able) and nasal secretions for changes in amount and color  - Stella appropriate cooling/warming therapies per order  - Administer medications as ordered  - Instruct and encourage patient and family to use good hand hygiene technique  - Identify and instruct in appropriate isolation precautions for identified infection/condition  Outcome: Progressing     Problem: SAFETY ADULT  Goal: Patient will remain free of falls  Description  INTERVENTIONS:  - Assess patient frequently for physical needs  -  Identify cognitive and physical deficits and behaviors that affect risk of falls    -  Stella fall precautions as indicated by assessment   - Educate patient/family on patient safety including physical limitations  - Instruct patient to call for assistance with activity based on assessment  - Modify environment to reduce risk of injury  - Consider OT/PT consult to assist with strengthening/mobility  Outcome: Progressing  Goal: Maintain or return to baseline ADL function  Description  INTERVENTIONS:  -  Assess patient's ability to carry out ADLs; assess patient's baseline for ADL function and identify physical deficits which impact ability to perform ADLs (bathing, care of mouth/teeth, toileting, grooming, dressing, etc )  - Assess/evaluate cause of self-care deficits   - Assess range of motion  - Assess patient's mobility; develop plan if impaired  - Assess patient's need for assistive devices and provide as appropriate  - Encourage maximum independence but intervene and supervise when necessary  ¯ Involve family in performance of ADLs  ¯ Assess for home care needs following discharge   ¯ Request OT consult to assist with ADL evaluation and planning for discharge  ¯ Provide patient education as appropriate  Outcome: Progressing  Goal: Maintain or return mobility status to optimal level  Description  INTERVENTIONS:  - Assess patient's baseline mobility status (ambulation, transfers, stairs, etc )    - Identify cognitive and physical deficits and behaviors that affect mobility  - Identify mobility aids required to assist with transfers and/or ambulation (gait belt, sit-to-stand, lift, walker, cane, etc )  - [place] fall precautions as indicated by assessment  - Record patient progress and toleration of activity level on Mobility SBAR; progress patient to next Phase/Stage  - Instruct patient to call for assistance with activity based on assessment  - Request Rehabilitation consult to assist with strengthening/weightbearing, etc   Outcome: Progressing     Problem: DISCHARGE PLANNING  Goal: Discharge to home or other facility with appropriate resources  Description  INTERVENTIONS:  - Identify barriers to discharge w/patient and caregiver  - Arrange for needed discharge resources and transportation as appropriate  - Identify discharge learning needs (meds, wound care, etc )  - Arrange for interpretive services to assist at discharge as needed  - Refer to Case Management Department for coordinating discharge planning if the patient needs post-hospital services based on physician/advanced practitioner order or complex needs related to functional status, cognitive ability, or social support system  Outcome: Progressing     Problem: Knowledge Deficit  Goal: Patient/family/caregiver demonstrates understanding of disease process, treatment plan, medications, and discharge instructions  Description  Complete learning assessment and assess knowledge base  Interventions:  - Provide teaching at level of understanding  - Provide teaching via preferred learning methods  Outcome: Progressing     Problem: CARDIOVASCULAR - ADULT  Goal: Maintains optimal cardiac output and hemodynamic stability  Description  INTERVENTIONS:  - Monitor I/O, vital signs and rhythm  - Monitor for S/S and trends of decreased cardiac output i e  bleeding, hypotension  - Administer and titrate ordered vasoactive medications to optimize hemodynamic stability  - Assess quality of pulses, skin color and temperature  - Assess for signs of decreased coronary artery perfusion - ex  Angina  - Instruct patient to report change in severity of symptoms  Outcome: Progressing  Goal: Absence of cardiac dysrhythmias or at baseline rhythm  Description  INTERVENTIONS:  - Continuous cardiac monitoring, monitor vital signs, obtain 12 lead EKG if indicated  - Administer antiarrhythmic and heart rate control medications as ordered  - Monitor electrolytes and administer replacement therapy as ordered  Outcome: Progressing     Problem: Potential for Falls  Goal: Patient will remain free of falls  Description  INTERVENTIONS:  - Assess patient frequently for physical needs  -  Identify cognitive and physical deficits and behaviors that affect risk of falls    -  [place] fall precautions as indicated by assessment   - Educate patient/family on patient safety including physical limitations  - Instruct patient to call for assistance with activity based on assessment  - Modify environment to reduce risk of injury  - Consider OT/PT consult to assist with strengthening/mobility  Outcome: Progressing

## 2019-08-15 NOTE — PROGRESS NOTES
Progress Note - Cardiology   Nga Cure 78 y o  female MRN: [id number]  Unit/Bed#: E4 -01 Encounter: [id number]      Assessment/Recommendations/Discussion:   Polymorphic VT for 26 episodes, sustained VT for 2 min  Hypokalemia  Hypomag  HTN  Fevers, chills  Type 2 diabetes  Right breast cancer status post right mastectomy currently on tamoxifen    PLAN  Catheterization showed no coronary disease, echo with normal ejection fraction  Continue p o  Amiodarone for VT  Keep magnesium greater than 2 and potassium greater than 4  Given sustained ventricular tachycardia will need upgrade to implantable cardioverter-defibrillator to be done with Dr Natividad Morales, will need transfer to [facility] once infectious process is ruled out and/or treated      Subjective:   HPI  Did well overnight  Blood cultures have been drawn  No further ventricular tachycardia  Maintained on p o  Amiodarone  Denies chest pain or shortness of breath      Review of Systems: As noted in HPI  Rest of ROS is negative  Vitals:   /60 (BP Location: Left arm)   Pulse 85   Temp 98 2 °F (36 8 °C) (Tympanic)   Resp 19   Ht 5' 1" (1 549 m)   Wt 70 8 kg (156 lb 1 4 oz)   LMP  (LMP Unknown) Comment: post   SpO2 95%   BMI 29 49 kg/m²   Vitals:    08/13/19 0600 08/14/19 0600   Weight: 72 kg (158 lb 11 7 oz) 70 8 kg (156 lb 1 4 oz)       Intake/Output Summary (Last 24 hours) at 8/15/2019 0915  Last data filed at 8/15/2019 0500  Gross per 24 hour   Intake    Output 750 ml   Net -750 ml       Physical Exam:  General appearance: alert and oriented, in no acute distress, doing well  Head: Normocephalic, without obvious abnormality, atraumatic  Eyes: conjunctivae/corneas clear  Anicteric    Neck: no adenopathy, no carotid bruit, no JVD  Lungs: clear to auscultation bilaterally  Heart: regular rate and rhythm, S1, S2 normal, no murmur, click, rub or gallop  Abdomen: soft, non-tender; bowel sounds normal; no masses,  no organomegaly  Extremities: extremities normal, warm and well-perfused; no cyanosis, clubbing, or edema  Skin: Skin color, texture, turgor normal  No rashes or lesions     TELEMETRY:  No further episodes of ventricular tachycardia  Lab Results:  Results from last 7 days   Lab Units 08/13/19  0553   WBC Thousand/uL 14 08*   HEMOGLOBIN g/dL 12 0   HEMATOCRIT % 36 7   PLATELETS Thousands/uL 75*     Results from last 7 days   Lab Units 08/14/19  0557  08/12/19  0512   POTASSIUM mmol/L 3 6   < > 3 4*   CHLORIDE mmol/L 104   < > 104   CO2 mmol/L 26   < > 20*   BUN mg/dL 15   < > 21   CREATININE mg/dL 0 60   < > 1 01   CALCIUM mg/dL 8 2*   < > 8 7   ALK PHOS U/L  --   --  161*   ALT U/L  --   --  42   AST U/L  --   --  43    < > = values in this interval not displayed       Results from last 7 days   Lab Units 08/14/19  0557   POTASSIUM mmol/L 3 6   CHLORIDE mmol/L 104   CO2 mmol/L 26   BUN mg/dL 15   CREATININE mg/dL 0 60   CALCIUM mg/dL 8 2*           Medications:    Current Facility-Administered Medications:     acetaminophen (TYLENOL) tablet 650 mg, 650 mg, Oral, Q6H PRN, Debbie Ambron, DO, 650 mg at 08/14/19 1446    aluminum-magnesium hydroxide-simethicone (MYLANTA) 200-200-20 mg/5 mL oral suspension 30 mL, 30 mL, Oral, Q6H PRN, aMr Can PA-C    amiodarone tablet 400 mg, 400 mg, Oral, BID With Meals, Gael FRANKLIN PA-C, 400 mg at 08/15/19 0824    atorvastatin (LIPITOR) tablet 20 mg, 20 mg, Oral, After Berl Boast Delpohsasha FRANKLIN PA-C, 20 mg at 08/14/19 1723    heparin (porcine) subcutaneous injection 5,000 Units, 5,000 Units, Subcutaneous, Q8H [address], 5,000 Units at 08/15/19 0543 **AND** [COMPLETED] Platelet count, , , Once, Gael FRANKLIN PA-C    insulin lispro (HumaLOG) 100 units/mL subcutaneous injection 1-5 Units, 1-5 Units, Subcutaneous, TID AC, 1 Units at 08/14/19 1727 **AND** Fingerstick Glucose (POCT), , , LUL SALMON, Elroy FRANKLIN PA-C    insulin lispro (HumaLOG) 100 units/mL subcutaneous injection 1-5 Units, 1-5 Units, Subcutaneous, HS, Crystal Walker, PA-C, 2 Units at 08/14/19 2123    metoprolol tartrate (LOPRESSOR) partial tablet 12 5 mg, 12 5 mg, Oral, Q12H ARMIN, Earney Jubilee V, PA-C, 12 5 mg at 08/15/19 0824    ondansetron (ZOFRAN) injection 4 mg, 4 mg, Intravenous, Q6H PRN, Earney Jubilee V, PA-C, 4 mg at 08/14/19 1430    tamoxifen (NOLVADEX) tablet 20 mg, 20 mg, Oral, Daily, Earney Jubilee V, PA-C, 20 mg at 08/15/19 3158    This note was completed in part utilizing [employer] Direct Software  Grammatical errors, random word insertions, spelling mistakes, and incomplete sentences may be an occasional consequence of this system secondary to software limitations, ambient noise, and hardware issues  If you have any questions or concerns about the content, text, or information contained within the body of this dictation, please contact the provider for clarification      Crystal Post DO  8/15/2019 9:15 AM

## 2019-08-16 LAB
ANION GAP SERPL CALCULATED.3IONS-SCNC: 8 MMOL/L (ref 4–13)
BUN SERPL-MCNC: 14 MG/DL (ref 5–25)
CALCIUM SERPL-MCNC: 9 MG/DL (ref 8.3–10.1)
CHLORIDE SERPL-SCNC: 101 MMOL/L (ref 100–108)
CO2 SERPL-SCNC: 25 MMOL/L (ref 21–32)
CREAT SERPL-MCNC: 0.68 MG/DL (ref 0.6–1.3)
ERYTHROCYTE [DISTWIDTH] IN BLOOD BY AUTOMATED COUNT: 13.4 % (ref 11.6–15.1)
GFR SERPL CREATININE-BSD FRML MDRD: 82 ML/MIN/1.73SQ M
GLUCOSE SERPL-MCNC: 169 MG/DL (ref 65–140)
GLUCOSE SERPL-MCNC: 181 MG/DL (ref 65–140)
GLUCOSE SERPL-MCNC: 184 MG/DL (ref 65–140)
GLUCOSE SERPL-MCNC: 224 MG/DL (ref 65–140)
GLUCOSE SERPL-MCNC: 264 MG/DL (ref 65–140)
HCT VFR BLD AUTO: 36.2 % (ref 34.8–46.1)
HGB BLD-MCNC: 11.7 G/DL (ref 11.5–15.4)
MCH RBC QN AUTO: 32.1 PG (ref 26.8–34.3)
MCHC RBC AUTO-ENTMCNC: 32.3 G/DL (ref 31.4–37.4)
MCV RBC AUTO: 100 FL (ref 82–98)
PLATELET # BLD AUTO: 105 THOUSANDS/UL (ref 149–390)
PMV BLD AUTO: 10.7 FL (ref 8.9–12.7)
POTASSIUM SERPL-SCNC: 4.3 MMOL/L (ref 3.5–5.3)
PROCALCITONIN SERPL-MCNC: 7.8 NG/ML
RBC # BLD AUTO: 3.64 MILLION/UL (ref 3.81–5.12)
SODIUM SERPL-SCNC: 134 MMOL/L (ref 136–145)
WBC # BLD AUTO: 12.5 THOUSAND/UL (ref 4.31–10.16)

## 2019-08-16 PROCEDURE — 99232 SBSQ HOSP IP/OBS MODERATE 35: CPT | Performed by: INTERNAL MEDICINE

## 2019-08-16 PROCEDURE — 84145 PROCALCITONIN (PCT): CPT | Performed by: INTERNAL MEDICINE

## 2019-08-16 PROCEDURE — NC001 PR NO CHARGE: Performed by: INTERNAL MEDICINE

## 2019-08-16 PROCEDURE — 82948 REAGENT STRIP/BLOOD GLUCOSE: CPT

## 2019-08-16 PROCEDURE — 85027 COMPLETE CBC AUTOMATED: CPT | Performed by: INTERNAL MEDICINE

## 2019-08-16 PROCEDURE — 80048 BASIC METABOLIC PNL TOTAL CA: CPT | Performed by: INTERNAL MEDICINE

## 2019-08-16 PROCEDURE — 99233 SBSQ HOSP IP/OBS HIGH 50: CPT | Performed by: INTERNAL MEDICINE

## 2019-08-16 RX ADMIN — ATORVASTATIN CALCIUM 20 MG: 20 TABLET, FILM COATED ORAL at 17:08

## 2019-08-16 RX ADMIN — HEPARIN SODIUM 5000 UNITS: 5000 INJECTION INTRAVENOUS; SUBCUTANEOUS at 06:14

## 2019-08-16 RX ADMIN — AMIODARONE HYDROCHLORIDE 400 MG: 200 TABLET ORAL at 17:08

## 2019-08-16 RX ADMIN — CEFEPIME HYDROCHLORIDE 2000 MG: 2 INJECTION, POWDER, FOR SOLUTION INTRAVENOUS at 12:36

## 2019-08-16 RX ADMIN — INSULIN LISPRO 1 UNITS: 100 INJECTION, SOLUTION INTRAVENOUS; SUBCUTANEOUS at 17:10

## 2019-08-16 RX ADMIN — HEPARIN SODIUM 5000 UNITS: 5000 INJECTION INTRAVENOUS; SUBCUTANEOUS at 14:30

## 2019-08-16 RX ADMIN — METOPROLOL TARTRATE 12.5 MG: 25 TABLET ORAL at 22:01

## 2019-08-16 RX ADMIN — INSULIN LISPRO 1 UNITS: 100 INJECTION, SOLUTION INTRAVENOUS; SUBCUTANEOUS at 08:35

## 2019-08-16 RX ADMIN — METOPROLOL TARTRATE 12.5 MG: 25 TABLET ORAL at 08:34

## 2019-08-16 RX ADMIN — HEPARIN SODIUM 5000 UNITS: 5000 INJECTION INTRAVENOUS; SUBCUTANEOUS at 22:06

## 2019-08-16 RX ADMIN — INSULIN LISPRO 2 UNITS: 100 INJECTION, SOLUTION INTRAVENOUS; SUBCUTANEOUS at 12:36

## 2019-08-16 RX ADMIN — AMIODARONE HYDROCHLORIDE 400 MG: 200 TABLET ORAL at 08:34

## 2019-08-16 RX ADMIN — INSULIN LISPRO 2 UNITS: 100 INJECTION, SOLUTION INTRAVENOUS; SUBCUTANEOUS at 22:05

## 2019-08-16 RX ADMIN — TAMOXIFEN CITRATE 20 MG: 10 TABLET, FILM COATED ORAL at 08:34

## 2019-08-16 NOTE — PROGRESS NOTES
Progress Note - Infectious Disease   Tommy Sprague 78 y o  female MRN: [id number]  Unit/Bed#: E4 -01 Encounter: [id number]      Impression/Recommendations:  1  GNR bacteremia  Source is unclear  Patient has mild pyuria but she has no urinary symptoms  She has no abdominal flank pain  Abdomen/pelvis CT without acute pathology  Patient had colonoscopy 10 years ago with polyp  I am concerned about the possibility of chronic lesion, either recurrent polyp on malignancy  Patient need colonoscopy  Continue IV cefepime for now  Monitor temperature/WBC  Follow-up on final blood culture results  Recheck blood cultures in a m  Follow-up on final urine culture results  Recommend colonoscopy  This can be done as an outpatient      2   Leukocytosis and elevated procalcitonin, most likely secondary to above  WBC decreasing  Antibiotic plan as in above  Monitor WBC      3  Ventricular tachycardia, with plan for ICD insertion  With bacteremia, will need to hold off on ICD insertion  Will recheck blood cultures to confirm clearance of bacteremia  Recheck blood cultures in a m  Recommend holding off on ICD insertion until bacteremia clears      4  History of right breast cancer, status post mastectomy  Patient is currently on tamoxifen but not chemotherapy      Discussed with patient and her  in detail regarding the above plan  Discussed with Dr Marisel Finn from [facility] service  Antibiotics:  Cefepime # 2     Subjective:  Patient's blood culture grew GNR  Cefepime was started yesterday  Patient feels well  No abdominal or flank pain  No urinary symptoms  Temperature is now down  No chills  She is tolerating antibiotic well  No nausea, vomiting or diarrhea      Objective:  Vitals:  Temp:  [97 °F (36 1 °C)-100 2 °F (37 9 °C)] 97 2 °F (36 2 °C)  HR:  [74-99] 74  Resp:  [18-19] 18  BP: (119-140)/(57-63) 119/57  SpO2:  [91 %-96 %] 95 %  Temp (24hrs), Av 4 °F (36 9 °C), Min:97 °F (36 1 °C), Max:100 2 °F (37 9 °C)  Current: Temperature: (!) 97 2 °F (36 2 °C)    Physical Exam:     General: Awake, alert, cooperative, no distress  Neck:  Supple  No mass  No lymphadenopathy  Lungs: Expansion symmetric, no rales, no wheezing, respirations unlabored  Heart:  Regular rate and rhythm, S1 and S2 normal, no murmur  Abdomen: Soft, nondistended, non-tender, bowel sounds active all four quadrants,        no masses, no organomegaly  Extremities: No edema  No erythema/warmth  No ulcer  Nontender to palpation  Skin:  No rash  Neuro: Moves all extremities  Invasive Devices     Peripheral Intravenous Line            Peripheral IV 08/16/19 Left;Ventral (anterior) Forearm less than 1 day                Labs studies:   I have personally reviewed pertinent labs  Results from last 7 days   Lab Units 08/16/19  0044 08/14/19  0557 08/13/19  0553  08/12/19  0512   POTASSIUM mmol/L 4 3 3 6 3 2*   < > 3 4*   CHLORIDE mmol/L 101 104 102   < > 104   CO2 mmol/L 25 26 28   < > 20*   BUN mg/dL 14 15 19   < > 21   CREATININE mg/dL 0 68 0 60 0 70   < > 1 01   EGFR ml/min/1 73sq m 82 86 82   < > 52   CALCIUM mg/dL 9 0 8 2* 8 0*   < > 8 7   AST U/L  --   --   --   --  43   ALT U/L  --   --   --   --  42   ALK PHOS U/L  --   --   --   --  161*    < > = values in this interval not displayed  Results from last 7 days   Lab Units 08/16/19  0043 08/15/19  0919 08/13/19  0553   WBC Thousand/uL 12 50* 12 77* 14 08*   HEMOGLOBIN g/dL 11 7 12 1 12 0   PLATELETS Thousands/uL 105* 106* 75*     Results from last 7 days   Lab Units 08/14/19  2224 08/14/19  1517   BLOOD CULTURE  No Growth at 24 hrs  Non lactose fermenting gram negative joseph*   GRAM STAIN RESULT   --  Gram negative rods*       Imaging Studies:   I have personally reviewed pertinent imaging study reports and images in PACS  Abdomen/pelvic CT reviewed personally  Right pleural effusion  No basilar consolidation    Extensive sigmoid diverticulosis without diverticulitis  Radiologist is reading a small epicardial node  EKG, Pathology, and Other Studies:   I have personally reviewed pertinent reports

## 2019-08-16 NOTE — PROGRESS NOTES
Progress Note - Leonardo Mcintosh 78 y o  female MRN: [id number]    Unit/Bed#: E4 -01 Encounter: [id number]  Subjective:   Does have some shortness of breath  No edema  No chest pain  Denies palpitations or dizziness  Objective:   Vitals: Blood pressure 125/59, pulse 78, temperature (!) 97 °F (36 1 °C), temperature source Tympanic, resp  rate 18, height 5' 1" (1 549 m), weight 70 4 kg (155 lb 3 3 oz), SpO2 91 %  ,Body mass index is 29 33 kg/m²  CBC with diff:   Results from last 7 days   Lab Units 08/16/19  0043   WBC Thousand/uL 12 50*   RBC Million/uL 3 64*   HEMOGLOBIN g/dL 11 7   HEMATOCRIT % 36 2   MCV fL 100*   MCH pg 32 1   MCHC g/dL 32 3   RDW % 13 4   MPV fL 10 7   PLATELETS Thousands/uL 105*     CMP:   Results from last 7 days   Lab Units 08/16/19  0044  08/12/19  0512   SODIUM mmol/L 134*   < > 140   POTASSIUM mmol/L 4 3   < > 3 4*   CHLORIDE mmol/L 101   < > 104   CO2 mmol/L 25   < > 20*   BUN mg/dL 14   < > 21   CREATININE mg/dL 0 68   < > 1 01   CALCIUM mg/dL 9 0   < > 8 7   AST U/L  --   --  43   ALT U/L  --   --  42   ALK PHOS U/L  --   --  161*   EGFR ml/min/1 73sq m 82   < > 52    < > = values in this interval not displayed  Physical exam:  Lungs-decreased breath sounds at the right base  No wheezing rhonchi rales  Heart-regular without audible murmur rub or gallop  Abdomen-soft nontender without mass organomegaly  Extremities-no edema of the lower extremity      Assessment:  1  Sick sinus syndrome with pacemaker in situ  Had prior syncope  2  Polymorphic ventricular tachycardia  No myocardial infarction  No significant coronary disease  Normal left ventricular function  Presenting magnesium and potassium mildly low  Patient presented with dry heaves  Now on amiodarone with intention of upgrading device [place] when stable medically  3  Hypertension-well controlled on metoprolol only  4  HLP-on statin Rx  5  Diabetes mellitus  Management as per Medicine  6   Breast cancer with metastases  Last hematology note describes near VENUS"    Had liver rsxn of met in past  7  Fever with elevated procalcitonin-abs as per IM    Plan:  Continue beta-blocker at the current dosage  Continue amiodarone at 400 mg twice daily  Will check EKG in [facility] [place] Continue current statin dose    Await resolution of patient's possible infection before sending about phlegm for upgrade of pacemaker to Bryce Pimentel MD

## 2019-08-16 NOTE — PROGRESS NOTES
[facility] Progress Note  Patient: Isaias Vogel 78 y o  female   MRN: [id number]  PCP: Les Royal MD  Unit/Bed#: E4 -01 Encounter: [id number]  Date Of Visit: 08/16/19      Assessment/plan  1  Ventricular tachycardia in setting of pacemarker placed in 2017 for complete heart block  Originally admitted to ICU step down level 1  Patient has implanted [employer] pacemaker and had multiple runs (about 20) of VT lasting 20-30 seconds  Given 100 mg lidocaine prehospital  Pt had been treated with amiodarone drip  Appreciate cardiology recommendations  Pt had a heart cath that was normal  Pt changed to po amiodarone  Pt will need pacer upgrade to icd at [place] but this can not be achieved until infection is treated      2  Bacteremia- one set of blood culture positive for gram negative rods  Unsure of source  Ct abd/pelvis negative  Urine culture pending  Appreciate Id recommendations       3  High anion gap metabolic acidosis- Most likely secondary to dehydration secondary to decreased PO intake and dyspnea  Has since resolved       4  Hypokalemia- resolved       5  Diabetes type 2- continue to hold metformin  Continue with insulin sliding scale       6  Essential hypertension- stable  Continue to hold lasix  Continue metoprolol       7  Right breast CA s/p right mastectomy- Continue tamoxifen      8  Thrombocytopenia-improved  Will continue to monitor     dispo-Pt will eventually need transfer [facility] for pacemaker upgrade to icd when infection is cleared    Subjective:   Pt seen and examined  Pt doing well today  No problems with cefepime  No sob  No throat swelling  No f/c no cp no n/v/d no abd pain  No rash    Objective:     Vitals: Blood pressure 119/57, pulse 74, temperature (!) 97 2 °F (36 2 °C), temperature source Tympanic, resp  rate 18, height 5' 1" (1 549 m), weight 70 4 kg (155 lb 3 3 oz), SpO2 95 %  ,Body mass index is 29 33 kg/m²      Lab, Imaging and other studies:  Results from last 7 days   Lab Units 08/16/19  0043   WBC Thousand/uL 12 50*   HEMOGLOBIN g/dL 11 7   HEMATOCRIT % 36 2   PLATELETS Thousands/uL 105*     Results from last 7 days   Lab Units 08/16/19  0044  08/12/19  0512   POTASSIUM mmol/L 4 3   < > 3 4*   CHLORIDE mmol/L 101   < > 104   CO2 mmol/L 25   < > 20*   BUN mg/dL 14   < > 21   CREATININE mg/dL 0 68   < > 1 01   CALCIUM mg/dL 9 0   < > 8 7   ALK PHOS U/L  --   --  161*   ALT U/L  --   --  42   AST U/L  --   --  43    < > = values in this interval not displayed  Results from last 7 days   Lab Units 08/12/19  0512   TROPONIN I ng/mL <0 02     Lab Results   Component Value Date    BLOODCX No Growth at 24 hrs  08/14/2019    BLOODCX Non lactose fermenting gram negative joseph (A) 08/14/2019    BLOODCX No Growth After 5 Days   03/01/2019    URINECX No Growth <1000 cfu/mL 04/03/2017     Scheduled Meds:   Current Facility-Administered Medications:  acetaminophen 650 mg Oral Q6H PRN Debbie Cruz DO    aluminum-magnesium hydroxide-simethicone 30 mL Oral Q6H PRN Teresa FRANKLIN PA-C    amiodarone 400 mg Oral BID With Meals Renetta Kamille FRANKLIN PA-C    atorvastatin 20 mg Oral After Christia Schirmer Mukesh FRANKLIN PA-C    cefepime 2,000 mg Intravenous Q12H Debbie Cruz DO Last Rate: Stopped (08/16/19 0014)   heparin (porcine) 5,000 Units Subcutaneous Q8H [address] Elroy FRANKLIN PA-C    insulin lispro 1-5 Units Subcutaneous TID AC Elroy FRANKLIN PA-C    insulin lispro 1-5 Units Subcutaneous HS Elroy FRANKLIN PA-C    metoprolol tartrate 12 5 mg Oral Q12H [address] Elroy FRANKLIN PA-C    ondansetron 4 mg Intravenous Q6H PRN Teresa FRANKLIN PA-C    tamoxifen 20 mg Oral Daily Elroy FRANKLIN PA-C      Continuous Infusions:    PRN Meds:   acetaminophen    aluminum-magnesium hydroxide-simethicone    ondansetron      Physical exam:  Physical Exam  General appearance: alert and oriented, in no acute distress  Head: Normocephalic, without obvious abnormality, atraumatic  Eyes: conjunctivae/corneas clear  PERRL, EOM's intact  Fundi benign    Neck: no adenopathy, no carotid bruit, no JVD, supple, symmetrical, trachea midline and thyroid not enlarged, symmetric, no tenderness/mass/nodules  Lungs: clear to auscultation bilaterally  Heart: regular rate and rhythm, S1, S2 normal, no murmur, click, rub or gallop  Abdomen: soft, non-tender; bowel sounds normal; no masses,  no organomegaly  Extremities: extremities normal, warm and well-perfused; no cyanosis, clubbing, or edema  Pulses: 2+ and symmetric  Skin: Skin color, texture, turgor normal  No rashes or lesions  Neurologic: Mental status: Alert, oriented, thought content appropriate      VTE Pharmacologic Prophylaxis: Heparin  VTE Mechanical Prophylaxis: sequential compression device    Counseling / Coordination of Care  Total floor / unit time spent today 20 minutes      Current Length of Stay: 4 day(s)    Current Patient Status: Inpatient       Code Status: Level 1 - Full Code

## 2019-08-16 NOTE — SOCIAL WORK
Cm reviewed pt care coordination rounds  Pt is not medically cleared for d/c  Needs colonoscopy and ICD placement  Anticipating hospital stay through the weekend and transfer to [facility] on Mond  Cm will f/u for medical clearance and d/c planning

## 2019-08-16 NOTE — PLAN OF CARE
Problem: Prexisting or High Potential for Compromised Skin Integrity  Goal: Skin integrity is maintained or improved  Description  INTERVENTIONS:  - Identify patients at risk for skin breakdown  - Assess and monitor skin integrity  - Assess and monitor nutrition and hydration status  - Monitor labs (i e  albumin)  - Assess for incontinence   - Turn and reposition patient  - Assist with mobility/ambulation  - Relieve pressure over bony prominences  - Avoid friction and shearing  - Provide appropriate hygiene as needed including keeping skin clean and dry  - Evaluate need for skin moisturizer/barrier cream  - Collaborate with interdisciplinary team (i e  Nutrition, Rehabilitation, etc )   - Patient/family teaching  Outcome: Progressing     Problem: Nutrition/Hydration-ADULT  Goal: Nutrient/Hydration intake appropriate for improving, restoring or maintaining nutritional needs  Description  Monitor and assess patient's nutrition/hydration status for malnutrition (ex- brittle hair, bruises, dry skin, pale skin and conjunctiva, muscle wasting, smooth red tongue, and disorientation)  Collaborate with interdisciplinary team and initiate plan and interventions as ordered  Monitor patient's weight and dietary intake as ordered or per policy  Utilize nutrition screening tool and intervene per policy  Determine patient's food preferences and provide high-protein, high-caloric foods as appropriate       INTERVENTIONS:  - Monitor oral intake, urinary output, labs, and treatment plans  - Assess nutrition and hydration status and recommend course of action  - Evaluate amount of meals eaten  - Assist patient with eating if necessary   - Allow adequate time for meals  - Recommend/ encourage appropriate diets, oral nutritional supplements, and vitamin/mineral supplements  - Order, calculate, and assess calorie counts as needed  - Recommend, monitor, and adjust tube feedings and TPN/PPN based on assessed needs  - Assess need for intravenous fluids  - Provide specific nutrition/hydration education as appropriate  - Include patient/family/caregiver in decisions related to nutrition  Outcome: Progressing     Problem: PAIN - ADULT  Goal: Verbalizes/displays adequate comfort level or baseline comfort level  Description  Interventions:  - Encourage patient to monitor pain and request assistance  - Assess pain using appropriate pain scale  - Administer analgesics based on type and severity of pain and evaluate response  - Implement non-pharmacological measures as appropriate and evaluate response  - Consider cultural and social influences on pain and pain management  - Notify physician/advanced practitioner if interventions unsuccessful or patient reports new pain  Outcome: Progressing     Problem: INFECTION - ADULT  Goal: Absence or prevention of progression during hospitalization  Description  INTERVENTIONS:  - Assess and monitor for signs and symptoms of infection  - Monitor lab/diagnostic results  - Monitor all insertion sites, i e  indwelling lines, tubes, and drains  - Monitor endotracheal (as able) and nasal secretions for changes in amount and color  - [place] appropriate cooling/warming therapies per order  - Administer medications as ordered  - Instruct and encourage patient and family to use good hand hygiene technique  - Identify and instruct in appropriate isolation precautions for identified infection/condition  Outcome: Progressing     Problem: SAFETY ADULT  Goal: Patient will remain free of falls  Description  INTERVENTIONS:  - Assess patient frequently for physical needs  -  Identify cognitive and physical deficits and behaviors that affect risk of falls    -  [place] fall precautions as indicated by assessment   - Educate patient/family on patient safety including physical limitations  - Instruct patient to call for assistance with activity based on assessment  - Modify environment to reduce risk of injury  - Consider OT/PT consult to assist with strengthening/mobility  Outcome: Progressing  Goal: Maintain or return to baseline ADL function  Description  INTERVENTIONS:  -  Assess patient's ability to carry out ADLs; assess patient's baseline for ADL function and identify physical deficits which impact ability to perform ADLs (bathing, care of mouth/teeth, toileting, grooming, dressing, etc )  - Assess/evaluate cause of self-care deficits   - Assess range of motion  - Assess patient's mobility; develop plan if impaired  - Assess patient's need for assistive devices and provide as appropriate  - Encourage maximum independence but intervene and supervise when necessary  ¯ Involve family in performance of ADLs  ¯ Assess for home care needs following discharge   ¯ Request OT consult to assist with ADL evaluation and planning for discharge  ¯ Provide patient education as appropriate  Outcome: Progressing  Goal: Maintain or return mobility status to optimal level  Description  INTERVENTIONS:  - Assess patient's baseline mobility status (ambulation, transfers, stairs, etc )    - Identify cognitive and physical deficits and behaviors that affect mobility  - Identify mobility aids required to assist with transfers and/or ambulation (gait belt, sit-to-stand, lift, walker, cane, etc )  - [place] fall precautions as indicated by assessment  - Record patient progress and toleration of activity level on Mobility SBAR; progress patient to next Phase/Stage  - Instruct patient to call for assistance with activity based on assessment  - Request Rehabilitation consult to assist with strengthening/weightbearing, etc   Outcome: Progressing     Problem: DISCHARGE PLANNING  Goal: Discharge to home or other facility with appropriate resources  Description  INTERVENTIONS:  - Identify barriers to discharge w/patient and caregiver  - Arrange for needed discharge resources and transportation as appropriate  - Identify discharge learning needs (meds, wound care, etc )  - Arrange for interpretive services to assist at discharge as needed  - Refer to Case Management Department for coordinating discharge planning if the patient needs post-hospital services based on physician/advanced practitioner order or complex needs related to functional status, cognitive ability, or social support system  Outcome: Progressing     Problem: Knowledge Deficit  Goal: Patient/family/caregiver demonstrates understanding of disease process, treatment plan, medications, and discharge instructions  Description  Complete learning assessment and assess knowledge base  Interventions:  - Provide teaching at level of understanding  - Provide teaching via preferred learning methods  Outcome: Progressing     Problem: CARDIOVASCULAR - ADULT  Goal: Maintains optimal cardiac output and hemodynamic stability  Description  INTERVENTIONS:  - Monitor I/O, vital signs and rhythm  - Monitor for S/S and trends of decreased cardiac output i e  bleeding, hypotension  - Administer and titrate ordered vasoactive medications to optimize hemodynamic stability  - Assess quality of pulses, skin color and temperature  - Assess for signs of decreased coronary artery perfusion - ex  Angina  - Instruct patient to report change in severity of symptoms  Outcome: Progressing  Goal: Absence of cardiac dysrhythmias or at baseline rhythm  Description  INTERVENTIONS:  - Continuous cardiac monitoring, monitor vital signs, obtain 12 lead EKG if indicated  - Administer antiarrhythmic and heart rate control medications as ordered  - Monitor electrolytes and administer replacement therapy as ordered  Outcome: Progressing     Problem: Potential for Falls  Goal: Patient will remain free of falls  Description  INTERVENTIONS:  - Assess patient frequently for physical needs  -  Identify cognitive and physical deficits and behaviors that affect risk of falls    -  [place] fall precautions as indicated by assessment   - Educate patient/family on patient safety including physical limitations  - Instruct patient to call for assistance with activity based on assessment  - Modify environment to reduce risk of injury  - Consider OT/PT consult to assist with strengthening/mobility  Outcome: Progressing

## 2019-08-17 LAB
BACTERIA BLD CULT: ABNORMAL
GLUCOSE SERPL-MCNC: 193 MG/DL (ref 65–140)
GLUCOSE SERPL-MCNC: 196 MG/DL (ref 65–140)
GLUCOSE SERPL-MCNC: 201 MG/DL (ref 65–140)
GLUCOSE SERPL-MCNC: 230 MG/DL (ref 65–140)
GRAM STN SPEC: ABNORMAL

## 2019-08-17 PROCEDURE — 99231 SBSQ HOSP IP/OBS SF/LOW 25: CPT | Performed by: INTERNAL MEDICINE

## 2019-08-17 PROCEDURE — 99232 SBSQ HOSP IP/OBS MODERATE 35: CPT | Performed by: INTERNAL MEDICINE

## 2019-08-17 PROCEDURE — 87040 BLOOD CULTURE FOR BACTERIA: CPT | Performed by: INTERNAL MEDICINE

## 2019-08-17 PROCEDURE — 82948 REAGENT STRIP/BLOOD GLUCOSE: CPT

## 2019-08-17 RX ORDER — AMIODARONE HYDROCHLORIDE 200 MG/1
200 TABLET ORAL
Status: DISCONTINUED | OUTPATIENT
Start: 2019-08-17 | End: 2019-08-19 | Stop reason: HOSPADM

## 2019-08-17 RX ORDER — CEFAZOLIN SODIUM 2 G/50ML
2000 SOLUTION INTRAVENOUS EVERY 8 HOURS
Status: DISCONTINUED | OUTPATIENT
Start: 2019-08-17 | End: 2019-08-18

## 2019-08-17 RX ADMIN — INSULIN LISPRO 1 UNITS: 100 INJECTION, SOLUTION INTRAVENOUS; SUBCUTANEOUS at 12:46

## 2019-08-17 RX ADMIN — INSULIN LISPRO 1 UNITS: 100 INJECTION, SOLUTION INTRAVENOUS; SUBCUTANEOUS at 08:22

## 2019-08-17 RX ADMIN — TAMOXIFEN CITRATE 20 MG: 10 TABLET, FILM COATED ORAL at 08:21

## 2019-08-17 RX ADMIN — AMIODARONE HYDROCHLORIDE 200 MG: 200 TABLET ORAL at 17:53

## 2019-08-17 RX ADMIN — INSULIN LISPRO 1 UNITS: 100 INJECTION, SOLUTION INTRAVENOUS; SUBCUTANEOUS at 17:53

## 2019-08-17 RX ADMIN — CEFEPIME HYDROCHLORIDE 2000 MG: 2 INJECTION, POWDER, FOR SOLUTION INTRAVENOUS at 00:28

## 2019-08-17 RX ADMIN — HEPARIN SODIUM 5000 UNITS: 5000 INJECTION INTRAVENOUS; SUBCUTANEOUS at 21:47

## 2019-08-17 RX ADMIN — HEPARIN SODIUM 5000 UNITS: 5000 INJECTION INTRAVENOUS; SUBCUTANEOUS at 07:19

## 2019-08-17 RX ADMIN — CEFAZOLIN SODIUM 2000 MG: 2 SOLUTION INTRAVENOUS at 12:39

## 2019-08-17 RX ADMIN — METOPROLOL TARTRATE 12.5 MG: 25 TABLET ORAL at 08:21

## 2019-08-17 RX ADMIN — HEPARIN SODIUM 5000 UNITS: 5000 INJECTION INTRAVENOUS; SUBCUTANEOUS at 13:00

## 2019-08-17 RX ADMIN — AMIODARONE HYDROCHLORIDE 400 MG: 200 TABLET ORAL at 08:20

## 2019-08-17 RX ADMIN — ATORVASTATIN CALCIUM 20 MG: 20 TABLET, FILM COATED ORAL at 17:53

## 2019-08-17 RX ADMIN — INSULIN LISPRO 2 UNITS: 100 INJECTION, SOLUTION INTRAVENOUS; SUBCUTANEOUS at 21:47

## 2019-08-17 RX ADMIN — CEFAZOLIN SODIUM 2000 MG: 2 SOLUTION INTRAVENOUS at 21:47

## 2019-08-17 NOTE — PROGRESS NOTES
Progress Note - Shahida Lighter 78 y o  female MRN: [id number]    Unit/Bed#: E4 -01 Encounter: [id number]  Subjective:   No chest pain or shortness of breath  Mild lower extremity edema  Overall feels better  No palpitations or lightheadedness  No nausea  Objective:   Vitals: Blood pressure 140/53, pulse 68, temperature (!) 97 1 °F (36 2 °C), temperature source Tympanic, resp  rate 18, height 5' 1" (1 549 m), weight 73 1 kg (161 lb 2 5 oz), SpO2 96 %  ,Body mass index is 30 45 kg/m²  Physical exam:  Lungs-clear without wheezing rhonchi rales  Heart-regular without murmur rub or gallop  Abdomen-soft nontender without mass organomegaly  Extremities-trace to 1+ edema being greater on the left lower extremity    Assessment:  1  Sick sinus syndrome with pacer in situ  2  Polymorphic ventricular tachycardia  No myocardial infarction  No CAD seen on cardiac catheterization  Normal left ventricular function  Mildly low magnesium and potassium which have been corrected  Eventual upgrade to ICD at [facility] when medically stable  3  Hypertension  Well controlled on metoprolol only  4  Hyperlipidemia-on statin therapy  5  Diabetes mellitus  Management as per Medicine  6  The breast cancer with metastases however last hematology note describes near VENUS"  7  Fever with Gram-negative bacteremia  Possible urinary verses GI source  Patient improving  Await clearance from ID for placement of ICD      Plan:  Continue beta-blocker current dosage  Will order EKG in a m   In light of addition of amiodarone  Reduce amiodarone to 200 mg 3 times daily  Continue statin    Fili French MD

## 2019-08-17 NOTE — PLAN OF CARE
Problem: Prexisting or High Potential for Compromised Skin Integrity  Goal: Skin integrity is maintained or improved  Description  INTERVENTIONS:  - Identify patients at risk for skin breakdown  - Assess and monitor skin integrity  - Assess and monitor nutrition and hydration status  - Monitor labs (i e  albumin)  - Assess for incontinence   - Turn and reposition patient  - Assist with mobility/ambulation  - Relieve pressure over bony prominences  - Avoid friction and shearing  - Provide appropriate hygiene as needed including keeping skin clean and dry  - Evaluate need for skin moisturizer/barrier cream  - Collaborate with interdisciplinary team (i e  Nutrition, Rehabilitation, etc )   - Patient/family teaching  Outcome: Progressing     Problem: Nutrition/Hydration-ADULT  Goal: Nutrient/Hydration intake appropriate for improving, restoring or maintaining nutritional needs  Description  Monitor and assess patient's nutrition/hydration status for malnutrition (ex- brittle hair, bruises, dry skin, pale skin and conjunctiva, muscle wasting, smooth red tongue, and disorientation)  Collaborate with interdisciplinary team and initiate plan and interventions as ordered  Monitor patient's weight and dietary intake as ordered or per policy  Utilize nutrition screening tool and intervene per policy  Determine patient's food preferences and provide high-protein, high-caloric foods as appropriate       INTERVENTIONS:  - Monitor oral intake, urinary output, labs, and treatment plans  - Assess nutrition and hydration status and recommend course of action  - Evaluate amount of meals eaten  - Assist patient with eating if necessary   - Allow adequate time for meals  - Recommend/ encourage appropriate diets, oral nutritional supplements, and vitamin/mineral supplements  - Order, calculate, and assess calorie counts as needed  - Recommend, monitor, and adjust tube feedings and TPN/PPN based on assessed needs  - Assess need for intravenous fluids  - Provide specific nutrition/hydration education as appropriate  - Include patient/family/caregiver in decisions related to nutrition  Outcome: Progressing     Problem: PAIN - ADULT  Goal: Verbalizes/displays adequate comfort level or baseline comfort level  Description  Interventions:  - Encourage patient to monitor pain and request assistance  - Assess pain using appropriate pain scale  - Administer analgesics based on type and severity of pain and evaluate response  - Implement non-pharmacological measures as appropriate and evaluate response  - Consider cultural and social influences on pain and pain management  - Notify physician/advanced practitioner if interventions unsuccessful or patient reports new pain  Outcome: Progressing     Problem: INFECTION - ADULT  Goal: Absence or prevention of progression during hospitalization  Description  INTERVENTIONS:  - Assess and monitor for signs and symptoms of infection  - Monitor lab/diagnostic results  - Monitor all insertion sites, i e  indwelling lines, tubes, and drains  - Monitor endotracheal (as able) and nasal secretions for changes in amount and color  - [place] appropriate cooling/warming therapies per order  - Administer medications as ordered  - Instruct and encourage patient and family to use good hand hygiene technique  - Identify and instruct in appropriate isolation precautions for identified infection/condition  Outcome: Progressing     Problem: SAFETY ADULT  Goal: Patient will remain free of falls  Description  INTERVENTIONS:  - Assess patient frequently for physical needs  -  Identify cognitive and physical deficits and behaviors that affect risk of falls    -  [place] fall precautions as indicated by assessment   - Educate patient/family on patient safety including physical limitations  - Instruct patient to call for assistance with activity based on assessment  - Modify environment to reduce risk of injury  - Consider OT/PT consult to assist with strengthening/mobility  Outcome: Progressing  Goal: Maintain or return to baseline ADL function  Description  INTERVENTIONS:  -  Assess patient's ability to carry out ADLs; assess patient's baseline for ADL function and identify physical deficits which impact ability to perform ADLs (bathing, care of mouth/teeth, toileting, grooming, dressing, etc )  - Assess/evaluate cause of self-care deficits   - Assess range of motion  - Assess patient's mobility; develop plan if impaired  - Assess patient's need for assistive devices and provide as appropriate  - Encourage maximum independence but intervene and supervise when necessary  ¯ Involve family in performance of ADLs  ¯ Assess for home care needs following discharge   ¯ Request OT consult to assist with ADL evaluation and planning for discharge  ¯ Provide patient education as appropriate  Outcome: Progressing  Goal: Maintain or return mobility status to optimal level  Description  INTERVENTIONS:  - Assess patient's baseline mobility status (ambulation, transfers, stairs, etc )    - Identify cognitive and physical deficits and behaviors that affect mobility  - Identify mobility aids required to assist with transfers and/or ambulation (gait belt, sit-to-stand, lift, walker, cane, etc )  - [place] fall precautions as indicated by assessment  - Record patient progress and toleration of activity level on Mobility SBAR; progress patient to next Phase/Stage  - Instruct patient to call for assistance with activity based on assessment  - Request Rehabilitation consult to assist with strengthening/weightbearing, etc   Outcome: Progressing     Problem: DISCHARGE PLANNING  Goal: Discharge to home or other facility with appropriate resources  Description  INTERVENTIONS:  - Identify barriers to discharge w/patient and caregiver  - Arrange for needed discharge resources and transportation as appropriate  - Identify discharge learning needs (meds, wound care, etc )  - Arrange for interpretive services to assist at discharge as needed  - Refer to Case Management Department for coordinating discharge planning if the patient needs post-hospital services based on physician/advanced practitioner order or complex needs related to functional status, cognitive ability, or social support system  Outcome: Progressing     Problem: Knowledge Deficit  Goal: Patient/family/caregiver demonstrates understanding of disease process, treatment plan, medications, and discharge instructions  Description  Complete learning assessment and assess knowledge base  Interventions:  - Provide teaching at level of understanding  - Provide teaching via preferred learning methods  Outcome: Progressing     Problem: CARDIOVASCULAR - ADULT  Goal: Maintains optimal cardiac output and hemodynamic stability  Description  INTERVENTIONS:  - Monitor I/O, vital signs and rhythm  - Monitor for S/S and trends of decreased cardiac output i e  bleeding, hypotension  - Administer and titrate ordered vasoactive medications to optimize hemodynamic stability  - Assess quality of pulses, skin color and temperature  - Assess for signs of decreased coronary artery perfusion - ex  Angina  - Instruct patient to report change in severity of symptoms  Outcome: Progressing  Goal: Absence of cardiac dysrhythmias or at baseline rhythm  Description  INTERVENTIONS:  - Continuous cardiac monitoring, monitor vital signs, obtain 12 lead EKG if indicated  - Administer antiarrhythmic and heart rate control medications as ordered  - Monitor electrolytes and administer replacement therapy as ordered  Outcome: Progressing     Problem: Potential for Falls  Goal: Patient will remain free of falls  Description  INTERVENTIONS:  - Assess patient frequently for physical needs  -  Identify cognitive and physical deficits and behaviors that affect risk of falls    -  [place] fall precautions as indicated by assessment   - Educate patient/family on patient safety including physical limitations  - Instruct patient to call for assistance with activity based on assessment  - Modify environment to reduce risk of injury  - Consider OT/PT consult to assist with strengthening/mobility  Outcome: Progressing

## 2019-08-17 NOTE — PROGRESS NOTES
[facility] Progress Note  Patient: Cj García 78 y o  female   MRN: [id number]  PCP: Madelaine Bah MD  Unit/Bed#: E4 -01 Encounter: [id number]  Date Of Visit: 08/17/19      Assessment/plan  1  Ventricular tachycardia in setting of pacemarker placed in 2017 for complete heart block  Originally admitted to ICU step down level 1  Patient has implanted [employer] pacemaker and had multiple runs (about 20) of VT lasting 20-30 seconds  Given 100 mg lidocaine prehospital  Pt had been treated with amiodarone drip  Appreciate cardiology recommendations  Pt had a heart cath that was normal  Pt changed to po amiodarone  Pt will need pacer upgrade to icd at [place] but this can not be achieved until infection is treated  Pt will hopefully be cleared for icd upgrade on Monday       2  Bacteremia- one set of blood culture positive for gram negative rods  Unclear source  Do appreciate ID recommendations  Possibly chronic lesion, either recurrent polyp or malignancy as pt had a colonoscopy 10 years ago with polyp  Pt will eventually need colonoscopy outpt after icd upgrade  Repeat blood cultures are pending  Will continue with IV cefepime  3  Diabetes type 2- continue to hold metformin  Continue with insulin sliding scale       4  Essential hypertension- stable  Continue to hold lasix  Continue metoprolol       5  Right breast CA s/p right mastectomy- Continue tamoxifen      8  Thrombocytopenia-improved  Will continue to monitor  Check cbc in am       dispo-Pt will eventually need transfer to [facility] for pacemaker upgrade to icd when infection is cleared    Subjective:   Pt seen and examined  Pt is doing well  She states she feels better each day  She reports she is afraid she is not getting enough nutrition and would like ensure added to her meals  No f/c no cp no sob no n/v/d no abd pain       Objective:     Vitals: Blood pressure 113/82, pulse 78, temperature (!) 97 1 °F (36 2 °C), temperature source Tympanic, resp  rate 18, height 5' 1" (1 549 m), weight 73 1 kg (161 lb 2 5 oz), SpO2 97 %  ,Body mass index is 30 45 kg/m²  Lab, Imaging and other studies:  Results from last 7 days   Lab Units 08/16/19  0043   WBC Thousand/uL 12 50*   HEMOGLOBIN g/dL 11 7   HEMATOCRIT % 36 2   PLATELETS Thousands/uL 105*     Results from last 7 days   Lab Units 08/16/19  0044  08/12/19  0512   POTASSIUM mmol/L 4 3   < > 3 4*   CHLORIDE mmol/L 101   < > 104   CO2 mmol/L 25   < > 20*   BUN mg/dL 14   < > 21   CREATININE mg/dL 0 68   < > 1 01   CALCIUM mg/dL 9 0   < > 8 7   ALK PHOS U/L  --   --  161*   ALT U/L  --   --  42   AST U/L  --   --  43    < > = values in this interval not displayed  Results from last 7 days   Lab Units 08/12/19 0512   TROPONIN I ng/mL <0 02     Lab Results   Component Value Date    BLOODCX No Growth at 48 hrs  08/14/2019    BLOODCX Escherichia coli (A) 08/14/2019    BLOODCX No Growth After 5 Days   03/01/2019    URINECX No Growth <1000 cfu/mL 04/03/2017     Scheduled Meds:   Current Facility-Administered Medications:  acetaminophen 650 mg Oral Q6H PRN Debbie Nancy DO    aluminum-magnesium hydroxide-simethicone 30 mL Oral Q6H PRN Carlos FRANKLIN PA-C    amiodarone 400 mg Oral BID With Meals LANDRY Mon-SIXTO    atorvastatin 20 mg Oral After [place] LANDRY Hall-SIXTO    cefepime 2,000 mg Intravenous Q12H Debbie Cruz DO Last Rate: 2,000 mg (08/17/19 0028)   heparin (porcine) 5,000 Units Subcutaneous Q8H [address] LANDRY Chi-SIXTO    insulin lispro 1-5 Units Subcutaneous TID AC LANDRY Chi-SIXTO    insulin lispro 1-5 Units Subcutaneous HS Elroy FRANKLIN PA-C    metoprolol tartrate 12 5 mg Oral Q12H [address] Elroy FRANKLIN PA-C    ondansetron 4 mg Intravenous Q6H PRN Carlos FRANKLIN PA-C    tamoxifen 20 mg Oral Daily Elroy FRANKLIN PA-C      Continuous Infusions:    PRN Meds:   acetaminophen    aluminum-magnesium hydroxide-simethicone   ondansetron      Physical exam:  Physical Exam   Constitutional: She is oriented to person, place, and time  No distress  HENT:   Head: Normocephalic and atraumatic  Eyes: Pupils are equal, round, and reactive to light  EOM are normal    Neck: Normal range of motion  Neck supple  Cardiovascular: Normal rate, regular rhythm and normal heart sounds  Pulmonary/Chest: Effort normal and breath sounds normal  No stridor  No respiratory distress  She has no wheezes  Abdominal: Soft  Bowel sounds are normal  She exhibits no distension  There is no tenderness  There is no guarding  Musculoskeletal: Normal range of motion  Neurological: She is alert and oriented to person, place, and time  Skin: Skin is warm and dry  She is not diaphoretic       VTE Pharmacologic Prophylaxis: Heparin  VTE Mechanical Prophylaxis: sequential compression device    Counseling / Coordination of Care  Total floor / unit time spent today 20 minutes      Current Length of Stay: 5 day(s)    Current Patient Status: Inpatient       Code Status: Level 1 - Full Code

## 2019-08-17 NOTE — PROGRESS NOTES
Progress Note - Infectious Disease   Emmanuelle Peña 78 y o  female MRN: [id number]  Unit/Bed#: E4 -01 Encounter: [id number]      Impression/Recommendations:  1    E coli bacteremia  Source is unclear  Patient has mild pyuria but she has no urinary symptoms  She has no abdominal flank pain  Abdomen/pelvis CT without acute pathology  Patient had colonoscopy 10 years ago with polyp  I am concerned about the possibility of chronic lesion, either recurrent polyp on malignancy  Patient needs colonoscopy  Change cefepime to IV cefazolin 2 g q 8 hours  Monitor temperature/WBC  Follow-up repeat blood cultures sent in a m  Follow-up on final urine culture results  Recommend colonoscopy  This can be done as an outpatient      2   Leukocytosis and elevated procalcitonin, most likely secondary to above  WBC has trended down  No CBC today  Antibiotic plan as in above  Monitor WBC      3  Ventricular tachycardia, with plan for ICD insertion  With bacteremia, will need to hold off on ICD insertion  Will recheck blood cultures to confirm clearance of bacteremia  Recheck blood cultures in a m  Recommend holding off on ICD insertion until bacteremia clears      4  History of right breast cancer, status post mastectomy   Patient is currently on tamoxifen but not chemotherapy  5  Ampicillin allergy  Will avoid amoxicillin/ampicillin  Patient has tolerated cefepime without difficulty  Will give IV cefazolin and monitor closely         Antibiotics:  Cefepime # 3       Subjective:  Patient is feeling much better  No fevers, chills, shortness of breath, cough, diarrhea      Objective:  Vitals:  Temp:  [96 4 °F (35 8 °C)-99 4 °F (37 4 °C)] 97 1 °F (36 2 °C)  HR:  [68-96] 68  Resp:  [18] 18  BP: (105-140)/(53-82) 140/53  SpO2:  [92 %-97 %] 96 %  Temp (24hrs), Av °F (36 7 °C), Min:96 4 °F (35 8 °C), Max:99 4 °F (37 4 °C)  Current: Temperature: (!) 97 1 °F (36 2 °C)    Physical Exam:   General: Well-nourished, well-developed, in no acute distress  Eyes:  Conjunctive clear with no hemorrhages or effusions  Oropharynx:  No ulcers, no lesions  Neck:  Supple, no lymphadenopathy  Lungs:  Clear to auscultation bilaterally, no accessory muscle use  Cardiac:  Regular rate and rhythm, no murmurs  Abdomen:  Soft, non-tender, non-distented  Extremities:  No peripheral cyanosis, clubbing, or edema  Skin:  No rashes, no ulcers  Neurological:  Moves all four extremities spontaneously    Lab Results:  I have personally reviewed pertinent labs  Results from last 7 days   Lab Units 08/16/19  0044 08/14/19  0557 08/13/19  0553  08/12/19  0512   POTASSIUM mmol/L 4 3 3 6 3 2*   < > 3 4*   CHLORIDE mmol/L 101 104 102   < > 104   CO2 mmol/L 25 26 28   < > 20*   BUN mg/dL 14 15 19   < > 21   CREATININE mg/dL 0 68 0 60 0 70   < > 1 01   EGFR ml/min/1 73sq m 82 86 82   < > 52   CALCIUM mg/dL 9 0 8 2* 8 0*   < > 8 7   AST U/L  --   --   --   --  43   ALT U/L  --   --   --   --  42   ALK PHOS U/L  --   --   --   --  161*    < > = values in this interval not displayed  Results from last 7 days   Lab Units 08/16/19  0043 08/15/19  0919 08/13/19  0553   WBC Thousand/uL 12 50* 12 77* 14 08*   HEMOGLOBIN g/dL 11 7 12 1 12 0   PLATELETS Thousands/uL 105* 106* 75*     Results from last 7 days   Lab Units 08/15/19  1147 08/14/19  2224 08/14/19  1517   BLOOD CULTURE   --  No Growth at 48 hrs  Escherichia coli*   GRAM STAIN RESULT   --   --  Gram negative rods*   URINE CULTURE  Culture too young- will reincubate  --   --        Imaging Studies:   I have personally reviewed pertinent imaging study reports and images in PACS  EKG, Pathology, and Other Studies:   I have personally reviewed pertinent reports

## 2019-08-18 LAB
ANION GAP SERPL CALCULATED.3IONS-SCNC: 9 MMOL/L (ref 4–13)
BACTERIA UR CULT: NORMAL
BUN SERPL-MCNC: 17 MG/DL (ref 5–25)
CALCIUM SERPL-MCNC: 8.6 MG/DL (ref 8.3–10.1)
CHLORIDE SERPL-SCNC: 106 MMOL/L (ref 100–108)
CO2 SERPL-SCNC: 29 MMOL/L (ref 21–32)
CREAT SERPL-MCNC: 0.8 MG/DL (ref 0.6–1.3)
ERYTHROCYTE [DISTWIDTH] IN BLOOD BY AUTOMATED COUNT: 13.3 % (ref 11.6–15.1)
GFR SERPL CREATININE-BSD FRML MDRD: 69 ML/MIN/1.73SQ M
GLUCOSE SERPL-MCNC: 160 MG/DL (ref 65–140)
GLUCOSE SERPL-MCNC: 163 MG/DL (ref 65–140)
GLUCOSE SERPL-MCNC: 233 MG/DL (ref 65–140)
GLUCOSE SERPL-MCNC: 263 MG/DL (ref 65–140)
GLUCOSE SERPL-MCNC: 263 MG/DL (ref 65–140)
HCT VFR BLD AUTO: 32.9 % (ref 34.8–46.1)
HGB BLD-MCNC: 10.6 G/DL (ref 11.5–15.4)
MCH RBC QN AUTO: 32.1 PG (ref 26.8–34.3)
MCHC RBC AUTO-ENTMCNC: 32.2 G/DL (ref 31.4–37.4)
MCV RBC AUTO: 100 FL (ref 82–98)
PLATELET # BLD AUTO: 165 THOUSANDS/UL (ref 149–390)
PMV BLD AUTO: 10.1 FL (ref 8.9–12.7)
POTASSIUM SERPL-SCNC: 4 MMOL/L (ref 3.5–5.3)
RBC # BLD AUTO: 3.3 MILLION/UL (ref 3.81–5.12)
SODIUM SERPL-SCNC: 144 MMOL/L (ref 136–145)
WBC # BLD AUTO: 8.49 THOUSAND/UL (ref 4.31–10.16)

## 2019-08-18 PROCEDURE — 82948 REAGENT STRIP/BLOOD GLUCOSE: CPT

## 2019-08-18 PROCEDURE — 93005 ELECTROCARDIOGRAM TRACING: CPT

## 2019-08-18 PROCEDURE — 85027 COMPLETE CBC AUTOMATED: CPT | Performed by: INTERNAL MEDICINE

## 2019-08-18 PROCEDURE — 80048 BASIC METABOLIC PNL TOTAL CA: CPT | Performed by: INTERNAL MEDICINE

## 2019-08-18 PROCEDURE — 99232 SBSQ HOSP IP/OBS MODERATE 35: CPT | Performed by: INTERNAL MEDICINE

## 2019-08-18 RX ORDER — CEPHALEXIN 500 MG/1
500 CAPSULE ORAL EVERY 6 HOURS SCHEDULED
Status: DISCONTINUED | OUTPATIENT
Start: 2019-08-18 | End: 2019-08-19 | Stop reason: HOSPADM

## 2019-08-18 RX ADMIN — AMIODARONE HYDROCHLORIDE 200 MG: 200 TABLET ORAL at 09:33

## 2019-08-18 RX ADMIN — CEPHALEXIN 500 MG: 500 CAPSULE ORAL at 22:00

## 2019-08-18 RX ADMIN — HEPARIN SODIUM 5000 UNITS: 5000 INJECTION INTRAVENOUS; SUBCUTANEOUS at 13:04

## 2019-08-18 RX ADMIN — HEPARIN SODIUM 5000 UNITS: 5000 INJECTION INTRAVENOUS; SUBCUTANEOUS at 21:55

## 2019-08-18 RX ADMIN — INSULIN LISPRO 2 UNITS: 100 INJECTION, SOLUTION INTRAVENOUS; SUBCUTANEOUS at 21:55

## 2019-08-18 RX ADMIN — TAMOXIFEN CITRATE 20 MG: 10 TABLET, FILM COATED ORAL at 09:35

## 2019-08-18 RX ADMIN — AMIODARONE HYDROCHLORIDE 200 MG: 200 TABLET ORAL at 12:59

## 2019-08-18 RX ADMIN — CEPHALEXIN 500 MG: 500 CAPSULE ORAL at 17:24

## 2019-08-18 RX ADMIN — CEFAZOLIN SODIUM 2000 MG: 2 SOLUTION INTRAVENOUS at 05:13

## 2019-08-18 RX ADMIN — CEPHALEXIN 500 MG: 500 CAPSULE ORAL at 12:59

## 2019-08-18 RX ADMIN — INSULIN LISPRO 1 UNITS: 100 INJECTION, SOLUTION INTRAVENOUS; SUBCUTANEOUS at 09:34

## 2019-08-18 RX ADMIN — HEPARIN SODIUM 5000 UNITS: 5000 INJECTION INTRAVENOUS; SUBCUTANEOUS at 05:13

## 2019-08-18 RX ADMIN — ATORVASTATIN CALCIUM 20 MG: 20 TABLET, FILM COATED ORAL at 17:24

## 2019-08-18 RX ADMIN — AMIODARONE HYDROCHLORIDE 200 MG: 200 TABLET ORAL at 17:24

## 2019-08-18 RX ADMIN — INSULIN LISPRO 2 UNITS: 100 INJECTION, SOLUTION INTRAVENOUS; SUBCUTANEOUS at 13:00

## 2019-08-18 RX ADMIN — METOPROLOL TARTRATE 12.5 MG: 25 TABLET ORAL at 21:55

## 2019-08-18 RX ADMIN — INSULIN LISPRO 2 UNITS: 100 INJECTION, SOLUTION INTRAVENOUS; SUBCUTANEOUS at 17:24

## 2019-08-18 NOTE — PROGRESS NOTES
Progress Note - Infectious Disease   Jean Carlos Crew 78 y o  female MRN: [id number]  Unit/Bed#: E4 -01 Encounter: [id number]      Impression/Recommendations:  1    E coli bacteremia   Source is unclear   Patient has mild pyuria but she has no urinary symptoms   She has no abdominal flank pain   Abdomen/pelvis CT without acute pathology   Patient had colonoscopy 10 years ago with polyp   I am concerned about the possibility of chronic lesion, either recurrent polyp on malignancy   Patient needs colonoscopy  Patient is clinically improved  Change IV cefazolin to high-dose oral Keflex  Monitor temperature/WBC  Follow-up repeat blood cultures, which are still pending  Recommend colonoscopy   This can be done as an outpatient      2   Leukocytosis and elevated procalcitonin, most likely secondary to above   WBC count has normalized  Patient remains afebrile and clinically stable  Antibiotic plan as in above  Monitor WBC      3  Ventricular tachycardia, with plan for ICD insertion   With bacteremia, will need to hold off on ICD insertion   Will recheck blood cultures to confirm clearance of bacteremia  Followup repeat blood cultures  Recommend holding off on ICD insertion until bacteremia clears      4  History of right breast cancer, status post mastectomy   Patient is currently on tamoxifen but not chemotherapy      5  Ampicillin allergy  Will avoid amoxicillin/ampicillin  Patient has tolerated cefepime without difficulty  Patient has tolerated IV cefazolin without difficulty         Antibiotics:  Cefepime # 4    Discussed above plan in detail with patient and her  at bedside, and with Dr Chriss Rudd  Subjective:  Patient had an episode of loose stool today  Not watery  No abdominal pain  No fevers or chills  No abnormal urinary symptoms  No nausea or vomiting      Objective:  Vitals:  Temp:  [97 2 °F (36 2 °C)-98 °F (36 7 °C)] 98 °F (36 7 °C)  HR:  [60-89] 89  Resp:  [18] 18  BP: ()/(50-63) 101/55  SpO2:  [94 %-96 %] 96 %  Temp (24hrs), Av 8 °F (36 6 °C), Min:97 2 °F (36 2 °C), Max:98 °F (36 7 °C)  Current: Temperature: 98 °F (36 7 °C)    Physical Exam:   General:  Well-nourished, well-developed, in no acute distress  Eyes:  Conjunctive clear with no hemorrhages or effusions  Oropharynx:  No ulcers, no lesions  Neck:  Supple, no lymphadenopathy  Lungs:  Clear to auscultation bilaterally, no accessory muscle use  Cardiac:  Regular rate and rhythm, no murmurs  Abdomen:  Soft, non-tender, non-distented  Extremities:  No peripheral cyanosis, clubbing, or edema  Skin:  No rashes, no ulcers  Neurological:  Moves all four extremities spontaneously    Lab Results:  I have personally reviewed pertinent labs  Results from last 7 days   Lab Units 19  0551 19  0044 19  0557  19  0512   POTASSIUM mmol/L 4 0 4 3 3 6   < > 3 4*   CHLORIDE mmol/L 106 101 104   < > 104   CO2 mmol/L 29 25 26   < > 20*   BUN mg/dL 17 14 15   < > 21   CREATININE mg/dL 0 80 0 68 0 60   < > 1 01   EGFR ml/min/1 73sq m 69 82 86   < > 52   CALCIUM mg/dL 8 6 9 0 8 2*   < > 8 7   AST U/L  --   --   --   --  43   ALT U/L  --   --   --   --  42   ALK PHOS U/L  --   --   --   --  161*    < > = values in this interval not displayed  Results from last 7 days   Lab Units 19  0551 19  0043 08/15/19  0919   WBC Thousand/uL 8 49 12 50* 12 77*   HEMOGLOBIN g/dL 10 6* 11 7 12 1   PLATELETS Thousands/uL 165 105* 106*     Results from last 7 days   Lab Units 08/15/19  1147 19  2224 19  1517   BLOOD CULTURE   --  No Growth at 72 hrs  Escherichia coli*   GRAM STAIN RESULT   --   --  Gram negative rods*   URINE CULTURE  >100,000 cfu/ml   --   --        Imaging Studies:   I have personally reviewed pertinent imaging study reports and images in PACS  EKG, Pathology, and Other Studies:   I have personally reviewed pertinent reports

## 2019-08-18 NOTE — PROGRESS NOTES
[facility] Progress Note  Patient: Nava Wright 78 y o  female   MRN: [id number]  PCP: Vasquez Mayo MD  Unit/Bed#: E4 -01 Encounter: [id number]  Date Of Visit: 08/18/19      Assessment/plan  1  Ventricular tachycardia in setting of pacemarker placed in 2017 for complete heart block  Originally admitted to ICU step down level 1  Patient has implanted [employer] pacemaker and had multiple runs (about 20) of VT lasting 20-30 seconds  Given 100 mg lidocaine prehospital  Pt had been treated with amiodarone drip  Appreciate cardiology recommendations  Pt had a heart cath that was normal  Pt changed to po amiodarone  Pt will need pacer upgrade to icd at [place] but this can not be achieved until infection is treated  Awaiting repeat blood cultures  If negative she can be transferred to [place] for icd       2  Bacteremia- due to e coli  Unclear source  Do appreciate ID recommendations  Possibly chronic lesion, either recurrent polyp or malignancy as pt had a colonoscopy 10 years ago with polyp  Pt will eventually need colonoscopy outpt after icd upgrade  Repeat blood cultures are pending  Pt was changed to high dose keflex to complete course       3  Diabetes type 2- continue to hold metformin  Continue with insulin sliding scale       4  Essential hypertension- stable  Continue to hold lasix  Continue metoprolol       5  Right breast CA s/p right mastectomy- Continue tamoxifen      8  Thrombocytopenia- resolved       dispo-Pt will eventually need transfer to [facility] for pacemaker upgrade to icd when infection is cleared    at bedside and updated  Subjective:   Pt seen and examined  Pt doing well  No f/c no cp no sob no n/v/d no abd pain  Objective:     Vitals: Blood pressure 101/55, pulse 89, temperature 98 °F (36 7 °C), temperature source Tympanic, resp  rate 18, height 5' 1" (1 549 m), weight 72 7 kg (160 lb 4 4 oz), SpO2 96 %  ,Body mass index is 30 28 kg/m²  Lab, Imaging and other studies:  Results from last 7 days   Lab Units 08/18/19  0551   WBC Thousand/uL 8 49   HEMOGLOBIN g/dL 10 6*   HEMATOCRIT % 32 9*   PLATELETS Thousands/uL 165     Results from last 7 days   Lab Units 08/18/19  0551  08/12/19  0512   POTASSIUM mmol/L 4 0   < > 3 4*   CHLORIDE mmol/L 106   < > 104   CO2 mmol/L 29   < > 20*   BUN mg/dL 17   < > 21   CREATININE mg/dL 0 80   < > 1 01   CALCIUM mg/dL 8 6   < > 8 7   ALK PHOS U/L  --   --  161*   ALT U/L  --   --  42   AST U/L  --   --  43    < > = values in this interval not displayed  Results from last 7 days   Lab Units 08/12/19  0512   TROPONIN I ng/mL <0 02     Lab Results   Component Value Date    BLOODCX No Growth at 72 hrs  08/14/2019    BLOODCX Escherichia coli (A) 08/14/2019    BLOODCX No Growth After 5 Days   03/01/2019    URINECX >100,000 cfu/ml  08/15/2019    URINECX No Growth <1000 cfu/mL 04/03/2017     Scheduled Meds:   Current Facility-Administered Medications:  acetaminophen 650 mg Oral Q6H PRN Debbie Crzu DO   aluminum-magnesium hydroxide-simethicone 30 mL Oral Q6H PRN Suha FRANKLIN PA-C   amiodarone 200 mg Oral TID With Meals Sergio Gonzalez MD   atorvastatin 20 mg Oral After Kiet Aliya FRANKLIN PA-C   cephalexin 500 mg Oral Q6H [facility] Catherine Lawson DO   heparin (porcine) 5,000 Units Subcutaneous [facility] FRANKLIN PA-C   insulin lispro 1-5 Units Subcutaneous TID AC [place] FRANKLIN PA-C   insulin lispro 1-5 Units Subcutaneous HS [place] FRANKLIN PA-C   metoprolol tartrate 12 5 mg Oral Q12H [facility] [place] FRANKLIN PA-C   ondansetron 4 mg Intravenous Q6H PRN Suha FRANKLIN PA-C   tamoxifen 20 mg Oral Daily [place] FRANKLIN PA-C     Continuous Infusions:    PRN Meds:   acetaminophen    aluminum-magnesium hydroxide-simethicone    ondansetron      Physical exam:  Physical Exam  General appearance: alert and oriented, in no acute distress  Head: Normocephalic, without obvious abnormality, atraumatic  Eyes: conjunctivae/corneas clear  PERRL, EOM's intact  Fundi benign    Neck: no adenopathy, no carotid bruit, no JVD, supple, symmetrical, trachea midline and thyroid not enlarged, symmetric, no tenderness/mass/nodules  Lungs: clear to auscultation bilaterally  Heart: regular rate and rhythm, S1, S2 normal, no murmur, click, rub or gallop  Abdomen: soft, non-tender; bowel sounds normal; no masses,  no organomegaly  Extremities: edema trace edema bilateral lower ext  Pulses: 2+ and symmetric  Skin: Skin color, texture, turgor normal  No rashes or lesions  Neurologic: Mental status: Alert, oriented, thought content appropriate      VTE Pharmacologic Prophylaxis: Heparin  VTE Mechanical Prophylaxis: sequential compression device    Counseling / Coordination of Care  Total floor / unit time spent today 20 minutes      Current Length of Stay: 6 day(s)    Current Patient Status: Inpatient       Code Status: Level 1 - Full Code

## 2019-08-18 NOTE — QUICK NOTE
Non billable visit  Review patient's chart and briefly discussed with her  Await repeat cultures and plan for upgrade of device later this week at [facility]  Patient's rhythm is been stable and now on amiodarone 200 mg 3 times a day  Vital sounds are stable and her exam is unchanged from yesterday    No change in cardiac medications plan for now    Candie Lafleur MD

## 2019-08-18 NOTE — PLAN OF CARE
Problem: Prexisting or High Potential for Compromised Skin Integrity  Goal: Skin integrity is maintained or improved  Description  INTERVENTIONS:  - Identify patients at risk for skin breakdown  - Assess and monitor skin integrity  - Assess and monitor nutrition and hydration status  - Monitor labs (i e  albumin)  - Assess for incontinence   - Turn and reposition patient  - Assist with mobility/ambulation  - Relieve pressure over bony prominences  - Avoid friction and shearing  - Provide appropriate hygiene as needed including keeping skin clean and dry  - Evaluate need for skin moisturizer/barrier cream  - Collaborate with interdisciplinary team (i e  Nutrition, Rehabilitation, etc )   - Patient/family teaching  Outcome: Progressing     Problem: Nutrition/Hydration-ADULT  Goal: Nutrient/Hydration intake appropriate for improving, restoring or maintaining nutritional needs  Description  Monitor and assess patient's nutrition/hydration status for malnutrition (ex- brittle hair, bruises, dry skin, pale skin and conjunctiva, muscle wasting, smooth red tongue, and disorientation)  Collaborate with interdisciplinary team and initiate plan and interventions as ordered  Monitor patient's weight and dietary intake as ordered or per policy  Utilize nutrition screening tool and intervene per policy  Determine patient's food preferences and provide high-protein, high-caloric foods as appropriate       INTERVENTIONS:  - Monitor oral intake, urinary output, labs, and treatment plans  - Assess nutrition and hydration status and recommend course of action  - Evaluate amount of meals eaten  - Assist patient with eating if necessary   - Allow adequate time for meals  - Recommend/ encourage appropriate diets, oral nutritional supplements, and vitamin/mineral supplements  - Order, calculate, and assess calorie counts as needed  - Recommend, monitor, and adjust tube feedings and TPN/PPN based on assessed needs  - Assess need for intravenous fluids  - Provide specific nutrition/hydration education as appropriate  - Include patient/family/caregiver in decisions related to nutrition  Outcome: Progressing     Problem: PAIN - ADULT  Goal: Verbalizes/displays adequate comfort level or baseline comfort level  Description  Interventions:  - Encourage patient to monitor pain and request assistance  - Assess pain using appropriate pain scale  - Administer analgesics based on type and severity of pain and evaluate response  - Implement non-pharmacological measures as appropriate and evaluate response  - Consider cultural and social influences on pain and pain management  - Notify physician/advanced practitioner if interventions unsuccessful or patient reports new pain  Outcome: Progressing     Problem: INFECTION - ADULT  Goal: Absence or prevention of progression during hospitalization  Description  INTERVENTIONS:  - Assess and monitor for signs and symptoms of infection  - Monitor lab/diagnostic results  - Monitor all insertion sites, i e  indwelling lines, tubes, and drains  - Monitor endotracheal (as able) and nasal secretions for changes in amount and color  - [place] appropriate cooling/warming therapies per order  - Administer medications as ordered  - Instruct and encourage patient and family to use good hand hygiene technique  - Identify and instruct in appropriate isolation precautions for identified infection/condition  Outcome: Progressing     Problem: SAFETY ADULT  Goal: Patient will remain free of falls  Description  INTERVENTIONS:  - Assess patient frequently for physical needs  -  Identify cognitive and physical deficits and behaviors that affect risk of falls    -  [place] fall precautions as indicated by assessment   - Educate patient/family on patient safety including physical limitations  - Instruct patient to call for assistance with activity based on assessment  - Modify environment to reduce risk of injury  - Consider OT/PT consult to assist with strengthening/mobility  Outcome: Progressing  Goal: Maintain or return to baseline ADL function  Description  INTERVENTIONS:  -  Assess patient's ability to carry out ADLs; assess patient's baseline for ADL function and identify physical deficits which impact ability to perform ADLs (bathing, care of mouth/teeth, toileting, grooming, dressing, etc )  - Assess/evaluate cause of self-care deficits   - Assess range of motion  - Assess patient's mobility; develop plan if impaired  - Assess patient's need for assistive devices and provide as appropriate  - Encourage maximum independence but intervene and supervise when necessary  ¯ Involve family in performance of ADLs  ¯ Assess for home care needs following discharge   ¯ Request OT consult to assist with ADL evaluation and planning for discharge  ¯ Provide patient education as appropriate  Outcome: Progressing  Goal: Maintain or return mobility status to optimal level  Description  INTERVENTIONS:  - Assess patient's baseline mobility status (ambulation, transfers, stairs, etc )    - Identify cognitive and physical deficits and behaviors that affect mobility  - Identify mobility aids required to assist with transfers and/or ambulation (gait belt, sit-to-stand, lift, walker, cane, etc )  - [place] fall precautions as indicated by assessment  - Record patient progress and toleration of activity level on Mobility SBAR; progress patient to next Phase/Stage  - Instruct patient to call for assistance with activity based on assessment  - Request Rehabilitation consult to assist with strengthening/weightbearing, etc   Outcome: Progressing     Problem: DISCHARGE PLANNING  Goal: Discharge to home or other facility with appropriate resources  Description  INTERVENTIONS:  - Identify barriers to discharge w/patient and caregiver  - Arrange for needed discharge resources and transportation as appropriate  - Identify discharge learning needs (meds, wound care, etc )  - Arrange for interpretive services to assist at discharge as needed  - Refer to Case Management Department for coordinating discharge planning if the patient needs post-hospital services based on physician/advanced practitioner order or complex needs related to functional status, cognitive ability, or social support system  Outcome: Progressing     Problem: Knowledge Deficit  Goal: Patient/family/caregiver demonstrates understanding of disease process, treatment plan, medications, and discharge instructions  Description  Complete learning assessment and assess knowledge base  Interventions:  - Provide teaching at level of understanding  - Provide teaching via preferred learning methods  Outcome: Progressing     Problem: CARDIOVASCULAR - ADULT  Goal: Maintains optimal cardiac output and hemodynamic stability  Description  INTERVENTIONS:  - Monitor I/O, vital signs and rhythm  - Monitor for S/S and trends of decreased cardiac output i e  bleeding, hypotension  - Administer and titrate ordered vasoactive medications to optimize hemodynamic stability  - Assess quality of pulses, skin color and temperature  - Assess for signs of decreased coronary artery perfusion - ex  Angina  - Instruct patient to report change in severity of symptoms  Outcome: Progressing  Goal: Absence of cardiac dysrhythmias or at baseline rhythm  Description  INTERVENTIONS:  - Continuous cardiac monitoring, monitor vital signs, obtain 12 lead EKG if indicated  - Administer antiarrhythmic and heart rate control medications as ordered  - Monitor electrolytes and administer replacement therapy as ordered  Outcome: Progressing     Problem: Potential for Falls  Goal: Patient will remain free of falls  Description  INTERVENTIONS:  - Assess patient frequently for physical needs  -  Identify cognitive and physical deficits and behaviors that affect risk of falls    -  [place] fall precautions as indicated by assessment   - Educate patient/family on patient safety including physical limitations  - Instruct patient to call for assistance with activity based on assessment  - Modify environment to reduce risk of injury  - Consider OT/PT consult to assist with strengthening/mobility  Outcome: Progressing     Problem: METABOLIC, FLUID AND ELECTROLYTES - ADULT  Goal: Electrolytes maintained within normal limits  Description  INTERVENTIONS:  - Monitor labs and assess patient for signs and symptoms of electrolyte imbalances  - Administer electrolyte replacement as ordered  - Monitor response to electrolyte replacements, including repeat lab results as appropriate  - Instruct patient on fluid and nutrition as appropriate  Outcome: Progressing  Goal: Fluid balance maintained  Description  INTERVENTIONS:  - Monitor labs   - Monitor I/O and WT  - Instruct patient on fluid and nutrition as appropriate  - Assess for signs & symptoms of volume excess or deficit  Outcome: Progressing  Goal: Glucose maintained within target range  Description  INTERVENTIONS:  - Monitor Blood Glucose as ordered  - Assess for signs and symptoms of hyperglycemia and hypoglycemia  - Administer ordered medications to maintain glucose within target range  - Assess nutritional intake and initiate nutrition service referral as needed  Outcome: Progressing     Problem: MUSCULOSKELETAL - ADULT  Goal: Maintain or return mobility to safest level of function  Description  INTERVENTIONS:  - Assess patient's ability to carry out ADLs; assess patient's baseline for ADL function and identify physical deficits which impact ability to perform ADLs (bathing, care of mouth/teeth, toileting, grooming, dressing, etc )  - Assess/evaluate cause of self-care deficits   - Assess range of motion  - Assess patient's mobility  - Assess patient's need for assistive devices and provide as appropriate  - Encourage maximum independence but intervene and supervise when necessary  - Involve family in performance of ADLs  - Assess for home care needs following discharge   - Consider OT consult to assist with ADL evaluation and planning for discharge  - Provide patient education as appropriate  Outcome: Progressing  Goal: Maintain proper alignment of affected body part  Description  INTERVENTIONS:  - Support, maintain and protect limb and body alignment  - Provide patient/ family with appropriate education  Outcome: Progressing

## 2019-08-19 ENCOUNTER — HOSPITAL ENCOUNTER (INPATIENT)
Facility: HOSPITAL | Age: 79
LOS: 2 days | Discharge: HOME | DRG: 227 | End: 2019-08-21
Attending: INTERNAL MEDICINE | Admitting: INTERNAL MEDICINE
Payer: COMMERCIAL

## 2019-08-19 VITALS
SYSTOLIC BLOOD PRESSURE: 130 MMHG | WEIGHT: 163.14 LBS | HEART RATE: 80 BPM | HEIGHT: 61 IN | BODY MASS INDEX: 30.8 KG/M2 | RESPIRATION RATE: 18 BRPM | DIASTOLIC BLOOD PRESSURE: 64 MMHG | TEMPERATURE: 97.8 F | OXYGEN SATURATION: 94 %

## 2019-08-19 DIAGNOSIS — B96.20 E COLI BACTEREMIA: ICD-10-CM

## 2019-08-19 DIAGNOSIS — I44.2 INTERMITTENT COMPLETE HEART BLOCK (HCC): ICD-10-CM

## 2019-08-19 DIAGNOSIS — R78.81 E COLI BACTEREMIA: ICD-10-CM

## 2019-08-19 DIAGNOSIS — I47.2 POLYMORPHIC VENTRICULAR TACHYCARDIA (HCC): Primary | ICD-10-CM

## 2019-08-19 LAB
GLUCOSE SERPL-MCNC: 152 MG/DL (ref 65–140)
GLUCOSE SERPL-MCNC: 163 MG/DL (ref 65–140)
GLUCOSE SERPL-MCNC: 173 MG/DL (ref 65–140)
GLUCOSE SERPL-MCNC: 212 MG/DL (ref 65–140)

## 2019-08-19 PROCEDURE — 82948 REAGENT STRIP/BLOOD GLUCOSE: CPT

## 2019-08-19 PROCEDURE — 99239 HOSP IP/OBS DSCHRG MGMT >30: CPT | Performed by: INTERNAL MEDICINE

## 2019-08-19 PROCEDURE — 99232 SBSQ HOSP IP/OBS MODERATE 35: CPT | Performed by: INTERNAL MEDICINE

## 2019-08-19 PROCEDURE — 99223 1ST HOSP IP/OBS HIGH 75: CPT | Performed by: INTERNAL MEDICINE

## 2019-08-19 RX ORDER — HEPARIN SODIUM 5000 [USP'U]/ML
5000 INJECTION, SOLUTION INTRAVENOUS; SUBCUTANEOUS EVERY 8 HOURS SCHEDULED
Status: DISCONTINUED | OUTPATIENT
Start: 2019-08-19 | End: 2019-08-21 | Stop reason: HOSPADM

## 2019-08-19 RX ORDER — TAMOXIFEN CITRATE 10 MG/1
20 TABLET ORAL DAILY
Status: DISCONTINUED | OUTPATIENT
Start: 2019-08-20 | End: 2019-08-21 | Stop reason: HOSPADM

## 2019-08-19 RX ORDER — TAMOXIFEN CITRATE 10 MG/1
20 TABLET ORAL DAILY
Status: CANCELLED | OUTPATIENT
Start: 2019-08-20

## 2019-08-19 RX ORDER — FUROSEMIDE 10 MG/ML
20 INJECTION INTRAMUSCULAR; INTRAVENOUS ONCE
Status: COMPLETED | OUTPATIENT
Start: 2019-08-20 | End: 2019-08-20

## 2019-08-19 RX ORDER — ATORVASTATIN CALCIUM 10 MG/1
20 TABLET, FILM COATED ORAL
Status: CANCELLED | OUTPATIENT
Start: 2019-08-19

## 2019-08-19 RX ORDER — CEPHALEXIN 500 MG/1
500 CAPSULE ORAL EVERY 6 HOURS SCHEDULED
Status: DISCONTINUED | OUTPATIENT
Start: 2019-08-19 | End: 2019-08-21 | Stop reason: HOSPADM

## 2019-08-19 RX ORDER — MAGNESIUM HYDROXIDE/ALUMINUM HYDROXICE/SIMETHICONE 120; 1200; 1200 MG/30ML; MG/30ML; MG/30ML
30 SUSPENSION ORAL EVERY 6 HOURS PRN
Status: DISCONTINUED | OUTPATIENT
Start: 2019-08-19 | End: 2019-08-21 | Stop reason: HOSPADM

## 2019-08-19 RX ORDER — MAGNESIUM HYDROXIDE/ALUMINUM HYDROXICE/SIMETHICONE 120; 1200; 1200 MG/30ML; MG/30ML; MG/30ML
30 SUSPENSION ORAL EVERY 6 HOURS PRN
Status: CANCELLED | OUTPATIENT
Start: 2019-08-19

## 2019-08-19 RX ORDER — HEPARIN SODIUM 5000 [USP'U]/ML
5000 INJECTION, SOLUTION INTRAVENOUS; SUBCUTANEOUS EVERY 8 HOURS SCHEDULED
Status: CANCELLED | OUTPATIENT
Start: 2019-08-19

## 2019-08-19 RX ORDER — ACETAMINOPHEN 325 MG/1
650 TABLET ORAL EVERY 6 HOURS PRN
Status: CANCELLED | OUTPATIENT
Start: 2019-08-19

## 2019-08-19 RX ORDER — ACETAMINOPHEN 325 MG/1
650 TABLET ORAL EVERY 6 HOURS PRN
Status: DISCONTINUED | OUTPATIENT
Start: 2019-08-19 | End: 2019-08-21 | Stop reason: HOSPADM

## 2019-08-19 RX ORDER — ONDANSETRON 2 MG/ML
4 INJECTION INTRAMUSCULAR; INTRAVENOUS EVERY 6 HOURS PRN
Status: DISCONTINUED | OUTPATIENT
Start: 2019-08-19 | End: 2019-08-21 | Stop reason: HOSPADM

## 2019-08-19 RX ORDER — AMIODARONE HYDROCHLORIDE 200 MG/1
200 TABLET ORAL
Status: DISCONTINUED | OUTPATIENT
Start: 2019-08-19 | End: 2019-08-21 | Stop reason: HOSPADM

## 2019-08-19 RX ORDER — AMIODARONE HYDROCHLORIDE 200 MG/1
200 TABLET ORAL
Status: CANCELLED | OUTPATIENT
Start: 2019-08-19

## 2019-08-19 RX ORDER — CEPHALEXIN 500 MG/1
500 CAPSULE ORAL EVERY 6 HOURS SCHEDULED
Status: CANCELLED | OUTPATIENT
Start: 2019-08-19

## 2019-08-19 RX ORDER — ATORVASTATIN CALCIUM 20 MG/1
20 TABLET, FILM COATED ORAL
Status: DISCONTINUED | OUTPATIENT
Start: 2019-08-19 | End: 2019-08-21 | Stop reason: HOSPADM

## 2019-08-19 RX ORDER — ONDANSETRON 2 MG/ML
4 INJECTION INTRAMUSCULAR; INTRAVENOUS EVERY 6 HOURS PRN
Status: CANCELLED | OUTPATIENT
Start: 2019-08-19

## 2019-08-19 RX ADMIN — CEPHALEXIN 500 MG: 500 CAPSULE ORAL at 17:16

## 2019-08-19 RX ADMIN — AMIODARONE HYDROCHLORIDE 200 MG: 200 TABLET ORAL at 08:44

## 2019-08-19 RX ADMIN — CEPHALEXIN 500 MG: 500 CAPSULE ORAL at 06:09

## 2019-08-19 RX ADMIN — METOPROLOL TARTRATE 12.5 MG: 25 TABLET ORAL at 21:50

## 2019-08-19 RX ADMIN — METOPROLOL TARTRATE 12.5 MG: 25 TABLET ORAL at 08:44

## 2019-08-19 RX ADMIN — CEPHALEXIN 500 MG: 500 CAPSULE ORAL at 23:55

## 2019-08-19 RX ADMIN — HEPARIN SODIUM 5000 UNITS: 5000 INJECTION INTRAVENOUS; SUBCUTANEOUS at 14:19

## 2019-08-19 RX ADMIN — AMIODARONE HYDROCHLORIDE 200 MG: 200 TABLET ORAL at 14:18

## 2019-08-19 RX ADMIN — HEPARIN SODIUM 5000 UNITS: 5000 INJECTION INTRAVENOUS; SUBCUTANEOUS at 21:52

## 2019-08-19 RX ADMIN — INSULIN LISPRO 2 UNITS: 100 INJECTION, SOLUTION INTRAVENOUS; SUBCUTANEOUS at 21:52

## 2019-08-19 RX ADMIN — HEPARIN SODIUM 5000 UNITS: 5000 INJECTION INTRAVENOUS; SUBCUTANEOUS at 06:09

## 2019-08-19 RX ADMIN — ATORVASTATIN CALCIUM 20 MG: 20 TABLET, FILM COATED ORAL at 17:16

## 2019-08-19 RX ADMIN — INSULIN LISPRO 1 UNITS: 100 INJECTION, SOLUTION INTRAVENOUS; SUBCUTANEOUS at 08:44

## 2019-08-19 RX ADMIN — CEPHALEXIN 500 MG: 500 CAPSULE ORAL at 14:18

## 2019-08-19 RX ADMIN — TAMOXIFEN CITRATE 20 MG: 10 TABLET, FILM COATED ORAL at 08:44

## 2019-08-19 RX ADMIN — AMIODARONE HYDROCHLORIDE 200 MG: 200 TABLET ORAL at 17:16

## 2019-08-19 NOTE — H&P
INTERNAL MEDICINE RESIDENCY ADMISSION H&P     Name: Vero Horn   Age & Sex: 78 y o  female   MRN: [id number]  Unit/Bed#: -01   Encounter: [id number]  Primary Care Provider: Whit Quiñonez MD    Code Status: Level 1 - Full Code  Admission Status: INPATIENT   Disposition: Patient requires Med/Surg with Telemetry    ASSESSMENT/PLAN     Principal Problem:    Polymorphic ventricular tachycardia ([facility] )  Active Problems:    Symptomatic bradycardia    E coli bacteremia    Type 2 diabetes mellitus, without long-term current use of insulin ([facility] )    Hypertension    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 ([facility] )      * Polymorphic ventricular tachycardia ([facility] )  Assessment & Plan  Patient presented to [facility] with fatigue and episode of unresponsiveness, and was found to have ventricular tachycardia  She was started on amiodarone 200 t i d  And is on metoprolol 12 5 b i d  She was followed by Cardiology [facility] who have discussed her case with EP here and patient is transferred here for pacemaker upgrade to ICD   - EP consult placed  - NPO after midnight    E coli bacteremia  Assessment & Plan  While admitted in [facility], patient had 1/2 blood cultures positive for E coli  She was evaluated by ID who feel that this could be secondary to colon polyp  Blood cultures have cleared so she is clear per the ID service for pacemaker upgrade  - continue Keflex  - outpatient colonoscopy    Type 2 diabetes mellitus, without long-term current use of insulin 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 [facility])  Assessment & Plan  Continued tamoxifen        VTE Pharmacologic Prophylaxis: Heparin  VTE Mechanical Prophylaxis: sequential compression device    CHIEF COMPLAINT   No chief complaint on file  HISTORY OF PRESENT ILLNESS     Patient is an 25-year-old female with past medical history of sick sinus syndrome, hypertension, breast cancer with liver Mets who presented initially to [facility] on August 12 with shortness of breath and an episode of decreased responsiveness per her   She was found to be in intermittent polymorphic V-tach in addition to her pre-existing sick sinus syndrome  She was initially managed in the critical care unit and was started on amiodarone drip, followed by oral amiodarone 200 t i d  There was a plan for transfer to [facility] for ICD, however she had 1/2 blood cultures positive for E coli  Patient was seen by infectious disease who feel that this is likely secondary to colonic source, as her last colonoscopy approximately 10 years ago did show polyp  Her blood cultures have now cleared  At this time, the patient states that she feels well  She has no complaints at all  She does note that her legs are swollen  She states that she takes Lasix as needed for this at home, and that she has not used it for several months  REVIEW OF SYSTEMS     Review of Systems   Constitutional: Negative for chills and fever  Respiratory: Negative for chest tightness and shortness of breath  Cardiovascular: Positive for leg swelling  Negative for chest pain and palpitations  Gastrointestinal: Negative for abdominal pain, diarrhea, nausea and vomiting  Genitourinary: Negative for difficulty urinating and dysuria  Neurological: Negative  All other systems reviewed and are negative      OBJECTIVE     Vitals:    08/19/19 1557 08/19/19 1558 08/19/19 1610   BP: 144/97  112/67   Pulse: 75  75   Resp:  20 18   Temp:  98 7 °F (37 1 °C) 97 9 °F (36 6 °C)   TempSrc:  Oral    SpO2: 97%  97%      Temperature:   Temp (24hrs), Av 1 °F (36 7 °C), Min:97 8 °F (36 6 °C), Max:98 7 °F (37 1 °C)    Temperature: 97 9 °F (36 6 °C)  Intake & Output:  I/O        0701 -  0700  07 -  0700  07 -  0700    P  O    340    Total Intake   340    Urine   50    Total Output   50    Net   +290               Weights: There is no height or weight on file to calculate BMI  Weight (last 2 days)     None        Physical Exam   Constitutional: She is oriented to person, place, and time  She appears well-developed and well-nourished  HENT:   Head: Normocephalic and atraumatic  Eyes: Conjunctivae are normal  No scleral icterus  Cardiovascular: Normal rate, regular rhythm and normal heart sounds  No murmur heard  Pulmonary/Chest: Effort normal and breath sounds normal  She has no wheezes  She has no rales  Abdominal: Soft  Bowel sounds are normal  She exhibits no distension  There is no tenderness  There is no guarding  Musculoskeletal: She exhibits edema  She exhibits no tenderness or deformity  3+ pitting edema to mid shin bilaterally   Neurological: She is alert and oriented to person, place, and time  Skin: Skin is warm and dry  No erythema  Psychiatric: She has a normal mood and affect   Her behavior is normal      PAST MEDICAL HISTORY     Past Medical History:   Diagnosis Date    Anxiety     Arthritis     Breast CA ([facility] )     recurrent, ductal carcinoma ER, [place] pos    Cardiac disease     Depression     Diverticula of intestine     Dizziness     and passes out    Flushing     Hiatal hernia     [place]      [place]    Hypercholesteremia     Liver mass     Liver metastasis (HCC)     Metast[facility] liver [facility] )     Bx 2017    PONV (postoperative nausea and vomiting)     Recurrent breast cancer (HCC)     Shingles     Shortness of breath     on exertion    Tachycardia     Urinary incontinence     Use of cane as ambulatory aid     or walker     PAST SURGICAL HISTORY     Past Surgical History:   Procedure Laterality Date    BREAST SURGERY      CARDIAC PACEMAKER REMOVAL N/A 11/9/2017    Procedure: PACEMAKER LEAD REVISION, REMOVAL OF NONFUNCTIONING LEAD, AND INSERTION OF A NEW RV LEAD;  Surgeon: Casandra Ghotra MD;  [facility];  Service: Cardiology    CATARACT EXTRACTION Bilateral     COLONOSCOPY      ERCP N/A 1/8/2018    Procedure: ENDOSCOPIC RETROGRADE CHOLANGIOPANCREATOGRAPHY (ERCP); Surgeon: Nicole Guthrie MD;  Location: [facility]; Service: Gastroenterology    HYSTERECTOMY      JOINT REPLACEMENT      l[place]  TKR and right TSR    MASTECTOMY Right     PA ERCP DX COLLECTION SPECIMEN BRUSHING/WASHING N/A 2/22/2018    Procedure: ENDOSCOPIC RETROGRADE CHOLANGIOPANCREATOGRAPHY (ERCP) with stent removal;  Surgeon: Nicole Guthrie MD;  Location: [facility]; Service: Gastroenterology    PA RESEC [address] N/A 7/13/2017    Procedure: LIVER RESECTION, INTRAOPERATIVE ULTRASOUND;  Surgeon: Ximena Hernandez MD;  [facility];  Service: Surgical Oncology    ROTATOR CUFF REPAIR Right     SENTINEL LYMPH NODE BIOPSY Right     TONSILECTOMY AND ADNOIDECTOMY      WOUND DEBRIDEMENT Left 11/9/2017    Procedure: DEBRIDEMENT WOUND AND DRESSING CHANGE [facility]); Surgeon: Casandra Ghotra MD;  [facility];  Service: Cardiology     SOCIAL & FAMILY HISTORY     Social History     Substance and Sexual Activity   Alcohol Use Not Currently     Social History     Substance and Sexual Activity   Drug Use No     Social History     Tobacco Use   Smoking Status Never Smoker   Smokeless Tobacco Never Used     Family History   Problem Relation Age of Onset    Lung cancer Father     Cancer Brother     Diabetes type II Son      LABORATORY DATA     Labs: I have personally reviewed pertinent reports      Results from last 7 days   Lab Units 08/18/19  0551 08/16/19  0043 08/15/19  0919 08/13/19  0553   WBC Thousand/uL 8 49 12 50* 12 77* 14 08*   HEMOGLOBIN g/dL 10 6* 11 7 12 1 12 0   HEMATOCRIT % 32 9* 36 2 37 5 36 7   PLATELETS Thousands/uL 165 105* 106* 75*   NEUTROS PCT %  --   --   --  75   MONOS PCT %  --   --   --  13*    Results from last 7 days   Lab Units 08/18/19  0551 08/16/19  0044 08/14/19  0557   POTASSIUM mmol/L 4 0 4 3 3 6   CHLORIDE mmol/L 106 101 104   CO2 mmol/L 29 25 26   BUN mg/dL 17 14 15   CREATININE mg/dL 0 80 0 68 0 60   CALCIUM mg/dL 8 6 9 0 8 2*     Results from last 7 days   Lab Units 08/13/19  0553   MAGNESIUM mg/dL 2 6     Results from last 7 days   Lab Units 08/13/19  0553   PHOSPHORUS mg/dL 2 2*                  Micro:  Lab Results   Component Value Date    BLOODCX No Growth at 48 hrs  08/17/2019    BLOODCX No Growth at 48 hrs  08/17/2019    BLOODCX No Growth After 4 Days  08/14/2019    URINECX >100,000 cfu/ml  08/15/2019    URINECX No Growth <1000 cfu/mL 04/03/2017     IMAGING & DIAGNOSTIC TESTS     Imaging: I have personally reviewed pertinent reports  No results found  EKG, Pathology, and Other Studies: I have personally reviewed pertinent reports  ALLERGIES     Allergies   Allergen Reactions    Ampicillin Shortness Of Breath, Anaphylaxis and Other (See Comments)     Other reaction(s): Unknown Allergic Reaction  Reaction Date: [id number];      MEDICATIONS PRIOR TO ARRIVAL     Prior to Admission medications    Medication Sig Start Date End Date Taking? Authorizing Provider   atorvastatin (LIPITOR) 20 mg tablet Take 20 mg by mouth daily      Historical Provider, MD   Calcium Citrate-Vitamin D (CALCIUM CITRATE + D PO) Take 1,500 mg by mouth daily    Historical Provider, MD   clindamycin (CLEOCIN) 300 MG capsule TAKE 2 CAPSULES BY MOUTH 1 HOUR PRIOR TO DENTAL PROCEDURE  1/3/18   Historical Provider, MD   clotrimazole-betamethasone (LOTRISONE) 1-0 05 % cream Apply 1 application topically as needed  8/14/17   Historical Provider, MD   Cranberry (THERACRAN HP PO) Take 2 tablets by mouth daily    Historical Provider, MD   furosemide (LASIX) 20 mg tablet TAKE 1 TABLET BY MOUTH DAILY AS NEEDED FOR EDEMA 8/1/17   Historical Provider, MD   Glucosamine-Chondroit-Vit C-Mn (GLUCOSAMINE 1500 COMPLEX) CAPS Take 1 capsule by mouth daily      Historical Provider, MD   metFORMIN (GLUCOPHAGE) 500 mg tablet 2 (two) times a day    Historical Provider, MD   metoprolol tartrate (LOPRESSOR) 25 mg tablet Take 0 5 tablets (12 5 mg total) by mouth every 12 (twelve) hours 5/16/19   Florencia Arriola DO   Multiple Vitamin (MULTIVITAMIN) capsule Take 1 capsule by mouth daily      Historical Provider, MD   tamoxifen (NOLVADEX) 20 mg tablet Take 1 tablet (20 mg total) by mouth daily 6/27/19   Marcus Driscoll DO     MEDICATIONS ADMINISTERED IN LAST 24 HOURS     Medication Administration - last 24 hours from 08/18/2019 1826 to 08/19/2019 1826       Date/Time Order Dose Route Action Action by     08/19/2019 1716 insulin lispro (HumaLOG) 100 units/mL subcutaneous injection 1-5 Units 1 Units Subcutaneous Not Given Clint Bagley RN     08/19/2019 1716 amiodarone tablet 200 mg 200 mg Oral Given Clint Bagley RN     08/19/2019 1716 atorvastatin (LIPITOR) tablet 20 mg 20 mg Oral Given Clint Bagley RN     08/19/2019 1716 cephalexin (KEFLEX) capsule 500 mg 500 mg Oral Given Clint Bagley RN        CURRENT MEDICATIONS     Current Facility-Administered Medications:  acetaminophen 650 mg Oral Q6H PRN Elena Swain MD   aluminum-magnesium hydroxide-simethicone 30 mL Oral Q6H PRN Elena Swain MD   amiodarone 200 mg Oral TID With Meals Elena Swain MD   atorvastatin 20 mg Oral After Timi Cornelius MD   cephalexin 500 mg Oral Q6H [facility] Elena Swain MD   [START ON 8/20/2019] furosemide 20 mg Intravenous Once Aury Mejia DO   heparin (porcine) 5,000 Units Subcutaneous [facility] Elena Swain MD   insulin lispro 1-5 Units Subcutaneous HS Elena Swain MD   insulin lispro 1-5 Units Subcutaneous TID [facility] Elena Swain MD   metoprolol tartrate 12 5 mg Oral Q12H [facility] Alcides Ivory MD   ondansetron 4 mg Intravenous Q6H PRN Alcides Ivory MD   [START ON 8/20/2019] tamoxifen 20 mg Oral Daily Alcides Ivory MD          acetaminophen 650 mg Q6H PRN   aluminum-magnesium hydroxide-simethicone 30 mL Q6H PRN   ondansetron 4 mg Q6H PRN       Admission Time  I spent 30 minutes admitting the patient  This involved direct patient contact where I performed a full history and physical, reviewing previous records, and reviewing laboratory and other diagnostic studies  Portions of the record may have been created with voice recognition software  Occasional wrong word or "sound a like" substitutions may have occurred due to the inherent limitations of voice recognition software    Read the chart carefully and recognize, using context, where substitutions have occurred     ==  Honorio Gates, [address] [place] [address]  Internal Medicine Residency PGY-2

## 2019-08-19 NOTE — NURSING NOTE
Pt transferred to [facility]  Report given to transport team  Pt discharged from telemetry  IV in L forearm left intact  Patient sent w/ medical necessity form and face sheet  Pt reported bringing no belongings to the hospital      Report called to receiving facility, receiving RN      AVS not printed, as patient transferred to [facility]

## 2019-08-19 NOTE — ASSESSMENT & PLAN NOTE
· Continue amiodarone 200 mg t i d   · Discussed with Cardiology and ID    · Stable for transfer to [facility] for ICD placement  · Re-order telemetry

## 2019-08-19 NOTE — ASSESSMENT & PLAN NOTE
[facility] with fatigue and episode of unresponsiveness, and was found to have ventricular tachycardia after interrogation of pacemaker  She was started on amiodarone 200 t i d  And is on metoprolol 12 5 b i d  She was followed by Cardiology [facility] who have discussed her case with EP here and patient is transferred here for pacemaker upgrade to ICD    - Tolerated BIV ICD insertion 8/20/2019, 100% RV paced  - Follow EP/Cardiology recs for discharge meds/follow ups   - continue amio 200mg TID for 1 week total (end date 8/26)   - continue metop 12 5mg q12H, atorvastatin 20mg

## 2019-08-19 NOTE — ASSESSMENT & PLAN NOTE
· Last imaging in May 2019 showed no evidence of metastatic disease within the abdomen and pelvis  · Continue tamoxifen 20 mg daily

## 2019-08-19 NOTE — ASSESSMENT & PLAN NOTE
· With permanent pacemaker  · Transfer to [place] to upgrade to pacemaker ICD  · Continue metoprolol 12 5 mg b i d

## 2019-08-19 NOTE — ASSESSMENT & PLAN NOTE
Lab Results   Component Value Date    HGBA1C 7 0 (H) 03/01/2019       Recent Labs     08/18/19  2027 08/19/19  0724 08/19/19  1213 08/19/19  1608   POCGLU 233* 152* 173* 163*       Blood Sugar Average: Last 72 hrs:  (P) 163     - Received sliding scale insulin t i d  and at bedtime while inpatient  - Continue home medications upon discharge (metformin 500 BID)

## 2019-08-19 NOTE — ASSESSMENT & PLAN NOTE
· Possibly from occult GI source  · Discussed with ID  · Cleared for ICD placement  · Continue cephalexin complete 14 days of antibiotics total  · Outpatient colonoscopy

## 2019-08-19 NOTE — TRANSPORTATION MEDICAL NECESSITY
Section I - General Information    Name of Patient: Catherine Gauthier                 : 1940    Medicare #: [id number]  Transport Date: 19 (PCS is valid for round trips on this date and for all repetitive trips in the 60-day range as noted below )  Origin[address]                                                         Destination: [facility]  Is the pt's stay covered under Medicare Part A (PPS/DRG)   [x]     Closest appropriate facility? If no, why is transport to more distant facility required? Yes  If hospice pt, is this transport related to pt's terminal illness? NA       Section II - Medical Necessity Questionnaire  Ambulance transportation is medically necessary only if other means of transport are contraindicated or would be potentially harmful to the patient  To meet this requirement, the patient must either be "bed confined" or suffer from a condition such that transport by means other than ambulance is contraindicated by the patient's condition  The following questions must be answered by the medical professional signing below for this form to be valid:    1)  Describe the MEDICAL CONDITION (physical and/or mental) of this patient AT [address] [place] [address] that requires the patient to be transported in an ambulance and why transport by other means is contraindicated by the patient's condition:pacemaker conversion to ICD at [facility]Ig[facility] level of care    2) Is the patient "bed confined" as defined below? No  To be "be confined" the patient must satisfy all three of the following conditions: (1) unable to get up from bed without Assistance; AND (2) unable to ambulate; AND (3) unable to sit in a chair or wheelchair  3) Can this patient safely be transported by car or wheelchair van (i e , seated during transport without a medical attendant or monitoring)?    No    4) In addition to completing questions 1-3 above, please check any of the following conditions that apply*:   *Note: supporting documentation for any boxes checked must be maintained in the patient's medical records  If hosp-hosp transfer, describe services needed at 2nd facility not available at 1st facility? Medical attendant required   Hemodynamic monitoring required en route  Cardiac monitoring required en route       Section III - Signature of Physician or Healthcare Professional  I certify that the above information is true and correct based on my evaluation of this patient, and represent that the patient requires transport by ambulance and that other forms of transport are contraindicated  I understand that this information will be used by the Centers for Medicare and Medicaid Services (CMS) to support the determination of medical necessity for ambulance services, and I represent that I have personal knowledge of the patient's condition at time of transport  []  If this box is checked, I also certify that the patient is physically or mentally incapable of signing the ambulance service's claim and that the institution with which I am affiliated has furnished care, services, or assistance to the patient  My signature below is made on behalf of the patient pursuant to 42 CFR §424 36(b)(4)  In accordance with 42 CFR §424 37, the specific reason(s) that the patient is physically or mentally incapable of signing the claim form is as follows: Andrew Kilgore of Physician* or Healthcare Professional______________________________________________________________  Signature Date 08/19/19 (For scheduled repetitive transports, this form is not valid for transports performed more than 60 days after this date)    Printed Name & Credentials of Physician or Healthcare Professional (MD, DO, RN, etc )________________________________  *Form must be signed by patient's attending physician for scheduled, repetitive transports   For non-repetitive, unscheduled ambulance transports, if unable to obtain the signature of the attending physician, any of the following may sign (choose appropriate option below)  [] Physician Assistant []  Clinical Nurse Specialist [x]  Registered Nurse  []  Nurse Practitioner  [x] Discharge Planner

## 2019-08-19 NOTE — ASSESSMENT & PLAN NOTE
While admitted in [facility], patient had 1/2 blood cultures positive for E coli  She was evaluated by ID who feel that this could be secondary to colon polyp  Blood cultures have cleared so she is clear per the ID service for pacemaker upgrade    - patient currently afebrile, negative blood cultures, normalized wbc, no signs of active infection  - continue Keflex total of 14 days (final dose on 8/28/2019)  - outpatient colonoscopy with GI for possible source

## 2019-08-19 NOTE — PLAN OF CARE
Problem: Prexisting or High Potential for Compromised Skin Integrity  Goal: Skin integrity is maintained or improved  Description  INTERVENTIONS:  - Identify patients at risk for skin breakdown  - Assess and monitor skin integrity  - Assess and monitor nutrition and hydration status  - Monitor labs (i e  albumin)  - Assess for incontinence   - Turn and reposition patient  - Assist with mobility/ambulation  - Relieve pressure over bony prominences  - Avoid friction and shearing  - Provide appropriate hygiene as needed including keeping skin clean and dry  - Evaluate need for skin moisturizer/barrier cream  - Collaborate with interdisciplinary team (i e  Nutrition, Rehabilitation, etc )   - Patient/family teaching  Outcome: Progressing     Problem: Nutrition/Hydration-ADULT  Goal: Nutrient/Hydration intake appropriate for improving, restoring or maintaining nutritional needs  Description  Monitor and assess patient's nutrition/hydration status for malnutrition (ex- brittle hair, bruises, dry skin, pale skin and conjunctiva, muscle wasting, smooth red tongue, and disorientation)  Collaborate with interdisciplinary team and initiate plan and interventions as ordered  Monitor patient's weight and dietary intake as ordered or per policy  Utilize nutrition screening tool and intervene per policy  Determine patient's food preferences and provide high-protein, high-caloric foods as appropriate       INTERVENTIONS:  - Monitor oral intake, urinary output, labs, and treatment plans  - Assess nutrition and hydration status and recommend course of action  - Evaluate amount of meals eaten  - Assist patient with eating if necessary   - Allow adequate time for meals  - Recommend/ encourage appropriate diets, oral nutritional supplements, and vitamin/mineral supplements  - Order, calculate, and assess calorie counts as needed  - Recommend, monitor, and adjust tube feedings and TPN/PPN based on assessed needs  - Assess need for intravenous fluids  - Provide specific nutrition/hydration education as appropriate  - Include patient/family/caregiver in decisions related to nutrition  Outcome: Progressing     Problem: PAIN - ADULT  Goal: Verbalizes/displays adequate comfort level or baseline comfort level  Description  Interventions:  - Encourage patient to monitor pain and request assistance  - Assess pain using appropriate pain scale  - Administer analgesics based on type and severity of pain and evaluate response  - Implement non-pharmacological measures as appropriate and evaluate response  - Consider cultural and social influences on pain and pain management  - Notify physician/advanced practitioner if interventions unsuccessful or patient reports new pain  Outcome: Progressing     Problem: INFECTION - ADULT  Goal: Absence or prevention of progression during hospitalization  Description  INTERVENTIONS:  - Assess and monitor for signs and symptoms of infection  - Monitor lab/diagnostic results  - Monitor all insertion sites, i e  indwelling lines, tubes, and drains  - Monitor endotracheal (as able) and nasal secretions for changes in amount and color  - [place] appropriate cooling/warming therapies per order  - Administer medications as ordered  - Instruct and encourage patient and family to use good hand hygiene technique  - Identify and instruct in appropriate isolation precautions for identified infection/condition  Outcome: Progressing     Problem: SAFETY ADULT  Goal: Patient will remain free of falls  Description  INTERVENTIONS:  - Assess patient frequently for physical needs  -  Identify cognitive and physical deficits and behaviors that affect risk of falls    -  [place] fall precautions as indicated by assessment   - Educate patient/family on patient safety including physical limitations  - Instruct patient to call for assistance with activity based on assessment  - Modify environment to reduce risk of injury  - Consider OT/PT consult to assist with strengthening/mobility  Outcome: Progressing  Goal: Maintain or return to baseline ADL function  Description  INTERVENTIONS:  -  Assess patient's ability to carry out ADLs; assess patient's baseline for ADL function and identify physical deficits which impact ability to perform ADLs (bathing, care of mouth/teeth, toileting, grooming, dressing, etc )  - Assess/evaluate cause of self-care deficits   - Assess range of motion  - Assess patient's mobility; develop plan if impaired  - Assess patient's need for assistive devices and provide as appropriate  - Encourage maximum independence but intervene and supervise when necessary  ¯ Involve family in performance of ADLs  ¯ Assess for home care needs following discharge   ¯ Request OT consult to assist with ADL evaluation and planning for discharge  ¯ Provide patient education as appropriate  Outcome: Progressing  Goal: Maintain or return mobility status to optimal level  Description  INTERVENTIONS:  - Assess patient's baseline mobility status (ambulation, transfers, stairs, etc )    - Identify cognitive and physical deficits and behaviors that affect mobility  - Identify mobility aids required to assist with transfers and/or ambulation (gait belt, sit-to-stand, lift, walker, cane, etc )  - [place] fall precautions as indicated by assessment  - Record patient progress and toleration of activity level on Mobility SBAR; progress patient to next Phase/Stage  - Instruct patient to call for assistance with activity based on assessment  - Request Rehabilitation consult to assist with strengthening/weightbearing, etc   Outcome: Progressing     Problem: DISCHARGE PLANNING  Goal: Discharge to home or other facility with appropriate resources  Description  INTERVENTIONS:  - Identify barriers to discharge w/patient and caregiver  - Arrange for needed discharge resources and transportation as appropriate  - Identify discharge learning needs (meds, wound care, etc )  - Arrange for interpretive services to assist at discharge as needed  - Refer to Case Management Department for coordinating discharge planning if the patient needs post-hospital services based on physician/advanced practitioner order or complex needs related to functional status, cognitive ability, or social support system  Outcome: Progressing     Problem: Knowledge Deficit  Goal: Patient/family/caregiver demonstrates understanding of disease process, treatment plan, medications, and discharge instructions  Description  Complete learning assessment and assess knowledge base  Interventions:  - Provide teaching at level of understanding  - Provide teaching via preferred learning methods  Outcome: Progressing     Problem: CARDIOVASCULAR - ADULT  Goal: Maintains optimal cardiac output and hemodynamic stability  Description  INTERVENTIONS:  - Monitor I/O, vital signs and rhythm  - Monitor for S/S and trends of decreased cardiac output i e  bleeding, hypotension  - Administer and titrate ordered vasoactive medications to optimize hemodynamic stability  - Assess quality of pulses, skin color and temperature  - Assess for signs of decreased coronary artery perfusion - ex  Angina  - Instruct patient to report change in severity of symptoms  Outcome: Progressing  Goal: Absence of cardiac dysrhythmias or at baseline rhythm  Description  INTERVENTIONS:  - Continuous cardiac monitoring, monitor vital signs, obtain 12 lead EKG if indicated  - Administer antiarrhythmic and heart rate control medications as ordered  - Monitor electrolytes and administer replacement therapy as ordered  Outcome: Progressing     Problem: Potential for Falls  Goal: Patient will remain free of falls  Description  INTERVENTIONS:  - Assess patient frequently for physical needs  -  Identify cognitive and physical deficits and behaviors that affect risk of falls    -  [place] fall precautions as indicated by assessment   - Educate patient/family on patient safety including physical limitations  - Instruct patient to call for assistance with activity based on assessment  - Modify environment to reduce risk of injury  - Consider OT/PT consult to assist with strengthening/mobility  Outcome: Progressing     Problem: METABOLIC, FLUID AND ELECTROLYTES - ADULT  Goal: Electrolytes maintained within normal limits  Description  INTERVENTIONS:  - Monitor labs and assess patient for signs and symptoms of electrolyte imbalances  - Administer electrolyte replacement as ordered  - Monitor response to electrolyte replacements, including repeat lab results as appropriate  - Instruct patient on fluid and nutrition as appropriate  Outcome: Progressing  Goal: Fluid balance maintained  Description  INTERVENTIONS:  - Monitor labs   - Monitor I/O and WT  - Instruct patient on fluid and nutrition as appropriate  - Assess for signs & symptoms of volume excess or deficit  Outcome: Progressing  Goal: Glucose maintained within target range  Description  INTERVENTIONS:  - Monitor Blood Glucose as ordered  - Assess for signs and symptoms of hyperglycemia and hypoglycemia  - Administer ordered medications to maintain glucose within target range  - Assess nutritional intake and initiate nutrition service referral as needed  Outcome: Progressing     Problem: MUSCULOSKELETAL - ADULT  Goal: Maintain or return mobility to safest level of function  Description  INTERVENTIONS:  - Assess patient's ability to carry out ADLs; assess patient's baseline for ADL function and identify physical deficits which impact ability to perform ADLs (bathing, care of mouth/teeth, toileting, grooming, dressing, etc )  - Assess/evaluate cause of self-care deficits   - Assess range of motion  - Assess patient's mobility  - Assess patient's need for assistive devices and provide as appropriate  - Encourage maximum independence but intervene and supervise when necessary  - Involve family in performance of ADLs  - Assess for home care needs following discharge   - Consider OT consult to assist with ADL evaluation and planning for discharge  - Provide patient education as appropriate  Outcome: Progressing  Goal: Maintain proper alignment of affected body part  Description  INTERVENTIONS:  - Support, maintain and protect limb and body alignment  - Provide patient/ family with appropriate education  Outcome: Progressing

## 2019-08-19 NOTE — ASSESSMENT & PLAN NOTE
Lab Results   Component Value Date    HGBA1C 7 0 (H) 03/01/2019       Recent Labs     08/18/19  1713 08/18/19 2027 08/19/19  0724 08/19/19  1213   POCGLU 263* 233* 152* 173*       Blood Sugar Average: Last 72 hrs:  (P) 871 [id number]     Sugars improving today  A1c at goal   Continue diabetic diet  Continue sliding scale insulin

## 2019-08-19 NOTE — PROGRESS NOTES
Progress Note - Yariel Conn 78 y o  female MRN: [id number]    Unit/Bed#: E4 -01 Encounter: [id number]  Subjective:   No chest pain or SOB  No edema  No palpitations or lightheadedness  Objective:   Vitals: Blood pressure 140/63, pulse 83, temperature 97 8 °F (36 6 °C), temperature source Tympanic, resp  rate 18, height 5' 1" (1 549 m), weight 74 kg (163 lb 2 3 oz), SpO2 94 %  ,Body mass index is 30 83 kg/m²  Physical exam:  Lungs-clear without wheezing rhonchi rales  Heart-regular without murmur rub or gallop  Abdomen-soft nontender without mass organomegaly  Extremities- trace to +1 edema being somewhat greater in the left lower extremity    Assessment:  1  Sick sinus syndrome with pacemaker in situ  For upgrade to ICD  See comments below  2  Polymorphic ventricular tachycardia  No myocardial infarction  No obstructive CAD on cardiac catheterization  Normal left ventricular function  Mild reduction of magnesium and potassium on admission which have been corrected  Cultures appear clear and will arrange upgrade to ICD in [facility]  3  Hypertension  Well controlled on metoprolol only  4  Hyperlipidemia-on statin therapy  5  Diabetes mellitus  Management as per Medicine  6  Breast cancer with metastasis is however hematology describes near No evident disease  7  Fever with Gram-negative bacteremia  Resolved  Possible urinary versus GI source  Cultures have cleared      Plan:  Continue beta-blocker and amiodarone to 200 mg 3 times daily  EKG without change  Continue beta-blocker  Continue statin  Have contacted EP to get patient on schedule and recommend initiating transfer to Ragini Bautista MD

## 2019-08-19 NOTE — PLAN OF CARE
Problem: Prexisting or High Potential for Compromised Skin Integrity  Goal: Skin integrity is maintained or improved  Description  INTERVENTIONS:  - Identify patients at risk for skin breakdown  - Assess and monitor skin integrity  - Assess and monitor nutrition and hydration status  - Monitor labs (i e  albumin)  - Assess for incontinence   - Turn and reposition patient  - Assist with mobility/ambulation  - Relieve pressure over bony prominences  - Avoid friction and shearing  - Provide appropriate hygiene as needed including keeping skin clean and dry  - Evaluate need for skin moisturizer/barrier cream  - Collaborate with interdisciplinary team (i e  Nutrition, Rehabilitation, etc )   - Patient/family teaching  Outcome: Progressing     Problem: Nutrition/Hydration-ADULT  Goal: Nutrient/Hydration intake appropriate for improving, restoring or maintaining nutritional needs  Description  Monitor and assess patient's nutrition/hydration status for malnutrition (ex- brittle hair, bruises, dry skin, pale skin and conjunctiva, muscle wasting, smooth red tongue, and disorientation)  Collaborate with interdisciplinary team and initiate plan and interventions as ordered  Monitor patient's weight and dietary intake as ordered or per policy  Utilize nutrition screening tool and intervene per policy  Determine patient's food preferences and provide high-protein, high-caloric foods as appropriate       INTERVENTIONS:  - Monitor oral intake, urinary output, labs, and treatment plans  - Assess nutrition and hydration status and recommend course of action  - Evaluate amount of meals eaten  - Assist patient with eating if necessary   - Allow adequate time for meals  - Recommend/ encourage appropriate diets, oral nutritional supplements, and vitamin/mineral supplements  - Order, calculate, and assess calorie counts as needed  - Recommend, monitor, and adjust tube feedings and TPN/PPN based on assessed needs  - Assess need for intravenous fluids  - Provide specific nutrition/hydration education as appropriate  - Include patient/family/caregiver in decisions related to nutrition  Outcome: Progressing     Problem: PAIN - ADULT  Goal: Verbalizes/displays adequate comfort level or baseline comfort level  Description  Interventions:  - Encourage patient to monitor pain and request assistance  - Assess pain using appropriate pain scale  - Administer analgesics based on type and severity of pain and evaluate response  - Implement non-pharmacological measures as appropriate and evaluate response  - Consider cultural and social influences on pain and pain management  - Notify physician/advanced practitioner if interventions unsuccessful or patient reports new pain  Outcome: Progressing     Problem: INFECTION - ADULT  Goal: Absence or prevention of progression during hospitalization  Description  INTERVENTIONS:  - Assess and monitor for signs and symptoms of infection  - Monitor lab/diagnostic results  - Monitor all insertion sites, i e  indwelling lines, tubes, and drains  - Monitor endotracheal (as able) and nasal secretions for changes in amount and color  - [place] appropriate cooling/warming therapies per order  - Administer medications as ordered  - Instruct and encourage patient and family to use good hand hygiene technique  - Identify and instruct in appropriate isolation precautions for identified infection/condition  Outcome: Progressing     Problem: SAFETY ADULT  Goal: Patient will remain free of falls  Description  INTERVENTIONS:  - Assess patient frequently for physical needs  -  Identify cognitive and physical deficits and behaviors that affect risk of falls    -  [place] fall precautions as indicated by assessment   - Educate patient/family on patient safety including physical limitations  - Instruct patient to call for assistance with activity based on assessment  - Modify environment to reduce risk of injury  - Consider OT/PT consult to assist with strengthening/mobility  Outcome: Progressing  Goal: Maintain or return to baseline ADL function  Description  INTERVENTIONS:  -  Assess patient's ability to carry out ADLs; assess patient's baseline for ADL function and identify physical deficits which impact ability to perform ADLs (bathing, care of mouth/teeth, toileting, grooming, dressing, etc )  - Assess/evaluate cause of self-care deficits   - Assess range of motion  - Assess patient's mobility; develop plan if impaired  - Assess patient's need for assistive devices and provide as appropriate  - Encourage maximum independence but intervene and supervise when necessary  ¯ Involve family in performance of ADLs  ¯ Assess for home care needs following discharge   ¯ Request OT consult to assist with ADL evaluation and planning for discharge  ¯ Provide patient education as appropriate  Outcome: Progressing  Goal: Maintain or return mobility status to optimal level  Description  INTERVENTIONS:  - Assess patient's baseline mobility status (ambulation, transfers, stairs, etc )    - Identify cognitive and physical deficits and behaviors that affect mobility  - Identify mobility aids required to assist with transfers and/or ambulation (gait belt, sit-to-stand, lift, walker, cane, etc )  - [place] fall precautions as indicated by assessment  - Record patient progress and toleration of activity level on Mobility SBAR; progress patient to next Phase/Stage  - Instruct patient to call for assistance with activity based on assessment  - Request Rehabilitation consult to assist with strengthening/weightbearing, etc   Outcome: Progressing     Problem: DISCHARGE PLANNING  Goal: Discharge to home or other facility with appropriate resources  Description  INTERVENTIONS:  - Identify barriers to discharge w/patient and caregiver  - Arrange for needed discharge resources and transportation as appropriate  - Identify discharge learning needs (meds, wound care, etc )  - Arrange for interpretive services to assist at discharge as needed  - Refer to Case Management Department for coordinating discharge planning if the patient needs post-hospital services based on physician/advanced practitioner order or complex needs related to functional status, cognitive ability, or social support system  Outcome: Progressing     Problem: Knowledge Deficit  Goal: Patient/family/caregiver demonstrates understanding of disease process, treatment plan, medications, and discharge instructions  Description  Complete learning assessment and assess knowledge base  Interventions:  - Provide teaching at level of understanding  - Provide teaching via preferred learning methods  Outcome: Progressing     Problem: CARDIOVASCULAR - ADULT  Goal: Maintains optimal cardiac output and hemodynamic stability  Description  INTERVENTIONS:  - Monitor I/O, vital signs and rhythm  - Monitor for S/S and trends of decreased cardiac output i e  bleeding, hypotension  - Administer and titrate ordered vasoactive medications to optimize hemodynamic stability  - Assess quality of pulses, skin color and temperature  - Assess for signs of decreased coronary artery perfusion - ex  Angina  - Instruct patient to report change in severity of symptoms  Outcome: Progressing  Goal: Absence of cardiac dysrhythmias or at baseline rhythm  Description  INTERVENTIONS:  - Continuous cardiac monitoring, monitor vital signs, obtain 12 lead EKG if indicated  - Administer antiarrhythmic and heart rate control medications as ordered  - Monitor electrolytes and administer replacement therapy as ordered  Outcome: Progressing     Problem: Potential for Falls  Goal: Patient will remain free of falls  Description  INTERVENTIONS:  - Assess patient frequently for physical needs  -  Identify cognitive and physical deficits and behaviors that affect risk of falls    -  [place] fall precautions as indicated by assessment   - Educate patient/family on patient safety including physical limitations  - Instruct patient to call for assistance with activity based on assessment  - Modify environment to reduce risk of injury  - Consider OT/PT consult to assist with strengthening/mobility  Outcome: Progressing     Problem: METABOLIC, FLUID AND ELECTROLYTES - ADULT  Goal: Electrolytes maintained within normal limits  Description  INTERVENTIONS:  - Monitor labs and assess patient for signs and symptoms of electrolyte imbalances  - Administer electrolyte replacement as ordered  - Monitor response to electrolyte replacements, including repeat lab results as appropriate  - Instruct patient on fluid and nutrition as appropriate  Outcome: Progressing  Goal: Fluid balance maintained  Description  INTERVENTIONS:  - Monitor labs   - Monitor I/O and WT  - Instruct patient on fluid and nutrition as appropriate  - Assess for signs & symptoms of volume excess or deficit  Outcome: Progressing  Goal: Glucose maintained within target range  Description  INTERVENTIONS:  - Monitor Blood Glucose as ordered  - Assess for signs and symptoms of hyperglycemia and hypoglycemia  - Administer ordered medications to maintain glucose within target range  - Assess nutritional intake and initiate nutrition service referral as needed  Outcome: Progressing     Problem: MUSCULOSKELETAL - ADULT  Goal: Maintain or return mobility to safest level of function  Description  INTERVENTIONS:  - Assess patient's ability to carry out ADLs; assess patient's baseline for ADL function and identify physical deficits which impact ability to perform ADLs (bathing, care of mouth/teeth, toileting, grooming, dressing, etc )  - Assess/evaluate cause of self-care deficits   - Assess range of motion  - Assess patient's mobility  - Assess patient's need for assistive devices and provide as appropriate  - Encourage maximum independence but intervene and supervise when necessary  - Involve family in performance of ADLs  - Assess for home care needs following discharge   - Consider OT consult to assist with ADL evaluation and planning for discharge  - Provide patient education as appropriate  Outcome: Progressing  Goal: Maintain proper alignment of affected body part  Description  INTERVENTIONS:  - Support, maintain and protect limb and body alignment  - Provide patient/ family with appropriate education  Outcome: Progressing

## 2019-08-19 NOTE — ASSESSMENT & PLAN NOTE
- Patient received 1 dose of 20 mg IV Lasix for lower extremity edema    - Continue home dose metoprolol   - Discuss lasix as needed at PCP follow up visit

## 2019-08-19 NOTE — PROGRESS NOTES
Progress Note - Infectious Disease   Azucena Matute 78 y o  female MRN: [id number]  Unit/Bed#: E4 -01 Encounter: [id number]      Impression/Recommendations:  1    E coli bacteremia   Source is unclear  Nadine Nassar has mild pyuria but she has no urinary symptoms   She has no abdominal flank pain   Abdomen/pelvis CT without acute pathology   Patient had colonoscopy 10 years ago with polyp   I am concerned about the possibility of chronic lesion, either recurrent polyp on malignancy   Patient needs colonoscopy  Patient is clinically improved  Continue Keflex  Monitor temperature/WBC  Follow-up repeat blood cultures, which are still pending  Recommend colonoscopy   This can be done as an outpatient  Treat times 14 days total, another 9 days      2   Leukocytosis and elevated procalcitonin, most likely secondary to above   WBC count has normalized  Patient remains afebrile and clinically stable  Antibiotic plan as in above  Monitor WBC      3  Ventricular tachycardia, with plan for ICD insertion   With clearance of bacteremia, patient should be okay to proceed to ICD insertion  Followup final repeat blood cultures  Okay to proceed ICD insertion any time      4  History of right breast cancer, status post mastectomy   Patient is currently on tamoxifen but not chemotherapy      5  Ampicillin allergy   Will avoid amoxicillin/ampicillin   Patient has tolerated cefepime without difficulty   Patient has tolerated IV cefazolin without difficulty  Discussed with patient and  in detail regarding above plan  Discussed with Dr Amarilis Molina from [facility] service      Antibiotics:  Keflex  Antibiotic # 5     Subjective:  Patient feels well  No further fever or chills  No urinary symptoms  No abdominal flank pain  She is tolerating antibiotic well  No nausea, vomiting or diarrhea      Objective:  Vitals:  Temp:  [97 8 °F (36 6 °C)-98 3 °F (36 8 °C)] 97 8 °F (36 6 °C)  HR:  [80-89] 80  Resp:  [18] 18  BP: (110-140)/(58-67) 130/64  SpO2:  [94 %-95 %] 94 %  Temp (24hrs), Av °F (36 7 °C), Min:97 8 °F (36 6 °C), Max:98 3 °F (36 8 °C)  Current: Temperature: 97 8 °F (36 6 °C)    Physical Exam:     General: Awake, alert, cooperative, no distress  Neck:  Supple  No mass  No lymphadenopathy  Lungs: Expansion symmetric, no rales, no wheezing, respirations unlabored  Heart:  Regular rate and rhythm, S1 and S2 normal, no murmur  Abdomen: Soft, nondistended, non-tender, bowel sounds active all four quadrants,        no masses, no organomegaly  Extremities: No edema  No erythema/warmth  No ulcer  Nontender to palpation  Skin:  No rash  Neuro: Moves all extremities  Invasive Devices     Peripheral Intravenous Line            Peripheral IV 19 Left Forearm less than 1 day                Labs studies:   I have personally reviewed pertinent labs  Results from last 7 days   Lab Units 19  0551 19  0044 19  0557   POTASSIUM mmol/L 4 0 4 3 3 6   CHLORIDE mmol/L 106 101 104   CO2 mmol/L 29 25 26   BUN mg/dL 17 14 15   CREATININE mg/dL 0 80 0 68 0 60   EGFR ml/min/1 73sq m 69 82 86   CALCIUM mg/dL 8 6 9 0 8 2*     Results from last 7 days   Lab Units 19  0551 19  0043 08/15/19  0919   WBC Thousand/uL 8 49 12 50* 12 77*   HEMOGLOBIN g/dL 10 6* 11 7 12 1   PLATELETS Thousands/uL 165 105* 106*     Results from last 7 days   Lab Units 19  0608 19  0559 08/15/19  1147 19  2224 19  1517   BLOOD CULTURE  No Growth at 24 hrs  No Growth at 24 hrs   --  No Growth After 4 Days  Escherichia coli*   GRAM STAIN RESULT   --   --   --   --  Gram negative rods*   URINE CULTURE   --   --  >100,000 cfu/ml   --   --        Imaging Studies:   I have personally reviewed pertinent imaging study reports and images in PACS  EKG, Pathology, and Other Studies:   I have personally reviewed pertinent reports

## 2019-08-19 NOTE — DISCHARGE SUMMARY
Discharge- Usmanaaliyah Cos 1940, 78 y o  female MRN: [id number]    Unit/Bed#: E4 -01 Encounter: [id number]    Primary Care Provider: Reza Howell MD   Date and time admitted to hospital: 8/12/2019  5:04 AM        * Polymorphic ventricular tachycardia (HCC)  Assessment & Plan  · Continue amiodarone 200 mg t i d   · Discussed with Cardiology and ID  · Stable for transfer to [facility] for ICD placement  · Re-order telemetry    E coli bacteremia  Assessment & Plan  · Possibly from occult GI source  · Discussed with ID  · Cleared for ICD placement  · Continue cephalexin complete 14 days of antibiotics total  · Outpatient colonoscopy    SSS (sick sinus syndrome) ([facility] [address] )  Assessment & Plan  · With permanent pacemaker  · Transfer to [facility] to upgrade to pacemaker ICD  · Continue metoprolol 12 5 mg b i d  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)  Assessment & Plan  · Last imaging in May 2019 showed no evidence of metastatic disease within the abdomen and pelvis  · Continue tamoxifen 20 mg daily    Type 2 diabetes mellitus, without long-term current use of insulin [facility])  Assessment & Plan  Lab Results   Component Value Date    HGBA1C 7 0 (H) 03/01/2019       Recent Labs     08/18/19  1713 08/18/19  2027 08/19/19  0724 08/19/19  1213   POCGLU 263* 233* 152* 173*       Blood Sugar Average: Last 72 hrs:  (P) 875 [id number]     Sugars improving today  A1c at goal   Continue diabetic diet  Continue sliding scale insulin        Discharging Physician / Practitioner: Sanam Esteban MD  PCP: Reza Howell MD  Admission Date:   Admission Orders (From admission, onward)     Ordered        08/12/19 0640  Inpatient Admission (expected length of stay for this patient Order details is greater than two midnights)  Once                   Discharge Date: 08/19/19    Resolved Problems  Date Reviewed: 8/19/2019          Resolved    Hypokalemia 8/13/2019     Resolved by  Allen Smith SILKE    Hypomagnesemia 8/13/2019     Resolved by  Ricardo Ramon PA-C    High anion gap metabolic acidosis 2/33/7893     Resolved by  Ricardo Ramon PA-C          Consultations During Hospital Stay:  · Infectious disease  · Cardiology    Procedures Performed:   · None    Significant Findings / Test Results:   · Blood culture 8/14/19-1 out of 2 positive for E coli    Incidental Findings:   · None     Test Results Pending at Discharge (will require follow up): · None     Outpatient Tests Requested:  · None    Complications:  None    Reason for Admission:  Dyspnea    Hospital Course: Bronson Steward is a 78 y o  female patient who originally presented to the hospital on 8/12/2019 due to shortness of breath, associated with fatigue, decreased appetite, nausea and chills  EMS was alerted to her house and she was found to have intermittent bursts of ventricular tachycardia  She was given 100 mg of IV lidocaine at the scene and sent to the hospital for further evaluation and treatment  In the ER she was placed on an amiodarone infusion and transferred to the level 1 step-down unit for further workup and monitoring under the critical care service  She was evaluated by Cardiology and was diagnosed with polymorphic ventricular tachycardia on top of previously known sick sinus syndrome which was treated with a permanent pacemaker  She was transitioned to oral amiodarone 200 mg t i d  Due to ventricular tachycardia, she will need her permanent pacemaker upgraded to an ICD  Her hospitalization was highlighted by positive blood culture for E coli on 08/14  For this she was seen by infectious disease  No obvious source of the bacteremia was found  There is a suspicion for occult GI source for which outpatient colonoscopy was recommended by ID  She was treated with cephazolin and then transition to oral Keflex  ID recommended a total of 14 days of treatment    So far her bacteremia has cleared and she has been cleared by ID and Cardiology to undergo an ICD upgrade  She will be transferred to [facility] for this procedure to be done by electrophysiology  Discussed with Dr Rivas Bates who accepted the patient under SOD-C      Please see above list of diagnoses and related plan for additional information  Condition at Discharge: good     Discharge Day Visit / Exam:     Subjective:  Eager to undergo ICD procedure  No fever or chills  No lightheadedness  Vitals: Blood Pressure: 130/64 (08/19/19 1216)  Pulse: 80 (08/19/19 1216)  Temperature: 97 8 °F (36 6 °C) (08/19/19 0700)  Temp Source: Tympanic (08/19/19 0700)  Respirations: 18 (08/19/19 1216)  Height: 5' 1" (154 9 cm) (08/12/19 0900)  Weight - Scale: 74 kg (163 lb 2 3 oz) (08/19/19 0548)  SpO2: 94 % (08/19/19 0700)  Exam:   Physical Exam   Constitutional: She is oriented to person, place, and time  She appears well-developed  No distress  HENT:   Head: Normocephalic and atraumatic  Eyes: No scleral icterus  Neck: No JVD present  Cardiovascular: Normal rate  Exam reveals no gallop and no friction rub  No murmur heard  Pulmonary/Chest: Effort normal  No stridor  No respiratory distress  She has no wheezes  Abdominal: Soft  She exhibits no distension  There is no tenderness  There is no guarding  Musculoskeletal: She exhibits edema  Neurological: She is alert and oriented to person, place, and time  Skin: Skin is warm  She is not diaphoretic  Psychiatric: She has a normal mood and affect  Her behavior is normal      Discussion with Family:  Spoke with     Discharge instructions/Information to patient and family:   See after visit summary for information provided to patient and family  Provisions for Follow-Up Care:  See after visit summary for information related to follow-up care and any pertinent home health orders        Disposition:     [address] Transfer to [facility]    Planned Readmission: yes     Discharge Statement:  I spent >30 minutes discharging the patient  This time was spent on the day of discharge  I had direct contact with the patient on the day of discharge  Greater than 50% of the total time was spent examining patient, answering all patient questions, arranging and discussing plan of care with patient as well as directly providing post-discharge instructions  Additional time then spent on discharge activities  Discharge Medications:  See after visit summary for reconciled discharge medications provided to patient and family        ** Please Note: This note has been constructed using a voice recognition system **

## 2019-08-20 ENCOUNTER — ANESTHESIA EVENT (INPATIENT)
Dept: NON INVASIVE DIAGNOSTICS | Facility: HOSPITAL | Age: 79
DRG: 227 | End: 2019-08-20
Payer: COMMERCIAL

## 2019-08-20 ENCOUNTER — APPOINTMENT (INPATIENT)
Dept: RADIOLOGY | Facility: HOSPITAL | Age: 79
DRG: 227 | End: 2019-08-20
Payer: COMMERCIAL

## 2019-08-20 PROBLEM — J98.6 ELEVATED HEMIDIAPHRAGM: Status: ACTIVE | Noted: 2019-08-20

## 2019-08-20 LAB
ANION GAP SERPL CALCULATED.3IONS-SCNC: 5 MMOL/L (ref 4–13)
ATRIAL RATE: 81 BPM
BACTERIA BLD CULT: NORMAL
BUN SERPL-MCNC: 14 MG/DL (ref 5–25)
CALCIUM SERPL-MCNC: 9.1 MG/DL (ref 8.3–10.1)
CHLORIDE SERPL-SCNC: 101 MMOL/L (ref 100–108)
CO2 SERPL-SCNC: 32 MMOL/L (ref 21–32)
CREAT SERPL-MCNC: 0.7 MG/DL (ref 0.6–1.3)
GFR SERPL CREATININE-BSD FRML MDRD: 82 ML/MIN/1.73SQ M
GLUCOSE SERPL-MCNC: 138 MG/DL (ref 65–140)
GLUCOSE SERPL-MCNC: 142 MG/DL (ref 65–140)
GLUCOSE SERPL-MCNC: 143 MG/DL (ref 65–140)
GLUCOSE SERPL-MCNC: 166 MG/DL (ref 65–140)
GLUCOSE SERPL-MCNC: 275 MG/DL (ref 65–140)
MAGNESIUM SERPL-MCNC: 2.2 MG/DL (ref 1.6–2.6)
P AXIS: 37 DEGREES
POTASSIUM SERPL-SCNC: 4.2 MMOL/L (ref 3.5–5.3)
PR INTERVAL: 192 MS
QRS AXIS: 33 DEGREES
QRSD INTERVAL: 148 MS
QT INTERVAL: 470 MS
QTC INTERVAL: 545 MS
SODIUM SERPL-SCNC: 138 MMOL/L (ref 136–145)
T WAVE AXIS: -21 DEGREES
VENTRICULAR RATE: 81 BPM

## 2019-08-20 PROCEDURE — C1769 GUIDE WIRE: HCPCS | Performed by: INTERNAL MEDICINE

## 2019-08-20 PROCEDURE — 02HK3KZ INSERTION OF DEFIBRILLATOR LEAD INTO RIGHT VENTRICLE, PERCUTANEOUS APPROACH: ICD-10-PCS | Performed by: INTERNAL MEDICINE

## 2019-08-20 PROCEDURE — 33249 INSJ/RPLCMT DEFIB W/LEAD(S): CPT | Performed by: INTERNAL MEDICINE

## 2019-08-20 PROCEDURE — 0JH609Z INSERTION OF CARDIAC RESYNCHRONIZATION DEFIBRILLATOR PULSE GENERATOR INTO CHEST SUBCUTANEOUS TISSUE AND FASCIA, OPEN APPROACH: ICD-10-PCS | Performed by: INTERNAL MEDICINE

## 2019-08-20 PROCEDURE — 99223 1ST HOSP IP/OBS HIGH 75: CPT | Performed by: INTERNAL MEDICINE

## 2019-08-20 PROCEDURE — 33225 L VENTRIC PACING LEAD ADD-ON: CPT | Performed by: INTERNAL MEDICINE

## 2019-08-20 PROCEDURE — C1892 INTRO/SHEATH,FIXED,PEEL-AWAY: HCPCS | Performed by: INTERNAL MEDICINE

## 2019-08-20 PROCEDURE — C1882 AICD, OTHER THAN SING/DUAL: HCPCS

## 2019-08-20 PROCEDURE — NC001 PR NO CHARGE: Performed by: INTERNAL MEDICINE

## 2019-08-20 PROCEDURE — 93010 ELECTROCARDIOGRAM REPORT: CPT | Performed by: INTERNAL MEDICINE

## 2019-08-20 PROCEDURE — 99233 SBSQ HOSP IP/OBS HIGH 50: CPT | Performed by: INTERNAL MEDICINE

## 2019-08-20 PROCEDURE — 80048 BASIC METABOLIC PNL TOTAL CA: CPT | Performed by: INTERNAL MEDICINE

## 2019-08-20 PROCEDURE — 33233 REMOVAL OF PM GENERATOR: CPT | Performed by: INTERNAL MEDICINE

## 2019-08-20 PROCEDURE — 82948 REAGENT STRIP/BLOOD GLUCOSE: CPT

## 2019-08-20 PROCEDURE — 0JPT0PZ REMOVAL OF CARDIAC RHYTHM RELATED DEVICE FROM TRUNK SUBCUTANEOUS TISSUE AND FASCIA, OPEN APPROACH: ICD-10-PCS | Performed by: INTERNAL MEDICINE

## 2019-08-20 PROCEDURE — 71045 X-RAY EXAM CHEST 1 VIEW: CPT

## 2019-08-20 PROCEDURE — 83735 ASSAY OF MAGNESIUM: CPT | Performed by: INTERNAL MEDICINE

## 2019-08-20 PROCEDURE — C1777 LEAD, AICD, ENDO SINGLE COIL: HCPCS

## 2019-08-20 RX ORDER — PROPOFOL 10 MG/ML
INJECTION, EMULSION INTRAVENOUS AS NEEDED
Status: DISCONTINUED | OUTPATIENT
Start: 2019-08-20 | End: 2019-08-20 | Stop reason: SURG

## 2019-08-20 RX ORDER — SODIUM CHLORIDE 9 MG/ML
INJECTION, SOLUTION INTRAVENOUS CONTINUOUS PRN
Status: DISCONTINUED | OUTPATIENT
Start: 2019-08-20 | End: 2019-08-20 | Stop reason: SURG

## 2019-08-20 RX ORDER — ONDANSETRON 2 MG/ML
INJECTION INTRAMUSCULAR; INTRAVENOUS AS NEEDED
Status: DISCONTINUED | OUTPATIENT
Start: 2019-08-20 | End: 2019-08-20 | Stop reason: SURG

## 2019-08-20 RX ORDER — PROPOFOL 10 MG/ML
INJECTION, EMULSION INTRAVENOUS CONTINUOUS PRN
Status: DISCONTINUED | OUTPATIENT
Start: 2019-08-20 | End: 2019-08-20 | Stop reason: SURG

## 2019-08-20 RX ORDER — LIDOCAINE HYDROCHLORIDE 10 MG/ML
INJECTION, SOLUTION INFILTRATION; PERINEURAL CODE/TRAUMA/SEDATION MEDICATION
Status: COMPLETED | OUTPATIENT
Start: 2019-08-20 | End: 2019-08-20

## 2019-08-20 RX ORDER — VANCOMYCIN HYDROCHLORIDE 1 G/200ML
INJECTION, SOLUTION INTRAVENOUS AS NEEDED
Status: DISCONTINUED | OUTPATIENT
Start: 2019-08-20 | End: 2019-08-20 | Stop reason: SURG

## 2019-08-20 RX ORDER — FUROSEMIDE 20 MG/1
20 TABLET ORAL ONCE
Status: COMPLETED | OUTPATIENT
Start: 2019-08-21 | End: 2019-08-21

## 2019-08-20 RX ORDER — FENTANYL CITRATE 50 UG/ML
INJECTION, SOLUTION INTRAMUSCULAR; INTRAVENOUS AS NEEDED
Status: DISCONTINUED | OUTPATIENT
Start: 2019-08-20 | End: 2019-08-20 | Stop reason: SURG

## 2019-08-20 RX ORDER — GENTAMICIN SULFATE 40 MG/ML
INJECTION, SOLUTION INTRAMUSCULAR; INTRAVENOUS CODE/TRAUMA/SEDATION MEDICATION
Status: COMPLETED | OUTPATIENT
Start: 2019-08-20 | End: 2019-08-20

## 2019-08-20 RX ADMIN — VANCOMYCIN HYDROCHLORIDE 1000 MG: 1 INJECTION, SOLUTION INTRAVENOUS at 12:39

## 2019-08-20 RX ADMIN — METOPROLOL TARTRATE 12.5 MG: 25 TABLET ORAL at 21:21

## 2019-08-20 RX ADMIN — ATORVASTATIN CALCIUM 20 MG: 20 TABLET, FILM COATED ORAL at 17:07

## 2019-08-20 RX ADMIN — ONDANSETRON 4 MG: 2 INJECTION INTRAMUSCULAR; INTRAVENOUS at 13:06

## 2019-08-20 RX ADMIN — AMIODARONE HYDROCHLORIDE 200 MG: 200 TABLET ORAL at 17:07

## 2019-08-20 RX ADMIN — HEPARIN SODIUM 5000 UNITS: 5000 INJECTION INTRAVENOUS; SUBCUTANEOUS at 05:54

## 2019-08-20 RX ADMIN — INSULIN LISPRO 3 UNITS: 100 INJECTION, SOLUTION INTRAVENOUS; SUBCUTANEOUS at 21:20

## 2019-08-20 RX ADMIN — CEPHALEXIN 500 MG: 500 CAPSULE ORAL at 05:54

## 2019-08-20 RX ADMIN — IOHEXOL 20 ML: 350 INJECTION, SOLUTION INTRAVENOUS at 12:31

## 2019-08-20 RX ADMIN — PHENYLEPHRINE HYDROCHLORIDE 100 MCG: 10 INJECTION INTRAVENOUS at 13:09

## 2019-08-20 RX ADMIN — PHENYLEPHRINE HYDROCHLORIDE 100 MCG: 10 INJECTION INTRAVENOUS at 13:03

## 2019-08-20 RX ADMIN — FENTANYL CITRATE 25 MCG: 50 INJECTION, SOLUTION INTRAMUSCULAR; INTRAVENOUS at 12:50

## 2019-08-20 RX ADMIN — CEPHALEXIN 500 MG: 500 CAPSULE ORAL at 17:07

## 2019-08-20 RX ADMIN — METOPROLOL TARTRATE 12.5 MG: 25 TABLET ORAL at 10:02

## 2019-08-20 RX ADMIN — CEPHALEXIN 500 MG: 500 CAPSULE ORAL at 23:04

## 2019-08-20 RX ADMIN — PROPOFOL 50 MCG/KG/MIN: 10 INJECTION, EMULSION INTRAVENOUS at 12:39

## 2019-08-20 RX ADMIN — FENTANYL CITRATE 50 MCG: 50 INJECTION, SOLUTION INTRAMUSCULAR; INTRAVENOUS at 12:39

## 2019-08-20 RX ADMIN — PHENYLEPHRINE HYDROCHLORIDE 100 MCG: 10 INJECTION INTRAVENOUS at 12:49

## 2019-08-20 RX ADMIN — FENTANYL CITRATE 25 MCG: 50 INJECTION, SOLUTION INTRAMUSCULAR; INTRAVENOUS at 12:41

## 2019-08-20 RX ADMIN — HEPARIN SODIUM 5000 UNITS: 5000 INJECTION INTRAVENOUS; SUBCUTANEOUS at 21:20

## 2019-08-20 RX ADMIN — FUROSEMIDE 20 MG: 10 INJECTION, SOLUTION INTRAVENOUS at 07:50

## 2019-08-20 RX ADMIN — LIDOCAINE HYDROCHLORIDE 20 ML: 10 INJECTION, SOLUTION INFILTRATION; PERINEURAL at 13:06

## 2019-08-20 RX ADMIN — AMIODARONE HYDROCHLORIDE 200 MG: 200 TABLET ORAL at 07:50

## 2019-08-20 RX ADMIN — TAMOXIFEN CITRATE 20 MG: 10 TABLET, FILM COATED ORAL at 10:02

## 2019-08-20 RX ADMIN — GENTAMICIN SULFATE 80 MG: 40 INJECTION, SOLUTION INTRAMUSCULAR; INTRAVENOUS at 13:49

## 2019-08-20 RX ADMIN — PROPOFOL 20 MG: 10 INJECTION, EMULSION INTRAVENOUS at 12:39

## 2019-08-20 RX ADMIN — SODIUM CHLORIDE: 0.9 INJECTION, SOLUTION INTRAVENOUS at 12:22

## 2019-08-20 NOTE — PROGRESS NOTES
INTERNAL MEDICINE RESIDENCY PROGRESS NOTE     Name: Sue Lacy   Age & Sex: 78 y o  female   MRN: [id number]  Unit/Bed#: -01   Encounter: [id number]  Team: SOD Team C     PATIENT INFORMATION     Name: Sue Lacy   Age & Sex: 78 y o  female   MRN: [id number]  Hospital Stay Days: 1    ASSESSMENT/PLAN     Principal Problem:    Polymorphic ventricular tachycardia ([facility] [address] )  Active Problems:    Hypercholesteremia    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    Symptomatic bradycardia    Type 2 diabetes mellitus, without long-term current use of insulin ([facility] )    Hypertension    E coli bacteremia    Elevated hemidiaphragm      * Polymorphic ventricular tachycardia ([facility] [address] )  Assessment & Plan  Patient presented to [facility] with fatigue and episode of unresponsiveness, and was found to have ventricular tachycardia after interrogation of pacemaker  She was started on amiodarone 200 t i d  And is on metoprolol 12 5 b i d  She was followed by Cardiology [facility] who have discussed her case with EP here and patient is transferred here for pacemaker upgrade to ICD   - EP consult placed  - NPO after midnight  - Plan for ICD insertion 8/20/2019  - Follow EP/Cardiology recs   - QTc prolongation noted - 545, will keep Mag >2    Hypercholesteremia  Assessment & Plan  - Continue home atorvastatin 20mg PO    Elevated hemidiaphragm  Assessment & Plan  - Noted on CXR on 8/15 at [address]  - Absent breath sounds in right lower lung field this AM  - Follow up CXR    E coli bacteremia  Assessment & Plan  While admitted in [facility], patient had 1/2 blood cultures positive for E coli  She was evaluated by ID who feel that this could be secondary to colon polyp  Blood cultures have cleared so she is clear per the ID service for pacemaker upgrade    - patient currently afebrile, negative blood cultures, normalized wbc, no signs of active infection  - continue Keflex total of 14 days (final dose on 2019)  - outpatient colonoscopy    Hypertension  Assessment & Plan  - Continue metoprolol    - Will give 1 dose of 20 mg IV Lasix for lower extremity edema  Type 2 diabetes mellitus, without long-term current use of insulin [facility])  Assessment & Plan  Lab Results   Component Value Date    HGBA1C 7 0 (H) 2019       Recent Labs     19  2027 19  0724 19  1213 19  1608   POCGLU 233* 152* 173* 163*       Blood Sugar Average: Last 72 hrs:  (P) 163     - Continue sliding scale insulin t i d  And at bedtime    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 [facility])  Assessment & Plan  - Continue tamoxifen 20mg PO      SUBJECTIVE     Patient seen and examined  No acute events overnight  Patient reports that her only complaints are increased urination 2/2 to lasix and mild anxiety waiting for ICD placement  She endorses significant improvement in her lower extremity edema  She denies chest pain, shortness of breath, abdominal pain, nausea, lightheadedness, dizziness, confusion, vomiting  OBJECTIVE     Vitals:    19 0600 19 0708 19 1001 19 1138   BP:  135/65 120/58 116/53   Pulse:  72 75    Resp:  18     Temp:  97 8 °F (36 6 °C)  98 2 °F (36 8 °C)   TempSrc:       SpO2:  95%     Weight: 73 7 kg (162 lb 6 4 oz)         Temperature:   Temp (24hrs), Av 1 °F (36 7 °C), Min:97 8 °F (36 6 °C), Max:98 7 °F (37 1 °C)    Temperature: 98 2 °F (36 8 °C)  Intake & Output:  I/O        07 -  07 07 -  0700  07 -  0700    P  O   340     Total Intake  340     Urine  900     Stool  0     Total Output  900     Net  -560            Unmeasured Urine Occurrence  1 x     Unmeasured Stool Occurrence  1 x         Weights:   IBW: -92 5 kg    Body mass index is 30 69 kg/m²  Weight (last 2 days)     Date/Time   Weight    19 0600   73 7 (162 4)            Physical Exam   Constitutional: She is oriented to person, place, and time   She appears well-developed and well-nourished  No distress  HENT:   Head: Normocephalic and atraumatic  Mouth/Throat: No oropharyngeal exudate  Eyes: EOM are normal  No scleral icterus  Neck: Neck supple  Cardiovascular: Normal rate and regular rhythm  Exam reveals distant heart sounds  Exam reveals no gallop and no friction rub  No murmur heard  Pulmonary/Chest: Effort normal  No respiratory distress  She has decreased breath sounds (absent) in the right lower field  She has no wheezes  She has no rales  She exhibits no tenderness  Abdominal: Soft  Bowel sounds are normal  She exhibits no distension and no mass  There is no tenderness  There is no rebound  Musculoskeletal: She exhibits edema (1+ pitting edema in bilateral lower extremity, left worse than right) and deformity (arthritic joint changes on hands)  Neurological: She is alert and oriented to person, place, and time  Skin: Skin is warm and dry  She is not diaphoretic  Psychiatric: She has a normal mood and affect  Her behavior is normal  Judgment and thought content normal    Nursing note and vitals reviewed  LABORATORY DATA     Labs: I have personally reviewed pertinent reports  Results from last 7 days   Lab Units 08/18/19  0551 08/16/19  0043 08/15/19  0919   WBC Thousand/uL 8 49 12 50* 12 77*   HEMOGLOBIN g/dL 10 6* 11 7 12 1   HEMATOCRIT % 32 9* 36 2 37 5   PLATELETS Thousands/uL 165 105* 106*      Results from last 7 days   Lab Units 08/20/19  1013 08/18/19  0551 08/16/19  0044   POTASSIUM mmol/L 4 2 4 0 4 3   CHLORIDE mmol/L 101 106 101   CO2 mmol/L 32 29 25   BUN mg/dL 14 17 14   CREATININE mg/dL 0 70 0 80 0 68   CALCIUM mg/dL 9 1 8 6 9 0     Results from last 7 days   Lab Units 08/20/19  1013   MAGNESIUM mg/dL 2 2                        IMAGING & DIAGNOSTIC TESTING     Radiology Results: I have personally reviewed pertinent reports      CT Abdomen/Pelvis w/ contrast - 8/15/2019  IMPRESSION:     Small epicardial node seen on series 2 image 10 to the right of midline anteriorly measuring 8 mm  This is increasing in size compared to the prior examination and may represent pathologic node      Posterior layering right-sided pleural effusion extending above the scope of this examination with adjacent right lower lobe atelectasis  In addition there is middle lobe atelectasis as well      Previous partial hepatectomy with diffuse hepatic steatosis  Focal fatty sparing noted within the right lobe of the liver posteriorly and laterally where there is focal biliary dilatation      Small amount of air is again present within a decompressed gallbladder      Extensive colonic diverticulosis within the sigmoid colon  No evidence of acute diverticulitis  Other Diagnostic Testing: I have personally reviewed pertinent reports      ACTIVE MEDICATIONS     Current Facility-Administered Medications   Medication Dose Route Frequency    acetaminophen (TYLENOL) tablet 650 mg  650 mg Oral Q6H PRN    aluminum-magnesium hydroxide-simethicone (MYLANTA) 200-200-20 mg/5 mL oral suspension 30 mL  30 mL Oral Q6H PRN    amiodarone tablet 200 mg  200 mg Oral TID With Meals    atorvastatin (LIPITOR) tablet 20 mg  20 mg Oral After Dinner    cephalexin (KEFLEX) capsule 500 mg  500 mg Oral Q6H [facility]    heparin (porcine) subcutaneous injection 5,000 Units  5,000 Units Subcutaneous Q8H [facility]    insulin lispro (HumaLOG) 100 units/mL subcutaneous injection 1-5 Units  1-5 Units Subcutaneous HS    insulin lispro (HumaLOG) 100 units/mL subcutaneous injection 1-5 Units  1-5 Units Subcutaneous TID AC    metoprolol tartrate (LOPRESSOR) partial tablet 12 5 mg  12 5 mg Oral Q12H [facility]    ondansetron (ZOFRAN) injection 4 mg  4 mg Intravenous Q6H PRN    tamoxifen (NOLVADEX) tablet 20 mg  20 mg Oral Daily       VTE Pharmacologic Prophylaxis: Heparin  VTE Mechanical Prophylaxis: sequential compression device    Portions of the record may have been created with voice recognition software  Occasional wrong word or "sound a like" substitutions may have occurred due to the inherent limitations of voice recognition software    Read the chart carefully and recognize, using context, where substitutions have occurred   ==  [address] [facility]  Internal Medicine [place], [place]

## 2019-08-20 NOTE — CONSULTS
Consultation - Electrophysiology-Cardiology (EP)   Emmanuelle Peña 78 y o  female MRN: [id number]  Unit/Bed#: -01 Encounter: [id number]            Inpatient consult to Electrophysiology     Performed by  Douglas Calderón MD     Authorized by Mohamud Mccray DO              Physician Requesting Consult: Carrie Albert MD  Reason for Consult: ICD    HPI  Emmanuelle Peña is a 78 y o  female with PMH of SSS s/p PPM ([employer]), HTN, HLD, DM2, breast cancer with mets who initially presented on 8/11/19 with fatigue and dyspnea found to have 120 second run of polymorphic VT with atrial rate 140's  Subsequent LHC showed non-occlusive CAD  Started on amio  Found to have e coli bacteremia of unclear source now on cefazolin (started 8/17/19, today is day 4)  Transferred here for upgrade of PPM to ICD for secondary prevention  She states she feels much better compared to when she 1st came into her other hospital   She denies any dysuria actively or during any part of her hospitalization  She states that she previously did not want to have this done but understands importance of having colonoscopy performed outpatient given bacteremia of unknown etiology  Denies fever chills nausea vomiting  Denies chest pain  She states that during episodes of VT she is asymptomatic  No family history of sudden cardiac death or accidental drowning or car accidents  We discussed long-term management of her VT and risk of deterioration into VFib  Discussed amiodarone and side effects including lung liver and thyroid and skin toxicity  However that the amnio benefits greatly outweigh the risks at this point  Also discussed reasoning for ICD implantation she understands reasoning for secondary prevention  She was appreciative of explanation and of all of the care she has received at [facility]      PMH: as above   Surgical Hx: tonsilectomy, rotator cuff repair, mastectomy, cataract  FH: father w lung cancer, son with DM2  Social Hx: denies sex, cigs, drugs, acl  Allergies: Allergies   Allergen Reactions    Ampicillin Shortness Of Breath, Anaphylaxis and Other (See Comments)     Other reaction(s): Unknown Allergic Reaction  Reaction Date: [id number];      Cardiologist: Dr Shiv Mills    Prior Cardiac Imaging  - TTE (8/13/19): EF 70%, possible hypokinesis basal inferior walls, G1DD  - LHC (8/13/19): normal coronary arteries  - EP device report (5/24/19): PPM, DDD mode  Battery 4 5 years    Physical exam  Gen: well appearing  Osteoarthritic changes of the DIP, PIP joints BL  Psych: AOx3  Skin: intact  Cardiac: S1, S2, regular rate, no S3 or S4 appreciated  No murmurs  +2 PT, radial pulses  No peripheral edema No carotid bruits  Resp: CTABL  No crackles  MSK: 5/5 strength throughout muscle groups  Neuro: CN grossly intact  Sensory to light touch, pain, proprioception intact BL LE, UE  LN: no cervical LAD  Rheum: Osteoarthritic changes of the DIP, PIP joints BL    A&P  Leonardo Mcintosh is a 78 y o  female with PMH of SSS s/p PPM ([employer]), HTN, HLD, DM2, breast cancer with mets who initially presented on 8/11/19 with fatigue and dyspnea found to have 120 second run of polymorphic VT with atrial rate 140's  Subsequent LHC showed non-occlusive CAD  Started on amio  Found to have e coli bacteremia of unclear source now on cefazolin (started 8/17/19, today is day 4)    1  Sustained VT  - Needs venogram then ICD upgrade for secondary prevention  - continue amio 200mg TID for 1 week total,  - continue metop 12 5mg q12H, atorvastatin 20mg  - appreciate ID recs  - Hb 10 6  Trend  Can use pediatric tubes to conserve blood draws    Plan  1  Keep NPO  Plan for venogram then ICD upgrade  Will confirm timing with attending physician  2  K >4, Mg >2  Please check Mg and replace PRN    Please await attending physician final attestation      Sarah Singleton MD  - PGY-4 Cardiology Fellow  - Pager: [phone]  ----                                  Review of Systems  ROS as noted above, otherwise 12 point review of systems was performed and is negative  Historical Information   Past Medical History:   Diagnosis Date    Anxiety     Arthritis     Breast CA ([facility] )     recurrent, ductal carcinoma ER, [place] pos    Cardiac disease     Depression     Diverticula of intestine     Dizziness     and passes out    Flushing     Hiatal hernia     [place] (hard of hearing[place]      liz[place]    Hypercholesteremia     Liver mass     Liver metastasis (HCC)     Metastatic adenocarcinoma to liver ([facility] )     Bx 01/03/2017    PONV (postoperative nausea and vomiting)     Recurrent breast cancer ([facility] )     Shingles     Shortness of breath     on exertion    Tachycardia     Urinary incontinence     Use of cane as ambulatory aid     or walker     Past Surgical History:   Procedure Laterality Date    BREAST SURGERY      CARDIAC PACEMAKER REMOVAL N/A 11/9/2017    Procedure: PACEMAKER LEAD REVISION, REMOVAL OF NONFUNCTIONING LEAD, AND INSERTION OF A NEW RV LEAD;  Surgeon: Yonatan Deleon MD;  Location[facility];  Service: Cardiology    CATARACT EXTRACTION Bilateral     COLONOSCOPY      ERCP N/A 1/8/2018    Procedure: ENDOSCOPIC RETROGRADE CHOLANGIOPANCREATOGRAPHY (ERCP); Surgeon: Mabel Eid MD;  Location: [facility]; Service: Gastroenterology    HYSTERECTOMY      JOINT REPLACEMENT      [place]  TKR and right TSR    MASTECTOMY Right     OR ERCP DX COLLECTION SPECIMEN BRUSHING/WASHING N/A 2/22/2018    Procedure: ENDOSCOPIC RETROGRADE CHOLANGIOPANCREATOGRAPHY (ERCP) with stent removal;  Surgeon: Mabel Eid MD;  Location: [facility];   Service: Gastroenterology    OR RESEC LIVER,PART LOBECTOMY N/A 7/13/2017    Procedure: LIVER RESECTION, INTRAOPERATIVE ULTRASOUND;  Surgeon: Gisselle Lainez MD;  Location[facility];  Service: Surgical Oncology    ROTATOR CUFF REPAIR Right     SENTINEL LYMPH NODE BIOPSY Right     TONSILECTOMY AND ADNOIDECTOMY      WOUND DEBRIDEMENT Left 11/9/2017    Procedure: DEBRIDEMENT WOUND AND DRESSING CHANGE [facility]);   Surgeon: Julio Curtis MD;  [facility];  Service: Cardiology     Social History     Substance and Sexual Activity   Alcohol Use Not Currently     Social History     Substance and Sexual Activity   Drug Use No     Social History     Tobacco Use   Smoking Status Never Smoker   Smokeless Tobacco Never Used     Family History: non-contributory    Meds/Allergies   Hospital Medications:   Current Facility-Administered Medications   Medication Dose Route Frequency    acetaminophen (TYLENOL) tablet 650 mg  650 mg Oral Q6H PRN    aluminum-magnesium hydroxide-simethicone (MYLANTA) 200-200-20 mg/5 mL oral suspension 30 mL  30 mL Oral Q6H PRN    amiodarone tablet 200 mg  200 mg Oral TID With Meals    atorvastatin (LIPITOR) tablet 20 mg  20 mg Oral After Dinner    cephalexin (KEFLEX) capsule 500 mg  500 mg Oral Q6H [facility]    heparin (porcine) subcutaneous injection 5,000 Units  5,000 Units Subcutaneous Q8H [facility]    insulin lispro (HumaLOG) 100 units/mL subcutaneous injection 1-5 Units  1-5 Units Subcutaneous HS    insulin lispro (HumaLOG) 100 units/mL subcutaneous injection 1-5 Units  1-5 Units Subcutaneous TID AC    metoprolol tartrate (LOPRESSOR) partial tablet 12 5 mg  12 5 mg Oral Q12H [facility]    ondansetron (ZOFRAN) injection 4 mg  4 mg Intravenous Q6H PRN    tamoxifen (NOLVADEX) tablet 20 mg  20 mg Oral Daily     Home Medications:   Medications Prior to Admission   Medication    atorvastatin (LIPITOR) 20 mg tablet    Calcium Citrate-Vitamin D (CALCIUM CITRATE + D PO)    clindamycin (CLEOCIN) 300 MG capsule    clotrimazole-betamethasone (LOTRISONE) 1-0 05 % cream    Cranberry (THERACRAN HP PO)    furosemide (LASIX) 20 mg tablet    Glucosamine-Chondroit-Vit C-Mn (GLUCOSAMINE 1500 COMPLEX) CAPS    metFORMIN (GLUCOPHAGE) 500 mg tablet    metoprolol tartrate (LOPRESSOR) 25 mg tablet    Multiple Vitamin (MULTIVITAMIN) capsule    tamoxifen (NOLVADEX) 20 mg tablet       Allergies   Allergen Reactions    Ampicillin Shortness Of Breath, Anaphylaxis and Other (See Comments)     Other reaction(s): Unknown Allergic Reaction  Reaction Date: [id number];        Objective   Vitals: Blood pressure 135/65, pulse 72, temperature 97 8 °F (36 6 °C), resp  rate 18, SpO2 95 %  Orthostatic Blood Pressures      Most Recent Value   Blood Pressure  135/65 filed at 08/20/2019 0708   Patient Position - Orthostatic VS  Sitting filed at 08/19/2019 1558            Intake/Output Summary (Last 24 hours) at 8/20/2019 0847  Last data filed at 8/20/2019 0547  Gross per 24 hour   Intake 340 ml   Output 900 ml   Net -560 ml       Invasive Devices     Peripheral Intravenous Line            Peripheral IV 08/19/19 Left Forearm less than 1 day                Physical Exam    Lab Results: I have personally reviewed pertinent lab results      Results from last 7 days   Lab Units 08/18/19  0551 08/16/19  0043 08/15/19  0919   WBC Thousand/uL 8 49 12 50* 12 77*   HEMOGLOBIN g/dL 10 6* 11 7 12 1   HEMATOCRIT % 32 9* 36 2 37 5   PLATELETS Thousands/uL 165 105* 106*     Results from last 7 days   Lab Units 08/18/19  0551 08/16/19  0044 08/14/19  0557   POTASSIUM mmol/L 4 0 4 3 3 6   CHLORIDE mmol/L 106 101 104   CO2 mmol/L 29 25 26   BUN mg/dL 17 14 15   CREATININE mg/dL 0 80 0 68 0 60   CALCIUM mg/dL 8 6 9 0 8 2*

## 2019-08-20 NOTE — PROGRESS NOTES
Dr Heather Moore at bedside rounding with pt  Plan of care discussed  Per Dr Heather Moore, EP called yesterday and planning for ICD placement today

## 2019-08-20 NOTE — PHYSICAL THERAPY NOTE
Physical Therapy Cancellation Note    PT order received  Chart review performed  At this time, PT evaluation held as patietn for ICD placement this date  PT will follow and evaluate as appropriate      Ilsa Mayo, PT

## 2019-08-20 NOTE — PROCEDURES
BiV ICD device    History and physical were reviewed  Patient was examined and history was reviewed  No change in patient's condition Since history and physical has been completed        The pre- operative diagnosis:  Ventricular tachycardia and ventricular fibrillation   Secondary prevention ICD    Sick sinus syndrome  Complete heart block  Dual-chamber pacemaker, 100% RV paced    QRS = 134 - 148 ms , LBBB, from RV pacing     LVEF = 50-70% ( 9521-7341)  NYHA  II -III    Patient does not have reduced heart function, hence not on beta-blocker or ACE-inhibitor    RV perforation by passive lead,  post extraction and reimplantation in November 2017  History of large chest wall hematoma as complication of same    Metastatic breast cancer, post right mastectomy  Liver lesions removed in September 2017    Right-sided pleural effusions in January 2018    Diabetes mellitus type 2  Hypertension  Hyperlipidemia    Shared decision making done with patient and her primary cardiologist          Postoperative diagnosis:  Ventricular tachycardia and ventricular fibrillation   Secondary prevention ICD    Sick sinus syndrome  Complete heart block  Dual-chamber pacemaker, 100% RV paced    QRS = 134 - 148 ms , LBBB, from RV pacing     LVEF = 50-70% ( 1837-8202)  NYHA  II -III    Patient does not have reduced heart function, hence not on beta-blocker or ACE-inhibitor    RV perforation by passive lead,  post extraction and reimplantation in November 2017  History of large chest wall hematoma as complication of same    Metastatic breast cancer, post right mastectomy  Liver lesions removed in September 2017    Right-sided pleural effusions in January 2018    Diabetes mellitus type 2  Hypertension  Hyperlipidemia    Shared decision making done with patient and her primary cardiologist          Procedure:  1  Venogram    2  New RV ICD lead placed in the higher septum (not at the apex)    3   Original RV pacemaker lead not explanted  - history of RV wall perforation with prior RV lead placement  - current lead is in left posterior fascicle position, 100% RV paced, QRS is 134 -148  milliseconds, this is preventing patient from developing pacing mediated heart failure    4  Original RA lead kept     Three leads attached to a Bi V ICD device    Patient is 78years of age, frail, history of metastatic breast cancer, prior cardiac perforation by RV lead, dependent on pacemaker  The RV lead is a his lead in the left posterior fascicular position, pacing with a narrow QRS of 134 -148  milliseconds  Considering the very high risk of repeat perforation it was not explanted          Surgeon: [id number] -none    Specimens - none    Estimated blood loss- 10 ml    Findings-none    Complications none    Anesthesia-  Anesthesia by anaesthesiologist , local lidocaine by myself      Details of the device                Description of procedure: The patient was seen before the procedure  The details of the procedure was explained and patient agreed to the same  Appropriate consent was signed  The patient was brought to the electrophysiologic laboratory  Proper time out was done  Sterile dressing and draping was done  Local lidocaine was infiltrated, In the infraclavicular region at the site of device  Initial incision was made by a sharp number 10 blade  Thereafter plasma blade and blunt dissection was used to open up the prepectoral pocket  Appropriate hemostasis was taken care of      20 mL of radiocontrast, followed by 20 mL of saline, was injected in a peripheral vein on the side of the implant  Under direct visualization, the axillary vein was  Punctured,extra-thoracic  A single guidewire was inserted, and taking all the way to the inferior vena cava to confirm that it is on the right side  We also confirmed by the left anterior oblique view that the wire is in the right side  A 9F sheath was passed over the wire   The right ventricular lead was taken through the sheath  In HAMLIN view the lead was taken to the right ventricular outflow tract  It was then dropped to the mid septum  It was turned counter-clockwise to maintain contact with the septum  Position of the lead was confirmed in both HAMLIN and OLIMPIA views that it was mid septal  Appropriate sensing and thresholds were noted  The lead was screwed in  Once again the lead was tested for appropriate sensing, impedance and threshold  The sheath was removed  The lead was sutured to the prepectoral fascia and muscle  The new RV ICD lead was attached to the ICD port of a Bi V device  The original his lead in left posterior fascicular position was attached to the LV port  The atrial lead was attached to the atrial port    The pocket was washed with large amounts of antibiotic saline  Appropriate hemostasis was taken care of  The generator and leads were placed inside the pocket  The generator was tied down  Thereafter the device was interrogated and proper sensing and thresholds through the device were confirmed  The pocket was closed in 3 separate layers with intermittent sutures  Dressing was done with Steri-Strips, Telfa and Tegaderm        Summary of the procedure: The patient came in to the laboratory for RV ICD  Lead placement   The device has been placed and patient tolerated the procedure well

## 2019-08-20 NOTE — ASSESSMENT & PLAN NOTE
- Noted on CXR on 8/15 at [facility]  - Follow up CXR on 8/21 shows consistent elevated R hemidiaphragm w/ small R pleural effusion unchanged  - Upon chart checking, this seems unchanged from the past and is not new nor worse    - Follow up with PCP

## 2019-08-20 NOTE — UTILIZATION REVIEW
Initial Clinical Review    Admission: Date/Time/Statement: 8/19/19 @ 1600     Orders Placed This Encounter   Procedures    Inpatient Admission     Standing Status:   Standing     Number of Occurrences:   1     Order Specific Question:   Admitting Physician     Answer:   Guevara Burton [[id number]]     Order Specific Question:   Level of Care     Answer:   Med Surg [[id number]]     Order Specific Question:   Estimated length of stay     Answer:   More than 2 Midnights     Order Specific Question:   Certification     Answer:   I certify that inpatient services are medically necessary for this patient for a duration of greater than two midnights  See H&P and MD Progress Notes for additional information about the patient's course of treatment  No chief complaint on file  Assessment/Plan: 77 YO FEMALE WAS TRANSFERRED TO [place] [address] FLOOR 08-19-19 FOR ICD UP GRADE  ON ADMISSION TO [facility]  PATIENT FOUND TO BE IN V-TACH    AND (+) BLOOD CX  PATIENT WAS TREATED AND WAS CLEARED BY ID TO HAVE ICD UP GRADE PMH   SICK SINUS POLYMORPHIC V-TACH HTN,HYPERLIPIDEMIA,DM,BREAST CA   PLAN CONSULT CARDIOLOGY MONITOR AND SUPPORTIVE CARE  Polymorphic ventricular tachycardia (HCC)  Assessment & Plan  · Continue amiodarone 200 mg t i d   · Discussed with Cardiology and ID  · Stable for transfer to [facility] for ICD placement  · Re-order telemetry     E coli bacteremia  Assessment & Plan  · Possibly from occult GI source  · Discussed with ID  · Cleared for ICD placement  · Continue cephalexin complete 14 days of antibiotics total  · Outpatient colonoscopy     SSS (sick sinus syndrome) ([facility] )  Assessment & Plan  · With permanent pacemaker  · Transfer to [facility] to upgrade to pacemaker ICD  · Continue metoprolol 12 5 mg b i d         Vitals   Temperature Pulse Respirations Blood Pressure SpO2   08/19/19 1558 08/19/19 1557 08/19/19 1558 08/19/19 1557 08/19/19 1557   98 7 °F (37 1 °C) 75 20 144/97 97 %      Temp Source Heart Rate Source Patient Position - Orthostatic VS BP Location FiO2 (%)   08/19/19 1558 -- 08/19/19 1558 08/19/19 1558 --   Oral  Sitting Left arm       Pain Score       08/19/19 1558       No Pain        Wt Readings from Last 1 Encounters:   08/19/19 74 kg (163 lb 2 3 oz)     Additional Vital Signs:   08/20/19 07:08:56  97 8 °F (36 6 °C)  72  18  135/65  88  95 %  --  --   08/19/19 21:54:22  97 9 °F (36 6 °C)  77  20  129/61  84  96 %  --  --   08/19/19 21:50:24  --  79  --  129/61  84  98 %  --  --   08/19/19 2150  --  84  --  129/61  --  --  --  --   08/19/19 2000  --  --  --  --  --  --  None (Room air)  --   08/19/19 19:07:50  --  83  --  132/66  88  92 %  --  --   08/19/19 16:10:24  97 9 °F (36 6 °C)  75  18  112/67  82  97 %  --  --   08/19/19 1558  98 7 °F (37 1 °C)  --  20  --  --             Pertinent Labs/Diagnostic Test Results:   Results from last 7 days   Lab Units 08/18/19  0551 08/16/19  0043 08/15/19  0919   WBC Thousand/uL 8 49 12 50* 12 77*   HEMOGLOBIN g/dL 10 6* 11 7 12 1   HEMATOCRIT % 32 9* 36 2 37 5   PLATELETS Thousands/uL 165 105* 106*         Results from last 7 days   Lab Units 08/20/19  1013 08/18/19  0551 08/16/19  0044 08/14/19  0557   SODIUM mmol/L 138 144 134* 138   POTASSIUM mmol/L 4 2 4 0 4 3 3 6   CHLORIDE mmol/L 101 106 101 104   CO2 mmol/L 32 29 25 26   ANION GAP mmol/L 5 9 8 8   BUN mg/dL 14 17 14 15   CREATININE mg/dL 0 70 0 80 0 68 0 60   EGFR ml/min/1 73sq m 82 69 82 86   CALCIUM mg/dL 9 1 8 6 9 0 8 2*   MAGNESIUM mg/dL 2 2  --   --   --          Results from last 7 days   Lab Units 08/20/19  0614 08/19/19  2102 08/19/19  1608 08/19/19  1213 08/19/19  0724 08/18/19  2027 08/18/19  1713 08/18/19  1125 08/18/19  0725 08/17/19  2127   POC GLUCOSE mg/dl 166* 212* 163* 173* 152* 233* 263* 263* 160* 230*     Results from last 7 days   Lab Units 08/20/19  1013 08/18/19  0551 08/16/19  0044 08/14/19  0557   GLUCOSE RANDOM mg/dL 138 163* 184* 185* Results from last 7 days   Lab Units 08/16/19  0043 08/14/19  1517   PROCALCITONIN ng/ml 7 80* 5 95*     Results from last 7 days   Lab Units 08/15/19  1147   CLARITY UA  Slightly Cloudy   COLOR UA  [place]   SPEC GRAV UA  1 025   PH UA  6 0   GLUCOSE UA mg/dl 250 (1/4%)*   KETONES UA mg/dl Negative   BLOOD UA  Trace-Intact*   PROTEIN UA mg/dl Trace*   NITRITE UA  Positive*   BILIRUBIN UA  Interference- unable to analyze*   UROBILINOGEN UA E U /dl 4 0*   LEUKOCYTES UA  Trace*   WBC UA /hpf 20-30*   RBC UA /hpf 2-4*   BACTERIA UA /hpf Innumerable*   EPITHELIAL CELLS WET PREP /hpf Innumerable*     Results from last 7 days   Lab Units 08/17/19  0608 08/17/19  0559 08/15/19  1147 08/14/19  2224 08/14/19  1517   BLOOD CULTURE  No Growth at 48 hrs  No Growth at 48 hrs  --  No Growth After 5 Days   Escherichia coli*   GRAM STAIN RESULT   --   --   --   --  Gram negative rods*   URINE CULTURE   --   --  >100,000 cfu/ml   --   --          Past Medical History:   Diagnosis Date    Anxiety     Arthritis     Breast CA (HCC)     recurrent, ductal carcinoma ER, [place] pos    Cardiac disease     Depression     Diverticula of intestine     Dizziness     and passes out    Flushing     Hiatal hernia     [place]hard of hearing[place]      [place]izette    Hypercholesteremia     Liver mass     Liver metastasis (HCC)     Metastatic adenocarcinoma to liver (HCC)     Bx 01/03/2017    PONV (postoperative nausea and vomiting)     Recurrent breast cancer (HCC)     Shingles     Shortness of breath     on exertion    Tachycardia     Urinary incontinence     Use of cane as ambulatory aid     or walker     Present on Admission:   Polymorphic ventricular tachycardia (HCC)   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 (HCC)   Symptomatic bradycardia   Type 2 diabetes mellitus, without long-term current use of insulin (HCC)   Hypertension   E coli bacteremia   Hypercholesteremia      Admitting Diagnosis: V-tach ([address] ) [I47 2]  Age/Sex: 78 y o  female  Admission Orders:    Current Facility-Administered Medications:  acetaminophen 650 mg Oral Q6H PRN   aluminum-magnesium hydroxide-simethicone 30 mL Oral Q6H PRN   amiodarone 200 mg Oral TID With Meals   atorvastatin 20 mg Oral After Dinner   cephalexin 500 mg Oral Q6H [address]   heparin (porcine) 5,000 Units Subcutaneous Q8H [address]   insulin lispro 1-5 Units Subcutaneous HS   insulin lispro 1-5 Units Subcutaneous TID AC   metoprolol tartrate 12 5 mg Oral Q12H [facility]   ondansetron 4 mg Intravenous Q6H PRN   tamoxifen 20 mg Oral Daily       IP CONSULT TO ELECTROPHYSIOLOGY   TELEMETRY  SCD  BLOOD SUGARS AC AND HS  PT        Network Utilization Review Department  Phone: [phone]; Fax [phone]  [email or web address]  ATTENTION: Please call with any questions or concerns to [phone]  and carefully listen to the prompts so that you are directed to the right person  Send all requests for admission clinical reviews, approved or denied determinations and any other requests to fax [phone]   All voicemails are confidential

## 2019-08-20 NOTE — ANESTHESIA PREPROCEDURE EVALUATION
Review of Systems/Medical History  Patient summary reviewed  Chart reviewed      Cardiovascular  Hyperlipidemia, Hypertension , Dysrhythmias ,    Pulmonary  Shortness of breath,        GI/Hepatic    GERD poorly controlled, Liver disease , history of liver cancer,  Hiatal hernia,             Endo/Other     GYN       Hematology   Musculoskeletal    Arthritis     Neurology    Neuromuscular disease ,    Psychology   Anxiety, Depression ,              Physical Exam    Airway    Mallampati score: II  TM Distance: <3 FB  Neck ROM: full     Dental   No notable dental hx     Cardiovascular  Rhythm: regular, Rate: normal, Cardiovascular exam normal    Pulmonary  Pulmonary exam normal Breath sounds clear to auscultation,     Other Findings        Anesthesia Plan  ASA Score- 3     Anesthesia Type- IV sedation with anesthesia with ASA Monitors  Additional Monitors:   Airway Plan:         Plan Factors-    Induction- intravenous  Postoperative Plan-     Informed Consent- Anesthetic plan and risks discussed with patient  I personally reviewed this patient with the CRNA  Discussed and agreed on the Anesthesia Plan with the KELIN Madison

## 2019-08-20 NOTE — SOCIAL WORK
CM met w/ pt for d/c planning  Pt verbalizes understanding of CM role  PTA Pt lives w/  Gabriella Lucio in 2 story home  Pt is independent a/ ADLS & Ambulates w assistive devices of walker & cane   Pt never had SN, had   [facility]  Pt does drive  [employer]  [employer] [address]  Pharmacy is [facility] on [address] has had no short term rehab, psych or D & A Admission history  Gabriella Lucio is emergency contact [phone] &  will drive home at d/c  CM reviewed d/c planning process including the following: identifying help at home, patient preference for d/c planning needs, Discharge Lounge, [facility] Meds to Bed program, availability of treatment team to discuss questions or concerns patient and/or family may have regarding understanding medications and recognizing signs and symptoms once discharged  CM also encouraged patient to follow up with all recommended appointments after discharge  Patient advised of importance for patient and family to participate in managing patients medical well being    Discharge checklist discussed with patient

## 2019-08-20 NOTE — PROGRESS NOTES
Pt resting comfortably  No complaints of pain  S1/2 heart sounds noted  Clear lung sounds with minimally diminished bases  +2/1 pulses  +1 pitting edema on LLE  Trace edema on RLE  Bowel Sounds A4Q  IV C/D/I and flushed  Purewick placed on pt due to incontinence  Pt educated on purewick device  No other complaints at this time

## 2019-08-21 ENCOUNTER — APPOINTMENT (INPATIENT)
Dept: RADIOLOGY | Facility: HOSPITAL | Age: 79
DRG: 227 | End: 2019-08-21
Payer: COMMERCIAL

## 2019-08-21 VITALS
TEMPERATURE: 98.1 F | DIASTOLIC BLOOD PRESSURE: 76 MMHG | HEART RATE: 71 BPM | BODY MASS INDEX: 30.19 KG/M2 | RESPIRATION RATE: 18 BRPM | SYSTOLIC BLOOD PRESSURE: 147 MMHG | WEIGHT: 159.8 LBS | OXYGEN SATURATION: 95 %

## 2019-08-21 PROBLEM — R00.1 SYMPTOMATIC BRADYCARDIA: Status: RESOLVED | Noted: 2017-11-01 | Resolved: 2019-08-21

## 2019-08-21 PROBLEM — B96.20 E COLI BACTEREMIA: Status: RESOLVED | Noted: 2019-08-19 | Resolved: 2019-08-21

## 2019-08-21 PROBLEM — R78.81 E COLI BACTEREMIA: Status: RESOLVED | Noted: 2019-08-19 | Resolved: 2019-08-21

## 2019-08-21 LAB
ANION GAP SERPL CALCULATED.3IONS-SCNC: 6 MMOL/L (ref 4–13)
BUN SERPL-MCNC: 18 MG/DL (ref 5–25)
CALCIUM SERPL-MCNC: 8.9 MG/DL (ref 8.3–10.1)
CHLORIDE SERPL-SCNC: 102 MMOL/L (ref 100–108)
CO2 SERPL-SCNC: 30 MMOL/L (ref 21–32)
CREAT SERPL-MCNC: 0.74 MG/DL (ref 0.6–1.3)
ERYTHROCYTE [DISTWIDTH] IN BLOOD BY AUTOMATED COUNT: 13.2 % (ref 11.6–15.1)
GFR SERPL CREATININE-BSD FRML MDRD: 76 ML/MIN/1.73SQ M
GLUCOSE SERPL-MCNC: 147 MG/DL (ref 65–140)
GLUCOSE SERPL-MCNC: 170 MG/DL (ref 65–140)
GLUCOSE SERPL-MCNC: 192 MG/DL (ref 65–140)
HCT VFR BLD AUTO: 36.6 % (ref 34.8–46.1)
HGB BLD-MCNC: 11.5 G/DL (ref 11.5–15.4)
MAGNESIUM SERPL-MCNC: 2.3 MG/DL (ref 1.6–2.6)
MCH RBC QN AUTO: 31 PG (ref 26.8–34.3)
MCHC RBC AUTO-ENTMCNC: 31.4 G/DL (ref 31.4–37.4)
MCV RBC AUTO: 99 FL (ref 82–98)
PLATELET # BLD AUTO: 249 THOUSANDS/UL (ref 149–390)
PMV BLD AUTO: 10.1 FL (ref 8.9–12.7)
POTASSIUM SERPL-SCNC: 4.4 MMOL/L (ref 3.5–5.3)
RBC # BLD AUTO: 3.71 MILLION/UL (ref 3.81–5.12)
SODIUM SERPL-SCNC: 138 MMOL/L (ref 136–145)
WBC # BLD AUTO: 9.29 THOUSAND/UL (ref 4.31–10.16)

## 2019-08-21 PROCEDURE — 80048 BASIC METABOLIC PNL TOTAL CA: CPT | Performed by: INTERNAL MEDICINE

## 2019-08-21 PROCEDURE — G8979 MOBILITY GOAL STATUS: HCPCS

## 2019-08-21 PROCEDURE — NC001 PR NO CHARGE: Performed by: INTERNAL MEDICINE

## 2019-08-21 PROCEDURE — 71045 X-RAY EXAM CHEST 1 VIEW: CPT

## 2019-08-21 PROCEDURE — 99239 HOSP IP/OBS DSCHRG MGMT >30: CPT | Performed by: INTERNAL MEDICINE

## 2019-08-21 PROCEDURE — 82948 REAGENT STRIP/BLOOD GLUCOSE: CPT

## 2019-08-21 PROCEDURE — G8978 MOBILITY CURRENT STATUS: HCPCS

## 2019-08-21 PROCEDURE — 83735 ASSAY OF MAGNESIUM: CPT | Performed by: INTERNAL MEDICINE

## 2019-08-21 PROCEDURE — 85027 COMPLETE CBC AUTOMATED: CPT | Performed by: INTERNAL MEDICINE

## 2019-08-21 PROCEDURE — 97163 PT EVAL HIGH COMPLEX 45 MIN: CPT

## 2019-08-21 RX ORDER — AMIODARONE HYDROCHLORIDE 200 MG/1
200 TABLET ORAL
Qty: 17 TABLET | Refills: 0 | Status: SHIPPED | OUTPATIENT
Start: 2019-08-21 | End: 2019-09-09 | Stop reason: ALTCHOICE

## 2019-08-21 RX ORDER — CEPHALEXIN 500 MG/1
500 CAPSULE ORAL EVERY 6 HOURS SCHEDULED
Qty: 28 CAPSULE | Refills: 0 | Status: SHIPPED | OUTPATIENT
Start: 2019-08-21 | End: 2019-08-28

## 2019-08-21 RX ORDER — FUROSEMIDE 20 MG/1
20 TABLET ORAL DAILY PRN
Qty: 30 TABLET | Refills: 0 | Status: SHIPPED | OUTPATIENT
Start: 2019-08-21 | End: 2020-01-31 | Stop reason: SDUPTHER

## 2019-08-21 RX ORDER — FUROSEMIDE 20 MG/1
20 TABLET ORAL DAILY PRN
Qty: 30 TABLET | Refills: 0 | Status: SHIPPED | OUTPATIENT
Start: 2019-08-21 | End: 2019-08-21 | Stop reason: SDUPTHER

## 2019-08-21 RX ADMIN — HEPARIN SODIUM 5000 UNITS: 5000 INJECTION INTRAVENOUS; SUBCUTANEOUS at 06:00

## 2019-08-21 RX ADMIN — CEPHALEXIN 500 MG: 500 CAPSULE ORAL at 06:00

## 2019-08-21 RX ADMIN — CEPHALEXIN 500 MG: 500 CAPSULE ORAL at 11:20

## 2019-08-21 RX ADMIN — TAMOXIFEN CITRATE 20 MG: 10 TABLET, FILM COATED ORAL at 09:39

## 2019-08-21 RX ADMIN — FUROSEMIDE 20 MG: 20 TABLET ORAL at 09:38

## 2019-08-21 RX ADMIN — INSULIN LISPRO 1 UNITS: 100 INJECTION, SOLUTION INTRAVENOUS; SUBCUTANEOUS at 06:39

## 2019-08-21 RX ADMIN — METOPROLOL TARTRATE 12.5 MG: 25 TABLET ORAL at 09:38

## 2019-08-21 RX ADMIN — INSULIN LISPRO 1 UNITS: 100 INJECTION, SOLUTION INTRAVENOUS; SUBCUTANEOUS at 11:16

## 2019-08-21 RX ADMIN — AMIODARONE HYDROCHLORIDE 200 MG: 200 TABLET ORAL at 06:33

## 2019-08-21 RX ADMIN — AMIODARONE HYDROCHLORIDE 200 MG: 200 TABLET ORAL at 11:20

## 2019-08-21 NOTE — PHYSICAL THERAPY NOTE
Physical Therapy Evaluation     Patient's Name: Emmanuelle Peña    Admitting Diagnosis  V-tach [facility]) [I47 2]    Problem List  Patient Active Problem List   Diagnosis    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 ([facility] )    Hypercholesteremia    Gastroesophageal reflux disease without esophagitis    History of total knee replacement    Hearing loss    LBBB (left bundle branch block)    Symptomatic bradycardia    Complication associated with cardiac pacemaker lead    Pacemaker complications, subsequent encounter    Chest wall hematoma    Cholecystitis    Acute biliary pancreatitis    Cholangitis    Choledocholithiasis    Acute gallstone pancreatitis    Intermittent complete heart block ([facility] )    Spinal stenosis, lumbar region, with neurogenic claudication    Lumbar pain    Spondylolisthesis    Upper abdominal pain    SIRS (systemic inflammatory response syndrome) (HCC)    Lactic acidosis    Abnormal urinalysis    Type 2 diabetes mellitus, without long-term current use of insulin (HCC)    Hypertension    Nerve pain - Right    Neck pain    Osteoarthritis of cervical spine    SSS (sick sinus syndrome) (HCC)    Polymorphic ventricular tachycardia (HCC)    Mixed hyperlipidemia    E coli bacteremia    Elevated hemidiaphragm       Past Medical History  Past Medical History:   Diagnosis Date    Anxiety     Arthritis     Breast CA (HCC)     recurrent, ductal carcinoma ER, AR pos    Cardiac disease     Depression     Diverticula of intestine     Dizziness     and passes out    Flushing     Hiatal hernia     [place] (hard of hearing)      [place]    Hypercholesteremia     Liver mass     Liver metastasis (HCC)     Metastatic adenocarcinoma to liver ([facility] )     Bx 01/03/2017    PONV (postoperative nausea and vomiting)     Recurrent breast cancer (HCC)     Shingles     Shortness of breath     on exertion    Tachycardia     Urinary incontinence     Use of cane as ambulatory aid or walker       Past Surgical History  Past Surgical History:   Procedure Laterality Date    BREAST SURGERY      CARDIAC PACEMAKER REMOVAL N/A 11/9/2017    Procedure: PACEMAKER LEAD REVISION, REMOVAL OF NONFUNCTIONING LEAD, AND INSERTION OF A NEW RV LEAD;  Surgeon: Shiv Mills MD;  [facility];  Service: Cardiology    CATARACT EXTRACTION Bilateral     COLONOSCOPY      ERCP N/A 1/8/2018    Procedure: ENDOSCOPIC RETROGRADE CHOLANGIOPANCREATOGRAPHY (ERCP); Surgeon: Lyssa Lambert MD;  Location: [facility]; Service: Gastroenterology    HYSTERECTOMY      JOINT REPLACEMENT      lizette  TKR and right TSR    MASTECTOMY Right     [place] ERCP DX COLLECTION SPECIMEN BRUSHING/WASHING N/A 2/22/2018    Procedure: ENDOSCOPIC RETROGRADE CHOLANGIOPANCREATOGRAPHY (ERCP) with stent removal;  Surgeon: Lyssa Lambert MD;  Location: [facility]; Service: Gastroenterology    [place] RES [address] N/A 7/13/2017    Procedure: LIVER RESECTION, INTRAOPERATIVE ULTRASOUND;  Surgeon: Viky Burton MD;  [facility];  Service: Surgical Oncology    ROTATOR CUFF REPAIR Right     SENTINEL LYMPH NODE BIOPSY Right     TONSILECTOMY AND ADNOIDECTOMY      WOUND DEBRIDEMENT Left 11/9/2017    Procedure: DEBRIDEMENT WOUND AND DRESSING CHANGE [facility]);   Surgeon: Shiv Mills MD;  [facility];  Service: Cardiology            08/21/19 6221   Note Type   Note type Eval/Treat   Pain Assessment   Pain Assessment 0-10   Pain Score 3   Pain Type Acute pain   Pain Location Arm   Pain Orientation Left   Pain Descriptors Discomfort   Pain Frequency Intermittent   Pain Onset Ongoing   Clinical Progression Not changed   Effect of Pain on Daily Activities NWB LUE in sling for 24 hours    Patient's Stated Pain Goal No pain   Hospital Pain Intervention(s) Ambulation/increased activity   Response to Interventions tolerated with no increase    Home Living   Type of [facility] Two level;Bed/bath upstairs;1/2 bath on main level  (5 ELGIN with FF to second floor bed and bath, B railing )   Bathroom Shower/Tub Tub/shower unit   Bathroom Toilet Standard   Bathroom Equipment Grab bars in shower; Shower chair   Bathroom Accessibility Accessible   Home Equipment Cane;Walker   Additional Comments Patient reports she and  were recently looking to remodel her bathroom to a walk in shower  Patient utilizes a [facility] on the first floor and a  on the second floor  Prior Function   Level of [place] Independent with ADLs and functional mobility; Needs assistance with IADLs   Lives With Spouse   Receives Help From Family   ADL Assistance Needs assistance   IADLs Needs assistance   Falls in the last 6 months 0   Vocational Retired   Comments Patient reports  assists with ADLs such as putting on a bra  She can drive but spouse primarily does the driving    Restrictions/Precautions   Weight Bearing Precautions Per Order No  (LUE treated as NWB 24 hours s/p ICD placement )   Braces or Orthoses Sling  (LUE 24 hours s/p ICD placement )   Other Precautions WBS; Fall Risk;Pain;Cardiac/sternal   General   Additional Pertinent History ICD placement 8/20/19    Family/Caregiver Present Yes  (spouse, Elvira Massey )   Cognition   Overall Cognitive Status WFL   Arousal/Participation Cooperative   Orientation Level Oriented X4   Memory Within functional limits   Following Commands Follows all commands and directions without difficulty   Comments Patient is overall pleasant and cooperative; able to converse beyond basic needs    RUE Assessment   RUE Assessment WFL   LUE Assessment   LUE Assessment X   RLE Assessment   RLE Assessment   (4-/5)   LLE Assessment   LLE Assessment   (4-/5)   Coordination   Movements are Fluid and Coordinated 0   Coordination and Movement Description bradykinetic    Sensation WFL   Light Touch   RLE Light Touch Grossly intact   LLE Light Touch Grossly intact   Proprioception   RLE Proprioception Impaired   LLE Proprioception Impaired   Bed Mobility   Additional Comments Patient OOB in chair upon arrival and remained OOB at the end of session    Transfers   Sit to Stand 5  Supervision   Additional items Increased time required   Stand to Sit 5  Supervision   Additional items Increased time required   Stand pivot 5  Supervision   Additional items Increased time required   Additional Comments utilized [facility] during session; utilize brief for mobility     Ambulation/Elevation   Gait pattern Forward Flexion;Decreased foot clearance; Inconsistent anant; Foward flexed   Gait Assistance 4  Minimal assist   Additional items Assist x 1   Assistive Device Straight cane   Distance 10'x2 + 70'   Stair Management Assistance Not tested   Balance   Static Sitting Good   Dynamic Sitting Good   Static Standing Fair   Dynamic Standing Fair   Ambulatory Fair -   Endurance Deficit   Endurance Deficit Yes   Endurance Deficit Description HENRIQUEZ noted; SpO2 reading 93% on RA at rest and dropped to 80s with activity; ? accuracy as patient denied any symptoms and was holding onto [facility] tightly    Activity Tolerance   Activity Tolerance Treatment limited secondary to medical complications (Comment)   Assessment   Prognosis Good   Problem List Decreased endurance;Decreased strength; Impaired balance;Decreased mobility; Decreased coordination;Decreased skin integrity;Orthopedic restrictions;Pain   Assessment Patient is an 78year old female presenting initially to [facility] for evaluation of SOB  She was subsequently transferred to [facility] for ICD placement which she underwent 8/20/19  PT consulted to assess functional mobility and assist with safe d/c planning  Pt presenting with an additional problem list which includes vtach, 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, GERD, LBBB, spinal stenosis, Osteoarthritis of cervical spine, depression and hearing loss   Please see above for a more comprehensive list  PTA, patient living at home with her spouse who is very supportive and can assist with needs at d/c  Patient lives in a [address] [facility] with 5 ELGIN and a full flight to second floor bed and bath  Patient uses a RW upstairs and a SPC on her first floor and in the community  Patient denies any recent falls  On evaluation, patient presenting with impaired balance, decreased generalized strength, and decreased overall functional mobility  Gait unstable with forward flexed posture  With minor losses of balance, patient appears to have fair righting reactions  Would however recommend use of RW at time of discharge to reduce risk for falls and conserve energy  Patient performing activity on RA with SpO2 reading 93% at rest and dropping to low 80s with activity  Patient also presenting with HENRIQUEZ and waseducated on pursed lip breathing; able to return breathing to WNL  At this time, patient safe to d/c home with family support however would recommend HHPT to continue to address deficits in balance and strength  Will continue to follow pt during stay to progress functional mobility (I) and safety  Barriers to Discharge Inaccessible home environment   Goals   Patient Goals Patient is eager to dc home    STG Expiration Date 08/31/19   Short Term Goal #1 Pt will be mod(I) with rolling R and L and supine<->sit  for ease with OOB transfer  Pt will be mod(I) with sit<->stand to promote (I) with toileting  Pt will be mod(I) with ambualtion of household distances (appx 48') using least restrictive AD to reduce caregiver burden  Pt will be S with community distance ambulaiton using least restrictive AD to increase tolerance to activity  Pt will be S ascending and descending full flight steps to gain access into home  Pt will particiapte in HEP consisitng of balance, strength, ROM and coordinaiton tasks to increase activitiy, improve (I) and decrease pain  Treatment Day 0   Plan   Treatment/Interventions Functional transfer training;LE strengthening/ROM; Elevations; Therapeutic exercise;Cognitive reorientation; Endurance training;Patient/family training;Equipment eval/education; Bed mobility;Gait training; Compensatory technique education   PT Frequency Other (Comment)  (3-5xw/k)   Recommendation   Recommendation Home PT; Home with family support   Equipment Recommended Lynda Jon  (patient owns a RW and is recommended to use )   Additional Comments OOB in chair at the end of session with all needs within reach    Barthel Index   Feeding 10   Bathing 0   Grooming Score 5   Dressing Score 5   Bladder Score 0   Bowels Score 5   Toilet Use Score 5   Transfers (Bed/Chair) Score 10   Mobility (Level Surface) Score 0   Stairs Score 0   Barthel Index Score 40     Vicki Jules, PT

## 2019-08-21 NOTE — PLAN OF CARE
Problem: PHYSICAL THERAPY ADULT  Goal: Performs mobility at highest level of function for planned discharge setting  See evaluation for individualized goals  Description  Treatment/Interventions: Functional transfer training, LE strengthening/ROM, Elevations, Therapeutic exercise, Cognitive reorientation, Endurance training, Patient/family training, Equipment eval/education, Bed mobility, Gait training, Compensatory technique education  Equipment Recommended: (S) Walker(patient owns a RW and is recommended to use )       See flowsheet documentation for full assessment, interventions and recommendations  Note:   Prognosis: Good  Problem List: Decreased endurance, Decreased strength, Impaired balance, Decreased mobility, Decreased coordination, Decreased skin integrity, Orthopedic restrictions, Pain  Assessment: Patient is an 78year old female presenting initially to [facility] for evaluation of SOB  She was subsequently transferred to [facility] for ICD placement which she underwent 8/20/19  PT consulted to assess functional mobility and assist with safe d/c planning  Pt presenting with an additional problem list which includes vtach, 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, GERD, LBBB, spinal stenosis, Osteoarthritis of cervical spine, depression and hearing loss  Please see above for a more comprehensive list  PTA, patient living at home with her spouse who is very supportive and can assist with needs at d/c  Patient lives in a 2 [address] with 5 ELGIN and a full flight to second floor bed and bath  Patient uses a RW upstairs and a SPC on her first floor and in the community  Patient denies any recent falls  On evaluation, patient presenting with impaired balance, decreased generalized strength, and decreased overall functional mobility  Gait unstable with forward flexed posture  With minor losses of balance, patient appears to have fair righting reactions   Would however recommend use of RW at time of discharge to reduce risk for falls and conserve energy  Patient performing activity on RA with SpO2 reading 93% at rest and dropping to low 80s with activity  Patient also presenting with HENRIQUEZ and waseducated on pursed lip breathing; able to return breathing to WNL  At this time, patient safe to d/c home with family support however would recommend HHPT to continue to address deficits in balance and strength  Will continue to follow pt during stay to progress functional mobility (I) and safety  Barriers to Discharge: Inaccessible home environment     Recommendation: Home PT, Home with family support          See flowsheet documentation for full assessment

## 2019-08-21 NOTE — DISCHARGE INSTRUCTIONS
You are being discharged after a brief hospital stay  Make an appointment with your PCP within two weeks  You will need to follow with a GI doctor to consider a colonoscopy  You will also need to follow up with your electrophysiologist and with the device clinic  Please refer to post ICD implantation discharge instructions and restrictions below and your ICD booklet/temporary card  Keep incision dry for one week  Do not use lotions/powders/creams on incision  Remove outer bandage 48 hours after implantation  Leave underlying steri-strips in place, they will either fall off on their own or will be removed at 2 week follow up appointment  No overhead reaching/pushing/pulling/lifting greater than 5-10lbs with left arm for one month  Please call the office if you notice redness, swelling, bleeding, or drainage from incision or if you develop fevers    Implantable Cardioverter Defibrillator      If you have any questions, please call [phone] to speak with a nurse (8:30am-4pm, or [phone] after hours)  For appointments, please call [phone]  WHAT YOU SHOULD KNOW:    An implantable cardioverter defibrillator (ICD) is a small device that monitors your heart rate and rhythm  It is commonly placed inside your upper chest region  It may be used if you have a ventricular arrhythmia, which is an irregular, dangerous rhythm from the bottom chamber of your heart  Some arrhythmias may cause your heart to suddenly stop beating  An ICD can give a shock to your heart to make it start beating again, or it can give pacing therapy (also known as pain-free therapy) to return your heart to normal rhythm  It is also a pacemaker, so it will pace your heart if needed to prevent it from beating too slowly            AFTER YOU LEAVE:      Follow up with your primary healthcare provider or cardiologist as directed: You will need to follow-up to have your ICD checked and make sure you are not having problems   Write down your questions so you remember to ask them during your visits  Self-care:   · Ask about activity: Ask if you need to avoid moving your shoulder or arm, and for how long  Ask if you should avoid lifting heavy objects  Do not play any contact sports, such as football or wrestling, until your primary healthcare provider [facility] or cardiologist tells you it is okay  You may only be able to drive for a certain amount of time per day, or during certain hours  Ask when you can return to work  · Care for your skin over the ICD: Ask your PHP or cardiologist when it is okay for you to bathe  Do not put any lotion or powder on the incision area  Do not rub or wear tight clothing over the ICD area until your PHP or cardiologist tells you it is okay  When you get a shock from your ICD: A shock may feel like someone has hit you, or you may feel a thump in the chest  If someone is touching you when you get a shock, they may feel a tingling feeling  The first time you feel a shock, it may scare you  Sit or lie down and stay calm  Ask someone to stay with you if possible  Please either call your cardiologist or report to an emergency room  Safety instructions when you have an ICD:   · Carry an ID card for the ICD: This card has important information about your ICD  · Stay away from magnets or machines with electric fields: This includes MRI machines  Avoid leaning into a car engine or doing welding  These things can interfere with how your ICD works  · Tell airport security you have an ICD: You may need to be searched by hand when you go through a security gate  The security gate or handheld wand could harm your ICD  · Keep an ICD diary: Record when you get a shock and what you were doing before you got the shock  Keep track of how you felt before and after the shock, as well as how many shocks you received  Write down the day and time of each shock   Bring the diary with you when you see your PHP or cardiologist  · Carry medical alert identification: Wear medical alert jewelry or carry a card that says you have an ICD  Ask your PHP or cardiologist where to get these items  Contact your primary healthcare provider or cardiologist if:   · You have a fever  · You feel 1 or more shocks from your ICD and feel fine afterwards  · Your feet or ankles swell  · The area around your ICD is painful or tender after surgery  · The skin around your stitches or staples is red, swollen, or draining pus or fluid  · You have chills, a cough, and feel weak or achy  · You are sad or anxious and find it hard to do your usual activities  · You have questions or concerns about your condition or care  Seek care immediately or call 911 if:   · Your stitches or staples come apart  · Blood soaks through your bandage  · You feel more than 3 shocks in a row from your ICD  · You become weak, dizzy, or faint  · You feel your heart skip beats or beat very fast or slow, but you do not feel a shock from your ICD  · You have chest pain that does not go away with rest or medicine  © 2014 [address] [employer] is for End User's use only and may not be sold, redistributed or otherwise used for commercial purposes  All illustrations and images included in CareNotes® are the copyrighted property of [employer]  or [employer]  The above information is an  only  It is not intended as medical advice for individual conditions or treatments  Talk to your doctor, nurse or pharmacist before following any medical regimen to see if it is safe and effective for you

## 2019-08-21 NOTE — PROGRESS NOTES
INTERNAL MEDICINE RESIDENCY PROGRESS NOTE     Name: Isaias Vogel   Age & Sex: 78 y o  female   MRN: [id number]  Unit/Bed#: -01   Encounter: [id number]  Team: SOD Team C     PATIENT INFORMATION     Name: Isaias Vogel   Age & Sex: 78 y o  female   MRN: [id number]  Hospital Stay Days: 2    ASSESSMENT/PLAN     Principal Problem:    Polymorphic ventricular tachycardia ([facility] [address] )  Active Problems:    Hypercholesteremia    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 ([facility] [address] )    Symptomatic bradycardia    Type 2 diabetes mellitus, without long-term current use of insulin ([facility] [address] )    Hypertension    E coli bacteremia    Elevated hemidiaphragm      * Polymorphic ventricular tachycardia ([facility] )  Assessment & Plan  Patient presented to [facility] with fatigue and episode of unresponsiveness, and was found to have ventricular tachycardia after interrogation of pacemaker  She was started on amiodarone 200 t i d  And is on metoprolol 12 5 b i d  She was followed by Cardiology [facility] who have discussed her case with EP here and patient is transferred here for pacemaker upgrade to ICD  - Tolerated BIV ICD insertion 8/20/2019, 100% RV paced  - Follow EP/Cardiology recs for discharge meds/follow ups  - 8/20 QTc prolongation noted - 545, will keep Mag >2 and repeat EKG  Cards recs  - continue amio 200mg TID for 1 week total (end date 8/26)  - continue metop 12 5mg q12H, atorvastatin 20mg    Hypercholesteremia  Assessment & Plan  - Continue home atorvastatin 20mg PO    Elevated hemidiaphragm  Assessment & Plan  - Noted on CXR on 8/15 at [facility]  - Absent breath sounds in right lower lung field this AM  - Follow up CXR on 8/21 shows consistent elevated R hemidiaphragm w/ small R pleural effusion unchanged    E coli bacteremia  Assessment & Plan  While admitted in [facility], patient had 1/2 blood cultures positive for E coli  She was evaluated by ID who feel that this could be secondary to colon polyp    Blood cultures have cleared so she is clear per the ID service for pacemaker upgrade  - patient currently afebrile, negative blood cultures, normalized wbc, no signs of active infection  - continue Keflex total of 14 days (final dose on 2019)  - outpatient colonoscopy with GI for possible source    Hypertension  Assessment & Plan  - Continue metoprolol    - Will give 1 dose of 20 mg IV Lasix for lower extremity edema  Type 2 diabetes mellitus, without long-term current use of insulin [facility])  Assessment & Plan  Lab Results   Component Value Date    HGBA1C 7 0 (H) 2019       Recent Labs     19  2027 19  0724 19  1213 19  1608   POCGLU 233* 152* 173* 163*       Blood Sugar Average: Last 72 hrs:  (P) 163     - Continue sliding scale insulin t i d  And at bedtime    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 [facility])  Assessment & Plan  - Continue tamoxifen 20mg PO      Disposition: Home    SUBJECTIVE   Patient tolerated ICD device insertion well yesterday  No acute complications  She was also noted to have a prolonged QTc yesterday to 545  Patient seen and examined  No acute events overnight  Patient has no complaints and is excited to get discharged home today  She has minimal tenderness at ICD insertion sight  Patient denies chest pain, abdominal pain, shortness of breath, headache, lightheadedness, dizziness, confusion, nausea, vomiting, diarrhea, or constipation  OBJECTIVE     Vitals:    19 0519 19 0634 19 0728 19 1039   BP:  161/89 158/89 147/76   BP Location:  Right arm     Pulse:  77 83 71   Resp:   18 18   Temp:   98 1 °F (36 7 °C)    TempSrc:       SpO2:  92% 95% 95%   Weight: 72 5 kg (159 lb 12 8 oz)         Temperature:   Temp (24hrs), Av 8 °F (36 6 °C), Min:97 5 °F (36 4 °C), Max:98 1 °F (36 7 °C)    Temperature: 98 1 °F (36 7 °C)  Intake & Output:  I/O        07 -  0700  07 -  0700  07 -  0700    P  O  340 0     I V  (mL/kg)  500 (6 9)     Total Intake(mL/kg) 340 (4 6) 500 (6 9)     Urine (mL/kg/hr) 900 2350 (1 4)     Stool 0      Total Output 900 2350     Net -560 -1850            Unmeasured Urine Occurrence 1 x      Unmeasured Stool Occurrence 1 x          Weights:   IBW: -92 5 kg    Body mass index is 30 19 kg/m²  Weight (last 2 days)     Date/Time   Weight    08/21/19 0519   72 5 (159 8)    08/20/19 0600   73 7 (162 4)            Physical Exam   Constitutional: She is oriented to person, place, and time  She appears well-developed and well-nourished  No distress  HENT:   Head: Normocephalic and atraumatic  Mouth/Throat: No oropharyngeal exudate  Eyes: EOM are normal  No scleral icterus  Neck: Neck supple  Cardiovascular: Normal rate, regular rhythm and normal heart sounds  Exam reveals no gallop and no friction rub  No murmur heard  Pulmonary/Chest: Effort normal and breath sounds normal  No stridor  No respiratory distress  She has no wheezes  She has no rales  She exhibits tenderness (mild tenderness at ICD insertion sight)  Abdominal: Soft  Bowel sounds are normal  She exhibits no distension and no mass  There is no tenderness  There is no rebound and no guarding  Musculoskeletal: She exhibits edema (bilaterally 1+ pitting edema on LEs left worse than right to mid-calf)  She exhibits no tenderness or deformity  Negative calf tenderness bilaterally  Negative Parisa's sign   Neurological: She is alert and oriented to person, place, and time  Skin: Skin is warm and dry  She is not diaphoretic  Psychiatric: She has a normal mood and affect  Her speech is normal and behavior is normal  Judgment and thought content normal  Cognition and memory are normal    Nursing note and vitals reviewed  LABORATORY DATA     Labs: I have personally reviewed pertinent reports    Results from last 7 days   Lab Units 08/21/19  0514 08/18/19  0551 08/16/19  0043   WBC Thousand/uL 9 29 8 49 12 50* HEMOGLOBIN g/dL 11 5 10 6* 11 7   HEMATOCRIT % 36 6 32 9* 36 2   PLATELETS Thousands/uL 249 165 105*      Results from last 7 days   Lab Units 08/21/19  0514 08/20/19  1013 08/18/19  0551   POTASSIUM mmol/L 4 4 4 2 4 0   CHLORIDE mmol/L 102 101 106   CO2 mmol/L 30 32 29   BUN mg/dL 18 14 17   CREATININE mg/dL 0 74 0 70 0 80   CALCIUM mg/dL 8 9 9 1 8 6     Results from last 7 days   Lab Units 08/21/19  0514 08/20/19  1013   MAGNESIUM mg/dL 2 3 2 2                        IMAGING & DIAGNOSTIC TESTING     Radiology Results: I have personally reviewed pertinent reports  CXR - 8/21/19  IMPRESSION:     No pneumothorax  Small right pleural effusion and elevated right hemidiaphragm again noted  Other Diagnostic Testing: I have personally reviewed pertinent reports  ACTIVE MEDICATIONS     Current Facility-Administered Medications   Medication Dose Route Frequency    acetaminophen (TYLENOL) tablet 650 mg  650 mg Oral Q6H PRN    aluminum-magnesium hydroxide-simethicone (MYLANTA) 200-200-20 mg/5 mL oral suspension 30 mL  30 mL Oral Q6H PRN    amiodarone tablet 200 mg  200 mg Oral TID With Meals    atorvastatin (LIPITOR) tablet 20 mg  20 mg Oral After Dinner    cephalexin (KEFLEX) capsule 500 mg  500 mg Oral Q6H [facility]    heparin (porcine) subcutaneous injection 5,000 Units  5,000 Units Subcutaneous Q8H [facility]    insulin lispro (HumaLOG) 100 units/mL subcutaneous injection 1-5 Units  1-5 Units Subcutaneous HS    insulin lispro (HumaLOG) 100 units/mL subcutaneous injection 1-5 Units  1-5 Units Subcutaneous TID AC    metoprolol tartrate (LOPRESSOR) partial tablet 12 5 mg  12 5 mg Oral Q12H [facility]    ondansetron (ZOFRAN) injection 4 mg  4 mg Intravenous Q6H PRN    tamoxifen (NOLVADEX) tablet 20 mg  20 mg Oral Daily       VTE Pharmacologic Prophylaxis: Heparin  VTE Mechanical Prophylaxis: sequential compression device    Portions of the record may have been created with voice recognition software    Occasional wrong word or "sound a like" substitutions may have occurred due to the inherent limitations of voice recognition software    Read the chart carefully and recognize, using context, where substitutions have occurred   ==  [facility]  Internal Medicine [place], [place]

## 2019-08-21 NOTE — DISCHARGE SUMMARY
INTERNAL MEDICINE RESIDENCY DISCHARGE SUMMARY     Leonardo Mcintosh   78 y o  female  MRN: [id number]  Room/Bed: /-[address] [place] [address]    Encounter: [id number]    Active Problems:    Hypercholesteremia    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 [facility])    Type 2 diabetes mellitus, without long-term current use of insulin (HCC)    Hypertension    Elevated hemidiaphragm      * Polymorphic ventricular tachycardia (HCC)-resolved as of 8/21/2019  Assessment & Plan  Patient presented to [facility] with fatigue and episode of unresponsiveness, and was found to have ventricular tachycardia after interrogation of pacemaker  She was started on amiodarone 200 t i d  And is on metoprolol 12 5 b i d  She was followed by Cardiology [facility] who have discussed her case with EP here and patient is transferred here for pacemaker upgrade to ICD  - Tolerated BIV ICD insertion 8/20/2019, 100% RV paced  - Follow EP/Cardiology recs for discharge meds/follow ups   - continue amio 200mg TID for 1 week total (end date 8/26)   - continue metop 12 5mg q12H, atorvastatin 20mg    Hypercholesteremia  Assessment & Plan  - Continue home atorvastatin 20mg PO    Elevated hemidiaphragm  Assessment & Plan  - Noted on CXR on 8/15 at [facility]  - Follow up CXR on 8/21 shows consistent elevated R hemidiaphragm w/ small R pleural effusion unchanged  - Upon chart checking, this seems unchanged from the past and is not new nor worse  - Follow up with PCP    Hypertension  Assessment & Plan  - Patient received 1 dose of 20 mg IV Lasix for lower extremity edema    - Continue home dose metoprolol   - Discuss lasix as needed at PCP follow up visit    Type 2 diabetes mellitus, without long-term current use of insulin [facility])  Assessment & Plan  Lab Results   Component Value Date    HGBA1C 7 0 (H) 03/01/2019       Recent Labs     08/18/19  2027 08/19/19  0724 08/19/19  1213 08/19/19  1608 POCGLU 233* 152* 173* 163*       Blood Sugar Average: Last 72 hrs:  (P) 163     - Received sliding scale insulin t i d  and at bedtime while inpatient  - Continue home medications upon discharge (metformin 500 BID)    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 [facility])  Assessment & Plan  - Continue tamoxifen 20mg PO    E coli bacteremia-resolved as of 8/21/2019  Assessment & Plan  While admitted in [facility], patient had 1/2 blood cultures positive for E coli  She was evaluated by ID who feel that this could be secondary to colon polyp  Blood cultures have cleared so she is clear per the ID service for pacemaker upgrade  - patient currently afebrile, negative blood cultures, normalized wbc, no signs of active infection  - continue Keflex total of 14 days (final dose on 8/28/2019)  - outpatient colonoscopy with GI for possible source      [address] is an 79 yo F who presented to [facility] on 8/12/2019 due to shortness of breath, fatigue, decreased appetite, nausea, and chills  She was found to have intermittent bursts of ventricular tachycardia and was placed on an amiodarone drip  Cardiology saw her and diagnosed her with polymorphic ventricular tachycardia on top of previously known sick sinus syndrome for which she has a permanent pacemaker  After investigating her pacemaker and seeing ventricular tachycardia, it was determined she needed an ICD  On 8/14, she had a positive blood culture for E  Coli and was seen by infectious disease, who recommended treatment with cefazolin and transition to oral Keflex for a total of 14 day course of antibiotics  There was no obvious source of bacteremia however there was suspicion for an occult GI source for which outpatient colonoscopy/GI follow up was recommended  Her bacteremia cleared and she was transferred to [facility] for an ICD upgrade with electrophysiology      At [facility], the patient remained on her antibiotics and was stable  She received one dose of lasix for lower extremity edema  She went to the operating room with electrophysiology to upgrade her pacemaker to an ICD device on 8/20/2019  There were no complications with the procedure, and she was discharged in the morning of 8/21/2019 with the following recommended appointments and adjustments to medications: We recommend you make the following appointments:  - Follow up with electrophysiology, Dr Yonatan Deleon, on 9/23/2019 at 11:30 am, address listed below   - Follow up with electrophysiology device clinic on 9/5/2019 at 2:30pm, address listed below  - Please make an appointment to follow up with primary care doctor, Dr Vasquez Mayo, within one week of discharge  - Please make an appointment to follow up with gastroenterology within one week of discharge to discuss your CT scan and possible source for infection  The changes to your medications are as follows:  - You will take Amiodarone 200mg 3x per day through 8/26/2019 for your heart rhythm  - You will take Cefalexin 500mg every 6 hours through 8/28/2019 for your blood infection  - You will remain on metoprolol 12 5 twice a day for your heart, as well as discuss lasix as needed with your PCP  - The remainder of your home medications will stay the same    DISCHARGE INFORMATION     PCP at Discharge:  Dr Vasquez Mayo MD    Admitting Provider: Leanne Hernandez MD  Admission Date: 8/19/2019    Discharge Provider: No att  providers found  Discharge Date: 8/21/2019    Discharge Disposition: Home/Self Care  Discharge Condition: good  Discharge with Lines: no    Discharge Diet: diabetic diet  Activity Restrictions: No strenous exercise  Test Results Pending at Discharge: None    Discharge Diagnoses:  Principal Problem (Resolved):     Polymorphic ventricular tachycardia (HCC)  Active Problems:    Hypercholesteremia    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)    Type 2 diabetes mellitus, without long-term current use of insulin (HCC)    Hypertension    Elevated hemidiaphragm  Resolved Problems:    Symptomatic bradycardia    E coli bacteremia      Consulting Providers:  Cordelia Gloria MD - Electrophysiologist    Diagnostic & Therapeutic Procedures Performed:  Xr Chest Portable    Result Date: 8/21/2019  Impression: No pneumothorax  Small right pleural effusion and elevated right hemidiaphragm again noted  Workstation performed: [id number]     Xr Chest Portable    Result Date: 8/21/2019  Impression: Small right pleural effusion  Elevation of right hemidiaphragm  No significant interval change from prior examination  Workstation performed: [id number]     CT Abdomen/Pelvis with IV Contrast  IMPRESSION:     Small epicardial node seen on series 2 image 10 to the right of midline anteriorly measuring 8 mm  This is increasing in size compared to the prior examination and may represent pathologic node      Posterior layering right-sided pleural effusion extending above the scope of this examination with adjacent right lower lobe atelectasis  In addition there is middle lobe atelectasis as well      Previous partial hepatectomy with diffuse hepatic steatosis  Focal fatty sparing noted within the right lobe of the liver posteriorly and laterally where there is focal biliary dilatation      Small amount of air is again present within a decompressed gallbladder      Extensive colonic diverticulosis within the sigmoid colon  No evidence of acute diverticulitis      Code Status: Prior  Advance Directive & Living Will: <no information>  Power of :    POLST:      Medications:  Discharge Medication List as of 8/21/2019  2:10 PM        Discharge Medication List as of 8/21/2019  2:10 PM      START taking these medications    Details   amiodarone 200 mg tablet Take 1 tablet (200 mg total) by mouth 3 (three) times a day with meals for 17 doses, Starting Wed 8/21/2019, Until Tue 8/27/2019, Print cephalexin (KEFLEX) 500 mg capsule Take 1 capsule (500 mg total) by mouth every 6 (six) hours for 7 days, Starting Wed 8/21/2019, Until Wed 8/28/2019, Print           Discharge Medication List as of 8/21/2019  2:10 PM      CONTINUE these medications which have NOT CHANGED    Details   atorvastatin (LIPITOR) 20 mg tablet Take 20 mg by mouth daily  , Historical Med      Calcium Citrate-Vitamin D (CALCIUM CITRATE + D PO) Take 1,500 mg by mouth daily, Historical Med      clindamycin (CLEOCIN) 300 MG capsule TAKE 2 CAPSULES BY MOUTH 1 HOUR PRIOR TO DENTAL PROCEDURE , Historical Med      clotrimazole-betamethasone (LOTRISONE) 1-0 05 % cream Apply 1 application topically as needed , Starting Mon 8/14/2017, Historical Med      Cranberry (THERACRAN HP PO) Take 2 tablets by mouth daily, Historical Med      Glucosamine-Chondroit-Vit C-Mn (GLUCOSAMINE 1500 COMPLEX) CAPS Take 1 capsule by mouth daily  , Historical Med      metFORMIN (GLUCOPHAGE) 500 mg tablet 2 (two) times a day, Historical Med      metoprolol tartrate (LOPRESSOR) 25 mg tablet Take 0 5 tablets (12 5 mg total) by mouth every 12 (twelve) hours, Starting Thu 5/16/2019, Normal      Multiple Vitamin (MULTIVITAMIN) capsule Take 1 capsule by mouth daily  , Historical Med      tamoxifen (NOLVADEX) 20 mg tablet Take 1 tablet (20 mg total) by mouth daily, Starting Thu 6/27/2019, Normal             Allergies: Allergies   Allergen Reactions    Ampicillin Shortness Of Breath, Anaphylaxis and Other (See Comments)     Other reaction(s): Unknown Allergic Reaction  Reaction Date: [id number];        FOLLOW-UP     PCP Outpatient Follow-up:  yes        Follow up: Cardiology - Dr Milo Reveles MD  Date and time: 9/23/2019 at 11:30am  Location: [address] [place] [address]  Follow up within next: Month     [facility]  Date and time: 9/5/2019 at 2:30PM  Location: [facility]   [address] [place] [id number]  Follow up within next: 2 weeks    Follow up: PCP - Dr John Paul Mosher MD  Date and time: Schedule this as soon as possible  Location: [facility], [address] [facility], [address]  Follow up within next: 1 week    Follow up: Gastroenterology  Date and time: Schedule this as soon as possible  Location: [address] [place] [address], [address] [facility]  Follow up within next: 1 week    Consulting Providers Follow-up:  yes      Physician name: Hannah El MD  Specialty: Electrophysiology  Office phone number: [phone]  Follow up within next: Month    Active Issues Requiring Follow-up:   none    Discharge Statement:   I spent 45 minutes minutes discharging the patient  This time was spent on the day of discharge  I had direct contact with the patient on the day of discharge  Additional documentation is required if more than 30 minutes were spent on discharge  Portions of the record may have been created with voice recognition software  Occasional wrong word or "sound a like" substitutions may have occurred due to the inherent limitations of voice recognition software    Read the chart carefully and recognize, using context, where substitutions have occurred     ==  [address] [facility]  Internal Medicine [place], [place]

## 2019-08-21 NOTE — PROGRESS NOTES
Cardiology Progress Note - Maximino Orta 78 y o  female MRN: [id number]    Unit/Bed#: -01 Encounter: [id number]    Subjective: Today s/p PPM upgrade to ICD  Stable and okay for DC from EP perspective  Will arrange follow up  Objective  GEN: NAD, alert and oriented x 3, pleasant and cooperative   Cardiac: S1, S2, appreciated  No S3 or S4  Regular rhythm  No JVD  +2 radial pulses, +2 DP, PT  murmurs  Pulm: CTABL  Abd: +Bs, NT, ND  MSK: 5/5 strength UE, LE  Neuro: CN grossly intact, sensation intact UE, LE    Assessment  Maximino Orta is a 78 y o  female with PMH of SSS s/p PPM ([employer]), HTN, HLD, DM2, breast cancer with mets who initially presented on 8/11/19 with fatigue and dyspnea found to have 120 second run of polymorphic VT with atrial rate 140's  Subsequent LHC showed non-occlusive CAD  Started on amio  Found to have e coli bacteremia of unclear source now on cefazolin (started 8/17/19, today is day 4)     1  Sustained VT  - s/p BiV PPM upgrade to ICD  New RV ICD lead placed in higher septum to avoid prior perforation area at apex  Original RV PPM lead not explanted  - c/w amio, metop, statin     Plan  1  Okay for DC from EP perspective    Please await final attending attestation    Maxi Perales MD  - PGY-4 Cardiology Fellow  - Pager: [phone]    ----            VS: Blood pressure 158/89, pulse 83, temperature 98 1 °F (36 7 °C), resp  rate 18, weight 72 5 kg (159 lb 12 8 oz), SpO2 95 %  , Body mass index is 30 19 kg/m² ,   Orthostatic Blood Pressures      Most Recent Value   Blood Pressure  158/89 filed at 08/21/2019 6238   Patient Position - Orthostatic VS  Sitting filed at 08/21/2019 8316          Active Meds    Current Facility-Administered Medications:     acetaminophen (TYLENOL) tablet 650 mg, 650 mg, Oral, Q6H PRN, Mayela Patterson MD    aluminum-magnesium hydroxide-simethicone (MYLANTA) 200-200-20 mg/5 mL oral suspension 30 mL, 30 mL, Oral, Q6H PRN, Mayela Patterson MD    amiodarone tablet 200 mg, 200 mg, Oral, TID With Meals, Ana Dimas MD, 200 mg at 08/21/19 7129    atorvastatin (LIPITOR) tablet 20 mg, 20 mg, Oral, After Markel Crooks MD, 20 mg at 08/20/19 1707    cephalexin (KEFLEX) capsule 500 mg, 500 mg, Oral, Q6H [facility], Ana Dimas MD, 500 mg at 08/21/19 0600    heparin (porcine) subcutaneous injection 5,000 Units, 5,000 Units, Subcutaneous, Q8H [facility], Ana Dimas MD, 5,000 Units at 08/21/19 0600    insulin lispro (HumaLOG) 100 units/mL subcutaneous injection 1-5 Units, 1-5 Units, Subcutaneous, HS, Ana Dimas MD, 3 Units at 08/20/19 2120    insulin lispro (HumaLOG) 100 units/mL subcutaneous injection 1-5 Units, 1-5 Units, Subcutaneous, TID AC, 1 Units at 08/21/19 0639 **AND** Fingerstick Glucose (POCT), , , TID AC, Ana Dimas MD    metoprolol tartrate (LOPRESSOR) partial tablet 12 5 mg, 12 5 mg, Oral, Q12H [facility], Ana Dimas MD, 12 5 mg at 08/21/19 0938    ondansetron (ZOFRAN) injection 4 mg, 4 mg, Intravenous, Q6H PRN, Ana Dimas MD    tamoxifen (NOLVADEX) tablet 20 mg, 20 mg, Oral, Daily, Ana Dimas MD, 20 mg at 08/21/19 0939    Labs & Results  Lab Results   Component Value Date    TROPONINI <0 02 08/12/2019    TROPONINI <0 02 03/01/2019    TROPONINI <0 02 11/06/2017     Lab Results   Component Value Date    GLUCOSE 156 (H) 11/09/2017    CALCIUM 8 9 08/21/2019     05/13/2015    K 4 4 08/21/2019    CO2 30 08/21/2019     08/21/2019    BUN 18 08/21/2019    CREATININE 0 74 08/21/2019     Lab Results   Component Value Date    WBC 9 29 08/21/2019    HGB 11 5 08/21/2019    HCT 36 6 08/21/2019    MCV 99 (H) 08/21/2019     08/21/2019         No results found for: CHOL  Lab Results   Component Value Date    HDL 67 (H) 06/20/2017     Lab Results   Component Value Date    LDLCALC 79 06/20/2017     Lab Results   Component Value Date    TRIG 120 06/20/2017     Lab Results   Component Value Date    ALT 42 08/12/2019    AST 43 08/12/2019

## 2019-08-21 NOTE — NURSING NOTE
Reviewed discharge instructions, medications, follow-up appointments with the patient    All questions answered

## 2019-08-22 LAB
BACTERIA BLD CULT: NORMAL
BACTERIA BLD CULT: NORMAL

## 2019-08-22 NOTE — SOCIAL WORK
CM called both pt and   Pt declines [facility] or any [facility] at this time    States "I am doing well and don't need it "

## 2019-08-26 ENCOUNTER — TELEPHONE (OUTPATIENT)
Dept: CARDIOLOGY CLINIC | Facility: CLINIC | Age: 79
End: 2019-08-26

## 2019-08-26 ENCOUNTER — TRANSCRIBE ORDERS (OUTPATIENT)
Dept: LAB | Facility: HOSPITAL | Age: 79
End: 2019-08-26

## 2019-08-26 DIAGNOSIS — C50.919 POSTMASTECTOMY LYMPHANGIOSARCOMA SYNDROME, UNSPECIFIED LATERALITY (HCC): Primary | ICD-10-CM

## 2019-08-26 DIAGNOSIS — Z90.10 POSTMASTECTOMY LYMPHANGIOSARCOMA SYNDROME, UNSPECIFIED LATERALITY (HCC): Primary | ICD-10-CM

## 2019-08-26 NOTE — TELEPHONE ENCOUNTER
Called pt, she would to have labs drawn at home  Gave her the main hospital number to ask for the lab to have it arranged

## 2019-08-26 NOTE — TELEPHONE ENCOUNTER
Please call patient  She left a msg stating she needed to talk to someone re her "recent surgery with Dr Joselin Blood"  I accidentally deleted her message on the nurse line  Thanks!     # [phone]

## 2019-08-27 NOTE — TELEPHONE ENCOUNTER
Pt called back today, very upset stating that the pharmacy didn't receive the medication  Asked what medication she was speaking of and she said her nausea medication  Explained to her that we don't prescribe nausea medication  She proceeded to yell and cry that she spoke to me about it yesterday and I would be asking Dr Nitesh Armstrong to send in nausea medication  I tried to explain to her that the only thing we spoke about yesterday was the home draw for the lab and she would need to call her PCP about that medication  She started yelling "You better think back  You better think back  I spoke to you, Ion Owens"  Then hung up

## 2019-08-29 ENCOUNTER — APPOINTMENT (OUTPATIENT)
Dept: LAB | Facility: HOSPITAL | Age: 79
End: 2019-08-29
Attending: INTERNAL MEDICINE
Payer: COMMERCIAL

## 2019-08-29 DIAGNOSIS — Z90.10 POSTMASTECTOMY LYMPHANGIOSARCOMA SYNDROME, UNSPECIFIED LATERALITY (HCC): ICD-10-CM

## 2019-08-29 DIAGNOSIS — C50.919 MALIGNANT NEOPLASM OF FEMALE BREAST, UNSPECIFIED ESTROGEN RECEPTOR STATUS, UNSPECIFIED LATERALITY, UNSPECIFIED SITE OF BREAST (HCC): ICD-10-CM

## 2019-08-29 DIAGNOSIS — C50.919 POSTMASTECTOMY LYMPHANGIOSARCOMA SYNDROME, UNSPECIFIED LATERALITY (HCC): ICD-10-CM

## 2019-08-29 LAB
ALBUMIN SERPL BCP-MCNC: 3.5 G/DL (ref 3.5–5)
ALP SERPL-CCNC: 103 U/L (ref 46–116)
ALT SERPL W P-5'-P-CCNC: 40 U/L (ref 12–78)
ANION GAP SERPL CALCULATED.3IONS-SCNC: 7 MMOL/L (ref 4–13)
AST SERPL W P-5'-P-CCNC: 25 U/L (ref 5–45)
BASOPHILS # BLD AUTO: 0.06 THOUSANDS/ΜL (ref 0–0.1)
BASOPHILS NFR BLD AUTO: 1 % (ref 0–1)
BILIRUB SERPL-MCNC: 0.66 MG/DL (ref 0.2–1)
BUN SERPL-MCNC: 18 MG/DL (ref 5–25)
CALCIUM SERPL-MCNC: 9.7 MG/DL (ref 8.3–10.1)
CHLORIDE SERPL-SCNC: 104 MMOL/L (ref 100–108)
CO2 SERPL-SCNC: 29 MMOL/L (ref 21–32)
CREAT SERPL-MCNC: 0.83 MG/DL (ref 0.6–1.3)
EOSINOPHIL # BLD AUTO: 0.21 THOUSAND/ΜL (ref 0–0.61)
EOSINOPHIL NFR BLD AUTO: 3 % (ref 0–6)
ERYTHROCYTE [DISTWIDTH] IN BLOOD BY AUTOMATED COUNT: 12.7 % (ref 11.6–15.1)
GFR SERPL CREATININE-BSD FRML MDRD: 66 ML/MIN/1.73SQ M
GLUCOSE SERPL-MCNC: 156 MG/DL (ref 65–140)
HCT VFR BLD AUTO: 36 % (ref 34.8–46.1)
HGB BLD-MCNC: 11.3 G/DL (ref 11.5–15.4)
IMM GRANULOCYTES # BLD AUTO: 0.02 THOUSAND/UL (ref 0–0.2)
IMM GRANULOCYTES NFR BLD AUTO: 0 % (ref 0–2)
LYMPHOCYTES # BLD AUTO: 1.45 THOUSANDS/ΜL (ref 0.6–4.47)
LYMPHOCYTES NFR BLD AUTO: 18 % (ref 14–44)
MCH RBC QN AUTO: 31.6 PG (ref 26.8–34.3)
MCHC RBC AUTO-ENTMCNC: 31.4 G/DL (ref 31.4–37.4)
MCV RBC AUTO: 101 FL (ref 82–98)
MONOCYTES # BLD AUTO: 0.83 THOUSAND/ΜL (ref 0.17–1.22)
MONOCYTES NFR BLD AUTO: 10 % (ref 4–12)
NEUTROPHILS # BLD AUTO: 5.72 THOUSANDS/ΜL (ref 1.85–7.62)
NEUTS SEG NFR BLD AUTO: 68 % (ref 43–75)
NRBC BLD AUTO-RTO: 0 /100 WBCS
PLATELET # BLD AUTO: 248 THOUSANDS/UL (ref 149–390)
PMV BLD AUTO: 10.2 FL (ref 8.9–12.7)
POTASSIUM SERPL-SCNC: 4.1 MMOL/L (ref 3.5–5.3)
PROT SERPL-MCNC: 7.3 G/DL (ref 6.4–8.2)
RBC # BLD AUTO: 3.58 MILLION/UL (ref 3.81–5.12)
SODIUM SERPL-SCNC: 140 MMOL/L (ref 136–145)
VENIPUNCTURE: NORMAL
WBC # BLD AUTO: 8.29 THOUSAND/UL (ref 4.31–10.16)

## 2019-08-29 PROCEDURE — 36415 COLL VENOUS BLD VENIPUNCTURE: CPT

## 2019-08-29 PROCEDURE — 80053 COMPREHEN METABOLIC PANEL: CPT

## 2019-08-29 PROCEDURE — 85025 COMPLETE CBC W/AUTO DIFF WBC: CPT

## 2019-09-05 ENCOUNTER — IN-CLINIC DEVICE VISIT (OUTPATIENT)
Dept: CARDIOLOGY CLINIC | Facility: CLINIC | Age: 79
End: 2019-09-05

## 2019-09-05 DIAGNOSIS — Z95.810 PRESENCE OF AUTOMATIC CARDIOVERTER/DEFIBRILLATOR (AICD): Primary | ICD-10-CM

## 2019-09-05 PROCEDURE — 99024 POSTOP FOLLOW-UP VISIT: CPT | Performed by: INTERNAL MEDICINE

## 2019-09-05 NOTE — PROGRESS NOTES
Results for orders placed or performed in visit on 09/05/19   Cardiac EP device report    Narrative    MDT BI-V ICD (3830 IN LV PORT)  WOUND CHECK: INCISION CLEAN AND DRY WITH EDGES APPROXIMATED; STERISTRIPS REMOVED; WOUND CARE AND RESTRICTIONS REVIEWED WITH PATIENT  DEVICE INTERROGATED IN THE [facility]  BATTERY ADEQUATE (7 6 YRS)  % (DDD 60/CHB)  ALL LEAD PARAMETERS WITHIN NORMAL LIMITS  NO EPISODES  NO PROGRAMMING CHANGES MADE TO DEVICE PARAMETERS  OPTIVOL INITIALIZING  NORMAL DEVICE FUNCTION   PL

## 2019-09-06 ENCOUNTER — HOSPITAL ENCOUNTER (OUTPATIENT)
Dept: CT IMAGING | Facility: HOSPITAL | Age: 79
Discharge: HOME | End: 2019-09-06
Attending: INTERNAL MEDICINE
Payer: COMMERCIAL

## 2019-09-06 DIAGNOSIS — C50.919 MALIGNANT NEOPLASM OF FEMALE BREAST, UNSPECIFIED ESTROGEN RECEPTOR STATUS, UNSPECIFIED LATERALITY, UNSPECIFIED SITE OF BREAST (HCC): ICD-10-CM

## 2019-09-06 PROCEDURE — 71260 CT THORAX DX C+: CPT

## 2019-09-06 PROCEDURE — 74177 CT ABD & PELVIS W/CONTRAST: CPT

## 2019-09-06 RX ADMIN — IOHEXOL 85 ML: 350 INJECTION, SOLUTION INTRAVENOUS at 18:56

## 2019-09-09 ENCOUNTER — HOSPITAL ENCOUNTER (OUTPATIENT)
Dept: RADIOLOGY | Facility: MEDICAL CENTER | Age: 79
Discharge: HOME | End: 2019-09-09
Attending: PHYSICAL MEDICINE & REHABILITATION
Payer: COMMERCIAL

## 2019-09-09 VITALS
HEART RATE: 84 BPM | DIASTOLIC BLOOD PRESSURE: 72 MMHG | RESPIRATION RATE: 20 BRPM | SYSTOLIC BLOOD PRESSURE: 128 MMHG | TEMPERATURE: 97.5 F | OXYGEN SATURATION: 97 %

## 2019-09-09 DIAGNOSIS — M54.2 NECK PAIN: ICD-10-CM

## 2019-09-09 DIAGNOSIS — M47.812 CERVICAL SPONDYLOSIS WITHOUT MYELOPATHY: ICD-10-CM

## 2019-09-09 PROCEDURE — 64634 DESTROY C/TH FACET JNT ADDL: CPT | Performed by: PHYSICAL MEDICINE & REHABILITATION

## 2019-09-09 PROCEDURE — 64633 DESTROY CERV/THOR FACET JNT: CPT | Performed by: PHYSICAL MEDICINE & REHABILITATION

## 2019-09-09 RX ORDER — LIDOCAINE HYDROCHLORIDE 10 MG/ML
5 INJECTION, SOLUTION EPIDURAL; INFILTRATION; INTRACAUDAL; PERINEURAL ONCE
Status: COMPLETED | OUTPATIENT
Start: 2019-09-09 | End: 2019-09-09

## 2019-09-09 RX ORDER — ONDANSETRON 4 MG/1
4 TABLET, FILM COATED ORAL EVERY 8 HOURS PRN
COMMUNITY
Start: 2019-08-27 | End: 2021-01-01 | Stop reason: SDUPTHER

## 2019-09-09 RX ADMIN — LIDOCAINE HYDROCHLORIDE 2 ML: 10 INJECTION, SOLUTION EPIDURAL; INFILTRATION; INTRACAUDAL; PERINEURAL at 11:49

## 2019-09-09 RX ADMIN — Medication 4 ML: at 11:54

## 2019-09-09 NOTE — H&P
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 ([facility] )    Hypercholesteremia    Gastroesophageal reflux disease without esophagitis    History of total knee replacement    Hearing loss    LBBB (left bundle branch block)    Complication associated with cardiac pacemaker lead    Pacemaker complications, subsequent encounter    Chest wall hematoma    Cholecystitis    Acute biliary pancreatitis    Cholangitis    Choledocholithiasis    Acute gallstone pancreatitis    Intermittent complete heart block ([facility] )    Spinal stenosis, lumbar region, with neurogenic claudication    Lumbar pain    Spondylolisthesis    Upper abdominal pain    SIRS (systemic inflammatory response syndrome) (HCC)    Lactic acidosis    Abnormal urinalysis    Type 2 diabetes mellitus, without long-term current use of insulin (HCC)    Hypertension    Nerve pain - Right    Neck pain    Osteoarthritis of cervical spine    SSS (sick sinus syndrome) (HCC)    Mixed hyperlipidemia    Elevated hemidiaphragm       Past Medical History:   Diagnosis Date    Anxiety     Arthritis     Breast CA (HCC)     recurrent, ductal carcinoma ER, [place] pos    Cardiac disease     Depression     Diverticula of intestine     Dizziness     and passes out    Flushing     Hiatal hernia     [place] (hard of hearing)      lizette    Hypercholesteremia     Liver mass     Liver metastasis (HCC)     [facility]     Bx 01/03/2017    PONV (postoperative nausea and vomiting)     Recurrent breast cancer (HCC)     Shingles     Shortness of breath     on exertion    Tachycardia     Urinary incontinence     Use of cane as ambulatory aid     or walker       Past Surgical History:   Procedure Laterality Date    BREAST SURGERY      CARDIAC PACEMAKER REMOVAL N/A 11/9/2017    Procedure: PACEMAKER LEAD REVISION, REMOVAL OF NONFUNCTIONING LEAD, AND INSERTION OF A NEW RV LEAD;  Surgeon: Robert Hamitlon MD;  [facility];  Service: Cardiology    CATARACT EXTRACTION Bilateral     COLONOSCOPY      ERCP N/A 1/8/2018    Procedure: ENDOSCOPIC RETROGRADE CHOLANGIOPANCREATOGRAPHY (ERCP); Surgeon: Puja Jaramillo MD;  [facility]; Service: Gastroenterology    HYSTERECTOMY      JOINT REPLACEMENT      lizette  TKR and right TSR    MASTECTOMY Right     CO ERCP DX COLLECTION SPECIMEN BRUSHING/WASHING N/A 2/22/2018    Procedure: ENDOSCOPIC RETROGRADE CHOLANGIOPANCREATOGRAPHY (ERCP) with stent removal;  Surgeon: Puja Jaramillo MD;  [facility]; Service: Gastroenterology    [place] RESEC [address] N/A 7/13/2017    Procedure: LIVER RESECTION, INTRAOPERATIVE ULTRASOUND;  Surgeon: Reece Gutierrez MD;  [facility];  Service: Surgical Oncology    ROTATOR CUFF REPAIR Right     SENTINEL LYMPH NODE BIOPSY Right     TONSILECTOMY AND ADNOIDECTOMY      WOUND DEBRIDEMENT Left 11/9/2017    Procedure: DEBRIDEMENT WOUND AND DRESSING CHANGE [facility]); Surgeon: Robert Hamilton MD;  [facility];  Service: Cardiology         Current Outpatient Medications:     atorvastatin (LIPITOR) 20 mg tablet, Take 20 mg by mouth daily  , Disp: , Rfl:     Calcium Citrate-Vitamin D (CALCIUM CITRATE + D PO), Take 1,500 mg by mouth daily, Disp: , Rfl:     clotrimazole-betamethasone (LOTRISONE) 1-0 05 % cream, Apply 1 application topically as needed , Disp: , Rfl:     Cranberry (THERACRAN HP PO), Take 2 tablets by mouth daily, Disp: , Rfl:     furosemide (LASIX) 20 mg tablet, Take 1 tablet (20 mg total) by mouth daily as needed (le swelling) Edema, Disp: 30 tablet, Rfl: 0    Glucosamine-Chondroit-Vit C-Mn (GLUCOSAMINE 1500 COMPLEX) CAPS, Take 1 capsule by mouth daily  , Disp: , Rfl:     metFORMIN (GLUCOPHAGE) 500 mg tablet, 2 (two) times a day, Disp: , Rfl:     metoprolol tartrate (LOPRESSOR) 25 mg tablet, Take 0 5 tablets (12 5 mg total) by mouth every 12 (twelve) hours, Disp: 30 tablet, Rfl: 6    Multiple Vitamin (MULTIVITAMIN) capsule, Take 1 capsule by mouth daily  , Disp: , Rfl:     ondansetron (ZOFRAN) 4 mg tablet, Take 4 mg by mouth every 8 (eight) hours as needed, Disp: , Rfl:     tamoxifen (NOLVADEX) 20 mg tablet, Take 1 tablet (20 mg total) by mouth daily, Disp: 30 tablet, Rfl: 3    clindamycin (CLEOCIN) 300 MG capsule, TAKE 2 CAPSULES BY MOUTH 1 HOUR PRIOR TO DENTAL PROCEDURE , Disp: , Rfl: 0    Current Facility-Administered Medications:     lidocaine (PF) (XYLOCAINE-MPF) 1 % injection 5 mL, 5 mL, Infiltration, Once, [place] Safer, DO    lidocaine (PF) (XYLOCAINE-MPF) 2 % injection 4 mL, 4 mL, Perineural, Once, [place] Safer, DO    Allergies   Allergen Reactions    Ampicillin Shortness Of Breath, Anaphylaxis and Other (See Comments)     Other reaction(s): Unknown Allergic Reaction  Reaction Date: [id number];        Physical Exam:   Vitals:    09/09/19 1135   BP: 132/73   Pulse: 84   Resp: 20   Temp: 97 5 °F (36 4 °C)   SpO2: 96%     General: Awake, Alert, Oriented x 3, Mood and affect appropriate  Respiratory: Respirations even and unlabored  Cardiovascular: Peripheral pulses intact; no edema  Musculoskeletal Exam:  Right-sided neck    ASA Score:  2  Patient/Chart Verification  Patient ID Verified: Verbal  ID Band Applied: No  Consents Confirmed: Procedural  H&P( within 30 days) Verified: To be obtained in the Pre-Procedure area  Interval H&P(within 24 hr) Complete (required for Outpatients and Surgery Admit only): To be obtained in the Pre-Procedure area  Allergies Reviewed: Yes  Anticoag/NSAID held?: NA  Currently on antibiotics?: No(two weeks s/p antibiotics)    Assessment:   1  Neck pain    2   Cervical spondylosis without myelopathy        Plan: RT C2-4 RFA

## 2019-09-09 NOTE — DISCHARGE INSTRUCTIONS

## 2019-09-10 ENCOUNTER — TELEPHONE (OUTPATIENT)
Dept: RADIOLOGY | Facility: MEDICAL CENTER | Age: 79
End: 2019-09-10

## 2019-09-10 NOTE — TELEPHONE ENCOUNTER
--JANISI--    RN s/w pt regarding previous  Pt did well over night, denies s/s of infection or sunburn like pain  Did not need pain medication or ice, aware that it will take two weeks to start to feel pan relief up to 4-6 weeks for full pain effect to be achieved  Confirmed f/u appt with AO on 10/2  Pt appreciative of call

## 2019-09-12 ENCOUNTER — OFFICE VISIT (OUTPATIENT)
Dept: HEMATOLOGY ONCOLOGY | Facility: CLINIC | Age: 79
End: 2019-09-12
Payer: COMMERCIAL

## 2019-09-12 VITALS
RESPIRATION RATE: 17 BRPM | WEIGHT: 159.6 LBS | OXYGEN SATURATION: 92 % | DIASTOLIC BLOOD PRESSURE: 74 MMHG | HEIGHT: 61 IN | BODY MASS INDEX: 30.13 KG/M2 | TEMPERATURE: 98.1 F | SYSTOLIC BLOOD PRESSURE: 124 MMHG | HEART RATE: 99 BPM

## 2019-09-12 DIAGNOSIS — C50.919 MALIGNANT NEOPLASM OF FEMALE BREAST, UNSPECIFIED ESTROGEN RECEPTOR STATUS, UNSPECIFIED LATERALITY, UNSPECIFIED SITE OF BREAST (HCC): Primary | ICD-10-CM

## 2019-09-12 PROCEDURE — 99214 OFFICE O/P EST MOD 30 MIN: CPT | Performed by: INTERNAL MEDICINE

## 2019-09-12 NOTE — PROGRESS NOTES
[facility] [place]  [address] [address]  [address]  [place] [id number] [phone]    Gricel Hernandez,1940, [id number]  09/12/19    Discussion:   In summary, this is an 56-year-old female history of advanced breast cancer  She was recently hospitalized with weakness, shortness of breath, etc   She was found to have E coli bacteremia and treated for the same  Additionally, her pacemaker needed revision with placement of some wires  These were all accomplished over a span of 10 days  Since that time she is feeling better  Recent CT scan of the chest abdomen pelvis shows no evidence recurrence/progressive disease  Mediastinal lymphadenopathy remains subcentimeter  Post treatment changes are noted in the liver  She continues on tamoxifen 20 mg p o  Daily  I suggested that she take aspirin 81 mg p o  Daily for thromboprophylaxis  Recent blood work shows borderline anemia  White count and platelets are normal     I discussed the above with the patient  The patient and her  voiced understanding and agreement   ______________________________________________________________________    Chief Complaint   Patient presents with    Follow-up       HPI:     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)    2009 Initial Diagnosis     Adenocarcinoma of right breast,  Infiltrating ductal carcinoma T2, N1 sebastien) Tumor was ER/NH positive, HER2 negative  2009 Surgery     Right Mastectomy      2009 -  Chemotherapy     Adjuvant chemotherapy with Taxotere/Cytoxan x 4 cycles      2009 - 10/2014 Chemotherapy     Arimidex 1 mg po dialy      2/11/2016 Progression     Right lateral mastectomy site growth with core needle biopsy demonstrated reoccurrence  %, NH 70%, Her2 +1  Final Diagnosis   A   Breast, right lateral mastectomy site, core needle biopsies:                        - Recurrent invasive mammary carcinoma, no special type (formerly invasive ductal carcinoma), 0 8 cm largest single focus               - Combined Wilton score: 7/9                         - Tubule formation: None (3/3)  - Nuclear pleomorphism: Marked (3/3)  - Mitotic count: 3 mitoses per 10 high-power fields (1/3)  - No lymph-vascular invasion is identified              - No in situ component is identified                - No microcalcifications are identified  3/16/2016 - 1/24/2017 Chemotherapy     Faslodex 500 mg with loading dose followed by maintenance monthly injection with Ibrance 75mg 3 weeks on 1 week off        12/13/2016 Progression      Progression noted on PET-CT scan  Progression was largely found in the liver  Patient was set up for liver biopsy  1/3/2017 Biopsy     Liver lesion biopsy demonstated Estrogen Receptor (clone SP-1) 100%/positive, Progesterone Receptor (clone 1E2) 1-2%/positive, HER2 by IHC ([id number]) 2+/equivocal   Her2 by FISH was positive  1/25/2017 - 4/19/2017 Chemotherapy      Herceptin and Perjeta x5 cycles      4/20/2017 Adverse Reaction     Perjecta causing significant diarrhea  Medication stopped  7/13/2017 Surgery     Partial Hepatectomy, results demonstrated ER70%, PR0% & Her2 negative disease  9/19/2017 -  Chemotherapy     Tamoxifen 20 mg daily      2/27/2019 - 4/10/2019 Radiation       Plan ID Energy Fractions Dose per Fraction (cGy) Dose Correction (cGy) Total Dose Delivered (cGy) Elapsed Days   R Axilla # 10X/6X 25 / 25 200 0 5,000 35   R Axilla CD 10X/6X 5 / 5 200 0 1,000 6              Interval History:  Clinically stable  ECOG-  1 - Symptomatic but completely ambulatory    Review of Systems   Constitutional: Negative for appetite change, diaphoresis, fatigue and fever  HENT: Negative for sinus pain  Eyes: Negative for discharge  Respiratory: Negative for cough and shortness of breath  Cardiovascular: Negative for chest pain  Gastrointestinal: Negative for abdominal pain, constipation and diarrhea  Endocrine: Negative for cold intolerance  Genitourinary: Negative for difficulty urinating and hematuria  Musculoskeletal: Negative for joint swelling  Skin: Negative for rash  Allergic/Immunologic: Negative for environmental allergies  Neurological: Negative for dizziness and headaches  Hematological: Negative for adenopathy  Psychiatric/Behavioral: Negative for agitation         Past Medical History:   Diagnosis Date    Anxiety     Arthritis     Breast CA (HCC)     recurrent, ductal carcinoma ER, [place] pos    Cardiac disease     Depression     Diverticula of intestine     Dizziness     and passes out    Flushing     Hiatal hernia     [place] (hard of hearing)      [place]izette    Hypercholesteremia     Liver mass     Liver metastasis (HCC)     Metastatic adenocarcinoma to liver (HCC)     Bx 01/03/2017    PONV (postoperative nausea and vomiting)     Recurrent breast cancer (HCC)     Shingles     Shortness of breath     on exertion    Tachycardia     Urinary incontinence     Use of cane as ambulatory aid     or walker     Patient Active Problem List   Diagnosis    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 (HCC)    Hypercholesteremia    Gastroesophageal reflux disease without esophagitis    History of total knee replacement    Hearing loss    LBBB (left bundle branch block)    Complication associated with cardiac pacemaker lead    Pacemaker complications, subsequent encounter    Chest wall hematoma    Cholecystitis    Acute biliary pancreatitis    Cholangitis    Choledocholithiasis    Acute gallstone pancreatitis    Intermittent complete heart block ([address] )    Spinal stenosis, lumbar region, with neurogenic claudication    Lumbar pain    Spondylolisthesis    Upper abdominal pain    SIRS (systemic inflammatory response syndrome) (HCC)    Lactic acidosis    Abnormal urinalysis    Type 2 diabetes mellitus, without long-term current use of insulin (HCC)    Hypertension    Nerve pain - Right    Neck pain    Cervical spondylosis without myelopathy    SSS (sick sinus syndrome) (HCC)    Mixed hyperlipidemia    Elevated hemidiaphragm       Current Outpatient Medications:     atorvastatin (LIPITOR) 20 mg tablet, Take 20 mg by mouth daily  , Disp: , Rfl:     Calcium Citrate-Vitamin D (CALCIUM CITRATE + D PO), Take 1,500 mg by mouth daily, Disp: , Rfl:     clindamycin (CLEOCIN) 300 MG capsule, TAKE 2 CAPSULES BY MOUTH 1 HOUR PRIOR TO DENTAL PROCEDURE , Disp: , Rfl: 0    clotrimazole-betamethasone (LOTRISONE) 1-0 05 % cream, Apply 1 application topically as needed , Disp: , Rfl:     Cranberry (THERACRAN HP PO), Take 2 tablets by mouth daily, Disp: , Rfl:     furosemide (LASIX) 20 mg tablet, Take 1 tablet (20 mg total) by mouth daily as needed (le swelling) Edema, Disp: 30 tablet, Rfl: 0    Glucosamine-Chondroit-Vit C-Mn (GLUCOSAMINE 1500 COMPLEX) CAPS, Take 1 capsule by mouth daily  , Disp: , Rfl:     metFORMIN (GLUCOPHAGE) 500 mg tablet, 2 (two) times a day, Disp: , Rfl:     metoprolol tartrate (LOPRESSOR) 25 mg tablet, Take 0 5 tablets (12 5 mg total) by mouth every 12 (twelve) hours, Disp: 30 tablet, Rfl: 6    Multiple Vitamin (MULTIVITAMIN) capsule, Take 1 capsule by mouth daily  , Disp: , Rfl:     ondansetron (ZOFRAN) 4 mg tablet, Take 4 mg by mouth every 8 (eight) hours as needed, Disp: , Rfl:     tamoxifen (NOLVADEX) 20 mg tablet, Take 1 tablet (20 mg total) by mouth daily, Disp: 30 tablet, Rfl: 3  Allergies   Allergen Reactions    Ampicillin Shortness Of Breath, Anaphylaxis and Other (See Comments)     Other reaction(s): Unknown Allergic Reaction  Reaction Date: [id number];      Past Surgical History:   Procedure Laterality Date    BREAST SURGERY      CARDIAC PACEMAKER REMOVAL N/A 11/9/2017    Procedure: PACEMAKER LEAD REVISION, REMOVAL OF NONFUNCTIONING LEAD, AND INSERTION OF A NEW RV LEAD; Surgeon: Ambika Marroquin MD;  [facility];  Service: Cardiology    CATARACT EXTRACTION Bilateral     COLONOSCOPY      ERCP N/A 1/8/2018    Procedure: ENDOSCOPIC RETROGRADE CHOLANGIOPANCREATOGRAPHY (ERCP); Surgeon: Bryn Markham MD;  [facility]; Service: Gastroenterology    HYSTERECTOMY      JOINT REPLACEMENT      lizette  TKR and right TSR    MASTECTOMY Right     [place] ERCP DX COLLECTION SPECIMEN BRUSHING/WASHING N/A 2/22/2018    Procedure: ENDOSCOPIC RETROGRADE CHOLANGIOPANCREATOGRAPHY (ERCP) with stent removal;  Surgeon: Bryn Markham MD;  [facility]; Service: Gastroenterology    [place] RESEC [address] N/A 7/13/2017    Procedure: LIVER RESECTION, INTRAOPERATIVE ULTRASOUND;  Surgeon: Lisa Strong MD;  [facility];  Service: Surgical Oncology    ROTATOR CUFF REPAIR Right     SENTINEL LYMPH NODE BIOPSY Right     TONSILECTOMY AND ADNOIDECTOMY      WOUND DEBRIDEMENT Left 11/9/2017    Procedure: DEBRIDEMENT WOUND AND DRESSING CHANGE [facility]); Surgeon: Ambika Marroquin MD;  [facility];  Service: Cardiology     Social History     Objective:  Vitals:    09/12/19 1049   BP: 124/74   BP Location: Right arm   Patient Position: Sitting   Pulse: 99   Resp: 17   Temp: 98 1 °F (36 7 °C)   TempSrc: Tympanic   SpO2: 92%   Weight: 72 4 kg (159 lb 9 6 oz)   Height: 5' 1" (1 549 m)     Physical Exam   Constitutional: She is oriented to person, place, and time  She appears well-developed  HENT:   Head: Normocephalic  Eyes: Pupils are equal, round, and reactive to light  Neck: Neck supple  Cardiovascular: Normal rate  No murmur heard  Pulmonary/Chest: No respiratory distress  She has no wheezes  She has no rales  Abdominal: Soft  She exhibits no distension  There is no tenderness  There is no rebound  Musculoskeletal: She exhibits no edema  Lymphadenopathy:     She has no cervical adenopathy     Neurological: She is alert and oriented to person, place, and time  She displays normal reflexes  Skin: Skin is warm  No rash noted  Psychiatric: She has a normal mood and affect  Thought content normal          Labs: I personally reviewed the labs and imaging pertinent to this patient care

## 2019-09-23 ENCOUNTER — OFFICE VISIT (OUTPATIENT)
Dept: CARDIOLOGY CLINIC | Facility: CLINIC | Age: 79
End: 2019-09-23
Payer: COMMERCIAL

## 2019-09-23 VITALS
BODY MASS INDEX: 30.02 KG/M2 | SYSTOLIC BLOOD PRESSURE: 118 MMHG | HEART RATE: 84 BPM | WEIGHT: 159 LBS | HEIGHT: 61 IN | DIASTOLIC BLOOD PRESSURE: 70 MMHG

## 2019-09-23 DIAGNOSIS — I47.2 VT (VENTRICULAR TACHYCARDIA) (HCC): ICD-10-CM

## 2019-09-23 DIAGNOSIS — Z95.0 PACEMAKER: Primary | ICD-10-CM

## 2019-09-23 DIAGNOSIS — I44.2 CHB (COMPLETE HEART BLOCK) (HCC): ICD-10-CM

## 2019-09-23 PROCEDURE — 93000 ELECTROCARDIOGRAM COMPLETE: CPT | Performed by: INTERNAL MEDICINE

## 2019-09-23 PROCEDURE — 99024 POSTOP FOLLOW-UP VISIT: CPT | Performed by: INTERNAL MEDICINE

## 2019-09-23 NOTE — PROGRESS NOTES
HEART AND VASCULAR  CARDIAC [address]     Follow-up  Today's Date: 09/23/19        Patient name: Eddie Michael  YOB: 1940         Chief Complaint: f/u VT    ASSESSMENT:  Problem List Items Addressed This Visit     None      Visit Diagnoses     Pacemaker    -  Primary    Relevant Orders    POCT ECG        79 yo female  1) 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   S/p upgrade from dual chamber ppm to BIV ICD (kept existing HIS lead for AV synchrony, not a CS lead)  No VT since  She is now off amiodarone  Remains on betablocker  No signficant CAD  No clear electrlyte derangement to explain VT, but she did have E  Coli sepsis at same time     2) S/p  Multiple 5s pauses due to intermittent complete heart block and syncope seen on loop, then dual chamber pacemaker w Passive leads placed  Unfortuantely she had late subacute RV lead perforation (pain started 5 days later and loss of capture)  We revised device and no pericardial effusion, however she did develop large hematoma requiring 2 units tranfusion  She is now recovered,       4) Stage 4 Liver CA post resection    5) Breast Cancer getting chemo and radiation    6) E  Coli sepsis unclear etiology-needs colonoscpy  7) Cough, recently, unclear etiology, she said improving  DISCUSSION AND PLAN:  1  Remote ICD checks- if recurrent VT resume amiodarone  2  F/u in 12 months        Orders Placed This Encounter   Procedures    POCT ECG     There are no discontinued medications    HPI:  79 yo female  1) 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   S/p upgrade from dual chamber ppm to BIV ICD (kept existing HIS lead for AV synchrony, not a CS lead)  No VT since  She is now off amiodarone  Remains on betablocker  No signficant CAD  No clear electrlyte derangement to explain VT, but she did have E   Coli sepsis at same time     2) S/p  Multiple 5s pauses due to intermittent complete heart block and syncope seen on loop, then dual chamber pacemaker w Passive leads placed  Unfortuantely she had late subacute RV lead perforation (pain started 5 days later and loss of capture)  We revised device and no pericardial effusion, however she did develop large hematoma requiring 2 units tranfusion  She is now recovered,       4) Stage 4 Liver CA post resection    5) Breast Cancer getting chemo and radiation    6) E  Coli sepsis unclear etiology-needs colonoscpy    7) Cough, recently, unclear etiology, she said improving    Please note HPI is listed by problem with with update following it, it is copied again in the assessment above and reflects medical decision making as well  Complete 12 point ROS reviewed and otherwise non pertinent or negative except as per HPI pertinent positives in Cardiovascular and Respiratory emphasized  Please see paper chart for outpatient clinic patients where the patient completed the 12 point ROS survey  Past Medical History:   Diagnosis Date    Anxiety     Arthritis     Breast CA ([address] )     recurrent, ductal carcinoma ER, [place] pos    Cardiac disease     Depression     Diverticula of intestine     Dizziness     and passes out    Flushing     Hiatal hernia     [place] (hard of hearing)      lizette    Hypercholesteremia     Liver mass     Liver metastasis (HCC)     Metastatic adenocarcinoma to liver (HCC)     Bx 01/03/2017    PONV (postoperative nausea and vomiting)     Recurrent breast cancer (HCC)     Shingles     Shortness of breath     on exertion    Tachycardia     Urinary incontinence     Use of cane as ambulatory aid     or walker       Allergies   Allergen Reactions    Ampicillin Shortness Of Breath, Anaphylaxis and Other (See Comments)     Other reaction(s): Unknown Allergic Reaction  Reaction Date: [id number]; I reviewed the Home Medication list and Allergies in the chart  Scheduled Meds:  Current Outpatient Medications   Medication Sig Dispense Refill    aspirin 81 MG tablet Take 81 mg by mouth daily      atorvastatin (LIPITOR) 20 mg tablet Take 20 mg by mouth daily   Calcium Citrate-Vitamin D (CALCIUM CITRATE + D PO) Take 1,500 mg by mouth daily      clotrimazole-betamethasone (LOTRISONE) 1-0 05 % cream Apply 1 application topically as needed       Cranberry (THERACRAN HP PO) Take 2 tablets by mouth daily      furosemide (LASIX) 20 mg tablet Take 1 tablet (20 mg total) by mouth daily as needed (le swelling) Edema 30 tablet 0    Glucosamine-Chondroit-Vit C-Mn (GLUCOSAMINE 1500 COMPLEX) CAPS Take 1 capsule by mouth daily        metFORMIN (GLUCOPHAGE) 500 mg tablet 2 (two) times a day      metoprolol tartrate (LOPRESSOR) 25 mg tablet Take 0 5 tablets (12 5 mg total) by mouth every 12 (twelve) hours 30 tablet 6    Multiple Vitamin (MULTIVITAMIN) capsule Take 1 capsule by mouth daily   ondansetron (ZOFRAN) 4 mg tablet Take 4 mg by mouth every 8 (eight) hours as needed      tamoxifen (NOLVADEX) 20 mg tablet Take 1 tablet (20 mg total) by mouth daily 30 tablet 3    clindamycin (CLEOCIN) 300 MG capsule TAKE 2 CAPSULES BY MOUTH 1 HOUR PRIOR TO DENTAL PROCEDURE   0     No current facility-administered medications for this visit        PRN Meds:         Family History   Problem Relation Age of Onset    Lung cancer Father     Cancer Brother     Diabetes type II Son        Social History     Socioeconomic History    Marital status: /Civil Union     Spouse name: Not on file    Number of children: Not on file    Years of education: Not on file    Highest education level: Not on file   Occupational History    Not on file   Social Needs    Financial resource strain: Not on file    Food insecurity:     Worry: Not on file     Inability: Not on file    Transportation needs:     Medical: Not on file     Non-medical: Not on file   Tobacco Use    Smoking status: Never Smoker    Smokeless tobacco: Never Used   Substance and Sexual Activity    Alcohol use: Not Currently    Drug use: No    Sexual activity: Not on file   Lifestyle    Physical activity:     Days per week: Not on file     Minutes per session: Not on file    Stress: Not on file   Relationships    Social connections:     Talks on phone: Not on file     Gets together: Not on file     Attends Judaism service: Not on file     Active member of club or organization: Not on file     Attends meetings of clubs or organizations: Not on file     Relationship status: Not on file    Intimate partner violence:     Fear of current or ex partner: Not on file     Emotionally abused: Not on file     Physically abused: Not on file     Forced sexual activity: Not on file   Other Topics Concern    Not on file   Social History Narrative    Not on file         OBJECTIVE:    /70   Pulse 84   Ht 5' 1" (1 549 m)   Wt 72 1 kg (159 lb)   LMP  (LMP Unknown) Comment: post   BMI 30 04 kg/m²   Vitals:    09/23/19 1116   Weight: 72 1 kg (159 lb)     GEN: No acute distress, Alert and oriented, well appearing  HEENT:Head, neck, ears, oral pharynx: Mucus membranes moist, oral pharynx clear, nares clear  External ears normal  EYES: Pupils equal, sclera anicteric, midline, normal conjuctiva  NECK: No JVD, supple, no obvious masses or thryomegaly or goiter  CARDIOVASCULAR:  RRR, No murmur, rub, gallops S1,S2  LUNGS: Clear To auscultation bilaterally, normal effort, no rales, rhonchi, crackles  ABDOMEN:  nondistended,  without obvious organomegaly or ascites  EXTREMITIES/VASCULAR:  No edema  Radial pulses intact, pedal pulses difficult to palpate, warm an well perfused  PSYCH: Normal Affect, no overt suicidal ideation, linear speech pattern without evidence of psychosis     NEURO: Grossly intact, moving all extremiteis equal, face symmetric, alert and responsive, no obvious focal defecits  GAIT:  Ambulates normally without difficulty  HEME: No bleeding, bruising, petechia, purpura  SKIN: No significant rashes, warm, no diaphoresis or pallor  ICD well healed, c/d/i  No hematoma    Lab Results:     @RESULTRCNT(1M])@    CBC with diff:     CMP:      Invalid input(s): ALBUMIN  TSH:       Lipid Profile:          Cardiac testing:   Results for orders placed during the hospital encounter of 17   Echo complete with contrast if indicated    Narrative [facility]  [facility], [address] [facility]  [phone]    Transthoracic Echocardiogram  2D, M-mode, Doppler, and Color Doppler    Study date:  2017    Patient: Viviane Reid  MR number: [id number]  Account number: [de-identified]  : 1940  Age: 76 years  Gender: Female  Status: Outpatient  Location: [address] [place] [address] [facility]  Height: 61 in  Weight: 161 7 lb  BP: 128/ 64 mmHg    Indications: Assess left ventricular function  Diagnoses: E78 5 - Hyperlipidemia, unspecified    Sonographer:  ELIANE Zapata  Primary Physician:  Lana Joy MD  Referring Physician:  Albertina Belle DO  Group:  [id number]  Cardiology Fellow:  Neha Ovalle MD  Interpreting Physician:  Francisco Mcgee MD    SUMMARY    LEFT VENTRICLE:  Systolic function was at the lower limits of normal  Ejection fraction was estimated to be 50 %  Although no diagnostic regional wall motion abnormality was identified, this possibility cannot be completely excluded on the basis of this study  Left ventricular diastolic function parameters were normal     VENTRICULAR SEPTUM:  There was dyssynergic motion  These changes are consistent with LBBB  RIGHT VENTRICLE:  The size was normal   Systolic function was normal     TRICUSPID VALVE:  There was mild regurgitation  Pulmonary artery systolic pressure was within the normal range      HISTORY: PRIOR HISTORY: Breast cancer; LBBB; Hyperlipidemia    PROCEDURE: The study was performed in the [address]  This was a routine study  The transthoracic approach was used  The study included complete 2D imaging, M-mode, complete spectral Doppler, and color Doppler  The  heart rate was 90 bpm, at the start of the study  Images were obtained from the parasternal, apical, subcostal, and suprasternal notch acoustic windows  Echocardiographic views were limited due to lung interference  Image quality was  adequate  LEFT VENTRICLE: Size was normal  Systolic function was at the lower limits of normal  Ejection fraction was estimated to be 50 %  Although no diagnostic regional wall motion abnormality was identified, this possibility cannot be completely  excluded on the basis of this study  Wall thickness was normal  DOPPLER: Left ventricular diastolic function parameters were normal     VENTRICULAR SEPTUM: There was dyssynergic motion  These changes are consistent with LBBB  RIGHT VENTRICLE: The size was normal  Systolic function was normal  Wall thickness was normal     LEFT ATRIUM: Size was normal     RIGHT ATRIUM: Size was normal     MITRAL VALVE: Valve structure was normal  There was normal leaflet separation  DOPPLER: The transmitral velocity was within the normal range  There was no evidence for stenosis  There was trace regurgitation  AORTIC VALVE: The valve was trileaflet  Leaflets exhibited normal thickness and normal cuspal separation  DOPPLER: Transaortic velocity was within the normal range  There was no evidence for stenosis  There was no regurgitation  TRICUSPID VALVE: The valve structure was normal  There was normal leaflet separation  DOPPLER: The transtricuspid velocity was within the normal range  There was no evidence for stenosis  There was mild regurgitation  Pulmonary artery  systolic pressure was within the normal range  Estimated peak PA pressure was 22 mmHg      PULMONIC VALVE: Leaflets exhibited normal thickness, no calcification, and normal cuspal separation  DOPPLER: The transpulmonic velocity was within the normal range  There was trace regurgitation  PERICARDIUM: There was no pericardial effusion  AORTA: The root exhibited normal size  SYSTEMIC VEINS: IVC: The inferior vena cava was normal in size and course  Respirophasic changes were normal     SYSTEM MEASUREMENT TABLES    2D  %FS: 20 78 %  Ao Diam: 3 49 cm  EDV(Teich): 45 21 ml  EF Biplane: 52 52 %  EF(Cube): 50 28 %  EF(Teich): 43 45 %  ESV(Cube): 18 41 ml  ESV(Teich): 25 57 ml  IVSd: 1 04 cm  LA Diam: 2 52 cm  LVEDV MOD A2C: 104 64 ml  LVEDV MOD A4C: 107 05 ml  LVEDV MOD BP: 108 41 ml  LVEF MOD A2C: 58 35 %  LVEF MOD A4C: 46 17 %  LVESV MOD A2C: 43 58 ml  LVESV MOD A4C: 57 62 ml  LVESV MOD BP: 51 48 ml  LVIDd: 3 33 cm  LVIDs: 2 64 cm  LVLd A2C: 7 99 cm  LVLd A4C: 7 59 cm  LVLs A2C: 6 22 cm  LVLs A4C: 6 59 cm  LVPWd: 1 13 cm  SI(Cube): 10 76 ml/m2  SI(Teich): 11 35 ml/m2  SV MOD A2C: 61 05 ml  SV MOD A4C: 49 42 ml  SV(Cube): 18 61 ml  SV(Teich): 19 64 ml    CW  AV Vmax: 1 07 m/s  AV maxP 62 mmHg  TR Vmax: 2 08 m/s  TR maxP 3 mmHg    MM  TAPSE: 1 66 cm    PW  E': 0 11 m/s  E/E': 9 62  LVOT Vmax: 0 74 m/s  LVOT maxP 2 mmHg  MV A Yadiel: 0 27 m/s  MV Dec Giles: 7 18 m/s2  MV DecT: 145 34 ms  MV E Yadiel: 1 04 m/s  MV E/A Ratio: 3 88    [employer] Accredited Echocardiography [facility]    Prepared and electronically signed by    Iggy Suarez MD  Signed 2017 16:28:13       No results found for this or any previous visit  No results found for this or any previous visit  No results found for this or any previous visit  I reviewed and interpreted the following LABS/EKG/TELE/IMAGING and below is summary of my interpretation (if data available):    No VT on ICD interogation 19   She paces 100% for CHB

## 2019-10-16 ENCOUNTER — OFFICE VISIT (OUTPATIENT)
Dept: PAIN MEDICINE | Facility: MEDICAL CENTER | Age: 79
End: 2019-10-16
Payer: COMMERCIAL

## 2019-10-16 VITALS
HEIGHT: 61 IN | DIASTOLIC BLOOD PRESSURE: 74 MMHG | SYSTOLIC BLOOD PRESSURE: 124 MMHG | HEART RATE: 88 BPM | RESPIRATION RATE: 14 BRPM | WEIGHT: 156.2 LBS | BODY MASS INDEX: 29.49 KG/M2

## 2019-10-16 DIAGNOSIS — M47.812 CERVICAL SPONDYLOSIS: Primary | ICD-10-CM

## 2019-10-16 DIAGNOSIS — M48.062 SPINAL STENOSIS, LUMBAR REGION, WITH NEUROGENIC CLAUDICATION: ICD-10-CM

## 2019-10-16 DIAGNOSIS — M47.812 CERVICAL SPONDYLOSIS WITHOUT MYELOPATHY: ICD-10-CM

## 2019-10-16 DIAGNOSIS — M54.16 LUMBAR RADICULOPATHY: ICD-10-CM

## 2019-10-16 PROCEDURE — 99214 OFFICE O/P EST MOD 30 MIN: CPT | Performed by: NURSE PRACTITIONER

## 2019-10-16 RX ORDER — GABAPENTIN 300 MG/1
300 CAPSULE ORAL
Qty: 30 CAPSULE | Refills: 0 | Status: SHIPPED | OUTPATIENT
Start: 2019-10-16 | End: 2019-11-13 | Stop reason: SDUPTHER

## 2019-10-16 NOTE — PROGRESS NOTES
Assessment  1  Cervical spondylosis    2  Lumbar radiculopathy    3  Cervical spondylosis without myelopathy    4  Spinal stenosis, lumbar region, with neurogenic claudication        Plan  At this time we will initiate a low dose of gabapentin 300 mg 1 tablet at bedtime to try and decrease some of her pain symptoms  She is aware of the most common side effects which are drowsiness, dizziness, blurry vision swelling of the hands and feet  The patient has not had a left L5-S1 lumbar epidural steroid injection since May 2019  This did help when she had this resolve some of her left leg pain  She tells me that the pain symptoms are back and she would like to repeat the injection  Patient will return in 4 weeks to re-evaluate her new medication  [place] Prescription Drug Monitoring Program report was reviewed and was appropriate     Complete risks and benefits including bleeding, infection, tissue reaction, nerve injury and allergic reaction were discussed  The approach was demonstrated using models and literature was provided  Verbal and written consent was obtained  My impressions and treatment recommendations were discussed in detail with the patient who verbalized understanding and had no further questions  Discharge instructions were provided  I personally saw and examined the patient and I agree with the above discussed plan of care  Orders Placed This Encounter   Procedures    FL spine and pain procedure     Standing Status:   Future     Standing Expiration Date:   10/16/2023     Order Specific Question:   Reason for Exam:     Answer:   repeat Left L5-S1 LESI     Order Specific Question:   Anticoagulant hold needed?      Answer:   ASA not prescribed     New Medications Ordered This Visit   Medications    gabapentin (NEURONTIN) 300 mg capsule     Sig: Take 1 capsule (300 mg total) by mouth daily at bedtime     Dispense:  30 capsule     Refill:  0       History of Present Illness    Van Espitia is a 78 y o  female presents for follow-up related to her chronic right-sided neck and arm pain as well as her left-sided low back and leg pain  Today the patient rates her pain 10/10 this is constant in nature and described as sharp, and shooting  Patient is status post a right C2 to for radiofrequency ablation on September 9, 2019 with Dr Meagan Ann  She tells me that she did receive probably about 50% relief from the procedure so far  I did explain to her that she may have another week or 2 of healing time as it does take a full 4-6 weeks for lesions to form on the burn nerves  I have personally reviewed and/or updated the patient's past medical history, past surgical history, family history, social history, current medications, allergies, and vital signs today  Review of Systems   Respiratory: Negative for shortness of breath  Cardiovascular: Negative for chest pain  Gastrointestinal: Negative for constipation, diarrhea, nausea and vomiting  Musculoskeletal: Positive for back pain (left hip and both thighs), gait problem, joint swelling, myalgias and neck pain (right lateral)  Negative for arthralgias  Skin: Negative for rash  Neurological: Positive for dizziness  Negative for seizures and weakness  All other systems reviewed and are negative        Patient Active Problem List   Diagnosis    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 ([address] )    Hypercholesteremia    Gastroesophageal reflux disease without esophagitis    History of total knee replacement    Hearing loss    LBBB (left bundle branch block)    Complication associated with cardiac pacemaker lead    Pacemaker complications, subsequent encounter    Chest wall hematoma    Cholecystitis    Acute biliary pancreatitis    Cholangitis    Choledocholithiasis    Acute gallstone pancreatitis    Intermittent complete heart block ([address] )    Spinal stenosis, lumbar region, with neurogenic claudication    Lumbar pain    Spondylolisthesis    Upper abdominal pain    SIRS (systemic inflammatory response syndrome) (HCC)    Lactic acidosis    Abnormal urinalysis    Type 2 diabetes mellitus, without long-term current use of insulin (HCC)    Hypertension    Nerve pain - Right    Neck pain    Cervical spondylosis without myelopathy    SSS (sick sinus syndrome) (HCC)    Mixed hyperlipidemia    Elevated hemidiaphragm    VT (ventricular tachycardia) (HCC)       Past Medical History:   Diagnosis Date    Anxiety     Arthritis     Breast CA (HCC)     recurrent, ductal carcinoma ER, [place] pos    Cardiac disease     Depression     Diverticula of intestine     Dizziness     and passes out    Flushing     Hiatal hernia     [place] (hard of hearing)      [place]    Hypercholesteremia     Liver mass     Liver metastasis (HCC)     Metastatic adenocarcinoma to liver (HCC)     Bx 01/03/2017    PONV (postoperative nausea and vomiting)     Recurrent breast cancer (HCC)     Shingles     Shortness of breath     on exertion    Tachycardia     Urinary incontinence     Use of cane as ambulatory aid     or walker       Past Surgical History:   Procedure Laterality Date    BREAST SURGERY      CARDIAC PACEMAKER REMOVAL N/A 11/9/2017    Procedure: PACEMAKER LEAD REVISION, REMOVAL OF NONFUNCTIONING LEAD, AND INSERTION OF A NEW RV LEAD;  Surgeon: Florencia Montes MD;  [facility];  Service: Cardiology    CATARACT EXTRACTION Bilateral     COLONOSCOPY      ERCP N/A 1/8/2018    Procedure: ENDOSCOPIC RETROGRADE CHOLANGIOPANCREATOGRAPHY (ERCP); Surgeon: Rosalio Sauceda MD;  [facility];   Service: Gastroenterology    HYSTERECTOMY      JOINT REPLACEMENT      l[place]  TKR and right TSR    MASTECTOMY Right     [place] ERCP DX COLLECTION SPECIMEN BRUSHING/WASHING N/A 2/22/2018    Procedure: ENDOSCOPIC RETROGRADE CHOLANGIOPANCREATOGRAPHY (ERCP) with stent removal;  Surgeon: Rosalio Sauceda MD;  [facility];  Service: Gastroenterology    [place] RESEC [address] N/A 7/13/2017    Procedure: LIVER RESECTION, INTRAOPERATIVE ULTRASOUND;  Surgeon: Julienne Vilchis MD;  [facility];  Service: Surgical Oncology    ROTATOR CUFF REPAIR Right     SENTINEL LYMPH NODE BIOPSY Right     TONSILECTOMY AND ADNOIDECTOMY      WOUND DEBRIDEMENT Left 11/9/2017    Procedure: DEBRIDEMENT WOUND AND DRESSING CHANGE Filippo [facility] KEITH); Surgeon: Lynnette Merino MD;  [facility];  Service: Cardiology       Family History   Problem Relation Age of Onset    Lung cancer Father     Cancer Brother     Diabetes type II Son        Social History     Occupational History    Not on file   Tobacco Use    Smoking status: Never Smoker    Smokeless tobacco: Never Used   Substance and Sexual Activity    Alcohol use: Not Currently    Drug use: No    Sexual activity: Not on file       Current Outpatient Medications on File Prior to Visit   Medication Sig    atorvastatin (LIPITOR) 20 mg tablet Take 20 mg by mouth daily   Calcium Citrate-Vitamin D (CALCIUM CITRATE + D PO) Take 1,500 mg by mouth daily    clotrimazole-betamethasone (LOTRISONE) 1-0 05 % cream Apply 1 application topically as needed     Cranberry (THERACRAN HP PO) Take 2 tablets by mouth daily    furosemide (LASIX) 20 mg tablet Take 1 tablet (20 mg total) by mouth daily as needed (le swelling) Edema    Glucosamine-Chondroit-Vit C-Mn (GLUCOSAMINE 1500 COMPLEX) CAPS Take 1 capsule by mouth daily      metFORMIN (GLUCOPHAGE) 500 mg tablet 2 (two) times a day    metoprolol tartrate (LOPRESSOR) 25 mg tablet Take 0 5 tablets (12 5 mg total) by mouth every 12 (twelve) hours    Multiple Vitamin (MULTIVITAMIN) capsule Take 1 capsule by mouth daily      ondansetron (ZOFRAN) 4 mg tablet Take 4 mg by mouth every 8 (eight) hours as needed    tamoxifen (NOLVADEX) 20 mg tablet Take 1 tablet (20 mg total) by mouth daily    aspirin 81 MG tablet Take 81 mg by mouth daily    clindamycin (CLEOCIN) 300 MG capsule TAKE 2 CAPSULES BY MOUTH 1 HOUR PRIOR TO DENTAL PROCEDURE  No current facility-administered medications on file prior to visit  Allergies   Allergen Reactions    Ampicillin Shortness Of Breath, Anaphylaxis and Other (See Comments)     Other reaction(s): Unknown Allergic Reaction  Reaction Date: [id number];        Physical Exam    /74   Pulse 88   Resp 14   Ht 5' 1" (1 549 m)   Wt 70 9 kg (156 lb 3 2 oz)   LMP  (LMP Unknown) Comment: post   BMI 29 51 kg/m²     Constitutional: normal, well developed, well nourished, alert, in no distress and non-toxic and no overt pain behavior    Eyes: anicteric  HEENT: grossly intact  Neck: supple, symmetric, trachea midline and no masses   Pulmonary:even and unlabored  Cardiovascular:No edema or pitting edema present  Skin:Normal without rashes or lesions and well hydrated  Psychiatric:Mood and affect appropriate  Neurologic:Cranial Nerves II-XII grossly intact  Musculoskeletal:normal    Imaging

## 2019-10-18 ENCOUNTER — TELEPHONE (OUTPATIENT)
Dept: PAIN MEDICINE | Facility: MEDICAL CENTER | Age: 79
End: 2019-10-18

## 2019-10-18 NOTE — TELEPHONE ENCOUNTER
--FYI--    S/W pt  Pt stated she started taking Gabapentin at night and is asking if she can still take aleve during the day? Advised pt yes she can  Pt verbalized understanding

## 2019-10-18 NOTE — TELEPHONE ENCOUNTER
Patient called requesting to speak with Belen Dawn  Patient did not give any further information      Call back# [phone]

## 2019-10-30 ENCOUNTER — HOSPITAL ENCOUNTER (OUTPATIENT)
Dept: RADIOLOGY | Facility: MEDICAL CENTER | Age: 79
Discharge: HOME | End: 2019-10-30
Attending: PHYSICAL MEDICINE & REHABILITATION | Admitting: PHYSICAL MEDICINE & REHABILITATION
Payer: COMMERCIAL

## 2019-10-30 VITALS
RESPIRATION RATE: 18 BRPM | SYSTOLIC BLOOD PRESSURE: 126 MMHG | TEMPERATURE: 98.3 F | HEART RATE: 80 BPM | OXYGEN SATURATION: 96 % | DIASTOLIC BLOOD PRESSURE: 76 MMHG

## 2019-10-30 DIAGNOSIS — M54.16 LUMBAR RADICULOPATHY: ICD-10-CM

## 2019-10-30 PROCEDURE — 62323 NJX INTERLAMINAR LMBR/SAC: CPT | Performed by: PHYSICAL MEDICINE & REHABILITATION

## 2019-10-30 RX ORDER — METHYLPREDNISOLONE ACETATE 80 MG/ML
80 INJECTION, SUSPENSION INTRA-ARTICULAR; INTRALESIONAL; INTRAMUSCULAR; PARENTERAL; SOFT TISSUE ONCE
Status: COMPLETED | OUTPATIENT
Start: 2019-10-30 | End: 2019-10-30

## 2019-10-30 RX ORDER — LIDOCAINE HYDROCHLORIDE 10 MG/ML
5 INJECTION, SOLUTION EPIDURAL; INFILTRATION; INTRACAUDAL; PERINEURAL ONCE
Status: COMPLETED | OUTPATIENT
Start: 2019-10-30 | End: 2019-10-30

## 2019-10-30 RX ORDER — SODIUM, POTASSIUM,MAG SULFATES 17.5-3.13G
SOLUTION, RECONSTITUTED, ORAL ORAL
Refills: 0 | COMMUNITY
Start: 2019-10-14 | End: 2020-02-03 | Stop reason: ALTCHOICE

## 2019-10-30 RX ADMIN — METHYLPREDNISOLONE ACETATE 80 MG: 80 INJECTION, SUSPENSION INTRA-ARTICULAR; INTRALESIONAL; INTRAMUSCULAR; PARENTERAL; SOFT TISSUE at 14:02

## 2019-10-30 RX ADMIN — IOHEXOL 1 ML: 300 INJECTION, SOLUTION INTRAVENOUS at 14:02

## 2019-10-30 RX ADMIN — LIDOCAINE HYDROCHLORIDE 2 ML: 10 INJECTION, SOLUTION EPIDURAL; INFILTRATION; INTRACAUDAL; PERINEURAL at 14:00

## 2019-10-30 NOTE — H&P
History of Present Illness:  The patient is a 78 y o  female who presents with complaints of back and left leg pain    Patient Active Problem List   Diagnosis    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 ([address] )    Hypercholesteremia    Gastroesophageal reflux disease without esophagitis    History of total knee replacement    Hearing loss    LBBB (left bundle branch block)    Complication associated with cardiac pacemaker lead    Pacemaker complications, subsequent encounter    Chest wall hematoma    Cholecystitis    Acute biliary pancreatitis    Cholangitis    Choledocholithiasis    Acute gallstone pancreatitis    Intermittent complete heart block ([address] )    Spinal stenosis, lumbar region, with neurogenic claudication    Lumbar pain    Spondylolisthesis    Upper abdominal pain    SIRS (systemic inflammatory response syndrome) (HCC)    Lactic acidosis    Abnormal urinalysis    Type 2 diabetes mellitus, without long-term current use of insulin (HCC)    Hypertension    Nerve pain - Right    Neck pain    Cervical spondylosis without myelopathy    SSS (sick sinus syndrome) (HCC)    Mixed hyperlipidemia    Elevated hemidiaphragm    VT (ventricular tachycardia) (HCC)       Past Medical History:   Diagnosis Date    Anxiety     Arthritis     Breast CA (HCC)     recurrent, ductal carcinoma ER, [place] pos    Cardiac disease     Depression     Diverticula of intestine     Dizziness     and passes out    Flushing     Hiatal hernia     [place] (hard of hearing)      lizette    Hypercholesteremia     Liver mass     Liver metastasis (HCC)     Metastatic adenocarcinoma to liver (HCC)     Bx 01/03/2017    PONV (postoperative nausea and vomiting)     Recurrent breast cancer (HCC)     Shingles     Shortness of breath     on exertion    Tachycardia     Urinary incontinence     Use of cane as ambulatory aid     or walker       Past Surgical History:   Procedure Laterality Date    BREAST SURGERY      CARDIAC PACEMAKER REMOVAL N/A 11/9/2017    Procedure: PACEMAKER LEAD REVISION, REMOVAL OF NONFUNCTIONING LEAD, AND INSERTION OF A NEW RV LEAD;  Surgeon: Christy Day MD;  [facility];  Service: Cardiology    CATARACT EXTRACTION Bilateral     COLONOSCOPY      ERCP N/A 1/8/2018    Procedure: ENDOSCOPIC RETROGRADE CHOLANGIOPANCREATOGRAPHY (ERCP); Surgeon: Siomara Edwards MD;  Location: [facility]; Service: Gastroenterology    HYSTERECTOMY      JOINT REPLACEMENT      lizette  TKR and right TSR    MASTECTOMY Right     CO ERCP DX COLLECTION SPECIMEN BRUSHING/WASHING N/A 2/22/2018    Procedure: ENDOSCOPIC RETROGRADE CHOLANGIOPANCREATOGRAPHY (ERCP) with stent removal;  Surgeon: Siomara Edwards MD;  Location: [facility]; Service: Gastroenterology    CO RESEC [id number] N/A 7/13/2017    Procedure: LIVER RESECTION, INTRAOPERATIVE ULTRASOUND;  Surgeon: Sage Boland MD;  Location[facility];  Service: Surgical Oncology    ROTATOR CUFF REPAIR Right     SENTINEL LYMPH NODE BIOPSY Right     TONSILECTOMY AND ADNOIDECTOMY      WOUND DEBRIDEMENT Left 11/9/2017    Procedure: DEBRIDEMENT WOUND AND DRESSING CHANGE [facility]); Surgeon: Christy Day MD;  [facility];  Service: Cardiology         Current Outpatient Medications:     atorvastatin (LIPITOR) 20 mg tablet, Take 20 mg by mouth daily  , Disp: , Rfl:     Calcium Citrate-Vitamin D (CALCIUM CITRATE + D PO), Take 1,500 mg by mouth daily, Disp: , Rfl:     clotrimazole-betamethasone (LOTRISONE) 1-0 05 % cream, Apply 1 application topically as needed , Disp: , Rfl:     Cranberry (THERACRAN HP PO), Take 2 tablets by mouth daily, Disp: , Rfl:     furosemide (LASIX) 20 mg tablet, Take 1 tablet (20 mg total) by mouth daily as needed (le swelling) Edema, Disp: 30 tablet, Rfl: 0    gabapentin (NEURONTIN) 300 mg capsule, Take 1 capsule (300 mg total) by mouth daily at bedtime, Disp: 30 capsule, Rfl: 0    Glucosamine-Chondroit-Vit C-Mn (GLUCOSAMINE 1500 COMPLEX) CAPS, Take 1 capsule by mouth daily  , Disp: , Rfl:     metFORMIN (GLUCOPHAGE) 500 mg tablet, 2 (two) times a day, Disp: , Rfl:     metoprolol tartrate (LOPRESSOR) 25 mg tablet, Take 0 5 tablets (12 5 mg total) by mouth every 12 (twelve) hours, Disp: 30 tablet, Rfl: 6    Multiple Vitamin (MULTIVITAMIN) capsule, Take 1 capsule by mouth daily  , Disp: , Rfl:     ondansetron (ZOFRAN) 4 mg tablet, Take 4 mg by mouth every 8 (eight) hours as needed, Disp: , Rfl:     tamoxifen (NOLVADEX) 20 mg tablet, Take 1 tablet (20 mg total) by mouth daily, Disp: 30 tablet, Rfl: 3    clindamycin (CLEOCIN) 300 MG capsule, TAKE 2 CAPSULES BY MOUTH 1 HOUR PRIOR TO DENTAL PROCEDURE , Disp: , Rfl: 0    SUPREP BOWEL PREP KIT 17 5-3 13-1 6 GM/177ML SOLN, , Disp: , Rfl: 0    Current Facility-Administered Medications:     iohexol (OMNIPAQUE) 300 mg/mL injection 50 mL, 50 mL, Epidural, Once, Scarlett Press, DO    lidocaine (PF) (XYLOCAINE-MPF) 1 % injection 5 mL, 5 mL, Infiltration, Once, Scarlett Press, DO    methylPREDNISolone acetate (DEPO-MEDROL) injection 80 mg, 80 mg, Epidural, Once, Scarlett Press, DO    Allergies   Allergen Reactions    Ampicillin Shortness Of Breath, Anaphylaxis and Other (See Comments)     Other reaction(s): Unknown Allergic Reaction  Reaction Date: [id number];        Physical Exam:   Vitals:    10/30/19 1346   BP: 127/80   Pulse: 86   Resp: 20   Temp: 98 3 °F (36 8 °C)   SpO2: 96%     General: Awake, Alert, Oriented x 3, Mood and affect appropriate  Respiratory: Respirations even and unlabored  Cardiovascular: Peripheral pulses intact; no edema  Musculoskeletal Exam:  Back and left leg pain    ASA Score:  2  Patient/Chart Verification  Patient ID Verified: Verbal  Consents Confirmed: Procedural, To be obtained in the Pre-Procedure area  H&P( within 30 days) Verified: To be obtained in the Pre-Procedure area  Allergies Reviewed:  Yes  Anticoag/NSAID held?: Yes(ASA last dose weeks ago, Aleve 1pill LD either yesterday or day before- pt unsure, JE aware)  Currently on antibiotics?: No  Pregnancy denied?: NA    Assessment:   1   Lumbar radiculopathy        Plan: repeat Left L5-S1 LESI

## 2019-10-30 NOTE — DISCHARGE INSTRUCTIONS
Epidural Steroid Injection   WHAT YOU NEED TO KNOW:   An epidural steroid injection (JESUS) is a procedure to inject steroid medicine into the epidural space  The epidural space is between your spinal cord and vertebrae  Steroids reduce inflammation and fluid buildup in your spine that may be causing pain  You may be given pain medicine along with the steroids  ACTIVITY  · Do not drive or operate machinery today  · No strenuous activity today - bending, lifting, etc   · You may resume normal activites starting tomorrow - start slowly and as tolerated  · You may shower today, but no tub baths or hot tubs  · You may have numbness for several hours from the local anesthetic  Please use caution and common sense, especially with weight-bearing activities  CARE OF THE INJECTION SITE  · If you have soreness or pain, apply ice to the area today (20 minutes on/20 minutes off)  · Starting tomorrow, you may use warm, moist heat or ice if needed  · You may have an increase or change in your discomfort for 36-48 hours after your treatment  · Apply ice and continue with any pain medication you have been prescribed  · Notify [facility] if you have any of the following: redness, drainage, swelling, headache, stiff neck or fever above 100°F     SPECIAL INSTRUCTIONS  · Our office will contact you in approximately 7 days for a progress report  MEDICATIONS  · Continue to take all routine medications  · Our office may have instructed you to hold some medications  Resume aspirin and Aleve tomorrow 10/31/19  If you have a problem specifically related to your procedure, please call our office at [phone]  Problems not related to your procedure should be directed to your primary care physician

## 2019-11-04 ENCOUNTER — TELEPHONE (OUTPATIENT)
Dept: HEMATOLOGY ONCOLOGY | Facility: CLINIC | Age: 79
End: 2019-11-04

## 2019-11-04 DIAGNOSIS — C50.919 MALIGNANT NEOPLASM OF FEMALE BREAST, UNSPECIFIED ESTROGEN RECEPTOR STATUS, UNSPECIFIED LATERALITY, UNSPECIFIED SITE OF BREAST (HCC): ICD-10-CM

## 2019-11-04 RX ORDER — TAMOXIFEN CITRATE 20 MG/1
20 TABLET ORAL DAILY
Qty: 60 TABLET | Refills: 3 | Status: SHIPPED | OUTPATIENT
Start: 2019-11-04 | End: 2020-06-29 | Stop reason: SDUPTHER

## 2019-11-04 NOTE — TELEPHONE ENCOUNTER
Patient called in for a refill on Tamoxifen 20 mg  She asked if she can get 60 pills instead of 30 because she always has to run out  She would like the prescription called into the [facility] in [place] on [facility]

## 2019-11-06 ENCOUNTER — TELEPHONE (OUTPATIENT)
Dept: RADIOLOGY | Facility: CLINIC | Age: 79
End: 2019-11-06

## 2019-11-08 NOTE — TELEPHONE ENCOUNTER
PA    S/w pt and advised of Dr Fausto Leiva notation  Pt states that she would like to keep her appt w/ AO next week because she was put on a medication and would like to talk to Samuel about it  Pt appreciative of call, will keep OV as scheduled

## 2019-11-12 ENCOUNTER — CLINICAL SUPPORT (OUTPATIENT)
Dept: RADIATION ONCOLOGY | Facility: CLINIC | Age: 79
End: 2019-11-12
Attending: RADIOLOGY
Payer: COMMERCIAL

## 2019-11-12 VITALS
HEIGHT: 61 IN | BODY MASS INDEX: 29.8 KG/M2 | WEIGHT: 157.85 LBS | DIASTOLIC BLOOD PRESSURE: 70 MMHG | TEMPERATURE: 97.9 F | OXYGEN SATURATION: 94 % | RESPIRATION RATE: 18 BRPM | SYSTOLIC BLOOD PRESSURE: 136 MMHG | HEART RATE: 87 BPM

## 2019-11-12 DIAGNOSIS — C78.7 ADENOCARCINOMA OF RIGHT BREAST METASTATIC TO LIVER (HCC): Primary | Chronic | ICD-10-CM

## 2019-11-12 DIAGNOSIS — C50.911 ADENOCARCINOMA OF RIGHT BREAST METASTATIC TO LIVER (HCC): Primary | Chronic | ICD-10-CM

## 2019-11-12 PROCEDURE — 99211 OFF/OP EST MAY X REQ PHY/QHP: CPT | Performed by: RADIOLOGY

## 2019-11-12 NOTE — PROGRESS NOTES
Aruna Baker 1940 is a 78 y o  female       Follow up visit     Alejandra Green L 12 year old female with a history of right breast carcinoma ER [place] and her 2 positive  She has evidence of stage IV disease, the liver metastasis which has been resected  She has been recently maintained on tamoxifen with good tolerance and stability of her systemic disease except for her right axilla which showed progression on imaging  She is asymptomatic at this site  [facility], no other obvious areas of disease, radiation treatment to the right axilla was recommended       She completed a course of palliative radiation therapy to the right axilla on 4/10/19  She was last seen 5/10/19     8/12/19 admitted with fatigue, SOB,  decreased appetite, nausea, chills, found to have E coli bacteremia and arrhythmia, pacemaker upgraded to   BIV ICD     8/12/19 CTA chest PE study  IMPRESSION:   No pulmonary embolus    Minimal bibasilar, right greater than left, and right middle lobe subsegmental atelectasis    Trace right pleural effusion    No other significant interval change  8/15/19 CT A/P  IMPRESSION:   Small epicardial node seen on series 2 image 10 to the right of midline anteriorly measuring 8 mm  This is increasing in size compared to the prior examination and may represent pathologic node      Posterior layering right-sided pleural effusion extending above the scope of this examination with adjacent right lower lobe atelectasis  In addition there is middle lobe atelectasis as well      Previous partial hepatectomy with diffuse hepatic steatosis  Focal fatty sparing noted within the right lobe of the liver posteriorly and laterally where there is focal biliary dilatation      Small amount of air is again present within a decompressed gallbladder      Extensive colonic diverticulosis within the sigmoid colon  No evidence of acute diverticulitis      9/6/19 CT C/A/P  IMPRESSION:   Anterior right upper lobe groundglass density may be on a postradiation basis      No other new findings within the chest, abdomen or pelvis    9/12/19 Dr Mathew Bruce FU  - continue tamoxifen 20 mg daily    12/30/19 Dr Jamie Santamaria W 45 year old female with a history of right breast carcinoma ER [place] and her 2 positive  She has evidence of stage IV disease, the liver metastasis which has been resected  She has been recently maintained on tamoxifen with good tolerance and stability of her systemic disease except for her right axilla which showed progression on imaging  She is asymptomatic at this site  [facility], no other obvious areas of disease, radiation treatment to the right axilla was recommended       She completed a course of palliative radiation therapy to the right axilla on 4/10/19  She was last seen 5/10/19     8/12/19 admitted with fatigue, SOB,  decreased appetite, nausea, chills, found to have E coli bacteremia and arrhythmia, pacemaker upgraded to   BIV ICD     8/12/19 CTA chest PE study  IMPRESSION:   No pulmonary embolus    Minimal bibasilar, right greater than left, and right middle lobe subsegmental atelectasis    Trace right pleural effusion    No other significant interval change  8/15/19 CT A/P  IMPRESSION:   Small epicardial node seen on series 2 image 10 to the right of midline anteriorly measuring 8 mm  This is increasing in size compared to the prior examination and may represent pathologic node      Posterior layering right-sided pleural effusion extending above the scope of this examination with adjacent right lower lobe atelectasis  In addition there is middle lobe atelectasis as well      Previous partial hepatectomy with diffuse hepatic steatosis    Focal fatty sparing noted within the right lobe of the liver posteriorly and laterally where there is focal biliary dilatation      Small amount of air is again present within a decompressed gallbladder      Extensive colonic diverticulosis within the sigmoid colon  No evidence of acute diverticulitis  9/6/19 CT C/A/P  IMPRESSION:   Anterior right upper lobe groundglass density may be on a postradiation basis      No other new findings within the chest, abdomen or pelvis    9/12/19 Dr Dia GOSS  - continue tamoxifen 20 mg daily    12/30/19 Dr Dia GOSS          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 [facility])    2009 Initial Diagnosis     Adenocarcinoma of right breast,  Infiltrating ductal carcinoma T2, N1 sebastien) Tumor was ER/OK positive, HER2 negative  2009 Surgery     Right Mastectomy      2009 -  Chemotherapy     Adjuvant chemotherapy with Taxotere/Cytoxan x 4 cycles      2009 - 10/2014 Chemotherapy     Arimidex 1 mg po dialy      2/11/2016 Progression     Right lateral mastectomy site growth with core needle biopsy demonstrated reoccurrence  %, OK 70%, Her2 +1  Final Diagnosis   A  Breast, right lateral mastectomy site, core needle biopsies:                        - Recurrent invasive mammary carcinoma, no special type (formerly invasive ductal carcinoma), 0 8 cm largest single focus               - Combined Olla score: 7/9                         - Tubule formation: None (3/3)  - Nuclear pleomorphism: Marked (3/3)  - Mitotic count: 3 mitoses per 10 high-power fields (1/3)  - No lymph-vascular invasion is identified              - No in situ component is identified                - No microcalcifications are identified  3/16/2016 - 1/24/2017 Chemotherapy     Faslodex 500 mg with loading dose followed by maintenance monthly injection with Ibrance 75mg 3 weeks on 1 week off        12/13/2016 Progression      Progression noted on PET-CT scan  Progression was largely found in the liver  Patient was set up for liver biopsy        1/3/2017 Biopsy     Liver lesion biopsy demonstated Estrogen Receptor (clone SP-1) 100%/positive, Progesterone Receptor (clone 1E2) 1-2%/positive, HER2 by IHC (pteuk1T1) 2+/equivocal   Her2 by FISH was positive  1/25/2017 - 4/19/2017 Chemotherapy      Herceptin and Perjeta x5 cycles      4/20/2017 Adverse Reaction     Perjecta causing significant diarrhea  Medication stopped  7/13/2017 Surgery     Partial Hepatectomy, results demonstrated ER70%, PR0% & Her2 negative disease         9/19/2017 -  Chemotherapy     Tamoxifen 20 mg daily      2/27/2019 - 4/10/2019 Radiation       Plan ID Energy Fractions Dose per Fraction (cGy) Dose Correction (cGy) Total Dose Delivered (cGy) Elapsed Days   R Axilla # 10X/6X 25 / 25 200 0 5,000 35   R Axilla CD 10X/6X 5 / 5 200 0 1,000 6              Clinical Trial: no    Imaging    Health Maintenance   Topic Date Due    Medicare Annual Wellness Visit (AWV)  1940    Diabetic Foot Exam  01/05/1948    DM Eye Exam  01/05/1948    URINE MICROALBUMIN  01/05/1948    BMI: Followup Plan  01/05/1956    HEPATITIS B VACCINES (1 of 3 - Risk 3-dose series) 01/05/1957    Urinary Incontinence Screening  01/05/2003    PT PLAN OF CARE  02/01/2019    HEMOGLOBIN A1C  11/23/2019    Fall Risk  01/02/2020    Depression Screening PHQ  05/10/2020    BMI: Adult  10/16/2020    DTaP,Tdap,and Td Vaccines (2 - Td) 06/23/2026    INFLUENZA VACCINE  Completed    Pneumococcal Vaccine: 65+ Years  Completed    Pneumococcal Vaccine: Pediatrics (0 to 5 Years) and At-Risk Patients (6 to 59 Years)  Aged Out       Patient Active Problem List   Diagnosis    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 ([address] )    Hypercholesteremia    Gastroesophageal reflux disease without esophagitis    History of total knee replacement    Hearing loss    LBBB (left bundle branch block)    Complication associated with cardiac pacemaker lead    Pacemaker complications, subsequent encounter    Chest wall hematoma    Cholecystitis    Acute biliary pancreatitis    Cholangitis    Choledocholithiasis    Acute gallstone pancreatitis    Intermittent complete heart block ([address] )    Spinal stenosis, lumbar region, with neurogenic claudication    Lumbar pain    Spondylolisthesis    Upper abdominal pain    SIRS (systemic inflammatory response syndrome) (HCC)    Lactic acidosis    Abnormal urinalysis    Type 2 diabetes mellitus, without long-term current use of insulin (HCC)    Hypertension    Nerve pain - Right    Neck pain    Cervical spondylosis without myelopathy    SSS (sick sinus syndrome) (HCC)    Mixed hyperlipidemia    Elevated hemidiaphragm    VT (ventricular tachycardia) (HCC)    Lumbar radiculopathy     Past Medical History:   Diagnosis Date    Anxiety     Arthritis     Breast CA (HCC)     recurrent, ductal carcinoma ER, VT pos    Cardiac disease     Depression     Diverticula of intestine     Dizziness     and passes out    Flushing     Hiatal hernia     [place] (hard of hearing[place]      [place]    Hypercholesteremia     Liver mass     Liver metastasis (HCC)     Metastatic adenocarcinoma to liver (HCC)     Bx 01/03/2017    PONV (postoperative nausea and vomiting)     Recurrent breast cancer (HCC)     Shingles     Shortness of breath     on exertion    Tachycardia     Urinary incontinence     Use of cane as ambulatory aid     or walker     Past Surgical History:   Procedure Laterality Date    BREAST SURGERY      CARDIAC PACEMAKER REMOVAL N/A 11/9/2017    Procedure: PACEMAKER LEAD REVISION, REMOVAL OF NONFUNCTIONING LEAD, AND INSERTION OF A NEW RV LEAD;  Surgeon: Javi العلي MD;  [facility];  Service: Cardiology    CATARACT EXTRACTION Bilateral     COLONOSCOPY      ERCP N/A 1/8/2018    Procedure: ENDOSCOPIC RETROGRADE CHOLANGIOPANCREATOGRAPHY (ERCP); Surgeon: Genet Wynn MD;  Location[facility];   Service: Gastroenterology    HYSTERECTOMY      JOINT REPLACEMENT      [place]  TKR and right TSR    MASTECTOMY Right     VT ERCP DX COLLECTION SPECIMEN BRUSHING/WASHING N/A 2/22/2018    Procedure: ENDOSCOPIC RETROGRADE CHOLANGIOPANCREATOGRAPHY (ERCP) with stent removal;  Surgeon: Jesus Monahan MD;  [facility]: [facility]; Service: Gastroenterology    [facility] [address] N/A 7/13/2017    Procedure: LIVER RESECTION, INTRAOPERATIVE ULTRASOUND;  Surgeon: Sofia Ayala MD;  [facility];  Service: Surgical Oncology    ROTATOR CUFF REPAIR Right     SENTINEL LYMPH NODE BIOPSY Right     TONSILECTOMY AND ADNOIDECTOMY      WOUND DEBRIDEMENT Left 11/9/2017    Procedure: DEBRIDEMENT WOUND AND DRESSING CHANGE [facility]);   Surgeon: Cris Arriola MD;  [facility];  Service: Cardiology     Family History   Problem Relation Age of Onset    Lung cancer Father     Cancer Brother     Diabetes type II Son      Social History     Socioeconomic History    Marital status: /Civil Union     Spouse name: Not on file    Number of children: Not on file    Years of education: Not on file    Highest education level: Not on file   Occupational History    Not on file   Social Needs    Financial resource strain: Not on file    Food insecurity:     Worry: Not on file     Inability: Not on file    Transportation needs:     Medical: Not on file     Non-medical: Not on file   Tobacco Use    Smoking status: Never Smoker    Smokeless tobacco: Never Used   Substance and Sexual Activity    Alcohol use: Not Currently    Drug use: No    Sexual activity: Not on file   Lifestyle    Physical activity:     Days per week: Not on file     Minutes per session: Not on file    Stress: Not on file   Relationships    Social connections:     Talks on phone: Not on file     Gets together: Not on file     Attends [employer] service: Not on file     Active member of club or organization: Not on file     Attends meetings of clubs or organizations: Not on file     Relationship status: Not on file    Intimate partner violence:     Fear of current or ex partner: Not on file     Emotionally abused: Not on file     Physically abused: Not on file     Forced sexual activity: Not on file   Other Topics Concern    Not on file   Social History Narrative    Not on file       Current Outpatient Medications:     atorvastatin (LIPITOR) 20 mg tablet, Take 20 mg by mouth daily  , Disp: , Rfl:     Calcium Citrate-Vitamin D (CALCIUM CITRATE + D PO), Take 1,500 mg by mouth daily, Disp: , Rfl:     clotrimazole-betamethasone (LOTRISONE) 1-0 05 % cream, Apply 1 application topically as needed , Disp: , Rfl:     Cranberry (THERACRAN HP PO), Take 2 tablets by mouth daily, Disp: , Rfl:     furosemide (LASIX) 20 mg tablet, Take 1 tablet (20 mg total) by mouth daily as needed (le swelling) Edema, Disp: 30 tablet, Rfl: 0    gabapentin (NEURONTIN) 300 mg capsule, Take 1 capsule (300 mg total) by mouth daily at bedtime, Disp: 30 capsule, Rfl: 0    Glucosamine-Chondroit-Vit C-Mn (GLUCOSAMINE 1500 COMPLEX) CAPS, Take 1 capsule by mouth daily  , Disp: , Rfl:     metFORMIN (GLUCOPHAGE) 500 mg tablet, 2 (two) times a day, Disp: , Rfl:     metoprolol tartrate (LOPRESSOR) 25 mg tablet, Take 0 5 tablets (12 5 mg total) by mouth every 12 (twelve) hours, Disp: 30 tablet, Rfl: 6    Multiple Vitamin (MULTIVITAMIN) capsule, Take 1 capsule by mouth daily  , Disp: , Rfl:     Naproxen Sodium (ALEVE PO), Take by mouth as needed, Disp: , Rfl:     ondansetron (ZOFRAN) 4 mg tablet, Take 4 mg by mouth every 8 (eight) hours as needed, Disp: , Rfl:     tamoxifen (NOLVADEX) 20 mg tablet, Take 1 tablet (20 mg total) by mouth daily, Disp: 60 tablet, Rfl: 3    clindamycin (CLEOCIN) 300 MG capsule, TAKE 2 CAPSULES BY MOUTH 1 HOUR PRIOR TO DENTAL PROCEDURE , Disp: , Rfl: 0    SUPREP BOWEL PREP KIT 17 5-3 13-1 6 GM/177ML SOLN, , Disp: , Rfl: 0  Allergies   Allergen Reactions    Ampicillin Shortness Of Breath, Anaphylaxis and Other (See Comments)     Other reaction(s): Unknown Allergic Reaction  Reaction Date: [id number];        Review of Systems:  Review of Systems   Constitutional: Negative  HENT: Positive for hearing loss  Eyes: Negative  Respiratory: Negative  Cardiovascular: Negative  Gastrointestinal: Positive for diarrhea (occasional)  Endocrine: Negative  Genitourinary: Negative  Incontinent; wears pad   Musculoskeletal: Positive for arthralgias, back pain (& leg pain (epidural injections)), gait problem, joint swelling, myalgias, neck pain and neck stiffness  Sees pain management  Skin: Negative  Allergic/Immunologic: Negative  Neurological: Positive for dizziness and light-headedness  Hematological: Bruises/bleeds easily  Psychiatric/Behavioral: Negative  Vitals:    11/12/19 1329   BP: 136/70   Pulse: 87   Resp: 18   Temp: 97 9 °F (36 6 °C)   SpO2: 94%   Weight: 71 6 kg (157 lb 13 6 oz)   Height: 5' 1" (1 549 m)        Pain assessment:0    Pain Score: 0-No pain      Imaging:Fl Spine And Pain Procedure    Result Date: 10/30/2019  Narrative: Indication:  Back and radiating leg pain Preoperative diagnosis:  Lumbar radiculitis Postoperative diagnosis:  Lumbar radiculitis Procedure: Fluoroscopically-guided left L5-S1 interlaminar epidural steroid injection under fluoroscopy EBL:  none Specimens:  not applicable After discussing the risks, benefits, and alternatives to the procedure, the patient expressed understanding and wished to proceed  The patient was brought to the fluoroscopy suite and placed in the prone position  A procedural pause was conducted to verify:  correct patient identity, procedure to be performed and as applicable, correct side and site, correct patient position, and availability of implants, special equipment and special requirements  After identifying the L5-S1 space fluoroscopically, the skin was sterilely prepped and draped in the usual fashion using Chloraprep skin prep  The skin and subcutaneous tissues were anesthetized with 0 5% lidocaine    Utilizing a loss of resistance technique and intermittent fluoroscopic guidance, a 3 5 inch 20 gauge Tuohy needle was advanced into the epidural space  Proper needle positioning was confirmed using multiple fluoroscopic views  After negative aspiration, Omnipaque 300 contrast was injected confirming epidural spread without evidence of intravascular or intrathecal spread  A 4 ml solution consisting of 80 mg of Depo-Medrol in sterile saline was injected slowly and incrementally into the epidural space  Following the injection the needle was withdrawn slightly and flushed with 0 5% buffered lidocaine as it was fully extracted  The patient tolerated the procedure well and there were no apparent complications  After appropriate observation, the patient was dismissed from the clinic in good condition under their own power              Teaching

## 2019-11-12 NOTE — PROGRESS NOTES
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 [facility])  Staging form: Breast, AJCC 8th Edition  - Pathologic stage from 2/11/2016: Stage IV (rpTX, pNX, pM1, G2, ER: Positive, SC: Unknown, HER2: Negative) - Signed by Judy Vo PA-C on 2/20/2018  Stage prefix: r  Neoadjuvant therapy: No  Sites of metastasis: Liver, Other  Source of metastatic specimen: Soft tissues (including muscle)  Nuclear grade: G3  Mitotic count score: Score 1  Percentage of tumor with tubule formation: 0  Tubule formation score: Score 3  Scarff-Bloom-Rizo score: 7  Histologic grading system: 3 grade system  Lymph-vascular invasion (LVI): LVI present/identified, NOS  Stage used in treatment planning: Yes  National guidelines used in treatment planning: Yes  Type of national guideline used in treatment planning: NCCN        Interval History:    Razia Hernandez is a 78year old female with a history of right breast carcinoma ER SC and her 2 positive  She has evidence of stage IV disease, the liver metastasis which has been resected  She has been recently maintained on tamoxifen with good tolerance and stability of her systemic disease except for her right axilla which showed progression on imaging  She is asymptomatic at this site  [facility], no other obvious areas of disease, radiation treatment to the right axilla was recommended       She completed a course of palliative radiation therapy to the right axilla on 4/10/19   She was last seen 5/10/19      8/12/19 admitted with fatigue, SOB,  decreased appetite, nausea, chills, found to have E coli bacteremia and arrhythmia, pacemaker upgraded to   BIV ICD      8/12/19 CTA chest PE study  IMPRESSION:   No pulmonary embolus    Minimal bibasilar, right greater than left, and right middle lobe subsegmental atelectasis    Trace right pleural effusion    No other significant interval change      8/15/19 CT A/P  IMPRESSION:   Small epicardial node seen on series 2 image 10 to the right of midline anteriorly measuring 8 mm   This is increasing in size compared to the prior examination and may represent pathologic node      Posterior layering right-sided pleural effusion extending above the scope of this examination with adjacent right lower lobe atelectasis   In addition there is middle lobe atelectasis as well      Previous partial hepatectomy with diffuse hepatic steatosis   Focal fatty sparing noted within the right lobe of the liver posteriorly and laterally where there is focal biliary dilatation      Small amount of air is again present within a decompressed gallbladder      Extensive colonic diverticulosis within the sigmoid colon   No evidence of acute diverticulitis      9/6/19 CT C/A/P  IMPRESSION:   Anterior right upper lobe groundglass density may be on a postradiation basis      No other new findings within the chest, abdomen or pelvis     9/12/19 Dr Chani GOSS  - continue tamoxifen 20 mg daily     12/30/19 Dr Chani GOSS     She denies any discomfort in the right axilla or arm edema    Historical Information      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 [facility])    2009 Initial Diagnosis     Adenocarcinoma of right breast,  Infiltrating ductal carcinoma T2, N1 sebastien) Tumor was ER/ND positive, HER2 negative  2009 Surgery     Right Mastectomy      2009 -  Chemotherapy     Adjuvant chemotherapy with Taxotere/Cytoxan x 4 cycles      2009 - 10/2014 Chemotherapy     Arimidex 1 mg po dialy      2/11/2016 Progression     Right lateral mastectomy site growth with core needle biopsy demonstrated reoccurrence  %, ND 70%, Her2 +1  Final Diagnosis   A   Breast, right lateral mastectomy site, core needle biopsies:                        - Recurrent invasive mammary carcinoma, no special type (formerly invasive ductal carcinoma), 0 8 cm largest single focus               - Combined Jose Elias score: 7/9                         - Tubule formation: None (3/3)  - Nuclear pleomorphism: Marked (3/3)  - Mitotic count: 3 mitoses per 10 high-power fields (1/3)  - No lymph-vascular invasion is identified              - No in situ component is identified                - No microcalcifications are identified  3/16/2016 - 1/24/2017 Chemotherapy     Faslodex 500 mg with loading dose followed by maintenance monthly injection with Ibrance 75mg 3 weeks on 1 week off        12/13/2016 Progression      Progression noted on PET-CT scan  Progression was largely found in the liver  Patient was set up for liver biopsy  1/3/2017 Biopsy     Liver lesion biopsy demonstated Estrogen Receptor (clone SP-1) 100%/positive, Progesterone Receptor (clone 1E2) 1-2%/positive, HER2 by IHC ([id number]) 2+/equivocal   Her2 by FISH was positive  1/25/2017 - 4/19/2017 Chemotherapy      Herceptin and Perjeta x5 cycles      4/20/2017 Adverse Reaction     Perjecta causing significant diarrhea  Medication stopped  7/13/2017 Surgery     Partial Hepatectomy, results demonstrated ER70%, PR0% & Her2 negative disease         9/19/2017 -  Chemotherapy     Tamoxifen 20 mg daily      2/27/2019 - 4/10/2019 Radiation       Plan ID Energy Fractions Dose per Fraction (cGy) Dose Correction (cGy) Total Dose Delivered (cGy) Elapsed Days   R Axilla # 10X/6X 25 / 25 200 0 5,000 [email or web address] [address]   R Axilla CD 10X/6X 5 / 5 200 0 1,000 6              Past Medical History:   Diagnosis Date    Anxiety     Arthritis     Breast CA (HCC)     recurrent, ductal carcinoma ER, [place] pos    Cardiac disease     Depression     Diverticula of intestine     Dizziness     and passes out    Flushing     Hiatal hernia     [place]hard of hearing[place]      lizette    Hypercholesteremia     Liver mass     Liver metastasis (HCC)     Metastatic adenocarcinoma to liver ([address] )     Bx 01/03/2017    PONV (postoperative nausea and vomiting)     Recurrent breast cancer (HCC)     Shingles     Shortness of breath     on exertion    Tachycardia     Urinary incontinence     Use of cane as ambulatory aid     or walker     Past Surgical History:   Procedure Laterality Date    BREAST SURGERY      CARDIAC PACEMAKER REMOVAL N/A 11/9/2017    Procedure: PACEMAKER LEAD REVISION, REMOVAL OF NONFUNCTIONING LEAD, AND INSERTION OF A NEW RV LEAD;  Surgeon: Rachel Moser MD;  [facility];  Service: Cardiology    CATARACT EXTRACTION Bilateral     COLONOSCOPY      ERCP N/A 1/8/2018    Procedure: ENDOSCOPIC RETROGRADE CHOLANGIOPANCREATOGRAPHY (ERCP); Surgeon: Maday Marie MD;  [facility]; Service: Gastroenterology    HYSTERECTOMY      JOINT REPLACEMENT      lizette  TKR and right TSR    MASTECTOMY Right     ID ERCP DX COLLECTION SPECIMEN BRUSHING/WASHING N/A 2/22/2018    Procedure: ENDOSCOPIC RETROGRADE CHOLANGIOPANCREATOGRAPHY (ERCP) with stent removal;  Surgeon: Maday Marie MD;  [facility]; Service: Gastroenterology    ID RESEC [id number] N/A 7/13/2017    Procedure: LIVER RESECTION, INTRAOPERATIVE ULTRASOUND;  Surgeon: Tomas Burleson MD;  [facility];  Service: Surgical Oncology    ROTATOR CUFF REPAIR Right     SENTINEL LYMPH NODE BIOPSY Right     TONSILECTOMY AND ADNOIDECTOMY      WOUND DEBRIDEMENT Left 11/9/2017    Procedure: DEBRIDEMENT WOUND AND DRESSING CHANGE [facility]); Surgeon: Rachel Moser MD;  [facility];  Service: Cardiology       Social History   Social History     Substance and Sexual Activity   Alcohol Use Not Currently     Social History     Substance and Sexual Activity   Drug Use No     Social History     Tobacco Use   Smoking Status Never Smoker   Smokeless Tobacco Never Used         Meds/Allergies     Current Outpatient Medications:     atorvastatin (LIPITOR) 20 mg tablet, Take 20 mg by mouth daily  , Disp: , Rfl:     Calcium Citrate-Vitamin D (CALCIUM CITRATE + D PO), Take 1,500 mg by mouth daily, Disp: , Rfl:     clotrimazole-betamethasone (LOTRISONE) 1-0 05 % cream, Apply 1 application topically as needed , Disp: , Rfl:     Cranberry (THERACRAN HP PO), Take 2 tablets by mouth daily, Disp: , Rfl:     furosemide (LASIX) 20 mg tablet, Take 1 tablet (20 mg total) by mouth daily as needed (le swelling) Edema, Disp: 30 tablet, Rfl: 0    gabapentin (NEURONTIN) 300 mg capsule, Take 1 capsule (300 mg total) by mouth daily at bedtime, Disp: 30 capsule, Rfl: 0    Glucosamine-Chondroit-Vit C-Mn (GLUCOSAMINE 1500 COMPLEX) CAPS, Take 1 capsule by mouth daily  , Disp: , Rfl:     metFORMIN (GLUCOPHAGE) 500 mg tablet, 2 (two) times a day, Disp: , Rfl:     metoprolol tartrate (LOPRESSOR) 25 mg tablet, Take 0 5 tablets (12 5 mg total) by mouth every 12 (twelve) hours, Disp: 30 tablet, Rfl: 6    Multiple Vitamin (MULTIVITAMIN) capsule, Take 1 capsule by mouth daily  , Disp: , Rfl:     Naproxen Sodium (ALEVE PO), Take by mouth as needed, Disp: , Rfl:     ondansetron (ZOFRAN) 4 mg tablet, Take 4 mg by mouth every 8 (eight) hours as needed, Disp: , Rfl:     tamoxifen (NOLVADEX) 20 mg tablet, Take 1 tablet (20 mg total) by mouth daily, Disp: 60 tablet, Rfl: 3    clindamycin (CLEOCIN) 300 MG capsule, TAKE 2 CAPSULES BY MOUTH 1 HOUR PRIOR TO DENTAL PROCEDURE , Disp: , Rfl: 0    SUPREP BOWEL PREP KIT 17 5-3 13-1 6 GM/177ML SOLN, , Disp: , Rfl: 0  Allergies   Allergen Reactions    Ampicillin Shortness Of Breath, Anaphylaxis and Other (See Comments)     Other reaction(s): Unknown Allergic Reaction  Reaction Date: [id number];          Review of Systems    Constitutional: Negative  HENT: Positive for hearing loss  Eyes: Negative  Respiratory: Negative  Cardiovascular: Negative  Gastrointestinal: Positive for diarrhea (occasional)  Endocrine: Negative  Genitourinary: Negative           Incontinent; wears pad Musculoskeletal: Positive for arthralgias, back pain (& leg pain (epidural injections)), gait problem, joint swelling, myalgias, neck pain and neck stiffness  Sees pain management  Skin: Negative  Allergic/Immunologic: Negative  Neurological: Positive for dizziness and light-headedness  Hematological: Bruises/bleeds easily  Psychiatric/Behavioral: Negative        OBJECTIVE:   /70   Pulse 87   Temp 97 9 °F (36 6 °C)   Resp 18   Ht 5' 1" (1 549 m)   Wt 71 6 kg (157 lb 13 6 oz)   LMP  (LMP Unknown) Comment: post   SpO2 94%   BMI 29 83 kg/m²   Pain Assessment:  0  ECOG/Zubrod/WHO: 1 - Symptomatic but completely ambulatory    Physical Exam   The skin in the radiated field shows a minimal hyperpigmentation  There are no suspicious lesions noted in the right chest wall or axillary region  She has no evidence of arm edema she is ambulating with a cane        RESULTS    Lab Results: No results found for this or any previous visit (from the past 672 hour(s))  Imaging Studies:Fl Spine And Pain Procedure    Result Date: 10/30/2019  Narrative: Indication:  Back and radiating leg pain Preoperative diagnosis:  Lumbar radiculitis Postoperative diagnosis:  Lumbar radiculitis Procedure: Fluoroscopically-guided left L5-S1 interlaminar epidural steroid injection under fluoroscopy EBL:  none Specimens:  not applicable After discussing the risks, benefits, and alternatives to the procedure, the patient expressed understanding and wished to proceed  The patient was brought to the fluoroscopy suite and placed in the prone position  A procedural pause was conducted to verify:  correct patient identity, procedure to be performed and as applicable, correct side and site, correct patient position, and availability of implants, special equipment and special requirements    After identifying the L5-S1 space fluoroscopically, the skin was sterilely prepped and draped in the usual fashion using Chloraprep skin prep  The skin and subcutaneous tissues were anesthetized with 0 5% lidocaine  Utilizing a loss of resistance technique and intermittent fluoroscopic guidance, a 3 5 inch 20 gauge Tuohy needle was advanced into the epidural space  Proper needle positioning was confirmed using multiple fluoroscopic views  After negative aspiration, Omnipaque 300 contrast was injected confirming epidural spread without evidence of intravascular or intrathecal spread  A 4 ml solution consisting of 80 mg of Depo-Medrol in sterile saline was injected slowly and incrementally into the epidural space  Following the injection the needle was withdrawn slightly and flushed with 0 5% buffered lidocaine as it was fully extracted  The patient tolerated the procedure well and there were no apparent complications  After appropriate observation, the patient was dismissed from the clinic in good condition under their own power  Assessment/Plan:  No orders of the defined types were placed in this encounter  Patric Miles is a 78y o  year old female who is 7 months status post radiation therapy to the right axilla for recurrent breast cancer  She has had good clinical response at this site without any significant side effects  Her most recent imaging from 9/19 is without any adenopathy noted in the right axilla  Paul Sifuentes MD  11/12/2019,2:33 PM    Portions of the record may have been created with voice recognition software   Occasional wrong word or "sound a like" substitutions may have occurred due to the inherent limitations of voice recognition software   Read the chart carefully and recognize, using context, where substitutions have occurred

## 2019-11-13 ENCOUNTER — OFFICE VISIT (OUTPATIENT)
Dept: PAIN MEDICINE | Facility: MEDICAL CENTER | Age: 79
End: 2019-11-13
Payer: COMMERCIAL

## 2019-11-13 VITALS
RESPIRATION RATE: 14 BRPM | HEIGHT: 61 IN | DIASTOLIC BLOOD PRESSURE: 64 MMHG | SYSTOLIC BLOOD PRESSURE: 122 MMHG | HEART RATE: 72 BPM | BODY MASS INDEX: 29.76 KG/M2 | WEIGHT: 157.6 LBS

## 2019-11-13 DIAGNOSIS — M54.16 LUMBAR RADICULOPATHY: ICD-10-CM

## 2019-11-13 DIAGNOSIS — M47.812 CERVICAL SPONDYLOSIS WITHOUT MYELOPATHY: ICD-10-CM

## 2019-11-13 DIAGNOSIS — M48.062 SPINAL STENOSIS, LUMBAR REGION, WITH NEUROGENIC CLAUDICATION: ICD-10-CM

## 2019-11-13 DIAGNOSIS — M54.2 NECK PAIN: Primary | ICD-10-CM

## 2019-11-13 DIAGNOSIS — M43.10 SPONDYLOLISTHESIS, UNSPECIFIED SPINAL REGION: ICD-10-CM

## 2019-11-13 PROCEDURE — 99213 OFFICE O/P EST LOW 20 MIN: CPT | Performed by: NURSE PRACTITIONER

## 2019-11-13 RX ORDER — GABAPENTIN 300 MG/1
300 CAPSULE ORAL
Qty: 90 CAPSULE | Refills: 0 | Status: SHIPPED | OUTPATIENT
Start: 2019-11-13 | End: 2020-02-10 | Stop reason: SDUPTHER

## 2019-11-13 NOTE — PROGRESS NOTES
Assessment  1  Neck pain    2  Lumbar radiculopathy    3  Spinal stenosis, lumbar region, with neurogenic claudication    4  Cervical spondylosis without myelopathy    5  Spondylolisthesis, unspecified spinal region        Plan  I discussed with the patient that since there has been moderate to significant improvement in the pain symptoms that we will hold off on any repeat injections at this point in time  Continue with gabapentin as prescribed she was given a refill today  We did discuss that if the side effect of dizziness becomes too bothersome she can contact me and I will decrease the dose for her  Follow-up visit in 12 weeks for medication refills        [place] Prescription Drug Monitoring Program report was reviewed and was appropriate         My impressions and treatment recommendations were discussed in detail with the patient who verbalized understanding and had no further questions  Discharge instructions were provided  I personally saw and examined the patient and I agree with the above discussed plan of care  No orders of the defined types were placed in this encounter  New Medications Ordered This Visit   Medications    gabapentin (NEURONTIN) 300 mg capsule     Sig: Take 1 capsule (300 mg total) by mouth daily at bedtime     Dispense:  90 capsule     Refill:  0       History of Present Illness    Omar Faustin is a 78 y o  female presents with her  for follow-up related to her low back and leg pain  Today the patient reports she is doing better and rates her pain 0/10 today  She does get occasional pain that is bothersome in the morning and at night that is described as sharp  She is status post a repeat L5-S1 lumbar epidural steroid injection on October 30, 2019 with Dr Dianna Hidalgo tells me that this was pretty helpful  Patient has been taking gabapentin 300 mg at bedtime as she does feel that it gives her some relief of her pain symptoms by 80%    She notes dizziness and drowsiness as her only side effects  She tells me that at this point she is dealing with the side effects and she is just very careful  I have personally reviewed and/or updated the patient's past medical history, past surgical history, family history, social history, current medications, allergies, and vital signs today  Review of Systems   Respiratory: Negative for shortness of breath  Cardiovascular: Negative for chest pain  Gastrointestinal: Negative for constipation, diarrhea, nausea and vomiting  Musculoskeletal: Positive for joint swelling (left hip)  Negative for arthralgias, gait problem and myalgias  Skin: Negative for rash  Neurological: Positive for dizziness  Negative for seizures and weakness  All other systems reviewed and are negative        Patient Active Problem List   Diagnosis    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 [facility] )    Hypercholesteremia    Gastroesophageal reflux disease without esophagitis    History of total knee replacement    Hearing loss    LBBB (left bundle branch block)    Complication associated with cardiac pacemaker lead    Pacemaker complications, subsequent encounter    Chest wall hematoma    Cholecystitis    Acute biliary pancreatitis    Cholangitis    Choledocholithiasis    Acute gallstone pancreatitis    [facility]termittent complete [facility] block [facility]    Spinal stenosis, lumbar region, with neurogenic claudication    Lumbar pain    Spondylolisthesis    Upper abdominal pain    SIRS (systemic inflammatory response syndrome) ([facility])    Lactic acidosis    Abnormal urinalysis    Type 2 diabetes mellitus, without long-term current use of insulin ([facility])    Hypertension    Nerve pain - Right    Neck pain    Cervical spondylosis without myelopathy    SSS (sick sinus syndrome) ([facility])    Mixed hyperlipidemia    Elevated hemidiaphragm    VT (ventricular tachycardia) ([facility])    Lumbar radiculopathy       Past Medical History:   Diagnosis Date    Anxiety     Arthritis     Breast CA (HCC)     recurrent, ductal carcinoma ER, [place] pos    Cardiac disease     Depression     Diverticula of intestine     Dizziness     and passes out    Flushing     Hiatal hernia     [place] (hard of hearing)      lizette    Hypercholesteremia     Liver mass     Liver metastasis (HCC)     Metastatic adenocarcinoma to liver ([address] )     Bx 01/03/2017    PONV (postoperative nausea and vomiting)     Recurrent breast cancer ([facility] )     Shingles     Shortness of breath     on exertion    Tachycardia     Urinary incontinence     Use of cane as ambulatory aid     or walker       Past Surgical History:   Procedure Laterality Date    BREAST SURGERY      CARDIAC PACEMAKER REMOVAL N/A 11/9/2017    Procedure: PACEMAKER LEAD REVISION, REMOVAL OF NONFUNCTIONING LEAD, AND INSERTION OF A NEW RV LEAD;  Surgeon: Sylvain Freedman MD;  [facility];  Service: Cardiology    CATARACT EXTRACTION Bilateral     COLONOSCOPY      ERCP N/A 1/8/2018    Procedure: ENDOSCOPIC RETROGRADE CHOLANGIOPANCREATOGRAPHY (ERCP); Surgeon: Lela Silveira MD;  [facility]; Service: Gastroenterology    HYSTERECTOMY      JOINT REPLACEMENT      lizette  TKR and right TSR    MASTECTOMY Right     ME ERCP DX COLLECTION SPECIMEN BRUSHING/WASHING N/A 2/22/2018    Procedure: ENDOSCOPIC RETROGRADE CHOLANGIOPANCREATOGRAPHY (ERCP) with stent removal;  Surgeon: Lela Silveira MD;  [facility]: [facility]; Service: Gastroenterology    ME [facility] [address] N/A 7/13/2017    Procedure: LIVER RESECTION, INTRAOPERATIVE ULTRASOUND;  Surgeon: Juan C Tan MD;  [facility];  Service: Surgical Oncology    ROTATOR CUFF REPAIR Right     SENTINEL LYMPH NODE BIOPSY Right     TONSILECTOMY AND ADNOIDECTOMY      WOUND DEBRIDEMENT Left 11/9/2017    Procedure: DEBRIDEMENT WOUND AND DRESSING CHANGE [facility]);   Surgeon: Sylvain Freedman MD;  [facility];  Service: Cardiology Family History   Problem Relation Age of Onset    Lung cancer Father     Cancer Brother     Diabetes type II Son        Social History     Occupational History    Not on file   Tobacco Use    Smoking status: Never Smoker    Smokeless tobacco: Never Used   Substance and Sexual Activity    Alcohol use: Not Currently    Drug use: No    Sexual activity: Not on file       Current Outpatient Medications on File Prior to Visit   Medication Sig    atorvastatin (LIPITOR) 20 mg tablet Take 20 mg by mouth daily   Calcium Citrate-Vitamin D (CALCIUM CITRATE + D PO) Take 1,500 mg by mouth daily    clotrimazole-betamethasone (LOTRISONE) 1-0 05 % cream Apply 1 application topically as needed     Cranberry (THERACRAN HP PO) Take 2 tablets by mouth daily    furosemide (LASIX) 20 mg tablet Take 1 tablet (20 mg total) by mouth daily as needed (le swelling) Edema    Glucosamine-Chondroit-Vit C-Mn (GLUCOSAMINE 1500 COMPLEX) CAPS Take 1 capsule by mouth daily      metFORMIN (GLUCOPHAGE) 500 mg tablet 2 (two) times a day    metoprolol tartrate (LOPRESSOR) 25 mg tablet Take 0 5 tablets (12 5 mg total) by mouth every 12 (twelve) hours    Multiple Vitamin (MULTIVITAMIN) capsule Take 1 capsule by mouth daily   Naproxen Sodium (ALEVE PO) Take by mouth as needed    ondansetron (ZOFRAN) 4 mg tablet Take 4 mg by mouth every 8 (eight) hours as needed    tamoxifen (NOLVADEX) 20 mg tablet Take 1 tablet (20 mg total) by mouth daily    [DISCONTINUED] gabapentin (NEURONTIN) 300 mg capsule Take 1 capsule (300 mg total) by mouth daily at bedtime    clindamycin (CLEOCIN) 300 MG capsule TAKE 2 CAPSULES BY MOUTH 1 HOUR PRIOR TO DENTAL PROCEDURE     SUPREP BOWEL PREP KIT 17 5-3 13-1 6 GM/177ML SOLN      No current facility-administered medications on file prior to visit          Allergies   Allergen Reactions    Ampicillin Shortness Of Breath, Anaphylaxis and Other (See Comments)     Other reaction(s): Unknown Allergic Reaction  Reaction Date: [id number];        Physical Exam    /64   Pulse 72   Resp 14   Ht 5' 1" (1 549 m)   Wt 71 5 kg (157 lb 9 6 oz)   LMP  (LMP Unknown) Comment: post   BMI 29 78 kg/m²     Constitutional: normal, well developed, well nourished, alert, in no distress and non-toxic and no overt pain behavior    Eyes: anicteric  HEENT: grossly intact  Neck: supple, symmetric, trachea midline and no masses   Pulmonary:even and unlabored  Cardiovascular:No edema or pitting edema present  Skin:Normal without rashes or lesions and well hydrated  Psychiatric:Mood and affect appropriate  Neurologic:Cranial Nerves II-XII grossly intact  Musculoskeletal:ambulates with cane    Imaging

## 2019-12-02 ENCOUNTER — ANESTHESIA EVENT (OUTPATIENT)
Dept: GASTROENTEROLOGY | Facility: HOSPITAL | Age: 79
End: 2019-12-02

## 2019-12-03 ENCOUNTER — HOSPITAL ENCOUNTER (OUTPATIENT)
Dept: GASTROENTEROLOGY | Facility: HOSPITAL | Age: 79
Setting detail: OUTPATIENT SURGERY
Discharge: HOME | End: 2019-12-03
Attending: INTERNAL MEDICINE
Payer: COMMERCIAL

## 2019-12-03 ENCOUNTER — ANESTHESIA (OUTPATIENT)
Dept: GASTROENTEROLOGY | Facility: HOSPITAL | Age: 79
End: 2019-12-03

## 2019-12-03 VITALS
BODY MASS INDEX: 28.89 KG/M2 | RESPIRATION RATE: 20 BRPM | DIASTOLIC BLOOD PRESSURE: 77 MMHG | WEIGHT: 153 LBS | HEIGHT: 61 IN | OXYGEN SATURATION: 99 % | HEART RATE: 81 BPM | TEMPERATURE: 98.9 F | SYSTOLIC BLOOD PRESSURE: 156 MMHG

## 2019-12-03 DIAGNOSIS — Z86.010 PERSONAL HISTORY OF COLONIC POLYPS: ICD-10-CM

## 2019-12-03 DIAGNOSIS — Z12.11 ENCOUNTER FOR SCREENING FOR MALIGNANT NEOPLASM OF COLON: ICD-10-CM

## 2019-12-03 DIAGNOSIS — K57.30 DIVERTICULOSIS OF LARGE INTESTINE WITHOUT PERFORATION OR ABSCESS WITHOUT BLEEDING: ICD-10-CM

## 2019-12-03 RX ORDER — PROPOFOL 10 MG/ML
INJECTION, EMULSION INTRAVENOUS AS NEEDED
Status: DISCONTINUED | OUTPATIENT
Start: 2019-12-03 | End: 2019-12-03 | Stop reason: SURG

## 2019-12-03 RX ORDER — SODIUM CHLORIDE 9 MG/ML
125 INJECTION, SOLUTION INTRAVENOUS CONTINUOUS
Status: DISCONTINUED | OUTPATIENT
Start: 2019-12-03 | End: 2019-12-07 | Stop reason: HOSPADM

## 2019-12-03 RX ORDER — SODIUM CHLORIDE 9 MG/ML
125 INJECTION, SOLUTION INTRAVENOUS CONTINUOUS
Status: CANCELLED | OUTPATIENT
Start: 2019-12-03

## 2019-12-03 RX ADMIN — PROPOFOL 100 MG: 10 INJECTION, EMULSION INTRAVENOUS at 14:38

## 2019-12-03 RX ADMIN — PROPOFOL 50 MG: 10 INJECTION, EMULSION INTRAVENOUS at 14:40

## 2019-12-03 RX ADMIN — SODIUM CHLORIDE 125 ML/HR: 0.9 INJECTION, SOLUTION INTRAVENOUS at 13:22

## 2019-12-03 RX ADMIN — PROPOFOL 50 MG: 10 INJECTION, EMULSION INTRAVENOUS at 14:43

## 2019-12-03 RX ADMIN — PROPOFOL 50 MG: 10 INJECTION, EMULSION INTRAVENOUS at 14:58

## 2019-12-03 RX ADMIN — PROPOFOL 50 MG: 10 INJECTION, EMULSION INTRAVENOUS at 14:48

## 2019-12-03 RX ADMIN — PROPOFOL 50 MG: 10 INJECTION, EMULSION INTRAVENOUS at 14:45

## 2019-12-03 NOTE — ANESTHESIA PREPROCEDURE EVALUATION
Review of Systems/Medical History      History of anesthetic complications PONV    Cardiovascular  Pacemaker/AICD, Hyperlipidemia, Hypertension controlled, Dysrhythmias , ventricular tachycardia,    Pulmonary  Shortness of breath,        GI/Hepatic    GERD well controlled, Liver disease , history of liver cancer,  Hiatal hernia,             Endo/Other  Diabetes well controlled type 2 Insulin,   Obesity    GYN       Hematology   Musculoskeletal    Arthritis     Neurology   Psychology   Anxiety, Depression ,              Physical Exam    Airway    Mallampati score: II  TM Distance: >3 FB  Neck ROM: full     Dental       Cardiovascular  Rhythm: regular, Rate: normal, Cardiovascular exam normal    Pulmonary  Pulmonary exam normal Breath sounds clear to auscultation,     Other Findings        Anesthesia Plan  ASA Score- 4     Anesthesia Type- general with ASA Monitors  Additional Monitors:   Airway Plan:         Plan Factors-    Induction- intravenous  Postoperative Plan-     Informed Consent- Anesthetic plan and risks discussed with patient

## 2019-12-03 NOTE — DISCHARGE INSTRUCTIONS
[facility] Discharge instructions    1  Rest today; avoid strenuous activity  It is common to have retained air in the intestinal tract following an endoscopy  Passing the air (flatus, belching) will assist in making abdominal bloating/cramping improve  2  No alcoholic beverages or driving for 12 hours if you were given sedation  3  Resume your usual diet and medications unless you were asked to change it prior to leaving the GI lab  Begin with small meal or snack first     4  Call our office at [phone] if you have any problems, concerns or questions  You may use this same number to schedule a follow up appointment if that has been suggested  Your Colonoscopy: was completed  Any abnormal findings are listed below  Findings included the following:    Diverticulosis is seen throughout the entire colon  No polyps identified  No other suspicious areas identified  Suggestion  In light of no polyps being discovered the need for future colonoscopic evaluations is not felt to be necessary for surveillance  Resume prior medications  Diverticulosis   WHAT YOU NEED TO KNOW:   Diverticulosis is a condition that causes small pockets called diverticula to form in your intestine  These pockets make it difficult for bowel movements to pass through your digestive system  DISCHARGE INSTRUCTIONS:   Seek care immediately if:   · You have severe pain on the left side of your lower abdomen  · Your bowel movements are bright or dark red  Contact your healthcare provider if:   · You have a fever and chills  · You feel dizzy or lightheaded  · You have nausea, or you are vomiting  · You have a change in your bowel movements  · You have questions or concerns about your condition or care  Medicines:   · Medicines  to soften your bowel movements may be given  You may also need medicines to treat symptoms such as bloating and pain      · Take your medicine as directed  Contact your healthcare provider if you think your medicine is not helping or if you have side effects  Tell him or her if you are allergic to any medicine  Keep a list of the medicines, vitamins, and herbs you take  Include the amounts, and when and why you take them  Bring the list or the pill bottles to follow-up visits  Carry your medicine list with you in case of an emergency  Self-care: The goal of treatment is to manage any symptoms you have and prevent other problems such as diverticulitis  Diverticulitis is swelling or infection of the diverticula  Your healthcare provider may recommend any of the following:  · Eat a variety of high-fiber foods  High-fiber foods help you have regular bowel movements  High-fiber foods include cooked beans, fruits, vegetables, and some cereals  Most adults need 25 to 35 grams of fiber each day  Your healthcare provider may recommend that you have more  Ask your healthcare provider how much fiber you need  Increase fiber slowly  You may have abdominal discomfort, bloating, and gas if you add fiber to your diet too quickly  You may need to take a fiber supplement if you are not getting enough fiber from food  · Drink liquids as directed  You may need to drink 2 to 3 liters (8 to 12 cups) of liquids every day  Ask your healthcare provider how much liquid to drink each day and which liquids are best for you  · Apply heat  on your abdomen for 20 to 30 minutes every 2 hours for as many days as directed  Heat helps decrease pain and muscle spasms  Help prevent diverticulitis or other symptoms: The following may help decrease your risk for diverticulitis or symptoms, such as bleeding  Talk to your provider about these or other things you can do to prevent problems that may occur with diverticulosis  · Exercise regularly  Ask your healthcare provider about the best exercise plan for you  Exercise can help you have regular bowel movements  Get 30 minutes of exercise on most days of the week  · Maintain a healthy weight  Ask your healthcare provider how much you should weigh  Ask him or her to help you create a weight loss plan if you are overweight  · Do not smoke  Nicotine and other chemicals in cigarettes increase your risk for diverticulitis  Ask your healthcare provider for information if you currently smoke and need help to quit  E-cigarettes or smokeless tobacco still contain nicotine  Talk to your healthcare provider before you use these products  · Ask your healthcare provider if it is safe to take NSAIDs  NSAIDs may increase your risk of diverticulitis  Follow up with your healthcare provider as directed:  Write down your questions so you remember to ask them during your visits  © 2017 [address] Information is for End User's use only and may not be sold, redistributed or otherwise used for commercial purposes  All illustrations and images included in CareNotes® are the copyrighted property of [employer]  or [employer]  The above information is an  only  It is not intended as medical advice for individual conditions or treatments  Talk to your doctor, nurse or pharmacist before following any medical regimen to see if it is safe and effective for you  Diverticulosis Diet   WHAT YOU NEED TO KNOW:   What is a diverticulosis diet? A diverticulosis diet includes high-fiber foods  High-fiber foods help you have regular bowel movements  Extra fiber may decrease your risk of forming new diverticula (small pockets) in your intestine  A high-fiber diet may also help prevent diverticulitis  Diverticulitis is a painful condition that occurs when diverticula become inflamed or infected  You do not need to avoid nuts, seeds, corn, or popcorn while you are on a diverticulosis diet  How much fiber do I need? You may need 25 to 35 grams of fiber each day   Ask your dietitian or healthcare provider how much fiber you should have  Increase your intake of fiber slowly  When you eat more fiber, you may have gas and feel bloated  You may need to take a fiber supplement if you do not get enough fiber from food  Drink plenty of liquids as you increase the fiber in your diet  Your dietitian or healthcare provider may recommend 8 eight-ounce cups or more each day  Ask which liquids are best for you  Which foods are high in fiber? · Foods with at least 4 grams of fiber per serving:      ¨ ? to ½ cup of high-fiber cereal (check the nutrition label on the box)    ¨ ½ cup of blackberries or raspberries    ¨ 4 dried prunes    ¨ 1 cooked artichoke    ¨ ½ cup of cooked legumes, such as lentils, or red, kidney, and dunham beans    · Foods with 1 to 3 grams of fiber per serving:      ¨ 1 slice of whole-wheat, pumpernickel, or rye bread    ¨ 4 whole-wheat crackers    ¨ ½ cup of cereal with 1 to 3 grams of fiber per serving (check the nutrition label on the box)    ¨ 1 piece of fruit, such as an apple, banana, pear, kiwi, or orange    ¨ 3 dates    ¨ ½ cup of canned apricots, fruit cocktail, peaches, or pears    ¨ ½ cup of raw or cooked vegetables, such as carrots, cauliflower, cabbage, spinach, squash, or corn  When should I contact my healthcare provider? · You have questions about a high-fiber diet  · You have a change in your bowel movements  · You have an upset stomach  · You have a fever  · You have pain in your lower abdomen on the left side  · You have questions about your condition or care  CARE AGREEMENT:   You have the right to help plan your care  Learn about your health condition and how it may be treated  Discuss treatment options with your caregivers to decide what care you want to receive  You always have the right to refuse treatment  The above information is an  only  It is not intended as medical advice for individual conditions or treatments   Talk to your doctor, nurse or pharmacist before following any medical regimen to see if it is safe and effective for you  © 2017 [address] Information is for End User's use only and may not be sold, redistributed or otherwise used for commercial purposes  All illustrations and images included in CareNotes® are the copyrighted property of [employer] M [employer]  or [employer]  Colonoscopy   WHAT YOU NEED TO KNOW:   A colonoscopy is a procedure to examine the inside of your colon (intestine) with a scope  Polyps or tissue growths may have been removed during your colonoscopy  It is normal to feel bloated and to have some abdominal discomfort  You should be passing gas  If you have hemorrhoids or you had polyps removed, you may have a small amount of bleeding  DISCHARGE INSTRUCTIONS:   Seek care immediately if:   · You have a large amount of bright red blood in your bowel movements  · Your abdomen is hard and firm and you have severe pain  · You have sudden trouble breathing  Contact your healthcare provider if:   · You develop a rash or hives  · You have a fever within 24 hours of your procedure  · You have not had a bowel movement for 3 days after your procedure  · You have questions or concerns about your condition or care  Activity:   · Do not lift, strain, or run  for 3 days after your procedure  · Rest after your procedure  You have been given medicine to relax you  Do not  drive or make important decisions until the day after your procedure  Return to your normal activity as directed  · Relieve gas and discomfort from bloating  by lying on your right side with a heating pad on your abdomen  You may need to take short walks to help the gas move out  Eat small meals until bloating is relieved  If you had polyps removed: For 7 days after your procedure:  · Do not  take aspirin  · Do not  go on long car rides    Help prevent constipation:   · Eat a variety of healthy foods  Healthy foods include fruit, vegetables, whole-grain breads, low-fat dairy products, beans, lean meat, and fish  Ask if you need to be on a special diet  Your healthcare provider may recommend that you eat high-fiber foods such as cooked beans  Fiber helps you have regular bowel movements  · Drink liquids as directed  Adults should drink between 9 and 13 eight-ounce cups of liquid every day  Ask what amount is best for you  For most people, good liquids to drink are water, juice, and milk  · Exercise as directed  Talk to your healthcare provider about the best exercise plan for you  Exercise can help prevent constipation, decrease your blood pressure and improve your health  Follow up with your healthcare provider as directed:  Write down your questions so you remember to ask them during your visits  © 2017 [address] Information is for End User's use only and may not be sold, redistributed or otherwise used for commercial purposes  All illustrations and images included in CareNotes® are the copyrighted property of [employer]  or [employer]  The above information is an  only  It is not intended as medical advice for individual conditions or treatments  Talk to your doctor, nurse or pharmacist before following any medical regimen to see if it is safe and effective for you

## 2019-12-05 ENCOUNTER — IN-CLINIC DEVICE VISIT (OUTPATIENT)
Dept: CARDIOLOGY CLINIC | Facility: CLINIC | Age: 79
End: 2019-12-05
Payer: COMMERCIAL

## 2019-12-05 DIAGNOSIS — Z95.810 CARDIAC DEFIBRILLATOR IN PLACE: Primary | ICD-10-CM

## 2019-12-05 PROCEDURE — 93284 PRGRMG EVAL IMPLANTABLE DFB: CPT | Performed by: INTERNAL MEDICINE

## 2019-12-05 NOTE — PROGRESS NOTES
Results for orders placed or performed in visit on 12/05/19   Cardiac EP device report    Narrative    MDT BI-V ICD (3830 IN LV PORT)  DEVICE INTERROGATED IN THE [place] OFFICE  BATTERY VOLTAGE ADEQUATE (7 6 YRS)  AP 1 3% BVP 97 9% ( 97 8% + VSR PACE 0 1%)  ALL LEAD PARAMETERS WITHIN NORMAL LIMITS  ALL OTHER TESTING WITHIN NORMAL LIMITS  NO SIGNIFICANT HIGH RATE EPISODES  NO VENTRICULAR SENSING EVENTS  OPTI-VOL FLUID THRESHOLD CROSSED SINCE 11/30/19 AND ONGOING  PT USES LASIX PRN AND DID TAKE PILL LAST EVENING C/O PEDAL EDEMA  TASK TO CHF RN  RECHECK SCHEDULED IN 31 DAYS  NO PROGRAMMING CHANGES MADE TO DEVICE PARAMETERS  APPROPRIATELY FUNCTIONING BIV ICD         EB

## 2019-12-20 ENCOUNTER — TELEPHONE (OUTPATIENT)
Dept: RADIOLOGY | Facility: MEDICAL CENTER | Age: 79
End: 2019-12-20

## 2019-12-20 NOTE — TELEPHONE ENCOUNTER
*Forwarding to Flavio Chacko to assist pt with scheduling repeat injection  (Pt does take Naproxen)  **Forwarding to  to offer and assist pt with double booking OV w/ AO on Monday for med adjustments

## 2019-12-20 NOTE — TELEPHONE ENCOUNTER
S/w pt who states that she is experiencing a lot of pain  Pt states that it is the same pain she has always had, L hip down past her knee, and L groin  Pt reports that last night her pain was a 10/10  Pt states that she is taking Aleve PRN and Gabapentin as prescribed  Pt states that she is going away at the end of January and is afraid of waiting and the pain getting worse  Pt states that she would like to maybe repeat her last injection if Dr Yessica Burr thinks it would be appropriate  Pt was advised that RN will send this information to [facility] and c/b w/ his recommendations when available  Pt verbalized understanding and was appreciative  Please advise  Thank you

## 2019-12-20 NOTE — TELEPHONE ENCOUNTER
Patient had left L5-S1 JESUS on 10/30  Rec'd call from patient stating that her pain is worse than ever  Please call to further evaluate       [phone]

## 2019-12-20 NOTE — TELEPHONE ENCOUNTER
Ok to schedule repeat injection for her    Can also bring her in and double book on Monday if she'd like to see Srini Chawla for med adjustments

## 2019-12-20 NOTE — TELEPHONE ENCOUNTER
Patient not available 1/27 - 31  Scheduled left L5-s1 JESUS on 2/3  Cx f/u with AO on 2/3; AO seeing patient on 12/23 and will adjust meds at that time and reschedule f/u at that time  NSAIDs no longer held prior to LESI  Reviewed instructions: , NPO 1 hour prior, loose-fitting/comfortable pants, if ill/fever/infx/abx to call and reschedule  No immunizations or vaccinations 2w prior/after steroid injections  Patient stated verbal understanding

## 2019-12-23 ENCOUNTER — OFFICE VISIT (OUTPATIENT)
Dept: PAIN MEDICINE | Facility: MEDICAL CENTER | Age: 79
End: 2019-12-23
Payer: COMMERCIAL

## 2019-12-23 VITALS
HEIGHT: 61 IN | HEART RATE: 45 BPM | SYSTOLIC BLOOD PRESSURE: 126 MMHG | WEIGHT: 162.6 LBS | BODY MASS INDEX: 30.7 KG/M2 | DIASTOLIC BLOOD PRESSURE: 74 MMHG

## 2019-12-23 DIAGNOSIS — M43.10 SPONDYLOLISTHESIS, UNSPECIFIED SPINAL REGION: ICD-10-CM

## 2019-12-23 DIAGNOSIS — M54.2 NECK PAIN: ICD-10-CM

## 2019-12-23 DIAGNOSIS — M47.812 CERVICAL SPONDYLOSIS WITHOUT MYELOPATHY: ICD-10-CM

## 2019-12-23 DIAGNOSIS — M48.062 SPINAL STENOSIS, LUMBAR REGION, WITH NEUROGENIC CLAUDICATION: Primary | ICD-10-CM

## 2019-12-23 DIAGNOSIS — M54.16 LUMBAR RADICULOPATHY: ICD-10-CM

## 2019-12-23 PROCEDURE — 99213 OFFICE O/P EST LOW 20 MIN: CPT | Performed by: NURSE PRACTITIONER

## 2019-12-26 ENCOUNTER — APPOINTMENT (OUTPATIENT)
Dept: LAB | Facility: MEDICAL CENTER | Age: 79
End: 2019-12-26
Payer: COMMERCIAL

## 2019-12-26 DIAGNOSIS — C50.919 MALIGNANT NEOPLASM OF FEMALE BREAST, UNSPECIFIED ESTROGEN RECEPTOR STATUS, UNSPECIFIED LATERALITY, UNSPECIFIED SITE OF BREAST (HCC): ICD-10-CM

## 2019-12-26 DIAGNOSIS — R00.0 TACHYCARDIA: ICD-10-CM

## 2019-12-26 LAB
ALBUMIN SERPL BCP-MCNC: 3.3 G/DL (ref 3.5–5)
ALP SERPL-CCNC: 91 U/L (ref 46–116)
ALT SERPL W P-5'-P-CCNC: 28 U/L (ref 12–78)
ANION GAP SERPL CALCULATED.3IONS-SCNC: 4 MMOL/L (ref 4–13)
AST SERPL W P-5'-P-CCNC: 23 U/L (ref 5–45)
BASOPHILS # BLD AUTO: 0.05 THOUSANDS/ΜL (ref 0–0.1)
BASOPHILS NFR BLD AUTO: 1 % (ref 0–1)
BILIRUB SERPL-MCNC: 0.54 MG/DL (ref 0.2–1)
BUN SERPL-MCNC: 19 MG/DL (ref 5–25)
CALCIUM SERPL-MCNC: 9.7 MG/DL (ref 8.3–10.1)
CANCER AG27-29 SERPL-ACNC: 35 U/ML (ref 0–42.3)
CHLORIDE SERPL-SCNC: 112 MMOL/L (ref 100–108)
CO2 SERPL-SCNC: 28 MMOL/L (ref 21–32)
CREAT SERPL-MCNC: 0.77 MG/DL (ref 0.6–1.3)
EOSINOPHIL # BLD AUTO: 0.18 THOUSAND/ΜL (ref 0–0.61)
EOSINOPHIL NFR BLD AUTO: 2 % (ref 0–6)
ERYTHROCYTE [DISTWIDTH] IN BLOOD BY AUTOMATED COUNT: 14.6 % (ref 11.6–15.1)
GFR SERPL CREATININE-BSD FRML MDRD: 73 ML/MIN/1.73SQ M
GLUCOSE SERPL-MCNC: 140 MG/DL (ref 65–140)
HCT VFR BLD AUTO: 41.1 % (ref 34.8–46.1)
HGB BLD-MCNC: 12.6 G/DL (ref 11.5–15.4)
IMM GRANULOCYTES # BLD AUTO: 0.01 THOUSAND/UL (ref 0–0.2)
IMM GRANULOCYTES NFR BLD AUTO: 0 % (ref 0–2)
LYMPHOCYTES # BLD AUTO: 2.17 THOUSANDS/ΜL (ref 0.6–4.47)
LYMPHOCYTES NFR BLD AUTO: 27 % (ref 14–44)
MCH RBC QN AUTO: 29.6 PG (ref 26.8–34.3)
MCHC RBC AUTO-ENTMCNC: 30.7 G/DL (ref 31.4–37.4)
MCV RBC AUTO: 97 FL (ref 82–98)
MONOCYTES # BLD AUTO: 0.86 THOUSAND/ΜL (ref 0.17–1.22)
MONOCYTES NFR BLD AUTO: 11 % (ref 4–12)
NEUTROPHILS # BLD AUTO: 4.88 THOUSANDS/ΜL (ref 1.85–7.62)
NEUTS SEG NFR BLD AUTO: 59 % (ref 43–75)
NRBC BLD AUTO-RTO: 0 /100 WBCS
PLATELET # BLD AUTO: 160 THOUSANDS/UL (ref 149–390)
PMV BLD AUTO: 10.1 FL (ref 8.9–12.7)
POTASSIUM SERPL-SCNC: 5 MMOL/L (ref 3.5–5.3)
PROT SERPL-MCNC: 6.9 G/DL (ref 6.4–8.2)
RBC # BLD AUTO: 4.25 MILLION/UL (ref 3.81–5.12)
SODIUM SERPL-SCNC: 144 MMOL/L (ref 136–145)
WBC # BLD AUTO: 8.15 THOUSAND/UL (ref 4.31–10.16)

## 2019-12-26 PROCEDURE — 86300 IMMUNOASSAY TUMOR CA 15-3: CPT

## 2019-12-26 PROCEDURE — 85025 COMPLETE CBC W/AUTO DIFF WBC: CPT

## 2019-12-26 PROCEDURE — 36415 COLL VENOUS BLD VENIPUNCTURE: CPT

## 2019-12-26 PROCEDURE — 80053 COMPREHEN METABOLIC PANEL: CPT

## 2019-12-26 NOTE — TELEPHONE ENCOUNTER
Patient called requesting refill on Metoprolol 25mg  Also requested we refill for a 90 day supply instead of 30 day       Refilling medication per patient's request

## 2019-12-30 ENCOUNTER — OFFICE VISIT (OUTPATIENT)
Dept: HEMATOLOGY ONCOLOGY | Facility: CLINIC | Age: 79
End: 2019-12-30
Payer: COMMERCIAL

## 2019-12-30 VITALS
TEMPERATURE: 98.1 F | OXYGEN SATURATION: 97 % | BODY MASS INDEX: 30.32 KG/M2 | SYSTOLIC BLOOD PRESSURE: 122 MMHG | WEIGHT: 160.6 LBS | HEART RATE: 53 BPM | DIASTOLIC BLOOD PRESSURE: 64 MMHG | RESPIRATION RATE: 17 BRPM | HEIGHT: 61 IN

## 2019-12-30 DIAGNOSIS — C50.911 ADENOCARCINOMA OF RIGHT BREAST METASTATIC TO LIVER (HCC): Primary | Chronic | ICD-10-CM

## 2019-12-30 DIAGNOSIS — C78.7 ADENOCARCINOMA OF RIGHT BREAST METASTATIC TO LIVER (HCC): Primary | Chronic | ICD-10-CM

## 2019-12-30 PROCEDURE — 99214 OFFICE O/P EST MOD 30 MIN: CPT | Performed by: INTERNAL MEDICINE

## 2019-12-30 RX ORDER — ACETAMINOPHEN 500 MG
500 TABLET ORAL EVERY 6 HOURS PRN
COMMUNITY
End: 2022-01-01 | Stop reason: HOSPADM

## 2019-12-30 NOTE — PROGRESS NOTES
[facility] [place]  [address] [facility] [id number] [phone]  [address],1940, [phone]  12/30/19    Discussion:   In summary, this is an 18-year-old female history of stage IV breast cancer, as outlined  Clinically she is essentially stable  Over the past few weeks she has convinced herself that she has polycythemia vera based on symptoms that she has seen advertised in television commercials including itching, fatigue  It is noteworthy that most recent CBC is essentially normal without evidence of erythrocytosis, leukocytosis or thrombocytosis  She has occasionally had leukocytosis in the past   This resolved without specific intervention, however  She had previous anemia which has now normalized  I do not believe that she has polycythemia  The etiology of her pruritus and fatigue are unclear  Fatigue certainly could be related to her malignancy itself, previous treatment, depression, other processes or medication toxicities, etcetera  Most recent tumor marker is in the normal range although it has risen within that range  Repeat imaging just prior to her next visit in 3 months is requested  I discussed the above with the patient  The patient and her  voiced understanding and agreement   ______________________________________________________________________    Chief Complaint   Patient presents with    Follow-up       HPI:     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 [facility])    2009 Initial Diagnosis     Adenocarcinoma of right breast,  Infiltrating ductal carcinoma T2, N1 sebastien) Tumor was ER/DE positive, HER2 negative          2009 Surgery     Right Mastectomy      2009 -  Chemotherapy     Adjuvant chemotherapy with Taxotere/Cytoxan x 4 cycles      2009 - 10/2014 Chemotherapy     Arimidex 1 mg po dialy      2/11/2016 Progression     Right lateral mastectomy site growth with core needle biopsy demonstrated reoccurrence  %, HI 70%, Her2 +1  Final Diagnosis   A  Breast, right lateral mastectomy site, core needle biopsies:                        - Recurrent invasive mammary carcinoma, no special type (formerly invasive ductal carcinoma), 0 8 cm largest single focus               - Combined Jose Elias score: 7/9                         - Tubule formation: None (3/3)  - Nuclear pleomorphism: Marked (3/3)  - Mitotic count: 3 mitoses per 10 high-power fields (1/3)  - No lymph-vascular invasion is identified              - No in situ component is identified                - No microcalcifications are identified  3/16/2016 - 1/24/2017 Chemotherapy     Faslodex 500 mg with loading dose followed by maintenance monthly injection with Ibrance 75mg 3 weeks on 1 week off        12/13/2016 Progression      Progression noted on PET-CT scan  Progression was largely found in the liver  Patient was set up for liver biopsy  1/3/2017 Biopsy     Liver lesion biopsy demonstated Estrogen Receptor (clone SP-1) 100%/positive, Progesterone Receptor (clone 1E2) 1-2%/positive, HER2 by IHC (bziby2H0) 2+/equivocal   Her2 by FISH was positive  1/25/2017 - 4/19/2017 Chemotherapy      Herceptin and Perjeta x5 cycles      4/20/2017 Adverse Reaction     Perjecta causing significant diarrhea  Medication stopped  7/13/2017 Surgery     Partial Hepatectomy, results demonstrated ER70%, PR0% & Her2 negative disease  9/19/2017 -  Chemotherapy     Tamoxifen 20 mg daily      2/27/2019 - 4/10/2019 Radiation       Plan ID Energy Fractions Dose per Fraction (cGy) Dose Correction (cGy) Total Dose Delivered (cGy) Elapsed Days   R Axilla # 10X/6X 25 / 25 200 0 5,000 35   R Axilla CD 10X/6X 5 / 5 200 0 1,000 6              Interval History:  Clinically stable    ECOG-  1 - Symptomatic but completely ambulatory    Review of Systems   Constitutional: Negative for appetite change, diaphoresis, fatigue and fever  HENT: Negative for sinus pain  Eyes: Negative for discharge  Respiratory: Negative for cough and shortness of breath  Cardiovascular: Negative for chest pain  Gastrointestinal: Negative for abdominal pain, constipation and diarrhea  Endocrine: Negative for cold intolerance  Genitourinary: Negative for difficulty urinating and hematuria  Musculoskeletal: Negative for joint swelling  Skin: Negative for rash  Allergic/Immunologic: Negative for environmental allergies  Neurological: Negative for dizziness and headaches  Hematological: Negative for adenopathy  Psychiatric/Behavioral: Negative for agitation         Past Medical History:   Diagnosis Date    Acute biliary pancreatitis     Anxiety     Arthritis     Breast CA (HCC)     recurrent, ductal carcinoma ER, [place] pos    Cardiac disease     Colon polyp     Depression     Diverticula of intestine     Diverticulosis     Dizziness     and passes out    Flushing     Hiatal hernia     [place] (hard of hearing)      lizette    Hypercholesteremia     Hypertension     Liver mass     Liver metastasis (HCC)     Metastatic adenocarcinoma to liver (HCC)     Bx 01/03/2017    PONV (postoperative nausea and vomiting)     Pulmonary embolism (HCC)     Recurrent breast cancer (HCC)     Shingles     Shortness of breath     on exertion    Tachycardia     Urinary incontinence     Use of cane as ambulatory aid     or walker     Patient Active Problem List   Diagnosis    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 ([address] )    Hypercholesteremia    Gastroesophageal reflux disease without esophagitis    History of total knee replacement    Hearing loss    LBBB (left bundle branch block)    Complication associated with cardiac pacemaker lead    Pacemaker complications, subsequent encounter    Chest wall hematoma    Cholecystitis    Acute biliary pancreatitis    Cholangitis    Choledocholithiasis    Acute gallstone pancreatitis    Intermittent complete heart block ([address] )    Spinal stenosis, lumbar region, with neurogenic claudication    Lumbar pain    Spondylolisthesis    Upper abdominal pain    SIRS (systemic inflammatory response syndrome) ([facility])    Lactic acidosis    Abnormal urinalysis    Type 2 diabetes mellitus, without long-term current use of insulin ([facility])    Hypertension    Nerve pain - Right    Neck pain    Cervical spondylosis without myelopathy    SSS (sick sinus syndrome) ([facility])    Mixed hyperlipidemia    Elevated hemidiaphragm    VT (ventricular tachycardia) ([facility])    Lumbar radiculopathy       Current Outpatient Medications:     acetaminophen (TYLENOL) 500 mg tablet, Take 500 mg by mouth every 6 (six) hours as needed for mild pain, Disp: , Rfl:     atorvastatin (LIPITOR) 20 mg tablet, Take 20 mg by mouth daily  , Disp: , Rfl:     Calcium Citrate-Vitamin D (CALCIUM CITRATE + D PO), Take 1,500 mg by mouth daily, Disp: , Rfl:     clindamycin (CLEOCIN) 300 MG capsule, TAKE 2 CAPSULES BY MOUTH 1 HOUR PRIOR TO DENTAL PROCEDURE , Disp: , Rfl: 0    clotrimazole-betamethasone (LOTRISONE) 1-0 05 % cream, Apply 1 application topically as needed , Disp: , Rfl:     Cranberry (THERACRAN HP PO), Take 2 tablets by mouth daily, Disp: , Rfl:     furosemide (LASIX) 20 mg tablet, Take 1 tablet (20 mg total) by mouth daily as needed (le swelling) Edema, Disp: 30 tablet, Rfl: 0    gabapentin (NEURONTIN) 300 mg capsule, Take 1 capsule (300 mg total) by mouth daily at bedtime, Disp: 90 capsule, Rfl: 0    Glucosamine-Chondroit-Vit C-Mn (GLUCOSAMINE 1500 COMPLEX) CAPS, Take 1 capsule by mouth daily  , Disp: , Rfl:     metFORMIN (GLUCOPHAGE) 500 mg tablet, 2 (two) times a day, Disp: , Rfl:     metoprolol tartrate (LOPRESSOR) 25 mg tablet, Take 0 5 tablets (12 5 mg total) by mouth every 12 (twelve) hours, Disp: 90 tablet, Rfl: 3    Multiple Vitamin (MULTIVITAMIN) capsule, Take 1 capsule by mouth daily  , Disp: , Rfl:     ondansetron (ZOFRAN) 4 mg tablet, Take 4 mg by mouth every 8 (eight) hours as needed, Disp: , Rfl:     SUPREP BOWEL PREP KIT 17 5-3 13-1 6 GM/177ML SOLN, , Disp: , Rfl: 0    tamoxifen (NOLVADEX) 20 mg tablet, Take 1 tablet (20 mg total) by mouth daily, Disp: 60 tablet, Rfl: 3    Naproxen Sodium (ALEVE PO), Take by mouth as needed, Disp: , Rfl:   Allergies   Allergen Reactions    Ampicillin Shortness Of Breath, Anaphylaxis and Other (See Comments)     Other reaction(s): Unknown Allergic Reaction  Reaction Date: [id number];      Past Surgical History:   Procedure Laterality Date    BREAST SURGERY      CARDIAC PACEMAKER REMOVAL N/A 11/9/2017    Procedure: PACEMAKER LEAD REVISION, REMOVAL OF NONFUNCTIONING LEAD, AND INSERTION OF A NEW RV LEAD;  Surgeon: India Car MD;  [facility];  Service: Cardiology    CATARACT EXTRACTION Bilateral     COLONOSCOPY      ERCP N/A 1/8/2018    Procedure: ENDOSCOPIC RETROGRADE CHOLANGIOPANCREATOGRAPHY (ERCP); Surgeon: Nat Marroquin MD;  Location: [facility]; Service: Gastroenterology    HYSTERECTOMY      INSERT / REPLACE / [place] Rail      JOINT REPLACEMENT      lizette  TKR and right TSR    MASTECTOMY Right     ND ERCP DX COLLECTION SPECIMEN BRUSHING/WASHING N/A 2/22/2018    Procedure: ENDOSCOPIC RETROGRADE CHOLANGIOPANCREATOGRAPHY (ERCP) with stent removal;  Surgeon: Nat Marroquin MD;  [facility]; Service: Gastroenterology    ND RESEC [address] N/A 7/13/2017    Procedure: LIVER RESECTION, INTRAOPERATIVE ULTRASOUND;  Surgeon: Tressia Pallas, MD;  [facility];  Service: Surgical Oncology    ROTATOR CUFF REPAIR Right     SENTINEL LYMPH NODE BIOPSY Right     TONSILECTOMY AND ADNOIDECTOMY      WOUND DEBRIDEMENT Left 11/9/2017    Procedure: DEBRIDEMENT WOUND AND DRESSING CHANGE [facility]);   Surgeon: India Car MD;  [facility];  Service: Cardiology     Social History Objective:  Vitals:    12/30/19 1038   BP: 122/64   BP Location: Left arm   Patient Position: Sitting   Pulse: (!) 53   Resp: 17   Temp: 98 1 °F (36 7 °C)   TempSrc: Tympanic   SpO2: 97%   Weight: 72 8 kg (160 lb 9 6 oz)   Height: 5' 1 2" (1 554 m)     Physical Exam   Constitutional: She is oriented to person, place, and time  She appears well-developed  HENT:   Head: Normocephalic  Eyes: Pupils are equal, round, and reactive to light  Neck: Neck supple  Cardiovascular: Normal rate  No murmur heard  Pulmonary/Chest: No respiratory distress  She has no wheezes  She has no rales  Abdominal: Soft  She exhibits no distension  There is no tenderness  There is no rebound  Musculoskeletal: She exhibits no edema  Lymphadenopathy:     She has no cervical adenopathy  Neurological: She is alert and oriented to person, place, and time  She displays normal reflexes  Skin: Skin is warm  No rash noted  Psychiatric: She has a normal mood and affect  Thought content normal          Labs: I personally reviewed the labs and imaging pertinent to this patient care

## 2020-01-09 ENCOUNTER — REMOTE DEVICE CLINIC VISIT (OUTPATIENT)
Dept: CARDIOLOGY CLINIC | Facility: CLINIC | Age: 80
End: 2020-01-09
Payer: COMMERCIAL

## 2020-01-09 DIAGNOSIS — Z95.810 PRESENCE OF AUTOMATIC CARDIOVERTER/DEFIBRILLATOR (AICD): Primary | ICD-10-CM

## 2020-01-09 PROCEDURE — 93297 REM INTERROG DEV EVAL ICPMS: CPT | Performed by: INTERNAL MEDICINE

## 2020-01-09 NOTE — PROGRESS NOTES
Results for orders placed or performed in visit on 01/09/20   Cardiac EP device report    Narrative    MDT BI-V ICD (3830 IN LV PORT)  CARELINK TRANSMISSION - OPTI-VOL ONLY: OPTI-VOL FLUID THRESHOLD CROSSED  TASKED HFN  RECHECK IN ONE MONTH  AP 4% BVP 93%  ALL AVAILABLE LEAD PARAMETERS WITHIN NORMAL LIMITS  NO SIGNIFICANT HIGH RATE EPISODES  NORMAL DEVICE FUNCTION  ---DUMONT

## 2020-01-31 ENCOUNTER — TELEPHONE (OUTPATIENT)
Dept: HEMATOLOGY ONCOLOGY | Facility: CLINIC | Age: 80
End: 2020-01-31

## 2020-01-31 ENCOUNTER — TELEPHONE (OUTPATIENT)
Dept: CARDIOLOGY CLINIC | Facility: CLINIC | Age: 80
End: 2020-01-31

## 2020-01-31 DIAGNOSIS — I44.2 INTERMITTENT COMPLETE HEART BLOCK (HCC): ICD-10-CM

## 2020-01-31 RX ORDER — FUROSEMIDE 20 MG/1
20 TABLET ORAL DAILY PRN
Qty: 30 TABLET | Refills: 3 | Status: SHIPPED | OUTPATIENT
Start: 2020-01-31 | End: 2021-03-11 | Stop reason: CLARIF

## 2020-01-31 NOTE — TELEPHONE ENCOUNTER
Please  Order the lasix 20mg daily 30 pills w 3 refills, can take daily prn for edema  Order U/s left ext to r/o DVT if not improving

## 2020-01-31 NOTE — TELEPHONE ENCOUNTER
Patient called requesting a refill on:    furosemide (LASIX) 20 mg tablet Preferred pharmacy: [address] [place] [address],   [phone]  Patient's call back # [phone]

## 2020-01-31 NOTE — TELEPHONE ENCOUNTER
Spoke with patient  Will send in Rx before I leave and she is going to call me in the office on Monday with an update

## 2020-01-31 NOTE — TELEPHONE ENCOUNTER
Task received  Patient called advised Dr Angie Squires did not prescribe her Lasix  Patient thought that she called her Cardiologist Dr Lacey Arroyo  Patient will call her Cardiologist for Lasix refill

## 2020-01-31 NOTE — TELEPHONE ENCOUNTER
Patient called c/o L ankle edema following a vacation  She has the edema X3 days without any relief, no SOB  She states she is elevating her legs  She had done more walking than usual on her vacation and diet was not optimal in relation to her salt intake  She had an RX which she believes was from Dr Miky Carr from 2017 for prn Lasix 20mg  She took her last one today  She had only a total of #15 tablets and is requesting another small prescription  Unsure who wanted to Rx this as you both see her  Dr Rizwan Tracy 9/23/19 and Dr Miky Carr 6/6/19  [facility] in [place]  Please let me know if you want me to send in and if a BMP is necessary to follow  Thanks

## 2020-02-03 ENCOUNTER — HOSPITAL ENCOUNTER (OUTPATIENT)
Dept: RADIOLOGY | Facility: MEDICAL CENTER | Age: 80
Discharge: HOME | End: 2020-02-03
Attending: PHYSICAL MEDICINE & REHABILITATION | Admitting: PHYSICAL MEDICINE & REHABILITATION
Payer: COMMERCIAL

## 2020-02-03 VITALS
RESPIRATION RATE: 18 BRPM | DIASTOLIC BLOOD PRESSURE: 74 MMHG | OXYGEN SATURATION: 97 % | TEMPERATURE: 97.6 F | HEART RATE: 55 BPM | SYSTOLIC BLOOD PRESSURE: 110 MMHG

## 2020-02-03 DIAGNOSIS — M54.16 LUMBAR RADICULOPATHY: ICD-10-CM

## 2020-02-03 PROCEDURE — 62323 NJX INTERLAMINAR LMBR/SAC: CPT | Performed by: PHYSICAL MEDICINE & REHABILITATION

## 2020-02-03 RX ORDER — LIDOCAINE HYDROCHLORIDE 10 MG/ML
5 INJECTION, SOLUTION EPIDURAL; INFILTRATION; INTRACAUDAL; PERINEURAL ONCE
Status: COMPLETED | OUTPATIENT
Start: 2020-02-03 | End: 2020-02-03

## 2020-02-03 RX ORDER — METHYLPREDNISOLONE ACETATE 80 MG/ML
80 INJECTION, SUSPENSION INTRA-ARTICULAR; INTRALESIONAL; INTRAMUSCULAR; PARENTERAL; SOFT TISSUE ONCE
Status: COMPLETED | OUTPATIENT
Start: 2020-02-03 | End: 2020-02-03

## 2020-02-03 RX ADMIN — LIDOCAINE HYDROCHLORIDE 2 ML: 10 INJECTION, SOLUTION EPIDURAL; INFILTRATION; INTRACAUDAL; PERINEURAL at 13:19

## 2020-02-03 RX ADMIN — METHYLPREDNISOLONE ACETATE 80 MG: 80 INJECTION, SUSPENSION INTRA-ARTICULAR; INTRALESIONAL; INTRAMUSCULAR; PARENTERAL; SOFT TISSUE at 13:21

## 2020-02-03 RX ADMIN — IOHEXOL 2 ML: 300 INJECTION, SOLUTION INTRAVENOUS at 13:21

## 2020-02-03 NOTE — DISCHARGE INSTRUCTIONS
Epidural Steroid Injection   WHAT YOU NEED TO KNOW:   An epidural steroid injection (JESUS) is a procedure to inject steroid medicine into the epidural space  The epidural space is between your spinal cord and vertebrae  Steroids reduce inflammation and fluid buildup in your spine that may be causing pain  You may be given pain medicine along with the steroids  ACTIVITY  · Do not drive or operate machinery today  · No strenuous activity today - bending, lifting, etc   · You may resume normal activites starting tomorrow - start slowly and as tolerated  · You may shower today, but no tub baths or hot tubs  · You may have numbness for several hours from the local anesthetic  Please use caution and common sense, especially with weight-bearing activities  CARE OF THE INJECTION SITE  · If you have soreness or pain, apply ice to the area today (20 minutes on/20 minutes off)  · Starting tomorrow, you may use warm, moist heat or ice if needed  · You may have an increase or change in your discomfort for 36-48 hours after your treatment  · Apply ice and continue with any pain medication you have been prescribed  · Notify [facility] if you have any of the following: redness, drainage, swelling, headache, stiff neck or fever above 100°F     SPECIAL INSTRUCTIONS  · Our office will contact you in approximately 7 days for a progress report  MEDICATIONS  · Continue to take all routine medications  · Our office may have instructed you to hold some medications  If you have a problem specifically related to your procedure, please call our office at [phone]  Problems not related to your procedure should be directed to your primary care physician

## 2020-02-03 NOTE — H&P
History of Present Illness:  The patient is a 78 y o  female who presents with complaints of bilateral back and leg pain    Patient Active Problem List   Diagnosis    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 ([address] )    Hypercholesteremia    Gastroesophageal reflux disease without esophagitis    History of total knee replacement    Hearing loss    LBBB (left bundle branch block)    Complication associated with cardiac pacemaker lead    Pacemaker complications, subsequent encounter    Chest wall hematoma    Cholecystitis    Acute biliary pancreatitis    Cholangitis    Choledocholithiasis    Acute gallstone pancreatitis    Intermittent complete heart block ([address] )    Spinal stenosis, lumbar region, with neurogenic claudication    Lumbar pain    Spondylolisthesis    Upper abdominal pain    SIRS (systemic inflammatory response syndrome) (HCC)    Lactic acidosis    Abnormal urinalysis    Type 2 diabetes mellitus, without long-term current use of insulin (HCC)    Hypertension    Nerve pain - Right    Neck pain    Cervical spondylosis without myelopathy    SSS (sick sinus syndrome) (HCC)    Mixed hyperlipidemia    Elevated hemidiaphragm    VT (ventricular tachycardia) (HCC)    Lumbar radiculopathy       Past Medical History:   Diagnosis Date    Acute biliary pancreatitis     Anxiety     Arthritis     Breast CA (HCC)     recurrent, ductal carcinoma ER, [place] pos    Cardiac disease     Colon polyp     Depression     Diverticula of intestine     Diverticulosis     Dizziness     and passes out    Flushing     Hiatal hernia     [place] (hard of hearing)      lizette    Hypercholesteremia     Hypertension     Liver mass     Liver metastasis ([address] )     Metastatic adenocarcinoma to liver (HCC)     Bx 01/03/2017    PONV (postoperative nausea and vomiting)     Pulmonary embolism (HCC)     Recurrent breast cancer (HCC)     Shingles     Shortness of breath     on exertion    Tachycardia  Urinary incontinence     Use of cane as ambulatory aid     or walker       Past Surgical History:   Procedure Laterality Date    BREAST SURGERY      CARDIAC PACEMAKER REMOVAL N/A 11/9/2017    Procedure: PACEMAKER LEAD REVISION, REMOVAL OF NONFUNCTIONING LEAD, AND INSERTION OF A NEW RV LEAD;  Surgeon: Lynnette Merino MD;  [facility];  Service: Cardiology    CATARACT EXTRACTION Bilateral     COLONOSCOPY      ERCP N/A 1/8/2018    Procedure: ENDOSCOPIC RETROGRADE CHOLANGIOPANCREATOGRAPHY (ERCP); Surgeon: Tariq Cain MD;  [facility]; Service: Gastroenterology    HYSTERECTOMY      INSERT / REPLACE / [place]      JOINT REPLACEMENT      lizette  TKR and right TSR    MASTECTOMY Right     AR ERCP DX COLLECTION SPECIMEN BRUSHING/WASHING N/A 2/22/2018    Procedure: ENDOSCOPIC RETROGRADE CHOLANGIOPANCREATOGRAPHY (ERCP) with stent removal;  Surgeon: Tariq Cain MD;  [facility]; Service: Gastroenterology    [place] RESEC [address] N/A 7/13/2017    Procedure: LIVER RESECTION, INTRAOPERATIVE ULTRASOUND;  Surgeon: Julienne Vilchis MD;  [facility];  Service: Surgical Oncology    ROTATOR CUFF REPAIR Right     SENTINEL LYMPH NODE BIOPSY Right     TONSILECTOMY AND ADNOIDECTOMY      WOUND DEBRIDEMENT Left 11/9/2017    Procedure: DEBRIDEMENT WOUND AND DRESSING CHANGE [facility]); Surgeon: Lynnette Merino MD;  [facility];  Service: Cardiology         Current Outpatient Medications:     acetaminophen (TYLENOL) 500 mg tablet, Take 500 mg by mouth every 6 (six) hours as needed for mild pain, Disp: , Rfl:     atorvastatin (LIPITOR) 20 mg tablet, Take 20 mg by mouth daily  , Disp: , Rfl:     Calcium Citrate-Vitamin D (CALCIUM CITRATE + D PO), Take 1,500 mg by mouth daily, Disp: , Rfl:     clotrimazole-betamethasone (LOTRISONE) 1-0 05 % cream, Apply 1 application topically as needed , Disp: , Rfl:     Cranberry (THERACRAN HP PO), Take 2 tablets by mouth daily, Disp: , Rfl:     furosemide (LASIX) 20 mg tablet, Take 1 tablet (20 mg total) by mouth daily as needed (le swelling) Edema, Disp: 30 tablet, Rfl: 3    gabapentin (NEURONTIN) 300 mg capsule, Take 1 capsule (300 mg total) by mouth daily at bedtime, Disp: 90 capsule, Rfl: 0    Glucosamine-Chondroit-Vit C-Mn (GLUCOSAMINE 1500 COMPLEX) CAPS, Take 1 capsule by mouth daily  , Disp: , Rfl:     metFORMIN (GLUCOPHAGE) 500 mg tablet, 1 pill 3 times daily with meals, Disp: , Rfl:     metoprolol tartrate (LOPRESSOR) 25 mg tablet, Take 0 5 tablets (12 5 mg total) by mouth every 12 (twelve) hours, Disp: 90 tablet, Rfl: 3    Multiple Vitamin (MULTIVITAMIN) capsule, Take 1 capsule by mouth daily  , Disp: , Rfl:     Naproxen Sodium (ALEVE PO), Take by mouth as needed, Disp: , Rfl:     ondansetron (ZOFRAN) 4 mg tablet, Take 4 mg by mouth every 8 (eight) hours as needed, Disp: , Rfl:     tamoxifen (NOLVADEX) 20 mg tablet, Take 1 tablet (20 mg total) by mouth daily, Disp: 60 tablet, Rfl: 3    clindamycin (CLEOCIN) 300 MG capsule, TAKE 2 CAPSULES BY MOUTH 1 HOUR PRIOR TO DENTAL PROCEDURE , Disp: , Rfl: 0    Current Facility-Administered Medications:     iohexol (OMNIPAQUE) 300 mg/mL injection 50 mL, 50 mL, Epidural, Once, Cristal Dominguez,     lidocaine (PF) (XYLOCAINE-MPF) 1 % injection 5 mL, 5 mL, Infiltration, Once, Cristal Dominguez,     methylPREDNISolone acetate (DEPO-MEDROL) injection 80 mg, 80 mg, Epidural, Once, Cristal Dominguez,     Allergies   Allergen Reactions    Ampicillin Shortness Of Breath, Anaphylaxis and Other (See Comments)     Other reaction(s): Unknown Allergic Reaction  Reaction Date: [id number];        Physical Exam:   Vitals:    02/03/20 1305   BP: 125/56   Pulse: 65   Resp: 18   Temp: 97 6 °F (36 4 °C)   SpO2: 96%     General: Awake, Alert, Oriented x 3, Mood and affect appropriate  Respiratory: Respirations even and unlabored  Cardiovascular: Peripheral pulses intact; no edema  Musculoskeletal Exam: Bilateral back and leg pain    ASA Score:  2  Patient/Chart Verification  Patient ID Verified: Verbal  Consents Confirmed: Procedural, To be obtained in the Pre-Procedure area  H&P( within 30 days) Verified: To be obtained in the Pre-Procedure area  Allergies Reviewed: Yes  Anticoag/NSAID held?: NA  Currently on antibiotics?: No  Pregnancy denied?: NA    Assessment:   1   Lumbar radiculopathy        Plan: LT L5-S1 LESI

## 2020-02-10 ENCOUNTER — TELEPHONE (OUTPATIENT)
Dept: PAIN MEDICINE | Facility: MEDICAL CENTER | Age: 80
End: 2020-02-10

## 2020-02-10 DIAGNOSIS — M54.16 LUMBAR RADICULOPATHY: ICD-10-CM

## 2020-02-10 RX ORDER — GABAPENTIN 300 MG/1
300 CAPSULE ORAL
Qty: 90 CAPSULE | Refills: 0 | Status: SHIPPED | OUTPATIENT
Start: 2020-02-10 | End: 2020-07-02 | Stop reason: ALTCHOICE

## 2020-02-10 NOTE — TELEPHONE ENCOUNTER
Pain level 7-8  Percentage of relief: 50%    Needs refill on gabapentin 300mg  Take 1 capsule (300 mg total) by mouth daily at bedtime   Has 2 pills left  [id number], [address]  Call back# [phone]

## 2020-02-17 ENCOUNTER — REMOTE DEVICE CLINIC VISIT (OUTPATIENT)
Dept: CARDIOLOGY CLINIC | Facility: CLINIC | Age: 80
End: 2020-02-17
Payer: COMMERCIAL

## 2020-02-17 DIAGNOSIS — Z95.810 PRESENCE OF AUTOMATIC CARDIOVERTER/DEFIBRILLATOR (AICD): Primary | ICD-10-CM

## 2020-02-17 PROCEDURE — G2066 INTER DEVC REMOTE 30D: HCPCS | Performed by: INTERNAL MEDICINE

## 2020-02-17 PROCEDURE — 93297 REM INTERROG DEV EVAL ICPMS: CPT | Performed by: INTERNAL MEDICINE

## 2020-02-17 NOTE — PROGRESS NOTES
Results for orders placed or performed in visit on 02/17/20   Cardiac EP device report    Narrative    MDT BI-V ICD (3830 IN LV PORT)  CARELINK TRANSMISSION - OPTI-VOL ONLY: BATTERY VOLTAGE ADEQUATE (7 5 YRS)  AP: 5 3%  : 89 7% + VSRP: <0 1% (<90%~DDD/60)  ALL AVAILABLE LEAD PARAMETERS WITHIN NORMAL LIMITS  NO SIGNIFICANT HIGH RATE EPISODES  OPTI-VOL FLUID THRESHOLD CROSSED  SINCE DEC  PT TAKES LASIX, METOPROLOL TART  EF: 70% (ECHO 08/13/19)  TASK TO HF RN  APPROPRIATELY FUNCTIONING BI-V ICD    [address] [place] [address]

## 2020-02-20 ENCOUNTER — TELEPHONE (OUTPATIENT)
Dept: INFUSION CENTER | Facility: HOSPITAL | Age: 80
End: 2020-02-20

## 2020-02-20 NOTE — TELEPHONE ENCOUNTER
Patient c/o 2 week duration of swelling, pain and warmth from right axilla to elbow, denies redness or  fever or any other symptoms  history of stage IV breast cancer on q 3 month observation  Please advise

## 2020-02-20 NOTE — TELEPHONE ENCOUNTER
Called Kellie Medeiros regarding her R arm axilla swelling  Discussed with Dr Loli Sim and his recommendation was for her to go to the Emergency Room  Patient was not happy, and was thinking about if she would go  Again recommended that she go as soon as possible  Discussed that she could have a condition that was serious, and was in her best interest to go  Also offered patient to see JEAN MARIE AJ , but refused

## 2020-03-17 ENCOUNTER — REMOTE DEVICE CLINIC VISIT (OUTPATIENT)
Dept: CARDIOLOGY CLINIC | Facility: CLINIC | Age: 80
End: 2020-03-17
Payer: COMMERCIAL

## 2020-03-17 DIAGNOSIS — Z95.810 CARDIAC DEFIBRILLATOR IN PLACE: Primary | ICD-10-CM

## 2020-03-17 PROCEDURE — 93296 REM INTERROG EVL PM/IDS: CPT | Performed by: INTERNAL MEDICINE

## 2020-03-17 PROCEDURE — 93295 DEV INTERROG REMOTE 1/2/MLT: CPT | Performed by: INTERNAL MEDICINE

## 2020-03-17 NOTE — PROGRESS NOTES
Results for orders placed or performed in visit on 03/17/20   Cardiac EP device report    Narrative    MDT BI-V ICD (3830 IN LV PORT)  CARELINK TRANSMISSION: BATTERY VOLTAGE ADEQUATE (7 3 YRS)  AP 4 6% BVP 92 5% ( 92 4% + VSR PACE 0 1%)  ALL AVAILABLE LEAD PARAMETERS WITHIN NORMAL LIMITS  NO SIGNIFICANT HIGH RATE EPISODES  1 VENT  SENSE EPISODE W/MARKERS FOR FUSION, PVC, FF O/S BEAT ON V CHANNEL, SLOW NSVT  EF 70% (8/2019 ECHO)  PT TAKES METOPROLOL TART  OPTI-VOL WITHIN NORMAL LIMITS  NORMAL DEVICE FUNCTION    EB

## 2020-03-26 ENCOUNTER — HOSPITAL ENCOUNTER (OUTPATIENT)
Dept: CT IMAGING | Facility: HOSPITAL | Age: 80
Discharge: HOME | End: 2020-03-26
Attending: INTERNAL MEDICINE
Payer: COMMERCIAL

## 2020-03-26 DIAGNOSIS — C78.7 ADENOCARCINOMA OF RIGHT BREAST METASTATIC TO LIVER (HCC): Chronic | ICD-10-CM

## 2020-03-26 DIAGNOSIS — C50.911 ADENOCARCINOMA OF RIGHT BREAST METASTATIC TO LIVER (HCC): Chronic | ICD-10-CM

## 2020-03-26 PROCEDURE — 71260 CT THORAX DX C+: CPT

## 2020-03-26 PROCEDURE — 74177 CT ABD & PELVIS W/CONTRAST: CPT

## 2020-03-26 RX ADMIN — IOHEXOL 100 ML: 350 INJECTION, SOLUTION INTRAVENOUS at 19:27

## 2020-03-27 ENCOUNTER — TELEPHONE (OUTPATIENT)
Dept: HEMATOLOGY ONCOLOGY | Facility: CLINIC | Age: 80
End: 2020-03-27

## 2020-03-27 ENCOUNTER — APPOINTMENT (OUTPATIENT)
Dept: LAB | Facility: MEDICAL CENTER | Age: 80
End: 2020-03-27
Payer: COMMERCIAL

## 2020-03-27 DIAGNOSIS — C50.911 ADENOCARCINOMA OF RIGHT BREAST METASTATIC TO LIVER (HCC): Chronic | ICD-10-CM

## 2020-03-27 DIAGNOSIS — C78.7 ADENOCARCINOMA OF RIGHT BREAST METASTATIC TO LIVER (HCC): Chronic | ICD-10-CM

## 2020-03-27 DIAGNOSIS — C50.911 ADENOCARCINOMA OF RIGHT BREAST METASTATIC TO LIVER (HCC): Primary | Chronic | ICD-10-CM

## 2020-03-27 DIAGNOSIS — C78.7 ADENOCARCINOMA OF RIGHT BREAST METASTATIC TO LIVER (HCC): Primary | Chronic | ICD-10-CM

## 2020-03-27 LAB
ALBUMIN SERPL BCP-MCNC: 3.7 G/DL (ref 3.5–5)
ALP SERPL-CCNC: 101 U/L (ref 46–116)
ALT SERPL W P-5'-P-CCNC: 30 U/L (ref 12–78)
ANION GAP SERPL CALCULATED.3IONS-SCNC: 6 MMOL/L (ref 4–13)
AST SERPL W P-5'-P-CCNC: 25 U/L (ref 5–45)
BASOPHILS # BLD AUTO: 0.06 THOUSANDS/ΜL (ref 0–0.1)
BASOPHILS NFR BLD AUTO: 1 % (ref 0–1)
BILIRUB SERPL-MCNC: 0.43 MG/DL (ref 0.2–1)
BUN SERPL-MCNC: 18 MG/DL (ref 5–25)
CALCIUM SERPL-MCNC: 9.8 MG/DL (ref 8.3–10.1)
CANCER AG27-29 SERPL-ACNC: 63.1 U/ML (ref 0–42.3)
CHLORIDE SERPL-SCNC: 110 MMOL/L (ref 100–108)
CO2 SERPL-SCNC: 26 MMOL/L (ref 21–32)
CREAT SERPL-MCNC: 0.83 MG/DL (ref 0.6–1.3)
EOSINOPHIL # BLD AUTO: 0.15 THOUSAND/ΜL (ref 0–0.61)
EOSINOPHIL NFR BLD AUTO: 2 % (ref 0–6)
ERYTHROCYTE [DISTWIDTH] IN BLOOD BY AUTOMATED COUNT: 12.8 % (ref 11.6–15.1)
GFR SERPL CREATININE-BSD FRML MDRD: 66 ML/MIN/1.73SQ M
GLUCOSE SERPL-MCNC: 123 MG/DL (ref 65–140)
HCT VFR BLD AUTO: 41.9 % (ref 34.8–46.1)
HGB BLD-MCNC: 13.4 G/DL (ref 11.5–15.4)
IMM GRANULOCYTES # BLD AUTO: 0.02 THOUSAND/UL (ref 0–0.2)
IMM GRANULOCYTES NFR BLD AUTO: 0 % (ref 0–2)
LYMPHOCYTES # BLD AUTO: 2.95 THOUSANDS/ΜL (ref 0.6–4.47)
LYMPHOCYTES NFR BLD AUTO: 35 % (ref 14–44)
MCH RBC QN AUTO: 31.4 PG (ref 26.8–34.3)
MCHC RBC AUTO-ENTMCNC: 32 G/DL (ref 31.4–37.4)
MCV RBC AUTO: 98 FL (ref 82–98)
MONOCYTES # BLD AUTO: 1.11 THOUSAND/ΜL (ref 0.17–1.22)
MONOCYTES NFR BLD AUTO: 13 % (ref 4–12)
NEUTROPHILS # BLD AUTO: 4.09 THOUSANDS/ΜL (ref 1.85–7.62)
NEUTS SEG NFR BLD AUTO: 49 % (ref 43–75)
NRBC BLD AUTO-RTO: 0 /100 WBCS
PLATELET # BLD AUTO: 169 THOUSANDS/UL (ref 149–390)
PMV BLD AUTO: 10.4 FL (ref 8.9–12.7)
POTASSIUM SERPL-SCNC: 4.5 MMOL/L (ref 3.5–5.3)
PROT SERPL-MCNC: 7.4 G/DL (ref 6.4–8.2)
RBC # BLD AUTO: 4.27 MILLION/UL (ref 3.81–5.12)
SODIUM SERPL-SCNC: 142 MMOL/L (ref 136–145)
WBC # BLD AUTO: 8.38 THOUSAND/UL (ref 4.31–10.16)

## 2020-03-27 PROCEDURE — 85025 COMPLETE CBC W/AUTO DIFF WBC: CPT

## 2020-03-27 PROCEDURE — 36415 COLL VENOUS BLD VENIPUNCTURE: CPT

## 2020-03-27 PROCEDURE — 80053 COMPREHEN METABOLIC PANEL: CPT

## 2020-03-27 PROCEDURE — 86300 IMMUNOASSAY TUMOR CA 15-3: CPT

## 2020-03-27 NOTE — TELEPHONE ENCOUNTER
Patient called to confirm their appointment  Appointment confirmed for Dr Arie Narayanan              Appointment date and time:4-2-20 @ 11:00am                [place] location: [place]               Patient verbalized understanding of above

## 2020-03-30 ENCOUNTER — TELEPHONE (OUTPATIENT)
Dept: HEMATOLOGY ONCOLOGY | Facility: CLINIC | Age: 80
End: 2020-03-30

## 2020-03-30 NOTE — TELEPHONE ENCOUNTER
Patient called requesting results from CT on 3-26-20 and also like your lab results from 3-27-20  Please call patient back at [phone]

## 2020-03-30 NOTE — TELEPHONE ENCOUNTER
Spoke with pt and let her know that her CT scan has improved and labs are ok  She stated that her right axilla and arm is swollen and red  This has increased since she called on 2/20/20, pt was told to go to ER then and she refused  I did speak with Christin Loredo and he asked pt to go to ER today  Pt refused again  We suggested that she is to get into see her PCP asap  If she does not wish to do that then she will see Dr Hernandez on 4/2/20 in office and we will examine her then  She voiced understanding and will try to get into her pcp before our 4/2 appt

## 2020-04-02 ENCOUNTER — OFFICE VISIT (OUTPATIENT)
Dept: HEMATOLOGY ONCOLOGY | Facility: CLINIC | Age: 80
End: 2020-04-02
Payer: COMMERCIAL

## 2020-04-02 VITALS
DIASTOLIC BLOOD PRESSURE: 72 MMHG | SYSTOLIC BLOOD PRESSURE: 128 MMHG | OXYGEN SATURATION: 94 % | RESPIRATION RATE: 17 BRPM | HEART RATE: 50 BPM | TEMPERATURE: 98.8 F | BODY MASS INDEX: 29.27 KG/M2 | HEIGHT: 61 IN | WEIGHT: 155 LBS

## 2020-04-02 DIAGNOSIS — C78.7 ADENOCARCINOMA OF RIGHT BREAST METASTATIC TO LIVER (HCC): Primary | ICD-10-CM

## 2020-04-02 DIAGNOSIS — C50.911 ADENOCARCINOMA OF RIGHT BREAST METASTATIC TO LIVER (HCC): Primary | ICD-10-CM

## 2020-04-02 PROCEDURE — 99214 OFFICE O/P EST MOD 30 MIN: CPT | Performed by: INTERNAL MEDICINE

## 2020-04-17 ENCOUNTER — REMOTE DEVICE CLINIC VISIT (OUTPATIENT)
Dept: CARDIOLOGY CLINIC | Facility: CLINIC | Age: 80
End: 2020-04-17
Payer: COMMERCIAL

## 2020-04-17 DIAGNOSIS — Z95.810 AICD (AUTOMATIC CARDIOVERTER/DEFIBRILLATOR) PRESENT: Primary | ICD-10-CM

## 2020-04-17 PROCEDURE — G2066 INTER DEVC REMOTE 30D: HCPCS | Performed by: INTERNAL MEDICINE

## 2020-04-17 PROCEDURE — 93297 REM INTERROG DEV EVAL ICPMS: CPT | Performed by: INTERNAL MEDICINE

## 2020-05-12 ENCOUNTER — CLINICAL SUPPORT (OUTPATIENT)
Dept: RADIATION ONCOLOGY | Facility: CLINIC | Age: 80
End: 2020-05-12
Attending: RADIOLOGY
Payer: COMMERCIAL

## 2020-05-12 DIAGNOSIS — C50.911 ADENOCARCINOMA OF RIGHT BREAST METASTATIC TO LIVER (HCC): Primary | Chronic | ICD-10-CM

## 2020-05-12 DIAGNOSIS — C78.7 ADENOCARCINOMA OF RIGHT BREAST METASTATIC TO LIVER (HCC): Primary | Chronic | ICD-10-CM

## 2020-05-12 PROCEDURE — 99211 OFF/OP EST MAY X REQ PHY/QHP: CPT | Performed by: RADIOLOGY

## 2020-05-18 ENCOUNTER — REMOTE DEVICE CLINIC VISIT (OUTPATIENT)
Dept: CARDIOLOGY CLINIC | Facility: CLINIC | Age: 80
End: 2020-05-18
Payer: COMMERCIAL

## 2020-05-18 DIAGNOSIS — Z95.810 PRESENCE OF AUTOMATIC CARDIOVERTER/DEFIBRILLATOR (AICD): Primary | ICD-10-CM

## 2020-05-18 PROCEDURE — 93297 REM INTERROG DEV EVAL ICPMS: CPT | Performed by: INTERNAL MEDICINE

## 2020-05-18 PROCEDURE — G2066 INTER DEVC REMOTE 30D: HCPCS | Performed by: INTERNAL MEDICINE

## 2020-05-22 ENCOUNTER — HOSPITAL ENCOUNTER (OUTPATIENT)
Dept: CT IMAGING | Facility: HOSPITAL | Age: 80
Discharge: HOME | End: 2020-05-22
Attending: RADIOLOGY
Payer: COMMERCIAL

## 2020-05-22 DIAGNOSIS — C78.7 ADENOCARCINOMA OF RIGHT BREAST METASTATIC TO LIVER (HCC): Chronic | ICD-10-CM

## 2020-05-22 DIAGNOSIS — C50.911 ADENOCARCINOMA OF RIGHT BREAST METASTATIC TO LIVER (HCC): Chronic | ICD-10-CM

## 2020-05-22 PROCEDURE — 71250 CT THORAX DX C-: CPT

## 2020-05-26 ENCOUNTER — TELEPHONE (OUTPATIENT)
Dept: RADIATION ONCOLOGY | Facility: CLINIC | Age: 80
End: 2020-05-26

## 2020-06-04 ENCOUNTER — IN-CLINIC DEVICE VISIT (OUTPATIENT)
Dept: CARDIOLOGY CLINIC | Facility: CLINIC | Age: 80
End: 2020-06-04

## 2020-06-04 DIAGNOSIS — Z95.810 ICD (IMPLANTABLE CARDIOVERTER-DEFIBRILLATOR), BIVENTRICULAR, IN SITU: Primary | ICD-10-CM

## 2020-06-04 PROCEDURE — RECHECK

## 2020-06-17 ENCOUNTER — APPOINTMENT (OUTPATIENT)
Dept: LAB | Facility: MEDICAL CENTER | Age: 80
End: 2020-06-17
Payer: COMMERCIAL

## 2020-06-17 DIAGNOSIS — C50.911 ADENOCARCINOMA OF RIGHT BREAST METASTATIC TO LIVER (HCC): ICD-10-CM

## 2020-06-17 DIAGNOSIS — C78.7 ADENOCARCINOMA OF RIGHT BREAST METASTATIC TO LIVER (HCC): ICD-10-CM

## 2020-06-17 LAB
ALBUMIN SERPL BCP-MCNC: 3.5 G/DL (ref 3.5–5)
ALP SERPL-CCNC: 92 U/L (ref 46–116)
ALT SERPL W P-5'-P-CCNC: 34 U/L (ref 12–78)
ANION GAP SERPL CALCULATED.3IONS-SCNC: 6 MMOL/L (ref 4–13)
AST SERPL W P-5'-P-CCNC: 31 U/L (ref 5–45)
BASOPHILS # BLD AUTO: 0.05 THOUSANDS/ΜL (ref 0–0.1)
BASOPHILS NFR BLD AUTO: 1 % (ref 0–1)
BILIRUB SERPL-MCNC: 0.7 MG/DL (ref 0.2–1)
BUN SERPL-MCNC: 15 MG/DL (ref 5–25)
CALCIUM SERPL-MCNC: 9.4 MG/DL (ref 8.3–10.1)
CANCER AG27-29 SERPL-ACNC: 94.6 U/ML (ref 0–42.3)
CHLORIDE SERPL-SCNC: 107 MMOL/L (ref 100–108)
CO2 SERPL-SCNC: 27 MMOL/L (ref 21–32)
CREAT SERPL-MCNC: 0.78 MG/DL (ref 0.6–1.3)
EOSINOPHIL # BLD AUTO: 0.21 THOUSAND/ΜL (ref 0–0.61)
EOSINOPHIL NFR BLD AUTO: 2 % (ref 0–6)
ERYTHROCYTE [DISTWIDTH] IN BLOOD BY AUTOMATED COUNT: 12.5 % (ref 11.6–15.1)
GFR SERPL CREATININE-BSD FRML MDRD: 71 ML/MIN/1.73SQ M
GLUCOSE P FAST SERPL-MCNC: 141 MG/DL (ref 65–99)
HCT VFR BLD AUTO: 44 % (ref 34.8–46.1)
HGB BLD-MCNC: 13.7 G/DL (ref 11.5–15.4)
IMM GRANULOCYTES # BLD AUTO: 0.02 THOUSAND/UL (ref 0–0.2)
IMM GRANULOCYTES NFR BLD AUTO: 0 % (ref 0–2)
LYMPHOCYTES # BLD AUTO: 3.49 THOUSANDS/ΜL (ref 0.6–4.47)
LYMPHOCYTES NFR BLD AUTO: 39 % (ref 14–44)
MCH RBC QN AUTO: 31.3 PG (ref 26.8–34.3)
MCHC RBC AUTO-ENTMCNC: 31.1 G/DL (ref 31.4–37.4)
MCV RBC AUTO: 101 FL (ref 82–98)
MONOCYTES # BLD AUTO: 0.95 THOUSAND/ΜL (ref 0.17–1.22)
MONOCYTES NFR BLD AUTO: 11 % (ref 4–12)
NEUTROPHILS # BLD AUTO: 4.22 THOUSANDS/ΜL (ref 1.85–7.62)
NEUTS SEG NFR BLD AUTO: 47 % (ref 43–75)
NRBC BLD AUTO-RTO: 0 /100 WBCS
PLATELET # BLD AUTO: 180 THOUSANDS/UL (ref 149–390)
PMV BLD AUTO: 10.1 FL (ref 8.9–12.7)
POTASSIUM SERPL-SCNC: 5.1 MMOL/L (ref 3.5–5.3)
PROT SERPL-MCNC: 7.1 G/DL (ref 6.4–8.2)
RBC # BLD AUTO: 4.38 MILLION/UL (ref 3.81–5.12)
SODIUM SERPL-SCNC: 140 MMOL/L (ref 136–145)
WBC # BLD AUTO: 8.94 THOUSAND/UL (ref 4.31–10.16)

## 2020-06-17 PROCEDURE — 86300 IMMUNOASSAY TUMOR CA 15-3: CPT

## 2020-06-17 PROCEDURE — 85025 COMPLETE CBC W/AUTO DIFF WBC: CPT

## 2020-06-17 PROCEDURE — 80053 COMPREHEN METABOLIC PANEL: CPT

## 2020-06-17 PROCEDURE — 36415 COLL VENOUS BLD VENIPUNCTURE: CPT

## 2020-06-25 ENCOUNTER — HOSPITAL ENCOUNTER (OUTPATIENT)
Dept: CT IMAGING | Facility: HOSPITAL | Age: 80
Discharge: HOME | End: 2020-06-25
Attending: INTERNAL MEDICINE
Payer: COMMERCIAL

## 2020-06-25 DIAGNOSIS — C78.7 ADENOCARCINOMA OF RIGHT BREAST METASTATIC TO LIVER (HCC): ICD-10-CM

## 2020-06-25 DIAGNOSIS — C50.911 ADENOCARCINOMA OF RIGHT BREAST METASTATIC TO LIVER (HCC): ICD-10-CM

## 2020-06-25 PROCEDURE — 71260 CT THORAX DX C+: CPT

## 2020-06-25 PROCEDURE — 74177 CT ABD & PELVIS W/CONTRAST: CPT

## 2020-06-25 RX ADMIN — IOHEXOL 100 ML: 350 INJECTION, SOLUTION INTRAVENOUS at 18:55

## 2020-06-29 ENCOUNTER — TELEPHONE (OUTPATIENT)
Dept: HEMATOLOGY ONCOLOGY | Facility: MEDICAL CENTER | Age: 80
End: 2020-06-29

## 2020-06-29 ENCOUNTER — TELEPHONE (OUTPATIENT)
Dept: SURGICAL ONCOLOGY | Facility: CLINIC | Age: 80
End: 2020-06-29

## 2020-06-29 DIAGNOSIS — C50.919 MALIGNANT NEOPLASM OF FEMALE BREAST, UNSPECIFIED ESTROGEN RECEPTOR STATUS, UNSPECIFIED LATERALITY, UNSPECIFIED SITE OF BREAST (HCC): ICD-10-CM

## 2020-06-29 RX ORDER — TAMOXIFEN CITRATE 20 MG/1
20 TABLET ORAL DAILY
Qty: 90 TABLET | Refills: 3 | Status: SHIPPED | OUTPATIENT
Start: 2020-06-29 | End: 2020-07-02 | Stop reason: ALTCHOICE

## 2020-07-02 ENCOUNTER — REMOTE DEVICE CLINIC VISIT (OUTPATIENT)
Dept: CARDIOLOGY CLINIC | Facility: CLINIC | Age: 80
End: 2020-07-02
Payer: COMMERCIAL

## 2020-07-02 ENCOUNTER — OFFICE VISIT (OUTPATIENT)
Dept: HEMATOLOGY ONCOLOGY | Facility: CLINIC | Age: 80
End: 2020-07-02
Payer: COMMERCIAL

## 2020-07-02 VITALS
HEIGHT: 61 IN | RESPIRATION RATE: 20 BRPM | HEART RATE: 93 BPM | OXYGEN SATURATION: 97 % | DIASTOLIC BLOOD PRESSURE: 76 MMHG | WEIGHT: 158 LBS | BODY MASS INDEX: 29.83 KG/M2 | SYSTOLIC BLOOD PRESSURE: 130 MMHG | TEMPERATURE: 97.8 F

## 2020-07-02 DIAGNOSIS — C50.911 ADENOCARCINOMA OF RIGHT BREAST METASTATIC TO LIVER (HCC): ICD-10-CM

## 2020-07-02 DIAGNOSIS — Z95.810 PRESENCE OF AUTOMATIC CARDIOVERTER/DEFIBRILLATOR (AICD): Primary | ICD-10-CM

## 2020-07-02 DIAGNOSIS — C50.911 MALIGNANT NEOPLASM OF RIGHT FEMALE BREAST, UNSPECIFIED ESTROGEN RECEPTOR STATUS, UNSPECIFIED SITE OF BREAST (HCC): Primary | ICD-10-CM

## 2020-07-02 DIAGNOSIS — C78.7 ADENOCARCINOMA OF RIGHT BREAST METASTATIC TO LIVER (HCC): ICD-10-CM

## 2020-07-02 PROCEDURE — 93295 DEV INTERROG REMOTE 1/2/MLT: CPT | Performed by: INTERNAL MEDICINE

## 2020-07-02 PROCEDURE — 93296 REM INTERROG EVL PM/IDS: CPT | Performed by: INTERNAL MEDICINE

## 2020-07-02 PROCEDURE — 99214 OFFICE O/P EST MOD 30 MIN: CPT | Performed by: NURSE PRACTITIONER

## 2020-07-02 RX ORDER — ANASTROZOLE 1 MG/1
1 TABLET ORAL DAILY
Qty: 90 TABLET | Refills: 3 | Status: SHIPPED | OUTPATIENT
Start: 2020-07-02 | End: 2020-10-15 | Stop reason: ALTCHOICE

## 2020-07-02 NOTE — PROGRESS NOTES
[id number] [facility] [address] [id number] [phone]  Progress Note  Kush Choudhury, 1940, [id number]  7/2/2020    Assessment/Plan:  1  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 [facility])  Patient is an 77-year-old female with a history of stage IV breast cancer metastatic to the liver  CT scan on 06/25/2020 reveals worsening appearance of the heterogeneous enhancement in the hepatic right lobe superiorly compared to prior study  This as well as an increased CA 27 29 indicates a change in treatment  We will discontinue tamoxifen and start her on Arimidex 1 mg p o  daily  I reviewed the side effects with the patient including hot flashes joint stiffness diarrhea constipation headache or rash are less common  Patient was previously on Arimidex between 2009 in 2014 and tolerated this fairly well  We will see her in 6 weeks with the following labs  Patient verbalized understanding is in agreement with the plan  - anastrozole (ARIMIDEX) 1 mg tablet; Take 1 tablet (1 mg total) by mouth daily  Dispense: 90 tablet; Refill: 3  - Cancer antigen 27 29; Future  - CBC and differential; Future  - Comprehensive metabolic panel; Future    2  Malignant neoplasm of right female breast, unspecified estrogen receptor status, unspecified site of breast (HCC)    - anastrozole (ARIMIDEX) 1 mg tablet; Take 1 tablet (1 mg total) by mouth daily  Dispense: 90 tablet; Refill: 3  - Cancer antigen 27 29; Future  - CBC and differential; Future  - Comprehensive metabolic panel; Future      The patient is scheduled for follow-up in approximately 6 weeks with Dr Kelsey Melendez or me  Patient voiced agreement and understanding to the above  Patient knows to call the Hematology/Oncology office with any questions and concerns regarding the above  Goals and Barriers:    Current Goal:   Prolong Survival from Cancer  Barriers: None        Patient's Capacity to Self Care:  Patient is a able to self care   -------------------------------------------------------------------------------------------------------    Chief Complaint   Patient presents with    Follow-up       History of present illness/Cancer History:      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 [facility])    2009 Initial Diagnosis     Adenocarcinoma of right breast,  Infiltrating ductal carcinoma T2, N1 sebastien) Tumor was ER/[place] positive, HER2 negative  2009 Surgery     Right Mastectomy      2009 -  Chemotherapy     Adjuvant chemotherapy with Taxotere/Cytoxan x 4 cycles      2009 - 10/2014 Chemotherapy     Arimidex 1 mg po dialy      2/11/2016 Progression     Right lateral mastectomy site growth with core needle biopsy demonstrated reoccurrence  %, [place] 70%, Her2 +1  Final Diagnosis   A  Breast, right lateral mastectomy site, core needle biopsies:                        - Recurrent invasive mammary carcinoma, no special type (formerly invasive ductal carcinoma), 0 8 cm largest single focus               - Combined Jose Elias score: 7/9                         - Tubule formation: None (3/3)  - Nuclear pleomorphism: Marked (3/3)  - Mitotic count: 3 mitoses per 10 high-power fields (1/3)  - No lymph-vascular invasion is identified              - No in situ component is identified                - No microcalcifications are identified  3/16/2016 - 1/24/2017 Chemotherapy     Faslodex 500 mg with loading dose followed by maintenance monthly injection with Ibrance 75mg 3 weeks on 1 week off        12/13/2016 Progression      Progression noted on PET-CT scan  Progression was largely found in the liver  Patient was set up for liver biopsy        1/3/2017 Biopsy     Liver lesion biopsy demonstated Estrogen Receptor (clone SP-1) 100%/positive, Progesterone Receptor (clone 1E2) 1-2%/positive, HER2 by IHC ([id number]) 2+/equivocal   Her2 by FISH was positive  2017 - 2017 Chemotherapy      Herceptin and Perjeta x5 cycles      2017 Adverse Reaction     Perjecta causing significant diarrhea  Medication stopped  2017 Surgery     Partial Hepatectomy, results demonstrated ER70%, PR0% & Her2 negative disease  2017 -  Chemotherapy     Tamoxifen 20 mg daily      2019 - 4/10/2019 Radiation       Plan ID Energy Fractions Dose per Fraction (cGy) Dose Correction (cGy) Total Dose Delivered (cGy) Elapsed Days   R Axilla # 10X/6X 25 /  200 0 5,000 35   R Axilla CD 10X/6X 5 /  200 0 1,000 6               Cancer Staging  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 [facility])  Staging form: Breast, AJCC 8th Edition  - Pathologic stage from 2016: Stage IV (rpTX, pNX, pM1, G2, ER: Positive, MA: Unknown, HER2: Negative) - Signed by Luke Pena PA-C on 2018       ECO - Symptomatic but completely ambulatory    Interval history:  Overall patient is doing well she is tolerating the tamoxifen with minimal side effects  She states she has hot flashes at night  She also has  some arthritis in her hands  Review of Systems   Constitutional: Negative for activity change, fatigue, fever and unexpected weight change  Respiratory: Negative for cough and shortness of breath  Cardiovascular: Negative for chest pain and leg swelling  Gastrointestinal: Negative for constipation, diarrhea and nausea  Genitourinary: Negative  Musculoskeletal: Positive for arthralgias  Neurological: Negative for dizziness, weakness and headaches  Hematological: Negative for adenopathy  Does not bruise/bleed easily  Current Outpatient Medications:     acetaminophen (TYLENOL) 500 mg tablet, Take 500 mg by mouth every 6 (six) hours as needed for mild pain, Disp: , Rfl:     atorvastatin (LIPITOR) 20 mg tablet, Take 20 mg by mouth daily  , Disp: , Rfl:     Calcium Citrate-Vitamin D (CALCIUM CITRATE + D PO), Take 1,500 mg by mouth daily, Disp: , Rfl:     clindamycin (CLEOCIN) 300 MG capsule, TAKE 2 CAPSULES BY MOUTH 1 HOUR PRIOR TO DENTAL PROCEDURE , Disp: , Rfl: 0    clotrimazole-betamethasone (LOTRISONE) 1-0 05 % cream, Apply 1 application topically as needed , Disp: , Rfl:     Cranberry (THERACRAN HP PO), Take 2 tablets by mouth daily, Disp: , Rfl:     furosemide (LASIX) 20 mg tablet, Take 1 tablet (20 mg total) by mouth daily as needed (le swelling) Edema, Disp: 30 tablet, Rfl: 3    Glucosamine-Chondroit-Vit C-Mn (GLUCOSAMINE 1500 COMPLEX) CAPS, Take 1 capsule by mouth daily  , Disp: , Rfl:     metFORMIN (GLUCOPHAGE) 500 mg tablet, 1 pill 3 times daily with meals, Disp: , Rfl:     metoprolol tartrate (LOPRESSOR) 25 mg tablet, Take 0 5 tablets (12 5 mg total) by mouth every 12 (twelve) hours, Disp: 90 tablet, Rfl: 3    Multiple Vitamin (MULTIVITAMIN) capsule, Take 1 capsule by mouth daily  , Disp: , Rfl:     ondansetron (ZOFRAN) 4 mg tablet, Take 4 mg by mouth every 8 (eight) hours as needed, Disp: , Rfl:     anastrozole (ARIMIDEX) 1 mg tablet, Take 1 tablet (1 mg total) by mouth daily, Disp: 90 tablet, Rfl: 3    Allergies   Allergen Reactions    Ampicillin Shortness Of Breath, Anaphylaxis and Other (See Comments)     Other reaction(s): Unknown Allergic Reaction  Reaction Date: [id number]; Advance Directive and Living Will:        Objective:   /76 (BP Location: Left arm, Patient Position: Sitting)   Pulse 93   Temp 97 8 °F (36 6 °C) (Tympanic)   Resp 20   Ht 5' 1 2" (1 554 m)   Wt 71 7 kg (158 lb)   LMP  (LMP Unknown) Comment: post   SpO2 97%   BMI 29 66 kg/m²   Wt Readings from Last 6 Encounters:   07/02/20 71 7 kg (158 lb)   04/02/20 70 3 kg (155 lb)   12/30/19 72 8 kg (160 lb 9 6 oz)   12/23/19 73 8 kg (162 lb 9 6 oz)   12/03/19 69 4 kg (153 lb)   11/13/19 71 5 kg (157 lb 9 6 oz)       Physical Exam   Constitutional: She is oriented to person, place, and time   She appears well-developed and well-nourished  HENT:   Head: Normocephalic and atraumatic  Eyes: Pupils are equal, round, and reactive to light  Conjunctivae and EOM are normal    Neck: Normal range of motion  Cardiovascular: Normal rate, regular rhythm and normal heart sounds  Pulmonary/Chest: Effort normal and breath sounds normal    Abdominal: Soft  Bowel sounds are normal    Musculoskeletal: Normal range of motion  She exhibits no edema  Lymphadenopathy:     She has no cervical adenopathy  Neurological: She is alert and oriented to person, place, and time  Skin: Skin is warm and dry  Capillary refill takes less than 2 seconds  Psychiatric: She has a normal mood and affect   Her behavior is normal        Pertinent Laboratory Results and Imaging Review:  Appointment on 06/17/2020   Component Date Value Ref Range Status    WBC 06/17/2020 8 94  4 31 - 10 16 Thousand/uL Final    RBC 06/17/2020 4 38  3 81 - 5 12 Million/uL Final    Hemoglobin 06/17/2020 13 7  11 5 - 15 4 g/dL Final    Hematocrit 06/17/2020 44 0  34 8 - 46 1 % Final    MCV 06/17/2020 101* 82 - 98 fL Final    MCH 06/17/2020 31 3  26 8 - 34 3 pg Final    MCHC 06/17/2020 31 1* 31 4 - 37 4 g/dL Final    RDW 06/17/2020 12 5  11 6 - 15 1 % Final    MPV 06/17/2020 10 1  8 9 - 12 7 fL Final    Platelets 80/08/5968 180  149 - 390 Thousands/uL Final    nRBC 06/17/2020 0  /100 WBCs Final    Neutrophils Relative 06/17/2020 47  43 - 75 % Final    Immat GRANS % 06/17/2020 0  0 - 2 % Final    Lymphocytes Relative 06/17/2020 39  14 - 44 % Final    Monocytes Relative 06/17/2020 11  4 - 12 % Final    Eosinophils Relative 06/17/2020 2  0 - 6 % Final    Basophils Relative 06/17/2020 1  0 - 1 % Final    Neutrophils Absolute 06/17/2020 4 22  1 85 - 7 62 Thousands/µL Final    Immature Grans Absolute 06/17/2020 0 02  0 00 - 0 20 Thousand/uL Final    Lymphocytes Absolute 06/17/2020 3 49  0 60 - 4 47 Thousands/µL Final    Monocytes Absolute 06/17/2020 0  95  0 17 - 1 22 Thousand/µL Final    Eosinophils Absolute 06/17/2020 0 21  0 00 - 0 61 Thousand/µL Final    Basophils Absolute 06/17/2020 0 05  0 00 - 0 10 Thousands/µL Final    Sodium 06/17/2020 140  136 - 145 mmol/L Final    Potassium 06/17/2020 5 1  3 5 - 5 3 mmol/L Final    Chloride 06/17/2020 107  100 - 108 mmol/L Final    CO2 06/17/2020 27  21 - 32 mmol/L Final    ANION GAP 06/17/2020 6  4 - 13 mmol/L Final    BUN 06/17/2020 15  5 - 25 mg/dL Final    Creatinine 06/17/2020 0 78  0 60 - 1 30 mg/dL Final    Standardized to IDMS reference method    Glucose, Fasting 06/17/2020 141* 65 - 99 mg/dL Final      Specimen collection should occur prior to Sulfasalazine administration due to the potential for falsely depressed results  Specimen collection should occur prior to Sulfapyridine administration due to the potential for falsely elevated results   Calcium 06/17/2020 9 4  8 3 - 10 1 mg/dL Final    AST 06/17/2020 31  5 - 45 U/L Final      Specimen collection should occur prior to Sulfasalazine administration due to the potential for falsely depressed results   ALT 06/17/2020 34  12 - 78 U/L Final      Specimen collection should occur prior to Sulfasalazine and/or Sulfapyridine administration due to the potential for falsely depressed results   Alkaline Phosphatase 06/17/2020 92  46 - 116 U/L Final    Total Protein 06/17/2020 7 1  6 4 - 8 2 g/dL Final    Albumin 06/17/2020 3 5  3 5 - 5 0 g/dL Final    Total Bilirubin 06/17/2020 0 70  0 20 - 1 00 mg/dL Final      Use of this assay is not recommended for patients undergoing treatment with eltrombopag due to the potential for falsely elevated results   eGFR 06/17/2020 71  ml/min/1 73sq m Final    CA 27 29 06/17/2020 94 6* 0 0 - 42 3 U/mL Final     CT CAP 06/25/2020  IMPRESSION:  1  Diffuse fatty infiltration of the liver again seen    Worsening appearance with a more well-defined area of heterogeneous enhancement in the right lobe superiorly compared to prior study  Pathologic metastatic process is suspected and further   characterization with MRI is recommended      2  Tubular area of fluid density in the right hepatic lobe again seen suggestive for biloma with resolution of air pockets and pneumobilia      3   Other findings as described are unchanged  The right upper anterior lateral chest wall mass suspected clinically demonstrates no associated CT abnormality        The following historical data was reviewed  Past Medical History:   Diagnosis Date    Acute biliary pancreatitis     Anxiety     Arthritis     Breast CA (HCC)     recurrent, ductal carcinoma ER, [place] pos    Cardiac disease     Colon polyp     Depression     Diverticula of intestine     Diverticulosis     Dizziness     and passes out    Flushing     Hiatal hernia     [place] hearing[place]    Hypercholesteremia     Hypertension     Liver mass     Liver metastasis (HCC)     Metastatic adenocarcinoma to liver (HCC)     Bx 01/03/2017    PONV (postoperative nausea and vomiting)     Pulmonary embolism (HCC)     Recurrent breast cancer (HCC)     Shingles     Shortness of breath     on exertion    Tachycardia     Urinary incontinence     Use of cane as ambulatory aid     or walker       Past Surgical History:   Procedure Laterality Date    BREAST SURGERY      CARDIAC PACEMAKER REMOVAL N/A 11/9/2017    Procedure: PACEMAKER LEAD REVISION, REMOVAL OF NONFUNCTIONING LEAD, AND INSERTION OF A NEW RV LEAD;  Surgeon: Marly Finn MD;  [facility];  Service: Cardiology    CATARACT EXTRACTION Bilateral     COLONOSCOPY      ERCP N/A 1/8/2018    Procedure: ENDOSCOPIC RETROGRADE CHOLANGIOPANCREATOGRAPHY (ERCP); Surgeon: Tessie Neves MD;  [facility];   Service: Gastroenterology    HYSTERECTOMY      INSERT / REPLACE / REMOVE PACEMAKER      JOINT REPLACEMENT      [place]  TKR and right TSR    MASTECTOMY Right     NH ERCP DX COLLECTION SPECIMEN BRUSHING/WASHING N/A 2/22/2018    Procedure: ENDOSCOPIC RETROGRADE CHOLANGIOPANCREATOGRAPHY (ERCP) with stent removal;  Surgeon: Tina Dang MD;  [facility]: [facility]; Service: Gastroenterology    [facility] [address] N/A 7/13/2017    Procedure: LIVER RESECTION, INTRAOPERATIVE ULTRASOUND;  Surgeon: Tonia Hargrove MD;  [facility];  Service: Surgical Oncology    ROTATOR CUFF REPAIR Right     SENTINEL LYMPH NODE BIOPSY Right     TONSILECTOMY AND ADNOIDECTOMY      WOUND DEBRIDEMENT Left 11/9/2017    Procedure: DEBRIDEMENT WOUND AND DRESSING CHANGE Filippomohit Booker [facility]);   Surgeon: David Schmitt MD;  [facility];  Service: Cardiology       Social History     Socioeconomic History    Marital status: /Civil Union     Spouse name: None    Number of children: None    Years of education: None    Highest education level: None   Occupational History    None   Social Needs    Financial resource strain: None    Food insecurity:     Worry: None     Inability: None    Transportation needs:     Medical: None     Non-medical: None   Tobacco Use    Smoking status: Never Smoker    Smokeless tobacco: Never Used   Substance and Sexual Activity    Alcohol use: Not Currently    Drug use: No    Sexual activity: None   Lifestyle    Physical activity:     Days per week: None     Minutes per session: None    Stress: None   Relationships    Social connections:     Talks on phone: None     Gets together: None     Attends Yarsani service: None     Active member of club or organization: None     Attends meetings of clubs or organizations: None     Relationship status: None    Intimate partner violence:     Fear of current or ex partner: None     Emotionally abused: None     Physically abused: None     Forced sexual activity: None   Other Topics Concern    None   Social History Narrative    None       Family History   Problem Relation Age of Onset    Lung cancer Father     Cancer Brother  Diabetes type II Son        Please note: This report has been generated by a voice recognition software system  Therefore there may be syntax, spelling, and/or grammatical errors  Please call if you have any questions

## 2020-07-02 NOTE — PROGRESS NOTES
Results for orders placed or performed in visit on 07/02/20   Cardiac EP device report    Narrative    MDT BI-V ICD (3830 IN LV PORT)  CARELINK TRANSMISSION: BATTERY VOLTAGE ADEQUATE  (7 5 YRS)  AP 1% BVP 99%  ALL AVAILABLE LEAD PARAMETERS WITHIN NORMAL LIMITS  NO SIGNIFICANT HIGH RATE EPISODES  OPTI-VOL WITHIN NORMAL LIMITS  NORMAL DEVICE FUNCTION  ----DUMONT

## 2020-07-06 ENCOUNTER — TELEPHONE (OUTPATIENT)
Dept: HEMATOLOGY ONCOLOGY | Facility: CLINIC | Age: 80
End: 2020-07-06

## 2020-07-06 NOTE — TELEPHONE ENCOUNTER
Patient called to reschedule her 8/6 follow up with Pio Ramos to 8/20 1:00pm at the [facility] [place] office

## 2020-08-03 ENCOUNTER — REMOTE DEVICE CLINIC VISIT (OUTPATIENT)
Dept: CARDIOLOGY CLINIC | Facility: CLINIC | Age: 80
End: 2020-08-03
Payer: COMMERCIAL

## 2020-08-03 DIAGNOSIS — Z95.810 PRESENCE OF AUTOMATIC CARDIOVERTER/DEFIBRILLATOR (AICD): Primary | ICD-10-CM

## 2020-08-03 PROCEDURE — 93297 REM INTERROG DEV EVAL ICPMS: CPT | Performed by: INTERNAL MEDICINE

## 2020-08-03 PROCEDURE — G2066 INTER DEVC REMOTE 30D: HCPCS | Performed by: INTERNAL MEDICINE

## 2020-08-03 NOTE — PROGRESS NOTES
Results for orders placed or performed in visit on 08/03/20   Cardiac EP device report    Narrative    MDT BI-V ICD (3830 IN LV PORT)  CARELINK TRANSMISSION - OPTI-VOL ONLY: OPTI-VOL WITHIN NORMAL LIMITS  AP 2% BVP 95%  ALL AVAILABLE LEAD PARAMETERS WITHIN NORMAL LIMITS  2 NSVT EPISODES DETECTED  INTERMITTENT T WAVE OVERSENSING NOTED; HISTORY OF THE SAME RV SENSITIVITY AT  60mv  NO THERAPIES GIVEN  PATIENT IS ON METOPROLOL  NORMAL DEVICE FUNCTION  ---DUMONT

## 2020-08-13 ENCOUNTER — APPOINTMENT (OUTPATIENT)
Dept: LAB | Facility: MEDICAL CENTER | Age: 80
End: 2020-08-13
Payer: COMMERCIAL

## 2020-08-13 DIAGNOSIS — C78.7 ADENOCARCINOMA OF RIGHT BREAST METASTATIC TO LIVER (HCC): ICD-10-CM

## 2020-08-13 DIAGNOSIS — C50.911 MALIGNANT NEOPLASM OF RIGHT FEMALE BREAST, UNSPECIFIED ESTROGEN RECEPTOR STATUS, UNSPECIFIED SITE OF BREAST (HCC): ICD-10-CM

## 2020-08-13 DIAGNOSIS — C50.911 ADENOCARCINOMA OF RIGHT BREAST METASTATIC TO LIVER (HCC): ICD-10-CM

## 2020-08-13 LAB
ALBUMIN SERPL BCP-MCNC: 3.7 G/DL (ref 3.5–5)
ALP SERPL-CCNC: 102 U/L (ref 46–116)
ALT SERPL W P-5'-P-CCNC: 33 U/L (ref 12–78)
ANION GAP SERPL CALCULATED.3IONS-SCNC: 8 MMOL/L (ref 4–13)
AST SERPL W P-5'-P-CCNC: 33 U/L (ref 5–45)
BASOPHILS # BLD AUTO: 0.05 THOUSANDS/ΜL (ref 0–0.1)
BASOPHILS NFR BLD AUTO: 1 % (ref 0–1)
BILIRUB SERPL-MCNC: 0.78 MG/DL (ref 0.2–1)
BUN SERPL-MCNC: 16 MG/DL (ref 5–25)
CALCIUM SERPL-MCNC: 9.7 MG/DL (ref 8.3–10.1)
CANCER AG27-29 SERPL-ACNC: 106.4 U/ML (ref 0–42.3)
CHLORIDE SERPL-SCNC: 108 MMOL/L (ref 100–108)
CO2 SERPL-SCNC: 28 MMOL/L (ref 21–32)
CREAT SERPL-MCNC: 0.75 MG/DL (ref 0.6–1.3)
EOSINOPHIL # BLD AUTO: 0.2 THOUSAND/ΜL (ref 0–0.61)
EOSINOPHIL NFR BLD AUTO: 2 % (ref 0–6)
ERYTHROCYTE [DISTWIDTH] IN BLOOD BY AUTOMATED COUNT: 12.5 % (ref 11.6–15.1)
GFR SERPL CREATININE-BSD FRML MDRD: 74 ML/MIN/1.73SQ M
GLUCOSE P FAST SERPL-MCNC: 146 MG/DL (ref 65–99)
HCT VFR BLD AUTO: 45.3 % (ref 34.8–46.1)
HGB BLD-MCNC: 13.8 G/DL (ref 11.5–15.4)
IMM GRANULOCYTES # BLD AUTO: 0.01 THOUSAND/UL (ref 0–0.2)
IMM GRANULOCYTES NFR BLD AUTO: 0 % (ref 0–2)
LYMPHOCYTES # BLD AUTO: 3.51 THOUSANDS/ΜL (ref 0.6–4.47)
LYMPHOCYTES NFR BLD AUTO: 40 % (ref 14–44)
MCH RBC QN AUTO: 30.8 PG (ref 26.8–34.3)
MCHC RBC AUTO-ENTMCNC: 30.5 G/DL (ref 31.4–37.4)
MCV RBC AUTO: 101 FL (ref 82–98)
MONOCYTES # BLD AUTO: 1.06 THOUSAND/ΜL (ref 0.17–1.22)
MONOCYTES NFR BLD AUTO: 12 % (ref 4–12)
NEUTROPHILS # BLD AUTO: 3.88 THOUSANDS/ΜL (ref 1.85–7.62)
NEUTS SEG NFR BLD AUTO: 45 % (ref 43–75)
NRBC BLD AUTO-RTO: 0 /100 WBCS
PLATELET # BLD AUTO: 169 THOUSANDS/UL (ref 149–390)
PMV BLD AUTO: 9.9 FL (ref 8.9–12.7)
POTASSIUM SERPL-SCNC: 4.6 MMOL/L (ref 3.5–5.3)
PROT SERPL-MCNC: 7.2 G/DL (ref 6.4–8.2)
RBC # BLD AUTO: 4.48 MILLION/UL (ref 3.81–5.12)
SODIUM SERPL-SCNC: 144 MMOL/L (ref 136–145)
WBC # BLD AUTO: 8.71 THOUSAND/UL (ref 4.31–10.16)

## 2020-08-13 PROCEDURE — 80053 COMPREHEN METABOLIC PANEL: CPT

## 2020-08-13 PROCEDURE — 86300 IMMUNOASSAY TUMOR CA 15-3: CPT

## 2020-08-13 PROCEDURE — 85025 COMPLETE CBC W/AUTO DIFF WBC: CPT

## 2020-08-13 PROCEDURE — 36415 COLL VENOUS BLD VENIPUNCTURE: CPT

## 2020-08-20 ENCOUNTER — OFFICE VISIT (OUTPATIENT)
Dept: HEMATOLOGY ONCOLOGY | Facility: CLINIC | Age: 80
End: 2020-08-20
Payer: COMMERCIAL

## 2020-08-20 VITALS
DIASTOLIC BLOOD PRESSURE: 76 MMHG | HEIGHT: 61 IN | BODY MASS INDEX: 30.02 KG/M2 | TEMPERATURE: 98.3 F | SYSTOLIC BLOOD PRESSURE: 134 MMHG | HEART RATE: 63 BPM | RESPIRATION RATE: 20 BRPM | OXYGEN SATURATION: 94 % | WEIGHT: 159 LBS

## 2020-08-20 DIAGNOSIS — C78.7 ADENOCARCINOMA OF RIGHT BREAST METASTATIC TO LIVER (HCC): Primary | ICD-10-CM

## 2020-08-20 DIAGNOSIS — C50.911 ADENOCARCINOMA OF RIGHT BREAST METASTATIC TO LIVER (HCC): Primary | ICD-10-CM

## 2020-08-20 PROCEDURE — 99214 OFFICE O/P EST MOD 30 MIN: CPT | Performed by: NURSE PRACTITIONER

## 2020-08-20 NOTE — PROGRESS NOTES
[address] [facility] [address] [address] [phone]  Progress Note  Iker Meza, 1940, [id number]  8/20/2020    Assessment/Plan:  1  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 [facility])   Patient is an 23-year-old female with a history of stage IV breast cancer with metastasis to the liver  She recently had an elevation of her CA 27- 29 in June to 94 6, which prompted a change in treatment  Tamoxifen was discontinued and she was started on Arimidex 1 mg p o  daily  She has been on the Arimidex for approximately 7 weeks  We reviewed her recent CA 27-29 from 08/13/2020 which was 106 4 U per mL  We discussed continuing to monitor this CA 27-29 without changing treatment at this time  Patient states she does have hot flashes and joint pain and diarrhea however is able to function and manage without any difficulty  We will continue to monitor blood levels and see patient in 2 months with the following blood work  Patient verbalized understanding and is in agreement with the plan  - Cancer antigen 27 29; Future  - CBC and differential; Future  - Comprehensive metabolic panel; Future      The patient is scheduled for follow-up in approximately 2 months with Dr Nino Tapia or me  Patient voiced agreement and understanding to the above  Patient knows to call the Hematology/Oncology office with any questions and concerns regarding the above  Goals and Barriers:    Current Goal:   Prolong Survival from Cancer  Barriers: None        Patient's Capacity to Self Care:  Patient is able to self care   -------------------------------------------------------------------------------------------------------    Chief Complaint   Patient presents with    Follow-up       History of present illness/Cancer History:   Oncology History   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 [facility])   2009 Initial Diagnosis    Adenocarcinoma of right breast,  Infiltrating ductal carcinoma T2, N1 sebastien) Tumor was ER/WA positive, HER2 negative  2009 Surgery    Right Mastectomy     2009 -  Chemotherapy    Adjuvant chemotherapy with Taxotere/Cytoxan x 4 cycles     2009 - 10/2014 Chemotherapy    Arimidex 1 mg po dialy     2/11/2016 Progression    Right lateral mastectomy site growth with core needle biopsy demonstrated reoccurrence  %, WA 70%, Her2 +1  Final Diagnosis   A  Breast, right lateral mastectomy site, core needle biopsies:                        - Recurrent invasive mammary carcinoma, no special type (formerly invasive ductal carcinoma), 0 8 cm largest single focus               - Combined Lydia score: 7/9                         - Tubule formation: None (3/3)  - Nuclear pleomorphism: Marked (3/3)  - Mitotic count: 3 mitoses per 10 high-power fields (1/3)  - No lymph-vascular invasion is identified              - No in situ component is identified                - No microcalcifications are identified  3/16/2016 - 1/24/2017 Chemotherapy    Faslodex 500 mg with loading dose followed by maintenance monthly injection with Ibrance 75mg 3 weeks on 1 week off       12/13/2016 Progression     Progression noted on PET-CT scan  Progression was largely found in the liver  Patient was set up for liver biopsy  1/3/2017 Biopsy    Liver lesion biopsy demonstated Estrogen Receptor (clone SP-1) 100%/positive, Progesterone Receptor (clone 1E2) 1-2%/positive, HER2 by IHC ([id number]) 2+/equivocal   Her2 by FISH was positive  1/25/2017 - 4/19/2017 Chemotherapy     Herceptin and Perjeta x5 cycles     4/20/2017 Adverse Reaction    Perjecta causing significant diarrhea  Medication stopped  7/13/2017 Surgery    Partial Hepatectomy, results demonstrated ER70%, PR0% & Her2 negative disease        9/19/2017 -  Chemotherapy    Tamoxifen 20 mg daily     2/27/2019 - 4/10/2019 Radiation      Plan ID Energy Fractions Dose per Fraction (cGy) Dose Correction (cGy) Total Dose Delivered (cGy) Elapsed Days   R Axilla # 10X/6X 25 / 25 200 0 5,000 28   R Axilla CD 10X/6X 5 / 5 200 0 1,000 6               Cancer Staging  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 [facility])  Staging form: Breast, AJCC 8th Edition  - Pathologic stage from 2016: Stage IV (rpTX, pNX, pM1, G2, ER: Positive, TX: Unknown, HER2: Negative) - Signed by Alfredo Conroy PA-C on 2018       ECO - Symptomatic but completely ambulatory    Review of Systems   Constitutional: Negative for activity change, appetite change, fatigue, fever and unexpected weight change  Respiratory: Negative for cough and shortness of breath  Cardiovascular: Negative for chest pain and leg swelling  Gastrointestinal: Positive for diarrhea  Negative for abdominal pain, constipation and nausea  Endocrine: Negative for cold intolerance and heat intolerance  Hot flashes   Musculoskeletal: Negative for arthralgias and myalgias  Joint pain and stiffness   Skin: Negative  Neurological: Negative for dizziness, weakness and headaches  Hematological: Negative for adenopathy  Does not bruise/bleed easily  Current Outpatient Medications:     acetaminophen (TYLENOL) 500 mg tablet, Take 500 mg by mouth every 6 (six) hours as needed for mild pain, Disp: , Rfl:     anastrozole (ARIMIDEX) 1 mg tablet, Take 1 tablet (1 mg total) by mouth daily, Disp: 90 tablet, Rfl: 3    atorvastatin (LIPITOR) 20 mg tablet, Take 20 mg by mouth daily  , Disp: , Rfl:     Calcium Citrate-Vitamin D (CALCIUM CITRATE + D PO), Take 1,500 mg by mouth daily, Disp: , Rfl:     clindamycin (CLEOCIN) 300 MG capsule, TAKE 2 CAPSULES BY MOUTH 1 HOUR PRIOR TO DENTAL PROCEDURE , Disp: , Rfl: 0    clotrimazole-betamethasone (LOTRISONE) 1-0 05 % cream, Apply 1 application topically as needed , Disp: , Rfl:     Cranberry (THERACRAN HP PO), Take 2 tablets by mouth daily, Disp: , Rfl:     furosemide (LASIX) 20 mg tablet, Take 1 tablet (20 mg total) by mouth daily as needed (le swelling) Edema, Disp: 30 tablet, Rfl: 3    Glucosamine-Chondroit-Vit C-Mn (GLUCOSAMINE 1500 COMPLEX) CAPS, Take 1 capsule by mouth daily  , Disp: , Rfl:     metFORMIN (GLUCOPHAGE) 500 mg tablet, 1 pill 3 times daily with meals, Disp: , Rfl:     metoprolol tartrate (LOPRESSOR) 25 mg tablet, Take 0 5 tablets (12 5 mg total) by mouth every 12 (twelve) hours, Disp: 90 tablet, Rfl: 3    Multiple Vitamin (MULTIVITAMIN) capsule, Take 1 capsule by mouth daily  , Disp: , Rfl:     ondansetron (ZOFRAN) 4 mg tablet, Take 4 mg by mouth every 8 (eight) hours as needed, Disp: , Rfl:     Allergies   Allergen Reactions    Ampicillin Shortness Of Breath, Anaphylaxis and Other (See Comments)     Other reaction(s): Unknown Allergic Reaction  Reaction Date: [id number]; Advance Directive and Living Will:        Objective:   /76 (BP Location: Left arm, Patient Position: Sitting)   Pulse 63   Temp 98 3 °F (36 8 °C) (Temporal)   Resp 20   Ht 5' 1 2" (1 554 m)   Wt 72 1 kg (159 lb)   LMP  (LMP Unknown) Comment: post   SpO2 94%   BMI 29 85 kg/m²   Wt Readings from Last 6 Encounters:   08/20/20 72 1 kg (159 lb)   07/02/20 71 7 kg (158 lb)   04/02/20 70 3 kg (155 lb)   12/30/19 72 8 kg (160 lb 9 6 oz)   12/23/19 73 8 kg (162 lb 9 6 oz)   12/03/19 69 4 kg (153 lb)       Physical Exam  Vitals signs reviewed  Constitutional:       Appearance: Normal appearance  She is well-developed  HENT:      Head: Normocephalic and atraumatic  Eyes:      Conjunctiva/sclera: Conjunctivae normal       Pupils: Pupils are equal, round, and reactive to light  Neck:      Musculoskeletal: Normal range of motion and neck supple  Cardiovascular:      Rate and Rhythm: Normal rate and regular rhythm  Heart sounds: Normal heart sounds  No murmur  Pulmonary:      Effort: No respiratory distress  Breath sounds: Normal breath sounds  Abdominal:      General: Bowel sounds are normal       Palpations: Abdomen is soft  Musculoskeletal: Normal range of motion  Lymphadenopathy:      Cervical: No cervical adenopathy  Skin:     General: Skin is warm and dry  Capillary Refill: Capillary refill takes less than 2 seconds  Neurological:      Mental Status: She is alert and oriented to person, place, and time     Psychiatric:         Mood and Affect: Mood normal          Behavior: Behavior normal          Pertinent Laboratory Results and Imaging Review:  Appointment on 08/13/2020   Component Date Value Ref Range Status    CA 27 29 08/13/2020 106 4* 0 0 - 42 3 U/mL Final    WBC 08/13/2020 8 71  4 31 - 10 16 Thousand/uL Final    RBC 08/13/2020 4 48  3 81 - 5 12 Million/uL Final    Hemoglobin 08/13/2020 13 8  11 5 - 15 4 g/dL Final    Hematocrit 08/13/2020 45 3  34 8 - 46 1 % Final    MCV 08/13/2020 101* 82 - 98 fL Final    MCH 08/13/2020 30 8  26 8 - 34 3 pg Final    MCHC 08/13/2020 30 5* 31 4 - 37 4 g/dL Final    RDW 08/13/2020 12 5  11 6 - 15 1 % Final    MPV 08/13/2020 9 9  8 9 - 12 7 fL Final    Platelets 13/88/6437 169  149 - 390 Thousands/uL Final    nRBC 08/13/2020 0  /100 WBCs Final    Neutrophils Relative 08/13/2020 45  43 - 75 % Final    Immat GRANS % 08/13/2020 0  0 - 2 % Final    Lymphocytes Relative 08/13/2020 40  14 - 44 % Final    Monocytes Relative 08/13/2020 12  4 - 12 % Final    Eosinophils Relative 08/13/2020 2  0 - 6 % Final    Basophils Relative 08/13/2020 1  0 - 1 % Final    Neutrophils Absolute 08/13/2020 3 88  1 85 - 7 62 Thousands/µL Final    Immature Grans Absolute 08/13/2020 0 01  0 00 - 0 20 Thousand/uL Final    Lymphocytes Absolute 08/13/2020 3 51  0 60 - 4 47 Thousands/µL Final    Monocytes Absolute 08/13/2020 1 06  0 17 - 1 22 Thousand/µL Final    Eosinophils Absolute 08/13/2020 0 20  0 00 - 0 61 Thousand/µL Final    Basophils Absolute 08/13/2020 0 05 0 00 - 0 10 Thousands/µL Final    Sodium 08/13/2020 144  136 - 145 mmol/L Final    Potassium 08/13/2020 4 6  3 5 - 5 3 mmol/L Final    Chloride 08/13/2020 108  100 - 108 mmol/L Final    CO2 08/13/2020 28  21 - 32 mmol/L Final    ANION GAP 08/13/2020 8  4 - 13 mmol/L Final    BUN 08/13/2020 16  5 - 25 mg/dL Final    Creatinine 08/13/2020 0 75  0 60 - 1 30 mg/dL Final    Standardized to IDMS reference method    Glucose, Fasting 08/13/2020 146* 65 - 99 mg/dL Final    Specimen collection should occur prior to Sulfasalazine administration due to the potential for falsely depressed results  Specimen collection should occur prior to Sulfapyridine administration due to the potential for falsely elevated results   Calcium 08/13/2020 9 7  8 3 - 10 1 mg/dL Final    AST 08/13/2020 33  5 - 45 U/L Final    Specimen collection should occur prior to Sulfasalazine administration due to the potential for falsely depressed results   ALT 08/13/2020 33  12 - 78 U/L Final    Specimen collection should occur prior to Sulfasalazine and/or Sulfapyridine administration due to the potential for falsely depressed results   Alkaline Phosphatase 08/13/2020 102  46 - 116 U/L Final    Total Protein 08/13/2020 7 2  6 4 - 8 2 g/dL Final    Albumin 08/13/2020 3 7  3 5 - 5 0 g/dL Final    Total Bilirubin 08/13/2020 0 78  0 20 - 1 00 mg/dL Final    Use of this assay is not recommended for patients undergoing treatment with eltrombopag due to the potential for falsely elevated results   eGFR 08/13/2020 74  ml/min/1 73sq m Final       The following historical data was reviewed      Past Medical History:   Diagnosis Date    Acute biliary pancreatitis     Anxiety     Arthritis     Breast CA (HCC)     recurrent, ductal carcinoma ER, [place] pos    Cardiac disease     Colon polyp     Depression     Diverticula of intestine     Diverticulosis     Dizziness     and passes out    Flushing     Hiatal hernia     [place]      [place]    Hypercholesteremia     Hypertension     Liver mass     Liver [facility] )     Metastatic adenocarcinoma to liver ([facility] )     Bx 01/03/2017    PONV (postoperative nausea and vomiting)     Pulmonary embolism (HCC)     Recurrent breast cancer (HCC)     Shingles     Shortness of breath     on exertion    Tachycardia     Urinary incontinence     Use of cane as ambulatory aid     or walker       Past Surgical History:   Procedure Laterality Date    BREAST SURGERY      CARDIAC PACEMAKER REMOVAL N/A 11/9/2017    Procedure: PACEMAKER LEAD REVISION, REMOVAL OF NONFUNCTIONING LEAD, AND INSERTION OF A NEW RV LEAD;  Surgeon: Mac Cabrera MD;  [facility];  Service: Cardiology    CATARACT EXTRACTION Bilateral     COLONOSCOPY      ERCP N/A 1/8/2018    Procedure: ENDOSCOPIC RETROGRADE CHOLANGIOPANCREATOGRAPHY (ERCP); Surgeon: Clint Warren MD;  Location: [facility]; Service: Gastroenterology    HYSTERECTOMY      INSERT / REPLACE / [place] Haymaker      JOINT REPLACEMENT      [place]  TKR and right TSR    MASTECTOMY Right     OK ERCP DX COLLECTION SPECIMEN BRUSHING/WASHING N/A 2/22/2018    Procedure: ENDOSCOPIC RETROGRADE CHOLANGIOPANCREATOGRAPHY (ERCP) with stent removal;  Surgeon: Clint Warren MD;  [facility]; Service: Gastroenterology    [place] RESEC [address] N/A 7/13/2017    Procedure: LIVER RESECTION, INTRAOPERATIVE ULTRASOUND;  Surgeon: Elzbieta Torrez MD;  [facility];  Service: Surgical Oncology    ROTATOR CUFF REPAIR Right     SENTINEL LYMPH NODE BIOPSY Right     TONSILECTOMY AND ADNOIDECTOMY      WOUND DEBRIDEMENT Left 11/9/2017    Procedure: DEBRIDEMENT WOUND AND DRESSING CHANGE [facility]);   Surgeon: Mac Cabrera MD;  [facility];  Service: Cardiology       Social History     Socioeconomic History    Marital status: /Civil Union     Spouse name: None    Number of children: None    Years of education: None    Highest education level: None   Occupational History    None   Social Needs    Financial resource strain: None    Food insecurity     Worry: None     Inability: None    Transportation needs     Medical: None     Non-medical: None   Tobacco Use    Smoking status: Never Smoker    Smokeless tobacco: Never Used   Substance and Sexual Activity    Alcohol use: Not Currently    Drug use: No    Sexual activity: None   Lifestyle    Physical activity     Days per week: None     Minutes per session: None    Stress: None   Relationships    Social connections     Talks on phone: None     Gets together: None     Attends Zoroastrian service: None     Active member of club or organization: None     Attends meetings of clubs or organizations: None     Relationship status: None    Intimate partner violence     Fear of current or ex partner: None     Emotionally abused: None     Physically abused: None     Forced sexual activity: None   Other Topics Concern    None   Social History Narrative    None       Family History   Problem Relation Age of Onset    Lung cancer Father     Cancer Brother     Diabetes type II Son        Please note: This report has been generated by a voice recognition software system  Therefore there may be syntax, spelling, and/or grammatical errors  Please call if you have any questions

## 2020-09-04 ENCOUNTER — REMOTE DEVICE CLINIC VISIT (OUTPATIENT)
Dept: CARDIOLOGY CLINIC | Facility: CLINIC | Age: 80
End: 2020-09-04
Payer: COMMERCIAL

## 2020-09-04 DIAGNOSIS — Z95.810 PRESENCE OF AUTOMATIC CARDIOVERTER/DEFIBRILLATOR (AICD): Primary | ICD-10-CM

## 2020-09-04 PROCEDURE — 93297 REM INTERROG DEV EVAL ICPMS: CPT | Performed by: INTERNAL MEDICINE

## 2020-09-04 PROCEDURE — G2066 INTER DEVC REMOTE 30D: HCPCS | Performed by: INTERNAL MEDICINE

## 2020-09-04 NOTE — PROGRESS NOTES
Results for orders placed or performed in visit on 09/04/20   Cardiac EP device report    Narrative    MDT BI-V ICD ([address])  CARELINK TRANSMISSION - OPTI-VOL ONLY: OPTI-VOL WITHIN NORMAL LIMITS  AP 2% BVP 95%  ALL AVAILABLE LEAD PARAMETERS WITHIN NORMAL LIMITS  NO SIGNIFICANT HIGH RATE EPISODES SINCE REMOTE 8/3  PATIENT IS ON METOPROLOL  NORMAL DEVICE FUNCTION  ---DUMONT

## 2020-09-21 ENCOUNTER — IN-CLINIC DEVICE VISIT (OUTPATIENT)
Dept: CARDIOLOGY CLINIC | Facility: CLINIC | Age: 80
End: 2020-09-21
Payer: COMMERCIAL

## 2020-09-21 ENCOUNTER — OFFICE VISIT (OUTPATIENT)
Dept: CARDIOLOGY CLINIC | Facility: CLINIC | Age: 80
End: 2020-09-21
Payer: COMMERCIAL

## 2020-09-21 VITALS
DIASTOLIC BLOOD PRESSURE: 64 MMHG | BODY MASS INDEX: 30.02 KG/M2 | HEART RATE: 66 BPM | TEMPERATURE: 98 F | WEIGHT: 159 LBS | SYSTOLIC BLOOD PRESSURE: 128 MMHG | HEIGHT: 61 IN

## 2020-09-21 DIAGNOSIS — I47.2 VT (VENTRICULAR TACHYCARDIA) (HCC): Primary | ICD-10-CM

## 2020-09-21 DIAGNOSIS — Z95.810 AICD (AUTOMATIC CARDIOVERTER/DEFIBRILLATOR) PRESENT: Primary | ICD-10-CM

## 2020-09-21 PROCEDURE — 99214 OFFICE O/P EST MOD 30 MIN: CPT | Performed by: PHYSICIAN ASSISTANT

## 2020-09-21 PROCEDURE — 93000 ELECTROCARDIOGRAM COMPLETE: CPT | Performed by: PHYSICIAN ASSISTANT

## 2020-09-21 PROCEDURE — RECHECK: Performed by: INTERNAL MEDICINE

## 2020-09-21 NOTE — PROGRESS NOTES
Electrophysiology Office Note    Lauryn Oleary  1940  [id number]  HEART & VASCULAR [facility] [place]  [address]        Assessment/Plan     1  VT (ventricular tachycardia) (HCC)  POCT ECG   1  Polymorphic VT    * patient presents to [facility] under direction of dr Daysi Waggoner for routine 1 year follow up  * device interrogated today without any ventricular abnormalities    * VT was during time of sepsis 2/2 E  Coli    * not on any AAD's    * her device was upgraded from DC PPM to MDT BIV ICD ( HIS lead with RV ICD lead not CS lead) due to this   * stable     2  T wave oversensing    * on ECG clear evidence of T wave oversensing, somehow device was programmed tip to coil instead of bipolar  Since sep 4th 2020? Patient denies having any recent in office interrogations    * no documentation to explain this    * device reprogrammed to bipolar    * will have her send interrogation tomorrow to ensure proper pacing     3  CHB   4  Metastatic breast cancer   * following with Heme Onc for continued chemotherapy    5  E  Coli w/ sepsis               Rhythm History:   Atrial fibrillation:     Atrial flutter:     SVT:     VT/VF:       Cardiac Testing:     ECHO:   Results for orders placed during the hospital encounter of 19   Echo complete with contrast if indicated    Narrative [address] [place] [address] 35    [facility], [address]  [phone]    Transthoracic Echocardiogram  2D, M-mode, Doppler, and Color Doppler    Study date:  13-Aug-2019    Patient: Isabella Del Cid  MR number: [id number]  Account number: [de-identified]  : 1940  Age: 78 years  Gender: Female  Status: Inpatient  Location: Bedside  Height: 62 in  Weight: 157 lb  BP: 108/ 53 mmHg    Indications: CAD Cardiac Device in Situ    Diagnoses: I25 10 - Atherosclerotic heart disease of native coronary artery without angina pectoris    Sonographer:  Rhea Guerra [facility]  Primary Physician:  Davi Dimas MD  Referring Physician:  Marciano Billingsley MD  Group:  [facility]  Interpreting Physician:  Shaun Joel MD    SUMMARY    PROCEDURE INFORMATION:  This was a technically difficult study  LEFT VENTRICLE:  Systolic function was hyperdynamic  Ejection fraction was estimated to be 70 %  There was possible hypokinesis of the basal inferior wall(s)  Doppler parameters were consistent with abnormal left ventricular relaxation (grade 1 diastolic dysfunction)  VENTRICULAR SEPTUM:  Mild discrete upper septal thickening noted  There was "bounce" motion  MITRAL VALVE:  There was mild annular calcification  TRICUSPID VALVE:  There was mild regurgitation  HISTORY: PRIOR HISTORY: Ventricular Arrythmia, LBBB, DM2, HTN    PROCEDURE: The procedure was performed at the bedside  This was a routine study  The transthoracic approach was used  The study included complete 2D imaging, M-mode, complete spectral Doppler, and color Doppler  The heart rate was 78 bpm,  at the start of the study  Images were obtained from the parasternal, apical, subcostal, and suprasternal notch acoustic windows  Echocardiographic views were limited due to poor acoustic window availability and decreased penetration  This  was a technically difficult study  LEFT VENTRICLE: Size was normal  Systolic function was hyperdynamic  Ejection fraction was estimated to be 70 %  There was possible hypokinesis of the basal inferior wall(s)  No evidence of apical thrombus  DOPPLER: Doppler parameters were  consistent with abnormal left ventricular relaxation (grade 1 diastolic dysfunction)  VENTRICULAR SEPTUM: Mild discrete upper septal thickening noted  There was "bounce" motion  RIGHT VENTRICLE: The size was normal  Systolic function was normal  Wall thickness was normal  A pacing wire was present      LEFT ATRIUM: Size was normal     RIGHT ATRIUM: Size was normal     MITRAL VALVE: There was mild annular calcification  Valve structure was normal  There was normal leaflet separation  DOPPLER: The transmitral velocity was within the normal range  There was no evidence for stenosis  There was no  significant regurgitation  AORTIC VALVE: The valve was trileaflet  Leaflets exhibited normal thickness, mild calcification, and normal cuspal separation  DOPPLER: Transaortic velocity was within the normal range  There was no evidence for stenosis  There was no  significant regurgitation  TRICUSPID VALVE: The valve structure was normal  There was normal leaflet separation  DOPPLER: The transtricuspid velocity was within the normal range  There was no evidence for stenosis  There was mild regurgitation  PULMONIC VALVE: Not well visualized  DOPPLER: The transpulmonic velocity was within the normal range  There was no significant regurgitation  PERICARDIUM: There was no pericardial effusion  The pericardium was normal in appearance  AORTA: The root exhibited normal size      SYSTEM MEASUREMENT TABLES    2D  %FS: 23 9 %  Ao Diam: 2 6 cm  EDV(Teich): 104 3 ml  EF(Teich): 47 7 %  ESV(Teich): 54 6 ml  IVSd: 0 8 cm  LA Diam: 3 4 cm  LAAs A4C: 15 3 cm2  LAESV A-L A4C: 42 3 ml  LAESV MOD A4C: 41 1 ml  LALs A4C: 4 7 cm  LVEDV MOD A4C: 81 5 ml  LVEF MOD A4C: 50 1 %  LVESV MOD A4C: 40 7 ml  LVIDd: 4 7 cm  LVIDs: 3 6 cm  LVLd A4C: 8 4 cm  LVLs A4C: 6 8 cm  LVPWd: 0 9 cm  Sandy: 12 8 cm2  RAEDV A-L: 23 1 ml  RAEDV MOD: 22 1 ml  RALd: 6 cm  SV MOD A4C: 40 9 ml  SV(Teich): 49 7 ml    CW  TR Vmax: 2 6 m/s  TR maxP 6 mmHg    MM  TAPSE: 1 9 cm    PW  MV A Yadiel: 1 1 m/s  MV Dec Calloway: 6 5 m/s2  MV DecT: 126 3 ms  MV E Yadiel: 0 8 m/s  MV E/A Ratio: 0 8  MV PHT: 36 6 ms  MVA By PHT: 6 cm2    [employer] Accredited Echocardiography Laboratory    Prepared and electronically signed by    Miquel Pittman MD  Signed 13-Aug-2019 14:37:17         CATH/STRESS TEST:   CORONARY CIRCULATION:  Left main: Normal   LAD: Normal  The major diagonal branch was also normal  The major diagonal branch was also normal   Circumflex: Normal  Two major OM branches were also normal   RCA: Dominant, giving rise to the PDA and two posterolateral branches  Normal     EKG (9-):   A sensed V paced with T wave oversensing         History of Present Illness     HPI/INTERVAL HISTORY: Pancho James is a 78 y o  female with a history of stage 4 liver ca s/p restion, breast ca, polymorphic VT w/ near syncope s/p upgrade of DC PPM to BIV ICD (HIS lead not CS lead), E  Coli bacteremia who presents to hospitals for routine 1 year follow up appointment  She is normally a patient of Dr Quyen Schuster  9-:  Since last seen by dr Quyen Schuster in 2019 from a cardiac perspective patient has no new concerns or complaints  She is able to walk with her cane without major limitations  She has no LH, dizziness or SOB  However, her breast cancer has again returned for which she is following closely with hematology/oncology  Patient is on chemotherapy with further management TBD after current course is completed  Review of Systems  ROS as noted above, otherwise 12 point review of systems was performed and is negative         Historical Information   Social History     Socioeconomic History    Marital status: /Civil Union     Spouse name: Not on file    Number of children: Not on file    Years of education: Not on file    Highest education level: Not on file   Occupational History    Not on file   Social Needs    Financial resource strain: Not on file    Food insecurity     Worry: Not on file     Inability: Not on file    Transportation needs     Medical: Not on file     Non-medical: Not on file   Tobacco Use    Smoking status: Never Smoker    Smokeless tobacco: Never Used   Substance and Sexual Activity    Alcohol use: Not Currently    Drug use: No    Sexual activity: Not on file   Lifestyle    Physical activity     Days per week: Not on file     Minutes per session: Not on file    Stress: Not on file   Relationships    Social connections     Talks on phone: Not on file     Gets together: Not on file     Attends Synagogue service: Not on file     Active member of club or organization: Not on file     Attends meetings of clubs or organizations: Not on file     Relationship status: Not on file    Intimate partner violence     Fear of current or ex partner: Not on file     Emotionally abused: Not on file     Physically abused: Not on file     Forced sexual activity: Not on file   Other Topics Concern    Not on file   Social History Narrative    Not on file     Past Medical History:   Diagnosis Date    Acute biliary pancreatitis     Anxiety     Arthritis     Breast CA ([facility] )     recurrent, ductal carcinoma ER, [place] pos    Cardiac disease     Colon polyp     Depression     Diverticula of intestine     Diverticulosis     Dizziness     and passes out    Flushing     Hiatal hernia     [place]hard [place] hearing[place]      [place]    Hypercholesteremia     Hypertension     Liver mass     Liver metastasis ([facility] )     Metastatic adenocarcinoma to liver ([facility] )     Bx 01/03/2017    PONV (postoperative nausea and vomiting)     Pulmonary embolism ([facility] )     Recurrent breast cancer ([facility] )     Shingles     Shortness of breath     on exertion    Tachycardia     Urinary incontinence     Use of cane as ambulatory aid     or walker     Past Surgical History:   Procedure Laterality Date    BREAST SURGERY      CARDIAC PACEMAKER REMOVAL N/A 11/9/2017    Procedure: PACEMAKER LEAD REVISION, REMOVAL OF NONFUNCTIONING LEAD, AND INSERTION OF A NEW RV LEAD;  Surgeon: Derek Aldrich MD;  [facility];  Service: Cardiology    CATARACT EXTRACTION Bilateral     COLONOSCOPY      ERCP N/A 1/8/2018    Procedure: ENDOSCOPIC RETROGRADE CHOLANGIOPANCREATOGRAPHY (ERCP); Surgeon: Darell Rdz MD;  Location: [facility];   Service: Gastroenterology   [facility] HYSTERECTOMY      INSERT / REPLACE / REMOVE PACEMAKER      JOINT REPLACEMENT      lizette  TKR and right TSR    MASTECTOMY Right     DC ERCP DX COLLECTION SPECIMEN BRUSHING/WASHING N/A 2/22/2018    Procedure: ENDOSCOPIC RETROGRADE CHOLANGIOPANCREATOGRAPHY (ERCP) with stent removal;  Surgeon: Doroteo Duarte MD;  Location: [facility]; Service: Gastroenterology    DC RESEC [address] N/A 7/13/2017    Procedure: LIVER RESECTION, INTRAOPERATIVE ULTRASOUND;  Surgeon: Beatrice Munson MD;  [facility];  Service: Surgical Oncology    ROTATOR CUFF REPAIR Right     SENTINEL LYMPH NODE BIOPSY Right     TONSILECTOMY AND ADNOIDECTOMY      WOUND DEBRIDEMENT Left 11/9/2017    Procedure: DEBRIDEMENT WOUND AND DRESSING CHANGE [facility]); Surgeon: Raven Pierce MD;  [facility];  Service: Cardiology     Social History     Substance and Sexual Activity   Alcohol Use Not Currently     Social History     Substance and Sexual Activity   Drug Use No     Social History     Tobacco Use   Smoking Status Never Smoker   Smokeless Tobacco Never Used     Family History   Problem Relation Age of Onset    Lung cancer Father     Cancer Brother     Diabetes type II Son        Meds/Allergies       Current Outpatient Medications:     acetaminophen (TYLENOL) 500 mg tablet, Take 500 mg by mouth every 6 (six) hours as needed for mild pain, Disp: , Rfl:     anastrozole (ARIMIDEX) 1 mg tablet, Take 1 tablet (1 mg total) by mouth daily, Disp: 90 tablet, Rfl: 3    atorvastatin (LIPITOR) 20 mg tablet, Take 20 mg by mouth daily  , Disp: , Rfl:     Calcium Citrate-Vitamin D (CALCIUM CITRATE + D PO), Take 1,500 mg by mouth daily, Disp: , Rfl:     clindamycin (CLEOCIN) 300 MG capsule, TAKE 2 CAPSULES BY MOUTH 1 HOUR PRIOR TO DENTAL PROCEDURE , Disp: , Rfl: 0    clotrimazole-betamethasone (LOTRISONE) 1-0 05 % cream, Apply 1 application topically as needed , Disp: , Rfl:     Cranberry (THERACRAN HP PO), Take 2 tablets by mouth daily, Disp: , Rfl:     furosemide (LASIX) 20 mg tablet, Take 1 tablet (20 mg total) by mouth daily as needed (le swelling) Edema, Disp: 30 tablet, Rfl: 3    Glucosamine-Chondroit-Vit C-Mn (GLUCOSAMINE 1500 COMPLEX) CAPS, Take 1 capsule by mouth daily  , Disp: , Rfl:     metFORMIN (GLUCOPHAGE) 500 mg tablet, 1 pill 3 times daily with meals, Disp: , Rfl:     metoprolol tartrate (LOPRESSOR) 25 mg tablet, Take 0 5 tablets (12 5 mg total) by mouth every 12 (twelve) hours, Disp: 90 tablet, Rfl: 3    Multiple Vitamin (MULTIVITAMIN) capsule, Take 1 capsule by mouth daily  , Disp: , Rfl:     ondansetron (ZOFRAN) 4 mg tablet, Take 4 mg by mouth every 8 (eight) hours as needed, Disp: , Rfl:     Allergies   Allergen Reactions    Ampicillin Shortness Of Breath, Anaphylaxis and Other (See Comments)     Other reaction(s): Unknown Allergic Reaction  Reaction Date: [id number];        Objective   Vitals: Blood pressure 128/64, pulse 66, temperature 98 °F (36 7 °C), height 5' 1" (1 549 m), weight 72 1 kg (159 lb)  Physical Exam  Constitutional:       Appearance: She is well-developed  HENT:      Head: Normocephalic and atraumatic  Eyes:      Pupils: Pupils are equal, round, and reactive to light  Neck:      Musculoskeletal: Normal range of motion and neck supple  Cardiovascular:      Rate and Rhythm: Normal rate and regular rhythm  Pulmonary:      Effort: Pulmonary effort is normal       Breath sounds: Normal breath sounds  Abdominal:      General: Bowel sounds are normal       Palpations: Abdomen is soft  Musculoskeletal: Normal range of motion  Skin:     General: Skin is warm and dry  Neurological:      Mental Status: She is alert and oriented to person, place, and time  Labs:not applicable    Imaging: I have personally reviewed pertinent reports

## 2020-09-22 NOTE — PROGRESS NOTES
Results for orders placed or performed in visit on 09/21/20   Cardiac EP device report    Narrative    MDT BI-V ICD (3830 IN LV PORT)  IN-OFFICE REPROGRAMMING PER R FRIEND: *T WAVE OVS*T WAVE OVS NOTED IN THE PAST AND SENSING WAS ADJUSTED  TODAY RV LEAD SWITCHED FROM TIP-COIL->BIPOLAR  ALL LEAD PARAMETERS & TRENDS WITHIN NORMAL LIMITS  BATTERY VOLTAGE ADEQUATE  AP 2%  95% VSRP 0%  OPTI-VOL WITHIN NORMAL LIMITS  NC       Current Outpatient Medications:     acetaminophen (TYLENOL) 500 mg tablet, Take 500 mg by mouth every 6 (six) hours as needed for mild pain, Disp: , Rfl:     anastrozole (ARIMIDEX) 1 mg tablet, Take 1 tablet (1 mg total) by mouth daily, Disp: 90 tablet, Rfl: 3    atorvastatin (LIPITOR) 20 mg tablet, Take 20 mg by mouth daily  , Disp: , Rfl:     Calcium Citrate-Vitamin D (CALCIUM CITRATE + D PO), Take 1,500 mg by mouth daily, Disp: , Rfl:     clindamycin (CLEOCIN) 300 MG capsule, TAKE 2 CAPSULES BY MOUTH 1 HOUR PRIOR TO DENTAL PROCEDURE , Disp: , Rfl: 0    clotrimazole-betamethasone (LOTRISONE) 1-0 05 % cream, Apply 1 application topically as needed , Disp: , Rfl:     Cranberry (THERACRAN HP PO), Take 2 tablets by mouth daily, Disp: , Rfl:     furosemide (LASIX) 20 mg tablet, Take 1 tablet (20 mg total) by mouth daily as needed (le swelling) Edema, Disp: 30 tablet, Rfl: 3    Glucosamine-Chondroit-Vit C-Mn (GLUCOSAMINE 1500 COMPLEX) CAPS, Take 1 capsule by mouth daily  , Disp: , Rfl:     metFORMIN (GLUCOPHAGE) 500 mg tablet, 1 pill 3 times daily with meals, Disp: , Rfl:     metoprolol tartrate (LOPRESSOR) 25 mg tablet, Take 0 5 tablets (12 5 mg total) by mouth every 12 (twelve) hours, Disp: 90 tablet, Rfl: 3    Multiple Vitamin (MULTIVITAMIN) capsule, Take 1 capsule by mouth daily  , Disp: , Rfl:     ondansetron (ZOFRAN) 4 mg tablet, Take 4 mg by mouth every 8 (eight) hours as needed, Disp: , Rfl:

## 2020-09-24 ENCOUNTER — IN-CLINIC DEVICE VISIT (OUTPATIENT)
Dept: CARDIOLOGY CLINIC | Facility: CLINIC | Age: 80
End: 2020-09-24

## 2020-09-24 DIAGNOSIS — Z95.810 PRESENCE OF AUTOMATIC CARDIOVERTER/DEFIBRILLATOR (AICD): Primary | ICD-10-CM

## 2020-09-24 PROCEDURE — RECHECK: Performed by: INTERNAL MEDICINE

## 2020-09-24 NOTE — PROGRESS NOTES
Results for orders placed or performed in visit on 09/24/20   Cardiac EP device report    Narrative    MDT BI-V ICD (3830 IN LV PORT)  DEVICE INTERROGATED IN THE [facility] OFFICE/T-WAVE OVERSENSING WITH AIDEE  REFER TO REPORT FOR CHANGES MADE IN THIS SESSION  REMOTE SCHEDULED IN ONE WEEK   PL

## 2020-10-01 ENCOUNTER — REMOTE DEVICE CLINIC VISIT (OUTPATIENT)
Dept: CARDIOLOGY CLINIC | Facility: CLINIC | Age: 80
End: 2020-10-01

## 2020-10-01 DIAGNOSIS — Z95.810 PRESENCE OF AUTOMATIC CARDIOVERTER/DEFIBRILLATOR (AICD): Primary | ICD-10-CM

## 2020-10-01 PROCEDURE — RECHECK: Performed by: INTERNAL MEDICINE

## 2020-10-07 ENCOUNTER — REMOTE DEVICE CLINIC VISIT (OUTPATIENT)
Dept: CARDIOLOGY CLINIC | Facility: CLINIC | Age: 80
End: 2020-10-07
Payer: COMMERCIAL

## 2020-10-07 DIAGNOSIS — Z95.810 PRESENCE OF IMPLANTABLE CARDIOVERTER-DEFIBRILLATOR (ICD): Primary | ICD-10-CM

## 2020-10-07 PROCEDURE — 93296 REM INTERROG EVL PM/IDS: CPT | Performed by: INTERNAL MEDICINE

## 2020-10-07 PROCEDURE — 93295 DEV INTERROG REMOTE 1/2/MLT: CPT | Performed by: INTERNAL MEDICINE

## 2020-10-08 ENCOUNTER — LAB (OUTPATIENT)
Dept: LAB | Facility: MEDICAL CENTER | Age: 80
End: 2020-10-08
Payer: COMMERCIAL

## 2020-10-08 DIAGNOSIS — C50.911 ADENOCARCINOMA OF RIGHT BREAST METASTATIC TO LIVER (HCC): ICD-10-CM

## 2020-10-08 DIAGNOSIS — C78.7 ADENOCARCINOMA OF RIGHT BREAST METASTATIC TO LIVER (HCC): ICD-10-CM

## 2020-10-08 LAB
ALBUMIN SERPL BCP-MCNC: 4 G/DL (ref 3.5–5)
ALP SERPL-CCNC: 123 U/L (ref 46–116)
ALT SERPL W P-5'-P-CCNC: 39 U/L (ref 12–78)
ANION GAP SERPL CALCULATED.3IONS-SCNC: 5 MMOL/L (ref 4–13)
AST SERPL W P-5'-P-CCNC: 35 U/L (ref 5–45)
BASOPHILS # BLD AUTO: 0.05 THOUSANDS/ΜL (ref 0–0.1)
BASOPHILS NFR BLD AUTO: 1 % (ref 0–1)
BILIRUB SERPL-MCNC: 1.02 MG/DL (ref 0.2–1)
BUN SERPL-MCNC: 14 MG/DL (ref 5–25)
CALCIUM SERPL-MCNC: 10.1 MG/DL (ref 8.3–10.1)
CANCER AG27-29 SERPL-ACNC: 132.8 U/ML (ref 0–42.3)
CHLORIDE SERPL-SCNC: 108 MMOL/L (ref 100–108)
CO2 SERPL-SCNC: 29 MMOL/L (ref 21–32)
CREAT SERPL-MCNC: 0.77 MG/DL (ref 0.6–1.3)
EOSINOPHIL # BLD AUTO: 0.17 THOUSAND/ΜL (ref 0–0.61)
EOSINOPHIL NFR BLD AUTO: 2 % (ref 0–6)
ERYTHROCYTE [DISTWIDTH] IN BLOOD BY AUTOMATED COUNT: 12.6 % (ref 11.6–15.1)
GFR SERPL CREATININE-BSD FRML MDRD: 72 ML/MIN/1.73SQ M
GLUCOSE P FAST SERPL-MCNC: 138 MG/DL (ref 65–99)
HCT VFR BLD AUTO: 45.3 % (ref 34.8–46.1)
HGB BLD-MCNC: 14.5 G/DL (ref 11.5–15.4)
IMM GRANULOCYTES # BLD AUTO: 0.02 THOUSAND/UL (ref 0–0.2)
IMM GRANULOCYTES NFR BLD AUTO: 0 % (ref 0–2)
LYMPHOCYTES # BLD AUTO: 2.54 THOUSANDS/ΜL (ref 0.6–4.47)
LYMPHOCYTES NFR BLD AUTO: 35 % (ref 14–44)
MCH RBC QN AUTO: 31.3 PG (ref 26.8–34.3)
MCHC RBC AUTO-ENTMCNC: 32 G/DL (ref 31.4–37.4)
MCV RBC AUTO: 98 FL (ref 82–98)
MONOCYTES # BLD AUTO: 0.91 THOUSAND/ΜL (ref 0.17–1.22)
MONOCYTES NFR BLD AUTO: 13 % (ref 4–12)
NEUTROPHILS # BLD AUTO: 3.51 THOUSANDS/ΜL (ref 1.85–7.62)
NEUTS SEG NFR BLD AUTO: 49 % (ref 43–75)
NRBC BLD AUTO-RTO: 0 /100 WBCS
PLATELET # BLD AUTO: 181 THOUSANDS/UL (ref 149–390)
PMV BLD AUTO: 10.1 FL (ref 8.9–12.7)
POTASSIUM SERPL-SCNC: 4.5 MMOL/L (ref 3.5–5.3)
PROT SERPL-MCNC: 7.7 G/DL (ref 6.4–8.2)
RBC # BLD AUTO: 4.63 MILLION/UL (ref 3.81–5.12)
SODIUM SERPL-SCNC: 142 MMOL/L (ref 136–145)
WBC # BLD AUTO: 7.2 THOUSAND/UL (ref 4.31–10.16)

## 2020-10-08 PROCEDURE — 80053 COMPREHEN METABOLIC PANEL: CPT

## 2020-10-08 PROCEDURE — 36415 COLL VENOUS BLD VENIPUNCTURE: CPT

## 2020-10-08 PROCEDURE — 85025 COMPLETE CBC W/AUTO DIFF WBC: CPT

## 2020-10-08 PROCEDURE — 86300 IMMUNOASSAY TUMOR CA 15-3: CPT

## 2020-10-15 ENCOUNTER — TELEPHONE (OUTPATIENT)
Dept: HEMATOLOGY ONCOLOGY | Facility: CLINIC | Age: 80
End: 2020-10-15

## 2020-10-15 ENCOUNTER — OFFICE VISIT (OUTPATIENT)
Dept: HEMATOLOGY ONCOLOGY | Facility: CLINIC | Age: 80
End: 2020-10-15
Payer: COMMERCIAL

## 2020-10-15 ENCOUNTER — DOCUMENTATION (OUTPATIENT)
Dept: HEMATOLOGY ONCOLOGY | Facility: CLINIC | Age: 80
End: 2020-10-15

## 2020-10-15 VITALS
BODY MASS INDEX: 29.34 KG/M2 | HEIGHT: 61 IN | HEART RATE: 101 BPM | DIASTOLIC BLOOD PRESSURE: 66 MMHG | OXYGEN SATURATION: 93 % | SYSTOLIC BLOOD PRESSURE: 120 MMHG | RESPIRATION RATE: 20 BRPM | WEIGHT: 155.4 LBS | TEMPERATURE: 97.2 F

## 2020-10-15 DIAGNOSIS — C50.911 MALIGNANT NEOPLASM OF RIGHT FEMALE BREAST, UNSPECIFIED ESTROGEN RECEPTOR STATUS, UNSPECIFIED SITE OF BREAST (HCC): Primary | ICD-10-CM

## 2020-10-15 DIAGNOSIS — C78.7 METASTASIS TO LIVER (HCC): ICD-10-CM

## 2020-10-15 PROCEDURE — 99215 OFFICE O/P EST HI 40 MIN: CPT | Performed by: NURSE PRACTITIONER

## 2020-10-15 RX ORDER — EXEMESTANE 25 MG/1
25 TABLET ORAL DAILY
Qty: 30 TABLET | Refills: 1 | Status: SHIPPED | OUTPATIENT
Start: 2020-10-15 | End: 2021-01-07 | Stop reason: SDUPTHER

## 2020-10-15 RX ORDER — EVEROLIMUS 2.5 MG/1
2.5 TABLET ORAL DAILY
Qty: 30 TABLET | Refills: 1 | Status: SHIPPED | OUTPATIENT
Start: 2020-10-15 | End: 2020-10-28 | Stop reason: SDUPTHER

## 2020-10-16 ENCOUNTER — TELEPHONE (OUTPATIENT)
Dept: HEMATOLOGY ONCOLOGY | Facility: CLINIC | Age: 80
End: 2020-10-16

## 2020-10-19 ENCOUNTER — DOCUMENTATION (OUTPATIENT)
Dept: HEMATOLOGY ONCOLOGY | Facility: CLINIC | Age: 80
End: 2020-10-19

## 2020-10-22 ENCOUNTER — TELEPHONE (OUTPATIENT)
Dept: HEMATOLOGY ONCOLOGY | Facility: CLINIC | Age: 80
End: 2020-10-22

## 2020-10-23 ENCOUNTER — TELEPHONE (OUTPATIENT)
Dept: HEMATOLOGY ONCOLOGY | Facility: CLINIC | Age: 80
End: 2020-10-23

## 2020-10-26 ENCOUNTER — TELEPHONE (OUTPATIENT)
Dept: HEMATOLOGY ONCOLOGY | Facility: CLINIC | Age: 80
End: 2020-10-26

## 2020-10-28 DIAGNOSIS — C50.911 MALIGNANT NEOPLASM OF RIGHT FEMALE BREAST, UNSPECIFIED ESTROGEN RECEPTOR STATUS, UNSPECIFIED SITE OF BREAST (HCC): Primary | ICD-10-CM

## 2020-10-28 DIAGNOSIS — C78.7 METASTASIS TO LIVER (HCC): ICD-10-CM

## 2020-10-29 ENCOUNTER — TELEPHONE (OUTPATIENT)
Dept: HEMATOLOGY ONCOLOGY | Facility: CLINIC | Age: 80
End: 2020-10-29

## 2020-10-29 RX ORDER — EVEROLIMUS 2.5 MG/1
2.5 TABLET ORAL DAILY
Qty: 30 TABLET | Refills: 6 | Status: SHIPPED | OUTPATIENT
Start: 2020-10-29 | End: 2021-01-07 | Stop reason: SDUPTHER

## 2020-11-03 ENCOUNTER — CLINICAL SUPPORT (OUTPATIENT)
Dept: RADIATION ONCOLOGY | Facility: CLINIC | Age: 80
End: 2020-11-03
Attending: RADIOLOGY

## 2020-11-03 ENCOUNTER — TELEMEDICINE (OUTPATIENT)
Dept: RADIATION ONCOLOGY | Facility: CLINIC | Age: 80
End: 2020-11-03
Attending: RADIOLOGY

## 2020-11-03 ENCOUNTER — TELEPHONE (OUTPATIENT)
Dept: HEMATOLOGY ONCOLOGY | Facility: CLINIC | Age: 80
End: 2020-11-03

## 2020-11-03 DIAGNOSIS — C78.7 ADENOCARCINOMA OF RIGHT BREAST METASTATIC TO LIVER (HCC): Primary | Chronic | ICD-10-CM

## 2020-11-03 DIAGNOSIS — C50.911 ADENOCARCINOMA OF RIGHT BREAST METASTATIC TO LIVER (HCC): Primary | Chronic | ICD-10-CM

## 2020-11-05 ENCOUNTER — TELEPHONE (OUTPATIENT)
Dept: HEMATOLOGY ONCOLOGY | Facility: CLINIC | Age: 80
End: 2020-11-05

## 2020-11-05 DIAGNOSIS — C50.919 MALIGNANT NEOPLASM OF FEMALE BREAST, UNSPECIFIED ESTROGEN RECEPTOR STATUS, UNSPECIFIED LATERALITY, UNSPECIFIED SITE OF BREAST (HCC): ICD-10-CM

## 2020-11-05 DIAGNOSIS — C78.7 METASTASIS TO LIVER (HCC): Primary | ICD-10-CM

## 2020-11-18 ENCOUNTER — HOSPITAL ENCOUNTER (OUTPATIENT)
Dept: CT IMAGING | Facility: HOSPITAL | Age: 80
Discharge: HOME | End: 2020-11-18
Payer: COMMERCIAL

## 2020-11-18 DIAGNOSIS — C78.7 METASTASIS TO LIVER (HCC): ICD-10-CM

## 2020-11-18 DIAGNOSIS — C50.919 MALIGNANT NEOPLASM OF FEMALE BREAST, UNSPECIFIED ESTROGEN RECEPTOR STATUS, UNSPECIFIED LATERALITY, UNSPECIFIED SITE OF BREAST (HCC): ICD-10-CM

## 2020-11-18 PROCEDURE — 71260 CT THORAX DX C+: CPT

## 2020-11-18 PROCEDURE — G1004 CDSM NDSC: HCPCS

## 2020-11-18 PROCEDURE — 74177 CT ABD & PELVIS W/CONTRAST: CPT

## 2020-11-18 RX ADMIN — IOHEXOL 100 ML: 350 INJECTION, SOLUTION INTRAVENOUS at 16:50

## 2020-11-20 LAB
LEFT EYE DIABETIC RETINOPATHY: NORMAL
RIGHT EYE DIABETIC RETINOPATHY: NORMAL
SEVERITY (EYE EXAM): NORMAL

## 2020-11-23 ENCOUNTER — TELEPHONE (OUTPATIENT)
Dept: HEMATOLOGY ONCOLOGY | Facility: CLINIC | Age: 80
End: 2020-11-23

## 2020-11-30 ENCOUNTER — LAB (OUTPATIENT)
Dept: LAB | Facility: MEDICAL CENTER | Age: 80
End: 2020-11-30
Payer: COMMERCIAL

## 2020-11-30 DIAGNOSIS — C78.7 METASTASIS TO LIVER (HCC): ICD-10-CM

## 2020-11-30 DIAGNOSIS — C50.911 MALIGNANT NEOPLASM OF RIGHT FEMALE BREAST, UNSPECIFIED ESTROGEN RECEPTOR STATUS, UNSPECIFIED SITE OF BREAST (HCC): ICD-10-CM

## 2020-11-30 LAB
ALBUMIN SERPL BCP-MCNC: 3.6 G/DL (ref 3.5–5)
ALP SERPL-CCNC: 130 U/L (ref 46–116)
ALT SERPL W P-5'-P-CCNC: 36 U/L (ref 12–78)
ANION GAP SERPL CALCULATED.3IONS-SCNC: 9 MMOL/L (ref 4–13)
AST SERPL W P-5'-P-CCNC: 39 U/L (ref 5–45)
BASOPHILS # BLD AUTO: 0.06 THOUSANDS/ΜL (ref 0–0.1)
BASOPHILS NFR BLD AUTO: 1 % (ref 0–1)
BILIRUB SERPL-MCNC: 0.5 MG/DL (ref 0.2–1)
BUN SERPL-MCNC: 12 MG/DL (ref 5–25)
CALCIUM SERPL-MCNC: 10.4 MG/DL (ref 8.3–10.1)
CANCER AG27-29 SERPL-ACNC: 152.2 U/ML (ref 0–42.3)
CHLORIDE SERPL-SCNC: 104 MMOL/L (ref 100–108)
CO2 SERPL-SCNC: 26 MMOL/L (ref 21–32)
CREAT SERPL-MCNC: 0.89 MG/DL (ref 0.6–1.3)
EOSINOPHIL # BLD AUTO: 0.17 THOUSAND/ΜL (ref 0–0.61)
EOSINOPHIL NFR BLD AUTO: 2 % (ref 0–6)
ERYTHROCYTE [DISTWIDTH] IN BLOOD BY AUTOMATED COUNT: 11.8 % (ref 11.6–15.1)
GFR SERPL CREATININE-BSD FRML MDRD: 61 ML/MIN/1.73SQ M
GLUCOSE P FAST SERPL-MCNC: 214 MG/DL (ref 65–99)
HCT VFR BLD AUTO: 46.1 % (ref 34.8–46.1)
HGB BLD-MCNC: 14.5 G/DL (ref 11.5–15.4)
IMM GRANULOCYTES # BLD AUTO: 0.03 THOUSAND/UL (ref 0–0.2)
IMM GRANULOCYTES NFR BLD AUTO: 0 % (ref 0–2)
LYMPHOCYTES # BLD AUTO: 3.17 THOUSANDS/ΜL (ref 0.6–4.47)
LYMPHOCYTES NFR BLD AUTO: 41 % (ref 14–44)
MCH RBC QN AUTO: 30.1 PG (ref 26.8–34.3)
MCHC RBC AUTO-ENTMCNC: 31.5 G/DL (ref 31.4–37.4)
MCV RBC AUTO: 96 FL (ref 82–98)
MONOCYTES # BLD AUTO: 1.12 THOUSAND/ΜL (ref 0.17–1.22)
MONOCYTES NFR BLD AUTO: 14 % (ref 4–12)
NEUTROPHILS # BLD AUTO: 3.27 THOUSANDS/ΜL (ref 1.85–7.62)
NEUTS SEG NFR BLD AUTO: 42 % (ref 43–75)
NRBC BLD AUTO-RTO: 0 /100 WBCS
PLATELET # BLD AUTO: 202 THOUSANDS/UL (ref 149–390)
PMV BLD AUTO: 10.2 FL (ref 8.9–12.7)
POTASSIUM SERPL-SCNC: 4.4 MMOL/L (ref 3.5–5.3)
PROT SERPL-MCNC: 7.9 G/DL (ref 6.4–8.2)
RBC # BLD AUTO: 4.82 MILLION/UL (ref 3.81–5.12)
SODIUM SERPL-SCNC: 139 MMOL/L (ref 136–145)
WBC # BLD AUTO: 7.82 THOUSAND/UL (ref 4.31–10.16)

## 2020-11-30 PROCEDURE — 36415 COLL VENOUS BLD VENIPUNCTURE: CPT

## 2020-11-30 PROCEDURE — 86300 IMMUNOASSAY TUMOR CA 15-3: CPT

## 2020-11-30 PROCEDURE — 80053 COMPREHEN METABOLIC PANEL: CPT

## 2020-11-30 PROCEDURE — 85025 COMPLETE CBC W/AUTO DIFF WBC: CPT

## 2020-12-03 ENCOUNTER — OFFICE VISIT (OUTPATIENT)
Dept: HEMATOLOGY ONCOLOGY | Facility: CLINIC | Age: 80
End: 2020-12-03
Payer: COMMERCIAL

## 2020-12-03 ENCOUNTER — TELEPHONE (OUTPATIENT)
Dept: HEMATOLOGY ONCOLOGY | Facility: CLINIC | Age: 80
End: 2020-12-03

## 2020-12-03 VITALS
BODY MASS INDEX: 29.45 KG/M2 | RESPIRATION RATE: 16 BRPM | WEIGHT: 156 LBS | SYSTOLIC BLOOD PRESSURE: 130 MMHG | TEMPERATURE: 97.9 F | DIASTOLIC BLOOD PRESSURE: 90 MMHG | HEART RATE: 119 BPM | HEIGHT: 61 IN

## 2020-12-03 DIAGNOSIS — C50.911 ADENOCARCINOMA OF RIGHT BREAST METASTATIC TO LIVER (HCC): Primary | Chronic | ICD-10-CM

## 2020-12-03 DIAGNOSIS — M54.9 CHRONIC BILATERAL BACK PAIN, UNSPECIFIED BACK LOCATION: ICD-10-CM

## 2020-12-03 DIAGNOSIS — G89.29 CHRONIC BILATERAL BACK PAIN, UNSPECIFIED BACK LOCATION: ICD-10-CM

## 2020-12-03 DIAGNOSIS — C78.7 ADENOCARCINOMA OF RIGHT BREAST METASTATIC TO LIVER (HCC): Primary | Chronic | ICD-10-CM

## 2020-12-03 PROCEDURE — 99215 OFFICE O/P EST HI 40 MIN: CPT | Performed by: NURSE PRACTITIONER

## 2020-12-03 RX ORDER — OXYCODONE HYDROCHLORIDE 5 MG/1
5 TABLET ORAL EVERY 4 HOURS PRN
Qty: 30 TABLET | Refills: 0 | Status: SHIPPED | OUTPATIENT
Start: 2020-12-03 | End: 2022-01-01 | Stop reason: SDUPTHER

## 2020-12-04 ENCOUNTER — DOCUMENTATION (OUTPATIENT)
Dept: HEMATOLOGY ONCOLOGY | Facility: CLINIC | Age: 80
End: 2020-12-04

## 2020-12-30 DIAGNOSIS — R00.0 TACHYCARDIA: ICD-10-CM

## 2020-12-31 ENCOUNTER — LAB (OUTPATIENT)
Dept: LAB | Facility: MEDICAL CENTER | Age: 80
End: 2020-12-31
Payer: COMMERCIAL

## 2020-12-31 DIAGNOSIS — C78.7 ADENOCARCINOMA OF RIGHT BREAST METASTATIC TO LIVER (HCC): Chronic | ICD-10-CM

## 2020-12-31 DIAGNOSIS — C50.911 ADENOCARCINOMA OF RIGHT BREAST METASTATIC TO LIVER (HCC): Chronic | ICD-10-CM

## 2020-12-31 LAB
ALBUMIN SERPL BCP-MCNC: 3.7 G/DL (ref 3.5–5)
ALP SERPL-CCNC: 115 U/L (ref 46–116)
ALT SERPL W P-5'-P-CCNC: 42 U/L (ref 12–78)
ANION GAP SERPL CALCULATED.3IONS-SCNC: 4 MMOL/L (ref 4–13)
AST SERPL W P-5'-P-CCNC: 33 U/L (ref 5–45)
BASOPHILS # BLD AUTO: 0.04 THOUSANDS/ΜL (ref 0–0.1)
BASOPHILS NFR BLD AUTO: 1 % (ref 0–1)
BILIRUB SERPL-MCNC: 0.63 MG/DL (ref 0.2–1)
BUN SERPL-MCNC: 12 MG/DL (ref 5–25)
CALCIUM SERPL-MCNC: 10.2 MG/DL (ref 8.3–10.1)
CANCER AG27-29 SERPL-ACNC: 116.9 U/ML (ref 0–42.3)
CHLORIDE SERPL-SCNC: 107 MMOL/L (ref 100–108)
CO2 SERPL-SCNC: 29 MMOL/L (ref 21–32)
CREAT SERPL-MCNC: 0.79 MG/DL (ref 0.6–1.3)
EOSINOPHIL # BLD AUTO: 0.2 THOUSAND/ΜL (ref 0–0.61)
EOSINOPHIL NFR BLD AUTO: 3 % (ref 0–6)
ERYTHROCYTE [DISTWIDTH] IN BLOOD BY AUTOMATED COUNT: 12.2 % (ref 11.6–15.1)
GFR SERPL CREATININE-BSD FRML MDRD: 70 ML/MIN/1.73SQ M
GLUCOSE P FAST SERPL-MCNC: 179 MG/DL (ref 65–99)
HCT VFR BLD AUTO: 41.5 % (ref 34.8–46.1)
HGB BLD-MCNC: 13 G/DL (ref 11.5–15.4)
IMM GRANULOCYTES # BLD AUTO: 0.01 THOUSAND/UL (ref 0–0.2)
IMM GRANULOCYTES NFR BLD AUTO: 0 % (ref 0–2)
LYMPHOCYTES # BLD AUTO: 2.63 THOUSANDS/ΜL (ref 0.6–4.47)
LYMPHOCYTES NFR BLD AUTO: 41 % (ref 14–44)
MCH RBC QN AUTO: 28.8 PG (ref 26.8–34.3)
MCHC RBC AUTO-ENTMCNC: 31.3 G/DL (ref 31.4–37.4)
MCV RBC AUTO: 92 FL (ref 82–98)
MONOCYTES # BLD AUTO: 0.93 THOUSAND/ΜL (ref 0.17–1.22)
MONOCYTES NFR BLD AUTO: 15 % (ref 4–12)
NEUTROPHILS # BLD AUTO: 2.62 THOUSANDS/ΜL (ref 1.85–7.62)
NEUTS SEG NFR BLD AUTO: 40 % (ref 43–75)
NRBC BLD AUTO-RTO: 0 /100 WBCS
PLATELET # BLD AUTO: 172 THOUSANDS/UL (ref 149–390)
PMV BLD AUTO: 9.8 FL (ref 8.9–12.7)
POTASSIUM SERPL-SCNC: 4.2 MMOL/L (ref 3.5–5.3)
PROT SERPL-MCNC: 7.4 G/DL (ref 6.4–8.2)
RBC # BLD AUTO: 4.51 MILLION/UL (ref 3.81–5.12)
SODIUM SERPL-SCNC: 140 MMOL/L (ref 136–145)
WBC # BLD AUTO: 6.43 THOUSAND/UL (ref 4.31–10.16)

## 2020-12-31 PROCEDURE — 85025 COMPLETE CBC W/AUTO DIFF WBC: CPT

## 2020-12-31 PROCEDURE — 86300 IMMUNOASSAY TUMOR CA 15-3: CPT

## 2020-12-31 PROCEDURE — 36415 COLL VENOUS BLD VENIPUNCTURE: CPT

## 2020-12-31 PROCEDURE — 80053 COMPREHEN METABOLIC PANEL: CPT

## 2021-01-01 ENCOUNTER — HOSPITAL ENCOUNTER (OUTPATIENT)
Dept: INFUSION CENTER | Facility: CLINIC | Age: 81
Discharge: HOME | End: 2021-09-01
Payer: COMMERCIAL

## 2021-01-01 ENCOUNTER — OFFICE VISIT (OUTPATIENT)
Dept: ENDOCRINOLOGY | Facility: CLINIC | Age: 81
End: 2021-01-01
Payer: COMMERCIAL

## 2021-01-01 ENCOUNTER — APPOINTMENT (OUTPATIENT)
Dept: LAB | Facility: MEDICAL CENTER | Age: 81
End: 2021-01-01
Payer: COMMERCIAL

## 2021-01-01 ENCOUNTER — TELEPHONE (OUTPATIENT)
Dept: HEMATOLOGY ONCOLOGY | Facility: CLINIC | Age: 81
End: 2021-01-01

## 2021-01-01 ENCOUNTER — HOSPITAL ENCOUNTER (OUTPATIENT)
Dept: CT IMAGING | Facility: HOSPITAL | Age: 81
Discharge: HOME | End: 2021-10-25
Payer: COMMERCIAL

## 2021-01-01 ENCOUNTER — HOSPITAL ENCOUNTER (OUTPATIENT)
Dept: INFUSION CENTER | Facility: CLINIC | Age: 81
Discharge: HOME | End: 2021-11-24
Payer: COMMERCIAL

## 2021-01-01 ENCOUNTER — OFFICE VISIT (OUTPATIENT)
Dept: HEMATOLOGY ONCOLOGY | Facility: CLINIC | Age: 81
End: 2021-01-01
Payer: COMMERCIAL

## 2021-01-01 ENCOUNTER — REMOTE DEVICE CLINIC VISIT (OUTPATIENT)
Dept: CARDIOLOGY CLINIC | Facility: CLINIC | Age: 81
End: 2021-01-01
Payer: COMMERCIAL

## 2021-01-01 ENCOUNTER — CLINICAL SUPPORT (OUTPATIENT)
Dept: RADIATION ONCOLOGY | Facility: CLINIC | Age: 81
End: 2021-01-01
Attending: RADIOLOGY
Payer: COMMERCIAL

## 2021-01-01 ENCOUNTER — IN-CLINIC DEVICE VISIT (OUTPATIENT)
Dept: CARDIOLOGY CLINIC | Facility: CLINIC | Age: 81
End: 2021-01-01
Payer: COMMERCIAL

## 2021-01-01 ENCOUNTER — OFFICE VISIT (OUTPATIENT)
Dept: CARDIOLOGY CLINIC | Facility: CLINIC | Age: 81
End: 2021-01-01
Payer: COMMERCIAL

## 2021-01-01 ENCOUNTER — HOSPITAL ENCOUNTER (OUTPATIENT)
Dept: INFUSION CENTER | Facility: CLINIC | Age: 81
Discharge: HOME | End: 2021-10-13
Payer: COMMERCIAL

## 2021-01-01 ENCOUNTER — HOSPITAL ENCOUNTER (OUTPATIENT)
Dept: INFUSION CENTER | Facility: CLINIC | Age: 81
Discharge: HOME | End: 2021-12-15
Payer: COMMERCIAL

## 2021-01-01 ENCOUNTER — TELEPHONE (OUTPATIENT)
Dept: CARDIOLOGY CLINIC | Facility: CLINIC | Age: 81
End: 2021-01-01

## 2021-01-01 ENCOUNTER — TELEPHONE (OUTPATIENT)
Dept: ENDOCRINOLOGY | Facility: CLINIC | Age: 81
End: 2021-01-01

## 2021-01-01 ENCOUNTER — IMMUNIZATIONS (OUTPATIENT)
Dept: FAMILY MEDICINE CLINIC | Facility: CLINIC | Age: 81
End: 2021-01-01

## 2021-01-01 ENCOUNTER — HOSPITAL ENCOUNTER (OUTPATIENT)
Dept: INFUSION CENTER | Facility: CLINIC | Age: 81
Discharge: HOME | End: 2021-11-03
Payer: COMMERCIAL

## 2021-01-01 ENCOUNTER — HOSPITAL ENCOUNTER (OUTPATIENT)
Dept: CT IMAGING | Facility: HOSPITAL | Age: 81
Discharge: HOME | End: 2021-08-30
Payer: COMMERCIAL

## 2021-01-01 ENCOUNTER — HOSPITAL ENCOUNTER (OUTPATIENT)
Dept: INFUSION CENTER | Facility: CLINIC | Age: 81
Discharge: HOME | End: 2021-09-22
Payer: COMMERCIAL

## 2021-01-01 VITALS
WEIGHT: 136 LBS | DIASTOLIC BLOOD PRESSURE: 80 MMHG | HEIGHT: 60 IN | SYSTOLIC BLOOD PRESSURE: 130 MMHG | BODY MASS INDEX: 26.7 KG/M2 | HEART RATE: 84 BPM

## 2021-01-01 VITALS
WEIGHT: 132.5 LBS | HEIGHT: 60 IN | RESPIRATION RATE: 18 BRPM | BODY MASS INDEX: 26.01 KG/M2 | HEART RATE: 82 BPM | TEMPERATURE: 96.6 F | DIASTOLIC BLOOD PRESSURE: 74 MMHG | OXYGEN SATURATION: 96 % | SYSTOLIC BLOOD PRESSURE: 100 MMHG

## 2021-01-01 VITALS
DIASTOLIC BLOOD PRESSURE: 78 MMHG | WEIGHT: 125.66 LBS | HEIGHT: 60 IN | HEART RATE: 92 BPM | TEMPERATURE: 98 F | SYSTOLIC BLOOD PRESSURE: 143 MMHG | RESPIRATION RATE: 18 BRPM | BODY MASS INDEX: 24.67 KG/M2

## 2021-01-01 VITALS
WEIGHT: 141.09 LBS | DIASTOLIC BLOOD PRESSURE: 83 MMHG | RESPIRATION RATE: 18 BRPM | HEIGHT: 60 IN | TEMPERATURE: 97 F | BODY MASS INDEX: 27.7 KG/M2 | HEART RATE: 84 BPM | SYSTOLIC BLOOD PRESSURE: 143 MMHG

## 2021-01-01 VITALS
TEMPERATURE: 98 F | WEIGHT: 135 LBS | RESPIRATION RATE: 20 BRPM | DIASTOLIC BLOOD PRESSURE: 78 MMHG | HEIGHT: 60 IN | BODY MASS INDEX: 26.5 KG/M2 | SYSTOLIC BLOOD PRESSURE: 118 MMHG

## 2021-01-01 VITALS
DIASTOLIC BLOOD PRESSURE: 64 MMHG | HEIGHT: 60 IN | TEMPERATURE: 97.2 F | HEART RATE: 86 BPM | BODY MASS INDEX: 24.94 KG/M2 | SYSTOLIC BLOOD PRESSURE: 118 MMHG | WEIGHT: 127 LBS | OXYGEN SATURATION: 96 % | RESPIRATION RATE: 16 BRPM

## 2021-01-01 VITALS
HEIGHT: 60 IN | HEART RATE: 87 BPM | SYSTOLIC BLOOD PRESSURE: 160 MMHG | DIASTOLIC BLOOD PRESSURE: 80 MMHG | WEIGHT: 131 LBS | BODY MASS INDEX: 25.72 KG/M2

## 2021-01-01 VITALS
WEIGHT: 141.87 LBS | HEIGHT: 60 IN | TEMPERATURE: 98.7 F | SYSTOLIC BLOOD PRESSURE: 135 MMHG | HEART RATE: 87 BPM | DIASTOLIC BLOOD PRESSURE: 69 MMHG | BODY MASS INDEX: 27.85 KG/M2 | RESPIRATION RATE: 18 BRPM

## 2021-01-01 VITALS
BODY MASS INDEX: 26.11 KG/M2 | HEART RATE: 87 BPM | DIASTOLIC BLOOD PRESSURE: 64 MMHG | HEIGHT: 60 IN | SYSTOLIC BLOOD PRESSURE: 118 MMHG | WEIGHT: 133 LBS

## 2021-01-01 VITALS
BODY MASS INDEX: 27.22 KG/M2 | HEIGHT: 60 IN | DIASTOLIC BLOOD PRESSURE: 79 MMHG | SYSTOLIC BLOOD PRESSURE: 135 MMHG | TEMPERATURE: 97.4 F | WEIGHT: 138.67 LBS | RESPIRATION RATE: 17 BRPM | HEART RATE: 80 BPM

## 2021-01-01 VITALS
HEART RATE: 96 BPM | HEIGHT: 60 IN | DIASTOLIC BLOOD PRESSURE: 71 MMHG | RESPIRATION RATE: 18 BRPM | WEIGHT: 140.87 LBS | BODY MASS INDEX: 27.66 KG/M2 | TEMPERATURE: 96.8 F | SYSTOLIC BLOOD PRESSURE: 126 MMHG

## 2021-01-01 VITALS
DIASTOLIC BLOOD PRESSURE: 75 MMHG | WEIGHT: 135.36 LBS | HEART RATE: 86 BPM | SYSTOLIC BLOOD PRESSURE: 126 MMHG | BODY MASS INDEX: 26.58 KG/M2 | HEIGHT: 60 IN | RESPIRATION RATE: 18 BRPM | TEMPERATURE: 97.9 F

## 2021-01-01 VITALS
BODY MASS INDEX: 26.79 KG/M2 | RESPIRATION RATE: 18 BRPM | HEIGHT: 60 IN | DIASTOLIC BLOOD PRESSURE: 72 MMHG | SYSTOLIC BLOOD PRESSURE: 125 MMHG | WEIGHT: 136.47 LBS | TEMPERATURE: 97.9 F | HEART RATE: 79 BPM

## 2021-01-01 VITALS
HEART RATE: 104 BPM | BODY MASS INDEX: 24.84 KG/M2 | SYSTOLIC BLOOD PRESSURE: 120 MMHG | WEIGHT: 126.5 LBS | DIASTOLIC BLOOD PRESSURE: 64 MMHG | HEIGHT: 60 IN

## 2021-01-01 VITALS
DIASTOLIC BLOOD PRESSURE: 70 MMHG | BODY MASS INDEX: 25.82 KG/M2 | HEART RATE: 119 BPM | WEIGHT: 132.2 LBS | SYSTOLIC BLOOD PRESSURE: 134 MMHG

## 2021-01-01 VITALS
SYSTOLIC BLOOD PRESSURE: 106 MMHG | DIASTOLIC BLOOD PRESSURE: 62 MMHG | BODY MASS INDEX: 26.21 KG/M2 | HEIGHT: 60 IN | RESPIRATION RATE: 18 BRPM | OXYGEN SATURATION: 99 % | WEIGHT: 133.5 LBS | TEMPERATURE: 97.4 F | HEART RATE: 91 BPM

## 2021-01-01 DIAGNOSIS — E78.2 MIXED HYPERLIPIDEMIA: ICD-10-CM

## 2021-01-01 DIAGNOSIS — C78.7 ADENOCARCINOMA OF RIGHT BREAST METASTATIC TO LIVER (HCC): Primary | ICD-10-CM

## 2021-01-01 DIAGNOSIS — I10 HYPERTENSION, UNSPECIFIED TYPE: ICD-10-CM

## 2021-01-01 DIAGNOSIS — C78.7 ADENOCARCINOMA OF RIGHT BREAST METASTATIC TO LIVER (HCC): Chronic | ICD-10-CM

## 2021-01-01 DIAGNOSIS — I27.82 OTHER CHRONIC PULMONARY EMBOLISM WITH ACUTE COR PULMONALE (HCC): ICD-10-CM

## 2021-01-01 DIAGNOSIS — E11.65 TYPE 2 DIABETES MELLITUS WITH HYPERGLYCEMIA, WITHOUT LONG-TERM CURRENT USE OF INSULIN (HCC): ICD-10-CM

## 2021-01-01 DIAGNOSIS — C78.7 ADENOCARCINOMA OF RIGHT BREAST METASTATIC TO LIVER (HCC): ICD-10-CM

## 2021-01-01 DIAGNOSIS — C50.911 ADENOCARCINOMA OF RIGHT BREAST METASTATIC TO LIVER (HCC): Primary | ICD-10-CM

## 2021-01-01 DIAGNOSIS — C50.911 ADENOCARCINOMA OF RIGHT BREAST METASTATIC TO LIVER (HCC): Primary | Chronic | ICD-10-CM

## 2021-01-01 DIAGNOSIS — C50.911 ADENOCARCINOMA OF RIGHT BREAST METASTATIC TO LIVER (HCC): ICD-10-CM

## 2021-01-01 DIAGNOSIS — D69.6 THROMBOCYTOPENIA, UNSPECIFIED (HCC): ICD-10-CM

## 2021-01-01 DIAGNOSIS — Z95.810 AICD (AUTOMATIC CARDIOVERTER/DEFIBRILLATOR) PRESENT: Primary | ICD-10-CM

## 2021-01-01 DIAGNOSIS — E11.9 TYPE 2 DIABETES MELLITUS WITHOUT COMPLICATION, WITHOUT LONG-TERM CURRENT USE OF INSULIN (HCC): ICD-10-CM

## 2021-01-01 DIAGNOSIS — C78.7 ADENOCARCINOMA OF RIGHT BREAST METASTATIC TO LIVER (HCC): Primary | Chronic | ICD-10-CM

## 2021-01-01 DIAGNOSIS — I50.32 CHRONIC HEART FAILURE WITH PRESERVED EJECTION FRACTION (HFPEF) (HCC): ICD-10-CM

## 2021-01-01 DIAGNOSIS — C50.911 ADENOCARCINOMA OF RIGHT BREAST METASTATIC TO LIVER (HCC): Chronic | ICD-10-CM

## 2021-01-01 DIAGNOSIS — C78.7 METASTATIC ADENOCARCINOMA TO LIVER (HCC): Primary | ICD-10-CM

## 2021-01-01 DIAGNOSIS — Z95.810 PRESENCE OF AUTOMATIC CARDIOVERTER/DEFIBRILLATOR (AICD): Primary | ICD-10-CM

## 2021-01-01 DIAGNOSIS — E78.5 HYPERLIPIDEMIA, UNSPECIFIED HYPERLIPIDEMIA TYPE: ICD-10-CM

## 2021-01-01 DIAGNOSIS — C78.7 METASTASIS TO LIVER (HCC): ICD-10-CM

## 2021-01-01 DIAGNOSIS — E78.00 HYPERCHOLESTEREMIA: Primary | ICD-10-CM

## 2021-01-01 DIAGNOSIS — E11.65 TYPE 2 DIABETES MELLITUS WITH HYPERGLYCEMIA, WITHOUT LONG-TERM CURRENT USE OF INSULIN (HCC): Primary | ICD-10-CM

## 2021-01-01 DIAGNOSIS — C78.7 METASTASIS TO LIVER (HCC): Primary | ICD-10-CM

## 2021-01-01 DIAGNOSIS — T45.1X5A CHEMOTHERAPY INDUCED NAUSEA AND VOMITING: Primary | ICD-10-CM

## 2021-01-01 DIAGNOSIS — I50.32 CHRONIC DIASTOLIC CHF (CONGESTIVE HEART FAILURE), NYHA CLASS 2 (HCC): ICD-10-CM

## 2021-01-01 DIAGNOSIS — Z23 ENCOUNTER FOR IMMUNIZATION: Primary | ICD-10-CM

## 2021-01-01 DIAGNOSIS — Z86.79 HISTORY OF VENTRICULAR TACHYCARDIA: ICD-10-CM

## 2021-01-01 DIAGNOSIS — D50.0 IRON DEFICIENCY ANEMIA SECONDARY TO BLOOD LOSS (CHRONIC): ICD-10-CM

## 2021-01-01 DIAGNOSIS — I49.5 SSS (SICK SINUS SYNDROME) (HCC): ICD-10-CM

## 2021-01-01 DIAGNOSIS — R11.2 CHEMOTHERAPY INDUCED NAUSEA AND VOMITING: Primary | ICD-10-CM

## 2021-01-01 DIAGNOSIS — Z86.79 HISTORY OF VENTRICULAR TACHYCARDIA: Primary | ICD-10-CM

## 2021-01-01 DIAGNOSIS — I26.09 OTHER CHRONIC PULMONARY EMBOLISM WITH ACUTE COR PULMONALE (HCC): ICD-10-CM

## 2021-01-01 DIAGNOSIS — I50.32 CHRONIC HEART FAILURE WITH PRESERVED EJECTION FRACTION (HCC): Primary | ICD-10-CM

## 2021-01-01 LAB
ALBUMIN SERPL BCP-MCNC: 3.3 G/DL (ref 3.5–5)
ALBUMIN SERPL BCP-MCNC: 3.3 G/DL (ref 3.5–5)
ALBUMIN SERPL BCP-MCNC: 3.5 G/DL (ref 3.5–5)
ALBUMIN SERPL BCP-MCNC: 3.5 G/DL (ref 3.5–5)
ALP SERPL-CCNC: 124 U/L (ref 46–116)
ALP SERPL-CCNC: 135 U/L (ref 46–116)
ALP SERPL-CCNC: 143 U/L (ref 46–116)
ALP SERPL-CCNC: 150 U/L (ref 46–116)
ALT SERPL W P-5'-P-CCNC: 32 U/L (ref 12–78)
ALT SERPL W P-5'-P-CCNC: 35 U/L (ref 12–78)
ALT SERPL W P-5'-P-CCNC: 35 U/L (ref 12–78)
ALT SERPL W P-5'-P-CCNC: 38 U/L (ref 12–78)
ANION GAP SERPL CALCULATED.3IONS-SCNC: 2 MMOL/L (ref 4–13)
ANION GAP SERPL CALCULATED.3IONS-SCNC: 2 MMOL/L (ref 4–13)
ANION GAP SERPL CALCULATED.3IONS-SCNC: 4 MMOL/L (ref 4–13)
ANION GAP SERPL CALCULATED.3IONS-SCNC: 6 MMOL/L (ref 4–13)
AST SERPL W P-5'-P-CCNC: 34 U/L (ref 5–45)
AST SERPL W P-5'-P-CCNC: 38 U/L (ref 5–45)
AST SERPL W P-5'-P-CCNC: 38 U/L (ref 5–45)
AST SERPL W P-5'-P-CCNC: 40 U/L (ref 5–45)
BASOPHILS # BLD AUTO: 0.06 THOUSANDS/ΜL (ref 0–0.1)
BASOPHILS # BLD AUTO: 0.07 THOUSANDS/ΜL (ref 0–0.1)
BASOPHILS # BLD AUTO: 0.09 THOUSANDS/ΜL (ref 0–0.1)
BASOPHILS # BLD AUTO: 0.09 THOUSANDS/ΜL (ref 0–0.1)
BASOPHILS NFR BLD AUTO: 1 % (ref 0–1)
BILIRUB SERPL-MCNC: 0.66 MG/DL (ref 0.2–1)
BILIRUB SERPL-MCNC: 0.67 MG/DL (ref 0.2–1)
BILIRUB SERPL-MCNC: 0.68 MG/DL (ref 0.2–1)
BILIRUB SERPL-MCNC: 0.75 MG/DL (ref 0.2–1)
BUN SERPL-MCNC: 14 MG/DL (ref 5–25)
BUN SERPL-MCNC: 15 MG/DL (ref 5–25)
BUN SERPL-MCNC: 17 MG/DL (ref 5–25)
BUN SERPL-MCNC: 17 MG/DL (ref 5–25)
CALCIUM ALBUM COR SERPL-MCNC: 10.5 MG/DL (ref 8.3–10.1)
CALCIUM ALBUM COR SERPL-MCNC: 11.1 MG/DL (ref 8.3–10.1)
CALCIUM SERPL-MCNC: 10 MG/DL (ref 8.3–10.1)
CALCIUM SERPL-MCNC: 10.3 MG/DL (ref 8.3–10.1)
CALCIUM SERPL-MCNC: 10.5 MG/DL (ref 8.3–10.1)
CALCIUM SERPL-MCNC: 9.9 MG/DL (ref 8.3–10.1)
CANCER AG27-29 SERPL-ACNC: 241.4 U/ML (ref 0–42.3)
CANCER AG27-29 SERPL-ACNC: 346.1 U/ML (ref 0–42.3)
CHLORIDE SERPL-SCNC: 103 MMOL/L (ref 100–108)
CHLORIDE SERPL-SCNC: 108 MMOL/L (ref 100–108)
CHLORIDE SERPL-SCNC: 109 MMOL/L (ref 100–108)
CHLORIDE SERPL-SCNC: 109 MMOL/L (ref 100–108)
CHOLEST SERPL-MCNC: 129 MG/DL (ref 50–200)
CO2 SERPL-SCNC: 28 MMOL/L (ref 21–32)
CO2 SERPL-SCNC: 28 MMOL/L (ref 21–32)
CO2 SERPL-SCNC: 31 MMOL/L (ref 21–32)
CO2 SERPL-SCNC: 32 MMOL/L (ref 21–32)
CREAT SERPL-MCNC: 0.76 MG/DL (ref 0.6–1.3)
CREAT SERPL-MCNC: 0.81 MG/DL (ref 0.6–1.3)
CREAT SERPL-MCNC: 0.81 MG/DL (ref 0.6–1.3)
CREAT SERPL-MCNC: 0.87 MG/DL (ref 0.6–1.3)
CREAT UR-MCNC: 99.3 MG/DL
EOSINOPHIL # BLD AUTO: 0.05 THOUSAND/ΜL (ref 0–0.61)
EOSINOPHIL # BLD AUTO: 0.06 THOUSAND/ΜL (ref 0–0.61)
EOSINOPHIL # BLD AUTO: 0.23 THOUSAND/ΜL (ref 0–0.61)
EOSINOPHIL # BLD AUTO: 0.27 THOUSAND/ΜL (ref 0–0.61)
EOSINOPHIL NFR BLD AUTO: 1 % (ref 0–6)
EOSINOPHIL NFR BLD AUTO: 1 % (ref 0–6)
EOSINOPHIL NFR BLD AUTO: 4 % (ref 0–6)
EOSINOPHIL NFR BLD AUTO: 4 % (ref 0–6)
ERYTHROCYTE [DISTWIDTH] IN BLOOD BY AUTOMATED COUNT: 13 % (ref 11.6–15.1)
ERYTHROCYTE [DISTWIDTH] IN BLOOD BY AUTOMATED COUNT: 13.1 % (ref 11.6–15.1)
ERYTHROCYTE [DISTWIDTH] IN BLOOD BY AUTOMATED COUNT: 17.7 % (ref 11.6–15.1)
ERYTHROCYTE [DISTWIDTH] IN BLOOD BY AUTOMATED COUNT: 19.9 % (ref 11.6–15.1)
EST. AVERAGE GLUCOSE BLD GHB EST-MCNC: 128 MG/DL
EST. AVERAGE GLUCOSE BLD GHB EST-MCNC: 140 MG/DL
GFR SERPL CREATININE-BSD FRML MDRD: 62 ML/MIN/1.73SQ M
GFR SERPL CREATININE-BSD FRML MDRD: 67 ML/MIN/1.73SQ M
GFR SERPL CREATININE-BSD FRML MDRD: 67 ML/MIN/1.73SQ M
GFR SERPL CREATININE-BSD FRML MDRD: 73 ML/MIN/1.73SQ M
GLUCOSE P FAST SERPL-MCNC: 113 MG/DL (ref 65–99)
GLUCOSE P FAST SERPL-MCNC: 115 MG/DL (ref 65–99)
GLUCOSE P FAST SERPL-MCNC: 136 MG/DL (ref 65–99)
GLUCOSE P FAST SERPL-MCNC: 176 MG/DL (ref 65–99)
HBA1C MFR BLD: 6.1 %
HBA1C MFR BLD: 6.5 %
HCT VFR BLD AUTO: 38.6 % (ref 34.8–46.1)
HCT VFR BLD AUTO: 38.7 % (ref 34.8–46.1)
HCT VFR BLD AUTO: 39.4 % (ref 34.8–46.1)
HCT VFR BLD AUTO: 40.7 % (ref 34.8–46.1)
HDLC SERPL-MCNC: 63 MG/DL
HGB BLD-MCNC: 11.7 G/DL (ref 11.5–15.4)
HGB BLD-MCNC: 12.1 G/DL (ref 11.5–15.4)
HGB BLD-MCNC: 12.1 G/DL (ref 11.5–15.4)
HGB BLD-MCNC: 12.5 G/DL (ref 11.5–15.4)
IMM GRANULOCYTES # BLD AUTO: 0.02 THOUSAND/UL (ref 0–0.2)
IMM GRANULOCYTES # BLD AUTO: 0.02 THOUSAND/UL (ref 0–0.2)
IMM GRANULOCYTES # BLD AUTO: 0.04 THOUSAND/UL (ref 0–0.2)
IMM GRANULOCYTES # BLD AUTO: 0.05 THOUSAND/UL (ref 0–0.2)
IMM GRANULOCYTES NFR BLD AUTO: 0 % (ref 0–2)
IMM GRANULOCYTES NFR BLD AUTO: 1 % (ref 0–2)
LDLC SERPL CALC-MCNC: 49 MG/DL (ref 0–100)
LEFT EYE DIABETIC RETINOPATHY: NORMAL
LYMPHOCYTES # BLD AUTO: 1.66 THOUSANDS/ΜL (ref 0.6–4.47)
LYMPHOCYTES # BLD AUTO: 1.97 THOUSANDS/ΜL (ref 0.6–4.47)
LYMPHOCYTES # BLD AUTO: 2.22 THOUSANDS/ΜL (ref 0.6–4.47)
LYMPHOCYTES # BLD AUTO: 2.38 THOUSANDS/ΜL (ref 0.6–4.47)
LYMPHOCYTES NFR BLD AUTO: 16 % (ref 14–44)
LYMPHOCYTES NFR BLD AUTO: 21 % (ref 14–44)
LYMPHOCYTES NFR BLD AUTO: 29 % (ref 14–44)
LYMPHOCYTES NFR BLD AUTO: 36 % (ref 14–44)
MCH RBC QN AUTO: 26.7 PG (ref 26.8–34.3)
MCH RBC QN AUTO: 27.3 PG (ref 26.8–34.3)
MCH RBC QN AUTO: 27.9 PG (ref 26.8–34.3)
MCH RBC QN AUTO: 29 PG (ref 26.8–34.3)
MCHC RBC AUTO-ENTMCNC: 30.2 G/DL (ref 31.4–37.4)
MCHC RBC AUTO-ENTMCNC: 30.7 G/DL (ref 31.4–37.4)
MCHC RBC AUTO-ENTMCNC: 30.7 G/DL (ref 31.4–37.4)
MCHC RBC AUTO-ENTMCNC: 31.3 G/DL (ref 31.4–37.4)
MCV RBC AUTO: 87 FL (ref 82–98)
MCV RBC AUTO: 87 FL (ref 82–98)
MCV RBC AUTO: 92 FL (ref 82–98)
MCV RBC AUTO: 95 FL (ref 82–98)
MICROALBUMIN UR-MCNC: 25.4 MG/L (ref 0–20)
MICROALBUMIN/CREAT 24H UR: 26 MG/G CREATININE (ref 0–30)
MONOCYTES # BLD AUTO: 0.8 THOUSAND/ΜL (ref 0.17–1.22)
MONOCYTES # BLD AUTO: 0.85 THOUSAND/ΜL (ref 0.17–1.22)
MONOCYTES # BLD AUTO: 0.87 THOUSAND/ΜL (ref 0.17–1.22)
MONOCYTES # BLD AUTO: 1.15 THOUSAND/ΜL (ref 0.17–1.22)
MONOCYTES NFR BLD AUTO: 11 % (ref 4–12)
MONOCYTES NFR BLD AUTO: 12 % (ref 4–12)
MONOCYTES NFR BLD AUTO: 13 % (ref 4–12)
MONOCYTES NFR BLD AUTO: 9 % (ref 4–12)
NEUTROPHILS # BLD AUTO: 3.04 THOUSANDS/ΜL (ref 1.85–7.62)
NEUTROPHILS # BLD AUTO: 4.12 THOUSANDS/ΜL (ref 1.85–7.62)
NEUTROPHILS # BLD AUTO: 6.37 THOUSANDS/ΜL (ref 1.85–7.62)
NEUTROPHILS # BLD AUTO: 7.14 THOUSANDS/ΜL (ref 1.85–7.62)
NEUTS SEG NFR BLD AUTO: 46 % (ref 43–75)
NEUTS SEG NFR BLD AUTO: 54 % (ref 43–75)
NEUTS SEG NFR BLD AUTO: 68 % (ref 43–75)
NEUTS SEG NFR BLD AUTO: 70 % (ref 43–75)
NRBC BLD AUTO-RTO: 0 /100 WBCS
PLATELET # BLD AUTO: 197 THOUSANDS/UL (ref 149–390)
PLATELET # BLD AUTO: 215 THOUSANDS/UL (ref 149–390)
PLATELET # BLD AUTO: 251 THOUSANDS/UL (ref 149–390)
PLATELET # BLD AUTO: 289 THOUSANDS/UL (ref 149–390)
PMV BLD AUTO: 10 FL (ref 8.9–12.7)
PMV BLD AUTO: 9.5 FL (ref 8.9–12.7)
PMV BLD AUTO: 9.6 FL (ref 8.9–12.7)
PMV BLD AUTO: 9.7 FL (ref 8.9–12.7)
POTASSIUM SERPL-SCNC: 4.4 MMOL/L (ref 3.5–5.3)
POTASSIUM SERPL-SCNC: 4.4 MMOL/L (ref 3.5–5.3)
POTASSIUM SERPL-SCNC: 5.2 MMOL/L (ref 3.5–5.3)
POTASSIUM SERPL-SCNC: 5.8 MMOL/L (ref 3.5–5.3)
PROT SERPL-MCNC: 7 G/DL (ref 6.4–8.2)
PROT SERPL-MCNC: 7.5 G/DL (ref 6.4–8.2)
PROT SERPL-MCNC: 7.8 G/DL (ref 6.4–8.2)
PROT SERPL-MCNC: 7.8 G/DL (ref 6.4–8.2)
RBC # BLD AUTO: 4.17 MILLION/UL (ref 3.81–5.12)
RBC # BLD AUTO: 4.19 MILLION/UL (ref 3.81–5.12)
RBC # BLD AUTO: 4.44 MILLION/UL (ref 3.81–5.12)
RBC # BLD AUTO: 4.69 MILLION/UL (ref 3.81–5.12)
RIGHT EYE DIABETIC RETINOPATHY: NORMAL
SODIUM SERPL-SCNC: 139 MMOL/L (ref 136–145)
SODIUM SERPL-SCNC: 140 MMOL/L (ref 136–145)
SODIUM SERPL-SCNC: 141 MMOL/L (ref 136–145)
SODIUM SERPL-SCNC: 142 MMOL/L (ref 136–145)
TRIGL SERPL-MCNC: 86 MG/DL
WBC # BLD AUTO: 10.14 THOUSAND/UL (ref 4.31–10.16)
WBC # BLD AUTO: 6.59 THOUSAND/UL (ref 4.31–10.16)
WBC # BLD AUTO: 7.56 THOUSAND/UL (ref 4.31–10.16)
WBC # BLD AUTO: 9.33 THOUSAND/UL (ref 4.31–10.16)

## 2021-01-01 PROCEDURE — 71260 CT THORAX DX C+: CPT

## 2021-01-01 PROCEDURE — 85025 COMPLETE CBC W/AUTO DIFF WBC: CPT | Performed by: NURSE PRACTITIONER

## 2021-01-01 PROCEDURE — 99211 OFF/OP EST MAY X REQ PHY/QHP: CPT | Performed by: RADIOLOGY

## 2021-01-01 PROCEDURE — 93295 DEV INTERROG REMOTE 1/2/MLT: CPT | Performed by: INTERNAL MEDICINE

## 2021-01-01 PROCEDURE — 3077F SYST BP >= 140 MM HG: CPT | Performed by: NURSE PRACTITIONER

## 2021-01-01 PROCEDURE — 85025 COMPLETE CBC W/AUTO DIFF WBC: CPT

## 2021-01-01 PROCEDURE — 1036F TOBACCO NON-USER: CPT | Performed by: INTERNAL MEDICINE

## 2021-01-01 PROCEDURE — 3078F DIAST BP <80 MM HG: CPT | Performed by: INTERNAL MEDICINE

## 2021-01-01 PROCEDURE — 93000 ELECTROCARDIOGRAM COMPLETE: CPT | Performed by: INTERNAL MEDICINE

## 2021-01-01 PROCEDURE — 93283 PRGRMG EVAL IMPLANTABLE DFB: CPT | Performed by: INTERNAL MEDICINE

## 2021-01-01 PROCEDURE — 99214 OFFICE O/P EST MOD 30 MIN: CPT | Performed by: INTERNAL MEDICINE

## 2021-01-01 PROCEDURE — 80053 COMPREHEN METABOLIC PANEL: CPT

## 2021-01-01 PROCEDURE — 1160F RVW MEDS BY RX/DR IN RCRD: CPT | Performed by: INTERNAL MEDICINE

## 2021-01-01 PROCEDURE — 36415 COLL VENOUS BLD VENIPUNCTURE: CPT | Performed by: NURSE PRACTITIONER

## 2021-01-01 PROCEDURE — 96367 TX/PROPH/DG ADDL SEQ IV INF: CPT

## 2021-01-01 PROCEDURE — 82043 UR ALBUMIN QUANTITATIVE: CPT

## 2021-01-01 PROCEDURE — 3074F SYST BP LT 130 MM HG: CPT | Performed by: NURSE PRACTITIONER

## 2021-01-01 PROCEDURE — 99215 OFFICE O/P EST HI 40 MIN: CPT | Performed by: NURSE PRACTITIONER

## 2021-01-01 PROCEDURE — 80061 LIPID PANEL: CPT

## 2021-01-01 PROCEDURE — 99214 OFFICE O/P EST MOD 30 MIN: CPT | Performed by: NURSE PRACTITIONER

## 2021-01-01 PROCEDURE — 3078F DIAST BP <80 MM HG: CPT | Performed by: NURSE PRACTITIONER

## 2021-01-01 PROCEDURE — 1036F TOBACCO NON-USER: CPT | Performed by: NURSE PRACTITIONER

## 2021-01-01 PROCEDURE — 93297 REM INTERROG DEV EVAL ICPMS: CPT | Performed by: INTERNAL MEDICINE

## 2021-01-01 PROCEDURE — 36415 COLL VENOUS BLD VENIPUNCTURE: CPT

## 2021-01-01 PROCEDURE — 3079F DIAST BP 80-89 MM HG: CPT | Performed by: NURSE PRACTITIONER

## 2021-01-01 PROCEDURE — 83036 HEMOGLOBIN GLYCOSYLATED A1C: CPT

## 2021-01-01 PROCEDURE — 99214 OFFICE O/P EST MOD 30 MIN: CPT | Performed by: PHYSICIAN ASSISTANT

## 2021-01-01 PROCEDURE — G2066 INTER DEVC REMOTE 30D: HCPCS | Performed by: INTERNAL MEDICINE

## 2021-01-01 PROCEDURE — 82570 ASSAY OF URINE CREATININE: CPT

## 2021-01-01 PROCEDURE — 74177 CT ABD & PELVIS W/CONTRAST: CPT

## 2021-01-01 PROCEDURE — 99213 OFFICE O/P EST LOW 20 MIN: CPT | Performed by: INTERNAL MEDICINE

## 2021-01-01 PROCEDURE — 1160F RVW MEDS BY RX/DR IN RCRD: CPT | Performed by: NURSE PRACTITIONER

## 2021-01-01 PROCEDURE — 96413 CHEMO IV INFUSION 1 HR: CPT

## 2021-01-01 PROCEDURE — 93296 REM INTERROG EVL PM/IDS: CPT | Performed by: INTERNAL MEDICINE

## 2021-01-01 PROCEDURE — 80053 COMPREHEN METABOLIC PANEL: CPT | Performed by: NURSE PRACTITIONER

## 2021-01-01 PROCEDURE — 86300 IMMUNOASSAY TUMOR CA 15-3: CPT

## 2021-01-01 PROCEDURE — G1004 CDSM NDSC: HCPCS

## 2021-01-01 PROCEDURE — 91301 SARSCOV2 VAC 100MCG/0.5ML IM: CPT

## 2021-01-01 PROCEDURE — 3075F SYST BP GE 130 - 139MM HG: CPT | Performed by: INTERNAL MEDICINE

## 2021-01-01 RX ORDER — TORSEMIDE 20 MG/1
20 TABLET ORAL DAILY
Qty: 90 TABLET | Refills: 3 | Status: SHIPPED | OUTPATIENT
Start: 2021-01-01 | End: 2021-01-01

## 2021-01-01 RX ORDER — ONDANSETRON HYDROCHLORIDE 8 MG/1
8 TABLET, FILM COATED ORAL EVERY 8 HOURS PRN
Qty: 20 TABLET | Refills: 3 | Status: SHIPPED | OUTPATIENT
Start: 2021-01-01 | End: 2022-01-01 | Stop reason: HOSPADM

## 2021-01-01 RX ORDER — SODIUM CHLORIDE 9 MG/ML
20 INJECTION, SOLUTION INTRAVENOUS ONCE
Status: COMPLETED | OUTPATIENT
Start: 2021-01-01 | End: 2021-01-01

## 2021-01-01 RX ORDER — SODIUM CHLORIDE 9 MG/ML
20 INJECTION, SOLUTION INTRAVENOUS ONCE
Status: CANCELLED | OUTPATIENT
Start: 2021-01-01

## 2021-01-01 RX ORDER — TORSEMIDE 20 MG/1
20 TABLET ORAL AS NEEDED
Qty: 90 TABLET | Refills: 3 | Status: SHIPPED | OUTPATIENT
Start: 2021-01-01 | End: 2022-01-01 | Stop reason: HOSPADM

## 2021-01-01 RX ORDER — GLIPIZIDE 5 MG/1
TABLET ORAL
Qty: 30 TABLET | Refills: 3 | Status: SHIPPED | OUTPATIENT
Start: 2021-01-01 | End: 2022-01-01 | Stop reason: HOSPADM

## 2021-01-01 RX ORDER — GLIPIZIDE 5 MG/1
5 TABLET ORAL DAILY
Qty: 45 TABLET | Refills: 1 | Status: SHIPPED | OUTPATIENT
Start: 2021-01-01 | End: 2021-01-01 | Stop reason: SDUPTHER

## 2021-01-01 RX ORDER — METOPROLOL TARTRATE 50 MG/1
50 TABLET, FILM COATED ORAL EVERY 12 HOURS SCHEDULED
Qty: 60 TABLET | Refills: 3 | Status: SHIPPED | OUTPATIENT
Start: 2021-01-01 | End: 2021-01-01

## 2021-01-01 RX ORDER — METOPROLOL TARTRATE 50 MG/1
50 TABLET, FILM COATED ORAL EVERY 12 HOURS SCHEDULED
Qty: 60 TABLET | Refills: 3 | Status: SHIPPED | OUTPATIENT
Start: 2021-01-01 | End: 2022-01-01

## 2021-01-01 RX ORDER — GLIPIZIDE 5 MG/1
TABLET ORAL
Qty: 30 TABLET | Refills: 3 | Status: SHIPPED | OUTPATIENT
Start: 2021-01-01 | End: 2021-01-01 | Stop reason: SDUPTHER

## 2021-01-01 RX ADMIN — SODIUM CHLORIDE 20 ML/HR: 0.9 INJECTION, SOLUTION INTRAVENOUS at 11:56

## 2021-01-01 RX ADMIN — DOCETAXEL 62.8 MG: 20 INJECTION, SOLUTION, CONCENTRATE INTRAVENOUS at 13:18

## 2021-01-01 RX ADMIN — DOCETAXEL 62.8 MG: 20 INJECTION, SOLUTION, CONCENTRATE INTRAVENOUS at 12:27

## 2021-01-01 RX ADMIN — DEXAMETHASONE SODIUM PHOSPHATE 10 MG: 10 INJECTION, SOLUTION INTRAMUSCULAR; INTRAVENOUS at 12:54

## 2021-01-01 RX ADMIN — IOHEXOL 100 ML: 350 INJECTION, SOLUTION INTRAVENOUS at 17:05

## 2021-01-01 RX ADMIN — DOCETAXEL 62.8 MG: 20 INJECTION, SOLUTION, CONCENTRATE INTRAVENOUS at 12:22

## 2021-01-01 RX ADMIN — DOCETAXEL 62.8 MG: 20 INJECTION, SOLUTION, CONCENTRATE INTRAVENOUS at 12:13

## 2021-01-01 RX ADMIN — DEXAMETHASONE SODIUM PHOSPHATE 10 MG: 10 INJECTION, SOLUTION INTRAMUSCULAR; INTRAVENOUS at 12:05

## 2021-01-01 RX ADMIN — DEXAMETHASONE SODIUM PHOSPHATE 10 MG: 10 INJECTION, SOLUTION INTRAMUSCULAR; INTRAVENOUS at 12:01

## 2021-01-01 RX ADMIN — SODIUM CHLORIDE 20 ML/HR: 0.9 INJECTION, SOLUTION INTRAVENOUS at 11:49

## 2021-01-01 RX ADMIN — IOHEXOL 100 ML: 350 INJECTION, SOLUTION INTRAVENOUS at 19:05

## 2021-01-01 RX ADMIN — SODIUM CHLORIDE 20 ML/HR: 0.9 INJECTION, SOLUTION INTRAVENOUS at 12:52

## 2021-01-01 RX ADMIN — SODIUM CHLORIDE 20 ML/HR: 0.9 INJECTION, SOLUTION INTRAVENOUS at 11:55

## 2021-01-01 RX ADMIN — DOCETAXEL 62.8 MG: 20 INJECTION, SOLUTION, CONCENTRATE INTRAVENOUS at 09:43

## 2021-01-01 RX ADMIN — DOCETAXEL 62.8 MG: 20 INJECTION, SOLUTION, CONCENTRATE INTRAVENOUS at 12:37

## 2021-01-01 RX ADMIN — SODIUM CHLORIDE 20 ML/HR: 0.9 INJECTION, SOLUTION INTRAVENOUS at 09:20

## 2021-01-01 RX ADMIN — DEXAMETHASONE SODIUM PHOSPHATE 10 MG: 10 INJECTION, SOLUTION INTRAMUSCULAR; INTRAVENOUS at 09:20

## 2021-01-01 RX ADMIN — SODIUM CHLORIDE 20 ML/HR: 0.9 INJECTION, SOLUTION INTRAVENOUS at 12:05

## 2021-01-01 RX ADMIN — DEXAMETHASONE SODIUM PHOSPHATE 10 MG: 10 INJECTION, SOLUTION INTRAMUSCULAR; INTRAVENOUS at 11:51

## 2021-01-06 ENCOUNTER — DOCUMENTATION (OUTPATIENT)
Dept: HEMATOLOGY ONCOLOGY | Facility: MEDICAL CENTER | Age: 81
End: 2021-01-06

## 2021-01-06 NOTE — PROGRESS NOTES
Attempted to contact to confirm appointment with [facility] on 01/07 and to screen for COVID, but patient did not answer and no option for VM

## 2021-01-07 ENCOUNTER — TELEPHONE (OUTPATIENT)
Dept: HEMATOLOGY ONCOLOGY | Facility: CLINIC | Age: 81
End: 2021-01-07

## 2021-01-07 ENCOUNTER — TELEMEDICINE (OUTPATIENT)
Dept: HEMATOLOGY ONCOLOGY | Facility: CLINIC | Age: 81
End: 2021-01-07
Payer: COMMERCIAL

## 2021-01-07 DIAGNOSIS — C78.7 METASTASIS TO LIVER (HCC): ICD-10-CM

## 2021-01-07 DIAGNOSIS — C50.911 MALIGNANT NEOPLASM OF RIGHT FEMALE BREAST, UNSPECIFIED ESTROGEN RECEPTOR STATUS, UNSPECIFIED SITE OF BREAST (HCC): Primary | ICD-10-CM

## 2021-01-07 PROCEDURE — 99443 PR PHYS/QHP TELEPHONE EVALUATION 21-30 MIN: CPT | Performed by: NURSE PRACTITIONER

## 2021-01-07 RX ORDER — EXEMESTANE 25 MG/1
25 TABLET ORAL DAILY
Qty: 90 TABLET | Refills: 3 | Status: SHIPPED | OUTPATIENT
Start: 2021-01-07 | End: 2021-01-01 | Stop reason: ALTCHOICE

## 2021-01-07 RX ORDER — EVEROLIMUS 2.5 MG/1
2.5 TABLET ORAL DAILY
Qty: 30 TABLET | Refills: 6 | Status: SHIPPED | OUTPATIENT
Start: 2021-01-07 | End: 2021-03-12 | Stop reason: SDUPTHER

## 2021-01-07 NOTE — TELEPHONE ENCOUNTER
Patient stated that she is feeling very anxious due to her 's recent hospitalization as of Monday 1/4/2021  Patient prefers a virtual visit via telephone call for TODAY'S 2:00 pm appointment rather than rescheduling  Appointment has been converted to virtual  Please advise if the patient must be rescheduled for an in office visit instead  Thanks

## 2021-01-07 NOTE — TELEPHONE ENCOUNTER
Called patient to schedule her 9 week follow up with Karolyn Hazel  She said she will call back to move her appointment to a virtual should she not feel better

## 2021-01-07 NOTE — PROGRESS NOTES
Virtual Brief Visit    Assessment/Plan:    Problem List Items Addressed This Visit     None      Visit Diagnoses     Malignant neoplasm of right female breast, unspecified estrogen receptor status, unspecified site of breast ([facility] [address] )    -  Primary    Relevant Medications    exemestane (AROMASIN) 25 MG tablet    everolimus (AFINITOR) 2 5 MG tablet    Other Relevant Orders    CBC and differential    Comprehensive metabolic panel    Cancer antigen 27 29    Metastasis to liver (HCC)        Relevant Medications    exemestane (AROMASIN) 25 MG tablet    everolimus (AFINITOR) 2 5 MG tablet    Other Relevant Orders    CBC and differential    Comprehensive metabolic panel    Cancer antigen 27 29         Patient had a virtual telephone visit secondary to not having a ride to the office as her  is currently in the hospital   Patient is an 25-year-old female with a history metastatic breast cancer to the liver  She is currently on Afinitor 2 5 mg p o  daily and exemestane 25 mg p o  daily  Patient started this regimen in early November 2020  We have been monitoring her CA 27-29 levels which have decreased to 116 9 previously 152 2 in November 30th 2020  We will continue the current medication at this time  Patient CBC and CMP are essentially stable  Her calcium is slightly elevated at 10 2  We will continue to monitor  Overall patient states she is doing well and able to function without difficulty  She has increased anxiety secondary to her  being in the hospital and not being able to drive  We will see patient in 2 months with a repeat CA 27-29, CBC and CMP  Patient verbalized understanding is in agreement with our plan  Reason for visit is No chief complaint on file         Encounter provider TITO Mckenzie    Provider located at [address] [address] [phone]    Recent Visits  No visits were found meeting these conditions  Showing recent visits within past 7 days and meeting all other requirements     Today's Visits  Date Type Provider Dept   01/07/21 Telephone Kendal Marti RN [facility]   01/07/21 Telephone Leonel Butts [facility]   01/07/21 Telemedicine TITO Dunbar P[facility] Hem Onc [facility]   Showing today's visits and meeting all other requirements     Future Appointments  No visits were found meeting these conditions  Showing future appointments within next 150 days and meeting all other requirements        After connecting through telephone, the patient was identified by name and date of birth  Bethany Jacobs was informed that this is a telemedicine visit and that the visit is being conducted through telephone  My office door was closed  No one else was in the room  She acknowledged consent and understanding of privacy and security of the platform  The patient has agreed to participate and understands she can discontinue the visit at any time  Patient is aware this is a billable service  Subjective    Bethany Jacobs is a 78 y o  female with a history of metastatic breast cancer currently on Afinitor and exemestane, clinically stable    HPI     Past Medical History:   Diagnosis Date    Acute biliary pancreatitis     Anxiety     Arthritis     Breast CA (HCC)     recurrent, ductal carcinoma ER, MD pos    Cardiac disease     Colon polyp     Depression     Diverticula of intestine     Diverticulosis     Dizziness     and passes out    Flushing     Hiatal hernia     [place]    Hypercholesteremia     Hypertension     Liver mass     Liver metastasis (HCC)     Metastatic adenocarcinoma to liver (HCC)     Bx 01/03/2017    PONV (postoperative nausea and vomiting)     Pulmonary embolism (HCC)     Recurrent breast cancer (HCC)     Shingles     Shortness of breath     on exertion    Tachycardia     Urinary incontinence     Use of cane as ambulatory aid     or walker       Past Surgical History:   Procedure Laterality Date    BREAST SURGERY      CARDIAC PACEMAKER REMOVAL N/A 11/9/2017    Procedure: PACEMAKER LEAD REVISION, REMOVAL OF NONFUNCTIONING LEAD, AND INSERTION OF A NEW RV LEAD;  Surgeon: Kiana Hazel MD;  [facility];  Service: Cardiology    CATARACT EXTRACTION Bilateral     COLONOSCOPY      ERCP N/A 1/8/2018    Procedure: ENDOSCOPIC RETROGRADE CHOLANGIOPANCREATOGRAPHY (ERCP); Surgeon: Rajwinder Christianson MD;  Location: [facility]; Service: Gastroenterology    HYSTERECTOMY      INSERT / REPLACE / Mancel Quiet      JOINT REPLACEMENT      [place]  TKR and right TSR    MASTECTOMY Right     CA ERCP DX COLLECTION SPECIMEN BRUSHING/WASHING N/A 2/22/2018    Procedure: ENDOSCOPIC RETROGRADE CHOLANGIOPANCREATOGRAPHY (ERCP) with stent removal;  Surgeon: Rajwinder Christianson MD;  [facility]: [facility]; Service: Gastroenterology    CA RESEC [address] N/A 7/13/2017    Procedure: LIVER RESECTION, INTRAOPERATIVE ULTRASOUND;  Surgeon: Hector Roman MD;  [facility];  Service: Surgical Oncology    ROTATOR CUFF REPAIR Right     SENTINEL LYMPH NODE BIOPSY Right     TONSILECTOMY AND ADNOIDECTOMY      WOUND DEBRIDEMENT Left 11/9/2017    Procedure: DEBRIDEMENT WOUND AND DRESSING CHANGE [facility]); Surgeon: Kiana Hazel MD;  [facility]: [facility];  Service: Cardiology       Current Outpatient Medications   Medication Sig Dispense Refill    acetaminophen (TYLENOL) 500 mg tablet Take 500 mg by mouth every 6 (six) hours as needed for mild pain      atorvastatin (LIPITOR) 20 mg tablet Take 20 mg by mouth daily        Calcium Citrate-Vitamin D (CALCIUM CITRATE + D PO) Take 1,500 mg by mouth daily      clindamycin (CLEOCIN) 300 MG capsule TAKE 2 CAPSULES BY MOUTH 1 HOUR PRIOR TO DENTAL PROCEDURE   0    clotrimazole-betamethasone (LOTRISONE) 1-0 05 % cream Apply 1 application topically as needed  Cranberry (THERACRAN HP PO) Take 2 tablets by mouth daily      everolimus (AFINITOR) 2 5 MG tablet Take 1 tablet (2 5 mg total) by mouth daily 30 tablet 6    exemestane (AROMASIN) 25 MG tablet Take 1 tablet (25 mg total) by mouth daily 90 tablet 3    furosemide (LASIX) 20 mg tablet Take 1 tablet (20 mg total) by mouth daily as needed (le swelling) Edema 30 tablet 3    Glucosamine-Chondroit-Vit C-Mn (GLUCOSAMINE 1500 COMPLEX) CAPS Take 1 capsule by mouth daily        metFORMIN (GLUCOPHAGE) 500 mg tablet 1 pill 3 times daily with meals      metoprolol tartrate (LOPRESSOR) 25 mg tablet Take 0 5 tablets (12 5 mg total) by mouth every 12 (twelve) hours 90 tablet 3    Multiple Vitamin (MULTIVITAMIN) capsule Take 1 capsule by mouth daily   ondansetron (ZOFRAN) 4 mg tablet Take 4 mg by mouth every 8 (eight) hours as needed      oxyCODONE (ROXICODONE) 5 mg immediate release tablet Take 1 tablet (5 mg total) by mouth every 4 (four) hours as needed for moderate painMax Daily Amount: 30 mg 30 tablet 0     No current facility-administered medications for this visit  Allergies   Allergen Reactions    Ampicillin Shortness Of Breath, Anaphylaxis and Other (See Comments)     Other reaction(s): Unknown Allergic Reaction  Reaction Date: [id number];        Review of Systems   Constitutional: Negative for activity change, appetite change, fatigue, fever and unexpected weight change  Respiratory: Negative for cough and shortness of breath  Cardiovascular: Negative for chest pain and leg swelling  Gastrointestinal: Negative for abdominal pain, constipation, diarrhea and nausea  Endocrine: Negative for cold intolerance and heat intolerance  Musculoskeletal: Negative for arthralgias and myalgias  Skin: Negative  Neurological: Negative for dizziness, weakness and headaches  Hematological: Negative for adenopathy  Does not bruise/bleed easily     Psychiatric/Behavioral: The patient is nervous/anxious  There were no vitals filed for this visit  I spent Twenty-five minutes directly with the patient during this visit    VIRTUAL VISIT DISCLAIMER    Milli Silva acknowledges that she has consented to an online visit or consultation  She understands that the online visit is based solely on information provided by her, and that, in the absence of a face-to-face physical evaluation by the physician, the diagnosis she receives is both limited and provisional in terms of accuracy and completeness  This is not intended to replace a full medical face-to-face evaluation by the physician  Milli Silva understands and accepts these terms

## 2021-01-07 NOTE — TELEPHONE ENCOUNTER
Patient is calling to see if her appointment today can be virtual   Patient is scheduled to see Hanny at 2pm  Per patient she is not feeling well enough to coming in the office    Will send a msg to [facility] to see if appointment can be changed to a virtual - patient requesting a call back ASAP to confirm that she does not need to come in for the appointment     Best call back number [phone]

## 2021-01-07 NOTE — TELEPHONE ENCOUNTER
Patient called and would like to pass the message along to Rusty that her " afinitor medication has been taken care of "

## 2021-01-07 NOTE — TELEPHONE ENCOUNTER
Patient called requesting a phone visit for today's appt with [facility]  Patient is not feeling well   Please call patient back at [phone]

## 2021-01-07 NOTE — TELEPHONE ENCOUNTER
Spoke with patient and made her aware her appt was changed to a virtual with [facility] for today

## 2021-01-11 ENCOUNTER — TELEPHONE (OUTPATIENT)
Dept: HEMATOLOGY ONCOLOGY | Facility: CLINIC | Age: 81
End: 2021-01-11

## 2021-01-11 NOTE — TELEPHONE ENCOUNTER
Patient stated [facility] wanted patient  to give her a call regarding her Afinitor   Please have [facility] call patient at [phone]

## 2021-01-11 NOTE — TELEPHONE ENCOUNTER
I phoned the pt, per Raúl Pacheco, regarding her prescription for Afinitor  The pt notes that a representative from [facility] phoned her last week and instructed her to call the pharmacy in [place] where she procures the med  The pt phoned this pharmacy and the med will be delivered to her tomorrow  The pt notes that she will continue to take her 2 pills a day as instructed when the meds arrive tomorrow  I informed the patient that I will let Shalonda Sandoval know this  The pt expressed understanding of this conversation

## 2021-01-21 ENCOUNTER — IMMUNIZATIONS (OUTPATIENT)
Dept: FAMILY MEDICINE CLINIC | Facility: HOSPITAL | Age: 81
End: 2021-01-21

## 2021-01-21 DIAGNOSIS — Z23 ENCOUNTER FOR IMMUNIZATION: Primary | ICD-10-CM

## 2021-01-21 PROCEDURE — 0011A SARS-COV-2 / COVID-19 MRNA VACCINE (MODERNA) 100 MCG: CPT

## 2021-01-21 PROCEDURE — 91301 SARS-COV-2 / COVID-19 MRNA VACCINE (MODERNA) 100 MCG: CPT

## 2021-02-20 ENCOUNTER — IMMUNIZATIONS (OUTPATIENT)
Dept: FAMILY MEDICINE CLINIC | Facility: HOSPITAL | Age: 81
End: 2021-02-20

## 2021-02-20 DIAGNOSIS — Z23 ENCOUNTER FOR IMMUNIZATION: Primary | ICD-10-CM

## 2021-02-20 PROCEDURE — 91301 SARS-COV-2 / COVID-19 MRNA VACCINE (MODERNA) 100 MCG: CPT

## 2021-02-20 PROCEDURE — 0012A SARS-COV-2 / COVID-19 MRNA VACCINE (MODERNA) 100 MCG: CPT

## 2021-03-06 ENCOUNTER — LAB (OUTPATIENT)
Dept: LAB | Facility: MEDICAL CENTER | Age: 81
End: 2021-03-06
Payer: COMMERCIAL

## 2021-03-06 DIAGNOSIS — C50.911 MALIGNANT NEOPLASM OF RIGHT FEMALE BREAST, UNSPECIFIED ESTROGEN RECEPTOR STATUS, UNSPECIFIED SITE OF BREAST (HCC): ICD-10-CM

## 2021-03-06 DIAGNOSIS — C78.7 METASTASIS TO LIVER (HCC): ICD-10-CM

## 2021-03-06 LAB
ALBUMIN SERPL BCP-MCNC: 3.5 G/DL (ref 3.5–5)
ALP SERPL-CCNC: 130 U/L (ref 46–116)
ALT SERPL W P-5'-P-CCNC: 30 U/L (ref 12–78)
ANION GAP SERPL CALCULATED.3IONS-SCNC: 4 MMOL/L (ref 4–13)
AST SERPL W P-5'-P-CCNC: 26 U/L (ref 5–45)
BASOPHILS # BLD AUTO: 0.08 THOUSANDS/ΜL (ref 0–0.1)
BASOPHILS NFR BLD AUTO: 1 % (ref 0–1)
BILIRUB SERPL-MCNC: 0.67 MG/DL (ref 0.2–1)
BUN SERPL-MCNC: 12 MG/DL (ref 5–25)
CALCIUM SERPL-MCNC: 9.7 MG/DL (ref 8.3–10.1)
CANCER AG27-29 SERPL-ACNC: 94.9 U/ML (ref 0–42.3)
CHLORIDE SERPL-SCNC: 107 MMOL/L (ref 100–108)
CO2 SERPL-SCNC: 29 MMOL/L (ref 21–32)
CREAT SERPL-MCNC: 0.79 MG/DL (ref 0.6–1.3)
EOSINOPHIL # BLD AUTO: 0.41 THOUSAND/ΜL (ref 0–0.61)
EOSINOPHIL NFR BLD AUTO: 5 % (ref 0–6)
ERYTHROCYTE [DISTWIDTH] IN BLOOD BY AUTOMATED COUNT: 12.8 % (ref 11.6–15.1)
GFR SERPL CREATININE-BSD FRML MDRD: 69 ML/MIN/1.73SQ M
GLUCOSE P FAST SERPL-MCNC: 251 MG/DL (ref 65–99)
HCT VFR BLD AUTO: 39.1 % (ref 34.8–46.1)
HGB BLD-MCNC: 12.3 G/DL (ref 11.5–15.4)
IMM GRANULOCYTES # BLD AUTO: 0.02 THOUSAND/UL (ref 0–0.2)
IMM GRANULOCYTES NFR BLD AUTO: 0 % (ref 0–2)
LYMPHOCYTES # BLD AUTO: 2.11 THOUSANDS/ΜL (ref 0.6–4.47)
LYMPHOCYTES NFR BLD AUTO: 27 % (ref 14–44)
MCH RBC QN AUTO: 27.4 PG (ref 26.8–34.3)
MCHC RBC AUTO-ENTMCNC: 31.5 G/DL (ref 31.4–37.4)
MCV RBC AUTO: 87 FL (ref 82–98)
MONOCYTES # BLD AUTO: 1.04 THOUSAND/ΜL (ref 0.17–1.22)
MONOCYTES NFR BLD AUTO: 13 % (ref 4–12)
NEUTROPHILS # BLD AUTO: 4.12 THOUSANDS/ΜL (ref 1.85–7.62)
NEUTS SEG NFR BLD AUTO: 54 % (ref 43–75)
NRBC BLD AUTO-RTO: 0 /100 WBCS
PLATELET # BLD AUTO: 170 THOUSANDS/UL (ref 149–390)
PMV BLD AUTO: 10.4 FL (ref 8.9–12.7)
POTASSIUM SERPL-SCNC: 4.6 MMOL/L (ref 3.5–5.3)
PROT SERPL-MCNC: 7.6 G/DL (ref 6.4–8.2)
RBC # BLD AUTO: 4.49 MILLION/UL (ref 3.81–5.12)
SODIUM SERPL-SCNC: 140 MMOL/L (ref 136–145)
WBC # BLD AUTO: 7.78 THOUSAND/UL (ref 4.31–10.16)

## 2021-03-06 PROCEDURE — 86300 IMMUNOASSAY TUMOR CA 15-3: CPT

## 2021-03-06 PROCEDURE — 80053 COMPREHEN METABOLIC PANEL: CPT

## 2021-03-06 PROCEDURE — 36415 COLL VENOUS BLD VENIPUNCTURE: CPT

## 2021-03-06 PROCEDURE — 85025 COMPLETE CBC W/AUTO DIFF WBC: CPT

## 2021-03-08 ENCOUNTER — TELEPHONE (OUTPATIENT)
Dept: HEMATOLOGY ONCOLOGY | Facility: CLINIC | Age: 81
End: 2021-03-08

## 2021-03-08 DIAGNOSIS — C78.7 METASTASIS TO LIVER (HCC): ICD-10-CM

## 2021-03-08 DIAGNOSIS — C50.911 MALIGNANT NEOPLASM OF RIGHT FEMALE BREAST, UNSPECIFIED ESTROGEN RECEPTOR STATUS, UNSPECIFIED SITE OF BREAST (HCC): ICD-10-CM

## 2021-03-08 RX ORDER — EVEROLIMUS 2.5 MG/1
2.5 TABLET ORAL DAILY
Qty: 30 TABLET | Refills: 6 | Status: CANCELLED | OUTPATIENT
Start: 2021-03-08

## 2021-03-08 NOTE — TELEPHONE ENCOUNTER
Appointment Confirmation     Appointment with  [facility]   Appointment date & time 03/11 at 1:00pm    [facility]   Patient verbilized Understanding  yes

## 2021-03-09 ENCOUNTER — TELEPHONE (OUTPATIENT)
Dept: CARDIOLOGY CLINIC | Facility: CLINIC | Age: 81
End: 2021-03-09

## 2021-03-09 ENCOUNTER — REMOTE DEVICE CLINIC VISIT (OUTPATIENT)
Dept: CARDIOLOGY CLINIC | Facility: CLINIC | Age: 81
End: 2021-03-09
Payer: COMMERCIAL

## 2021-03-09 DIAGNOSIS — Z95.810 PRESENCE OF IMPLANTABLE CARDIOVERTER-DEFIBRILLATOR (ICD): Primary | ICD-10-CM

## 2021-03-09 PROCEDURE — 93296 REM INTERROG EVL PM/IDS: CPT | Performed by: INTERNAL MEDICINE

## 2021-03-09 PROCEDURE — 93295 DEV INTERROG REMOTE 1/2/MLT: CPT | Performed by: INTERNAL MEDICINE

## 2021-03-09 NOTE — TELEPHONE ENCOUNTER
S/w Jocelyn Sanchez,    C/o LE edema L>R "3x's as big"  Worse when legs dependent as day progresses  No edema in am     C/o SOB on/off w/ exertion, unsure how long symptoms have been occurring, "Too long too remember"  C/o dry cough    Maintaining weight at 144-145 lbs  Does weigh daily    Lasix 20 mg ordered PRN for LE edema  Stopped taking x3 days ago due to not doing anything with edema and incontinent with increased urine output    States LLE is red and slightly warm to touch  Denies any open wounds or infections  Denies fever  Last echo 2019    COVID questions completed    Appt moved up to Thursday @ 2:40 after Hem Onc appt

## 2021-03-09 NOTE — TELEPHONE ENCOUNTER
----- Message from Aguila Lay sent at 3/9/2021  9:34 AM EST -----  Regarding: CRT-D PATIENT CROSSED OPTIVOL IN DECEMBER 2020  MDT BI-V ICD (3830 IN LV PORT)   CARELINK TRANSMISSION:  BATTERY VOLTAGE ADEQUATE (6 8 YR)   AP 1 2% %    ALL LEAD PARAMETERS WITHIN NORMAL LIMITS   CURRENT EGM SHOWS AS  BPM   2 VT-NS EPISODES SINCE 10/7/20 FOR NSVT (6 @ 176 BPM, 8 @ 207 BPM)   NO V SENSE EPISODES   OPTI-VOL FLUID THRESHOLD CROSSED 12/2020, AND IS ONGOING   PT TAKES FUROSEMIDE AND METOPROLOL   TASK TO HF RN   NORMAL DEVICE FUNCTION  Willy Shen

## 2021-03-09 NOTE — PROGRESS NOTES
MDT BI-V ICD (3830 IN LV PORT)   CARELINK TRANSMISSION:  BATTERY VOLTAGE ADEQUATE (6 8 YR)   AP 1 2% %    ALL LEAD PARAMETERS WITHIN NORMAL LIMITS   CURRENT EGM SHOWS AS  BPM   2 VT-NS EPISODES SINCE 10/7/20 FOR NSVT (6 @ 176 BPM, 8 @ 207 BPM)   NO V SENSE EPISODES   OPTI-VOL FLUID THRESHOLD CROSSED 12/2020, AND IS ONGOING   PT TAKES FUROSEMIDE AND METOPROLOL   TASK TO HF RN   NORMAL DEVICE FUNCTION  Willy Shen

## 2021-03-11 ENCOUNTER — OFFICE VISIT (OUTPATIENT)
Dept: HEMATOLOGY ONCOLOGY | Facility: CLINIC | Age: 81
End: 2021-03-11
Payer: COMMERCIAL

## 2021-03-11 ENCOUNTER — OFFICE VISIT (OUTPATIENT)
Dept: CARDIOLOGY CLINIC | Facility: CLINIC | Age: 81
End: 2021-03-11
Payer: COMMERCIAL

## 2021-03-11 VITALS
WEIGHT: 158 LBS | HEART RATE: 112 BPM | DIASTOLIC BLOOD PRESSURE: 78 MMHG | BODY MASS INDEX: 31.02 KG/M2 | HEIGHT: 60 IN | SYSTOLIC BLOOD PRESSURE: 132 MMHG | TEMPERATURE: 99 F | RESPIRATION RATE: 22 BRPM

## 2021-03-11 VITALS
BODY MASS INDEX: 28.74 KG/M2 | DIASTOLIC BLOOD PRESSURE: 74 MMHG | HEIGHT: 60 IN | HEART RATE: 113 BPM | OXYGEN SATURATION: 96 % | WEIGHT: 146.4 LBS | SYSTOLIC BLOOD PRESSURE: 132 MMHG

## 2021-03-11 DIAGNOSIS — C50.911 ADENOCARCINOMA OF RIGHT BREAST METASTATIC TO LIVER (HCC): ICD-10-CM

## 2021-03-11 DIAGNOSIS — I44.2 CHB (COMPLETE HEART BLOCK) (HCC): ICD-10-CM

## 2021-03-11 DIAGNOSIS — I10 HYPERTENSION, UNSPECIFIED TYPE: ICD-10-CM

## 2021-03-11 DIAGNOSIS — E11.9 TYPE 2 DIABETES MELLITUS WITHOUT COMPLICATION, WITHOUT LONG-TERM CURRENT USE OF INSULIN (HCC): ICD-10-CM

## 2021-03-11 DIAGNOSIS — I50.32 CHRONIC HEART FAILURE WITH PRESERVED EJECTION FRACTION (HFPEF) (HCC): Primary | ICD-10-CM

## 2021-03-11 DIAGNOSIS — Z86.79 HISTORY OF VENTRICULAR TACHYCARDIA: ICD-10-CM

## 2021-03-11 DIAGNOSIS — C78.7 ADENOCARCINOMA OF RIGHT BREAST METASTATIC TO LIVER (HCC): Primary | Chronic | ICD-10-CM

## 2021-03-11 DIAGNOSIS — C50.911 ADENOCARCINOMA OF RIGHT BREAST METASTATIC TO LIVER (HCC): Primary | Chronic | ICD-10-CM

## 2021-03-11 DIAGNOSIS — R00.0 TACHYCARDIA: ICD-10-CM

## 2021-03-11 DIAGNOSIS — C78.7 ADENOCARCINOMA OF RIGHT BREAST METASTATIC TO LIVER (HCC): ICD-10-CM

## 2021-03-11 DIAGNOSIS — E78.5 HYPERLIPIDEMIA, UNSPECIFIED HYPERLIPIDEMIA TYPE: ICD-10-CM

## 2021-03-11 PROCEDURE — 99214 OFFICE O/P EST MOD 30 MIN: CPT | Performed by: NURSE PRACTITIONER

## 2021-03-11 PROCEDURE — 99215 OFFICE O/P EST HI 40 MIN: CPT | Performed by: PHYSICIAN ASSISTANT

## 2021-03-11 RX ORDER — TORSEMIDE 20 MG/1
20 TABLET ORAL DAILY
Qty: 30 TABLET | Refills: 3 | Status: SHIPPED | OUTPATIENT
Start: 2021-03-11 | End: 2021-01-01 | Stop reason: SDUPTHER

## 2021-03-11 NOTE — PROGRESS NOTES
[address] [facility] HEMATOLOGY ONCOLOGY SPECIALISTS [address] [id number] [phone]  Progress Note  Marco Antonio De Souza, 1940, [id number]  3/11/2021    Assessment/Plan:  1  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 [facility])     Patient is an 57-year-old female with a history of metastatic breast cancer to the liver, currently on Afinitor 2 5 mg p o  daily and exemestane 25 mg p o  daily  She has been tolerating this without difficulty  Her most recent CA  27 -29 was 94 9, previously measuring 116 9  Metabolic panel and CBC are essentially stable  Her blood sugars have been elevated likely secondary to the Afinitor  Patient recently was started on Januvia to help control her blood sugars  She had previously been having intermittent edema of her lower extremities however this is becoming more consistent and daily  She believes this is secondary to the Januvia  Both Afinitor and exemestane can cause edema in the lower extremities however given that it was intermittent previous to starting the Januvia I suspect the  Edema is more likely to the Januvia  Her PCP has referred her to an endocrinologist however patient would like 1 within the  [employer] system  Therefore I have given her a list of our endocrinologists to contact  She also reports having tenderness in her bilateral lower extremities below the knee in the front of her legs and also in the calf likely secondary to the increased edema  She states the swelling does decrease at night and when she elevates her legs  She also has an appointment with Cardiology later today to discuss the increased fluid  Overall patient is doing well and able to function without restriction we will continue current medications at this time  Previously her calcium level was elevated which is now within normal limits  We will see her again in 2 months with repeat blood work    Patient and her  verbalized understanding and are in agreement with the plan  - Comprehensive metabolic panel; Future  - CBC and differential; Future  - Cancer antigen 27 29; Future    Goals and Barriers:    Current Goal:   Prolong Survival from Cancer  Barriers: None  Patient's Capacity to Self Care:  Patient   Is able to self care   -------------------------------------------------------------------------------------------------------    Chief Complaint   Patient presents with    Follow-up       History of present illness/Cancer History:   Oncology History   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)   2009 Initial Diagnosis    Adenocarcinoma of right breast,  Infiltrating ductal carcinoma T2, N1 sebastien) Tumor was ER/UT positive, HER2 negative  2009 Surgery    Right Mastectomy     2009 -  Chemotherapy    Adjuvant chemotherapy with Taxotere/Cytoxan x 4 cycles     2009 - 10/2014 Chemotherapy    Arimidex 1 mg po dialy     2/11/2016 Progression    Right lateral mastectomy site growth with core needle biopsy demonstrated reoccurrence  %, UT 70%, Her2 +1  Final Diagnosis   A  Breast, right lateral mastectomy site, core needle biopsies:                        - Recurrent invasive mammary carcinoma, no special type (formerly invasive ductal carcinoma), 0 8 cm largest single focus               - Combined Jose Elias score: 7/9                         - Tubule formation: None (3/3)  - Nuclear pleomorphism: Marked (3/3)  - Mitotic count: 3 mitoses per 10 high-power fields (1/3)  - No lymph-vascular invasion is identified              - No in situ component is identified                - No microcalcifications are identified  3/16/2016 - 1/24/2017 Chemotherapy    Faslodex 500 mg with loading dose followed by maintenance monthly injection with Ibrance 75mg 3 weeks on 1 week off       12/13/2016 Progression     Progression noted on PET-CT scan  Progression was largely found in the liver  Patient was set up for liver biopsy  1/3/2017 Biopsy    Liver lesion biopsy demonstated Estrogen Receptor (clone SP-1) 100%/positive, Progesterone Receptor (clone 1E2) 1-2%/positive, HER2 by IHC ([id number]) 2+/equivocal   Her2 by FISH was positive  2017 - 2017 Chemotherapy     Herceptin and Perjeta x5 cycles     2017 Adverse Reaction    Perjecta causing significant diarrhea  Medication stopped  2017 Surgery    Partial Hepatectomy, results demonstrated ER70%, PR0% & Her2 negative disease  2017 -  Chemotherapy    Tamoxifen 20 mg daily     2019 - 4/10/2019 Radiation      Plan ID Energy Fractions Dose per Fraction (cGy) Dose Correction (cGy) Total Dose Delivered (cGy) Elapsed Days   R Axilla # 10X/6X 25 / 25 200 0 5,000 35   R Axilla CD 10X/6X 5 / 5 200 0 1,000 6          2020 -  Hormone Therapy    Aromasin 25 mg daily     2020 -  Chemotherapy    Afinitor 2 5 mg daily          Cancer Staging  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 [facility])  Staging form: Breast, AJCC 8th Edition  - Pathologic stage from 2016: Stage IV (rpTX, pNX, pM1, G2, ER: Positive, [place]: Unknown, HER2: Negative) - Signed by Leonie Wiggins PA-C on 2018       ECO - Symptomatic but completely ambulatory    Interval history:   Clinically stable     Review of Systems   Constitutional: Negative for activity change, appetite change, fatigue, fever and unexpected weight change  Respiratory: Negative for cough and shortness of breath  Cardiovascular: Positive for leg swelling (Bilateral ankles)  Negative for chest pain  Gastrointestinal: Negative for abdominal pain, constipation, diarrhea and nausea  Endocrine: Negative for cold intolerance and heat intolerance  Musculoskeletal: Negative for arthralgias and myalgias  Bilateral lower  The knee tenderness   Skin: Negative      Neurological: Negative for dizziness, weakness and headaches  Hematological: Negative for adenopathy  Does not bruise/bleed easily  Current Outpatient Medications:     acetaminophen (TYLENOL) 500 mg tablet, Take 500 mg by mouth every 6 (six) hours as needed for mild pain, Disp: , Rfl:     atorvastatin (LIPITOR) 20 mg tablet, Take 20 mg by mouth daily  , Disp: , Rfl:     Calcium Citrate-Vitamin D (CALCIUM CITRATE + D PO), Take 1,500 mg by mouth daily, Disp: , Rfl:     clindamycin (CLEOCIN) 300 MG capsule, TAKE 2 CAPSULES BY MOUTH 1 HOUR PRIOR TO DENTAL PROCEDURE , Disp: , Rfl: 0    clotrimazole-betamethasone (LOTRISONE) 1-0 05 % cream, Apply 1 application topically as needed , Disp: , Rfl:     Cranberry (THERACRAN HP PO), Take 2 tablets by mouth daily, Disp: , Rfl:     everolimus (AFINITOR) 2 5 MG tablet, Take 1 tablet (2 5 mg total) by mouth daily, Disp: 30 tablet, Rfl: 6    exemestane (AROMASIN) 25 MG tablet, Take 1 tablet (25 mg total) by mouth daily, Disp: 90 tablet, Rfl: 3    Glucosamine-Chondroit-Vit C-Mn (GLUCOSAMINE 1500 COMPLEX) CAPS, Take 1 capsule by mouth daily  , Disp: , Rfl:     metFORMIN (GLUCOPHAGE) 500 mg tablet, 1 pill 3 times daily with meals, Disp: , Rfl:     metoprolol tartrate (LOPRESSOR) 25 mg tablet, Take 0 5 tablets (12 5 mg total) by mouth every 12 (twelve) hours, Disp: 90 tablet, Rfl: 3    Multiple Vitamin (MULTIVITAMIN) capsule, Take 1 capsule by mouth daily  , Disp: , Rfl:     ondansetron (ZOFRAN) 4 mg tablet, Take 4 mg by mouth every 8 (eight) hours as needed, Disp: , Rfl:     oxyCODONE (ROXICODONE) 5 mg immediate release tablet, Take 1 tablet (5 mg total) by mouth every 4 (four) hours as needed for moderate painMax Daily Amount: 30 mg, Disp: 30 tablet, Rfl: 0    sitaGLIPtin (JANUVIA) 100 mg tablet, Take 100 mg by mouth daily, Disp: , Rfl:     Allergies   Allergen Reactions    Ampicillin Shortness Of Breath, Anaphylaxis and Other (See Comments)     Other reaction(s): Unknown Allergic Reaction  Reaction Date: 07Mar2012; Advance Directive and Living Will:        Objective:   /78 (BP Location: Left arm, Patient Position: Sitting, Cuff Size: Standard)   Pulse (!) 112   Temp 99 °F (37 2 °C) (Temporal)   Resp 22   Ht 5' (1 524 m)   Wt 71 7 kg (158 lb)   LMP  (LMP Unknown) Comment: post   BMI 30 86 kg/m²   Wt Readings from Last 6 Encounters:   03/11/21 71 7 kg (158 lb)   12/03/20 70 8 kg (156 lb)   10/15/20 70 5 kg (155 lb 6 4 oz)   09/21/20 72 1 kg (159 lb)   08/20/20 72 1 kg (159 lb)   07/02/20 71 7 kg (158 lb)       Physical Exam  Constitutional:       Appearance: Normal appearance  She is well-developed  HENT:      Head: Normocephalic and atraumatic  Eyes:      Conjunctiva/sclera: Conjunctivae normal       Pupils: Pupils are equal, round, and reactive to light  Neck:      Musculoskeletal: Normal range of motion and neck supple  Cardiovascular:      Rate and Rhythm: Normal rate and regular rhythm  Pulses: Normal pulses  Heart sounds: Normal heart sounds  No murmur  Pulmonary:      Effort: Pulmonary effort is normal  No respiratory distress  Breath sounds: Normal breath sounds  Abdominal:      General: Bowel sounds are normal       Palpations: Abdomen is soft  Musculoskeletal: Normal range of motion  Lymphadenopathy:      Cervical: No cervical adenopathy  Skin:     General: Skin is warm and dry  Capillary Refill: Capillary refill takes less than 2 seconds  Neurological:      Mental Status: She is alert and oriented to person, place, and time     Psychiatric:         Behavior: Behavior normal          Pertinent Laboratory Results and Imaging Review:  Lab on 03/06/2021   Component Date Value Ref Range Status    WBC 03/06/2021 7 78  4 31 - 10 16 Thousand/uL Final    RBC 03/06/2021 4 49  3 81 - 5 12 Million/uL Final    Hemoglobin 03/06/2021 12 3  11 5 - 15 4 g/dL Final    Hematocrit 03/06/2021 39 1  34 8 - 46 1 % Final    MCV 03/06/2021 87 82 - 98 fL Final    MCH 03/06/2021 27 4  26 8 - 34 3 pg Final    MCHC 03/06/2021 31 5  31 4 - 37 4 g/dL Final    RDW 03/06/2021 12 8  11 6 - 15 1 % Final    MPV 03/06/2021 10 4  8 9 - 12 7 fL Final    Platelets 57/07/2128 170  149 - 390 Thousands/uL Final    nRBC 03/06/2021 0  /100 WBCs Final    Neutrophils Relative 03/06/2021 54  43 - 75 % Final    Immat GRANS % 03/06/2021 0  0 - 2 % Final    Lymphocytes Relative 03/06/2021 27  14 - 44 % Final    Monocytes Relative 03/06/2021 13* 4 - 12 % Final    Eosinophils Relative 03/06/2021 5  0 - 6 % Final    Basophils Relative 03/06/2021 1  0 - 1 % Final    Neutrophils Absolute 03/06/2021 4 12  1 85 - 7 62 Thousands/µL Final    Immature Grans Absolute 03/06/2021 0 02  0 00 - 0 20 Thousand/uL Final    Lymphocytes Absolute 03/06/2021 2 11  0 60 - 4 47 Thousands/µL Final    Monocytes Absolute 03/06/2021 1 04  0 17 - 1 22 Thousand/µL Final    Eosinophils Absolute 03/06/2021 0 41  0 00 - 0 61 Thousand/µL Final    Basophils Absolute 03/06/2021 0 08  0 00 - 0 10 Thousands/µL Final    Sodium 03/06/2021 140  136 - 145 mmol/L Final    Potassium 03/06/2021 4 6  3 5 - 5 3 mmol/L Final    Chloride 03/06/2021 107  100 - 108 mmol/L Final    CO2 03/06/2021 29  21 - 32 mmol/L Final    ANION GAP 03/06/2021 4  4 - 13 mmol/L Final    BUN 03/06/2021 12  5 - 25 mg/dL Final    Creatinine 03/06/2021 0 79  0 60 - 1 30 mg/dL Final    Standardized to IDMS reference method    Glucose, Fasting 03/06/2021 251* 65 - 99 mg/dL Final    Specimen collection should occur prior to Sulfasalazine administration due to the potential for falsely depressed results  Specimen collection should occur prior to Sulfapyridine administration due to the potential for falsely elevated results      Calcium 03/06/2021 9 7  8 3 - 10 1 mg/dL Final    AST 03/06/2021 26  5 - 45 U/L Final    Specimen collection should occur prior to Sulfasalazine administration due to the potential for falsely depressed results   ALT 03/06/2021 30  12 - 78 U/L Final    Specimen collection should occur prior to Sulfasalazine and/or Sulfapyridine administration due to the potential for falsely depressed results   Alkaline Phosphatase 03/06/2021 130* 46 - 116 U/L Final    Total Protein 03/06/2021 7 6  6 4 - 8 2 g/dL Final    Albumin 03/06/2021 3 5  3 5 - 5 0 g/dL Final    Total Bilirubin 03/06/2021 0 67  0 20 - 1 00 mg/dL Final    Use of this assay is not recommended for patients undergoing treatment with eltrombopag due to the potential for falsely elevated results   eGFR 03/06/2021 69  ml/min/1 73sq m Final    CA 27 29 03/06/2021 94 9* 0 0 - 42 3 U/mL Final         The following historical data was reviewed  Past Medical History:   Diagnosis Date    Acute biliary pancreatitis     Anxiety     Arthritis     Breast CA (HCC)     recurrent, ductal carcinoma ER, [place] pos    Cardiac disease     Colon polyp     Depression     Diverticula of intestine     Diverticulosis     Dizziness     and passes out    Flushing     Hiatal hernia     [place]      [place]    Hypercholesteremia     Hypertension     Liver mass     Liver metastasis (HCC)     Metastatic adenocarcinoma to liver (HCC)     Bx 01/03/2017    PONV (postoperative nausea and vomiting)     Pulmonary embolism (HCC)     Recurrent breast cancer (HCC)     Shingles     Shortness of breath     on exertion    Tachycardia     Urinary incontinence     Use of cane as ambulatory aid     or walker       Past Surgical History:   Procedure Laterality Date    BREAST SURGERY      CARDIAC PACEMAKER REMOVAL N/A 11/9/2017    Procedure: PACEMAKER LEAD REVISION, REMOVAL OF NONFUNCTIONING LEAD, AND INSERTION OF A NEW RV LEAD;  Surgeon: Cely Núñez MD;  [facility];  Service: Cardiology    CATARACT EXTRACTION Bilateral     COLONOSCOPY      ERCP N/A 1/8/2018    Procedure: ENDOSCOPIC RETROGRADE CHOLANGIOPANCREATOGRAPHY (ERCP);   Surgeon: Wilton Anali Nathan Davila MD;  Location: [facility]; Service: Gastroenterology    HYSTERECTOMY      INSERT / REPLACE / Miguel Ángel Clear      JOINT REPLACEMENT      lizette  TKR and right TSR    MASTECTOMY Right     UT ERCP DX COLLECTION SPECIMEN BRUSHING/WASHING N/A 2/22/2018    Procedure: ENDOSCOPIC RETROGRADE CHOLANGIOPANCREATOGRAPHY (ERCP) with stent removal;  Surgeon: Raúl Harrison MD;  Location: [facility]; Service: Gastroenterology    UT RESEC [address] N/A 7/13/2017    Procedure: LIVER RESECTION, INTRAOPERATIVE ULTRASOUND;  Surgeon: Liana Pressley MD;  [facility];  Service: Surgical Oncology    ROTATOR CUFF REPAIR Right     SENTINEL LYMPH NODE BIOPSY Right     TONSILECTOMY AND ADNOIDECTOMY      WOUND DEBRIDEMENT Left 11/9/2017    Procedure: DEBRIDEMENT WOUND AND DRESSING CHANGE [facility]);   Surgeon: Elizabeth Abdul MD;  [facility];  Service: Cardiology       Social History     Socioeconomic History    Marital status: /Civil Union     Spouse name: None    Number of children: None    Years of education: None    Highest education level: None   Occupational History    None   Social Needs    Financial resource strain: None    Food insecurity     Worry: None     Inability: None    Transportation needs     Medical: None     Non-medical: None   Tobacco Use    Smoking status: Never Smoker    Smokeless tobacco: Never Used   Substance and Sexual Activity    Alcohol use: Not Currently    Drug use: No    Sexual activity: None   Lifestyle    Physical activity     Days per week: None     Minutes per session: None    Stress: None   Relationships    Social connections     Talks on phone: None     Gets together: None     Attends Sikh service: None     Active member of club or organization: None     Attends meetings of clubs or organizations: None     Relationship status: None    Intimate partner violence     Fear of current or ex partner: None     Emotionally abused: None Physically abused: None     Forced sexual activity: None   Other Topics Concern    None   Social History Narrative    None       Family History   Problem Relation Age of Onset    Lung cancer Father     Cancer Brother     Diabetes type II Son        Please note: This report has been generated by a voice recognition software system  Therefore there may be syntax, spelling, and/or grammatical errors  Please call if you have any questions

## 2021-03-11 NOTE — PROGRESS NOTES
Advanced Heart Failure / Pulmonary Hypertension Outpatient Progress Note    Gregor Ego 78 y o  female   MRN: [id number]  Encounter: [id number]    Assessment:  Patient Active Problem List    Diagnosis Date Noted    Lumbar radiculopathy     VT (ventricular tachycardia) ([facility] ) 09/23/2019    Elevated hemidiaphragm 08/20/2019    SSS (sick sinus syndrome) ([facility])     Mixed hyperlipidemia     Cervical spondylosis without myelopathy 06/03/2019    Nerve pain - Right 04/29/2019    Neck pain 04/29/2019    Upper abdominal pain 03/01/2019    SIRS (systemic inflammatory response syndrome) ([facility]) 03/01/2019    Lactic acidosis 03/01/2019    Abnormal urinalysis 03/01/2019    Type 2 diabetes mellitus, without long-term current use of insulin ([facility] ) 03/01/2019    Hypertension 03/01/2019    Lumbar pain 12/17/2018    Spondylolisthesis 12/17/2018    Spinal stenosis, lumbar region, with neurogenic claudication     Intermittent complete heart block ([place] [address] ) 02/09/2018    Acute gallstone pancreatitis 01/30/2018    Cholecystitis 01/07/2018    Acute biliary pancreatitis 01/07/2018    Cholangitis 01/07/2018    Choledocholithiasis 01/07/2018    Chest wall hematoma 35/45/6698    Complication associated with cardiac pacemaker lead 11/09/2017    Pacemaker complications, subsequent encounter 11/09/2017    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 ([facility] ) 01/05/2017    Hypercholesteremia     History of total knee replacement 05/27/2015    LBBB (left bundle branch block) 04/13/2015    Hearing loss 07/16/2014    Gastroesophageal reflux disease without esophagitis 03/22/2010       Today's Plan:   Increase metoprolol tartrate to 25 mg q12 hours   Switch from Lasix to torsemide 20 mg daily   Check BMP in 7-10 days   RTC in 2 weeks to reassess volume status and symptoms  Plan:  Chronic HFpEF; LVEF 70%; LVIDd 4 7 cm; NYHA III; ACC/AHA Stage C   TTE 08/13/2019: LVEF 70%  LVIDd 4 7 cm  Grade 1 DD   Normal RV size and RVSF  Mild TR  Reviewed importance of low sodium diet and fluid restriction  Weight of 159 lbs on 09/21  Today, weighs 146 lbs  Most recent BMP from 03/06/2021: sodium 140; potassium 4 6; BUN 12; creatinine 0 79; eGFR 69  Neurohormonal Blockade:  --Beta Blocker: metoprolol tartrate 25 mg q12 hours  --ARNi / ACEi / ARB: No    --Aldosterone Antagonist: No   --SGLT2 Inhibitor: No    --Diuretic: torsemide 20 mg daily  Sudden Cardiac Death Risk Reduction:  --Medtronic BiV ICD (His and RV leads) in situ since 08/20/2019  --Interrogation from 03/09/2021: AP 1 2%  %  Lead parameters WNL  2 VT-NS episodes  Opti-Vol crossed since 12/2020  Normal device function  Electrical Resynchronization:  --Candidacy for BiV device: narrow QRS  Advanced Therapies: Will continue to monitor  Ventricular tachycardia, polymorphic   S/p BiV ICD in 08/2019 as above  Follows with Dr Daniel Burroughs for EP  Hypertension   BP of 132/74 mmHg in office today  Continue on medications as above  Right breast cancer with metastasis to liver   S/p right mastectomy and 4 cycles taxotere and cytoxan in 2009  Was maintained on anastrozole from 2445-8308  Recurrence in 02/2016 and liver mets noted in 12/2016  Chemotherapy from 03/2016 to 04/2017  S/p partial hepatectomy in 07/2017  S/p right axilla radiation from 02/2019 to 04/2019  Continues on exemestane 25 mg daily and everolimus 2 5 mg daily  Follows with Dr Stacy Barlow and Jessie Choudhury as outpatient  Hyperlipidemia   Most recent lipid panel from 01/23/2021: cholesterol 161; ; HDL 64; calculated LDL 72  Continue on atorvastatin 20 mg daily  Diabetes mellitus, type II     Non-insulin dependent  Most recent hemoglobin A1c of 10 0 from 01/23/2021  Complete heart block: PPM placed in 10 /2017; now with ICD as above     Spinal stenosis, lumbar  Urinary incontinence    HPI:   Nina Fowler is an 78-year-old woman with a PMH as above who presents to the office for an acute visit  From most recent office visit on 06/06/2019: "77 yo female presents for follow up after undergoing PPM placement in October 2017 for symptomatic bradycardia after loop recorder revealed 5 second pause  She has metastatic breast CA, stage 4 with taxotere, cytoxan and arimidex in past with last MUGA Jan 2017 showing EF: 57%  EKG had shown LBBB  Her disease had disappeared radiographically except for liver lesion  She had a number of syncopal episodes  Nuclear stress was unremarkable and echo showed EF: 50%  She underwent resection of liver lesions in Sept 2017  10/4/17 loop recorder implanted  She had 5 second pause noted and underwent Medtronic dual chamber PPM 10/31/17  Post PPM course complicated by RV perforation and hemorrhagic shock  On 11/9 had lead extraction and reimplantation on 11/9/17  Has since followed up with Dr Anton Vital  She had a CT chest 1/718 and there is moderate right pleural effusion       Interval History:   Still with HENRIQUEZ, no edema  She had radiation therapy for 6 weeks "    HF RN contacted patient on 03/09/2021 due to OptiVol threshold being crossed since 12/20202 on most recent device interrogation  03/11/2021: Patient presents with her  for an acute visit  Reports having chronic HENRIQUEZ but has noticed worsening SOB both at rest and with exertion for past month in addition to worsening LE swelling and orthopnea  Able to walk about 20 feet before having to catch her breath (able to walk double this distance about 6 months ago)  Is completing daily weights; denies any recent weight gain  Reports that soon after changing chemotherapy medications, her blood sugars became more difficult to control  This then resulted in changing her diabetes medications (now on Januvia)  Soon after starting on Januvia, she feels that her legs began to swell   Has been off Lasix for 5 days now because "it doesn't do anything anymore " Past Medical History:   Diagnosis Date    Acute biliary pancreatitis     Anxiety     Arthritis     Breast CA (HCC)     recurrent, ductal carcinoma ER, [place] pos    Cardiac disease     Colon polyp     Depression     Diverticula of intestine     Diverticulosis     Dizziness     and passes out    Flushing     Hiatal hernia     [place]    Hypercholesteremia     Hypertension     Liver mass     Liver metastasis (HCC)     Metastatic adenocarcinoma to liver (HCC)     Bx 01/03/2017    PONV (postoperative nausea and vomiting)     Pulmonary embolism (HCC)     Recurrent breast cancer (HCC)     Shingles     Shortness of breath     on exertion    Tachycardia     Urinary incontinence     Use of cane as ambulatory aid     or walker       Review of Systems   Constitutional: Negative for activity change, appetite change, fatigue, fever and unexpected weight change  HENT: Negative for congestion, postnasal drip, rhinorrhea, sneezing, sore throat and trouble swallowing  Eyes: Negative  Respiratory: Positive for shortness of breath  Negative for cough and chest tightness  Cardiovascular: Positive for palpitations and leg swelling  Negative for chest pain  Gastrointestinal: Negative for abdominal distention, abdominal pain, diarrhea, nausea and vomiting  Endocrine: Negative  Genitourinary: Negative for decreased urine volume, difficulty urinating, dysuria, frequency and urgency  Musculoskeletal: Negative  Skin: Negative  Allergic/Immunologic: Negative  Neurological: Negative for dizziness, tremors, syncope, weakness, light-headedness and headaches  Hematological: Negative  Psychiatric/Behavioral: Negative for agitation, confusion and sleep disturbance  The patient is not nervous/anxious  14-point ROS completed and negative except as stated above and/or in the HPI      Allergies   Allergen Reactions    Ampicillin Shortness Of Breath, Anaphylaxis and Other (See Comments)     Other reaction(s): Unknown Allergic Reaction  Reaction Date: 07Mar2012;        Current Outpatient Medications:     acetaminophen (TYLENOL) 500 mg tablet, Take 500 mg by mouth every 6 (six) hours as needed for mild pain, Disp: , Rfl:     atorvastatin (LIPITOR) 20 mg tablet, Take 20 mg by mouth daily  , Disp: , Rfl:     Calcium Citrate-Vitamin D (CALCIUM CITRATE + D PO), Take 1,500 mg by mouth daily, Disp: , Rfl:     clotrimazole-betamethasone (LOTRISONE) 1-0 05 % cream, Apply 1 application topically as needed , Disp: , Rfl:     Cranberry (THERACRAN HP PO), Take 2 tablets by mouth daily, Disp: , Rfl:     everolimus (AFINITOR) 2 5 MG tablet, Take 1 tablet (2 5 mg total) by mouth daily, Disp: 30 tablet, Rfl: 6    exemestane (AROMASIN) 25 MG tablet, Take 1 tablet (25 mg total) by mouth daily, Disp: 90 tablet, Rfl: 3    Glucosamine-Chondroit-Vit C-Mn (GLUCOSAMINE 1500 COMPLEX) CAPS, Take 1 capsule by mouth daily  , Disp: , Rfl:     metFORMIN (GLUCOPHAGE) 500 mg tablet, 1 pill 3 times daily with meals, Disp: , Rfl:     metoprolol tartrate (LOPRESSOR) 25 mg tablet, Take 0 5 tablets (12 5 mg total) by mouth every 12 (twelve) hours, Disp: 90 tablet, Rfl: 3    Multiple Vitamin (MULTIVITAMIN) capsule, Take 1 capsule by mouth daily  , Disp: , Rfl:     oxyCODONE (ROXICODONE) 5 mg immediate release tablet, Take 1 tablet (5 mg total) by mouth every 4 (four) hours as needed for moderate painMax Daily Amount: 30 mg, Disp: 30 tablet, Rfl: 0    sitaGLIPtin (JANUVIA) 100 mg tablet, Take 100 mg by mouth daily, Disp: , Rfl:     clindamycin (CLEOCIN) 300 MG capsule, TAKE 2 CAPSULES BY MOUTH 1 HOUR PRIOR TO DENTAL PROCEDURE , Disp: , Rfl: 0    ondansetron (ZOFRAN) 4 mg tablet, Take 4 mg by mouth every 8 (eight) hours as needed, Disp: , Rfl:     Social History     Socioeconomic History    Marital status: /Civil Union     Spouse name: Not on file    Number of children: Not on file    Years of education: Not on file    Highest education level: Not on file   Occupational History    Not on file   Social Needs    Financial resource strain: Not on file    Food insecurity     Worry: Not on file     Inability: Not on file    Transportation needs     Medical: Not on file     Non-medical: Not on file   Tobacco Use    Smoking status: Never Smoker    Smokeless tobacco: Never Used   Substance and Sexual Activity    Alcohol use: Not Currently    Drug use: No    Sexual activity: Not on file   Lifestyle    Physical activity     Days per week: Not on file     Minutes per session: Not on file    Stress: Not on file   Relationships    Social connections     Talks on phone: Not on file     Gets together: Not on file     Attends Congregation service: Not on file     Active member of club or organization: Not on file     Attends meetings of clubs or organizations: Not on file     Relationship status: Not on file    Intimate partner violence     Fear of current or ex partner: Not on file     Emotionally abused: Not on file     Physically abused: Not on file     Forced sexual activity: Not on file   Other Topics Concern    Not on file   Social History Narrative    Not on file     Family History   Problem Relation Age of Onset    Lung cancer Father     Cancer Brother     Diabetes type II Son        Vitals:   Blood pressure 132/74, pulse (!) 113, height 5' (1 524 m), weight 66 4 kg (146 lb 6 4 oz), SpO2 96 %  Body mass index is 28 59 kg/m²  Wt Readings from Last 3 Encounters:   03/11/21 66 4 kg (146 lb 6 4 oz)   03/11/21 71 7 kg (158 lb)   12/03/20 70 8 kg (156 lb)     Vitals:    03/11/21 1429   BP: 132/74   BP Location: Left arm   Patient Position: Sitting   Cuff Size: Standard   Pulse: (!) 113   SpO2: 96%   Weight: 66 4 kg (146 lb 6 4 oz)   Height: 5' (1 524 m)       Physical Exam  Vitals signs reviewed  Constitutional:       General: She is awake  She is not in acute distress       Appearance: Normal appearance  She is well-developed and normal weight  Comments: Fabric face mask present   HENT:      Head: Normocephalic  Nose: Nose normal       Mouth/Throat:      Mouth: Mucous membranes are moist    Eyes:      General: No scleral icterus  Conjunctiva/sclera: Conjunctivae normal    Neck:      Musculoskeletal: Neck supple  Vascular: JVD present  Trachea: No tracheal deviation  Cardiovascular:      Rate and Rhythm: Regular rhythm  Tachycardia present  No extrasystoles are present  Pulses: Normal pulses  Heart sounds: No murmur  Pulmonary:      Effort: Pulmonary effort is normal  Tachypnea (mild) present  No bradypnea, accessory muscle usage or respiratory distress  Breath sounds: Normal air entry  No decreased breath sounds, rhonchi or rales  Abdominal:      General: Bowel sounds are normal  There is no distension  Palpations: Abdomen is soft  Tenderness: There is no abdominal tenderness  Musculoskeletal:      Right lower le+ Edema present  Left lower le+ Edema present  Skin:     General: Skin is warm and dry  Coloration: Skin is not jaundiced or pale  Neurological:      General: No focal deficit present  Mental Status: She is alert and oriented to person, place, and time  Psychiatric:         Attention and Perception: Attention normal          Mood and Affect: Mood and affect normal          Speech: Speech normal          Behavior: Behavior normal  Behavior is cooperative  Thought Content:  Thought content normal      Labs & Results:  Lab Results   Component Value Date    WBC 7 78 2021    HGB 12 3 2021    HCT 39 1 2021    MCV 87 2021     2021     Lab Results   Component Value Date    SODIUM 140 2021    K 4 6 2021     2021    CO2 29 2021    BUN 12 2021    CREATININE 0 79 2021    GLUC 123 2020    CALCIUM 9 7 2021     Lab Results Component Value Date    INR 1 04 03/01/2019    INR 1 32 (H) 01/08/2018    INR 1 27 (H) 01/07/2018    PROTIME 13 7 03/01/2019    PROTIME 16 5 (H) 01/08/2018    PROTIME 16 0 (H) 01/07/2018     Lab Results   Component Value Date    NTBNP 253 11/06/2017      EKG personally reviewed by SILKE Villa PA-C

## 2021-03-11 NOTE — PATIENT INSTRUCTIONS
Stop taking Lasix  Begin torsemide 20 mg (1 tablet) once a day  Increase metoprolol tartrate to 25 mg (1 tablet) twice a day  Have blood work completed before next appointment; no need to fast      Please weigh yourself every day, and contact the Heart Failure program at [phone] if you gain 3 lbs overnight or 5 lbs in 5-7 days  Limit daily sodium/salt intake to 5201-8353 mg daily to prevent fluid retention  Avoid canned foods, [place] food, and processed meat (hot dogs, lunch meat, and sausage etc )  Limit daily fluid intake to 2000 mL or 2L (about 60 ounces) daily  Bring complete list of medications to your follow-up appointment

## 2021-03-12 DIAGNOSIS — C50.911 MALIGNANT NEOPLASM OF RIGHT FEMALE BREAST, UNSPECIFIED ESTROGEN RECEPTOR STATUS, UNSPECIFIED SITE OF BREAST (HCC): Primary | ICD-10-CM

## 2021-03-12 DIAGNOSIS — C78.7 METASTASIS TO LIVER (HCC): ICD-10-CM

## 2021-03-12 RX ORDER — EVEROLIMUS 2.5 MG/1
2.5 TABLET ORAL DAILY
Qty: 30 TABLET | Refills: 6 | Status: SHIPPED | OUTPATIENT
Start: 2021-03-12 | End: 2021-01-01 | Stop reason: ALTCHOICE

## 2021-03-25 ENCOUNTER — APPOINTMENT (OUTPATIENT)
Dept: LAB | Facility: MEDICAL CENTER | Age: 81
End: 2021-03-25
Payer: COMMERCIAL

## 2021-03-25 DIAGNOSIS — I50.32 CHRONIC HEART FAILURE WITH PRESERVED EJECTION FRACTION (HFPEF) (HCC): ICD-10-CM

## 2021-03-25 LAB
ANION GAP SERPL CALCULATED.3IONS-SCNC: 6 MMOL/L (ref 4–13)
BUN SERPL-MCNC: 18 MG/DL (ref 5–25)
CALCIUM SERPL-MCNC: 10.2 MG/DL (ref 8.3–10.1)
CHLORIDE SERPL-SCNC: 98 MMOL/L (ref 100–108)
CO2 SERPL-SCNC: 32 MMOL/L (ref 21–32)
CREAT SERPL-MCNC: 1.15 MG/DL (ref 0.6–1.3)
GFR SERPL CREATININE-BSD FRML MDRD: 44 ML/MIN/1.73SQ M
GLUCOSE SERPL-MCNC: 351 MG/DL (ref 65–140)
POTASSIUM SERPL-SCNC: 3.9 MMOL/L (ref 3.5–5.3)
SODIUM SERPL-SCNC: 136 MMOL/L (ref 136–145)

## 2021-03-25 PROCEDURE — 36415 COLL VENOUS BLD VENIPUNCTURE: CPT

## 2021-03-25 PROCEDURE — 80048 BASIC METABOLIC PNL TOTAL CA: CPT

## 2021-03-28 NOTE — PATIENT INSTRUCTIONS
Continue current medications  Okay to decrease torsemide to 1/2 tablet once a day on days you feel dehydrated  Call your family doctor about your possible UTI  Please weigh yourself every day, and contact the Heart Failure program at [phone] if you gain 3 lbs overnight or 5 lbs in 5-7 days  Limit daily sodium/salt intake to 7902-9718 mg daily to prevent fluid retention  Avoid canned foods, [place] food, and processed meat (hot dogs, lunch meat, and sausage etc )  Limit daily fluid intake to 2000 mL or 2L (about 60 ounces) daily  Bring complete list of medications to your follow-up appointment

## 2021-03-28 NOTE — PROGRESS NOTES
Advanced Heart Failure / Pulmonary Hypertension Outpatient Progress Note    Gregor Ego 78 y o  female   MRN: [id number]  Encounter: [id number]    Assessment:  Patient Active Problem List    Diagnosis Date Noted    Lumbar radiculopathy     VT (ventricular tachycardia) ([facility] [place] [facility] ) 09/23/2019    Elevated hemidiaphragm 08/20/2019    SSS (sick sinus syndrome) ([facility])     Mixed hyperlipidemia     Cervical spondylosis without myelopathy 06/03/2019    Nerve pain - Right 04/29/2019    Neck pain 04/29/2019    Upper abdominal pain 03/01/2019    SIRS (systemic inflammatory response syndrome) ([facility]) 03/01/2019    Lactic acidosis 03/01/2019    Abnormal urinalysis 03/01/2019    Type 2 diabetes mellitus, without long-term current use of insulin ([place] [address] ) 03/01/2019    Hypertension 03/01/2019    Lumbar pain 12/17/2018    Spondylolisthesis 12/17/2018    Spinal stenosis, lumbar region, with neurogenic claudication     Intermittent complete heart block ([place] [address] ) 02/09/2018    Acute gallstone pancreatitis 01/30/2018    Cholecystitis 01/07/2018    Acute biliary pancreatitis 01/07/2018    Cholangitis 01/07/2018    Choledocholithiasis 01/07/2018    Chest wall hematoma 10/14/9971    Complication associated with cardiac pacemaker lead 11/09/2017    Pacemaker complications, subsequent encounter 11/09/2017    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 ([facility] [place] [facility] ) 01/05/2017    Hypercholesteremia     History of total knee replacement 05/27/2015    LBBB (left bundle branch block) 04/13/2015    Hearing loss 07/16/2014    Gastroesophageal reflux disease without esophagitis 03/22/2010       Today's Plan:   Continue current medications   Advised to contact PCP or go to urgent care to further evaluate her possible UTI  Plan:  Chronic HFpEF; LVEF 70%; LVIDd 4 7 cm; NYHA III; ACC/AHA Stage B/C   TTE 08/13/2019: LVEF 70%  LVIDd 4 7 cm  Grade 1 DD  Normal RV size and RVSF  Mild TR      Reviewed importance of low sodium diet and fluid restriction  Weight of 146 lbs on 03/11  Today, weighs 142 lbs  Most recent BMP from 03/25/2021: sodium 136; potassium 3 9; BUN 18; creatinine 1 15; eGFR 44  Neurohormonal Blockade:  --Beta Blocker: metoprolol tartrate 25 mg q12 hours  --ARNi / ACEi / ARB: No    --Aldosterone Antagonist: No   --SGLT2 Inhibitor: No    --Diuretic: torsemide 20 mg daily  Sudden Cardiac Death Risk Reduction:  --Medtronic BiV ICD (His and RV leads) in situ since 08/20/2019  --Interrogation from 03/09/2021: AP 1 2%  %  Lead parameters WNL  2 VT-NS episodes  Opti-Vol crossed since 12/2020  Normal device function  Electrical Resynchronization:   --Candidacy for BiV device: narrow QRS  Advanced Therapies: Will continue to monitor  Ventricular tachycardia, polymorphic   S/p BiV ICD in 08/2019 as above  Follows with Dr Lynsey Serrano for EP  Hypertension   BP of 126/78 mmHg in office today  Continue on medications as above  Right breast cancer with metastasis to liver   S/p right mastectomy and 4 cycles taxotere and cytoxan in 2009  Was maintained on anastrozole from 1965-0035  Recurrence in 02/2016 and liver mets noted in 12/2016  Chemotherapy from 03/2016 to 04/2017  S/p partial hepatectomy in 07/2017  S/p right axilla radiation from 02/2019 to 04/2019  Continues on exemestane 25 mg daily and everolimus 2 5 mg daily  Follows with Dr Gillian Adamson and Raúl Pacheco as outpatient  Hyperlipidemia   Most recent lipid panel from 01/23/2021: cholesterol 161; ; HDL 64; calculated LDL 72  Continue on atorvastatin 20 mg daily  Diabetes mellitus, type II     Non-insulin dependent  Most recent hemoglobin A1c of 10 0 from 01/23/2021  Complete heart block: PPM placed in 10 /2017; now with ICD as above  Spinal stenosis, lumbar  Urinary incontinence    HPI:   Luke Padilla is an 78-year-old woman with a PMH as above who presents to the office for an acute visit  From most recent office visit on 06/06/2019: "77 yo female presents for follow up after undergoing PPM placement in October 2017 for symptomatic bradycardia after loop recorder revealed 5 second pause  She has metastatic breast CA, stage 4 with taxotere, cytoxan and arimidex in past with last MUGA Jan 2017 showing EF: 57%  EKG had shown LBBB  Her disease had disappeared radiographically except for liver lesion  She had a number of syncopal episodes  Nuclear stress was unremarkable and echo showed EF: 50%  She underwent resection of liver lesions in Sept 2017  10/4/17 loop recorder implanted  She had 5 second pause noted and underwent Medtronic dual chamber PPM 10/31/17  Post PPM course complicated by RV perforation and hemorrhagic shock  On 11/9 had lead extraction and reimplantation on 11/9/17  Has since followed up with Dr Nereyda Kamara  She had a CT chest 1/718 and there is moderate right pleural effusion       Interval History:   Still with HENRIQUEZ, no edema  She had radiation therapy for 6 weeks "    HF RN contacted patient on 03/09/2021 due to OptiVol threshold being crossed since 12/20202 on most recent device interrogation  03/11/2021: Patient presents with her  for an acute visit  Reports having chronic HENRIQUEZ but has noticed worsening SOB both at rest and with exertion for past month in addition to worsening LE swelling and orthopnea  Able to walk about 20 feet before having to catch her breath (able to walk double this distance about 6 months ago)  Is completing daily weights; denies any recent weight gain  Reports that soon after changing chemotherapy medications, her blood sugars became more difficult to control  This then resulted in changing her diabetes medications (now on Januvia)  Soon after starting on Januvia, she feels that her legs began to swell  Has been off Lasix for 5 days now because "it doesn't do anything anymore " BB dose increased and switched to torsemide       03/29/2021: Patient presents for follow-up  Reports feeling "much better" since last visit  Has been urinating more  Did not take torsemide this AM  However, patient also states that, "[she] knows [she] has a UTI,"  and has been experiencing dysuria and lower abdominal pain over the last few days  Denies fevers and chills  Advised patient to seek medical care from her PCP or urgent care for further evaluation/ workup of this  Also has been experiencing a dry cough in the AM for the past week with associated nasal congestion, itchy eyes, and runny nose  Also reports that for the past week, everything she eats, "tastes like metal "    Past Medical History:   Diagnosis Date    Acute biliary pancreatitis     Anxiety     Arthritis     Breast CA ([facility] [facility] )     recurrent, ductal carcinoma ER, MI pos    Cardiac disease     Colon polyp     Depression     Diverticula of intestine     Diverticulosis     Dizziness     and passes out    Flushing     Hiatal hernia     [place]    Hypercholesteremia     Hypertension     Liver mass     [facility]     Metastatic adenocarcinoma to liver ([facility] )     Bx 01/03/2017    PONV (postoperative nausea and vomiting)     Pulmonary embolism (HCC)     Recurrent breast cancer (HCC)     Shingles     Shortness of breath     on exertion    Tachycardia     Urinary incontinence     Use of cane as ambulatory aid     or walker        Review of Systems   Constitutional: Negative for activity change, appetite change, fatigue, fever and unexpected weight change  HENT: Negative for congestion, postnasal drip, rhinorrhea, sneezing, sore throat and trouble swallowing  Eyes: Negative  Respiratory: Positive for shortness of breath (with exertion)  Negative for cough and chest tightness  Cardiovascular: Positive for leg swelling  Negative for chest pain and palpitations  Gastrointestinal: Positive for abdominal pain (lower, generalized)   Negative for abdominal distention, diarrhea, nausea and vomiting  Endocrine: Negative  Genitourinary: Positive for dysuria and frequency  Negative for decreased urine volume, difficulty urinating, flank pain, hematuria and urgency  Musculoskeletal: Negative  Skin: Negative  Allergic/Immunologic: Negative  Neurological: Negative for dizziness, tremors, syncope, weakness, light-headedness and headaches  Hematological: Negative  Psychiatric/Behavioral: Negative for agitation, confusion and sleep disturbance  The patient is not nervous/anxious  14-point ROS completed and negative except as stated above and/or in the HPI  Allergies   Allergen Reactions    Ampicillin Shortness Of Breath, Anaphylaxis and Other (See Comments)     Other reaction(s): Unknown Allergic Reaction  Reaction Date: [id number];        Current Outpatient Medications:     acetaminophen (TYLENOL) 500 mg tablet, Take 500 mg by mouth every 6 (six) hours as needed for mild pain, Disp: , Rfl:     atorvastatin (LIPITOR) 20 mg tablet, Take 20 mg by mouth daily  , Disp: , Rfl:     Calcium Citrate-Vitamin D (CALCIUM CITRATE + D PO), Take 1,500 mg by mouth daily, Disp: , Rfl:     clotrimazole-betamethasone (LOTRISONE) 1-0 05 % cream, Apply 1 application topically as needed , Disp: , Rfl:     Cranberry (THERACRAN HP PO), Take 2 tablets by mouth daily, Disp: , Rfl:     everolimus (AFINITOR) 2 5 MG tablet, Take 1 tablet (2 5 mg total) by mouth daily, Disp: 30 tablet, Rfl: 6    exemestane (AROMASIN) 25 MG tablet, Take 1 tablet (25 mg total) by mouth daily, Disp: 90 tablet, Rfl: 3    Glucosamine-Chondroit-Vit C-Mn (GLUCOSAMINE 1500 COMPLEX) CAPS, Take 1 capsule by mouth daily  , Disp: , Rfl:     metFORMIN (GLUCOPHAGE) 500 mg tablet, 1 pill 3 times daily with meals, Disp: , Rfl:     metoprolol tartrate (LOPRESSOR) 25 mg tablet, Take 1 tablet (25 mg total) by mouth every 12 (twelve) hours, Disp: 180 tablet, Rfl: 1    Multiple Vitamin (MULTIVITAMIN) capsule, Take 1 capsule by mouth daily  , Disp: , Rfl:     sitaGLIPtin (JANUVIA) 100 mg tablet, Take 100 mg by mouth daily, Disp: , Rfl:     torsemide (DEMADEX) 20 mg tablet, Take 1 tablet (20 mg total) by mouth daily, Disp: 30 tablet, Rfl: 3    clindamycin (CLEOCIN) 300 MG capsule, TAKE 2 CAPSULES BY MOUTH 1 HOUR PRIOR TO DENTAL PROCEDURE , Disp: , Rfl: 0    ondansetron (ZOFRAN) 4 mg tablet, Take 4 mg by mouth every 8 (eight) hours as needed, Disp: , Rfl:     oxyCODONE (ROXICODONE) 5 mg immediate release tablet, Take 1 tablet (5 mg total) by mouth every 4 (four) hours as needed for moderate painMax Daily Amount: 30 mg (Patient not taking: Reported on 3/29/2021), Disp: 30 tablet, Rfl: 0    Social History     Socioeconomic History    Marital status: /Civil Union     Spouse name: Not on file    Number of children: Not on file    Years of education: Not on file    Highest education level: Not on file   Occupational History    Not on file   Social Needs    Financial resource strain: Not on file    Food insecurity     Worry: Not on file     Inability: Not on file   Outline App needs     Medical: Not on file     Non-medical: Not on file   Tobacco Use    Smoking status: Never Smoker    Smokeless tobacco: Never Used   Substance and Sexual Activity    Alcohol use: Not Currently    Drug use: No    Sexual activity: Not on file   Lifestyle    Physical activity     Days per week: Not on file     Minutes per session: Not on file    Stress: Not on file   Relationships    Social connections     Talks on phone: Not on file     Gets together: Not on file     Attends Yarsani service: Not on file     Active member of club or organization: Not on file     Attends meetings of clubs or organizations: Not on file     Relationship status: Not on file    Intimate partner violence     Fear of current or ex partner: Not on file     Emotionally abused: Not on file     Physically abused: Not on file     Forced sexual activity: Not on file   Other Topics Concern    Not on file   Social History Narrative    Not on file     Family History   Problem Relation Age of Onset    Lung cancer Father     Cancer Brother     Diabetes type II Son        Vitals:   Blood pressure 126/78, pulse 79, weight 64 4 kg (142 lb), SpO2 99 %  Body mass index is 27 73 kg/m²  Wt Readings from Last 3 Encounters:   21 64 4 kg (142 lb)   21 66 4 kg (146 lb 6 4 oz)   21 71 7 kg (158 lb)     Vitals:    21 1259   BP: 126/78   BP Location: Left arm   Patient Position: Sitting   Cuff Size: Standard   Pulse: 79   SpO2: 99%   Weight: 64 4 kg (142 lb)       Physical Exam  Vitals signs reviewed  Constitutional:       General: She is awake  She is not in acute distress  Appearance: Normal appearance  She is well-developed and normal weight  Comments: Face mask present   HENT:      Head: Normocephalic  Nose: Nose normal       Mouth/Throat:      Mouth: Mucous membranes are moist    Eyes:      General: No scleral icterus  Conjunctiva/sclera: Conjunctivae normal    Neck:      Musculoskeletal: Neck supple  Vascular: No JVD  Trachea: No tracheal deviation  Cardiovascular:      Rate and Rhythm: Normal rate and regular rhythm  No extrasystoles are present  Pulses: Normal pulses  Heart sounds: No murmur  Pulmonary:      Effort: Pulmonary effort is normal  No tachypnea, bradypnea, accessory muscle usage or respiratory distress  Breath sounds: Normal air entry  No decreased breath sounds, wheezing or rales  Abdominal:      General: Bowel sounds are normal  There is no distension  Palpations: Abdomen is soft  Tenderness: There is no abdominal tenderness  Musculoskeletal:      Right lower le+ Edema (hitting above ankle) present  Left lower le+ Edema (hitting above ankle) present  Skin:     General: Skin is warm and dry        Coloration: Skin is not jaundiced or pale    Neurological:      General: No focal deficit present  Mental Status: She is alert and oriented to person, place, and time  Psychiatric:         Attention and Perception: Attention normal          Mood and Affect: Mood and affect normal          Speech: Speech normal          Behavior: Behavior normal  Behavior is cooperative  Thought Content: Thought content normal      Labs & Results:  Lab Results   Component Value Date    WBC 7 78 03/06/2021    HGB 12 3 03/06/2021    HCT 39 1 03/06/2021    MCV 87 03/06/2021     03/06/2021     Lab Results   Component Value Date    SODIUM 136 03/25/2021    K 3 9 03/25/2021    CL 98 (L) 03/25/2021    CO2 32 03/25/2021    BUN 18 03/25/2021    CREATININE 1 15 03/25/2021    GLUC 351 (H) 03/25/2021    CALCIUM 10 2 (H) 03/25/2021     Lab Results   Component Value Date    INR 1 04 03/01/2019    INR 1 32 (H) 01/08/2018    INR 1 27 (H) 01/07/2018    PROTIME 13 7 03/01/2019    PROTIME 16 5 (H) 01/08/2018    PROTIME 16 0 (H) 01/07/2018     Lab Results   Component Value Date    NTBNP 253 11/06/2017      EKG personally reviewed by Hines China, PA-C [place]and[place]e Essex, PA-C

## 2021-03-29 ENCOUNTER — OFFICE VISIT (OUTPATIENT)
Dept: CARDIOLOGY CLINIC | Facility: CLINIC | Age: 81
End: 2021-03-29
Payer: COMMERCIAL

## 2021-03-29 VITALS
HEART RATE: 79 BPM | SYSTOLIC BLOOD PRESSURE: 126 MMHG | DIASTOLIC BLOOD PRESSURE: 78 MMHG | BODY MASS INDEX: 27.73 KG/M2 | OXYGEN SATURATION: 99 % | WEIGHT: 142 LBS

## 2021-03-29 DIAGNOSIS — E11.9 TYPE 2 DIABETES MELLITUS WITHOUT COMPLICATION, WITHOUT LONG-TERM CURRENT USE OF INSULIN (HCC): ICD-10-CM

## 2021-03-29 DIAGNOSIS — C78.7 ADENOCARCINOMA OF RIGHT BREAST METASTATIC TO LIVER (HCC): ICD-10-CM

## 2021-03-29 DIAGNOSIS — E78.5 HYPERLIPIDEMIA, UNSPECIFIED HYPERLIPIDEMIA TYPE: ICD-10-CM

## 2021-03-29 DIAGNOSIS — I10 HYPERTENSION, UNSPECIFIED TYPE: ICD-10-CM

## 2021-03-29 DIAGNOSIS — Z86.79 HISTORY OF VENTRICULAR TACHYCARDIA: ICD-10-CM

## 2021-03-29 DIAGNOSIS — I50.32 CHRONIC HEART FAILURE WITH PRESERVED EJECTION FRACTION (HFPEF) (HCC): Primary | ICD-10-CM

## 2021-03-29 DIAGNOSIS — C50.911 ADENOCARCINOMA OF RIGHT BREAST METASTATIC TO LIVER (HCC): ICD-10-CM

## 2021-03-29 PROCEDURE — 3074F SYST BP LT 130 MM HG: CPT | Performed by: PHYSICIAN ASSISTANT

## 2021-03-29 PROCEDURE — 3078F DIAST BP <80 MM HG: CPT | Performed by: PHYSICIAN ASSISTANT

## 2021-03-29 PROCEDURE — 1036F TOBACCO NON-USER: CPT | Performed by: PHYSICIAN ASSISTANT

## 2021-03-29 PROCEDURE — 1160F RVW MEDS BY RX/DR IN RCRD: CPT | Performed by: PHYSICIAN ASSISTANT

## 2021-03-29 PROCEDURE — 99215 OFFICE O/P EST HI 40 MIN: CPT | Performed by: PHYSICIAN ASSISTANT

## 2021-04-13 ENCOUNTER — REMOTE DEVICE CLINIC VISIT (OUTPATIENT)
Dept: CARDIOLOGY CLINIC | Facility: CLINIC | Age: 81
End: 2021-04-13
Payer: COMMERCIAL

## 2021-04-13 DIAGNOSIS — Z95.810 PRESENCE OF AUTOMATIC CARDIOVERTER/DEFIBRILLATOR (AICD): Primary | ICD-10-CM

## 2021-04-13 PROCEDURE — G2066 INTER DEVC REMOTE 30D: HCPCS | Performed by: INTERNAL MEDICINE

## 2021-04-13 PROCEDURE — 93297 REM INTERROG DEV EVAL ICPMS: CPT | Performed by: INTERNAL MEDICINE

## 2021-04-13 NOTE — PROGRESS NOTES
Results for orders placed or performed in visit on 04/13/21   Cardiac EP device report    Narrative    MDT BI-V ICD (3830 IN LV PORT)  CAREL[employer] TRANSMISSION - OPTI-VOL ONLY:OPTI-VOL FLUID THRESHOLD CROSSED ON 4-11  RECHECK IN ONE MONTH  AP <1% BVP 99%  ALL AVAILABLE LEAD PARAMETERS WITHIN NORMAL LIMITS  NO SIGNIFICANT HIGH RATE EPISODES  NORMAL DEVICE FUNCTION  ----DUMONT

## 2021-04-23 ENCOUNTER — APPOINTMENT (EMERGENCY)
Dept: CT IMAGING | Facility: HOSPITAL | Age: 81
End: 2021-04-23
Payer: COMMERCIAL

## 2021-04-23 ENCOUNTER — HOSPITAL ENCOUNTER (EMERGENCY)
Facility: HOSPITAL | Age: 81
Discharge: HOME | End: 2021-04-23
Attending: EMERGENCY MEDICINE
Payer: COMMERCIAL

## 2021-04-23 VITALS
SYSTOLIC BLOOD PRESSURE: 161 MMHG | HEART RATE: 100 BPM | TEMPERATURE: 98 F | DIASTOLIC BLOOD PRESSURE: 74 MMHG | RESPIRATION RATE: 20 BRPM | OXYGEN SATURATION: 97 %

## 2021-04-23 DIAGNOSIS — S01.81XA LACERATION OF FOREHEAD, INITIAL ENCOUNTER: ICD-10-CM
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
## 2021-04-23 DIAGNOSIS — S20.211A RIB CONTUSION, RIGHT, INITIAL ENCOUNTER: ICD-10-CM

## 2021-04-23 DIAGNOSIS — S09.90XA INJURY OF HEAD, INITIAL ENCOUNTER: ICD-10-CM

## 2021-04-23 LAB
ANION GAP SERPL CALCULATED.3IONS-SCNC: 10 MMOL/L (ref 4–13)
APTT PPP: 27 SECONDS (ref 23–37)
BASOPHILS # BLD AUTO: 0.04 THOUSANDS/ΜL (ref 0–0.1)
BASOPHILS NFR BLD AUTO: 1 % (ref 0–1)
BUN SERPL-MCNC: 14 MG/DL (ref 5–25)
CALCIUM SERPL-MCNC: 10.1 MG/DL (ref 8.3–10.1)
CHLORIDE SERPL-SCNC: 103 MMOL/L (ref 100–108)
CO2 SERPL-SCNC: 30 MMOL/L (ref 21–32)
CREAT SERPL-MCNC: 0.77 MG/DL (ref 0.6–1.3)
EOSINOPHIL # BLD AUTO: 0.23 THOUSAND/ΜL (ref 0–0.61)
EOSINOPHIL NFR BLD AUTO: 3 % (ref 0–6)
ERYTHROCYTE [DISTWIDTH] IN BLOOD BY AUTOMATED COUNT: 13.2 % (ref 11.6–15.1)
GFR SERPL CREATININE-BSD FRML MDRD: 72 ML/MIN/1.73SQ M
GLUCOSE SERPL-MCNC: 155 MG/DL (ref 65–140)
HCT VFR BLD AUTO: 34.3 % (ref 34.8–46.1)
HGB BLD-MCNC: 10.9 G/DL (ref 11.5–15.4)
IMM GRANULOCYTES # BLD AUTO: 0.02 THOUSAND/UL (ref 0–0.2)
IMM GRANULOCYTES NFR BLD AUTO: 0 % (ref 0–2)
INR PPP: 1.09 (ref 0.84–1.19)
LYMPHOCYTES # BLD AUTO: 1.72 THOUSANDS/ΜL (ref 0.6–4.47)
LYMPHOCYTES NFR BLD AUTO: 23 % (ref 14–44)
MCH RBC QN AUTO: 27.1 PG (ref 26.8–34.3)
MCHC RBC AUTO-ENTMCNC: 31.8 G/DL (ref 31.4–37.4)
MCV RBC AUTO: 85 FL (ref 82–98)
MONOCYTES # BLD AUTO: 1.03 THOUSAND/ΜL (ref 0.17–1.22)
MONOCYTES NFR BLD AUTO: 14 % (ref 4–12)
NEUTROPHILS # BLD AUTO: 4.44 THOUSANDS/ΜL (ref 1.85–7.62)
NEUTS SEG NFR BLD AUTO: 59 % (ref 43–75)
NRBC BLD AUTO-RTO: 0 /100 WBCS
PLATELET # BLD AUTO: 164 THOUSANDS/UL (ref 149–390)
PMV BLD AUTO: 9.4 FL (ref 8.9–12.7)
POTASSIUM SERPL-SCNC: 3.6 MMOL/L (ref 3.5–5.3)
PROTHROMBIN TIME: 13.9 SECONDS (ref 11.6–14.5)
RBC # BLD AUTO: 4.02 MILLION/UL (ref 3.81–5.12)
SODIUM SERPL-SCNC: 143 MMOL/L (ref 136–145)
WBC # BLD AUTO: 7.48 THOUSAND/UL (ref 4.31–10.16)

## 2021-04-23 PROCEDURE — 74177 CT ABD & PELVIS W/CONTRAST: CPT

## 2021-04-23 PROCEDURE — 80048 BASIC METABOLIC PNL TOTAL CA: CPT | Performed by: EMERGENCY MEDICINE

## 2021-04-23 PROCEDURE — 70450 CT HEAD/BRAIN W/O DYE: CPT

## 2021-04-23 PROCEDURE — 85025 COMPLETE CBC W/AUTO DIFF WBC: CPT | Performed by: EMERGENCY MEDICINE

## 2021-04-23 PROCEDURE — 85610 PROTHROMBIN TIME: CPT | Performed by: EMERGENCY MEDICINE

## 2021-04-23 PROCEDURE — 96374 THER/PROPH/DIAG INJ IV PUSH: CPT

## 2021-04-23 PROCEDURE — 72125 CT NECK SPINE W/O DYE: CPT

## 2021-04-23 PROCEDURE — 99284 EMERGENCY DEPT VISIT MOD MDM: CPT

## 2021-04-23 PROCEDURE — 85730 THROMBOPLASTIN TIME PARTIAL: CPT | Performed by: EMERGENCY MEDICINE

## 2021-04-23 PROCEDURE — 99285 EMERGENCY DEPT VISIT HI MDM: CPT | Performed by: EMERGENCY MEDICINE

## 2021-04-23 PROCEDURE — 71260 CT THORAX DX C+: CPT

## 2021-04-23 PROCEDURE — 36415 COLL VENOUS BLD VENIPUNCTURE: CPT | Performed by: EMERGENCY MEDICINE

## 2021-04-23 RX ORDER — MORPHINE SULFATE 4 MG/ML
4 INJECTION, SOLUTION INTRAMUSCULAR; INTRAVENOUS ONCE
Status: COMPLETED | OUTPATIENT
Start: 2021-04-23 | End: 2021-04-23

## 2021-04-23 RX ORDER — METHOCARBAMOL 500 MG/1
1000 TABLET, FILM COATED ORAL 2 TIMES DAILY
Qty: 28 TABLET | Refills: 0 | Status: SHIPPED | OUTPATIENT
Start: 2021-04-23 | End: 2021-01-01 | Stop reason: ALTCHOICE

## 2021-04-23 RX ORDER — LIDOCAINE 50 MG/G
1 PATCH TOPICAL ONCE
Status: DISCONTINUED | OUTPATIENT
Start: 2021-04-23 | End: 2021-04-23 | Stop reason: HOSPADM

## 2021-04-23 RX ORDER — ACETAMINOPHEN 500 MG
1000 TABLET ORAL EVERY 6 HOURS PRN
Qty: 60 TABLET | Refills: 0 | Status: SHIPPED | OUTPATIENT
Start: 2021-04-23 | End: 2022-01-01 | Stop reason: HOSPADM

## 2021-04-23 RX ORDER — LIDOCAINE 40 MG/G
CREAM TOPICAL AS NEEDED
Qty: 30 G | Refills: 0 | Status: SHIPPED | OUTPATIENT
Start: 2021-04-23 | End: 2021-01-01 | Stop reason: ALTCHOICE

## 2021-04-23 RX ADMIN — LIDOCAINE 1 PATCH: 50 PATCH CUTANEOUS at 19:33

## 2021-04-23 RX ADMIN — IOHEXOL 100 ML: 350 INJECTION, SOLUTION INTRAVENOUS at 20:26

## 2021-04-23 RX ADMIN — MORPHINE SULFATE 4 MG: 4 INJECTION INTRAVENOUS at 19:34

## 2021-04-24 NOTE — ED PROVIDER NOTES
History  Chief Complaint   Patient presents with    Fall     patient was squating down to clean  stood up to turn around and possibly tripped on chair  fell forward  hit head on corner of fire place  ecchymosis and laceration to forehead  c/o right sided pain  no loc  no thinners  Pt is an 78year old female with a PMH of hypertension, hyperlipidemia and cancer presenting with fall  Pt states PTA she lost her balance and fell forward striking her head on the corner of a piece of slate  Denies LOC but does have a laceration of her forehead  States she does not know if she struck her chest or abdomen but has right anterolateral rib pain that radiates to her back  Denies blood thinners  Associated neck pain as well  Denies lightheadedness, dizziness, SOB, n/v        History provided by:  Patient   used: No    Fall  Mechanism of injury: fall    Injury location:  Head/neck and torso  Head/neck injury location:  Head  Torso injury location:  R chest, R flank and abdomen  Incident location:  Home  Arrived directly from scene: yes    Fall:     Fall occurred:  Standing    Point of impact:  Head    Entrapped after fall: no    Suspicion of alcohol use: no    Suspicion of drug use: no    Tetanus status:  Up to date  Prior to arrival data:     Patient ambulatory at scene: yes      Blood loss:  Minimal    Responsiveness at scene:  Alert    Orientation at scene:  Person, place, situation and time    Loss of consciousness: no      Amnesic to event: no    Associated symptoms: abdominal pain, back pain, chest pain (right rib), headaches and neck pain    Associated symptoms: no nausea and no vomiting        Prior to Admission Medications   Prescriptions Last Dose Informant Patient Reported? Taking?    Calcium Citrate-Vitamin D (CALCIUM CITRATE + D PO)  Self Yes No   Sig: Take 1,500 mg by mouth daily   Cranberry (THERACRAN HP PO)  Self Yes No   Sig: Take 2 tablets by mouth daily   Glucosamine-Chondroit-Vit C-Mn (GLUCOSAMINE 1500 COMPLEX) CAPS  Self Yes No   Sig: Take 1 capsule by mouth daily     Multiple Vitamin (MULTIVITAMIN) capsule  Self Yes No   Sig: Take 1 capsule by mouth daily  acetaminophen (TYLENOL) 500 mg tablet  Self Yes No   Sig: Take 500 mg by mouth every 6 (six) hours as needed for mild pain   atorvastatin (LIPITOR) 20 mg tablet  Self Yes No   Sig: Take 20 mg by mouth daily  clindamycin (CLEOCIN) 300 MG capsule  Self Yes No   Sig: TAKE 2 CAPSULES BY MOUTH 1 HOUR PRIOR TO DENTAL PROCEDURE     clotrimazole-betamethasone (LOTRISONE) 1-0 05 % cream  Self Yes No   Sig: Apply 1 application topically as needed    everolimus (AFINITOR) 2 5 MG tablet  Self No No   Sig: Take 1 tablet (2 5 mg total) by mouth daily   exemestane (AROMASIN) 25 MG tablet  Self No No   Sig: Take 1 tablet (25 mg total) by mouth daily   metFORMIN (GLUCOPHAGE) 500 mg tablet  Self Yes No   Si pill 3 times daily with meals   metoprolol tartrate (LOPRESSOR) 25 mg tablet  Self No No   Sig: Take 1 tablet (25 mg total) by mouth every 12 (twelve) hours   ondansetron (ZOFRAN) 4 mg tablet  Self Yes No   Sig: Take 4 mg by mouth every 8 (eight) hours as needed   oxyCODONE (ROXICODONE) 5 mg immediate release tablet  Self No No   Sig: Take 1 tablet (5 mg total) by mouth every 4 (four) hours as needed for moderate painMax Daily Amount: 30 mg   Patient not taking: Reported on 3/29/2021   sitaGLIPtin (JANUVIA) 100 mg tablet  Self Yes No   Sig: Take 100 mg by mouth daily   torsemide (DEMADEX) 20 mg tablet  Self No No   Sig: Take 1 tablet (20 mg total) by mouth daily      Facility-Administered Medications: None       Past Medical History:   Diagnosis Date    Acute biliary pancreatitis     Anxiety     Arthritis     Breast CA (HCC)     recurrent, ductal carcinoma ER, CT pos    Cardiac disease     Colon polyp     Depression     Diverticula of intestine     Diverticulosis     Dizziness     and passes out    Flushing     Hiatal hernia     [facility] ([facility])      lizette    Hypercholesteremia     Hypertension     Liver mass     Liver [facility] ([facility] [facility] )     Metastatic adenocarcinoma to liver ([facility] [address] )     Bx 01/03/2017    PONV (postoperative nausea and vomiting)     Pulmonary embolism (HCC)     Recurrent breast cancer (HCC)     Shingles     Shortness of breath     on exertion    Tachycardia     Urinary incontinence     Use of cane as ambulatory aid     or walker       Past Surgical History:   Procedure Laterality Date    BREAST SURGERY      CARDIAC PACEMAKER REMOVAL N/A 11/9/2017    Procedure: PACEMAKER LEAD REVISION, REMOVAL OF NONFUNCTIONING LEAD, AND INSERTION OF A NEW RV LEAD;  Surgeon: Filipe Kim MD;  [facility];  Service: Cardiology    CATARACT EXTRACTION Bilateral     COLONOSCOPY      ERCP N/A 1/8/2018    Procedure: ENDOSCOPIC RETROGRADE CHOLANGIOPANCREATOGRAPHY (ERCP); Surgeon: Octavia Lizama MD;  Location: [facility]; Service: Gastroenterology    HYSTERECTOMY      INSERT / REPLACE / [employer]      JOINT REPLACEMENT      lizette  TKR and right TSR    MASTECTOMY Right     CO ERCP DX COLLECTION SPECIMEN BRUSHING/WASHING N/A 2/22/2018    Procedure: ENDOSCOPIC RETROGRADE CHOLANGIOPANCREATOGRAPHY (ERCP) with stent removal;  Surgeon: Octavia Lizama MD;  [facility]; Service: Gastroenterology    CO RESEC [address] N/A 7/13/2017    Procedure: LIVER RESECTION, INTRAOPERATIVE ULTRASOUND;  Surgeon: Stevie Tang MD;  [facility];  Service: Surgical Oncology    ROTATOR CUFF REPAIR Right     SENTINEL LYMPH NODE BIOPSY Right     TONSILECTOMY AND ADNOIDECTOMY      WOUND DEBRIDEMENT Left 11/9/2017    Procedure: DEBRIDEMENT WOUND AND DRESSING CHANGE [facility]);   Surgeon: Filipe Kim MD;  [facility];  Service: Cardiology       Family History   Problem Relation Age of Onset    Lung cancer Father     Cancer Brother     Diabetes type II Son      I have reviewed and agree with the history as documented  E-Cigarette/Vaping     E-Cigarette/Vaping Substances     Social History     Tobacco Use    Smoking status: Never Smoker    Smokeless tobacco: Never Used   Substance Use Topics    Alcohol use: Not Currently    Drug use: No       Review of Systems   Constitutional: Negative  HENT: Negative  Respiratory: Negative  Cardiovascular: Positive for chest pain (right rib)  Negative for palpitations and leg swelling  Gastrointestinal: Positive for abdominal pain  Negative for nausea and vomiting  Genitourinary: Negative  Musculoskeletal: Positive for back pain and neck pain  Negative for arthralgias and joint swelling  Skin: Positive for wound  Neurological: Positive for headaches  Negative for dizziness, syncope, facial asymmetry, speech difficulty, weakness, light-headedness and numbness  All other systems reviewed and are negative  Physical Exam  Physical Exam  Constitutional:       General: She is in acute distress  Appearance: She is well-developed  She is not diaphoretic  HENT:      Head: Normocephalic and atraumatic  Right Ear: External ear normal       Left Ear: External ear normal       Nose: Nose normal    Eyes:      General: No scleral icterus  Right eye: No discharge  Left eye: No discharge  Extraocular Movements: Extraocular movements intact  Conjunctiva/sclera: Conjunctivae normal       Pupils: Pupils are equal, round, and reactive to light  Neck:      Musculoskeletal: Normal range of motion and neck supple  Cardiovascular:      Rate and Rhythm: Normal rate and regular rhythm  Heart sounds: Normal heart sounds  Pulmonary:      Effort: Pulmonary effort is normal       Breath sounds: Normal breath sounds  Chest:      Chest wall: Tenderness present  No swelling or crepitus  Comments: TTP of right anterolateral lower ribs without crepitus, ecchymosis, erythema or swelling  Abdominal:      General: Abdomen is flat  Bowel sounds are normal       Palpations: Abdomen is soft  Tenderness: There is abdominal tenderness in the right upper quadrant  There is no guarding or rebound  Musculoskeletal: Normal range of motion  Right hip: Normal       Left hip: Normal       Right knee: Normal       Left knee: Normal       Right ankle: Normal       Left ankle: Normal       Cervical back: She exhibits tenderness and pain  She exhibits normal range of motion, no bony tenderness, no swelling, no edema, no deformity, no laceration, no spasm and normal pulse  Thoracic back: She exhibits tenderness and pain  She exhibits normal range of motion, no bony tenderness, no swelling, no edema, no deformity, no laceration, no spasm and normal pulse  Lumbar back: She exhibits tenderness and pain  She exhibits normal range of motion, no bony tenderness, no swelling, no edema, no deformity, no laceration, no spasm and normal pulse  Skin:     General: Skin is warm and dry  Neurological:      General: No focal deficit present  Mental Status: She is alert and oriented to person, place, and time  Mental status is at baseline  Cranial Nerves: No cranial nerve deficit  Sensory: No sensory deficit  Motor: No weakness        Coordination: Coordination normal    Psychiatric:         Mood and Affect: Mood normal          Behavior: Behavior normal          Vital Signs  ED Triage Vitals   Temperature Pulse Respirations Blood Pressure SpO2   04/23/21 1839 04/23/21 1839 04/23/21 1839 04/23/21 1839 04/23/21 1839   98 °F (36 7 °C) (!) 110 16 166/77 100 %      Temp Source Heart Rate Source Patient Position - Orthostatic VS BP Location FiO2 (%)   04/23/21 1839 04/23/21 1839 04/23/21 1839 04/23/21 1839 --   Oral Monitor Sitting Right arm       Pain Score       04/23/21 2001       3           Vitals:    04/23/21 1839 04/23/21 2129   BP: 166/77 161/74   Pulse: (!) 110 100   Patient Position - Orthostatic VS: Sitting Lying Visual Acuity  Visual Acuity      Most Recent Value   L Pupil Size (mm)  4   R Pupil Size (mm)  4          ED Medications  Medications   lidocaine (LIDODERM) 5 % patch 1 patch (1 patch Topical Medication Applied 4/23/21 1933)   morphine (PF) 4 mg/mL injection 4 mg (4 mg Intravenous Given 4/23/21 1934)   iohexol (OMNIPAQUE) 350 MG/ML injection (SINGLE-DOSE) 100 mL (100 mL Intravenous Given 4/23/21 2026)       Diagnostic Studies  Results Reviewed     Procedure Component Value Units Date/Time    Basic metabolic panel [[id number]]  (Abnormal) Collected: 04/23/21 1932    Lab Status: Final result Specimen: Blood from Arm, Left Updated: 04/23/21 2000     Sodium 143 mmol/L      Potassium 3 6 mmol/L      Chloride 103 mmol/L      CO2 30 mmol/L      ANION GAP 10 mmol/L      BUN 14 mg/dL      Creatinine 0 77 mg/dL      Glucose 155 mg/dL      Calcium 10 1 mg/dL      eGFR 72 ml/min/1 73sq m     Narrative:      [place] guidelines for Chronic Kidney Disease (CKD):     Stage 1 with normal or high GFR (GFR > 90 mL/min/1 73 square meters)    Stage 2 Mild CKD (GFR = 60-89 mL/min/1 73 square meters)    Stage 3A Moderate CKD (GFR = 45-59 mL/min/1 73 square meters)    Stage 3B Moderate CKD (GFR = 30-44 mL/min/1 73 square meters)    Stage 4 Severe CKD (GFR = 15-29 mL/min/1 73 square meters)    Stage 5 End Stage CKD (GFR <15 mL/min/1 73 square meters)  Note: GFR calculation is accurate only with a steady state creatinine    Protime-INR [[id number]]  (Normal) Collected: 04/23/21 1932    Lab Status: Final result Specimen: Blood from Arm, Left Updated: 04/23/21 1957     Protime 13 9 seconds      INR 1 09    APTT [[id number]]  (Normal) Collected: 04/23/21 1932    Lab Status: Final result Specimen: Blood from Arm, Left Updated: 04/23/21 1957     PTT 27 seconds     CBC and differential [[id number]]  (Abnormal) Collected: 04/23/21 1932    Lab Status: Final result Specimen: Blood from Arm, Left Updated: 04/23/21 1940     WBC 7 48 Thousand/uL      RBC 4 02 Million/uL      Hemoglobin 10 9 g/dL      Hematocrit 34 3 %      MCV 85 fL      MCH 27 1 pg      MCHC 31 8 g/dL      RDW 13 2 %      MPV 9 4 fL      Platelets 230 Thousands/uL      nRBC 0 /100 WBCs      Neutrophils Relative 59 %      Immat GRANS % 0 %      Lymphocytes Relative 23 %      Monocytes Relative 14 %      Eosinophils Relative 3 %      Basophils Relative 1 %      Neutrophils Absolute 4 44 Thousands/µL      Immature Grans Absolute 0 02 Thousand/uL      Lymphocytes Absolute 1 72 Thousands/µL      Monocytes Absolute 1 03 Thousand/µL      Eosinophils Absolute 0 23 Thousand/µL      Basophils Absolute 0 04 Thousands/µL                  CT head without contrast   Final Result by Kieran Balbuena MD (04/23 2110)         1  No evidence of acute intracranial hemorrhage, mass effect or extra-axial collection  2   Right anterior frontal scalp hematoma  No acute calvarial fracture  Workstation performed: [id number]         CT cervical spine without contrast   Final Result by Kieran Balbuena MD (04/23 2107)         1  No acute cervical spine fracture or traumatic malalignment  2   Increased nodular scarring and pneumonia 6 mm pulmonary nodule in the apical region of the right upper lobe, concerning for metastatic disease  Workstation performed: [id number]         CT chest abdomen pelvis w contrast   Final Result by Kieran Balbuena MD (04/23 2100)         1  No evidence of acute trauma in the chest, abdomen or pelvis  2   Mild increase in size of hypoenhancing mass in the dome of the liver and adjacent subcapsular lesion consistent with progression of disease  Workstation performed: [id number]                    Procedures  Procedures         ED Course  ED Course as of Apr 23 2247 Fri Apr 23, 2021 2116 Pt pain improved in the ED with morphine and patch  Her CT are negative for trauma  Follow up with PCP  Educated on return precautions  Stable for discharge  SBIRT 22yo+      Most Recent Value   SBIRT (24 yo +)   In order to provide better care to our patients, we are screening all of our patients for alcohol and drug use  Would it be okay to ask you these screening questions? No Filed at: 04/23/2021 7861                    [facility]  Number of Diagnoses or Management Options  Fall, initial encounter: new and requires workup  Injury of head, initial encounter: new and requires workup  Laceration of forehead, initial encounter: new and requires workup  Rib contusion, right, initial encounter: new and requires workup     Amount and/or Complexity of Data Reviewed  Clinical lab tests: ordered and reviewed  Tests in the radiology section of CPT®: ordered and reviewed  Independent visualization of images, tracings, or specimens: yes    Risk of Complications, Morbidity, and/or Mortality  Presenting problems: high  Management options: high  General comments: Given IV morphine for pain relief in the ED  Patient Progress  Patient progress: improved      Disposition  Final diagnoses:   Fall, initial encounter   Injury of head, initial encounter   Rib contusion, right, initial encounter   Laceration of forehead, initial encounter     Time reflects when diagnosis was documented in both MDM as applicable and the Disposition within this note     Time User Action Codes Description Comment    4/23/2021  9:12 PM Merlinda Jordan Add [P79  ZDZA] Fall, initial encounter     4/23/2021  9:12 PM Merlinda Jordan Add [S09 90XA] Injury of head, initial encounter     4/23/2021  9:12 PM Merlinda Jordan Add [S20 211A] Rib contusion, right, initial encounter     4/23/2021  9:31 PM Marta, [address] Laceration of forehead, initial encounter       ED Disposition     ED Disposition Condition Date/Time Comment    Discharge Good Fri Apr 23, 2021  9:12 PM J Luis Lara discharge to home/self care  Follow-up Information     Follow up With Specialties Details Why Contact Margareth Lanier MD Family Medicine Schedule an appointment as soon as possible for a visit today  [place] [address] [phone]            Discharge Medication List as of 4/23/2021  9:31 PM      START taking these medications    Details   !! acetaminophen (TYLENOL) 500 mg tablet Take 2 tablets (1,000 mg total) by mouth every 6 (six) hours as needed for mild pain, Starting Fri 4/23/2021, Normal      Diclofenac Sodium (VOLTAREN) 1 % Apply 2 g topically 4 (four) times a day, Starting Fri 4/23/2021, Normal      lidocaine (LMX) 4 % cream Apply topically as needed for mild pain, Starting Fri 4/23/2021, Normal      methocarbamol (ROBAXIN) 500 mg tablet Take 2 tablets (1,000 mg total) by mouth 2 (two) times a day for 7 days, Starting Fri 4/23/2021, Until Fri 4/30/2021, Normal       !! - Potential duplicate medications found  Please discuss with provider  CONTINUE these medications which have NOT CHANGED    Details   !! acetaminophen (TYLENOL) 500 mg tablet Take 500 mg by mouth every 6 (six) hours as needed for mild pain, Historical Med      atorvastatin (LIPITOR) 20 mg tablet Take 20 mg by mouth daily  , Historical Med      Calcium Citrate-Vitamin D (CALCIUM CITRATE + D PO) Take 1,500 mg by mouth daily, Historical Med      clindamycin (CLEOCIN) 300 MG capsule TAKE 2 CAPSULES BY MOUTH 1 HOUR PRIOR TO DENTAL PROCEDURE , Historical Med      clotrimazole-betamethasone (LOTRISONE) 1-0 05 % cream Apply 1 application topically as needed , Starting Mon 8/14/2017, Historical Med      Cranberry (THERACRAN HP PO) Take 2 tablets by mouth daily, Historical Med      everolimus (AFINITOR) 2 5 MG tablet Take 1 tablet (2 5 mg total) by mouth daily, Starting Fri 3/12/2021, Normal      exemestane (AROMASIN) 25 MG tablet Take 1 tablet (25 mg total) by mouth daily, Starting Thu 1/7/2021, Normal Glucosamine-Chondroit-Vit C-Mn (GLUCOSAMINE 1500 COMPLEX) CAPS Take 1 capsule by mouth daily  , Historical Med      metFORMIN (GLUCOPHAGE) 500 mg tablet 1 pill 3 times daily with meals, Historical Med      metoprolol tartrate (LOPRESSOR) 25 mg tablet Take 1 tablet (25 mg total) by mouth every 12 (twelve) hours, Starting u 3/11/2021, No Print      Multiple Vitamin (MULTIVITAMIN) capsule Take 1 capsule by mouth daily  , Historical Med      ondansetron (ZOFRAN) 4 mg tablet Take 4 mg by mouth every 8 (eight) hours as needed, Starting Tue 8/27/2019, Historical Med      oxyCODONE (ROXICODONE) 5 mg immediate release tablet Take 1 tablet (5 mg total) by mouth every 4 (four) hours as needed for moderate painMax Daily Amount: 30 mg, Starting Thu 12/3/2020, Normal      sitaGLIPtin (JANUVIA) 100 mg tablet Take 100 mg by mouth daily, Historical Med      torsemide (DEMADEX) 20 mg tablet Take 1 tablet (20 mg total) by mouth daily, Starting Thu 3/11/2021, Normal       !! - Potential duplicate medications found  Please discuss with provider  No discharge procedures on file      PDMP Review       Value Time User    PDMP Reviewed  Yes 12/3/2020  3:38 PM Augustus Quinones, [facility] Provider  Electronically Signed by           Kwabena Graff PA-C  04/23/21 1219

## 2021-05-05 ENCOUNTER — OFFICE VISIT (OUTPATIENT)
Dept: ENDOCRINOLOGY | Facility: CLINIC | Age: 81
End: 2021-05-05
Payer: COMMERCIAL

## 2021-05-05 ENCOUNTER — TELEPHONE (OUTPATIENT)
Dept: ADMINISTRATIVE | Facility: OTHER | Age: 81
End: 2021-05-05

## 2021-05-05 VITALS
SYSTOLIC BLOOD PRESSURE: 122 MMHG | BODY MASS INDEX: 27.96 KG/M2 | HEART RATE: 88 BPM | HEIGHT: 60 IN | WEIGHT: 142.4 LBS | DIASTOLIC BLOOD PRESSURE: 82 MMHG

## 2021-05-05 DIAGNOSIS — E11.65 TYPE 2 DIABETES MELLITUS WITH HYPERGLYCEMIA, WITHOUT LONG-TERM CURRENT USE OF INSULIN (HCC): Primary | ICD-10-CM

## 2021-05-05 DIAGNOSIS — E83.52 HYPERCALCEMIA: ICD-10-CM

## 2021-05-05 PROCEDURE — 99204 OFFICE O/P NEW MOD 45 MIN: CPT | Performed by: INTERNAL MEDICINE

## 2021-05-05 NOTE — TELEPHONE ENCOUNTER
Upon review of the In Basket request and the patient's chart, initial outreach has been made via fax, please see Contacts section for details       Thank you  Randal Briggs

## 2021-05-05 NOTE — LETTER
Diabetic Eye Exam Form    Date Requested: 21  Patient: J Luis Lara  Patient : 1940   Referring Provider: Jesus Purcell MD    Dilated Retinal Exam, Optomap-Iris Exam, or Fundus Photography Done         Yes ([employer] one above)         No     Date of Diabetic Eye Exam ______________________________  Left Eye      Exam did show retinopathy    Exam did not show retinopathy         Mild       Moderate       None       Proliferative       Severe     Right Eye     Exam did show retinopathy    Exam did not show retinopathy         Mild       Moderate       None       Proliferative       Severe     Comments __________________________________________________________    Practice Providing Exam ______________________________________________    Exam Performed By (print name) _______________________________________      Provider Signature ___________________________________________________      These reports are needed for  compliance    Please fax this completed form and a copy of the Diabetic Eye Exam report to our office located at [place] [address] as soon as possible to [phone] attention Honora Modest: Phone [phone]    We thank you for your assistance in treating our mutual patient

## 2021-05-05 NOTE — TELEPHONE ENCOUNTER
----- Message from Livia Carson sent at 5/5/2021  8:48 AM EDT -----  Regarding: Diabetic eye exam  05/05/21 8:48 AM    Hello, our patient Jen Temple has had diabetic eye exam completed/performed  Please assist in obtaining the exclusion documentation by [facility] for [facility]  The date of service is with a year       Thank you,  Livia Carson  [facility]

## 2021-05-05 NOTE — LETTER
May 5, 2021     [place], [address]  [address] [facility]    Patient: Jannie Díaz   YOB: 1940   Date of Visit: 5/5/2021       Dear Dr Eliza Yang: Thank you for referring Sofie Maria to me for evaluation  Below are my notes for this consultation  If you have questions, please do not hesitate to call me  I look forward to following your patient along with you  Sincerely,        Alice Arambula MD        CC: Severiano Amsterdam, DO Shona Garre, MD  5/5/2021 12:02 PM  Attested   Jannie Díaz 78 y o  female MRN: [id number]    Encounter: [id number]      Assessment/Plan     Assessment:  Uncontrolled Type 2 diabetes mellitus  Patient is type 2 diabetic, diagnosed 4 years ago, has been maintained on oral hypoglycemics  Her A1c trended up from 7 in 5/2020 to 10 in 1/2021   Off note patient was started on chemotherapy medicine AFINITOR which is likely the cause for significant hyperglycemia  Patient was on Januvia, her leg swelling worse ,BS uncontrolled ,on for few months,stopped on march 30th   Complications-following up with the Ophthalmology at [facility], no retinopathy  CKD stage 2, no history of heart attack or stroke  Patient checks her fasting blood glucose- numbers ranging between 165-244    No recent weight loss or gain  Patient is watching her diet, trying not to eat carbohydrates  Has never seen a nutritionist   Not interested to see a nutritionist    She is not doing any exercise  now because of COVID, before COVID patient was active  Plan-   We will communicate with the oncologist regarding the choice for chemotherapy as current regimen is causing significant hyperglycemia and complications  Repeat HbA1c, TSH, T3, T4, PTH, refer to nutritionist   Continue Metformin- no side effects, she is taking 500 mg 3 times daily        CC: Diabetes    History of Present Illness     HPI:  67-year-old female with past medical history of type 2 diabetes mellitus,Metastatic breast cancer s/p R mastectomy, currently on active chemotherapy, diastolic heart failure, CAD,  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,  Hyperlipidemia  referred from primary care doctor for the management of diabetes mellitus  Patient was diagnosed with diabetes approximately 4 years ago  She has been maintained on oral hypoglycemics, never been on insulin  Hemoglobin A1c trended up to 10 from jan 2021  Denied any weight gain/loss  Complications-following up with the Ophthalmology at [facility], no retinopathy  CKD stage 2, no history of heart attack or stroke  Review of Systems   Constitutional: Positive for fatigue  HENT: Positive for hearing loss  Negative for congestion, mouth sores and sore throat  Eyes: Negative for visual disturbance  Respiratory: Negative for chest tightness and shortness of breath  Cardiovascular: Positive for leg swelling  Negative for palpitations  Gastrointestinal: Negative for abdominal pain, diarrhea, nausea and vomiting  Endocrine: Positive for polydipsia and polyuria  Negative for cold intolerance  Genitourinary: Positive for frequency  Negative for dysuria  Neurological: Negative for dizziness and light-headedness  Psychiatric/Behavioral: Negative for agitation  The patient is not nervous/anxious          Historical Information   Past Medical History:   Diagnosis Date    Acute biliary pancreatitis     Anxiety     Arthritis     Breast CA (HCC)     recurrent, ductal carcinoma ER, [place] pos    Cardiac disease     Colon polyp     Depression     Diverticula of intestine     Diverticulosis     Dizziness     and passes out    Flushing     Hiatal hernia     [place] (hard of hearing)      lizette    Hypercholesteremia     Hypertension     Liver mass     Liver metastasis (HCC)     Metastatic adenocarcinoma to liver (HCC)     Bx 01/03/2017    PONV (postoperative nausea and vomiting)     Pulmonary embolism ([facility] [address] )  Recurrent breast cancer ([facility] [address] )     Shingles     Shortness of breath     on exertion    Tachycardia     Urinary incontinence     Use of cane as ambulatory aid     or walker     Past Surgical History:   Procedure Laterality Date    BREAST SURGERY      CARDIAC PACEMAKER REMOVAL N/A 11/9/2017    Procedure: PACEMAKER LEAD REVISION, REMOVAL OF NONFUNCTIONING LEAD, AND INSERTION OF A NEW RV LEAD;  Surgeon: Gerardo Ritchie MD;  [facility];  Service: Cardiology    CATARACT EXTRACTION Bilateral     COLONOSCOPY      ERCP N/A 1/8/2018    Procedure: ENDOSCOPIC RETROGRADE CHOLANGIOPANCREATOGRAPHY (ERCP); Surgeon: Deidre Cottrell MD;  Location: [facility]; Service: Gastroenterology    HYSTERECTOMY      INSERT / REPLACE / Donaldo Remedies      JOINT REPLACEMENT      lizette  TKR and right TSR    MASTECTOMY Right     HI ERCP DX COLLECTION SPECIMEN BRUSHING/WASHING N/A 2/22/2018    Procedure: ENDOSCOPIC RETROGRADE CHOLANGIOPANCREATOGRAPHY (ERCP) with stent removal;  Surgeon: Deidre Cottrell MD;  [facility]: [facility]; Service: Gastroenterology    HI RESEC [address] N/A 7/13/2017    Procedure: LIVER RESECTION, INTRAOPERATIVE ULTRASOUND;  Surgeon: Marvin Padilla MD;  [facility];  Service: Surgical Oncology    ROTATOR CUFF REPAIR Right     SENTINEL LYMPH NODE BIOPSY Right     TONSILECTOMY AND ADNOIDECTOMY      WOUND DEBRIDEMENT Left 11/9/2017    Procedure: DEBRIDEMENT WOUND AND DRESSING CHANGE [facility]);   Surgeon: Gerardo Ritchie MD;  [facility];  Service: Cardiology     Social History   Social History     Substance and Sexual Activity   Alcohol Use Not Currently     Social History     Substance and Sexual Activity   Drug Use No     Social History     Tobacco Use   Smoking Status Never Smoker   Smokeless Tobacco Never Used     Family History:   Family History   Problem Relation Age of Onset    Lung cancer Father     Cancer Brother     Diabetes type II Son        Meds/Allergies Current Outpatient Medications   Medication Sig Dispense Refill    acetaminophen (TYLENOL) 500 mg tablet Take 500 mg by mouth every 6 (six) hours as needed for mild pain      atorvastatin (LIPITOR) 20 mg tablet Take 20 mg by mouth daily   Calcium Citrate-Vitamin D (CALCIUM CITRATE + D PO) Take 1,500 mg by mouth daily      clindamycin (CLEOCIN) 300 MG capsule TAKE 2 CAPSULES BY MOUTH 1 HOUR PRIOR TO DENTAL PROCEDURE   0    clotrimazole-betamethasone (LOTRISONE) 1-0 05 % cream Apply 1 application topically as needed       Cranberry (THERACRAN HP PO) Take 2 tablets by mouth daily      Diclofenac Sodium (VOLTAREN) 1 % Apply 2 g topically 4 (four) times a day 350 g 0    everolimus (AFINITOR) 2 5 MG tablet Take 1 tablet (2 5 mg total) by mouth daily 30 tablet 6    exemestane (AROMASIN) 25 MG tablet Take 1 tablet (25 mg total) by mouth daily 90 tablet 3    Glucosamine-Chondroit-Vit C-Mn (GLUCOSAMINE 1500 COMPLEX) CAPS Take 1 capsule by mouth daily        metFORMIN (GLUCOPHAGE) 500 mg tablet 1 pill 3 times daily with meals      metoprolol tartrate (LOPRESSOR) 25 mg tablet Take 1 tablet (25 mg total) by mouth every 12 (twelve) hours 180 tablet 1    Multiple Vitamin (MULTIVITAMIN) capsule Take 1 capsule by mouth daily        ondansetron (ZOFRAN) 4 mg tablet Take 4 mg by mouth every 8 (eight) hours as needed      torsemide (DEMADEX) 20 mg tablet Take 1 tablet (20 mg total) by mouth daily 30 tablet 3    acetaminophen (TYLENOL) 500 mg tablet Take 2 tablets (1,000 mg total) by mouth every 6 (six) hours as needed for mild pain 60 tablet 0    lidocaine (LMX) 4 % cream Apply topically as needed for mild pain (Patient not taking: Reported on 5/5/2021) 30 g 0    methocarbamol (ROBAXIN) 500 mg tablet Take 2 tablets (1,000 mg total) by mouth 2 (two) times a day for 7 days (Patient not taking: Reported on 5/5/2021) 28 tablet 0    oxyCODONE (ROXICODONE) 5 mg immediate release tablet Take 1 tablet (5 mg total) by mouth every 4 (four) hours as needed for moderate painMax Daily Amount: 30 mg (Patient not taking: Reported on 3/29/2021) 30 tablet 0    sitaGLIPtin (JANUVIA) 100 mg tablet Take 100 mg by mouth daily       No current facility-administered medications for this visit  Allergies   Allergen Reactions    Ampicillin Shortness Of Breath, Anaphylaxis and Other (See Comments)     Other reaction(s): Unknown Allergic Reaction  Reaction Date: [id number];        Objective   Vitals: Blood pressure 122/82, pulse 88, height 5' (1 524 m), weight 64 6 kg (142 lb 6 4 oz)  Physical Exam  Constitutional:       Appearance: Normal appearance  HENT:      Head: Normocephalic and atraumatic  Eyes:      Conjunctiva/sclera: Conjunctivae normal    Neck:      Musculoskeletal: Normal range of motion and neck supple  Cardiovascular:      Rate and Rhythm: Normal rate and regular rhythm  Pulses: Normal pulses  Heart sounds: Normal heart sounds  Pulmonary:      Effort: Pulmonary effort is normal       Breath sounds: Normal breath sounds  Abdominal:      General: Bowel sounds are normal       Palpations: Abdomen is soft  Musculoskeletal: Normal range of motion  Right lower leg: Edema present  Left lower leg: Edema present  Comments: Bilateral swelling of the feet   Skin:     General: Skin is warm  Neurological:      General: No focal deficit present  Mental Status: She is alert and oriented to person, place, and time  Comments: Hard of hearing    Psychiatric:         Mood and Affect: Mood normal          Behavior: Behavior normal          The history was obtained from the review of the chart, patient      Lab Results:   Lab Results   Component Value Date/Time    Hemoglobin A1C 10 0 (H) 01/23/2021 10:13 AM    Hemoglobin A1C 7 0 (H) 05/14/2020 09:57 AM    WBC 7 48 04/23/2021 07:32 PM    WBC 7 78 03/06/2021 09:11 AM    WBC 6 43 12/31/2020 09:59 AM    Hemoglobin 10 9 (L) 04/23/2021 07:32 PM Hemoglobin 12 3 03/06/2021 09:11 AM    Hemoglobin 13 0 12/31/2020 09:59 AM    Hematocrit 34 3 (L) 04/23/2021 07:32 PM    Hematocrit 39 1 03/06/2021 09:11 AM    Hematocrit 41 5 12/31/2020 09:59 AM    MCV 85 04/23/2021 07:32 PM    MCV 87 03/06/2021 09:11 AM    MCV 92 12/31/2020 09:59 AM    Platelets 774 49/23/0161 07:32 PM    Platelets 163 31/51/7330 09:11 AM    Platelets 733 19/23/7504 09:59 AM    BUN 14 04/23/2021 07:32 PM    BUN 18 03/25/2021 02:23 PM    BUN 12 03/06/2021 09:11 AM    Potassium 3 6 04/23/2021 07:32 PM    Potassium 3 9 03/25/2021 02:23 PM    Potassium 4 6 03/06/2021 09:11 AM    Chloride 103 04/23/2021 07:32 PM    Chloride 98 (L) 03/25/2021 02:23 PM    Chloride 107 03/06/2021 09:11 AM    CO2 30 04/23/2021 07:32 PM    CO2 32 03/25/2021 02:23 PM    CO2 29 03/06/2021 09:11 AM    Creatinine 0 77 04/23/2021 07:32 PM    Creatinine 1 15 03/25/2021 02:23 PM    Creatinine 0 79 03/06/2021 09:11 AM    AST 26 03/06/2021 09:11 AM    AST 33 12/31/2020 09:59 AM    AST 39 11/30/2020 09:29 AM    ALT 30 03/06/2021 09:11 AM    ALT 42 12/31/2020 09:59 AM    ALT 36 11/30/2020 09:29 AM    Albumin 3 5 03/06/2021 09:11 AM    Albumin 3 7 12/31/2020 09:59 AM    Albumin 3 6 11/30/2020 09:29 AM           Imaging Studies: I have personally reviewed pertinent reports  Portions of the record may have been created with voice recognition software  Occasional wrong word or "sound a like" substitutions may have occurred due to the inherent limitations of voice recognition software  Read the chart carefully and recognize, using context, where substitutions have occurred      Diabetic Foot Exam    Diabetic Foot Exam   No foot ulcers/wounds   No redness, no deformity  Significant swelling noted on bilateral feet   Pulses-unable to assess because of significant foot edema  Sensations -intact   Vibratory sense intact    Attestation signed by Ferrel Riedel, MD at 5/5/2021 12:39 PM:  I interviewed, took the history and examined the patient  I discussed the case with the Resident and reviewed the Residents note , prescribed medications, and orders placed  I supervised the Resident and I agree with the Resident management plan as it was presented to me  I was present in the clinic and examined the patient  78-year-old female referred here for evaluation of uncontrolled type 2 diabetes  She has history of type 2 diabetes for the past few years and has been treated with oral hypoglycemics  Diabetes has been fairly controlled with hemoglobin A1c in the 7-8 range  for the past few years  It appears that in November she was started on Afinitor for metastatic breast cancer and had labs done in January which showed hemoglobin A1c of 10-she was started on Januvia that she stopped taking about a month back as she felt her sugars were higher  In the last week or so her fasting sugars have ranged between 150s to 180s    On exam   Vital signs reviewed   General appearance-elderly female in no apparent distress, frail   Head-atraumatic normocephalic   Extremities-bilateral lower extremity edema   Moving all extremities  Skin normal in temperature    Assessment and plan   78-year-old female with type 2 diabetes with hyperglycemia with worsening control since starting Afinitor for metastatic breast cancer   -she is reporting improved fingersticks in the past few weeks or so  For now will repeat labs  Worsening control is due to addition of Afinitor-she has upcoming appointment with her oncologist-advised to discuss to see medication can be changed- I will also send this note to the oncologist   If medication cannot be changed consider adding MARYANN Carbajal MD 05/05/21

## 2021-05-05 NOTE — PROGRESS NOTES
Malia Kimball 78 y o  female MRN: [id number]    Encounter: [id number]      Assessment/Plan     Assessment:  Uncontrolled Type 2 diabetes mellitus  Patient is type 2 diabetic, diagnosed 4 years ago, has been maintained on oral hypoglycemics  Her A1c trended up from 7 in 5/2020 to 10 in 1/2021   Off note patient was started on chemotherapy medicine AFINITOR which is likely the cause for significant hyperglycemia  Patient was on Januvia, her leg swelling worse ,BS uncontrolled ,on for few months,stopped on march 30th   Complications-following up with the Ophthalmology at [facility], no retinopathy  CKD stage 2, no history of heart attack or stroke  Patient checks her fasting blood glucose- numbers ranging between 165-244    No recent weight loss or gain  Patient is watching her diet, trying not to eat carbohydrates  Has never seen a nutritionist   Not interested to see a nutritionist    She is not doing any exercise  now because of COVID, before COVID patient was active  Plan-   We will communicate with the oncologist regarding the choice for chemotherapy as current regimen is causing significant hyperglycemia and complications  Repeat HbA1c, TSH, T3, T4, PTH, refer to nutritionist   Continue Metformin- no side effects, she is taking 500 mg 3 times daily  CC: Diabetes    History of Present Illness     HPI:  42-year-old female with past medical history of type 2 diabetes mellitus,Metastatic breast cancer s/p R mastectomy, currently on active chemotherapy, diastolic heart failure, CAD,  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,  Hyperlipidemia  referred from primary care doctor for the management of diabetes mellitus  Patient was diagnosed with diabetes approximately 4 years ago  She has been maintained on oral hypoglycemics, never been on insulin  Hemoglobin A1c trended up to 10 from jan 2021  Denied any weight gain/loss      Complications-following up with the Ophthalmology at [facility], no retinopathy  CKD stage 2, no history of heart attack or stroke  Review of Systems   Constitutional: Positive for fatigue  HENT: Positive for hearing loss  Negative for congestion, mouth sores and sore throat  Eyes: Negative for visual disturbance  Respiratory: Negative for chest tightness and shortness of breath  Cardiovascular: Positive for leg swelling  Negative for palpitations  Gastrointestinal: Negative for abdominal pain, diarrhea, nausea and vomiting  Endocrine: Positive for polydipsia and polyuria  Negative for cold intolerance  Genitourinary: Positive for frequency  Negative for dysuria  Neurological: Negative for dizziness and light-headedness  Psychiatric/Behavioral: Negative for agitation  The patient is not nervous/anxious          Historical Information   Past Medical History:   Diagnosis Date    Acute biliary pancreatitis     Anxiety     Arthritis     Breast CA (HCC)     recurrent, ductal carcinoma ER, [place] pos    Cardiac disease     Colon polyp     Depression     Diverticula of intestine     Diverticulosis     Dizziness     and passes out    Flushing     Hiatal hernia     [place] (hard of hearing[place]      [place]    Hypercholesteremia     Hypertension     Liver mass     Liver metastasis (HCC)     Metastatic adenocarcinoma to liver (HCC)     Bx 01/03/2017    PONV (postoperative nausea and vomiting)     Pulmonary embolism (HCC)     Recurrent breast cancer (HCC)     Shingles     Shortness of breath     on exertion    Tachycardia     Urinary incontinence     Use of cane as ambulatory aid     or walker     Past Surgical History:   Procedure Laterality Date    BREAST SURGERY      CARDIAC PACEMAKER REMOVAL N/A 11/9/2017    Procedure: PACEMAKER LEAD REVISION, REMOVAL OF NONFUNCTIONING LEAD, AND INSERTION OF A NEW RV LEAD;  Surgeon: Erika Romero MD;  [facility];  Service: Cardiology    CATARACT EXTRACTION Bilateral     COLONOSCOPY      ERCP N/A 1/8/2018    Procedure: ENDOSCOPIC RETROGRADE CHOLANGIOPANCREATOGRAPHY (ERCP); Surgeon: Antonina Velázquez MD;  Location: [facility]; Service: Gastroenterology    HYSTERECTOMY      INSERT / REPLACE / Evangelista Nick      JOINT REPLACEMENT      l[place]  TKR and right TSR    MASTECTOMY Right     [place] ERCP DX COLLECTION SPECIMEN BRUSHING/WASHING N/A 2/22/2018    Procedure: ENDOSCOPIC RETROGRADE CHOLANGIOPANCREATOGRAPHY (ERCP) with stent removal;  Surgeon: Antonina Velázquez MD;  [facility]: [facility]; Service: Gastroenterology    [place] RESEC [address] N/A 7/13/2017    Procedure: LIVER RESECTION, INTRAOPERATIVE ULTRASOUND;  Surgeon: Kenny Apodaca MD;  [facility];  Service: Surgical Oncology    ROTATOR CUFF REPAIR Right     SENTINEL LYMPH NODE BIOPSY Right     TONSILECTOMY AND ADNOIDECTOMY      WOUND DEBRIDEMENT Left 11/9/2017    Procedure: DEBRIDEMENT WOUND AND DRESSING CHANGE [facility]); Surgeon: Shlomo Vega MD;  [facility];  Service: Cardiology     Social History   Social History     Substance and Sexual Activity   Alcohol Use Not Currently     Social History     Substance and Sexual Activity   Drug Use No     Social History     Tobacco Use   Smoking Status Never Smoker   Smokeless Tobacco Never Used     Family History:   Family History   Problem Relation Age of Onset    Lung cancer Father     Cancer Brother     Diabetes type II Son        Meds/Allergies   Current Outpatient Medications   Medication Sig Dispense Refill    acetaminophen (TYLENOL) 500 mg tablet Take 500 mg by mouth every 6 (six) hours as needed for mild pain      atorvastatin (LIPITOR) 20 mg tablet Take 20 mg by mouth daily        Calcium Citrate-Vitamin D (CALCIUM CITRATE + D PO) Take 1,500 mg by mouth daily      clindamycin (CLEOCIN) 300 MG capsule TAKE 2 CAPSULES BY MOUTH 1 HOUR PRIOR TO DENTAL PROCEDURE   0    clotrimazole-betamethasone (LOTRISONE) 1-0 05 % cream Apply 1 application topically as needed       Cranberry (THERACRAN HP PO) Take 2 tablets by mouth daily      Diclofenac Sodium (VOLTAREN) 1 % Apply 2 g topically 4 (four) times a day 350 g 0    everolimus (AFINITOR) 2 5 MG tablet Take 1 tablet (2 5 mg total) by mouth daily 30 tablet 6    exemestane (AROMASIN) 25 MG tablet Take 1 tablet (25 mg total) by mouth daily 90 tablet 3    Glucosamine-Chondroit-Vit C-Mn (GLUCOSAMINE 1500 COMPLEX) CAPS Take 1 capsule by mouth daily        metFORMIN (GLUCOPHAGE) 500 mg tablet 1 pill 3 times daily with meals      metoprolol tartrate (LOPRESSOR) 25 mg tablet Take 1 tablet (25 mg total) by mouth every 12 (twelve) hours 180 tablet 1    Multiple Vitamin (MULTIVITAMIN) capsule Take 1 capsule by mouth daily   ondansetron (ZOFRAN) 4 mg tablet Take 4 mg by mouth every 8 (eight) hours as needed      torsemide (DEMADEX) 20 mg tablet Take 1 tablet (20 mg total) by mouth daily 30 tablet 3    acetaminophen (TYLENOL) 500 mg tablet Take 2 tablets (1,000 mg total) by mouth every 6 (six) hours as needed for mild pain 60 tablet 0    lidocaine (LMX) 4 % cream Apply topically as needed for mild pain (Patient not taking: Reported on 5/5/2021) 30 g 0    methocarbamol (ROBAXIN) 500 mg tablet Take 2 tablets (1,000 mg total) by mouth 2 (two) times a day for 7 days (Patient not taking: Reported on 5/5/2021) 28 tablet 0    oxyCODONE (ROXICODONE) 5 mg immediate release tablet Take 1 tablet (5 mg total) by mouth every 4 (four) hours as needed for moderate painMax Daily Amount: 30 mg (Patient not taking: Reported on 3/29/2021) 30 tablet 0    sitaGLIPtin (JANUVIA) 100 mg tablet Take 100 mg by mouth daily       No current facility-administered medications for this visit        Allergies   Allergen Reactions    Ampicillin Shortness Of Breath, Anaphylaxis and Other (See Comments)     Other reaction(s): Unknown Allergic Reaction  Reaction Date: [id number];        Objective   Vitals: Blood pressure 122/82, pulse 88, height 5' (1 524 m), weight 64 6 kg (142 lb 6 4 oz)  Physical Exam  Constitutional:       Appearance: Normal appearance  HENT:      Head: Normocephalic and atraumatic  Eyes:      Conjunctiva/sclera: Conjunctivae normal    Neck:      Musculoskeletal: Normal range of motion and neck supple  Cardiovascular:      Rate and Rhythm: Normal rate and regular rhythm  Pulses: Normal pulses  Heart sounds: Normal heart sounds  Pulmonary:      Effort: Pulmonary effort is normal       Breath sounds: Normal breath sounds  Abdominal:      General: Bowel sounds are normal       Palpations: Abdomen is soft  Musculoskeletal: Normal range of motion  Right lower leg: Edema present  Left lower leg: Edema present  Comments: Bilateral swelling of the feet   Skin:     General: Skin is warm  Neurological:      General: No focal deficit present  Mental Status: She is alert and oriented to person, place, and time  Comments: Hard of hearing    Psychiatric:         Mood and Affect: Mood normal          Behavior: Behavior normal          The history was obtained from the review of the chart, patient      Lab Results:   Lab Results   Component Value Date/Time    Hemoglobin A1C 10 0 (H) 01/23/2021 10:13 AM    Hemoglobin A1C 7 0 (H) 05/14/2020 09:57 AM    WBC 7 48 04/23/2021 07:32 PM    WBC 7 78 03/06/2021 09:11 AM    WBC 6 43 12/31/2020 09:59 AM    Hemoglobin 10 9 (L) 04/23/2021 07:32 PM    Hemoglobin 12 3 03/06/2021 09:11 AM    Hemoglobin 13 0 12/31/2020 09:59 AM    Hematocrit 34 3 (L) 04/23/2021 07:32 PM    Hematocrit 39 1 03/06/2021 09:11 AM    Hematocrit 41 5 12/31/2020 09:59 AM    MCV 85 04/23/2021 07:32 PM    MCV 87 03/06/2021 09:11 AM    MCV 92 12/31/2020 09:59 AM    Platelets 097 14/46/3217 07:32 PM    Platelets 807 04/62/1788 09:11 AM    Platelets 844 10/70/0132 09:59 AM    BUN 14 04/23/2021 07:32 PM    BUN 18 03/25/2021 02:23 PM    BUN 12 03/06/2021 09:11 AM    Potassium 3 6 04/23/2021 07:32 PM    Potassium 3 9 03/25/2021 02:23 PM    Potassium 4 6 03/06/2021 09:11 AM    Chloride 103 04/23/2021 07:32 PM    Chloride 98 (L) 03/25/2021 02:23 PM    Chloride 107 03/06/2021 09:11 AM    CO2 30 04/23/2021 07:32 PM    CO2 32 03/25/2021 02:23 PM    CO2 29 03/06/2021 09:11 AM    Creatinine 0 77 04/23/2021 07:32 PM    Creatinine 1 15 03/25/2021 02:23 PM    Creatinine 0 79 03/06/2021 09:11 AM    AST 26 03/06/2021 09:11 AM    AST 33 12/31/2020 09:59 AM    AST 39 11/30/2020 09:29 AM    ALT 30 03/06/2021 09:11 AM    ALT 42 12/31/2020 09:59 AM    ALT 36 11/30/2020 09:29 AM    Albumin 3 5 03/06/2021 09:11 AM    Albumin 3 7 12/31/2020 09:59 AM    Albumin 3 6 11/30/2020 09:29 AM           Imaging Studies: I have personally reviewed pertinent reports  Portions of the record may have been created with voice recognition software  Occasional wrong word or "sound a like" substitutions may have occurred due to the inherent limitations of voice recognition software  Read the chart carefully and recognize, using context, where substitutions have occurred      Diabetic Foot Exam    Diabetic Foot Exam   No foot ulcers/wounds   No redness, no deformity  Significant swelling noted on bilateral feet   Pulses-unable to assess because of significant foot edema  Sensations -intact   Vibratory sense intact

## 2021-05-05 NOTE — LETTER
Diabetic Eye Exam Form    Date Requested: 21  Patient: Kelly Torres  Patient : [id number]   Referring Provider: Pio Farrar MD    Dilated Retinal Exam, Optomap-Iris Exam, or Fundus Photography Done         Yes ([place] one above)         No     Date of Diabetic Eye Exam ______________________________  Left Eye      Exam did show retinopathy    Exam did not show retinopathy         Mild       Moderate       None       Proliferative       Severe     Right Eye     Exam did show retinopathy    Exam did not show retinopathy         Mild       Moderate       None       Proliferative       Severe     Comments __________________________________________________________    Practice Providing Exam ______________________________________________    Exam Performed By (print name) _______________________________________      Provider Signature ___________________________________________________      These reports are needed for  compliance    Please fax this completed form and a copy of the Diabetic Eye Exam report to our office located at [place] [address] as soon as possible to [phone] lisa Hong Aus: Phone [phone]    We thank you for your assistance in treating our mutual patient

## 2021-05-07 ENCOUNTER — TELEPHONE (OUTPATIENT)
Dept: HEMATOLOGY ONCOLOGY | Facility: CLINIC | Age: 81
End: 2021-05-07

## 2021-05-07 NOTE — TELEPHONE ENCOUNTER
I phoned the patient and reminded her about having her labs drawn prior to her appointment with TITO Martínez on 5/13  The patient notes that she will be having them drawn either tomorrow AM or Monday

## 2021-05-10 ENCOUNTER — APPOINTMENT (OUTPATIENT)
Dept: LAB | Facility: MEDICAL CENTER | Age: 81
End: 2021-05-10
Payer: COMMERCIAL

## 2021-05-10 DIAGNOSIS — C78.7 ADENOCARCINOMA OF RIGHT BREAST METASTATIC TO LIVER (HCC): Chronic | ICD-10-CM

## 2021-05-10 DIAGNOSIS — E83.52 HYPERCALCEMIA: ICD-10-CM

## 2021-05-10 DIAGNOSIS — E11.65 TYPE 2 DIABETES MELLITUS WITH HYPERGLYCEMIA, WITHOUT LONG-TERM CURRENT USE OF INSULIN (HCC): ICD-10-CM

## 2021-05-10 DIAGNOSIS — C50.911 ADENOCARCINOMA OF RIGHT BREAST METASTATIC TO LIVER (HCC): Chronic | ICD-10-CM

## 2021-05-10 LAB
25(OH)D3 SERPL-MCNC: 50.1 NG/ML (ref 30–100)
ALBUMIN SERPL BCP-MCNC: 3.5 G/DL (ref 3.5–5)
ALP SERPL-CCNC: 108 U/L (ref 46–116)
ALT SERPL W P-5'-P-CCNC: 28 U/L (ref 12–78)
ANION GAP SERPL CALCULATED.3IONS-SCNC: 7 MMOL/L (ref 4–13)
AST SERPL W P-5'-P-CCNC: 26 U/L (ref 5–45)
BASOPHILS # BLD AUTO: 0.06 THOUSANDS/ΜL (ref 0–0.1)
BASOPHILS NFR BLD AUTO: 1 % (ref 0–1)
BILIRUB SERPL-MCNC: 0.59 MG/DL (ref 0.2–1)
BUN SERPL-MCNC: 10 MG/DL (ref 5–25)
CALCIUM SERPL-MCNC: 9.9 MG/DL (ref 8.3–10.1)
CANCER AG27-29 SERPL-ACNC: 92.5 U/ML (ref 0–42.3)
CHLORIDE SERPL-SCNC: 106 MMOL/L (ref 100–108)
CO2 SERPL-SCNC: 28 MMOL/L (ref 21–32)
CREAT SERPL-MCNC: 0.7 MG/DL (ref 0.6–1.3)
EOSINOPHIL # BLD AUTO: 0.31 THOUSAND/ΜL (ref 0–0.61)
EOSINOPHIL NFR BLD AUTO: 5 % (ref 0–6)
ERYTHROCYTE [DISTWIDTH] IN BLOOD BY AUTOMATED COUNT: 14.3 % (ref 11.6–15.1)
GFR SERPL CREATININE-BSD FRML MDRD: 80 ML/MIN/1.73SQ M
GLUCOSE P FAST SERPL-MCNC: 200 MG/DL (ref 65–99)
HCT VFR BLD AUTO: 40 % (ref 34.8–46.1)
HGB BLD-MCNC: 12.3 G/DL (ref 11.5–15.4)
IMM GRANULOCYTES # BLD AUTO: 0.01 THOUSAND/UL (ref 0–0.2)
IMM GRANULOCYTES NFR BLD AUTO: 0 % (ref 0–2)
LYMPHOCYTES # BLD AUTO: 1.98 THOUSANDS/ΜL (ref 0.6–4.47)
LYMPHOCYTES NFR BLD AUTO: 30 % (ref 14–44)
MCH RBC QN AUTO: 27 PG (ref 26.8–34.3)
MCHC RBC AUTO-ENTMCNC: 30.8 G/DL (ref 31.4–37.4)
MCV RBC AUTO: 88 FL (ref 82–98)
MONOCYTES # BLD AUTO: 0.71 THOUSAND/ΜL (ref 0.17–1.22)
MONOCYTES NFR BLD AUTO: 11 % (ref 4–12)
NEUTROPHILS # BLD AUTO: 3.62 THOUSANDS/ΜL (ref 1.85–7.62)
NEUTS SEG NFR BLD AUTO: 53 % (ref 43–75)
NRBC BLD AUTO-RTO: 0 /100 WBCS
PLATELET # BLD AUTO: 216 THOUSANDS/UL (ref 149–390)
PMV BLD AUTO: 9.4 FL (ref 8.9–12.7)
POTASSIUM SERPL-SCNC: 4.4 MMOL/L (ref 3.5–5.3)
PROT SERPL-MCNC: 7.7 G/DL (ref 6.4–8.2)
PTH-INTACT SERPL-MCNC: 26.1 PG/ML (ref 18.4–80.1)
RBC # BLD AUTO: 4.56 MILLION/UL (ref 3.81–5.12)
SODIUM SERPL-SCNC: 141 MMOL/L (ref 136–145)
T4 FREE SERPL-MCNC: 1.17 NG/DL (ref 0.76–1.46)
TSH SERPL DL<=0.05 MIU/L-ACNC: 1.48 UIU/ML (ref 0.36–3.74)
WBC # BLD AUTO: 6.69 THOUSAND/UL (ref 4.31–10.16)

## 2021-05-10 PROCEDURE — 83970 ASSAY OF PARATHORMONE: CPT

## 2021-05-10 PROCEDURE — 85025 COMPLETE CBC W/AUTO DIFF WBC: CPT

## 2021-05-10 PROCEDURE — 83036 HEMOGLOBIN GLYCOSYLATED A1C: CPT

## 2021-05-10 PROCEDURE — 36415 COLL VENOUS BLD VENIPUNCTURE: CPT

## 2021-05-10 PROCEDURE — 86300 IMMUNOASSAY TUMOR CA 15-3: CPT

## 2021-05-10 PROCEDURE — 82306 VITAMIN D 25 HYDROXY: CPT

## 2021-05-10 PROCEDURE — 80053 COMPREHEN METABOLIC PANEL: CPT

## 2021-05-10 PROCEDURE — 84443 ASSAY THYROID STIM HORMONE: CPT

## 2021-05-10 PROCEDURE — 84439 ASSAY OF FREE THYROXINE: CPT

## 2021-05-11 LAB
EST. AVERAGE GLUCOSE BLD GHB EST-MCNC: 214 MG/DL
HBA1C MFR BLD: 9.1 %

## 2021-05-11 NOTE — TELEPHONE ENCOUNTER
As a follow-up, a second attempt has been made for outreach via fax, please see Contacts section for details      Thank you  Bhavya Lane

## 2021-05-13 ENCOUNTER — OFFICE VISIT (OUTPATIENT)
Dept: HEMATOLOGY ONCOLOGY | Facility: CLINIC | Age: 81
End: 2021-05-13
Payer: COMMERCIAL

## 2021-05-13 VITALS
RESPIRATION RATE: 18 BRPM | DIASTOLIC BLOOD PRESSURE: 64 MMHG | WEIGHT: 140 LBS | HEIGHT: 60 IN | BODY MASS INDEX: 27.48 KG/M2 | TEMPERATURE: 98.5 F | HEART RATE: 103 BPM | OXYGEN SATURATION: 95 % | SYSTOLIC BLOOD PRESSURE: 128 MMHG

## 2021-05-13 DIAGNOSIS — C78.7 ADENOCARCINOMA OF RIGHT BREAST METASTATIC TO LIVER (HCC): Primary | Chronic | ICD-10-CM

## 2021-05-13 DIAGNOSIS — C50.911 ADENOCARCINOMA OF RIGHT BREAST METASTATIC TO LIVER (HCC): Primary | Chronic | ICD-10-CM

## 2021-05-13 PROCEDURE — 99214 OFFICE O/P EST MOD 30 MIN: CPT | Performed by: NURSE PRACTITIONER

## 2021-05-13 RX ORDER — GLIPIZIDE 5 MG/1
5 TABLET ORAL DAILY
COMMUNITY
Start: 2021-03-30 | End: 2021-01-01 | Stop reason: SDUPTHER

## 2021-05-13 NOTE — PROGRESS NOTES
[address] [facility] [address] [address] [phone]  Progress Note  Iban Moore, 1940, [id number]  5/13/2021    Assessment/Plan:  1  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 [facility])    Patient is an 20-year-old female with a history of metastatic breast cancer to the liver currently on Afinitor 2 5 mg p o  daily and exemestane 25 mg p o  daily  She has been tolerating this without difficulty  Overall she is doing well and able to function without restriction however she does note increased edema to her bilateral lower extremities  She has been following with Cardiology and recently started on Demadex which has a decreased the edema  We reviewed her most recent blood work, CBC is essentially stable hemoglobin 12 3 g per dL, metabolic panel is within normal limits however she does have an elevated glucose at 200  She has been following with her PCP to manage her glucose  Her most recent CA 27-29 is 92 5 slightly decreased from 2 months before  We discussed that we are looking for stability rather than a normal value given her metastatic disease the CA 27-29 will never be within the normal range  I suspect she may be starting to plateau  We will continue to monitor on a 3 month basis  Patient and her  verbalized understanding and are in agreement with the plan  - CBC and differential; Future  - Comprehensive metabolic panel; Future  - Cancer antigen 27 29; Future    Goals and Barriers:    Current Goal:   Prolong Survival from Cancer  Barriers: None        Patient's Capacity to Self Care:  Patient   Is able to self care   -------------------------------------------------------------------------------------------------------    Chief Complaint   Patient presents with    Follow-up       History of present illness/Cancer History:   Oncology History   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 [facility]) 2009 Initial Diagnosis    Adenocarcinoma of right breast,  Infiltrating ductal carcinoma T2, N1 sebastien) Tumor was ER/[place] positive, HER2 negative  2009 Surgery    Right Mastectomy     2009 -  Chemotherapy    Adjuvant chemotherapy with Taxotere/Cytoxan x 4 cycles     2009 - 10/2014 Chemotherapy    Arimidex 1 mg po dialy     2/11/2016 Progression    Right lateral mastectomy site growth with core needle biopsy demonstrated reoccurrence  %, [place] 70%, Her2 +1  Final Diagnosis   A  Breast, right lateral mastectomy site, core needle biopsies:                        - Recurrent invasive mammary carcinoma, no special type (formerly invasive ductal carcinoma), 0 8 cm largest single focus               - Combined Jose Elias score: 7/9                         - Tubule formation: None (3/3)  - Nuclear pleomorphism: Marked (3/3)  - Mitotic count: 3 mitoses per 10 high-power fields (1/3)  - No lymph-vascular invasion is identified              - No in situ component is identified                - No microcalcifications are identified  3/16/2016 - 1/24/2017 Chemotherapy    Faslodex 500 mg with loading dose followed by maintenance monthly injection with Ibrance 75mg 3 weeks on 1 week off       12/13/2016 Progression     Progression noted on PET-CT scan  Progression was largely found in the liver  Patient was set up for liver biopsy  1/3/2017 Biopsy    Liver lesion biopsy demonstated Estrogen Receptor (clone SP-1) 100%/positive, Progesterone Receptor (clone 1E2) 1-2%/positive, HER2 by IHC ([id number]) 2+/equivocal   Her2 by FISH was positive  1/25/2017 - 4/19/2017 Chemotherapy     Herceptin and Perjeta x5 cycles     4/20/2017 Adverse Reaction    Perjecta causing significant diarrhea  Medication stopped  7/13/2017 Surgery    Partial Hepatectomy, results demonstrated ER70%, PR0% & Her2 negative disease        9/19/2017 -  Chemotherapy Tamoxifen 20 mg daily     2019 - 4/10/2019 Radiation      Plan ID Energy Fractions Dose per Fraction (cGy) Dose Correction (cGy) Total Dose Delivered (cGy) Elapsed Days   R Axilla # 10X/6X 25 / 25 200 0 5,000 35   R Axilla CD 10X/6X 5 / 5 200 0 1,000 6          2020 -  Hormone Therapy    Aromasin 25 mg daily     2020 -  Chemotherapy    Afinitor 2 5 mg daily          Cancer Staging  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)  Staging form: Breast, AJCC 8th Edition  - Pathologic stage from 2016: Stage IV (rpTX, pNX, pM1, G2, ER: Positive, ID: Unknown, HER2: Negative) - Signed by Td Alfonso PA-C on 2018       ECO - Asymptomatic    Interval history:   Clinically stable     Review of Systems   Constitutional: Negative for activity change, appetite change, fatigue, fever and unexpected weight change  Respiratory: Negative for cough and shortness of breath  Cardiovascular: Positive for leg swelling (Bilateral lower extremity edema)  Negative for chest pain  Gastrointestinal: Negative for abdominal pain, constipation, diarrhea and nausea  Endocrine: Negative for cold intolerance and heat intolerance  Musculoskeletal: Negative for arthralgias and myalgias  Skin: Negative  Neurological: Negative for dizziness, weakness and headaches  Hematological: Negative for adenopathy  Does not bruise/bleed easily  Current Outpatient Medications:     acetaminophen (TYLENOL) 500 mg tablet, Take 500 mg by mouth every 6 (six) hours as needed for mild pain, Disp: , Rfl:     acetaminophen (TYLENOL) 500 mg tablet, Take 2 tablets (1,000 mg total) by mouth every 6 (six) hours as needed for mild pain, Disp: 60 tablet, Rfl: 0    atorvastatin (LIPITOR) 20 mg tablet, Take 20 mg by mouth daily  , Disp: , Rfl:     Calcium Citrate-Vitamin D (CALCIUM CITRATE + D PO), Take 1,500 mg by mouth daily, Disp: , Rfl:     clindamycin (CLEOCIN) 300 MG capsule, TAKE 2 CAPSULES BY MOUTH 1 HOUR PRIOR TO DENTAL PROCEDURE , Disp: , Rfl: 0    clotrimazole-betamethasone (LOTRISONE) 1-0 05 % cream, Apply 1 application topically as needed , Disp: , Rfl:     Cranberry (THERACRAN HP PO), Take 2 tablets by mouth daily, Disp: , Rfl:     Diclofenac Sodium (VOLTAREN) 1 %, Apply 2 g topically 4 (four) times a day, Disp: 350 g, Rfl: 0    everolimus (AFINITOR) 2 5 MG tablet, Take 1 tablet (2 5 mg total) by mouth daily, Disp: 30 tablet, Rfl: 6    exemestane (AROMASIN) 25 MG tablet, Take 1 tablet (25 mg total) by mouth daily, Disp: 90 tablet, Rfl: 3    glipiZIDE (GLUCOTROL) 5 mg tablet, take 1/2 tablet by mouth every morning before breakfast, Disp: , Rfl:     Glucosamine-Chondroit-Vit C-Mn (GLUCOSAMINE 1500 COMPLEX) CAPS, Take 1 capsule by mouth daily  , Disp: , Rfl:     metFORMIN (GLUCOPHAGE) 500 mg tablet, 1 pill 3 times daily with meals, Disp: , Rfl:     metoprolol tartrate (LOPRESSOR) 25 mg tablet, Take 1 tablet (25 mg total) by mouth every 12 (twelve) hours, Disp: 180 tablet, Rfl: 1    Multiple Vitamin (MULTIVITAMIN) capsule, Take 1 capsule by mouth daily  , Disp: , Rfl:     ondansetron (ZOFRAN) 4 mg tablet, Take 4 mg by mouth every 8 (eight) hours as needed, Disp: , Rfl:     torsemide (DEMADEX) 20 mg tablet, Take 1 tablet (20 mg total) by mouth daily, Disp: 30 tablet, Rfl: 3    lidocaine (LMX) 4 % cream, Apply topically as needed for mild pain (Patient not taking: Reported on 5/5/2021), Disp: 30 g, Rfl: 0    methocarbamol (ROBAXIN) 500 mg tablet, Take 2 tablets (1,000 mg total) by mouth 2 (two) times a day for 7 days (Patient not taking: Reported on 5/5/2021), Disp: 28 tablet, Rfl: 0    oxyCODONE (ROXICODONE) 5 mg immediate release tablet, Take 1 tablet (5 mg total) by mouth every 4 (four) hours as needed for moderate painMax Daily Amount: 30 mg (Patient not taking: Reported on 3/29/2021), Disp: 30 tablet, Rfl: 0    Allergies   Allergen Reactions    Ampicillin Shortness Of Breath, Anaphylaxis and Other (See Comments)     Other reaction(s): Unknown Allergic Reaction  Reaction Date: [id number]; Advance Directive and Living Will:        Objective:   /64 (BP Location: Left arm, Patient Position: Sitting, Cuff Size: Standard)   Pulse 103   Temp 98 5 °F (36 9 °C) (Tympanic)   Resp 18   Ht 5' (1 524 m)   Wt 63 5 kg (140 lb)   LMP  (LMP Unknown) Comment: post   SpO2 95%   BMI 27 34 kg/m²   Wt Readings from Last 6 Encounters:   05/13/21 63 5 kg (140 lb)   05/05/21 64 6 kg (142 lb 6 4 oz)   03/29/21 64 4 kg (142 lb)   03/11/21 66 4 kg (146 lb 6 4 oz)   03/11/21 71 7 kg (158 lb)   12/03/20 70 8 kg (156 lb)       Physical Exam  Vitals signs reviewed  Constitutional:       Appearance: Normal appearance  She is well-developed  HENT:      Head: Normocephalic and atraumatic  Eyes:      Conjunctiva/sclera: Conjunctivae normal       Pupils: Pupils are equal, round, and reactive to light  Neck:      Musculoskeletal: Normal range of motion and neck supple  Cardiovascular:      Rate and Rhythm: Normal rate and regular rhythm  Pulses: Normal pulses  Heart sounds: Normal heart sounds  No murmur  Pulmonary:      Effort: Pulmonary effort is normal  No respiratory distress  Breath sounds: Normal breath sounds  Abdominal:      General: Bowel sounds are normal       Palpations: Abdomen is soft  Musculoskeletal: Normal range of motion  Right lower leg: Edema (Plus two pitting) present  Left lower leg: Edema (Plus two pitting) present  Lymphadenopathy:      Cervical: No cervical adenopathy  Skin:     General: Skin is warm and dry  Capillary Refill: Capillary refill takes less than 2 seconds  Neurological:      Mental Status: She is alert and oriented to person, place, and time     Psychiatric:         Behavior: Behavior normal          Pertinent Laboratory Results and Imaging Review:  Appointment on 05/10/2021   Component Date Value Ref Range Status    WBC 05/10/2021 6 69  4 31 - 10 16 Thousand/uL Final    RBC 05/10/2021 4 56  3 81 - 5 12 Million/uL Final    Hemoglobin 05/10/2021 12 3  11 5 - 15 4 g/dL Final    Hematocrit 05/10/2021 40 0  34 8 - 46 1 % Final    MCV 05/10/2021 88  82 - 98 fL Final    MCH 05/10/2021 27 0  26 8 - 34 3 pg Final    MCHC 05/10/2021 30 8* 31 4 - 37 4 g/dL Final    RDW 05/10/2021 14 3  11 6 - 15 1 % Final    MPV 05/10/2021 9 4  8 9 - 12 7 fL Final    Platelets 82/16/9371 216  149 - 390 Thousands/uL Final    nRBC 05/10/2021 0  /100 WBCs Final    Neutrophils Relative 05/10/2021 53  43 - 75 % Final    Immat GRANS % 05/10/2021 0  0 - 2 % Final    Lymphocytes Relative 05/10/2021 30  14 - 44 % Final    Monocytes Relative 05/10/2021 11  4 - 12 % Final    Eosinophils Relative 05/10/2021 5  0 - 6 % Final    Basophils Relative 05/10/2021 1  0 - 1 % Final    Neutrophils Absolute 05/10/2021 3 62  1 85 - 7 62 Thousands/µL Final    Immature Grans Absolute 05/10/2021 0 01  0 00 - 0 20 Thousand/uL Final    Lymphocytes Absolute 05/10/2021 1 98  0 60 - 4 47 Thousands/µL Final    Monocytes Absolute 05/10/2021 0 71  0 17 - 1 22 Thousand/µL Final    Eosinophils Absolute 05/10/2021 0 31  0 00 - 0 61 Thousand/µL Final    Basophils Absolute 05/10/2021 0 06  0 00 - 0 10 Thousands/µL Final    CA 27 29 05/10/2021 92 5* 0 0 - 42 3 U/mL Final    Hemoglobin A1C 05/10/2021 9 1* Normal 3 8-5 6%; PreDiabetic 5 7-6 4%;  Diabetic >=6 5%; Glycemic control for adults with diabetes <7 0% % Final    EAG 05/10/2021 214  mg/dl Final    PTH 05/10/2021 26 1  18 4 - 80 1 pg/mL Final    Vit D, 25-Hydroxy 05/10/2021 50 1  30 0 - 100 0 ng/mL Final    TSH 3RD GENERATON 05/10/2021 1 480  0 358 - 3 740 uIU/mL Final    The recommended reference ranges for TSH during pregnancy are as follows:   First trimester 0 1 to 2 5 uIU/mL   Second trimester  0 2 to 3 0 uIU/mL   Third trimester 0 3 to 3 0 uIU/m    Note: Normal ranges may not apply to patients who are transgender, non-binary, or whose legal sex, sex at birth, and gender identity differ   Free T4 05/10/2021 1 17  0 76 - 1 46 ng/dL Final    Specimen collection should occur prior to Sulfasalazine administration due to the potential for falsely elevated results   Sodium 05/10/2021 141  136 - 145 mmol/L Final    Potassium 05/10/2021 4 4  3 5 - 5 3 mmol/L Final    Chloride 05/10/2021 106  100 - 108 mmol/L Final    CO2 05/10/2021 28  21 - 32 mmol/L Final    ANION GAP 05/10/2021 7  4 - 13 mmol/L Final    BUN 05/10/2021 10  5 - 25 mg/dL Final    Creatinine 05/10/2021 0 70  0 60 - 1 30 mg/dL Final    Standardized to IDMS reference method    Glucose, Fasting 05/10/2021 200* 65 - 99 mg/dL Final    Specimen collection should occur prior to Sulfasalazine administration due to the potential for falsely depressed results  Specimen collection should occur prior to Sulfapyridine administration due to the potential for falsely elevated results   Calcium 05/10/2021 9 9  8 3 - 10 1 mg/dL Final    AST 05/10/2021 26  5 - 45 U/L Final    Specimen collection should occur prior to Sulfasalazine administration due to the potential for falsely depressed results   ALT 05/10/2021 28  12 - 78 U/L Final    Specimen collection should occur prior to Sulfasalazine and/or Sulfapyridine administration due to the potential for falsely depressed results   Alkaline Phosphatase 05/10/2021 108  46 - 116 U/L Final    Total Protein 05/10/2021 7 7  6 4 - 8 2 g/dL Final    Albumin 05/10/2021 3 5  3 5 - 5 0 g/dL Final    Total Bilirubin 05/10/2021 0 59  0 20 - 1 00 mg/dL Final    Use of this assay is not recommended for patients undergoing treatment with eltrombopag due to the potential for falsely elevated results   eGFR 05/10/2021 80  ml/min/1 73sq m Final       The following historical data was reviewed      Past Medical History:   Diagnosis Date    Acute biliary pancreatitis     Anxiety     Arthritis     Breast CA ([address] ) recurrent, ductal carcinoma ER, NE pos    Cardiac disease     Colon polyp     Depression     Diverticula of intestine     Diverticulosis     Dizziness     and passes out    Flushing     Hiatal hernia     [place] hearing[place]      lizette    Hypercholesteremia     Hypertension     Liver mass     Liver metastasis (HCC)     Metastatic adenocarcinoma to liver (HCC)     Bx 01/03/2017    PONV (postoperative nausea and vomiting)     Pulmonary embolism (HCC)     Recurrent breast cancer (HCC)     Shingles     Shortness of breath     on exertion    Tachycardia     Urinary incontinence     Use of cane as ambulatory aid     or walker       Past Surgical History:   Procedure Laterality Date    BREAST SURGERY      CARDIAC PACEMAKER REMOVAL N/A 11/9/2017    Procedure: PACEMAKER LEAD REVISION, REMOVAL OF NONFUNCTIONING LEAD, AND INSERTION OF A NEW RV LEAD;  Surgeon: Kali Worrell MD;  [facility];  Service: Cardiology    CATARACT EXTRACTION Bilateral     COLONOSCOPY      ERCP N/A 1/8/2018    Procedure: ENDOSCOPIC RETROGRADE CHOLANGIOPANCREATOGRAPHY (ERCP); Surgeon: Jeremi Garcia MD;  Location: [facility]; Service: Gastroenterology    HYSTERECTOMY      INSERT / REPLACE / [facility]      JOINT REPLACEMENT      [place]  TKR and right TSR    MASTECTOMY Right     NE ERCP DX COLLECTION SPECIMEN BRUSHING/WASHING N/A 2/22/2018    Procedure: ENDOSCOPIC RETROGRADE CHOLANGIOPANCREATOGRAPHY (ERCP) with stent removal;  Surgeon: Jeremi Garcia MD;  Location: [facility]; Service: Gastroenterology    NE [facility] [address] N/A 7/13/2017    Procedure: LIVER RESECTION, INTRAOPERATIVE ULTRASOUND;  Surgeon: Jason Aragon MD;  [facility];  Service: Surgical Oncology    ROTATOR CUFF REPAIR Right     SENTINEL LYMPH NODE BIOPSY Right     TONSILECTOMY AND ADNOIDECTOMY      WOUND DEBRIDEMENT Left 11/9/2017    Procedure: DEBRIDEMENT WOUND AND DRESSING CHANGE [facility]);   Surgeon: Kali Worrell MD;  Location:  MAIN OR;  Service: Cardiology       Social History     Socioeconomic History    Marital status: /Civil Union     Spouse name: None    Number of children: None    Years of education: None    Highest education level: None   Occupational History    None   Social Needs    Financial resource strain: None    Food insecurity     Worry: None     Inability: None    Transportation needs     Medical: None     Non-medical: None   Tobacco Use    Smoking status: Never Smoker    Smokeless tobacco: Never Used   Substance and Sexual Activity    Alcohol use: Not Currently    Drug use: No    Sexual activity: None   Lifestyle    Physical activity     Days per week: None     Minutes per session: None    Stress: None   Relationships    Social connections     Talks on phone: None     Gets together: None     Attends [employer] service: None     Active member of club or organization: None     Attends meetings of clubs or organizations: None     Relationship status: None    Intimate partner violence     Fear of current or ex partner: None     Emotionally abused: None     Physically abused: None     Forced sexual activity: None   Other Topics Concern    None   Social History Narrative    None       Family History   Problem Relation Age of Onset    Lung cancer Father     Cancer Brother     Diabetes type II Son        Please note: This report has been generated by a voice recognition software system  Therefore there may be syntax, spelling, and/or grammatical errors  Please call if you have any questions

## 2021-05-17 NOTE — TELEPHONE ENCOUNTER
As a final attempt, a third outreach has been made via telephone call  Please see Contacts section for details  This encounter will be closed and completed by end of day  Should we receive the requested information because of previous outreach attempts, the requested patient's chart will be updated appropriately       Thank you  Shania Bennett

## 2021-05-18 NOTE — PROGRESS NOTES
Results for orders placed or performed in visit on 05/18/21   Cardiac EP device report    Narrative    MDT BI-V ICD (3830 IN LV PORT)  [employer] TRANSMISSION - OPTI-VOL ONLY: OPTI-VOL FLUID THRESHOLD CROSSED SINCE 4/11/21 & ONGOING  TASK TO CHF TEAM  PT ON TORSEMIDE  RECHECK IN ~ 31 DAYS  BATTERY VOLTAGE ADEQUATE (6 1 YRS)  AP 1 1% % ( 100% & VSR PACE <0 1%)  ALL AVAILABLE LEAD PARAMETERS WITHIN NORMAL LIMITS  1 VT-NS , 5 BEATS @  BPM  EF 70% (8/2019 ECHO)  PT ON METOPROLOL TART  NORMAL DEVICE FUNCTION     EBS

## 2021-05-18 NOTE — TELEPHONE ENCOUNTER
Upon review of the In Basket request we were able to locate, review, and update the patient chart as requested for Diabetic Eye Exam     Any additional questions or concerns should be emailed to the Practice Liaisons via [email or web address] email, please do not reply via In Basket      Thank you  Kelly Padilla

## 2021-05-18 NOTE — TELEPHONE ENCOUNTER
S/w Marychuy Shipman, denies any weight gain or worsening SOB or LE edema  Admits on days she has to go somewhere she does not take dose of Torsemide, states [place] ok'd this  I advised Swetha Vega ok'd splitting dose in half if she felt dry  I stated to her that if she has somewhere to be she should take dose when she comes home and should not miss any doses-- verbally understood  Discussed checking daily weights and contact office if she gains 3 lbs in one day or 5 lbs in 5-7 days  Read labels  Limit daily sodium intake to 2000 mg daily  Limit daily fluid intake to 2000 mL (about 60 ounces) daily      Confirmed appt for 6/3

## 2021-05-18 NOTE — TELEPHONE ENCOUNTER
----- Message from Lyssa Cake sent at 5/18/2021  1:23 PM EDT -----  Regarding: optivol remains crossed since 4/11/21  Santhoshezio Ricci threshold remains crossed and ongoing since 4/11/21  Thanks Angel Escamilla TRANSMISSION - OPTI-VOL ONLY: OPTI-VOL FLUID THRESHOLD CROSSED SINCE 4/11/21 & ONGOING  TASK TO CHF TEAM  PT ON TORSEMIDE  RECHECK IN ~ 31 DAYS  BATTERY VOLTAGE ADEQUATE (6 1 YRS)  AP 1 1% % ( 100% & VSR PACE <0 1%)  ALL AVAILABLE LEAD PARAMETERS WITHIN NORMAL LIMITS  1 VT-NS , 5 BEATS @  BPM  EF 70% (8/2019 ECHO)  PT ON METOPROLOL TART  NORMAL DEVICE FUNCTION     EBS

## 2021-05-26 NOTE — TELEPHONE ENCOUNTER
Pt is aware to take a whole pill of glipzide 5 mg at dinner, she takes 1/2 tablet with breakfast, she was a little confused but I spoke slowly and she finally said she understood

## 2021-05-26 NOTE — TELEPHONE ENCOUNTER
Frequent morning hyperglycemia  Add Glipizide 5 mg with dinner   Please confirm if she is taking a 1/2 tablet or full tablet of Glipizide with breakfast

## 2021-06-03 PROBLEM — I26.99 PULMONARY EMBOLUS (HCC): Status: ACTIVE | Noted: 2021-01-01

## 2021-06-03 NOTE — TELEPHONE ENCOUNTER
Patient calling to update Dr Cyndy Lubin that she will not be getting her Afinitor on time  There has been a delay in shipment    Per patient [employer] will be shipping today and she should receive medication in 2 days  Patient will start Afinitor as soon as she receives it  Will send to Update RN with Dr Cyndy Lubin

## 2021-06-03 NOTE — PATIENT INSTRUCTIONS
We are increasing metoprolol to 50 mg twice daily- I put in new script for 50 mg tabs  You can take two of your 25 mg tabs

## 2021-06-03 NOTE — PROGRESS NOTES
Heart Failure Outpatient Progress Note - J Luis Lara 78 y o  female MRN: [id number]    @ Encounter: [id number]      Assessment/Plan:    Patient Active Problem List    Diagnosis Date Noted    Type 2 diabetes mellitus with hyperglycemia, without long-term current use of insulin ([facility] ) 05/05/2021     Priority: Low    Hypercalcemia 05/05/2021     Priority: Low    Lumbar radiculopathy      Priority: Low    VT (ventricular tachycardia) ([facility]) 09/23/2019     Priority: Low    Elevated hemidiaphragm 08/20/2019     Priority: Low    SSS (sick sinus syndrome) ([facility] )      Priority: Low    Mixed hyperlipidemia      Priority: Low    Cervical spondylosis without myelopathy 06/03/2019     Priority: Low    Nerve pain - Right 04/29/2019     Priority: Low    Neck pain 04/29/2019     Priority: Low    Upper abdominal pain 03/01/2019     Priority: Low    SIRS (systemic inflammatory response syndrome) ([facility] ) 03/01/2019     Priority: Low    Lactic acidosis 03/01/2019     Priority: Low    Abnormal urinalysis 03/01/2019     Priority: Low    Type 2 diabetes mellitus, without long-term current use of insulin ([facility] ) 03/01/2019     Priority: Low    Hypertension 03/01/2019     Priority: Low    Lumbar pain 12/17/2018     Priority: Low    Spondylolisthesis 12/17/2018     Priority: Low    Spinal stenosis, lumbar region, with neurogenic claudication      Priority: Low    Intermittent complete heart block ([facility] ) 02/09/2018     Priority: Low    Acute gallstone pancreatitis 01/30/2018     Priority: Low    Cholecystitis 01/07/2018     Priority: Low    Acute biliary pancreatitis 01/07/2018     Priority: Low    Cholangitis 01/07/2018     Priority: Low    Choledocholithiasis 01/07/2018     Priority: Low    Chest wall hematoma 11/10/2017     Priority: Low    Complication associated with cardiac pacemaker lead 11/09/2017     Priority: Low    Pacemaker complications, subsequent encounter 11/09/2017     Priority: Low    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 ([facility] ) 01/05/2017     Priority: Low    Hypercholesteremia      Priority: Low    History of total knee replacement 05/27/2015     Priority: Low    LBBB (left bundle branch block) 04/13/2015     Priority: Low    Hearing loss 07/16/2014     Priority: Low    Gastroesophageal reflux disease without esophagitis 03/22/2010     Priority: Low       Assessment:  # Chronic HFpEF, Stage C  Diuretic: torsemide 20 mg daily  Weight: 132 lbs  NT proBNP:  Studies- personally reviewed by me  TTE 08/13/2019: LVEF 70%  LVIDd 4 7 cm  Grade 1 DD  Normal RV size and RVSF  Mild TR  #  CHB- Medtronic dual chamber PPM 10/31/17 after loop showed 5 second pauses  She has recurrent syncope in the above context  Had large hematomas admitted 11/9, hemorrhagic shock   RV lead perforation- lead extraction and reimplantation on 11/9/17  Interrogation 5/18/21: Optivol crossed, %, one NSVT 5 beats  Interrogation from 03/09/2021: AP 1 2%  %  Lead parameters WNL  2 VT-NS episodes  Opti-Vol crossed since 12/2020  Normal device function  # ST in office today  # HTN- metoprolol tartrate 25 mg BID  #  Metastsatic breast CA- post right mastectomy, liver lesion removed Sept 2017   S/p right axilla radiation from 02/2019 to 04/2019  Continues on exemestane 25 mg daily and everolimus 2 5 mg daily  Follows with Dr Dayna Mccormack and Monika Hartley as outpatient  #  Hyperlipidemia  1/23/21: LDL 72, HDL 64  #  Right Pleural effusion on 1/7/18 CT chest- sounds better on exam today,  Not large, saturating ok, has lasix to take prn   Follow up CT 5/29/18: smaller in size      TODAY'S PLAN:  Continue torsemide 20 mg daily (k 4 4 on 5/10 labs without K supplement)  Optivol continues to be crossed  Increase metoprolol tartrate to 50 mg twice daily as ST in office today    HPI: 79 yo female presents for follow up after undergoing PPM placement in October 2017 for symptomatic bradycardia after loop recorder revealed 5 second pause  She has metastatic breast CA, stage 4 with taxotere, cytoxan and arimidex in past with last MUGA Jan 2017 showing EF: 57%  EKG had shown LBBB  Her disease had disappeared radiographically except for liver lesion  She had a number of syncopal episodes  Nuclear stress was unremarkable and echo showed EF: 50%  She underwent resection of liver lesions in Sept 2017  10/4/17 loop recorder implanted  She had 5 second pause noted and underwent Medtronic dual chamber PPM 10/31/17  Post PPM course complicated by RV perforation and hemorrhagic shock  On 11/9 had lead extraction and reimplantation on 11/9/17  Has since followed up with Dr Melton Sos  She had a CT chest 1/718 and there is moderate right pleural effusion  Interval History:   I have not seen since 2019, more recently seeing our HF PA  On torsemide 20 mg daily for HFpEF  Interrogation 5/18/21: Optivol crossed, %, one NSVT 5 beats     today  Past Medical History:   Diagnosis Date    Acute biliary pancreatitis     Anxiety     Arthritis     Breast CA (HCC)     recurrent, ductal carcinoma ER, [place] pos    Cardiac disease     Colon polyp     Depression     Diverticula of intestine     Diverticulosis     Dizziness     and passes out    Flushing     Hiatal hernia     [place] (hard of hearing[place]      [place]    Hypercholesteremia     Hypertension     Liver mass     Liver metastasis ([facility] )     Metastatic adenocarcinoma to liver (HCC)     Bx 01/03/2017    PONV (postoperative nausea and vomiting)     Pulmonary embolism (HCC)     Recurrent breast cancer (HCC)     Shingles     Shortness of breath     on exertion    Tachycardia     Urinary incontinence     Use of cane as ambulatory aid     or walker       Review of Systems - General ROS: positive for dyspnea on exertion and lightheadedness, but no syncope  No chest pain       Allergies   Allergen Reactions    Ampicillin Shortness Of Breath, Anaphylaxis and Other (See Comments)     Other reaction(s): Unknown Allergic Reaction  Reaction Date: [id number];           Current Outpatient Medications:     acetaminophen (TYLENOL) 500 mg tablet, Take 500 mg by mouth every 6 (six) hours as needed for mild pain, Disp: , Rfl:     acetaminophen (TYLENOL) 500 mg tablet, Take 2 tablets (1,000 mg total) by mouth every 6 (six) hours as needed for mild pain, Disp: 60 tablet, Rfl: 0    atorvastatin (LIPITOR) 20 mg tablet, Take 20 mg by mouth daily  , Disp: , Rfl:     Calcium Citrate-Vitamin D (CALCIUM CITRATE + D PO), Take 1,500 mg by mouth daily, Disp: , Rfl:     clindamycin (CLEOCIN) 300 MG capsule, TAKE 2 CAPSULES BY MOUTH 1 HOUR PRIOR TO DENTAL PROCEDURE , Disp: , Rfl: 0    clotrimazole-betamethasone (LOTRISONE) 1-0 05 % cream, Apply 1 application topically as needed , Disp: , Rfl:     Cranberry (THERACRAN HP PO), Take 2 tablets by mouth daily, Disp: , Rfl:     Diclofenac Sodium (VOLTAREN) 1 %, Apply 2 g topically 4 (four) times a day, Disp: 350 g, Rfl: 0    everolimus (AFINITOR) 2 5 MG tablet, Take 1 tablet (2 5 mg total) by mouth daily, Disp: 30 tablet, Rfl: 6    exemestane (AROMASIN) 25 MG tablet, Take 1 tablet (25 mg total) by mouth daily, Disp: 90 tablet, Rfl: 3    glipiZIDE (GLUCOTROL) 5 mg tablet, take 1/2 tablet by mouth every morning before breakfast, Disp: , Rfl:     Glucosamine-Chondroit-Vit C-Mn (GLUCOSAMINE 1500 COMPLEX) CAPS, Take 1 capsule by mouth daily  , Disp: , Rfl:     lidocaine (LMX) 4 % cream, Apply topically as needed for mild pain (Patient not taking: Reported on 5/5/2021), Disp: 30 g, Rfl: 0    metFORMIN (GLUCOPHAGE) 500 mg tablet, 1 pill 3 times daily with meals, Disp: , Rfl:     methocarbamol (ROBAXIN) 500 mg tablet, Take 2 tablets (1,000 mg total) by mouth 2 (two) times a day for 7 days (Patient not taking: Reported on 5/5/2021), Disp: 28 tablet, Rfl: 0    metoprolol tartrate (LOPRESSOR) 25 mg tablet, Take 1 tablet (25 mg total) by mouth every 12 (twelve) hours, Disp: 180 tablet, Rfl: 1    Multiple Vitamin (MULTIVITAMIN) capsule, Take 1 capsule by mouth daily  , Disp: , Rfl:     ondansetron (ZOFRAN) 4 mg tablet, Take 4 mg by mouth every 8 (eight) hours as needed, Disp: , Rfl:     oxyCODONE (ROXICODONE) 5 mg immediate release tablet, Take 1 tablet (5 mg total) by mouth every 4 (four) hours as needed for moderate painMax Daily Amount: 30 mg (Patient not taking: Reported on 3/29/2021), Disp: 30 tablet, Rfl: 0    torsemide (DEMADEX) 20 mg tablet, Take 1 tablet (20 mg total) by mouth daily, Disp: 30 tablet, Rfl: 3    Social History     Socioeconomic History    Marital status: /Civil Union     Spouse name: Not on file    Number of children: Not on file    Years of education: Not on file    Highest education level: Not on file   Occupational History    Not on file   Social Needs    Financial resource strain: Not on file    Food insecurity     Worry: Not on file     Inability: Not on file   [employer] needs     Medical: Not on file     Non-medical: Not on file   Tobacco Use    Smoking status: Never Smoker    Smokeless tobacco: Never Used   Substance and Sexual Activity    Alcohol use: Not Currently    Drug use: No    Sexual activity: Not on file   Lifestyle    Physical activity     Days per week: Not on file     Minutes per session: Not on file    Stress: Not on file   Relationships    Social connections     Talks on phone: Not on file     Gets together: Not on file     Attends L[employer] service: Not on file     Active member of club or organization: Not on file     Attends meetings of clubs or organizations: Not on file     Relationship status: Not on file    Intimate partner violence     Fear of current or ex partner: Not on file     Emotionally abused: Not on file     Physically abused: Not on file     Forced sexual activity: Not on file   Other Topics Concern    Not on file   Social History Narrative    Not on file       Family History   Problem Relation Age of Onset    Lung cancer Father     Cancer Brother     Diabetes type II Son        Physical Exam:    Vitals: There were no vitals taken for this visit  , There is no height or weight on file to calculate BMI ,   Wt Readings from Last 3 Encounters:   05/13/21 63 5 kg (140 lb)   05/05/21 64 6 kg (142 lb 6 4 oz)   03/29/21 64 4 kg (142 lb)         Physical Exam:  There were no vitals filed for this visit  GEN: Teja Lopes appears well, alert and oriented x 3, pleasant and cooperative   HEENT: pupils equal, round, and reactive to light; extraocular muscles intact  NECK: supple, no carotid bruits   HEART: regular rhythm, normal S1 and S2, no murmurs, clicks, gallops or rubs, JVP is down   LUNGS: clear to auscultation bilaterally; no wheezes, rales, or rhonchi   No significant effusion on exam on right  ABDOMEN: normal bowel sounds, soft, no tenderness, no distention  EXTREMITIES: peripheral pulses normal; no clubbing, cyanosis, or edema  NEURO: no focal findings   SKIN: normal without suspicious lesions on exposed skin    Labs & Results:    Lab Results   Component Value Date    GLUCOSE 156 (H) 11/09/2017    CALCIUM 9 9 05/10/2021     05/13/2015    K 4 4 05/10/2021    CO2 28 05/10/2021     05/10/2021    BUN 10 05/10/2021    CREATININE 0 70 05/10/2021     Lab Results   Component Value Date    WBC 6 69 05/10/2021    HGB 12 3 05/10/2021    HCT 40 0 05/10/2021    MCV 88 05/10/2021     05/10/2021     No results found for: BNP   No results found for: CHOL  Lab Results   Component Value Date    HDL 67 (H) 06/20/2017     Lab Results   Component Value Date    LDLCALC 79 06/20/2017     Lab Results   Component Value Date    TRIG 120 06/20/2017     No results found for: CHOLHDL     EKG personally reviewed by Evert Keene DO  Counseling / Coordination of Care  Time spent today 25 minutes    Greater than 50% of total time was spent with the patient and / or family counseling and / or coordination of care  We discussed diagnoses, most recent studies, tests and any changes in treatment plan    Thank you for the opportunity to participate in the care of this patient      [address] [facility]  MEDICAL DIRECTOR OF [place]

## 2021-06-16 NOTE — TELEPHONE ENCOUNTER
Kassandra Donovan left message on nurse line requesting a call back from Adonay  I returned her call  Kassandra Donovan states she is doing well and would like to know when her f/u appts will be  Advised o/v w/ Adonay will be in Sept and Dr Babar Ch will be in Dec     Patient will call w/ any concerns

## 2021-06-22 NOTE — PROGRESS NOTES
Results for orders placed or performed in visit on 06/22/21   Cardiac EP device report    Narrative    MDT BI-V ICD (3830 IN LV PORT)  CARELINK TRANSMISSION: BATTERY STATUS "6 YRS " AP 2%  100% VSRP 0%  ALL AVAILABLE LEAD PARAMETERS WITHIN NORMAL LIMITS  NO SIGNIFICANT HIGH RATE EPISODES  OPTI-VOL FLUID THRESHOLD CROSSED  HF-RN MADE AWARE  NORMAL DEVICE FUNCTION  NC         Current Outpatient Medications:     acetaminophen (TYLENOL) 500 mg tablet, Take 500 mg by mouth every 6 (six) hours as needed for mild pain, Disp: , Rfl:     acetaminophen (TYLENOL) 500 mg tablet, Take 2 tablets (1,000 mg total) by mouth every 6 (six) hours as needed for mild pain, Disp: 60 tablet, Rfl: 0    atorvastatin (LIPITOR) 20 mg tablet, Take 20 mg by mouth daily  , Disp: , Rfl:     Calcium Citrate-Vitamin D (CALCIUM CITRATE + D PO), Take 1,500 mg by mouth daily, Disp: , Rfl:     clindamycin (CLEOCIN) 300 MG capsule, TAKE 2 CAPSULES BY MOUTH 1 HOUR PRIOR TO DENTAL PROCEDURE , Disp: , Rfl: 0    clotrimazole-betamethasone (LOTRISONE) 1-0 05 % cream, Apply 1 application topically as needed , Disp: , Rfl:     Cranberry (THERACRAN HP PO), Take 2 tablets by mouth daily, Disp: , Rfl:     Diclofenac Sodium (VOLTAREN) 1 %, Apply 2 g topically 4 (four) times a day, Disp: 350 g, Rfl: 0    everolimus (AFINITOR) 2 5 MG tablet, Take 1 tablet (2 5 mg total) by mouth daily, Disp: 30 tablet, Rfl: 6    exemestane (AROMASIN) 25 MG tablet, Take 1 tablet (25 mg total) by mouth daily, Disp: 90 tablet, Rfl: 3    glipiZIDE (GLUCOTROL) 5 mg tablet, Take 5 mg by mouth daily Take 1/2 tab morning and 1 whole tab at night, Disp: , Rfl:     Glucosamine-Chondroit-Vit C-Mn (GLUCOSAMINE 1500 COMPLEX) CAPS, Take 1 capsule by mouth daily  , Disp: , Rfl:     lidocaine (LMX) 4 % cream, Apply topically as needed for mild pain (Patient not taking: Reported on 5/5/2021), Disp: 30 g, Rfl: 0    metFORMIN (GLUCOPHAGE) 500 mg tablet, 1 pill 3 times daily with meals, Disp: , Rfl:     methocarbamol (ROBAXIN) 500 mg tablet, Take 2 tablets (1,000 mg total) by mouth 2 (two) times a day for 7 days (Patient not taking: Reported on 5/5/2021), Disp: 28 tablet, Rfl: 0    metoprolol tartrate (LOPRESSOR) 50 mg tablet, Take 1 tablet (50 mg total) by mouth every 12 (twelve) hours, Disp: 60 tablet, Rfl: 3    Multiple Vitamin (MULTIVITAMIN) capsule, Take 1 capsule by mouth daily  , Disp: , Rfl:     ondansetron (ZOFRAN) 4 mg tablet, Take 4 mg by mouth every 8 (eight) hours as needed, Disp: , Rfl:     oxyCODONE (ROXICODONE) 5 mg immediate release tablet, Take 1 tablet (5 mg total) by mouth every 4 (four) hours as needed for moderate painMax Daily Amount: 30 mg (Patient not taking: Reported on 3/29/2021), Disp: 30 tablet, Rfl: 0    torsemide (DEMADEX) 20 mg tablet, Take 1 tablet (20 mg total) by mouth daily, Disp: 30 tablet, Rfl: 3

## 2021-06-22 NOTE — ASSESSMENT & PLAN NOTE
BG now improving with no episodes of hyperglycemia since starting Glipizide  No episodes of hypoglycemia  Continue current regimen  Check BG 1-2x per day at alternating times per day and send in BG log in two weeks for review

## 2021-06-22 NOTE — ASSESSMENT & PLAN NOTE
BP elevated today, usually well controlled  Continue current regimen for now   Will reassess at next visit

## 2021-06-22 NOTE — PROGRESS NOTES
Established Patient Progress Note      Chief Complaint   Patient presents with    Diabetes Type 2         History of Present Illness:   Malik Calvin is a 78 y o  female with a history of type 2 diabetes without long term use of insulin for 4 years  Reports complications of CKD  Last A1C 9 1  She has metastatic breast cancer s/p R mastectomy, currently on Afinitor  Follow with Oncology  Was started on Glipizide has BG increased with the Afinitor  BG improving, morning fasting now 120-170s  Denies recent severe hypoglycemic or severe hyperglycemic episodes  Denies any issues with her current regimen  Home glucose monitoring: are performed regularly      Current regimen: Metformin 500 mg TID and Glipizide 2 5 mg am and 5 mg pm  compliant all of the timedenies any side effects from current medications     Hypoglycemic episodes: No never   H/o of hypoglycemia causing hospitalization or Intervention such as glucagon injection or ambulance call No       Last Eye Exam: 11/20/20  Last Foot Exam: performed today    Has hypertension: Taking Metoprolol   Has hyperlipidemia: Taking Atorvastatin       Patient Active Problem List   Diagnosis    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 ([address] )    Hypercholesteremia    Gastroesophageal reflux disease without esophagitis    History of total knee replacement    Hearing loss    LBBB (left bundle branch block)    Complication associated with cardiac pacemaker lead    Pacemaker complications, subsequent encounter    Chest wall hematoma    Cholecystitis    Acute biliary pancreatitis    Cholangitis    Choledocholithiasis    Acute gallstone pancreatitis    Intermittent complete heart block ([address] )    Spinal stenosis, lumbar region, with neurogenic claudication    Lumbar pain    Spondylolisthesis    Upper abdominal pain    SIRS (systemic inflammatory response syndrome) (HCC)    Lactic acidosis    Abnormal urinalysis    Type 2 diabetes mellitus, without long-term current use of insulin (HCC)    Hypertension    Nerve pain - Right    Neck pain    Cervical spondylosis without myelopathy    SSS (sick sinus syndrome) (HCC)    Mixed hyperlipidemia    Elevated hemidiaphragm    VT (ventricular tachycardia) (HCC)    Lumbar radiculopathy    Type 2 diabetes mellitus with hyperglycemia, without long-term current use of insulin (HCC)    Hypercalcemia    Pulmonary embolus (HCC)      Past Medical History:   Diagnosis Date    Acute biliary pancreatitis     Anxiety     Arthritis     Breast CA (HCC)     recurrent, ductal carcinoma ER, [facility]    Cardiac disease     Colon polyp     Depression     Diverticula of intestine     Diverticulosis     Dizziness     and passes out    Flushing     Hiatal hernia     [place]    Hypercholesteremia     Hypertension     Liver mass     Liver metastasis (HCC)     Metastatic adenocarcinoma to liver (HCC)     Bx 01/03/2017    PONV (postoperative nausea and vomiting)     Pulmonary embolism (HCC)     Recurrent breast cancer (HCC)     Shingles     Shortness of breath     on exertion    Tachycardia     Urinary incontinence     Use of cane as ambulatory aid     or walker      Past Surgical History:   Procedure Laterality Date    BREAST SURGERY      CARDIAC PACEMAKER REMOVAL N/A 11/9/2017    Procedure: PACEMAKER LEAD REVISION, REMOVAL OF NONFUNCTIONING LEAD, AND INSERTION OF A NEW RV LEAD;  Surgeon: Raven Pierce MD;  [facility];  Service: Cardiology    CATARACT EXTRACTION Bilateral     COLONOSCOPY      ERCP N/A 1/8/2018    Procedure: ENDOSCOPIC RETROGRADE CHOLANGIOPANCREATOGRAPHY (ERCP); Surgeon: Doroteo Duarte MD;  [facility]: [facility];   Service: Gastroenterology    HYSTERECTOMY      INSERT / REPLACE / REMOVE PACEMAKER      JOINT REPLACEMENT      [place]  TKR and right TSR    MASTECTOMY Right     [facility] ER[facility] DX COLLECTION SPECIMEN BRUSHING/WASHING N/A 2/22/2018    Procedure: ENDOSCOPIC RETROGRADE CHOLANGIOPANCREATOGRAPHY (ERCP) with stent removal;  Surgeon: Jose Nunn MD;  Location: [facility]; Service: Gastroenterology    [place] RESEC [address] N/A 7/13/2017    Procedure: LIVER RESECTION, INTRAOPERATIVE ULTRASOUND;  Surgeon: Monty Anthony MD;  [facility];  Service: Surgical Oncology    ROTATOR CUFF REPAIR Right     SENTINEL LYMPH NODE BIOPSY Right     TONSILECTOMY AND ADNOIDECTOMY      WOUND DEBRIDEMENT Left 11/9/2017    Procedure: DEBRIDEMENT WOUND AND DRESSING CHANGE [facility]); Surgeon: Mohsen Leblanc MD;  [facility];  Service: Cardiology      Family History   Problem Relation Age of Onset    Lung cancer Father     Cancer Brother     Diabetes type II Son      Social History     Tobacco Use    Smoking status: Never Smoker    Smokeless tobacco: Never Used   Substance Use Topics    Alcohol use: Not Currently     Allergies   Allergen Reactions    Ampicillin Shortness Of Breath, Anaphylaxis and Other (See Comments)     Other reaction(s): Unknown Allergic Reaction  Reaction Date: 07Mar2012;          Current Outpatient Medications:     acetaminophen (TYLENOL) 500 mg tablet, Take 2 tablets (1,000 mg total) by mouth every 6 (six) hours as needed for mild pain, Disp: 60 tablet, Rfl: 0    atorvastatin (LIPITOR) 20 mg tablet, Take 20 mg by mouth daily  , Disp: , Rfl:     Calcium Citrate-Vitamin D (CALCIUM CITRATE + D PO), Take 1,500 mg by mouth daily, Disp: , Rfl:     clindamycin (CLEOCIN) 300 MG capsule, TAKE 2 CAPSULES BY MOUTH 1 HOUR PRIOR TO DENTAL PROCEDURE , Disp: , Rfl: 0    clotrimazole-betamethasone (LOTRISONE) 1-0 05 % cream, Apply 1 application topically as needed , Disp: , Rfl:     Cranberry (THERACRAN HP PO), Take 2 tablets by mouth daily, Disp: , Rfl:     everolimus (AFINITOR) 2 5 MG tablet, Take 1 tablet (2 5 mg total) by mouth daily, Disp: 30 tablet, Rfl: 6    exemestane (AROMASIN) 25 MG tablet, Take 1 tablet (25 mg total) by mouth daily, Disp: 90 tablet, Rfl: 3    glipiZIDE (GLUCOTROL) 5 mg tablet, Take 5 mg by mouth daily Take 1/2 tab morning and 1 whole tab at night, Disp: , Rfl:     Glucosamine-Chondroit-Vit C-Mn (GLUCOSAMINE 1500 COMPLEX) CAPS, Take 1 capsule by mouth daily  , Disp: , Rfl:     metFORMIN (GLUCOPHAGE) 500 mg tablet, 1 pill 3 times daily with meals, Disp: , Rfl:     metoprolol tartrate (LOPRESSOR) 50 mg tablet, Take 1 tablet (50 mg total) by mouth every 12 (twelve) hours, Disp: 60 tablet, Rfl: 3    Multiple Vitamin (MULTIVITAMIN) capsule, Take 1 capsule by mouth daily  , Disp: , Rfl:     torsemide (DEMADEX) 20 mg tablet, Take 1 tablet (20 mg total) by mouth daily, Disp: 30 tablet, Rfl: 3    acetaminophen (TYLENOL) 500 mg tablet, Take 500 mg by mouth every 6 (six) hours as needed for mild pain (Patient not taking: Reported on 6/22/2021), Disp: , Rfl:     Diclofenac Sodium (VOLTAREN) 1 %, Apply 2 g topically 4 (four) times a day (Patient not taking: Reported on 6/22/2021), Disp: 350 g, Rfl: 0    lidocaine (LMX) 4 % cream, Apply topically as needed for mild pain (Patient not taking: Reported on 5/5/2021), Disp: 30 g, Rfl: 0    methocarbamol (ROBAXIN) 500 mg tablet, Take 2 tablets (1,000 mg total) by mouth 2 (two) times a day for 7 days (Patient not taking: Reported on 5/5/2021), Disp: 28 tablet, Rfl: 0    ondansetron (ZOFRAN) 4 mg tablet, Take 4 mg by mouth every 8 (eight) hours as needed (Patient not taking: Reported on 6/22/2021), Disp: , Rfl:     oxyCODONE (ROXICODONE) 5 mg immediate release tablet, Take 1 tablet (5 mg total) by mouth every 4 (four) hours as needed for moderate painMax Daily Amount: 30 mg (Patient not taking: Reported on 3/29/2021), Disp: 30 tablet, Rfl: 0    Review of Systems   Constitutional: Negative for chills and fever  HENT: Negative for sore throat and trouble swallowing  Eyes: Negative for visual disturbance  Respiratory: Negative for shortness of breath      Cardiovascular: Negative for chest pain and palpitations  Gastrointestinal: Negative for abdominal pain, constipation and diarrhea  Endocrine: Negative for cold intolerance, heat intolerance, polydipsia, polyphagia and polyuria  Genitourinary: Negative for frequency  Musculoskeletal: Negative for arthralgias and myalgias  Skin: Negative for rash  Neurological: Negative for dizziness and tremors  Hematological: Negative for adenopathy  Psychiatric/Behavioral: Negative for sleep disturbance  All other systems reviewed and are negative  Physical Exam:  Body mass index is 25 58 kg/m²  /80 (BP Location: Left arm, Patient Position: Sitting)   Pulse 87   Ht 5' (1 524 m)   Wt 59 4 kg (131 lb)   LMP  (LMP Unknown) Comment: post   BMI 25 58 kg/m²    Wt Readings from Last 3 Encounters:   06/22/21 59 4 kg (131 lb)   06/03/21 60 kg (132 lb 3 2 oz)   05/13/21 63 5 kg (140 lb)       Physical Exam  Vitals reviewed  Constitutional:       General: She is not in acute distress  Appearance: She is well-developed  HENT:      Head: Normocephalic and atraumatic  Eyes:      Conjunctiva/sclera: Conjunctivae normal       Pupils: Pupils are equal, round, and reactive to light  Neck:      Thyroid: No thyromegaly  Cardiovascular:      Rate and Rhythm: Normal rate and regular rhythm  Pulses: no weak pulses          Dorsalis pedis pulses are 2+ on the right side and 2+ on the left side  Posterior tibial pulses are 2+ on the right side and 2+ on the left side  Heart sounds: Normal heart sounds  Pulmonary:      Effort: Pulmonary effort is normal  No respiratory distress  Breath sounds: Normal breath sounds  No wheezing or rales  Abdominal:      General: Bowel sounds are normal  There is no distension  Palpations: Abdomen is soft  Tenderness: There is no abdominal tenderness  Musculoskeletal:         General: Normal range of motion  Cervical back: Normal range of motion and neck supple  Feet:      Right foot:      Skin integrity: No ulcer, skin breakdown, erythema, warmth, callus or dry skin  Left foot:      Skin integrity: No ulcer, skin breakdown, erythema, warmth, callus or dry skin  Skin:     General: Skin is warm and dry  Neurological:      Mental Status: She is alert and oriented to person, place, and time  Patient's shoes and socks removed  Right Foot/Ankle   Right Foot Inspection  Skin Exam: skin normal skin not intact, no dry skin, no warmth, no callus, no erythema, no maceration, no abnormal color, no pre-ulcer, no ulcer and no callus                            Sensory   Vibration: diminished    Monofilament testing: intact  Vascular  Capillary refills: < 3 seconds  The right DP pulse is 2+  The right PT pulse is 2+  Left Foot/Ankle  Left Foot Inspection  Skin Exam: skin normalskin not intact, no dry skin, no warmth, no erythema, no maceration, normal color, no pre-ulcer, no ulcer and no callus                                         Sensory   Vibration: diminished    Monofilament: intact  Vascular  Capillary refills: < 3 seconds  The left DP pulse is 2+  The left PT pulse is 2+  Assign Risk Category:  No deformity present; No loss of protective sensation;  No weak pulses       Risk: 0      Labs:     Lab Results   Component Value Date    HGBA1C 9 1 (H) 05/10/2021       Lab Results   Component Value Date     05/13/2015    SODIUM 141 05/10/2021    K 4 4 05/10/2021     05/10/2021    CO2 28 05/10/2021    ANIONGAP 10 05/13/2015    AGAP 7 05/10/2021    BUN 10 05/10/2021    CREATININE 0 70 05/10/2021    GLUC 155 (H) 04/23/2021    GLUF 200 (H) 05/10/2021    CALCIUM 9 9 05/10/2021    AST 26 05/10/2021    ALT 28 05/10/2021    ALKPHOS 108 05/10/2021    PROT 6 0 (L) 05/13/2015    TP 7 7 05/10/2021    BILITOT 1 9 (H) 05/13/2015    TBILI 0 59 05/10/2021    EGFR 80 05/10/2021         Lab Results   Component Value Date    HDL 67 (H) 06/20/2017    TRIG 120 06/20/2017 Lab Results   Component Value Date    KCL5OBWCIWUN 1 480 05/10/2021    UEU3OVCZPCMP 2 741 08/12/2019    YMB5PPWKNSFV 1 920 10/30/2017     Lab Results   Component Value Date    FREET4 1 17 05/10/2021       Impression & Plan:    Problem List Items Addressed This Visit        Endocrine    Type 2 diabetes mellitus with hyperglycemia, without long-term current use of insulin ([address] ) - Primary     BG now improving with no episodes of hyperglycemia since starting Glipizide  No episodes of hypoglycemia  Continue current regimen  Check BG 1-2x per day at alternating times per day and send in BG log in two weeks for review  Relevant Orders    Ambulatory referral to Podiatry    Hemoglobin A1C    Lipid Panel with Direct LDL reflex    Comprehensive metabolic panel    Microalbumin / creatinine urine ratio       Cardiovascular and Mediastinum    Hypertension     BP elevated today, usually well controlled  Continue current regimen for now  Will reassess at next visit          Relevant Orders    Comprehensive metabolic panel       Other    Mixed hyperlipidemia     Continue statin          Relevant Orders    Lipid Panel with Direct LDL reflex          Orders Placed This Encounter   Procedures    Hemoglobin A1C     Standing Status:   Future     Standing Expiration Date:   6/22/2022    Lipid Panel with Direct LDL reflex     This is a patient instruction: This test requires patient fasting for 10-12 hours or longer  Drinking of black coffee or black tea is acceptable  Standing Status:   Future     Standing Expiration Date:   6/22/2022    Comprehensive metabolic panel     This is a patient instruction: Patient fasting for 8 hours or longer recommended       Standing Status:   Future     Standing Expiration Date:   6/22/2022    Microalbumin / creatinine urine ratio     Standing Status:   Future     Standing Expiration Date:   6/22/2022    Ambulatory referral to Podiatry     Standing Status:   Future     Standing Expiration Date:   6/22/2022     Referral Priority:   Routine     Referral Type:   Consult - AMB     Referral Reason:   Specialty Services Required     Referred to Provider:   Yoshi Larsen DPM     Requested Specialty:   Podiatry     Number of Visits Requested:   1     Expiration Date:   6/22/2022         Discussed with the patient and all questioned fully answered  She will call me if any problems arise  Follow-up appointment in 3 months       Counseled patient on diagnostic results, prognosis, risk and benefit of treatment options, instruction for management, importance of treatment compliance, Risk  factor reduction and impressions      Buster Johnson [address]

## 2021-08-16 PROBLEM — D69.6 THROMBOCYTOPENIA, UNSPECIFIED (HCC): Status: ACTIVE | Noted: 2021-01-01

## 2021-08-16 NOTE — PROGRESS NOTES
[facility] HEMATOLOGY ONCOLOGY SPECIALISTS [address] [address] [phone]  Progress Note  Nava Wright, 1940, [id number]  8/16/2021    Assessment/Plan:  1  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 ([facility] [address] )  2  Metastasis to liver [facility])      Patient is an 73-year-old female with a history of metastatic breast CA with metastasis to the liver currently on Afinitor 2 5 mg p o  daily and exemestane 25 mg p o  daily  Patient was started on this regimen in November 2020  Overall she tolerates this without difficulty  We have been monitoring her CA 27-29  which had been stable in the 90s  Her most recent Tumor marker was significantly increased at 241 indicating need for change in treatment  Patient has been on Herceptin, Perjeta, and tamoxifen in the past   We discussed changing treatment to Taxotere 40 mg per m2 every 3 weeks  I reviewed indications and adverse reactions including nausea vomiting diarrhea, mouth sores, fatigue,  and cytopenias  Further Education and consent was obtained by our chemotherapy nurse Helga York RN  Patient and her  had opportunities to ask questions and were answered to the best of our abilities  I instructed patient to continue her Afinitor and exemestane until she is started on treatment  Patient states she only has approximately 2 weeks left of oral medication  We will also obtain an updated CT scan of the chest abdomen and pelvis for restaging purposes  We will see her in 3 weeks OU patient and her  verbalized understanding and are in agreement with the plan  - CT chest abdomen pelvis w contrast; Future  - CBC and differential; Future  - Comprehensive metabolic panel;  Future  - Infusion Calculated Appointment Request; Future  - Infusion Calculated Appointment Request; Future  - Infusion Calculated Appointment Request; Future    I have spent 45 minutes with  Patient and  today in which greater than 50% of this time was spent in counseling/coordination of care regarding Diagnostic results, Prognosis, Risks and benefits of tx options, Patient and family education and Indications and adverse reactions of Taxotere including nausea vomiting diarrhea constipation, stomatitis  Goals and Barriers:    Current Goal:   Prolong Survival from Cancer  Barriers: None  Patient's Capacity to Self Care:  Patient  is able to self care   -------------------------------------------------------------------------------------------------------    Chief Complaint   Patient presents with    Follow-up     3 Months       History of present illness/Cancer History:   Oncology History   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 [facility])   2009 Initial Diagnosis    Adenocarcinoma of right breast,  Infiltrating ductal carcinoma T2, N1 sebastien) Tumor was ER/CO positive, HER2 negative  2009 Surgery    Right Mastectomy     2009 -  Chemotherapy    Adjuvant chemotherapy with Taxotere/Cytoxan x 4 cycles     2009 - 10/2014 Chemotherapy    Arimidex 1 mg po dialy     2/11/2016 Progression    Right lateral mastectomy site growth with core needle biopsy demonstrated reoccurrence  %, CO 70%, Her2 +1  Final Diagnosis   A  Breast, right lateral mastectomy site, core needle biopsies:                        - Recurrent invasive mammary carcinoma, no special type (formerly invasive ductal carcinoma), 0 8 cm largest single focus               - Combined [place] score: 7/9                         - Tubule formation: None (3/3)  - Nuclear pleomorphism: Marked (3/3)  - Mitotic count: 3 mitoses per 10 high-power fields (1/3)  - No lymph-vascular invasion is identified              - No in situ component is identified                - No microcalcifications are identified               3/16/2016 - 1/24/2017 Chemotherapy    Faslodex 500 mg with loading dose followed by maintenance monthly injection with Ibrance 75mg 3 weeks on 1 week off       2016 Progression     Progression noted on PET-CT scan  Progression was largely found in the liver  Patient was set up for liver biopsy  1/3/2017 Biopsy    Liver lesion biopsy demonstated Estrogen Receptor (clone SP-1) 100%/positive, Progesterone Receptor (clone 1E2) 1-2%/positive, HER2 by IHC ([id number]) 2+/equivocal   Her2 by FISH was positive  2017 - 2017 Chemotherapy     Herceptin and Perjeta x5 cycles     2017 Adverse Reaction    Perjecta causing significant diarrhea  Medication stopped  2017 Surgery    Partial Hepatectomy, results demonstrated ER70%, PR0% & Her2 negative disease  2017 -  Chemotherapy    Tamoxifen 20 mg daily     2019 - 4/10/2019 Radiation      Plan ID Energy Fractions Dose per Fraction (cGy) Dose Correction (cGy) Total Dose Delivered (cGy) Elapsed Days   R Axilla # 10X/6X 25 / 25 200 0 5,000 35   R Axilla CD 10X/6X 5 / 5 200 0 1,000 6          2020 -  Hormone Therapy    Aromasin 25 mg daily     2020 -  Chemotherapy    Afinitor 2 5 mg daily     2021 -  Chemotherapy    DOCEtaxel (TAXOTERE) chemo infusion, 40 mg/m2 = 62 8 mg (66 7 % of original dose 60 mg/m2), Intravenous, Once, 0 of 6 cycles  Dose modification: 40 mg/m2 (original dose 60 mg/m2, Cycle 1, Reason: Dose modified as per discussion with consulting physician)        Cancer Staging  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 [facility])  Staging form: Breast, AJCC 8th Edition  - Pathologic stage from 2016: Stage IV (rpTX, pNX, pM1, G2, ER: Positive, IN: Unknown, HER2: Negative) - Signed by Marquis Hood PA-C on 2018    ECO - Asymptomatic    Interval history:   Clinically stable     Review of Systems   Constitutional: Negative for activity change, appetite change, fatigue, fever and unexpected weight change     Respiratory: Negative for cough and shortness of breath  Cardiovascular: Negative for chest pain and leg swelling  Gastrointestinal: Negative for abdominal pain, constipation, diarrhea and nausea  Endocrine: Negative for cold intolerance and heat intolerance  Musculoskeletal: Negative for arthralgias and myalgias  Skin: Negative  Neurological: Negative for dizziness, weakness and headaches  Hematological: Negative for adenopathy  Does not bruise/bleed easily  Current Outpatient Medications:     acetaminophen (TYLENOL) 500 mg tablet, Take 2 tablets (1,000 mg total) by mouth every 6 (six) hours as needed for mild pain, Disp: 60 tablet, Rfl: 0    atorvastatin (LIPITOR) 20 mg tablet, Take 20 mg by mouth daily  , Disp: , Rfl:     Calcium Citrate-Vitamin D (CALCIUM CITRATE + D PO), Take 1,500 mg by mouth daily, Disp: , Rfl:     clindamycin (CLEOCIN) 300 MG capsule, TAKE 2 CAPSULES BY MOUTH 1 HOUR PRIOR TO DENTAL PROCEDURE , Disp: , Rfl: 0    clotrimazole-betamethasone (LOTRISONE) 1-0 05 % cream, Apply 1 application topically as needed , Disp: , Rfl:     Cranberry (THERACRAN HP PO), Take 2 tablets by mouth daily, Disp: , Rfl:     everolimus (AFINITOR) 2 5 MG tablet, Take 1 tablet (2 5 mg total) by mouth daily, Disp: 30 tablet, Rfl: 6    exemestane (AROMASIN) 25 MG tablet, Take 1 tablet (25 mg total) by mouth daily, Disp: 90 tablet, Rfl: 3    glipiZIDE (GLUCOTROL) 5 mg tablet, Take 1 tablet (5 mg total) by mouth daily Take 1/2 tab morning and 1 tab at night, Disp: 45 tablet, Rfl: 1    Glucosamine-Chondroit-Vit C-Mn (GLUCOSAMINE 1500 COMPLEX) CAPS, Take 1 capsule by mouth daily  , Disp: , Rfl:     lidocaine (LMX) 4 % cream, Apply topically as needed for mild pain, Disp: 30 g, Rfl: 0    metFORMIN (GLUCOPHAGE) 500 mg tablet, 1 pill 3 times daily with meals, Disp: , Rfl:     metoprolol tartrate (LOPRESSOR) 50 mg tablet, Take 1 tablet (50 mg total) by mouth every 12 (twelve) hours, Disp: 60 tablet, Rfl: 3    Multiple Vitamin (MULTIVITAMIN) capsule, Take 1 capsule by mouth daily  , Disp: , Rfl:     torsemide (DEMADEX) 20 mg tablet, Take 1 tablet (20 mg total) by mouth daily, Disp: 30 tablet, Rfl: 3    acetaminophen (TYLENOL) 500 mg tablet, Take 500 mg by mouth every 6 (six) hours as needed for mild pain (Patient not taking: Reported on 6/22/2021), Disp: , Rfl:     Diclofenac Sodium (VOLTAREN) 1 %, Apply 2 g topically 4 (four) times a day (Patient not taking: Reported on 6/22/2021), Disp: 350 g, Rfl: 0    methocarbamol (ROBAXIN) 500 mg tablet, Take 2 tablets (1,000 mg total) by mouth 2 (two) times a day for 7 days (Patient not taking: Reported on 5/5/2021), Disp: 28 tablet, Rfl: 0    ondansetron (ZOFRAN) 4 mg tablet, Take 4 mg by mouth every 8 (eight) hours as needed (Patient not taking: Reported on 6/22/2021), Disp: , Rfl:     oxyCODONE (ROXICODONE) 5 mg immediate release tablet, Take 1 tablet (5 mg total) by mouth every 4 (four) hours as needed for moderate painMax Daily Amount: 30 mg (Patient not taking: Reported on 3/29/2021), Disp: 30 tablet, Rfl: 0    Allergies   Allergen Reactions    Ampicillin Shortness Of Breath, Anaphylaxis and Other (See Comments)     Other reaction(s): Unknown Allergic Reaction  Reaction Date: [id number]; Advance Directive and Living Will:        Objective:   /74 (BP Location: Left arm, Patient Position: Sitting, Cuff Size: Standard)   Pulse 82   Temp (!) 96 6 °F (35 9 °C)   Resp 18   Ht 5' (1 524 m)   Wt 60 1 kg (132 lb 8 oz)   LMP  (LMP Unknown) Comment: post   SpO2 96%   BMI 25 88 kg/m²   Wt Readings from Last 6 Encounters:   08/16/21 60 1 kg (132 lb 8 oz)   06/22/21 59 4 kg (131 lb)   06/03/21 60 kg (132 lb 3 2 oz)   05/13/21 63 5 kg (140 lb)   05/05/21 64 6 kg (142 lb 6 4 oz)   03/29/21 64 4 kg (142 lb)       Physical Exam  Vitals reviewed  Constitutional:       Appearance: Normal appearance  She is well-developed  HENT:      Head: Normocephalic and atraumatic     Eyes: Conjunctiva/sclera: Conjunctivae normal       Pupils: Pupils are equal, round, and reactive to light  Cardiovascular:      Rate and Rhythm: Normal rate and regular rhythm  Heart sounds: Normal heart sounds  No murmur heard  Pulmonary:      Effort: No respiratory distress  Breath sounds: Normal breath sounds  Abdominal:      General: Bowel sounds are normal       Palpations: Abdomen is soft  Musculoskeletal:         General: Normal range of motion  Cervical back: Normal range of motion and neck supple  Lymphadenopathy:      Cervical: No cervical adenopathy  Skin:     General: Skin is warm and dry  Capillary Refill: Capillary refill takes less than 2 seconds  Neurological:      Mental Status: She is alert and oriented to person, place, and time     Psychiatric:         Behavior: Behavior normal          Pertinent Laboratory Results and Imaging Review:  Appointment on 08/11/2021   Component Date Value Ref Range Status    WBC 08/11/2021 6 59  4 31 - 10 16 Thousand/uL Final    RBC 08/11/2021 4 44  3 81 - 5 12 Million/uL Final    Hemoglobin 08/11/2021 12 1  11 5 - 15 4 g/dL Final    Hematocrit 08/11/2021 38 6  34 8 - 46 1 % Final    MCV 08/11/2021 87  82 - 98 fL Final    MCH 08/11/2021 27 3  26 8 - 34 3 pg Final    MCHC 08/11/2021 31 3* 31 4 - 37 4 g/dL Final    RDW 08/11/2021 13 0  11 6 - 15 1 % Final    MPV 08/11/2021 9 6  8 9 - 12 7 fL Final    Platelets 80/51/0714 215  149 - 390 Thousands/uL Final    nRBC 08/11/2021 0  /100 WBCs Final    Neutrophils Relative 08/11/2021 46  43 - 75 % Final    Immat GRANS % 08/11/2021 0  0 - 2 % Final    Lymphocytes Relative 08/11/2021 36  14 - 44 % Final    Monocytes Relative 08/11/2021 13* 4 - 12 % Final    Eosinophils Relative 08/11/2021 4  0 - 6 % Final    Basophils Relative 08/11/2021 1  0 - 1 % Final    Neutrophils Absolute 08/11/2021 3 04  1 85 - 7 62 Thousands/µL Final    Immature Grans Absolute 08/11/2021 0 02  0 00 - 0 20 Thousand/uL Final    Lymphocytes Absolute 08/11/2021 2 38  0 60 - 4 47 Thousands/µL Final    Monocytes Absolute 08/11/2021 0 85  0 17 - 1 22 Thousand/µL Final    Eosinophils Absolute 08/11/2021 0 23  0 00 - 0 61 Thousand/µL Final    Basophils Absolute 08/11/2021 0 07  0 00 - 0 10 Thousands/µL Final    Sodium 08/11/2021 142  136 - 145 mmol/L Final    Potassium 08/11/2021 4 4  3 5 - 5 3 mmol/L Final    Chloride 08/11/2021 108  100 - 108 mmol/L Final    CO2 08/11/2021 32  21 - 32 mmol/L Final    ANION GAP 08/11/2021 2* 4 - 13 mmol/L Final    BUN 08/11/2021 17  5 - 25 mg/dL Final    Creatinine 08/11/2021 0 81  0 60 - 1 30 mg/dL Final    Standardized to IDMS reference method    Glucose, Fasting 08/11/2021 136* 65 - 99 mg/dL Final    Specimen collection should occur prior to Sulfasalazine administration due to the potential for falsely depressed results  Specimen collection should occur prior to Sulfapyridine administration due to the potential for falsely elevated results   Calcium 08/11/2021 10 3* 8 3 - 10 1 mg/dL Final    AST 08/11/2021 38  5 - 45 U/L Final    Specimen collection should occur prior to Sulfasalazine administration due to the potential for falsely depressed results   ALT 08/11/2021 35  12 - 78 U/L Final    Specimen collection should occur prior to Sulfasalazine and/or Sulfapyridine administration due to the potential for falsely depressed results   Alkaline Phosphatase 08/11/2021 135* 46 - 116 U/L Final    Total Protein 08/11/2021 7 8  6 4 - 8 2 g/dL Final    Albumin 08/11/2021 3 5  3 5 - 5 0 g/dL Final    Total Bilirubin 08/11/2021 0 66  0 20 - 1 00 mg/dL Final    Use of this assay is not recommended for patients undergoing treatment with eltrombopag due to the potential for falsely elevated results   eGFR 08/11/2021 67  ml/min/1 73sq m Final    CA 27 29 08/11/2021 241 4* 0 0 - 42 3 U/mL Final         The following historical data was reviewed      Past Medical History: Diagnosis Date    Acute biliary pancreatitis     Anxiety     Arthritis     Breast CA (HCC)     recurrent, ductal carcinoma ER, [place] pos    Cardiac disease     Colon polyp     Depression     Diverticula of intestine     Diverticulosis     Dizziness     and passes out    Flushing     Hiatal hernia     [place]      [place]    Hypercholesteremia     Hypertension     Liver mass     Liver metastasis (HCC)     Metastatic adenocarcinoma to liver (HCC)     Bx 01/03/2017    PONV (postoperative nausea and vomiting)     Pulmonary embolism (HCC)     Recurrent breast cancer (HCC)     Shingles     Shortness of breath     on exertion    Tachycardia     Urinary incontinence     Use of cane as ambulatory aid     or walker       Past Surgical History:   Procedure Laterality Date    BREAST SURGERY      CARDIAC PACEMAKER REMOVAL N/A 11/9/2017    Procedure: PACEMAKER LEAD REVISION, REMOVAL OF NONFUNCTIONING LEAD, AND INSERTION OF A NEW RV LEAD;  Surgeon: Harlan Calvo MD;  [facility];  Service: Cardiology    CATARACT EXTRACTION Bilateral     COLONOSCOPY      ERCP N/A 1/8/2018    Procedure: ENDOSCOPIC RETROGRADE CHOLANGIOPANCREATOGRAPHY (ERCP); Surgeon: Vito Gutierrez MD;  Location: [facility]; Service: Gastroenterology    HYSTERECTOMY      INSERT / REPLACE / [place]      JOINT REPLACEMENT      lizette  TKR and right TSR    MASTECTOMY Right     [place] ERCP DX COLLECTION SPECIMEN BRUSHING/WASHING N/A 2/22/2018    Procedure: ENDOSCOPIC RETROGRADE CHOLANGIOPANCREATOGRAPHY (ERCP) with stent removal;  Surgeon: Vito Gutierrez MD;  [facility]: [facility];   Service: Gastroenterology    [place] RESEC LIVER,PART LOBECTOMY N/A 7/13/2017    Procedure: LIVER RESECTION, INTRAOPERATIVE ULTRASOUND;  Surgeon: David Man MD;  [facility];  Service: Surgical Oncology    ROTATOR CUFF REPAIR Right     SENTINEL LYMPH NODE BIOPSY Right     TONSILECTOMY AND ADNOIDECTOMY      WOUND DEBRIDEMENT Left 11/9/2017    Procedure: DEBRIDEMENT WOUND AND DRESSING CHANGE Filippo STANTON; Surgeon: Cordelia Gloria MD;  [facility];  Service: Cardiology       Social History     Socioeconomic History    Marital status: /Civil Union     Spouse name: None    Number of children: None    Years of education: None    Highest education level: None   Occupational History    None   Tobacco Use    Smoking status: Never Smoker    Smokeless tobacco: Never Used   Vaping Use    Vaping Use: Never assessed   Substance and Sexual Activity    Alcohol use: Not Currently    Drug use: No    Sexual activity: None   Other Topics Concern    None   Social History Narrative    None     Social Determinants of Health     Financial Resource Strain:     Difficulty of Paying Living Expenses:    Food Insecurity:     Worried About Running Out of Food in the Last Year:     Ran Out of Food in the Last Year:    Transportation Needs:     Lack of Transportation (Medical):  Lack of Transportation (Non-Medical):    Physical Activity:     Days of Exercise per Week:     Minutes of Exercise per Session:    Stress:     Feeling of Stress :    Social Connections:     Frequency of Communication with Friends and Family:     Frequency of Social Gatherings with Friends and Family:     Attends Confucianism Services:     Active Member of Clubs or Organizations:     Attends Club or Organization Meetings:     Marital Status:    Intimate Partner Violence:     Fear of Current or Ex-Partner:     Emotionally Abused:     Physically Abused:     Sexually Abused:        Family History   Problem Relation Age of Onset    Lung cancer Father     Cancer Brother     Diabetes type II Son        Please note: This report has been generated by a voice recognition software system  Therefore there may be syntax, spelling, and/or grammatical errors  Please call if you have any questions

## 2021-08-27 NOTE — TELEPHONE ENCOUNTER
Patient is calling to get PRN antiemetic medication sent to her pharmacy  Patient is due to start treatment on 9/1/21  Please review with Kel Thakkar NP what she would like sent to the pharmacy      Preferred pharmacy:[id number] [address], [address] [facility], [address] [address]   Phone:  [phone]

## 2021-08-27 NOTE — TELEPHONE ENCOUNTER
Patient advised her prescription was sent to her pharmacy  Verbalized understanding of above  RN-FYI

## 2021-09-01 NOTE — PROGRESS NOTES
Pt arrived to unit without complaint,tolerated first cycle Taxotere without adverse reaction, left unit in stable condition, AVS provided  Pt instructed to go for blood work 2 days prior to each future cycle infusion  Pt verbalized understanding

## 2021-09-03 NOTE — PROGRESS NOTES
Advanced Heart Failure / Pulmonary Hypertension Outpatient Progress Note    Barbara Murillo 78 y o  female   MRN: [id number]  Encounter: [id number]    Assessment:  Patient Active Problem List    Diagnosis Date Noted    Thrombocytopenia, unspecified ([place] [address] ) 08/16/2021    Pulmonary embolus ([place] [address] ) 06/03/2021    Type 2 diabetes mellitus with hyperglycemia, without long-term current use of insulin ([place] [address] ) 05/05/2021    Hypercalcemia 05/05/2021    Lumbar radiculopathy     VT (ventricular tachycardia) ([facility]) 09/23/2019    Elevated hemidiaphragm 08/20/2019    SSS (sick sinus syndrome) ([facility])     Mixed hyperlipidemia     Cervical spondylosis without myelopathy 06/03/2019    Nerve pain - Right 04/29/2019    Neck pain 04/29/2019    Upper abdominal pain 03/01/2019    SIRS (systemic inflammatory response syndrome) ([facility]) 03/01/2019    Lactic acidosis 03/01/2019    Abnormal urinalysis 03/01/2019    Type 2 diabetes mellitus, without long-term current use of insulin ([place] [address] ) 03/01/2019    Hypertension 03/01/2019    Lumbar pain 12/17/2018    Spondylolisthesis 12/17/2018    Spinal stenosis, lumbar region, with neurogenic claudication     Intermittent complete heart block ([place] [address] ) 02/09/2018    Acute gallstone pancreatitis 01/30/2018    Cholecystitis 01/07/2018    Acute biliary pancreatitis 01/07/2018    Cholangitis 01/07/2018    Choledocholithiasis 01/07/2018    Chest wall hematoma 79/63/5592    Complication associated with cardiac pacemaker lead 11/09/2017    Pacemaker complications, subsequent encounter 11/09/2017    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 ([facility] ) 01/05/2017    Hypercholesteremia     History of total knee replacement 05/27/2015    LBBB (left bundle branch block) 04/13/2015    Hearing loss 07/16/2014    Gastroesophageal reflux disease without esophagitis 03/22/2010       Today's Plan:   With chemotherapy infusions, reports decreased oral intake   Will decrease torsemide to 10-20 mg PRN  Reviewed indications of when to take PRN doses in detail   Heart rates well controlled on current BB dose  Plan:  Chronic HFpEF; LVEF 70%; LVIDd 4 7 cm; NYHA II; ACC/AHA Stage B   TTE 08/13/2019: LVEF 70%  LVIDd 4 7 cm  Grade 1 DD  Normal RV size and RVSF  Mild TR  Reviewed importance of low sodium diet and fluid restriction  Weight of 132 lbs on 06/03  Today, weighs 126 lbs  Most recent BMP from 08/25/2021: sodium 140; potassium 4 4; BUN 15; creatinine 0 87; eGFR 62  Neurohormonal Blockade:  --Beta Blocker: metoprolol tartrate 50 mg q12 hours  --ARNi / ACEi / ARB: No    --Aldosterone Antagonist: No   --SGLT2 Inhibitor: No    --Diuretic: torsemide 10-20 mg PRN  Sudden Cardiac Death Risk Reduction:  --Medtronic BiV ICD (His and RV leads) in situ since 08/20/2019  --Interrogation from 06/22/2021: AP 2%   100%  Lead parameters WNL  2 VT-NS episodes  Opti-Vol crossed  Normal device function  Electrical Resynchronization:   --Candidacy for BiV device: narrow QRS  Advanced Therapies: Will continue to monitor  Ventricular tachycardia, polymorphic   S/p BiV ICD in 08/2019 as above  Follows with Dr Michael Post for EP  Hypertension   BP of 120/64 mmHg in office today  Continue on medications as above  Right breast cancer with metastasis to liver   S/p right mastectomy and 4 cycles taxotere and cytoxan in 2009  Was maintained on anastrozole from 3557-5712  Recurrence in 02/2016 and liver mets noted in 12/2016  Chemotherapy from 03/2016 to 04/2017  S/p partial hepatectomy in 07/2017  S/p right axilla radiation from 02/2019 to 04/2019  Exemestane and everolimus discontinued in 09/2021  Started on docetaxel infusions in 09/2021 (due to increase in tumor level markers and progression of liver mets); plan for 6 cycles  Follows with Dr James Zhang and Lexa Mata as outpatient       Hyperlipidemia   Most recent lipid panel from 01/23/2021: cholesterol 161; TG 123; HDL 64; calculated LDL 72  Continue on atorvastatin 20 mg daily  Diabetes mellitus, type II     Non-insulin dependent  Most recent hemoglobin A1c of 10 0 from 01/23/2021  Complete heart block: PPM placed in 10/2017; now with ICD as above  Spinal stenosis, lumbar  Urinary incontinence    HPI:   Mari Hinkle is an 47-year-old woman with a PMH as above who presents to the office for an acute visit  06/06/2019 with Dr Jackie Dela Cruz: "79 yo female presents for follow up after undergoing PPM placement in October 2017 for symptomatic bradycardia after loop recorder revealed 5 second pause  She has metastatic breast CA, stage 4 with taxotere, cytoxan and arimidex in past with last MUGA Jan 2017 showing EF: 57%  EKG had shown LBBB  Her disease had disappeared radiographically except for liver lesion  She had a number of syncopal episodes  Nuclear stress was unremarkable and echo showed EF: 50%  She underwent resection of liver lesions in Sept 2017  10/4/17 loop recorder implanted  She had 5 second pause noted and underwent Medtronic dual chamber PPM 10/31/17  Post PPM course complicated by RV perforation and hemorrhagic shock  On 11/9 had lead extraction and reimplantation on 11/9/17  Has since followed up with Dr Carol Soler  She had a CT chest 1/718 and there is moderate right pleural effusion       Interval History:   Still with HENRIQUEZ, no edema  She had radiation therapy for 6 weeks "    HF RN contacted patient on 03/09/2021 due to OptiVol threshold being crossed since 12/20202 on most recent device interrogation  03/11/2021: Patient presents with her  for an acute visit  Reports having chronic HENRIQUEZ but has noticed worsening SOB both at rest and with exertion for past month in addition to worsening LE swelling and orthopnea  Able to walk about 20 feet before having to catch her breath (able to walk double this distance about 6 months ago)   Is completing daily weights; denies any recent weight gain  Reports that soon after changing chemotherapy medications, her blood sugars became more difficult to control  This then resulted in changing her diabetes medications (now on Januvia)  Soon after starting on Januvia, she feels that her legs began to swell  Has been off Lasix for 5 days now because "it doesn't do anything anymore " BB dose increased and switched to torsemide  03/29/2021: Patient presents for follow-up  Reports feeling "much better" since last visit  Has been urinating more  Did not take torsemide this AM  However, patient also states that, "[she] knows [she] has a UTI,"  and has been experiencing dysuria and lower abdominal pain over the last few days  Denies fevers and chills  Advised patient to seek medical care from her PCP or urgent care for further evaluation/ workup of this  Also has been experiencing a dry cough in the AM for the past week with associated nasal congestion, itchy eyes, and runny nose  Also reports that for the past week, everything she eats, "tastes like metal "    06/03/2021 with Dr Katy Saul: "I have not seen since 2019, more recently seeing our HF PA  On torsemide 20 mg daily for HFpEF  Interrogation 5/18/21: Optivol crossed, %, one NSVT 5 beats  Continue torsemide 20 mg daily (k 4 4 on 5/10 labs without K supplement)  Optivol continues to be crossed  Increase metoprolol tartrate to 50 mg twice daily as ST in office today "    09/07/2021: Patient presents with her  for follow-up  Now receiving chemotherapy infusions  Reports decreased appetite ("everything tastes like metal") and general malaise after her first infusion  Anxious about CT scan results  Feels "emaciated" and that torsemide dose is too strong now  Answered questions about OptiVol and reviewed PRN torsemide use and indications for this  Reports having bilateral inguinal pain with urination; states this pain is different than her previous UTI or kidney stone pain      Past Medical History: Diagnosis Date    Acute biliary pancreatitis     Anxiety     Arthritis     Breast CA (HCC)     recurrent, ductal carcinoma ER, [place] pos    Cardiac disease     Colon polyp     Depression     Diverticula of intestine     Diverticulosis     Dizziness     and passes out    Flushing     Hiatal hernia     [place] of hearing[place]      [place]    Hypercholesteremia     Hypertension     Liver mass     Liver metastasis (HCC)     Metastatic adenocarcinoma to liver (HCC)     Bx 01/03/2017    PONV (postoperative nausea and vomiting)     Pulmonary embolism (HCC)     Recurrent breast cancer (HCC)     Shingles     Shortness of breath     on exertion    Tachycardia     Urinary incontinence     Use of cane as ambulatory aid     or walker        Review of Systems   Constitutional: Positive for appetite change  Negative for activity change, fatigue, fever and unexpected weight change  HENT: Negative for congestion, postnasal drip, rhinorrhea, sneezing, sore throat and trouble swallowing  Eyes: Negative  Respiratory: Negative for cough, chest tightness and shortness of breath  Cardiovascular: Negative for chest pain, palpitations and leg swelling  Gastrointestinal: Positive for nausea  Negative for abdominal distention, abdominal pain, diarrhea and vomiting  Endocrine: Negative  Genitourinary: Positive for dysuria  Negative for decreased urine volume, difficulty urinating and urgency  Musculoskeletal: Negative  Skin: Negative  Allergic/Immunologic: Negative  Neurological: Negative for dizziness, tremors, syncope, weakness, light-headedness and headaches  Hematological: Negative  Psychiatric/Behavioral: Negative for agitation, confusion and sleep disturbance  The patient is nervous/anxious  14-point ROS completed and negative except as stated above and/or in the HPI      Allergies   Allergen Reactions    Ampicillin Shortness Of Breath, Anaphylaxis and Other (See Comments)     Other reaction(s): Unknown Allergic Reaction  Reaction Date: 07Mar2012;        Current Outpatient Medications:     acetaminophen (TYLENOL) 500 mg tablet, Take 2 tablets (1,000 mg total) by mouth every 6 (six) hours as needed for mild pain, Disp: 60 tablet, Rfl: 0    atorvastatin (LIPITOR) 20 mg tablet, Take 20 mg by mouth daily  , Disp: , Rfl:     Calcium Citrate-Vitamin D (CALCIUM CITRATE + D PO), Take 1,500 mg by mouth daily, Disp: , Rfl:     clindamycin (CLEOCIN) 300 MG capsule, TAKE 2 CAPSULES BY MOUTH 1 HOUR PRIOR TO DENTAL PROCEDURE , Disp: , Rfl: 0    Cranberry (THERACRAN HP PO), Take 2 tablets by mouth daily, Disp: , Rfl:     glipiZIDE (GLUCOTROL) 5 mg tablet, Take 1 tablet (5 mg total) by mouth daily Take 1/2 tab morning and 1 tab at night, Disp: 45 tablet, Rfl: 1    Glucosamine-Chondroit-Vit C-Mn (GLUCOSAMINE 1500 COMPLEX) CAPS, Take 1 capsule by mouth daily  , Disp: , Rfl:     metFORMIN (GLUCOPHAGE) 500 mg tablet, 1 pill 3 times daily with meals, Disp: , Rfl:     methocarbamol (ROBAXIN) 500 mg tablet, Take 2 tablets (1,000 mg total) by mouth 2 (two) times a day for 7 days, Disp: 28 tablet, Rfl: 0    metoprolol tartrate (LOPRESSOR) 50 mg tablet, Take 1 tablet (50 mg total) by mouth every 12 (twelve) hours, Disp: 60 tablet, Rfl: 3    Multiple Vitamin (MULTIVITAMIN) capsule, Take 1 capsule by mouth daily  , Disp: , Rfl:     ondansetron (ZOFRAN) 8 mg tablet, Take 1 tablet (8 mg total) by mouth every 8 (eight) hours as needed for nausea or vomiting, Disp: 20 tablet, Rfl: 3    oxyCODONE (ROXICODONE) 5 mg immediate release tablet, Take 1 tablet (5 mg total) by mouth every 4 (four) hours as needed for moderate painMax Daily Amount: 30 mg, Disp: 30 tablet, Rfl: 0    torsemide (DEMADEX) 20 mg tablet, Take 1 tablet (20 mg total) by mouth daily, Disp: 90 tablet, Rfl: 3    acetaminophen (TYLENOL) 500 mg tablet, Take 500 mg by mouth every 6 (six) hours as needed for mild pain , Disp: , Rfl:    clotrimazole-betamethasone (LOTRISONE) 1-0 05 % cream, Apply 1 application topically as needed  (Patient not taking: Reported on 9/7/2021), Disp: , Rfl:     Diclofenac Sodium (VOLTAREN) 1 %, Apply 2 g topically 4 (four) times a day (Patient not taking: Reported on 6/22/2021), Disp: 350 g, Rfl: 0    everolimus (AFINITOR) 2 5 MG tablet, Take 1 tablet (2 5 mg total) by mouth daily (Patient not taking: Reported on 9/1/2021), Disp: 30 tablet, Rfl: 6    exemestane (AROMASIN) 25 MG tablet, Take 1 tablet (25 mg total) by mouth daily (Patient not taking: Reported on 9/1/2021), Disp: 90 tablet, Rfl: 3    lidocaine (LMX) 4 % cream, Apply topically as needed for mild pain (Patient not taking: Reported on 9/1/2021), Disp: 30 g, Rfl: 0    Social History     Socioeconomic History    Marital status: /Civil Union     Spouse name: Not on file    Number of children: Not on file    Years of education: Not on file    Highest education level: Not on file   Occupational History    Not on file   Tobacco Use    Smoking status: Never Smoker    Smokeless tobacco: Never Used   Vaping Use    Vaping Use: Never assessed   Substance and Sexual Activity    Alcohol use: Not Currently    Drug use: No    Sexual activity: Not on file   Other Topics Concern    Not on file   Social History Narrative    Not on file     Social Determinants of Health     Financial Resource Strain:     Difficulty of Paying Living Expenses:    Food Insecurity:     Worried About Running Out of Food in the Last Year:     [address] in the Last Year:    Transportation Needs:     Lack of Transportation (Medical):      Lack of Transportation (Non-Medical):    Physical Activity:     Days of Exercise per Week:     Minutes of Exercise per Session:    Stress:     Feeling of Stress :    Social Connections:     Frequency of Communication with Friends and Family:     Frequency of Social Gatherings with Friends and Family:     Attends [employer]odoxy Services:     Active Member of Clubs or Organizations:     Attends Club or Organization Meetings:     Marital Status:    Intimate Partner Violence:     Fear of Current or Ex-Partner:     Emotionally Abused:     Physically Abused:     Sexually Abused:      Family History   Problem Relation Age of Onset    Lung cancer Father     Cancer Brother     Diabetes type II Son        Vitals:   Blood pressure 120/64, pulse 104, height 5' (1 524 m), weight 57 4 kg (126 lb 8 oz)  Body mass index is 24 71 kg/m²  Wt Readings from Last 3 Encounters:   09/07/21 57 4 kg (126 lb 8 oz)   09/01/21 57 kg (125 lb 10 6 oz)   08/16/21 60 1 kg (132 lb 8 oz)     Vitals:    09/07/21 1303   BP: 120/64   BP Location: Left arm   Patient Position: Sitting   Cuff Size: Standard   Pulse: 104   Weight: 57 4 kg (126 lb 8 oz)   Height: 5' (1 524 m)       Physical Exam  Vitals reviewed  Constitutional:       General: She is awake  She is not in acute distress  Appearance: Normal appearance  She is well-developed and underweight  Comments: Face mask present   HENT:      Head: Normocephalic  Nose: Nose normal       Mouth/Throat:      Mouth: Mucous membranes are moist    Eyes:      General: No scleral icterus  Conjunctiva/sclera: Conjunctivae normal    Neck:      Vascular: No JVD  Trachea: No tracheal deviation  Cardiovascular:      Rate and Rhythm: Normal rate and regular rhythm  No extrasystoles are present  Pulses: Normal pulses  Heart sounds: Murmur heard  Pulmonary:      Effort: Pulmonary effort is normal  No tachypnea, bradypnea, accessory muscle usage or respiratory distress  Breath sounds: Normal air entry  No decreased air movement  No decreased breath sounds, wheezing or rales  Abdominal:      General: Bowel sounds are normal  There is no distension  Palpations: Abdomen is soft  Tenderness: There is no abdominal tenderness  Musculoskeletal:      Cervical back: Neck supple  Right lower leg: No edema  Left lower leg: No edema  Skin:     General: Skin is warm and dry  Coloration: Skin is pale  Skin is not jaundiced  Neurological:      General: No focal deficit present  Mental Status: She is alert and oriented to person, place, and time  Psychiatric:         Attention and Perception: Attention normal          Mood and Affect: Affect normal  Mood is anxious  Speech: Speech normal          Behavior: Behavior normal  Behavior is cooperative  Thought Content: Thought content normal      Labs & Results:  Lab Results   Component Value Date    WBC 7 56 08/25/2021    HGB 12 5 08/25/2021    HCT 40 7 08/25/2021    MCV 87 08/25/2021     08/25/2021     Lab Results   Component Value Date    SODIUM 140 08/25/2021    K 4 4 08/25/2021     08/25/2021    CO2 31 08/25/2021    BUN 15 08/25/2021    CREATININE 0 87 08/25/2021    GLUC 155 (H) 04/23/2021    CALCIUM 10 0 08/25/2021     Lab Results   Component Value Date    INR 1 09 04/23/2021    INR 1 04 03/01/2019    INR 1 32 (H) 01/08/2018    PROTIME 13 9 04/23/2021    PROTIME 13 7 03/01/2019    PROTIME 16 5 (H) 01/08/2018     Lab Results   Component Value Date    NTBNP 253 11/06/2017      EKG personally reviewed by SILKE Hernandez PA-C

## 2021-09-03 NOTE — PATIENT INSTRUCTIONS
Decrease torsemide to 20 mg as needed use  Take torsemide on days you gain 3 lbs overnight or 5 lbs in 5-7 days or on days you feel more short of breath or swollen  Please weigh yourself every day and keep a detailed log of weights  Contact the Heart Failure program at [phone] if you gain 3 lbs overnight or 5 lbs in 5-7 days  Limit daily sodium/salt intake to 2155-3895 mg daily to prevent fluid retention  Avoid canned foods, [place] food, and processed meat (hot dogs, lunch meat, and sausage etc )  Limit fluid intake to 2000 mL or 2L (about 60-65 ounces) daily  Bring complete list of medications and log of daily weights to your follow-up appointment

## 2021-09-07 NOTE — TELEPHONE ENCOUNTER
IMPRESSION:     1  Marked progression of hepatic metastatic disease with extensive new lesions and enlargement of pre-existing lesions      2  Small amount of perihepatic ascites, unchanged      3  No evidence of metastatic disease in the chest   Stable, likely benign 4 mm right upper lobe nodule      4  Air in the urinary bladder, a chronic finding      5  Diffuse colonic diverticulosis      Additional incidental findings as above      The study was marked in EPIC for significant notification        Patient has f/u 9/9    Dr Ascencio [facility] RNs aware

## 2021-09-09 NOTE — PROGRESS NOTES
[facility] [address] [address] [phone]  Progress Note  Justice Paulson, 1940, [id number]  9/9/2021    Assessment/Plan:  1  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)    Patient is an 49-year-old female with a history of stage IV breast cancer with metastasis to the liver who was recently started Taxotere 40 mg per m2 every 3 weeks  She has completed her 1st cycle on 09/01/2021  Overall patient states she tolerated it well however did report significant fatigue and bright yellow mucousy diarrhea that started approximately 4 days after treatment  She was able to manage the diarrhea with Imodium  She is scheduled for her next cycle on 09/22/2021  Recent CT scan from 08/30/2021 will serve as her new baseline  There is marked progression of hepatic metastasis with extensive new lesions and enlargement of pre-existing lesions  Small amount of perihepatic ascites is unchanged and no evidence of metastatic disease in the chest   She does have diffuse colonic diverticulosis which corresponds to her intermittent pain in her lower abdomen  Overall patient is able to function without restriction  We will plan to see her in 3 weeks with repeat blood work  Patient and her  verbalized understanding and are in agreement with the plan  Goals and Barriers:    Current Goal:   Prolong Survival from Cancer  Barriers: None        Patient's Capacity to Self Care:  Patient is able to self care   -------------------------------------------------------------------------------------------------------    Chief Complaint   Patient presents with    Follow-up       History of present illness/Cancer History:   Oncology History   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)   2009 Initial Diagnosis    Adenocarcinoma of right breast,  Infiltrating ductal carcinoma T2, N1 sebastien) Tumor was ER/[place] positive, HER2 negative  2009 Surgery    Right Mastectomy     2009 -  Chemotherapy    Adjuvant chemotherapy with Taxotere/Cytoxan x 4 cycles     2009 - 10/2014 Chemotherapy    Arimidex 1 mg po dialy     2/11/2016 Progression    Right lateral mastectomy site growth with core needle biopsy demonstrated reoccurrence  %, [place] 70%, Her2 +1  Final Diagnosis   A  Breast, right lateral mastectomy site, core needle biopsies:                        - Recurrent invasive mammary carcinoma, no special type (formerly invasive ductal carcinoma), 0 8 cm largest single focus               - Combined Jose Elias score: 7/9                         - Tubule formation: None (3/3)  - Nuclear pleomorphism: Marked (3/3)  - Mitotic count: 3 mitoses per 10 high-power fields (1/3)  - No lymph-vascular invasion is identified              - No in situ component is identified                - No microcalcifications are identified  3/16/2016 - 1/24/2017 Chemotherapy    Faslodex 500 mg with loading dose followed by maintenance monthly injection with Ibrance 75mg 3 weeks on 1 week off       12/13/2016 Progression     Progression noted on PET-CT scan  Progression was largely found in the liver  Patient was set up for liver biopsy  1/3/2017 Biopsy    Liver lesion biopsy demonstated Estrogen Receptor (clone SP-1) 100%/positive, Progesterone Receptor (clone 1E2) 1-2%/positive, HER2 by IHC ([id number]) 2+/equivocal   Her2 by FISH was positive  1/25/2017 - 4/19/2017 Chemotherapy     Herceptin and Perjeta x5 cycles     4/20/2017 Adverse Reaction    Perjecta causing significant diarrhea  Medication stopped  7/13/2017 Surgery    Partial Hepatectomy, results demonstrated ER70%, PR0% & Her2 negative disease        9/19/2017 -  Chemotherapy    Tamoxifen 20 mg daily     2/27/2019 - 4/10/2019 Radiation      Plan ID Energy Fractions Dose per Fraction (cGy) Dose Correction (cGy) Total Dose Delivered (cGy) Elapsed Days   R Axilla # 10X/6X 25 / 25 200 0 5,000 35   R Axilla CD 10X/6X 5 / 5 200 0 1,000 6          2020 -  Hormone Therapy    Aromasin 25 mg daily     2020 -  Chemotherapy    Afinitor 2 5 mg daily     2021 -  Chemotherapy    DOCEtaxel (TAXOTERE) chemo infusion, 40 mg/m2 = 62 8 mg (66 7 % of original dose 60 mg/m2), Intravenous, Once, 1 of 6 cycles  Dose modification: 40 mg/m2 (original dose 60 mg/m2, Cycle 1, Reason: Dose modified as per discussion with consulting physician)  Administration: 62 8 mg (2021)          Cancer Staging  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 [facility])  Staging form: Breast, AJCC 8th Edition  - Pathologic stage from 2016: Stage IV (rpTX, pNX, pM1, G2, ER: Positive, [place]: Unknown, HER2: Negative) - Signed by Myla Hoang PA-C on 2018       ECO - Symptomatic but completely ambulatory    Interval history:   Clinically stable     Review of Systems   Constitutional: Positive for appetite change (Decreased) and fatigue  Negative for activity change, fever and unexpected weight change  Respiratory: Negative for cough and shortness of breath  Cardiovascular: Negative for chest pain and leg swelling  Gastrointestinal: Positive for diarrhea ( bright yellow mucus) and nausea  Negative for abdominal pain and constipation  Endocrine: Negative for cold intolerance and heat intolerance  Musculoskeletal: Negative for arthralgias and myalgias  Skin: Negative  Neurological: Negative for dizziness, weakness and headaches  Hematological: Negative for adenopathy  Does not bruise/bleed easily         Current Outpatient Medications:     acetaminophen (TYLENOL) 500 mg tablet, Take 500 mg by mouth every 6 (six) hours as needed for mild pain , Disp: , Rfl:     acetaminophen (TYLENOL) 500 mg tablet, Take 2 tablets (1,000 mg total) by mouth every 6 (six) hours as needed for mild pain, Disp: 60 tablet, Rfl: 0   atorvastatin (LIPITOR) 20 mg tablet, Take 20 mg by mouth daily  , Disp: , Rfl:     Calcium Citrate-Vitamin D (CALCIUM CITRATE + D PO), Take 1,500 mg by mouth daily, Disp: , Rfl:     clindamycin (CLEOCIN) 300 MG capsule, TAKE 2 CAPSULES BY MOUTH 1 HOUR PRIOR TO DENTAL PROCEDURE , Disp: , Rfl: 0    Cranberry (THERACRAN HP PO), Take 2 tablets by mouth daily, Disp: , Rfl:     glipiZIDE (GLUCOTROL) 5 mg tablet, Take 1 tablet (5 mg total) by mouth daily Take 1/2 tab morning and 1 tab at night, Disp: 45 tablet, Rfl: 1    Glucosamine-Chondroit-Vit C-Mn (GLUCOSAMINE 1500 COMPLEX) CAPS, Take 1 capsule by mouth daily  , Disp: , Rfl:     metFORMIN (GLUCOPHAGE) 500 mg tablet, 1 pill 3 times daily with meals, Disp: , Rfl:     metoprolol tartrate (LOPRESSOR) 50 mg tablet, Take 1 tablet (50 mg total) by mouth every 12 (twelve) hours, Disp: 60 tablet, Rfl: 3    Multiple Vitamin (MULTIVITAMIN) capsule, Take 1 capsule by mouth daily  , Disp: , Rfl:     ondansetron (ZOFRAN) 8 mg tablet, Take 1 tablet (8 mg total) by mouth every 8 (eight) hours as needed for nausea or vomiting, Disp: 20 tablet, Rfl: 3    torsemide (DEMADEX) 20 mg tablet, Take 1 tablet (20 mg total) by mouth as needed (rapid weight gain of 3 lbs overnight or 5 lbs in 5-7 days), Disp: 90 tablet, Rfl: 3    clotrimazole-betamethasone (LOTRISONE) 1-0 05 % cream, Apply 1 application topically as needed  (Patient not taking: Reported on 9/7/2021), Disp: , Rfl:     Diclofenac Sodium (VOLTAREN) 1 %, Apply 2 g topically 4 (four) times a day (Patient not taking: Reported on 6/22/2021), Disp: 350 g, Rfl: 0    everolimus (AFINITOR) 2 5 MG tablet, Take 1 tablet (2 5 mg total) by mouth daily (Patient not taking: Reported on 9/1/2021), Disp: 30 tablet, Rfl: 6    exemestane (AROMASIN) 25 MG tablet, Take 1 tablet (25 mg total) by mouth daily (Patient not taking: Reported on 9/1/2021), Disp: 90 tablet, Rfl: 3    lidocaine (LMX) 4 % cream, Apply topically as needed for mild pain (Patient not taking: Reported on 9/1/2021), Disp: 30 g, Rfl: 0    methocarbamol (ROBAXIN) 500 mg tablet, Take 2 tablets (1,000 mg total) by mouth 2 (two) times a day for 7 days (Patient not taking: Reported on 9/9/2021), Disp: 28 tablet, Rfl: 0    oxyCODONE (ROXICODONE) 5 mg immediate release tablet, Take 1 tablet (5 mg total) by mouth every 4 (four) hours as needed for moderate painMax Daily Amount: 30 mg (Patient not taking: Reported on 9/9/2021), Disp: 30 tablet, Rfl: 0    Allergies   Allergen Reactions    Ampicillin Shortness Of Breath, Anaphylaxis and Other (See Comments)     Other reaction(s): Unknown Allergic Reaction  Reaction Date: [id number]; Advance Directive and Living Will:        Objective:   /64 (BP Location: Left arm, Patient Position: Sitting, Cuff Size: Standard)   Pulse 86   Temp (!) 97 2 °F (36 2 °C) (Temporal)   Resp 16   Ht 5' (1 524 m)   Wt 57 6 kg (127 lb)   LMP  (LMP Unknown) Comment: post   SpO2 96%   BMI 24 80 kg/m²   Wt Readings from Last 6 Encounters:   09/09/21 57 6 kg (127 lb)   09/07/21 57 4 kg (126 lb 8 oz)   09/01/21 57 kg (125 lb 10 6 oz)   08/16/21 60 1 kg (132 lb 8 oz)   06/22/21 59 4 kg (131 lb)   06/03/21 60 kg (132 lb 3 2 oz)       Physical Exam  Vitals reviewed  Constitutional:       Appearance: Normal appearance  She is well-developed  HENT:      Head: Normocephalic and atraumatic  Eyes:      Conjunctiva/sclera: Conjunctivae normal       Pupils: Pupils are equal, round, and reactive to light  Cardiovascular:      Rate and Rhythm: Normal rate and regular rhythm  Pulses: Normal pulses  Heart sounds: Normal heart sounds  No murmur heard  Pulmonary:      Effort: Pulmonary effort is normal  No respiratory distress  Breath sounds: Normal breath sounds  Abdominal:      General: Bowel sounds are normal       Palpations: Abdomen is soft  Musculoskeletal:         General: Normal range of motion        Cervical back: Normal range of motion and neck supple  Lymphadenopathy:      Cervical: No cervical adenopathy  Skin:     General: Skin is warm and dry  Capillary Refill: Capillary refill takes less than 2 seconds  Neurological:      Mental Status: She is alert and oriented to person, place, and time  Psychiatric:         Mood and Affect: Affect is tearful  Behavior: Behavior normal          Pertinent Laboratory Results and Imaging Review:      No visits with results within 1 Week(s) from this visit     Latest known visit with results is:   Appointment on 08/25/2021   Component Date Value Ref Range Status    WBC 08/25/2021 7 56  4 31 - 10 16 Thousand/uL Final    RBC 08/25/2021 4 69  3 81 - 5 12 Million/uL Final    Hemoglobin 08/25/2021 12 5  11 5 - 15 4 g/dL Final    Hematocrit 08/25/2021 40 7  34 8 - 46 1 % Final    MCV 08/25/2021 87  82 - 98 fL Final    MCH 08/25/2021 26 7* 26 8 - 34 3 pg Final    MCHC 08/25/2021 30 7* 31 4 - 37 4 g/dL Final    RDW 08/25/2021 13 1  11 6 - 15 1 % Final    MPV 08/25/2021 10 0  8 9 - 12 7 fL Final    Platelets 14/96/5979 197  149 - 390 Thousands/uL Final    nRBC 08/25/2021 0  /100 WBCs Final    Neutrophils Relative 08/25/2021 54  43 - 75 % Final    Immat GRANS % 08/25/2021 0  0 - 2 % Final    Lymphocytes Relative 08/25/2021 29  14 - 44 % Final    Monocytes Relative 08/25/2021 12  4 - 12 % Final    Eosinophils Relative 08/25/2021 4  0 - 6 % Final    Basophils Relative 08/25/2021 1  0 - 1 % Final    Neutrophils Absolute 08/25/2021 4 12  1 85 - 7 62 Thousands/µL Final    Immature Grans Absolute 08/25/2021 0 02  0 00 - 0 20 Thousand/uL Final    Lymphocytes Absolute 08/25/2021 2 22  0 60 - 4 47 Thousands/µL Final    Monocytes Absolute 08/25/2021 0 87  0 17 - 1 22 Thousand/µL Final    Eosinophils Absolute 08/25/2021 0 27  0 00 - 0 61 Thousand/µL Final    Basophils Absolute 08/25/2021 0 06  0 00 - 0 10 Thousands/µL Final    Sodium 08/25/2021 140  136 - 145 mmol/L Final  Potassium 08/25/2021 4 4  3 5 - 5 3 mmol/L Final    Chloride 08/25/2021 103  100 - 108 mmol/L Final    CO2 08/25/2021 31  21 - 32 mmol/L Final    ANION GAP 08/25/2021 6  4 - 13 mmol/L Final    BUN 08/25/2021 15  5 - 25 mg/dL Final    Creatinine 08/25/2021 0 87  0 60 - 1 30 mg/dL Final    Standardized to IDMS reference method    Glucose, Fasting 08/25/2021 176* 65 - 99 mg/dL Final    Specimen collection should occur prior to Sulfasalazine administration due to the potential for falsely depressed results  Specimen collection should occur prior to Sulfapyridine administration due to the potential for falsely elevated results   Calcium 08/25/2021 10 0  8 3 - 10 1 mg/dL Final    AST 08/25/2021 40  5 - 45 U/L Final    Specimen collection should occur prior to Sulfasalazine administration due to the potential for falsely depressed results   ALT 08/25/2021 38  12 - 78 U/L Final    Specimen collection should occur prior to Sulfasalazine and/or Sulfapyridine administration due to the potential for falsely depressed results   Alkaline Phosphatase 08/25/2021 150* 46 - 116 U/L Final    Total Protein 08/25/2021 7 8  6 4 - 8 2 g/dL Final    Albumin 08/25/2021 3 5  3 5 - 5 0 g/dL Final    Total Bilirubin 08/25/2021 0 75  0 20 - 1 00 mg/dL Final    Use of this assay is not recommended for patients undergoing treatment with eltrombopag due to the potential for falsely elevated results   eGFR 08/25/2021 62  ml/min/1 73sq m Final     CT chest abdomen pelvis w contrast    Result Date: 9/6/2021  Narrative CT CHEST, ABDOMEN AND PELVIS WITH IV CONTRAST INDICATION:  C50 911: Malignant neoplasm of unspecified site of right female breast  C78 7: Secondary malignant neoplasm of liver and intrahepatic bile duct  COMPARISON:  CT chest, abdomen and pelvis 4/23/2021, 11/18/2020 and 5/2/2019 TECHNIQUE: CT examination of the chest, abdomen and pelvis was performed   Axial, sagittal, and coronal 2D reformatted images were created from the source data and submitted for interpretation  Radiation dose length product (DLP) for this visit:  597 mGy-cm  This examination, like all CT scans performed in the [facility], was performed utilizing techniques to minimize radiation dose exposure, including the use of iterative reconstruction and automated exposure control  IV Contrast:  100 mL of iohexol (OMNIPAQUE) Enteric Contrast: Enteric contrast was administered  FINDINGS: CHEST LUNGS:  4 mm right upper lobe nodule series 3/22, grossly stable dating back to at least May 2019, likely benign  No new nodules  Mild compressive atelectatic changes and/or scarring in the right middle and lower lobes from elevated right diaphragm  Right apical and lateral upper lobe scarring  Tiny focus of atelectasis or scarring in the inferior lingula  No central endobronchial lesions  PLEURA:  No pleural effusions  Elevation of the right diaphragm as before  Right base pleural calcification  HEART/GREAT VESSELS:  The heart is normal size  Atherosclerotic changes thoracic aorta and coronary arteries  MEDIASTINUM AND NGOZI:  Unremarkable  CHEST WALL AND LOWER NECK:   Left chest wall AICD device  ABDOMEN LIVER/BILIARY TREE:  There has been marked progression of hepatic metastatic disease with extensive new lesions throughout both lobes  Dominant, central and partially necrotic liver mass near the dome is estimated at 7 4 x 7 6 x 6 0 cm, previously about 6 7 x 6 2 x 5 0 cm  There is also probable increase in viable tumor along the peripheral margin of the lesion  Interval enlargement of a previous anterior liver lesion measuring 3 4 x 5 1 x 4 8 cm, previously 3 6 x 3 9 x 4 0 cm  Stable postsurgical change from partial right hepatectomy  Decreasing pneumobilia  GALLBLADDER:  Increased attenuation gallstone  No pericholecystic inflammatory change  SPLEEN:  Stable subcentimeter splenic lesion, too small to characterize    Numerous calcified granulomas  PANCREAS:  Mild fatty replacement of the pancreas without acute findings  ADRENAL GLANDS:  Unremarkable  KIDNEYS/URETERS:  No hydronephrosis or urinary tract calculus  One or more sharply circumscribed subcentimeter renal hypodensities are present, too small to accurately characterize, and statistically most likely benign findings  According to recent literature (Radiology 2019) no further workup of these findings is recommended  STOMACH AND BOWEL:  The stomach is poorly distended, evaluation limited  Small bowel is normal caliber  Diffuse colonic diverticulosis without evidence of diverticulitis  APPENDIX:  No findings to suggest appendicitis  ABDOMINOPELVIC CAVITY:  Small amount of perihepatic ascites again noted  No pneumoperitoneum  No pathologic lymphadenopathy  VESSELS: Atherosclerotic changes abdominal aorta without evidence of aneurysm  PELVIS REPRODUCTIVE ORGANS:  Status post hysterectomy  URINARY BLADDER:  Small amount of air noted within the urinary bladder which was also evident on previous studies dating to at least May 2019 in retrospect  The bladder is otherwise unremarkable  ABDOMINAL WALL/INGUINAL REGIONS:  Unremarkable  OSSEOUS STRUCTURES:  No acute fracture  Moderately advanced degenerative changes of the spine with osteoarthritis of bilateral hips and left shoulder also noted  Right shoulder prosthesis  Enthesopathy noted along the bilateral iliac crests  Grade 1 spondylolisthesis L3 on L4 and L4-L5 secondary to degenerative facet arthropathy  1 5 cm sclerotic focus left pubic bone, unchanged  Increased sclerosis seen about the right lateral 9th rib fracture, likely interval healing  Fractures of the right lateral 3rd and 4th ribs also seen with interval healing  Impression 1  Marked progression of hepatic metastatic disease with extensive new lesions and enlargement of pre-existing lesions  2  Small amount of perihepatic ascites, unchanged   3  No evidence of metastatic disease in the chest   Stable, likely benign 4 mm right upper lobe nodule  4  Air in the urinary bladder, a chronic finding  5  Diffuse colonic diverticulosis  Additional incidental findings as above  The study was marked in EPIC for significant notification  Workstation performed: [id number]     The following historical data was reviewed  Past Medical History:   Diagnosis Date    Acute biliary pancreatitis     Anxiety     Arthritis     Breast CA (HCC)     recurrent, ductal carcinoma ER, [place] pos    Cardiac disease     Colon polyp     Depression     Diverticula of intestine     Diverticulosis     Dizziness     and passes out    Flushing     Hiatal hernia     [place]      [place]    Hypercholesteremia     Hypertension     Liver mass     Liver metastasis (HCC)     Metastatic adenocarcinoma to liver (HCC)     Bx 01/03/2017    PONV (postoperative nausea and vomiting)     Pulmonary embolism (HCC)     Recurrent breast cancer (HCC)     Shingles     Shortness of breath     on exertion    Tachycardia     Urinary incontinence     Use of cane as ambulatory aid     or walker       Past Surgical History:   Procedure Laterality Date    BREAST SURGERY      CARDIAC PACEMAKER REMOVAL N/A 11/9/2017    Procedure: PACEMAKER LEAD REVISION, REMOVAL OF NONFUNCTIONING LEAD, AND INSERTION OF A NEW RV LEAD;  Surgeon: Milo Reveles MD;  [facility];  Service: Cardiology    CATARACT EXTRACTION Bilateral     COLONOSCOPY      ERCP N/A 1/8/2018    Procedure: ENDOSCOPIC RETROGRADE CHOLANGIOPANCREATOGRAPHY (ERCP); Surgeon: Kelly iY MD;  Location: [facility];   Service: Gastroenterology    HYSTERECTOMY      INSERT / REPLACE / REMOVE PACEMAKER      JOINT REPLACEMENT      [place]  TKR and right TSR    MASTECTOMY Right     [place] ERCP DX COLLECTION SPECIMEN BRUSHING/WASHING N/A 2/22/2018    Procedure: ENDOSCOPIC RETROGRADE CHOLANGIOPANCREATOGRAPHY (ERCP) with stent removal;  Surgeon: Kelly Yi MD; Location: [facility]; Service: Gastroenterology    [place] RESEC [address] N/A 7/13/2017    Procedure: LIVER RESECTION, INTRAOPERATIVE ULTRASOUND;  Surgeon: Terrence Martinez MD;  [facility];  Service: Surgical Oncology    ROTATOR CUFF REPAIR Right     SENTINEL LYMPH NODE BIOPSY Right     TONSILECTOMY AND ADNOIDECTOMY      WOUND DEBRIDEMENT Left 11/9/2017    Procedure: DEBRIDEMENT WOUND AND DRESSING CHANGE Filippo PARKER); Surgeon: Julio Curtis MD;  [facility];  Service: Cardiology       Social History     Socioeconomic History    Marital status: /Civil Union     Spouse name: None    Number of children: None    Years of education: None    Highest education level: None   Occupational History    None   Tobacco Use    Smoking status: Never Smoker    Smokeless tobacco: Never Used   Vaping Use    Vaping Use: Never assessed   Substance and Sexual Activity    Alcohol use: Not Currently    Drug use: No    Sexual activity: None   Other Topics Concern    None   Social History Narrative    None     Social Determinants of Health     Financial Resource Strain:     Difficulty of Paying Living Expenses:    Food Insecurity:     Worried About Running Out of Food in the Last Year:     Ran Out of Food in the Last Year:    Transportation Needs:     Lack of Transportation (Medical):      Lack of Transportation (Non-Medical):    Physical Activity:     Days of Exercise per Week:     Minutes of Exercise per Session:    Stress:     Feeling of Stress :    Social Connections:     Frequency of Communication with Friends and Family:     Frequency of Social Gatherings with Friends and Family:     Attends [employer] Services:     Active Member of Clubs or Organizations:     Attends Club or Organization Meetings:     Marital Status:    Intimate Partner Violence:     Fear of Current or Ex-Partner:     Emotionally Abused:     Physically Abused:     Sexually Abused:        Family History Problem Relation Age of Onset    Lung cancer Father     Cancer Brother     Diabetes type II Son        Please note: This report has been generated by a voice recognition software system  Therefore there may be syntax, spelling, and/or grammatical errors  Please call if you have any questions

## 2021-09-14 NOTE — PROGRESS NOTES
Results for orders placed or performed in visit on 09/14/21   Cardiac EP device report    Narrative    MDT BI-V ICD (3830 IN LV PORT/DDD MODE)  DEVICE INTERROGATED IN THE [place] OFFICE/YEARLY-OFFICE VISIT TO FOLLOW WITH DR Rosa Travis  BATTERY ADEQUATE (5 3 YRS)  AP 0%;  100% (SSS/DDD 60)  ALL LEAD PARAMETERS WITHIN NORMAL LIMITS  3 NEW VT EPISODES (UP  BPM & 9 BEATS/AVAILABLE FOR REVIEW)  PT TAKES METOPROLOL TART  OPTI-VOL WITHIN NORMAL LIMITS  EF 70% (ECHO/AUGUST, 2019)  SENT TO DR Rosa Travis FOR REVIEW  NO PROGRAMMING CHANGES MADE TO DEVICE PARAMETERS  NORMAL DEVICE FUNCTION   PL

## 2021-09-14 NOTE — PROGRESS NOTES
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   S/p upgrade from dual chamber ppm to BIV ICD (kept existing HIS lead for AV synchrony, not a CS lead)  No sustained VT since  She garcia shave NSVT  She is now off amiodarone  Remains on betablocker  No signficant CAD  No clear electrlyte derangement to explain VT, but she did have E  Coli sepsis at same time     2) Chronic diastolic chf, euvlemic on torsemide prn  She has CHB and depending on BIV pacing  3) Stage 4 Liver CA post resection and Breast Cancer getting chemo and radiation w Mets to liver  4) E  Coli sepsis unclear etiology-needs colonoscpy  5) Cough, recently, unclear etiology, she said improving  DISCUSSION AND PLAN:  1  Remote ICD checks-continue metoprolol  2  F/u in 12 months        Orders Placed This Encounter   Procedures    POCT ECG     There are no discontinued medications    HPI:  79 yo female  1) 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   S/p upgrade from dual chamber ppm to BIV ICD (kept existing HIS lead for AV synchrony, not a CS lead)  No sustained VT since  She garcia shave NSVT  She is now off amiodarone  Remains on betablocker  No signficant CAD  No clear electrlyte derangement to explain VT, but she did have E  Coli sepsis at same time     2) Chronic diastolic chf, euvlemic on torsemide prn  She has CHB and depending on BIV pacing  3) Stage 4 Liver CA post resection and Breast Cancer getting chemo and radiation w Mets to liver  4) E   Coli sepsis unclear etiology-needs colonoscpy  5) Cough, recently, unclear etiology, she said improving  Marisela Lara has lost a lot of weight  She is not currently SOB, no edema  No dizzyness  Undergoing chemotherapy  Please note HPI is listed by problem with with update following it, it is copied again in the assessment above and reflects medical decision making as well  Complete 12 point ROS reviewed and otherwise non pertinent or negative except as per HPI pertinent positives in Cardiovascular and Respiratory emphasized  Please see paper chart for outpatient clinic patients where the patient completed the 12 point ROS survey  Past Medical History:   Diagnosis Date    Anxiety     Arthritis     Breast CA ([address] )     recurrent, ductal carcinoma ER, [place] pos    Cardiac disease     Depression     Diverticula of intestine     Dizziness     and passes out    Flushing     Hiatal hernia     [place] (hard of hearing)      lizette    Hypercholesteremia     Liver mass     Liver metastasis (HCC)     Metastatic adenocarcinoma to liver (HCC)     Bx 01/03/2017    PONV (postoperative nausea and vomiting)     Recurrent breast cancer (HCC)     Shingles     Shortness of breath     on exertion    Tachycardia     Urinary incontinence     Use of cane as ambulatory aid     or walker       Allergies   Allergen Reactions    Ampicillin Shortness Of Breath, Anaphylaxis and Other (See Comments)     Other reaction(s): Unknown Allergic Reaction  Reaction Date: [id number]; I reviewed the Home Medication list and Allergies in the chart  Scheduled Meds:  Current Outpatient Medications   Medication Sig Dispense Refill    aspirin 81 MG tablet Take 81 mg by mouth daily      atorvastatin (LIPITOR) 20 mg tablet Take 20 mg by mouth daily        Calcium Citrate-Vitamin D (CALCIUM CITRATE + D PO) Take 1,500 mg by mouth daily      clotrimazole-betamethasone (LOTRISONE) 1-0 05 % cream Apply 1 application topically as needed       [address] HP PO) Take 2 tablets by mouth daily      furosemide (LASIX) 20 mg tablet Take 1 tablet (20 mg total) by mouth daily as needed (le swelling) Edema 30 tablet 0    Glucosamine-Chondroit-Vit C-Mn (GLUCOSAMINE 1500 COMPLEX) CAPS Take 1 capsule by mouth daily        metFORMIN (GLUCOPHAGE) 500 mg tablet 2 (two) times a day      metoprolol tartrate (LOPRESSOR) 25 mg tablet Take 0 5 tablets (12 5 mg total) by mouth every 12 (twelve) hours 30 tablet 6    Multiple Vitamin (MULTIVITAMIN) capsule Take 1 capsule by mouth daily   ondansetron (ZOFRAN) 4 mg tablet Take 4 mg by mouth every 8 (eight) hours as needed      tamoxifen (NOLVADEX) 20 mg tablet Take 1 tablet (20 mg total) by mouth daily 30 tablet 3    clindamycin (CLEOCIN) 300 MG capsule TAKE 2 CAPSULES BY MOUTH 1 HOUR PRIOR TO DENTAL PROCEDURE   0     No current facility-administered medications for this visit        PRN Meds:         Family History   Problem Relation Age of Onset    Lung cancer Father     Cancer Brother     Diabetes type II Son        Social History     Socioeconomic History    Marital status: /Civil Union     Spouse name: Not on file    Number of children: Not on file    Years of education: Not on file    Highest education level: Not on file   Occupational History    Not on file   Social Needs    Financial resource strain: Not on file    Food insecurity:     Worry: Not on file     Inability: Not on file    Transportation needs:     Medical: Not on file     Non-medical: Not on file   Tobacco Use    Smoking status: Never Smoker    Smokeless tobacco: Never Used   Substance and Sexual Activity    Alcohol use: Not Currently    Drug use: No    Sexual activity: Not on file   Lifestyle    Physical activity:     Days per week: Not on file     Minutes per session: Not on file    Stress: Not on file   Relationships    Social connections:     Talks on phone: Not on file     Gets together: Not on file     Attends Baptism service: Not on file     Active member of club or organization: Not on file     Attends meetings of clubs or organizations: Not on file     Relationship status: Not on file    Intimate partner violence:     Fear of current or ex partner: Not on file     Emotionally abused: Not on file     Physically abused: Not on file     Forced sexual activity: Not on file   Other Topics Concern    Not on file   Social History Narrative    Not on file         OBJECTIVE:    /70   Pulse 84   Ht 5' 1" (1 549 m)   Wt 72 1 kg (159 lb)   LMP  (LMP Unknown) Comment: post   BMI 30 04 kg/m²   Vitals:    09/23/19 1116   Weight: 72 1 kg (159 lb)     /64 (BP Location: Left arm, Patient Position: Sitting, Cuff Size: Standard)   Pulse 87 Comment: AS/BVP  Ht 5' (1 524 m)   Wt 60 3 kg (133 lb)   LMP  (LMP Unknown) Comment: post   BMI 25 97 kg/m²   Vitals:    09/14/21 1155   Weight: 60 3 kg (133 lb)     GEN: No acute distress, Alert and oriented, well appearing  HEENT:External ears normal, wearing a mask  EYES: Pupils equal, sclera anicteric, midline, normal conjuctiva  NECK: No JVD, supple, no obvious masses or thryomegaly or goiter  CARDIOVASCULAR:  RRR, No murmur, rub, gallops S1,S2  LUNGS: Clear To auscultation bilaterally, normal effort, no rales, rhonchi, crackles   ABDOMEN:  nondistended,  without obvious organomegaly or ascites  EXTREMITIES/VASCULAR:  No edema  warm an well perfused  PSYCH: Normal Affect,  linear speech pattern without evidence of psychosis  NEURO: Grossly intact, moving all extremiteis equal, face symmetric, alert and responsive, no obvious focal defecits   GAIT:  Ambulates normally without difficulty  HEME: No bleeding, bruising, petechia, purpura   SKIN: No significant rashes on visibile skin, warm, no diaphoresis or pallor       Lab Results:     @RESULTRCNT(1M])@    CBC with diff:     CMP:      Invalid input(s): ALBUMIN  TSH:       Lipid Profile:          Cardiac testing:   Results for orders placed during the hospital encounter of 17   Echo complete with contrast if indicated    Narrative [address]  [facility] [place], [address] [facility]  [phone]    Transthoracic Echocardiogram  2D, M-mode, Doppler, and Color Doppler    Study date:  2017    Patient: Jonathan Ambrocio  MR number: [id number]  Account number: [de-identified]  : 1940  Age: 76 years  Gender: Female  Status: Outpatient  Location: [address] [place] [address] [facility] [place]  Height: 61 in  Weight: 161 7 lb  BP: 128/ 64 mmHg    Indications: Assess left ventricular function  Diagnoses: E78 5 - Hyperlipidemia, unspecified    Sonographer:  ELIANE Peoples  Primary Physician:  Beau Scott MD  Referring Physician:  Rand Molina DO  Group:  [facility]  Cardiology Fellow:  Ermelinda Hurst MD  Interpreting Physician:  Flaquito Carrillo MD    SUMMARY    LEFT VENTRICLE:  Systolic function was at the lower limits of normal  Ejection fraction was estimated to be 50 %  Although no diagnostic regional wall motion abnormality was identified, this possibility cannot be completely excluded on the basis of this study  Left ventricular diastolic function parameters were normal     VENTRICULAR SEPTUM:  There was dyssynergic motion  These changes are consistent with LBBB  RIGHT VENTRICLE:  The size was normal   Systolic function was normal     TRICUSPID VALVE:  There was mild regurgitation  Pulmonary artery systolic pressure was within the normal range  HISTORY: PRIOR HISTORY: Breast cancer; LBBB; Hyperlipidemia    PROCEDURE: The study was performed in the [address]  This was a routine study  The transthoracic approach was used  The study included complete 2D imaging, M-mode, complete spectral Doppler, and color Doppler  The  heart rate was 90 bpm, at the start of the study   Images were obtained from the parasternal, apical, subcostal, and suprasternal notch acoustic windows  Echocardiographic views were limited due to lung interference  Image quality was  adequate  LEFT VENTRICLE: Size was normal  Systolic function was at the lower limits of normal  Ejection fraction was estimated to be 50 %  Although no diagnostic regional wall motion abnormality was identified, this possibility cannot be completely  excluded on the basis of this study  Wall thickness was normal  DOPPLER: Left ventricular diastolic function parameters were normal     VENTRICULAR SEPTUM: There was dyssynergic motion  These changes are consistent with LBBB  RIGHT VENTRICLE: The size was normal  Systolic function was normal  Wall thickness was normal     LEFT ATRIUM: Size was normal     RIGHT ATRIUM: Size was normal     MITRAL VALVE: Valve structure was normal  There was normal leaflet separation  DOPPLER: The transmitral velocity was within the normal range  There was no evidence for stenosis  There was trace regurgitation  AORTIC VALVE: The valve was trileaflet  Leaflets exhibited normal thickness and normal cuspal separation  DOPPLER: Transaortic velocity was within the normal range  There was no evidence for stenosis  There was no regurgitation  TRICUSPID VALVE: The valve structure was normal  There was normal leaflet separation  DOPPLER: The transtricuspid velocity was within the normal range  There was no evidence for stenosis  There was mild regurgitation  Pulmonary artery  systolic pressure was within the normal range  Estimated peak PA pressure was 22 mmHg  PULMONIC VALVE: Leaflets exhibited normal thickness, no calcification, and normal cuspal separation  DOPPLER: The transpulmonic velocity was within the normal range  There was trace regurgitation  PERICARDIUM: There was no pericardial effusion  AORTA: The root exhibited normal size  SYSTEMIC VEINS: IVC: The inferior vena cava was normal in size and course   Respirophasic changes were normal     SYSTEM MEASUREMENT TABLES    2D  %FS: 20 78 %  Ao Diam: 3 49 cm  EDV(Teich): 45 21 ml  EF Biplane: 52 52 %  EF(Cube): 50 28 %  EF(Teich): 43 45 %  ESV(Cube): 18 41 ml  ESV(Teich): 25 57 ml  IVSd: 1 04 cm  LA Diam: 2 52 cm  LVEDV MOD A2C: 104 64 ml  LVEDV MOD A4C: 107 05 ml  LVEDV MOD BP: 108 41 ml  LVEF MOD A2C: 58 35 %  LVEF MOD A4C: 46 17 %  LVESV MOD A2C: 43 58 ml  LVESV MOD A4C: 57 62 ml  LVESV MOD BP: 51 48 ml  LVIDd: 3 33 cm  LVIDs: 2 64 cm  LVLd A2C: 7 99 cm  LVLd A4C: 7 59 cm  LVLs A2C: 6 22 cm  LVLs A4C: 6 59 cm  LVPWd: 1 13 cm  SI(Cube): 10 76 ml/m2  SI(Teich): 11 35 ml/m2  SV MOD A2C: 61 05 ml  SV MOD A4C: 49 42 ml  SV(Cube): 18 61 ml  SV(Teich): 19 64 ml    CW  AV Vmax: 1 07 m/s  AV maxP 62 mmHg  TR Vmax: 2 08 m/s  TR maxP 3 mmHg    MM  TAPSE: 1 66 cm    PW  E': 0 11 m/s  E/E': 9 62  LVOT Vmax: 0 74 m/s  LVOT maxP 2 mmHg  MV A Yadiel: 0 27 m/s  MV Dec [place]: 7 18 m/s2  MV DecT: 145 34 ms  MV E Yadiel: 1 04 m/s  MV E/A Ratio: 3 88    [facility] Accredited Echocardiography Laboratory    Prepared and electronically signed by    Ama Obrien MD  Signed 2017 16:28:13       No results found for this or any previous visit  No results found for this or any previous visit  No results found for this or any previous visit          I reviewed and interpreted the following LABS/EKG/TELE/IMAGING and below is summary of my interpretation (if data available):    Several NSVT  on ICD interogation  She paces 100% for CHB

## 2021-09-22 NOTE — PROGRESS NOTES
Patient tolerated Taxotere infusion well  Dr Yessi Law office if aware of potassium 5 8 and no new orders at this time  Pt is aware of all future appointments  AVS provided

## 2021-09-30 NOTE — PROGRESS NOTES
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 ([address] )  2  Thrombocytopenia, unspecified ([address] )  3  Metastasis to liver [facility])   Patient is an 40-year-old female with a history of stage IV breast carcinoma with metastasis to liver currently on Taxotere 40 mg per m2 every 3 weeks  She has completed her 2nd cycle on 09/22/2021 and is anticipated to have her 3rd cycle on 10/13/2021  Overall she tolerates this fairly well although does report increased fatigue and mild confusion 2-3 days after her treatment  She states this improved by the 3rd to 4th day  She manages her fatigue with rest   She also reports diarrhea that is managed with antidiarrheals  Occasionally she has a pain in her neck that extends to the back of her head that is managed with Tylenol  Otherwise she is doing well and able to function without restriction  I advised her to have her flu shot and COVID booster next week prior to her 3rd cycle of chemotherapy  We will plan to see her in 1 month with a repeat CT scan of the chest abdomen pelvis and Ca 27-29 level  Patient and her  verbalized understanding and are in agreement with the plan  - CBC and differential; Standing  - Comprehensive metabolic panel; Standing  - Cancer antigen 27 29; Future  - CBC and differential  - Comprehensive metabolic panel  - CT chest abdomen pelvis w contrast; Future    Goals and Barriers:    Current Goal:   Prolong Survival from Cancer  Barriers: None        Patient's Capacity to Self Care:  Patient is able to self care   -------------------------------------------------------------------------------------------------------    Chief Complaint   Patient presents with    Follow-up       History of present illness/Cancer History: Oncology History   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 [facility])   2009 Initial Diagnosis    Adenocarcinoma of right breast,  Infiltrating ductal carcinoma T2, N1 sebastien) Tumor was ER/KS positive, HER2 negative  2009 Surgery    Right Mastectomy     2009 -  Chemotherapy    Adjuvant chemotherapy with Taxotere/Cytoxan x 4 cycles     2009 - 10/2014 Chemotherapy    Arimidex 1 mg po dialy     2/11/2016 Progression    Right lateral mastectomy site growth with core needle biopsy demonstrated reoccurrence  %, KS 70%, Her2 +1  Final Diagnosis   A  Breast, right lateral mastectomy site, core needle biopsies:                        - Recurrent invasive mammary carcinoma, no special type (formerly invasive ductal carcinoma), 0 8 cm largest single focus               - Combined Jose Elias score: 7/9                         - Tubule formation: None (3/3)  - Nuclear pleomorphism: Marked (3/3)  - Mitotic count: 3 mitoses per 10 high-power fields (1/3)  - No lymph-vascular invasion is identified              - No in situ component is identified                - No microcalcifications are identified  3/16/2016 - 1/24/2017 Chemotherapy    Faslodex 500 mg with loading dose followed by maintenance monthly injection with Ibrance 75mg 3 weeks on 1 week off       12/13/2016 Progression     Progression noted on PET-CT scan  Progression was largely found in the liver  Patient was set up for liver biopsy  1/3/2017 Biopsy    Liver lesion biopsy demonstated Estrogen Receptor (clone SP-1) 100%/positive, Progesterone Receptor (clone 1E2) 1-2%/positive, HER2 by IHC ([id number]) 2+/equivocal   Her2 by FISH was positive  1/25/2017 - 4/19/2017 Chemotherapy     Herceptin and Perjeta x5 cycles     4/20/2017 Adverse Reaction    Perjecta causing significant diarrhea  Medication stopped       7/13/2017 Surgery    Partial Hepatectomy, results demonstrated ER70%, PR0% & Her2 negative disease  2017 -  Chemotherapy    Tamoxifen 20 mg daily     2019 - 4/10/2019 Radiation      Plan ID Energy Fractions Dose per Fraction (cGy) Dose Correction (cGy) Total Dose Delivered (cGy) Elapsed Days   R Axilla # 10X/6X 25 / 25 200 0 5,000 35   R Axilla CD 10X/6X 5 / 5 200 0 1,000 6          2020 -  Hormone Therapy    Aromasin 25 mg daily     2020 -  Chemotherapy    Afinitor 2 5 mg daily     2021 -  Chemotherapy    DOCEtaxel (TAXOTERE) chemo infusion, 40 mg/m2 = 62 8 mg (66 7 % of original dose 60 mg/m2), Intravenous, Once, 2 of 6 cycles  Dose modification: 40 mg/m2 (original dose 60 mg/m2, Cycle 1, Reason: Dose modified as per discussion with consulting physician)  Administration: 62 8 mg (2021), 62 8 mg (2021)          Cancer Staging  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 [facility])  Staging form: Breast, AJCC 8th Edition  - Pathologic stage from 2016: Stage IV (rpTX, pNX, pM1, G2, ER: Positive, WI: Unknown, HER2: Negative) - Signed by Luis Suarez PA-C on 2018       ECO - Symptomatic but completely ambulatory    Interval history:  Clinically stable     Review of Systems   Constitutional: Positive for fatigue  Negative for activity change, appetite change, fever and unexpected weight change  Respiratory: Negative for cough and shortness of breath  Cardiovascular: Negative for chest pain and leg swelling  Gastrointestinal: Positive for diarrhea  Negative for abdominal pain, constipation and nausea  Endocrine: Negative for cold intolerance and heat intolerance  Musculoskeletal: Positive for neck pain  Negative for arthralgias and myalgias  Skin: Negative  Neurological: Negative for dizziness, weakness and headaches  Hematological: Negative for adenopathy  Does not bruise/bleed easily           Current Outpatient Medications:     acetaminophen (TYLENOL) 500 mg tablet, Take 500 mg by mouth every 6 (six) hours as needed for mild pain , Disp: , Rfl:     acetaminophen (TYLENOL) 500 mg tablet, Take 2 tablets (1,000 mg total) by mouth every 6 (six) hours as needed for mild pain, Disp: 60 tablet, Rfl: 0    atorvastatin (LIPITOR) 20 mg tablet, Take 20 mg by mouth daily  , Disp: , Rfl:     Calcium Citrate-Vitamin D (CALCIUM CITRATE + D PO), Take 1,500 mg by mouth daily, Disp: , Rfl:     clindamycin (CLEOCIN) 300 MG capsule, TAKE 2 CAPSULES BY MOUTH 1 HOUR PRIOR TO DENTAL PROCEDURE , Disp: , Rfl: 0    glipiZIDE (GLUCOTROL) 5 mg tablet, Take 1 tablet (5 mg total) by mouth daily Take 1/2 tab morning and 1 tab at night, Disp: 45 tablet, Rfl: 1    Glucosamine-Chondroit-Vit C-Mn (GLUCOSAMINE 1500 COMPLEX) CAPS, Take 1 capsule by mouth daily  , Disp: , Rfl:     metFORMIN (GLUCOPHAGE) 500 mg tablet, 1 pill 3 times daily with meals, Disp: , Rfl:     metoprolol tartrate (LOPRESSOR) 50 mg tablet, Take 1 tablet (50 mg total) by mouth every 12 (twelve) hours, Disp: 60 tablet, Rfl: 3    Multiple Vitamin (MULTIVITAMIN) capsule, Take 1 capsule by mouth daily  , Disp: , Rfl:     ondansetron (ZOFRAN) 8 mg tablet, Take 1 tablet (8 mg total) by mouth every 8 (eight) hours as needed for nausea or vomiting, Disp: 20 tablet, Rfl: 3    oxyCODONE (ROXICODONE) 5 mg immediate release tablet, Take 1 tablet (5 mg total) by mouth every 4 (four) hours as needed for moderate painMax Daily Amount: 30 mg, Disp: 30 tablet, Rfl: 0    torsemide (DEMADEX) 20 mg tablet, Take 1 tablet (20 mg total) by mouth as needed (rapid weight gain of 3 lbs overnight or 5 lbs in 5-7 days), Disp: 90 tablet, Rfl: 3    clotrimazole-betamethasone (LOTRISONE) 1-0 05 % cream, Apply 1 application topically as needed  (Patient not taking: Reported on 9/30/2021), Disp: , Rfl:     Cranberry (THERACRAN HP PO), Take 2 tablets by mouth daily, Disp: , Rfl:     Allergies   Allergen Reactions    Ampicillin Shortness Of Breath, Anaphylaxis and Other (See Comments)     Other reaction(s): Unknown Allergic Reaction  Reaction Date: 07Mar2012; Advance Directive and Living Will:        Objective:   /62 (BP Location: Left arm, Patient Position: Sitting, Cuff Size: Standard)   Pulse 91   Temp (!) 97 4 °F (36 3 °C) (Temporal)   Resp 18   Ht 5' (1 524 m)   Wt 60 6 kg (133 lb 8 oz)   LMP  (LMP Unknown) Comment: post   SpO2 99%   BMI 26 07 kg/m²   Wt Readings from Last 6 Encounters:   09/30/21 60 6 kg (133 lb 8 oz)   09/22/21 61 4 kg (135 lb 5 8 oz)   09/14/21 60 3 kg (133 lb)   09/09/21 57 6 kg (127 lb)   09/07/21 57 4 kg (126 lb 8 oz)   09/01/21 57 kg (125 lb 10 6 oz)       Physical Exam  Vitals reviewed  Constitutional:       Appearance: Normal appearance  She is well-developed  HENT:      Head: Normocephalic and atraumatic  Eyes:      Conjunctiva/sclera: Conjunctivae normal       Pupils: Pupils are equal, round, and reactive to light  Cardiovascular:      Rate and Rhythm: Normal rate and regular rhythm  Pulses: Normal pulses  Heart sounds: Normal heart sounds  No murmur heard  Pulmonary:      Effort: Pulmonary effort is normal  No respiratory distress  Breath sounds: Normal breath sounds  Abdominal:      General: Bowel sounds are normal       Palpations: Abdomen is soft  Musculoskeletal:         General: Normal range of motion  Cervical back: Normal range of motion and neck supple  Lymphadenopathy:      Cervical: No cervical adenopathy  Skin:     General: Skin is warm and dry  Capillary Refill: Capillary refill takes less than 2 seconds  Neurological:      Mental Status: She is alert and oriented to person, place, and time  Psychiatric:         Behavior: Behavior normal          Pertinent Laboratory Results and Imaging Review:  No visits with results within 1 Week(s) from this visit     Latest known visit with results is:   Appointment on 09/20/2021   Component Date Value Ref Range Status    WBC 09/20/2021 9 33  4 31 - 10 16 Thousand/uL Final    RBC 09/20/2021 4 19  3 81 - 5 12 Million/uL Final    Hemoglobin 09/20/2021 11 7  11 5 - 15 4 g/dL Final    Hematocrit 09/20/2021 38 7  34 8 - 46 1 % Final    MCV 09/20/2021 92  82 - 98 fL Final    MCH 09/20/2021 27 9  26 8 - 34 3 pg Final    MCHC 09/20/2021 30 2* 31 4 - 37 4 g/dL Final    RDW 09/20/2021 17 7* 11 6 - 15 1 % Final    MPV 09/20/2021 9 7  8 9 - 12 7 fL Final    Platelets 84/[birth year]1941 251  149 - 390 Thousands/uL Final    nRBC 09/20/2021 0  /100 WBCs Final    Neutrophils Relative 09/20/2021 68  43 - 75 % Final    Immat GRANS % 09/20/2021 0  0 - 2 % Final    Lymphocytes Relative 09/20/2021 21  14 - 44 % Final    Monocytes Relative 09/20/2021 9  4 - 12 % Final    Eosinophils Relative 09/20/2021 1  0 - 6 % Final    Basophils Relative 09/20/2021 1  0 - 1 % Final    Neutrophils Absolute 09/20/2021 6 37  1 85 - 7 62 Thousands/µL Final    Immature Grans Absolute 09/20/2021 0 04  0 00 - 0 20 Thousand/uL Final    Lymphocytes Absolute 09/20/2021 1 97  0 60 - 4 47 Thousands/µL Final    Monocytes Absolute 09/20/2021 0 80  0 17 - 1 22 Thousand/µL Final    Eosinophils Absolute 09/20/2021 0 06  0 00 - 0 61 Thousand/µL Final    Basophils Absolute 09/20/2021 0 09  0 00 - 0 10 Thousands/µL Final    Sodium 09/20/2021 141  136 - 145 mmol/L Final    Potassium 09/20/2021 5 8* 3 5 - 5 3 mmol/L Final    Chloride 09/20/2021 109* 100 - 108 mmol/L Final    CO2 09/20/2021 28  21 - 32 mmol/L Final    ANION GAP 09/20/2021 4  4 - 13 mmol/L Final    BUN 09/20/2021 17  5 - 25 mg/dL Final    Creatinine 09/20/2021 0 76  0 60 - 1 30 mg/dL Final    Standardized to IDMS reference method    Glucose, Fasting 09/20/2021 115* 65 - 99 mg/dL Final    Specimen collection should occur prior to Sulfasalazine administration due to the potential for falsely depressed results  Specimen collection should occur prior to Sulfapyridine administration due to the potential for falsely elevated results      Calcium 09/20/2021 9 9  8 3 - 10 1 mg/dL Final    Corrected Calcium 09/20/2021 10 5* 8 3 - 10 1 mg/dL Final    AST 09/20/2021 34  5 - 45 U/L Final    Specimen collection should occur prior to Sulfasalazine administration due to the potential for falsely depressed results   ALT 09/20/2021 32  12 - 78 U/L Final    Specimen collection should occur prior to Sulfasalazine and/or Sulfapyridine administration due to the potential for falsely depressed results   Alkaline Phosphatase 09/20/2021 124* 46 - 116 U/L Final    Total Protein 09/20/2021 7 0  6 4 - 8 2 g/dL Final    Albumin 09/20/2021 3 3* 3 5 - 5 0 g/dL Final    Total Bilirubin 09/20/2021 0 68  0 20 - 1 00 mg/dL Final    Use of this assay is not recommended for patients undergoing treatment with eltrombopag due to the potential for falsely elevated results   eGFR 09/20/2021 73  ml/min/1 73sq m Final         The following historical data was reviewed      Past Medical History:   Diagnosis Date    Acute biliary pancreatitis     Anxiety     Arthritis     Breast CA (HCC)     recurrent, ductal carcinoma ER, [place] pos    Cardiac disease     Colon polyp     Depression     Diverticula of intestine     Diverticulosis     Dizziness     and passes out    Flushing     Hiatal hernia     [place]hard [place] hearing[place]      [place]    Hypercholesteremia     Hypertension     Liver mass     Liver metastasis (HCC)     Metastatic adenocarcinoma to liver (HCC)     Bx 01/03/2017    PONV (postoperative nausea and vomiting)     Pulmonary embolism (HCC)     Recurrent breast cancer (HCC)     Shingles     Shortness of breath     on exertion    Tachycardia     Urinary incontinence     Use of cane as ambulatory aid     or walker       Past Surgical History:   Procedure Laterality Date    BREAST SURGERY      CARDIAC PACEMAKER REMOVAL N/A 11/9/2017    Procedure: PACEMAKER LEAD REVISION, REMOVAL OF NONFUNCTIONING LEAD, AND INSERTION OF A NEW RV LEAD;  Surgeon: Jose Alberto Arredondo Carola Nair MD;  [facility];  Service: Cardiology    CATARACT EXTRACTION Bilateral     COLONOSCOPY      ERCP N/A 1/8/2018    Procedure: ENDOSCOPIC RETROGRADE CHOLANGIOPANCREATOGRAPHY (ERCP); Surgeon: Saray Gregory MD;  Location: [facility]; Service: Gastroenterology    HYSTERECTOMY      INSERT / REPLACE / Carletha Cristian      JOINT REPLACEMENT      [place]izette  TKR and right TSR    MASTECTOMY Right     FL ERCP DX COLLECTION SPECIMEN BRUSHING/WASHING N/A 2/22/2018    Procedure: ENDOSCOPIC RETROGRADE CHOLANGIOPANCREATOGRAPHY (ERCP) with stent removal;  Surgeon: Saray Gregory MD;  [facility]; Service: Gastroenterology    [place] RESEC [address] N/A 7/13/2017    Procedure: LIVER RESECTION, INTRAOPERATIVE ULTRASOUND;  Surgeon: Gabriela Clark MD;  [facility];  Service: Surgical Oncology    ROTATOR CUFF REPAIR Right     SENTINEL LYMPH NODE BIOPSY Right     TONSILECTOMY AND ADNOIDECTOMY      WOUND DEBRIDEMENT Left 11/9/2017    Procedure: DEBRIDEMENT WOUND AND DRESSING CHANGE [facility]); Surgeon: Ba Abraham MD;  [facility];  Service: Cardiology       Social History     Socioeconomic History    Marital status: /Civil Union     Spouse name: None    Number of children: None    Years of education: None    Highest education level: None   Occupational History    None   Tobacco Use    Smoking status: Never Smoker    Smokeless tobacco: Never Used   Vaping Use    Vaping Use: Never assessed   Substance and Sexual Activity    Alcohol use: Not Currently    Drug use: No    Sexual activity: None   Other Topics Concern    None   Social History Narrative    None     Social Determinants of Health     Financial Resource Strain:     Difficulty of Paying Living Expenses:    Food Insecurity:     Worried About Running Out of Food in the Last Year:     Ran Out of Food in the Last Year:    Transportation Needs:     Lack of Transportation (Medical):      Lack of Transportation (Non-Medical):    Physical Activity:     Days of Exercise per Week:     Minutes of Exercise per Session:    Stress:     Feeling of Stress :    Social Connections:     Frequency of Communication with Friends and Family:     Frequency of Social Gatherings with Friends and Family:     Attends Orthodoxy Services:     Active Member of Clubs or Organizations:     Attends Club or Organization Meetings:     Marital Status:    Intimate Partner Violence:     Fear of Current or Ex-Partner:     Emotionally Abused:     Physically Abused:     Sexually Abused:        Family History   Problem Relation Age of Onset    Lung cancer Father     Cancer Brother     Diabetes type II Son        Please note: This report has been generated by a voice recognition software system  Therefore there may be syntax, spelling, and/or grammatical errors  Please call if you have any questions

## 2022-01-01 ENCOUNTER — APPOINTMENT (EMERGENCY)
Dept: RADIOLOGY | Facility: HOSPITAL | Age: 82
End: 2022-01-01
Payer: COMMERCIAL

## 2022-01-01 ENCOUNTER — OFFICE VISIT (OUTPATIENT)
Dept: DIABETES SERVICES | Facility: CLINIC | Age: 82
End: 2022-01-01
Payer: COMMERCIAL

## 2022-01-01 ENCOUNTER — APPOINTMENT (OUTPATIENT)
Dept: LAB | Facility: MEDICAL CENTER | Age: 82
End: 2022-01-01
Payer: COMMERCIAL

## 2022-01-01 ENCOUNTER — APPOINTMENT (EMERGENCY)
Dept: CT IMAGING | Facility: HOSPITAL | Age: 82
End: 2022-01-01
Payer: COMMERCIAL

## 2022-01-01 ENCOUNTER — TELEPHONE (OUTPATIENT)
Dept: PALLIATIVE MEDICINE | Facility: HOSPITAL | Age: 82
End: 2022-01-01

## 2022-01-01 ENCOUNTER — DOCUMENTATION (OUTPATIENT)
Dept: HEMATOLOGY ONCOLOGY | Facility: CLINIC | Age: 82
End: 2022-01-01

## 2022-01-01 ENCOUNTER — OFFICE VISIT (OUTPATIENT)
Dept: CARDIOLOGY CLINIC | Facility: CLINIC | Age: 82
End: 2022-01-01
Payer: COMMERCIAL

## 2022-01-01 ENCOUNTER — HOSPITAL ENCOUNTER (OUTPATIENT)
Dept: INFUSION CENTER | Facility: CLINIC | Age: 82
Discharge: HOME | End: 2022-01-26
Payer: COMMERCIAL

## 2022-01-01 ENCOUNTER — TELEPHONE (OUTPATIENT)
Dept: HEMATOLOGY ONCOLOGY | Facility: CLINIC | Age: 82
End: 2022-01-01

## 2022-01-01 ENCOUNTER — HOSPITAL ENCOUNTER (INPATIENT)
Facility: HOSPITAL | Age: 82
LOS: 5 days | DRG: 871 | End: 2022-05-10
Attending: EMERGENCY MEDICINE
Payer: COMMERCIAL

## 2022-01-01 ENCOUNTER — HOSPITAL ENCOUNTER (OUTPATIENT)
Dept: NON INVASIVE DIAGNOSTICS | Facility: HOSPITAL | Age: 82
Discharge: HOME | End: 2022-02-22
Payer: COMMERCIAL

## 2022-01-01 ENCOUNTER — TELEPHONE (OUTPATIENT)
Dept: ADMINISTRATIVE | Facility: OTHER | Age: 82
End: 2022-01-01

## 2022-01-01 ENCOUNTER — APPOINTMENT (OUTPATIENT)
Dept: LAB | Facility: CLINIC | Age: 82
End: 2022-01-01
Payer: COMMERCIAL

## 2022-01-01 ENCOUNTER — TELEPHONE (OUTPATIENT)
Dept: ENDOCRINOLOGY | Facility: CLINIC | Age: 82
End: 2022-01-01

## 2022-01-01 ENCOUNTER — OFFICE VISIT (OUTPATIENT)
Dept: ENDOCRINOLOGY | Facility: CLINIC | Age: 82
End: 2022-01-01
Payer: COMMERCIAL

## 2022-01-01 ENCOUNTER — OFFICE VISIT (OUTPATIENT)
Dept: HEMATOLOGY ONCOLOGY | Facility: CLINIC | Age: 82
End: 2022-01-01
Payer: COMMERCIAL

## 2022-01-01 ENCOUNTER — HOSPITAL ENCOUNTER (OUTPATIENT)
Dept: INFUSION CENTER | Facility: CLINIC | Age: 82
Discharge: HOME | End: 2022-03-07
Payer: COMMERCIAL

## 2022-01-01 ENCOUNTER — HOSPITAL ENCOUNTER (OUTPATIENT)
Dept: INFUSION CENTER | Facility: CLINIC | Age: 82
Discharge: HOME | DRG: 871 | End: 2022-05-02
Payer: COMMERCIAL

## 2022-01-01 ENCOUNTER — REMOTE DEVICE CLINIC VISIT (OUTPATIENT)
Dept: CARDIOLOGY CLINIC | Facility: CLINIC | Age: 82
End: 2022-01-01
Payer: COMMERCIAL

## 2022-01-01 ENCOUNTER — APPOINTMENT (INPATIENT)
Dept: RADIOLOGY | Facility: HOSPITAL | Age: 82
DRG: 871 | End: 2022-01-01
Attending: STUDENT IN AN ORGANIZED HEALTH CARE EDUCATION/TRAINING PROGRAM
Payer: COMMERCIAL

## 2022-01-01 ENCOUNTER — HOSPITAL ENCOUNTER (EMERGENCY)
Facility: HOSPITAL | Age: 82
Discharge: HOME | End: 2022-04-22
Attending: EMERGENCY MEDICINE
Payer: COMMERCIAL

## 2022-01-01 ENCOUNTER — APPOINTMENT (EMERGENCY)
Dept: CT IMAGING | Facility: HOSPITAL | Age: 82
DRG: 871 | End: 2022-01-01
Payer: COMMERCIAL

## 2022-01-01 ENCOUNTER — HOSPITAL ENCOUNTER (OUTPATIENT)
Dept: INFUSION CENTER | Facility: CLINIC | Age: 82
Discharge: HOME | End: 2022-01-05
Payer: COMMERCIAL

## 2022-01-01 ENCOUNTER — HOSPITAL ENCOUNTER (OUTPATIENT)
Dept: INFUSION CENTER | Facility: CLINIC | Age: 82
Discharge: HOME | End: 2022-03-21
Payer: COMMERCIAL

## 2022-01-01 ENCOUNTER — HOSPITAL ENCOUNTER (OUTPATIENT)
Dept: INFUSION CENTER | Facility: CLINIC | Age: 82
Discharge: HOME | End: 2022-04-04
Payer: COMMERCIAL

## 2022-01-01 ENCOUNTER — HOSPITAL ENCOUNTER (OUTPATIENT)
Dept: CT IMAGING | Facility: HOSPITAL | Age: 82
Discharge: HOME | End: 2022-01-04
Attending: INTERNAL MEDICINE
Payer: COMMERCIAL

## 2022-01-01 VITALS
BODY MASS INDEX: 40.04 KG/M2 | WEIGHT: 212.08 LBS | HEART RATE: 113 BPM | RESPIRATION RATE: 14 BRPM | HEIGHT: 61 IN | SYSTOLIC BLOOD PRESSURE: 155 MMHG | DIASTOLIC BLOOD PRESSURE: 80 MMHG | OXYGEN SATURATION: 99 % | TEMPERATURE: 96.9 F

## 2022-01-01 VITALS
HEIGHT: 60 IN | BODY MASS INDEX: 28.07 KG/M2 | WEIGHT: 143 LBS | HEART RATE: 89 BPM | SYSTOLIC BLOOD PRESSURE: 122 MMHG | OXYGEN SATURATION: 98 % | DIASTOLIC BLOOD PRESSURE: 63 MMHG

## 2022-01-01 VITALS
HEART RATE: 85 BPM | RESPIRATION RATE: 18 BRPM | TEMPERATURE: 96.4 F | SYSTOLIC BLOOD PRESSURE: 122 MMHG | HEART RATE: 89 BPM | DIASTOLIC BLOOD PRESSURE: 67 MMHG | RESPIRATION RATE: 18 BRPM | SYSTOLIC BLOOD PRESSURE: 104 MMHG | DIASTOLIC BLOOD PRESSURE: 70 MMHG | TEMPERATURE: 97.6 F

## 2022-01-01 VITALS
SYSTOLIC BLOOD PRESSURE: 124 MMHG | HEIGHT: 60 IN | DIASTOLIC BLOOD PRESSURE: 76 MMHG | WEIGHT: 141.6 LBS | HEART RATE: 80 BPM | BODY MASS INDEX: 27.8 KG/M2

## 2022-01-01 VITALS
HEART RATE: 91 BPM | HEIGHT: 60 IN | BODY MASS INDEX: 27.29 KG/M2 | WEIGHT: 139 LBS | SYSTOLIC BLOOD PRESSURE: 149 MMHG | DIASTOLIC BLOOD PRESSURE: 89 MMHG

## 2022-01-01 VITALS
RESPIRATION RATE: 18 BRPM | TEMPERATURE: 98.9 F | OXYGEN SATURATION: 96 % | SYSTOLIC BLOOD PRESSURE: 131 MMHG | WEIGHT: 141.54 LBS | HEIGHT: 60 IN | DIASTOLIC BLOOD PRESSURE: 93 MMHG | BODY MASS INDEX: 27.79 KG/M2 | HEART RATE: 115 BPM

## 2022-01-01 VITALS
HEIGHT: 60 IN | OXYGEN SATURATION: 99 % | RESPIRATION RATE: 16 BRPM | DIASTOLIC BLOOD PRESSURE: 60 MMHG | HEART RATE: 95 BPM | TEMPERATURE: 98.5 F | SYSTOLIC BLOOD PRESSURE: 112 MMHG | BODY MASS INDEX: 27.21 KG/M2 | WEIGHT: 138.6 LBS

## 2022-01-01 VITALS
WEIGHT: 143 LBS | HEART RATE: 64 BPM | OXYGEN SATURATION: 96 % | HEIGHT: 60 IN | BODY MASS INDEX: 28.07 KG/M2 | SYSTOLIC BLOOD PRESSURE: 130 MMHG | RESPIRATION RATE: 18 BRPM | DIASTOLIC BLOOD PRESSURE: 76 MMHG | TEMPERATURE: 97.2 F

## 2022-01-01 VITALS
HEIGHT: 60 IN | RESPIRATION RATE: 18 BRPM | TEMPERATURE: 97.4 F | DIASTOLIC BLOOD PRESSURE: 89 MMHG | HEART RATE: 98 BPM | BODY MASS INDEX: 27.48 KG/M2 | WEIGHT: 139.99 LBS | SYSTOLIC BLOOD PRESSURE: 149 MMHG

## 2022-01-01 VITALS
DIASTOLIC BLOOD PRESSURE: 72 MMHG | HEART RATE: 90 BPM | TEMPERATURE: 97.8 F | HEIGHT: 60 IN | RESPIRATION RATE: 18 BRPM | WEIGHT: 145.06 LBS | SYSTOLIC BLOOD PRESSURE: 152 MMHG | BODY MASS INDEX: 28.48 KG/M2

## 2022-01-01 VITALS
HEART RATE: 103 BPM | DIASTOLIC BLOOD PRESSURE: 73 MMHG | TEMPERATURE: 97.4 F | SYSTOLIC BLOOD PRESSURE: 111 MMHG | RESPIRATION RATE: 22 BRPM

## 2022-01-01 VITALS
HEART RATE: 93 BPM | DIASTOLIC BLOOD PRESSURE: 75 MMHG | SYSTOLIC BLOOD PRESSURE: 143 MMHG | RESPIRATION RATE: 18 BRPM | TEMPERATURE: 97.7 F

## 2022-01-01 DIAGNOSIS — C50.911 ADENOCARCINOMA OF RIGHT BREAST METASTATIC TO LIVER (HCC): ICD-10-CM

## 2022-01-01 DIAGNOSIS — C50.911 ADENOCARCINOMA OF RIGHT BREAST METASTATIC TO LIVER (HCC): Primary | ICD-10-CM

## 2022-01-01 DIAGNOSIS — J90 PLEURAL EFFUSION ON RIGHT: ICD-10-CM

## 2022-01-01 DIAGNOSIS — E78.2 MIXED HYPERLIPIDEMIA: ICD-10-CM

## 2022-01-01 DIAGNOSIS — R10.11 RIGHT UPPER QUADRANT ABDOMINAL PAIN: Primary | ICD-10-CM

## 2022-01-01 DIAGNOSIS — C50.911 MALIGNANT NEOPLASM OF RIGHT FEMALE BREAST, UNSPECIFIED ESTROGEN RECEPTOR STATUS, UNSPECIFIED SITE OF BREAST (HCC): Primary | ICD-10-CM

## 2022-01-01 DIAGNOSIS — J90 PLEURAL EFFUSION, RIGHT: ICD-10-CM

## 2022-01-01 DIAGNOSIS — C78.7 ADENOCARCINOMA OF RIGHT BREAST METASTATIC TO LIVER (HCC): Primary | ICD-10-CM

## 2022-01-01 DIAGNOSIS — R73.9 HYPERGLYCEMIA: ICD-10-CM

## 2022-01-01 DIAGNOSIS — C50.911 ADENOCARCINOMA OF RIGHT BREAST METASTATIC TO LIVER (HCC): Primary | Chronic | ICD-10-CM

## 2022-01-01 DIAGNOSIS — C78.7 METASTASIS TO LIVER (HCC): ICD-10-CM

## 2022-01-01 DIAGNOSIS — C78.7 METASTASES TO THE LIVER (HCC): ICD-10-CM

## 2022-01-01 DIAGNOSIS — C78.7 LIVER METASTASES (HCC): ICD-10-CM

## 2022-01-01 DIAGNOSIS — C50.911 MALIGNANT NEOPLASM OF RIGHT FEMALE BREAST, UNSPECIFIED ESTROGEN RECEPTOR STATUS, UNSPECIFIED SITE OF BREAST (HCC): ICD-10-CM

## 2022-01-01 DIAGNOSIS — I50.32 CHRONIC DIASTOLIC CHF (CONGESTIVE HEART FAILURE), NYHA CLASS 2 (HCC): ICD-10-CM

## 2022-01-01 DIAGNOSIS — I27.82 OTHER CHRONIC PULMONARY EMBOLISM WITH ACUTE COR PULMONALE (HCC): ICD-10-CM

## 2022-01-01 DIAGNOSIS — C78.7 ADENOCARCINOMA OF RIGHT BREAST METASTATIC TO LIVER (HCC): ICD-10-CM

## 2022-01-01 DIAGNOSIS — E11.65 TYPE 2 DIABETES MELLITUS WITH HYPERGLYCEMIA, WITHOUT LONG-TERM CURRENT USE OF INSULIN (HCC): ICD-10-CM

## 2022-01-01 DIAGNOSIS — E87.2 LACTIC ACIDOSIS: ICD-10-CM

## 2022-01-01 DIAGNOSIS — G89.29 CHRONIC BILATERAL BACK PAIN, UNSPECIFIED BACK LOCATION: ICD-10-CM

## 2022-01-01 DIAGNOSIS — I47.2 VT (VENTRICULAR TACHYCARDIA) (HCC): Primary | ICD-10-CM

## 2022-01-01 DIAGNOSIS — K66.8 PNEUMOPERITONEUM: Primary | ICD-10-CM

## 2022-01-01 DIAGNOSIS — I49.5 SSS (SICK SINUS SYNDROME) (HCC): ICD-10-CM

## 2022-01-01 DIAGNOSIS — R79.89 ELEVATED LFTS: ICD-10-CM

## 2022-01-01 DIAGNOSIS — E11.65 TYPE 2 DIABETES MELLITUS WITH HYPERGLYCEMIA, WITHOUT LONG-TERM CURRENT USE OF INSULIN (HCC): Primary | ICD-10-CM

## 2022-01-01 DIAGNOSIS — K63.9 COLONIC THICKENING: ICD-10-CM

## 2022-01-01 DIAGNOSIS — I26.09 OTHER CHRONIC PULMONARY EMBOLISM WITH ACUTE COR PULMONALE (HCC): ICD-10-CM

## 2022-01-01 DIAGNOSIS — I50.32 CHRONIC HEART FAILURE WITH PRESERVED EJECTION FRACTION (HFPEF) (HCC): ICD-10-CM

## 2022-01-01 DIAGNOSIS — M54.9 CHRONIC BILATERAL BACK PAIN, UNSPECIFIED BACK LOCATION: ICD-10-CM

## 2022-01-01 DIAGNOSIS — Z95.810 AICD (AUTOMATIC CARDIOVERTER/DEFIBRILLATOR) PRESENT: Primary | ICD-10-CM

## 2022-01-01 DIAGNOSIS — C50.911 ADENOCARCINOMA OF RIGHT BREAST METASTATIC TO LIVER (HCC): Chronic | ICD-10-CM

## 2022-01-01 DIAGNOSIS — C78.7 ADENOCARCINOMA OF RIGHT BREAST METASTATIC TO LIVER (HCC): Primary | Chronic | ICD-10-CM

## 2022-01-01 DIAGNOSIS — C78.7 ADENOCARCINOMA OF RIGHT BREAST METASTATIC TO LIVER (HCC): Chronic | ICD-10-CM

## 2022-01-01 DIAGNOSIS — D69.6 THROMBOCYTOPENIA, UNSPECIFIED (HCC): ICD-10-CM

## 2022-01-01 LAB
2HR DELTA HS TROPONIN: -1 NG/L
2HR DELTA HS TROPONIN: 8 NG/L
4HR DELTA HS TROPONIN: 15 NG/L
ALBUMIN SERPL BCP-MCNC: 2.2 G/DL (ref 3.5–5)
ALBUMIN SERPL BCP-MCNC: 3.1 G/DL (ref 3.5–5)
ALBUMIN SERPL BCP-MCNC: 3.2 G/DL (ref 3.5–5)
ALBUMIN SERPL BCP-MCNC: 3.3 G/DL (ref 3.5–5)
ALBUMIN SERPL BCP-MCNC: 3.6 G/DL (ref 3.5–5)
ALP SERPL-CCNC: 235 U/L (ref 46–116)
ALP SERPL-CCNC: 261 U/L (ref 46–116)
ALP SERPL-CCNC: 355 U/L (ref 46–116)
ALP SERPL-CCNC: 362 U/L (ref 46–116)
ALP SERPL-CCNC: 431 U/L (ref 46–116)
ALT SERPL W P-5'-P-CCNC: 30 U/L (ref 12–78)
ALT SERPL W P-5'-P-CCNC: 43 U/L (ref 12–78)
ALT SERPL W P-5'-P-CCNC: 48 U/L (ref 12–78)
ALT SERPL W P-5'-P-CCNC: 49 U/L (ref 12–78)
ALT SERPL W P-5'-P-CCNC: 59 U/L (ref 12–78)
ANION GAP SERPL CALCULATED.3IONS-SCNC: 10 MMOL/L (ref 4–13)
ANION GAP SERPL CALCULATED.3IONS-SCNC: 11 MMOL/L (ref 4–13)
ANION GAP SERPL CALCULATED.3IONS-SCNC: 11 MMOL/L (ref 4–13)
ANION GAP SERPL CALCULATED.3IONS-SCNC: 15 MMOL/L (ref 4–13)
ANION GAP SERPL CALCULATED.3IONS-SCNC: 5 MMOL/L (ref 4–13)
ANION GAP SERPL CALCULATED.3IONS-SCNC: 7 MMOL/L (ref 4–13)
ANION GAP SERPL CALCULATED.3IONS-SCNC: 9 MMOL/L (ref 4–13)
ANISOCYTOSIS BLD QL SMEAR: PRESENT
AORTIC ROOT: 2.9 CM
APICAL FOUR CHAMBER EJECTION FRACTION: 65 %
APPEARANCE FLD: ABNORMAL
AST SERPL W P-5'-P-CCNC: 47 U/L (ref 5–45)
AST SERPL W P-5'-P-CCNC: 58 U/L (ref 5–45)
AST SERPL W P-5'-P-CCNC: 64 U/L (ref 5–45)
AST SERPL W P-5'-P-CCNC: 65 U/L (ref 5–45)
AST SERPL W P-5'-P-CCNC: 94 U/L (ref 5–45)
ATRIAL RATE: 106 BPM
ATRIAL RATE: 122 BPM
ATRIAL RATE: 127 BPM
ATRIAL RATE: 129 BPM
ATRIAL RATE: 89 BPM
ATRIAL RATE: 92 BPM
BACTERIA BLD CULT: NORMAL
BACTERIA BLD CULT: NORMAL
BACTERIA SPEC BFLD CULT: NO GROWTH
BACTERIA UR QL AUTO: ABNORMAL /HPF
BACTERIA UR QL AUTO: ABNORMAL /HPF
BASE EX.OXY STD BLDV CALC-SCNC: 72.6 % (ref 60–80)
BASE EXCESS BLDV CALC-SCNC: -1.5 MMOL/L
BASOPHILS # BLD AUTO: 0.04 THOUSANDS/ΜL (ref 0–0.1)
BASOPHILS # BLD AUTO: 0.07 THOUSANDS/ΜL (ref 0–0.1)
BASOPHILS # BLD AUTO: 0.08 THOUSANDS/ΜL (ref 0–0.1)
BASOPHILS # BLD MANUAL: 0 THOUSAND/UL (ref 0–0.1)
BASOPHILS NFR BLD AUTO: 1 % (ref 0–1)
BASOPHILS NFR BLD AUTO: 1 % (ref 0–1)
BASOPHILS NFR BLD AUTO: 2 % (ref 0–1)
BASOPHILS NFR MAR MANUAL: 0 % (ref 0–1)
BILIRUB DIRECT SERPL-MCNC: 0.59 MG/DL (ref 0–0.2)
BILIRUB DIRECT SERPL-MCNC: 0.77 MG/DL (ref 0–0.2)
BILIRUB SERPL-MCNC: 1.04 MG/DL (ref 0.2–1)
BILIRUB SERPL-MCNC: 1.19 MG/DL (ref 0.2–1)
BILIRUB SERPL-MCNC: 1.2 MG/DL (ref 0.2–1)
BILIRUB SERPL-MCNC: 1.27 MG/DL (ref 0.2–1)
BILIRUB SERPL-MCNC: 1.56 MG/DL (ref 0.2–1)
BILIRUB UR QL STRIP: ABNORMAL
BILIRUB UR QL STRIP: ABNORMAL
BUN SERPL-MCNC: 12 MG/DL (ref 5–25)
BUN SERPL-MCNC: 15 MG/DL (ref 5–25)
BUN SERPL-MCNC: 15 MG/DL (ref 5–25)
BUN SERPL-MCNC: 17 MG/DL (ref 5–25)
BUN SERPL-MCNC: 19 MG/DL (ref 5–25)
CALCIUM ALBUM COR SERPL-MCNC: 11.1 MG/DL (ref 8.3–10.1)
CALCIUM ALBUM COR SERPL-MCNC: 9.9 MG/DL (ref 8.3–10.1)
CALCIUM SERPL-MCNC: 10.2 MG/DL (ref 8.3–10.1)
CALCIUM SERPL-MCNC: 10.5 MG/DL (ref 8.3–10.1)
CALCIUM SERPL-MCNC: 11 MG/DL (ref 8.3–10.1)
CALCIUM SERPL-MCNC: 8.2 MG/DL (ref 8.3–10.1)
CALCIUM SERPL-MCNC: 8.4 MG/DL (ref 8.3–10.1)
CALCIUM SERPL-MCNC: 8.5 MG/DL (ref 8.3–10.1)
CALCIUM SERPL-MCNC: 9.7 MG/DL (ref 8.3–10.1)
CANCER AG27-29 SERPL-ACNC: 1356.3 U/ML (ref 0–42.3)
CANCER AG27-29 SERPL-ACNC: 418.8 U/ML (ref 0–42.3)
CARDIAC TROPONIN I PNL SERPL HS: 32 NG/L
CARDIAC TROPONIN I PNL SERPL HS: 33 NG/L
CARDIAC TROPONIN I PNL SERPL HS: 37 NG/L
CARDIAC TROPONIN I PNL SERPL HS: 45 NG/L
CARDIAC TROPONIN I PNL SERPL HS: 52 NG/L
CHLORIDE SERPL-SCNC: 101 MMOL/L (ref 100–108)
CHLORIDE SERPL-SCNC: 103 MMOL/L (ref 100–108)
CHLORIDE SERPL-SCNC: 104 MMOL/L (ref 100–108)
CHLORIDE SERPL-SCNC: 105 MMOL/L (ref 100–108)
CHLORIDE SERPL-SCNC: 106 MMOL/L (ref 100–108)
CHLORIDE SERPL-SCNC: 107 MMOL/L (ref 100–108)
CHLORIDE SERPL-SCNC: 97 MMOL/L (ref 100–108)
CLARITY UR: ABNORMAL
CLARITY UR: CLEAR
CO2 SERPL-SCNC: 23 MMOL/L (ref 21–32)
CO2 SERPL-SCNC: 24 MMOL/L (ref 21–32)
CO2 SERPL-SCNC: 24 MMOL/L (ref 21–32)
CO2 SERPL-SCNC: 25 MMOL/L (ref 21–32)
CO2 SERPL-SCNC: 27 MMOL/L (ref 21–32)
CO2 SERPL-SCNC: 28 MMOL/L (ref 21–32)
CO2 SERPL-SCNC: 28 MMOL/L (ref 21–32)
COLOR FLD: YELLOW
COLOR UR: ABNORMAL
COLOR UR: ABNORMAL
CREAT SERPL-MCNC: 0.85 MG/DL (ref 0.6–1.3)
CREAT SERPL-MCNC: 0.88 MG/DL (ref 0.6–1.3)
CREAT SERPL-MCNC: 0.9 MG/DL (ref 0.6–1.3)
CREAT SERPL-MCNC: 0.96 MG/DL (ref 0.6–1.3)
CREAT SERPL-MCNC: 1.04 MG/DL (ref 0.6–1.3)
CREAT SERPL-MCNC: 1.08 MG/DL (ref 0.6–1.3)
CREAT SERPL-MCNC: 1.2 MG/DL (ref 0.6–1.3)
E WAVE DECELERATION TIME: 199 MS
EOSINOPHIL # BLD AUTO: 0.02 THOUSAND/ΜL (ref 0–0.61)
EOSINOPHIL # BLD AUTO: 0.14 THOUSAND/ΜL (ref 0–0.61)
EOSINOPHIL # BLD AUTO: 0.28 THOUSAND/ΜL (ref 0–0.61)
EOSINOPHIL # BLD MANUAL: 0 THOUSAND/UL (ref 0–0.4)
EOSINOPHIL NFR BLD AUTO: 1 % (ref 0–6)
EOSINOPHIL NFR BLD AUTO: 2 % (ref 0–6)
EOSINOPHIL NFR BLD AUTO: 3 % (ref 0–6)
EOSINOPHIL NFR BLD MANUAL: 0 % (ref 0–6)
ERYTHROCYTE [DISTWIDTH] IN BLOOD BY AUTOMATED COUNT: 15 % (ref 11.6–15.1)
ERYTHROCYTE [DISTWIDTH] IN BLOOD BY AUTOMATED COUNT: 15.3 % (ref 11.6–15.1)
ERYTHROCYTE [DISTWIDTH] IN BLOOD BY AUTOMATED COUNT: 15.4 % (ref 11.6–15.1)
ERYTHROCYTE [DISTWIDTH] IN BLOOD BY AUTOMATED COUNT: 15.6 % (ref 11.6–15.1)
ERYTHROCYTE [DISTWIDTH] IN BLOOD BY AUTOMATED COUNT: 15.8 % (ref 11.6–15.1)
ERYTHROCYTE [DISTWIDTH] IN BLOOD BY AUTOMATED COUNT: 16.9 % (ref 11.6–15.1)
FRACTIONAL SHORTENING: 34 % (ref 28–44)
GFR SERPL CREATININE-BSD FRML MDRD: 41 ML/MIN/1.73SQ M
GFR SERPL CREATININE-BSD FRML MDRD: 47 ML/MIN/1.73SQ M
GFR SERPL CREATININE-BSD FRML MDRD: 49 ML/MIN/1.73SQ M
GFR SERPL CREATININE-BSD FRML MDRD: 54 ML/MIN/1.73SQ M
GFR SERPL CREATININE-BSD FRML MDRD: 58 ML/MIN/1.73SQ M
GFR SERPL CREATININE-BSD FRML MDRD: 60 ML/MIN/1.73SQ M
GFR SERPL CREATININE-BSD FRML MDRD: 63 ML/MIN/1.73SQ M
GLUCOSE FLD-MCNC: 352 MG/DL
GLUCOSE SERPL-MCNC: 104 MG/DL (ref 65–140)
GLUCOSE SERPL-MCNC: 141 MG/DL (ref 65–140)
GLUCOSE SERPL-MCNC: 147 MG/DL (ref 65–140)
GLUCOSE SERPL-MCNC: 151 MG/DL (ref 65–140)
GLUCOSE SERPL-MCNC: 151 MG/DL (ref 65–140)
GLUCOSE SERPL-MCNC: 156 MG/DL (ref 65–140)
GLUCOSE SERPL-MCNC: 162 MG/DL (ref 65–140)
GLUCOSE SERPL-MCNC: 162 MG/DL (ref 65–140)
GLUCOSE SERPL-MCNC: 163 MG/DL (ref 65–140)
GLUCOSE SERPL-MCNC: 167 MG/DL (ref 65–140)
GLUCOSE SERPL-MCNC: 171 MG/DL (ref 65–140)
GLUCOSE SERPL-MCNC: 183 MG/DL (ref 65–140)
GLUCOSE SERPL-MCNC: 187 MG/DL (ref 65–140)
GLUCOSE SERPL-MCNC: 187 MG/DL (ref 65–140)
GLUCOSE SERPL-MCNC: 192 MG/DL (ref 65–140)
GLUCOSE SERPL-MCNC: 215 MG/DL (ref 65–140)
GLUCOSE SERPL-MCNC: 238 MG/DL (ref 65–140)
GLUCOSE SERPL-MCNC: 257 MG/DL (ref 65–140)
GLUCOSE SERPL-MCNC: 274 MG/DL (ref 65–140)
GLUCOSE SERPL-MCNC: 287 MG/DL (ref 65–140)
GLUCOSE SERPL-MCNC: 296 MG/DL (ref 65–140)
GLUCOSE SERPL-MCNC: 326 MG/DL (ref 65–140)
GLUCOSE SERPL-MCNC: 356 MG/DL (ref 65–140)
GLUCOSE SERPL-MCNC: 358 MG/DL (ref 65–140)
GLUCOSE SERPL-MCNC: 362 MG/DL (ref 65–140)
GLUCOSE SERPL-MCNC: 451 MG/DL (ref 65–140)
GLUCOSE UR STRIP-MCNC: ABNORMAL MG/DL
GLUCOSE UR STRIP-MCNC: NEGATIVE MG/DL
GRAM STN SPEC: NORMAL
GRAM STN SPEC: NORMAL
HCO3 BLDV-SCNC: 23.6 MMOL/L (ref 24–30)
HCT VFR BLD AUTO: 39.2 % (ref 34.8–46.1)
HCT VFR BLD AUTO: 40.3 % (ref 34.8–46.1)
HCT VFR BLD AUTO: 41 % (ref 34.8–46.1)
HCT VFR BLD AUTO: 42.5 % (ref 34.8–46.1)
HCT VFR BLD AUTO: 46.1 % (ref 34.8–46.1)
HCT VFR BLD AUTO: 50.3 % (ref 34.8–46.1)
HGB BLD-MCNC: 12.6 G/DL (ref 11.5–15.4)
HGB BLD-MCNC: 12.7 G/DL (ref 11.5–15.4)
HGB BLD-MCNC: 13 G/DL (ref 11.5–15.4)
HGB BLD-MCNC: 13.3 G/DL (ref 11.5–15.4)
HGB BLD-MCNC: 14.9 G/DL (ref 11.5–15.4)
HGB BLD-MCNC: 16.3 G/DL (ref 11.5–15.4)
HGB UR QL STRIP.AUTO: NEGATIVE
HGB UR QL STRIP.AUTO: NEGATIVE
HISTIOCYTES NFR FLD: 58 %
IMM GRANULOCYTES # BLD AUTO: 0.01 THOUSAND/UL (ref 0–0.2)
IMM GRANULOCYTES # BLD AUTO: 0.02 THOUSAND/UL (ref 0–0.2)
IMM GRANULOCYTES # BLD AUTO: 0.02 THOUSAND/UL (ref 0–0.2)
IMM GRANULOCYTES NFR BLD AUTO: 0 % (ref 0–2)
INTERVENTRICULAR SEPTUM IN DIASTOLE (PARASTERNAL SHORT AXIS VIEW): 1.2 CM (ref 0.49–0.92)
INTERVENTRICULAR SEPTUM: 1.2 CM (ref 0.6–1.1)
KETONES UR STRIP-MCNC: ABNORMAL MG/DL
KETONES UR STRIP-MCNC: ABNORMAL MG/DL
LAAS-AP2: 13 CM2
LAAS-AP4: 8.8 CM2
LACTATE SERPL-SCNC: 2.6 MMOL/L (ref 0.5–2)
LACTATE SERPL-SCNC: 3.1 MMOL/L (ref 0.5–2)
LACTATE SERPL-SCNC: 3.2 MMOL/L (ref 0.5–2)
LACTATE SERPL-SCNC: 3.4 MMOL/L (ref 0.5–2)
LDH FLD L TO P-CCNC: 353 U/L
LEFT ATRIUM AREA SYSTOLE SINGLE PLANE A4C: 8.9 CM2
LEFT ATRIUM SIZE: 3.5 CM
LEFT INTERNAL DIMENSION IN SYSTOLE: 2.1 CM (ref 2.34–3.53)
LEFT VENTRICULAR INTERNAL DIMENSION IN DIASTOLE: 3.2 CM (ref 3.8–5.65)
LEFT VENTRICULAR POSTERIOR WALL IN END DIASTOLE: 1.3 CM (ref 0.48–0.91)
LEFT VENTRICULAR STROKE VOLUME: 27 ML
LEUKOCYTE ESTERASE UR QL STRIP: NEGATIVE
LEUKOCYTE ESTERASE UR QL STRIP: NEGATIVE
LG PLATELETS BLD QL SMEAR: PRESENT
LIPASE SERPL-CCNC: 41 U/L (ref 73–393)
LIPASE SERPL-CCNC: 80 U/L (ref 73–393)
LVSV (TEICH): 27 ML
LYMPHOCYTES # BLD AUTO: 0.94 THOUSANDS/ΜL (ref 0.6–4.47)
LYMPHOCYTES # BLD AUTO: 1.07 THOUSAND/UL (ref 0.6–4.47)
LYMPHOCYTES # BLD AUTO: 1.3 THOUSANDS/ΜL (ref 0.6–4.47)
LYMPHOCYTES # BLD AUTO: 1.74 THOUSANDS/ΜL (ref 0.6–4.47)
LYMPHOCYTES # BLD AUTO: 10 % (ref 14–44)
LYMPHOCYTES NFR BLD AUTO: 17 % (ref 14–44)
LYMPHOCYTES NFR BLD AUTO: 21 % (ref 14–44)
LYMPHOCYTES NFR BLD AUTO: 29 %
LYMPHOCYTES NFR BLD AUTO: 37 % (ref 14–44)
MCH RBC QN AUTO: 29 PG (ref 26.8–34.3)
MCH RBC QN AUTO: 29.2 PG (ref 26.8–34.3)
MCH RBC QN AUTO: 29.7 PG (ref 26.8–34.3)
MCH RBC QN AUTO: 29.8 PG (ref 26.8–34.3)
MCH RBC QN AUTO: 30.6 PG (ref 26.8–34.3)
MCH RBC QN AUTO: 30.8 PG (ref 26.8–34.3)
MCHC RBC AUTO-ENTMCNC: 30.6 G/DL (ref 31.4–37.4)
MCHC RBC AUTO-ENTMCNC: 31.5 G/DL (ref 31.4–37.4)
MCHC RBC AUTO-ENTMCNC: 32.1 G/DL (ref 31.4–37.4)
MCHC RBC AUTO-ENTMCNC: 32.3 G/DL (ref 31.4–37.4)
MCHC RBC AUTO-ENTMCNC: 32.4 G/DL (ref 31.4–37.4)
MCHC RBC AUTO-ENTMCNC: 32.4 G/DL (ref 31.4–37.4)
MCV RBC AUTO: 92 FL (ref 82–98)
MCV RBC AUTO: 92 FL (ref 82–98)
MCV RBC AUTO: 93 FL (ref 82–98)
MCV RBC AUTO: 95 FL (ref 82–98)
MCV RBC AUTO: 95 FL (ref 82–98)
MCV RBC AUTO: 96 FL (ref 82–98)
MONO+MESO NFR FLD MANUAL: 2 %
MONOCYTES # BLD AUTO: 0.16 THOUSAND/ΜL (ref 0.17–1.22)
MONOCYTES # BLD AUTO: 0.89 THOUSAND/ΜL (ref 0.17–1.22)
MONOCYTES # BLD AUTO: 0.96 THOUSAND/UL (ref 0–1.22)
MONOCYTES # BLD AUTO: 1.1 THOUSAND/ΜL (ref 0.17–1.22)
MONOCYTES NFR BLD AUTO: 12 % (ref 4–12)
MONOCYTES NFR BLD AUTO: 13 % (ref 4–12)
MONOCYTES NFR BLD AUTO: 6 % (ref 4–12)
MONOCYTES NFR BLD AUTO: 9 %
MONOCYTES NFR BLD: 9 % (ref 4–12)
MUCOUS THREADS UR QL AUTO: ABNORMAL
MV E'TISSUE VEL-SEP: 6 CM/S
MV PEAK A VEL: 0.86 M/S
MV PEAK E VEL: 51 CM/S
MV STENOSIS PRESSURE HALF TIME: 58 MS
MV VALVE AREA P 1/2 METHOD: 3.79 CM2
MYELOCYTES NFR BLD MANUAL: 1 % (ref 0–1)
NEUTROPHILS # BLD AUTO: 1.39 THOUSANDS/ΜL (ref 1.85–7.62)
NEUTROPHILS # BLD AUTO: 5.05 THOUSANDS/ΜL (ref 1.85–7.62)
NEUTROPHILS # BLD AUTO: 5.17 THOUSANDS/ΜL (ref 1.85–7.62)
NEUTROPHILS # BLD MANUAL: 8.54 THOUSAND/UL (ref 1.85–7.62)
NEUTS BAND NFR BLD MANUAL: 10 % (ref 0–8)
NEUTS SEG NFR BLD AUTO: 2 %
NEUTS SEG NFR BLD AUTO: 54 % (ref 43–75)
NEUTS SEG NFR BLD AUTO: 62 % (ref 43–75)
NEUTS SEG NFR BLD AUTO: 68 % (ref 43–75)
NEUTS SEG NFR BLD AUTO: 70 % (ref 43–75)
NITRITE UR QL STRIP: NEGATIVE
NITRITE UR QL STRIP: POSITIVE
NON-SQ EPI CELLS URNS QL MICRO: ABNORMAL /HPF
NON-SQ EPI CELLS URNS QL MICRO: ABNORMAL /HPF
NRBC BLD AUTO-RTO: 0 /100 WBCS
O2 CT BLDV-SCNC: 16.9 ML/DL
P AXIS: 21 DEGREES
P AXIS: 29 DEGREES
P AXIS: 35 DEGREES
P AXIS: 39 DEGREES
P AXIS: 40 DEGREES
P AXIS: 66 DEGREES
PCO2 BLDV: 41 MM HG (ref 42–50)
PH BLDV: 7.38 [PH] (ref 7.3–7.4)
PH BODY FLUID: 7.7
PH UR STRIP.AUTO: 5.5 [PH]
PH UR STRIP.AUTO: 5.5 [PH] (ref 4.5–8)
PLATELET # BLD AUTO: 132 THOUSANDS/UL (ref 149–390)
PLATELET # BLD AUTO: 133 THOUSANDS/UL (ref 149–390)
PLATELET # BLD AUTO: 145 THOUSANDS/UL (ref 149–390)
PLATELET # BLD AUTO: 149 THOUSANDS/UL (ref 149–390)
PLATELET # BLD AUTO: 212 THOUSANDS/UL (ref 149–390)
PLATELET # BLD AUTO: 221 THOUSANDS/UL (ref 149–390)
PLATELET BLD QL SMEAR: ABNORMAL
PMV BLD AUTO: 10.1 FL (ref 8.9–12.7)
PMV BLD AUTO: 10.4 FL (ref 8.9–12.7)
PMV BLD AUTO: 10.7 FL (ref 8.9–12.7)
PMV BLD AUTO: 10.7 FL (ref 8.9–12.7)
PMV BLD AUTO: 10.9 FL (ref 8.9–12.7)
PMV BLD AUTO: 11.5 FL (ref 8.9–12.7)
PO2 BLDV: 41.5 MM HG (ref 35–45)
POTASSIUM SERPL-SCNC: 3.6 MMOL/L (ref 3.5–5.3)
POTASSIUM SERPL-SCNC: 3.9 MMOL/L (ref 3.5–5.3)
POTASSIUM SERPL-SCNC: 3.9 MMOL/L (ref 3.5–5.3)
POTASSIUM SERPL-SCNC: 4 MMOL/L (ref 3.5–5.3)
POTASSIUM SERPL-SCNC: 4.1 MMOL/L (ref 3.5–5.3)
POTASSIUM SERPL-SCNC: 4.5 MMOL/L (ref 3.5–5.3)
POTASSIUM SERPL-SCNC: 5.3 MMOL/L (ref 3.5–5.3)
PR INTERVAL: 114 MS
PR INTERVAL: 132 MS
PR INTERVAL: 132 MS
PR INTERVAL: 136 MS
PR INTERVAL: 140 MS
PR INTERVAL: 156 MS
PROT FLD-MCNC: 3.6 G/DL
PROT SERPL-MCNC: 5.9 G/DL (ref 6.4–8.2)
PROT SERPL-MCNC: 7.4 G/DL (ref 6.4–8.2)
PROT SERPL-MCNC: 7.4 G/DL (ref 6.4–8.2)
PROT SERPL-MCNC: 7.6 G/DL (ref 6.4–8.2)
PROT SERPL-MCNC: 7.6 G/DL (ref 6.4–8.2)
PROT UR STRIP-MCNC: >=300 MG/DL
PROT UR STRIP-MCNC: ABNORMAL MG/DL
QRS AXIS: 22 DEGREES
QRS AXIS: 25 DEGREES
QRS AXIS: 35 DEGREES
QRS AXIS: 35 DEGREES
QRS AXIS: 40 DEGREES
QRS AXIS: 7 DEGREES
QRSD INTERVAL: 122 MS
QRSD INTERVAL: 126 MS
QRSD INTERVAL: 126 MS
QRSD INTERVAL: 86 MS
QRSD INTERVAL: 88 MS
QRSD INTERVAL: 92 MS
QT INTERVAL: 284 MS
QT INTERVAL: 308 MS
QT INTERVAL: 312 MS
QT INTERVAL: 344 MS
QT INTERVAL: 352 MS
QT INTERVAL: 356 MS
QTC INTERVAL: 404 MS
QTC INTERVAL: 414 MS
QTC INTERVAL: 425 MS
QTC INTERVAL: 428 MS
QTC INTERVAL: 447 MS
QTC INTERVAL: 507 MS
RBC # BLD AUTO: 4.24 MILLION/UL (ref 3.81–5.12)
RBC # BLD AUTO: 4.38 MILLION/UL (ref 3.81–5.12)
RBC # BLD AUTO: 4.45 MILLION/UL (ref 3.81–5.12)
RBC # BLD AUTO: 4.46 MILLION/UL (ref 3.81–5.12)
RBC # BLD AUTO: 4.84 MILLION/UL (ref 3.81–5.12)
RBC # BLD AUTO: 5.32 MILLION/UL (ref 3.81–5.12)
RBC #/AREA URNS AUTO: ABNORMAL /HPF
RBC #/AREA URNS AUTO: ABNORMAL /HPF
RIGHT ATRIUM AREA SYSTOLE A4C: 8.2 CM2
RIGHT VENTRICLE ID DIMENSION: 1.9 CM
SITE: ABNORMAL
SL CV LEFT ATRIUM LENGTH A2C: 5.5 CM
SL CV PED ECHO LEFT VENTRICLE DIASTOLIC VOLUME (MOD BIPLANE) 2D: 41 ML
SL CV PED ECHO LEFT VENTRICLE SYSTOLIC VOLUME (MOD BIPLANE) 2D: 14 ML
SODIUM SERPL-SCNC: 134 MMOL/L (ref 136–145)
SODIUM SERPL-SCNC: 137 MMOL/L (ref 136–145)
SODIUM SERPL-SCNC: 140 MMOL/L (ref 136–145)
SODIUM SERPL-SCNC: 142 MMOL/L (ref 136–145)
SODIUM SERPL-SCNC: 143 MMOL/L (ref 136–145)
SP GR UR STRIP.AUTO: 1.01 (ref 1–1.03)
SP GR UR STRIP.AUTO: 1.02 (ref 1–1.03)
T WAVE AXIS: 236 DEGREES
T WAVE AXIS: 241 DEGREES
T WAVE AXIS: 245 DEGREES
T WAVE AXIS: 260 DEGREES
T WAVE AXIS: 261 DEGREES
T WAVE AXIS: 264 DEGREES
TOTAL CELLS COUNTED SPEC: 100
TR MAX PG: 21 MMHG
TR PEAK VELOCITY: 2.3 M/S
TRICUSPID VALVE PEAK REGURGITATION VELOCITY: 2.31 M/S
UROBILINOGEN UR QL STRIP.AUTO: 0.2 E.U./DL
UROBILINOGEN UR QL STRIP.AUTO: 1 E.U./DL
VENTRICULAR RATE: 106 BPM
VENTRICULAR RATE: 122 BPM
VENTRICULAR RATE: 122 BPM
VENTRICULAR RATE: 127 BPM
VENTRICULAR RATE: 89 BPM
VENTRICULAR RATE: 92 BPM
WBC # BLD AUTO: 10.59 THOUSAND/UL (ref 4.31–10.16)
WBC # BLD AUTO: 10.67 THOUSAND/UL (ref 4.31–10.16)
WBC # BLD AUTO: 2.56 THOUSAND/UL (ref 4.31–10.16)
WBC # BLD AUTO: 7.6 THOUSAND/UL (ref 4.31–10.16)
WBC # BLD AUTO: 8.26 THOUSAND/UL (ref 4.31–10.16)
WBC # BLD AUTO: 9.91 THOUSAND/UL (ref 4.31–10.16)
WBC # FLD MANUAL: 162 /UL
WBC #/AREA URNS AUTO: ABNORMAL /HPF
WBC #/AREA URNS AUTO: ABNORMAL /HPF
Z-SCORE OF INTERVENTRICULAR SEPTUM IN END DIASTOLE: 4.47
Z-SCORE OF LEFT VENTRICULAR DIMENSION IN END DIASTOLE: -3.7
Z-SCORE OF LEFT VENTRICULAR DIMENSION IN END SYSTOLE: -2.47
Z-SCORE OF LEFT VENTRICULAR POSTERIOR WALL IN END DIASTOLE: 5.49

## 2022-01-01 PROCEDURE — 82805 BLOOD GASES W/O2 SATURATION: CPT | Performed by: EMERGENCY MEDICINE

## 2022-01-01 PROCEDURE — 83986 ASSAY PH BODY FLUID NOS: CPT | Performed by: STUDENT IN AN ORGANIZED HEALTH CARE EDUCATION/TRAINING PROGRAM

## 2022-01-01 PROCEDURE — 85025 COMPLETE CBC W/AUTO DIFF WBC: CPT | Performed by: EMERGENCY MEDICINE

## 2022-01-01 PROCEDURE — 86300 IMMUNOASSAY TUMOR CA 15-3: CPT

## 2022-01-01 PROCEDURE — 93010 ELECTROCARDIOGRAM REPORT: CPT | Performed by: INTERNAL MEDICINE

## 2022-01-01 PROCEDURE — 80076 HEPATIC FUNCTION PANEL: CPT | Performed by: EMERGENCY MEDICINE

## 2022-01-01 PROCEDURE — 93306 TTE W/DOPPLER COMPLETE: CPT | Performed by: INTERNAL MEDICINE

## 2022-01-01 PROCEDURE — 36415 COLL VENOUS BLD VENIPUNCTURE: CPT | Performed by: EMERGENCY MEDICINE

## 2022-01-01 PROCEDURE — C9113 INJ PANTOPRAZOLE SODIUM, VIA: HCPCS | Performed by: STUDENT IN AN ORGANIZED HEALTH CARE EDUCATION/TRAINING PROGRAM

## 2022-01-01 PROCEDURE — 99285 EMERGENCY DEPT VISIT HI MDM: CPT

## 2022-01-01 PROCEDURE — 99232 SBSQ HOSP IP/OBS MODERATE 35: CPT | Performed by: INTERNAL MEDICINE

## 2022-01-01 PROCEDURE — 96402 CHEMO HORMON ANTINEOPL SQ/IM: CPT

## 2022-01-01 PROCEDURE — 96361 HYDRATE IV INFUSION ADD-ON: CPT

## 2022-01-01 PROCEDURE — G1004 CDSM NDSC: HCPCS

## 2022-01-01 PROCEDURE — 36415 COLL VENOUS BLD VENIPUNCTURE: CPT | Performed by: INTERNAL MEDICINE

## 2022-01-01 PROCEDURE — 82948 REAGENT STRIP/BLOOD GLUCOSE: CPT

## 2022-01-01 PROCEDURE — 88342 IMHCHEM/IMCYTCHM 1ST ANTB: CPT | Performed by: PATHOLOGY

## 2022-01-01 PROCEDURE — 96413 CHEMO IV INFUSION 1 HR: CPT

## 2022-01-01 PROCEDURE — 85027 COMPLETE CBC AUTOMATED: CPT | Performed by: STUDENT IN AN ORGANIZED HEALTH CARE EDUCATION/TRAINING PROGRAM

## 2022-01-01 PROCEDURE — 96376 TX/PRO/DX INJ SAME DRUG ADON: CPT

## 2022-01-01 PROCEDURE — 74177 CT ABD & PELVIS W/CONTRAST: CPT

## 2022-01-01 PROCEDURE — 99233 SBSQ HOSP IP/OBS HIGH 50: CPT | Performed by: INTERNAL MEDICINE

## 2022-01-01 PROCEDURE — 99223 1ST HOSP IP/OBS HIGH 75: CPT | Performed by: STUDENT IN AN ORGANIZED HEALTH CARE EDUCATION/TRAINING PROGRAM

## 2022-01-01 PROCEDURE — 83615 LACTATE (LD) (LDH) ENZYME: CPT | Performed by: STUDENT IN AN ORGANIZED HEALTH CARE EDUCATION/TRAINING PROGRAM

## 2022-01-01 PROCEDURE — 99223 1ST HOSP IP/OBS HIGH 75: CPT | Performed by: SURGERY

## 2022-01-01 PROCEDURE — 84484 ASSAY OF TROPONIN QUANT: CPT | Performed by: EMERGENCY MEDICINE

## 2022-01-01 PROCEDURE — 88305 TISSUE EXAM BY PATHOLOGIST: CPT | Performed by: PATHOLOGY

## 2022-01-01 PROCEDURE — 99232 SBSQ HOSP IP/OBS MODERATE 35: CPT | Performed by: HOSPITALIST

## 2022-01-01 PROCEDURE — 89051 BODY FLUID CELL COUNT: CPT | Performed by: STUDENT IN AN ORGANIZED HEALTH CARE EDUCATION/TRAINING PROGRAM

## 2022-01-01 PROCEDURE — 88341 IMHCHEM/IMCYTCHM EA ADD ANTB: CPT | Performed by: PATHOLOGY

## 2022-01-01 PROCEDURE — 93296 REM INTERROG EVL PM/IDS: CPT | Performed by: INTERNAL MEDICINE

## 2022-01-01 PROCEDURE — 99215 OFFICE O/P EST HI 40 MIN: CPT | Performed by: INTERNAL MEDICINE

## 2022-01-01 PROCEDURE — 93005 ELECTROCARDIOGRAM TRACING: CPT

## 2022-01-01 PROCEDURE — 0W993ZX DRAINAGE OF RIGHT PLEURAL CAVITY, PERCUTANEOUS APPROACH, DIAGNOSTIC: ICD-10-PCS | Performed by: INTERNAL MEDICINE

## 2022-01-01 PROCEDURE — 93306 TTE W/DOPPLER COMPLETE: CPT

## 2022-01-01 PROCEDURE — NC001 PR NO CHARGE: Performed by: SURGERY

## 2022-01-01 PROCEDURE — 85007 BL SMEAR W/DIFF WBC COUNT: CPT | Performed by: STUDENT IN AN ORGANIZED HEALTH CARE EDUCATION/TRAINING PROGRAM

## 2022-01-01 PROCEDURE — 99215 OFFICE O/P EST HI 40 MIN: CPT | Performed by: PHYSICIAN ASSISTANT

## 2022-01-01 PROCEDURE — 80048 BASIC METABOLIC PNL TOTAL CA: CPT | Performed by: EMERGENCY MEDICINE

## 2022-01-01 PROCEDURE — 71260 CT THORAX DX C+: CPT

## 2022-01-01 PROCEDURE — G0108 DIAB MANAGE TRN  PER INDIV: HCPCS | Performed by: DIETITIAN, REGISTERED

## 2022-01-01 PROCEDURE — 85027 COMPLETE CBC AUTOMATED: CPT | Performed by: INTERNAL MEDICINE

## 2022-01-01 PROCEDURE — 32555 ASPIRATE PLEURA W/ IMAGING: CPT | Performed by: PHYSICIAN ASSISTANT

## 2022-01-01 PROCEDURE — 92610 EVALUATE SWALLOWING FUNCTION: CPT

## 2022-01-01 PROCEDURE — 83605 ASSAY OF LACTIC ACID: CPT | Performed by: STUDENT IN AN ORGANIZED HEALTH CARE EDUCATION/TRAINING PROGRAM

## 2022-01-01 PROCEDURE — 99285 EMERGENCY DEPT VISIT HI MDM: CPT | Performed by: EMERGENCY MEDICINE

## 2022-01-01 PROCEDURE — 83690 ASSAY OF LIPASE: CPT | Performed by: EMERGENCY MEDICINE

## 2022-01-01 PROCEDURE — 88112 CYTOPATH CELL ENHANCE TECH: CPT | Performed by: PATHOLOGY

## 2022-01-01 PROCEDURE — 96367 TX/PROPH/DG ADDL SEQ IV INF: CPT

## 2022-01-01 PROCEDURE — 80048 BASIC METABOLIC PNL TOTAL CA: CPT | Performed by: INTERNAL MEDICINE

## 2022-01-01 PROCEDURE — 99233 SBSQ HOSP IP/OBS HIGH 50: CPT | Performed by: SURGERY

## 2022-01-01 PROCEDURE — 96375 TX/PRO/DX INJ NEW DRUG ADDON: CPT

## 2022-01-01 PROCEDURE — 36415 COLL VENOUS BLD VENIPUNCTURE: CPT

## 2022-01-01 PROCEDURE — 99221 1ST HOSP IP/OBS SF/LOW 40: CPT | Performed by: NURSE PRACTITIONER

## 2022-01-01 PROCEDURE — 82945 GLUCOSE OTHER FLUID: CPT | Performed by: STUDENT IN AN ORGANIZED HEALTH CARE EDUCATION/TRAINING PROGRAM

## 2022-01-01 PROCEDURE — 80053 COMPREHEN METABOLIC PANEL: CPT | Performed by: INTERNAL MEDICINE

## 2022-01-01 PROCEDURE — 83605 ASSAY OF LACTIC ACID: CPT | Performed by: EMERGENCY MEDICINE

## 2022-01-01 PROCEDURE — 81001 URINALYSIS AUTO W/SCOPE: CPT

## 2022-01-01 PROCEDURE — 99239 HOSP IP/OBS DSCHRG MGMT >30: CPT | Performed by: INTERNAL MEDICINE

## 2022-01-01 PROCEDURE — 72125 CT NECK SPINE W/O DYE: CPT

## 2022-01-01 PROCEDURE — 70450 CT HEAD/BRAIN W/O DYE: CPT

## 2022-01-01 PROCEDURE — 87205 SMEAR GRAM STAIN: CPT | Performed by: STUDENT IN AN ORGANIZED HEALTH CARE EDUCATION/TRAINING PROGRAM

## 2022-01-01 PROCEDURE — 71046 X-RAY EXAM CHEST 2 VIEWS: CPT

## 2022-01-01 PROCEDURE — 80053 COMPREHEN METABOLIC PANEL: CPT | Performed by: STUDENT IN AN ORGANIZED HEALTH CARE EDUCATION/TRAINING PROGRAM

## 2022-01-01 PROCEDURE — 81001 URINALYSIS AUTO W/SCOPE: CPT | Performed by: EMERGENCY MEDICINE

## 2022-01-01 PROCEDURE — 84157 ASSAY OF PROTEIN OTHER: CPT | Performed by: STUDENT IN AN ORGANIZED HEALTH CARE EDUCATION/TRAINING PROGRAM

## 2022-01-01 PROCEDURE — 99214 OFFICE O/P EST MOD 30 MIN: CPT | Performed by: INTERNAL MEDICINE

## 2022-01-01 PROCEDURE — 99223 1ST HOSP IP/OBS HIGH 75: CPT | Performed by: INTERNAL MEDICINE

## 2022-01-01 PROCEDURE — 93295 DEV INTERROG REMOTE 1/2/MLT: CPT | Performed by: INTERNAL MEDICINE

## 2022-01-01 PROCEDURE — 80053 COMPREHEN METABOLIC PANEL: CPT

## 2022-01-01 PROCEDURE — 32555 ASPIRATE PLEURA W/ IMAGING: CPT

## 2022-01-01 PROCEDURE — 96374 THER/PROPH/DIAG INJ IV PUSH: CPT

## 2022-01-01 PROCEDURE — 87040 BLOOD CULTURE FOR BACTERIA: CPT | Performed by: EMERGENCY MEDICINE

## 2022-01-01 PROCEDURE — 85025 COMPLETE CBC W/AUTO DIFF WBC: CPT | Performed by: INTERNAL MEDICINE

## 2022-01-01 PROCEDURE — 96365 THER/PROPH/DIAG IV INF INIT: CPT

## 2022-01-01 PROCEDURE — 87070 CULTURE OTHR SPECIMN AEROBIC: CPT | Performed by: STUDENT IN AN ORGANIZED HEALTH CARE EDUCATION/TRAINING PROGRAM

## 2022-01-01 RX ORDER — PANTOPRAZOLE SODIUM 40 MG/1
40 INJECTION, POWDER, FOR SOLUTION INTRAVENOUS EVERY 12 HOURS SCHEDULED
Status: DISCONTINUED | OUTPATIENT
Start: 2022-01-01 | End: 2022-01-01 | Stop reason: HOSPADM

## 2022-01-01 RX ORDER — GLYCOPYRROLATE 0.2 MG/ML
0.1 INJECTION INTRAMUSCULAR; INTRAVENOUS EVERY 4 HOURS PRN
Qty: 1 ML | Refills: 0
Start: 2022-01-01

## 2022-01-01 RX ORDER — ONDANSETRON 2 MG/ML
4 INJECTION INTRAMUSCULAR; INTRAVENOUS EVERY 6 HOURS PRN
Qty: 2 ML | Refills: 0
Start: 2022-01-01

## 2022-01-01 RX ORDER — ALPELISIB 200 MG/1
150 TABLET ORAL DAILY
Qty: 28 EACH | Refills: 2 | Status: SHIPPED | OUTPATIENT
Start: 2022-01-01 | End: 2022-01-01 | Stop reason: ALTCHOICE

## 2022-01-01 RX ORDER — LAMOTRIGINE 25 MG/1
500 TABLET ORAL ONCE
Status: CANCELLED | OUTPATIENT
Start: 2022-01-01

## 2022-01-01 RX ORDER — INSULIN LISPRO 100 [IU]/ML
1-5 INJECTION, SOLUTION INTRAVENOUS; SUBCUTANEOUS EVERY 6 HOURS SCHEDULED
Status: DISCONTINUED | OUTPATIENT
Start: 2022-01-01 | End: 2022-01-01

## 2022-01-01 RX ORDER — OXYCODONE HYDROCHLORIDE 5 MG/1
5 TABLET ORAL EVERY 4 HOURS PRN
Status: DISCONTINUED | OUTPATIENT
Start: 2022-01-01 | End: 2022-01-01

## 2022-01-01 RX ORDER — HYDROMORPHONE HCL/PF 1 MG/ML
0.5 SYRINGE (ML) INJECTION EVERY 6 HOURS PRN
Status: DISCONTINUED | OUTPATIENT
Start: 2022-01-01 | End: 2022-01-01

## 2022-01-01 RX ORDER — ACETAMINOPHEN 650 MG/1
650 SUPPOSITORY RECTAL EVERY 4 HOURS PRN
Status: DISCONTINUED | OUTPATIENT
Start: 2022-01-01 | End: 2022-01-01 | Stop reason: HOSPADM

## 2022-01-01 RX ORDER — HYDROMORPHONE HCL/PF 1 MG/ML
1 SYRINGE (ML) INJECTION EVERY 4 HOURS PRN
Status: DISCONTINUED | OUTPATIENT
Start: 2022-01-01 | End: 2022-01-01

## 2022-01-01 RX ORDER — HYDROMORPHONE HCL/PF 1 MG/ML
0.5 SYRINGE (ML) INJECTION EVERY 2 HOUR PRN
Status: DISCONTINUED | OUTPATIENT
Start: 2022-01-01 | End: 2022-01-01

## 2022-01-01 RX ORDER — BISACODYL 10 MG
10 SUPPOSITORY, RECTAL RECTAL DAILY PRN
Status: DISCONTINUED | OUTPATIENT
Start: 2022-01-01 | End: 2022-01-01 | Stop reason: HOSPADM

## 2022-01-01 RX ORDER — LORAZEPAM 2 MG/ML
0.5 INJECTION INTRAMUSCULAR EVERY 4 HOURS PRN
Status: DISCONTINUED | OUTPATIENT
Start: 2022-01-01 | End: 2022-01-01 | Stop reason: HOSPADM

## 2022-01-01 RX ORDER — METOPROLOL TARTRATE 5 MG/5ML
2.5 INJECTION INTRAVENOUS EVERY 6 HOURS
Status: DISCONTINUED | OUTPATIENT
Start: 2022-01-01 | End: 2022-01-01

## 2022-01-01 RX ORDER — HEPARIN SODIUM 5000 [USP'U]/ML
5000 INJECTION, SOLUTION INTRAVENOUS; SUBCUTANEOUS EVERY 8 HOURS SCHEDULED
Status: DISCONTINUED | OUTPATIENT
Start: 2022-01-01 | End: 2022-01-01

## 2022-01-01 RX ORDER — INSULIN LISPRO 100 [IU]/ML
1-5 INJECTION, SOLUTION INTRAVENOUS; SUBCUTANEOUS
Status: DISCONTINUED | OUTPATIENT
Start: 2022-01-01 | End: 2022-01-01

## 2022-01-01 RX ORDER — LAMOTRIGINE 25 MG/1
500 TABLET ORAL ONCE
Status: COMPLETED | OUTPATIENT
Start: 2022-01-01 | End: 2022-01-01

## 2022-01-01 RX ORDER — METOPROLOL TARTRATE 50 MG/1
50 TABLET, FILM COATED ORAL EVERY 12 HOURS SCHEDULED
Qty: 180 TABLET | Refills: 3 | Status: SHIPPED | OUTPATIENT
Start: 2022-01-01 | End: 2022-01-01 | Stop reason: HOSPADM

## 2022-01-01 RX ORDER — ALPELISIB 200 MG/1
200 TABLET ORAL DAILY
Qty: 28 EACH | Refills: 5 | Status: SHIPPED | OUTPATIENT
Start: 2022-01-01 | End: 2022-01-01 | Stop reason: ALTCHOICE

## 2022-01-01 RX ORDER — METRONIDAZOLE 500 MG/100ML
500 INJECTION, SOLUTION INTRAVENOUS EVERY 8 HOURS
Status: DISCONTINUED | OUTPATIENT
Start: 2022-01-01 | End: 2022-01-01

## 2022-01-01 RX ORDER — OXYCODONE HYDROCHLORIDE 5 MG/1
2.5 TABLET ORAL ONCE
Status: COMPLETED | OUTPATIENT
Start: 2022-01-01 | End: 2022-01-01

## 2022-01-01 RX ORDER — SODIUM CHLORIDE 9 MG/ML
20 INJECTION, SOLUTION INTRAVENOUS ONCE
Status: CANCELLED | OUTPATIENT
Start: 2022-01-01

## 2022-01-01 RX ORDER — LORAZEPAM 2 MG/ML
1 INJECTION INTRAMUSCULAR
Status: DISCONTINUED | OUTPATIENT
Start: 2022-01-01 | End: 2022-01-01 | Stop reason: HOSPADM

## 2022-01-01 RX ORDER — ACETAMINOPHEN 325 MG/1
650 TABLET ORAL EVERY 4 HOURS PRN
Status: DISCONTINUED | OUTPATIENT
Start: 2022-01-01 | End: 2022-01-01

## 2022-01-01 RX ORDER — METOPROLOL TARTRATE 50 MG/1
50 TABLET, FILM COATED ORAL EVERY 12 HOURS SCHEDULED
Status: DISCONTINUED | OUTPATIENT
Start: 2022-01-01 | End: 2022-01-01

## 2022-01-01 RX ORDER — LORAZEPAM 2 MG/ML
0.5 INJECTION INTRAMUSCULAR EVERY 4 HOURS PRN
Qty: 0.25 ML | Refills: 0
Start: 2022-01-01 | End: 2022-05-20

## 2022-01-01 RX ORDER — HYDROMORPHONE HCL IN WATER/PF 6 MG/30 ML
0.2 PATIENT CONTROLLED ANALGESIA SYRINGE INTRAVENOUS EVERY 4 HOURS PRN
Status: DISCONTINUED | OUTPATIENT
Start: 2022-01-01 | End: 2022-01-01

## 2022-01-01 RX ORDER — LORAZEPAM 2 MG/ML
0.5 INJECTION INTRAMUSCULAR EVERY 6 HOURS PRN
Status: DISCONTINUED | OUTPATIENT
Start: 2022-01-01 | End: 2022-01-01

## 2022-01-01 RX ORDER — METRONIDAZOLE 500 MG/100ML
500 INJECTION, SOLUTION INTRAVENOUS EVERY 8 HOURS
Status: DISCONTINUED | OUTPATIENT
Start: 2022-01-01 | End: 2022-01-01 | Stop reason: SDUPTHER

## 2022-01-01 RX ORDER — SODIUM CHLORIDE 9 MG/ML
75 INJECTION, SOLUTION INTRAVENOUS CONTINUOUS
Status: DISCONTINUED | OUTPATIENT
Start: 2022-01-01 | End: 2022-01-01

## 2022-01-01 RX ORDER — SODIUM CHLORIDE 9 MG/ML
20 INJECTION, SOLUTION INTRAVENOUS ONCE
Status: COMPLETED | OUTPATIENT
Start: 2022-01-01 | End: 2022-01-01

## 2022-01-01 RX ORDER — INSULIN GLARGINE 100 [IU]/ML
10 INJECTION, SOLUTION SUBCUTANEOUS ONCE
Status: DISCONTINUED | OUTPATIENT
Start: 2022-01-01 | End: 2022-01-01

## 2022-01-01 RX ORDER — ONDANSETRON 2 MG/ML
4 INJECTION INTRAMUSCULAR; INTRAVENOUS EVERY 6 HOURS PRN
Status: DISCONTINUED | OUTPATIENT
Start: 2022-01-01 | End: 2022-01-01 | Stop reason: HOSPADM

## 2022-01-01 RX ORDER — ACETAMINOPHEN 650 MG/1
650 SUPPOSITORY RECTAL EVERY 4 HOURS PRN
Qty: 1 SUPPOSITORY | Refills: 0
Start: 2022-01-01

## 2022-01-01 RX ORDER — ONDANSETRON 2 MG/ML
4 INJECTION INTRAMUSCULAR; INTRAVENOUS ONCE
Status: COMPLETED | OUTPATIENT
Start: 2022-01-01 | End: 2022-01-01

## 2022-01-01 RX ORDER — NYSTATIN 100000 [USP'U]/G
POWDER TOPICAL 2 TIMES DAILY
Status: DISCONTINUED | OUTPATIENT
Start: 2022-01-01 | End: 2022-01-01 | Stop reason: HOSPADM

## 2022-01-01 RX ORDER — BISACODYL 10 MG
10 SUPPOSITORY, RECTAL RECTAL DAILY PRN
Qty: 1 SUPPOSITORY | Refills: 0
Start: 2022-01-01

## 2022-01-01 RX ORDER — HALOPERIDOL 5 MG/ML
0.5 INJECTION INTRAMUSCULAR EVERY 2 HOUR PRN
Status: DISCONTINUED | OUTPATIENT
Start: 2022-01-01 | End: 2022-01-01 | Stop reason: HOSPADM

## 2022-01-01 RX ORDER — ALPELISIB 150 MG/1
150 TABLET ORAL DAILY
Qty: 28 EACH | Refills: 2 | Status: SHIPPED | OUTPATIENT
Start: 2022-01-01 | End: 2022-01-01 | Stop reason: HOSPADM

## 2022-01-01 RX ORDER — OXYCODONE HYDROCHLORIDE 5 MG/1
5 TABLET ORAL EVERY 4 HOURS PRN
Qty: 30 TABLET | Refills: 0 | Status: SHIPPED | OUTPATIENT
Start: 2022-01-01 | End: 2022-01-01 | Stop reason: HOSPADM

## 2022-01-01 RX ORDER — GLYCOPYRROLATE 0.2 MG/ML
0.1 INJECTION INTRAMUSCULAR; INTRAVENOUS EVERY 4 HOURS PRN
Status: DISCONTINUED | OUTPATIENT
Start: 2022-01-01 | End: 2022-01-01 | Stop reason: HOSPADM

## 2022-01-01 RX ORDER — HYDROMORPHONE HCL/PF 1 MG/ML
0.5 SYRINGE (ML) INJECTION
Status: DISCONTINUED | OUTPATIENT
Start: 2022-01-01 | End: 2022-01-01 | Stop reason: HOSPADM

## 2022-01-01 RX ORDER — LAMOTRIGINE 25 MG/1
500 TABLET ORAL ONCE
Status: CANCELLED | OUTPATIENT
Start: 2022-05-30

## 2022-01-01 RX ADMIN — SODIUM CHLORIDE 75 ML/HR: 0.9 INJECTION, SOLUTION INTRAVENOUS at 19:50

## 2022-01-01 RX ADMIN — HEPARIN SODIUM 5000 UNITS: 5000 INJECTION INTRAVENOUS; SUBCUTANEOUS at 22:42

## 2022-01-01 RX ADMIN — HYDROMORPHONE HYDROCHLORIDE 1 MG: 1 INJECTION, SOLUTION INTRAMUSCULAR; INTRAVENOUS; SUBCUTANEOUS at 08:57

## 2022-01-01 RX ADMIN — HEPARIN SODIUM 5000 UNITS: 5000 INJECTION INTRAVENOUS; SUBCUTANEOUS at 14:39

## 2022-01-01 RX ADMIN — ONDANSETRON 4 MG: 2 INJECTION INTRAMUSCULAR; INTRAVENOUS at 11:12

## 2022-01-01 RX ADMIN — METRONIDAZOLE 500 MG: 500 INJECTION, SOLUTION INTRAVENOUS at 19:53

## 2022-01-01 RX ADMIN — FULVESTRANT 500 MG: 50 INJECTION INTRAMUSCULAR at 12:11

## 2022-01-01 RX ADMIN — PANTOPRAZOLE SODIUM 40 MG: 40 INJECTION, POWDER, FOR SOLUTION INTRAVENOUS at 09:22

## 2022-01-01 RX ADMIN — INSULIN LISPRO 3 UNITS: 100 INJECTION, SOLUTION INTRAVENOUS; SUBCUTANEOUS at 18:44

## 2022-01-01 RX ADMIN — SODIUM CHLORIDE 75 ML/HR: 0.9 INJECTION, SOLUTION INTRAVENOUS at 02:34

## 2022-01-01 RX ADMIN — HYDROMORPHONE HYDROCHLORIDE 0.5 MG: 1 INJECTION, SOLUTION INTRAMUSCULAR; INTRAVENOUS; SUBCUTANEOUS at 04:59

## 2022-01-01 RX ADMIN — HYDROMORPHONE HYDROCHLORIDE 1 MG: 1 INJECTION, SOLUTION INTRAMUSCULAR; INTRAVENOUS; SUBCUTANEOUS at 23:24

## 2022-01-01 RX ADMIN — HYDROMORPHONE HYDROCHLORIDE 1 MG: 1 INJECTION, SOLUTION INTRAMUSCULAR; INTRAVENOUS; SUBCUTANEOUS at 08:25

## 2022-01-01 RX ADMIN — PANTOPRAZOLE SODIUM 40 MG: 40 INJECTION, POWDER, FOR SOLUTION INTRAVENOUS at 22:42

## 2022-01-01 RX ADMIN — IOHEXOL 100 ML: 350 INJECTION, SOLUTION INTRAVENOUS at 18:34

## 2022-01-01 RX ADMIN — PANTOPRAZOLE SODIUM 40 MG: 40 INJECTION, POWDER, FOR SOLUTION INTRAVENOUS at 15:06

## 2022-01-01 RX ADMIN — DOCETAXEL 62.8 MG: 20 INJECTION, SOLUTION, CONCENTRATE INTRAVENOUS at 13:19

## 2022-01-01 RX ADMIN — PANTOPRAZOLE SODIUM 40 MG: 40 INJECTION, POWDER, FOR SOLUTION INTRAVENOUS at 09:49

## 2022-01-01 RX ADMIN — PANTOPRAZOLE SODIUM 40 MG: 40 INJECTION, POWDER, FOR SOLUTION INTRAVENOUS at 21:18

## 2022-01-01 RX ADMIN — SODIUM CHLORIDE 75 ML/HR: 0.9 INJECTION, SOLUTION INTRAVENOUS at 17:43

## 2022-01-01 RX ADMIN — NYSTATIN: 100000 POWDER TOPICAL at 08:44

## 2022-01-01 RX ADMIN — IOHEXOL 100 ML: 350 INJECTION, SOLUTION INTRAVENOUS at 09:21

## 2022-01-01 RX ADMIN — METOPROLOL TARTRATE 50 MG: 50 TABLET, FILM COATED ORAL at 09:58

## 2022-01-01 RX ADMIN — METOPROLOL TARTRATE 50 MG: 50 TABLET, FILM COATED ORAL at 22:43

## 2022-01-01 RX ADMIN — SODIUM CHLORIDE 20 ML/HR: 0.9 INJECTION, SOLUTION INTRAVENOUS at 12:55

## 2022-01-01 RX ADMIN — FULVESTRANT 500 MG: 50 INJECTION INTRAMUSCULAR at 11:58

## 2022-01-01 RX ADMIN — NYSTATIN: 100000 POWDER TOPICAL at 08:39

## 2022-01-01 RX ADMIN — INSULIN LISPRO 1 UNITS: 100 INJECTION, SOLUTION INTRAVENOUS; SUBCUTANEOUS at 16:50

## 2022-01-01 RX ADMIN — METOPROLOL TARTRATE 2.5 MG: 1 INJECTION, SOLUTION INTRAVENOUS at 15:12

## 2022-01-01 RX ADMIN — HYDROMORPHONE HYDROCHLORIDE 0.5 MG: 1 INJECTION, SOLUTION INTRAMUSCULAR; INTRAVENOUS; SUBCUTANEOUS at 17:54

## 2022-01-01 RX ADMIN — PANTOPRAZOLE SODIUM 40 MG: 40 INJECTION, POWDER, FOR SOLUTION INTRAVENOUS at 08:39

## 2022-01-01 RX ADMIN — PANTOPRAZOLE SODIUM 40 MG: 40 INJECTION, POWDER, FOR SOLUTION INTRAVENOUS at 21:55

## 2022-01-01 RX ADMIN — IOHEXOL 100 ML: 350 INJECTION, SOLUTION INTRAVENOUS at 12:50

## 2022-01-01 RX ADMIN — SODIUM CHLORIDE 75 ML/HR: 0.9 INJECTION, SOLUTION INTRAVENOUS at 07:22

## 2022-01-01 RX ADMIN — HEPARIN SODIUM 5000 UNITS: 5000 INJECTION INTRAVENOUS; SUBCUTANEOUS at 21:55

## 2022-01-01 RX ADMIN — METRONIDAZOLE 500 MG: 500 INJECTION, SOLUTION INTRAVENOUS at 11:24

## 2022-01-01 RX ADMIN — HYDROMORPHONE HYDROCHLORIDE 0.5 MG: 1 INJECTION, SOLUTION INTRAMUSCULAR; INTRAVENOUS; SUBCUTANEOUS at 11:16

## 2022-01-01 RX ADMIN — METRONIDAZOLE 500 MG: 500 INJECTION, SOLUTION INTRAVENOUS at 18:16

## 2022-01-01 RX ADMIN — DEXAMETHASONE SODIUM PHOSPHATE 10 MG: 10 INJECTION, SOLUTION INTRAMUSCULAR; INTRAVENOUS at 12:15

## 2022-01-01 RX ADMIN — SODIUM CHLORIDE 500 ML: 900 INJECTION, SOLUTION INTRAVENOUS at 11:12

## 2022-01-01 RX ADMIN — METOPROLOL TARTRATE 50 MG: 50 TABLET, FILM COATED ORAL at 22:48

## 2022-01-01 RX ADMIN — HYDROMORPHONE HYDROCHLORIDE 0.5 MG: 1 INJECTION, SOLUTION INTRAMUSCULAR; INTRAVENOUS; SUBCUTANEOUS at 13:59

## 2022-01-01 RX ADMIN — PANTOPRAZOLE SODIUM 40 MG: 40 INJECTION, POWDER, FOR SOLUTION INTRAVENOUS at 08:43

## 2022-01-01 RX ADMIN — CEFEPIME HYDROCHLORIDE 1000 MG: 1 INJECTION, POWDER, FOR SOLUTION INTRAMUSCULAR; INTRAVENOUS at 22:36

## 2022-01-01 RX ADMIN — SODIUM CHLORIDE 20 ML/HR: 0.9 INJECTION, SOLUTION INTRAVENOUS at 12:15

## 2022-01-01 RX ADMIN — SODIUM CHLORIDE 75 ML/HR: 0.9 INJECTION, SOLUTION INTRAVENOUS at 10:47

## 2022-01-01 RX ADMIN — ONDANSETRON 4 MG: 2 INJECTION INTRAMUSCULAR; INTRAVENOUS at 08:24

## 2022-01-01 RX ADMIN — METRONIDAZOLE 500 MG: 500 INJECTION, SOLUTION INTRAVENOUS at 02:10

## 2022-01-01 RX ADMIN — METRONIDAZOLE 500 MG: 500 INJECTION, SOLUTION INTRAVENOUS at 10:41

## 2022-01-01 RX ADMIN — HYDROMORPHONE HYDROCHLORIDE 0.2 MG: 0.2 INJECTION, SOLUTION INTRAMUSCULAR; INTRAVENOUS; SUBCUTANEOUS at 10:07

## 2022-01-01 RX ADMIN — METRONIDAZOLE 500 MG: 500 INJECTION, SOLUTION INTRAVENOUS at 18:26

## 2022-01-01 RX ADMIN — INSULIN LISPRO 3 UNITS: 100 INJECTION, SOLUTION INTRAVENOUS; SUBCUTANEOUS at 00:15

## 2022-01-01 RX ADMIN — ACETAMINOPHEN 650 MG: 325 TABLET, FILM COATED ORAL at 23:24

## 2022-01-01 RX ADMIN — METRONIDAZOLE 500 MG: 500 INJECTION, SOLUTION INTRAVENOUS at 11:29

## 2022-01-01 RX ADMIN — METOPROLOL TARTRATE 2.5 MG: 1 INJECTION, SOLUTION INTRAVENOUS at 02:58

## 2022-01-01 RX ADMIN — SODIUM CHLORIDE 75 ML/HR: 0.9 INJECTION, SOLUTION INTRAVENOUS at 11:50

## 2022-01-01 RX ADMIN — HEPARIN SODIUM 5000 UNITS: 5000 INJECTION INTRAVENOUS; SUBCUTANEOUS at 15:07

## 2022-01-01 RX ADMIN — METRONIDAZOLE 500 MG: 500 INJECTION, SOLUTION INTRAVENOUS at 18:29

## 2022-01-01 RX ADMIN — DOCETAXEL 62.8 MG: 20 INJECTION, SOLUTION, CONCENTRATE INTRAVENOUS at 13:27

## 2022-01-01 RX ADMIN — HYDROMORPHONE HYDROCHLORIDE 0.2 MG: 0.2 INJECTION, SOLUTION INTRAMUSCULAR; INTRAVENOUS; SUBCUTANEOUS at 20:22

## 2022-01-01 RX ADMIN — INSULIN LISPRO 2 UNITS: 100 INJECTION, SOLUTION INTRAVENOUS; SUBCUTANEOUS at 11:24

## 2022-01-01 RX ADMIN — PANTOPRAZOLE SODIUM 40 MG: 40 INJECTION, POWDER, FOR SOLUTION INTRAVENOUS at 08:24

## 2022-01-01 RX ADMIN — NYSTATIN: 100000 POWDER TOPICAL at 08:31

## 2022-01-01 RX ADMIN — METOPROLOL TARTRATE 2.5 MG: 1 INJECTION, SOLUTION INTRAVENOUS at 02:10

## 2022-01-01 RX ADMIN — HEPARIN SODIUM 5000 UNITS: 5000 INJECTION INTRAVENOUS; SUBCUTANEOUS at 05:49

## 2022-01-01 RX ADMIN — METOPROLOL TARTRATE 2.5 MG: 1 INJECTION, SOLUTION INTRAVENOUS at 22:36

## 2022-01-01 RX ADMIN — SODIUM CHLORIDE 500 ML: 0.9 INJECTION, SOLUTION INTRAVENOUS at 08:35

## 2022-01-01 RX ADMIN — HYDROMORPHONE HYDROCHLORIDE 0.5 MG: 1 INJECTION, SOLUTION INTRAMUSCULAR; INTRAVENOUS; SUBCUTANEOUS at 22:48

## 2022-01-01 RX ADMIN — INSULIN LISPRO 1 UNITS: 100 INJECTION, SOLUTION INTRAVENOUS; SUBCUTANEOUS at 11:38

## 2022-01-01 RX ADMIN — CEFEPIME HYDROCHLORIDE 1000 MG: 1 INJECTION, POWDER, FOR SOLUTION INTRAMUSCULAR; INTRAVENOUS at 09:55

## 2022-01-01 RX ADMIN — HYDROMORPHONE HYDROCHLORIDE 1 MG: 1 INJECTION, SOLUTION INTRAMUSCULAR; INTRAVENOUS; SUBCUTANEOUS at 02:01

## 2022-01-01 RX ADMIN — HYDROMORPHONE HYDROCHLORIDE 0.5 MG: 1 INJECTION, SOLUTION INTRAMUSCULAR; INTRAVENOUS; SUBCUTANEOUS at 13:44

## 2022-01-01 RX ADMIN — METRONIDAZOLE 500 MG: 500 INJECTION, SOLUTION INTRAVENOUS at 02:58

## 2022-01-01 RX ADMIN — INSULIN LISPRO 2 UNITS: 100 INJECTION, SOLUTION INTRAVENOUS; SUBCUTANEOUS at 19:52

## 2022-01-01 RX ADMIN — DEXAMETHASONE SODIUM PHOSPHATE 10 MG: 10 INJECTION, SOLUTION INTRAMUSCULAR; INTRAVENOUS at 12:59

## 2022-01-01 RX ADMIN — INSULIN LISPRO 1 UNITS: 100 INJECTION, SOLUTION INTRAVENOUS; SUBCUTANEOUS at 07:22

## 2022-01-01 RX ADMIN — HYDROMORPHONE HYDROCHLORIDE 1 MG: 1 INJECTION, SOLUTION INTRAMUSCULAR; INTRAVENOUS; SUBCUTANEOUS at 09:50

## 2022-01-01 RX ADMIN — NYSTATIN: 100000 POWDER TOPICAL at 18:16

## 2022-01-01 RX ADMIN — FULVESTRANT 500 MG: 50 INJECTION, SOLUTION INTRAMUSCULAR at 12:12

## 2022-01-01 RX ADMIN — METOPROLOL TARTRATE 50 MG: 50 TABLET, FILM COATED ORAL at 08:24

## 2022-01-01 RX ADMIN — CEFEPIME HYDROCHLORIDE 1000 MG: 1 INJECTION, POWDER, FOR SOLUTION INTRAMUSCULAR; INTRAVENOUS at 22:50

## 2022-01-01 RX ADMIN — METRONIDAZOLE 500 MG: 500 INJECTION, SOLUTION INTRAVENOUS at 02:02

## 2022-01-01 RX ADMIN — FULVESTRANT 500 MG: 50 INJECTION, SOLUTION INTRAMUSCULAR at 12:07

## 2022-01-01 RX ADMIN — HEPARIN SODIUM 5000 UNITS: 5000 INJECTION INTRAVENOUS; SUBCUTANEOUS at 13:17

## 2022-01-01 RX ADMIN — INSULIN LISPRO 1 UNITS: 100 INJECTION, SOLUTION INTRAVENOUS; SUBCUTANEOUS at 07:52

## 2022-01-01 RX ADMIN — CEFEPIME HYDROCHLORIDE 1000 MG: 1 INJECTION, POWDER, FOR SOLUTION INTRAMUSCULAR; INTRAVENOUS at 10:08

## 2022-01-01 RX ADMIN — HEPARIN SODIUM 5000 UNITS: 5000 INJECTION INTRAVENOUS; SUBCUTANEOUS at 22:35

## 2022-01-01 RX ADMIN — CEFEPIME HYDROCHLORIDE 1000 MG: 1 INJECTION, POWDER, FOR SOLUTION INTRAMUSCULAR; INTRAVENOUS at 10:31

## 2022-01-01 RX ADMIN — METRONIDAZOLE 500 MG: 500 INJECTION, SOLUTION INTRAVENOUS at 11:12

## 2022-01-01 RX ADMIN — MORPHINE SULFATE 2 MG: 2 INJECTION, SOLUTION INTRAMUSCULAR; INTRAVENOUS at 08:43

## 2022-01-01 RX ADMIN — CEFEPIME HYDROCHLORIDE 1000 MG: 1 INJECTION, POWDER, FOR SOLUTION INTRAMUSCULAR; INTRAVENOUS at 22:42

## 2022-01-01 RX ADMIN — HYDROMORPHONE HYDROCHLORIDE 0.5 MG: 1 INJECTION, SOLUTION INTRAMUSCULAR; INTRAVENOUS; SUBCUTANEOUS at 21:33

## 2022-01-01 RX ADMIN — METRONIDAZOLE 500 MG: 500 INJECTION, SOLUTION INTRAVENOUS at 11:09

## 2022-01-01 RX ADMIN — CEFEPIME HYDROCHLORIDE 2000 MG: 2 INJECTION, POWDER, FOR SOLUTION INTRAVENOUS at 10:47

## 2022-01-01 RX ADMIN — HEPARIN SODIUM 5000 UNITS: 5000 INJECTION INTRAVENOUS; SUBCUTANEOUS at 06:44

## 2022-01-01 RX ADMIN — LORAZEPAM 0.5 MG: 2 INJECTION INTRAMUSCULAR; INTRAVENOUS at 00:03

## 2022-01-01 RX ADMIN — HYDROMORPHONE HYDROCHLORIDE 0.5 MG: 1 INJECTION, SOLUTION INTRAMUSCULAR; INTRAVENOUS; SUBCUTANEOUS at 18:57

## 2022-01-01 RX ADMIN — HEPARIN SODIUM 5000 UNITS: 5000 INJECTION INTRAVENOUS; SUBCUTANEOUS at 14:53

## 2022-01-01 RX ADMIN — INSULIN LISPRO 4 UNITS: 100 INJECTION, SOLUTION INTRAVENOUS; SUBCUTANEOUS at 15:33

## 2022-01-01 RX ADMIN — PANTOPRAZOLE SODIUM 40 MG: 40 INJECTION, POWDER, FOR SOLUTION INTRAVENOUS at 22:48

## 2022-01-01 RX ADMIN — OXYCODONE HYDROCHLORIDE 2.5 MG: 5 TABLET ORAL at 15:33

## 2022-01-01 RX ADMIN — METOPROLOL TARTRATE 2.5 MG: 1 INJECTION, SOLUTION INTRAVENOUS at 15:06

## 2022-01-01 RX ADMIN — CEFEPIME HYDROCHLORIDE 1000 MG: 1 INJECTION, POWDER, FOR SOLUTION INTRAMUSCULAR; INTRAVENOUS at 10:29

## 2022-01-01 RX ADMIN — HYDROMORPHONE HYDROCHLORIDE 0.2 MG: 0.2 INJECTION, SOLUTION INTRAMUSCULAR; INTRAVENOUS; SUBCUTANEOUS at 14:55

## 2022-01-01 RX ADMIN — METOPROLOL TARTRATE 2.5 MG: 1 INJECTION, SOLUTION INTRAVENOUS at 21:55

## 2022-01-01 RX ADMIN — HYDROMORPHONE HYDROCHLORIDE 1 MG: 1 INJECTION, SOLUTION INTRAMUSCULAR; INTRAVENOUS; SUBCUTANEOUS at 15:15

## 2022-01-01 RX ADMIN — HYDROMORPHONE HYDROCHLORIDE 0.2 MG: 0.2 INJECTION, SOLUTION INTRAMUSCULAR; INTRAVENOUS; SUBCUTANEOUS at 20:23

## 2022-01-01 RX ADMIN — SODIUM CHLORIDE 75 ML/HR: 0.9 INJECTION, SOLUTION INTRAVENOUS at 04:39

## 2022-01-01 RX ADMIN — METOPROLOL TARTRATE 50 MG: 50 TABLET, FILM COATED ORAL at 08:43

## 2022-01-01 RX ADMIN — NYSTATIN: 100000 POWDER TOPICAL at 18:28

## 2022-01-01 RX ADMIN — INSULIN LISPRO 1 UNITS: 100 INJECTION, SOLUTION INTRAVENOUS; SUBCUTANEOUS at 11:53

## 2022-01-01 RX ADMIN — HEPARIN SODIUM 5000 UNITS: 5000 INJECTION INTRAVENOUS; SUBCUTANEOUS at 22:48

## 2022-01-01 RX ADMIN — HYDROMORPHONE HYDROCHLORIDE 0.5 MG: 1 INJECTION, SOLUTION INTRAMUSCULAR; INTRAVENOUS; SUBCUTANEOUS at 06:41

## 2022-01-01 RX ADMIN — CEFEPIME HYDROCHLORIDE 1000 MG: 1 INJECTION, POWDER, FOR SOLUTION INTRAMUSCULAR; INTRAVENOUS at 22:01

## 2022-01-01 RX ADMIN — INSULIN LISPRO 3 UNITS: 100 INJECTION, SOLUTION INTRAVENOUS; SUBCUTANEOUS at 06:44

## 2022-01-01 RX ADMIN — MORPHINE SULFATE 2 MG: 2 INJECTION, SOLUTION INTRAMUSCULAR; INTRAVENOUS at 11:29

## 2022-01-01 RX ADMIN — HYDROMORPHONE HYDROCHLORIDE 0.5 MG: 1 INJECTION, SOLUTION INTRAMUSCULAR; INTRAVENOUS; SUBCUTANEOUS at 22:53

## 2022-01-01 RX ADMIN — METOPROLOL TARTRATE 2.5 MG: 1 INJECTION, SOLUTION INTRAVENOUS at 09:22

## 2022-01-01 RX ADMIN — METRONIDAZOLE 500 MG: 500 INJECTION, SOLUTION INTRAVENOUS at 02:31

## 2022-01-05 NOTE — PROGRESS NOTES
Heart Failure Outpatient Progress Note - J Luis Lara 78 y o  female MRN: [id number]    @ Encounter: [id number]      Assessment/Plan:    Patient Active Problem List    Diagnosis Date Noted    Type 2 diabetes mellitus with hyperglycemia, without long-term current use of insulin ([facility] [address] ) 05/05/2021    Hypercalcemia 05/05/2021    Lumbar radiculopathy     VT (ventricular tachycardia) ([facility]) 09/23/2019    Elevated hemidiaphragm 08/20/2019    SSS (sick sinus syndrome) ([facility])     Mixed hyperlipidemia     Cervical spondylosis without myelopathy 06/03/2019    Nerve pain - Right 04/29/2019    Neck pain 04/29/2019    Upper abdominal pain 03/01/2019    SIRS (systemic inflammatory response syndrome) ([facility]) 03/01/2019    Lactic acidosis 03/01/2019    Abnormal urinalysis 03/01/2019    Type 2 diabetes mellitus, without long-term current use of insulin ([facility] [address] ) 03/01/2019    Hypertension 03/01/2019    Lumbar pain 12/17/2018    Spondylolisthesis 12/17/2018    Spinal stenosis, lumbar region, with neurogenic claudication     Intermittent complete heart block ([address] ) 02/09/2018    Acute gallstone pancreatitis 01/30/2018    Cholecystitis 01/07/2018    Acute biliary pancreatitis 01/07/2018    Cholangitis 01/07/2018    Choledocholithiasis 01/07/2018    Chest wall hematoma 30/75/2612    Complication associated with cardiac pacemaker lead 11/09/2017    Pacemaker complications, subsequent encounter 11/09/2017    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 ([facility] [address] ) 01/05/2017    Hypercholesteremia     History of total knee replacement 05/27/2015    LBBB (left bundle branch block) 04/13/2015    Hearing loss 07/16/2014    Gastroesophageal reflux disease without esophagitis 03/22/2010    Thrombocytopenia, unspecified ([address] ) 08/16/2021    Pulmonary embolus ([address] ) 06/03/2021       Assessment:  # Chronic HFpEF, Stage C  Diuretic: torsemide 20 mg  prn  Weight: 132 lbs, 143 lbs  NT proBNP:  Studies- personally reviewed by me  TTE 08/13/2019: LVEF 70%  LVIDd 4 7 cm  Grade 1 DD  Normal RV size and RVSF  Mild TR  # Polymorphic VT w/ near syncope  S/p upgrade of dual chamber PPM to Medtronic BiV ICD 8/20/19    [facility] 8/13/19: no obstructive CAD  Echo 8/13/19:  LVEF: 70%    #  CHB- Medtronic dual chamber PPM 10/31/17 after loop showed 5 second pauses  Interrogation 12/21/21:  99%, optivol normal  Interrogation 5/18/21: Optivol crossed, %, one NSVT 5 beats    # HTN- metoprolol tartrate 50 mg BID  #  Metastsatic breast CA- post right mastectomy, liver lesion removed Sept 2017   S/p right axilla radiation from 02/2019 to 04/2019  Continues on exemestane 25 mg daily and everolimus 2 5 mg daily  Follows with Dr Nikolay Peraza and Regi Hansen as outpatient  Undergoing chemo  #  Hyperlipidemia  1/23/21: LDL 72, HDL 64      TODAY'S PLAN:  Not taking torsemide daily- getting chemo, only takes prn  Will order follow up echo  Examines euvolemic today    HPI: 77 yo female presents for follow up after undergoing PPM placement in October 2017 for symptomatic bradycardia after loop recorder revealed 5 second pause  She has metastatic breast CA, stage 4 with taxotere, cytoxan and arimidex in past with last MUGA Jan 2017 showing EF: 57%  EKG had shown LBBB  Her disease had disappeared radiographically except for liver lesion  She had a number of syncopal episodes  Nuclear stress was unremarkable and echo showed EF: 50%  She underwent resection of liver lesions in Sept 2017  10/4/17 loop recorder implanted  She had 5 second pause noted and underwent Medtronic dual chamber PPM 10/31/17  Post PPM course complicated by RV perforation and hemorrhagic shock  On 11/9 had lead extraction and reimplantation on 11/9/17  Has since followed up with Dr Erwin Every  She had a CT chest 1/718 and there is moderate right pleural effusion  Had polymorphic VT and device upgraded to ICD in 2019  [facility] negative for CAD and echo showed EF: 70%  Interval History:   Had ST last visit, increased metoprolol to 50 mg BID  Interrogation 12/21/21:  99%, optivol normal  Does not take torsemide every day    Review of Systems   Constitutional: Negative for activity change, appetite change, fatigue and unexpected weight change (wt up to 143 lbs)  HENT: Negative for congestion and nosebleeds  Eyes: Negative  Respiratory: Negative for cough, chest tightness and shortness of breath  Cardiovascular: Negative for chest pain, palpitations and leg swelling  Gastrointestinal: Negative for abdominal distention  Endocrine: Negative  Genitourinary: Negative  Musculoskeletal: Negative  Skin: Negative  Neurological: Negative for dizziness, syncope and weakness  Hematological: Negative  Psychiatric/Behavioral: Negative  Past Medical History:   Diagnosis Date    Acute biliary pancreatitis     Anxiety     Arthritis     Breast CA (HCC)     recurrent, ductal carcinoma ER, [place] pos    Cardiac disease     Colon polyp     Depression     Diverticula of intestine     Diverticulosis     Dizziness     and passes out    Flushing     Hiatal hernia     [place] (hard of hearing)      lizette    Hypercholesteremia     Hypertension     Liver mass     Liver metastasis (HCC)     Metastatic adenocarcinoma to liver (HCC)     Bx 01/03/2017    PONV (postoperative nausea and vomiting)     Pulmonary embolism (HCC)     Recurrent breast cancer (HCC)     Shingles     Shortness of breath     on exertion    Tachycardia     Urinary incontinence     Use of cane as ambulatory aid     or walker         Allergies   Allergen Reactions    Ampicillin Shortness Of Breath, Anaphylaxis and Other (See Comments)     Other reaction(s): Unknown Allergic Reaction  Reaction Date: [id number];           Current Outpatient Medications:     atorvastatin (LIPITOR) 20 mg tablet, Take 20 mg by mouth daily  , Disp: , Rfl:     Calcium Citrate-Vitamin D (CALCIUM CITRATE + D PO), Take 1,500 mg by mouth daily, Disp: , Rfl:     Cranberry (THERACRAN HP PO), Take 2 tablets by mouth daily, Disp: , Rfl:     glipiZIDE (GLUCOTROL) 5 mg tablet, Take 1/2 tab before breakfast, Disp: 30 tablet, Rfl: 3    Glucosamine-Chondroit-Vit C-Mn (GLUCOSAMINE 1500 COMPLEX) CAPS, Take 1 capsule by mouth daily  , Disp: , Rfl:     metFORMIN (GLUCOPHAGE) 500 mg tablet, Take 1 tablet (500 mg total) by mouth 2 (two) times a day with meals Please refill when she calls, Disp: 60 tablet, Rfl: 3    metoprolol tartrate (LOPRESSOR) 50 mg tablet, Take 1 tablet (50 mg total) by mouth every 12 (twelve) hours, Disp: 60 tablet, Rfl: 3    Multiple Vitamin (MULTIVITAMIN) capsule, Take 1 capsule by mouth daily  , Disp: , Rfl:     acetaminophen (TYLENOL) 500 mg tablet, Take 500 mg by mouth every 6 (six) hours as needed for mild pain  (Patient not taking: Reported on 1/6/2022 ), Disp: , Rfl:     acetaminophen (TYLENOL) 500 mg tablet, Take 2 tablets (1,000 mg total) by mouth every 6 (six) hours as needed for mild pain (Patient not taking: Reported on 1/6/2022 ), Disp: 60 tablet, Rfl: 0    clindamycin (CLEOCIN) 300 MG capsule, TAKE 2 CAPSULES BY MOUTH 1 HOUR PRIOR TO DENTAL PROCEDURE  (Patient not taking: Reported on 1/6/2022), Disp: , Rfl: 0    clotrimazole-betamethasone (LOTRISONE) 1-0 05 % cream, Apply 1 application topically as needed  (Patient not taking: Reported on 9/30/2021), Disp: , Rfl:     ondansetron (ZOFRAN) 8 mg tablet, Take 1 tablet (8 mg total) by mouth every 8 (eight) hours as needed for nausea or vomiting (Patient not taking: Reported on 1/6/2022 ), Disp: 20 tablet, Rfl: 3    oxyCODONE (ROXICODONE) 5 mg immediate release tablet, Take 1 tablet (5 mg total) by mouth every 4 (four) hours as needed for moderate painMax Daily Amount: 30 mg (Patient not taking: Reported on 1/6/2022 ), Disp: 30 tablet, Rfl: 0    torsemide (DEMADEX) 20 mg tablet, Take 1 tablet (20 mg total) by mouth as needed (rapid weight gain of 3 lbs overnight or 5 lbs in 5-7 days) (Patient not taking: Reported on 1/6/2022 ), Disp: 90 tablet, Rfl: 3  No current facility-administered medications for this visit  Social History     Socioeconomic History    Marital status: /Civil Union     Spouse name: Not on file    Number of children: Not on file    Years of education: Not on file    Highest education level: Not on file   Occupational History    Not on file   Tobacco Use    Smoking status: Never Smoker    Smokeless tobacco: Never Used   Vaping Use    Vaping Use: Never used   Substance and Sexual Activity    Alcohol use: Not Currently    Drug use: No    Sexual activity: Not on file   Other Topics Concern    Not on file   Social History Narrative    Not on file     Social Determinants of Health     Financial Resource Strain: Not on file   Food Insecurity: Not on file   Transportation Needs: Not on file   Physical Activity: Not on file   Stress: Not on file   Social Connections: Not on file   Intimate Partner Violence: Not on file   Housing Stability: Not on file       Family History   Problem Relation Age of Onset    Lung cancer Father     Cancer Brother     Diabetes type II Son        Physical Exam:    Vitals: Blood pressure 122/63, pulse 89, height 5' (1 524 m), weight 64 9 kg (143 lb), SpO2 98 %  , Body mass index is 27 93 kg/m² ,   Wt Readings from Last 3 Encounters:   01/06/22 64 9 kg (143 lb)   01/05/22 65 8 kg (145 lb 1 oz)   12/15/21 64 4 kg (141 lb 13 9 oz)         Physical Exam:  Vitals:    01/06/22 1054   BP: 122/63   Pulse: 89   SpO2: 98%         GEN: Brittny Meng appears well, alert and oriented x 3, pleasant and cooperative   HEENT: pupils equal, round, and reactive to light; extraocular muscles intact  NECK: supple, no carotid bruits   HEART: regular rhythm, normal S1 and S2, no murmurs, clicks, gallops or rubs, JVP is down   LUNGS: clear to auscultation bilaterally; no wheezes, rales, or rhonchi   No significant effusion on exam on right  ABDOMEN: normal bowel sounds, soft, no tenderness, no distention  EXTREMITIES: peripheral pulses normal; no clubbing, cyanosis, or edema  NEURO: no focal findings   SKIN: normal without suspicious lesions on exposed skin    Labs & Results:    Lab Results   Component Value Date    GLUCOSE 156 (H) 11/09/2017    CALCIUM 10 7 (H) 01/03/2022     05/13/2015    K 4 5 01/03/2022    CO2 29 01/03/2022     01/03/2022    BUN 14 01/03/2022    CREATININE 0 78 01/03/2022     Lab Results   Component Value Date    WBC 9 37 01/03/2022    HGB 13 4 01/03/2022    HCT 42 7 01/03/2022    MCV 95 01/03/2022     01/03/2022     No results found for: BNP   No results found for: CHOL  Lab Results   Component Value Date    HDL 63 11/01/2021    HDL 67 (H) 06/20/2017     Lab Results   Component Value Date    LDLCALC 49 11/01/2021    LDLCALC 79 06/20/2017     Lab Results   Component Value Date    TRIG 86 11/01/2021    TRIG 120 06/20/2017     No results found for: CHOLHDL     EKG personally reviewed by Latia Sprague DO  Counseling / Coordination of Care  Time spent today 25 minutes  Greater than 50% of total time was spent with the patient and / or family counseling and / or coordination of care  We discussed diagnoses, most recent studies, tests and any changes in treatment plan    Thank you for the opportunity to participate in the care of this patient      [facility] PULMONARY HYPERTENSION  MEDICAL DIRECTOR OF [place]

## 2022-01-13 NOTE — PROGRESS NOTES
[facility] [place]  [address] [place] [id number] [phone]  [address] [place],1940, [id number]  01/13/22    Discussion:   In summary, this is an 26-year-old female history of ERPR positive HER2 negative breast cancer with liver metastases, stage IV  She has been on Taxotere q  3 weeks  Recent CT scan shows some lesions to be stable, some have increased in size  Aggregate progression is approximately 15%  She seems to be tolerating chemotherapy fairly well  She has fatigue although that started prior to Taxotere  Appetite is fair but her weight is stable  It does not appear that she has had next generation sequencing  We will track down that result and, if not done, arrange for it to be done  She will proceed with next chemotherapy treatment in 2 weeks  I will see her back in 1 month for review  I discussed the above with the patient  The patient and her  voiced understanding and agreement   ______________________________________________________________________    Chief Complaint   Patient presents with    Follow-up       HPI:  Oncology History   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 [facility])   2009 Initial Diagnosis    Adenocarcinoma of right breast,  Infiltrating ductal carcinoma T2, N1 sebastien) Tumor was ER/IN positive, HER2 negative  2009 Surgery    Right Mastectomy     2009 -  Chemotherapy    Adjuvant chemotherapy with Taxotere/Cytoxan x 4 cycles     2009 - 10/2014 Chemotherapy    Arimidex 1 mg po dialy     2/11/2016 Progression    Right lateral mastectomy site growth with core needle biopsy demonstrated reoccurrence  %, IN 70%, Her2 +1  Final Diagnosis   A   Breast, right lateral mastectomy site, core needle biopsies:                        - Recurrent invasive mammary carcinoma, no special type (formerly invasive ductal carcinoma), 0 8 cm largest single focus               - [facility] score: 7/9                         - Tubule formation: None (3/3)  - Nuclear pleomorphism: Marked (3/3)  - Mitotic count: 3 mitoses per 10 high-power fields (1/3)  - No lymph-vascular invasion is identified              - No in situ component is identified                - No microcalcifications are identified  3/16/2016 - 1/24/2017 Chemotherapy    Faslodex 500 mg with loading dose followed by maintenance monthly injection with Ibrance 75mg 3 weeks on 1 week off       12/13/2016 Progression     Progression noted on PET-CT scan  Progression was largely found in the liver  Patient was set up for liver biopsy  1/3/2017 Biopsy    Liver lesion biopsy demonstated Estrogen Receptor (clone SP-1) 100%/positive, Progesterone Receptor (clone 1E2) 1-2%/positive, HER2 by IHC ([id number]) 2+/equivocal   Her2 by FISH was positive  1/25/2017 - 4/19/2017 Chemotherapy     Herceptin and Perjeta x5 cycles     4/20/2017 Adverse Reaction    Perjecta causing significant diarrhea  Medication stopped  7/13/2017 Surgery    Partial Hepatectomy, results demonstrated ER70%, PR0% & Her2 negative disease        9/19/2017 -  Chemotherapy    Tamoxifen 20 mg daily     2/27/2019 - 4/10/2019 Radiation      Plan ID Energy Fractions Dose per Fraction (cGy) Dose Correction (cGy) Total Dose Delivered (cGy) Elapsed Days   R Axilla # 10X/6X 25 / 25 200 0 5,000 35   R Axilla CD 10X/6X 5 / 5 200 0 1,000 6          11/2/2020 -  Hormone Therapy    Aromasin 25 mg daily     11/2/2020 -  Chemotherapy    Afinitor 2 5 mg daily     9/1/2021 -  Chemotherapy    DOCEtaxel (TAXOTERE) chemo infusion, 40 mg/m2 = 62 8 mg (66 7 % of original dose 60 mg/m2), Intravenous, Once, 7 of 8 cycles  Dose modification: 40 mg/m2 (original dose 60 mg/m2, Cycle 1, Reason: Dose modified as per discussion with consulting physician)  Administration: 62 8 mg (9/1/2021), 62 8 mg (9/22/2021), 62 8 mg (10/13/2021), 62 8 mg (11/3/2021), 62 8 mg (11/24/2021), 62 8 mg (1/5/2022), 62 8 mg (12/15/2021)         Interval History:  Clinically stable  ECOG-  3-symptomatic, greater than 50% sedentary  Review of Systems   Constitutional: Negative for appetite change, diaphoresis, fatigue and fever  HENT: Negative for sinus pain  Eyes: Negative for discharge  Respiratory: Negative for cough and shortness of breath  Cardiovascular: Negative for chest pain  Gastrointestinal: Negative for abdominal pain, constipation and diarrhea  Endocrine: Negative for cold intolerance  Genitourinary: Negative for difficulty urinating and hematuria  Musculoskeletal: Negative for joint swelling  Skin: Negative for rash  Allergic/Immunologic: Negative for environmental allergies  Neurological: Negative for dizziness and headaches  Hematological: Negative for adenopathy  Psychiatric/Behavioral: Negative for agitation         Past Medical History:   Diagnosis Date    Acute biliary pancreatitis     Anxiety     Arthritis     Breast CA (HCC)     recurrent, ductal carcinoma [place], [place] pos    Cardiac disease     Colon polyp     Depression     Diverticula of intestine     Diverticulosis     Dizziness     and passes out    Flushing     Hiatal hernia     [place] (hard of hearing)      lizette    Hypercholesteremia     Hypertension     Liver mass     Liver metastasis (HCC)     Metastatic adenocarcinoma to liver (HCC)     Bx 01/03/2017    PONV (postoperative nausea and vomiting)     Pulmonary embolism (HCC)     Recurrent breast cancer (HCC)     Shingles     Shortness of breath     on exertion    Tachycardia     Urinary incontinence     Use of cane as ambulatory aid     or walker     Patient Active Problem List   Diagnosis    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 ([facility] [address] )    Hypercholesteremia    Gastroesophageal reflux disease without esophagitis    History of total knee replacement    Hearing loss    LBBB (left bundle branch block)    Complication associated with cardiac pacemaker lead    Pacemaker complications, subsequent encounter    Chest wall hematoma    Cholecystitis    Acute biliary pancreatitis    Cholangitis    Choledocholithiasis    Acute gallstone pancreatitis    Intermittent complete heart block ([address] )    Spinal stenosis, lumbar region, with neurogenic claudication    Lumbar pain    Spondylolisthesis    Upper abdominal pain    SIRS (systemic inflammatory response syndrome) [facility]    Lactic acidosis    Abnormal urinalysis    Type 2 diabetes mellitus, without long-term current use of insulin ([facility])    Hypertension    Nerve pain - Right    Neck pain    Cervical spondylosis without myelopathy    SSS (sick sinus syndrome) ([facility])    Mixed hyperlipidemia    Elevated hemidiaphragm    VT (ventricular tachycardia) ([facility])    Lumbar radiculopathy    Type 2 diabetes mellitus with hyperglycemia, without long-term current use of insulin ([facility])    Hypercalcemia    Pulmonary embolus ([facility])    Thrombocytopenia, unspecified ([facility])       Current Outpatient Medications:     atorvastatin (LIPITOR) 20 mg tablet, Take 20 mg by mouth daily  , Disp: , Rfl:     Calcium Citrate-Vitamin D (CALCIUM CITRATE + D PO), Take 1,500 mg by mouth daily, Disp: , Rfl:     Cranberry (THERACRAN HP PO), Take 2 tablets by mouth daily, Disp: , Rfl:     glipiZIDE (GLUCOTROL) 5 mg tablet, Take 1/2 tab before breakfast, Disp: 30 tablet, Rfl: 3    Glucosamine-Chondroit-Vit C-Mn (GLUCOSAMINE 1500 COMPLEX) CAPS, Take 1 capsule by mouth daily  , Disp: , Rfl:     metFORMIN (GLUCOPHAGE) 500 mg tablet, Take 1 tablet (500 mg total) by mouth 2 (two) times a day with meals Please refill when she calls, Disp: 60 tablet, Rfl: 3    metoprolol tartrate (LOPRESSOR) 50 mg tablet, Take 1 tablet (50 mg total) by mouth every 12 (twelve) hours, Disp: 60 tablet, Rfl: 3    Multiple Vitamin (MULTIVITAMIN) capsule, Take 1 capsule by mouth daily  , Disp: , Rfl:     acetaminophen (TYLENOL) 500 mg tablet, Take 500 mg by mouth every 6 (six) hours as needed for mild pain  (Patient not taking: Reported on 1/6/2022 ), Disp: , Rfl:     acetaminophen (TYLENOL) 500 mg tablet, Take 2 tablets (1,000 mg total) by mouth every 6 (six) hours as needed for mild pain (Patient not taking: Reported on 1/6/2022 ), Disp: 60 tablet, Rfl: 0    clindamycin (CLEOCIN) 300 MG capsule, TAKE 2 CAPSULES BY MOUTH 1 HOUR PRIOR TO DENTAL PROCEDURE  (Patient not taking: Reported on 1/6/2022), Disp: , Rfl: 0    clotrimazole-betamethasone (LOTRISONE) 1-0 05 % cream, Apply 1 application topically as needed  (Patient not taking: Reported on 9/30/2021), Disp: , Rfl:     ondansetron (ZOFRAN) 8 mg tablet, Take 1 tablet (8 mg total) by mouth every 8 (eight) hours as needed for nausea or vomiting (Patient not taking: Reported on 1/6/2022 ), Disp: 20 tablet, Rfl: 3    oxyCODONE (ROXICODONE) 5 mg immediate release tablet, Take 1 tablet (5 mg total) by mouth every 4 (four) hours as needed for moderate painMax Daily Amount: 30 mg (Patient not taking: Reported on 1/6/2022 ), Disp: 30 tablet, Rfl: 0    torsemide (DEMADEX) 20 mg tablet, Take 1 tablet (20 mg total) by mouth as needed (rapid weight gain of 3 lbs overnight or 5 lbs in 5-7 days) (Patient not taking: Reported on 1/6/2022 ), Disp: 90 tablet, Rfl: 3  Allergies   Allergen Reactions    Ampicillin Shortness Of Breath, Anaphylaxis and Other (See Comments)     Other reaction(s): Unknown Allergic Reaction  Reaction Date: [id number];      Past Surgical History:   Procedure Laterality Date    BREAST SURGERY      CARDIAC PACEMAKER REMOVAL N/A 11/9/2017    Procedure: PACEMAKER LEAD REVISION, REMOVAL OF NONFUNCTIONING LEAD, AND INSERTION OF A NEW RV LEAD;  Surgeon: Andreia Churchill MD;  [facility]: [facility];  Service: Cardiology    CATARACT EXTRACTION Bilateral     COLONOSCOPY      ERCP N/A 1/8/2018    Procedure: ENDOSCOPIC RETROGRADE CHOLANGIOPANCREATOGRAPHY (ERCP); Surgeon: Tessie Neves MD;  Location: [facility]; Service: Gastroenterology    HYSTERECTOMY      INSERT / REPLACE / [place]      JOINT REPLACEMENT      lizette  TKR and right TSR    MASTECTOMY Right     MO ERCP DX COLLECTION SPECIMEN BRUSHING/WASHING N/A 2/22/2018    Procedure: ENDOSCOPIC RETROGRADE CHOLANGIOPANCREATOGRAPHY (ERCP) with stent removal;  Surgeon: Tessie Neves MD;  Location: [facility]; Service: Gastroenterology    MO RESEC [address] N/A 7/13/2017    Procedure: LIVER RESECTION, INTRAOPERATIVE ULTRASOUND;  Surgeon: Tereza Rogers MD;  [facility];  Service: Surgical Oncology    ROTATOR CUFF REPAIR Right     SENTINEL LYMPH NODE BIOPSY Right     TONSILECTOMY AND ADNOIDECTOMY      WOUND DEBRIDEMENT Left 11/9/2017    Procedure: DEBRIDEMENT WOUND AND DRESSING CHANGE [facility]); Surgeon: Marly Finn MD;  Location: [facility];  Service: Cardiology     Social History     Objective:  Vitals:    01/13/22 1346   BP: 112/60   BP Location: Left arm   Patient Position: Sitting   Cuff Size: Adult   Pulse: 95   Resp: 16   Temp: 98 5 °F (36 9 °C)   TempSrc: Temporal   SpO2: 99%   Weight: 62 9 kg (138 lb 9 6 oz)   Height: 5' (1 524 m)     Physical Exam  Constitutional:       Appearance: She is well-developed  HENT:      Head: Normocephalic and atraumatic  Eyes:      Pupils: Pupils are equal, round, and reactive to light  Cardiovascular:      Rate and Rhythm: Normal rate  Heart sounds: No murmur heard  Pulmonary:      Effort: No respiratory distress  Breath sounds: No wheezing or rales  Abdominal:      General: There is no distension  Palpations: Abdomen is soft  Tenderness: There is no abdominal tenderness  There is no rebound  Musculoskeletal:         General: No tenderness  Cervical back: Neck supple  Lymphadenopathy:      Cervical: No cervical adenopathy  Skin:     General: Skin is warm        Findings: No rash    Neurological:      Mental Status: She is alert and oriented to person, place, and time  Deep Tendon Reflexes: Reflexes normal    Psychiatric:         Thought Content: Thought content normal            Labs: I personally reviewed the labs and imaging pertinent to this patient care

## 2022-01-26 NOTE — PROGRESS NOTES
Pt  Denied new symptoms or concerns today  After arrival, pt  Stated she had to use the bathroom and requested help due to active diarrhea  Pt  Stated she may have diarrhea 4 times monthly and uses imodium effectively  Also noted a rise in bilirubin  RN notified office of Dr Asenath Claude  Per Dr Asenath Claude, ok to proceed with treatment  Pt  Tolerated Taxotere without adverse event  Pt had no other episodes of Taxotere during infusion time  Pt  States she will follow up with Dr Asenath Claude and future treatment will be decided  AVS provided

## 2022-02-28 NOTE — PROGRESS NOTES
[facility] [place]  [address] [address]  [address],1940, [phone]  02/28/22    Discussion:   In summary, this is an 75-year-old female history of stage IV breast cancer as outlined  PIK3Ca mutation is noted  ER positive, HER2 negative  We reviewed the potential role of Piqray/Faslodex  We reviewed potential toxicities including but not limited to cytopenias, abnormal LFTs, rash, diarrhea, fatigue  We reviewed monitoring to allow for early intervention as appropriate  Our chemotherapy nurse review the above information as well as providing written information in this regard  CBC, CMP q 2 weeks, tumor marker in 2 months  I discussed the above with the patient  The patient and her  voiced understanding and agreement   ______________________________________________________________________    Chief Complaint   Patient presents with    Follow-up       HPI:  Oncology History   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 [facility])   2009 Initial Diagnosis    Adenocarcinoma of right breast,  Infiltrating ductal carcinoma T2, N1 sebastien) Tumor was ER/AR positive, HER2 negative  2009 Surgery    Right Mastectomy     2009 -  Chemotherapy    Adjuvant chemotherapy with Taxotere/Cytoxan x 4 cycles     2009 - 10/2014 Chemotherapy    Arimidex 1 mg po dialy     2/11/2016 Progression    Right lateral mastectomy site growth with core needle biopsy demonstrated reoccurrence  %, AR 70%, Her2 +1  Final Diagnosis   A  Breast, right lateral mastectomy site, core needle biopsies:                        - Recurrent invasive mammary carcinoma, no special type (formerly invasive ductal carcinoma), 0 8 cm largest single focus               - Combined Randolph score: 7/9                         - Tubule formation: None (3/3)  - Nuclear pleomorphism: Marked (3/3)  - Mitotic count: 3 mitoses per 10 high-power fields (1/3)  - No lymph-vascular invasion is identified              - No in situ component is identified                - No microcalcifications are identified  3/16/2016 - 1/24/2017 Chemotherapy    Faslodex 500 mg with loading dose followed by maintenance monthly injection with Ibrance 75mg 3 weeks on 1 week off       12/13/2016 Progression     Progression noted on PET-CT scan  Progression was largely found in the liver  Patient was set up for liver biopsy  1/3/2017 Biopsy    Liver lesion biopsy demonstated Estrogen Receptor (clone SP-1) 100%/positive, Progesterone Receptor (clone 1E2) 1-2%/positive, HER2 by IHC ([id number]) 2+/equivocal   Her2 by FISH was positive  1/25/2017 - 4/19/2017 Chemotherapy     Herceptin and Perjeta x5 cycles     4/20/2017 Adverse Reaction    Perjecta causing significant diarrhea  Medication stopped  7/13/2017 Surgery    Partial Hepatectomy, results demonstrated ER70%, PR0% & Her2 negative disease        9/19/2017 -  Chemotherapy    Tamoxifen 20 mg daily     2/27/2019 - 4/10/2019 Radiation      Plan ID Energy Fractions Dose per Fraction (cGy) Dose Correction (cGy) Total Dose Delivered (cGy) Elapsed Days   R Axilla # 10X/6X 25 / 25 200 0 5,000 35   R Axilla CD 10X/6X 5 / 5 200 0 1,000 6          11/2/2020 -  Hormone Therapy    Aromasin 25 mg daily     11/2/2020 -  Chemotherapy    Afinitor 2 5 mg daily     9/1/2021 -  Chemotherapy    DOCEtaxel (TAXOTERE) chemo infusion, 40 mg/m2 = 62 8 mg (66 7 % of original dose 60 mg/m2), Intravenous, Once, 8 of 8 cycles  Dose modification: 40 mg/m2 (original dose 60 mg/m2, Cycle 1, Reason: Dose modified as per discussion with consulting physician)  Administration: 62 8 mg (9/1/2021), 62 8 mg (9/22/2021), 62 8 mg (10/13/2021), 62 8 mg (11/3/2021), 62 8 mg (11/24/2021), 62 8 mg (1/5/2022), 62 8 mg (12/15/2021), 62 8 mg (1/26/2022)         Interval History:  Clinically stable  ECOG-  1 - Symptomatic but completely ambulatory    Review of Systems   Constitutional: Negative for appetite change, diaphoresis, fatigue and fever  HENT: Negative for sinus pain  Eyes: Negative for discharge  Respiratory: Negative for cough and shortness of breath  Cardiovascular: Negative for chest pain  Gastrointestinal: Negative for abdominal pain, constipation and diarrhea  Endocrine: Negative for cold intolerance  Genitourinary: Negative for difficulty urinating and hematuria  Musculoskeletal: Negative for joint swelling  Skin: Negative for rash  Allergic/Immunologic: Negative for environmental allergies  Neurological: Negative for dizziness and headaches  Hematological: Negative for adenopathy  Psychiatric/Behavioral: Negative for agitation         Past Medical History:   Diagnosis Date    Acute biliary pancreatitis     Anxiety     Arthritis     Breast CA (HCC)     recurrent, ductal carcinoma ER, ME pos    Cardiac disease     Colon polyp     Depression     Diverticula of intestine     Diverticulosis     Dizziness     and passes out    Flushing     Hiatal hernia     [place] (hard of hearing)      lizette    Hypercholesteremia     Hypertension     Liver mass     Liver metastasis (HCC)     Metastatic adenocarcinoma to liver (HCC)     Bx 01/03/2017    PONV (postoperative nausea and vomiting)     Pulmonary embolism (HCC)     Recurrent breast cancer (HCC)     Shingles     Shortness of breath     on exertion    Tachycardia     Urinary incontinence     Use of cane as ambulatory aid     or walker     Patient Active Problem List   Diagnosis    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 ([facility] [address] )    Hypercholesteremia    Gastroesophageal reflux disease without esophagitis    History of total knee replacement    Hearing loss    LBBB (left bundle branch block)    Complication associated with cardiac pacemaker lead    Pacemaker complications, subsequent encounter    Chest wall hematoma    Cholecystitis    Acute biliary pancreatitis    Cholangitis    Choledocholithiasis    Acute gallstone pancreatitis    Intermittent complete heart block ([facility])    Spinal stenosis, lumbar region, with neurogenic claudication    Lumbar pain    Spondylolisthesis    Upper abdominal pain    SIRS (systemic inflammatory response syndrome) ([facility])    Lactic acidosis    Abnormal urinalysis    Type 2 diabetes mellitus, without long-term current use of insulin ([facility])    Hypertension    Nerve pain - Right    Neck pain    Cervical spondylosis without myelopathy    SSS (sick sinus syndrome) ([facility])    Mixed hyperlipidemia    Elevated hemidiaphragm    VT (ventricular tachycardia) ([facility])    Lumbar radiculopathy    Type 2 diabetes mellitus with hyperglycemia, without long-term current use of insulin ([facility])    Hypercalcemia    Pulmonary embolus ([facility])    Thrombocytopenia, unspecified ([facility])       Current Outpatient Medications:     acetaminophen (TYLENOL) 500 mg tablet, Take 500 mg by mouth every 6 (six) hours as needed for mild pain  (Patient not taking: Reported on 1/6/2022 ), Disp: , Rfl:     acetaminophen (TYLENOL) 500 mg tablet, Take 2 tablets (1,000 mg total) by mouth every 6 (six) hours as needed for mild pain (Patient not taking: Reported on 2/28/2022 ), Disp: 60 tablet, Rfl: 0    atorvastatin (LIPITOR) 20 mg tablet, Take 20 mg by mouth daily  , Disp: , Rfl:     Calcium Citrate-Vitamin D (CALCIUM CITRATE + D PO), Take 1,500 mg by mouth daily, Disp: , Rfl:     clindamycin (CLEOCIN) 300 MG capsule, TAKE 2 CAPSULES BY MOUTH 1 HOUR PRIOR TO DENTAL PROCEDURE  (Patient not taking: Reported on 1/6/2022), Disp: , Rfl: 0    clotrimazole-betamethasone (LOTRISONE) 1-0 05 % cream, Apply 1 application topically as needed  (Patient not taking: Reported on 9/30/2021), Disp: , Rfl:     Cranberry (THERACRAN HP PO), Take 2 tablets by mouth daily, Disp: , Rfl:     glipiZIDE (GLUCOTROL) 5 mg tablet, Take 1/2 tab before breakfast, Disp: 30 tablet, Rfl: 3    Glucosamine-Chondroit-Vit C-Mn (GLUCOSAMINE 1500 COMPLEX) CAPS, Take 1 capsule by mouth daily  , Disp: , Rfl:     metFORMIN (GLUCOPHAGE) 500 mg tablet, Take 1 tablet (500 mg total) by mouth 2 (two) times a day with meals Please refill when she calls, Disp: 60 tablet, Rfl: 3    metoprolol tartrate (LOPRESSOR) 50 mg tablet, Take 1 tablet (50 mg total) by mouth every 12 (twelve) hours, Disp: 180 tablet, Rfl: 3    Multiple Vitamin (MULTIVITAMIN) capsule, Take 1 capsule by mouth daily  , Disp: , Rfl:     ondansetron (ZOFRAN) 8 mg tablet, Take 1 tablet (8 mg total) by mouth every 8 (eight) hours as needed for nausea or vomiting (Patient not taking: Reported on 1/6/2022 ), Disp: 20 tablet, Rfl: 3    oxyCODONE (ROXICODONE) 5 mg immediate release tablet, Take 1 tablet (5 mg total) by mouth every 4 (four) hours as needed for moderate painMax Daily Amount: 30 mg (Patient not taking: Reported on 1/6/2022 ), Disp: 30 tablet, Rfl: 0    torsemide (DEMADEX) 20 mg tablet, Take 1 tablet (20 mg total) by mouth as needed (rapid weight gain of 3 lbs overnight or 5 lbs in 5-7 days) (Patient not taking: Reported on 1/6/2022 ), Disp: 90 tablet, Rfl: 3  Allergies   Allergen Reactions    Ampicillin Shortness Of Breath, Anaphylaxis and Other (See Comments)     Other reaction(s): Unknown Allergic Reaction  Reaction Date: [id number];      Past Surgical History:   Procedure Laterality Date    BREAST SURGERY      CARDIAC PACEMAKER REMOVAL N/A 11/9/2017    Procedure: PACEMAKER LEAD REVISION, REMOVAL OF NONFUNCTIONING LEAD, AND INSERTION OF A NEW RV LEAD;  Surgeon: Andreia Churchill MD;  [facility];  Service: Cardiology    CATARACT EXTRACTION Bilateral     COLONOSCOPY      ERCP N/A 1/8/2018    Procedure: ENDOSCOPIC RETROGRADE CHOLANGIOPANCREATOGRAPHY (ERCP); Surgeon: Parul Dallas MD;  Location: [facility];   Service: Gastroenterology    [facility] INSERT / REPLACE / REMOVE PACEMAKER      JOINT REPLACEMENT      lizette  TKR and right TSR    MASTECTOMY Right     NV ERCP DX COLLECTION SPECIMEN BRUSHING/WASHING N/A 2/22/2018    Procedure: ENDOSCOPIC RETROGRADE CHOLANGIOPANCREATOGRAPHY (ERCP) with stent removal;  Surgeon: Lilian Hartley MD;  [facility]: [facility]; Service: Gastroenterology    [place] RESEC [address] N/A 7/13/2017    Procedure: LIVER RESECTION, INTRAOPERATIVE ULTRASOUND;  Surgeon: Lazarus Nielsen MD;  [facility];  Service: Surgical Oncology    ROTATOR CUFF REPAIR Right     SENTINEL LYMPH NODE BIOPSY Right     TONSILECTOMY AND ADNOIDECTOMY      WOUND DEBRIDEMENT Left 11/9/2017    Procedure: DEBRIDEMENT WOUND AND DRESSING CHANGE [facility]); Surgeon: Bill Yeung MD;  [facility];  Service: Cardiology     Social History     Objective:  Vitals:    02/28/22 1455   BP: 130/76   BP Location: Left arm   Patient Position: Sitting   Cuff Size: Adult   Pulse: 64   Resp: 18   Temp: (!) 97 2 °F (36 2 °C)   SpO2: 96%   Weight: 64 9 kg (143 lb)   Height: 5' (1 524 m)     Physical Exam  Constitutional:       Appearance: She is well-developed  HENT:      Head: Normocephalic and atraumatic  Eyes:      Pupils: Pupils are equal, round, and reactive to light  Cardiovascular:      Rate and Rhythm: Normal rate  Heart sounds: No murmur heard  Pulmonary:      Effort: No respiratory distress  Breath sounds: No wheezing or rales  Abdominal:      General: There is no distension  Palpations: Abdomen is soft  Tenderness: There is no abdominal tenderness  There is no rebound  Musculoskeletal:         General: No tenderness  Cervical back: Neck supple  Lymphadenopathy:      Cervical: No cervical adenopathy  Skin:     General: Skin is warm  Findings: No rash  Neurological:      Mental Status: She is alert and oriented to person, place, and time        Deep Tendon Reflexes: Reflexes normal    Psychiatric: Thought Content: Thought content normal            Labs: I personally reviewed the labs and imaging pertinent to this patient care

## 2022-03-01 NOTE — PROGRESS NOTES
2-28-22  [facility] new oral chemo start- Piqray // Auth required per [employer]  3-1-22  Auth obtained/ copay [facility] [id number] / No foundations available / Completed [facility] application for free drug assistance  Forwarded to provider for signature  Also requested copay of auth letter needed for application as requested by [facility]  Providers portion [facility] and faxed to [facility] along with copy of auth  3-14-22  For ZAKIA Bardales  [birth year]  Piqary    Received notification from [employer] regarding patients free drug application  Patient has been approved   Eff 03-11-22  Thru 04-53-31    Ros // Ronni Tabor // Pricila:  Rx [address] will fill prescription   Please send prescription with refills to them    Epic noted, email to team

## 2022-03-01 NOTE — PROGRESS NOTES
Received request from clinical for patient to start on Piqray 200 MG daily  Karen Pearce has been approved via cover my meds   01/29/2022-02/27/2025  Patient has [address] [facility]- [phone]    ID- [id number]  BIN: [id number] / PCN: [id number] 3/1:  This has been approved with a large co pay- will need to go through free drug assistance  Charissa Bence has already submitted      Call this number to get the approval letter sent over to us for the free drug application  [phone]    [id number]- case number 01/29/2022-02/27/2025

## 2022-03-07 NOTE — PROGRESS NOTES
Pt presents for faslodex injection  No new meds or concerns  Injections administered in bilateral buttocks, simultaneously by Alva Chanel RN  Tolerated well  Future appointments made  AVS given

## 2022-03-21 NOTE — PROGRESS NOTES
Pt arrived to unit without complaint, Faslodex 500mg administered as two 5ml injections (one in each buttocks) slowly over 1 to 2 minutes per injection  Pt tolerated well, left unit in stable condition, AVS provided

## 2022-03-22 NOTE — PROGRESS NOTES
Results for orders placed or performed in visit on 03/22/22   Cardiac EP device report    Narrative    MDT BI-V ICD (3830 IN LV PORT/DDD MODE)  CARELINK TRANSMISSION: BATTERY STATUS "4 YRS " AP 0%  99% VSRP 0%  ALL AVAILABLE LEAD PARAMETERS & TRENDS WITHIN NORMAL LIMITS  2 DEVICE CLASSIFIED VHRS NOTED; NO THERAPIES GIVEN  CAN'T EXCLUDE OVER SENSING VS NOISE ON EPISODE #16  PT ON METO TART & EF 65% (2022)  OPTI-VOL WITHIN NORMAL LIMITS  NORMAL DEVICE FUNCTION  NC         Current Outpatient Medications:     acetaminophen (TYLENOL) 500 mg tablet, Take 500 mg by mouth every 6 (six) hours as needed for mild pain  (Patient not taking: Reported on 1/6/2022 ), Disp: , Rfl:     acetaminophen (TYLENOL) 500 mg tablet, Take 2 tablets (1,000 mg total) by mouth every 6 (six) hours as needed for mild pain (Patient not taking: Reported on 2/28/2022 ), Disp: 60 tablet, Rfl: 0    Alpelisib, 200 MG Daily Dose, (Piqray, 200 MG Daily Dose,) 200 MG TBPK, Take 200 mg by mouth in the morning, Disp: 28 each, Rfl: 5    atorvastatin (LIPITOR) 20 mg tablet, Take 20 mg by mouth daily  , Disp: , Rfl:     Calcium Citrate-Vitamin D (CALCIUM CITRATE + D PO), Take 1,500 mg by mouth daily, Disp: , Rfl:     clindamycin (CLEOCIN) 300 MG capsule, TAKE 2 CAPSULES BY MOUTH 1 HOUR PRIOR TO DENTAL PROCEDURE  (Patient not taking: Reported on 1/6/2022), Disp: , Rfl: 0    clotrimazole-betamethasone (LOTRISONE) 1-0 05 % cream, Apply 1 application topically as needed  (Patient not taking: Reported on 9/30/2021), Disp: , Rfl:     Cranberry (THERACRAN HP PO), Take 2 tablets by mouth daily, Disp: , Rfl:     glipiZIDE (GLUCOTROL) 5 mg tablet, Take 1/2 tab before breakfast, Disp: 30 tablet, Rfl: 3    Glucosamine-Chondroit-Vit C-Mn (GLUCOSAMINE 1500 COMPLEX) CAPS, Take 1 capsule by mouth daily  , Disp: , Rfl:     metFORMIN (GLUCOPHAGE) 500 mg tablet, Take 1 tablet (500 mg total) by mouth 2 (two) times a day with meals Please refill when she calls, Disp: 60 tablet, Rfl: 3    metoprolol tartrate (LOPRESSOR) 50 mg tablet, Take 1 tablet (50 mg total) by mouth every 12 (twelve) hours, Disp: 180 tablet, Rfl: 3    Multiple Vitamin (MULTIVITAMIN) capsule, Take 1 capsule by mouth daily  , Disp: , Rfl:     ondansetron (ZOFRAN) 8 mg tablet, Take 1 tablet (8 mg total) by mouth every 8 (eight) hours as needed for nausea or vomiting (Patient not taking: Reported on 1/6/2022 ), Disp: 20 tablet, Rfl: 3    oxyCODONE (ROXICODONE) 5 mg immediate release tablet, Take 1 tablet (5 mg total) by mouth every 4 (four) hours as needed for moderate painMax Daily Amount: 30 mg (Patient not taking: Reported on 1/6/2022 ), Disp: 30 tablet, Rfl: 0    torsemide (DEMADEX) 20 mg tablet, Take 1 tablet (20 mg total) by mouth as needed (rapid weight gain of 3 lbs overnight or 5 lbs in 5-7 days) (Patient not taking: Reported on 1/6/2022 ), Disp: 90 tablet, Rfl: 3  No current facility-administered medications for this visit

## 2022-03-29 NOTE — TELEPHONE ENCOUNTER
How long she she been on piqray ? 03/24  Who stopped glipizide ? Patient did herself since paperwork told her not to take it    How often is she checking f s - once daily  Log for a few days ?   03/29- before breakfast 377  03/28-before breakfast 316  03/27-before breakfast 218  03/26-before breakfast 175  03/25-before breakfast 208  03/24-before breakfast 105

## 2022-03-29 NOTE — TELEPHONE ENCOUNTER
Please keep the appointment tomorrow - keep well hydrated   and will add new medication tomorrow   For now increase metformin to 2 tabs twice daily with meals   Will be seen tomorrow and after that will discuss with oncologist if piqary dose can be decreased

## 2022-03-29 NOTE — TELEPHONE ENCOUNTER
Patient has an appointment tomorrow but concern that since starting Piqray medication blood sugar have been over 350's   She stopped glipizide because she can not take it with Pigray and only taking Metformin 500mg BID

## 2022-03-30 NOTE — ASSESSMENT & PLAN NOTE
Marychuy Shipman is having severe symptomatic hyperglycemia since starting Piqray 1 week ago  Recommended ER evaluation for IV hydration and and treatment of hyperglycemia and possible DKA, but patient declines  She also has history of lactic acidosis in 2019  Patient/ prefer to monitor condition at home and will go to ER if symptoms are worsening  Will due CMP today to eval for DKA, CRISTINA, and worsening hyperglycemia  If kidney function remains stable will increase metformin to 1000mg twice per day and resume glipizide at 5mg daily  Insulin will likely not be effective while on piqray and hyperglycemia should rapidly improve when piqray is stopped  Will avoid adding DPP4 inhibitor/GLP1 agonist due to hx pancreatitis  Will avoid adding pioglitazone as she already dose have edema/  Will avoid adding SGLT2 inhibitors due to risk of DKA    Contacted Dr Charla Puente via tiger text to discuss plan of care and he recommends stopping Piqray for now and will consider resuming next week at a lower dose if she can tolerate medication without developing severe hyperglycemia     Lab Results   Component Value Date    HGBA1C 7 8 (H) 01/29/2022

## 2022-03-30 NOTE — LETTER
March 30, 2022     Dorothy Kirtirj,   [address]  [place] [address]    Patient: Kierra Bolanos   YOB: 1940   Date of Visit: 3/30/2022       Dear Dr Asenath Claude: Thank you for referring Javier Vines to me for evaluation  Below are my notes for this consultation  If you have questions, please do not hesitate to call me  I look forward to following your patient along with you  Sincerely,        Kali Teran PA-C        CC: No Recipients  Kelly Sensor  3/30/2022  3:02 PM  Sign when Signing Visit      Established Patient Progress Note      Chief Complaint   Patient presents with    Diabetes Type 2        History of Present Illness:   Kierra Bolanos is a 78 y o  female with a history of type 2 diabetes without long term use of insulin since about 5 years ago  She is following with hematology for Metastatic Breast Cancer  She started treatment with Piqray on 3/24/2022 (took 7th dose today at 11AM)  Also started falsodex at that time  Since that time, blood sugars have been elevated  Today, blood sugar was 415  She is feeling very thirsty and having frequent urination  Also reports blurry vision  She has had Nausea, but no vomiting and has had dry heaves x 2  Took medication for nausea  She reports fatigue and increased confusion  No fevers/chills  Stopped taking glipizide 1 week ago as piqray paperwork said not to take glipizide  Prior to starting piqray, fasting blood sugars were typically in the low 100s  Over the past week blood sugars range 208, 175, 218, 316, 377, 415  Fingerstick at visit today: 441  She was previously treated with Afinitor which resulted in hyperglycemia  This was stopped 9/2021       Current regimen:   Metformin 500mg twice per day  (did not get message yesterday to increase metformin Dose)    Has hypertension: Taking metoprolol  Has hyperlipidemia: Taking atorvastatin    Thyroid disorders: none    Reviewed [address] website and recommendations for management of hyperglycemia, information below       Patient Active Problem List   Diagnosis    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 ([facility] [address] )    Hypercholesteremia    Gastroesophageal reflux disease without esophagitis    History of total knee replacement    Hearing loss    LBBB (left bundle branch block)    Complication associated with cardiac pacemaker lead    Pacemaker complications, subsequent encounter    Chest wall hematoma    Cholecystitis    Acute biliary pancreatitis    Cholangitis    Choledocholithiasis    Acute gallstone pancreatitis    Intermittent complete heart block ([facility] [address] )    Spinal stenosis, lumbar region, with neurogenic claudication    Lumbar pain    Spondylolisthesis    Upper abdominal pain    SIRS (systemic inflammatory response syndrome) (HCC)    Lactic acidosis    Abnormal urinalysis    Type 2 diabetes mellitus, without long-term current use of insulin (HCC)    Hypertension    Nerve pain - Right    Neck pain    Cervical spondylosis without myelopathy    SSS (sick sinus syndrome) (HCC)    Mixed hyperlipidemia    Elevated hemidiaphragm    VT (ventricular tachycardia) (HCC)    Lumbar radiculopathy    Type 2 diabetes mellitus with hyperglycemia, without long-term current use of insulin (HCC)    Hypercalcemia    Pulmonary embolus (HCC)    Thrombocytopenia, unspecified (HCC)      Past Medical History:   Diagnosis Date    Acute biliary pancreatitis     Anxiety     Arthritis     Breast CA (HCC)     recurrent, ductal carcinoma ER, AR pos    Cardiac disease     Colon polyp     Depression     Diverticula of intestine     Diverticulosis     Dizziness     and passes out    Flushing     Hiatal hernia     [place] hearing[place]    Hypercholesteremia     Hypertension     Liver mass     Liver metastasis ([facility] [address] )     Metastatic adenocarcinoma to liver ([facility] [address] )     Bx 01/03/2017    PONV (postoperative nausea and vomiting)     Pulmonary embolism (HCC)     Recurrent breast cancer (HCC)     Shingles     Shortness of breath     on exertion    Tachycardia     Urinary incontinence     Use of cane as ambulatory aid     or walker      Past Surgical History:   Procedure Laterality Date    BREAST SURGERY      CARDIAC PACEMAKER REMOVAL N/A 11/9/2017    Procedure: PACEMAKER LEAD REVISION, REMOVAL OF NONFUNCTIONING LEAD, AND INSERTION OF A NEW RV LEAD;  Surgeon: Vito Zapata MD;  [facility];  Service: Cardiology    CATARACT EXTRACTION Bilateral     COLONOSCOPY      ERCP N/A 1/8/2018    Procedure: ENDOSCOPIC RETROGRADE CHOLANGIOPANCREATOGRAPHY (ERCP); Surgeon: Cristiano Prado MD;  Location: [facility]; Service: Gastroenterology    HYSTERECTOMY      INSERT / REPLACE / Moiz Caulk      JOINT REPLACEMENT      [place]  TKR and right TSR    MASTECTOMY Right     [place] ERCP DX COLLECTION SPECIMEN BRUSHING/WASHING N/A 2/22/2018    Procedure: ENDOSCOPIC RETROGRADE CHOLANGIOPANCREATOGRAPHY (ERCP) with stent removal;  Surgeon: Cristiano Prado MD;  [facility]: [facility]; Service: Gastroenterology    [place] RESEC [address] N/A 7/13/2017    Procedure: LIVER RESECTION, INTRAOPERATIVE ULTRASOUND;  Surgeon: Shell Israel MD;  [facility];  Service: Surgical Oncology    ROTATOR CUFF REPAIR Right     SENTINEL LYMPH NODE BIOPSY Right     TONSILECTOMY AND ADNOIDECTOMY      WOUND DEBRIDEMENT Left 11/9/2017    Procedure: DEBRIDEMENT WOUND AND DRESSING CHANGE [facility]);   Surgeon: Vito Zapata MD;  [facility];  Service: Cardiology      Family History   Problem Relation Age of Onset    Lung cancer Father     Cancer Brother     Diabetes type II Son      Social History     Tobacco Use    Smoking status: Never Smoker    Smokeless tobacco: Never Used   Substance Use Topics    Alcohol use: Not Currently     Allergies   Allergen Reactions    Ampicillin Shortness Of Breath, Anaphylaxis and Other (See Comments)     Other reaction(s): Unknown Allergic Reaction  Reaction Date: [id number];          Current Outpatient Medications:     Alpelisib, 200 MG Daily Dose, (Piqray, 200 MG Daily Dose,) 200 MG TBPK, Take 200 mg by mouth in the morning, Disp: 28 each, Rfl: 5    atorvastatin (LIPITOR) 20 mg tablet, Take 20 mg by mouth daily  , Disp: , Rfl:     Calcium Citrate-Vitamin D (CALCIUM CITRATE + D PO), Take 1,500 mg by mouth daily, Disp: , Rfl:     Cranberry (THERACRAN HP PO), Take 2 tablets by mouth daily, Disp: , Rfl:     Glucosamine-Chondroit-Vit C-Mn (GLUCOSAMINE 1500 COMPLEX) CAPS, Take 1 capsule by mouth daily  , Disp: , Rfl:     metFORMIN (GLUCOPHAGE) 500 mg tablet, Take 1 tablet (500 mg total) by mouth 2 (two) times a day with meals Please refill when she calls, Disp: 60 tablet, Rfl: 3    metoprolol tartrate (LOPRESSOR) 50 mg tablet, Take 1 tablet (50 mg total) by mouth every 12 (twelve) hours, Disp: 180 tablet, Rfl: 3    Multiple Vitamin (MULTIVITAMIN) capsule, Take 1 capsule by mouth daily  , Disp: , Rfl:     acetaminophen (TYLENOL) 500 mg tablet, Take 500 mg by mouth every 6 (six) hours as needed for mild pain  (Patient not taking: Reported on 1/6/2022 ), Disp: , Rfl:     acetaminophen (TYLENOL) 500 mg tablet, Take 2 tablets (1,000 mg total) by mouth every 6 (six) hours as needed for mild pain (Patient not taking: Reported on 2/28/2022 ), Disp: 60 tablet, Rfl: 0    clindamycin (CLEOCIN) 300 MG capsule, TAKE 2 CAPSULES BY MOUTH 1 HOUR PRIOR TO DENTAL PROCEDURE  (Patient not taking: Reported on 1/6/2022), Disp: , Rfl: 0    clotrimazole-betamethasone (LOTRISONE) 1-0 05 % cream, Apply 1 application topically as needed  (Patient not taking: Reported on 9/30/2021), Disp: , Rfl:     glipiZIDE (GLUCOTROL) 5 mg tablet, Take 1/2 tab before breakfast (Patient not taking: Reported on 3/30/2022 ), Disp: 30 tablet, Rfl: 3    ondansetron (ZOFRAN) 8 mg tablet, Take 1 tablet (8 mg total) by mouth every 8 (eight) hours as needed for nausea or vomiting (Patient not taking: Reported on 1/6/2022 ), Disp: 20 tablet, Rfl: 3    oxyCODONE (ROXICODONE) 5 mg immediate release tablet, Take 1 tablet (5 mg total) by mouth every 4 (four) hours as needed for moderate painMax Daily Amount: 30 mg (Patient not taking: Reported on 1/6/2022 ), Disp: 30 tablet, Rfl: 0    torsemide (DEMADEX) 20 mg tablet, Take 1 tablet (20 mg total) by mouth as needed (rapid weight gain of 3 lbs overnight or 5 lbs in 5-7 days) (Patient not taking: Reported on 1/6/2022 ), Disp: 90 tablet, Rfl: 3    Review of Systems   Constitutional: Positive for appetite change (decreased appetite) and fatigue  Negative for activity change, chills, diaphoresis, fever and unexpected weight change  HENT: Negative for trouble swallowing and voice change  Eyes: Negative for visual disturbance  Respiratory: Negative for shortness of breath  Cardiovascular: Positive for leg swelling  Negative for chest pain and palpitations  Gastrointestinal: Negative for abdominal pain, constipation and diarrhea  Endocrine: Positive for polydipsia and polyuria  Negative for cold intolerance, heat intolerance and polyphagia  Genitourinary: Negative for frequency and menstrual problem  Musculoskeletal: Positive for gait problem  Negative for arthralgias and myalgias  Skin: Negative for rash  Allergic/Immunologic: Negative for food allergies  Neurological: Positive for dizziness  Negative for tremors  Hematological: Negative for adenopathy  Psychiatric/Behavioral: Negative for sleep disturbance  All other systems reviewed and are negative  Physical Exam:  Body mass index is 27 65 kg/m²    /76   Pulse 80   Ht 5' (1 524 m)   Wt 64 2 kg (141 lb 9 6 oz)   LMP  (LMP Unknown) Comment: post   BMI 27 65 kg/m²    Wt Readings from Last 3 Encounters:   03/30/22 64 2 kg (141 lb 9 6 oz)   02/28/22 64 9 kg (143 lb)   02/22/22 63 kg (139 lb) Physical Exam  Vitals reviewed  Constitutional:       General: She is not in acute distress  Appearance: She is well-developed  She is not ill-appearing or toxic-appearing  Comments: Mucous membranes dry   HENT:      Head: Normocephalic and atraumatic  Eyes:      Conjunctiva/sclera: Conjunctivae normal       Pupils: Pupils are equal, round, and reactive to light  Neck:      Thyroid: No thyromegaly  Cardiovascular:      Rate and Rhythm: Normal rate and regular rhythm  Heart sounds: Normal heart sounds  Pulmonary:      Effort: Pulmonary effort is normal  No respiratory distress  Breath sounds: Normal breath sounds  No wheezing or rales  Abdominal:      General: Bowel sounds are normal  There is no distension  Palpations: Abdomen is soft  Tenderness: There is generalized abdominal tenderness (mild)  There is no guarding  Musculoskeletal:         General: Normal range of motion  Cervical back: Normal range of motion and neck supple  Right lower le+ Pitting Edema present  Left lower le+ Pitting Edema present  Skin:     General: Skin is warm and dry  Neurological:      Mental Status: She is alert and oriented to person, place, and time           Labs:   Component      Latest Ref Rng & Units 5/10/2021 5/10/2021 5/10/2021 5/10/2021          10:51 AM 10:51 AM 10:51 AM 10:51 AM   Sodium      136 - 145 mmol/L       Potassium      3 5 - 5 3 mmol/L       Chloride      100 - 108 mmol/L       CO2      21 - 32 mmol/L       Anion Gap      4 - 13 mmol/L       BUN      5 - 25 mg/dL       Creatinine      0 60 - 1 30 mg/dL       GLUCOSE FASTING      65 - 99 mg/dL       Calcium      8 3 - 10 1 mg/dL       CORRECTED CALCIUM      8 3 - 10 1 mg/dL       AST      5 - 45 U/L       ALT      12 - 78 U/L       Alkaline Phosphatase      46 - 116 U/L       Total Protein      6 4 - 8 2 g/dL       Albumin      3 5 - 5 0 g/dL       TOTAL BILIRUBIN      0 20 - 1 00 mg/dL eGFR      ml/min/1 73sq m       Hemoglobin A1C      <5 7 %       eAG, EST AVG Glucose      <154 mg/dL       PARATHYROID HORMONE      18 4 - 80 1 pg/mL 26 1      Vit D, 25-Hydroxy      30 0 - 100 0 ng/mL  50 1     TSH 3RD GENERATON      0 358 - 3 740 uIU/mL   1 480    Free T4      0 76 - 1 46 ng/dL    1 17     Component      Latest Ref Rng & Units 5/10/2021 1/29/2022 3/16/2022 3/28/2022          10:51 AM  9:37 AM  9:38 AM  9:24 AM   Sodium      136 - 145 mmol/L   138 137   Potassium      3 5 - 5 3 mmol/L   4 3 5 3   Chloride      100 - 108 mmol/L   104 104   CO2      21 - 32 mmol/L   29 28   Anion Gap      4 - 13 mmol/L   5 5   BUN      5 - 25 mg/dL   15 15   Creatinine      0 60 - 1 30 mg/dL   0 88 1 02   GLUCOSE FASTING      65 - 99 mg/dL   157 (H) 331 (H)   Calcium      8 3 - 10 1 mg/dL   10 4 (H) 10 3 (H)   CORRECTED CALCIUM      8 3 - 10 1 mg/dL   11 0 (H) 10 9 (H)   AST      5 - 45 U/L   78 (H) 59 (H)   ALT      12 - 78 U/L   61 47   Alkaline Phosphatase      46 - 116 U/L   309 (H) 329 (H)   Total Protein      6 4 - 8 2 g/dL   7 1 7 2   Albumin      3 5 - 5 0 g/dL   3 2 (L) 3 3 (L)   TOTAL BILIRUBIN      0 20 - 1 00 mg/dL   2 02 (H) 1 16 (H)   eGFR      ml/min/1 73sq m   60 50   Hemoglobin A1C      <5 7 % 9 1 (H) 7 8 (H)     eAG, EST AVG Glucose      <154 mg/dL 214 177 (H)     PARATHYROID HORMONE      18 4 - 80 1 pg/mL       Vit D, 25-Hydroxy      30 0 - 100 0 ng/mL       TSH 3RD GENERATON      0 358 - 3 740 uIU/mL       Free T4      0 76 - 1 46 ng/dL           Impression & Plan:    Problem List Items Addressed This Visit        Endocrine    Type 2 diabetes mellitus with hyperglycemia, without long-term current use of insulin ([address] ) - Primary     Marychuy Shipman is having severe symptomatic hyperglycemia since starting Piqray 1 week ago  Recommended ER evaluation for IV hydration and and treatment of hyperglycemia and possible DKA, but patient declines    Patient/ prefer to monitor condition at home and will go to ER if symptoms are worsening  Will due CMP today to eval for DKA, CRISTINA, and worsening hyperglycemia  If kidney function remains stable will increase metformin to 1000mg twice per day and resume glipizide at 5mg daily  Insulin will likely not be effective while on piqray and hyperglycemia should rapidly improve when piqray is stopped  Will avoid adding DPP4 inhibitor/GLP1 agonist due to hx pancreatitis  Will avoid adding pioglitazone as she already dose have edema/  Will avoid adding SGLT2 inhibitors due to risk of DKA    Contacted Dr Aamir Marin via tiger text to discuss plan of care and he recommends stopping Piqray for now and will consider resuming next week at a lower dose if she can tolerate medication without developing severe hyperglycemia  Lab Results   Component Value Date    HGBA1C 7 8 (H) 01/29/2022            Relevant Orders    Comprehensive metabolic panel Lab Collect       Other    Mixed hyperlipidemia     Continue atorvastatin  Orders Placed This Encounter   Procedures    Comprehensive metabolic panel Lab Collect     This is a patient instruction: Patient fasting for 8 hours or longer recommended  Standing Status:   Future     Number of Occurrences:   1     Standing Expiration Date:   3/30/2023       Patient Instructions   Do lab testing today for CMP    Stay off [address] until you hear from Dr Roger Ramirez office next week  Resume Metformin 500mg twice per day-- depending on lab results, we can increase dose  Resume glipizide at 5mg daily with breakfast for now  If symptoms worse-- go to ER  Keep well hydrated with water and non-sugary fluids  Call tomorrow to update us with your blood sugars  Discussed with the patient and all questioned fully answered  She will call me if any problems arise  Follow-up appointment in 1 month       Counseled patient on diagnostic results, prognosis, risk and benefit of treatment options, instruction for management, importance of treatment compliance, Risk  factor reduction and impressions  Reviewed plan with Dr Hayley Lara who also spoke with patient regarding recommendation for evaluation in ER  Patient/ wish to avoid hospitalization if possible but will go to ER if condition worsening and unable to maintain hydration at home       Laura Sterling PA-C

## 2022-03-30 NOTE — PROGRESS NOTES
Established Patient Progress Note      Chief Complaint   Patient presents with    Diabetes Type 2        History of Present Illness:   Pancho James is a 78 y o  female with a history of type 2 diabetes without long term use of insulin since about 5 years ago  She is following with hematology for Metastatic Breast Cancer  She started treatment with Piqray on 3/24/2022 (took 7th dose today at 11AM)  Also started falsodex at that time  Since that time, blood sugars have been elevated  Today, blood sugar was 415  She is feeling very thirsty and having frequent urination  Also reports blurry vision  She has had Nausea, but no vomiting and has had dry heaves x 2  Took medication for nausea  She reports fatigue and increased confusion  No fevers/chills  Stopped taking glipizide 1 week ago as piqray paperwork said not to take glipizide  Prior to starting piqray, fasting blood sugars were typically in the low 100s  Over the past week blood sugars range 208, 175, 218, 316, 377, 415  Fingerstick at visit today: 441  She was previously treated with Afinitor which resulted in hyperglycemia  This was stopped 9/2021  Current regimen:   Metformin 500mg twice per day  (did not get message yesterday to increase metformin Dose)    Has hypertension: Taking metoprolol  Has hyperlipidemia: Taking atorvastatin    Thyroid disorders: none    Reviewed [address] website and recommendations for management of hyperglycemia, information below       Patient Active Problem List   Diagnosis    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 ([facility] [address] )    Hypercholesteremia    Gastroesophageal reflux disease without esophagitis    History of total knee replacement    Hearing loss    LBBB (left bundle branch block)    Complication associated with cardiac pacemaker lead    Pacemaker complications, subsequent encounter    Chest wall hematoma    Cholecystitis    Acute biliary pancreatitis    Cholangitis    Choledocholithiasis    Acute gallstone pancreatitis    Intermittent complete heart block ([address] )    Spinal stenosis, lumbar region, with neurogenic claudication    Lumbar pain    Spondylolisthesis    Upper abdominal pain    SIRS (systemic inflammatory response syndrome) (HCC)    Lactic acidosis    Abnormal urinalysis    Type 2 diabetes mellitus, without long-term current use of insulin (HCC)    Hypertension    Nerve pain - Right    Neck pain    Cervical spondylosis without myelopathy    SSS (sick sinus syndrome) (HCC)    Mixed hyperlipidemia    Elevated hemidiaphragm    VT (ventricular tachycardia) (HCC)    Lumbar radiculopathy    Type 2 diabetes mellitus with hyperglycemia, without long-term current use of insulin (HCC)    Hypercalcemia    Pulmonary embolus (HCC)    Thrombocytopenia, unspecified (HCC)      Past Medical History:   Diagnosis Date    Acute biliary pancreatitis     Anxiety     Arthritis     Breast CA (HCC)     recurrent, ductal carcinoma ER, [place] pos    Cardiac disease     Colon polyp     Depression     Diverticula of intestine     Diverticulosis     Dizziness     and passes out    Flushing     Hiatal hernia     [place] (hard of hearing[place]      [place]    Hypercholesteremia     Hypertension     Liver mass     Liver metastasis ([facility] [address] )     Metastatic adenocarcinoma to liver ([facility] [address] )     Bx 01/03/2017    PONV (postoperative nausea and vomiting)     Pulmonary embolism (HCC)     Recurrent breast cancer (HCC)     Shingles     Shortness of breath     on exertion    Tachycardia     Urinary incontinence     Use of cane as ambulatory aid     or walker      Past Surgical History:   Procedure Laterality Date    BREAST SURGERY      CARDIAC PACEMAKER REMOVAL N/A 11/9/2017    Procedure: PACEMAKER LEAD REVISION, REMOVAL OF NONFUNCTIONING LEAD, AND INSERTION OF A NEW RV LEAD;  Surgeon: Marco A Fuentes MD;  [facility];  Service: Cardiology    CATARACT EXTRACTION Bilateral     COLONOSCOPY      ERCP N/A 1/8/2018    Procedure: ENDOSCOPIC RETROGRADE CHOLANGIOPANCREATOGRAPHY (ERCP); Surgeon: Regina Taylor MD;  Location: [facility]; Service: Gastroenterology    HYSTERECTOMY      INSERT / REPLACE / Alverna Skillern      JOINT REPLACEMENT      [place]  TKR and right TSR    MASTECTOMY Right     UT ERCP DX COLLECTION SPECIMEN BRUSHING/WASHING N/A 2/22/2018    Procedure: ENDOSCOPIC RETROGRADE CHOLANGIOPANCREATOGRAPHY (ERCP) with stent removal;  Surgeon: Regina Taylor MD;  [facility]; Service: Gastroenterology    UT RESEC [address] N/A 7/13/2017    Procedure: LIVER RESECTION, INTRAOPERATIVE ULTRASOUND;  Surgeon: Judith Paige MD;  [facility];  Service: Surgical Oncology    ROTATOR CUFF REPAIR Right     SENTINEL LYMPH NODE BIOPSY Right     TONSILECTOMY AND ADNOIDECTOMY      WOUND DEBRIDEMENT Left 11/9/2017    Procedure: DEBRIDEMENT WOUND AND DRESSING CHANGE [facility]); Surgeon: Jazmín Barron MD;  [facility];  Service: Cardiology      Family History   Problem Relation Age of Onset    Lung cancer Father     Cancer Brother     Diabetes type II Son      Social History     Tobacco Use    Smoking status: Never Smoker    Smokeless tobacco: Never Used   Substance Use Topics    Alcohol use: Not Currently     Allergies   Allergen Reactions    Ampicillin Shortness Of Breath, Anaphylaxis and Other (See Comments)     Other reaction(s): Unknown Allergic Reaction  Reaction Date: 07Mar2012;          Current Outpatient Medications:     Alpelisib, 200 MG Daily Dose, (Piqray, 200 MG Daily Dose,) 200 MG TBPK, Take 200 mg by mouth in the morning, Disp: 28 each, Rfl: 5    atorvastatin (LIPITOR) 20 mg tablet, Take 20 mg by mouth daily  , Disp: , Rfl:     Calcium Citrate-Vitamin D (CALCIUM CITRATE + D PO), Take 1,500 mg by mouth daily, Disp: , Rfl:     Cranberry (THERACRAN HP PO), Take 2 tablets by mouth daily, Disp: , Rfl:     Glucosamine-Chondroit-Vit C-Mn (GLUCOSAMINE 1500 COMPLEX) CAPS, Take 1 capsule by mouth daily  , Disp: , Rfl:     metFORMIN (GLUCOPHAGE) 500 mg tablet, Take 1 tablet (500 mg total) by mouth 2 (two) times a day with meals Please refill when she calls, Disp: 60 tablet, Rfl: 3    metoprolol tartrate (LOPRESSOR) 50 mg tablet, Take 1 tablet (50 mg total) by mouth every 12 (twelve) hours, Disp: 180 tablet, Rfl: 3    Multiple Vitamin (MULTIVITAMIN) capsule, Take 1 capsule by mouth daily  , Disp: , Rfl:     acetaminophen (TYLENOL) 500 mg tablet, Take 500 mg by mouth every 6 (six) hours as needed for mild pain  (Patient not taking: Reported on 1/6/2022 ), Disp: , Rfl:     acetaminophen (TYLENOL) 500 mg tablet, Take 2 tablets (1,000 mg total) by mouth every 6 (six) hours as needed for mild pain (Patient not taking: Reported on 2/28/2022 ), Disp: 60 tablet, Rfl: 0    clindamycin (CLEOCIN) 300 MG capsule, TAKE 2 CAPSULES BY MOUTH 1 HOUR PRIOR TO DENTAL PROCEDURE  (Patient not taking: Reported on 1/6/2022), Disp: , Rfl: 0    clotrimazole-betamethasone (LOTRISONE) 1-0 05 % cream, Apply 1 application topically as needed  (Patient not taking: Reported on 9/30/2021), Disp: , Rfl:     glipiZIDE (GLUCOTROL) 5 mg tablet, Take 1/2 tab before breakfast (Patient not taking: Reported on 3/30/2022 ), Disp: 30 tablet, Rfl: 3    ondansetron (ZOFRAN) 8 mg tablet, Take 1 tablet (8 mg total) by mouth every 8 (eight) hours as needed for nausea or vomiting (Patient not taking: Reported on 1/6/2022 ), Disp: 20 tablet, Rfl: 3    oxyCODONE (ROXICODONE) 5 mg immediate release tablet, Take 1 tablet (5 mg total) by mouth every 4 (four) hours as needed for moderate painMax Daily Amount: 30 mg (Patient not taking: Reported on 1/6/2022 ), Disp: 30 tablet, Rfl: 0    torsemide (DEMADEX) 20 mg tablet, Take 1 tablet (20 mg total) by mouth as needed (rapid weight gain of 3 lbs overnight or 5 lbs in 5-7 days) (Patient not taking: Reported on 1/6/2022 ), Disp: 90 tablet, Rfl: 3    Review of Systems   Constitutional: Positive for appetite change (decreased appetite) and fatigue  Negative for activity change, chills, diaphoresis, fever and unexpected weight change  HENT: Negative for trouble swallowing and voice change  Eyes: Negative for visual disturbance  Respiratory: Negative for shortness of breath  Cardiovascular: Positive for leg swelling  Negative for chest pain and palpitations  Gastrointestinal: Negative for abdominal pain, constipation and diarrhea  Endocrine: Positive for polydipsia and polyuria  Negative for cold intolerance, heat intolerance and polyphagia  Genitourinary: Negative for frequency and menstrual problem  Musculoskeletal: Positive for gait problem  Negative for arthralgias and myalgias  Skin: Negative for rash  Allergic/Immunologic: Negative for food allergies  Neurological: Positive for dizziness  Negative for tremors  Hematological: Negative for adenopathy  Psychiatric/Behavioral: Negative for sleep disturbance  All other systems reviewed and are negative  Physical Exam:  Body mass index is 27 65 kg/m²  /76   Pulse 80   Ht 5' (1 524 m)   Wt 64 2 kg (141 lb 9 6 oz)   LMP  (LMP Unknown) Comment: post   BMI 27 65 kg/m²    Wt Readings from Last 3 Encounters:   03/30/22 64 2 kg (141 lb 9 6 oz)   02/28/22 64 9 kg (143 lb)   02/22/22 63 kg (139 lb)       Physical Exam  Vitals reviewed  Constitutional:       General: She is not in acute distress  Appearance: She is well-developed  She is not ill-appearing or toxic-appearing  Comments: Mucous membranes dry   HENT:      Head: Normocephalic and atraumatic  Eyes:      Conjunctiva/sclera: Conjunctivae normal       Pupils: Pupils are equal, round, and reactive to light  Neck:      Thyroid: No thyromegaly  Cardiovascular:      Rate and Rhythm: Normal rate and regular rhythm  Heart sounds: Normal heart sounds     Pulmonary:      Effort: Pulmonary effort is normal  No respiratory distress  Breath sounds: Normal breath sounds  No wheezing or rales  Abdominal:      General: Bowel sounds are normal  There is no distension  Palpations: Abdomen is soft  Tenderness: There is generalized abdominal tenderness (mild)  There is no guarding  Musculoskeletal:         General: Normal range of motion  Cervical back: Normal range of motion and neck supple  Right lower le+ Pitting Edema present  Left lower le+ Pitting Edema present  Skin:     General: Skin is warm and dry  Neurological:      Mental Status: She is alert and oriented to person, place, and time           Labs:   Component      Latest Ref Rng & Units 5/10/2021 5/10/2021 5/10/2021 5/10/2021          10:51 AM 10:51 AM 10:51 AM 10:51 AM   Sodium      136 - 145 mmol/L       Potassium      3 5 - 5 3 mmol/L       Chloride      100 - 108 mmol/L       CO2      21 - 32 mmol/L       Anion Gap      4 - 13 mmol/L       BUN      5 - 25 mg/dL       Creatinine      0 60 - 1 30 mg/dL       GLUCOSE FASTING      65 - 99 mg/dL       Calcium      8 3 - 10 1 mg/dL       CORRECTED CALCIUM      8 3 - 10 1 mg/dL       AST      5 - 45 U/L       ALT      12 - 78 U/L       Alkaline Phosphatase      46 - 116 U/L       Total Protein      6 4 - 8 2 g/dL       Albumin      3 5 - 5 0 g/dL       TOTAL BILIRUBIN      0 20 - 1 00 mg/dL       eGFR      ml/min/1 73sq m       Hemoglobin A1C      <5 7 %       eAG, EST AVG Glucose      <154 mg/dL       PARATHYROID HORMONE      18 4 - 80 1 pg/mL 26 1      Vit D, 25-Hydroxy      30 0 - 100 0 ng/mL  50 1     TSH 3RD GENERATON      0 358 - 3 740 uIU/mL   1 480    Free T4      0 76 - 1 46 ng/dL    1 17     Component      Latest Ref Rng & Units 5/10/2021 2022 3/16/2022 3/28/2022          10:51 AM  9:37 AM  9:38 AM  9:24 AM   Sodium      136 - 145 mmol/L   138 137   Potassium      3 5 - 5 3 mmol/L   4 3 5 3   Chloride      100 - 108 mmol/L   104 104   CO2      21 - 32 mmol/L   29 28   Anion Gap      4 - 13 mmol/L   5 5   BUN      5 - 25 mg/dL   15 15   Creatinine      0 60 - 1 30 mg/dL   0 88 1 02   GLUCOSE FASTING      65 - 99 mg/dL   157 (H) 331 (H)   Calcium      8 3 - 10 1 mg/dL   10 4 (H) 10 3 (H)   CORRECTED CALCIUM      8 3 - 10 1 mg/dL   11 0 (H) 10 9 (H)   AST      5 - 45 U/L   78 (H) 59 (H)   ALT      12 - 78 U/L   61 47   Alkaline Phosphatase      46 - 116 U/L   309 (H) 329 (H)   Total Protein      6 4 - 8 2 g/dL   7 1 7 2   Albumin      3 5 - 5 0 g/dL   3 2 (L) 3 3 (L)   TOTAL BILIRUBIN      0 20 - 1 00 mg/dL   2 02 (H) 1 16 (H)   eGFR      ml/min/1 73sq m   60 50   Hemoglobin A1C      <5 7 % 9 1 (H) 7 8 (H)     eAG, EST AVG Glucose      <154 mg/dL 214 177 (H)     PARATHYROID HORMONE      18 4 - 80 1 pg/mL       Vit D, 25-Hydroxy      30 0 - 100 0 ng/mL       TSH 3RD GENERATON      0 358 - 3 740 uIU/mL       Free T4      0 76 - 1 46 ng/dL           Impression & Plan:    Problem List Items Addressed This Visit        Endocrine    Type 2 diabetes mellitus with hyperglycemia, without long-term current use of insulin ([facility] [address] ) - Primary     Bethany Giordano is having severe symptomatic hyperglycemia since starting Piqray 1 week ago  Recommended ER evaluation for IV hydration and and treatment of hyperglycemia and possible DKA, but patient declines  She also has history of lactic acidosis in 2019  Patient/ prefer to monitor condition at home and will go to ER if symptoms are worsening  Will due CMP today to eval for DKA, CRISTINA, and worsening hyperglycemia  If kidney function remains stable will increase metformin to 1000mg twice per day and resume glipizide at 5mg daily  Insulin will likely not be effective while on piqray and hyperglycemia should rapidly improve when piqray is stopped      Will avoid adding DPP4 inhibitor/GLP1 agonist due to hx pancreatitis  Will avoid adding pioglitazone as she already dose have edema/  Will avoid adding SGLT2 inhibitors due to risk of DKA    Contacted Dr Minna Cortez via tiger text to discuss plan of care and he recommends stopping Piqray for now and will consider resuming next week at a lower dose if she can tolerate medication without developing severe hyperglycemia  Lab Results   Component Value Date    HGBA1C 7 8 (H) 01/29/2022            Relevant Orders    Comprehensive metabolic panel Lab Collect       Other    Mixed hyperlipidemia     Continue atorvastatin  Orders Placed This Encounter   Procedures    Comprehensive metabolic panel Lab Collect     This is a patient instruction: Patient fasting for 8 hours or longer recommended  Standing Status:   Future     Number of Occurrences:   1     Standing Expiration Date:   3/30/2023       Patient Instructions   Do lab testing today for CMP    Stay off [address] until you hear from Dr Reji Neves office next week  Resume Metformin 500mg twice per day-- depending on lab results, we can increase dose  Resume glipizide at 5mg daily with breakfast for now  If symptoms worse-- go to ER  Keep well hydrated with water and non-sugary fluids  Call tomorrow to update us with your blood sugars  Discussed with the patient and all questioned fully answered  She will call me if any problems arise  Follow-up appointment in 1 month  Counseled patient on diagnostic results, prognosis, risk and benefit of treatment options, instruction for management, importance of treatment compliance, Risk  factor reduction and impressions  Reviewed plan with Dr Anushka Kohler who also spoke with patient regarding recommendation for evaluation in ER  Patient/ wish to avoid hospitalization if possible but will go to ER if condition worsening and unable to maintain hydration at home       Zi Pillai PA-C

## 2022-03-30 NOTE — TELEPHONE ENCOUNTER
Rec'd return call from pt  Advised pt to hold [address] until further notice  Pt is to continue checking blood sugars and will call me on Monday to let me know how they are  Advised her we would be decreasing her dose from 200mg daily to 150mg daily and that I am waiting to hear from finance regarding Yaneli Baird  She verbalized understanding

## 2022-03-30 NOTE — PATIENT INSTRUCTIONS
Do lab testing today for CMP    Stay off [address] until you hear from Dr Nathan Warner office next week  Resume Metformin 500mg twice per day-- depending on lab results, we can increase dose  Resume glipizide at 5mg daily with breakfast for now  If symptoms worse-- go to ER  Keep well hydrated with water and non-sugary fluids  Call tomorrow to update us with your blood sugars

## 2022-03-31 NOTE — LETTER
Diabetic Eye Exam Form    Date Requested: 22  Patient: Nina Fowler  Patient : [id number]   Referring Provider: Oliver Dela Cruz MD    DIABETIC Eye Exam Date _______________________________    Type of Exam MUST be documented for Diabetic Eye Exams  Please CHECK ONE  Dilated Retinal Exam      Optomap-Iris Exam      Fundus Photography Completed       Left Eye - Please check Retinopathy AND Type or No Retinopathy      Exam did show retinopathy    Exam did not show retinopathy         Mild     Proliferative           Moderate    Severe            None         Right Eye - Please check Retinopathy AND Type or No Retinopathy     Exam did show retinopathy    Exam did not show retinopathy         Mild     Proliferative        Moderate    Severe        None       Comments __________________________________________________________    Practice Providing Exam ______________________________________________    Exam Performed By (print name) _______________________________________      Provider Signature ___________________________________________________    These reports are needed for  compliance  Please fax this completed form and a copy of the Diabetic Eye Exam report to our office located at [place] [address] as soon as possible via [phone] lisa Cruz: Phone [phone]    We thank you for your assistance in treating our mutual patient

## 2022-03-31 NOTE — TELEPHONE ENCOUNTER
Samples in fridge for Elda Smith with her name on them to get when she comes in for insulin pen training  [facility] RD/CDES aware of patient's situation and plan    She does not need to start Insulin at this time, when she resumes the [facility] she will start insulin same day  She can continue the metformin and glipizide when starting the insulin  PLAN  Basaglar 20 units daily in the morning   Humalog Before each meal according to sliding scale  150-200: 2 units  201-250: 4 units  251-300: 6 units  301-350: 8 units  Over 350: 10 units    Check BG 3-4x per day before meals/bedtime and report blood sugars to endocrinology on a daily basis when first resuming Piqray and starting insulin

## 2022-03-31 NOTE — TELEPHONE ENCOUNTER
----- Message from [address] sent at 3/30/2022  1:10 PM EDT -----  Regarding: DM EYE EXAM  03/30/22 1:10 PM    Hello, our patient Marco Antonio De Souza has had a DM Eye Exam performed by Dr Wesly Alvarez in [place]  His number is [phone]      Thank you,  Bridgett Garvey  [facility]

## 2022-03-31 NOTE — TELEPHONE ENCOUNTER
Upon review of the In Basket request and the patient's chart, initial outreach has been made via fax, please see Contacts section for details       Thank you  Emily Millan MA

## 2022-04-04 NOTE — TELEPHONE ENCOUNTER
Upon review of the In Basket request we were able to locate, review, and update the patient chart as requested for Diabetic Eye Exam     Any additional questions or concerns should be emailed to the Practice Liaisons via [email or web address] email, please do not reply via In Basket      Thank you  Aiden Burk MA

## 2022-04-04 NOTE — TELEPHONE ENCOUNTER
Rec'd call from pt  Pt advised that her blood sugars the last 3 days were 330, 222, and 160 (today)  Advised her to continue to check blood sugars and to call me on Wednesday with Tuesday and Wednesday values  Informed her that if her blood sugars remain 160 or less for 3-5days we plan on restarting her on her Piqray at a lower dose (150mg)  She verbalized understanding

## 2022-04-07 NOTE — Clinical Note
Edgardo Ferris, I met with Rose Mary Salsilva to teach her how to take the insulin  She and her  both got upset stating that they told somebody that she does not want to take the insulin  She basically refused it  I did go through the motions and showed her how to take it, but she did not take the samples with her  I told her that you may be calling her to discuss this  I tried

## 2022-04-07 NOTE — PROGRESS NOTES
Insulin Instruction  Met with Rigo Cordova for initial insulin start education  Amy Bah is currently taking the following diabetes medications: glipizide and metformin  Comments: Patient adamantly refuses to take insulin--she did not take the sample insulin with her    Patient instructed on: Insulin type; timing of insulin; site selection and rotation; proper technique of injection and insulin administration, safe needle disposal; side effects and precautions of insulin  Comments: Amy Bah has an understanding of insulin usage  Patient states that she would need her  to administer it secondary to arthritic hands  Patient instruction completed on Hypoglycemia: definition/risk, causes/symptoms, treatment, prevention of hypoglycemia, when to notify physician and EMS  Comments: Amy Bah understands hypoglycemia and it's treatment  Patient instruction completed on Hyperglycemia: definition, causes/symptoms, treatment, prevention of hypoglycemia, when to notify physician and EMS  Comments: Amy Bah understands hyperglycemia and treatment  Diabetes Education Record: Amy Bah was given the following education material: Fast 15 List, Hypoglycemia Fact Sheet  Patient response to instruction  Comprehension: good  Motivation: good  Expected Compliance: good  Readiness: action  Barriers to Learning: none known     Start- Stop: 12:55-1:25  Total Minutes: 30 Minutes  Group or Individual Instruction: DSMT-I  Other: REILLY AyalaC    Thank you for referring your patient to [facility], it was a pleasure working with them today  Please feel free to call with any questions or concerns      Andrew Bobby  [address] [facility] [id number]

## 2022-04-20 NOTE — TELEPHONE ENCOUNTER
Returned call to patient inquiring about her [address]  She explained that she finally got the call from the pharmacy saying that she is getting 300mg Piqray  Pt states she knew she was supposed to get 150mg  I informed the patient I will contact the pharmacy to see what's going on  Pt verbalized understanding

## 2022-04-20 NOTE — TELEPHONE ENCOUNTER
Spoke with patient after calling the pharmacy to let her know that the medication comes in a package that states it is 300mg  The instructions say that the patient should only take 150mg, which would be one tablet  I told the patient to call the pharmacy to schedule shipment and too call us if any other issues arise  Patient verbalized understanding

## 2022-04-20 NOTE — TELEPHONE ENCOUNTER
CALL RETURN FORM   Reason for patient call? patient wants to discuss her medication  Alpelisib, 300 MG Daily Dose, (Piqray, 300 MG Daily Dose,) 2 x 150 MG TBPK         Patient's primary oncologist? Dr Jessenia Acosta   Name of person the patient was calling for? franco   Any additional information to add, if applicable? Best call back number is [phone]   Informed patient that the message will be forwarded to the team and someone will get back to them as soon as possible    Did you relay this information to the patient?  yes

## 2022-04-22 NOTE — ED NOTES
Patient ambulatory to bathroom with a steady gait using a cane  Patient denies SOB/CP  Pt's pulse ox down to 92% on RA and HR at 115  ED provider notified        Holly Hairston RN  04/22/22 7217

## 2022-04-22 NOTE — ED PROVIDER NOTES
History  Chief Complaint   Patient presents with    Abdominal Pain     Pt reports abd pain starting 2 days ago  Pt reports having gallstones and states pain feels similar to that  c/o nausea and dry heaves  79 yo F h/o DM, 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, HTN, HLD; presenting for evaluation of RUQ abdominal pain/nausea  Pt states sx since Easter (5 days ago) after eating different food than her typical  States pain to RUQ, eased up 2 days ago and then worsened last night/this morning  Pain does not radiate  Reports nausea with dry heaving  DM- states accuchecks 100s at home  Stage IV breast ca- on Faslodex/Piqray                 Prior to Admission Medications   Prescriptions Last Dose Informant Patient Reported? Taking? Alpelisib, 300 MG Daily Dose, (Piqray, 300 MG Daily Dose,) 2 x 150 MG TBPK   No Yes   Sig: Take 150 mg by mouth in the morning   Calcium Citrate-Vitamin D (CALCIUM CITRATE + D PO)  Self Yes Yes   Sig: Take 1,500 mg by mouth daily   Cranberry (THERACRAN HP PO)  Self Yes Yes   Sig: Take 2 tablets by mouth daily   Glucosamine-Chondroit-Vit C-Mn (GLUCOSAMINE 1500 COMPLEX) CAPS  Self Yes Yes   Sig: Take 1 capsule by mouth daily     Multiple Vitamin (MULTIVITAMIN) capsule  Self Yes Yes   Sig: Take 1 capsule by mouth daily  acetaminophen (TYLENOL) 500 mg tablet  Self Yes No   Sig: Take 500 mg by mouth every 6 (six) hours as needed for mild pain    Patient not taking: Reported on 1/6/2022    acetaminophen (TYLENOL) 500 mg tablet  Self No No   Sig: Take 2 tablets (1,000 mg total) by mouth every 6 (six) hours as needed for mild pain   Patient not taking: Reported on 2/28/2022    atorvastatin (LIPITOR) 20 mg tablet  Self Yes Yes   Sig: Take 20 mg by mouth daily  clindamycin (CLEOCIN) 300 MG capsule  Self Yes No   Sig: TAKE 2 CAPSULES BY MOUTH 1 HOUR PRIOR TO DENTAL PROCEDURE     Patient not taking: Reported on 1/6/2022   clotrimazole-betamethasone (LOTRISONE) 1-0 05 % cream  Self Yes Yes   Sig: Apply 1 application topically as needed     glipiZIDE (GLUCOTROL) 5 mg tablet  Self No Yes   Sig: Take 1/2 tab before breakfast   metFORMIN (GLUCOPHAGE) 500 mg tablet  Self No Yes   Sig: Take 1 tablet (500 mg total) by mouth 2 (two) times a day with meals Please refill when she calls   Patient taking differently: Take 500 mg by mouth 2 (two) times a day with meals Please refill when she calls    metoprolol tartrate (LOPRESSOR) 50 mg tablet  Self No Yes   Sig: Take 1 tablet (50 mg total) by mouth every 12 (twelve) hours   ondansetron (ZOFRAN) 8 mg tablet  Self No Yes   Sig: Take 1 tablet (8 mg total) by mouth every 8 (eight) hours as needed for nausea or vomiting   oxyCODONE (ROXICODONE) 5 mg immediate release tablet  Self No No   Sig: Take 1 tablet (5 mg total) by mouth every 4 (four) hours as needed for moderate painMax Daily Amount: 30 mg   Patient not taking: Reported on 1/6/2022    torsemide (DEMADEX) 20 mg tablet  Self No No   Sig: Take 1 tablet (20 mg total) by mouth as needed (rapid weight gain of 3 lbs overnight or 5 lbs in 5-7 days)   Patient not taking: Reported on 1/6/2022       Facility-Administered Medications: None       Past Medical History:   Diagnosis Date    Acute biliary pancreatitis     Anxiety     Arthritis     Breast CA (HCC)     recurrent, ductal carcinoma ER, [place] pos    Cancer (HCC)     Right breast     Cardiac disease     Colon polyp     Depression     Diverticula of intestine     Diverticulosis     Dizziness     and passes out    Flushing     Hiatal hernia     [place] (hard of hearing)      [place]    Hypercholesteremia     Hypertension     Liver mass     Liver metastasis (HCC)     Metastatic adenocarcinoma to liver ([facility] [address] )     Bx 01/03/2017    PONV (postoperative nausea and vomiting)     Pulmonary embolism (HCC)     Recurrent breast cancer (HCC)     Shingles     Shortness of breath     on exertion    Tachycardia     Urinary incontinence     Use of cane as ambulatory aid or walker       Past Surgical History:   Procedure Laterality Date    BREAST SURGERY      CARDIAC PACEMAKER REMOVAL N/A 11/9/2017    Procedure: PACEMAKER LEAD REVISION, REMOVAL OF NONFUNCTIONING LEAD, AND INSERTION OF A NEW RV LEAD;  Surgeon: Kg Ann MD;  [facility];  Service: Cardiology    CATARACT EXTRACTION Bilateral     COLONOSCOPY      ERCP N/A 1/8/2018    Procedure: ENDOSCOPIC RETROGRADE CHOLANGIOPANCREATOGRAPHY (ERCP); Surgeon: Stanton Araya MD;  Location: [facility]; Service: Gastroenterology    HYSTERECTOMY      INSERT / REPLACE / [place] Hearing      JOINT REPLACEMENT      [place]  TKR and right TSR    MASTECTOMY Right     DC ERCP DX COLLECTION SPECIMEN BRUSHING/WASHING N/A 2/22/2018    Procedure: ENDOSCOPIC RETROGRADE CHOLANGIOPANCREATOGRAPHY (ERCP) with stent removal;  Surgeon: Stanton Araya MD;  [facility]; Service: Gastroenterology    DC RESEC [address] N/A 7/13/2017    Procedure: LIVER RESECTION, INTRAOPERATIVE ULTRASOUND;  Surgeon: Za Pulido MD;  [facility];  Service: Surgical Oncology    ROTATOR CUFF REPAIR Right     SENTINEL LYMPH NODE BIOPSY Right     TONSILECTOMY AND ADNOIDECTOMY      WOUND DEBRIDEMENT Left 11/9/2017    Procedure: DEBRIDEMENT WOUND AND DRESSING CHANGE [facility]); Surgeon: Kg Ann MD;  [facility];  Service: Cardiology       Family History   Problem Relation Age of Onset    Lung cancer Father     Cancer Brother     Diabetes type II Son      I have reviewed and agree with the history as documented      E-Cigarette/Vaping    E-Cigarette Use Never User      E-Cigarette/Vaping Substances    Nicotine No     THC No     CBD No     Flavoring No     Other No     Unknown No      Social History     Tobacco Use    Smoking status: Never Smoker    Smokeless tobacco: Never Used   Vaping Use    Vaping Use: Never used   Substance Use Topics    Alcohol use: Not Currently    Drug use: No       Review of Systems   Constitutional: Negative for chills, fever and unexpected weight change  HENT: Negative for ear pain, rhinorrhea and sore throat  Eyes: Negative for pain and visual disturbance  Respiratory: Negative for cough and shortness of breath  Cardiovascular: Negative for chest pain and leg swelling  Gastrointestinal: Positive for abdominal pain and nausea  Negative for blood in stool, constipation, diarrhea and vomiting  Endocrine: Negative for polydipsia, polyphagia and polyuria  Genitourinary: Negative for dysuria, frequency, hematuria and urgency  Musculoskeletal: Negative for back pain, myalgias and neck pain  Skin: Negative for color change and rash  Allergic/Immunologic: Negative for environmental allergies and immunocompromised state  Neurological: Negative for dizziness, weakness, light-headedness, numbness and headaches  Hematological: Negative for adenopathy  Does not bruise/bleed easily  Psychiatric/Behavioral: Negative for agitation and confusion  All other systems reviewed and are negative  Physical Exam  Physical Exam  Vitals and nursing note reviewed  Constitutional:       General: She is not in acute distress  Appearance: She is well-developed  HENT:      Head: Normocephalic and atraumatic  Nose: Nose normal    Eyes:      Conjunctiva/sclera: Conjunctivae normal    Cardiovascular:      Rate and Rhythm: Normal rate and regular rhythm  Heart sounds: Normal heart sounds  Pulmonary:      Effort: Pulmonary effort is normal  No respiratory distress  Breath sounds: Normal breath sounds  No stridor  No wheezing or rales  Chest:      Chest wall: No tenderness  Abdominal:      General: There is no distension  Palpations: Abdomen is soft  Tenderness: There is abdominal tenderness  There is no right CVA tenderness, left CVA tenderness, guarding or rebound        Comments: Tender to palpation RUQ, no rebound/guarding   Musculoskeletal: General: No deformity  Cervical back: Normal range of motion and neck supple  Comments: B/l LE 2+ pitting edema, equal b/l, states is her baseline, no calf ttp   Skin:     General: Skin is warm and dry  Findings: No rash  Neurological:      General: No focal deficit present  Mental Status: She is alert and oriented to person, place, and time  Motor: No weakness or abnormal muscle tone  Coordination: Coordination normal    Psychiatric:         Thought Content:  Thought content normal          Judgment: Judgment normal          Vital Signs  ED Triage Vitals [04/22/22 1019]   Temperature Pulse Respirations Blood Pressure SpO2   98 9 °F (37 2 °C) 104 18 141/60 96 %      Temp Source Heart Rate Source Patient Position - Orthostatic VS BP Location FiO2 (%)   Oral Monitor Sitting Right arm --      Pain Score       9           Vitals:    04/22/22 1115 04/22/22 1330 04/22/22 1526 04/22/22 1532   BP: 100/55 102/54  131/93   Pulse: 96 96 (!) 115 (!) 115   Patient Position - Orthostatic VS:  Lying  Sitting         Visual Acuity      ED Medications  Medications   ondansetron (ZOFRAN) injection 4 mg (4 mg Intravenous Given 4/22/22 1112)   sodium chloride 0 9 % bolus 500 mL (0 mL Intravenous Stopped 4/22/22 1336)   iohexol (OMNIPAQUE) 350 MG/ML injection (SINGLE-DOSE) 100 mL (100 mL Intravenous Given 4/22/22 1250)   oxyCODONE (ROXICODONE) IR tablet 2 5 mg (2 5 mg Oral Given 4/22/22 1533)       Diagnostic Studies  Results Reviewed     Procedure Component Value Units Date/Time    Urine Microscopic [[id number]]  (Abnormal) Collected: 04/22/22 1521    Lab Status: Final result Specimen: Urine, Clean Catch Updated: 04/22/22 1603     RBC, UA 0-1 /hpf      WBC, UA 2-4 /hpf      Epithelial Cells Occasional /hpf      Bacteria, UA Innumerable /hpf     Urine Macroscopic, POC [[id number]]  (Abnormal) Collected: 04/22/22 1521    Lab Status: Final result Specimen: Urine Updated: 04/22/22 1523     Color, UA [place] Clarity, UA Cloudy     pH, UA 5 5     Leukocytes, UA Negative     Nitrite, UA Negative     Protein, UA >=300 mg/dl      Glucose, UA Negative mg/dl      Ketones, UA Trace mg/dl      Urobilinogen, UA 1 0 E U /dl      Bilirubin, UA Interference- unable to analyze     Blood, UA Negative     Specific Gravity, UA 1 015    Narrative:      CLINITEK RESULT    Lactic acid 2 Hours [[id number]]  (Abnormal) Collected: 04/22/22 1330    Lab Status: Final result Specimen: Blood from Arm, Left Updated: 04/22/22 1408     LACTIC ACID 2 6 mmol/L     Narrative:      Result may be elevated if tourniquet was used during collection  HS Troponin I 2hr [[id number]]  (Normal) Collected: 04/22/22 1330    Lab Status: Final result Specimen: Blood from Arm, Left Updated: 04/22/22 1405     hs TnI 2hr 32 ng/L      Delta 2hr hsTnI -1 ng/L     Lactic acid, plasma [[id number]]  (Abnormal) Collected: 04/22/22 1107    Lab Status: Final result Specimen: Blood from Arm, Left Updated: 04/22/22 1158     LACTIC ACID 3 4 mmol/L     Narrative:      Result may be elevated if tourniquet was used during collection      Basic metabolic panel [[id number]]  (Abnormal) Collected: 04/22/22 1107    Lab Status: Final result Specimen: Blood from Arm, Left Updated: 04/22/22 1140     Sodium 143 mmol/L      Potassium 4 0 mmol/L      Chloride 105 mmol/L      CO2 27 mmol/L      ANION GAP 11 mmol/L      BUN 15 mg/dL      Creatinine 0 96 mg/dL      Glucose 151 mg/dL      Calcium 10 2 mg/dL      eGFR 54 ml/min/1 73sq m     Narrative:      [facility] guidelines for Chronic Kidney Disease (CKD):     Stage 1 with normal or high GFR (GFR > 90 mL/min/1 73 square meters)    Stage 2 Mild CKD (GFR = 60-89 mL/min/1 73 square meters)    Stage 3A Moderate CKD (GFR = 45-59 mL/min/1 73 square meters)    Stage 3B Moderate CKD (GFR = 30-44 mL/min/1 73 square meters)    Stage 4 Severe CKD (GFR = 15-29 mL/min/1 73 square meters)    Stage 5 End Stage CKD (GFR <15 mL/min/1 73 square meters)  Note: GFR calculation is accurate only with a steady state creatinine    Lipase [[id number]]  (Normal) Collected: 04/22/22 1107    Lab Status: Final result Specimen: Blood from Arm, Left Updated: 04/22/22 1140     Lipase 80 u/L     Hepatic function panel [[id number]]  (Abnormal) Collected: 04/22/22 1107    Lab Status: Final result Specimen: Blood from Arm, Left Updated: 04/22/22 1140     Total Bilirubin 1 20 mg/dL      Bilirubin, Direct 0 59 mg/dL      Alkaline Phosphatase 431 U/L      AST 94 U/L      ALT 59 U/L      Total Protein 7 4 g/dL      Albumin 3 2 g/dL     HS Troponin 0hr (reflex protocol) [[id number]]  (Normal) Collected: 04/22/22 1107    Lab Status: Final result Specimen: Blood from Arm, Left Updated: 04/22/22 1139     hs TnI 0hr 33 ng/L     CBC and differential [[id number]]  (Abnormal) Collected: 04/22/22 1107    Lab Status: Final result Specimen: Blood from Arm, Left Updated: 04/22/22 1114     WBC 7 60 Thousand/uL      RBC 4 84 Million/uL      Hemoglobin 14 9 g/dL      Hematocrit 46 1 %      MCV 95 fL      MCH 30 8 pg      MCHC 32 3 g/dL      RDW 15 8 %      MPV 10 1 fL      Platelets 304 Thousands/uL      nRBC 0 /100 WBCs      Neutrophils Relative 68 %      Immat GRANS % 0 %      Lymphocytes Relative 17 %      Monocytes Relative 12 %      Eosinophils Relative 2 %      Basophils Relative 1 %      Neutrophils Absolute 5 17 Thousands/µL      Immature Grans Absolute 0 02 Thousand/uL      Lymphocytes Absolute 1 30 Thousands/µL      Monocytes Absolute 0 89 Thousand/µL      Eosinophils Absolute 0 14 Thousand/µL      Basophils Absolute 0 08 Thousands/µL                  XR chest 2 views   Final Result by Sherry Rios MD (04/22 1524)      Moderate right pleural effusion                    Workstation performed: [id number]         CT abdomen pelvis with contrast   ED Interpretation by Hayes Madison DO (04/22 1412)   ADDENDUM:     Correction- gallbladder is present, distorted and compressed by the metastatic disease  Stable gallstone present  No findings to suggest acute cholecystitis  Addended by Bryon Valera MD on 4/22/2022  1:51 PM      Study Result    Narrative & Impression  CT ABDOMEN AND PELVIS WITH IV CONTRAST     INDICATION:   Abdominal pain, acute, nonlocalized  RUQ pain, nausea, history of liver mets and gallstones  Metastatic breast cancer      COMPARISON:  January 4, 2022     TECHNIQUE:  CT examination of the abdomen and pelvis was performed  Axial, sagittal, and coronal 2D reformatted images were created from the source data and submitted for interpretation      Radiation dose length product (DLP) for this visit:  634 mGy-cm   This examination, like all CT scans performed in the [facility], was performed utilizing techniques to minimize radiation dose exposure, including the use of iterative   reconstruction and automated exposure control      I   V Contrast:  100 mL of iohexol (OMNIPAQUE)  Enteric Contrast:  Enteric contrast was not administered      FINDINGS:     ABDOMEN     LOWER CHEST:  New mostly dependent right pleural fluid, mild to moderate volume  Atelectatic changes seen in the right lower lobe  Persistent elevated right hemidiaphragm similar to prior  Stable calcified nodule at the right lung base and infrahilar   region chronically stable      LIVER/BILIARY TREE:  Prior partial right hepatectomy  Liver is again remarkable for innumerable metastatic lesions throughout the remaining liver parenchyma  Allowing for differences in contrast bolus timing, these continue to show progression in size   compared to October and January scans  Specific examples, posterior image 2/23 measuring 4 9 x 4 8 cm prior 3 2 x 3 0 cm  Posterior dome image 2/14, prior 2 3 x 3 1 cm    Linear hypodensity in the left lateral segment probably representing focally   dilated intrahepatic bile duct 6 mm in diameter image 2/19      GALLBLADD   ER: Gallbladder is surgically absent      SPLEEN:  Numerous calcified granulomata  Single rounded hypoattenuating nodule medially without significant change      PANCREAS:  Unremarkable      ADRENAL GLANDS:  Unremarkable      KIDNEYS/URETERS:  Unremarkable  No hydronephrosis      STOMACH AND BOWEL:  There is colonic diverticulosis without evidence of acute diverticulitis      APPENDIX:  No findings to suggest appendicitis      ABDOMINOPELVIC CAVITY:  Mild ascites, left upper quadrant and midline pelvis  No pneumoperitoneum  No lymphadenopathy      VESSELS:  Unremarkable for patient's age      PELVIS     REPRODUCTIVE ORGANS:  Unremarkable for patient's age      URINARY BLADDER:  Unremarkable      ABDOMINAL WALL/INGUINAL REGIONS:  Unremarkable      OSSEOUS STRUCTURES:  No acute fracture or destructive osseous lesion  Degenerative disc disease  Grade 1 anterolisthesis of L3 on L4      IMPRESSION:     Persistent moderate elevation of the right hemidiaphragm  New mild to moderate ri   ght pleural fluid and associated subsegmental atelectatic changes      Prior partial right hepatectomy  Extensive hepatic metastatic disease showing continued interval increase in size of the lesions       Remainder of the findings are stable      The study was marked in EPIC for significant notification               Final Result by Sarah Aguilar MD (04/22 6658)   Addendum 1 of 1 by Sarah Aguilar MD (04/22 6512)   ADDENDUM:      Correction- gallbladder is present, distorted and compressed by the    metastatic disease  Stable gallstone present  No findings to suggest    acute cholecystitis  Final      Persistent moderate elevation of the right hemidiaphragm  New mild to moderate right pleural fluid and associated subsegmental atelectatic changes  Prior partial right hepatectomy  Extensive hepatic metastatic disease showing continued interval increase in size of the lesions  Remainder of the findings are stable  The study was marked in EPIC for significant notification  Workstation performed: [id number]                    Procedures  Procedures         ED Course  ED Course as of 04/22/22 1608   Fri Apr 22, 2022   1140 hs TnI 0hr: 33   1140 EKG: paced, normal axis, nonspecific st changes, t wave inversion II, III, avF, v4-v6, similar to prior   1159 LACTIC ACID(!!): 3 4  No evidence of infection at this time, possibly from poor perfusion/ischemia, pending remainder of workup  No SIRS criteria met regardless (only tachycardia)   1257 Pt reassessed, resting comfortably  Pending CT read   1350 Discussed with radiologist regarding CT read of s/p cholecystectomy, Dr Campos Ice looked again and did visualize gb: nondistended and smaller than on prior  1359 Pt resting comfortably, states pain with movement and is agreeable to try oxycodone  Reviewed CT report with pt, I feel her pain is from her metastatic liver lesions  She does not see pain management at this time, recommend she establish care  I reviewed the pleural effusion with her and given minimal sx of this, I recommended she return if SOB or other concerns  She states she has oncology appointment next week already   [id number] [facility] 24 2hr hsTnI: -1   1409 LACTIC ACID(!!): 2 6  Improved after 500 cc bolus   1440 Reviewed repeat labs, do not want to give further fluids with the pleural effusion  Pt wants to go home  She was given water  Plan is to ambulate, ambulatory pulse ox and check UA   1514 Pt ambulated to bathroom with her cane, lowest O2 sat was 92%, she is anxious to be discharged  Hr increased with ambulating   She states she feels steady/walking at her baseline with her cane             HEART Risk Score      Most Recent Value   Heart Score Risk Calculator    History 0 Filed at: 04/22/2022 1227   ECG 0 Filed at: 04/22/2022 1227   Age 2 Filed at: 04/22/2022 1227   Risk Factors 2 Filed at: 04/22/2022 1227   Troponin 1 Filed at: 04/22/2022 1227   HEART Score 5 Filed at: 04/22/2022 1227                     Initial Sepsis Screening     Row Name 04/22/22 1227                Is the patient's history suggestive of a new or worsening infection? No  -KH        Suspected source of infection --        Are two or more of the following signs & symptoms of infection both present and new to the patient? --        Indicate SIRS criteria --        If the answer is yes to both questions, suspicion of sepsis is present --        If severe sepsis is present AND tissue hypoperfusion perists in the hour after fluid resuscitation or lactate > 4, the patient meets criteria for SEPTIC SHOCK --        Are any of the following organ dysfunction criteria present within 6 hours of suspected infection and SIRS criteria that are NOT considered to be chronic conditions? --        Organ dysfunction --        Date of presentation of severe sepsis --        Time of presentation of severe sepsis --        Tissue hypoperfusion persists in the hour after crystalloid fluid administration, evidenced, by either: --        Was hypotension present within one hour of the conclusion of crystalloid fluid administration? --        Date of presentation of septic shock --        Time of presentation of septic shock --              User Key  (r) = Recorded By, (t) = Taken By, (c) = Cosigned By    [address] Name Provider Type    [address] [place] [address], DO Physician                SBIRT 22yo+      Most Recent Value   SBIRT (24 yo +)    In order to provide better care to our patients, we are screening all of our patients for alcohol and drug use  Would it be okay to ask you these screening questions?  No Filed at: 04/22/2022 1137                    MDM  Number of Diagnoses or Management Options  Elevated LFTs  Metastases to the liver (HCC)  Pleural effusion on right  Right upper quadrant abdominal pain  Diagnosis management comments: 77 yo F presenting with RUQ pain, workup showing liver mets and elevated LFTs which was known previously  Most likely the cause of her sx and sx mild in ED  Pt requesting to go home  Recommend she establish care with pain management    R pleural effusion- O2 sat WNL even with ambulation and pt is not symptomatic of this  I discussed that if she were to become more short of breath to return as she may need thoracentesis    Pt lives at home with   Her son was at bedside throughout ED course  She is anxious to go home, ambulated with her cane and feels comfortable with discharge  I discussed close return precautions and close f/u at length       Amount and/or Complexity of Data Reviewed  Clinical lab tests: ordered and reviewed  Tests in the radiology section of CPT®: ordered and reviewed  Tests in the medicine section of CPT®: ordered and reviewed  Review and summarize past medical records: yes  Independent visualization of images, tracings, or specimens: yes        Disposition  Final diagnoses:   Right upper quadrant abdominal pain   Metastases to the liver (HCC)   Elevated LFTs   Pleural effusion on right     Time reflects when diagnosis was documented in both MDM as applicable and the Disposition within this note     Time User Action Codes Description Comment    4/22/2022  1:13 PM Elmer Medina A Add [R10 11] Right upper quadrant abdominal pain     4/22/2022  1:14 PM Elmer Medina A Add [C78 7] Metastases to the liver ([address] )     4/22/2022  1:14 PM Lacho Kelley Add [R79 89] Elevated LFTs     4/22/2022  1:43 PM Elmer HERRERA Add [J90] Pleural effusion on right       ED Disposition     ED Disposition Condition Date/Time Comment    Discharge Stable Fri Apr 22, 2022  3:29 PM [address] [place] [address] discharge to home/self care              Follow-up Information     Follow up With Specialties Details Why [address] [facility] [id number], MD Family Medicine   [facility] [id number]  [place] [address] [facility]   [id number] [id number]          Discharge Medication List as of 4/22/2022  3:30 PM      CONTINUE these medications which have NOT CHANGED    Details   Alpelisib, 300 MG Daily Dose, (Piqray, 300 MG Daily Dose,) 2 x 150 MG TBPK Take 150 mg by mouth in the morning, Starting Mon 4/11/2022, Normal      atorvastatin (LIPITOR) 20 mg tablet Take 20 mg by mouth daily  , Historical Med      Calcium Citrate-Vitamin D (CALCIUM CITRATE + D PO) Take 1,500 mg by mouth daily, Historical Med      clotrimazole-betamethasone (LOTRISONE) 1-0 05 % cream Apply 1 application topically as needed  , Starting Mon 8/14/2017, Historical Med      Cranberry (THERACRAN HP PO) Take 2 tablets by mouth daily, Historical Med      glipiZIDE (GLUCOTROL) 5 mg tablet Take 1/2 tab before breakfast, Normal      Glucosamine-Chondroit-Vit C-Mn (GLUCOSAMINE 1500 COMPLEX) CAPS Take 1 capsule by mouth daily  , Historical Med      metFORMIN (GLUCOPHAGE) 500 mg tablet Take 1 tablet (500 mg total) by mouth 2 (two) times a day with meals Please refill when she calls, Starting Mon 11/8/2021, Normal      metoprolol tartrate (LOPRESSOR) 50 mg tablet Take 1 tablet (50 mg total) by mouth every 12 (twelve) hours, Starting Tue 2/8/2022, Normal      Multiple Vitamin (MULTIVITAMIN) capsule Take 1 capsule by mouth daily  , Historical Med      ondansetron (ZOFRAN) 8 mg tablet Take 1 tablet (8 mg total) by mouth every 8 (eight) hours as needed for nausea or vomiting, Starting Fri 8/27/2021, Normal      !! acetaminophen (TYLENOL) 500 mg tablet Take 500 mg by mouth every 6 (six) hours as needed for mild pain , Historical Med      !! acetaminophen (TYLENOL) 500 mg tablet Take 2 tablets (1,000 mg total) by mouth every 6 (six) hours as needed for mild pain, Starting Fri 4/23/2021, Normal      clindamycin (CLEOCIN) 300 MG capsule TAKE 2 CAPSULES BY MOUTH 1 HOUR PRIOR TO DENTAL PROCEDURE , Historical Med      oxyCODONE (ROXICODONE) 5 mg immediate release tablet Take 1 tablet (5 mg total) by mouth every 4 (four) hours as needed for moderate painMax Daily Amount: 30 mg, Starting Thu 12/3/2020, Normal      torsemide (DEMADEX) 20 mg tablet Take 1 tablet (20 mg total) by mouth as needed (rapid weight gain of 3 lbs overnight or 5 lbs in 5-7 days), Starting Tue 9/7/2021, Normal       !! - Potential duplicate medications found  Please discuss with provider  No discharge procedures on file      PDMP Review       Value Time User    PDMP Reviewed  Yes 12/3/2020  3:38 PM Nkechi Akhtar          ED Provider  Electronically Signed by           Yessi Johnson DO  04/22/22 6722

## 2022-04-22 NOTE — DISCHARGE INSTRUCTIONS
Ibuprofen 400 mg every 6 hours as needed (take with food)    Follow up with family doctor, oncologist and if persistent pain, pain management    Return to ED if you have new/worsening symptoms including but not limited to difficulty breathing, intractable pain/vomiting, etc

## 2022-04-25 NOTE — TELEPHONE ENCOUNTER
Medication Refill     Who is Calling  Patient   Medication oxyCODONE (ROXICODONE) 5 mg immediate release tablet     How many pills left 3   Preferred Pharmacy / Address [address] [address]    Who is your Physician?  Carrol Estrada   Call back number [phone]   Relevant Information

## 2022-05-02 NOTE — TELEPHONE ENCOUNTER
Spoke with patient to let her know that she can go for her faslodex today  I told her we are currently coming up with a plan due to her high blood sugars, and I will give her a call this afternoon  Patient verbalized understanding

## 2022-05-02 NOTE — TELEPHONE ENCOUNTER
Spoke with patient regarding her blood glucose  Pt states that her blood sugars have been increasing lately and the patient gave me a record of her blood sugar over the last week-  4/ 4/  4/  4/  4/  4/ 5/1-270 5/2-340  The patient states that she is on day 12 of [address]  She states that she had diarrhea the first two days, but she is feeling good now  She denies changing her diet, lifestyle, or medications  Patient's main concern is that she has an appt to get faslodex today and was wondering if she should still go to that INF appt  I informed the patient that I will speak with Dr Kapil Gilliland and call her back shortly  Pt verbalized understanding

## 2022-05-02 NOTE — PROGRESS NOTES
Patient arrived to unit without complaint  Patient tolerated Faslodex IM injections 1 to left gluteal and 1 to right gluteal without incident  AVS provided and patient aware of next appointment  Patient left in stable condition

## 2022-05-04 NOTE — TELEPHONE ENCOUNTER
Spoke with patient regarding her high blood sugar levels that she has had lately  I informed the pt that Dr Alexia Wall would like her to call her family doctor to get her blood sugar medications adjusted  Pt stated that her family doctor recently retired and she does have a new one, Dr Sharonda He and she is worried because she hasn't seen him yet  I informed the patient that she should make an appointment with him soon so that he can go over her blood sugar medications and adjust them as needed  Pt verbalized that she will call his office and make an appt as soon as she can

## 2022-05-05 PROBLEM — A41.9 SEPSIS (HCC): Status: ACTIVE | Noted: 2022-01-01

## 2022-05-05 PROBLEM — K66.8 PNEUMOPERITONEUM: Status: ACTIVE | Noted: 2022-01-01

## 2022-05-05 PROBLEM — J90 PLEURAL EFFUSION: Status: ACTIVE | Noted: 2022-01-01

## 2022-05-05 NOTE — BRIEF OP NOTE (RAD/CATH)
Right thoracentesis    PATIENT NAME: Anahi Dunn  : 1940  MRN: [id number]    Pre-op Diagnosis:   1  Pneumoperitoneum    2  Colonic thickening    3  Pleural effusion, right    4  Liver metastases ([facility] [address] )    5  Hyperglycemia    6  Lactic acidosis      Post-op Diagnosis:   1  Pneumoperitoneum    2  Colonic thickening    3  Pleural effusion, right    4  Liver metastases ([facility] [address] )    5  Hyperglycemia    6  Lactic acidosis        Provider:  Susana Mcallister PA-C    No qualified resident was available, Resident is only observing    Estimated Blood Loss: minimal    Findings: right sided thoracentesis  600cc clear yellow fluid removed      Specimens: sent    Complications:  None immediate    Anesthesia: local    Susana Mcallister PA-C     Date: 2022  Time: 1:34 PM

## 2022-05-05 NOTE — H&P
[address]  1940, 78 y o  female MRN: [id number]  Unit/Bed#: [facility] Encounter: [id number]  Primary Care Provider: Rodney Mccall MD   Date and time admitted to hospital: 5/5/2022  8:06 AM    * Sepsis [facility])  Assessment & Plan  Sepsis criteria on admission, tachycardia, neutropenia, with likely abdominal source given pneumoperitoneum  Continue IV fluids, lactic acid of 3 2 on admission  Cefepime, Flagyl, patient does have allergy to penicillins  Blood cultures currently pending  Will check procalcitonin      Pleural effusion  Assessment & Plan  New right-sided pleural effusion noted on CT imaging  Patient currently on 1-2 L with oxygen saturations around 94-95% on admission  Patient does not appear to be tachypneic  Suspect likely secondary to malignancy given history of breast cancer with mets to the liver  Reviewed prior CT imaging  Will consult IR for thoracentesis, cytology studies ordered    Pneumoperitoneum  Assessment & Plan  Patient has been reporting abdominal pain, nausea ongoing for the past several weeks  CT imaging shows pneumoperitoneum  Surgery suspect possible micro perforation  Maintain NPO, IV fluids and bowel rest  Protonix IV b i d    No indications for surgery, general surgery currently recommending hospice  Palliative care consulted    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 [facility])  Assessment & Plan  History of metastatic breast cancer to liver, intial diagnosis in 2016  Follows Oncology outpatient, Dr Lea German  Currently on chemotherapy, piqray recently started 2 weeks prior, has failed prior therapies  Hold current chemotherapy  Palliative care consult    Type 2 diabetes mellitus with hyperglycemia, without long-term current use of insulin [facility])  Assessment & Plan  Lab Results   Component Value Date    HGBA1C 7 8 (H) 01/29/2022       Recent Labs     05/05/22  0852   POCGLU 358*     Cristal NPO, sinus headache, Accu-Cheks q 6 hours    SSS (sick sinus syndrome) (HCC)  Assessment & Plan  Status post pacemaker  Continue IV Lopressor 2 5 q 6 hours, currently NPO    Hypertension  Assessment & Plan  Hold further blood pressure medications in setting of sepsis  Currently NPO  Continue to evaluate patient is able to resume      VTE Prophylaxis: Heparin  / sequential compression device   Code Status: FC  POLST: There is no POLST form on file for this patient (pre-hospital)  Discussion with family: patient, spouse bedside    Anticipated Length of Stay:  Patient will be admitted on an Inpatient basis with an anticipated length of stay of 2 midnights  Justification for Hospital Stay: IV fluids, abx    Total Time for Visit, including Counseling / Coordination of Care: 45 minutes  Greater than 50% of this total time spent on direct patient counseling and coordination of care  Chief Complaint:   Abdominal Pain, Weakness    History of Present Illness:    Milli Silva is a 78 y o  female who presents with abdominal pain  Past medical history metastatic breast cancer on chemotherapy, type 2 diabetes, SSS status post pacemaker, hypertension and hyperlipidemia  Patient presented today with worsening abdominal pain, nausea  She reportedly was trying get out of bed today when she was weak and slid to the ground  Her  assisted her and brought her to the ED  She has been having abdominal pain ongoing for the past several weeks, poor p o  Intake with persistent nausea  CT imaging done in the ED showed new pneumoperitoneum, new moderate pleural effusions, diffuse hepatic metastasis and is ascites  There was also some thickening of the right colon that could be infectious or inflammatory in terms of etiology  Patient denies any fevers or chills  Discussed code status with family, currently undecided but at this time remains a full code      Review of Systems:    Review of Systems   Constitutional: Positive for activity change and appetite change  Negative for chills and fever  HENT: Negative for congestion, facial swelling, sinus pain and sore throat  Respiratory: Positive for shortness of breath  Negative for cough and wheezing  Cardiovascular: Negative for chest pain, palpitations and leg swelling  Gastrointestinal: Positive for abdominal pain and nausea  Negative for abdominal distention, constipation, diarrhea and vomiting  Endocrine: Negative for cold intolerance, heat intolerance, polydipsia, polyphagia and polyuria  Genitourinary: Negative for dysuria, flank pain and urgency  Skin: Negative for color change and pallor  Neurological: Negative for dizziness, syncope, weakness, light-headedness and headaches  Psychiatric/Behavioral: Negative for agitation, confusion and dysphoric mood         Past Medical and Surgical History:     Past Medical History:   Diagnosis Date    Acute biliary pancreatitis     Anxiety     Arthritis     Breast CA ([facility] [address] )     recurrent, ductal carcinoma ER, CA pos    Cancer (HCC)     Right breast     Cardiac disease     Colon polyp     Depression     Diverticula of intestine     Diverticulosis     Dizziness     and passes out    Flushing     Hiatal hernia     [place] (hard of hearing)      lizette    Hypercholesteremia     Hypertension     Liver mass     Liver metastasis (HCC)     Metastatic adenocarcinoma to liver (HCC)     Bx 01/03/2017    PONV (postoperative nausea and vomiting)     Pulmonary embolism (HCC)     Recurrent breast cancer (HCC)     Shingles     Shortness of breath     on exertion    Tachycardia     Urinary incontinence     Use of cane as ambulatory aid     or walker       Past Surgical History:   Procedure Laterality Date    BREAST SURGERY      CARDIAC PACEMAKER REMOVAL N/A 11/9/2017    Procedure: PACEMAKER LEAD REVISION, REMOVAL OF NONFUNCTIONING LEAD, AND INSERTION OF A NEW RV LEAD;  Surgeon: Amara Hodges MD;  [facility];  Service: Cardiology    CATARACT EXTRACTION Bilateral     COLONOSCOPY      ERCP N/A 1/8/2018    Procedure: ENDOSCOPIC RETROGRADE CHOLANGIOPANCREATOGRAPHY (ERCP); Surgeon: Karen Lindsay MD;  Location: [facility]; Service: Gastroenterology    HYSTERECTOMY      INSERT / REPLACE / Dorothea Spillers      JOINT REPLACEMENT      lizette  TKR and right TSR    MASTECTOMY Right     CT ERCP DX COLLECTION SPECIMEN BRUSHING/WASHING N/A 2/22/2018    Procedure: ENDOSCOPIC RETROGRADE CHOLANGIOPANCREATOGRAPHY (ERCP) with stent removal;  Surgeon: Karen Lindsay MD;  [facility]: [facility]; Service: Gastroenterology    CT RESEC [address] N/A 7/13/2017    Procedure: LIVER RESECTION, INTRAOPERATIVE ULTRASOUND;  Surgeon: Nora Main MD;  [facility];  Service: Surgical Oncology    ROTATOR CUFF REPAIR Right     SENTINEL LYMPH NODE BIOPSY Right     TONSILECTOMY AND ADNOIDECTOMY      WOUND DEBRIDEMENT Left 11/9/2017    Procedure: DEBRIDEMENT WOUND AND DRESSING CHANGE [facility]); Surgeon: Klaus Nolasco MD;  [facility];  Service: Cardiology       Meds/Allergies:    Prior to Admission medications    Medication Sig Start Date End Date Taking? Authorizing Provider   acetaminophen (TYLENOL) 500 mg tablet Take 500 mg by mouth every 6 (six) hours as needed for mild pain   Patient not taking: Reported on 1/6/2022     Historical Provider, MD   acetaminophen (TYLENOL) 500 mg tablet Take 2 tablets (1,000 mg total) by mouth every 6 (six) hours as needed for mild pain  Patient not taking: Reported on 2/28/2022 4/23/21   Bartolo Cuevas PA-C   Alpelisib, 300 MG Daily Dose, (Piqray, 300 MG Daily Dose,) 2 x 150 MG TBPK Take 150 mg by mouth in the morning 4/11/22   Dipak Singh DO   atorvastatin (LIPITOR) 20 mg tablet Take 20 mg by mouth daily      Historical Provider, MD   Calcium Citrate-Vitamin D (CALCIUM CITRATE + D PO) Take 1,500 mg by mouth daily    Historical Provider, MD   clindamycin (CLEOCIN) 300 MG capsule TAKE 2 CAPSULES BY MOUTH 1 HOUR PRIOR TO DENTAL PROCEDURE  Patient not taking: Reported on 1/6/2022 1/3/18   Historical Provider, MD   clotrimazole-betamethasone (Willi Primes) 1-0 05 % cream Apply 1 application topically as needed   8/14/17   Historical Provider, MD   Cranberry (THERACRAN HP PO) Take 2 tablets by mouth daily    Historical Provider, MD   glipiZIDE (GLUCOTROL) 5 mg tablet Take 1/2 tab before breakfast 11/8/21   Clare Collado MD   Glucosamine-Chondroit-Vit C-Mn (GLUCOSAMINE 1500 COMPLEX) CAPS Take 1 capsule by mouth daily      Historical Provider, MD   metFORMIN (GLUCOPHAGE) 500 mg tablet Take 1 tablet (500 mg total) by mouth 2 (two) times a day with meals Please refill when she calls  Patient taking differently: Take 500 mg by mouth 2 (two) times a day with meals Please refill when she calls  11/8/21   Clare Collado MD   metoprolol tartrate (LOPRESSOR) 50 mg tablet Take 1 tablet (50 mg total) by mouth every 12 (twelve) hours 2/8/22   Octaviano Trent DO   Multiple Vitamin (MULTIVITAMIN) capsule Take 1 capsule by mouth daily  Historical Provider, MD   ondansetron (ZOFRAN) 8 mg tablet Take 1 tablet (8 mg total) by mouth every 8 (eight) hours as needed for nausea or vomiting 8/27/21   TITO Bourne   oxyCODONE (ROXICODONE) 5 immediate release tablet Take 1 tablet (5 mg total) by mouth every 4 (four) hours as needed for moderate pain Max Daily Amount: 30 mg 4/25/22   TITO Bourne   torsemide (DEMADEX) 20 mg tablet Take 1 tablet (20 mg total) by mouth as needed (rapid weight gain of 3 lbs overnight or 5 lbs in 5-7 days)  Patient not taking: Reported on 1/6/2022 9/7/21   Madeline Macdonald PA-C     I have reviewed home medications with patient personally  Allergies:    Allergies   Allergen Reactions    Ampicillin Shortness Of Breath, Anaphylaxis and Other (See Comments)     Other reaction(s): Unknown Allergic Reaction  Reaction Date: [id number];        Social History:     Marital Status: /Civil Union   Occupation: retired  Patient Pre-hospital Living Situation: home  Patient Pre-hospital Level of Mobility: amb with device  Patient Pre-hospital Diet Restrictions: none  Substance Use History:   Social History     Substance and Sexual Activity   Alcohol Use Not Currently     Social History     Tobacco Use   Smoking Status Never Smoker   Smokeless Tobacco Never Used     Social History     Substance and Sexual Activity   Drug Use No       Family History:    Family History   Problem Relation Age of Onset    Lung cancer Father     Cancer Brother     Diabetes type II Son        Physical Exam:     Vitals:   Blood Pressure: 124/60 (05/05/22 1316)  Pulse: (!) 130 (05/05/22 1316)  Temperature: 98 5 °F (36 9 °C) (05/05/22 0812)  Temp Source: Oral (05/05/22 0812)  Respirations: 20 (05/05/22 1316)  SpO2: 95 % (05/05/22 1316)    Physical Exam  Vitals and nursing note reviewed  Constitutional:       General: She is in acute distress  Appearance: She is toxic-appearing  HENT:      Head: Normocephalic and atraumatic  Eyes:      General: No scleral icterus  Conjunctiva/sclera: Conjunctivae normal    Cardiovascular:      Rate and Rhythm: Tachycardia present  Rhythm irregular  Pulses: Normal pulses  Heart sounds: Murmur heard  Pulmonary:      Effort: Respiratory distress present  Breath sounds: No wheezing  Abdominal:      General: There is no distension  Palpations: Abdomen is soft  Tenderness: There is abdominal tenderness  Musculoskeletal:         General: No swelling  Right lower leg: No edema  Left lower leg: No edema  Skin:     General: Skin is warm and dry  Neurological:      General: No focal deficit present  Mental Status: Mental status is at baseline  Additional Data:     Lab Results: I have personally reviewed pertinent reports        Results from last 7 days   Lab Units 05/05/22  0833   WBC Thousand/uL 2 56*   HEMOGLOBIN g/dL 16 3*   HEMATOCRIT % 50 3* PLATELETS Thousands/uL 145*   NEUTROS PCT % 54   LYMPHS PCT % 37   MONOS PCT % 6   EOS PCT % 1     Results from last 7 days   Lab Units 05/05/22  0833   SODIUM mmol/L 137   POTASSIUM mmol/L 4 1   CHLORIDE mmol/L 97*   CO2 mmol/L 25   BUN mg/dL 12   CREATININE mg/dL 1 04   ANION GAP mmol/L 15*   CALCIUM mg/dL 9 7   ALBUMIN g/dL 3 1*   TOTAL BILIRUBIN mg/dL 1 56*   ALK PHOS U/L 362*   ALT U/L 43   AST U/L 64*   GLUCOSE RANDOM mg/dL 356*         Results from last 7 days   Lab Units 05/05/22  0852   POC GLUCOSE mg/dl 358*         Results from last 7 days   Lab Units 05/05/22  1240 05/05/22  1042   LACTIC ACID mmol/L 3 1* 3 2*       Imaging: I have personally reviewed pertinent reports  CT chest abdomen pelvis w contrast   ED Interpretation by Karol Lanier DO (05/05 1026)   IMPRESSION:     1  New small amount of pneumoperitoneum  2   New wall thickening of the right colon may be infectious or inflammatory in etiology  3   Moderate-sized pleural effusion has increased since January 4, 2022  4  Diffuse hepatic metastases  5   Increase small-volume ascites  Final Result by Rachel Torres MD (05/05 1017)      1  New small amount of pneumoperitoneum  2   New wall thickening of the right colon may be infectious or inflammatory in etiology  3   Moderate-sized pleural effusion has increased since January 4, 2022   4  Diffuse hepatic metastases  5   Increase small-volume ascites  I personally discussed this study with Effie Miranda on 5/5/2022 at 10:14 AM                   Workstation performed: [id number]         CT head without contrast   ED Interpretation by Karol Lanier DO (05/05 9824)   FINDINGS:     PARENCHYMA: Decreased attenuation is noted in periventricular and subcortical white matter demonstrating an appearance that is statistically most likely to represent moderate microangiopathic change      No CT signs of acute infarction  No intracranial mass, mass effect or midline shift  No acute parenchymal hemorrhage  Atherosclerotic calcifications of the cavernous segment of the internal carotid artery are mild      VENTRICLES AND EXTRA-AXIAL SPACES:  Normal for the patient's age      VISUALIZED ORBITS AND PARANASAL SINUSES:  Bilateral lens implants noted     CALVARIUM AND EXTRACRANIAL SOFT TISSUES:  Normal      IMPRESSION:         Final Result by Niya Gaviria MD (05/05 9609)      No acute intracranial abnormality  Microangiopathic changes  Workstation performed: [id number]         CT spine cervical without contrast   ED Interpretation by Yessi Johnson DO (05/05 2338)   IMPRESSION:     1  There is nonspecific straightening of the cervical lordosis without subluxation  2   Advanced spondylosis  3   No acute fracture  4   Moderate right pleural effusion is incompletely imaged     See separate CT of the chest abdomen pelvis  Final Result by Niya Gaviria MD (05/05 7954)      1  There is nonspecific straightening of the cervical lordosis without subluxation  2   Advanced spondylosis  3   No acute fracture  4   Moderate right pleural effusion is incompletely imaged     See separate CT of the chest abdomen pelvis  Workstation performed: [id number]         IR IN-Patient Thoracentesis    (Results Pending)       EKG, Pathology, and Other Studies Reviewed on Admission:   · EKG: sinus tachycardia    Allscripts / Epic Records Reviewed: Yes     ** Please Note: This note has been constructed using a voice recognition system   **

## 2022-05-05 NOTE — PLAN OF CARE
Problem: Potential for Falls  Goal: Patient will remain free of falls  Description: INTERVENTIONS:  - Educate patient/family on patient safety including physical limitations  - Instruct patient to call for assistance with activity   - Consult OT/PT to assist with strengthening/mobility   - Keep Call bell within reach  - Keep bed low and locked with side rails adjusted as appropriate  - Keep care items and personal belongings within reach  - Initiate and maintain comfort rounds  - Make Fall Risk Sign visible to staff  - Offer Toileting every 2 Hours, in advance of need  - Initiate/Maintain bed alarm  - Apply yellow socks and bracelet for high fall risk patients  - Consider moving patient to room near nurses station  Outcome: Progressing     Problem: MUSCULOSKELETAL - ADULT  Goal: Maintain or return mobility to safest level of function  Description: INTERVENTIONS:  - Assess patient's ability to carry out ADLs; assess patient's baseline for ADL function and identify physical deficits which impact ability to perform ADLs (bathing, care of mouth/teeth, toileting, grooming, dressing, etc )  - Assess/evaluate cause of self-care deficits   - Assess range of motion  - Assess patient's mobility  - Assess patient's need for assistive devices and provide as appropriate  - Encourage maximum independence but intervene and supervise when necessary  - Involve family in performance of ADLs  - Assess for home care needs following discharge   - Consider OT consult to assist with ADL evaluation and planning for discharge  - Provide patient education as appropriate  Outcome: Progressing  Goal: Maintain proper alignment of affected body part  Description: INTERVENTIONS:  - Support, maintain and protect limb and body alignment  - Provide patient/ family with appropriate education  Outcome: Progressing     Problem: PAIN - ADULT  Goal: Verbalizes/displays adequate comfort level or baseline comfort level  Description: Interventions:  - Encourage patient to monitor pain and request assistance  - Assess pain using appropriate pain scale  - Administer analgesics based on type and severity of pain and evaluate response  - Implement non-pharmacological measures as appropriate and evaluate response  - Consider cultural and social influences on pain and pain management  - Notify physician/advanced practitioner if interventions unsuccessful or patient reports new pain  Outcome: Progressing     Problem: INFECTION - ADULT  Goal: Absence or prevention of progression during hospitalization  Description: INTERVENTIONS:  - Assess and monitor for signs and symptoms of infection  - Monitor lab/diagnostic results  - Monitor all insertion sites, i e  indwelling lines, tubes, and drains  - Monitor endotracheal if appropriate and nasal secretions for changes in amount and color  - Ashley appropriate cooling/warming therapies per order  - Administer medications as ordered  - Instruct and encourage patient and family to use good hand hygiene technique  - Identify and instruct in appropriate isolation precautions for identified infection/condition  Outcome: Progressing  Goal: Absence of fever/infection during neutropenic period  Description: INTERVENTIONS:  - Monitor WBC    Outcome: Progressing     Problem: SAFETY ADULT  Goal: Patient will remain free of falls  Description: INTERVENTIONS:  - Educate patient/family on patient safety including physical limitations  - Instruct patient to call for assistance with activity   - Consult OT/PT to assist with strengthening/mobility   - Keep Call bell within reach  - Keep bed low and locked with side rails adjusted as appropriate  - Keep care items and personal belongings within reach  - Initiate and maintain comfort rounds  - Make Fall Risk Sign visible to staff  - Offer Toileting every 2 Hours, in advance of need  - Initiate/Maintain bed alarm  - Apply yellow socks and bracelet for high fall risk patients  - Consider moving patient to room near nurses station  Outcome: Progressing  Goal: Maintain or return to baseline ADL function  Description: INTERVENTIONS:  -  Assess patient's ability to carry out ADLs; assess patient's baseline for ADL function and identify physical deficits which impact ability to perform ADLs (bathing, care of mouth/teeth, toileting, grooming, dressing, etc )  - Assess/evaluate cause of self-care deficits   - Assess range of motion  - Assess patient's mobility; develop plan if impaired  - Assess patient's need for assistive devices and provide as appropriate  - Encourage maximum independence but intervene and supervise when necessary  - Involve family in performance of ADLs  - Assess for home care needs following discharge   - Consider OT consult to assist with ADL evaluation and planning for discharge  - Provide patient education as appropriate  Outcome: Progressing  Goal: Maintains/Returns to pre admission functional level  Description: INTERVENTIONS:  - Perform BMAT or MOVE assessment daily    - Set and communicate daily mobility goal to care team and patient/family/caregiver     - Collaborate with rehabilitation services on mobility goals if consulted  - Out of bed for meals 3 times a day  - Out of bed for toileting  - Record patient progress and toleration of activity level   Outcome: Progressing     Problem: DISCHARGE PLANNING  Goal: Discharge to home or other facility with appropriate resources  Description: INTERVENTIONS:  - Identify barriers to discharge w/patient and caregiver  - Arrange for needed discharge resources and transportation as appropriate  - Identify discharge learning needs (meds, wound care, etc )  - Arrange for interpretive services to assist at discharge as needed  - Refer to Case Management Department for coordinating discharge planning if the patient needs post-hospital services based on physician/advanced practitioner order or complex needs related to functional status, cognitive ability, or social support system  Outcome: Progressing     Problem: Knowledge Deficit  Goal: Patient/family/caregiver demonstrates understanding of disease process, treatment plan, medications, and discharge instructions  Description: Complete learning assessment and assess knowledge base    Interventions:  - Provide teaching at level of understanding  - Provide teaching via preferred learning methods  Outcome: Progressing

## 2022-05-05 NOTE — ASSESSMENT & PLAN NOTE
Patient has been reporting abdominal pain, nausea ongoing for the past several weeks  CT imaging shows pneumoperitoneum  Surgery suspect possible micro perforation  Maintain NPO, IV fluids and bowel rest  Protonix IV b i d    No indications for surgery, general surgery currently recommending hospice  Palliative care consulted

## 2022-05-05 NOTE — ASSESSMENT & PLAN NOTE
New right-sided pleural effusion noted on CT imaging  Patient currently on 1-2 L with oxygen saturations around 94-95% on admission  Patient does not appear to be tachypneic  Suspect likely secondary to malignancy given history of breast cancer with mets to the liver  Reviewed prior CT imaging  Will consult IR for thoracentesis, cytology studies ordered

## 2022-05-05 NOTE — CONSULTS
Consultation - General Surgery   Tye Rider 78 y o  female MRN: [id number]  Unit/Bed#: ED 10 Encounter: [id number]    Assessment:  78-yr old F (PMHx: LBBB s/p pacemaker, hearing loss, GERD, HLD, T2DM, HTN, PE) w/ metastatic breast cancer (hepatic) on immunotherapy; presenting w/ abdominal pain & generalized fatigue  Tachycardic (120-130) w/ dry mucous membranes  Normotensive  RLQ/suprapubic tenderness w/out rebound or guarding  White count: 2 6  Lactate: 3 2  CTAP: R colon thickening w/ small amount of pneumoperitoneum  Plan:  - likely case of colitis w/ microperforation: ? Infectious vs inflammatory/immunotherapy-induced  - inpatient admission to medical service for IVF hydration & IV abx   - trend lactate  - no emergent indication for surgery at this time as patient has no clinical signs of peritonitis  - goals of care discussion including possible hospice care: this should ideally involve the patient/family, palliative team as well as her oncologist   - bowel rest/NPO  History of Present Illness   HPI:  Tye Rider is a 78 y o  female (PMHx: LBBB s/p pacemaker, GERD, HLD, T2DM, HTN, PE) w metastatic breast ca (hepatic) who presents with generalized fatigue and abdominal pain  She reports sliding off the edge of her bed this morning and was too fatigued to get up  She also reports lower abdominal pain which she admits has been chronic in nature w/ some acute exacerbation noted over the past 24 hours  Passing flatus and having BM  She is on falsodex & piqray per her Oncologist for advanced breast ca palliation & immunotherapy  No fever, hematochezia or hematemesis  Consult to surgery general  Consult performed by: Jann Torre MD  Consult ordered by: Rich Kelley DO          Review of Systems   Constitutional: Positive for activity change, appetite change and fatigue  Negative for chills and fever  HENT: Positive for hearing loss  Eyes: Negative      Respiratory: Positive for shortness of breath  Cardiovascular: Negative for chest pain  Gastrointestinal: Positive for abdominal pain  Negative for abdominal distention, blood in stool, constipation, diarrhea, nausea and vomiting  Endocrine: Negative  Genitourinary: Negative  Musculoskeletal: Positive for arthralgias, back pain, gait problem and myalgias  Skin: Negative  Neurological: Positive for weakness  Psychiatric/Behavioral: Negative  All other systems reviewed and are negative  Historical Information   Past Medical History:   Diagnosis Date    Acute biliary pancreatitis     Anxiety     Arthritis     Breast CA ([address] )     recurrent, ductal carcinoma ER, NH pos    Cancer (HCC)     Right breast     Cardiac disease     Colon polyp     Depression     Diverticula of intestine     Diverticulosis     Dizziness     and passes out    Flushing     Hiatal hernia     [place]    Hypercholesteremia     Hypertension     Liver mass     Liver metastasis (HCC)     Metastatic adenocarcinoma to liver (HCC)     Bx 01/03/2017    PONV (postoperative nausea and vomiting)     Pulmonary embolism (HCC)     Recurrent breast cancer (HCC)     Shingles     Shortness of breath     on exertion    Tachycardia     Urinary incontinence     Use of cane as ambulatory aid     or walker     Past Surgical History:   Procedure Laterality Date    BREAST SURGERY      CARDIAC PACEMAKER REMOVAL N/A 11/9/2017    Procedure: PACEMAKER LEAD REVISION, REMOVAL OF NONFUNCTIONING LEAD, AND INSERTION OF A NEW RV LEAD;  Surgeon: Amara Hodges MD;  [facility];  Service: Cardiology    CATARACT EXTRACTION Bilateral     COLONOSCOPY      ERCP N/A 1/8/2018    Procedure: ENDOSCOPIC RETROGRADE CHOLANGIOPANCREATOGRAPHY (ERCP); Surgeon: Cristina Maurer MD;  [facility]: [facility];   Service: Gastroenterology    HYSTERECTOMY      INSERT / REPLACE / REMOVE PACEMAKER      JOINT REPLACEMENT      [place]  TKR and right TSR    MASTECTOMY Right     MT ERCP DX COLLECTION SPECIMEN BRUSHING/WASHING N/A 2/22/2018    Procedure: ENDOSCOPIC RETROGRADE CHOLANGIOPANCREATOGRAPHY (ERCP) with stent removal;  Surgeon: Kristen Soria MD;  Location: [facility]; Service: Gastroenterology    MT RESEC [id number] N/A 7/13/2017    Procedure: LIVER RESECTION, INTRAOPERATIVE ULTRASOUND;  Surgeon: Yassine Orantes MD;  [facility];  Service: Surgical Oncology    ROTATOR CUFF REPAIR Right     SENTINEL LYMPH NODE BIOPSY Right     TONSILECTOMY AND ADNOIDECTOMY      WOUND DEBRIDEMENT Left 11/9/2017    Procedure: DEBRIDEMENT WOUND AND DRESSING CHANGE [facility]);   Surgeon: Rachael Ma MD;  [facility];  Service: Cardiology     Social History   Social History     Substance and Sexual Activity   Alcohol Use Not Currently     Social History     Substance and Sexual Activity   Drug Use No     E-Cigarette/Vaping    E-Cigarette Use Never User      E-Cigarette/Vaping Substances    Nicotine No     THC No     CBD No     Flavoring No     Other No     Unknown No      Social History     Tobacco Use   Smoking Status Never Smoker   Smokeless Tobacco Never Used     Family History: non-contributory    Meds/Allergies   all current active meds have been reviewed  Allergies   Allergen Reactions    Ampicillin Shortness Of Breath, Anaphylaxis and Other (See Comments)     Other reaction(s): Unknown Allergic Reaction  Reaction Date: [id number];        Objective   First Vitals:   Blood Pressure: 157/72 (05/05/22 0812)  Pulse: (!) 106 (05/05/22 0812)  Temperature: 98 5 °F (36 9 °C) (05/05/22 0812)  Temp Source: Oral (05/05/22 0812)  Respirations: 20 (05/05/22 0812)  SpO2: 94 % (05/05/22 0812)    Current Vitals:   Blood Pressure: 133/60 (05/05/22 1115)  Pulse: (!) 130 (05/05/22 1115)  Temperature: 98 5 °F (36 9 °C) (05/05/22 0812)  Temp Source: Oral (05/05/22 1318)  Respirations: 22 (05/05/22 1115)  SpO2: 92 % (05/05/22 1115)      Intake/Output Summary (Last 24 hours) at 5/5/2022 1206  Last data filed at 5/5/2022 1117  Gross per 24 hour   Intake 550 ml   Output --   Net 550 ml       Invasive Devices  Report    Peripheral Intravenous Line            Peripheral IV 05/05/22 Left Antecubital <1 day                Physical Exam  Vitals reviewed  Constitutional:       Appearance: She is ill-appearing  HENT:      Head: Normocephalic and atraumatic  Right Ear: External ear normal       Left Ear: External ear normal       Nose: Nose normal       Mouth/Throat:      Mouth: Mucous membranes are dry  Eyes:      Pupils: Pupils are equal, round, and reactive to light  Cardiovascular:      Rate and Rhythm: Tachycardia present  Pulmonary:      Effort: Pulmonary effort is normal  No respiratory distress  Abdominal:      General: A surgical scar is present  There is no distension  Tenderness: There is abdominal tenderness in the right lower quadrant and suprapubic area  There is no right CVA tenderness, left CVA tenderness, guarding or rebound  Musculoskeletal:         General: No deformity  Cervical back: Normal range of motion  Skin:     General: Skin is dry  Capillary Refill: Capillary refill takes 2 to 3 seconds  Neurological:      General: No focal deficit present  Mental Status: She is alert and oriented to person, place, and time  Psychiatric:      Comments: Depressed mood         Lab Results:   I have personally reviewed pertinent lab results    , CBC:   Lab Results   Component Value Date    WBC 2 56 (L) 05/05/2022    HGB 16 3 (H) 05/05/2022    HCT 50 3 (H) 05/05/2022    MCV 95 05/05/2022     (L) 05/05/2022    MCH 30 6 05/05/2022    MCHC 32 4 05/05/2022    RDW 15 0 05/05/2022    MPV 10 9 05/05/2022    NRBC 0 05/05/2022   , CMP:   Lab Results   Component Value Date    SODIUM 137 05/05/2022    K 4 1 05/05/2022    CL 97 (L) 05/05/2022    CO2 25 05/05/2022    BUN 12 05/05/2022    CREATININE 1 04 05/05/2022    CALCIUM 9 7 05/05/2022    AST 64 (H) 05/05/2022    ALT 43 05/05/2022    ALKPHOS 362 (H) 05/05/2022    EGFR 49 05/05/2022   , Coagulation: No results found for: PT, INR, APTT, Urinalysis: No results found for: Jamel Flack, SPECGRAV, PHUR, LEUKOCYTESUR, NITRITE, PROTEINUA, GLUCOSEU, KETONESU, BILIRUBINUR, BLOODU, Amylase: No results found for: AMYLASE, Lipase:   Lab Results   Component Value Date    LIPASE 41 (L) 05/05/2022     Imaging: I have personally reviewed pertinent reports  EKG, Pathology, and Other Studies: I have personally reviewed pertinent reports  Counseling / Coordination of Care  Total floor / unit time spent today 30 minutes  Greater than 50% of total time was spent with the patient and / or family counseling and / or coordination of care    A description of the counseling / coordination of care: NA

## 2022-05-05 NOTE — ED PROVIDER NOTES
History  Chief Complaint   Patient presents with    Abdominal Pain     pt c/o chronic abdominal pain from liver cancer but today she had uncontrolled nausea and vomiting and can not keep any of her nausea or pain medications down, pain is not different then normal just more intense     79 yo F h/o DM, 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, HTN, HLD- initial EMS call was because pt slipped onto the floor and couldn't get up  Pt states she slid down side of the bed, landed on buttocks and couldn't get up  Denies injury  Denies head strike/LOC  No AC/AP agents  When EMS arrived, pt also c/o abdominal pain/nausea  Pt nauseous/dry heaving on arrival  She states this started this morning and she was unable to take her morning meds/pain meds, but states she took her meds yesterday  States this has happened before  C/o pain "everywhere", but mostly abdomen  Denies fevers, SOB, cough  Lives at home with   Breast ca with mets to liver- daily Piqray, Falsodex IM injections (last on 5/2)  Seen on 4/22/22 by myself for abdominal pain/nausea felt to be from liver mets, found to have pleural effusion- declined thora at that time and denies SOB/worsening sx               Prior to Admission Medications   Prescriptions Last Dose Informant Patient Reported? Taking? Alpelisib, 300 MG Daily Dose, (Piqray, 300 MG Daily Dose,) 2 x 150 MG TBPK 5/4/2022 at Unknown time  No Yes   Sig: Take 150 mg by mouth in the morning   Calcium Citrate-Vitamin D (CALCIUM CITRATE + D PO) 5/4/2022 at Unknown time Self Yes Yes   Sig: Take 1,500 mg by mouth daily   Cranberry (THERACRAN HP PO) 5/4/2022 at Unknown time Self Yes Yes   Sig: Take 2 tablets by mouth daily   Glucosamine-Chondroit-Vit C-Mn (GLUCOSAMINE 1500 COMPLEX) CAPS 5/4/2022 at Unknown time Self Yes Yes   Sig: Take 1 capsule by mouth daily     Multiple Vitamin (MULTIVITAMIN) capsule 5/4/2022 at Unknown time Self Yes Yes   Sig: Take 1 capsule by mouth daily     acetaminophen (TYLENOL) 500 mg tablet Past Month at Unknown time Self Yes Yes   Sig: Take 500 mg by mouth every 6 (six) hours as needed for mild pain     acetaminophen (TYLENOL) 500 mg tablet Past Month at Unknown time Self No Yes   Sig: Take 2 tablets (1,000 mg total) by mouth every 6 (six) hours as needed for mild pain   atorvastatin (LIPITOR) 20 mg tablet 5/4/2022 at Unknown time Self Yes Yes   Sig: Take 20 mg by mouth daily  clindamycin (CLEOCIN) 300 MG capsule More than a month at Unknown time Self Yes No   Sig: TAKE 2 CAPSULES BY MOUTH 1 HOUR PRIOR TO DENTAL PROCEDURE     Patient not taking: Reported on 1/6/2022   clotrimazole-betamethasone (LOTRISONE) 1-0 05 % cream 5/4/2022 at Unknown time Self Yes Yes   Sig: Apply 1 application topically as needed     glipiZIDE (GLUCOTROL) 5 mg tablet 5/4/2022 at Unknown time Self No Yes   Sig: Take 1/2 tab before breakfast   metFORMIN (GLUCOPHAGE) 500 mg tablet 5/4/2022 at Unknown time Self No Yes   Sig: Take 1 tablet (500 mg total) by mouth 2 (two) times a day with meals Please refill when she calls   Patient taking differently: Take 500 mg by mouth 2 (two) times a day with meals Please refill when she calls    metoprolol tartrate (LOPRESSOR) 50 mg tablet 5/4/2022 at Unknown time Self No Yes   Sig: Take 1 tablet (50 mg total) by mouth every 12 (twelve) hours   ondansetron (ZOFRAN) 8 mg tablet 5/4/2022 at Unknown time Self No Yes   Sig: Take 1 tablet (8 mg total) by mouth every 8 (eight) hours as needed for nausea or vomiting   oxyCODONE (ROXICODONE) 5 immediate release tablet 5/4/2022 at Unknown time  No Yes   Sig: Take 1 tablet (5 mg total) by mouth every 4 (four) hours as needed for moderate pain Max Daily Amount: 30 mg   torsemide (DEMADEX) 20 mg tablet More than a month at Unknown time Self No No   Sig: Take 1 tablet (20 mg total) by mouth as needed (rapid weight gain of 3 lbs overnight or 5 lbs in 5-7 days)   Patient not taking: Reported on 1/6/2022       Facility-Administered Medications: None       Past Medical History:   Diagnosis Date    Acute biliary pancreatitis     Anxiety     Arthritis     Breast CA ([address] )     recurrent, ductal carcinoma ER, [place] pos    Cancer (HCC)     Right breast     Cardiac disease     Colon polyp     Depression     Diverticula of intestine     Diverticulosis     Dizziness     and passes out    Flushing     Hiatal hernia     [place]      [place]    Hypercholesteremia     Hypertension     Liver mass     Liver metastasis (HCC)     Metastatic adenocarcinoma to liver (HCC)     Bx 01/03/2017    PONV (postoperative nausea and vomiting)     Pulmonary embolism (HCC)     Recurrent breast cancer (HCC)     Shingles     Shortness of breath     on exertion    Tachycardia     Urinary incontinence     Use of cane as ambulatory aid     or walker       Past Surgical History:   Procedure Laterality Date    BREAST SURGERY      CARDIAC PACEMAKER REMOVAL N/A 11/9/2017    Procedure: PACEMAKER LEAD REVISION, REMOVAL OF NONFUNCTIONING LEAD, AND INSERTION OF A NEW RV LEAD;  Surgeon: Cris Arriola MD;  [facility];  Service: Cardiology    CATARACT EXTRACTION Bilateral     COLONOSCOPY      ERCP N/A 1/8/2018    Procedure: ENDOSCOPIC RETROGRADE CHOLANGIOPANCREATOGRAPHY (ERCP); Surgeon: Jesus Monahan MD;  Location: [facility]; Service: Gastroenterology    HYSTERECTOMY      Suraj Hobby / Monikabryon Lowe / Waldemra Rosalinda PACEMAKER      IR THORACENTESIS  5/5/2022    JOINT REPLACEMENT      lizette  TKR and right TSR    MASTECTOMY Right     AK ERCP DX COLLECTION SPECIMEN BRUSHING/WASHING N/A 2/22/2018    Procedure: ENDOSCOPIC RETROGRADE CHOLANGIOPANCREATOGRAPHY (ERCP) with stent removal;  Surgeon: Jesus Monahan MD;  [facility];   Service: Gastroenterology    AK RESEC LIVER,PART LOBECTOMY N/A 7/13/2017    Procedure: LIVER RESECTION, INTRAOPERATIVE ULTRASOUND;  Surgeon: Sofia Ayala MD;  [facility];  Service: Surgical Oncology    ROTATOR CUFF REPAIR Right     SENTINEL LYMPH NODE BIOPSY Right     TONSILECTOMY AND ADNOIDECTOMY      WOUND DEBRIDEMENT Left 11/9/2017    Procedure: DEBRIDEMENT WOUND AND DRESSING CHANGE [facility]); Surgeon: Hamlet Segura MD;  [facility];  Service: Cardiology       Family History   Problem Relation Age of Onset    Lung cancer Father     Cancer Brother     Diabetes type II Son      I have reviewed and agree with the history as documented  E-Cigarette/Vaping    E-Cigarette Use Never User      E-Cigarette/Vaping Substances    Nicotine No     THC No     CBD No     Flavoring No     Other No     Unknown No      Social History     Tobacco Use    Smoking status: Never Smoker    Smokeless tobacco: Never Used   Vaping Use    Vaping Use: Never used   Substance Use Topics    Alcohol use: Not Currently    Drug use: No       Review of Systems   Constitutional: Negative for chills and fever  HENT: Negative for ear pain, rhinorrhea and sore throat  Eyes: Negative for pain and visual disturbance  Respiratory: Negative for cough and shortness of breath  Cardiovascular: Negative for chest pain and leg swelling  Gastrointestinal: Positive for abdominal pain and nausea  Negative for constipation and diarrhea  Endocrine: Negative for polydipsia, polyphagia and polyuria  Genitourinary: Negative for dysuria, frequency, hematuria and urgency  Musculoskeletal: Positive for back pain  Negative for myalgias and neck pain  Skin: Negative for color change and rash  Allergic/Immunologic: Negative for environmental allergies and immunocompromised state  Neurological: Negative for dizziness, weakness, light-headedness, numbness and headaches  Hematological: Negative for adenopathy  Does not bruise/bleed easily  Psychiatric/Behavioral: Negative for agitation and confusion  All other systems reviewed and are negative  Physical Exam  Physical Exam  Vitals and nursing note reviewed     Constitutional: Appearance: She is not diaphoretic  Comments: Dry heaving, appears uncomfortable   HENT:      Head: Normocephalic and atraumatic  Nose: Nose normal    Eyes:      Conjunctiva/sclera: Conjunctivae normal    Cardiovascular:      Rate and Rhythm: Regular rhythm  Tachycardia present  Heart sounds: Normal heart sounds  Pulmonary:      Effort: Pulmonary effort is normal  No respiratory distress  Breath sounds: Normal breath sounds  No stridor  No wheezing or rales  Chest:      Chest wall: No tenderness  Abdominal:      General: There is no distension  Palpations: Abdomen is soft  Tenderness: There is abdominal tenderness  There is no guarding or rebound  Comments: Diffuse abdominal tenderness to palpation, voluntary guarding  Back: no skin changes, midline tenderness to palpation along entire spine, no stepoff/deformity   Musculoskeletal:         General: Swelling present  No deformity  Cervical back: Normal range of motion and neck supple  Comments: B/l LE edema   Skin:     General: Skin is warm and dry  Findings: No rash  Neurological:      General: No focal deficit present  Mental Status: She is alert and oriented to person, place, and time  Motor: No abnormal muscle tone  Psychiatric:         Thought Content:  Thought content normal          Judgment: Judgment normal          Vital Signs  ED Triage Vitals [05/05/22 0812]   Temperature Pulse Respirations Blood Pressure SpO2   98 5 °F (36 9 °C) (!) 106 20 157/72 94 %      Temp Source Heart Rate Source Patient Position - Orthostatic VS BP Location FiO2 (%)   Oral Monitor Lying Left arm --      Pain Score       10 - Worst Possible Pain           Vitals:    05/05/22 1316 05/05/22 1335 05/05/22 1404 05/05/22 1502   BP: 124/60 127/80 128/80 153/73   Pulse: (!) 130 (!) 128 (!) 124 (!) 127   Patient Position - Orthostatic VS:   Lying          Visual Acuity      ED Medications  Medications   sodium chloride 0 9 % infusion (75 mL/hr Intravenous New Bag 5/5/22 1047)   metoprolol (LOPRESSOR) injection 2 5 mg (2 5 mg Intravenous Given 5/5/22 1506)   insulin lispro (HumaLOG) 100 units/mL subcutaneous injection 1-5 Units (4 Units Subcutaneous Given 5/5/22 1533)   cefepime (MAXIPIME) 1 g/50 mL dextrose IVPB (has no administration in time range)   metroNIDAZOLE (FLAGYL) IVPB (premix) 500 mg 100 mL (has no administration in time range)   ondansetron (ZOFRAN) injection 4 mg (has no administration in time range)   heparin (porcine) subcutaneous injection 5,000 Units (5,000 Units Subcutaneous Given 5/5/22 1507)   pantoprazole (PROTONIX) injection 40 mg (40 mg Intravenous Given 5/5/22 1506)   HYDROmorphone HCl (DILAUDID) injection 0 2 mg (has no administration in time range)   HYDROmorphone (DILAUDID) injection 1 mg (has no administration in time range)   HYDROmorphone (DILAUDID) injection 0 5 mg (has no administration in time range)   ondansetron (ZOFRAN) injection 4 mg (4 mg Intravenous Given 5/5/22 0824)   morphine injection 2 mg (2 mg Intravenous Given 5/5/22 0843)   sodium chloride 0 9 % bolus 500 mL (0 mL Intravenous Stopped 5/5/22 1023)   iohexol (OMNIPAQUE) 350 MG/ML injection (SINGLE-DOSE) 100 mL (100 mL Intravenous Given 5/5/22 0921)   cefepime (MAXIPIME) 2 g/50 mL dextrose IVPB (0 mg Intravenous Stopped 5/5/22 1117)   morphine injection 2 mg (2 mg Intravenous Given 5/5/22 1129)       Diagnostic Studies  Results Reviewed     Procedure Component Value Units Date/Time    Glucose, body fluid [[id number]] Collected: 05/05/22 1329    Lab Status: Final result Specimen: Body Fluid from Other Updated: 05/05/22 1719     Glucose, Fluid 352 mg/dL     LD (LDH), Body Fluid [[id number]] Collected: 05/05/22 1329    Lab Status: Final result Specimen: Body Fluid from Other Updated: 05/05/22 1508     LD, Fluid 353 U/L     Total Protein, body fluid [[id number]] Collected: 05/05/22 1329    Lab Status: Final result Specimen:  Body Fluid from Other Updated: 05/05/22 1508     Protein, Fluid 3 6 g/dL     Body Fluid Diff [[id number]] Collected: 05/05/22 1329    Lab Status: Final result Specimen: Body Fluid from Pleural, Right Updated: 05/05/22 1432     Total Counted 100     Neutrophils % (Fluid) 2 %      Lymphs % (Fluid) 29 %      Mesothelial % (Fluid) 2 %      Histiocyte % (Fluid) 58 %      Monocytes % (Fluid) 9 %     Body fluid white cell count with differential [[id number]]  (Abnormal) Collected: 05/05/22 1329    Lab Status: Final result Specimen: Body Fluid from Pleural, Right Updated: 05/05/22 1411     Site Pleural, Right     Color, Fluid Yellow     Clarity, Fluid Hazy     WBC, Fluid 162 /ul     pH, body fluid [[id number]] Collected: 05/05/22 1329    Lab Status: Final result Specimen: Body Fluid from Other Updated: 05/05/22 1403     PH BODY FLUID 7 7    Body fluid culture (Pleural Fluid Culture) and Gram stain [[id number]] Collected: 05/05/22 1329    Lab Status: In process Specimen: Body Fluid from Pleural, Right Updated: 05/05/22 1336    HS Troponin I 4hr [[id number]]  (Abnormal) Collected: 05/05/22 1240    Lab Status: Final result Specimen: Blood from Arm, Left Updated: 05/05/22 1318     hs TnI 4hr 52 ng/L      Delta 4hr hsTnI 15 ng/L     Lactic acid 2 Hours [[id number]]  (Abnormal) Collected: 05/05/22 1240    Lab Status: Final result Specimen: Blood from Arm, Left Updated: 05/05/22 1304     LACTIC ACID 3 1 mmol/L     Narrative:      Result may be elevated if tourniquet was used during collection  Lactic acid, plasma [[id number]]  (Abnormal) Collected: 05/05/22 1042    Lab Status: Final result Specimen: Blood from Hand, Left Updated: 05/05/22 1136     LACTIC ACID 3 2 mmol/L     Narrative:      Result may be elevated if tourniquet was used during collection      HS Troponin I 2hr [[id number]]  (Normal) Collected: 05/05/22 1042    Lab Status: Final result Specimen: Blood from Hand, Left Updated: 05/05/22 1121     hs TnI 2hr 45 ng/L      Delta 2hr hsTnI 8 ng/L Blood culture #1 [[id number]] Collected: 05/05/22 1042    Lab Status: In process Specimen: Blood from Hand, Left Updated: 05/05/22 1050    Blood culture #2 [[id number]] Collected: 05/05/22 1042    Lab Status:  In process Specimen: Blood from Hand, Left Updated: 05/05/22 1050    HS Troponin 0hr (reflex protocol) [[id number]]  (Normal) Collected: 05/05/22 0833    Lab Status: Final result Specimen: Blood from Arm, Left Updated: 05/05/22 0910     hs TnI 0hr 37 ng/L     Basic metabolic panel [[id number]]  (Abnormal) Collected: 05/05/22 0833    Lab Status: Final result Specimen: Blood from Arm, Left Updated: 05/05/22 0856     Sodium 137 mmol/L      Potassium 4 1 mmol/L      Chloride 97 mmol/L      CO2 25 mmol/L      ANION GAP 15 mmol/L      BUN 12 mg/dL      Creatinine 1 04 mg/dL      Glucose 356 mg/dL      Calcium 9 7 mg/dL      eGFR 49 ml/min/1 73sq m     Narrative:      [place] guidelines for Chronic Kidney Disease (CKD):     Stage 1 with normal or high GFR (GFR > 90 mL/min/1 73 square meters)    Stage 2 Mild CKD (GFR = 60-89 mL/min/1 73 square meters)    Stage 3A Moderate CKD (GFR = 45-59 mL/min/1 73 square meters)    Stage 3B Moderate CKD (GFR = 30-44 mL/min/1 73 square meters)    Stage 4 Severe CKD (GFR = 15-29 mL/min/1 73 square meters)    Stage 5 End Stage CKD (GFR <15 mL/min/1 73 square meters)  Note: GFR calculation is accurate only with a steady state creatinine    Lipase [[id number]]  (Abnormal) Collected: 05/05/22 0833    Lab Status: Final result Specimen: Blood from Arm, Left Updated: 05/05/22 0856     Lipase 41 u/L     Hepatic function panel [[id number]]  (Abnormal) Collected: 05/05/22 0833    Lab Status: Final result Specimen: Blood from Arm, Left Updated: 05/05/22 0856     Total Bilirubin 1 56 mg/dL      Bilirubin, Direct 0 77 mg/dL      Alkaline Phosphatase 362 U/L      AST 64 U/L      ALT 43 U/L      Total Protein 7 4 g/dL      Albumin 3 1 g/dL     Fingerstick Glucose (POCT) [[id number]]  (Abnormal) Collected: 05/05/22 0852    Lab Status: Final result Updated: 05/05/22 0855     POC Glucose 358 mg/dl     Blood gas, venous [[id number]]  (Abnormal) Collected: 05/05/22 0833    Lab Status: Final result Specimen: Blood from Arm, Left Updated: 05/05/22 0844     pH, Sandip 7 378     pCO2, Sandip 41 0 mm Hg      pO2, Sandip 41 5 mm Hg      HCO3, Sandip 23 6 mmol/L      Base Excess, Sandip -1 5 mmol/L      O2 Content, Sandip 16 9 ml/dL      O2 HGB, VENOUS 72 6 %     CBC and differential [[id number]]  (Abnormal) Collected: 05/05/22 0833    Lab Status: Final result Specimen: Blood from Arm, Left Updated: 05/05/22 0840     WBC 2 56 Thousand/uL      RBC 5 32 Million/uL      Hemoglobin 16 3 g/dL      Hematocrit 50 3 %      MCV 95 fL      MCH 30 6 pg      MCHC 32 4 g/dL      RDW 15 0 %      MPV 10 9 fL      Platelets 615 Thousands/uL      nRBC 0 /100 WBCs      Neutrophils Relative 54 %      Immat GRANS % 0 %      Lymphocytes Relative 37 %      Monocytes Relative 6 %      Eosinophils Relative 1 %      Basophils Relative 2 %      Neutrophils Absolute 1 39 Thousands/µL      Immature Grans Absolute 0 01 Thousand/uL      Lymphocytes Absolute 0 94 Thousands/µL      Monocytes Absolute 0 16 Thousand/µL      Eosinophils Absolute 0 02 Thousand/µL      Basophils Absolute 0 04 Thousands/µL     UA w Reflex to Microscopic w Reflex to Culture [[id number]]     Lab Status: No result Specimen: Urine                  IR IN-Patient Thoracentesis   Final Result by Isabelle Fatima MD (05/05 1744)   Impression:   1  Right ultrasound-guided thoracentesis yielding 600cc clear yellow pleural fluid  Signed, performed and dictated by Karol Hoskins PA-C under the supervision of Dr Marisela Graves  Workstation performed: [id number]         CT chest abdomen pelvis w contrast   ED Interpretation by Asya Ruiz DO (05/05 1496)   IMPRESSION:     1  New small amount of pneumoperitoneum    2   New wall thickening of the right colon may be infectious or inflammatory in etiology  3   Moderate-sized pleural effusion has increased since January 4, 2022  4  Diffuse hepatic metastases  5   Increase small-volume ascites  Final Result by Norris Patel MD (05/05 1017)      1  New small amount of pneumoperitoneum  2   New wall thickening of the right colon may be infectious or inflammatory in etiology  3   Moderate-sized pleural effusion has increased since January 4, 2022   4  Diffuse hepatic metastases  5   Increase small-volume ascites  I personally discussed this study with Jeremi Snyder on 5/5/2022 at 10:14 AM                   Workstation performed: [id number]         CT head without contrast   ED Interpretation by Asya Ruiz DO (05/05 9172)   FINDINGS:     PARENCHYMA: Decreased attenuation is noted in periventricular and subcortical white matter demonstrating an appearance that is statistically most likely to represent moderate microangiopathic change      No CT signs of acute infarction  No intracranial mass, mass effect or midline shift  No acute parenchymal hemorrhage  Atherosclerotic calcifications of the cavernous segment of the internal carotid artery are mild      VENTRICLES AND EXTRA-AXIAL SPACES:  Normal for the patient's age      VISUALIZED ORBITS AND PARANASAL SINUSES:  Bilateral lens implants noted     CALVARIUM AND EXTRACRANIAL SOFT TISSUES:  Normal      IMPRESSION:         Final Result by Maci Funk MD (05/05 0481)      No acute intracranial abnormality  Microangiopathic changes  Workstation performed: [id number]         CT spine cervical without contrast   ED Interpretation by Asya Ruiz DO (05/05 4740)   IMPRESSION:     1  There is nonspecific straightening of the cervical lordosis without subluxation  2   Advanced spondylosis  3   No acute fracture  4   Moderate right pleural effusion is incompletely imaged     See separate CT of the chest abdomen pelvis        Final Result by Saida Gamino MD (05/05 0945)      1  There is nonspecific straightening of the cervical lordosis without subluxation  2   Advanced spondylosis  3   No acute fracture  4   Moderate right pleural effusion is incompletely imaged     See separate CT of the chest abdomen pelvis  Workstation performed: [id number]                    Procedures  Procedures         ED Course  ED Course as of 05/05/22 1745   Thu May 05, 2022   0813 Pulse(!): 106   0813 SpO2: 94 %   0856 POC Glucose(!): 358   0906 EKG: sinus tachycardia@ 106 bpm, normal axis, qtc 414, slight dep inferior/lat, similar to previous from 4/22/22   1027 Updated patient regarding results, states her pain is controlled  Messaged surgery resident and AP, both in [facility] Called and informed  of ED findings and seriousness of her condition, he is not sure if he is coming in  I recommended he call his son and have the son and/or himself come to the hospital   [address] to speak with surgical resident, they will send someone to evaluate the patient   1046 EKG: sinus tachycardia @ 122 bpm, normal axis, normal intervals, nonspecific st changes, unchanged from prior   1112 Evaluated by surgery, they recommend abx/fluids and medical admission, no surgery at this time  SLIM messaged for admission   1136 LACTIC ACID(!!): 3 2  Pt now meets severe sepsis criteria, all cultures/labs already done and given Cefepime               HEART Risk Score      Most Recent Value   Heart Score Risk Calculator    History 0 Filed at: 05/05/2022 0932   ECG 1 Filed at: 05/05/2022 0932   Age 2 Filed at: 05/05/2022 0932   Risk Factors 2 Filed at: 05/05/2022 0932   Troponin 1 Filed at: 05/05/2022 0932   HEART Score 6 Filed at: 05/05/2022 0932                     Initial Sepsis Screening     Row Name 05/05/22 1137 05/05/22 1043             Is the patient's history suggestive of a new or worsening infection? -- Yes (Proceed)  -[address]       Suspected source of infection -- acute abdominal infection  -KH       Are two or more of the following signs & symptoms of infection both present and new to the patient? Yes (Proceed)  -KH No  -KH       Indicate SIRS criteria Leukopenia (WBC < 4000 IJL); Tachycardia > 90 bpm  -KH Tachycardia > 90 bpm  -       If the answer is yes to both questions, suspicion of sepsis is present -- --       If severe sepsis is present AND tissue hypoperfusion perists in the hour after fluid resuscitation or lactate > 4, the patient meets criteria for SEPTIC SHOCK -- --       Are any of the following organ dysfunction criteria present within 6 hours of suspected infection and SIRS criteria that are NOT considered to be chronic conditions? Yes  -KH No  -KH       Organ dysfunction Lactate > 2 0 mmol/L  -KH --       Date of presentation of severe sepsis 05/05/22  -KH --       Time of presentation of severe sepsis 1137  -KH --       Tissue hypoperfusion persists in the hour after crystalloid fluid administration, evidenced, by either: -- --       Was hypotension present within one hour of the conclusion of crystalloid fluid administration?  No  -KH --       Date of presentation of septic shock -- --       Time of presentation of septic shock -- --             User Key  (r) = Recorded By, (t) = Taken By, (c) = Cosigned By    [address] Name Provider Type    [address] [place] [address],  Physician              Default Flowsheet Data (last 720 hours)     Sepsis Reassess     Row Name 05/05/22 1139                   Repeat Volume Status and Tissue Perfusion Assessment Performed    Repeat Volume Status and Tissue Perfusion Assessment Performed Yes  -[address]                  Volume Status and Tissue Perfusion Post Fluid Resuscitation * Must Document All *    Vital Signs Reviewed (HR, RR, BP, T) Yes  -KH        Shock Index Reviewed Yes  -KH        Arterial Oxygen Saturation Reviewed (POx, SaO2 or SpO2) Yes (comment %)  -KH        Cardio Tachycardia;Regular rate and rhythm [facility]        Pulmonary Normal effort;Clear to auscultation  -KH        Capillary Refill Brisk  -KH        Peripheral Pulses Radial;Dorsalis Pedis; Posterior Tibialis  -KH        Peripheral Pulse +1  -KH        Dorsalis Pedis +1  -KH        Posterior Tibialis +1  -KH        Skin Warm;Dry  -KH        Urine output assessed Decreased  -KH                  *OR*   Intensive Monitoring- Must Document One of the Following Four *:    Vital Signs Reviewed --        * Central Venous Pressure (CVP or RAP) --        * Central Venous Oxygen (SVO2, ScvO2 or Oxygen saturation via central catheter) --        * Bedside Cardiovascular US in IVC diameter and % collapse --        * Passive Leg Raise OR Crystalloid Challenge --              User Key  (r) = Recorded By, (t) = Taken By, (c) = Cosigned By    Initials Name Provider Type    [address] [place] [address],  Physician              SBIRT 20yo+      Most Recent Value   SBIRT (24 yo +)    In order to provide better care to our patients, we are screening all of our patients for alcohol and drug use  Would it be okay to ask you these screening questions? No Filed at: 05/05/2022 0854                    MDM  Number of Diagnoses or Management Options  Colonic thickening  Hyperglycemia  Lactic acidosis  Liver metastases (HCC)  Pleural effusion, right  Pneumoperitoneum  Diagnosis management comments: 77 yo F presenting after "slipping to the ground"- no traumatic injuries  In ED c/o worsening abdominal pain/nausea/dry heaving- CT scan showing colon wall thickening and small pneumoperitoneum  Seen by surgery, recommended IVF & Abx, not a surgical candidate     Admitted to [facility] for further management       Amount and/or Complexity of Data Reviewed  Clinical lab tests: ordered and reviewed  Tests in the radiology section of CPT®: ordered and reviewed  Tests in the medicine section of CPT®: reviewed and ordered  Review and summarize past medical records: yes  Discuss the patient with other providers: yes (General surgery, [facility])  Independent visualization of images, tracings, or specimens: yes        Disposition  Final diagnoses:   Pneumoperitoneum   Colonic thickening   Pleural effusion, right   Liver metastases (HCC)   Hyperglycemia   Lactic acidosis     Time reflects when diagnosis was documented in both MDM as applicable and the Disposition within this note     Time User Action Codes Description Comment    5/5/2022 10:44 AM Rm [place] A Add [K66 8] Pneumoperitoneum     5/5/2022 10:44 AM Rm [place] A Add [K63 9] Colonic thickening     5/5/2022 10:47 AM Rm [place] A Add [J90] Pleural effusion, right     5/5/2022 10:47 AM Rm [place]anna A Add [C78 7] Liver metastases ([address] )     5/5/2022 11:40 AM Rm [place] A Add [R73 9] Hyperglycemia     5/5/2022 11:41 AM Rm Rosanna A Add [E87 2] Lactic acidosis     5/5/2022  3:28 PM Sudtoñito Royalty Add [C50 911,  C78 7] 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)       ED Disposition     ED Disposition Condition Date/Time Comment    Admit Stable Thu May 5, 2022 11:16 AM Case was discussed with NELLY and the patient's admission status was agreed to be Admission Status: inpatient status to the service of Dr [facility]   Follow-up Information    None         Current Discharge Medication List      CONTINUE these medications which have NOT CHANGED    Details   !! acetaminophen (TYLENOL) 500 mg tablet Take 500 mg by mouth every 6 (six) hours as needed for mild pain        !! 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 (HCC)      atorvastatin (LIPITOR) 20 mg tablet Take 20 mg by mouth daily  Calcium Citrate-Vitamin D (CALCIUM CITRATE + D PO) Take 1,500 mg by mouth daily      clotrimazole-betamethasone (LOTRISONE) 1-0 05 % cream Apply 1 application topically as needed        Cranberry (THERACRAN HP PO) Take 2 tablets by mouth daily      glipiZIDE (GLUCOTROL) 5 mg tablet Take 1/2 tab before breakfast  Qty: 30 tablet, Refills: 3    Comments: Please refill when patient requests a refill  Associated Diagnoses: Type 2 diabetes mellitus with hyperglycemia, without long-term current use of insulin ([facility])      Glucosamine-Chondroit-Vit C-Mn (GLUCOSAMINE 1500 COMPLEX) CAPS Take 1 capsule by mouth daily        metFORMIN (GLUCOPHAGE) 500 mg tablet Take 1 tablet (500 mg total) by mouth 2 (two) times a day with meals Please refill when she calls  Qty: 60 tablet, Refills: 3    Associated Diagnoses: Type 2 diabetes mellitus with hyperglycemia, without long-term current use of insulin ([facility])      metoprolol tartrate (LOPRESSOR) 50 mg tablet Take 1 tablet (50 mg total) by mouth every 12 (twelve) hours  Qty: 180 tablet, Refills: 3    Associated Diagnoses: Chronic heart failure with preserved ejection fraction (HFpEF) ([facility])      Multiple Vitamin (MULTIVITAMIN) capsule Take 1 capsule by mouth daily  ondansetron (ZOFRAN) 8 mg tablet Take 1 tablet (8 mg total) by mouth every 8 (eight) hours as needed for nausea or vomiting  Qty: 20 tablet, Refills: 3    Associated Diagnoses: Chemotherapy induced nausea and vomiting; Metastasis to liver ([facility])      oxyCODONE (ROXICODONE) 5 immediate release tablet Take 1 tablet (5 mg total) by mouth every 4 (four) hours as needed for moderate pain Max Daily Amount: 30 mg  Qty: 30 tablet, Refills: 0    Associated Diagnoses: Chronic bilateral back pain, unspecified back location      clindamycin (CLEOCIN) 300 MG capsule TAKE 2 CAPSULES BY MOUTH 1 HOUR PRIOR TO DENTAL PROCEDURE    Refills: 0      torsemide (DEMADEX) 20 mg tablet Take 1 tablet (20 mg total) by mouth as needed (rapid weight gain of 3 lbs overnight or 5 lbs in 5-7 days)  Qty: 90 tablet, Refills: 3    Associated Diagnoses: Chronic heart failure with preserved ejection fraction (HFpEF) ([facility] )       ! ! - Potential duplicate medications found  Please discuss with provider  No discharge procedures on file      PDMP Review       Value Time User    PDMP Reviewed  Yes 5/5/2022  3:47 PM Whitney Duarte MD          ED Provider  Electronically Signed by           DO Nancy  05/05/22 7335

## 2022-05-05 NOTE — CONSULTS
Consultation - [facility]  78 y o   female  [address] [address] [address] [id number] MS [phone]   MRN: [id number]  Encounter: [id number]    ASSESSMENT:    Patient Active Problem List   Diagnosis    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 ([facility] )    Hypercholesteremia    Gastroesophageal reflux disease without esophagitis    History of total knee replacement    Hearing loss    LBBB (left bundle branch block)    Complication associated with cardiac pacemaker lead    Pacemaker complications, subsequent encounter    Chest wall hematoma    Cholecystitis    Acute biliary pancreatitis    Cholangitis    Choledocholithiasis    Acute gallstone pancreatitis    Intermittent complete heart block ([facility] )    Spinal stenosis, lumbar region, with neurogenic claudication    Lumbar pain    Spondylolisthesis    Upper abdominal pain    SIRS (systemic inflammatory response syndrome) (HCC)    Lactic acidosis    Abnormal urinalysis    Type 2 diabetes mellitus, without long-term current use of insulin (HCC)    Hypertension    Nerve pain - Right    Neck pain    Cervical spondylosis without myelopathy    SSS (sick sinus syndrome) (HCC)    Mixed hyperlipidemia    Elevated hemidiaphragm    VT (ventricular tachycardia) (HCC)    Lumbar radiculopathy    Type 2 diabetes mellitus with hyperglycemia, without long-term current use of insulin (HCC)    Hypercalcemia    Pulmonary embolus (HCC)    Thrombocytopenia, unspecified (HCC)    Sepsis ([facility] )    Pneumoperitoneum    Pleural effusion       Active problems addressed:  Stage IV breast cancer  Diffuse hepatic metastasis  Acute on chronic abdominal pain  New pneumoperitoneum  Right-sided pleural effusion  Goals of care    Consult is for goals of care    PLAN:    1  Goals:    Patient appears too weak today to participate in any conversations  She allowed her  and her son to do most of the talking      reviewed what he thinks he remembers from her living will - "do resuscitation if this will keep her alive, but do not do any of those things if there's no hope and she will be a vegetable"   At this time, I recommended to the family level 3 code status because doing chest compressions and intubation at this time is unlikely to improve her overall prognosis, improve her quality of life for the better, and can rather cause undue harm and suffering  However, family are not ready to decide and was becoming visibly frustrated the longer I talked about it   Family is focused on continued restorative cares at this time  Level 1 code status   Patient can qualify for hospice based on disease progression despite cancer treatments with apparent decline in clinical condition  However, I do not believe the family are ready to discuss this yet   Recommend to have CM obtain a copy of the living will from her [facility] PCP   The family requested that Dr Elie Adams team from Oncology follow the patient while here  They request a consult to Oncology   Recommend ongoing discussions about goals of care and code status by primary team and specialists as well   Discuss with Dr Marii Garcia    Code status: Level 1 - Full Code   Decisional apparatus:  Patient does have capacity to make medical decisions on my exam today  If such capacity is lost, patient's substitute decision maker would default to /[place]  Advance Directive / Living Will / POLST:  None on file    2  Social Support:   Supportive listening provided   Patient is  to /Ruben  They have 4 children together  Family appear very supportive of the patient  3  Symptom management:   On IV Dilaudid p r n  Continue   Home regimen is oxycodone IR 5 mg p o , but patient was not really taking until recently when she started to have abdominal pain      Controlled Substance Review    [place]MP or [place]  reviewed: No red flags were identified; safe to proceed with prescription  Roni White 04/25/2022  1   04/25/2022  Oxycodone Hcl (Ir) 5 MG Tablet    30 00  5 Ch Kaia   [id number]   Thr (4908)    45 00 MME        I have reviewed the patient's controlled substance dispensing history in the Prescription Drug Monitoring Program in compliance with the [facility] regulations before prescribing any controlled substances  We appreciate the opportunity to participate in this patient's care  We will continue to follow  Please do not hesitate to contact our on-call provider through our clinic answering service at [phone] should you have acute symptom control concerns  IDENTIFICATION:  Inpatient consult to Palliative Care  Consult performed by: Candy Wu MD  Consult ordered by: Evelyn Perez MD        Reason for Consult / Principal Problem:  Goals of care    HISTORY OF PRESENT ILLNESS:    Lyn Tapia is a 78 y o  female with a palliative diagnosis of stage IV breast cancer with liver metastases  She was initially diagnosed in 2009  She has undergone right mastectomy followed by  Chemotherapy  In 2016, patient was noted to have disease progression to the liver  She subsequently underwent chemotherapy and partial hepatectomy  In 2019, she underwent radiation to the right axilla  Followed by hormone therapy and chemotherapy  In January 2022, she again was found with disease progression, with noted enlargement of some of the previously noted hepatic lesions on a her CT imaging on 01/04/2022  She last saw Oncology on 02/28/2022 where Piqray/Faslodex was recommended and was started on 03/24/2022  Per review of notes, she started to have issues with elevated blood sugars since treatments  She is under the care of endocrinology for this reason  She is currently admitted from home because of worsening abdominal pain and nausea    CT imaging on admission shows new small pneumoperitoneum, new right colonic wall thickening, moderate size pleural effusions, diffuse hepatic Mets, and increased small volume ascites  She is currently getting supportive cares for sepsis, and is receiving broad-spectrum antibiotics and IV fluids  Surgery is consulted, who is concern for microperforation  No surgery is indicated  IR is consulted for consideration of right-sided thoracentesis  Palliative Care is consulted to assist with goals of care discussion  Met with patient, /Ruben, and son/Ruben dusty at bedside  Introduced palliative care with emphasis in goals of care conversation  Patient appears too weak to participate in any conversations and allowed her family to speak in her behalf  Family was able to tell me her history leading up to this hospitalization  They are aware of the CT scan finding that shows possible micro perforation in the gut  There able to tell me the surgery spoke to them and told them that the patient is not a good surgical candidate therefore surgery is not recommended  They are recommending conservative management with antibiotics and time to heal perforation  Prior to this acute event, patient was capable of self-care at home  Per , patient was doing well and is tolerating her treatments well  We spent time discussing her code status  I recommended a DNR/DNI given the patient's current presentation, current status of her cancer, and her current tenuous prognosis   stated that patient has a living will that says she wants to be given life support if this can help her live, but do not do any heroics if it will end up with her being in a vegetative state  I pointed out that we may be at the point where she would want to forego any life-sustaining treatments because I do not believe that performing chest compressions or intubation on her will improve her overall prognosis her clinical condition, and actually do more harm than good and cause undue suffering    However, they are not ready to commit to this yet and was noticeably getting more and frustrated the more I push for this  They seem to want us to have a copy of the living will that is currently in the possession of her family doctor from [facility]  Interview and exam limited by:  None    Review of Systems   Unable to perform ROS: Acuity of condition       Past Medical History:   Diagnosis Date    Acute biliary pancreatitis     Anxiety     Arthritis     Breast CA ([facility] )     recurrent, ductal carcinoma ER, [place] pos    Cancer ([facility] )     Right breast     Cardiac disease     Colon polyp     Depression     Diverticula of intestine     Diverticulosis     Dizziness     and passes out    Flushing     Hiatal hernia     [place]hard of hearing[place]      lizette    Hypercholesteremia     Hypertension     Liver mass     Liver metastasis ([facility] )     [facility]ast[facility] )     Bx 01/03/2017    PONV (postoperative nausea and vomiting)     Pulmonary embolism (HCC)     Recurrent breast cancer (HCC)     Shingles     Shortness of breath     on exertion    Tachycardia     Urinary incontinence     Use of cane as ambulatory aid     or walker     Past Surgical History:   Procedure Laterality Date    BREAST SURGERY      CARDIAC PACEMAKER REMOVAL N/A 11/9/2017    Procedure: PACEMAKER LEAD REVISION, REMOVAL OF NONFUNCTIONING LEAD, AND INSERTION OF A NEW RV LEAD;  Surgeon: Alpesh Ahn MD;  [facility]: [facility];  Service: Cardiology    CATARACT EXTRACTION Bilateral     COLONOSCOPY      ERCP N/A 1/8/2018    Procedure: ENDOSCOPIC RETROGRADE CHOLANGIOPANCREATOGRAPHY (ERCP); Surgeon: Heriberto Rudd MD;  Location: [facility];   Service: Gastroenterology    HYSTERECTOMY      [place] / Maximino Castillo / Ervin Chapman      IR THORACENTESIS  5/5/2022    JOINT REPLACEMENT      lizette  TKR and right TSR    MASTECTOMY Right     [place] ERCP DX COLLECTION SPECIMEN BRUSHING/WASHING N/A 2/22/2018    Procedure: ENDOSCOPIC RETROGRADE CHOLANGIOPANCREATOGRAPHY (ERCP) with stent removal;  Surgeon: Rosalio Sauceda MD;  Location: [facility]; Service: Gastroenterology    CO RESEC [address] N/A 7/13/2017    Procedure: LIVER RESECTION, INTRAOPERATIVE ULTRASOUND;  Surgeon: Andrei Hogue MD;  [facility];  Service: Surgical Oncology    ROTATOR CUFF REPAIR Right     SENTINEL LYMPH NODE BIOPSY Right     TONSILECTOMY AND ADNOIDECTOMY      WOUND DEBRIDEMENT Left 11/9/2017    Procedure: DEBRIDEMENT WOUND AND DRESSING CHANGE [facility]);   Surgeon: Chantal Cooper MD;  [facility]: [facility];  Service: Cardiology     Social History     Socioeconomic History    Marital status: /Civil Union     Spouse name: Not on file    Number of children: Not on file    Years of education: Not on file    Highest education level: Not on file   Occupational History    Not on file   Tobacco Use    Smoking status: Never Smoker    Smokeless tobacco: Never Used   Vaping Use    Vaping Use: Never used   Substance and Sexual Activity    Alcohol use: Not Currently    Drug use: No    Sexual activity: Not on file   Other Topics Concern    Not on file   Social History Narrative    Not on file     Social Determinants of Health     Financial Resource Strain: Not on file   Food Insecurity: Not on file   Transportation Needs: Not on file   Physical Activity: Not on file   Stress: Not on file   Social Connections: Not on file   Intimate Partner Violence: Not on file   Housing Stability: Not on file     Family History   Problem Relation Age of Onset    Lung cancer Father     Cancer Brother     Diabetes type II Son        MEDICATIONS / ALLERGIES:  all current active meds have been reviewed    Allergies   Allergen Reactions    Ampicillin Shortness Of Breath, Anaphylaxis and Other (See Comments)     Other reaction(s): Unknown Allergic Reaction  Reaction Date: [id number];        OBJECTIVE:  /80 (BP Location: Left arm)   Pulse (!) 124   Temp 98 4 °F (36 9 °C) (Oral)   Resp 18   Ht 5' 1" (1 549 m)   Wt 65 3 kg (143 lb 15 4 oz)   LMP  (LMP Unknown) Comment: post   SpO2 92%   BMI 27 20 kg/m²   Physical Exam:  Constitutional: Appears well-developed and well-nourished  Appears frail, I stated age, chronically ill appearing, but not toxic looking  In no acute physical or emotional distress  Head: Normocephalic and atraumatic  Temporal muscle wasting  Eyes: EOM are normal  No ocular discharge  No scleral icterus  Neck: No visible adenopathy or masses  Respiratory: Effort normal  No stridor  No respiratory distress  Gastrointestinal: No abdominal distension  Musculoskeletal: No edema  Neurological:  Groggy, did not participate in conversations today due to acuity of condition  Follows commands  Skin: Dry, no diaphoresis  Pale  Psychiatric: Displays a normal mood and affect  Behavior, judgment and thought content at this time appear questionable    Lab Results: I have personally reviewed pertinent labs  Imaging Studies: I have personally reviewed pertinent reports  EKG, Pathology, and Other Studies: I have personally reviewed pertinent reports  Counseling / Coordination of Care:  Counseling / Coordination of Care  Total floor / unit time spent today 60 minutes  Greater than 50% of total time was spent with the patient and / or family counseling and / or coordination of care  A description of the counseling / coordination of care: provided medical updates, discussed palliative care, discussed hospice care, determined competency, determined goals of care, determined POA, determined social/family support, discussed plans of care, discussed symptom management, provided psychosocial support       Gaston Medrano MD  [address] and [facility]  [phone]

## 2022-05-05 NOTE — SEDATION DOCUMENTATION
600 cc clear yellow right pleural fluid removed with complication, labs sent  Pt transported to ED 10 via stretcher

## 2022-05-05 NOTE — ASSESSMENT & PLAN NOTE
Lab Results   Component Value Date    HGBA1C 7 8 (H) 01/29/2022       Recent Labs     05/05/22  0852   POCGLU 358*     Cristal NPO, sinus headache, Accu-Cheks q 6 hours

## 2022-05-05 NOTE — PLAN OF CARE
Problem: Potential for Falls  Goal: Patient will remain free of falls  Description: INTERVENTIONS:  - Educate patient/family on patient safety including physical limitations  - Instruct patient to call for assistance with activity   - Consult OT/PT to assist with strengthening/mobility   - Keep Call bell within reach  - Keep bed low and locked with side rails adjusted as appropriate  - Keep care items and personal belongings within reach  - Initiate and maintain comfort rounds  - Make Fall Risk Sign visible to staff  - Offer Toileting every 2 Hours, in advance of need  - Initiate/Maintain bed alarm  - Apply yellow socks and bracelet for high fall risk patients  - Consider moving patient to room near nurses station  Outcome: Progressing     Problem: MUSCULOSKELETAL - ADULT  Goal: Maintain or return mobility to safest level of function  Description: INTERVENTIONS:  - Assess patient's ability to carry out ADLs; assess patient's baseline for ADL function and identify physical deficits which impact ability to perform ADLs (bathing, care of mouth/teeth, toileting, grooming, dressing, etc )  - Assess/evaluate cause of self-care deficits   - Assess range of motion  - Assess patient's mobility  - Assess patient's need for assistive devices and provide as appropriate  - Encourage maximum independence but intervene and supervise when necessary  - Involve family in performance of ADLs  - Assess for home care needs following discharge   - Consider OT consult to assist with ADL evaluation and planning for discharge  - Provide patient education as appropriate  Outcome: Progressing  Goal: Maintain proper alignment of affected body part  Description: INTERVENTIONS:  - Support, maintain and protect limb and body alignment  - Provide patient/ family with appropriate education  Outcome: Progressing     Problem: PAIN - ADULT  Goal: Verbalizes/displays adequate comfort level or baseline comfort level  Description: Interventions:  - Encourage patient to monitor pain and request assistance  - Assess pain using appropriate pain scale  - Administer analgesics based on type and severity of pain and evaluate response  - Implement non-pharmacological measures as appropriate and evaluate response  - Consider cultural and social influences on pain and pain management  - Notify physician/advanced practitioner if interventions unsuccessful or patient reports new pain  Outcome: Progressing     Problem: INFECTION - ADULT  Goal: Absence or prevention of progression during hospitalization  Description: INTERVENTIONS:  - Assess and monitor for signs and symptoms of infection  - Monitor lab/diagnostic results  - Monitor all insertion sites, i e  indwelling lines, tubes, and drains  - Monitor endotracheal if appropriate and nasal secretions for changes in amount and color  - [place] appropriate cooling/warming therapies per order  - Administer medications as ordered  - Instruct and encourage patient and family to use good hand hygiene technique  - Identify and instruct in appropriate isolation precautions for identified infection/condition  Outcome: Progressing  Goal: Absence of fever/infection during neutropenic period  Description: INTERVENTIONS:  - Monitor WBC    Outcome: Progressing     Problem: SAFETY ADULT  Goal: Patient will remain free of falls  Description: INTERVENTIONS:  - Educate patient/family on patient safety including physical limitations  - Instruct patient to call for assistance with activity   - Consult OT/PT to assist with strengthening/mobility   - Keep Call bell within reach  - Keep bed low and locked with side rails adjusted as appropriate  - Keep care items and personal belongings within reach  - Initiate and maintain comfort rounds  - Make Fall Risk Sign visible to staff  - Offer Toileting every 2 Hours, in advance of need  - Initiate/Maintain bed alarm  - Apply yellow socks and bracelet for high fall risk patients  - Consider moving patient to room near nurses station  Outcome: Progressing  Goal: Maintain or return to baseline ADL function  Description: INTERVENTIONS:  -  Assess patient's ability to carry out ADLs; assess patient's baseline for ADL function and identify physical deficits which impact ability to perform ADLs (bathing, care of mouth/teeth, toileting, grooming, dressing, etc )  - Assess/evaluate cause of self-care deficits   - Assess range of motion  - Assess patient's mobility; develop plan if impaired  - Assess patient's need for assistive devices and provide as appropriate  - Encourage maximum independence but intervene and supervise when necessary  - Involve family in performance of ADLs  - Assess for home care needs following discharge   - Consider OT consult to assist with ADL evaluation and planning for discharge  - Provide patient education as appropriate  Outcome: Progressing  Goal: Maintains/Returns to pre admission functional level  Description: INTERVENTIONS:  - Perform BMAT or MOVE assessment daily    - Set and communicate daily mobility goal to care team and patient/family/caregiver     - Collaborate with rehabilitation services on mobility goals if consulted  - Out of bed for meals 3 times a day  - Out of bed for toileting  - Record patient progress and toleration of activity level   Outcome: Progressing     Problem: DISCHARGE PLANNING  Goal: Discharge to home or other facility with appropriate resources  Description: INTERVENTIONS:  - Identify barriers to discharge w/patient and caregiver  - Arrange for needed discharge resources and transportation as appropriate  - Identify discharge learning needs (meds, wound care, etc )  - Arrange for interpretive services to assist at discharge as needed  - Refer to Case Management Department for coordinating discharge planning if the patient needs post-hospital services based on physician/advanced practitioner order or complex needs related to functional status, cognitive ability, or social support system  Outcome: Progressing     Problem: Knowledge Deficit  Goal: Patient/family/caregiver demonstrates understanding of disease process, treatment plan, medications, and discharge instructions  Description: Complete learning assessment and assess knowledge base  Interventions:  - Provide teaching at level of understanding  - Provide teaching via preferred learning methods  Outcome: Progressing     Problem: MOBILITY - ADULT  Goal: Maintain or return to baseline ADL function  Description: INTERVENTIONS:  -  Assess patient's ability to carry out ADLs; assess patient's baseline for ADL function and identify physical deficits which impact ability to perform ADLs (bathing, care of mouth/teeth, toileting, grooming, dressing, etc )  - Assess/evaluate cause of self-care deficits   - Assess range of motion  - Assess patient's mobility; develop plan if impaired  - Assess patient's need for assistive devices and provide as appropriate  - Encourage maximum independence but intervene and supervise when necessary  - Involve family in performance of ADLs  - Assess for home care needs following discharge   - Consider OT consult to assist with ADL evaluation and planning for discharge  - Provide patient education as appropriate  Outcome: Progressing  Goal: Maintains/Returns to pre admission functional level  Description: INTERVENTIONS:  - Perform BMAT or MOVE assessment daily    - Set and communicate daily mobility goal to care team and patient/family/caregiver     - Collaborate with rehabilitation services on mobility goals if consulted  - Out of bed for meals 3 times a day  - Out of bed for toileting  - Record patient progress and toleration of activity level   Outcome: Progressing     Problem: Nutrition/Hydration-ADULT  Goal: Nutrient/Hydration intake appropriate for improving, restoring or maintaining nutritional needs  Description: Monitor and assess patient's nutrition/hydration status for malnutrition  Collaborate with interdisciplinary team and initiate plan and interventions as ordered  Monitor patient's weight and dietary intake as ordered or per policy  Utilize nutrition screening tool and intervene as necessary  Determine patient's food preferences and provide high-protein, high-caloric foods as appropriate       INTERVENTIONS:  - Monitor oral intake, urinary output, labs, and treatment plans  - Assess nutrition and hydration status and recommend course of action  - Evaluate amount of meals eaten  - Assist patient with eating if necessary   - Allow adequate time for meals  - Recommend/ encourage appropriate diets, oral nutritional supplements, and vitamin/mineral supplements  - Order, calculate, and assess calorie counts as needed  - Recommend, monitor, and adjust tube feedings and TPN/PPN based on assessed needs  - Assess need for intravenous fluids  - Provide specific nutrition/hydration education as appropriate  - Include patient/family/caregiver in decisions related to nutrition  Outcome: Progressing     Problem: Prexisting or High Potential for Compromised Skin Integrity  Goal: Skin integrity is maintained or improved  Description: INTERVENTIONS:  - Identify patients at risk for skin breakdown  - Assess and monitor skin integrity  - Assess and monitor nutrition and hydration status  - Monitor labs   - Assess for incontinence   - Turn and reposition patient  - Assist with mobility/ambulation  - Relieve pressure over bony prominences  - Avoid friction and shearing  - Provide appropriate hygiene as needed including keeping skin clean and dry  - Evaluate need for skin moisturizer/barrier cream  - Collaborate with interdisciplinary team   - Patient/family teaching  - Consider wound care consult   Outcome: Progressing

## 2022-05-05 NOTE — ED NOTES
Pt placed on 2L nasal canula due to O2 level of 90%   Pt now at 95%      Ruben Cavazos  05/05/22 1386

## 2022-05-05 NOTE — DISCHARGE INSTRUCTIONS
Thoracentesis   WHAT YOU NEED TO KNOW:   A thoracentesis is a procedure to remove extra fluid or air from between your lungs and your inner chest wall  Air or fluid buildup may make it hard for you to breathe  A thoracentesis allows your lungs to expand fully so you can breathe more easily  DISCHARGE INSTRUCTIONS:     Small amount of shoulder pain and bloody sputum is normal after a Thoracentesis  Rest:  Rest when you feel it is needed  Slowly start to do more each day  Return to your daily activities as directed  Resume your normal diet  Small sips of flat soda will help mild nausea  Do not smoke: If you smoke, it is never too late to quit  Ask for information about how to stop smoking if you need help  Contact Interventional Radiology at [phone] Sean PATIENTS: Contact Interventional Radiology at [phone]) Annette Foote PATIENTS: Contact Interventional Radiology at [phone]) if:   · You have a fever  · Your puncture site is red, warm, swollen, or draining pus  · You have questions or concerns about your procedure, medicine, or care  Seek care immediately or call 911 if:   · Severe chest pain with inspiration and shortness of breath    · Large amounts of blood in your sputum    Follow up with your healthcare provider as directed  Thoracentesis   WHAT YOU NEED TO KNOW:   A thoracentesis is a procedure to remove extra fluid or air from between your lungs and your inner chest wall  Air or fluid buildup may make it hard for you to breathe  A thoracentesis allows your lungs to expand fully so you can breathe more easily  DISCHARGE INSTRUCTIONS:     Small amount of shoulder pain and bloody sputum is normal after a Thoracentesis  Rest:  Rest when you feel it is needed  Slowly start to do more each day  Return to your daily activities as directed  Resume your normal diet  Small sips of flat soda will help mild nausea  Do not smoke:   If you smoke, it is never too late to quit  Ask for information about how to stop smoking if you need help  Contact Interventional Radiology at [phone] Sean PATIENTS: Contact Interventional Radiology at [phone]) Annette Foote PATIENTS: Contact Interventional Radiology at [phone]) if:   · You have a fever  · Your puncture site is red, warm, swollen, or draining pus  · You have questions or concerns about your procedure, medicine, or care  Seek care immediately or call 911 if:   · Severe chest pain with inspiration and shortness of breath    · Large amounts of blood in your sputum    Follow up with your healthcare provider as directed

## 2022-05-05 NOTE — ASSESSMENT & PLAN NOTE
Hold further blood pressure medications in setting of sepsis  Currently NPO  Continue to evaluate patient is able to resume

## 2022-05-05 NOTE — ASSESSMENT & PLAN NOTE
Sepsis criteria on admission, tachycardia, neutropenia, with likely abdominal source given pneumoperitoneum  Continue IV fluids, lactic acid of 3 2 on admission  Cefepime, Flagyl, patient does have allergy to penicillins  Blood cultures currently pending  Will check procalcitonin

## 2022-05-05 NOTE — ASSESSMENT & PLAN NOTE
History of metastatic breast cancer to liver, intial diagnosis in 2016  [facility] Oncology outpatient, Dr Chani Oleary  Currently on chemotherapy, piqray recently started 2 weeks prior, has failed prior therapies  Hold current chemotherapy  Palliative care consult

## 2022-05-06 NOTE — ASSESSMENT & PLAN NOTE
Sepsis criteria on admission, tachycardia, neutropenia, with likely abdominal source given pneumoperitoneum  Continue IV fluids, lactic acid of 3 2 on admission  Cefepime, Flagyl, patient does have allergy to penicillins  Blood cultures currently pending  Continue for now, high risk of bowel sepsis

## 2022-05-06 NOTE — TELEPHONE ENCOUNTER
KATERIN attempted to call Dr Tamiko Cook office several times today, each time receiving a busy signal  KATERIN will try again on Monday, as we are trying to request a copy of her ACP

## 2022-05-06 NOTE — UTILIZATION REVIEW
Initial Clinical Review    Admission: Date/Time/Statement:   Admission Orders (From admission, onward)     Ordered        05/05/22 1141  Inpatient Admission  Once                      Orders Placed This Encounter   Procedures    Inpatient Admission     Standing Status:   Standing     Number of Occurrences:   1     Order Specific Question:   Level of Care     Answer:   Med Surg [16]     Order Specific Question:   Estimated length of stay     Answer:   More than 2 Midnights     Order Specific Question:   Certification     Answer:   I certify that inpatient services are medically necessary for this patient for a duration of greater than two midnights  See H&P and MD Progress Notes for additional information about the patient's course of treatment  ED Arrival Information     Expected Arrival Acuity    - 5/5/2022 08:06 Emergent         Means of arrival Escorted by Service Admission type    Ambulance [facility] Urgent         Arrival complaint    chronic pain        Chief Complaint   Patient presents with    Abdominal Pain     pt c/o chronic abdominal pain from liver cancer but today she had uncontrolled nausea and vomiting and can not keep any of her nausea or pain medications down, pain is not different then normal just more intense       Initial Presentation: 78 y o  female with PMH DM, 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, HTN, HLD, SSS s/p pacer  presents to ED from home due to fall/weaknes -was unable to get up, abdominal pain, nausea; pain everywhere but mostly abdomen  Seen in ED 4/22 for abdominal pain/nausea felt to be from liver mets, found to have pleural effusion- declined thora at that time and denies SOB/worsening sx  On arrival: tachycardic, dry heaving, +  Diffuse abdominal tenderness to palpation, voluntary guarding  Lactic acid 3 2, glucose 358  CT showed CT  showing colon wall thickening and small pneumoperitoneum (see below)      Admitted to [address] [place] [address] with Sepsis -tachycardia, neutropenia, with likely abdominal source given pneumoperitoneum, new R pleural effusion, Pneumoperitoneum  NPO, IVF's, IV cefepime & flagyl, f/u on bc's, O2 as needed - currently requiring 2L nc, accu checks q6h, IR consult for thoracentesis, IV protonix  Consults to Surgery, Oncology, Palliative Care  Per Surgery :some soft peritoneal signs on examination, she has severe leukopenia and shows signs of early sepsis  Possible colitis with micro perforation  Infectious versus inflammatory or immunotherapy induced  Patient is not a surgical candidate  Would manage with IV hydration aggressively and IV antibiotics  Per Palliative Care: family focused on continued restorative care @ present  Level 1 code status  IR Thoracentesis: 600 ccs clear yellow fluid removed    Date: 5/6   Day 2: Per Surgery: No acute surgical intervention  Trial sips of clears    Per Medicine: Tolerating clears - will advance to full liquids  IVF, Abx's -Continue for now, high risk of bowel sepsis  If no improvement in the next 48 hours or through the week, will need hospice referal    Per Oncology: 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-History of metastatic breast cancer to liver, initially diagnosed in 2016  ECOG 3  Currently on oral targeted therapy, alpelisib (Piqray); completed Taxotere treatment on 1/6/22   Discussed with patient about continuing with oral targeted therapy as this was a question that she initially presented upon inpatient admission  Explored options, including potential toxicities associated with her treatment, such as cytopenias, diarrhea, fatigue  Reports abd pain 3/10  Discussed full code status vs DNR/DNI; patient reported being overwhelmed with the plethora of information that was being presented to her yesterday  Will p/u on discussion later today      ED Triage Vitals [05/05/22 0812]   Temperature Pulse Respirations Blood Pressure SpO2   98 5 °F (36 9 °C) (!) 106 20 157/72 94 % Temp Source Heart Rate Source Patient Position - Orthostatic VS BP Location FiO2 (%)   Oral Monitor Lying Left arm --      Pain Score       10 - Worst Possible Pain          Wt Readings from Last 1 Encounters:   05/06/22 67 3 kg (148 lb 5 9 oz)     Additional Vital Signs:   05/06/22 15:31:16 -- 111 Abnormal  -- 127/70 89 91 % -- -- -- --   05/06/22 07:13:34 97 3 °F (36 3 °C)  96 18 98/54 69 95 % -- -- -- --   05/06/22 02:56:24 -- 103 -- 111/54 73 95 % -- -- -- --   05/06/22 02:10:21 98 8 °F (37 1 °C) 106 Abnormal  -- -- -- 97 % -- -- -- --   05/05/22 22:33:34 100 3 °F (37 9 °C) 121 Abnormal  16 142/69 93 94 % -- -- -- --   05/05/22 15:02:06 -- 127 Abnormal  -- 153/73 100 92 % -- -- -- --   05/05/22 14:04:34 98 4 °F (36 9 °C) 124 Abnormal  18 128/80 96 92 % -- -- None (Room air) Lying   05/05/22 13:35:09 -- 128 Abnormal  18 127/80 -- 93 % -- -- -- --   05/05/22 13:16:59 -- 130 Abnormal  20 124/60 -- 95 % -- -- -- --   05/05/22 1223 -- 126 Abnormal  22 138/63 -- 95 % 28 2 L/min Nasal cannula Lying   05/05/22 1115 -- 130 Abnormal  22 133/60 90 92 % -- -- None (Room air) Lying   05/05/22 1014 -- 121 Abnormal  20 162/72 -- 92 % -- -- None (Room air) Lying       Pertinent Labs/Diagnostic Test Results:   IR IN-Patient Thoracentesis   Final Result by Ying Inman MD (05/05 2008)   Impression:   1  Right ultrasound-guided thoracentesis yielding 600cc clear yellow pleural fluid  Signed, performed and dictated by Bhanu Cooney PA-C under the supervision of Dr Homa Vilchis  Workstation performed: [id number]         CT chest abdomen pelvis w contrast   ED Interpretation by Michelle Velázquez DO (05/05 1026)   IMPRESSION:     1  New small amount of pneumoperitoneum  2   New wall thickening of the right colon may be infectious or inflammatory in etiology  3   Moderate-sized pleural effusion has increased since January 4, 2022  4  Diffuse hepatic metastases  5   Increase small-volume ascites        Final Result by Julián Hope MD (05/05 1017)      1  New small amount of pneumoperitoneum  2   New wall thickening of the right colon may be infectious or inflammatory in etiology  3   Moderate-sized pleural effusion has increased since January 4, 2022   4  Diffuse hepatic metastases  5   Increase small-volume ascites  I personally discussed this study with Orquidea Deleon on 5/5/2022 at 10:14 AM       Workstation performed: [id number]         CT head without contrast   ED Interpretation by Cat Dougherty DO (05/05 9381)   FINDINGS:     PARENCHYMA: Decreased attenuation is noted in periventricular and subcortical white matter demonstrating an appearance that is statistically most likely to represent moderate microangiopathic change      No CT signs of acute infarction  No intracranial mass, mass effect or midline shift  No acute parenchymal hemorrhage  Atherosclerotic calcifications of the cavernous segment of the internal carotid artery are mild      VENTRICLES AND EXTRA-AXIAL SPACES:  Normal for the patient's age      VISUALIZED ORBITS AND PARANASAL SINUSES:  Bilateral lens implants noted     CALVARIUM AND EXTRACRANIAL SOFT TISSUES:  Normal      IMPRESSION:         Final Result by Tawanda Brooks MD (05/05 9925)      No acute intracranial abnormality  Microangiopathic changes  Workstation performed: [id number]         CT spine cervical without contrast   ED Interpretation by Cat Dougherty DO (05/05 6183)   IMPRESSION:     1  There is nonspecific straightening of the cervical lordosis without subluxation  2   Advanced spondylosis  3   No acute fracture  4   Moderate right pleural effusion is incompletely imaged     See separate CT of the chest abdomen pelvis  Final Result by Tawanda Brooks MD (05/05 7332)      1  There is nonspecific straightening of the cervical lordosis without subluxation  2   Advanced spondylosis  3   No acute fracture     4   Moderate right pleural effusion is incompletely imaged     See separate CT of the chest abdomen pelvis        Workstation performed: [id number]               Results from last 7 days   Lab Units 05/06/22  0447 05/05/22  0833   WBC Thousand/uL 10 67* 2 56*   HEMOGLOBIN g/dL 13 0 16 3*   HEMATOCRIT % 42 5 50 3*   PLATELETS Thousands/uL 133* 145*   NEUTROS ABS Thousands/µL  --  1 39*   BANDS PCT % 10*  --      Results from last 7 days   Lab Units 05/06/22  0447 05/05/22  0833   SODIUM mmol/L 137 137   POTASSIUM mmol/L 3 9 4 1   CHLORIDE mmol/L 103 97*   CO2 mmol/L 23 25   ANION GAP mmol/L 11 15*   BUN mg/dL 17 12   CREATININE mg/dL 1 20 1 04   EGFR ml/min/1 73sq m 41 49   CALCIUM mg/dL 8 5 9 7     Results from last 7 days   Lab Units 05/06/22  0447 05/05/22  0833   AST U/L 47* 64*   ALT U/L 30 43   ALK PHOS U/L 235* 362*   TOTAL PROTEIN g/dL 5 9* 7 4   ALBUMIN g/dL 2 2* 3 1*   TOTAL BILIRUBIN mg/dL 1 19* 1 56*   BILIRUBIN DIRECT mg/dL  --  0 77*     Results from last 7 days   Lab Units 05/06/22  0628 05/05/22  2358 05/05/22  1839 05/05/22  1529 05/05/22  0852   POC GLUCOSE mg/dl 274* 296* 326* 362* 358*     Results from last 7 days   Lab Units 05/06/22  0447 05/05/22  0833   GLUCOSE RANDOM mg/dL 287* 356*     Results from last 7 days   Lab Units 05/05/22  0833   PH EVELYN  7 378   PCO2 EVELYN mm Hg 41 0*   PO2 EVELYN mm Hg 41 5   HCO3 EVELYN mmol/L 23 6*   BASE EXC EVELYN mmol/L -1 5   O2 CONTENT EVELYN ml/dL 16 9   O2 HGB, VENOUS % 72 6     Results from last 7 days   Lab Units 05/05/22  1240 05/05/22  1042 05/05/22  0833   HS TNI 0HR ng/L  --   --  37   HS TNI 2HR ng/L  --  45  --    HSTNI D2 ng/L  --  8  --    HS TNI 4HR ng/L 52*  --   --    HSTNI D4 ng/L 15  --   --      Results from last 7 days   Lab Units 05/05/22  1240 05/05/22  1042   LACTIC ACID mmol/L 3 1* 3 2*     Results from last 7 days   Lab Units 05/05/22  0833   LIPASE u/L 41*     Results from last 7 days   Lab Units 05/06/22  0336   CLARITY UA  Clear   COLOR UA  [place]renity   SPEC GRAV UA  1 020 PH UA  5 5   GLUCOSE UA mg/dl 100 (1/10%)*   KETONES UA mg/dl Trace*   BLOOD UA  Negative   PROTEIN UA mg/dl 100 (2+)*   NITRITE UA  Positive*   BILIRUBIN UA  Interference- unable to analyze*   UROBILINOGEN UA E U /dl 0 2   LEUKOCYTES UA  Negative   WBC UA /hpf 4-10*   RBC UA /hpf 2-4   BACTERIA UA /hpf Occasional   EPITHELIAL CELLS WET PREP /hpf Occasional   MUCUS THREADS  Occasional*     Results from last 7 days   Lab Units 05/05/22  1329 05/05/22  1042   BLOOD CULTURE   --  Received in Microbiology Lab  Culture in Progress  Received in Microbiology Lab  Culture in Progress     GRAM STAIN RESULT  Rare Polys  No organisms seen  --      Results from last 7 days   Lab Units 05/05/22  1329   TOTAL COUNTED  100   WBC FLUID /ul 162     ED Treatment:   Medication Administration from 05/05/2022 0806 to 05/05/2022 1400       Date/Time Order Dose Route Action     05/05/2022 0824 ondansetron (ZOFRAN) injection 4 mg 4 mg Intravenous Given     05/05/2022 0843 morphine injection 2 mg 2 mg Intravenous Given     05/05/2022 0835 sodium chloride 0 9 % bolus 500 mL 500 mL Intravenous New Bag     05/05/2022 1047 cefepime (MAXIPIME) 2 g/50 mL dextrose IVPB 2,000 mg Intravenous New Bag     05/05/2022 1129 metroNIDAZOLE (FLAGYL) IVPB (premix) 500 mg 100 mL 500 mg Intravenous New Bag     05/05/2022 1047 sodium chloride 0 9 % infusion 75 mL/hr Intravenous New Bag     05/05/2022 1129 morphine injection 2 mg 2 mg Intravenous Given        Past Medical History:   Diagnosis Date    Acute biliary pancreatitis     Anxiety     Arthritis     Breast CA (HCC)     recurrent, ductal carcinoma ER, [place] pos    Cancer (HCC)     Right breast     Cardiac disease     Colon polyp     Depression     Diverticula of intestine     Diverticulosis     Dizziness     and passes out    Flushing     Hiatal hernia     [place]    Hypercholesteremia     Hypertension     Liver mass     Liver metastasis ([facility] [address] )     Metastatic adenocarcinoma to liver ([facility] [address] )     Bx 01/03/2017    PONV (postoperative nausea and vomiting)     Pulmonary embolism (HCC)     Recurrent breast cancer (HCC)     Shingles     Shortness of breath     on exertion    Tachycardia     Urinary incontinence     Use of cane as ambulatory aid     or walker     Present on Admission:   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 (HCC)   Type 2 diabetes mellitus with hyperglycemia, without long-term current use of insulin (HCC)   SSS (sick sinus syndrome) (HCC)   Hypertension      Admitting Diagnosis: Pneumoperitoneum [K66 8]  Lactic acidosis [E87 2]  Pain [R52]  Liver metastases (HCC) [C78 7]  Hyperglycemia [R73 9]  Pleural effusion, right [J90]  Colonic thickening [K63 9]  Age/Sex: 78 y o  female     Admission Orders:  Scheduled Medications:  cefepime, 1,000 mg, Intravenous, Q12H  heparin (porcine), 5,000 Units, Subcutaneous, Q8H [facility]  insulin lispro, 1-5 Units, Subcutaneous, Q6H [facility]  metoprolol, 2 5 mg, Intravenous, Q6H  metroNIDAZOLE, 500 mg, Intravenous, Q8H  pantoprazole, 40 mg, Intravenous, Q12H [facility]    Continuous IV Infusions:  sodium chloride, 75 mL/hr, Intravenous, Continuous    PRN Meds:  acetaminophen, 650 mg, Oral, Q4H PRN x 1 5/5  HYDROmorphone, 0 5 mg, Intravenous, Q6H PRN  HYDROmorphone, 1 mg, Intravenous, Q4H PRN x 1 5/5   HYDROmorphone, 0 2 mg, Intravenous, Q4H PRN x 1 5/6  ondansetron, 4 mg, Intravenous, Q6H PRN    NPO  SCDs  IP CONSULT TO ACUTE CARE SURGERY  IP CONSULT TO PALLIATIVE CARE  IP CONSULT TO ONCOLOGY    Network Utilization Review Department  ATTENTION: Please call with any questions or concerns to [phone] and carefully listen to the prompts so that you are directed to the right person  All voicemails are confidential   Tabitha Wall all requests for admission clinical reviews, approved or denied determinations and any other requests to dedicated fax number below belonging to the campus where the patient is receiving treatment   List of dedicated fax numbers for the Facilities:  [address] [place] [address] DENIALS (Administrative/Medical Necessity) [phone]   [address] (Maternity/NICU/Pediatrics) [phone]   [address]  [address] [facility] [place] [address] [id number] [place] [address] [address] [facility] [phone]

## 2022-05-06 NOTE — CONSULTS
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-  -History of metastatic breast cancer to liver, initially diagnosed in 2016  ECOG 3     -Currently on oral targeted therapy, alpelisib (Piqray); completed Taxotere treatment on 1/6/22    -Initially on Piqray 200 mg PO daily (took 7 pills), switched to 802 mg due to complications with diarrhea  There was a four-week pause in between dosage change     -Discussed with patient about continuing with oral targeted therapy as this was a question that she initially presented upon inpatient admission  Explored options, including potential toxicities associated with her treatment, such as cytopenias, diarrhea, fatigue      -Patient expressed that she was tired constantly be hospitalized  Ultimately, patient wishes to seek closure from Dr Armando Collazo (primary oncologist)  Albeit she understands that it is her decision to either continue or stop her treatment, she is very much open to receive Dr Armando Collazo blessing  2  Pneumoperitoneum-  -CT C/A/P (5/5/22) revealed small volume ascites and mesenteric infiltration is increased  Small amount of pneumoperitoneum anteriorly is new since prior study      -Abdominal pain stable, patient reported the pain to be 3/10  "It only hurts when you push on it that way "    -No Surgery indicated at this time per General Surgery  3  Goals of care-  -Discussed full code status vs DNR/DNI; patient reported being overwhelmed with the plethora of information that was being presented to her yesterday  Patient's son, who was at the bedside, agreed with this statement as well   Explained the reason why patient's care team had to present a topic that was not only crucial, but essentially time-sensitive      -Patient did not appear to be entirely engaged with the conversation, but reiterated that it was important to decide on this due to patient's precarious medical condition  We agreed to  on this discussion again later in the afternoon when the information has settled a bit  Will plan to [employer] back in the afternoon  -Appreciate Palliative Medicine's continuing conversations with goals of care  Outpatient follow up plan: Will need to reschedule appointment with Dr Mathew Bruce  Appointment was canceled for May 9, 2022  Communication with team:  Communicated with primary team, Dr Sarahi Mckinney  I did review this patient with Dr Harmony Olguin and he is in agreement with this plan  Thank you for this consult  Tom Villarrealace, [address], [address] , [place] [facility]  Hematology-Oncology    Reason for consultation:  Family requesting oncology consult for palliative medicine  Interval history:    Patient was able to offer me a huge smile as I walked in the door; she expressed tangela that a member of the Oncology team was "nice enough to give me support " She was happy to report that she feels so much better since being initially admitted to the hospital   However, she began to weep when she recounted the events on what led her to be hospitalized  Patient's son, Chula Andrea, was at the bedside  We had a long discussion about her cancer treatment which ultimately paved the way to conversations regarding her goals of care  She had a few intelligent questions about how this would impact her cancer treatment in general  She expressed frustration that so many people telling me things that I did not understand  My  as well " Explained code status in a manner that I was hoping that she would be able to comprehend, however, patient appeared to be much more preoccupied with other psychosocial issues that appeared to be contributing to her emotional distress  She was weeping the entire time we were having a conversation    Towards the end, it was apparent that this crucial conversation will need to take some time before a decision can be made  I promised the patient that I will be back at some point to revisit the topic  Patient reported that she has been trying her best to consume fluids and delicious Jell-O offerings  Denies any fevers or chills  Denies shortness of breast, nausea, vomiting, diarrhea  Presence of abdominal pain, rates 3/10  Expressed that she was looking forward to getting better soon, so she can be discharged and celebrate Mother's Day at home  History of present illness:  Marco Antonio De Souza is a 78 y o  female with a PMH of   type 2 diabetes, hypertension and hyperlipidemia, stage IV breast cancer with liver on oral chemotherapy, alpelisib (Piqray) 300 mg daily, along with fulvestrant 500 mg IM (hormonal therapy)  Started [facility] on 4/11/22  She underwent Taxotere treatment q3 weeks from 9/1/22-1/6/2022  She has PIK3Ca mutation; ER positive, HER2 negative  Last visit with Dr Peter Hamman was on 2/28/2022  After that visit the plan was follow-up in 2 months with a plan to complete CBC, CMP, plus tumor marker  Patient has been religiously keeping appointments for her Faslodex injections  They currently have an appointment set up for May 9, 2022 at 3:00 p m , but that was canceled by the patient's spouse due to patient's inpatient stay  Patient presented in the ED on 5/5/22 for nausea and abdominal pain  Earlier that day, she was reported to be getting out of bed, but felt weak, resulting in a fall  EMS was called to assist with helping her get up, but by the time they arrived, the patient had started to feel worsening abdominal pain  It was reported that she has been experiencing abdominal pain for the past several weeks  She has not been eating well due to persistent nausea  CT imaging on 5/5/22 revealed new pneumoperitoneum, new moderate pleural effusions, diffuse hepatic metastasis and is ascites    Hematology-oncology was consulted due to family requesting input regarding patient's oncology care  Review of Systems:   Review of Systems   Constitutional: Positive for activity change, appetite change and fatigue  Skin: Positive for pallor  Neurological: Positive for weakness  Psychiatric/Behavioral: The patient is nervous/anxious  Oncology History:   Cancer Staging  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)  Staging form: Breast, AJCC 8th Edition  - Pathologic stage from 2/11/2016: Stage IV (rpTX, pNX, pM1, G2, ER: Positive, VA: Unknown, HER2: Negative) - Signed by Diana Wolfe PA-C on 2/20/2018    Oncology History   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)   2009 Initial Diagnosis    Adenocarcinoma of right breast,  Infiltrating ductal carcinoma T2, N1 sebastien) Tumor was ER/VA positive, HER2 negative  2009 Surgery    Right Mastectomy     2009 -  Chemotherapy    Adjuvant chemotherapy with Taxotere/Cytoxan x 4 cycles     2009 - 10/2014 Chemotherapy    Arimidex 1 mg po dialy     2/11/2016 Progression    Right lateral mastectomy site growth with core needle biopsy demonstrated reoccurrence  %, VA 70%, Her2 +1  Final Diagnosis   A  Breast, right lateral mastectomy site, core needle biopsies:                        - Recurrent invasive mammary carcinoma, no special type (formerly invasive ductal carcinoma), 0 8 cm largest single focus               - Combined Jose Elias score: 7/9                         - Tubule formation: None (3/3)  - Nuclear pleomorphism: Marked (3/3)  - Mitotic count: 3 mitoses per 10 high-power fields (1/3)  - No lymph-vascular invasion is identified              - No in situ component is identified                - No microcalcifications are identified               3/16/2016 - 1/24/2017 Chemotherapy    Faslodex 500 mg with loading dose followed by maintenance monthly injection with Ibrance 75mg 3 weeks on 1 week off       12/13/2016 Progression Progression noted on PET-CT scan  Progression was largely found in the liver  Patient was set up for liver biopsy  1/3/2017 Biopsy    Liver lesion biopsy demonstated Estrogen Receptor (clone SP-1) 100%/positive, Progesterone Receptor (clone 1E2) 1-2%/positive, HER2 by IHC ([id number]) 2+/equivocal   Her2 by FISH was positive  1/25/2017 - 4/19/2017 Chemotherapy     Herceptin and Perjeta x5 cycles     4/20/2017 Adverse Reaction    Perjecta causing significant diarrhea  Medication stopped  7/13/2017 Surgery    Partial Hepatectomy, results demonstrated ER70%, PR0% & Her2 negative disease        9/19/2017 -  Chemotherapy    Tamoxifen 20 mg daily     2/27/2019 - 4/10/2019 Radiation      Plan ID Energy Fractions Dose per Fraction (cGy) Dose Correction (cGy) Total Dose Delivered (cGy) Elapsed Days   R Axilla # 10X/6X 25 / 25 200 0 5,000 35   R Axilla CD 10X/6X 5 / 5 200 0 1,000 6          11/2/2020 -  Hormone Therapy    Aromasin 25 mg daily     11/2/2020 -  Chemotherapy    Afinitor 2 5 mg daily     9/1/2021 - 1/26/2022 Chemotherapy    DOCEtaxel (TAXOTERE) chemo infusion, 40 mg/m2 = 62 8 mg (66 7 % of original dose 60 mg/m2), Intravenous, Once, 8 of 8 cycles  Dose modification: 40 mg/m2 (original dose 60 mg/m2, Cycle 1, Reason: Dose modified as per discussion with consulting physician)  Administration: 62 8 mg (9/1/2021), 62 8 mg (9/22/2021), 62 8 mg (10/13/2021), 62 8 mg (11/3/2021), 62 8 mg (11/24/2021), 62 8 mg (1/5/2022), 62 8 mg (12/15/2021), 62 8 mg (1/26/2022)         Past Medical History:   Past Medical History:   Diagnosis Date    Acute biliary pancreatitis     Anxiety     Arthritis     Breast CA ([facility] [address] )     recurrent, ductal carcinoma ER, MS pos    Cancer (HCC)     Right breast     Cardiac disease     Colon polyp     Depression     Diverticula of intestine     Diverticulosis     Dizziness     and passes out    Flushing     Hiatal hernia     [place]    Hypercholesteremia     Hypertension     Liver mass     Liver metastasis ([facility] [address] )     Metastatic adenocarcinoma to liver ([facility] [address] )     Bx 01/03/2017    PONV (postoperative nausea and vomiting)     Pulmonary embolism (HCC)     Recurrent breast cancer (HCC)     Shingles     Shortness of breath     on exertion    Tachycardia     Urinary incontinence     Use of cane as ambulatory aid     or walker       Past Surgical History:   Procedure Laterality Date    BREAST SURGERY      CARDIAC PACEMAKER REMOVAL N/A 11/9/2017    Procedure: PACEMAKER LEAD REVISION, REMOVAL OF NONFUNCTIONING LEAD, AND INSERTION OF A NEW RV LEAD;  Surgeon: Harika Shane MD;  [facility];  Service: Cardiology    CATARACT EXTRACTION Bilateral     COLONOSCOPY      ERCP N/A 1/8/2018    Procedure: ENDOSCOPIC RETROGRADE CHOLANGIOPANCREATOGRAPHY (ERCP); Surgeon: Baljinder Sanders MD;  Location: [facility]; Service: Gastroenterology    HYSTERECTOMY      [address] / [place] [employer] PACEMAKER      IR THORACENTESIS  5/5/2022    JOINT REPLACEMENT      lizette  TKR and right TSR    MASTECTOMY Right     FL ERCP DX COLLECTION SPECIMEN BRUSHING/WASHING N/A 2/22/2018    Procedure: ENDOSCOPIC RETROGRADE CHOLANGIOPANCREATOGRAPHY (ERCP) with stent removal;  Surgeon: Baljinder Sanders MD;  [facility]: [facility]; Service: Gastroenterology    [place] RESEC [id number] N/A 7/13/2017    Procedure: LIVER RESECTION, INTRAOPERATIVE ULTRASOUND;  Surgeon: Ronald Moore MD;  [facility];  Service: Surgical Oncology    ROTATOR CUFF REPAIR Right     SENTINEL LYMPH NODE BIOPSY Right     TONSILECTOMY AND ADNOIDECTOMY      WOUND DEBRIDEMENT Left 11/9/2017    Procedure: DEBRIDEMENT WOUND AND DRESSING CHANGE [facility]);   Surgeon: Harika Shane MD;  [facility];  Service: Cardiology       Family History   Problem Relation Age of Onset    Lung cancer Father     Cancer Brother     Diabetes type II Son        Social History     Socioeconomic History  Marital status: /Civil Union     Spouse name: None    Number of children: None    Years of education: None    Highest education level: None   Occupational History    None   Tobacco Use    Smoking status: Never Smoker    Smokeless tobacco: Never Used   Vaping Use    Vaping Use: Never used   Substance and Sexual Activity    Alcohol use: Not Currently    Drug use: No    Sexual activity: None   Other Topics Concern    None   Social History Narrative    None     Social Determinants of Health     Financial Resource Strain: Not on file   Food Insecurity: Not on file   Transportation Needs: Not on file   Physical Activity: Not on file   Stress: Not on file   Social Connections: Not on file   Intimate Partner Violence: Not on file   Housing Stability: Not on file         Current Facility-Administered Medications:     acetaminophen (TYLENOL) tablet 650 mg, 650 mg, Oral, Q4H PRN, Monse Pitts PA-C, 650 mg at 05/05/22 2324    cefepime (MAXIPIME) 1 g/50 mL dextrose IVPB, 1,000 mg, Intravenous, Q12H, Radha Johnson MD, Last Rate: 100 mL/hr at 05/05/22 2236, 1,000 mg at 05/05/22 2236    heparin (porcine) subcutaneous injection 5,000 Units, 5,000 Units, Subcutaneous, Q8H [address], Radha Johnson MD, 5,000 Units at 05/06/22 0644    HYDROmorphone (DILAUDID) injection 0 5 mg, 0 5 mg, Intravenous, Q6H PRN, Radha Johnson MD    HYDROmorphone (DILAUDID) injection 1 mg, 1 mg, Intravenous, Q4H PRN, Radha Johnson MD, 1 mg at 05/05/22 2324    HYDROmorphone HCl (DILAUDID) injection 0 2 mg, 0 2 mg, Intravenous, Q4H PRN, Radha Johnson MD    insulin lispro (HumaLOG) 100 units/mL subcutaneous injection 1-5 Units, 1-5 Units, Subcutaneous, Q6H [address], 3 Units at 05/06/22 0644 **AND** Fingerstick Glucose (POCT), , , Q6H, Bharath Monae MD    metoprolol (LOPRESSOR) injection 2 5 mg, 2 5 mg, Intravenous, Q6H, Radha Johnson MD, 2 5 mg at 05/06/22 0258    metroNIDAZOLE (FLAGYL) IVPB (premix) 500 mg 100 mL, 500 mg, Intravenous, Q8H, Alek Cohn MD, Last Rate: 200 mL/hr at 05/06/22 0258, 500 mg at 05/06/22 0258    ondansetron (ZOFRAN) injection 4 mg, 4 mg, Intravenous, Q6H PRN, Alek Cohn MD    pantoprazole (PROTONIX) injection 40 mg, 40 mg, Intravenous, Q12H [facility], Alek Cohn MD, 40 mg at 05/05/22 1506    sodium chloride 0 9 % infusion, 75 mL/hr, Intravenous, Continuous, Rich eKlley DO, Last Rate: 75 mL/hr at 05/06/22 0439, 75 mL/hr at 05/06/22 0439    Medications Prior to Admission   Medication    acetaminophen (TYLENOL) 500 mg tablet    acetaminophen (TYLENOL) 500 mg tablet    Alpelisib, 300 MG Daily Dose, (Piqray, 300 MG Daily Dose,) 2 x 150 MG TBPK    atorvastatin (LIPITOR) 20 mg tablet    Calcium Citrate-Vitamin D (CALCIUM CITRATE + D PO)    clotrimazole-betamethasone (LOTRISONE) 1-0 05 % cream    Cranberry (THERACRAN HP PO)    glipiZIDE (GLUCOTROL) 5 mg tablet    Glucosamine-Chondroit-Vit C-Mn (GLUCOSAMINE 1500 COMPLEX) CAPS    metFORMIN (GLUCOPHAGE) 500 mg tablet    metoprolol tartrate (LOPRESSOR) 50 mg tablet    Multiple Vitamin (MULTIVITAMIN) capsule    ondansetron (ZOFRAN) 8 mg tablet    oxyCODONE (ROXICODONE) 5 immediate release tablet    clindamycin (CLEOCIN) 300 MG capsule    torsemide (DEMADEX) 20 mg tablet       Allergies   Allergen Reactions    Ampicillin Shortness Of Breath, Anaphylaxis and Other (See Comments)     Other reaction(s): Unknown Allergic Reaction  Reaction Date: [id number];          Physical Exam:     BP 98/54   Pulse 96   Temp (!) 97 3 °F (36 3 °C)   Resp 18   Ht 5' 1" (1 549 m)   Wt 67 3 kg (148 lb 5 9 oz)   LMP  (LMP Unknown) Comment: post   SpO2 95%   BMI 28 03 kg/m²     Physical Exam  HENT:      Head: Normocephalic and atraumatic  Mouth/Throat:      Mouth: Mucous membranes are moist       Pharynx: Oropharynx is clear  Eyes:      Extraocular Movements: Extraocular movements intact        Conjunctiva/sclera: Conjunctivae normal    Cardiovascular: Rate and Rhythm: Normal rate and regular rhythm  Pulses: Normal pulses  Heart sounds: Normal heart sounds  Pulmonary:      Effort: Pulmonary effort is normal       Breath sounds: Normal breath sounds  Abdominal:      General: Bowel sounds are normal       Palpations: Abdomen is soft  Tenderness: There is abdominal tenderness  Comments: Diffuse pain across the abdomen   Musculoskeletal:         General: Swelling present  Right lower leg: Edema present  Left lower leg: Edema present  Comments: RUE swelling   Neurological:      General: No focal deficit present  Mental Status: She is alert and oriented to person, place, and time     Psychiatric:      Comments: Upset/agitated         Recent Results (from the past 48 hour(s))   CBC and differential    Collection Time: 05/05/22  8:33 AM   Result Value Ref Range    WBC 2 56 (L) 4 31 - 10 16 Thousand/uL    RBC 5 32 (H) 3 81 - 5 12 Million/uL    Hemoglobin 16 3 (H) 11 5 - 15 4 g/dL    Hematocrit 50 3 (H) 34 8 - 46 1 %    MCV 95 82 - 98 fL    MCH 30 6 26 8 - 34 3 pg    MCHC 32 4 31 4 - 37 4 g/dL    RDW 15 0 11 6 - 15 1 %    MPV 10 9 8 9 - 12 7 fL    Platelets 109 (L) 971 - 390 Thousands/uL    nRBC 0 /100 WBCs    Neutrophils Relative 54 43 - 75 %    Immat GRANS % 0 0 - 2 %    Lymphocytes Relative 37 14 - 44 %    Monocytes Relative 6 4 - 12 %    Eosinophils Relative 1 0 - 6 %    Basophils Relative 2 (H) 0 - 1 %    Neutrophils Absolute 1 39 (L) 1 85 - 7 62 Thousands/µL    Immature Grans Absolute 0 01 0 00 - 0 20 Thousand/uL    Lymphocytes Absolute 0 94 0 60 - 4 47 Thousands/µL    Monocytes Absolute 0 16 (L) 0 17 - 1 22 Thousand/µL    Eosinophils Absolute 0 02 0 00 - 0 61 Thousand/µL    Basophils Absolute 0 04 0 00 - 0 10 Thousands/µL   Basic metabolic panel    Collection Time: 05/05/22  8:33 AM   Result Value Ref Range    Sodium 137 136 - 145 mmol/L    Potassium 4 1 3 5 - 5 3 mmol/L    Chloride 97 (L) 100 - 108 mmol/L    CO2 25 21 - 32 mmol/L    ANION GAP 15 (H) 4 - 13 mmol/L    BUN 12 5 - 25 mg/dL    Creatinine 1 04 0 60 - 1 30 mg/dL    Glucose 356 (H) 65 - 140 mg/dL    Calcium 9 7 8 3 - 10 1 mg/dL    eGFR 49 ml/min/1 73sq m   Lipase    Collection Time: 05/05/22  8:33 AM   Result Value Ref Range    Lipase 41 (L) 73 - 393 u/L   Hepatic function panel    Collection Time: 05/05/22  8:33 AM   Result Value Ref Range    Total Bilirubin 1 56 (H) 0 20 - 1 00 mg/dL    Bilirubin, Direct 0 77 (H) 0 00 - 0 20 mg/dL    Alkaline Phosphatase 362 (H) 46 - 116 U/L    AST 64 (H) 5 - 45 U/L    ALT 43 12 - 78 U/L    Total Protein 7 4 6 4 - 8 2 g/dL    Albumin 3 1 (L) 3 5 - 5 0 g/dL   HS Troponin 0hr (reflex protocol)    Collection Time: 05/05/22  8:33 AM   Result Value Ref Range    hs TnI 0hr 37 "Refer to ACS Flowchart"- see link ng/L   Blood gas, venous    Collection Time: 05/05/22  8:33 AM   Result Value Ref Range    pH, Sandip 7 378 7 300 - 7 400    pCO2, Sandip 41 0 (L) 42 0 - 50 0 mm Hg    pO2, Sandip 41 5 35 0 - 45 0 mm Hg    HCO3, Sandip 23 6 (L) 24 - 30 mmol/L    Base Excess, Sandip -1 5 mmol/L    O2 Content, Sandip 16 9 ml/dL    O2 HGB, VENOUS 72 6 60 0 - 80 0 %   ECG 12 lead    Collection Time: 05/05/22  8:45 AM   Result Value Ref Range    Ventricular Rate 106 BPM    Atrial Rate 106 BPM    DC Interval 156 ms    QRSD Interval 92 ms    QT Interval 312 ms    QTC Interval 414 ms    P Axis 66 degrees    QRS Axis 35 degrees    T Wave Axis 260 degrees   Fingerstick Glucose (POCT)    Collection Time: 05/05/22  8:52 AM   Result Value Ref Range    POC Glucose 358 (H) 65 - 140 mg/dl   HS Troponin I 2hr    Collection Time: 05/05/22 10:42 AM   Result Value Ref Range    hs TnI 2hr 45 "Refer to ACS Flowchart"- see link ng/L    Delta 2hr hsTnI 8 <20 ng/L   Lactic acid, plasma    Collection Time: 05/05/22 10:42 AM   Result Value Ref Range    LACTIC ACID 3 2 (HH) 0 5 - 2 0 mmol/L   Blood culture #1    Collection Time: 05/05/22 10:42 AM    Specimen: Hand, Left; Blood   Result Value Ref Range    Blood Culture Received in Microbiology Lab  Culture in Progress  Blood culture #2    Collection Time: 05/05/22 10:42 AM    Specimen: Hand, Left; Blood   Result Value Ref Range    Blood Culture Received in Microbiology Lab  Culture in Progress  ECG 12 lead    Collection Time: 05/05/22 10:44 AM   Result Value Ref Range    Ventricular Rate 122 BPM    Atrial Rate 129 BPM    NC Interval 136 ms    QRSD Interval 86 ms    QT Interval 284 ms    QTC Interval 404 ms    P [place] 35 degrees    QRS Axis 25 degrees    T Wave Axis 241 degrees   ECG 12 lead    Collection Time: 05/05/22 12:36 PM   Result Value Ref Range    Ventricular Rate 127 BPM    Atrial Rate 127 BPM    NC Interval 132 ms    QRSD Interval 88 ms    QT Interval 308 ms    QTC Interval 447 ms    P [place] 29 degrees    QRS Axis 22 degrees    T Wave [place] 245 degrees   HS Troponin I 4hr    Collection Time: 05/05/22 12:40 PM   Result Value Ref Range    hs TnI 4hr 52 (H) "Refer to ACS Flowchart"- see link ng/L    Delta 4hr hsTnI 15 <20 ng/L   Lactic acid 2 Hours    Collection Time: 05/05/22 12:40 PM   Result Value Ref Range    LACTIC ACID 3 1 (HH) 0 5 - 2 0 mmol/L   Body fluid white cell count with differential    Collection Time: 05/05/22  1:29 PM   Result Value Ref Range    Site Pleural, Right     Color, Fluid Yellow Clear, Colorless,Yellow    Clarity, Fluid Hazy (A) Clear    WBC, Fluid 162 /ul   Body fluid culture (Pleural Fluid Culture) and Gram stain    Collection Time: 05/05/22  1:29 PM    Specimen: Pleural, Right;  Body Fluid   Result Value Ref Range    Gram Stain Result Rare Polys     Gram Stain Result No organisms seen    Glucose, body fluid    Collection Time: 05/05/22  1:29 PM   Result Value Ref Range    Glucose, Fluid 352 mg/dL   LD (LDH), Body Fluid    Collection Time: 05/05/22  1:29 PM   Result Value Ref Range    LD, Fluid 353 U/L   pH, body fluid    Collection Time: 05/05/22  1:29 PM   Result Value Ref Range    PH BODY FLUID 7 7    Total Protein, body fluid    Collection Time: 05/05/22  1:29 PM   Result Value Ref Range    Protein, Fluid 3 6 g/dL   Body Fluid Diff    Collection Time: 05/05/22  1:29 PM   Result Value Ref Range    Total Counted 100     Neutrophils % (Fluid) 2 %    Lymphs % (Fluid) 29 %    Mesothelial % (Fluid) 2 %    Histiocyte % (Fluid) 58 %    Monocytes % (Fluid) 9 %   Fingerstick Glucose (POCT)    Collection Time: 05/05/22  3:29 PM   Result Value Ref Range    POC Glucose 362 (H) 65 - 140 mg/dl   Fingerstick Glucose (POCT)    Collection Time: 05/05/22  6:39 PM   Result Value Ref Range    POC Glucose 326 (H) 65 - 140 mg/dl   Fingerstick Glucose (POCT)    Collection Time: 05/05/22 11:58 PM   Result Value Ref Range    POC Glucose 296 (H) 65 - 140 mg/dl   UA w Reflex to Microscopic w Reflex to Culture    Collection Time: 05/06/22  3:36 AM    Specimen: Urine, Clean Catch   Result Value Ref Range    Color, UA Serenity     Clarity, UA Clear     Specific Gravity, UA 1 020 1 003 - 1 030    pH, UA 5 5 4 5, 5 0, 5 5, 6 0, 6 5, 7 0, 7 5, 8 0    Leukocytes, UA Negative Negative    Nitrite, UA Positive (A) Negative    Protein,  (2+) (A) Negative mg/dl    Glucose,  (1/10%) (A) Negative mg/dl    Ketones, UA Trace (A) Negative mg/dl    Urobilinogen, UA 0 2 0 2, 1 0 E U /dl E U /dl    Bilirubin, UA Interference- unable to analyze (A) Negative    Blood, UA Negative Negative   Urine Microscopic    Collection Time: 05/06/22  3:36 AM   Result Value Ref Range    RBC, UA 2-4 None Seen, 2-4 /hpf    WBC, UA 4-10 (A) None Seen, 2-4, 5-60 /hpf    Epithelial Cells Occasional None Seen, Occasional /hpf    Bacteria, UA Occasional None Seen, Occasional /hpf    MUCUS THREADS Occasional (A) None Seen   Comprehensive metabolic panel    Collection Time: 05/06/22  4:47 AM   Result Value Ref Range    Sodium 137 136 - 145 mmol/L    Potassium 3 9 3 5 - 5 3 mmol/L    Chloride 103 100 - 108 mmol/L    CO2 23 21 - 32 mmol/L    ANION GAP 11 4 - 13 mmol/L    BUN 17 5 - 25 mg/dL Creatinine 1 20 0 60 - 1 30 mg/dL    Glucose 287 (H) 65 - 140 mg/dL    Calcium 8 5 8 3 - 10 1 mg/dL    Corrected Calcium 9 9 8 3 - 10 1 mg/dL    AST 47 (H) 5 - 45 U/L    ALT 30 12 - 78 U/L    Alkaline Phosphatase 235 (H) 46 - 116 U/L    Total Protein 5 9 (L) 6 4 - 8 2 g/dL    Albumin 2 2 (L) 3 5 - 5 0 g/dL    Total Bilirubin 1 19 (H) 0 20 - 1 00 mg/dL    eGFR 41 ml/min/1 73sq m   CBC and differential    Collection Time: 05/06/22  4:47 AM   Result Value Ref Range    WBC 10 67 (H) 4 31 - 10 16 Thousand/uL    RBC 4 45 3 81 - 5 12 Million/uL    Hemoglobin 13 0 11 5 - 15 4 g/dL    Hematocrit 42 5 34 8 - 46 1 %    MCV 96 82 - 98 fL    MCH 29 2 26 8 - 34 3 pg    MCHC 30 6 (L) 31 4 - 37 4 g/dL    RDW 15 6 (H) 11 6 - 15 1 %    MPV 11 5 8 9 - 12 7 fL    Platelets 877 (L) 792 - 390 Thousands/uL   Manual Differential(PHLEBS Do Not Order)    Collection Time: 05/06/22  4:47 AM   Result Value Ref Range    Segmented % 70 43 - 75 %    Bands % 10 (H) 0 - 8 %    Lymphocytes % 10 (L) 14 - 44 %    Monocytes % 9 4 - 12 %    Eosinophils, % 0 0 - 6 %    Basophils % 0 0 - 1 %    Myelocytes % 1 0 - 1 %    Absolute Neutrophils 8 54 (H) 1 85 - 7 62 Thousand/uL    Lymphocytes Absolute 1 07 0 60 - 4 47 Thousand/uL    Monocytes Absolute 0 96 0 00 - 1 22 Thousand/uL    Eosinophils Absolute 0 00 0 00 - 0 40 Thousand/uL    Basophils Absolute 0 00 0 00 - 0 10 Thousand/uL    Total Counted      Anisocytosis Present     Platelet Estimate Borderline (A) Adequate    Large Platelet Present    Fingerstick Glucose (POCT)    Collection Time: 05/06/22  6:28 AM   Result Value Ref Range    POC Glucose 274 (H) 65 - 140 mg/dl       XR chest 2 views    Result Date: 4/22/2022  Narrative: CHEST INDICATION:   R pleural effusion  COMPARISON:  CT 1/4/2022  CXR 6/6/2008 EXAM PERFORMED/VIEWS:  XR CHEST PA & LATERAL  The frontal view was performed utilizing dual energy radiographic technique  FINDINGS: Moderate size right pleural effusion   Cardiomediastinal silhouette appears unremarkable  Chronic right hemidiaphragm elevation  Left pacer/ICD, right shoulder arthroplasty, left shoulder osteoarthritis  Right base granuloma  Right rib old trauma  The lungs are otherwise clear  No pneumothorax  Impression: Moderate right pleural effusion  Workstation performed: [id number]     CT head without contrast    Result Date: 5/5/2022  Narrative: CT BRAIN - WITHOUT CONTRAST INDICATION:   fall, N/V  COMPARISON:  4/23/2021 TECHNIQUE:  CT examination of the brain was performed  In addition to axial images, sagittal and coronal 2D reformatted images were created and submitted for interpretation  Radiation dose length product (DLP) for this visit:  869 mGy-cm   This examination, like all CT scans performed in the [facility], was performed utilizing techniques to minimize radiation dose exposure, including the use of iterative reconstruction and automated exposure control  IMAGE QUALITY:  Diagnostic  FINDINGS: PARENCHYMA: Decreased attenuation is noted in periventricular and subcortical white matter demonstrating an appearance that is statistically most likely to represent moderate microangiopathic change  No CT signs of acute infarction  No intracranial mass, mass effect or midline shift  No acute parenchymal hemorrhage  Atherosclerotic calcifications of the cavernous segment of the internal carotid artery are mild  VENTRICLES AND EXTRA-AXIAL SPACES:  Normal for the patient's age  VISUALIZED ORBITS AND PARANASAL SINUSES:  Bilateral lens implants noted CALVARIUM AND EXTRACRANIAL SOFT TISSUES:  Normal      Impression: No acute intracranial abnormality  Microangiopathic changes  Workstation performed: [id number]     CT spine cervical without contrast    Result Date: 5/5/2022  Narrative: CT CERVICAL SPINE - WITHOUT CONTRAST INDICATION:   Neck trauma (Age >= 65y) fall   COMPARISON:  4/23/2021 TECHNIQUE:  CT examination of the cervical spine was performed without intravenous contrast   Contiguous axial images were obtained  Sagittal and coronal reconstructions were performed  Radiation dose length product (DLP) for this visit:  535 mGy-cm   This examination, like all CT scans performed in the [facility], was performed utilizing techniques to minimize radiation dose exposure, including the use of iterative reconstruction and automated exposure control  IMAGE QUALITY:  Diagnostic  FINDINGS: ALIGNMENT:  There is nonspecific straightening of the cervical lordosis without subluxation  VERTEBRAL BODIES:  No fracture  DEGENERATIVE CHANGES:  Severe multilevel cervical degenerative changes are noted  No critical central canal stenosis  PREVERTEBRAL AND PARASPINAL SOFT TISSUES:  More advanced atherosclerotic vascular calcification than would be expected for the patient's age is noted at the carotid bifurcations bilaterally  THORACIC INLET:  Please refer to the concurrent chest, abdomen, and pelvic CT report for description of the thoracic inlet findings  Moderate right pleural effusion has developed  Strandy nodular opacities in the upper lung field   view demonstrates pacemaker on the left  Right shoulder arthroplasty noted as well  Impression: 1  There is nonspecific straightening of the cervical lordosis without subluxation  2   Advanced spondylosis  3   No acute fracture  4   Moderate right pleural effusion is incompletely imaged     See separate CT of the chest abdomen pelvis  Workstation performed: [id number]      IN-Patient Thoracentesis    Result Date: 5/5/2022  Narrative: Ultrasound-guided thoracentesis Clinical History: Metastatic breast cancer  Presenting with fatigue   Technique: The patient was brought to the interventional radiology area and placed in the sitting position on the side of a stretcher  The right chest was then examined with ultrasound and the skin was marked   The skin was then prepped, and draped in usual sterile fashion  Lidocaine was administered to the skin and a small skin incision was made  Under direct ultrasound guidance, a 5 [employer] Susy Yueh multisidehole catheter was advanced into the pleural space  600cc  clear yellow pleural fluid was aspirated  The patient tolerated the procedure well and suffered no complications  Impression: Impression: 1  Right ultrasound-guided thoracentesis yielding 600cc clear yellow pleural fluid  Signed, performed and dictated by Cheryl Rich PA-C under the supervision of Dr Jose R Rondon  Workstation performed: [id number]     CT chest abdomen pelvis w contrast    Result Date: 5/5/2022  Narrative: CT CHEST, ABDOMEN AND PELVIS WITH IV CONTRAST INDICATION:   Fall with thoracolumbar pain  Metastatic breast cancer  Abdominal pain  COMPARISON:  Chest CT January 4, 2022; CT abdomen pelvis April 22, 2021 TECHNIQUE: CT examination of the chest, abdomen and pelvis was performed  Axial, sagittal, and coronal 2D reformatted images were created from the source data and submitted for interpretation  Radiation dose length product (DLP) for this visit:  705 mGy-cm   This examination, like all CT scans performed in the [facility], was performed utilizing techniques to minimize radiation dose exposure, including the use of iterative reconstruction and automated exposure control  IV Contrast:  100 mL of iohexol (OMNIPAQUE) Enteric Contrast: Enteric contrast was administered  FINDINGS: CHEST LUNGS:  Increased right lower lung atelectasis atelectasis  PLEURA:  Moderate-sized pleural effusion has increased since January 4, 2022 HEART/GREAT VESSELS: Left chest wall AICD/pacemaker  Heart is unremarkable for patient's age  No thoracic aortic aneurysm  MEDIASTINUM AND NGOZI:  Unremarkable  CHEST WALL AND LOWER NECK:  Unremarkable  ABDOMEN LIVER/BILIARY TREE:  Post partial right hepatectomy  Diffuse hepatic metastases are similar to prior study    Representative examples include a 4 9 x 4 7 cm lesion posteriorly (series 2, image 50), and a 4 4 x 3 7 cm lesion posterosuperiorly (series 2, image 41)  The hepatic veins and intrahepatic branches of the portal vein are compressed due to the metastases (for example series 2, image 43)  GALLBLADDER:  Compressed due to hepatic metastases  No findings to suggest acute cholecystitis    SPLEEN:  0 8 cm hypoattenuating splenic lesion is unchanged  Multiple calcified granulomas  PANCREAS:  Unremarkable  ADRENAL GLANDS:  Unremarkable  KIDNEYS/URETERS:  Unremarkable  No hydronephrosis  STOMACH AND BOWEL:  Wall thickening of the ascending colon and proximal transverse colon  Colonic diverticulosis  No bowel obstruction  APPENDIX:  No findings to suggest appendicitis  ABDOMINOPELVIC CAVITY:  Small volume ascites and mesenteric infiltration is increased  Small amount of pneumoperitoneum anteriorly is new since prior study (series 2, images 60-67)  VESSELS:  Atherosclerotic changes are present  No evidence of aneurysm  PELVIS REPRODUCTIVE ORGANS:  Surgical changes of prior hysterectomy  URINARY BLADDER:  Unremarkable  ABDOMINAL WALL/INGUINAL REGIONS:  Unremarkable  OSSEOUS STRUCTURES:  Sclerosis within the posterior right 9th rib is unchanged from prior study (series 2, image 52)  Unchanged sclerosis of the lateral right 4th rib reflects sequelae of prior injury (series 2, image 23) Multilevel degenerative change of the spine with grade 1 anterolisthesis of L3 on L4  Moderate right and mild left hip osteoporosis  Right shoulder arthroplasty  Severe left shoulder osteoarthrosis  Impression: 1  New small amount of pneumoperitoneum  2   New wall thickening of the right colon may be infectious or inflammatory in etiology  3   Moderate-sized pleural effusion has increased since January 4, 2022 4  Diffuse hepatic metastases  5   Increase small-volume ascites    I personally discussed this study with DEWAYNE GOEL on 5/5/2022 at 10:14 AM  Workstation performed: [id number]     CT abdomen pelvis with contrast    Addendum Date: 4/22/2022 Addendum:   ADDENDUM: Correction- gallbladder is present, distorted and compressed by the metastatic disease  Stable gallstone present  No findings to suggest acute cholecystitis  Result Date: 4/22/2022  Narrative: CT ABDOMEN AND PELVIS WITH IV CONTRAST INDICATION:   Abdominal pain, acute, nonlocalized RUQ pain, nausea, history of liver mets and gallstones  Metastatic breast cancer  COMPARISON:  January 4, 2022 TECHNIQUE:  CT examination of the abdomen and pelvis was performed  Axial, sagittal, and coronal 2D reformatted images were created from the source data and submitted for interpretation  Radiation dose length product (DLP) for this visit:  634 mGy-cm   This examination, like all CT scans performed in the [facility], was performed utilizing techniques to minimize radiation dose exposure, including the use of iterative reconstruction and automated exposure control  IV Contrast:  100 mL of iohexol (OMNIPAQUE) Enteric Contrast:  Enteric contrast was not administered  FINDINGS: ABDOMEN LOWER CHEST:  New mostly dependent right pleural fluid, mild to moderate volume  Atelectatic changes seen in the right lower lobe  Persistent elevated right hemidiaphragm similar to prior  Stable calcified nodule at the right lung base and infrahilar region chronically stable  LIVER/BILIARY TREE:  Prior partial right hepatectomy  Liver is again remarkable for innumerable metastatic lesions throughout the remaining liver parenchyma  Allowing for differences in contrast bolus timing, these continue to show progression in size compared to October and January scans  Specific examples, posterior image 2/23 measuring 4 9 x 4 8 cm prior 3 2 x 3 0 cm  Posterior dome image 2/14, prior 2 3 x 3 1 cm  Linear hypodensity in the left lateral segment probably representing focally dilated intrahepatic bile duct 6 mm in diameter image 2/19  GALLBLADDER:  Gallbladder is surgically absent  SPLEEN:  Numerous calcified granulomata  Single rounded hypoattenuating nodule medially without significant change  PANCREAS:  Unremarkable  ADRENAL GLANDS:  Unremarkable  KIDNEYS/URETERS:  Unremarkable  No hydronephrosis  STOMACH AND BOWEL:  There is colonic diverticulosis without evidence of acute diverticulitis  APPENDIX:  No findings to suggest appendicitis  ABDOMINOPELVIC CAVITY:  Mild ascites, left upper quadrant and midline pelvis  No pneumoperitoneum  No lymphadenopathy  VESSELS:  Unremarkable for patient's age  PELVIS REPRODUCTIVE ORGANS:  Unremarkable for patient's age  URINARY BLADDER:  Unremarkable  ABDOMINAL WALL/INGUINAL REGIONS:  Unremarkable  OSSEOUS STRUCTURES:  No acute fracture or destructive osseous lesion  Degenerative disc disease  Grade 1 anterolisthesis of L3 on L4  Impression: Persistent moderate elevation of the right hemidiaphragm  New mild to moderate right pleural fluid and associated subsegmental atelectatic changes  Prior partial right hepatectomy  Extensive hepatic metastatic disease showing continued interval increase in size of the lesions  Remainder of the findings are stable  The study was marked in EPIC for significant notification  Workstation performed: [id number]       Labs and pertinent reports reviewed  This note has been generated by voice recognition software system  Therefore, there may be spelling, grammar, and or syntax errors  Please contact if questions arise

## 2022-05-06 NOTE — ASSESSMENT & PLAN NOTE
Hold further blood pressure medications in setting of sepsis  Continue to evaluate patient is able to resume

## 2022-05-06 NOTE — ASSESSMENT & PLAN NOTE
Patient has been reporting abdominal pain, nausea ongoing for the past several weeks  CT imaging shows pneumoperitoneum  Surgery suspect possible micro perforation  Denies surgery evaluated, patient too high risk for surgical intervention  Protonix IV b i d    Started on clears, tolerating, will advance to full liquid  If no improvement in the next 48 hours or through the week, will need hospice referal

## 2022-05-06 NOTE — PLAN OF CARE
Problem: Potential for Falls  Goal: Patient will remain free of falls  Description: INTERVENTIONS:  - Educate patient/family on patient safety including physical limitations  - Instruct patient to call for assistance with activity   - Consult OT/PT to assist with strengthening/mobility   - Keep Call bell within reach  - Keep bed low and locked with side rails adjusted as appropriate  - Keep care items and personal belongings within reach  - Initiate and maintain comfort rounds  - Make Fall Risk Sign visible to staff  - Offer Toileting every 2 Hours, in advance of need  - Initiate/Maintain bed alarm  - Apply yellow socks and bracelet for high fall risk patients  - Consider moving patient to room near nurses station  Outcome: Progressing     Problem: MUSCULOSKELETAL - ADULT  Goal: Maintain or return mobility to safest level of function  Description: INTERVENTIONS:  - Assess patient's ability to carry out ADLs; assess patient's baseline for ADL function and identify physical deficits which impact ability to perform ADLs (bathing, care of mouth/teeth, toileting, grooming, dressing, etc )  - Assess/evaluate cause of self-care deficits   - Assess range of motion  - Assess patient's mobility  - Assess patient's need for assistive devices and provide as appropriate  - Encourage maximum independence but intervene and supervise when necessary  - Involve family in performance of ADLs  - Assess for home care needs following discharge   - Consider OT consult to assist with ADL evaluation and planning for discharge  - Provide patient education as appropriate  Outcome: Progressing  Goal: Maintain proper alignment of affected body part  Description: INTERVENTIONS:  - Support, maintain and protect limb and body alignment  - Provide patient/ family with appropriate education  Outcome: Progressing     Problem: PAIN - ADULT  Goal: Verbalizes/displays adequate comfort level or baseline comfort level  Description: Interventions:  - Encourage patient to monitor pain and request assistance  - Assess pain using appropriate pain scale  - Administer analgesics based on type and severity of pain and evaluate response  - Implement non-pharmacological measures as appropriate and evaluate response  - Consider cultural and social influences on pain and pain management  - Notify physician/advanced practitioner if interventions unsuccessful or patient reports new pain  Outcome: Progressing     Problem: INFECTION - ADULT  Goal: Absence or prevention of progression during hospitalization  Description: INTERVENTIONS:  - Assess and monitor for signs and symptoms of infection  - Monitor lab/diagnostic results  - Monitor all insertion sites, i e  indwelling lines, tubes, and drains  - Monitor endotracheal if appropriate and nasal secretions for changes in amount and color  - Bledsoe appropriate cooling/warming therapies per order  - Administer medications as ordered  - Instruct and encourage patient and family to use good hand hygiene technique  - Identify and instruct in appropriate isolation precautions for identified infection/condition  Outcome: Progressing  Goal: Absence of fever/infection during neutropenic period  Description: INTERVENTIONS:  - Monitor WBC    Outcome: Progressing     Problem: SAFETY ADULT  Goal: Patient will remain free of falls  Description: INTERVENTIONS:  - Educate patient/family on patient safety including physical limitations  - Instruct patient to call for assistance with activity   - Consult OT/PT to assist with strengthening/mobility   - Keep Call bell within reach  - Keep bed low and locked with side rails adjusted as appropriate  - Keep care items and personal belongings within reach  - Initiate and maintain comfort rounds  - Make Fall Risk Sign visible to staff  - Offer Toileting every 2 Hours, in advance of need  - Initiate/Maintain bed alarm  - Apply yellow socks and bracelet for high fall risk patients  - Consider moving patient to room near nurses station  Outcome: Progressing  Goal: Maintain or return to baseline ADL function  Description: INTERVENTIONS:  -  Assess patient's ability to carry out ADLs; assess patient's baseline for ADL function and identify physical deficits which impact ability to perform ADLs (bathing, care of mouth/teeth, toileting, grooming, dressing, etc )  - Assess/evaluate cause of self-care deficits   - Assess range of motion  - Assess patient's mobility; develop plan if impaired  - Assess patient's need for assistive devices and provide as appropriate  - Encourage maximum independence but intervene and supervise when necessary  - Involve family in performance of ADLs  - Assess for home care needs following discharge   - Consider OT consult to assist with ADL evaluation and planning for discharge  - Provide patient education as appropriate  Outcome: Progressing  Goal: Maintains/Returns to pre admission functional level  Description: INTERVENTIONS:  - Perform BMAT or MOVE assessment daily    - Set and communicate daily mobility goal to care team and patient/family/caregiver     - Collaborate with rehabilitation services on mobility goals if consulted  - Out of bed for meals 3 times a day  - Out of bed for toileting  - Record patient progress and toleration of activity level   Outcome: Progressing     Problem: DISCHARGE PLANNING  Goal: Discharge to home or other facility with appropriate resources  Description: INTERVENTIONS:  - Identify barriers to discharge w/patient and caregiver  - Arrange for needed discharge resources and transportation as appropriate  - Identify discharge learning needs (meds, wound care, etc )  - Arrange for interpretive services to assist at discharge as needed  - Refer to Case Management Department for coordinating discharge planning if the patient needs post-hospital services based on physician/advanced practitioner order or complex needs related to functional status, cognitive ability, or social support system  Outcome: Progressing     Problem: Knowledge Deficit  Goal: Patient/family/caregiver demonstrates understanding of disease process, treatment plan, medications, and discharge instructions  Description: Complete learning assessment and assess knowledge base  Interventions:  - Provide teaching at level of understanding  - Provide teaching via preferred learning methods  Outcome: Progressing     Problem: MOBILITY - ADULT  Goal: Maintain or return to baseline ADL function  Description: INTERVENTIONS:  -  Assess patient's ability to carry out ADLs; assess patient's baseline for ADL function and identify physical deficits which impact ability to perform ADLs (bathing, care of mouth/teeth, toileting, grooming, dressing, etc )  - Assess/evaluate cause of self-care deficits   - Assess range of motion  - Assess patient's mobility; develop plan if impaired  - Assess patient's need for assistive devices and provide as appropriate  - Encourage maximum independence but intervene and supervise when necessary  - Involve family in performance of ADLs  - Assess for home care needs following discharge   - Consider OT consult to assist with ADL evaluation and planning for discharge  - Provide patient education as appropriate  Outcome: Progressing  Goal: Maintains/Returns to pre admission functional level  Description: INTERVENTIONS:  - Perform BMAT or MOVE assessment daily    - Set and communicate daily mobility goal to care team and patient/family/caregiver     - Collaborate with rehabilitation services on mobility goals if consulted  - Out of bed for meals 3 times a day  - Out of bed for toileting  - Record patient progress and toleration of activity level   Outcome: Progressing     Problem: Nutrition/Hydration-ADULT  Goal: Nutrient/Hydration intake appropriate for improving, restoring or maintaining nutritional needs  Description: Monitor and assess patient's nutrition/hydration status for malnutrition  Collaborate with interdisciplinary team and initiate plan and interventions as ordered  Monitor patient's weight and dietary intake as ordered or per policy  Utilize nutrition screening tool and intervene as necessary  Determine patient's food preferences and provide high-protein, high-caloric foods as appropriate       INTERVENTIONS:  - Monitor oral intake, urinary output, labs, and treatment plans  - Assess nutrition and hydration status and recommend course of action  - Evaluate amount of meals eaten  - Assist patient with eating if necessary   - Allow adequate time for meals  - Recommend/ encourage appropriate diets, oral nutritional supplements, and vitamin/mineral supplements  - Order, calculate, and assess calorie counts as needed  - Recommend, monitor, and adjust tube feedings and TPN/PPN based on assessed needs  - Assess need for intravenous fluids  - Provide specific nutrition/hydration education as appropriate  - Include patient/family/caregiver in decisions related to nutrition  Outcome: Progressing     Problem: Prexisting or High Potential for Compromised Skin Integrity  Goal: Skin integrity is maintained or improved  Description: INTERVENTIONS:  - Identify patients at risk for skin breakdown  - Assess and monitor skin integrity  - Assess and monitor nutrition and hydration status  - Monitor labs   - Assess for incontinence   - Turn and reposition patient  - Assist with mobility/ambulation  - Relieve pressure over bony prominences  - Avoid friction and shearing  - Provide appropriate hygiene as needed including keeping skin clean and dry  - Evaluate need for skin moisturizer/barrier cream  - Collaborate with interdisciplinary team   - Patient/family teaching  - Consider wound care consult   Outcome: Progressing

## 2022-05-06 NOTE — PROGRESS NOTES
Progress Note - General Surgery   Lyn Tapia 78 y o  female MRN: [id number]  Unit/Bed#: E5 -01 Encounter: [id number]    Assessment:  78-yr old F (PMHx: LBBB s/p pacemaker, hearing loss, GERD, HLD, T2DM, HTN, PE) w/ metastatic breast cancer (hepatic) on immunotherapy; presenting w/ abdominal pain & generalized fatigue  Plan:  No acute surgical intervention  Would trial sips of clears and advance to clears today    Continue care per primary with Abx    Subjective/Objective   Chief Complaint:     Subjective: No acute events overnight  Resting in bed comfortably  Pain improved    Objective:     Blood pressure 111/54, pulse 103, temperature 98 8 °F (37 1 °C), resp  rate 16, height 5' 1" (1 549 m), weight 67 3 kg (148 lb 5 9 oz), SpO2 95 %  ,Body mass index is 28 03 kg/m²  Intake/Output Summary (Last 24 hours) at 5/6/2022 0657  Last data filed at 5/6/2022 0439  Gross per 24 hour   Intake 1635 ml   Output 600 ml   Net 1035 ml       Invasive Devices  Report    Peripheral Intravenous Line            Peripheral IV 05/05/22 Left Antecubital <1 day                Physical Exam: General: AAOx3  Head: normocephalic, atraumatic  Neck: supple, trachea midline   Respiratory: BS b/l  Abdomen: Soft, minimally tender in RLQ, rest of abdomen benign  Heart: RRR, S1s2  Ext: Warm no cyanosis         Lab, Imaging and other studies:  I have personally reviewed pertinent lab results    , CBC:   Lab Results   Component Value Date    WBC 2 56 (L) 05/05/2022    HGB 16 3 (H) 05/05/2022    HCT 50 3 (H) 05/05/2022    MCV 95 05/05/2022     (L) 05/05/2022    MCH 30 6 05/05/2022    MCHC 32 4 05/05/2022    RDW 15 0 05/05/2022    MPV 10 9 05/05/2022    NRBC 0 05/05/2022   , CMP:   Lab Results   Component Value Date    SODIUM 137 05/06/2022    K 3 9 05/06/2022     05/06/2022    CO2 23 05/06/2022    BUN 17 05/06/2022    CREATININE 1 20 05/06/2022    CALCIUM 8 5 05/06/2022    AST 47 (H) 05/06/2022    ALT 30 05/06/2022 ALKPHOS 235 (H) 05/06/2022    EGFR 41 05/06/2022     VTE Pharmacologic Prophylaxis: Heparin  VTE Mechanical Prophylaxis: sequential compression device

## 2022-05-06 NOTE — TELEPHONE ENCOUNTER
Appointment Cancellation Or Reschedule     Person calling in Spouse    Provider Dr Sal Keith Date and Time  5/9/22 3:00 pm   Office Visit Location [facility]   Did patient want to reschedule their office appointment? If so, when was it scheduled to? no   Is this patient calling to reschedule an infusion appointment? no   When is their next infusion appointment? 5/31/22   Is this patient a Chemo patient? yes   Reason for Cancellation or Reschedule Patient currently hospitalized, Inpatient at [address] [place] [address]       If the patient is a treatment patient, please route this to the office nurse  If the patient is not on treatment, please route to the office MA

## 2022-05-06 NOTE — ASSESSMENT & PLAN NOTE
Lab Results   Component Value Date    HGBA1C 7 8 (H) 01/29/2022       Recent Labs     05/05/22  1839 05/05/22  2358 05/06/22  0628 05/06/22  1122   POCGLU 326* 296* 274* 257*     Continue sliding scale and basal bolus protocol

## 2022-05-06 NOTE — ASSESSMENT & PLAN NOTE
History of metastatic breast cancer to liver, intial diagnosis in 2016  Follows Oncology outpatient, Dr Stacy Barlow  Currently on chemotherapy, piqray recently started 2 weeks prior, has failed prior therapies  Stage IV with metastatic disease to the liver  Overall poor performance status  Ongoing goals of care discussion, patient is hospice appropriate given burden of disease

## 2022-05-06 NOTE — PROGRESS NOTES
[facility]  Progress Note - Joelle Galeano 1940, 78 y o  female MRN: [id number]  Unit/Bed#: E5 -01 Encounter: [id number]  Primary Care Provider: Nelda Bronson MD   Date and time admitted to hospital: 5/5/2022  8:06 AM    * Sepsis [facility])  Assessment & Plan  Sepsis criteria on admission, tachycardia, neutropenia, with likely abdominal source given pneumoperitoneum  Continue IV fluids, lactic acid of 3 2 on admission  Cefepime, Flagyl, patient does have allergy to penicillins  Blood cultures currently pending  Continue for now, high risk of bowel sepsis      Pleural effusion  Assessment & Plan  New right-sided pleural effusion noted on CT imaging  Patient currently on 1-2 L with oxygen saturations around 94-95% on admission  Patient does not appear to be tachypneic  Suspect likely secondary to malignancy given history of breast cancer with mets to the liver  Reviewed prior CT imaging  Will consult IR for thoracentesis, cytology studies ordered    Pneumoperitoneum  Assessment & Plan  Patient has been reporting abdominal pain, nausea ongoing for the past several weeks  CT imaging shows pneumoperitoneum  Surgery suspect possible micro perforation  Denies surgery evaluated, patient too high risk for surgical intervention  Protonix IV b i d    Started on clears, tolerating, will advance to full liquid  If no improvement in the next 48 hours or through the week, will need hospice referal    Type 2 diabetes mellitus with hyperglycemia, without long-term current use of insulin [facility])  Assessment & Plan  Lab Results   Component Value Date    HGBA1C 7 8 (H) 01/29/2022       Recent Labs     05/05/22  1839 05/05/22  2358 05/06/22  0628 05/06/22  1122   POCGLU 326* 296* 274* 257*     Continue sliding scale and basal bolus protocol    SSS (sick sinus syndrome) (HCC)  Assessment & Plan  Status post pacemaker  Continue IV Lopressor 2 5 q 6 hours    Hypertension  Assessment & Plan  Hold further blood pressure medications in setting of sepsis  Continue to evaluate patient is able to resume    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 ([facility] )    Hypertension    SSS (sick sinus syndrome) ([facility] )    Type 2 diabetes mellitus with hyperglycemia, without long-term current use of insulin (HCC)    Pneumoperitoneum    Pleural effusion      Plan:    · Ongoing goals of care discussed  · Overall prognosis is poor  · Advanced diet       VTE Pharmacologic Prophylaxis:   Pharmacologic: Heparin  Mechanical VTE Prophylaxis in Place: Yes    Patient Centered Rounds: I have performed bedside rounds with nursing staff today  Discussions with Specialists or Other Care Team Provider:  Discussed with case management/oncology    Education and Discussions with Family / Patient: Son    Time Spent for Care: 45 minutes  More than 50% of total time spent on counseling and coordination of care as described above  Current Length of Stay: 1 day(s)    Current Patient Status: Inpatient   Certification Statement: The patient will continue to require additional inpatient hospital stay due to Bowel perforation    Discharge Plan / Estimated Discharge Date:   To be determined    Code Status: Level 1 - Full Code      Subjective:   Seen and examined, tolerating clear liquid diet    A complete and comprehensive 14 point organ system review has been performed and all other systems are negative other than stated above  Objective:     Vitals:   Temp (24hrs), Av 8 °F (37 1 °C), Min:97 3 °F (36 3 °C), Max:100 3 °F (37 9 °C)    Temp:  [97 3 °F (36 3 °C)-100 3 °F (37 9 °C)] 97 3 °F (36 3 °C)  HR:  [] 111  Resp:  [16-18] 18  BP: ()/(54-70) 127/70  SpO2:  [91 %-97 %] 91 %  Body mass index is 28 03 kg/m²  Input and Output Summary (last 24 hours):        Intake/Output Summary (Last 24 hours) at 2022 1616  Last data filed at 2022 0439  Gross per 24 hour   Intake 985 ml   Output --   Net 985 ml       Physical Exam:     General:  Appears chronically ill, cachectic  HEENT: atraumatic, PERRLA, moist mucosa, normal pharynx, normal tonsils and adenoids, normal tongue, no fluid in sinuses  Neck: Trachea midline, no carotid bruit, no masses  Respiratory: normal chest wall expansion, CTA B, no r/r/w, no rubs  Cardiovascular: RRR, no m/r/g, Normal S1 and S2  Abdomen: Soft, non-tender, non-distended, normal bowel sounds in all quadrants, no hepatosplenomegaly, no tympany  Rectal: deferred  Musculoskeletal: normal ROM in upper and lower extremities  Integumentary: warm, dry, and pink, with no rash, purpura, or petechia  Heme/Lymph: no lymphadenopathy, no bruises  Neurological: Cranial Nerves II-XII grossly intact, no tics, normal sensation to pressure and light touch  Psychiatric: cooperative with normal mood, affect, and cognition      Additional Data:     Labs:    Results from last 7 days   Lab Units 22  0447 22  0833 22  0833   WBC Thousand/uL 10 67*   < > 2 56*   HEMOGLOBIN g/dL 13 0   < > 16 3*   HEMATOCRIT % 42 5   < > 50 3*   PLATELETS Thousands/uL 133*   < > 145*   NEUTROS PCT %  --   --  54   LYMPHS PCT %  --   --  37   LYMPHO PCT % 10*  --   --    MONOS PCT %  --   --  6   MONO PCT % 9 --   --    EOS PCT % 0  --  1    < > = values in this interval not displayed  Results from last 7 days   Lab Units 05/06/22  0447   POTASSIUM mmol/L 3 9   CHLORIDE mmol/L 103   CO2 mmol/L 23   BUN mg/dL 17   CREATININE mg/dL 1 20   CALCIUM mg/dL 8 5   ALK PHOS U/L 235*   ALT U/L 30   AST U/L 47*           * I Have Reviewed All Lab Data Listed Above  * Additional Pertinent Lab Tests Reviewed: [facility] Admission Reviewed    Imaging:    Imaging Reports Reviewed Today Include:  No new imaging  Imaging Personally Reviewed by Myself Includes: no new imaging    Recent Cultures (last 7 days):     Results from last 7 days   Lab Units 05/05/22  1329 05/05/22  1042   BLOOD CULTURE   --  Received in Microbiology Lab  Culture in Progress  Received in Microbiology Lab  Culture in Progress     GRAM STAIN RESULT  Rare Polys  No organisms seen  --    BODY FLUID CULTURE, STERILE  No growth  --        Last 24 Hours Medication List:   Current Facility-Administered Medications   Medication Dose Route Frequency Provider Last Rate    acetaminophen  650 mg Oral Q4H PRN [facility]      cefepime  1,000 mg Intravenous Q12H Charlene Ruiz MD 1,000 mg (05/06/22 1008)    heparin (porcine)  5,000 Units Subcutaneous Q8H [address] Bharath Cooley MD      HYDROmorphone  0 5 mg Intravenous Q6H PRN Charlene Ruiz MD      HYDROmorphone  1 mg Intravenous Q4H PRN Charlene Ruiz MD      HYDROmorphone  0 2 mg Intravenous Q4H PRN Charlene Ruiz MD      insulin lispro  1-5 Units Subcutaneous Q6H [address] Bharath Cooley MD      metoprolol  2 5 mg Intravenous Q6H Charlene Ruiz MD      metroNIDAZOLE  500 mg Intravenous Q8H Charlene Ruiz  mg (05/06/22 1124)    ondansetron  4 mg Intravenous Q6H PRN Charlene Ruiz MD      pantoprazole  40 mg Intravenous Q12H Juju Gentile MD      sodium chloride  75 mL/hr Intravenous Continuous Reyes Mutter, DO 75 mL/hr (05/06/22 9025)        Today, Patient Was Seen By: Buddy Serra,     ** Please Note: This note was completed in part utilizing [employer] Direct Software  Grammatical errors, random word insertions, spelling mistakes, and incomplete sentences may be an occasional consequence of this system secondary to software limitations, ambient noise, and hardware issues  If you have any questions or concerns about the content, text, or information contained within the body of this dictation, please contact the provider for clarification   **

## 2022-05-07 NOTE — PROGRESS NOTES
Progress Note - Laurita Homans 78 y o  female MRN: [id number]    Unit/Bed#: E5 -01 Encounter: [id number]      Subjective: The patient is somewhat uncomfortable  At the moment, this is more related to neck pain and abdominal pain  She does state that she has some abdominal pain no it is not severe  She denies nausea or vomiting  She has had no chest pain or shortness of breath  Physical Exam:   Temp:  [96 8 °F (36 °C)-98 1 °F (36 7 °C)] 96 8 °F (36 °C)  HR:  [111-128] 124  BP: (127-154)/(68-92) 154/92    Gen:  Well-developed, frail, appearing generally uncomfortable  Neck:  Supple  There is some muscular tenderness over the posterior neck muscles and trapezius bilaterally  I appreciated no lymphadenopathy or goiter  Heart:  Regular tachycardia  I heard no murmur, gallop, or rub  Lungs:  Clear to auscultation and percussion  No wheezing, rales, or rhonchi  Abd:  Soft but diffusely tender  No mass, tenderness, or organomegaly  Extremities:  No clubbing, cyanosis, or edema  No calf tenderness  Neuro:  Alert conversant  Distracted by her neck pain  No focal sign  Skin:  Warm and dry  LABS:   CBC:   Lab Results   Component Value Date    WBC 10 59 (H) 05/07/2022    HGB 13 3 05/07/2022    HCT 41 0 05/07/2022    MCV 92 05/07/2022     (L) 05/07/2022    MCH 29 8 05/07/2022    MCHC 32 4 05/07/2022    RDW 15 4 (H) 05/07/2022    MPV 10 7 05/07/2022   , CMP:   Lab Results   Component Value Date    SODIUM 137 05/07/2022    K 3 6 05/07/2022     05/07/2022    CO2 24 05/07/2022    BUN 17 05/07/2022    CREATININE 0 90 05/07/2022    CALCIUM 8 4 05/07/2022    EGFR 58 05/07/2022             Assessment/Plan:  1  Pneumoperitoneum  2  Metastatic breast cancer  3  Tachycardia, history of sick sinus syndrome with pacemaker  4  Type 2 diabetes  5  Hypertension   6  Sepsis, present on admission  7   Right pleural effusion    The patient remains on cefepime and metronidazole for the treatment of sepsis, with a presumed GI source  She has been evaluated by General surgery and is deemed to be unsuitable for surgery  EKG will be checked to evaluate her tachycardia  She remains hemodynamically stable  She was not receptive to further discussion of goals of care this morning        VTE Pharmacologic Prophylaxis: Heparin  VTE Mechanical Prophylaxis: sequential compression device

## 2022-05-07 NOTE — PLAN OF CARE
Problem: Potential for Falls  Goal: Patient will remain free of falls  Description: INTERVENTIONS:  - Educate patient/family on patient safety including physical limitations  - Instruct patient to call for assistance with activity   - Consult OT/PT to assist with strengthening/mobility   - Keep Call bell within reach  - Keep bed low and locked with side rails adjusted as appropriate  - Keep care items and personal belongings within reach  - Initiate and maintain comfort rounds  - Make Fall Risk Sign visible to staff  - Offer Toileting every 2 Hours, in advance of need  - Initiate/Maintain bed alarm  - Apply yellow socks and bracelet for high fall risk patients  - Consider moving patient to room near nurses station  Outcome: Progressing     Problem: MUSCULOSKELETAL - ADULT  Goal: Maintain or return mobility to safest level of function  Description: INTERVENTIONS:  - Assess patient's ability to carry out ADLs; assess patient's baseline for ADL function and identify physical deficits which impact ability to perform ADLs (bathing, care of mouth/teeth, toileting, grooming, dressing, etc )  - Assess/evaluate cause of self-care deficits   - Assess range of motion  - Assess patient's mobility  - Assess patient's need for assistive devices and provide as appropriate  - Encourage maximum independence but intervene and supervise when necessary  - Involve family in performance of ADLs  - Assess for home care needs following discharge   - Consider OT consult to assist with ADL evaluation and planning for discharge  - Provide patient education as appropriate  Outcome: Progressing  Goal: Maintain proper alignment of affected body part  Description: INTERVENTIONS:  - Support, maintain and protect limb and body alignment  - Provide patient/ family with appropriate education  Outcome: Progressing     Problem: PAIN - ADULT  Goal: Verbalizes/displays adequate comfort level or baseline comfort level  Description: Interventions:  - Encourage patient to monitor pain and request assistance  - Assess pain using appropriate pain scale  - Administer analgesics based on type and severity of pain and evaluate response  - Implement non-pharmacological measures as appropriate and evaluate response  - Consider cultural and social influences on pain and pain management  - Notify physician/advanced practitioner if interventions unsuccessful or patient reports new pain  Outcome: Progressing     Problem: INFECTION - ADULT  Goal: Absence or prevention of progression during hospitalization  Description: INTERVENTIONS:  - Assess and monitor for signs and symptoms of infection  - Monitor lab/diagnostic results  - Monitor all insertion sites, i e  indwelling lines, tubes, and drains  - Monitor endotracheal if appropriate and nasal secretions for changes in amount and color  - [place] appropriate cooling/warming therapies per order  - Administer medications as ordered  - Instruct and encourage patient and family to use good hand hygiene technique  - Identify and instruct in appropriate isolation precautions for identified infection/condition  Outcome: Progressing  Goal: Absence of fever/infection during neutropenic period  Description: INTERVENTIONS:  - Monitor WBC    Outcome: Progressing

## 2022-05-07 NOTE — SPEECH THERAPY NOTE
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 ([facility] )    Hypertension    SSS (sick sinus syndrome) ([facility] )    Type 2 diabetes mellitus with hyperglycemia, without long-term current use of insulin (HCC)    Pneumoperitoneum    Pleural effusion    Past Medical History  Past Medical History:   Diagnosis Date    Acute biliary pancreatitis     Anxiety     Arthritis     Breast CA (HCC)     recurrent, ductal carcinoma ER, RI pos    Cancer (HCC)     Right breast     Cardiac disease     Colon polyp     Depression     Diverticula of intestine     Diverticulosis     Dizziness     and passes out    Flushing     Hiatal hernia     [place] (hard of hearing)      lizette    Hypercholesteremia     Hypertension     Liver mass     Liver metastasis ([facility] )     Metastatic adenocarcinoma to liver ([facility] )     Bx 01/03/2017    PONV (postoperative nausea and vomiting)     Pulmonary embolism (HCC)     Recurrent breast cancer (HCC)     Shingles     Shortness of breath     on exertion    Tachycardia     Urinary incontinence     Use of cane as ambulatory aid     or walker     Past Surgical History  Past Surgical History:   Procedure Laterality Date    BREAST SURGERY      CARDIAC PACEMAKER REMOVAL N/A 11/9/2017    Procedure: PACEMAKER LEAD REVISION, REMOVAL OF NONFUNCTIONING LEAD, AND INSERTION OF A NEW RV LEAD;  Surgeon: Gino Guy MD;  [facility];  Service: Cardiology    CATARACT EXTRACTION Bilateral     COLONOSCOPY      ERCP N/A 1/8/2018    Procedure: ENDOSCOPIC RETROGRADE CHOLANGIOPANCREATOGRAPHY (ERCP); Surgeon: Noemy Car MD;  Location: [facility];   Service: Gastroenterology    HYSTERECTOMY      INSERT / Venetta Filippo / REMOVE PACEMAKER      IR THORACENTESIS  5/5/2022    JOINT REPLACEMENT      lizette  TKR and right TSR    MASTECTOMY Right     NH ERCP DX COLLECTION SPECIMEN BRUSHING/WASHING N/A 2/22/2018    Procedure: ENDOSCOPIC RETROGRADE CHOLANGIOPANCREATOGRAPHY (ERCP) with stent removal;  Surgeon: Racheal Jordan MD;  [facility]: [facility]; Service: Gastroenterology    NH RESEC [address] N/A 7/13/2017    Procedure: LIVER RESECTION, INTRAOPERATIVE ULTRASOUND;  Surgeon: Gurdeep Whittaker MD;  [facility];  Service: Surgical Oncology    ROTATOR CUFF REPAIR Right     SENTINEL LYMPH NODE BIOPSY Right     TONSILECTOMY AND ADNOIDECTOMY      WOUND DEBRIDEMENT Left 11/9/2017    Procedure: DEBRIDEMENT WOUND AND DRESSING CHANGE [facility]); Surgeon: Hamlet Segura MD;  [facility]: [facility];  Service: Cardiology       Summary  Pt presented with s/s suggestive of mild orpharyngeal dysphagia with the limited items offered today  Pt is on a liquid diet for bowel rest and was therefore limited to thin and slightly thicker (soup) liquid  Symptoms or concerns included suspected decreased control of liquid and  mild pharyngeal swallow delay  No s/s aspiration occurred with liquids  Pt reported she does sometimes have trouble swallowing but struggled to describe the difficulty  RN had slightr concern with meds and plans to try crushed in apple sauce for next med pass  ST will f/u briefly, may need to assess solids when cleared to come off of the liquid diet  Risk/s for Aspiration: suspect low    Recommended Diet: thin liquids for now   Recommended Form of Meds: crushed with puree   Aspiration precautions and swallowing strategies: upright posture, only feed when fully alert and small bites/sips  Other Recommendations: Continue frequent oral care      Current Medical Status per H&P 5/5/22  Rosalva Hassan is a 78 y o  female who presents with abdominal pain  Past medical history metastatic breast cancer on chemotherapy, type 2 diabetes, SSS status post pacemaker, hypertension and hyperlipidemia    Patient presented today with worsening abdominal pain, nausea  She reportedly was trying get out of bed today when she was weak and slid to the ground  Her  assisted her and brought her to the ED  She has been having abdominal pain ongoing for the past several weeks, poor p o  Intake with persistent nausea  CT imaging done in the ED showed new pneumoperitoneum, new moderate pleural effusions, diffuse hepatic metastasis and is ascites  There was also some thickening of the right colon that could be infectious or inflammatory in terms of etiology  Patient denies any fevers or chills  Discussed code status with family, currently undecided but at this time remains a full code  Special Studies:  CT chest 5/5/22 IMPRESSION:  1   New small amount of pneumoperitoneum  2   New wall thickening of the right colon may be infectious or inflammatory in etiology  3   Moderate-sized pleural effusion has increased since January 4, 2022  4  Diffuse hepatic metastases  5   Increase small-volume ascites  CT head 5/5/22 IMPRESSION:  No acute intracranial abnormality  Microangiopathic changes  Social/Education/Vocational Hx:  Pt lives with family    Swallow Information   Current Risks for Dysphagia & Aspiration: AMS  Current Symptoms/Concerns: pt report of dysphagia  Current Diet: thin liquids FULL LIQUID  Baseline Diet: regular diet and thin liquids      Baseline Assessment   Behavior/Cognition: alert, suspect some confusion  Speech/Language Status: able to participate in basic conversation and able to follow commands inconsistently  Patient Positioning: upright in bed  Pain Status/Interventions/Response to Interventions: No report of or nonverbal indications of pain         Swallow Mechanism Exam  Facial: symmetrical  Labial: WFL  Lingual: decreased coordination  Velum: symmetrical  Mandible: adequate ROM  Dentition: adequate  Vocal quality:clear/adequate   Volitional Cough: weak   Respiratory Status: on RA       Consistencies Assessed and Performance   Consistencies Administered: liquids only per bowel rest diet    Oral Stage: mild and suspected decreased control of liquids, likely premature spillage over the base of the tongue prior to swallow initiation     Pharyngeal Stage: mild and suspected pharyngeal swallow delay  No coughing, throat clearing, change in vocal quality or respiratory status noted today  Esophageal Concerns: hx of GERD      Summary and Recommendations (see above)    Results Reviewed with: patient and RN     Treatment Recommended: brief f/u pending ability to advance diet     Frequency of treatment: 1-2x f/u    Dysphagia LTG  -Patient will demonstrate safe and effective oral intake (without overt s/s significant oral/pharyngeal dysphagia including s/s penetration or aspiration) for the highest appropriate diet level  Short Term Goals:  -Pt will tolerate the least restrictive diet texture and thin liquids with no significant s/s oral or pharyngeal dysphagia across 1-3 diagnostic sessions

## 2022-05-07 NOTE — PLAN OF CARE
Problem: Potential for Falls  Goal: Patient will remain free of falls  Description: INTERVENTIONS:  - Educate patient/family on patient safety including physical limitations  - Instruct patient to call for assistance with activity   - Consult OT/PT to assist with strengthening/mobility   - Keep Call bell within reach  - Keep bed low and locked with side rails adjusted as appropriate  - Keep care items and personal belongings within reach  - Initiate and maintain comfort rounds  - Make Fall Risk Sign visible to staff  - Offer Toileting every 2 Hours, in advance of need  - Initiate/Maintain bed alarm  - Apply yellow socks and bracelet for high fall risk patients  - Consider moving patient to room near nurses station  Outcome: Progressing     Problem: MUSCULOSKELETAL - ADULT  Goal: Maintain or return mobility to safest level of function  Description: INTERVENTIONS:  - Assess patient's ability to carry out ADLs; assess patient's baseline for ADL function and identify physical deficits which impact ability to perform ADLs (bathing, care of mouth/teeth, toileting, grooming, dressing, etc )  - Assess/evaluate cause of self-care deficits   - Assess range of motion  - Assess patient's mobility  - Assess patient's need for assistive devices and provide as appropriate  - Encourage maximum independence but intervene and supervise when necessary  - Involve family in performance of ADLs  - Assess for home care needs following discharge   - Consider OT consult to assist with ADL evaluation and planning for discharge  - Provide patient education as appropriate  Outcome: Progressing  Goal: Maintain proper alignment of affected body part  Description: INTERVENTIONS:  - Support, maintain and protect limb and body alignment  - Provide patient/ family with appropriate education  Outcome: Progressing     Problem: PAIN - ADULT  Goal: Verbalizes/displays adequate comfort level or baseline comfort level  Description: Interventions:  - Encourage patient to monitor pain and request assistance  - Assess pain using appropriate pain scale  - Administer analgesics based on type and severity of pain and evaluate response  - Implement non-pharmacological measures as appropriate and evaluate response  - Consider cultural and social influences on pain and pain management  - Notify physician/advanced practitioner if interventions unsuccessful or patient reports new pain  Outcome: Progressing     Problem: INFECTION - ADULT  Goal: Absence or prevention of progression during hospitalization  Description: INTERVENTIONS:  - Assess and monitor for signs and symptoms of infection  - Monitor lab/diagnostic results  - Monitor all insertion sites, i e  indwelling lines, tubes, and drains  - Monitor endotracheal if appropriate and nasal secretions for changes in amount and color  - [place] appropriate cooling/warming therapies per order  - Administer medications as ordered  - Instruct and encourage patient and family to use good hand hygiene technique  - Identify and instruct in appropriate isolation precautions for identified infection/condition  Outcome: Progressing  Goal: Absence of fever/infection during neutropenic period  Description: INTERVENTIONS:  - Monitor WBC    Outcome: Progressing     Problem: SAFETY ADULT  Goal: Patient will remain free of falls  Description: INTERVENTIONS:  - Educate patient/family on patient safety including physical limitations  - Instruct patient to call for assistance with activity   - Consult OT/PT to assist with strengthening/mobility   - Keep Call bell within reach  - Keep bed low and locked with side rails adjusted as appropriate  - Keep care items and personal belongings within reach  - Initiate and maintain comfort rounds  - Make Fall Risk Sign visible to staff  - Offer Toileting every 2 Hours, in advance of need  - Initiate/Maintain bed alarm  - Apply yellow socks and bracelet for high fall risk patients  - Consider moving patient to room near nurses station  Outcome: Progressing  Goal: Maintain or return to baseline ADL function  Description: INTERVENTIONS:  -  Assess patient's ability to carry out ADLs; assess patient's baseline for ADL function and identify physical deficits which impact ability to perform ADLs (bathing, care of mouth/teeth, toileting, grooming, dressing, etc )  - Assess/evaluate cause of self-care deficits   - Assess range of motion  - Assess patient's mobility; develop plan if impaired  - Assess patient's need for assistive devices and provide as appropriate  - Encourage maximum independence but intervene and supervise when necessary  - Involve family in performance of ADLs  - Assess for home care needs following discharge   - Consider OT consult to assist with ADL evaluation and planning for discharge  - Provide patient education as appropriate  Outcome: Progressing  Goal: Maintains/Returns to pre admission functional level  Description: INTERVENTIONS:  - Perform BMAT or MOVE assessment daily    - Set and communicate daily mobility goal to care team and patient/family/caregiver     - Collaborate with rehabilitation services on mobility goals if consulted  - Out of bed for meals 3 times a day  - Out of bed for toileting  - Record patient progress and toleration of activity level   Outcome: Progressing     Problem: DISCHARGE PLANNING  Goal: Discharge to home or other facility with appropriate resources  Description: INTERVENTIONS:  - Identify barriers to discharge w/patient and caregiver  - Arrange for needed discharge resources and transportation as appropriate  - Identify discharge learning needs (meds, wound care, etc )  - Arrange for interpretive services to assist at discharge as needed  - Refer to Case Management Department for coordinating discharge planning if the patient needs post-hospital services based on physician/advanced practitioner order or complex needs related to functional status, cognitive ability, or social support system  Outcome: Progressing     Problem: Knowledge Deficit  Goal: Patient/family/caregiver demonstrates understanding of disease process, treatment plan, medications, and discharge instructions  Description: Complete learning assessment and assess knowledge base  Interventions:  - Provide teaching at level of understanding  - Provide teaching via preferred learning methods  Outcome: Progressing     Problem: MOBILITY - ADULT  Goal: Maintain or return to baseline ADL function  Description: INTERVENTIONS:  -  Assess patient's ability to carry out ADLs; assess patient's baseline for ADL function and identify physical deficits which impact ability to perform ADLs (bathing, care of mouth/teeth, toileting, grooming, dressing, etc )  - Assess/evaluate cause of self-care deficits   - Assess range of motion  - Assess patient's mobility; develop plan if impaired  - Assess patient's need for assistive devices and provide as appropriate  - Encourage maximum independence but intervene and supervise when necessary  - Involve family in performance of ADLs  - Assess for home care needs following discharge   - Consider OT consult to assist with ADL evaluation and planning for discharge  - Provide patient education as appropriate  Outcome: Progressing  Goal: Maintains/Returns to pre admission functional level  Description: INTERVENTIONS:  - Perform BMAT or MOVE assessment daily    - Set and communicate daily mobility goal to care team and patient/family/caregiver     - Collaborate with rehabilitation services on mobility goals if consulted  - Out of bed for meals 3 times a day  - Out of bed for toileting  - Record patient progress and toleration of activity level   Outcome: Progressing     Problem: Nutrition/Hydration-ADULT  Goal: Nutrient/Hydration intake appropriate for improving, restoring or maintaining nutritional needs  Description: Monitor and assess patient's nutrition/hydration status for malnutrition  Collaborate with interdisciplinary team and initiate plan and interventions as ordered  Monitor patient's weight and dietary intake as ordered or per policy  Utilize nutrition screening tool and intervene as necessary  Determine patient's food preferences and provide high-protein, high-caloric foods as appropriate       INTERVENTIONS:  - Monitor oral intake, urinary output, labs, and treatment plans  - Assess nutrition and hydration status and recommend course of action  - Evaluate amount of meals eaten  - Assist patient with eating if necessary   - Allow adequate time for meals  - Recommend/ encourage appropriate diets, oral nutritional supplements, and vitamin/mineral supplements  - Order, calculate, and assess calorie counts as needed  - Recommend, monitor, and adjust tube feedings and TPN/PPN based on assessed needs  - Assess need for intravenous fluids  - Provide specific nutrition/hydration education as appropriate  - Include patient/family/caregiver in decisions related to nutrition  Outcome: Progressing     Problem: Prexisting or High Potential for Compromised Skin Integrity  Goal: Skin integrity is maintained or improved  Description: INTERVENTIONS:  - Identify patients at risk for skin breakdown  - Assess and monitor skin integrity  - Assess and monitor nutrition and hydration status  - Monitor labs   - Assess for incontinence   - Turn and reposition patient  - Assist with mobility/ambulation  - Relieve pressure over bony prominences  - Avoid friction and shearing  - Provide appropriate hygiene as needed including keeping skin clean and dry  - Evaluate need for skin moisturizer/barrier cream  - Collaborate with interdisciplinary team   - Patient/family teaching  - Consider wound care consult   Outcome: Progressing

## 2022-05-07 NOTE — PROGRESS NOTES
Progress Note    Rosalva Hassan 78 y o  female MRN: [id number]  Unit/Bed#: E5 -01 Encounter: [id number]    Assessment:  78-yr old F (PMHx: LBBB s/p pacemaker, hearing loss, GERD, HLD, T2DM, HTN, PE) w/ metastatic breast cancer (hepatic) on immunotherapy; presenting w/ abdominal pain & generalized fatigue  CT finding of small amount of pneumoperitoneum  Tachy to the low 100s/110s  Other VS stable in room air; afebrile  White count: 10 6 (10 6)  PLAN:  - very slowly advance diet as tolerated  - continue antibiotics  - IVF crystalloid hydration   - PT/OT/OOB  Subjective:   - no new complaints this morning   - abdominal pain much improved  Objective:     Vitals: Temp:  [96 8 °F (36 °C)-98 1 °F (36 7 °C)] 96 8 °F (36 °C)  HR:  [111-128] 124  BP: (127-154)/(68-92) 154/92  Body mass index is 27 16 kg/m²  I/O       05/05 0701  05/06 0700 05/06 0701  05/07 0700 05/07 0701  05/08 0700    I V  (mL/kg) 985 (14 6) 1000 (15 3)     IV Piggyback 650      Total Intake(mL/kg) 1635 (24 3) 1000 (15 3)     Other 600      Total Output 600      Net +1035 +1000            Unmeasured Urine Occurrence 1 x 1 x           Physical Exam:  GEN: ill looking/frail  HEENT: atraumatic  CV: RRR  Lung: Normal effort  Ab: Soft, ND, mild R sided tenderness  No peritoneal signs  Extrem: No CCE  Neuro: A+Ox3    Lab, Imaging and other studies:   I have personally reviewed pertinent reports    , CBC with diff:   Lab Results   Component Value Date    WBC 10 59 (H) 05/07/2022    HGB 13 3 05/07/2022    HCT 41 0 05/07/2022    MCV 92 05/07/2022     (L) 05/07/2022    MCH 29 8 05/07/2022    MCHC 32 4 05/07/2022    RDW 15 4 (H) 05/07/2022    MPV 10 7 05/07/2022   , BMP/CMP:   Lab Results   Component Value Date    SODIUM 137 05/07/2022    K 3 6 05/07/2022     05/07/2022    CO2 24 05/07/2022    BUN 17 05/07/2022    CREATININE 0 90 05/07/2022    CALCIUM 8 4 05/07/2022    EGFR 58 05/07/2022   , Magnesium: No components found for: MAG  VTE Pharmacologic Prophylaxis: Heparin  VTE Mechanical Prophylaxis: sequential compression device

## 2022-05-08 NOTE — PROGRESS NOTES
Progress Note - General Surgery   Nina Fowler 78 y o  female MRN: [id number]  Unit/Bed#: E5 -01 Encounter: [id number]    Assessment:  78 y o  F with multiple medical comorbidities including metastatic breast cancer on immunotherapy, presents with abdominal pain and generalized fatigue with CT findings consistent with colitis and small amount of pneumoperitoneum    Afebrile  VSS    Plan:  Patient with persistent abdominal pain and tenderness  Recommend backing her diet down to just clear liquids  Continue IV abx  Palliative consulted  Will re-address hospice discussion tomorrow  Remainder of care per primary team    Subjective/Objective     Subjective: Patient complains of persistent abdominal pain  Her biggest complaint this morning is mouth dryness and difficulty speaking secondary to this  Objective:     Blood pressure 140/82, pulse 97, temperature (!) 96 9 °F (36 1 °C), resp  rate 18, height 5' 1" (1 549 m), weight 65 8 kg (145 lb 1 oz), SpO2 94 %  ,Body mass index is 27 41 kg/m²  Intake/Output Summary (Last 24 hours) at 5/8/2022 8690  Last data filed at 5/7/2022 1150  Gross per 24 hour   Intake 960 ml   Output --   Net 960 ml       Invasive Devices  Report    Peripheral Intravenous Line            Peripheral IV 05/05/22 Left Antecubital 3 days                Physical Exam:   NAD, alert and oriented x3  Normocephalic, atraumatic  MMM  Norm resp effort on room air   Regular rate  Abd soft, epigastric and left sided abdominal tenderness  No rebound, rigidity of guarding   Mild upper abdominal distention  No calf tenderness or peripheral edema  -rash/lesions      Lab, Imaging and other studies:  CBC:   Lab Results   Component Value Date    WBC 9 91 05/08/2022    HGB 12 7 05/08/2022    HCT 40 3 05/08/2022    MCV 92 05/08/2022     05/08/2022    MCH 29 0 05/08/2022    MCHC 31 5 05/08/2022    RDW 15 3 (H) 05/08/2022    MPV 10 7 05/08/2022   , CMP:   Lab Results   Component Value Date    SODIUM 140 05/08/2022    K 3 9 05/08/2022     05/08/2022    CO2 24 05/08/2022    BUN 19 05/08/2022    CREATININE 0 88 05/08/2022    CALCIUM 8 2 (L) 05/08/2022    EGFR 60 05/08/2022     VTE Pharmacologic Prophylaxis: Heparin  VTE Mechanical Prophylaxis: sequential compression device

## 2022-05-08 NOTE — PLAN OF CARE
Problem: Potential for Falls  Goal: Patient will remain free of falls  Description: INTERVENTIONS:  - Educate patient/family on patient safety including physical limitations  - Instruct patient to call for assistance with activity   - Consult OT/PT to assist with strengthening/mobility   - Keep Call bell within reach  - Keep bed low and locked with side rails adjusted as appropriate  - Keep care items and personal belongings within reach  - Initiate and maintain comfort rounds  - Make Fall Risk Sign visible to staff  - Offer Toileting every 2 Hours, in advance of need  - Initiate/Maintain bed alarm  - Apply yellow socks and bracelet for high fall risk patients  - Consider moving patient to room near nurses station  Outcome: Progressing     Problem: MUSCULOSKELETAL - ADULT  Goal: Maintain or return mobility to safest level of function  Description: INTERVENTIONS:  - Assess patient's ability to carry out ADLs; assess patient's baseline for ADL function and identify physical deficits which impact ability to perform ADLs (bathing, care of mouth/teeth, toileting, grooming, dressing, etc )  - Assess/evaluate cause of self-care deficits   - Assess range of motion  - Assess patient's mobility  - Assess patient's need for assistive devices and provide as appropriate  - Encourage maximum independence but intervene and supervise when necessary  - Involve family in performance of ADLs  - Assess for home care needs following discharge   - Consider OT consult to assist with ADL evaluation and planning for discharge  - Provide patient education as appropriate  Outcome: Progressing  Goal: Maintain proper alignment of affected body part  Description: INTERVENTIONS:  - Support, maintain and protect limb and body alignment  - Provide patient/ family with appropriate education  Outcome: Progressing     Problem: PAIN - ADULT  Goal: Verbalizes/displays adequate comfort level or baseline comfort level  Description: Interventions:  - Encourage patient to monitor pain and request assistance  - Assess pain using appropriate pain scale  - Administer analgesics based on type and severity of pain and evaluate response  - Implement non-pharmacological measures as appropriate and evaluate response  - Consider cultural and social influences on pain and pain management  - Notify physician/advanced practitioner if interventions unsuccessful or patient reports new pain  Outcome: Progressing

## 2022-05-08 NOTE — PLAN OF CARE
Problem: Potential for Falls  Goal: Patient will remain free of falls  Description: INTERVENTIONS:  - Educate patient/family on patient safety including physical limitations  - Instruct patient to call for assistance with activity   - Consult OT/PT to assist with strengthening/mobility   - Keep Call bell within reach  - Keep bed low and locked with side rails adjusted as appropriate  - Keep care items and personal belongings within reach  - Initiate and maintain comfort rounds  - Make Fall Risk Sign visible to staff  - Offer Toileting every 2 Hours, in advance of need  - Initiate/Maintain bed alarm  - Apply yellow socks and bracelet for high fall risk patients  - Consider moving patient to room near nurses station  Outcome: Progressing     Problem: MUSCULOSKELETAL - ADULT  Goal: Maintain or return mobility to safest level of function  Description: INTERVENTIONS:  - Assess patient's ability to carry out ADLs; assess patient's baseline for ADL function and identify physical deficits which impact ability to perform ADLs (bathing, care of mouth/teeth, toileting, grooming, dressing, etc )  - Assess/evaluate cause of self-care deficits   - Assess range of motion  - Assess patient's mobility  - Assess patient's need for assistive devices and provide as appropriate  - Encourage maximum independence but intervene and supervise when necessary  - Involve family in performance of ADLs  - Assess for home care needs following discharge   - Consider OT consult to assist with ADL evaluation and planning for discharge  - Provide patient education as appropriate  Outcome: Progressing  Goal: Maintain proper alignment of affected body part  Description: INTERVENTIONS:  - Support, maintain and protect limb and body alignment  - Provide patient/ family with appropriate education  Outcome: Progressing     Problem: PAIN - ADULT  Goal: Verbalizes/displays adequate comfort level or baseline comfort level  Description: Interventions:  - Encourage patient to monitor pain and request assistance  - Assess pain using appropriate pain scale  - Administer analgesics based on type and severity of pain and evaluate response  - Implement non-pharmacological measures as appropriate and evaluate response  - Consider cultural and social influences on pain and pain management  - Notify physician/advanced practitioner if interventions unsuccessful or patient reports new pain  Outcome: Progressing     Problem: INFECTION - ADULT  Goal: Absence or prevention of progression during hospitalization  Description: INTERVENTIONS:  - Assess and monitor for signs and symptoms of infection  - Monitor lab/diagnostic results  - Monitor all insertion sites, i e  indwelling lines, tubes, and drains  - Monitor endotracheal if appropriate and nasal secretions for changes in amount and color  - [place] appropriate cooling/warming therapies per order  - Administer medications as ordered  - Instruct and encourage patient and family to use good hand hygiene technique  - Identify and instruct in appropriate isolation precautions for identified infection/condition  Outcome: Progressing  Goal: Absence of fever/infection during neutropenic period  Description: INTERVENTIONS:  - Monitor WBC    Outcome: Progressing     Problem: SAFETY ADULT  Goal: Patient will remain free of falls  Description: INTERVENTIONS:  - Educate patient/family on patient safety including physical limitations  - Instruct patient to call for assistance with activity   - Consult OT/PT to assist with strengthening/mobility   - Keep Call bell within reach  - Keep bed low and locked with side rails adjusted as appropriate  - Keep care items and personal belongings within reach  - Initiate and maintain comfort rounds  - Make Fall Risk Sign visible to staff  - Offer Toileting every 2 Hours, in advance of need  - Initiate/Maintain bed alarm  - Apply yellow socks and bracelet for high fall risk patients  - Consider moving patient to room near nurses station  Outcome: Progressing  Goal: Maintain or return to baseline ADL function  Description: INTERVENTIONS:  -  Assess patient's ability to carry out ADLs; assess patient's baseline for ADL function and identify physical deficits which impact ability to perform ADLs (bathing, care of mouth/teeth, toileting, grooming, dressing, etc )  - Assess/evaluate cause of self-care deficits   - Assess range of motion  - Assess patient's mobility; develop plan if impaired  - Assess patient's need for assistive devices and provide as appropriate  - Encourage maximum independence but intervene and supervise when necessary  - Involve family in performance of ADLs  - Assess for home care needs following discharge   - Consider OT consult to assist with ADL evaluation and planning for discharge  - Provide patient education as appropriate  Outcome: Progressing  Goal: Maintains/Returns to pre admission functional level  Description: INTERVENTIONS:  - Perform BMAT or MOVE assessment daily    - Set and communicate daily mobility goal to care team and patient/family/caregiver     - Collaborate with rehabilitation services on mobility goals if consulted  - Out of bed for meals 3 times a day  - Out of bed for toileting  - Record patient progress and toleration of activity level   Outcome: Progressing     Problem: DISCHARGE PLANNING  Goal: Discharge to home or other facility with appropriate resources  Description: INTERVENTIONS:  - Identify barriers to discharge w/patient and caregiver  - Arrange for needed discharge resources and transportation as appropriate  - Identify discharge learning needs (meds, wound care, etc )  - Arrange for interpretive services to assist at discharge as needed  - Refer to Case Management Department for coordinating discharge planning if the patient needs post-hospital services based on physician/advanced practitioner order or complex needs related to functional status, cognitive ability, or social support system  Outcome: Progressing     Problem: Knowledge Deficit  Goal: Patient/family/caregiver demonstrates understanding of disease process, treatment plan, medications, and discharge instructions  Description: Complete learning assessment and assess knowledge base  Interventions:  - Provide teaching at level of understanding  - Provide teaching via preferred learning methods  Outcome: Progressing     Problem: MOBILITY - ADULT  Goal: Maintain or return to baseline ADL function  Description: INTERVENTIONS:  -  Assess patient's ability to carry out ADLs; assess patient's baseline for ADL function and identify physical deficits which impact ability to perform ADLs (bathing, care of mouth/teeth, toileting, grooming, dressing, etc )  - Assess/evaluate cause of self-care deficits   - Assess range of motion  - Assess patient's mobility; develop plan if impaired  - Assess patient's need for assistive devices and provide as appropriate  - Encourage maximum independence but intervene and supervise when necessary  - Involve family in performance of ADLs  - Assess for home care needs following discharge   - Consider OT consult to assist with ADL evaluation and planning for discharge  - Provide patient education as appropriate  Outcome: Progressing  Goal: Maintains/Returns to pre admission functional level  Description: INTERVENTIONS:  - Perform BMAT or MOVE assessment daily    - Set and communicate daily mobility goal to care team and patient/family/caregiver     - Collaborate with rehabilitation services on mobility goals if consulted  - Out of bed for meals 3 times a day  - Out of bed for toileting  - Record patient progress and toleration of activity level   Outcome: Progressing     Problem: Nutrition/Hydration-ADULT  Goal: Nutrient/Hydration intake appropriate for improving, restoring or maintaining nutritional needs  Description: Monitor and assess patient's nutrition/hydration status for malnutrition  Collaborate with interdisciplinary team and initiate plan and interventions as ordered  Monitor patient's weight and dietary intake as ordered or per policy  Utilize nutrition screening tool and intervene as necessary  Determine patient's food preferences and provide high-protein, high-caloric foods as appropriate       INTERVENTIONS:  - Monitor oral intake, urinary output, labs, and treatment plans  - Assess nutrition and hydration status and recommend course of action  - Evaluate amount of meals eaten  - Assist patient with eating if necessary   - Allow adequate time for meals  - Recommend/ encourage appropriate diets, oral nutritional supplements, and vitamin/mineral supplements  - Order, calculate, and assess calorie counts as needed  - Recommend, monitor, and adjust tube feedings and TPN/PPN based on assessed needs  - Assess need for intravenous fluids  - Provide specific nutrition/hydration education as appropriate  - Include patient/family/caregiver in decisions related to nutrition  Outcome: Progressing     Problem: Prexisting or High Potential for Compromised Skin Integrity  Goal: Skin integrity is maintained or improved  Description: INTERVENTIONS:  - Identify patients at risk for skin breakdown  - Assess and monitor skin integrity  - Assess and monitor nutrition and hydration status  - Monitor labs   - Assess for incontinence   - Turn and reposition patient  - Assist with mobility/ambulation  - Relieve pressure over bony prominences  - Avoid friction and shearing  - Provide appropriate hygiene as needed including keeping skin clean and dry  - Evaluate need for skin moisturizer/barrier cream  - Collaborate with interdisciplinary team   - Patient/family teaching  - Consider wound care consult   Outcome: Progressing

## 2022-05-08 NOTE — PROGRESS NOTES
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 ([facility] [address] )    Hypertension    SSS (sick sinus syndrome) ([facility] )    Type 2 diabetes mellitus with hyperglycemia, without long-term current use of insulin (HCC)    Pneumoperitoneum    Pleural effusion  Resolved Problems:    * No resolved hospital problems  *      Assessment/Plan:  1  Pneumoperitoneum  2  Metastatic breast cancer-adenocarcinoma of the right breast metastatic to the liver a [facility] cold 3  3  Tachycardia, history of sick sinus syndrome with pacemaker  4  Type 2 diabetes  5  Hypertension   6  Sepsis, present on admission  7  Right pleural effusion     78 y o  F with multiple medical comorbidities including metastatic breast cancer on immunotherapy, presents with abdominal pain and generalized fatigue with CT findings consistent with colitis and small amount of pneumoperitoneum  The patient remains on cefepime and metronidazole for the treatment of sepsis, with a presumed GI source  She has been evaluated by General surgery and is deemed to be unsuitable for surgery  Palliative Care has been consulted-had initiated discussions of hospice with the patient  Family was not agreeable initially and wanted an oncology opinion  Which was obtained  Appreciate palliative medicine's continuing conversation with goals of care and likely will have a discussion tomorrow  This morning the patient was not receptive to a discussion on goals of care  She remains a level 1 code for now     Subjective: The patient states that she was uncomfortable but just received pain medication and she is comfortable currently  Complaints of back, neck pain and abdominal pain  She does state that she has some abdominal pain but is not severe  She denies nausea or vomiting    She has had no chest pain or shortness of breath      Vitals: Blood pressure 140/82, pulse 97, temperature (!) 96 9 °F (36 1 °C), resp  rate 18, height 5' 1" (1 549 m), weight 65 8 kg (145 lb 1 oz), SpO2 94 %  ,Body mass index is 27 41 kg/m²  Physical Exam:   Gen:  No tachypnea, nondyspneic  Tearful at times  Heart: regular rate and rhythm, S1S2 present, no murmur, rub or gallop  Lungs: clear to ausculatation bilaterally  No wheezing, crackless, or rhonchi  No accessory muscle use or respiratory distress  Abd: soft, non-tender, non-distended  NABS, no guarding, rebound or peritoneal signs  Extremities: no clubbing, cyanosis or edema  2+pedal pulses bilaterally  Full range of motion  Neuro: awake, alert and oriented  Cranial nerves 2-12 intact  Strength and sensation grossly intact  Skin: warm and dry: no petechiae, purpura and rash      LABS:   Results from last 7 days   Lab Units 05/08/22  0501 05/07/22  0443 05/06/22  0447   WBC Thousand/uL 9 91 10 59* 10 67*   HEMOGLOBIN g/dL 12 7 13 3 13 0   HEMATOCRIT % 40 3 41 0 42 5   PLATELETS Thousands/uL 149 132* 133*     Results from last 7 days   Lab Units 05/08/22  0501 05/07/22  0443 05/06/22  0447   POTASSIUM mmol/L 3 9 3 6 3 9   CHLORIDE mmol/L 106 104 103   CO2 mmol/L 24 24 23   BUN mg/dL 19 17 17   CREATININE mg/dL 0 88 0 90 1 20   CALCIUM mg/dL 8 2* 8 4 8 5     No intake or output data in the 24 hours ending 05/08/22 1202      Current Facility-Administered Medications   Medication Dose Route Frequency Provider Last Rate    acetaminophen  650 mg Oral Q4H PRN Monse Pitts PA-C      cefepime  1,000 mg Intravenous Q12H Evelyn Perez MD 1,000 mg (05/08/22 1029)    heparin (porcine)  5,000 Units Subcutaneous Q8H [address] Bharath Taylor MD      HYDROmorphone  0 5 mg Intravenous Q6H PRN Evelyn Perez MD      HYDROmorphone  1 mg Intravenous Q4H PRN Evelyn Perez MD      HYDROmorphone  0 2 mg Intravenous Q4H PRN Evelyn Perez MD      insulin lispro  1-5 Units Subcutaneous TID With Meals TITO Zambrano      metoprolol tartrate  50 mg Oral Q12H [address] Cristiano Banuelos MD      metroNIDAZOLE  500 mg Intravenous Q8H Nydia Ren  mg (05/08/22 1105)    nystatin   Topical BID Cherylene Catalina, PA-C      ondansetron  4 mg Intravenous Q6H PRN Nydia Ren MD      pantoprazole  40 mg Intravenous Q12H [address] Bharath Duncan MD      sodium chloride  75 mL/hr Intravenous Continuous Tonya City, DO 75 mL/hr (05/08/22 9077)

## 2022-05-09 PROBLEM — K52.9 COLITIS: Status: ACTIVE | Noted: 2022-01-01

## 2022-05-09 NOTE — QUICK NOTE
Checked on patient  Patient's , daughter, and other son are at the bedside  Patient's  notes that she appears to be comfortable and resting after her most recent dose of morphine  Physical exam  Heart:  Tachycardic, S1-S2 present  2/6 systolic ejection murmur  Lungs: No wheezes, crackles, rhonchi  Abdomen:  Firm, distended  Hypoactive bowel sounds  Neurologic:  Somnolent    Assessment/plan  1-continue comfort measures, with analgesics and anxiolytics as patient needs  Family is spending time with her at the bedside    2-pacemaker/ICD:  Discussed with patient's  that the ICD portion of her pacemaker will be turned off to avoid delivering any shocks  Patient's  gives permission for this    Discussed with [employer] rep:    D/w Cardiologist, Dr Mir Caba, and requested defibrillator be turned off  He met with the family at the bedside and de-activated the defibrillator portion, and maintained her pacemaker with her home settings, with pt's 's permission

## 2022-05-09 NOTE — ASSESSMENT & PLAN NOTE
-Patient presented with sepsis, present on admission, with tachycardia, leukopenia, and lactic acidosis- likely abdominal source with colitis and microperforation with pneumoperitoneum  -patient started on antibiotics with cefepime and Flagyl  -Blood cultures negative x2  -currently afebrile with a normal white blood cell count

## 2022-05-09 NOTE — PROGRESS NOTES
[facility]  Progress Note - Nicholas Solitario 1940, 78 y o  female MRN: [id number]  Unit/Bed#: E5 -01 Encounter: [id number]  Primary Care Provider: Sloan Arredondo MD   Date and time admitted to hospital: 5/5/2022  8:06 AM    * Sepsis [facility])  Assessment & Plan  -Patient presented with sepsis, present on admission, with tachycardia, leukopenia, and lactic acidosis- likely abdominal source with colitis and microperforation with pneumoperitoneum  -patient started on antibiotics with cefepime and Flagyl  -Blood cultures negative x2  -currently afebrile with a normal white blood cell count      Colitis  Assessment & Plan  -patient presented with sepsis  -CT scan on admission revealed new wall thickening of the right colon, may be infectious or inflammatory in etiology  Patient had increase in her small volume ascites and a new small amount of pneumoperitoneum  -patient was started on cefepime/Flagyl  -was evaluated by surgery team for her microperforation: It was noted that she was not a surgical candidate and medical management was recommended    Pneumoperitoneum  Assessment & Plan  -Patient has been reporting abdominal pain, nausea ongoing for the past several weeks, on chemotherapy  -CT scan on admission revealed wall thickening of the right colon, infectious versus inflammatory, and new small amount of pneumoperitoneum, and increase it small volume ascites  -patient started on antibiotics for colitis  -she was evaluated the surgical team who suspected microperforation, and noted that she was too high risk for surgical intervention  -patient has had progressive decline in her status with increasing abdominal pain and distension, lethargy and episodes of apnea  -had family meeting with Palliative Care team, pt's  and son María Armas at the bedside:  Hospice/comfort measure elected    -cont analgesics/anxiolytics    Pleural effusion  Assessment & Plan  New right-sided pleural effusion noted on CT imaging  Patient was placed on 1-2 L with oxygen saturations around 94-95% on admission  Pt underwent thoracentesis on 5/5 with 500cc fluid yielded  Cytology pending    Type 2 diabetes mellitus with hyperglycemia, without long-term current use of insulin [facility])  Assessment & Plan  Lab Results   Component Value Date    HGBA1C 7 8 (H) 01/29/2022       Recent Labs     05/08/22  1118 05/08/22  1626 05/08/22  2120 05/09/22  0718   POCGLU 163* 147* 156* 162*     Patient has history of diabetes type 2, without long-term use of insulin, without complication  Home metformin on hold  Continue Accu-Cheks and sliding scale insulin  Continue to monitor and titrate regimen as needed    SSS (sick sinus syndrome) [facility])  Assessment & Plan  Patient has history of sick sinus syndrome, status post pacemaker      Hypertension  Assessment & Plan  Patient has history of essential hypertension  Presented with sepsis, so home antihypertensive of metoprolol 50 mg q 12 hours as well as p r n  Demadex 20 mg were held on admission due to concerns over precipitating hypotension  Blood pressure remained stable so metoprolol 50 mg q 12 hours was restarted      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 [facility])  Assessment & Plan  Pt has a h/o Stage 4 breast cancer, with mets to the liver, intial diagnosis in 2016  [facility] outpatient, Dr Alexia Wall  Currently on chemotherapy, piqray  faslodex:  recently started   piqray dose decreased recently due to diarrhea  Patient was evaluated by the oncology team:  Appreciate their evaluation and recommendations              Family:  Updated patient's  Sandie Johnson, and son Sandie Johnson at the bedside  Reviewed all test results, and treatment plan    Reviewed update from surgery team     dw pts nurse  dw   [place] surgery team: Dr Nehal Núñez:  Pt is not a surgical candidate, would not survive any surgery for bowel perforation  D/w palliative care: Dr: Óscar Yepez at bedside    VTE Pharmacologic Prophylaxis: Heparin  VTE Mechanical Prophylaxis: sequential compression device        Certification Statement: The patient will continue to require additional inpatient hospital stay due to need for further acute intervention for palliative care/comfort measures    Status: inpatient     ===================================================================    Subjective:  Patient is currently lying in bed, with head of the bed at approximately 75°  Lethargic  Apneic spells noted at times  Patient's family the bedside notes she has gotten progressively worse since admission  They note increasing abdominal pain and distension  Patient's son notes that she called him early this morning, complaining of abdominal pain  He notes she has seems more confused today  She was requesting he give her water  Patient's  at the bedside confirms that he also notes that patient has deteriorated  She relates that she has advanced directives, and that she would not want any life support, or intubation/resuscitation  Patient's  and son note that they feel that her condition has worsened  They verbalize that they have been told she is not a surgical candidate  They relate they wish her to be comfortable, and not be in pain  Physical Exam:   Temp:  [96 4 °F (35 8 °C)-98 3 °F (36 8 °C)] 96 9 °F (36 1 °C)  HR:  [107-128] 107  Resp:  [18] 18  BP: (155)/(80-86) 155/80    Gen:  Lying in bed with the head of the bed at approximately 75°  Lethargic  Apneic spells noted intermittently  Lungs:  Labored respirations  Abd: distended    Neuro:  Lethargic       LABS:   Results from last 7 days   Lab Units 05/08/22  0501 05/07/22  0443 05/06/22  0447   WBC Thousand/uL 9 91 10 59* 10 67*   HEMOGLOBIN g/dL 12 7 13 3 13 0   HEMATOCRIT % 40 3 41 0 42 5   PLATELETS Thousands/uL 149 132* 133*     Results from last 7 days   Lab Units 05/08/22  0501 05/07/22  0443 05/06/22  0447   POTASSIUM mmol/L 3 9 3 6 3 9 CHLORIDE mmol/L 106 104 103   CO2 mmol/L 24 24 23   BUN mg/dL 19 17 17   CREATININE mg/dL 0 88 0 90 1 20   CALCIUM mg/dL 8 2* 8 4 8 5       Hospital Data:  5/5 CT chest/abdomen/pelvis:  1  New small amount of pneumoperitoneum  2   New wall thickening of the right colon may be infectious or inflammatory in etiology  3   Moderate-sized pleural effusion has increased since January 4, 2022  4  Diffuse hepatic metastases  5   Increase small-volume ascites  5/5 CT cervical spine:  1  There is nonspecific straightening of the cervical lordosis without subluxation  2   Advanced spondylosis  3   No acute fracture  4   Moderate right pleural effusion is incompletely imaged     See separate CT of the chest abdomen pelvis    5/5 CT head:  No acute intracranial abnormality  Microangiopathic changes    Microbiology  5/5:  Blood culture:  Negative x2  5/5 body fluid culture:  No growth    Procedure  5/5:  Thoracentesis:  1  Right ultrasound-guided thoracentesis yielding 600cc clear yellow pleural fluid    ---------------------------------------------------------------------------------------------------------------  This note has been constructed using a voice recognition system

## 2022-05-09 NOTE — ASSESSMENT & PLAN NOTE
Pt has a h/o Stage 4 breast cancer, with mets to the liver, intial diagnosis in 2016  [facility] outpatient, Dr Noel Daniel  Currently on chemotherapy, piqray  faslodex:  recently started   piqray dose decreased recently due to diarrhea  Patient was evaluated by the oncology team:  Appreciate their evaluation and recommendations

## 2022-05-09 NOTE — ASSESSMENT & PLAN NOTE
Patient has history of essential hypertension  Presented with sepsis, so home antihypertensive of metoprolol 50 mg q 12 hours as well as p r n   Demadex 20 mg were held on admission due to concerns over precipitating hypotension  Blood pressure remained stable so metoprolol 50 mg q 12 hours was restarted

## 2022-05-09 NOTE — ASSESSMENT & PLAN NOTE
-Patient has been reporting abdominal pain, nausea ongoing for the past several weeks, on chemotherapy  -CT scan on admission revealed wall thickening of the right colon, infectious versus inflammatory, and new small amount of pneumoperitoneum, and increase it small volume ascites  -patient started on antibiotics for colitis  -she was evaluated the surgical team who suspected microperforation, and noted that she was too high risk for surgical intervention  -patient has had progressive decline in her status with increasing abdominal pain and distension, lethargy and episodes of apnea  -had family meeting with Palliative Care team, pt's  and son Sandie Johnson at the bedside:  Hospice/comfort measure elected    -cont analgesics/anxiolytics

## 2022-05-09 NOTE — ASSESSMENT & PLAN NOTE
New right-sided pleural effusion noted on CT imaging  Patient was placed on 1-2 L with oxygen saturations around 94-95% on admission  Pt underwent thoracentesis on 5/5 with 500cc fluid yielded    Cytology pending

## 2022-05-09 NOTE — ASSESSMENT & PLAN NOTE
-patient presented with sepsis  -CT scan on admission revealed new wall thickening of the right colon, may be infectious or inflammatory in etiology  Patient had increase in her small volume ascites and a new small amount of pneumoperitoneum  -patient was started on cefepime/Flagyl  -was evaluated by surgery team for her microperforation:   It was noted that she was not a surgical candidate and medical management was recommended

## 2022-05-09 NOTE — TELEPHONE ENCOUNTER
MSW called pts PCP and left a detailed message asking for her living will to be faxed to me  I also provided the palliative care office number for any questions

## 2022-05-09 NOTE — PLAN OF CARE
Problem: Potential for Falls  Goal: Patient will remain free of falls  Description: INTERVENTIONS:  - Educate patient/family on patient safety including physical limitations  - Instruct patient to call for assistance with activity   - Consult OT/PT to assist with strengthening/mobility   - Keep Call bell within reach  - Keep bed low and locked with side rails adjusted as appropriate  - Keep care items and personal belongings within reach  - Initiate and maintain comfort rounds  - Make Fall Risk Sign visible to staff  - Offer Toileting every 2 Hours, in advance of need  - Initiate/Maintain bed alarm  - Apply yellow socks and bracelet for high fall risk patients  - Consider moving patient to room near nurses station  Outcome: Progressing     Problem: MUSCULOSKELETAL - ADULT  Goal: Maintain or return mobility to safest level of function  Description: INTERVENTIONS:  - Assess patient's ability to carry out ADLs; assess patient's baseline for ADL function and identify physical deficits which impact ability to perform ADLs (bathing, care of mouth/teeth, toileting, grooming, dressing, etc )  - Assess/evaluate cause of self-care deficits   - Assess range of motion  - Assess patient's mobility  - Assess patient's need for assistive devices and provide as appropriate  - Encourage maximum independence but intervene and supervise when necessary  - Involve family in performance of ADLs  - Assess for home care needs following discharge   - Consider OT consult to assist with ADL evaluation and planning for discharge  - Provide patient education as appropriate  Outcome: Progressing  Goal: Maintain proper alignment of affected body part  Description: INTERVENTIONS:  - Support, maintain and protect limb and body alignment  - Provide patient/ family with appropriate education  Outcome: Progressing     Problem: PAIN - ADULT  Goal: Verbalizes/displays adequate comfort level or baseline comfort level  Description: Interventions:  - Encourage patient to monitor pain and request assistance  - Assess pain using appropriate pain scale  - Administer analgesics based on type and severity of pain and evaluate response  - Implement non-pharmacological measures as appropriate and evaluate response  - Consider cultural and social influences on pain and pain management  - Notify physician/advanced practitioner if interventions unsuccessful or patient reports new pain  Outcome: Progressing     Problem: INFECTION - ADULT  Goal: Absence or prevention of progression during hospitalization  Description: INTERVENTIONS:  - Assess and monitor for signs and symptoms of infection  - Monitor lab/diagnostic results  - Monitor all insertion sites, i e  indwelling lines, tubes, and drains  - Monitor endotracheal if appropriate and nasal secretions for changes in amount and color  - [place] appropriate cooling/warming therapies per order  - Administer medications as ordered  - Instruct and encourage patient and family to use good hand hygiene technique  - Identify and instruct in appropriate isolation precautions for identified infection/condition  Outcome: Progressing  Goal: Absence of fever/infection during neutropenic period  Description: INTERVENTIONS:  - Monitor WBC    Outcome: Progressing     Problem: SAFETY ADULT  Goal: Patient will remain free of falls  Description: INTERVENTIONS:  - Educate patient/family on patient safety including physical limitations  - Instruct patient to call for assistance with activity   - Consult OT/PT to assist with strengthening/mobility   - Keep Call bell within reach  - Keep bed low and locked with side rails adjusted as appropriate  - Keep care items and personal belongings within reach  - Initiate and maintain comfort rounds  - Make Fall Risk Sign visible to staff  - Offer Toileting every 2 Hours, in advance of need  - Initiate/Maintain bed alarm  - Apply yellow socks and bracelet for high fall risk patients  - Consider moving patient to room near nurses station  Outcome: Progressing  Goal: Maintain or return to baseline ADL function  Description: INTERVENTIONS:  -  Assess patient's ability to carry out ADLs; assess patient's baseline for ADL function and identify physical deficits which impact ability to perform ADLs (bathing, care of mouth/teeth, toileting, grooming, dressing, etc )  - Assess/evaluate cause of self-care deficits   - Assess range of motion  - Assess patient's mobility; develop plan if impaired  - Assess patient's need for assistive devices and provide as appropriate  - Encourage maximum independence but intervene and supervise when necessary  - Involve family in performance of ADLs  - Assess for home care needs following discharge   - Consider OT consult to assist with ADL evaluation and planning for discharge  - Provide patient education as appropriate  Outcome: Progressing  Goal: Maintains/Returns to pre admission functional level  Description: INTERVENTIONS:  - Perform BMAT or MOVE assessment daily    - Set and communicate daily mobility goal to care team and patient/family/caregiver     - Collaborate with rehabilitation services on mobility goals if consulted  - Out of bed for meals 3 times a day  - Out of bed for toileting  - Record patient progress and toleration of activity level   Outcome: Progressing     Problem: DISCHARGE PLANNING  Goal: Discharge to home or other facility with appropriate resources  Description: INTERVENTIONS:  - Identify barriers to discharge w/patient and caregiver  - Arrange for needed discharge resources and transportation as appropriate  - Identify discharge learning needs (meds, wound care, etc )  - Arrange for interpretive services to assist at discharge as needed  - Refer to Case Management Department for coordinating discharge planning if the patient needs post-hospital services based on physician/advanced practitioner order or complex needs related to functional status, cognitive ability, or social support system  Outcome: Progressing     Problem: Knowledge Deficit  Goal: Patient/family/caregiver demonstrates understanding of disease process, treatment plan, medications, and discharge instructions  Description: Complete learning assessment and assess knowledge base  Interventions:  - Provide teaching at level of understanding  - Provide teaching via preferred learning methods  Outcome: Progressing     Problem: MOBILITY - ADULT  Goal: Maintain or return to baseline ADL function  Description: INTERVENTIONS:  -  Assess patient's ability to carry out ADLs; assess patient's baseline for ADL function and identify physical deficits which impact ability to perform ADLs (bathing, care of mouth/teeth, toileting, grooming, dressing, etc )  - Assess/evaluate cause of self-care deficits   - Assess range of motion  - Assess patient's mobility; develop plan if impaired  - Assess patient's need for assistive devices and provide as appropriate  - Encourage maximum independence but intervene and supervise when necessary  - Involve family in performance of ADLs  - Assess for home care needs following discharge   - Consider OT consult to assist with ADL evaluation and planning for discharge  - Provide patient education as appropriate  Outcome: Progressing  Goal: Maintains/Returns to pre admission functional level  Description: INTERVENTIONS:  - Perform BMAT or MOVE assessment daily    - Set and communicate daily mobility goal to care team and patient/family/caregiver     - Collaborate with rehabilitation services on mobility goals if consulted  - Out of bed for meals 3 times a day  - Out of bed for toileting  - Record patient progress and toleration of activity level   Outcome: Progressing     Problem: Nutrition/Hydration-ADULT  Goal: Nutrient/Hydration intake appropriate for improving, restoring or maintaining nutritional needs  Description: Monitor and assess patient's nutrition/hydration status for malnutrition  Collaborate with interdisciplinary team and initiate plan and interventions as ordered  Monitor patient's weight and dietary intake as ordered or per policy  Utilize nutrition screening tool and intervene as necessary  Determine patient's food preferences and provide high-protein, high-caloric foods as appropriate       INTERVENTIONS:  - Monitor oral intake, urinary output, labs, and treatment plans  - Assess nutrition and hydration status and recommend course of action  - Evaluate amount of meals eaten  - Assist patient with eating if necessary   - Allow adequate time for meals  - Recommend/ encourage appropriate diets, oral nutritional supplements, and vitamin/mineral supplements  - Order, calculate, and assess calorie counts as needed  - Recommend, monitor, and adjust tube feedings and TPN/PPN based on assessed needs  - Assess need for intravenous fluids  - Provide specific nutrition/hydration education as appropriate  - Include patient/family/caregiver in decisions related to nutrition  Outcome: Progressing     Problem: Prexisting or High Potential for Compromised Skin Integrity  Goal: Skin integrity is maintained or improved  Description: INTERVENTIONS:  - Identify patients at risk for skin breakdown  - Assess and monitor skin integrity  - Assess and monitor nutrition and hydration status  - Monitor labs   - Assess for incontinence   - Turn and reposition patient  - Assist with mobility/ambulation  - Relieve pressure over bony prominences  - Avoid friction and shearing  - Provide appropriate hygiene as needed including keeping skin clean and dry  - Evaluate need for skin moisturizer/barrier cream  - Collaborate with interdisciplinary team   - Patient/family teaching  - Consider wound care consult   Outcome: Progressing

## 2022-05-09 NOTE — PROGRESS NOTES
Progress Note - Palliative & Supportive Care  Landing Ego  78 y o   female  [address] [address] [id number] Pieter Hsu-*   MRN: [id number]  Encounter: [id number]     Assessment/Plan:  1  Sepsis  2  Colitis  3  Pneumoperitoneum  4  Pleural effusion s/p thoracentesis 5/5, cytology pending  5  Stage IV breast cancer with diffuse mets to liver  6  Abdominal pain, generalized pain  7  Goals of Care  -transition to comfort measures  -change code status to level 4  -d/c IV antibiotics, d/w family  -will order comfort medications   -patient with periods of intermittent breath pause-will monitor overnight and determine stability for possible hospice eval/[facility] hospice IPU tomorrow; family prefers to keep patient here for now; prognosis hours to days  -d/w nursing  -d/w CM  -d/w primary  -primary will contact [employer] to deactivate ICD/pacemaker  -please tigertext palliative on call with any questions, concerns, or medication adjustment needs    Approached by nursing staff prior to entering room  Family upset asking for update on patient's condition  Per nursing, patient declining  Increased abdominal pain, difficulty swallowing, intermittent confusion  Entered room with /Ruben sitting and son/Ruben standing at bedside  Frustrated and upset that have not received medical update over weekend  See that patient has declined  Patient called son this am upset, worried people were in her room and requesting he bring her water  Family believes she has recognized them  State she said she was dying and just wanted to go off to sleep  Said "mckayla" multiple times to family  Intermittently opens eyes and cries out "dry", requests water  Moans and shakes head "no"  Glazed over  Intermittently responds to questions but does not consistently follow commands or engage in goals of care discussion  /son do not want to see her suffer, but want a medical update prior to discussing options further   Hospitalist/Dr Marlena Garnica provided update to family to their satisfaction  We discussed concern that patient is at end of life given current conditon and further treatment focused care would prolong her dying process and not reverse her underlying metastatic breast cancer   reports that patient has "beaten" the cancer before, but this is different  Discussed the colitis and pneumoperitoneum  Discussed that surgery is not being offered  Patient's abdominal pain and condition has worsened, indicating that pneumoperitoneum/colitis has likely worsened  Son reports patient stated she was being moved today and [facility]  Discussed option of transitioning to comfort with/without hospice support  Given condition and need for frequent nursing assessment and IV medications, patient would likely qualify for hospice IPU  In discussing process,  seemed apprehensive in moving patient  /[facility] 's [facility]  However, prefer to monitor patient in hospital overnight due to rapidity of decline and comfort level with being at [facility]  Discussed code status at length   states patient wanted resuscitated if there was a chance of meaningful recovery but did not want to be prolonged on machines when they discussed for their living hall  He spoke of her fighting spirit throughout the years  Given current condition, family understand that remaining full code would prolong the dying process and cause additional suffering  They do not want patient to suffer any longer  They are agreeable to DNAR/DNI, transitioning to focus of comfort, starting comfort measures now  We will discontinue IV antibiotics and IVF  They are agreeable to  support for prayer and comfort for the patient  They will also contact her  at [facility], as well as additional family  Emotional support and validation provided  * f/u with patient, family, nursing   Nursing reports discomfort with repositioning and inserting Marc  Hydromorphone 0 5mg IV dilaudid given   and son at bedside  State patient yelling out and sitting up prior to administration of pain medication  She did not seem to recognize them  Patient resting comfortable now  Discussed comfort medications and how often will be given  Office contact provided to family  Nursing will contact if need medication adjustments  Family appreciative of f/u  Code status: Level 1, full code; change to level 4, comfort measures    Subjective:  Patient seen and examined  Moaning  Confused  Intermittently opens eyes and asks for water  Appears uncomfortable  Hard of hearing  Answers questions intermittently  Unable to participate in goals of care discussion      Received:  Hydromorphone 4 2mg IV/last 24 hours = 84mg approx OME  Last BM: this am, incontinent    Medications    Current Facility-Administered Medications:     acetaminophen (TYLENOL) tablet 650 mg, 650 mg, Oral, Q4H PRN, Monse Pitts PA-C, 650 mg at 05/05/22 2324    cefepime (MAXIPIME) 1 g/50 mL dextrose IVPB, 1,000 mg, Intravenous, Q12H, Chago Christian MD, Last Rate: 100 mL/hr at 05/09/22 1031, 1,000 mg at 05/09/22 1031    heparin (porcine) subcutaneous injection 5,000 Units, 5,000 Units, Subcutaneous, Q8H [facility], Chago Christian MD, 5,000 Units at 05/08/22 2248    HYDROmorphone (DILAUDID) injection 0 5 mg, 0 5 mg, Intravenous, Q6H PRN, Chago Christian MD, 0 5 mg at 05/09/22 1116    HYDROmorphone (DILAUDID) injection 1 mg, 1 mg, Intravenous, Q4H PRN, Chago Christian MD, 1 mg at 05/09/22 0857    HYDROmorphone HCl (DILAUDID) injection 0 2 mg, 0 2 mg, Intravenous, Q4H PRN, Chago Christian MD, 0 2 mg at 05/08/22 2022    insulin lispro (HumaLOG) 100 units/mL subcutaneous injection 1-5 Units, 1-5 Units, Subcutaneous, TID With Meals, 1 Units at 05/09/22 0722 **AND** Fingerstick Glucose (POCT), , , 4x Daily AC and at bedtime, [employer], CRNP    metoprolol tartrate (LOPRESSOR) tablet 50 mg, 50 mg, Oral, Q12H [address], Cipriano Vaughn MD, 50 mg at 05/09/22 0843    metroNIDAZOLE (FLAGYL) IVPB (premix) 500 mg 100 mL, 500 mg, Intravenous, Q8H, Lizbeth Lion MD, Last Rate: 200 mL/hr at 05/09/22 1112, 500 mg at 05/09/22 1112    nystatin (MYCOSTATIN) powder, , Topical, BID, Alice Gerber PA-C, Given at 05/09/22 0844    ondansetron (ZOFRAN) injection 4 mg, 4 mg, Intravenous, Q6H PRN, Lizbeth Lion MD    pantoprazole (PROTONIX) injection 40 mg, 40 mg, Intravenous, Q12H [address], Lizbeth Lion MD, 40 mg at 05/09/22 0843    sodium chloride 0 9 % infusion, 75 mL/hr, Intravenous, Continuous, Cali Lux, DO, Last Rate: 75 mL/hr at 05/09/22 0722, 75 mL/hr at 05/09/22 8909    Objective:  /80   Pulse (!) 107   Temp (!) 96 9 °F (36 1 °C)   Resp 18   Ht 5' 1" (1 549 m)   Wt 96 2 kg (212 lb 1 3 oz)   LMP  (LMP Unknown) Comment: post   SpO2 96%   BMI 40 07 kg/m²   Physical Exam:  General: uncomfortable, chronically ill appearing, mouth agape  Neurological: confused, intermittently responds to questions  Cardiovascular: tachy  Respiratory: on 2L NC, labored at times with pauses  Gastrointestinal: distended, diffusely tender, upper mid-abdomen hard  Musculoskeletal: + pitting edema  Skin: pale  Psychiatric: unable to assess     Lab Results: I have personally reviewed pertinent labs  , CBC: No results found for: WBC, HGB, HCT, MCV, PLT, ADJUSTEDWBC, MCH, MCHC, RDW, MPV, NRBC, CMP: No results found for: SODIUM, K, CL, CO2, ANIONGAP, BUN, CREATININE, GLUCOSE, CALCIUM, AST, ALT, ALKPHOS, PROT, BILITOT, EGFR, PT/PTT:No results found for: PT, PTT    Counseling / Coordination of Care  Total floor / unit time spent today 120 minutes 11:30am-1pm, 2:30-3pm   Greater than 50% of total time was spent with the patient and / or family counseling and / or coordinating of care  A description of the counseling / coordination of care: current condition, hospice discussion, code status, goals of care, transition to comfort       Bharathi German Caesar, DO  Palliative & Supportive Care

## 2022-05-09 NOTE — ASSESSMENT & PLAN NOTE
Lab Results   Component Value Date    HGBA1C 7 8 (H) 01/29/2022       Recent Labs     05/08/22  1118 05/08/22  1626 05/08/22  2120 05/09/22  0718   POCGLU 163* 147* 156* 162*     Patient has history of diabetes type 2, without long-term use of insulin, without complication  Home metformin on hold  Continue Accu-Cheks and sliding scale insulin  Continue to monitor and titrate regimen as needed

## 2022-05-10 NOTE — PROGRESS NOTES
Pastoral Care Progress Note    5/10/2022  Patient: Paty Sahu : 1940  Admission Date & Time: 2022 0806  MRN: [id number] CSN: [id number]      Patient is on comforted care,  and sister at bedside  Supported family with listening support and prayer for patient and family  Anamaria Allan should be coming in this afternoon to see patient and family, will continue to follow patient and support family

## 2022-05-10 NOTE — ASSESSMENT & PLAN NOTE
Pt has a h/o Stage 4 breast cancer, with mets to the liver, intial diagnosis in 2016  [facility] outpatient, Dr Cecy Lake  Currently on chemotherapy, piqray/faslodex:  recently started   piqray dose decreased recently due to diarrhea  Patient was evaluated by the oncology team during her hospitalization  Patient family of currently elected hospice care

## 2022-05-10 NOTE — ASSESSMENT & PLAN NOTE
New right-sided pleural effusion noted on CT imaging on admission  Pt underwent thoracentesis on 5/5 with 500cc fluid yielded      Cytology:  Suspicious for metastatic adenocarcinoma

## 2022-05-10 NOTE — PLAN OF CARE
Problem: Potential for Falls  Goal: Patient will remain free of falls  Description: INTERVENTIONS:  - Educate patient/family on patient safety including physical limitations  - Instruct patient to call for assistance with activity   - Consult OT/PT to assist with strengthening/mobility   - Keep Call bell within reach  - Keep bed low and locked with side rails adjusted as appropriate  - Keep care items and personal belongings within reach  - Initiate and maintain comfort rounds  - Make Fall Risk Sign visible to staff  - Apply yellow socks and bracelet for high fall risk patients    Outcome: Progressing     Problem: MUSCULOSKELETAL - ADULT  Goal: Maintain or return mobility to safest level of function  Description: INTERVENTIONS:  - Assess patient's ability to carry out ADLs; assess patient's baseline for ADL function and identify physical deficits which impact ability to perform ADLs (bathing, care of mouth/teeth, toileting, grooming, dressing, etc )  - Assess/evaluate cause of self-care deficits   - Assess range of motion  - Assess patient's mobility  - Assess patient's need for assistive devices and provide as appropriate  - Encourage maximum independence but intervene and supervise when necessary  - Involve family in performance of ADLs  - Assess for home care needs following discharge   - Consider OT consult to assist with ADL evaluation and planning for discharge  - Provide patient education as appropriate  5/10/2022 0753 by Christy Frye RN  Outcome: Progressing  5/10/2022 0749 by Christy Frye RN  Outcome: Not Progressing  Goal: Maintain proper alignment of affected body part  Description: INTERVENTIONS:  - Support, maintain and protect limb and body alignment  - Provide patient/ family with appropriate education  Outcome: Progressing     Problem: PAIN - ADULT  Goal: Verbalizes/displays adequate comfort level or baseline comfort level  Description: Interventions:  - Encourage patient to monitor pain and request assistance  - Assess pain using appropriate pain scale  - Administer analgesics based on type and severity of pain and evaluate response  - Implement non-pharmacological measures as appropriate and evaluate response  - Consider cultural and social influences on pain and pain management  - Notify physician/advanced practitioner if interventions unsuccessful or patient reports new pain  Outcome: Progressing     Problem: INFECTION - ADULT  Goal: Absence or prevention of progression during hospitalization  Description: INTERVENTIONS:  - Assess and monitor for signs and symptoms of infection  - Monitor lab/diagnostic results  - Monitor all insertion sites, i e  indwelling lines, tubes, and drains  - Monitor endotracheal if appropriate and nasal secretions for changes in amount and color  - [place] appropriate cooling/warming therapies per order  - Administer medications as ordered  - Instruct and encourage patient and family to use good hand hygiene technique    Outcome: Progressing  Goal: Absence of fever/infection during neutropenic period  Description: INTERVENTIONS:  - Monitor WBC    Outcome: Progressing     Problem: SAFETY ADULT  Goal: Patient will remain free of falls  Description: INTERVENTIONS:  - Educate patient/family on patient safety including physical limitations  - Instruct patient to call for assistance with activity   - Consult OT/PT to assist with strengthening/mobility   - Keep Call bell within reach  - Keep bed low and locked with side rails adjusted as appropriate  - Keep care items and personal belongings within reach  - Initiate and maintain comfort rounds  - Make Fall Risk Sign visible to staff  - Apply yellow socks and bracelet for high fall risk patients    Outcome: Progressing  Goal: Maintain or return to baseline ADL function  Description: INTERVENTIONS:  - Keep Call bell within reach  - Keep bed low and locked with side rails adjusted as appropriate  - Keep care items and personal belongings within reach  - Initiate and maintain comfort rounds  - Make Fall Risk Sign visible to staff  - Initiate/Maintain bed alarm  - Apply yellow socks and bracelet for high fall risk patients    Outcome: Progressing  Goal: Maintains/Returns to pre admission functional level  Description: INTERVENTIONS:  - Perform BMAT or MOVE assessment daily    - Set and communicate daily mobility goal to care team and patient/family/caregiver  - Collaborate with rehabilitation services on mobility goals if consulted  - Out of bed for meals 3 times a day  - Out of bed for toileting  - Record patient progress and toleration of activity level   Outcome: Progressing     Problem: Knowledge Deficit  Goal: Patient/family/caregiver demonstrates understanding of disease process, treatment plan, medications, and discharge instructions  Description: Complete learning assessment and assess knowledge base  Interventions:  - Provide teaching at level of understanding  - Provide teaching via preferred learning methods  Outcome: Progressing     Problem: MOBILITY - ADULT  Goal: Maintain or return to baseline ADL function  Description: INTERVENTIONS:  - Keep Call bell within reach  - Keep bed low and locked with side rails adjusted as appropriate  - Keep care items and personal belongings within reach  - Initiate and maintain comfort rounds  - Make Fall Risk Sign visible to staff  - Initiate/Maintain bed alarm  - Apply yellow socks and bracelet for high fall risk patients    Outcome: Progressing  Goal: Maintains/Returns to pre admission functional level  Description: INTERVENTIONS:  - Perform BMAT or MOVE assessment daily    - Set and communicate daily mobility goal to care team and patient/family/caregiver     - Collaborate with rehabilitation services on mobility goals if consulted  - Out of bed for meals 3 times a day  - Out of bed for toileting  - Record patient progress and toleration of activity level   Outcome: Progressing     Problem: Nutrition/Hydration-ADULT  Goal: Nutrient/Hydration intake appropriate for improving, restoring or maintaining nutritional needs  Description: Monitor and assess patient's nutrition/hydration status for malnutrition  Collaborate with interdisciplinary team and initiate plan and interventions as ordered  Monitor patient's weight and dietary intake as ordered or per policy  Utilize nutrition screening tool and intervene as necessary  Determine patient's food preferences and provide high-protein, high-caloric foods as appropriate  INTERVENTIONS:  - Monitor oral intake, urinary output, labs, and treatment plans  - Provide specific nutrition/hydration education as appropriate  - Include patient/family/caregiver in decisions related to nutrition  Outcome: Progressing     Problem: Prexisting or High Potential for Compromised Skin Integrity  Goal: Skin integrity is maintained or improved  Description: INTERVENTIONS:  - Identify patients at risk for skin breakdown  - Assess and monitor skin integrity  - Assess and monitor nutrition and hydration status  - Assess for incontinence   - Turn and reposition patient  - Relieve pressure over bony prominences  - Avoid friction and shearing  - Provide appropriate hygiene as needed including keeping skin clean and dry  - Evaluate need for skin moisturizer/barrier cream  - Collaborate with interdisciplinary team   - Patient/family teaching    Outcome: Progressing     Problem: Pain  Goal: Patient/caregiver/family will verbalize satisfaction with the patient's level of comfort and symptom control  Description: Patient/caregiver/family will verbalize satisfaction with the patient's level of comfort and symptom control  Uncontrolled pain will be resolved within 2 hours of exacerbation throughout hospice care    Outcome: Progressing

## 2022-05-10 NOTE — ASSESSMENT & PLAN NOTE
Lab Results   Component Value Date    HGBA1C 7 8 (H) 01/29/2022       Recent Labs     05/08/22  1118 05/08/22  1626 05/08/22  2120 05/09/22  0718   POCGLU 163* 147* 156* 162*     Patient has history of diabetes type 2, without long-term use of insulin, without complication  Home metformin on hold  Patient was initially treated with Accu-Cheks and sliding scale insulin  Currently on comfort measures

## 2022-05-10 NOTE — CONSULTS
Visited with family of patient, patient resting comfortably prayed with family provided listening support and pastoral care presence   Sister chaplain Zayra

## 2022-05-10 NOTE — NURSING NOTE
Report given to [facility] hospice, pt transported at [email or web address]

## 2022-05-10 NOTE — TREATMENT PLAN
Called RN to f/u on patient  ICD deactivated  Patient resting comfortably after last dose of hydromorphone  Family just left, son to return later this evening  Patient awakened while on phone with nurse  C/o dry mouth, nurse to swab   Now with pain - nurse to give pain medication  HR: 113, RR: 14, SpO2: 94% on O2 NC   Plan:  -continue comfort measures, level 4 code status  -continue comfort medications   -dilaudid 0 5mg IV q1h prn pain, sob   -ativan 0 5mg IV q4h prn anxiety, agitation, n/v   -ativan 1mg IV q10min prn seizure activity   -haldol 0 5mg IV q2h prn agitation, vomiting   -robinul 0 1mg IV q4h prn pulm secretions   -zofran 4mg IV q6h prn n/v   -tylenol supp 650mg pr q4h prn fever   -dulcolax supp 10mg pr daily prn constipation  -continue to assess vitals - HR/RR/SpO2 q shift and prn  -continue to turn/reposition patient q2h and prn  -nurse will tigertext palliative on call for medication adjustment needs  -if patient stable in am, rec poss [facility] hospice [facility] referral if family agrees    Lata Ribeiro DO  Palliative and Supportive Care  [phone]

## 2022-05-10 NOTE — ASSESSMENT & PLAN NOTE
-Patient presented with sepsis, present on admission, with tachycardia, leukopenia, and lactic acidosis- likely abdominal source with colitis and microperforation with pneumoperitoneum  -patient started on antibiotics with cefepime and Flagyl  -Blood cultures negative x2  -currently afebrile with a normal white blood cell count  -patient's condition declined, and patient and her family have elected hospice care

## 2022-05-10 NOTE — PLAN OF CARE
Problem: Nutrition/Hydration-ADULT  Goal: Nutrient/Hydration intake appropriate for improving, restoring or maintaining nutritional needs  Description: Monitor and assess patient's nutrition/hydration status for malnutrition  Collaborate with interdisciplinary team and initiate plan and interventions as ordered  Monitor patient's weight and dietary intake as ordered or per policy  Utilize nutrition screening tool and intervene as necessary  Determine patient's food preferences and provide high-protein, high-caloric foods as appropriate       INTERVENTIONS:  - Monitor oral intake, urinary output, labs, and treatment plans  - Provide specific nutrition/hydration education as appropriate  - Include patient/family/caregiver in decisions related to nutrition  Outcome: Not Progressing

## 2022-05-10 NOTE — PROGRESS NOTES
05/10/22 1100   Clinical Encounter Type   Visited With Family   Routine Visit Introduction   Continue Visiting Yes   Synagogue Encounters   Synagogue Needs Prayer

## 2022-05-10 NOTE — PLAN OF CARE
Problem: Potential for Falls  Goal: Patient will remain free of falls  Description: INTERVENTIONS:  - Educate patient/family on patient safety including physical limitations  - Instruct patient to call for assistance with activity   - Consult OT/PT to assist with strengthening/mobility   - Keep Call bell within reach  - Keep bed low and locked with side rails adjusted as appropriate  - Keep care items and personal belongings within reach  - Initiate and maintain comfort rounds  - Make Fall Risk Sign visible to staff  - Offer Toileting every 2 Hours, in advance of need  - Initiate/Maintain bed alarm  - Apply yellow socks and bracelet for high fall risk patients  - Consider moving patient to room near nurses station  5/9/2022 2238 by Emilie Nj RN  Outcome: Not Progressing  5/9/2022 2238 by Emilie Nj RN  Outcome: Progressing     Problem: MUSCULOSKELETAL - ADULT  Goal: Maintain or return mobility to safest level of function  Description: INTERVENTIONS:  - Assess patient's ability to carry out ADLs; assess patient's baseline for ADL function and identify physical deficits which impact ability to perform ADLs (bathing, care of mouth/teeth, toileting, grooming, dressing, etc )  - Assess/evaluate cause of self-care deficits   - Assess range of motion  - Assess patient's mobility  - Assess patient's need for assistive devices and provide as appropriate  - Encourage maximum independence but intervene and supervise when necessary  - Involve family in performance of ADLs  - Assess for home care needs following discharge   - Consider OT consult to assist with ADL evaluation and planning for discharge  - Provide patient education as appropriate  5/9/2022 2238 by Emilie Nj RN  Outcome: Not Progressing  5/9/2022 2238 by Emilie Nj RN  Outcome: Progressing  Goal: Maintain proper alignment of affected body part  Description: INTERVENTIONS:  - Support, maintain and protect limb and body alignment  - Provide patient/ family with appropriate education  5/9/2022 2238 by Zoë Diggs RN  Outcome: Not Progressing  5/9/2022 2238 by Zoë Diggs RN  Outcome: Progressing     Problem: PAIN - ADULT  Goal: Verbalizes/displays adequate comfort level or baseline comfort level  Description: Interventions:  - Encourage patient to monitor pain and request assistance  - Assess pain using appropriate pain scale  - Administer analgesics based on type and severity of pain and evaluate response  - Implement non-pharmacological measures as appropriate and evaluate response  - Consider cultural and social influences on pain and pain management  - Notify physician/advanced practitioner if interventions unsuccessful or patient reports new pain  5/9/2022 2238 by Zoë Diggs RN  Outcome: Not Progressing  5/9/2022 2238 by Zoë Diggs RN  Outcome: Progressing     Problem: INFECTION - ADULT  Goal: Absence or prevention of progression during hospitalization  Description: INTERVENTIONS:  - Assess and monitor for signs and symptoms of infection  - Monitor lab/diagnostic results  - Monitor all insertion sites, i e  indwelling lines, tubes, and drains  - Monitor endotracheal if appropriate and nasal secretions for changes in amount and color  - [place] appropriate cooling/warming therapies per order  - Administer medications as ordered  - Instruct and encourage patient and family to use good hand hygiene technique  - Identify and instruct in appropriate isolation precautions for identified infection/condition  5/9/2022 2238 by Zoë Diggs RN  Outcome: Not Progressing  5/9/2022 2238 by Zoë Diggs RN  Outcome: Progressing  Goal: Absence of fever/infection during neutropenic period  Description: INTERVENTIONS:  - Monitor WBC    5/9/2022 2238 by Zoë Diggs RN  Outcome: Not Progressing  5/9/2022 2238 by Zoë Diggs RN  Outcome: Progressing     Problem: SAFETY ADULT  Goal: Patient will remain free of falls  Description: INTERVENTIONS:  - Educate patient/family on patient safety including physical limitations  - Instruct patient to call for assistance with activity   - Consult OT/PT to assist with strengthening/mobility   - Keep Call bell within reach  - Keep bed low and locked with side rails adjusted as appropriate  - Keep care items and personal belongings within reach  - Initiate and maintain comfort rounds  - Make Fall Risk Sign visible to staff  - Offer Toileting every 2 Hours, in advance of need  - Initiate/Maintain bed alarm  - Apply yellow socks and bracelet for high fall risk patients  - Consider moving patient to room near nurses station  5/9/2022 2238 by Rere Jewell RN  Outcome: Not Progressing  5/9/2022 2238 by Rere Jewell RN  Outcome: Progressing  Goal: Maintain or return to baseline ADL function  Description: INTERVENTIONS:  -  Assess patient's ability to carry out ADLs; assess patient's baseline for ADL function and identify physical deficits which impact ability to perform ADLs (bathing, care of mouth/teeth, toileting, grooming, dressing, etc )  - Assess/evaluate cause of self-care deficits   - Assess range of motion  - Assess patient's mobility; develop plan if impaired  - Assess patient's need for assistive devices and provide as appropriate  - Encourage maximum independence but intervene and supervise when necessary  - Involve family in performance of ADLs  - Assess for home care needs following discharge   - Consider OT consult to assist with ADL evaluation and planning for discharge  - Provide patient education as appropriate  5/9/2022 2238 by Rere Jewell RN  Outcome: Not Progressing  5/9/2022 2238 by Rere Jewell RN  Outcome: Progressing  Goal: Maintains/Returns to pre admission functional level  Description: INTERVENTIONS:  - Perform BMAT or MOVE assessment daily    - Set and communicate daily mobility goal to care team and patient/family/caregiver     - Collaborate with rehabilitation services on mobility goals if consulted  - Out of bed for meals 3 times a day  - Out of bed for toileting  - Record patient progress and toleration of activity level   5/9/2022 2238 by Cesario Sandoval RN  Outcome: Not Progressing  5/9/2022 2238 by Cesario Sandoval RN  Outcome: Progressing     Problem: DISCHARGE PLANNING  Goal: Discharge to home or other facility with appropriate resources  Description: INTERVENTIONS:  - Identify barriers to discharge w/patient and caregiver  - Arrange for needed discharge resources and transportation as appropriate  - Identify discharge learning needs (meds, wound care, etc )  - Arrange for interpretive services to assist at discharge as needed  - Refer to Case Management Department for coordinating discharge planning if the patient needs post-hospital services based on physician/advanced practitioner order or complex needs related to functional status, cognitive ability, or social support system  5/9/2022 2238 by Cesario Sandoval RN  Outcome: Not Progressing  5/9/2022 2238 by Cesario Sandoval RN  Outcome: Progressing     Problem: Knowledge Deficit  Goal: Patient/family/caregiver demonstrates understanding of disease process, treatment plan, medications, and discharge instructions  Description: Complete learning assessment and assess knowledge base    Interventions:  - Provide teaching at level of understanding  - Provide teaching via preferred learning methods  5/9/2022 2238 by Cesario Sandoval RN  Outcome: Not Progressing  5/9/2022 2238 by Cesario Sandoval RN  Outcome: Progressing     Problem: MOBILITY - ADULT  Goal: Maintain or return to baseline ADL function  Description: INTERVENTIONS:  -  Assess patient's ability to carry out ADLs; assess patient's baseline for ADL function and identify physical deficits which impact ability to perform ADLs (bathing, care of mouth/teeth, toileting, grooming, dressing, etc )  - Assess/evaluate cause of self-care deficits   - Assess range of motion  - Assess patient's mobility; develop plan if impaired  - Assess patient's need for assistive devices and provide as appropriate  - Encourage maximum independence but intervene and supervise when necessary  - Involve family in performance of ADLs  - Assess for home care needs following discharge   - Consider OT consult to assist with ADL evaluation and planning for discharge  - Provide patient education as appropriate  5/9/2022 2238 by Sissy Strong RN  Outcome: Not Progressing  5/9/2022 2238 by Sissy Strong RN  Outcome: Progressing  Goal: Maintains/Returns to pre admission functional level  Description: INTERVENTIONS:  - Perform BMAT or MOVE assessment daily    - Set and communicate daily mobility goal to care team and patient/family/caregiver  - Collaborate with rehabilitation services on mobility goals if consulted  - Out of bed for meals 3 times a day  - Out of bed for toileting  - Record patient progress and toleration of activity level   5/9/2022 2238 by Sissy Strong RN  Outcome: Not Progressing  5/9/2022 2238 by Sissy Strong RN  Outcome: Progressing     Problem: Nutrition/Hydration-ADULT  Goal: Nutrient/Hydration intake appropriate for improving, restoring or maintaining nutritional needs  Description: Monitor and assess patient's nutrition/hydration status for malnutrition  Collaborate with interdisciplinary team and initiate plan and interventions as ordered  Monitor patient's weight and dietary intake as ordered or per policy  Utilize nutrition screening tool and intervene as necessary  Determine patient's food preferences and provide high-protein, high-caloric foods as appropriate       INTERVENTIONS:  - Monitor oral intake, urinary output, labs, and treatment plans  - Assess nutrition and hydration status and recommend course of action  - Evaluate amount of meals eaten  - Assist patient with eating if necessary   - Allow adequate time for meals  - Recommend/ encourage appropriate diets, oral nutritional supplements, and vitamin/mineral supplements  - Order, calculate, and assess calorie counts as needed  - Recommend, monitor, and adjust tube feedings and TPN/PPN based on assessed needs  - Assess need for intravenous fluids  - Provide specific nutrition/hydration education as appropriate  - Include patient/family/caregiver in decisions related to nutrition  5/9/2022 2238 by Mile Fuentes RN  Outcome: Not Progressing  5/9/2022 2238 by Mile Fuentes RN  Outcome: Progressing     Problem: Prexisting or High Potential for Compromised Skin Integrity  Goal: Skin integrity is maintained or improved  Description: INTERVENTIONS:  - Identify patients at risk for skin breakdown  - Assess and monitor skin integrity  - Assess and monitor nutrition and hydration status  - Monitor labs   - Assess for incontinence   - Turn and reposition patient  - Assist with mobility/ambulation  - Relieve pressure over bony prominences  - Avoid friction and shearing  - Provide appropriate hygiene as needed including keeping skin clean and dry  - Evaluate need for skin moisturizer/barrier cream  - Collaborate with interdisciplinary team   - Patient/family teaching  - Consider wound care consult   5/9/2022 2238 by Mile Fuentes RN  Outcome: Not Progressing  5/9/2022 2238 by Mile Fuentes RN  Outcome: Progressing

## 2022-05-10 NOTE — ASSESSMENT & PLAN NOTE
-patient presented with sepsis  -CT scan on admission revealed new wall thickening of the right colon, may be infectious or inflammatory in etiology  Patient had increase in her small volume ascites and a new small amount of pneumoperitoneum  -patient was started on antibiotics cefepime/Flagyl  -was evaluated by surgery team for her microperforation:   It was noted that she was not a surgical candidate and medical management was recommended  -patient was treated with antibiotics and analgesics however her condition progressively worsened, and she has been transition to comfort care

## 2022-05-10 NOTE — ASSESSMENT & PLAN NOTE
Pt has a h/o Stage 4 breast cancer, with mets to the liver, intial diagnosis in 2016  [facility] outpatient, Dr Nga Temple  Currently on chemotherapy, piqray/faslodex:  recently started   piqray dose decreased recently due to diarrhea  Patient was evaluated by the oncology team during her hospitalization  Patient family of currently elected hospice care

## 2022-05-10 NOTE — ASSESSMENT & PLAN NOTE
Patient has history of essential hypertension  Presented with sepsis, so home antihypertensive of metoprolol 50 mg q 12 hours as well as p r n   Demadex 20 mg were held on admission due to concerns over precipitating hypotension  Patient is currently on comfort measures

## 2022-05-10 NOTE — ASSESSMENT & PLAN NOTE
-Patient has been reporting abdominal pain, nausea ongoing for the past several weeks, on chemotherapy  -CT scan on admission revealed wall thickening of the right colon, infectious versus inflammatory, and new small amount of pneumoperitoneum, and increase it small volume ascites  -patient started on antibiotics for colitis  -she was evaluated the surgical team who suspected microperforation, and noted that she was too high risk for surgical intervention  -patient has had progressive decline in her status with increasing abdominal pain and distension, lethargy and episodes of apnea  -had family meeting and hospice/comfort measure were elected    -cont analgesics/anxiolytics

## 2022-05-10 NOTE — PROGRESS NOTES
[address] [place] [address]  Progress Note - Tamar Domingo 1940, 78 y o  female MRN: [id number]  Unit/Bed#: E5 -01 Encounter: [id number]  Primary Care Provider: Flor Cardoso MD   Date and time admitted to hospital: 5/5/2022  8:06 AM    * Sepsis [facility])  Assessment & Plan  -Patient presented with sepsis, present on admission, with tachycardia, leukopenia, and lactic acidosis- likely abdominal source with colitis and microperforation with pneumoperitoneum  -patient started on antibiotics with cefepime and Flagyl  -Blood cultures negative x2  -currently afebrile with a normal white blood cell count  -patient's condition declined, and patient and her family have elected hospice care      Colitis  Assessment & Plan  -patient presented with sepsis  -CT scan on admission revealed new wall thickening of the right colon, may be infectious or inflammatory in etiology  Patient had increase in her small volume ascites and a new small amount of pneumoperitoneum  -patient was started on antibiotics cefepime/Flagyl  -was evaluated by surgery team for her microperforation:   It was noted that she was not a surgical candidate and medical management was recommended  -patient was treated with antibiotics and analgesics however her condition progressively worsened, and she has been transition to comfort care    Pneumoperitoneum  Assessment & Plan  -Patient has been reporting abdominal pain, nausea ongoing for the past several weeks, on chemotherapy  -CT scan on admission revealed wall thickening of the right colon, infectious versus inflammatory, and new small amount of pneumoperitoneum, and increase it small volume ascites  -patient started on antibiotics for colitis  -she was evaluated the surgical team who suspected microperforation, and noted that she was too high risk for surgical intervention  -patient has had progressive decline in her status with increasing abdominal pain and distension, lethargy and episodes of apnea  -had family meeting and hospice/comfort measure were elected  -cont analgesics/anxiolytics    Pleural effusion  Assessment & Plan  New right-sided pleural effusion noted on CT imaging on admission  Pt underwent thoracentesis on 5/5 with 500cc fluid yielded  Cytology:  Suspicious for metastatic adenocarcinoma    Type 2 diabetes mellitus with hyperglycemia, without long-term current use of insulin [facility])  Assessment & Plan  Lab Results   Component Value Date    HGBA1C 7 8 (H) 01/29/2022       Recent Labs     05/08/22  1118 05/08/22  1626 05/08/22  2120 05/09/22  0718   POCGLU 163* 147* 156* 162*     Patient has history of diabetes type 2, without long-term use of insulin, without complication  Home metformin on hold  Patient was initially treated with Accu-Cheks and sliding scale insulin  Currently on comfort measures    SSS (sick sinus syndrome) ([facility] [address] )  Assessment & Plan  Patient has history of sick sinus syndrome, status post pacemaker/ICD      Hypertension  Assessment & Plan  Patient has history of essential hypertension  Presented with sepsis, so home antihypertensive of metoprolol 50 mg q 12 hours as well as p r n  Demadex 20 mg were held on admission due to concerns over precipitating hypotension  Patient is currently on comfort measures      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 [facility])  Assessment & Plan  Pt has a h/o Stage 4 breast cancer, with mets to the liver, intial diagnosis in 2016  [facility] On[facility] outpatient, Dr Ari Leonardo  Currently on chemotherapy, piqray/faslodex:  recently started   piqray dose decreased recently due to diarrhea  Patient was evaluated by the oncology team during her hospitalization  Patient family of currently elected hospice care            Disposition:  Patient's comfort level has improved  Currently appears that pain and anxiety are controlled on this current regimen    Discussed with patient's son options of patient remaining in house to continue comfort measures, or transitioning to inpatient hospice house  Will confer with palliative care team and case management as well      Family:  Updated pt's son Chula Andrea at bedside  He has been present at the bedside throughout the night    dw pts nurse  Will dw     VTE Pharmacologic Prophylaxis: RX contraindicated due to: comfort care  VTE Mechanical Prophylaxis: reason for no mechanical VTE prophylaxis comfort care        Certification Statement: The patient will continue to require additional inpatient hospital stay due to need for further acute intervention for transition to inpatient hospice    Status: inpatient     ===================================================================    Subjective:  Patient is currently sleeping, lying supine with head of bed at 80°  Opens her eyes briefly  Does not make eye contact  Does not communicate verbally or nonverbally  Patient's son notes that earlier in the evening she appeared restless, however after her evening dose of pain medications she seemed comfortable on slept throughout the entire night  He notes she seems more comfortable than yesterday  Update from the night nursing team, patient appeared comfortable on her current medication regimen with anxiolytics and analgesics  Received 3 doses of Dilaudid throughout the night and 1 dose of Ativan      Physical Exam:   HR:  [101-131] 131  Resp:  [14-16] 14    Gen:  Lying supine with head of bed at 80°  Non tachypneic, nonlabored respirations  Heart:  Tachycardic, S1S2 present, no murmur, rub or gallop  Lungs: clear to ausculatation bilaterally  No wheezing, crackles, or rhonchi  No accessory muscle use or respiratory distress  Abd: firm  +distended  Hypoactive bowel sounds present  Neuro:  Somnolent  Opens eyes briefly, but closes them immediately afterwards        LABS:   Results from last 7 days   Lab Units 05/08/22  0501 05/07/22  0443 05/06/22  0447   WBC Thousand/uL 9 91 10 59* 10 67* HEMOGLOBIN g/dL 12 7 13 3 13 0   HEMATOCRIT % 40 3 41 0 42 5   PLATELETS Thousands/uL 149 132* 133*     Results from last 7 days   Lab Units 05/08/22  0501 05/07/22  0443 05/06/22  0447   POTASSIUM mmol/L 3 9 3 6 3 9   CHLORIDE mmol/L 106 104 103   CO2 mmol/L 24 24 23   BUN mg/dL 19 17 17   CREATININE mg/dL 0 88 0 90 1 20   CALCIUM mg/dL 8 2* 8 4 8 5       Hospital Data:  5/5 CT chest/abdomen/pelvis:  1   New small amount of pneumoperitoneum  2   New wall thickening of the right colon may be infectious or inflammatory in etiology  3   Moderate-sized pleural effusion has increased since January 4, 2022  4   Diffuse hepatic metastases  5   Increase small-volume ascites      5/5 CT cervical spine:  1  There is nonspecific straightening of the cervical lordosis without subluxation  2   Advanced spondylosis  3   No acute fracture  4   Moderate right pleural effusion is incompletely imaged  Octavia Boyd separate CT of the chest abdomen pelvis     5/5 CT head:  No acute intracranial abnormality   Microangiopathic changes     Microbiology  5/5:  Blood culture:  Negative x2  5/5 body fluid culture:  No growth     Procedure  5/5:  Thoracentesis:  1  Right ultrasound-guided thoracentesis yielding 600cc clear yellow pleural fluid          ---------------------------------------------------------------------------------------------------------------  This note has been constructed using a voice recognition system

## 2022-05-10 NOTE — DISCHARGE SUMMARY
1 Fabrice[address] Marr Donovan 1940, 78 y o  female MRN: [id number]  Unit/Bed#: E5 -01 Encounter: [id number]  Primary Care Provider: Oliver Dela Cruz MD   Date and time admitted to hospital: 5/5/2022  8:06 AM          Admission Date: 5/5/2022       Discharge Date:  05/10/2022        Primary Diagnoses  Principal Problem:    Sepsis ([place] [address] )  Active Problems:    Pneumoperitoneum    Colitis    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 ([place] [address] )    Hypertension    SSS (sick sinus syndrome) ([place] [address] )    Type 2 diabetes mellitus with hyperglycemia, without long-term current use of insulin (HCC)    Pleural effusion  Resolved Problems:    * No resolved hospital problems  [facility] AND [facility] Course by problem:  Sepsis [facility])  Assessment & Plan  -Patient presented with sepsis, present on admission, with tachycardia, leukopenia, and lactic acidosis- likely abdominal source with colitis and microperforation with pneumoperitoneum  -patient started on antibiotics with cefepime and Flagyl  -Blood cultures negative x2  -currently afebrile with a normal white blood cell count  -patient's condition declined, and patient and her family have elected hospice care        Colitis  Assessment & Plan  -patient presented with sepsis  -CT scan on admission revealed new wall thickening of the right colon, may be infectious or inflammatory in etiology  Patient had increase in her small volume ascites and a new small amount of pneumoperitoneum  -patient was started on antibiotics cefepime/Flagyl  -was evaluated by surgery team for her microperforation:   It was noted that she was not a surgical candidate and medical management was recommended  -patient was treated with antibiotics and analgesics however her condition progressively worsened, and she has been transition to comfort care  -patient be transferred to inpatient hospice today     Pneumoperitoneum  Assessment & Plan  -Patient has been reporting abdominal pain, nausea ongoing for the past several weeks, on chemotherapy  -CT scan on admission revealed wall thickening of the right colon, infectious versus inflammatory, and new small amount of pneumoperitoneum, and increase it small volume ascites  -patient started on antibiotics for colitis  -she was evaluated the surgical team who suspected microperforation, and noted that she was too high risk for surgical intervention  -patient has had progressive decline in her status with increasing abdominal pain and distension, lethargy and episodes of apnea  -had family meeting and hospice/comfort measure were elected  -cont analgesics/anxiolytics     Pleural effusion  Assessment & Plan  New right-sided pleural effusion noted on CT imaging on admission  Pt underwent thoracentesis on 5/5 with 500cc fluid yielded  Cytology:  Suspicious for metastatic adenocarcinoma     Type 2 diabetes mellitus with hyperglycemia, without long-term current use of insulin [facility])  Assessment & Plan        Lab Results   Component Value Date     HGBA1C 7 8 (H) 01/29/2022                Recent Labs     05/08/22  1118 05/08/22  1626 05/08/22  2120 05/09/22  0718   POCGLU 163* 147* 156* 162*      Patient has history of diabetes type 2, without long-term use of insulin, without complication  Home metformin on hold  Patient was initially treated with Accu-Cheks and sliding scale insulin  Currently on comfort measures     SSS (sick sinus syndrome) ([address] )  Assessment & Plan  Patient has history of sick sinus syndrome, status post pacemaker/ICD        Hypertension  Assessment & Plan  Patient has history of essential hypertension  Presented with sepsis, so home antihypertensive of metoprolol 50 mg q 12 hours as well as p r n   Demadex 20 mg were held on admission due to concerns over precipitating hypotension  Patient is currently on comfort measures        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 [facility])  Assessment & Plan  Pt has a h/o Stage 4 breast cancer, with mets to the liver, intial diagnosis in 2016  [facility] outpatient, Dr Tejal Reyes  Currently on chemotherapy, piqray/faslodex:  recently started   piqray dose decreased recently due to diarrhea  Patient was evaluated by the oncology team during her hospitalization  Patient family of currently elected hospice care        Service:  [facility], [facility]  Consulting Providers   Surgery:  Dr Walden  Oncology: SIXTO [place] ;[facility]ITO  Palliative care:  Dr Castañeda    Procedures Performed   None    Hospital Studies:  5/5 CT chest/abdomen/pelvis:  1   New small amount of pneumoperitoneum  2   New wall thickening of the right colon may be infectious or inflammatory in etiology  3   Moderate-sized pleural effusion has increased since January 4, 2022  4   Diffuse hepatic metastases  5   Increase small-volume ascites      5/5 CT cervical spine:  1  There is nonspecific straightening of the cervical lordosis without subluxation  2   Advanced spondylosis  3   No acute fracture  4   Moderate right pleural effusion is incompletely imaged  Carey Sartorius separate CT of the chest abdomen pelvis     5/5 CT head:  No acute intracranial abnormality   Microangiopathic changes     Microbiology  5/5:  Blood culture:  Negative x2  5/5 body fluid culture:  No growth     Procedure  5/5:  Thoracentesis:  1   Right ultrasound-guided thoracentesis yielding 600cc clear yellow pleural fluid     Results from last 7 days   Lab Units 05/08/22  0501 05/07/22  0443 05/06/22  0447   WBC Thousand/uL 9 91 10 59* 10 67*   HEMOGLOBIN g/dL 12 7 13 3 13 0   HEMATOCRIT % 40 3 41 0 42 5   PLATELETS Thousands/uL 149 132* 133*     Results from last 7 days   Lab Units 05/08/22  0501 05/07/22  0443 05/06/22  0447   POTASSIUM mmol/L 3 9 3 6 3 9   CHLORIDE mmol/L 106 104 103   CO2 mmol/L 24 24 23   BUN mg/dL 19 17 17   CREATININE mg/dL 0 88 0 90 1 20   CALCIUM mg/dL 8 2* 8 4 8 5       History and Physical Exam:  Please refer to the Admission H&P note    Discharge Condition: Improved  Discharge Disposition: [address]    Discharge Note and Physical Exam:   Please refer to physical exam from earlier today    Vitals:    05/10/22 1731   BP:    Pulse: (!) 113   Resp:    Temp:    SpO2: 99%         Discharge Medications   Please see Medical Reconciliation Discharge Form    Discharge Follow Up Appointments:   Patient be followed by the hospice team at the inpatient hospice unit    Discharge  Statement   Total Time Spent today including physical exam, discussion with patient and family, and discharge arrangements/care = 45 minutes      This note has been constructed using a voice recognition system

## 2022-05-10 NOTE — CASE MANAGEMENT
Case Management Discharge Planning Note    Patient name aVn Espitia  Location [address] [facility] 5 /E5 -* MRN [id number]  : 1940 Date 5/10/2022       Current Admission Date: 2022  Current Admission Diagnosis:Sepsis [facility])   Patient Active Problem List    Diagnosis Date Noted    Colitis 2022    [facility] 2022    Pneumoperitoneum 2022    Pleural effusion 2022    Thrombocytopenia, unspecified ([facility] ) 2021    Pulmonary embolus ([facility] ) 2021    Type 2 diabetes mellitus with hyperglycemia, without long-term current use of insulin ([facility] ) 2021    Hypercalcemia 2021    Lumbar radiculopathy     [facility] ([facility]ricular tachycardia) ([facility]) 2019    Elevated hemidiaphragm 2019    SSS (sick sinus syndrome) ([facility])     Mixed hyperlipidemia     Cervical spondylosis without myelopathy 2019    Nerve pain - Right 2019    Neck pain 2019    Upper abdominal pain 2019    SIRS (systemic inflammatory response syndrome) ([facility] [address] ) 2019    Lactic acidosis 2019    Abnormal urinalysis 2019    Type 2 diabetes mellitus, without long-term current use of insulin ([facility] [address] ) 2019    Hypertension 2019    Lumbar pain 2018    Spondylolisthesis 2018    Spinal stenosis, lumbar region, with neurogenic claudication     Intermittent complete heart block ([facility] [address] ) 2018    Acute gallstone pancreatitis 2018    Cholecystitis 2018    Acute biliary pancreatitis 2018    Cholangitis 2018    Choledocholithiasis 2018    Chest wall hematoma     Complication associated with cardiac pacemaker lead 2017    Pacemaker complications, subsequent encounter 2017    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 ([facility] ) 2017    Hypercholesteremia     History of total knee replacement 2015    LBBB (left bundle branch block) 2015    Hearing loss 07/16/2014    Gastroesophageal reflux disease without esophagitis 03/22/2010      LOS (days): 5  Geometric Mean LOS (GMLOS) (days): 4 80  Days to GMLOS:-0 3     OBJECTIVE:  Risk of Unplanned Readmission Score: 21         Current admission status: Inpatient   Preferred Pharmacy:   [id number] [address] [place] [id number]  Phone: [phone] Fax: [phone]    [facility] [address] [place] [address]  Phone: [phone] Fax: [phone]    Primary Care Provider: Abbey Aguilar MD    Primary Insurance: [id number] [id number]  Secondary Insurance:     DISCHARGE DETAILS:    Accepting Facility Name, [address] : [facility]  Receiving Facility/Agency Phone Number: [phone]  Facility/Agency Fax Number: [phone]     CM met with patient, spouse and son at bedside  Spouse signed consents for [facility] DNR and POLST forms  Family has spoken with [place] [facility]  Family is agreeable and understand the discharge plans and are aware of the 1900 pickup time via BLS  RN at bedside also aware of arrangements and transport times

## 2022-05-13 NOTE — UTILIZATION REVIEW
Zain Daniel MD   Physician   Hospitalist   Discharge Summary       Signed   Date of Service:  5/10/2022  2:05 PM                 Signed              1 Fabrice Marr Donovan 1940, 78 y o  female MRN: [id number]  Unit/Bed#: E5 -01 Encounter: [id number]  Primary Care Provider: Sloan Arredondo MD   Date and time admitted to hospital: 5/5/2022  8:06 AM              Admission Date: 5/5/2022       Discharge Date:  05/10/2022           Primary Diagnoses  Principal Problem:    Sepsis ([facility] )  Active Problems:    Pneumoperitoneum    Colitis    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 ([facility] )    Hypertension    SSS (sick sinus syndrome) ([place] [address] )    Type 2 diabetes mellitus with hyperglycemia, without long-term current use of insulin (HCC)    Pleural effusion  Resolved Problems:    * No resolved hospital problems   *        Hospital Course by problem:  Sepsis [facility])  Assessment & Plan  -Patient presented with sepsis, present on admission, with tachycardia, leukopenia, and lactic acidosis- likely abdominal source with colitis and microperforation with pneumoperitoneum  -patient started on antibiotics with cefepime and Flagyl  -Blood cultures negative x2  -currently afebrile with a normal white blood cell count  -patient's condition declined, and patient and her family have elected hospice care        Colitis  Assessment & Plan  -patient presented with sepsis  -CT scan on admission revealed new wall thickening of the right colon, may be infectious or inflammatory in etiology   Patient had increase in her small volume ascites and a new small amount of pneumoperitoneum  -patient was started on antibiotics cefepime/Flagyl  -was evaluated by surgery team for her microperforation:  It was noted that she was not a surgical candidate and medical management was recommended  -patient was treated with antibiotics and analgesics however her condition progressively worsened, and she has been transition to comfort care  -patient be transferred to inpatient hospice today     Pneumoperitoneum  Assessment & Plan  -Patient has been reporting abdominal pain, nausea ongoing for the past several weeks, on chemotherapy  -CT scan on admission revealed wall thickening of the right colon, infectious versus inflammatory, and new small amount of pneumoperitoneum, and increase it small volume ascites  -patient started on antibiotics for colitis  -she was evaluated the surgical team who suspected microperforation, and noted that she was too high risk for surgical intervention  -patient has had progressive decline in her status with increasing abdominal pain and distension, lethargy and episodes of apnea  -had family meeting and hospice/comfort measure were elected  -cont analgesics/anxiolytics     Pleural effusion  Assessment & Plan  New right-sided pleural effusion noted on CT imaging on admission  Pt underwent thoracentesis on 5/5 with 500cc fluid yielded     Cytology:  Suspicious for metastatic adenocarcinoma     Type 2 diabetes mellitus with hyperglycemia, without long-term current use of insulin [facility])  Assessment & Plan            Lab Results   Component Value Date     HGBA1C 7 8 (H) 01/29/2022                     Recent Labs     05/08/22  1118 05/08/22  1626 05/08/22  2120 05/09/22  0718   POCGLU 163* 147* 156* 162*      Patient has history of diabetes type 2, without long-term use of insulin, without complication  Home metformin on hold  Patient was initially treated with Accu-Cheks and sliding scale insulin  Currently on comfort measures     SSS (sick sinus syndrome) ([facility] )  Assessment & Plan  Patient has history of sick sinus syndrome, status post pacemaker/ICD        Hypertension  Assessment & Plan  Patient has history of essential hypertension  Presented with sepsis, so home antihypertensive of metoprolol 50 mg q 12 hours as well as p r n   Demadex 20 mg were held on admission due to concerns over precipitating hypotension  Patient is currently on comfort measures        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 [facility])  Assessment & Plan  Pt has a h/o Stage 4 breast cancer, with mets to the liver, intial diagnosis in 2016  [facility] outpatient, Dr Peter Hamman  Currently on chemotherapy, piqray/faslodex:  recently started   piqray dose decreased recently due to diarrhea  Patient was evaluated by the oncology team during her hospitalization  Patient family of currently elected hospice care           Service:  [facility], Dr [facility]      Consulting Providers   Surgery:  Dr Walden  Oncology: C Lanita Osler ;TITO  Palliative care:  Samy Gallardo     Procedures Performed   None     Hospital Studies:  5/5 CT chest/abdomen/pelvis:  1   New small amount of pneumoperitoneum  2   New wall thickening of the right colon may be infectious or inflammatory in etiology  3   Moderate-sized pleural effusion has increased since January 4, 2022  4   Diffuse hepatic metastases  5   Increase small-volume ascites      5/5 CT cervical spine:  1  There is nonspecific straightening of the cervical lordosis without subluxation  2   Advanced spondylosis  3   No acute fracture  4   Moderate right pleural effusion is incompletely imaged  Mary Madden separate CT of the chest abdomen pelvis     5/5 CT head:  No acute intracranial abnormality   Microangiopathic changes     Microbiology  5/5:  Blood culture:  Negative x2  5/5 body fluid culture:  No growth     Procedure  5/5:  Thoracentesis:  1   Right ultrasound-guided thoracentesis yielding 600cc clear yellow pleural fluid     Results from last 7 days   Lab Units 05/08/22  0501 05/07/22  0443 05/06/22  0447   WBC Thousand/uL 9 91 10 59* 10 67*   HEMOGLOBIN g/dL 12 7 13 3 13 0   HEMATOCRIT % 40 3 41 0 42 5   PLATELETS Thousands/uL 149 132* 133*             Results from last 7 days   Lab Units 05/08/22  0501 05/07/22  0443 05/06/22  0447   POTASSIUM mmol/L 3 9 3 6 3 9   CHLORIDE mmol/L 106 104 103   CO2 mmol/L 24 24 23   BUN mg/dL 19 17 17   CREATININE mg/dL 0 88 0 90 1 20   CALCIUM mg/dL 8 2* 8 4 8 5         History and Physical Exam:  Please refer to the Admission H&P note     Discharge Condition: Improved  Discharge Disposition: Non [address]     Discharge Note and Physical Exam:   Please refer to physical exam from earlier today     Vitals:     05/10/22 1731   BP:     Pulse: (!) 113   Resp:     Temp:     SpO2: 99%            Discharge Medications   Please see Medical Reconciliation Discharge Form     Discharge Follow Up Appointments:   Patient be followed by the hospice team at the inpatient hospice unit     Discharge  Statement   Total Time Spent today including physical exam, discussion with patient and family, and discharge arrangements/care = 45 minutes      This note has been constructed using a voice recognition system

## 2022-05-16 NOTE — UTILIZATION REVIEW
Notification of Discharge   This is a Notification of Discharge from our facility [address]  Please be advised that this patient has been discharge from our facility  Below you will find the admission and discharge date and time including the patients disposition  UTILIZATION REVIEW CONTACT:  Inga Onofre  Utilization   Network Utilization Review Department  Phone: [phone] x carefully listen to the prompts  All voicemails are confidential   Email: [email or web address]     PHYSICIAN ADVISORY SERVICES:  FOR [id number] REVIEW - MEDICAL NECESSITY DENIAL  Phone: [phone]  Fax: [phone]  Email: [email or web address]     PRESENTATION DATE: 5/5/2022  8:06 AM    INPATIENT ADMISSION DATE: 5/5/22 11:41 AM   DISCHARGE DATE: 5/10/2022  7:44 PM  DISPOSITION: Non [facility] Non [facility]      IMPORTANT INFORMATION:  Send all requests for admission clinical reviews, approved or denied determinations and any other requests to dedicated fax number below belonging to the campus where the patient is receiving treatment   List of dedicated fax numbers:  [address] DENIALS (Administrative/Medical Necessity) [phone]   [address] (Maternity/NICU/Pediatrics) [phone]   Keyanna Mayer [phone]   130 [facility] [phone]   [address] [place] [address] [phone]   2000 [facility] [address] [place] [address],[address] [phone]   [facility]  [phone]   2205 [facility]  [address] [phone]

## 2023-01-24 NOTE — PROGRESS NOTES
16 Pt arrived to unit without complaint, Faslodex 500mg administered as two 5ml injections (one in each buttocks) slowly over 1 to 2 minutes per injection  Pt tolerated well, left unit in stable condition, AVS provided

## 2025-06-29 NOTE — PROGRESS NOTES
Confirmed with Dr Cristina Kenny nurse that there are no labs required for March 21st 2017 
29-Jun-2025 23:18

## (undated) DEVICE — RETRIEVAL BALLOON CATHETER: Brand: EXTRACTOR™ PRO RX

## (undated) DEVICE — INFLATION DEVICE: Brand: ENCORE 26

## (undated) DEVICE — SPONGE STICK WITH PVP-I: Brand: KENDALL

## (undated) DEVICE — 3M™ STERI-STRIP™ REINFORCED ADHESIVE SKIN CLOSURES, R1547, 1/2 IN X 4 IN (12 MM X 100 MM), 6 STRIPS/ENVELOPE: Brand: 3M™ STERI-STRIP™

## (undated) DEVICE — PLUMEPEN PRO 10FT

## (undated) DEVICE — THE ECHELON FLEX POWERED PLUS ARTICULATING ENDOSCOPIC LINEAR CUTTERS ARE STERILE, SINGLE PATIENT USE INSTRUMENTS THAT SIMULTANEOUSLYCUT AND STAPLE TISSUE. THERE ARE SIX STAGGERED ROWS OF STAPLES, THREE ON EITHER SIDE OF THE CUT LINE. THE ECHELON FLEX 45 POWERED PLUSINSTRUMENTS HAVE A STAPLE LINE THAT IS APPROXIMATELY 45 MM LONG AND A CUT LINE THAT IS APPROXIMATELY 42 MM LONG. THE SHAFT CAN ROTATE FREELYIN BOTH DIRECTIONS AND AN ARTICULATION MECHANISM ENABLES THE DISTAL PORTION OF THE SHAFT TO PIVOT TO FACILITATE LATERAL ACCESS TO THE OPERATIVESITE.THE INSTRUMENTS ARE PACKAGED WITH A PRIMARY LITHIUM BATTERY PACK THAT MUST BE INSTALLED PRIOR TO USE. THERE ARE SPECIFIC REQUIREMENTS FORDISPOSING OF THE BATTERY PACK. REFER TO THE BATTERY PACK DISPOSAL SECTION.THE INSTRUMENTS ARE PACKAGED WITHOUT A RELOAD AND MUST BE LOADED PRIOR TO USE. A STAPLE RETAINING CAP ON THE RELOAD PROTECTS THE STAPLE LEGPOINTS DURING SHIPPING AND TRANSPORTATION. THE INSTRUMENTS’ LOCK-OUT FEATURE IS DESIGNED TO PREVENT A USED OR IMPROPERLY INSTALLED RELOADFROM BEING REFIRED OR AN INSTRUMENT FROM BEING FIRED WITHOUT A RELOAD.: Brand: ECHELON FLEX

## (undated) DEVICE — LIGACLIP MCA MULTIPLE CLIP APPLIERS, 20 MEDIUM CLIPS: Brand: LIGACLIP

## (undated) DEVICE — STERILE MAJOR GENERAL PACK: Brand: CARDINAL HEALTH

## (undated) DEVICE — SPHINCTEROTOME: Brand: DREAMTOME™ RX 44

## (undated) DEVICE — SNAP KOVER: Brand: UNBRANDED

## (undated) DEVICE — SEALANT PATCH 2 X 4 IN FIBRIN TOPICAL EVARREST

## (undated) DEVICE — SUT VICRYL 0 REEL 54 IN J287G

## (undated) DEVICE — GLOVE INDICATOR PI UNDERGLOVE SZ 8 BLUE

## (undated) DEVICE — SUT PROLENE 3-0 SH 36 IN 8522H

## (undated) DEVICE — DRAPE C-ARM BAG/DOME XR ELSTIC OPEN LF

## (undated) DEVICE — BALLOON DILATATION CATHETER: Brand: HURRICANE™ RX

## (undated) DEVICE — FLOSEAL MATRIX IS INDICATED IN SURGICAL PROCEDURES (OTHER THAN IN OPHTHALMIC) AS AN ADJUNCT TO HEMOSTASIS WHEN CONTROL OF BLEEDING BY LIGATURE OR CONVENTIONALPROCEDURES IS INEFFECTIVE OR IMPRACTICAL.: Brand: FLOSEAL HEMOSTATIC MATRIX

## (undated) DEVICE — PROXIMATE PLUS MD MULTI-DIRECTIONAL RELEASE SKIN STAPLERS CONTAINS 35 STAINLESS STEEL STAPLES APPROXIMATE CLOSED DIMENSIONS: 6.9MM X 3.9MM WIDE: Brand: PROXIMATE

## (undated) DEVICE — 3000CC GUARDIAN II: Brand: GUARDIAN

## (undated) DEVICE — SCD SEQUENTIAL COMPRESSION COMFORT SLEEVE MEDIUM KNEE LENGTH: Brand: KENDALL SCD

## (undated) DEVICE — SINGLE USE RETRIEVAL BASKET V: Brand: SINGLE USE RETRIEVAL BASKET V

## (undated) DEVICE — HIGH PERFORMANCE GUIDEWIRE: Brand: DREAMWIRE

## (undated) DEVICE — TRAY FOLEY 16FR URIMETER SURESTEP

## (undated) DEVICE — CHLORAPREP HI-LITE 26ML ORANGE

## (undated) DEVICE — DRAPE TOWEL: Brand: CONVERTORS

## (undated) DEVICE — REM POLYHESIVE ADULT PATIENT RETURN ELECTRODE: Brand: VALLEYLAB

## (undated) DEVICE — DRAPE SHEET THREE QUARTER

## (undated) DEVICE — INTENDED FOR TISSUE SEPARATION, AND OTHER PROCEDURES THAT REQUIRE A SHARP SURGICAL BLADE TO PUNCTURE OR CUT.: Brand: BARD-PARKER SAFETY BLADES SIZE 15, STERILE

## (undated) DEVICE — 3M™ TEGADERM™ TRANSPARENT FILM DRESSING FRAME STYLE, 1628, 6 IN X 8 IN (15 CM X 20 CM), 10/CT 8CT/CASE: Brand: 3M™ TEGADERM™

## (undated) DEVICE — TOWEL SET X-RAY

## (undated) DEVICE — TELFA NON-ADHERENT ABSORBENT DRESSING: Brand: TELFA

## (undated) DEVICE — GLOVE SRG BIOGEL ECLIPSE 8

## (undated) DEVICE — MEDI-VAC YANK SUCT HNDL W/TPRD BULBOUS TIP: Brand: CARDINAL HEALTH

## (undated) DEVICE — ENDOSCOPIC LINEAR CUTTER RELOADS WHITE 2.5 MM: Brand: ECHELON; ENDOPATH

## (undated) DEVICE — STRL UNIVERSAL MINOR GENERAL: Brand: CARDINAL HEALTH

## (undated) DEVICE — GLOVE SRG BIOGEL ECLIPSE 6

## (undated) DEVICE — RX DELIVERY SYSTEM: Brand: NAVIFLEX™ RX DELIVERY SYSTEM

## (undated) DEVICE — RADIFOCUS GLIDEWIRE: Brand: GLIDEWIRE

## (undated) DEVICE — SURGICEL 4 X 8

## (undated) DEVICE — SUT SILK 3-0 SH 30 IN K832H

## (undated) DEVICE — GLOVE SRG BIOGEL 6.5

## (undated) DEVICE — GLOVE SRG BIOGEL ECLIPSE 7

## (undated) DEVICE — SUT POLYESTER TAPE D-G 8618-00

## (undated) DEVICE — GLOVE SRG BIOGEL 7.5

## (undated) DEVICE — INTRO SHEATH PEEL AWAY 7FR

## (undated) DEVICE — GLOVE INDICATOR PI UNDERGLOVE SZ 7 BLUE

## (undated) DEVICE — 3M™ TEGADERM™ TRANSPARENT FILM DRESSING FRAME STYLE, 1626W, 4 IN X 4-3/4 IN (10 CM X 12 CM), 50/CT 4CT/CASE: Brand: 3M™ TEGADERM™

## (undated) DEVICE — GLOVE INDICATOR PI UNDERGLOVE SZ 6.5 BLUE

## (undated) DEVICE — 1200CC GUARDIAN II: Brand: GUARDIAN

## (undated) DEVICE — Device

## (undated) DEVICE — GAUZE SPONGES,16 PLY: Brand: CURITY

## (undated) DEVICE — INTENDED FOR TISSUE SEPARATION, AND OTHER PROCEDURES THAT REQUIRE A SHARP SURGICAL BLADE TO PUNCTURE OR CUT.: Brand: BARD-PARKER SAFETY BLADES SIZE 10, STERILE

## (undated) DEVICE — ASTOUND STANDARD SURGICAL GOWN, XL: Brand: CONVERTORS